# Patient Record
Sex: FEMALE | Race: BLACK OR AFRICAN AMERICAN | Employment: OTHER | URBAN - NONMETROPOLITAN AREA
[De-identification: names, ages, dates, MRNs, and addresses within clinical notes are randomized per-mention and may not be internally consistent; named-entity substitution may affect disease eponyms.]

---

## 2017-01-04 ENCOUNTER — TELEPHONE (OUTPATIENT)
Dept: SURGERY | Age: 72
End: 2017-01-04

## 2017-01-25 ENCOUNTER — TELEPHONE (OUTPATIENT)
Dept: SURGERY | Age: 72
End: 2017-01-25

## 2017-02-21 ENCOUNTER — TELEPHONE (OUTPATIENT)
Dept: SURGERY | Age: 72
End: 2017-02-21

## 2017-02-21 RX ORDER — ANASTROZOLE 1 MG/1
1 TABLET ORAL DAILY
Qty: 90 TABLET | Refills: 0 | Status: SHIPPED | OUTPATIENT
Start: 2017-02-21

## 2017-02-28 ENCOUNTER — INFUSION (OUTPATIENT)
Dept: ONCOLOGY | Facility: HOSPITAL | Age: 72
End: 2017-02-28

## 2017-02-28 ENCOUNTER — LAB (OUTPATIENT)
Dept: ONCOLOGY | Facility: CLINIC | Age: 72
End: 2017-02-28

## 2017-02-28 ENCOUNTER — OFFICE VISIT (OUTPATIENT)
Dept: ONCOLOGY | Facility: CLINIC | Age: 72
End: 2017-02-28

## 2017-02-28 VITALS
HEART RATE: 50 BPM | TEMPERATURE: 97.6 F | DIASTOLIC BLOOD PRESSURE: 50 MMHG | WEIGHT: 235 LBS | OXYGEN SATURATION: 99 % | BODY MASS INDEX: 34.8 KG/M2 | RESPIRATION RATE: 20 BRPM | SYSTOLIC BLOOD PRESSURE: 140 MMHG | HEIGHT: 69 IN

## 2017-02-28 VITALS
RESPIRATION RATE: 18 BRPM | OXYGEN SATURATION: 94 % | DIASTOLIC BLOOD PRESSURE: 70 MMHG | HEIGHT: 69 IN | TEMPERATURE: 97.1 F | BODY MASS INDEX: 34.76 KG/M2 | WEIGHT: 234.7 LBS | HEART RATE: 60 BPM | SYSTOLIC BLOOD PRESSURE: 122 MMHG

## 2017-02-28 DIAGNOSIS — N18.30 ANEMIA OF CHRONIC RENAL FAILURE, STAGE 3 (MODERATE) (HCC): Primary | ICD-10-CM

## 2017-02-28 DIAGNOSIS — D50.9 IRON DEFICIENCY ANEMIA, UNSPECIFIED: Primary | ICD-10-CM

## 2017-02-28 DIAGNOSIS — N18.30 ANEMIA OF CHRONIC RENAL FAILURE, STAGE 3 (MODERATE) (HCC): ICD-10-CM

## 2017-02-28 DIAGNOSIS — D63.1 ANEMIA OF CHRONIC RENAL FAILURE, STAGE 3 (MODERATE) (HCC): Primary | ICD-10-CM

## 2017-02-28 DIAGNOSIS — C50.112 MALIGNANT NEOPLASM OF CENTRAL PORTION OF LEFT FEMALE BREAST (HCC): Primary | ICD-10-CM

## 2017-02-28 DIAGNOSIS — D50.9 IRON DEFICIENCY ANEMIA, UNSPECIFIED IRON DEFICIENCY ANEMIA TYPE: Primary | ICD-10-CM

## 2017-02-28 DIAGNOSIS — D63.1 ANEMIA OF CHRONIC RENAL FAILURE, STAGE 3 (MODERATE) (HCC): ICD-10-CM

## 2017-02-28 LAB
AUTO MIXED CELLS #: 0.4 10*3/UL (ref 0.1–1.5)
AUTO MIXED CELLS %: 7.8 % (ref 0.2–15.1)
ERYTHROCYTE [DISTWIDTH] IN BLOOD BY AUTOMATED COUNT: 17.3 % (ref 11.5–14.5)
HCT VFR BLD AUTO: 30.9 % (ref 37–47)
HGB BLD-MCNC: 9.5 G/DL (ref 12–16)
LYMPHOCYTES # BLD AUTO: 1.6 10*3/MM3 (ref 0.8–7)
LYMPHOCYTES NFR BLD AUTO: 33.1 % (ref 10–58.5)
MCH RBC QN AUTO: 27.8 PG (ref 27–31)
MCHC RBC AUTO-ENTMCNC: 30.7 G/DL (ref 33–37)
MCV RBC AUTO: 90.3 FL (ref 81–99)
NEUTROPHILS # BLD AUTO: 2.9 10*3/MM3 (ref 2–7.8)
NEUTROPHILS NFR BLD AUTO: 59.1 % (ref 37–92)
PLATELET # BLD AUTO: 259 10*3/MM3 (ref 130–400)
PMV BLD AUTO: 7.4 FL (ref 6–12)
RBC # BLD AUTO: 3.42 10*6/MM3 (ref 4.2–5.4)
WBC NRBC COR # BLD: 4.9 10*3/MM3 (ref 4.8–10.8)

## 2017-02-28 PROCEDURE — 36415 COLL VENOUS BLD VENIPUNCTURE: CPT | Performed by: INTERNAL MEDICINE

## 2017-02-28 PROCEDURE — 63510000001 EPOETIN ALFA PER 1000 UNITS: Performed by: INTERNAL MEDICINE

## 2017-02-28 PROCEDURE — 99214 OFFICE O/P EST MOD 30 MIN: CPT | Performed by: INTERNAL MEDICINE

## 2017-02-28 PROCEDURE — 85025 COMPLETE CBC W/AUTO DIFF WBC: CPT | Performed by: INTERNAL MEDICINE

## 2017-02-28 PROCEDURE — 96372 THER/PROPH/DIAG INJ SC/IM: CPT

## 2017-02-28 RX ORDER — IBANDRONATE SODIUM 150 MG/1
150 TABLET, FILM COATED ORAL
Qty: 1 TABLET | Refills: 5 | Status: SHIPPED | OUTPATIENT
Start: 2017-02-28 | End: 2018-05-02

## 2017-02-28 RX ORDER — ANASTROZOLE 1 MG/1
1 TABLET ORAL DAILY
Qty: 30 TABLET | Refills: 5 | Status: SHIPPED | OUTPATIENT
Start: 2017-02-28 | End: 2017-05-30 | Stop reason: SDUPTHER

## 2017-02-28 RX ADMIN — ERYTHROPOIETIN 40000 UNITS: 40000 INJECTION, SOLUTION INTRAVENOUS; SUBCUTANEOUS at 10:26

## 2017-03-28 ENCOUNTER — APPOINTMENT (OUTPATIENT)
Dept: ONCOLOGY | Facility: HOSPITAL | Age: 72
End: 2017-03-28

## 2017-04-27 ENCOUNTER — APPOINTMENT (OUTPATIENT)
Dept: ONCOLOGY | Facility: HOSPITAL | Age: 72
End: 2017-04-27

## 2017-05-04 DIAGNOSIS — C50.212 MALIGNANT NEOPLASM OF UPPER-INNER QUADRANT OF LEFT FEMALE BREAST (HCC): Primary | ICD-10-CM

## 2017-05-05 ENCOUNTER — OFFICE VISIT (OUTPATIENT)
Dept: ONCOLOGY | Facility: CLINIC | Age: 72
End: 2017-05-05

## 2017-05-05 ENCOUNTER — LAB (OUTPATIENT)
Dept: ONCOLOGY | Facility: CLINIC | Age: 72
End: 2017-05-05

## 2017-05-05 VITALS
SYSTOLIC BLOOD PRESSURE: 138 MMHG | BODY MASS INDEX: 34.7 KG/M2 | WEIGHT: 234.3 LBS | RESPIRATION RATE: 18 BRPM | OXYGEN SATURATION: 97 % | HEIGHT: 69 IN | HEART RATE: 68 BPM | DIASTOLIC BLOOD PRESSURE: 76 MMHG | TEMPERATURE: 97.6 F

## 2017-05-05 DIAGNOSIS — D63.1 ANEMIA OF CHRONIC RENAL FAILURE, STAGE 3 (MODERATE) (HCC): ICD-10-CM

## 2017-05-05 DIAGNOSIS — C50.112 MALIGNANT NEOPLASM OF CENTRAL PORTION OF LEFT FEMALE BREAST (HCC): Primary | ICD-10-CM

## 2017-05-05 DIAGNOSIS — N18.30 CHRONIC KIDNEY DISEASE, STAGE III (MODERATE) (HCC): Primary | ICD-10-CM

## 2017-05-05 DIAGNOSIS — N18.30 ANEMIA OF CHRONIC RENAL FAILURE, STAGE 3 (MODERATE) (HCC): ICD-10-CM

## 2017-05-05 DIAGNOSIS — D50.8 OTHER IRON DEFICIENCY ANEMIA: ICD-10-CM

## 2017-05-05 LAB
ALBUMIN SERPL-MCNC: 4.1 G/DL (ref 3.5–5)
ALBUMIN/GLOB SERPL: 1.1 G/DL
ALP SERPL-CCNC: 55 U/L (ref 38–126)
ALT SERPL W P-5'-P-CCNC: 30 U/L (ref 9–52)
ANION GAP SERPL CALCULATED.3IONS-SCNC: 10 MMOL/L
AST SERPL-CCNC: 27 U/L (ref 5–40)
AUTO MIXED CELLS #: 0.4 10*3/UL (ref 0.1–1.5)
AUTO MIXED CELLS %: 7.6 % (ref 0.2–15.1)
BILIRUB SERPL-MCNC: 0.4 MG/DL (ref 0.2–1.3)
BUN BLD-MCNC: 25 MG/DL (ref 7–26)
BUN/CREAT SERPL: 15.6 (ref 7–25)
CALCIUM SPEC-SCNC: 9.7 MG/DL (ref 8.4–10.2)
CHLORIDE SERPL-SCNC: 105 MMOL/L (ref 98–107)
CO2 SERPL-SCNC: 29 MMOL/L (ref 22–30)
CREAT BLD-MCNC: 1.6 MG/DL (ref 0.7–1.4)
ERYTHROCYTE [DISTWIDTH] IN BLOOD BY AUTOMATED COUNT: 18 % (ref 11.5–14.5)
GFR SERPL CREATININE-BSD FRML MDRD: 39 ML/MIN/1.73
GLOBULIN UR ELPH-MCNC: 3.7 GM/DL
GLUCOSE BLD-MCNC: 113 MG/DL (ref 75–110)
HCT VFR BLD AUTO: 32 % (ref 37–47)
HGB BLD-MCNC: 10 G/DL (ref 12–16)
IRON SATN MFR SERPL: 23 % (ref 20–45)
IRON SERPL-MCNC: 67 MCG/DL (ref 42–180)
LYMPHOCYTES # BLD AUTO: 1.7 10*3/MM3 (ref 0.8–7)
LYMPHOCYTES NFR BLD AUTO: 32.1 % (ref 10–58.5)
MCH RBC QN AUTO: 28.1 PG (ref 27–31)
MCHC RBC AUTO-ENTMCNC: 31.3 G/DL (ref 33–37)
MCV RBC AUTO: 89.9 FL (ref 81–99)
NEUTROPHILS # BLD AUTO: 3.1 10*3/MM3 (ref 2–7.8)
NEUTROPHILS NFR BLD AUTO: 60.3 % (ref 37–92)
PLATELET # BLD AUTO: 281 10*3/MM3 (ref 130–400)
PMV BLD AUTO: 6.8 FL (ref 6–12)
POTASSIUM BLD-SCNC: 3.9 MMOL/L (ref 3.6–5)
PROT SERPL-MCNC: 7.8 G/DL (ref 6.3–8.2)
RBC # BLD AUTO: 3.56 10*6/MM3 (ref 4.2–5.4)
SODIUM BLD-SCNC: 144 MMOL/L (ref 137–145)
TIBC SERPL-MCNC: 289 MCG/DL (ref 225–420)
UIBC SERPL-MCNC: 222 MCG/DL
WBC NRBC COR # BLD: 5.2 10*3/MM3 (ref 4.8–10.8)

## 2017-05-05 PROCEDURE — 36415 COLL VENOUS BLD VENIPUNCTURE: CPT | Performed by: INTERNAL MEDICINE

## 2017-05-05 PROCEDURE — 80053 COMPREHEN METABOLIC PANEL: CPT | Performed by: INTERNAL MEDICINE

## 2017-05-05 PROCEDURE — 99214 OFFICE O/P EST MOD 30 MIN: CPT | Performed by: INTERNAL MEDICINE

## 2017-05-05 PROCEDURE — 85025 COMPLETE CBC W/AUTO DIFF WBC: CPT | Performed by: INTERNAL MEDICINE

## 2017-05-06 LAB — FERRITIN SERPL-MCNC: 243 NG/ML (ref 11.1–264)

## 2017-05-30 RX ORDER — ANASTROZOLE 1 MG/1
1 TABLET ORAL DAILY
Qty: 30 TABLET | Refills: 5 | Status: SHIPPED | OUTPATIENT
Start: 2017-05-30 | End: 2017-11-01 | Stop reason: SDUPTHER

## 2017-10-27 DIAGNOSIS — D63.1 ANEMIA OF CHRONIC RENAL FAILURE, STAGE 3 (MODERATE) (HCC): ICD-10-CM

## 2017-10-27 DIAGNOSIS — N18.30 ANEMIA OF CHRONIC RENAL FAILURE, STAGE 3 (MODERATE) (HCC): ICD-10-CM

## 2017-11-01 ENCOUNTER — LAB (OUTPATIENT)
Dept: LAB | Facility: HOSPITAL | Age: 72
End: 2017-11-01

## 2017-11-01 ENCOUNTER — INFUSION (OUTPATIENT)
Dept: ONCOLOGY | Facility: HOSPITAL | Age: 72
End: 2017-11-01
Attending: INTERNAL MEDICINE

## 2017-11-01 ENCOUNTER — OFFICE VISIT (OUTPATIENT)
Dept: ONCOLOGY | Facility: CLINIC | Age: 72
End: 2017-11-01

## 2017-11-01 VITALS
HEIGHT: 69 IN | SYSTOLIC BLOOD PRESSURE: 146 MMHG | TEMPERATURE: 97.4 F | HEART RATE: 60 BPM | OXYGEN SATURATION: 96 % | DIASTOLIC BLOOD PRESSURE: 86 MMHG | BODY MASS INDEX: 36.66 KG/M2 | RESPIRATION RATE: 16 BRPM | WEIGHT: 247.5 LBS

## 2017-11-01 DIAGNOSIS — C50.112 MALIGNANT NEOPLASM OF CENTRAL PORTION OF LEFT FEMALE BREAST, UNSPECIFIED ESTROGEN RECEPTOR STATUS (HCC): Primary | ICD-10-CM

## 2017-11-01 DIAGNOSIS — D63.1 ANEMIA OF CHRONIC RENAL FAILURE, STAGE 3 (MODERATE) (HCC): ICD-10-CM

## 2017-11-01 DIAGNOSIS — N18.30 ANEMIA OF CHRONIC RENAL FAILURE, STAGE 3 (MODERATE) (HCC): ICD-10-CM

## 2017-11-01 LAB
ALBUMIN SERPL-MCNC: 3.9 G/DL (ref 3.5–5)
ALBUMIN/GLOB SERPL: 1.1 G/DL (ref 1.1–2.5)
ALP SERPL-CCNC: 54 U/L (ref 24–120)
ALT SERPL W P-5'-P-CCNC: 39 U/L (ref 0–54)
ANION GAP SERPL CALCULATED.3IONS-SCNC: 11 MMOL/L (ref 4–13)
AST SERPL-CCNC: 30 U/L (ref 7–45)
AUTO MIXED CELLS #: 0.6 10*3/MM3 (ref 0.1–2.6)
AUTO MIXED CELLS %: 11.5 % (ref 0.1–24)
BILIRUB SERPL-MCNC: 0.2 MG/DL (ref 0.1–1)
BUN BLD-MCNC: 23 MG/DL (ref 5–21)
BUN/CREAT SERPL: 16.9
CALCIUM SPEC-SCNC: 9.6 MG/DL (ref 8.4–10.4)
CHLORIDE SERPL-SCNC: 102 MMOL/L (ref 98–110)
CO2 SERPL-SCNC: 28 MMOL/L (ref 24–31)
CREAT BLD-MCNC: 1.36 MG/DL (ref 0.5–1.4)
ERYTHROCYTE [DISTWIDTH] IN BLOOD BY AUTOMATED COUNT: 15.5 % (ref 12–15)
GFR SERPL CREATININE-BSD FRML MDRD: 46 ML/MIN/1.73
GLOBULIN UR ELPH-MCNC: 3.6 GM/DL
GLUCOSE BLD-MCNC: 94 MG/DL (ref 70–100)
HCT VFR BLD AUTO: 31.1 % (ref 37–47)
HGB BLD-MCNC: 10.3 G/DL (ref 12–16)
HOLD SPECIMEN: NORMAL
LYMPHOCYTES # BLD AUTO: 1.5 10*3/MM3 (ref 0.8–7)
LYMPHOCYTES NFR BLD AUTO: 28.7 % (ref 15–45)
MCH RBC QN AUTO: 28.9 PG (ref 28–32)
MCHC RBC AUTO-ENTMCNC: 33.1 G/DL (ref 33–36)
MCV RBC AUTO: 87.4 FL (ref 82–98)
NEUTROPHILS # BLD AUTO: 3.2 10*3/MM3 (ref 1.5–8.3)
NEUTROPHILS NFR BLD AUTO: 59.8 % (ref 39–78)
PLATELET # BLD AUTO: 246 10*3/MM3 (ref 130–400)
PMV BLD AUTO: 8.3 FL (ref 6–12)
POTASSIUM BLD-SCNC: 3.9 MMOL/L (ref 3.5–5.3)
PROT SERPL-MCNC: 7.5 G/DL (ref 6.3–8.7)
RBC # BLD AUTO: 3.56 10*6/MM3 (ref 4.2–5.4)
SODIUM BLD-SCNC: 141 MMOL/L (ref 135–145)
WBC NRBC COR # BLD: 5.3 10*3/MM3 (ref 4.8–10.8)

## 2017-11-01 PROCEDURE — 99214 OFFICE O/P EST MOD 30 MIN: CPT | Performed by: INTERNAL MEDICINE

## 2017-11-01 PROCEDURE — 85025 COMPLETE CBC W/AUTO DIFF WBC: CPT | Performed by: INTERNAL MEDICINE

## 2017-11-01 PROCEDURE — 80053 COMPREHEN METABOLIC PANEL: CPT | Performed by: INTERNAL MEDICINE

## 2017-11-01 PROCEDURE — 36415 COLL VENOUS BLD VENIPUNCTURE: CPT

## 2017-11-01 RX ORDER — ANASTROZOLE 1 MG/1
1 TABLET ORAL DAILY
Qty: 30 TABLET | Refills: 5 | Status: SHIPPED | OUTPATIENT
Start: 2017-11-01 | End: 2018-05-02 | Stop reason: SDUPTHER

## 2017-11-01 RX ORDER — FERROUS SULFATE 325(65) MG
325 TABLET ORAL
COMMUNITY
End: 2022-11-29

## 2017-11-01 NOTE — PROGRESS NOTES
"[facility] Hematology/Oncology    Marina Joe  [id number]  1945 11/1/2017      Subjective    1 year follow-up    HISTORY OF PRESENT ILLNESS:   Oncology history significant for stage IA G1 invasive ductal carcinoma left breast ER 97% FL 97% HER-2/alix 2+, fish  unamplified diagnosed May 2012 status post lumpectomy, radiation, on the hormonal manipulation with Arimidex.  Mammogram May 2016 benign      ROS:    A comprehensive 14 point review of systems performed and was negative generalized fatigue and arthritic discomfort       Medications:  The current medication list was reviewed in the EMR    ALLERGIES:    Allergies   Allergen Reactions   • Aspirin        Objective      Vitals:    11/01/17 1323   BP: 146/86   Pulse: 60   Resp: 16   Temp: 97.4 °F (36.3 °C)   TempSrc: Tympanic   SpO2: 96%   Weight: 247 lb 8 oz (112 kg)   Height: 69\" (175.3 cm)           EXAM:  Gen: Cooperative, in no acute distress.  HEENT: No icterus or pallor, oral mucosa moist, PERRLA, EOMI  Neck: Supple, no JVD, no thyromegaly   Resp: CTA B/L no wheeze or crackles.  Abdomen: Soft, NT , ND, no hepatosplenomegaly  EXT:  No pedal edema or cyanosis  CNS: AAO x 3 , no focal deficit.  Skin: No rash  Breast: No dominant masses, nipple retraction or discharge        Assessment/Plan      1. Stage IA invasive ductal carcinoma left breast ER 97% FL 97% HER-2/alix 2+, fish and amplified diagnosed May 2012 status post lumpectomy, radiation, on the hormonal manipulation with Arimidex.  Mammogram May 2016 benign.  Mammogram due May 2017.  Continue breast self examination and report any abnormality.  Continue Arimidex till January 2018.    2.  Osteopenia.  DEXA scan May 2016 revealed osteopenia.  Start Boniva or Fosamax pending insurance coverage.  Continue daily calcium and vitamin D supplement    3.  Anemia in CKD stage III area today's hemoglobin 10.3. She is asymptomatic, therefore, NO EPO for now. Cont OBSERVATION. RTC 6 months.  "     Farrukh Chavez MD    11/1/2017

## 2017-11-16 ENCOUNTER — TELEPHONE (OUTPATIENT)
Dept: SURGERY | Age: 72
End: 2017-11-16

## 2017-11-16 DIAGNOSIS — Z12.31 VISIT FOR SCREENING MAMMOGRAM: Primary | ICD-10-CM

## 2017-11-16 DIAGNOSIS — C50.412 MALIGNANT NEOPLASM OF UPPER-OUTER QUADRANT OF LEFT FEMALE BREAST, UNSPECIFIED ESTROGEN RECEPTOR STATUS (HCC): ICD-10-CM

## 2017-11-16 NOTE — TELEPHONE ENCOUNTER
Called patient and gave her the following appointment information    [id number]- 12/11/17 at 10:10 for mammograms    [facility]- 12/11/17- at 11:00    Patient voiced her understanding.

## 2017-12-11 ENCOUNTER — OFFICE VISIT (OUTPATIENT)
Dept: SURGERY | Age: 72
End: 2017-12-11
Payer: MEDICARE

## 2017-12-11 ENCOUNTER — HOSPITAL ENCOUNTER (OUTPATIENT)
Dept: WOMENS IMAGING | Age: 72
Discharge: HOME | End: 2017-12-11
Payer: MEDICARE

## 2017-12-11 VITALS — HEART RATE: 72 BPM | SYSTOLIC BLOOD PRESSURE: 138 MMHG | DIASTOLIC BLOOD PRESSURE: 72 MMHG

## 2017-12-11 DIAGNOSIS — C50.412 MALIGNANT NEOPLASM OF UPPER-OUTER QUADRANT OF LEFT FEMALE BREAST, UNSPECIFIED ESTROGEN RECEPTOR STATUS (HCC): ICD-10-CM

## 2017-12-11 DIAGNOSIS — Z85.3 PERSONAL HISTORY OF MALIGNANT NEOPLASM OF BREAST: Primary | ICD-10-CM

## 2017-12-11 PROCEDURE — G8427 DOCREV CUR MEDS BY ELIG CLIN: HCPCS | Performed by: PHYSICIAN ASSISTANT

## 2017-12-11 PROCEDURE — 4040F PNEUMOC VAC/ADMIN/RCVD: CPT | Performed by: PHYSICIAN ASSISTANT

## 2017-12-11 PROCEDURE — 99212 OFFICE O/P EST SF 10 MIN: CPT | Performed by: PHYSICIAN ASSISTANT

## 2017-12-11 PROCEDURE — G8399 PT W/DXA RESULTS DOCUMENT: HCPCS | Performed by: PHYSICIAN ASSISTANT

## 2017-12-11 PROCEDURE — 1036F TOBACCO NON-USER: CPT | Performed by: PHYSICIAN ASSISTANT

## 2017-12-11 PROCEDURE — 1123F ACP DISCUSS/DSCN MKR DOCD: CPT | Performed by: PHYSICIAN ASSISTANT

## 2017-12-11 PROCEDURE — 3014F SCREEN MAMMO DOC REV: CPT | Performed by: PHYSICIAN ASSISTANT

## 2017-12-11 PROCEDURE — G8421 BMI NOT CALCULATED: HCPCS | Performed by: PHYSICIAN ASSISTANT

## 2017-12-11 PROCEDURE — G8484 FLU IMMUNIZE NO ADMIN: HCPCS | Performed by: PHYSICIAN ASSISTANT

## 2017-12-11 PROCEDURE — 3017F COLORECTAL CA SCREEN DOC REV: CPT | Performed by: PHYSICIAN ASSISTANT

## 2017-12-11 PROCEDURE — 1090F PRES/ABSN URINE INCON ASSESS: CPT | Performed by: PHYSICIAN ASSISTANT

## 2017-12-11 PROCEDURE — G0204 DX MAMMO INCL CAD BI: HCPCS

## 2017-12-20 NOTE — PROGRESS NOTES
HISTORY OF PRESENT ILLNESS:  Sakshi Patiño is in for 6 month follow-up breast check with mammogram done today just prior to her visit.  She is s/p left partial mastectomy with left sentinel node biopsy on 1/8/2013 for 3.1 mm invasive ductal carcinoma. ER/GA Positive and HER 2 Equivocal.     She has no new complaints today.  She denies weight loss or any other systemic symptoms.      EXAMINATION: [facility] DIGITAL DIAGNOSTIC W OR WO CAD BILATERAL 12/11/2017   12:32 PM   HISTORY: 71-year-old female with a personal history of left-sided   breast cancer, previous lumpectomy in 2013. Report: Today's examination consists of standard craniocaudal and   oblique digital views of both breasts, a true lateral compression view   of the left lumpectomy bed was also obtained. Comparison is made with   the previous mammograms 12/8/2016 and 5/31/2016. There are scattered fibroglandular densities in a pattern B   parenchymal pattern. There are post therapeutic changes in the left   upper outer quadrant including multiple surgical clips and mild   distortion and fibrosis, which is unchanged. No dominant mass is   identified. There are no suspicious calcifications, skin thickening or   nipple retraction. There is no significant interval change.       Impression   Stable mammograms, with evidence of previous lumpectomy   for breast carcinoma in the left upper outer quadrant. No new or   suspicious features are identified. 1 year follow-up is recommended. 2. ACR/BI-RADS Category 2, benign findings. PHYSICAL EXAM:  The left lumpectomy incision is looking good.  There are fibrocystic changes and appropriate post operative changes.  There are no dominant masses, no skin or nipple changes, and no axillary adenopathy. There is nothing suspicious for new carcinoma and no evidence of local tumor recurrence.     ASSESSMENT:  No evidence of disease status post left lumpectomy 1/2013    PLAN:  I will plan to see her in 1 year with bilateral mammograms.

## 2018-04-30 DIAGNOSIS — C50.112 MALIGNANT NEOPLASM OF CENTRAL PORTION OF LEFT FEMALE BREAST, UNSPECIFIED ESTROGEN RECEPTOR STATUS (HCC): Primary | ICD-10-CM

## 2018-05-01 DIAGNOSIS — N18.30 ANEMIA OF CHRONIC RENAL FAILURE, STAGE 3 (MODERATE) (HCC): ICD-10-CM

## 2018-05-01 DIAGNOSIS — D63.1 ANEMIA OF CHRONIC RENAL FAILURE, STAGE 3 (MODERATE) (HCC): ICD-10-CM

## 2018-05-02 ENCOUNTER — INFUSION (OUTPATIENT)
Dept: ONCOLOGY | Facility: HOSPITAL | Age: 73
End: 2018-05-02
Attending: INTERNAL MEDICINE

## 2018-05-02 ENCOUNTER — LAB (OUTPATIENT)
Dept: LAB | Facility: HOSPITAL | Age: 73
End: 2018-05-02
Attending: INTERNAL MEDICINE

## 2018-05-02 ENCOUNTER — OFFICE VISIT (OUTPATIENT)
Dept: ONCOLOGY | Facility: CLINIC | Age: 73
End: 2018-05-02

## 2018-05-02 VITALS
WEIGHT: 143.8 LBS | HEIGHT: 69 IN | TEMPERATURE: 97.4 F | DIASTOLIC BLOOD PRESSURE: 84 MMHG | SYSTOLIC BLOOD PRESSURE: 138 MMHG | OXYGEN SATURATION: 94 % | HEART RATE: 76 BPM | RESPIRATION RATE: 16 BRPM | BODY MASS INDEX: 21.3 KG/M2

## 2018-05-02 DIAGNOSIS — C50.112 MALIGNANT NEOPLASM OF CENTRAL PORTION OF LEFT BREAST IN FEMALE, ESTROGEN RECEPTOR POSITIVE (HCC): ICD-10-CM

## 2018-05-02 DIAGNOSIS — N18.30 ANEMIA OF CHRONIC RENAL FAILURE, STAGE 3 (MODERATE) (HCC): ICD-10-CM

## 2018-05-02 DIAGNOSIS — D50.9 IRON DEFICIENCY ANEMIA, UNSPECIFIED IRON DEFICIENCY ANEMIA TYPE: Primary | ICD-10-CM

## 2018-05-02 DIAGNOSIS — Z17.0 MALIGNANT NEOPLASM OF CENTRAL PORTION OF LEFT BREAST IN FEMALE, ESTROGEN RECEPTOR POSITIVE (HCC): ICD-10-CM

## 2018-05-02 DIAGNOSIS — D63.1 ANEMIA OF CHRONIC RENAL FAILURE, STAGE 3 (MODERATE) (HCC): ICD-10-CM

## 2018-05-02 DIAGNOSIS — C50.112 MALIGNANT NEOPLASM OF CENTRAL PORTION OF LEFT FEMALE BREAST, UNSPECIFIED ESTROGEN RECEPTOR STATUS (HCC): Primary | ICD-10-CM

## 2018-05-02 LAB
ALBUMIN SERPL-MCNC: 4 G/DL (ref 3.5–5)
ALBUMIN/GLOB SERPL: 1.1 G/DL (ref 1.1–2.5)
ALP SERPL-CCNC: 67 U/L (ref 24–120)
ALT SERPL W P-5'-P-CCNC: 32 U/L (ref 0–54)
ANION GAP SERPL CALCULATED.3IONS-SCNC: 10 MMOL/L (ref 4–13)
AST SERPL-CCNC: 39 U/L (ref 7–45)
BASOPHILS # BLD AUTO: 0.01 10*3/MM3 (ref 0–0.2)
BASOPHILS NFR BLD AUTO: 0.2 % (ref 0–2)
BILIRUB SERPL-MCNC: 0.3 MG/DL (ref 0.1–1)
BUN BLD-MCNC: 20 MG/DL (ref 5–21)
BUN/CREAT SERPL: 17.5 (ref 7–25)
CALCIUM SPEC-SCNC: 9.3 MG/DL (ref 8.4–10.4)
CHLORIDE SERPL-SCNC: 100 MMOL/L (ref 98–110)
CO2 SERPL-SCNC: 32 MMOL/L (ref 24–31)
CREAT BLD-MCNC: 1.14 MG/DL (ref 0.5–1.4)
DEPRECATED RDW RBC AUTO: 49.7 FL (ref 40–54)
EOSINOPHIL # BLD AUTO: 0.16 10*3/MM3 (ref 0–0.7)
EOSINOPHIL NFR BLD AUTO: 3.5 % (ref 0–4)
ERYTHROCYTE [DISTWIDTH] IN BLOOD BY AUTOMATED COUNT: 15.5 % (ref 12–15)
FOLATE SERPL-MCNC: >20 NG/ML
GFR SERPL CREATININE-BSD FRML MDRD: 57 ML/MIN/1.73
GLOBULIN UR ELPH-MCNC: 3.7 GM/DL
GLUCOSE BLD-MCNC: 106 MG/DL (ref 70–100)
HCT VFR BLD AUTO: 32.3 % (ref 37–47)
HGB BLD-MCNC: 10.4 G/DL (ref 12–16)
HOLD SPECIMEN: NORMAL
HOLD SPECIMEN: NORMAL
IMM GRANULOCYTES # BLD: 0.02 10*3/MM3 (ref 0–0.03)
IMM GRANULOCYTES NFR BLD: 0.4 % (ref 0–5)
IRON 24H UR-MRATE: 83 MCG/DL (ref 42–180)
IRON SATN MFR SERPL: 29 % (ref 20–45)
LYMPHOCYTES # BLD AUTO: 1.46 10*3/MM3 (ref 0.72–4.86)
LYMPHOCYTES NFR BLD AUTO: 31.7 % (ref 15–45)
MCH RBC QN AUTO: 28.4 PG (ref 28–32)
MCHC RBC AUTO-ENTMCNC: 32.2 G/DL (ref 33–36)
MCV RBC AUTO: 88.3 FL (ref 82–98)
MONOCYTES # BLD AUTO: 0.43 10*3/MM3 (ref 0.19–1.3)
MONOCYTES NFR BLD AUTO: 9.3 % (ref 4–12)
NEUTROPHILS # BLD AUTO: 2.53 10*3/MM3 (ref 1.87–8.4)
NEUTROPHILS NFR BLD AUTO: 54.9 % (ref 39–78)
NRBC BLD MANUAL-RTO: 0 /100 WBC (ref 0–0)
PLATELET # BLD AUTO: 269 10*3/MM3 (ref 130–400)
PMV BLD AUTO: 9.3 FL (ref 6–12)
POTASSIUM BLD-SCNC: 4 MMOL/L (ref 3.5–5.3)
PROT SERPL-MCNC: 7.7 G/DL (ref 6.3–8.7)
RBC # BLD AUTO: 3.66 10*6/MM3 (ref 4.2–5.4)
SODIUM BLD-SCNC: 142 MMOL/L (ref 135–145)
TIBC SERPL-MCNC: 291 MCG/DL (ref 225–420)
VIT B12 BLD-MCNC: 956 PG/ML (ref 239–931)
WBC NRBC COR # BLD: 4.61 10*3/MM3 (ref 4.8–10.8)

## 2018-05-02 PROCEDURE — 36415 COLL VENOUS BLD VENIPUNCTURE: CPT

## 2018-05-02 PROCEDURE — 99214 OFFICE O/P EST MOD 30 MIN: CPT | Performed by: INTERNAL MEDICINE

## 2018-05-02 PROCEDURE — 83550 IRON BINDING TEST: CPT | Performed by: INTERNAL MEDICINE

## 2018-05-02 PROCEDURE — 85025 COMPLETE CBC W/AUTO DIFF WBC: CPT

## 2018-05-02 PROCEDURE — 82746 ASSAY OF FOLIC ACID SERUM: CPT | Performed by: INTERNAL MEDICINE

## 2018-05-02 PROCEDURE — 82607 VITAMIN B-12: CPT | Performed by: INTERNAL MEDICINE

## 2018-05-02 PROCEDURE — 80053 COMPREHEN METABOLIC PANEL: CPT

## 2018-05-02 PROCEDURE — 83540 ASSAY OF IRON: CPT | Performed by: INTERNAL MEDICINE

## 2018-05-02 RX ORDER — ANASTROZOLE 1 MG/1
1 TABLET ORAL DAILY
Qty: 90 TABLET | Refills: 4 | Status: SHIPPED | OUTPATIENT
Start: 2018-05-02 | End: 2019-01-15 | Stop reason: SDUPTHER

## 2018-05-02 RX ORDER — CARVEDILOL 6.25 MG/1
6.25 TABLET ORAL 2 TIMES DAILY WITH MEALS
COMMUNITY
End: 2020-04-03 | Stop reason: SDUPTHER

## 2018-05-02 NOTE — PROGRESS NOTES
[facility]  HEMATOLOGY & ONCOLOGY    Cancer Staging Information:  No matching staging information was found for the patient.      Subjective     VISIT DIAGNOSIS:   Encounter Diagnoses   Name Primary?   • Iron deficiency anemia, unspecified iron deficiency anemia type Yes   • Malignant neoplasm of central portion of left breast in female, estrogen receptor positive        REASON FOR VISIT:   No chief complaint on file.       HEMATOLOGY / ONCOLOGY HISTORY:    No history exists.           INTERVAL HISTORY  Patient ID: Marina Joe is a 73 y.o. year old female h/o breast ca on arimidex. Tolerates AI very well. mammo 4/27/18 [facility]  -denies sob, brbpr, cp.  Past Medical History:   Past Medical History:   Diagnosis Date   • Breast cancer    • CKD (chronic kidney disease)    • Hypercholesteremia    • Hypertension    • Sinusitis      Past Surgical History:   Past Surgical History:   Procedure Laterality Date   • BREAST BIOPSY Left 11/20/2012   • BREAST LUMPECTOMY Left     with node bx    • REPLACEMENT TOTAL KNEE Right     2016   • TOTAL ABDOMINAL HYSTERECTOMY WITH SALPINGO OOPHORECTOMY       Social History:   Social History     Social History   • Marital status:      Spouse name: N/A   • Number of children: N/A   • Years of education: N/A     Occupational History   • Not on file.     Social History Main Topics   • Smoking status: Never Smoker   • Smokeless tobacco: Not on file   • Alcohol use No   • Drug use: Unknown   • Sexual activity: Not on file     Other Topics Concern   • Not on file     Social History Narrative   • No narrative on file     Family History:   Family History   Problem Relation Age of Onset   • Heart attack Mother    • Hodgkin's lymphoma Father    • Heart attack Brother        Review of Systems   Constitutional: Negative.    HENT: Negative.    Eyes: Negative.    Respiratory: Negative.    Cardiovascular: Negative.    Gastrointestinal: Negative.    Genitourinary: Negative.   "  Musculoskeletal: Negative.    Skin: Negative.    Neurological: Negative.    Hematological: Negative.    Psychiatric/Behavioral: Negative.         Performance Status:  Asymptomatic    Medications:    Current Outpatient Prescriptions   Medication Sig Dispense Refill   • anastrozole (ARIMIDEX) 1 MG tablet Take 1 tablet by mouth Daily. 30 tablet 5   • buPROPion SR (WELLBUTRIN SR) 150 MG 12 hr tablet Take 150 mg by mouth.     • Calcium Carb-Cholecalciferol (CALCIUM 600+D3 PO) Take  by mouth.     • carvedilol (COREG) 6.25 MG tablet Take 6.25 mg by mouth 2 (Two) Times a Day With Meals.     • Ergocalciferol (VITAMIN D2 PO) Take 50,000 Units by mouth.     • ferrous sulfate 325 (65 FE) MG tablet Take 325 mg by mouth Daily With Breakfast.     • fluticasone (FLOVENT DISKUS) 50 MCG/BLIST diskus inhaler Inhale 1 puff.     • levothyroxine (SYNTHROID, LEVOTHROID) 25 MCG tablet Take 25 mcg by mouth Daily.     • Misc Natural Products (COLON HERBAL CLEANSER PO) Take  by mouth.     • olmesartan-hydrochlorothiazide (BENICAR HCT) 40-25 MG per tablet Take 1 tablet by mouth Daily.     • Omega-3 Fatty Acids (FISH OIL) 1000 MG capsule capsule Take 1,000 mg by mouth.     • oxyCODONE (OXY-IR) 5 MG capsule Take 5 mg by mouth.     • Pediatric Multivitamins-Iron (PEDIATRIC MULTIPLE VITAMINS W/ IRON) 15 MG chewable tablet Chew 1 tablet Daily.     • rosuvastatin (CRESTOR) 20 MG tablet Take 20 mg by mouth Daily.     • sertraline (ZOLOFT) 100 MG tablet Take 100 mg by mouth Daily.     • valACYclovir (VALTREX) 500 MG tablet Take 500 mg by mouth.       No current facility-administered medications for this visit.        ALLERGIES:    Allergies   Allergen Reactions   • Aspirin        Objective      Vitals:    05/02/18 1347   BP: 138/84   Pulse: 76   Resp: 16   Temp: 97.4 °F (36.3 °C)   TempSrc: Tympanic   SpO2: 94%   Weight: 65.2 kg (143 lb 12.8 oz)   Height: 175.3 cm (69.02\")         Current Status 5/2/2018   ECOG score 0         Physical Exam  General " Appearance: Patient is awake, alert, oriented and in no acute distress. Patient is welldeveloped, wellnourished, and appears stated age.  HEENT: Normocephalic. Sclerae clear, conjunctiva pink, extraocular movements intact, pupils, round, reactive to light and  accommodation. Mouth and throat are clear with moist oral mucosa.  NECK: Supple, no jugular venous distention, thyroid not enlarged.  LYMPH: No cervical, supraclavicular, axillary, or inguinal lymphadenopathy.  CHEST: Equal bilateral expansion, AP  diameter normal, resonant percussion note  LUNGS: Good air movement, no rales, rhonchi, rubs or wheezes with auscultation  CARDIO: Regular sinus rhythm, no murmurs, gallops or rubs.  ABDOMEN: Nondistended, soft, No tenderness, no guarding, no rebound, No hepatosplenomegaly. No abdominal masses. Bowel sounds positive. No hernia  GENITALIA: Not examined.  BREASTS: Not examined.  MUSKEL: No joint swelling, decreased motion, or inflammation  EXTREMS: No edema, clubbing, cyanosis, No varicose veins.  NEURO: Grossly nonfocal, Gait is coordinated and smooth, Cognition is preserved.  SKIN: No rashes, no ecchymoses, no petechia.  PSYCH: Oriented to time, place and person. Memory is preserved. Mood and affect appear normal  RECENT LABS:  Lab on 05/02/2018   Component Date Value Ref Range Status   • WBC 05/02/2018 4.61* 4.80 - 10.80 10*3/mm3 Final   • RBC 05/02/2018 3.66* 4.20 - 5.40 10*6/mm3 Final   • Hemoglobin 05/02/2018 10.4* 12.0 - 16.0 g/dL Final   • Hematocrit 05/02/2018 32.3* 37.0 - 47.0 % Final   • MCV 05/02/2018 88.3  82.0 - 98.0 fL Final   • MCH 05/02/2018 28.4  28.0 - 32.0 pg Final   • MCHC 05/02/2018 32.2* 33.0 - 36.0 g/dL Final   • RDW 05/02/2018 15.5* 12.0 - 15.0 % Final   • RDW-SD 05/02/2018 49.7  40.0 - 54.0 fl Final   • MPV 05/02/2018 9.3  6.0 - 12.0 fL Final   • Platelets 05/02/2018 269  130 - 400 10*3/mm3 Final   • Neutrophil % 05/02/2018 54.9  39.0 - 78.0 % Final   • Lymphocyte % 05/02/2018 31.7  15.0 -  45.0 % Final   • Monocyte % 05/02/2018 9.3  4.0 - 12.0 % Final   • Eosinophil % 05/02/2018 3.5  0.0 - 4.0 % Final   • Basophil % 05/02/2018 0.2  0.0 - 2.0 % Final   • Immature Grans % 05/02/2018 0.4  0.0 - 5.0 % Final   • Neutrophils, Absolute 05/02/2018 2.53  1.87 - 8.40 10*3/mm3 Final   • Lymphocytes, Absolute 05/02/2018 1.46  0.72 - 4.86 10*3/mm3 Final   • Monocytes, Absolute 05/02/2018 0.43  0.19 - 1.30 10*3/mm3 Final   • Eosinophils, Absolute 05/02/2018 0.16  0.00 - 0.70 10*3/mm3 Final   • Basophils, Absolute 05/02/2018 0.01  0.00 - 0.20 10*3/mm3 Final   • Immature Grans, Absolute 05/02/2018 0.02  0.00 - 0.03 10*3/mm3 Final   • nRBC 05/02/2018 0.0  0.0 - 0.0 /100 WBC Final       RADIOLOGY:  No results found.         Assessment/Plan  Marina Joe is a 73 y.o. year old female with h/o breast ca whom we are seeing today for anemia 2/2 ckd.    Patient Active Problem List   Diagnosis   • Malignant neoplasm of central portion of left female breast   • Anemia of chronic renal failure, stage 3 (moderate)        1. Stage IA invasive ductal carcinoma left breast ER 97% CO 97% HER-2/alix 2+, fish and amplified diagnosed May 2012 status post lumpectomy, radiation, on the hormonal manipulation with Arimidex.  Mammogram 12/17 benign.   --she tolerates AI with no SE, continue for 10yrs.     2.  Osteopenia.  DEXA scan May 2016 revealed osteopenia.  Start Boniva or Fosamax pending insurance coverage.  Continue daily calcium and vitamin D supplement     3.  Anemia in CKD stage III area today's hemoglobin 10.3. She is asymptomatic, therefore, NO EPO for now. Cont OBSERVATION. Check iron profile, B12, folate.      4. Hypothyroidism: on synthroid.  5. Depression: on wellbutrin  6. CAD: on coreg, benicar  7. Chronic pain synd: on oxycodone IR       Obiageli Chinaka Ezewuiro, MD    5/2/2018    2:03 PM

## 2018-06-29 ENCOUNTER — HOSPITAL ENCOUNTER (OUTPATIENT)
Dept: HOSPITAL 58 - REHAB | Age: 73
LOS: 1 days | End: 2018-06-30
Attending: INTERNAL MEDICINE

## 2018-06-29 DIAGNOSIS — M62.89: ICD-10-CM

## 2018-06-29 DIAGNOSIS — M54.2: Primary | ICD-10-CM

## 2018-07-02 NOTE — RS.OPPTDN
Subjective


Date of Note: 07/02/18


Visit #: 2


Date of Evaluation: 06/29/18


Payer Source: MEDICARE


Treatment Diagnosis: cervical pain


Current Subjective/complaints:: Patient says treatment at [facility] felt good, but 

continues with neck soreness.  She says she has been able to try some HEP.


*Precautions: n/a





Pain Assessment





- Pain Description


Pain Location: L and R cspine paraspinals.  Pain can radiate to cause HA 

occasionally.





- Heat/Cryotherapy


Treatment: Hot Pack (20 mins to cervical supine)





Interventions





- Exercise/Activities/Manual Therapy


Exercises/Activities: Patient received gentle passive stretching to cspine in SB

, rotation, and levator scap stretch.  Began manual isometrics for neck 

retraction x 5, SB L and R x 5.  Shoulder shrugs, scap adduction x 8.


Total minutes of Exercise: 14


Manual Therapy: STM and gentle trigger work throughout the bilateral UT, 

focusing on the R side.  Cross stretching bilaterally.


Total minutes of Manual Therapy: 16


HOME EXERCISE PROGRAM: pt given written HEP including cervical retraction, 

scapular retraction, corner stretch, upper trap stretch





- Charges


Timed Code Treatment Minutes: 30


Total Treatment Time: 50


Procedures billed for this date of service:: hp, MT, EX


Assessment: Patient demo moderate muscle guarding throughout bilateral UT, but 

particularly to the R.  Pain seems to be more so to the L of the C5-C7 

paraspinals however.  Stiffness and tightness to the R shoulder with AROM.  

Patient admitted mild pain relief and improved mobility following treatment 

today.


Patient Education: Education of diagnosis, Body/Joint mechanics, Education of 

Plan of Care


Patient demonstrates compliance with HEP?: Yes





Short Term Goals


Goal #1: pt rate pain < 7/10 in cervical spine with no c/o radicular pain in 

RUE.


Goal to be met by: 07/20/18


Goal #2: pt independent with initial HEP


Goal to be met by: 07/20/18


Goal #3: pt demonstrate improved cervical ext and rotation with less pain.


Goal to be met by: 07/20/18





Long Term Goals


Goal #1: pt rate pain <4/10


Goal to be met by: 08/10/18


Goal #2: Cervical ROM WFL's w decreased pain to allow normal activity 

independently


Goal to be met by: 08/10/18


Goal #3: Decreased muscle tightness decreased trigger points noted cervical 

spine


Goal to be met by: 08/10/18





Plan


PLAN OF CARE EXPIRES ON:: 08/10/18


ORDER # VISITS AND/OR THROUGH DATE: 8/10/18


PLAN: Patient to attend BIW for cspine ROM, pain, and postural strengthening.

## 2018-07-03 NOTE — RS.OPPTDN
Subjective


Date of Note: 07/03/18


Visit #: 3


Date of Evaluation: 06/29/18


Payer Source: MEDICARE


Treatment Diagnosis: cervical pain


Current Subjective/complaints:: Patient says she had relief of neck pain after 

yesterday's MT, but is a little sore today on both sides of her neck.


*Precautions: n/a





Pain Assessment





- Pain Description


Pain Location: bilateral UT, daily R sided HA's with or without neck pain.





- Heat/Cryotherapy


Treatment: Hot Pack (cervical in supine x 20 mins)





Interventions





- Exercise/Activities/Manual Therapy


Exercises/Activities: Patient received gentle passive stretching to cspine in SB

, rotation, and levator scap stretch.  Continued with manual isometrics for 

neck retraction x 5, SB L and R x 5.  Shoulder shrugs, scap adduction x 8. 

Began red tband scap retraction with therapy stick and bilateral shoulder ER x 

10 each.  Gave these latter 2 for HEP.


Total minutes of Exercise: 14


Manual Therapy: STM and gentle trigger work throughout the bilateral UT, 

focusing on the R side.  Cross stretching bilaterally.


Total minutes of Manual Therapy: 15


HOME EXERCISE PROGRAM: pt given written HEP including cervical retraction, 

scapular retraction, corner stretch, upper trap stretch





- Charges


Timed Code Treatment Minutes: 29


Total Treatment Time: 49


Procedures billed for this date of service:: hp, MT, EX


Assessment: Patient expresses relief with yesterday's session.  She did have 

mild soreness bilateral UT from MT initially, but did feel improved mobility 

later on.  She demo continued increased tone to the R UT> L.  Limited L 

rotation.  She would benefit from continued MT to improve neck pain/HA's, and 

mobility.  Continue to advance postural strengthening.


Patient Education: Home Exercise Program


Patient demonstrates compliance with HEP?: Yes





Short Term Goals


Goal #1: pt rate pain < 7/10 in cervical spine with no c/o radicular pain in 

RUE.


Goal to be met by: 07/20/18


Goal #2: pt independent with initial HEP


Goal to be met by: 07/20/18


Goal #3: pt demonstrate improved cervical ext and rotation with less pain.


Goal to be met by: 07/20/18





Long Term Goals


Goal #1: pt rate pain <4/10


Goal to be met by: 08/10/18


Goal #2: Cervical ROM WFL's w decreased pain to allow normal activity 

independently


Goal to be met by: 08/10/18


Goal #3: Decreased muscle tightness decreased trigger points noted cervical 

spine


Goal to be met by: 08/10/18





Plan


PLAN OF CARE EXPIRES ON:: 08/10/18


ORDER # VISITS AND/OR THROUGH DATE: 8/10/18


PLAN: Patient to continue BIW

## 2018-07-06 NOTE — RS.OPPTDN
Subjective


Date of Note: 07/06/18


Visit #: 4


Date of Evaluation: 06/29/18


Payer Source: MEDICARE


Treatment Diagnosis: cervical pain


Current Subjective/complaints:: Patient says she has had soreness to her neck 

the past few days.  She says she has noticed improved mobility and can see her 

exercises are getting a little easier.


*Precautions: n/a





Pain Assessment





- Pain Description


Pain Location: bilateral UT





- Heat/Cryotherapy


Treatment: Hot Pack (cervical in supine x 20 mins)





Interventions





- Exercise/Activities/Manual Therapy


Exercises/Activities: Patient received gentle passive stretching to cspine in SB

, rotation, and levator scap stretch.  Continued with manual isometrics for 

neck retraction x 5, SB L and R x 5.  Shoulder shrugs, scap adduction x 8. 

Continued with red tband scap retraction with therapy stick and bilateral 

shoulder ER with green tband x 10 each.


Total minutes of Exercise: 12


Manual Therapy: STM and gentle trigger work throughout the bilateral UT, 

focusing on the R side.  Cross stretching bilaterally.


Total minutes of Manual Therapy: 14


HOME EXERCISE PROGRAM: pt given written HEP including cervical retraction, 

scapular retraction, corner stretch, upper trap stretch





- Charges


Timed Code Treatment Minutes: 26


Total Treatment Time: 46


Procedures billed for this date of service:: hp, MT, ex


Assessment: Patient presents with soreness to bilateral UT with continued mm 

guarding throughout both sides, more so to the R.  She has limited L cervical 

rotation today compared to previous session.  She does show less tone to 

bilateral UT following therex/MT today.


Patient Education: Body/Joint mechanics, Home Exercise Program


Patient demonstrates compliance with HEP?: Yes





Short Term Goals


Goal #1: pt rate pain < 7/10 in cervical spine with no c/o radicular pain in 

RUE.


Goal to be met by: 07/20/18


Progress towards Goal:: Progressing


Goal #2: pt independent with initial HEP


Goal to be met by: 07/20/18


Progress towards Goal:: Progressing


Goal #3: pt demonstrate improved cervical ext and rotation with less pain.


Goal to be met by: 07/20/18


Progress towards Goal:: Progressing





Long Term Goals


Goal #1: pt rate pain <4/10


Goal to be met by: 08/10/18


Goal #2: Cervical ROM WFL's w decreased pain to allow normal activity 

independently


Goal to be met by: 08/10/18


Goal #3: Decreased muscle tightness decreased trigger points noted cervical 

spine


Goal to be met by: 08/10/18





Plan


PLAN OF CARE EXPIRES ON:: 08/10/18


ORDER # VISITS AND/OR THROUGH DATE: 8/10/18


PLAN: Patient to continue to improve cspine mobility, pain, and postural 

strength.

## 2018-07-10 NOTE — RS.OPPTDN
Subjective


Date of Note: 07/10/18


Visit #: 5


Date of Evaluation: 06/29/18


Payer Source: MEDICARE


Treatment Diagnosis: cervical pain


Current Subjective/complaints:: Patient says she has felt good over the 

weekend.  She says her R shoulder may have slightly more mobility.  Gisselle says 

her neck is still tight, but she continues with self stretching to assist with 

limitation.  She says she is open to try any form of treatment as we discussed 

possible trying cervical traction today.


*Precautions: n/a





Pain Assessment





- Pain Description


Pain Location: bilateral UT and R shoulder





- Heat/Cryotherapy


Treatment: Hot Pack (cervical x 20 mins supine)





Interventions





- Exercise/Activities/Manual Therapy


Exercises/Activities: Patient received continued passive stretching to cspine 

in SB, rotation, and levator scap stretch.  Continued with manual isometrics 

for neck retraction x 5, SB L and R x 5.  Shoulder shrugs, scap adduction x 8. 

Continued with red tband scap retraction with therapy stick and bilateral 

shoulder ER with green tband x 10 each.  1# wand for bilateral shoulder flexion 

x 10.


Total minutes of Exercise: 13


Manual Therapy: STM and gentle trigger work throughout the bilateral UT, 

focusing on the R side.  Cross stretching bilaterally.


Total minutes of Manual Therapy: 17


HOME EXERCISE PROGRAM: pt given written HEP including cervical retraction, 

scapular retraction, corner stretch, upper trap stretch





- Charges


Timed Code Treatment Minutes: 30


Total Treatment Time: 50


Procedures billed for this date of service:: hp, MT, EX


Assessment: Patient's pain level is lower today with demo reduced muscle 

guarding to bilateral UT.  Radicular pain to the R shoulder is intermittent and 

not present at current.  She remains with 2 small trigger points at the R UT.  

Patient eager to try traction, but room was occupied at that time.  Patient 

should begin traction next session, for it may assist with radiculopathy and 

continued muscle guarding.


Patient Education: Body/Joint mechanics, Home Exercise Program, Education of 

Plan of Care


Patient demonstrates compliance with HEP?: Yes





Short Term Goals


Goal #1: pt rate pain < 7/10 in cervical spine with no c/o radicular pain in 

RUE.


Goal to be met by: 07/20/18


Progress towards Goal:: Progressing


Goal #2: pt independent with initial HEP


Goal to be met by: 07/20/18


Progress towards Goal:: Progressing


Goal #3: pt demonstrate improved cervical ext and rotation with less pain.


Goal to be met by: 07/20/18


Progress towards Goal:: Progressing





Long Term Goals


Goal #1: pt rate pain <4/10


Goal to be met by: 08/10/18


Goal #2: Cervical ROM WFL's w decreased pain to allow normal activity 

independently


Goal to be met by: 08/10/18


Goal #3: Decreased muscle tightness decreased trigger points noted cervical 

spine


Goal to be met by: 08/10/18





Plan


PLAN OF CARE EXPIRES ON:: 08/10/18


ORDER # VISITS AND/OR THROUGH DATE: 8/10/18


PLAN: Continue BIW and begin cervical traction next session

## 2018-07-12 NOTE — RS.OPPTDN
Subjective


Date of Note: 07/12/18


Visit #: 6


Date of Evaluation: 06/29/18


Payer Source: MEDICARE


Treatment Diagnosis: cervical pain


Current Subjective/complaints:: pt states that her pain is better today, 

however when asked to rate pain, pt rates pain 9/10.


*Precautions: n/a





Pain Assessment





- Pain Description


Pain Location: cervical pain


Pain Description: Aching


Current Pain Intensity: 9/10


Other Comments regarding Pain:: pt inconsistent with rating pain, states that 

pain is better, however rates 9/10.





- Heat/Cryotherapy


Treatment: Hot Pack


Comments:: cervical spine





- Traction Treatment


Method: Mechanical, Cervical


Patient Position: Supine


Amount of Force Applied: 14lbs


Hold Time: 35sec


Rest Time: 5 sec


Duration of treatment: 12 mins


Traction Treatment Comment: pt states multiple times when asked that traction 

felt good, no pain reported, however after removing pt from traction she states 

that is caused some increased pain. pt is inconsistent with describing pain.





Interventions





- Exercise/Activities/Manual Therapy


Exercises/Activities: pt received passive stretch SB, rotation, levator 

scapular stretch. pt performed scapular retraction and shld ER with green 

theraband 2 sets of 10 reps, 1#wand Bshld flex 2 sets of 10 reps, cervical 

retraction x 10reps


Total minutes of Exercise: 26


Manual Therapy: gentle trigger point work


HOME EXERCISE PROGRAM: pt given written HEP including cervical retraction, 

scapular retraction, corner stretch, upper trap stretch





- Charges


Timed Code Treatment Minutes: 48


Total Treatment Time: 60


Procedures billed for this date of service:: exercise x 2, cervical traction, 

hot packs


Assessment: pt inconsistent with description of pain. pt continues with muscle 

tightness in cervical spine as well as pain. pt also with forward flexed 

posture.


Patient Education: Home Exercise Program, Education of Plan of Care


Comments: pt states she has been doing HEP, however muscle tightness remains 

with little change.


Patient demonstrates compliance with HEP?: Yes





Short Term Goals


Goal #1: pt rate pain < 7/10 in cervical spine with no c/o radicular pain in 

RUE.


Goal to be met by: 07/20/18


Progress towards Goal:: Progressing


Goal #2: pt independent with initial HEP


Goal to be met by: 07/20/18


Progress towards Goal:: Progressing


Goal #3: pt demonstrate improved cervical ext and rotation with less pain.


Goal to be met by: 07/20/18


Progress towards Goal:: Progressing





Long Term Goals


Goal #1: pt rate pain <4/10


Goal to be met by: 08/10/18


Goal #2: Cervical ROM WFL's w decreased pain to allow normal activity 

independently


Goal to be met by: 08/10/18


Goal #3: Decreased muscle tightness decreased trigger points noted cervical 

spine


Goal to be met by: 08/10/18





Plan


PLAN OF CARE EXPIRES ON:: 08/10/18


ORDER # VISITS AND/OR THROUGH DATE: 8/10/18


PLAN: plan to continue with manual therapy and try cervical traction another 

time to assess for improvement.

## 2018-07-13 NOTE — RS.OPPTDN
Subjective


Date of Note: 07/13/18


Visit #: 7


Date of Evaluation: 06/29/18


Payer Source: MEDICARE


Treatment Diagnosis: cervical pain


Current Subjective/complaints:: Patient reports the neck feels about the same 

as yesterday.


*Precautions: n/a





Pain Assessment





- Pain Description


Pain Location: cervical


Pain Description: Aching


Current Pain Intensity: 9/10





- Heat/Cryotherapy


Treatment: Hot Pack (20 mins. to cervical ,prior to exercises and traction)





- Traction Treatment


Method: Mechanical, Intermittent, Cervical


Patient Position: Supine


Amount of Force Applied: 15 #


Hold Time: 30 secs.


Rest Time: 5 secs.


Duration of treatment: 15 mins.





Interventions





- Exercise/Activities/Manual Therapy


Exercises/Activities: pt received passive stretch SB, rotation, chin tucks.HEP 

review,emphasizing posture.


Total minutes of Exercise: 20


Manual Therapy: N/A


Total minutes of Manual Therapy: 0


HOME EXERCISE PROGRAM: pt given written HEP including cervical retraction, 

scapular retraction, corner stretch, upper trap stretch





- Charges


Timed Code Treatment Minutes: 35


Total Treatment Time: 50


Procedures billed for this date of service:: hp,ex,traction


Assessment: Patient tolerates traction well,reports slight relief,appears to 

have difficulty assessing her pain level.She is tender to palpate C7 level,also 

has radiating into the R UE today ,but unable to rate.Continue PT to reduce/

eliminate cervical pain.


Patient Education: Education of diagnosis, Body/Joint mechanics, Home Exercise 

Program, Activity Modification, Education of Plan of Care





Short Term Goals


Goal #1: pt rate pain < 7/10 in cervical spine with no c/o radicular pain in 

RUE.


Goal to be met by: 07/20/18 (9/10 before treatment,8/10 after traction.)


Progress towards Goal:: Progressing


Goal #2: pt independent with initial HEP


Goal to be met by: 07/20/18


Progress towards Goal:: Progressing


Goal #3: pt demonstrate improved cervical ext and rotation with less pain.


Goal to be met by: 07/20/18


Progress towards Goal:: Progressing





Long Term Goals


Goal #1: pt rate pain <4/10


Goal to be met by: 08/10/18


Goal #2: Cervical ROM WFL's w decreased pain to allow normal activity 

independently


Goal to be met by: 08/10/18


Goal #3: Decreased muscle tightness decreased trigger points noted cervical 

spine


Goal to be met by: 08/10/18





Plan


PLAN OF CARE EXPIRES ON:: 08/10/18


ORDER # VISITS AND/OR THROUGH DATE: 8/10/18


PLAN: Cont. PT to reduce /eliminate cervical pain ,educate patient on better 

posture.

## 2018-07-16 NOTE — RS.OPPTDN
Subjective


Date of Note: 07/16/18


Visit #: 8


Date of Evaluation: 06/29/18


Payer Source: MEDICARE


Treatment Diagnosis: cervical pain


Current Subjective/complaints:: Patient c/o dull headache today,reports the 

pain intensity continues to be the same.


*Precautions: n/a





Pain Assessment





- Pain Description


Pain Location: cervical


Pain Description: Dull, Aching, Chronic


Current Pain Intensity: 9/10


Other Comments regarding Pain:: 6/10 after manual therapy





Interventions





- Exercise/Activities/Manual Therapy


Exercises/Activities: 15 mins. total,pt received passive stretch SB, rotation 

to L and R, chin tucks,gentle manual distraction intermittently.


Total minutes of Exercise: 15


Manual Therapy: 20 mins. deep tissue mobs. to upper traps /cervical (scalenes) 

in seated position.


Total minutes of Manual Therapy: 20 mins.


HOME EXERCISE PROGRAM: pt given written HEP including cervical retraction, 

scapular retraction, corner stretch, upper trap stretch





- Charges


Timed Code Treatment Minutes: 35


Total Treatment Time: 55


Procedures billed for this date of service:: hp,ex ,manual


Assessment: Patient reports consistent pain at 9/10 before treatment ,but has 

relief after manual therapy today.She has decreased tone in the upper traps,no 

tender trigger points present.She has minimal tightness with R cervical 

rotation today.She has no report of increased pain with all other motions.


Patient Education: Body/Joint mechanics, Home Exercise Program





Short Term Goals


Goal #1: pt rate pain < 7/10 in cervical spine with no c/o radicular pain in 

RUE.


Goal to be met by: 07/20/18 (9/10 before treatment,6/10 after traction.)


Progress towards Goal:: Progressing


Goal #2: pt independent with initial HEP


Goal to be met by: 07/20/18


Progress towards Goal:: Progressing


Goal #3: pt demonstrate improved cervical ext and rotation with less pain.


Goal to be met by: 07/20/18


Progress towards Goal:: Progressing





Long Term Goals


Goal #1: pt rate pain <4/10


Goal to be met by: 08/10/18


Goal #2: Cervical ROM WFL's w decreased pain to allow normal activity 

independently


Goal to be met by: 08/10/18


Goal #3: Decreased muscle tightness decreased trigger points noted cervical 

spine


Goal to be met by: 08/10/18


Progress towards goal: Partially Met





Plan


PLAN OF CARE EXPIRES ON:: 08/10/18


ORDER # VISITS AND/OR THROUGH DATE: 8/10/18


PLAN: Cont. PT to eliminate /reduce cervical pain .

## 2018-07-17 NOTE — RS.OPPTDN
Subjective


Date of Note: 07/17/18


Visit #: 9


Date of Evaluation: 06/29/18


Payer Source: MEDICARE


Treatment Diagnosis: cervical pain


Current Subjective/complaints:: Patient says her neck was very sore last night, 

but did have relief after her session.


*Precautions: n/a





Pain Assessment





- Pain Description


Pain Location: L more than R side of neck.  Does not rate.  Describes, "I'm 

hurting and sore this morning."





- Heat/Cryotherapy


Treatment: Hot Pack (cervical x 20 mins supine)





- Traction Treatment


Method: Mechanical, Intermittent


Patient Position: Supine


Amount of Force Applied: 15


Hold Time: 25


Rest Time: 5


Duration of treatment: 15





Interventions





- Exercise/Activities/Manual Therapy


Exercises/Activities: 15 mins. total,pt received passive stretch SB, rotation 

to L and R, chin tucks.  Isometric neck retraction, SB bilaterally x 5.  

Shoulder shrugs, 2# therapy wand for bilateral shoulder flexion, green tband 

scap retraction, bilateral shoulder ER, horizontal abd bilaterally with green 

tband x 10.


Manual Therapy: 10 mins. deep tissue mobs. to upper traps /cervical (scalenes) 

in seated position.


HOME EXERCISE PROGRAM: pt given written HEP including cervical retraction, 

scapular retraction, corner stretch, upper trap stretch





- Charges


Timed Code Treatment Minutes: 25


Total Treatment Time: 55


Procedures billed for this date of service:: hp, mechanical traction, MT, EX


Assessment: Patient described moderate pain to both sides of her neck, with the 

L > R.  She admits only slight tenderness to the L UT during MT, but none to 

the R.  She is unable to be specific about any limitations at home or community 

which may involve reproduction of neck pain.  She continues with radiating 

symptoms to the L UT intermittently.  Initiated mechanical traction today for 

continued radiculopathy and patient reported mild reduction in pain following 

MT and no pain following traction.  Patient eager to continue progressing.


Patient Education: Home Exercise Program, Education of Plan of Care


Patient demonstrates compliance with HEP?: Yes





Short Term Goals


Goal #1: pt rate pain < 7/10 in cervical spine with no c/o radicular pain in 

RUE.


Goal to be met by: 07/20/18


Progress towards Goal:: Progressing


Goal #2: pt independent with initial HEP


Goal to be met by: 07/20/18


Progress towards Goal:: Met


Goal #3: pt demonstrate improved cervical ext and rotation with less pain.


Goal to be met by: 07/20/18


Progress towards Goal:: Partially Met





Long Term Goals


Goal #1: pt rate pain <4/10


Goal to be met by: 08/10/18


Progress towards goal: Progressing


Goal #2: Cervical ROM WFL's w decreased pain to allow normal activity 

independently


Goal to be met by: 08/10/18


Progress towards goal: Progressing


Goal #3: Decreased muscle tightness decreased trigger points noted cervical 

spine


Goal to be met by: 08/10/18


Progress towards goal: Partially Met





Plan


PLAN OF CARE EXPIRES ON:: 08/10/18


ORDER # VISITS AND/OR THROUGH DATE: 8/10/18


PLAN: Patient to continue to progress cervical traction Friday.

## 2018-07-23 NOTE — RS.OPPTDN
Subjective


Date of Note: 07/20/18


Visit #: 10


Date of Evaluation: 06/29/18


Payer Source: MEDICARE


Treatment Diagnosis: cervical pain


Current Subjective/complaints:: Patient says she thought traction helped her 

neck pain.  She is working on HEP and says her pain is intermittent and is not 

"too high right now."


*Precautions: n/a





- Heat/Cryotherapy


Treatment: Hot Pack (20 mins to the cervical in supine)





- Traction Treatment


Method: Mechanical, Intermittent, Cervical


Patient Position: Supine


Amount of Force Applied: 16-17


Hold Time: 25


Rest Time: 5


Duration of treatment: 17


Traction Treatment Comment: Traction cradle slipping and readjusted 

intermittently.  Loosened patient's pony tail for her head to fit more 

comfortably in sled.





Interventions





- Exercise/Activities/Manual Therapy


Exercises/Activities: Reviewed HeP


Manual Therapy: na


HOME EXERCISE PROGRAM: pt given written HEP including cervical retraction, 

scapular retraction, corner stretch, upper trap stretch





- Objective Findings


Observations,measurements,etc.: Neck Disability Index:  20 or 40% impairment (

Eval measured 25 or 50%)  This leaves patient only 1 point from reaching 

progressed category.





- Charges


Timed Code Treatment Minutes: 10


Total Treatment Time: 50


Procedures billed for this date of service:: hp, mechanical traction


Assessment: Patient admitting improved neck pain following traction session 

last appt, but did have some difficulty with sled comfort.  Readjusted and 

loosened several times for best pull.  Patient noted being relaxed and sleepy 

after session, but no increase in pain due to traction.  Some improvement noted 

in Neck disability Index with 1 point reaching progressed category.  She is 

vague at times about her pain and admits it is better, but offers a high pain 

number on scale.


Patient Education: Education of diagnosis, Home Exercise Program


Patient demonstrates compliance with HEP?: Yes





Short Term Goals


Goal #1: pt rate pain < 7/10 in cervical spine with no c/o radicular pain in 

RUE.


Goal to be met by: 07/20/18


Progress towards Goal:: Partially Met


Comments:: Pain is down on average, but continues with intermittent 

radiculopathy


Goal #2: pt independent with initial HEP


Goal to be met by: 07/20/18


Progress towards Goal:: Met


Goal #3: pt demonstrate improved cervical ext and rotation with less pain.


Goal to be met by: 07/20/18


Progress towards Goal:: Met





Long Term Goals


Goal #1: pt rate pain <4/10


Goal to be met by: 08/10/18


Progress towards goal: Progressing


Goal #2: Cervical ROM WFL's w decreased pain to allow normal activity 

independently


Goal to be met by: 08/10/18


Progress towards goal: Progressing


Goal #3: Decreased muscle tightness decreased trigger points noted cervical 

spine


Goal to be met by: 08/10/18


Progress towards goal: Partially Met





Plan


PLAN OF CARE EXPIRES ON:: 08/10/18


ORDER # VISITS AND/OR THROUGH DATE: 8/10/18


PLAN: Patient to continue 2 more times for progressed traction and 

strengthening.

## 2018-07-23 NOTE — RS.OPPTDC
Date of Discharge: 07/25/18


Date of Evaluation: 06/29/18


Number of Visits: 12


Treatment Diagnosis: cervical pain


Current Level of Function: pt demonstrates decreased muscle guarding 

bilaterally and demonstrates cspine ROM WNL with slight limitation in R side 

bending, rotation.  strength R hand 30#, L 38#. pt with no trigger points 

noted, only intermittent radicular pain in upper arm. Reports h/a are the same.


Current Complaints/Gains: pt reports improved neck pain and tightness but pt 

states headaches are the same. She verbalized improvement but c/o 8/10 score on 

average. She states that at times she has no pain. No reports no real sleep 

disturbance.





Pain Assessment





- Pain Description


Pain Location: cervical spine.


Current Pain Intensity: 8/10





Functional Outcome Measure


Neck Disability Index: 20 (40%)





- G Codes & Severity Modifier


G Codes & Modifier: carrying moving and handling current CK.  carrying moving 

and handling goal CI


Source of G Code score: neck disability index





Observation





- Observation


Posture: Forward Head, Increased Thoracic Kyphosis, Decreased Lumbar Lordosis


Handedness: Right





Gait





- Gait Pattern


General Gait Pattern Observation: No Deviations/Normal





General


Range of Motion: slight limitation in R side bending, rotation


Muscle Strength: WFL's





Interventions





- Exercise/Activities/Manual Therapy


Exercises/Activities: n/a


Manual Therapy: na


HOME EXERCISE PROGRAM: pt given written HEP including cervical retraction, 

scapular retraction, corner stretch, upper trap stretch





- Charges


Timed Code Treatment Minutes: n/a


Total Treatment Time: n/a


Procedures billed for this date of service:: n/a





Assessment


Assessment: pt progressing with decreased pain at times and improved ROM. pt 

has met STG 2,3 and LTG 3. pt progressing or partially met remaining goals. pt 

is inconsistent with description of pain.


Patient Education: Home Exercise Program, Education of Plan of Care


Rehab Potential: Good





Short Term Goals


Goal #1: pt rate pain < 7/10 in cervical spine with no c/o radicular pain in 

RUE.


Goal to be met by: 07/20/18


Progress towards Goal:: Partially Met


Goal #2: pt independent with initial HEP


Goal to be met by: 07/20/18


Progress towards Goal:: Met


Goal #3: pt demonstrate improved cervical ext and rotation with less pain.


Goal to be met by: 07/20/18


Progress towards Goal:: Met





Long Term Goals


Goal #1: pt rate pain <4/10


Goal to be met by: 08/10/18


Progress towards goal: Progressing


Goal #2: Cervical ROM WFL's w decreased pain to allow normal activity 

independently


Goal to be met by: 08/10/18


Progress towards goal: Partially Met


Goal #3: Decreased muscle tightness decreased trigger points noted cervical 

spine


Goal to be met by: 08/10/18


Progress towards goal: Met





Plan


Reason for Discharge:: Maximum Potential Met

## 2018-07-23 NOTE — RS.OPPTDN
Subjective


Date of Note: 07/23/18


Visit #: 11


Date of Evaluation: 06/29/18


Payer Source: MEDICARE


Treatment Diagnosis: cervical pain


Current Subjective/complaints:: Patient says she was hurting yesterday and this 

morning.  She feels it is related to the rain.  She reports ache and stiffness 

to her neck.


*Precautions: n/a





- Heat/Cryotherapy


Treatment: Hot Pack (20 mins to cervical in supine)





- Traction Treatment


Method: Mechanical, Intermittent, Cervical


Patient Position: Supine


Amount of Force Applied: 17


Hold Time: 25


Rest Time: 5


Duration of treatment: 15


Traction Treatment Comment: Readjusted traction today towards the end of her 

session.  Good pull and stable in cradle.  Stopped after ~15 mins.





Interventions





- Exercise/Activities/Manual Therapy


Exercises/Activities: Patient receives Passive stretching bilateral Cervical 

rotation/SB.  Manual isometrics for bilateral SB and neck retraction 2x5.  

Shoulder shrugs, standing against wall for:  2# wand for bilateral shoulder 

flexion, green tband scap retraction, bilateral shoulder ER x 12.


Total minutes of Exercise: 16


Manual Therapy: na


HOME EXERCISE PROGRAM: pt given written HEP including cervical retraction, 

scapular retraction, corner stretch, upper trap stretch





- Charges


Timed Code Treatment Minutes: 16


Total Treatment Time: 51


Procedures billed for this date of service:: hp, mechanical traction, ex


Assessment: Patient lynnette traction slightly better today.  She continues to rate 

her pain 8/10 after therapy, but says pain is better.  She presents differently 

compared to verbal admission.  Neck Index is slightly better, but remains in 

the same category as eval.


Patient Education: Body/Joint mechanics, Home Exercise Program, Education of 

Plan of Care


Patient demonstrates compliance with HEP?: Yes





Short Term Goals


Goal #1: pt rate pain < 7/10 in cervical spine with no c/o radicular pain in 

RUE.


Goal to be met by: 07/20/18


Progress towards Goal:: Partially Met


Goal #2: pt independent with initial HEP


Goal to be met by: 07/20/18


Progress towards Goal:: Met


Goal #3: pt demonstrate improved cervical ext and rotation with less pain.


Goal to be met by: 07/20/18


Progress towards Goal:: Partially Met





Long Term Goals


Goal #1: pt rate pain <4/10


Goal to be met by: 08/10/18


Progress towards goal: Progressing


Goal #2: Cervical ROM WFL's w decreased pain to allow normal activity 

independently


Goal to be met by: 08/10/18


Progress towards goal: Progressing


Goal #3: Decreased muscle tightness decreased trigger points noted cervical 

spine


Goal to be met by: 08/10/18


Progress towards goal: Met





Plan


PLAN OF CARE EXPIRES ON:: 08/10/18


ORDER # VISITS AND/OR THROUGH DATE: 8/10/18


PLAN: Continue 1 more session.  Patient returns to MD 7/31/18.

## 2018-07-25 ENCOUNTER — HOSPITAL ENCOUNTER (OUTPATIENT)
Dept: HOSPITAL 58 - REHAB | Age: 73
LOS: 6 days | End: 2018-07-31
Attending: INTERNAL MEDICINE

## 2018-07-25 DIAGNOSIS — M54.2: Primary | ICD-10-CM

## 2018-07-25 DIAGNOSIS — M62.89: ICD-10-CM

## 2018-07-25 NOTE — RS.OPPTDN
Subjective


Date of Note: 07/25/18


Visit #: 12


Date of Evaluation: 06/29/18


Payer Source: MEDICARE


Treatment Diagnosis: cervical pain


Current Subjective/complaints:: Patient c/o neck and knee pain.  She says she 

would like to avoid traction today due to elevation.  She continues to say 

there are times that she has no pain, but remains with HA's and radiating pain 

to the R arm despite treatment.


*Precautions: n/a





- Heat/Cryotherapy


Treatment: Hot Pack (20 mins cervical )





Interventions





- Exercise/Activities/Manual Therapy


Exercises/Activities: Passive stretching to the cspine for SB, rotation, 

levator scap.  Manual isometrics for neck retraction, L and R SB.  Scap 

adduction, shoulder shrugs, green tband scap retraction, 1# wand bilateral 

shoulder flexion x 10.  Given and performed several reps of stress ball for the 

R hand.  Discussed ways to manage flare ups at home after D/c and working on 

HEP.  She was given education for postural mechanics.


Total minutes of Exercise: 16


Manual Therapy: DTM to the bilateral UT with cross stretching.


Total minutes of Manual Therapy: 14


HOME EXERCISE PROGRAM: pt given written HEP including cervical retraction, 

scapular retraction, corner stretch, upper trap stretch





- Objective Findings


Observations,measurements,etc.: Gcodes remain the same





- Charges


Timed Code Treatment Minutes: 30


Total Treatment Time: 50


Procedures billed for this date of service:: hp, MT, EX


Assessment: Patient demo slight increase in muscle guarding to the R UT with 

pain that is intermittent to the R upper arm.   strength is less into the R 

(dominant) hand demo avg 30# and 38# to the L.  Patient demo full cervical ROM 

with slight limitation with R rotation @ 80%.  She has unchanged HA's that 

occur often, but also has days where she has no neck pain.  She is agreeable to 

D/c and understands treatment plan/HeP.


Patient Education: Education of diagnosis, Body/Joint mechanics, Home Exercise 

Program, Education of Plan of Care


Patient demonstrates compliance with HEP?: Yes





Short Term Goals


Goal #1: pt rate pain < 7/10 in cervical spine with no c/o radicular pain in 

RUE.


Goal to be met by: 07/20/18


Progress towards Goal:: Partially Met


Comments:: Patient continues with intermittent R UE radiculopathy, but neck 

pain reduc


Goal #2: pt independent with initial HEP


Goal to be met by: 07/20/18


Progress towards Goal:: Met


Goal #3: pt demonstrate improved cervical ext and rotation with less pain.


Goal to be met by: 07/20/18


Progress towards Goal:: Met





Long Term Goals


Goal #1: pt rate pain <4/10


Goal to be met by: 08/10/18


Progress towards goal: Progressing


Goal #2: Cervical ROM WFL's w decreased pain to allow normal activity 

independently


Goal to be met by: 08/10/18


Progress towards goal: Partially Met


Goal #3: Decreased muscle tightness decreased trigger points noted cervical 

spine


Goal to be met by: 08/10/18


Progress towards goal: Met





Plan


PLAN OF CARE EXPIRES ON:: 08/10/18


ORDER # VISITS AND/OR THROUGH DATE: 8/10/18


PLAN: Patient has completed orders and has met maximal progress at this point.

## 2018-07-25 NOTE — RS.PTSUM
Progress Note/Summary


Date of Note: 07/20/18


Date of Evaluation: 06/29/18


Number of Visits: 10


Reporting Period for this Progress Note: 6/29/18-7/20/18


Current Complaints/Gains: pt reports improved neck pain and tightness but 

states she continues to have headaches. She verbalizes improvement but c/o 8/10 

score on average. She does reports no pain at times. Reports no real sleep 

disturbance.


Objective Measurements/Presentation: pt demonstrates decreased muscle guarding 

bilaterally and cervical spine ROM WFL's with slight limitation in R side 

bending, rotation. Mild improvement with functional index.


G Codes: carrying moving and handling current CK.  carrying moving and handling 

goal CI


Source of G Code Score: neck disability index.





- Short Term Goals


Goal #1: pt rate pain < 7/10 in cervical spine with no c/o radicular pain in 

RUE.


Goal to be met by: 07/20/18


Progress towards Goal:: Partially Met


Goal #2: pt independent with initial HEP


Goal to be met by: 07/20/18


Progress towards Goal:: Met


Goal #3: pt demonstrate improved cervical ext and rotation with less pain.


Goal to be met by: 07/20/18


Progress towards Goal:: Met





- Long Term Goals


Goal #1: pt rate pain <4/10


Goal to be met by: 08/10/18


Progress towards goal: Progressing


Goal #2: Cervical ROM WFL's w decreased pain to allow normal activity 

independently


Goal to be met by: 08/10/18


Progress towards goal: Partially Met


Goal #3: Decreased muscle tightness decreased trigger points noted cervical 

spine


Goal to be met by: 08/10/18


Progress towards goal: Met





- Assessment


Assessment of Improvement/Progress: pt has met STG 2, 3, and LTG 3 pt is 

progressing toward remaining goals. pt has made improvements with decreased pain

, increased ROM and increased funtional ability.


Summary: Patient has made progress towards goals., Patient demonstrates 

potential to gain increased function with therapy





- Plan


Plan: Continue Plan of Care


Frequency: 2 X week


Duration: 1 week


PLAN OF CARE EXPIRES ON:: 08/10/18


ORDER # VISITS AND/OR THROUGH DATE: 8/10/18

## 2018-07-31 DIAGNOSIS — C50.112 MALIGNANT NEOPLASM OF CENTRAL PORTION OF LEFT FEMALE BREAST, UNSPECIFIED ESTROGEN RECEPTOR STATUS (HCC): Primary | ICD-10-CM

## 2018-08-06 ENCOUNTER — OFFICE VISIT (OUTPATIENT)
Dept: ONCOLOGY | Facility: CLINIC | Age: 73
End: 2018-08-06

## 2018-08-06 ENCOUNTER — INFUSION (OUTPATIENT)
Dept: ONCOLOGY | Facility: HOSPITAL | Age: 73
End: 2018-08-06

## 2018-08-06 ENCOUNTER — APPOINTMENT (OUTPATIENT)
Dept: LAB | Facility: HOSPITAL | Age: 73
End: 2018-08-06
Attending: INTERNAL MEDICINE

## 2018-08-06 VITALS
HEART RATE: 69 BPM | WEIGHT: 244.7 LBS | OXYGEN SATURATION: 95 % | TEMPERATURE: 97.7 F | SYSTOLIC BLOOD PRESSURE: 140 MMHG | HEIGHT: 69 IN | BODY MASS INDEX: 36.24 KG/M2 | RESPIRATION RATE: 18 BRPM | DIASTOLIC BLOOD PRESSURE: 70 MMHG

## 2018-08-06 VITALS
BODY MASS INDEX: 36.29 KG/M2 | HEART RATE: 57 BPM | RESPIRATION RATE: 18 BRPM | SYSTOLIC BLOOD PRESSURE: 149 MMHG | OXYGEN SATURATION: 98 % | DIASTOLIC BLOOD PRESSURE: 65 MMHG | TEMPERATURE: 97 F | WEIGHT: 245 LBS | HEIGHT: 69 IN

## 2018-08-06 DIAGNOSIS — D63.1 ANEMIA OF CHRONIC RENAL FAILURE, STAGE 3 (MODERATE) (HCC): Primary | ICD-10-CM

## 2018-08-06 DIAGNOSIS — N18.30 ANEMIA OF CHRONIC RENAL FAILURE, STAGE 3 (MODERATE) (HCC): Primary | ICD-10-CM

## 2018-08-06 DIAGNOSIS — C50.112 MALIGNANT NEOPLASM OF CENTRAL PORTION OF LEFT FEMALE BREAST, UNSPECIFIED ESTROGEN RECEPTOR STATUS (HCC): Primary | ICD-10-CM

## 2018-08-06 DIAGNOSIS — C50.112 MALIGNANT NEOPLASM OF CENTRAL PORTION OF LEFT FEMALE BREAST, UNSPECIFIED ESTROGEN RECEPTOR STATUS (HCC): ICD-10-CM

## 2018-08-06 LAB
ALBUMIN SERPL-MCNC: 4 G/DL (ref 3.5–5)
ALBUMIN/GLOB SERPL: 1.2 G/DL (ref 1.1–2.5)
ALP SERPL-CCNC: 65 U/L (ref 24–120)
ALT SERPL W P-5'-P-CCNC: 30 U/L (ref 0–54)
ANION GAP SERPL CALCULATED.3IONS-SCNC: 10 MMOL/L (ref 4–13)
AST SERPL-CCNC: 34 U/L (ref 7–45)
BASOPHILS # BLD AUTO: 0.01 10*3/MM3 (ref 0–0.2)
BASOPHILS NFR BLD AUTO: 0.2 % (ref 0–2)
BILIRUB SERPL-MCNC: 0.3 MG/DL (ref 0.1–1)
BUN BLD-MCNC: 20 MG/DL (ref 5–21)
BUN/CREAT SERPL: 15.3 (ref 7–25)
CALCIUM SPEC-SCNC: 9.6 MG/DL (ref 8.4–10.4)
CHLORIDE SERPL-SCNC: 101 MMOL/L (ref 98–110)
CO2 SERPL-SCNC: 31 MMOL/L (ref 24–31)
CREAT BLD-MCNC: 1.31 MG/DL (ref 0.5–1.4)
DEPRECATED RDW RBC AUTO: 49.6 FL (ref 40–54)
EOSINOPHIL # BLD AUTO: 0.15 10*3/MM3 (ref 0–0.7)
EOSINOPHIL NFR BLD AUTO: 3.1 % (ref 0–4)
ERYTHROCYTE [DISTWIDTH] IN BLOOD BY AUTOMATED COUNT: 15.4 % (ref 12–15)
GFR SERPL CREATININE-BSD FRML MDRD: 48 ML/MIN/1.73
GLOBULIN UR ELPH-MCNC: 3.4 GM/DL
GLUCOSE BLD-MCNC: 106 MG/DL (ref 70–100)
HCT VFR BLD AUTO: 31.8 % (ref 37–47)
HGB BLD-MCNC: 10.3 G/DL (ref 12–16)
HOLD SPECIMEN: NORMAL
HOLD SPECIMEN: NORMAL
IMM GRANULOCYTES # BLD: 0.01 10*3/MM3 (ref 0–0.03)
IMM GRANULOCYTES NFR BLD: 0.2 % (ref 0–5)
LYMPHOCYTES # BLD AUTO: 1.29 10*3/MM3 (ref 0.72–4.86)
LYMPHOCYTES NFR BLD AUTO: 26.4 % (ref 15–45)
MCH RBC QN AUTO: 28.5 PG (ref 28–32)
MCHC RBC AUTO-ENTMCNC: 32.4 G/DL (ref 33–36)
MCV RBC AUTO: 88.1 FL (ref 82–98)
MONOCYTES # BLD AUTO: 0.43 10*3/MM3 (ref 0.19–1.3)
MONOCYTES NFR BLD AUTO: 8.8 % (ref 4–12)
NEUTROPHILS # BLD AUTO: 2.99 10*3/MM3 (ref 1.87–8.4)
NEUTROPHILS NFR BLD AUTO: 61.3 % (ref 39–78)
NRBC BLD MANUAL-RTO: 0 /100 WBC (ref 0–0)
PLATELET # BLD AUTO: 250 10*3/MM3 (ref 130–400)
PMV BLD AUTO: 9.4 FL (ref 6–12)
POTASSIUM BLD-SCNC: 3.8 MMOL/L (ref 3.5–5.3)
PROT SERPL-MCNC: 7.4 G/DL (ref 6.3–8.7)
RBC # BLD AUTO: 3.61 10*6/MM3 (ref 4.2–5.4)
SODIUM BLD-SCNC: 142 MMOL/L (ref 135–145)
WBC NRBC COR # BLD: 4.88 10*3/MM3 (ref 4.8–10.8)

## 2018-08-06 PROCEDURE — 85025 COMPLETE CBC W/AUTO DIFF WBC: CPT | Performed by: INTERNAL MEDICINE

## 2018-08-06 PROCEDURE — 36415 COLL VENOUS BLD VENIPUNCTURE: CPT

## 2018-08-06 PROCEDURE — 80053 COMPREHEN METABOLIC PANEL: CPT | Performed by: INTERNAL MEDICINE

## 2018-08-06 PROCEDURE — 63510000001 EPOETIN ALFA PER 1000 UNITS: Performed by: INTERNAL MEDICINE

## 2018-08-06 PROCEDURE — 99214 OFFICE O/P EST MOD 30 MIN: CPT | Performed by: INTERNAL MEDICINE

## 2018-08-06 PROCEDURE — 96372 THER/PROPH/DIAG INJ SC/IM: CPT

## 2018-08-06 RX ADMIN — ERYTHROPOIETIN 40000 UNITS: 40000 INJECTION, SOLUTION INTRAVENOUS; SUBCUTANEOUS at 14:38

## 2018-08-06 NOTE — PROGRESS NOTES
[facility]  HEMATOLOGY & ONCOLOGY    Cancer Staging Information:  No matching staging information was found for the patient.      Subjective     VISIT DIAGNOSIS:   Encounter Diagnoses   Name Primary?   • Anemia of chronic renal failure, stage 3 (moderate) Yes   • Malignant neoplasm of central portion of left female breast, unspecified estrogen receptor status (CMS/HCC)        REASON FOR VISIT:     Chief Complaint   Patient presents with   • Follow-up     anemia/Breast Ca: She is here for f/u visit today        HEMATOLOGY / ONCOLOGY HISTORY:    No history exists.           INTERVAL HISTORY  Patient ID: Marina Joe is a 73 y.o. year old female h/o breast ca on arimidex. Tolerates AI very well. mammo 4/27/18 NMEM  -denies sob, brbpr, cp.  Past Medical History:   Past Medical History:   Diagnosis Date   • Breast cancer (CMS/HCC)    • CKD (chronic kidney disease)    • Hypercholesteremia    • Hypertension    • Sinusitis      Past Surgical History:   Past Surgical History:   Procedure Laterality Date   • BREAST BIOPSY Left 11/20/2012   • BREAST LUMPECTOMY Left     with node bx    • REPLACEMENT TOTAL KNEE Right     2016   • TOTAL ABDOMINAL HYSTERECTOMY WITH SALPINGO OOPHORECTOMY       Social History:   Social History     Social History   • Marital status:      Spouse name: N/A   • Number of children: N/A   • Years of education: N/A     Occupational History   • Not on file.     Social History Main Topics   • Smoking status: Never Smoker   • Smokeless tobacco: Not on file   • Alcohol use No   • Drug use: Unknown   • Sexual activity: Not on file     Other Topics Concern   • Not on file     Social History Narrative   • No narrative on file     Family History:   Family History   Problem Relation Age of Onset   • Heart attack Mother    • Hodgkin's lymphoma Father    • Heart attack Brother        Review of Systems   Constitutional: Negative.    HENT: Negative.    Eyes: Negative.    Respiratory:  Negative.    Cardiovascular: Negative.    Gastrointestinal: Negative.    Genitourinary: Negative.    Musculoskeletal: Negative.    Skin: Negative.    Neurological: Negative.    Hematological: Negative.    Psychiatric/Behavioral: Negative.         Performance Status:  Asymptomatic    Medications:    Current Outpatient Prescriptions   Medication Sig Dispense Refill   • anastrozole (ARIMIDEX) 1 MG tablet Take 1 tablet by mouth Daily. 90 tablet 4   • buPROPion SR (WELLBUTRIN SR) 150 MG 12 hr tablet Take 150 mg by mouth.     • Calcium Carb-Cholecalciferol (CALCIUM 600+D3 PO) Take  by mouth.     • carvedilol (COREG) 6.25 MG tablet Take 6.25 mg by mouth 2 (Two) Times a Day With Meals.     • Ergocalciferol (VITAMIN D2 PO) Take 50,000 Units by mouth.     • ferrous sulfate 325 (65 FE) MG tablet Take 325 mg by mouth Daily With Breakfast.     • fluticasone (FLOVENT DISKUS) 50 MCG/BLIST diskus inhaler Inhale 1 puff.     • levothyroxine (SYNTHROID, LEVOTHROID) 25 MCG tablet Take 25 mcg by mouth Daily.     • Misc Natural Products (COLON HERBAL CLEANSER PO) Take  by mouth.     • olmesartan-hydrochlorothiazide (BENICAR HCT) 40-25 MG per tablet Take 1 tablet by mouth Daily.     • Omega-3 Fatty Acids (FISH OIL) 1000 MG capsule capsule Take 1,000 mg by mouth.     • oxyCODONE (OXY-IR) 5 MG capsule Take 5 mg by mouth.     • Pediatric Multivitamins-Iron (PEDIATRIC MULTIPLE VITAMINS W/ IRON) 15 MG chewable tablet Chew 1 tablet Daily.     • rosuvastatin (CRESTOR) 20 MG tablet Take 20 mg by mouth Daily.     • sertraline (ZOLOFT) 100 MG tablet Take 100 mg by mouth Daily.     • valACYclovir (VALTREX) 500 MG tablet Take 500 mg by mouth.       No current facility-administered medications for this visit.        ALLERGIES:    Allergies   Allergen Reactions   • Aspirin        Objective      Vitals:    08/06/18 1312   BP: 140/70   Pulse: 69   Resp: 18   Temp: 97.7 °F (36.5 °C)   TempSrc: Tympanic   SpO2: 95%   Weight: 111 kg (244 lb 11.2 oz)  "  Height: 175.3 cm (69.02\")         Current Status 8/6/2018   ECOG score 0         Physical Exam    General Appearance: Patient is awake, alert, oriented and in no acute distress. Patient is welldeveloped, wellnourished, and appears stated age.  HEENT: Normocephalic. Sclerae clear, conjunctiva pink, extraocular movements intact, pupils, round, reactive to light and  accommodation. Mouth and throat are clear with moist oral mucosa.  NECK: Supple, no jugular venous distention, thyroid not enlarged.  LYMPH: No cervical, supraclavicular, axillary, or inguinal lymphadenopathy.  CHEST: Equal bilateral expansion, AP  diameter normal, resonant percussion note  LUNGS: Good air movement, no rales, rhonchi, rubs or wheezes with auscultation  CARDIO: Regular sinus rhythm, no murmurs, gallops or rubs.  ABDOMEN: Nondistended, soft, No tenderness, no guarding, no rebound, No hepatosplenomegaly. No abdominal masses. Bowel sounds positive. No hernia  GENITALIA: Not examined.  BREASTS: Not examined.  MUSKEL: No joint swelling, decreased motion, or inflammation  EXTREMS: No edema, clubbing, cyanosis, No varicose veins.  NEURO: Grossly nonfocal, Gait is coordinated and smooth, Cognition is preserved.  SKIN: No rashes, no ecchymoses, no petechia.  PSYCH: Oriented to time, place and person. Memory is preserved. Mood and affect appear normal  RECENT LABS:  Orders Only on 07/31/2018   Component Date Value Ref Range Status   • Glucose 08/06/2018 106* 70 - 100 mg/dL Final   • BUN 08/06/2018 20  5 - 21 mg/dL Final   • Creatinine 08/06/2018 1.31  0.50 - 1.40 mg/dL Final   • Sodium 08/06/2018 142  135 - 145 mmol/L Final   • Potassium 08/06/2018 3.8  3.5 - 5.3 mmol/L Final   • Chloride 08/06/2018 101  98 - 110 mmol/L Final   • CO2 08/06/2018 31.0  24.0 - 31.0 mmol/L Final   • Calcium 08/06/2018 9.6  8.4 - 10.4 mg/dL Final   • Total Protein 08/06/2018 7.4  6.3 - 8.7 g/dL Final   • Albumin 08/06/2018 4.00  3.50 - 5.00 g/dL Final   • ALT (SGPT) " 08/06/2018 30  0 - 54 U/L Final   • AST (SGOT) 08/06/2018 34  7 - 45 U/L Final   • Alkaline Phosphatase 08/06/2018 65  24 - 120 U/L Final   • Total Bilirubin 08/06/2018 0.3  0.1 - 1.0 mg/dL Final   • eGFR   Amer 08/06/2018 48* >60 mL/min/1.73 Final   • Globulin 08/06/2018 3.4  gm/dL Final   • A/G Ratio 08/06/2018 1.2  1.1 - 2.5 g/dL Final   • BUN/Creatinine Ratio 08/06/2018 15.3  7.0 - 25.0 Final   • Anion Gap 08/06/2018 10.0  4.0 - 13.0 mmol/L Final   • WBC 08/06/2018 4.88  4.80 - 10.80 10*3/mm3 Final   • RBC 08/06/2018 3.61* 4.20 - 5.40 10*6/mm3 Final   • Hemoglobin 08/06/2018 10.3* 12.0 - 16.0 g/dL Final   • Hematocrit 08/06/2018 31.8* 37.0 - 47.0 % Final   • MCV 08/06/2018 88.1  82.0 - 98.0 fL Final   • MCH 08/06/2018 28.5  28.0 - 32.0 pg Final   • MCHC 08/06/2018 32.4* 33.0 - 36.0 g/dL Final   • RDW 08/06/2018 15.4* 12.0 - 15.0 % Final   • RDW-SD 08/06/2018 49.6  40.0 - 54.0 fl Final   • MPV 08/06/2018 9.4  6.0 - 12.0 fL Final   • Platelets 08/06/2018 250  130 - 400 10*3/mm3 Final   • Neutrophil % 08/06/2018 61.3  39.0 - 78.0 % Final   • Lymphocyte % 08/06/2018 26.4  15.0 - 45.0 % Final   • Monocyte % 08/06/2018 8.8  4.0 - 12.0 % Final   • Eosinophil % 08/06/2018 3.1  0.0 - 4.0 % Final   • Basophil % 08/06/2018 0.2  0.0 - 2.0 % Final   • Immature Grans % 08/06/2018 0.2  0.0 - 5.0 % Final   • Neutrophils, Absolute 08/06/2018 2.99  1.87 - 8.40 10*3/mm3 Final   • Lymphocytes, Absolute 08/06/2018 1.29  0.72 - 4.86 10*3/mm3 Final   • Monocytes, Absolute 08/06/2018 0.43  0.19 - 1.30 10*3/mm3 Final   • Eosinophils, Absolute 08/06/2018 0.15  0.00 - 0.70 10*3/mm3 Final   • Basophils, Absolute 08/06/2018 0.01  0.00 - 0.20 10*3/mm3 Final   • Immature Grans, Absolute 08/06/2018 0.01  0.00 - 0.03 10*3/mm3 Final   • nRBC 08/06/2018 0.0  0.0 - 0.0 /100 WBC Final       RADIOLOGY:  No results found.         Assessment/Plan  Marina Joe is a 73 y.o. year old female with h/o breast ca whom we are seeing today for anemia  2/2 ckd.    Patient Active Problem List   Diagnosis   • Malignant neoplasm of central portion of left female breast (CMS/HCC)   • Anemia of chronic renal failure, stage 3 (moderate)        1. Stage IA invasive ductal carcinoma left breast ER 97% NC 97% HER-2/alix 2+, fish and amplified diagnosed May 2012 status post lumpectomy, radiation, on the hormonal manipulation with Arimidex.  Mammogram 12/17 benign.   --she tolerates AI with no SE, continue for 10yrs.     2.  Osteopenia.  DEXA scan May 2016 revealed osteopenia.  Start Boniva or Fosamax pending insurance coverage.  Continue daily calcium and vitamin D supplement     3.  Anemia in CKD stage III area today's hemoglobin 10.3. Give epo. Check iron profile, B12, folate.      4. Hypothyroidism: on synthroid.  5. Depression: on wellbutrin  6. CAD: on coreg, benicar  7. Chronic pain synd: on oxycodone IR       Samra De Leon MD    8/6/2018    1:46 PM

## 2018-08-27 DIAGNOSIS — D63.1 ANEMIA OF CHRONIC RENAL FAILURE, STAGE 3 (MODERATE) (HCC): ICD-10-CM

## 2018-08-27 DIAGNOSIS — N18.30 ANEMIA OF CHRONIC RENAL FAILURE, STAGE 3 (MODERATE) (HCC): ICD-10-CM

## 2018-09-04 ENCOUNTER — INFUSION (OUTPATIENT)
Dept: ONCOLOGY | Facility: HOSPITAL | Age: 73
End: 2018-09-04
Attending: INTERNAL MEDICINE

## 2018-09-04 ENCOUNTER — LAB (OUTPATIENT)
Dept: LAB | Facility: HOSPITAL | Age: 73
End: 2018-09-04
Attending: INTERNAL MEDICINE

## 2018-09-04 ENCOUNTER — CLINICAL SUPPORT (OUTPATIENT)
Dept: ONCOLOGY | Facility: CLINIC | Age: 73
End: 2018-09-04

## 2018-09-04 VITALS
WEIGHT: 249 LBS | DIASTOLIC BLOOD PRESSURE: 62 MMHG | HEART RATE: 61 BPM | BODY MASS INDEX: 36.88 KG/M2 | TEMPERATURE: 96.3 F | SYSTOLIC BLOOD PRESSURE: 177 MMHG | RESPIRATION RATE: 20 BRPM | OXYGEN SATURATION: 99 % | HEIGHT: 69 IN

## 2018-09-04 VITALS
HEIGHT: 69 IN | OXYGEN SATURATION: 97 % | SYSTOLIC BLOOD PRESSURE: 150 MMHG | WEIGHT: 249.9 LBS | HEART RATE: 71 BPM | TEMPERATURE: 96.2 F | RESPIRATION RATE: 16 BRPM | DIASTOLIC BLOOD PRESSURE: 84 MMHG | BODY MASS INDEX: 37.01 KG/M2

## 2018-09-04 DIAGNOSIS — D63.1 ANEMIA OF CHRONIC RENAL FAILURE, STAGE 3 (MODERATE) (HCC): ICD-10-CM

## 2018-09-04 DIAGNOSIS — C50.112 MALIGNANT NEOPLASM OF CENTRAL PORTION OF LEFT FEMALE BREAST, UNSPECIFIED ESTROGEN RECEPTOR STATUS (HCC): Primary | ICD-10-CM

## 2018-09-04 DIAGNOSIS — D63.1 ANEMIA OF CHRONIC RENAL FAILURE, STAGE 3 (MODERATE) (HCC): Primary | ICD-10-CM

## 2018-09-04 DIAGNOSIS — N18.30 ANEMIA OF CHRONIC RENAL FAILURE, STAGE 3 (MODERATE) (HCC): Primary | ICD-10-CM

## 2018-09-04 DIAGNOSIS — N18.30 ANEMIA OF CHRONIC RENAL FAILURE, STAGE 3 (MODERATE) (HCC): ICD-10-CM

## 2018-09-04 LAB
ALBUMIN SERPL-MCNC: 3.8 G/DL (ref 3.5–5)
ALBUMIN/GLOB SERPL: 1.1 G/DL (ref 1.1–2.5)
ALP SERPL-CCNC: 74 U/L (ref 24–120)
ALT SERPL W P-5'-P-CCNC: 17 U/L (ref 0–54)
ANION GAP SERPL CALCULATED.3IONS-SCNC: 11 MMOL/L (ref 4–13)
AST SERPL-CCNC: 25 U/L (ref 7–45)
BASOPHILS # BLD AUTO: 0.04 10*3/MM3 (ref 0–0.2)
BASOPHILS NFR BLD AUTO: 0.8 % (ref 0–2)
BILIRUB SERPL-MCNC: 0.4 MG/DL (ref 0.1–1)
BUN BLD-MCNC: 25 MG/DL (ref 5–21)
BUN/CREAT SERPL: 21.6 (ref 7–25)
CALCIUM SPEC-SCNC: 9.2 MG/DL (ref 8.4–10.4)
CHLORIDE SERPL-SCNC: 102 MMOL/L (ref 98–110)
CO2 SERPL-SCNC: 27 MMOL/L (ref 24–31)
CREAT BLD-MCNC: 1.16 MG/DL (ref 0.5–1.4)
DEPRECATED RDW RBC AUTO: 51.1 FL (ref 40–54)
EOSINOPHIL # BLD AUTO: 0.23 10*3/MM3 (ref 0–0.7)
EOSINOPHIL NFR BLD AUTO: 4.5 % (ref 0–4)
ERYTHROCYTE [DISTWIDTH] IN BLOOD BY AUTOMATED COUNT: 15.8 % (ref 12–15)
GFR SERPL CREATININE-BSD FRML MDRD: 56 ML/MIN/1.73
GLOBULIN UR ELPH-MCNC: 3.5 GM/DL
GLUCOSE BLD-MCNC: 99 MG/DL (ref 70–100)
HCT VFR BLD AUTO: 31.5 % (ref 37–47)
HGB BLD-MCNC: 10.1 G/DL (ref 12–16)
IMM GRANULOCYTES # BLD: 0.06 10*3/MM3 (ref 0–0.03)
IMM GRANULOCYTES NFR BLD: 1.2 % (ref 0–5)
LYMPHOCYTES # BLD AUTO: 1.53 10*3/MM3 (ref 0.72–4.86)
LYMPHOCYTES NFR BLD AUTO: 29.7 % (ref 15–45)
MCH RBC QN AUTO: 28.3 PG (ref 28–32)
MCHC RBC AUTO-ENTMCNC: 32.1 G/DL (ref 33–36)
MCV RBC AUTO: 88.2 FL (ref 82–98)
MONOCYTES # BLD AUTO: 0.53 10*3/MM3 (ref 0.19–1.3)
MONOCYTES NFR BLD AUTO: 10.3 % (ref 4–12)
NEUTROPHILS # BLD AUTO: 2.76 10*3/MM3 (ref 1.87–8.4)
NEUTROPHILS NFR BLD AUTO: 53.5 % (ref 39–78)
NRBC BLD MANUAL-RTO: 0 /100 WBC (ref 0–0)
PLATELET # BLD AUTO: 234 10*3/MM3 (ref 130–400)
PMV BLD AUTO: 9.7 FL (ref 6–12)
POTASSIUM BLD-SCNC: 4.4 MMOL/L (ref 3.5–5.3)
PROT SERPL-MCNC: 7.3 G/DL (ref 6.3–8.7)
RBC # BLD AUTO: 3.57 10*6/MM3 (ref 4.2–5.4)
SODIUM BLD-SCNC: 140 MMOL/L (ref 135–145)
WBC NRBC COR # BLD: 5.15 10*3/MM3 (ref 4.8–10.8)

## 2018-09-04 PROCEDURE — 63510000001 EPOETIN ALFA PER 1000 UNITS: Performed by: INTERNAL MEDICINE

## 2018-09-04 PROCEDURE — 36415 COLL VENOUS BLD VENIPUNCTURE: CPT

## 2018-09-04 PROCEDURE — 85025 COMPLETE CBC W/AUTO DIFF WBC: CPT

## 2018-09-04 PROCEDURE — 80053 COMPREHEN METABOLIC PANEL: CPT

## 2018-09-04 PROCEDURE — 96372 THER/PROPH/DIAG INJ SC/IM: CPT

## 2018-09-04 PROCEDURE — 99211 OFF/OP EST MAY X REQ PHY/QHP: CPT | Performed by: INTERNAL MEDICINE

## 2018-09-04 RX ADMIN — ERYTHROPOIETIN 40000 UNITS: 40000 INJECTION, SOLUTION INTRAVENOUS; SUBCUTANEOUS at 11:21

## 2018-09-04 NOTE — PROGRESS NOTES
Patient here for CBC evaluation to see if she meets parameters for Procrit Injection.  Lab reviewed with Dr. De Leon Hemoglobin 10.1.  Patient to get procrit today.  Breath sounds clear.  Denies any problems.  Edema noted left lower leg.  Patient will return in one month for repeat lab.

## 2018-09-26 ENCOUNTER — OFFICE VISIT (OUTPATIENT)
Dept: GASTROENTEROLOGY | Facility: CLINIC | Age: 73
End: 2018-09-26

## 2018-09-26 VITALS
DIASTOLIC BLOOD PRESSURE: 80 MMHG | HEIGHT: 69 IN | SYSTOLIC BLOOD PRESSURE: 142 MMHG | BODY MASS INDEX: 36.88 KG/M2 | WEIGHT: 249 LBS | OXYGEN SATURATION: 98 % | HEART RATE: 81 BPM

## 2018-09-26 DIAGNOSIS — Z86.010 HX OF COLONIC POLYPS: Primary | ICD-10-CM

## 2018-09-26 DIAGNOSIS — E66.9 OBESITY, UNSPECIFIED OBESITY SEVERITY, UNSPECIFIED OBESITY TYPE: ICD-10-CM

## 2018-09-26 DIAGNOSIS — I10 HTN (HYPERTENSION), BENIGN: ICD-10-CM

## 2018-09-26 DIAGNOSIS — Z78.9 NONSMOKER: ICD-10-CM

## 2018-09-26 DIAGNOSIS — Z85.3 HX: BREAST CANCER: ICD-10-CM

## 2018-09-26 PROBLEM — Z86.0100 HX OF COLONIC POLYPS: Status: ACTIVE | Noted: 2018-09-26

## 2018-09-26 PROCEDURE — S0260 H&P FOR SURGERY: HCPCS | Performed by: CLINICAL NURSE SPECIALIST

## 2018-09-26 NOTE — PROGRESS NOTES
Marina Joe  1945 9/26/2018  Chief Complaint   Patient presents with   • Colonoscopy     Subjective   HPI  Marina Joe is a 73 y.o. female who presents as a referral for preventative maintenance. She has no complaints of nausea or vomiting. No change in bowels. No wt loss. No BRBPR. No melena. There is NO family hx for colon cancer. No abdominal pain.  Past Medical History:   Diagnosis Date   • Breast cancer (CMS/HCC)    • CKD (chronic kidney disease)    • Hypercholesteremia    • Hypertension    • Sinusitis      Past Surgical History:   Procedure Laterality Date   • BREAST BIOPSY Left 11/20/2012   • BREAST LUMPECTOMY Left     with node bx    • COLONOSCOPY  09/13/2013    small polyp at 30cm benign hyperplastic polyp, changes consistent with melanosis coli. Recall 5 years   • REPLACEMENT TOTAL KNEE Right     2016   • TOTAL ABDOMINAL HYSTERECTOMY WITH SALPINGO OOPHORECTOMY       Outpatient Prescriptions Marked as Taking for the 9/26/18 encounter (Office Visit) with Meaghan White APRN   Medication Sig Dispense Refill   • anastrozole (ARIMIDEX) 1 MG tablet Take 1 tablet by mouth Daily. 90 tablet 4   • buPROPion SR (WELLBUTRIN SR) 150 MG 12 hr tablet Take 150 mg by mouth.     • Calcium Carb-Cholecalciferol (CALCIUM 600+D3 PO) Take  by mouth.     • carvedilol (COREG) 6.25 MG tablet Take 6.25 mg by mouth 2 (Two) Times a Day With Meals.     • Ergocalciferol (VITAMIN D2 PO) Take 50,000 Units by mouth.     • ferrous sulfate 325 (65 FE) MG tablet Take 325 mg by mouth Daily With Breakfast.     • fluticasone (FLOVENT DISKUS) 50 MCG/BLIST diskus inhaler Inhale 1 puff.     • levothyroxine (SYNTHROID, LEVOTHROID) 25 MCG tablet Take 25 mcg by mouth Daily.     • Misc Natural Products (COLON HERBAL CLEANSER PO) Take  by mouth.     • olmesartan-hydrochlorothiazide (BENICAR HCT) 40-25 MG per tablet Take 1 tablet by mouth Daily.     • Omega-3 Fatty Acids (FISH OIL) 1000 MG capsule capsule Take 1,000 mg by mouth.      • oxyCODONE (OXY-IR) 5 MG capsule Take 5 mg by mouth.     • Pediatric Multivitamins-Iron (PEDIATRIC MULTIPLE VITAMINS W/ IRON) 15 MG chewable tablet Chew 1 tablet Daily.     • rosuvastatin (CRESTOR) 20 MG tablet Take 20 mg by mouth Daily.     • sertraline (ZOLOFT) 100 MG tablet Take 100 mg by mouth Daily.     • valACYclovir (VALTREX) 500 MG tablet Take 500 mg by mouth.       Allergies   Allergen Reactions   • Aspirin      Social History     Social History   • Marital status:      Spouse name: N/A   • Number of children: N/A   • Years of education: N/A     Occupational History   • Not on file.     Social History Main Topics   • Smoking status: Never Smoker   • Smokeless tobacco: Never Used   • Alcohol use No   • Drug use: Unknown   • Sexual activity: Not on file     Other Topics Concern   • Not on file     Social History Narrative   • No narrative on file     Family History   Problem Relation Age of Onset   • Heart attack Mother    • Hodgkin's lymphoma Father    • Heart attack Brother    • Colon cancer Neg Hx    • Colon polyps Neg Hx      Health Maintenance   Topic Date Due   • TDAP/TD VACCINES (1 - Tdap) 01/31/1965   • ZOSTER VACCINE (1 of 2) 01/31/1996   • HEPATITIS C SCREENING  05/03/2017   • MEDICARE ANNUAL WELLNESS  05/03/2017   • LIPID PANEL  05/03/2017   • PNEUMOCOCCAL VACCINES (65+ LOW/MEDIUM RISK) (2 of 2 - PPSV23) 10/14/2017   • INFLUENZA VACCINE  08/01/2018   • MAMMOGRAM  12/11/2019   • COLONOSCOPY  09/13/2023       REVIEW OF SYSTEMS  General: well appearing, no fever chills or sweats, no unexplained wt loss  HEENT: no acute visual or hearing disturbances  Cardiovascular: No chest pain or palpitations  Pulmonary: No shortness of breath, coughing, wheezing or hemoptysis  : No burning, urgency, hematuria, or dysuria  Musculoskeletal: No joint pain or stiffness  Peripheral: no edema  Skin: No lesions or rashes  Neuro: No dizziness, headaches, stroke, syncope  Endocrine: No hot or cold  "intolerances  Hematological: No blood dyscrasias    Objective   Vitals:    09/26/18 0952   BP: 142/80   Pulse: 81   SpO2: 98%   Weight: 113 kg (249 lb)   Height: 175.3 cm (69\")     Body mass index is 36.77 kg/m².  Patient's Body mass index is 36.77 kg/m². BMI is above normal parameters. Recommendations include: nutrition counseling.      PHYSICAL EXAM  General: age appropriate well nourished well appearing, no acute distress  Head: normocephalic and atraumatic  Global assessment-supple  Neck-No JVD noted, no lymphadenopathy  Pulmonary-clear to auscultation bilaterally, normal respiratory effort  Cardiovascular-normal rate and rhythm, normal heart sounds, S1 and S2 noted  Abdomen-soft, non tender, non distended, normal bowel sounds all 4 quadrants, no hepatosplenomegaly noted  Extremities-No clubbing cyanosis or edema  Neuro-Non focal, converses appropriately, awake, alert, oriented    Assessment/Plan     Marina was seen today for colonoscopy.    Diagnoses and all orders for this visit:    Hx of colonic polyps  -     polyethylene glycol (GoLYTELY) 236 g solution; Take as directed by office instructions.  -     Case Request; Standing  -     Follow Anesthesia Guidelines / Standing Orders; Future  -     Implement Anesthesia Orders Day of Procedure; Standing  -     Obtain Informed Consent; Standing  -     Verify bowel prep was successful; Standing  -     Case Request    HTN (hypertension), benign    HX: breast cancer    Nonsmoker    Obesity, unspecified obesity severity, unspecified obesity type        COLONOSCOPY WITH ANESTHESIA (N/A)  Body mass index is 36.77 kg/m².    Patient instructions on prep prior to procedure provided to the patient.    All risks, benefits, alternatives, and indications of colonoscopy procedure have been discussed with the patient. Risks to include perforation of the colon requiring possible surgery or colostomy, risk of bleeding from biopsies or removal of colon tissue, possibility of missing a " colon polyp or cancer, or adverse drug reaction.  Benefits to include the diagnosis and management of disease of the colon and rectum. Alternatives to include barium enema, radiographic evaluation, lab testing or no intervention. Pt verbalizes understanding and agrees.     MARY Portillo  2018  10:19 AM      IF YOU SMOKE OR USE TOBACCO PLEASE READ THE FOLLOWIN minutes reading provided    Why is smoking bad for me?  Smoking increases the risk of heart disease, lung disease, vascular disease, stroke, and cancer.     If you smoke, STOP!    If you would like more information on quitting smoking, please visit the [employer] website: [email or web address]   This link will provide additional resources including the QUIT line and the Beat the Pack support groups.     For more information:    Quit Now [place]  QUIT-NOW  https://[place].[employer].org/en-US/    Obesity, Adult  Obesity is the condition of having too much total body fat. Being overweight or obese means that your weight is greater than what is considered healthy for your body size. Obesity is determined by a measurement called BMI. BMI is an estimate of body fat and is calculated from height and weight. For adults, a BMI of 30 or higher is considered obese.  Obesity can eventually lead to other health concerns and major illnesses, including:  · Stroke.  · Coronary artery disease (CAD).  · Type 2 diabetes.  · Some types of cancer, including cancers of the colon, breast, uterus, and gallbladder.  · Osteoarthritis.  · High blood pressure (hypertension).  · High cholesterol.  · Sleep apnea.  · Gallbladder stones.  · Infertility problems.  What are the causes?  The main cause of obesity is taking in (consuming) more calories than your body uses for energy. Other factors that contribute to this condition may include:  · Being born with genes that make you more likely to become obese.  · Having  a medical condition that causes obesity. These conditions include:  ¨ Hypothyroidism.  ¨ Polycystic ovarian syndrome (PCOS).  ¨ Binge-eating disorder.  ¨ Cushing syndrome.  · Taking certain medicines, such as steroids, antidepressants, and seizure medicines.  · Not being physically active (sedentary lifestyle).  · Living where there are limited places to exercise safely or buy healthy foods.  · Not getting enough sleep.  What increases the risk?  The following factors may increase your risk of this condition:  · Having a family history of obesity.  · Being a woman of -American descent.  · Being a man of  descent.  What are the signs or symptoms?  Having excessive body fat is the main symptom of this condition.  How is this diagnosed?  This condition may be diagnosed based on:  · Your symptoms.  · Your medical history.  · A physical exam. Your health care provider may measure:  ¨ Your BMI. If you are an adult with a BMI between 25 and less than 30, you are considered overweight. If you are an adult with a BMI of 30 or higher, you are considered obese.  ¨ The distances around your hips and your waist (circumferences). These may be compared to each other to help diagnose your condition.  ¨ Your skinfold thickness. Your health care provider may gently pinch a fold of your skin and measure it.  How is this treated?  Treatment for this condition often includes changing your lifestyle. Treatment may include some or all of the following:  · Dietary changes. Work with your health care provider and a dietitian to set a weight-loss goal that is healthy and reasonable for you. Dietary changes may include eating:  ¨ Smaller portions. A portion size is the amount of a particular food that is healthy for you to eat at one time. This varies from person to person.  ¨ Low-calorie or low-fat options.  ¨ More whole grains, fruits, and vegetables.  · Regular physical activity. This may include aerobic activity (cardio)  and strength training.  · Medicine to help you lose weight. Your health care provider may prescribe medicine if you are unable to lose 1 pound a week after 6 weeks of eating more healthily and doing more physical activity.  · Surgery. Surgical options may include gastric banding and gastric bypass. Surgery may be done if:  ¨ Other treatments have not helped to improve your condition.  ¨ You have a BMI of 40 or higher.  ¨ You have life-threatening health problems related to obesity.  Follow these instructions at home:     Eating and drinking     · Follow recommendations from your health care provider about what you eat and drink. Your health care provider may advise you to:  ¨ Limit fast foods, sweets, and processed snack foods.  ¨ Choose low-fat options, such as low-fat milk instead of whole milk.  ¨ Eat 5 or more servings of fruits or vegetables every day.  ¨ Eat at home more often. This gives you more control over what you eat.  ¨ Choose healthy foods when you eat out.  ¨ Learn what a healthy portion size is.  ¨ Keep low-fat snacks on hand.  ¨ Avoid sugary drinks, such as soda, fruit juice, iced tea sweetened with sugar, and flavored milk.  ¨ Eat a healthy breakfast.  · Drink enough water to keep your urine clear or pale yellow.  · Do not go without eating for long periods of time (do not fast) or follow a fad diet. Fasting and fad diets can be unhealthy and even dangerous.  Physical Activity   · Exercise regularly, as told by your health care provider. Ask your health care provider what types of exercise are safe for you and how often you should exercise.  · Warm up and stretch before being active.  · Cool down and stretch after being active.  · Rest between periods of activity.  Lifestyle   · Limit the time that you spend in front of your TV, computer, or video game system.  · Find ways to reward yourself that do not involve food.  · Limit alcohol intake to no more than 1 drink a day for nonpregnant women and 2  drinks a day for men. One drink equals 12 oz of beer, 5 oz of wine, or 1½ oz of hard liquor.  General instructions   · Keep a weight loss journal to keep track of the food you eat and how much you exercise you get.  · Take over-the-counter and prescription medicines only as told by your health care provider.  · Take vitamins and supplements only as told by your health care provider.  · Consider joining a support group. Your health care provider may be able to recommend a support group.  · Keep all follow-up visits as told by your health care provider. This is important.  Contact a health care provider if:  · You are unable to meet your weight loss goal after 6 weeks of dietary and lifestyle changes.  This information is not intended to replace advice given to you by your health care provider. Make sure you discuss any questions you have with your health care provider.  Document Released: 01/25/2006 Document Revised: 05/22/2017 Document Reviewed: 10/05/2016  [employer]vier Interactive Patient Education © 2017 [employer]

## 2018-10-02 ENCOUNTER — LAB (OUTPATIENT)
Dept: LAB | Facility: HOSPITAL | Age: 73
End: 2018-10-02
Attending: INTERNAL MEDICINE

## 2018-10-02 ENCOUNTER — APPOINTMENT (OUTPATIENT)
Dept: LAB | Facility: HOSPITAL | Age: 73
End: 2018-10-02

## 2018-10-02 ENCOUNTER — INFUSION (OUTPATIENT)
Dept: ONCOLOGY | Facility: HOSPITAL | Age: 73
End: 2018-10-02
Attending: INTERNAL MEDICINE

## 2018-10-02 ENCOUNTER — OFFICE VISIT (OUTPATIENT)
Dept: ONCOLOGY | Facility: CLINIC | Age: 73
End: 2018-10-02

## 2018-10-02 VITALS
SYSTOLIC BLOOD PRESSURE: 142 MMHG | OXYGEN SATURATION: 95 % | WEIGHT: 251 LBS | HEART RATE: 68 BPM | DIASTOLIC BLOOD PRESSURE: 80 MMHG | HEIGHT: 69 IN | BODY MASS INDEX: 37.18 KG/M2 | TEMPERATURE: 97.5 F | RESPIRATION RATE: 18 BRPM

## 2018-10-02 VITALS
BODY MASS INDEX: 37.18 KG/M2 | OXYGEN SATURATION: 97 % | WEIGHT: 251 LBS | SYSTOLIC BLOOD PRESSURE: 135 MMHG | DIASTOLIC BLOOD PRESSURE: 74 MMHG | HEIGHT: 69 IN | HEART RATE: 59 BPM | TEMPERATURE: 96.4 F | RESPIRATION RATE: 20 BRPM

## 2018-10-02 DIAGNOSIS — C50.112 MALIGNANT NEOPLASM OF CENTRAL PORTION OF LEFT FEMALE BREAST, UNSPECIFIED ESTROGEN RECEPTOR STATUS (HCC): Primary | ICD-10-CM

## 2018-10-02 DIAGNOSIS — D63.1 ANEMIA OF CHRONIC RENAL FAILURE, STAGE 3 (MODERATE) (HCC): Primary | ICD-10-CM

## 2018-10-02 DIAGNOSIS — D63.1 ANEMIA OF CHRONIC RENAL FAILURE, STAGE 3 (MODERATE) (HCC): ICD-10-CM

## 2018-10-02 DIAGNOSIS — N18.30 STAGE 3 CHRONIC KIDNEY DISEASE (HCC): ICD-10-CM

## 2018-10-02 DIAGNOSIS — N18.30 ANEMIA OF CHRONIC RENAL FAILURE, STAGE 3 (MODERATE) (HCC): ICD-10-CM

## 2018-10-02 DIAGNOSIS — N18.30 ANEMIA OF CHRONIC RENAL FAILURE, STAGE 3 (MODERATE) (HCC): Primary | ICD-10-CM

## 2018-10-02 LAB
ALBUMIN SERPL-MCNC: 3.9 G/DL (ref 3.5–5)
ALBUMIN/GLOB SERPL: 1.3 G/DL (ref 1.1–2.5)
ALP SERPL-CCNC: 69 U/L (ref 24–120)
ALT SERPL W P-5'-P-CCNC: 28 U/L (ref 0–54)
ANION GAP SERPL CALCULATED.3IONS-SCNC: 8 MMOL/L (ref 4–13)
AST SERPL-CCNC: 33 U/L (ref 7–45)
BASOPHILS # BLD AUTO: 0.02 10*3/MM3 (ref 0–0.2)
BASOPHILS NFR BLD AUTO: 0.5 % (ref 0–2)
BILIRUB SERPL-MCNC: 0.4 MG/DL (ref 0.1–1)
BUN BLD-MCNC: 23 MG/DL (ref 5–21)
BUN/CREAT SERPL: 17 (ref 7–25)
CALCIUM SPEC-SCNC: 9.2 MG/DL (ref 8.4–10.4)
CHLORIDE SERPL-SCNC: 99 MMOL/L (ref 98–110)
CO2 SERPL-SCNC: 31 MMOL/L (ref 24–31)
CREAT BLD-MCNC: 1.35 MG/DL (ref 0.5–1.4)
DEPRECATED RDW RBC AUTO: 51.1 FL (ref 40–54)
EOSINOPHIL # BLD AUTO: 0.18 10*3/MM3 (ref 0–0.7)
EOSINOPHIL NFR BLD AUTO: 4.1 % (ref 0–4)
ERYTHROCYTE [DISTWIDTH] IN BLOOD BY AUTOMATED COUNT: 15.6 % (ref 12–15)
GFR SERPL CREATININE-BSD FRML MDRD: 47 ML/MIN/1.73
GLOBULIN UR ELPH-MCNC: 3 GM/DL
GLUCOSE BLD-MCNC: 93 MG/DL (ref 70–100)
HCT VFR BLD AUTO: 32.6 % (ref 37–47)
HGB BLD-MCNC: 10.5 G/DL (ref 12–16)
HOLD SPECIMEN: NORMAL
HOLD SPECIMEN: NORMAL
IMM GRANULOCYTES # BLD: 0.02 10*3/MM3 (ref 0–0.03)
IMM GRANULOCYTES NFR BLD: 0.5 % (ref 0–5)
LYMPHOCYTES # BLD AUTO: 1.45 10*3/MM3 (ref 0.72–4.86)
LYMPHOCYTES NFR BLD AUTO: 33 % (ref 15–45)
MCH RBC QN AUTO: 28.8 PG (ref 28–32)
MCHC RBC AUTO-ENTMCNC: 32.2 G/DL (ref 33–36)
MCV RBC AUTO: 89.3 FL (ref 82–98)
MONOCYTES # BLD AUTO: 0.45 10*3/MM3 (ref 0.19–1.3)
MONOCYTES NFR BLD AUTO: 10.2 % (ref 4–12)
NEUTROPHILS # BLD AUTO: 2.28 10*3/MM3 (ref 1.87–8.4)
NEUTROPHILS NFR BLD AUTO: 51.7 % (ref 39–78)
NRBC BLD MANUAL-RTO: 0 /100 WBC (ref 0–0)
PLATELET # BLD AUTO: 253 10*3/MM3 (ref 130–400)
PMV BLD AUTO: 9.8 FL (ref 6–12)
POTASSIUM BLD-SCNC: 3.7 MMOL/L (ref 3.5–5.3)
PROT SERPL-MCNC: 6.9 G/DL (ref 6.3–8.7)
RBC # BLD AUTO: 3.65 10*6/MM3 (ref 4.2–5.4)
SODIUM BLD-SCNC: 138 MMOL/L (ref 135–145)
WBC NRBC COR # BLD: 4.4 10*3/MM3 (ref 4.8–10.8)

## 2018-10-02 PROCEDURE — 99214 OFFICE O/P EST MOD 30 MIN: CPT | Performed by: INTERNAL MEDICINE

## 2018-10-02 PROCEDURE — 96372 THER/PROPH/DIAG INJ SC/IM: CPT

## 2018-10-02 PROCEDURE — 80053 COMPREHEN METABOLIC PANEL: CPT

## 2018-10-02 PROCEDURE — 36415 COLL VENOUS BLD VENIPUNCTURE: CPT

## 2018-10-02 PROCEDURE — 85025 COMPLETE CBC W/AUTO DIFF WBC: CPT

## 2018-10-02 PROCEDURE — 63510000001 EPOETIN ALFA PER 1000 UNITS: Performed by: INTERNAL MEDICINE

## 2018-10-02 RX ORDER — CETIRIZINE HYDROCHLORIDE 10 MG/1
10 TABLET ORAL DAILY
COMMUNITY

## 2018-10-02 RX ADMIN — ERYTHROPOIETIN 40000 UNITS: 40000 INJECTION, SOLUTION INTRAVENOUS; SUBCUTANEOUS at 10:18

## 2018-10-02 NOTE — PROGRESS NOTES
[facility]  HEMATOLOGY & ONCOLOGY    Cancer Staging Information:  No matching staging information was found for the patient.      Subjective     VISIT DIAGNOSIS:   Encounter Diagnoses   Name Primary?   • Anemia of chronic renal failure, stage 3 (moderate) (CMS/HCC)    • Malignant neoplasm of central portion of left female breast, unspecified estrogen receptor status (CMS/HCC) Yes   • Stage 3 chronic kidney disease (CMS/HCC)        REASON FOR VISIT:     Chief Complaint   Patient presents with   • Follow-up     Breast Ca/anemia: She is here for f/u visit today        HEMATOLOGY / ONCOLOGY HISTORY:    No history exists.           INTERVAL HISTORY  Patient ID: Marina Joe is a 73 y.o. year old female h/o breast ca on arimidex. Tolerates AI very well. mammo 4/27/18 NMEM  -denies sob, brbpr, cp.  Past Medical History:   Past Medical History:   Diagnosis Date   • Breast cancer (CMS/HCC)    • CKD (chronic kidney disease)    • Hypercholesteremia    • Hypertension    • Sinusitis      Past Surgical History:   Past Surgical History:   Procedure Laterality Date   • AVULSION TOENAIL PLATE      Sept 26,2018   • BREAST BIOPSY Left 11/20/2012   • BREAST LUMPECTOMY Left     with node bx    • COLONOSCOPY  09/13/2013    small polyp at 30cm benign hyperplastic polyp, changes consistent with melanosis coli. Recall 5 years   • REPLACEMENT TOTAL KNEE Right     2016   • TOTAL ABDOMINAL HYSTERECTOMY WITH SALPINGO OOPHORECTOMY       Social History:   Social History     Social History   • Marital status:      Spouse name: N/A   • Number of children: N/A   • Years of education: N/A     Occupational History   • Not on file.     Social History Main Topics   • Smoking status: Never Smoker   • Smokeless tobacco: Never Used   • Alcohol use No   • Drug use: Unknown   • Sexual activity: Not on file     Other Topics Concern   • Not on file     Social History Narrative   • No narrative on file     Family History:   Family  History   Problem Relation Age of Onset   • Heart attack Mother    • Hodgkin's lymphoma Father    • Heart attack Brother    • Colon cancer Neg Hx    • Colon polyps Neg Hx        Review of Systems   Constitutional: Negative.    HENT: Negative.    Eyes: Negative.    Respiratory: Negative.    Cardiovascular: Negative.    Gastrointestinal: Negative.    Genitourinary: Negative.    Musculoskeletal: Negative.    Skin: Negative.    Neurological: Negative.    Hematological: Negative.    Psychiatric/Behavioral: Negative.         Performance Status:  Asymptomatic    Medications:    Current Outpatient Prescriptions   Medication Sig Dispense Refill   • anastrozole (ARIMIDEX) 1 MG tablet Take 1 tablet by mouth Daily. 90 tablet 4   • buPROPion SR (WELLBUTRIN SR) 150 MG 12 hr tablet Take 150 mg by mouth.     • Calcium Carb-Cholecalciferol (CALCIUM 600+D3 PO) Take  by mouth.     • carvedilol (COREG) 6.25 MG tablet Take 6.25 mg by mouth 2 (Two) Times a Day With Meals.     • cetirizine (zyrTEC) 10 MG tablet Take 10 mg by mouth Daily.     • Ergocalciferol (VITAMIN D2 PO) Take 50,000 Units by mouth.     • ferrous sulfate 325 (65 FE) MG tablet Take 325 mg by mouth Daily With Breakfast.     • fluticasone (FLOVENT DISKUS) 50 MCG/BLIST diskus inhaler Inhale 1 puff.     • levothyroxine (SYNTHROID, LEVOTHROID) 25 MCG tablet Take 25 mcg by mouth Daily.     • Misc Natural Products (COLON HERBAL CLEANSER PO) Take  by mouth.     • olmesartan-hydrochlorothiazide (BENICAR HCT) 40-25 MG per tablet Take 1 tablet by mouth Daily.     • Omega-3 Fatty Acids (FISH OIL) 1000 MG capsule capsule Take 1,000 mg by mouth.     • Pediatric Multivitamins-Iron (PEDIATRIC MULTIPLE VITAMINS W/ IRON) 15 MG chewable tablet Chew 1 tablet Daily.     • rosuvastatin (CRESTOR) 20 MG tablet Take 20 mg by mouth Daily.     • sertraline (ZOLOFT) 100 MG tablet Take 100 mg by mouth Daily.     • valACYclovir (VALTREX) 500 MG tablet Take 500 mg by mouth.     • oxyCODONE (OXY-IR) 5  "MG capsule Take 5 mg by mouth.     • polyethylene glycol (GoLYTELY) 236 g solution Take as directed by office instructions. 4000 mL 0     No current facility-administered medications for this visit.        ALLERGIES:    Allergies   Allergen Reactions   • Aspirin        Objective      Vitals:    10/02/18 0912   BP: 142/80   Pulse: 68   Resp: 18   Temp: 97.5 °F (36.4 °C)   TempSrc: Tympanic   SpO2: 95%   Weight: 114 kg (251 lb)   Height: 175.3 cm (69\")         Current Status 10/2/2018   ECOG score 0         Physical Exam    General Appearance: Patient is awake, alert, oriented and in no acute distress. Patient is welldeveloped, wellnourished, and appears stated age.  HEENT: Normocephalic. Sclerae clear, conjunctiva pink, extraocular movements intact, pupils, round, reactive to light and  accommodation. Mouth and throat are clear with moist oral mucosa.  NECK: Supple, no jugular venous distention, thyroid not enlarged.  LYMPH: No cervical, supraclavicular, axillary, or inguinal lymphadenopathy.  CHEST: Equal bilateral expansion, AP  diameter normal, resonant percussion note  LUNGS: Good air movement, no rales, rhonchi, rubs or wheezes with auscultation  CARDIO: Regular sinus rhythm, no murmurs, gallops or rubs.  ABDOMEN: Nondistended, soft, No tenderness, no guarding, no rebound, No hepatosplenomegaly. No abdominal masses. Bowel sounds positive. No hernia  GENITALIA: Not examined.  BREASTS: Not examined.  MUSKEL: No joint swelling, decreased motion, or inflammation  EXTREMS: No edema, clubbing, cyanosis, No varicose veins.  NEURO: Grossly nonfocal, Gait is coordinated and smooth, Cognition is preserved.  SKIN: No rashes, no ecchymoses, no petechia.  PSYCH: Oriented to time, place and person. Memory is preserved. Mood and affect appear normal  RECENT LABS:  Lab on 10/02/2018   Component Date Value Ref Range Status   • Glucose 10/02/2018 93  70 - 100 mg/dL Final   • BUN 10/02/2018 23* 5 - 21 mg/dL Final   • Creatinine " 10/02/2018 1.35  0.50 - 1.40 mg/dL Final   • Sodium 10/02/2018 138  135 - 145 mmol/L Final   • Potassium 10/02/2018 3.7  3.5 - 5.3 mmol/L Final   • Chloride 10/02/2018 99  98 - 110 mmol/L Final   • CO2 10/02/2018 31.0  24.0 - 31.0 mmol/L Final   • Calcium 10/02/2018 9.2  8.4 - 10.4 mg/dL Final   • Total Protein 10/02/2018 6.9  6.3 - 8.7 g/dL Final   • Albumin 10/02/2018 3.90  3.50 - 5.00 g/dL Final   • ALT (SGPT) 10/02/2018 28  0 - 54 U/L Final   • AST (SGOT) 10/02/2018 33  7 - 45 U/L Final   • Alkaline Phosphatase 10/02/2018 69  24 - 120 U/L Final   • Total Bilirubin 10/02/2018 0.4  0.1 - 1.0 mg/dL Final   • eGFR   Amer 10/02/2018 47* >60 mL/min/1.73 Final   • Globulin 10/02/2018 3.0  gm/dL Final   • A/G Ratio 10/02/2018 1.3  1.1 - 2.5 g/dL Final   • BUN/Creatinine Ratio 10/02/2018 17.0  7.0 - 25.0 Final   • Anion Gap 10/02/2018 8.0  4.0 - 13.0 mmol/L Final   • WBC 10/02/2018 4.40* 4.80 - 10.80 10*3/mm3 Final   • RBC 10/02/2018 3.65* 4.20 - 5.40 10*6/mm3 Final   • Hemoglobin 10/02/2018 10.5* 12.0 - 16.0 g/dL Final   • Hematocrit 10/02/2018 32.6* 37.0 - 47.0 % Final   • MCV 10/02/2018 89.3  82.0 - 98.0 fL Final   • MCH 10/02/2018 28.8  28.0 - 32.0 pg Final   • MCHC 10/02/2018 32.2* 33.0 - 36.0 g/dL Final   • RDW 10/02/2018 15.6* 12.0 - 15.0 % Final   • RDW-SD 10/02/2018 51.1  40.0 - 54.0 fl Final   • MPV 10/02/2018 9.8  6.0 - 12.0 fL Final   • Platelets 10/02/2018 253  130 - 400 10*3/mm3 Final   • Neutrophil % 10/02/2018 51.7  39.0 - 78.0 % Final   • Lymphocyte % 10/02/2018 33.0  15.0 - 45.0 % Final   • Monocyte % 10/02/2018 10.2  4.0 - 12.0 % Final   • Eosinophil % 10/02/2018 4.1* 0.0 - 4.0 % Final   • Basophil % 10/02/2018 0.5  0.0 - 2.0 % Final   • Immature Grans % 10/02/2018 0.5  0.0 - 5.0 % Final   • Neutrophils, Absolute 10/02/2018 2.28  1.87 - 8.40 10*3/mm3 Final   • Lymphocytes, Absolute 10/02/2018 1.45  0.72 - 4.86 10*3/mm3 Final   • Monocytes, Absolute 10/02/2018 0.45  0.19 - 1.30 10*3/mm3 Final   •  Eosinophils, Absolute 10/02/2018 0.18  0.00 - 0.70 10*3/mm3 Final   • Basophils, Absolute 10/02/2018 0.02  0.00 - 0.20 10*3/mm3 Final   • Immature Grans, Absolute 10/02/2018 0.02  0.00 - 0.03 10*3/mm3 Final   • nRBC 10/02/2018 0.0  0.0 - 0.0 /100 WBC Final       RADIOLOGY:  No results found.         Assessment/Plan  Marina Joe is a 73 y.o. year old female with h/o breast ca whom we are seeing today for anemia 2/2 ckd.    Patient Active Problem List   Diagnosis   • Malignant neoplasm of central portion of left female breast (CMS/HCC)   • Anemia of chronic renal failure, stage 3 (moderate) (CMS/HCC)   • HTN (hypertension), benign   • Hx of colonic polyps   • HX: breast cancer        1. Stage IA invasive ductal carcinoma left breast ER 97% WA 97% HER-2/alix 2+, fish and amplified diagnosed May 2012 status post lumpectomy, radiation, on the hormonal manipulation with Arimidex.  Mammogram 12/17 benign.   --she tolerates AI with no SE, continue for 10yrs.     2.  Osteopenia.  DEXA scan May 2016 revealed osteopenia.  Start Boniva or Fosamax pending insurance coverage.  Continue daily calcium and vitamin D supplement     3.  Anemia in CKD stage III area today's hemoglobin 10.5. Give epo. Iron profile pending for today. She is on po iron.  --william monthly cbc with subQ epo if Hg <11  --RTC in 3 months    4. Hypothyroidism: on synthroid.  5. Depression: on wellbutrin  6. CAD: on coreg, benicar  7. Chronic pain synd: on oxycodone IR       Samra De Leon MD    10/2/2018    9:32 AM

## 2018-11-06 ENCOUNTER — LAB (OUTPATIENT)
Dept: LAB | Facility: HOSPITAL | Age: 73
End: 2018-11-06

## 2018-11-06 DIAGNOSIS — D63.1 ANEMIA OF CHRONIC RENAL FAILURE, STAGE 3 (MODERATE) (HCC): ICD-10-CM

## 2018-11-06 DIAGNOSIS — N18.30 ANEMIA OF CHRONIC RENAL FAILURE, STAGE 3 (MODERATE) (HCC): ICD-10-CM

## 2018-11-06 LAB
ALBUMIN SERPL-MCNC: 4.1 G/DL (ref 3.5–5)
ALBUMIN/GLOB SERPL: 1.2 G/DL (ref 1.1–2.5)
ALP SERPL-CCNC: 56 U/L (ref 24–120)
ALT SERPL W P-5'-P-CCNC: 25 U/L (ref 0–54)
ANION GAP SERPL CALCULATED.3IONS-SCNC: 11 MMOL/L (ref 4–13)
AST SERPL-CCNC: 34 U/L (ref 7–45)
BASOPHILS # BLD AUTO: 0.02 10*3/MM3 (ref 0–0.2)
BASOPHILS NFR BLD AUTO: 0.4 % (ref 0–2)
BILIRUB SERPL-MCNC: 0.5 MG/DL (ref 0.1–1)
BUN BLD-MCNC: 23 MG/DL (ref 5–21)
BUN/CREAT SERPL: 18.4 (ref 7–25)
CALCIUM SPEC-SCNC: 9.3 MG/DL (ref 8.4–10.4)
CHLORIDE SERPL-SCNC: 97 MMOL/L (ref 98–110)
CO2 SERPL-SCNC: 31 MMOL/L (ref 24–31)
CREAT BLD-MCNC: 1.25 MG/DL (ref 0.5–1.4)
DEPRECATED RDW RBC AUTO: 49.3 FL (ref 40–54)
EOSINOPHIL # BLD AUTO: 0.19 10*3/MM3 (ref 0–0.7)
EOSINOPHIL NFR BLD AUTO: 3.5 % (ref 0–4)
ERYTHROCYTE [DISTWIDTH] IN BLOOD BY AUTOMATED COUNT: 15.5 % (ref 12–15)
GFR SERPL CREATININE-BSD FRML MDRD: 51 ML/MIN/1.73
GLOBULIN UR ELPH-MCNC: 3.3 GM/DL
GLUCOSE BLD-MCNC: 91 MG/DL (ref 70–100)
HCT VFR BLD AUTO: 32.8 % (ref 37–47)
HGB BLD-MCNC: 10.5 G/DL (ref 12–16)
HOLD SPECIMEN: NORMAL
IMM GRANULOCYTES # BLD: 0.01 10*3/MM3 (ref 0–0.03)
IMM GRANULOCYTES NFR BLD: 0.2 % (ref 0–5)
LYMPHOCYTES # BLD AUTO: 1.89 10*3/MM3 (ref 0.72–4.86)
LYMPHOCYTES NFR BLD AUTO: 34.8 % (ref 15–45)
MCH RBC QN AUTO: 27.9 PG (ref 28–32)
MCHC RBC AUTO-ENTMCNC: 32 G/DL (ref 33–36)
MCV RBC AUTO: 87 FL (ref 82–98)
MONOCYTES # BLD AUTO: 0.5 10*3/MM3 (ref 0.19–1.3)
MONOCYTES NFR BLD AUTO: 9.2 % (ref 4–12)
NEUTROPHILS # BLD AUTO: 2.82 10*3/MM3 (ref 1.87–8.4)
NEUTROPHILS NFR BLD AUTO: 51.9 % (ref 39–78)
NRBC BLD MANUAL-RTO: 0 /100 WBC (ref 0–0)
PLATELET # BLD AUTO: 257 10*3/MM3 (ref 130–400)
PMV BLD AUTO: 9.7 FL (ref 6–12)
POTASSIUM BLD-SCNC: 4.1 MMOL/L (ref 3.5–5.3)
PROT SERPL-MCNC: 7.4 G/DL (ref 6.3–8.7)
RBC # BLD AUTO: 3.77 10*6/MM3 (ref 4.2–5.4)
SODIUM BLD-SCNC: 139 MMOL/L (ref 135–145)
WBC NRBC COR # BLD: 5.43 10*3/MM3 (ref 4.8–10.8)

## 2018-11-06 PROCEDURE — 36415 COLL VENOUS BLD VENIPUNCTURE: CPT

## 2018-11-06 PROCEDURE — 80053 COMPREHEN METABOLIC PANEL: CPT

## 2018-11-06 PROCEDURE — 85025 COMPLETE CBC W/AUTO DIFF WBC: CPT

## 2018-11-14 ENCOUNTER — TELEPHONE (OUTPATIENT)
Dept: SURGERY | Age: 73
End: 2018-11-14

## 2018-11-16 ENCOUNTER — TELEPHONE (OUTPATIENT)
Dept: SURGERY | Age: 73
End: 2018-11-16

## 2018-11-19 ENCOUNTER — OUTSIDE FACILITY SERVICE (OUTPATIENT)
Dept: GASTROENTEROLOGY | Facility: CLINIC | Age: 73
End: 2018-11-19

## 2018-11-19 PROCEDURE — G0105 COLORECTAL SCRN; HI RISK IND: HCPCS | Performed by: INTERNAL MEDICINE

## 2018-11-27 ENCOUNTER — TELEPHONE (OUTPATIENT)
Dept: GASTROENTEROLOGY | Facility: CLINIC | Age: 73
End: 2018-11-27

## 2018-12-12 ENCOUNTER — HOSPITAL ENCOUNTER (OUTPATIENT)
Dept: WOMENS IMAGING | Age: 73
Discharge: HOME | End: 2018-12-12
Payer: MEDICARE

## 2018-12-12 ENCOUNTER — OFFICE VISIT (OUTPATIENT)
Dept: SURGERY | Age: 73
End: 2018-12-12
Payer: MEDICARE

## 2018-12-12 VITALS — HEIGHT: 68 IN | WEIGHT: 251 LBS | BODY MASS INDEX: 38.04 KG/M2

## 2018-12-12 DIAGNOSIS — Z85.3 PERSONAL HISTORY OF MALIGNANT NEOPLASM OF BREAST: ICD-10-CM

## 2018-12-12 DIAGNOSIS — N63.21 MASS OF UPPER OUTER QUADRANT OF LEFT BREAST: Primary | ICD-10-CM

## 2018-12-12 DIAGNOSIS — R92.8 ABNORMAL MAMMOGRAM: ICD-10-CM

## 2018-12-12 PROCEDURE — G0279 TOMOSYNTHESIS, MAMMO: HCPCS

## 2018-12-12 PROCEDURE — 99212 OFFICE O/P EST SF 10 MIN: CPT | Performed by: PHYSICIAN ASSISTANT

## 2018-12-12 PROCEDURE — G8427 DOCREV CUR MEDS BY ELIG CLIN: HCPCS | Performed by: PHYSICIAN ASSISTANT

## 2018-12-12 PROCEDURE — 4004F PT TOBACCO SCREEN RCVD TLK: CPT | Performed by: PHYSICIAN ASSISTANT

## 2018-12-12 PROCEDURE — 4040F PNEUMOC VAC/ADMIN/RCVD: CPT | Performed by: PHYSICIAN ASSISTANT

## 2018-12-12 PROCEDURE — G8417 CALC BMI ABV UP PARAM F/U: HCPCS | Performed by: PHYSICIAN ASSISTANT

## 2018-12-12 PROCEDURE — 1090F PRES/ABSN URINE INCON ASSESS: CPT | Performed by: PHYSICIAN ASSISTANT

## 2018-12-12 PROCEDURE — 1123F ACP DISCUSS/DSCN MKR DOCD: CPT | Performed by: PHYSICIAN ASSISTANT

## 2018-12-12 PROCEDURE — 3017F COLORECTAL CA SCREEN DOC REV: CPT | Performed by: PHYSICIAN ASSISTANT

## 2018-12-12 PROCEDURE — 1101F PT FALLS ASSESS-DOCD LE1/YR: CPT | Performed by: PHYSICIAN ASSISTANT

## 2018-12-12 PROCEDURE — G8399 PT W/DXA RESULTS DOCUMENT: HCPCS | Performed by: PHYSICIAN ASSISTANT

## 2018-12-12 PROCEDURE — 76642 ULTRASOUND BREAST LIMITED: CPT

## 2018-12-12 PROCEDURE — G8484 FLU IMMUNIZE NO ADMIN: HCPCS | Performed by: PHYSICIAN ASSISTANT

## 2019-01-02 ENCOUNTER — APPOINTMENT (OUTPATIENT)
Dept: LAB | Facility: HOSPITAL | Age: 74
End: 2019-01-02

## 2019-01-04 ENCOUNTER — TELEPHONE (OUTPATIENT)
Dept: SURGERY | Age: 74
End: 2019-01-04

## 2019-01-04 DIAGNOSIS — R92.8 ABNORMAL MAMMOGRAM: Primary | ICD-10-CM

## 2019-01-04 RX ORDER — DIAZEPAM 5 MG/1
TABLET ORAL
Qty: 10 TABLET | Refills: 0 | OUTPATIENT
Start: 2019-01-04 | End: 2019-01-09

## 2019-01-07 ENCOUNTER — HOSPITAL ENCOUNTER (OUTPATIENT)
Dept: WOMENS IMAGING | Age: 74
Discharge: HOME | End: 2019-01-07
Payer: MEDICARE

## 2019-01-07 DIAGNOSIS — N63.21 MASS OF UPPER OUTER QUADRANT OF LEFT BREAST: ICD-10-CM

## 2019-01-07 PROCEDURE — 19083 BX BREAST 1ST LESION US IMAG: CPT

## 2019-01-07 PROCEDURE — 77065 DX MAMMO INCL CAD UNI: CPT

## 2019-01-07 PROCEDURE — 88305 TISSUE EXAM BY PATHOLOGIST: CPT

## 2019-01-08 DIAGNOSIS — D63.1 ANEMIA OF CHRONIC RENAL FAILURE, STAGE 3 (MODERATE) (HCC): ICD-10-CM

## 2019-01-08 DIAGNOSIS — N18.30 ANEMIA OF CHRONIC RENAL FAILURE, STAGE 3 (MODERATE) (HCC): ICD-10-CM

## 2019-01-14 ENCOUNTER — TELEPHONE (OUTPATIENT)
Dept: SURGERY | Age: 74
End: 2019-01-14

## 2019-01-15 ENCOUNTER — OFFICE VISIT (OUTPATIENT)
Dept: ONCOLOGY | Facility: CLINIC | Age: 74
End: 2019-01-15

## 2019-01-15 ENCOUNTER — INFUSION (OUTPATIENT)
Dept: ONCOLOGY | Facility: HOSPITAL | Age: 74
End: 2019-01-15
Attending: INTERNAL MEDICINE

## 2019-01-15 ENCOUNTER — LAB (OUTPATIENT)
Dept: LAB | Facility: HOSPITAL | Age: 74
End: 2019-01-15

## 2019-01-15 VITALS
WEIGHT: 253.3 LBS | SYSTOLIC BLOOD PRESSURE: 144 MMHG | DIASTOLIC BLOOD PRESSURE: 78 MMHG | TEMPERATURE: 98.4 F | BODY MASS INDEX: 37.52 KG/M2 | OXYGEN SATURATION: 93 % | RESPIRATION RATE: 18 BRPM | HEIGHT: 69 IN | HEART RATE: 71 BPM

## 2019-01-15 DIAGNOSIS — D63.1 ANEMIA OF CHRONIC RENAL FAILURE, STAGE 3 (MODERATE) (HCC): ICD-10-CM

## 2019-01-15 DIAGNOSIS — N18.30 ANEMIA OF CHRONIC RENAL FAILURE, STAGE 3 (MODERATE) (HCC): ICD-10-CM

## 2019-01-15 DIAGNOSIS — N18.30 ANEMIA OF CHRONIC RENAL FAILURE, STAGE 3 (MODERATE) (HCC): Primary | ICD-10-CM

## 2019-01-15 DIAGNOSIS — C50.112 MALIGNANT NEOPLASM OF CENTRAL PORTION OF LEFT FEMALE BREAST, UNSPECIFIED ESTROGEN RECEPTOR STATUS (HCC): Primary | ICD-10-CM

## 2019-01-15 DIAGNOSIS — D63.1 ANEMIA OF CHRONIC RENAL FAILURE, STAGE 3 (MODERATE) (HCC): Primary | ICD-10-CM

## 2019-01-15 LAB
ALBUMIN SERPL-MCNC: 4.3 G/DL (ref 3.5–5)
ALBUMIN/GLOB SERPL: 1.2 G/DL (ref 1.1–2.5)
ALP SERPL-CCNC: 64 U/L (ref 24–120)
ALT SERPL W P-5'-P-CCNC: 26 U/L (ref 0–54)
ANION GAP SERPL CALCULATED.3IONS-SCNC: 10 MMOL/L (ref 4–13)
AST SERPL-CCNC: 33 U/L (ref 7–45)
BASOPHILS # BLD AUTO: 0.02 10*3/MM3 (ref 0–0.2)
BASOPHILS NFR BLD AUTO: 0.4 % (ref 0–2)
BILIRUB SERPL-MCNC: 0.5 MG/DL (ref 0.1–1)
BUN BLD-MCNC: 21 MG/DL (ref 5–21)
BUN/CREAT SERPL: 16.8 (ref 7–25)
CALCIUM SPEC-SCNC: 9.7 MG/DL (ref 8.4–10.4)
CHLORIDE SERPL-SCNC: 97 MMOL/L (ref 98–110)
CO2 SERPL-SCNC: 32 MMOL/L (ref 24–31)
CREAT BLD-MCNC: 1.25 MG/DL (ref 0.5–1.4)
DEPRECATED RDW RBC AUTO: 51.5 FL (ref 40–54)
EOSINOPHIL # BLD AUTO: 0.14 10*3/MM3 (ref 0–0.7)
EOSINOPHIL NFR BLD AUTO: 2.6 % (ref 0–4)
ERYTHROCYTE [DISTWIDTH] IN BLOOD BY AUTOMATED COUNT: 16 % (ref 12–15)
FERRITIN SERPL-MCNC: 337 NG/ML (ref 11.1–264)
FOLATE SERPL-MCNC: >20 NG/ML
GFR SERPL CREATININE-BSD FRML MDRD: 51 ML/MIN/1.73
GLOBULIN UR ELPH-MCNC: 3.5 GM/DL
GLUCOSE BLD-MCNC: 100 MG/DL (ref 70–100)
HCT VFR BLD AUTO: 34.5 % (ref 37–47)
HGB BLD-MCNC: 11.2 G/DL (ref 12–16)
IMM GRANULOCYTES # BLD AUTO: 0.02 10*3/MM3 (ref 0–0.03)
IMM GRANULOCYTES NFR BLD AUTO: 0.4 % (ref 0–5)
IRON 24H UR-MRATE: 87 MCG/DL (ref 42–180)
IRON SATN MFR SERPL: 31 % (ref 20–45)
LYMPHOCYTES # BLD AUTO: 1.87 10*3/MM3 (ref 0.72–4.86)
LYMPHOCYTES NFR BLD AUTO: 34.3 % (ref 15–45)
MCH RBC QN AUTO: 28.6 PG (ref 28–32)
MCHC RBC AUTO-ENTMCNC: 32.5 G/DL (ref 33–36)
MCV RBC AUTO: 88.2 FL (ref 82–98)
MONOCYTES # BLD AUTO: 0.41 10*3/MM3 (ref 0.19–1.3)
MONOCYTES NFR BLD AUTO: 7.5 % (ref 4–12)
NEUTROPHILS # BLD AUTO: 2.99 10*3/MM3 (ref 1.87–8.4)
NEUTROPHILS NFR BLD AUTO: 54.8 % (ref 39–78)
NRBC BLD AUTO-RTO: 0 /100 WBC (ref 0–0)
PLATELET # BLD AUTO: 235 10*3/MM3 (ref 130–400)
PMV BLD AUTO: 9.3 FL (ref 6–12)
POTASSIUM BLD-SCNC: 3.6 MMOL/L (ref 3.5–5.3)
PROT SERPL-MCNC: 7.8 G/DL (ref 6.3–8.7)
RBC # BLD AUTO: 3.91 10*6/MM3 (ref 4.2–5.4)
SODIUM BLD-SCNC: 139 MMOL/L (ref 135–145)
TIBC SERPL-MCNC: 284 MCG/DL (ref 225–420)
VIT B12 BLD-MCNC: >1000 PG/ML (ref 239–931)
WBC NRBC COR # BLD: 5.45 10*3/MM3 (ref 4.8–10.8)

## 2019-01-15 PROCEDURE — 86300 IMMUNOASSAY TUMOR CA 15-3: CPT | Performed by: INTERNAL MEDICINE

## 2019-01-15 PROCEDURE — 83550 IRON BINDING TEST: CPT | Performed by: INTERNAL MEDICINE

## 2019-01-15 PROCEDURE — 80053 COMPREHEN METABOLIC PANEL: CPT

## 2019-01-15 PROCEDURE — 99214 OFFICE O/P EST MOD 30 MIN: CPT | Performed by: INTERNAL MEDICINE

## 2019-01-15 PROCEDURE — 82746 ASSAY OF FOLIC ACID SERUM: CPT | Performed by: INTERNAL MEDICINE

## 2019-01-15 PROCEDURE — 82728 ASSAY OF FERRITIN: CPT | Performed by: INTERNAL MEDICINE

## 2019-01-15 PROCEDURE — 36415 COLL VENOUS BLD VENIPUNCTURE: CPT | Performed by: INTERNAL MEDICINE

## 2019-01-15 PROCEDURE — 85025 COMPLETE CBC W/AUTO DIFF WBC: CPT

## 2019-01-15 PROCEDURE — 83540 ASSAY OF IRON: CPT | Performed by: INTERNAL MEDICINE

## 2019-01-15 PROCEDURE — 82607 VITAMIN B-12: CPT | Performed by: INTERNAL MEDICINE

## 2019-01-15 RX ORDER — ANASTROZOLE 1 MG/1
1 TABLET ORAL DAILY
Qty: 90 TABLET | Refills: 4 | Status: SHIPPED | OUTPATIENT
Start: 2019-01-15 | End: 2019-07-19 | Stop reason: SDUPTHER

## 2019-01-15 NOTE — PROGRESS NOTES
[facility]  HEMATOLOGY & ONCOLOGY    Cancer Staging Information:  No matching staging information was found for the patient.      Subjective     VISIT DIAGNOSIS:   Encounter Diagnosis   Name Primary?   • Anemia of chronic renal failure, stage 3 (moderate) (CMS/HCC)        REASON FOR VISIT:     Chief Complaint   Patient presents with   • Breast Cancer     She is here for f/u visit today        HEMATOLOGY / ONCOLOGY HISTORY:    No history exists.           INTERVAL HISTORY  Patient ID: Marina Joe is a 73 y.o. year old female h/o breast ca on arimidex. Tolerates AI very well. mammo 4/27/18 [facility]  -- had flu n Dec. Getting better. Per pt today is the first time she actually feels fine. Had flu shot in Nov of 2018. She would like refill of her arimidex  --denies sob, brbpr, cp/BRIDGER, unintentional weight loss,n/v/adnominal pain. Rest of ros unremarkable. PE unchanged.    Past Medical History:   Past Medical History:   Diagnosis Date   • Breast cancer (CMS/HCC)    • CKD (chronic kidney disease)    • Hypercholesteremia    • Hypertension    • Sinusitis      Past Surgical History:   Past Surgical History:   Procedure Laterality Date   • AVULSION TOENAIL PLATE      Sept 26,2018   • BREAST BIOPSY Left 11/20/2012   • BREAST BIOPSY      Left Breast, 1/2019 per Dr Barkley   • BREAST LUMPECTOMY Left     with node bx    • COLONOSCOPY  09/13/2013    small polyp at 30cm benign hyperplastic polyp, changes consistent with melanosis coli. Recall 5 years   • REPLACEMENT TOTAL KNEE Right     2016   • TOTAL ABDOMINAL HYSTERECTOMY WITH SALPINGO OOPHORECTOMY       Social History:   Social History     Socioeconomic History   • Marital status:      Spouse name: Not on file   • Number of children: Not on file   • Years of education: Not on file   • Highest education level: Not on file   Social Needs   • Financial resource strain: Not on file   • Food insecurity - worry: Not on file   • Food insecurity - inability: Not  on file   • Transportation needs - medical: Not on file   • Transportation needs - non-medical: Not on file   Occupational History   • Not on file   Tobacco Use   • Smoking status: Never Smoker   • Smokeless tobacco: Never Used   Substance and Sexual Activity   • Alcohol use: No   • Drug use: Not on file   • Sexual activity: Not on file   Other Topics Concern   • Not on file   Social History Narrative   • Not on file     Family History:   Family History   Problem Relation Age of Onset   • Heart attack Mother    • Hodgkin's lymphoma Father    • Heart attack Brother    • Colon cancer Neg Hx    • Colon polyps Neg Hx        Review of Systems   Constitutional: Negative.    HENT: Negative.    Eyes: Negative.    Respiratory: Negative.    Cardiovascular: Negative.    Gastrointestinal: Negative.    Genitourinary: Negative.    Musculoskeletal: Negative.    Skin: Negative.    Neurological: Negative.    Hematological: Negative.    Psychiatric/Behavioral: Negative.         Performance Status:  Asymptomatic    Medications:    Current Outpatient Medications   Medication Sig Dispense Refill   • anastrozole (ARIMIDEX) 1 MG tablet Take 1 tablet by mouth Daily. 90 tablet 4   • buPROPion SR (WELLBUTRIN SR) 150 MG 12 hr tablet Take 150 mg by mouth.     • Calcium Carb-Cholecalciferol (CALCIUM 600+D3 PO) Take  by mouth.     • carvedilol (COREG) 6.25 MG tablet Take 6.25 mg by mouth 2 (Two) Times a Day With Meals.     • cetirizine (zyrTEC) 10 MG tablet Take 10 mg by mouth Daily.     • Ergocalciferol (VITAMIN D2 PO) Take 50,000 Units by mouth.     • ferrous sulfate 325 (65 FE) MG tablet Take 325 mg by mouth Daily With Breakfast.     • fluticasone (FLOVENT DISKUS) 50 MCG/BLIST diskus inhaler Inhale 1 puff.     • levothyroxine (SYNTHROID, LEVOTHROID) 25 MCG tablet Take 25 mcg by mouth Daily.     • Misc Natural Products (COLON HERBAL CLEANSER PO) Take  by mouth.     • olmesartan-hydrochlorothiazide (BENICAR HCT) 40-25 MG per tablet Take 1  "tablet by mouth Daily.     • Omega-3 Fatty Acids (FISH OIL) 1000 MG capsule capsule Take 1,000 mg by mouth.     • oxyCODONE (OXY-IR) 5 MG capsule Take 5 mg by mouth.     • Pediatric Multivitamins-Iron (PEDIATRIC MULTIPLE VITAMINS W/ IRON) 15 MG chewable tablet Chew 1 tablet Daily.     • polyethylene glycol (GoLYTELY) 236 g solution Take as directed by office instructions. 4000 mL 0   • rosuvastatin (CRESTOR) 20 MG tablet Take 20 mg by mouth Daily.     • sertraline (ZOLOFT) 100 MG tablet Take 100 mg by mouth Daily.     • valACYclovir (VALTREX) 500 MG tablet Take 500 mg by mouth.       No current facility-administered medications for this visit.        ALLERGIES:    Allergies   Allergen Reactions   • Aspirin        Objective      Vitals:    01/15/19 0941   BP: 144/78  Comment: right arm   Pulse: 71   Resp: 18   Temp: 98.4 °F (36.9 °C)   TempSrc: Tympanic   SpO2: 93%   Weight: 115 kg (253 lb 4.8 oz)   Height: 175.3 cm (69\")   PainSc:   2   PainLoc: Knee  Comment: legs & knees due to weather changes, Arthritis         Current Status 10/2/2018   ECOG score 0         Physical Exam    General Appearance: Patient is awake, alert, oriented and in no acute distress. Patient is welldeveloped, wellnourished, and appears stated age.  HEENT: Normocephalic. Sclerae clear, conjunctiva pink, extraocular movements intact, pupils, round, reactive to light and  accommodation. Mouth and throat are clear with moist oral mucosa.  NECK: Supple, no jugular venous distention, thyroid not enlarged.  LYMPH: No cervical, supraclavicular, axillary, or inguinal lymphadenopathy.  CHEST: Equal bilateral expansion, AP  diameter normal, resonant percussion note  LUNGS: Good air movement, no rales, rhonchi, rubs or wheezes with auscultation  CARDIO: Regular sinus rhythm, no murmurs, gallops or rubs.  ABDOMEN: Nondistended, soft, No tenderness, no guarding, no rebound, No hepatosplenomegaly. No abdominal masses. Bowel sounds positive. No " hernia  GENITALIA: Not examined.  BREASTS: Not examined.  MUSKEL: No joint swelling, decreased motion, or inflammation  EXTREMS: No edema, clubbing, cyanosis, No varicose veins.  NEURO: Grossly nonfocal, Gait is coordinated and smooth, Cognition is preserved.  SKIN: No rashes, no ecchymoses, no petechia.  PSYCH: Oriented to time, place and person. Memory is preserved. Mood and affect appear normal  RECENT LABS:  Lab on 01/15/2019   Component Date Value Ref Range Status   • WBC 01/15/2019 5.45  4.80 - 10.80 10*3/mm3 Final   • RBC 01/15/2019 3.91* 4.20 - 5.40 10*6/mm3 Final   • Hemoglobin 01/15/2019 11.2* 12.0 - 16.0 g/dL Final   • Hematocrit 01/15/2019 34.5* 37.0 - 47.0 % Final   • MCV 01/15/2019 88.2  82.0 - 98.0 fL Final   • MCH 01/15/2019 28.6  28.0 - 32.0 pg Final   • MCHC 01/15/2019 32.5* 33.0 - 36.0 g/dL Final   • RDW 01/15/2019 16.0* 12.0 - 15.0 % Final   • RDW-SD 01/15/2019 51.5  40.0 - 54.0 fl Final   • MPV 01/15/2019 9.3  6.0 - 12.0 fL Final   • Platelets 01/15/2019 235  130 - 400 10*3/mm3 Final   • Neutrophil % 01/15/2019 54.8  39.0 - 78.0 % Final   • Lymphocyte % 01/15/2019 34.3  15.0 - 45.0 % Final   • Monocyte % 01/15/2019 7.5  4.0 - 12.0 % Final   • Eosinophil % 01/15/2019 2.6  0.0 - 4.0 % Final   • Basophil % 01/15/2019 0.4  0.0 - 2.0 % Final   • Immature Grans % 01/15/2019 0.4  0.0 - 5.0 % Final   • Neutrophils, Absolute 01/15/2019 2.99  1.87 - 8.40 10*3/mm3 Final   • Lymphocytes, Absolute 01/15/2019 1.87  0.72 - 4.86 10*3/mm3 Final   • Monocytes, Absolute 01/15/2019 0.41  0.19 - 1.30 10*3/mm3 Final   • Eosinophils, Absolute 01/15/2019 0.14  0.00 - 0.70 10*3/mm3 Final   • Basophils, Absolute 01/15/2019 0.02  0.00 - 0.20 10*3/mm3 Final   • Immature Grans, Absolute 01/15/2019 0.02  0.00 - 0.03 10*3/mm3 Final   • nRBC 01/15/2019 0.0  0.0 - 0.0 /100 WBC Final       RADIOLOGY:  No results found.         Assessment/Plan  Marina Joe is a 73 y.o. year old female with h/o breast ca whom we are seeing today  for anemia 2/2 ckd.    Patient Active Problem List   Diagnosis   • Malignant neoplasm of central portion of left female breast (CMS/HCC)   • Anemia of chronic renal failure, stage 3 (moderate) (CMS/HCC)   • HTN (hypertension), benign   • Hx of colonic polyps   • HX: breast cancer        1. Stage IA invasive ductal carcinoma left breast ER 97% MT 97% HER-2/alix 2+, fish and amplified diagnosed May 2012 status post lumpectomy, radiation, on the hormonal manipulation with Arimidex.  Mammogram 12/17 benign.   --she tolerates AI with no SE, continue for 10yrs.  --had biopsy of left breast nodule per Dr Barkley and pathology was benign.   -- arimidex refilled.    2.  Osteopenia.  DEXA scan May 2016 revealed osteopenia.  Start Boniva or Fosamax pending insurance coverage.  Continue daily calcium and vitamin D supplement     3.  Anemia in CKD stage III area today's hemoglobin 10.5. Give epo. Iron profile pending for today. She is on po iron.  --william monthly cbc with subQ epo if Hg <11  --Hg is 11.2  --RTC in 3 months    4. Hypothyroidism: on synthroid.  5. Depression: on wellbutrin  6. CAD: on coreg, benicar  7. Chronic pain synd: on oxycodone IR       Samra De Leon MD    1/15/2019    9:53 AM

## 2019-01-16 LAB
CANCER AG15-3 SERPL-ACNC: 21.1 U/ML (ref 0–25)
CANCER AG27-29 SERPL-ACNC: 24.9 U/ML (ref 0–38.6)

## 2019-01-18 ENCOUNTER — OFFICE VISIT (OUTPATIENT)
Dept: SURGERY | Age: 74
End: 2019-01-18
Payer: MEDICARE

## 2019-01-18 VITALS
HEART RATE: 70 BPM | DIASTOLIC BLOOD PRESSURE: 80 MMHG | BODY MASS INDEX: 38.04 KG/M2 | HEIGHT: 68 IN | WEIGHT: 251 LBS | SYSTOLIC BLOOD PRESSURE: 132 MMHG

## 2019-01-18 DIAGNOSIS — N64.1 FAT NECROSIS (SEGMENTAL) OF BREAST: Primary | ICD-10-CM

## 2019-01-18 PROCEDURE — G8399 PT W/DXA RESULTS DOCUMENT: HCPCS | Performed by: PHYSICIAN ASSISTANT

## 2019-01-18 PROCEDURE — G8427 DOCREV CUR MEDS BY ELIG CLIN: HCPCS | Performed by: PHYSICIAN ASSISTANT

## 2019-01-18 PROCEDURE — 99213 OFFICE O/P EST LOW 20 MIN: CPT | Performed by: PHYSICIAN ASSISTANT

## 2019-01-18 PROCEDURE — 4004F PT TOBACCO SCREEN RCVD TLK: CPT | Performed by: PHYSICIAN ASSISTANT

## 2019-01-18 PROCEDURE — 1090F PRES/ABSN URINE INCON ASSESS: CPT | Performed by: PHYSICIAN ASSISTANT

## 2019-01-18 PROCEDURE — G8484 FLU IMMUNIZE NO ADMIN: HCPCS | Performed by: PHYSICIAN ASSISTANT

## 2019-01-18 PROCEDURE — 3017F COLORECTAL CA SCREEN DOC REV: CPT | Performed by: PHYSICIAN ASSISTANT

## 2019-01-18 PROCEDURE — 1101F PT FALLS ASSESS-DOCD LE1/YR: CPT | Performed by: PHYSICIAN ASSISTANT

## 2019-01-18 PROCEDURE — G8417 CALC BMI ABV UP PARAM F/U: HCPCS | Performed by: PHYSICIAN ASSISTANT

## 2019-01-18 PROCEDURE — 1123F ACP DISCUSS/DSCN MKR DOCD: CPT | Performed by: PHYSICIAN ASSISTANT

## 2019-01-18 PROCEDURE — 4040F PNEUMOC VAC/ADMIN/RCVD: CPT | Performed by: PHYSICIAN ASSISTANT

## 2019-02-12 ENCOUNTER — APPOINTMENT (OUTPATIENT)
Dept: ONCOLOGY | Facility: HOSPITAL | Age: 74
End: 2019-02-12
Attending: INTERNAL MEDICINE

## 2019-04-15 DIAGNOSIS — N18.30 ANEMIA OF CHRONIC RENAL FAILURE, STAGE 3 (MODERATE) (HCC): Primary | ICD-10-CM

## 2019-04-15 DIAGNOSIS — D63.1 ANEMIA OF CHRONIC RENAL FAILURE, STAGE 3 (MODERATE) (HCC): Primary | ICD-10-CM

## 2019-04-16 ENCOUNTER — INFUSION (OUTPATIENT)
Dept: ONCOLOGY | Facility: HOSPITAL | Age: 74
End: 2019-04-16

## 2019-04-16 ENCOUNTER — LAB (OUTPATIENT)
Dept: LAB | Facility: HOSPITAL | Age: 74
End: 2019-04-16

## 2019-04-16 ENCOUNTER — OFFICE VISIT (OUTPATIENT)
Dept: ONCOLOGY | Facility: CLINIC | Age: 74
End: 2019-04-16

## 2019-04-16 VITALS
HEIGHT: 69 IN | TEMPERATURE: 98.1 F | OXYGEN SATURATION: 98 % | BODY MASS INDEX: 37.34 KG/M2 | SYSTOLIC BLOOD PRESSURE: 142 MMHG | HEART RATE: 70 BPM | DIASTOLIC BLOOD PRESSURE: 70 MMHG | WEIGHT: 252.1 LBS

## 2019-04-16 VITALS
WEIGHT: 252 LBS | OXYGEN SATURATION: 99 % | DIASTOLIC BLOOD PRESSURE: 62 MMHG | HEIGHT: 69 IN | SYSTOLIC BLOOD PRESSURE: 141 MMHG | TEMPERATURE: 97.7 F | HEART RATE: 65 BPM | BODY MASS INDEX: 37.33 KG/M2

## 2019-04-16 DIAGNOSIS — D63.1 ANEMIA OF CHRONIC RENAL FAILURE, STAGE 3 (MODERATE) (HCC): ICD-10-CM

## 2019-04-16 DIAGNOSIS — N18.30 ANEMIA OF CHRONIC RENAL FAILURE, STAGE 3 (MODERATE) (HCC): Primary | ICD-10-CM

## 2019-04-16 DIAGNOSIS — D63.1 ANEMIA OF CHRONIC RENAL FAILURE, STAGE 3 (MODERATE) (HCC): Primary | ICD-10-CM

## 2019-04-16 DIAGNOSIS — N18.30 ANEMIA OF CHRONIC RENAL FAILURE, STAGE 3 (MODERATE) (HCC): ICD-10-CM

## 2019-04-16 DIAGNOSIS — C50.112 MALIGNANT NEOPLASM OF CENTRAL PORTION OF LEFT FEMALE BREAST, UNSPECIFIED ESTROGEN RECEPTOR STATUS (HCC): ICD-10-CM

## 2019-04-16 DIAGNOSIS — E66.01 MORBIDLY OBESE (HCC): ICD-10-CM

## 2019-04-16 LAB
ALBUMIN SERPL-MCNC: 4.1 G/DL (ref 3.5–5)
ALBUMIN/GLOB SERPL: 1.2 G/DL (ref 1.1–2.5)
ALP SERPL-CCNC: 70 U/L (ref 24–120)
ALT SERPL W P-5'-P-CCNC: 15 U/L (ref 0–54)
ANION GAP SERPL CALCULATED.3IONS-SCNC: 9 MMOL/L (ref 4–13)
AST SERPL-CCNC: 34 U/L (ref 7–45)
BASOPHILS # BLD AUTO: 0.02 10*3/MM3 (ref 0–0.2)
BASOPHILS NFR BLD AUTO: 0.4 % (ref 0–2)
BILIRUB SERPL-MCNC: 0.3 MG/DL (ref 0.1–1)
BUN BLD-MCNC: 25 MG/DL (ref 5–21)
BUN/CREAT SERPL: 20.7 (ref 7–25)
CALCIUM SPEC-SCNC: 9.4 MG/DL (ref 8.4–10.4)
CHLORIDE SERPL-SCNC: 100 MMOL/L (ref 98–110)
CO2 SERPL-SCNC: 32 MMOL/L (ref 24–31)
CREAT BLD-MCNC: 1.21 MG/DL (ref 0.5–1.4)
DEPRECATED RDW RBC AUTO: 49.2 FL (ref 40–54)
EOSINOPHIL # BLD AUTO: 0.36 10*3/MM3 (ref 0–0.7)
EOSINOPHIL NFR BLD AUTO: 6.4 % (ref 0–4)
ERYTHROCYTE [DISTWIDTH] IN BLOOD BY AUTOMATED COUNT: 15 % (ref 12–15)
FERRITIN SERPL-MCNC: 241 NG/ML (ref 11.1–264)
FOLATE SERPL-MCNC: >20 NG/ML (ref 4.78–24.2)
GFR SERPL CREATININE-BSD FRML MDRD: 53 ML/MIN/1.73
GLOBULIN UR ELPH-MCNC: 3.5 GM/DL
GLUCOSE BLD-MCNC: 107 MG/DL (ref 70–100)
HCT VFR BLD AUTO: 31.9 % (ref 37–47)
HGB BLD-MCNC: 10.2 G/DL (ref 12–16)
IMM GRANULOCYTES # BLD AUTO: 0.01 10*3/MM3 (ref 0–0.05)
IMM GRANULOCYTES NFR BLD AUTO: 0.2 % (ref 0–5)
IRON 24H UR-MRATE: 75 MCG/DL (ref 42–180)
IRON SATN MFR SERPL: 26 % (ref 20–45)
LYMPHOCYTES # BLD AUTO: 1.5 10*3/MM3 (ref 0.72–4.86)
LYMPHOCYTES NFR BLD AUTO: 26.8 % (ref 15–45)
MCH RBC QN AUTO: 28.5 PG (ref 28–32)
MCHC RBC AUTO-ENTMCNC: 32 G/DL (ref 33–36)
MCV RBC AUTO: 89.1 FL (ref 82–98)
MONOCYTES # BLD AUTO: 0.48 10*3/MM3 (ref 0.19–1.3)
MONOCYTES NFR BLD AUTO: 8.6 % (ref 4–12)
NEUTROPHILS # BLD AUTO: 3.22 10*3/MM3 (ref 1.87–8.4)
NEUTROPHILS NFR BLD AUTO: 57.6 % (ref 39–78)
NRBC BLD AUTO-RTO: 0 /100 WBC (ref 0–0)
PLATELET # BLD AUTO: 245 10*3/MM3 (ref 130–400)
PMV BLD AUTO: 9.7 FL (ref 6–12)
POTASSIUM BLD-SCNC: 4 MMOL/L (ref 3.5–5.3)
PROT SERPL-MCNC: 7.6 G/DL (ref 6.3–8.7)
RBC # BLD AUTO: 3.58 10*6/MM3 (ref 4.2–5.4)
SODIUM BLD-SCNC: 141 MMOL/L (ref 135–145)
TIBC SERPL-MCNC: 285 MCG/DL (ref 225–420)
VIT B12 BLD-MCNC: 950 PG/ML (ref 239–931)
WBC NRBC COR # BLD: 5.59 10*3/MM3 (ref 4.8–10.8)

## 2019-04-16 PROCEDURE — 83550 IRON BINDING TEST: CPT

## 2019-04-16 PROCEDURE — 96372 THER/PROPH/DIAG INJ SC/IM: CPT

## 2019-04-16 PROCEDURE — 80053 COMPREHEN METABOLIC PANEL: CPT

## 2019-04-16 PROCEDURE — 83540 ASSAY OF IRON: CPT

## 2019-04-16 PROCEDURE — 82728 ASSAY OF FERRITIN: CPT

## 2019-04-16 PROCEDURE — 85025 COMPLETE CBC W/AUTO DIFF WBC: CPT

## 2019-04-16 PROCEDURE — 82746 ASSAY OF FOLIC ACID SERUM: CPT

## 2019-04-16 PROCEDURE — 99214 OFFICE O/P EST MOD 30 MIN: CPT | Performed by: INTERNAL MEDICINE

## 2019-04-16 PROCEDURE — 25010000002 EPOETIN ALFA PER 1000 UNITS: Performed by: INTERNAL MEDICINE

## 2019-04-16 PROCEDURE — 36415 COLL VENOUS BLD VENIPUNCTURE: CPT

## 2019-04-16 PROCEDURE — 82607 VITAMIN B-12: CPT

## 2019-04-16 RX ADMIN — ERYTHROPOIETIN 40000 UNITS: 40000 INJECTION, SOLUTION INTRAVENOUS; SUBCUTANEOUS at 11:51

## 2019-04-16 NOTE — PROGRESS NOTES
[facility]  HEMATOLOGY & ONCOLOGY    Cancer Staging Information:  No matching staging information was found for the patient.      Subjective     VISIT DIAGNOSIS:   No diagnosis found.    REASON FOR VISIT:     Chief Complaint   Patient presents with   • Breast Cancer     pt is here for a follow up, pt states around [place] she had the flu and pneumonia.        HEMATOLOGY / ONCOLOGY HISTORY:    No history exists.           INTERVAL HISTORY  Patient ID: Marina Joe is a 74 y.o. year old female h/o breast ca on arimidex. Tolerates AI very well. mammo 4/27/18 [facility]  -- had flu n Dec. Getting better. Per pt today is the first time she actually feels fine. Had flu shot in Nov of 2018. She would like refill of her arimidex  --4/16/19: denies sob, brbpr, cp/BRIDGER, unintentional weight loss,n/v/adnominal pain. Rest of ros unremarkable. PE unchanged.    Past Medical History:   Past Medical History:   Diagnosis Date   • Breast cancer (CMS/HCC)    • CKD (chronic kidney disease)    • Hypercholesteremia    • Hypertension    • Sinusitis      Past Surgical History:   Past Surgical History:   Procedure Laterality Date   • AVULSION TOENAIL PLATE      Sept 26,2018   • BREAST BIOPSY Left 11/20/2012   • BREAST BIOPSY      Left Breast, 1/2019 per Dr Barkley   • BREAST LUMPECTOMY Left     with node bx    • COLONOSCOPY  09/13/2013    small polyp at 30cm benign hyperplastic polyp, changes consistent with melanosis coli. Recall 5 years   • REPLACEMENT TOTAL KNEE Right     2016   • TOTAL ABDOMINAL HYSTERECTOMY WITH SALPINGO OOPHORECTOMY       Social History:   Social History     Socioeconomic History   • Marital status:      Spouse name: Not on file   • Number of children: Not on file   • Years of education: Not on file   • Highest education level: Not on file   Tobacco Use   • Smoking status: Never Smoker   • Smokeless tobacco: Never Used   Substance and Sexual Activity   • Alcohol use: No     Family History:    Family History   Problem Relation Age of Onset   • Heart attack Mother    • Hodgkin's lymphoma Father    • Heart attack Brother    • Colon cancer Neg Hx    • Colon polyps Neg Hx        Review of Systems   Constitutional: Negative.    HENT: Negative.    Eyes: Negative.    Respiratory: Negative.    Cardiovascular: Negative.    Gastrointestinal: Negative.    Genitourinary: Negative.    Musculoskeletal: Negative.    Skin: Negative.    Neurological: Negative.    Hematological: Negative.    Psychiatric/Behavioral: Negative.         Performance Status:  Asymptomatic    Medications:    Current Outpatient Medications   Medication Sig Dispense Refill   • anastrozole (ARIMIDEX) 1 MG tablet Take 1 tablet by mouth Daily. 90 tablet 4   • buPROPion SR (WELLBUTRIN SR) 150 MG 12 hr tablet Take 150 mg by mouth.     • Calcium Carb-Cholecalciferol (CALCIUM 600+D3 PO) Take  by mouth.     • carvedilol (COREG) 6.25 MG tablet Take 6.25 mg by mouth 2 (Two) Times a Day With Meals.     • cetirizine (zyrTEC) 10 MG tablet Take 10 mg by mouth Daily.     • Ergocalciferol (VITAMIN D2 PO) Take 50,000 Units by mouth.     • ferrous sulfate 325 (65 FE) MG tablet Take 325 mg by mouth Daily With Breakfast.     • fluticasone (FLOVENT DISKUS) 50 MCG/BLIST diskus inhaler Inhale 1 puff.     • levothyroxine (SYNTHROID, LEVOTHROID) 25 MCG tablet Take 25 mcg by mouth Daily.     • Misc Natural Products (COLON HERBAL CLEANSER PO) Take  by mouth.     • olmesartan-hydrochlorothiazide (BENICAR HCT) 40-25 MG per tablet Take 1 tablet by mouth Daily.     • Omega-3 Fatty Acids (FISH OIL) 1000 MG capsule capsule Take 1,000 mg by mouth.     • oxyCODONE (OXY-IR) 5 MG capsule Take 5 mg by mouth.     • Pediatric Multivitamins-Iron (PEDIATRIC MULTIPLE VITAMINS W/ IRON) 15 MG chewable tablet Chew 1 tablet Daily.     • polyethylene glycol (GoLYTELY) 236 g solution Take as directed by office instructions. 4000 mL 0   • rosuvastatin (CRESTOR) 20 MG tablet Take 20 mg by mouth  "Daily.     • sertraline (ZOLOFT) 100 MG tablet Take 100 mg by mouth Daily.     • valACYclovir (VALTREX) 500 MG tablet Take 500 mg by mouth.       No current facility-administered medications for this visit.        ALLERGIES:    Allergies   Allergen Reactions   • Aspirin        Objective      Vitals:    04/16/19 1035   Weight: 114 kg (252 lb 1.6 oz)   Height: 175.3 cm (69\")         Current Status 4/16/2019   ECOG score 0         Physical Exam  General Appearance: Patient is awake, alert, oriented and in no acute distress. Patient is welldeveloped, wellnourished, and appears stated age.  HEENT: Normocephalic. Sclerae clear, conjunctiva pink, extraocular movements intact, pupils, round, reactive to light and  accommodation. Mouth and throat are clear with moist oral mucosa.  NECK: Supple, no jugular venous distention, thyroid not enlarged.  LYMPH: No cervical, supraclavicular, axillary, or inguinal lymphadenopathy.  CHEST: Equal bilateral expansion, AP  diameter normal, resonant percussion note  LUNGS: Good air movement, no rales, rhonchi, rubs or wheezes with auscultation  CARDIO: Regular sinus rhythm, no murmurs, gallops or rubs.  ABDOMEN: Nondistended, soft, No tenderness, no guarding, no rebound, No hepatosplenomegaly. No abdominal masses. Bowel sounds positive. No hernia  GENITALIA: Not examined.  BREASTS: Not examined.  MUSKEL: No joint swelling, decreased motion, or inflammation  EXTREMS: No edema, clubbing, cyanosis, No varicose veins.  NEURO: Grossly nonfocal, Gait is coordinated and smooth, Cognition is preserved.  SKIN: No rashes, no ecchymoses, no petechia.  PSYCH: Oriented to time, place and person. Memory is preserved. Mood and affect appear normal  RECENT LABS:  No visits with results within 7 Day(s) from this visit.   Latest known visit with results is:   Orders Only on 01/15/2019   Component Date Value Ref Range Status   • CA 27.29 01/15/2019 24.9  0.0 - 38.6 U/mL Final    [employer] " Immunochemiluminometric Methodology (ICMA)  Values obtained with different assay methods or kits cannot be used  interchangeably. Results cannot be interpreted as absolute evidence  of the presence or absence of malignant disease.   • CA 15-3 01/15/2019 21.1  0.0 - 25.0 U/mL Final    [employer] Electrochemiluminescence Immunoassay (ECLIA)  Values obtained with different assay methods or kits cannot be  used interchangeably.  Results cannot be interpreted as absolute  evidence of the presence or absence of malignant disease.       RADIOLOGY:  No results found.         Assessment/Plan  Marina Joe is a 74 y.o. year old female with h/o breast ca whom we are seeing today for anemia 2/2 ckd.    Patient Active Problem List   Diagnosis   • Malignant neoplasm of central portion of left female breast (CMS/HCC)   • Anemia of chronic renal failure, stage 3 (moderate) (CMS/HCC)   • HTN (hypertension), benign   • Hx of colonic polyps   • HX: breast cancer        1. Stage IA invasive ductal carcinoma left breast ER 97% AK 97% HER-2/alix 2+, fish and amplified diagnosed May 2012 status post lumpectomy, radiation, on the hormonal manipulation with Arimidex.  Mammogram 12/17 benign.   --she tolerates AI with no SE, continue for 10yrs.  --had biopsy of left breast nodule per Dr Barkley and pathology was benign.   -- arimidex refilled.    2.  Osteopenia.  DEXA scan May 2016 revealed osteopenia.  Start Boniva or Fosamax pending insurance coverage.  Continue daily calcium and vitamin D supplement     3.  Anemia in CKD stage III area today's hemoglobin 10.2. Give epo.iron profile 1/15/19 iron 87, %sat 31  --william monthly cbc with subQ epo if Hg <11  --Hg is 10.2  --RTC in 3 months    4. Hypothyroidism: on synthroid.  5. Depression: on wellbutrin  6. CAD: on coreg, benicar  7. Chronic pain synd: on oxycodone IR       Samra De Leon MD    4/16/2019    10:40 AM

## 2019-06-03 ENCOUNTER — TELEPHONE (OUTPATIENT)
Dept: SURGERY | Age: 74
End: 2019-06-03

## 2019-06-03 NOTE — TELEPHONE ENCOUNTER
Pt called in asking for appts on 6/10 be r/s to another date after 20th, she is out of town. Please call with new date and time.   [phone]    CC: Lashae Owens

## 2019-07-03 ENCOUNTER — HOSPITAL ENCOUNTER (OUTPATIENT)
Dept: WOMENS IMAGING | Age: 74
Discharge: HOME | End: 2019-07-03
Payer: MEDICARE

## 2019-07-03 ENCOUNTER — OFFICE VISIT (OUTPATIENT)
Dept: SURGERY | Age: 74
End: 2019-07-03
Payer: MEDICARE

## 2019-07-03 VITALS — WEIGHT: 250 LBS | TEMPERATURE: 96.2 F | BODY MASS INDEX: 37.89 KG/M2 | HEIGHT: 68 IN

## 2019-07-03 DIAGNOSIS — N64.1 FAT NECROSIS (SEGMENTAL) OF BREAST: Primary | ICD-10-CM

## 2019-07-03 DIAGNOSIS — N64.1 FAT NECROSIS (SEGMENTAL) OF BREAST: ICD-10-CM

## 2019-07-03 DIAGNOSIS — Z12.31 SCREENING MAMMOGRAM FOR HIGH-RISK PATIENT: ICD-10-CM

## 2019-07-03 PROCEDURE — 4040F PNEUMOC VAC/ADMIN/RCVD: CPT | Performed by: PHYSICIAN ASSISTANT

## 2019-07-03 PROCEDURE — 1090F PRES/ABSN URINE INCON ASSESS: CPT | Performed by: PHYSICIAN ASSISTANT

## 2019-07-03 PROCEDURE — 1123F ACP DISCUSS/DSCN MKR DOCD: CPT | Performed by: PHYSICIAN ASSISTANT

## 2019-07-03 PROCEDURE — G0279 TOMOSYNTHESIS, MAMMO: HCPCS

## 2019-07-03 PROCEDURE — 99213 OFFICE O/P EST LOW 20 MIN: CPT | Performed by: PHYSICIAN ASSISTANT

## 2019-07-03 PROCEDURE — 4004F PT TOBACCO SCREEN RCVD TLK: CPT | Performed by: PHYSICIAN ASSISTANT

## 2019-07-03 PROCEDURE — 3017F COLORECTAL CA SCREEN DOC REV: CPT | Performed by: PHYSICIAN ASSISTANT

## 2019-07-03 PROCEDURE — G8427 DOCREV CUR MEDS BY ELIG CLIN: HCPCS | Performed by: PHYSICIAN ASSISTANT

## 2019-07-03 PROCEDURE — G8399 PT W/DXA RESULTS DOCUMENT: HCPCS | Performed by: PHYSICIAN ASSISTANT

## 2019-07-03 PROCEDURE — G8417 CALC BMI ABV UP PARAM F/U: HCPCS | Performed by: PHYSICIAN ASSISTANT

## 2019-07-19 ENCOUNTER — OFFICE VISIT (OUTPATIENT)
Dept: ONCOLOGY | Facility: CLINIC | Age: 74
End: 2019-07-19

## 2019-07-19 ENCOUNTER — LAB (OUTPATIENT)
Dept: LAB | Facility: HOSPITAL | Age: 74
End: 2019-07-19

## 2019-07-19 ENCOUNTER — INFUSION (OUTPATIENT)
Dept: ONCOLOGY | Facility: HOSPITAL | Age: 74
End: 2019-07-19

## 2019-07-19 VITALS
HEIGHT: 69 IN | HEART RATE: 60 BPM | DIASTOLIC BLOOD PRESSURE: 78 MMHG | WEIGHT: 252.1 LBS | SYSTOLIC BLOOD PRESSURE: 132 MMHG | BODY MASS INDEX: 37.34 KG/M2 | OXYGEN SATURATION: 96 % | TEMPERATURE: 98.2 F | RESPIRATION RATE: 16 BRPM

## 2019-07-19 VITALS
WEIGHT: 252 LBS | RESPIRATION RATE: 18 BRPM | OXYGEN SATURATION: 98 % | HEART RATE: 55 BPM | BODY MASS INDEX: 38.19 KG/M2 | SYSTOLIC BLOOD PRESSURE: 126 MMHG | DIASTOLIC BLOOD PRESSURE: 76 MMHG | TEMPERATURE: 97.7 F | HEIGHT: 68 IN

## 2019-07-19 DIAGNOSIS — C50.112 MALIGNANT NEOPLASM OF CENTRAL PORTION OF LEFT BREAST IN FEMALE, ESTROGEN RECEPTOR POSITIVE (HCC): ICD-10-CM

## 2019-07-19 DIAGNOSIS — N18.30 ANEMIA OF CHRONIC RENAL FAILURE, STAGE 3 (MODERATE) (HCC): Primary | ICD-10-CM

## 2019-07-19 DIAGNOSIS — Z17.0 MALIGNANT NEOPLASM OF CENTRAL PORTION OF LEFT BREAST IN FEMALE, ESTROGEN RECEPTOR POSITIVE (HCC): ICD-10-CM

## 2019-07-19 DIAGNOSIS — D63.1 ANEMIA OF CHRONIC RENAL FAILURE, STAGE 3 (MODERATE) (HCC): Primary | ICD-10-CM

## 2019-07-19 LAB
ALBUMIN SERPL-MCNC: 4.2 G/DL (ref 3.5–5)
ALBUMIN/GLOB SERPL: 1.2 G/DL (ref 1.1–2.5)
ALP SERPL-CCNC: 84 U/L (ref 24–120)
ALT SERPL W P-5'-P-CCNC: 22 U/L (ref 0–54)
ANION GAP SERPL CALCULATED.3IONS-SCNC: 8 MMOL/L (ref 4–13)
AST SERPL-CCNC: 35 U/L (ref 7–45)
BASOPHILS # BLD AUTO: 0.03 10*3/MM3 (ref 0–0.2)
BASOPHILS NFR BLD AUTO: 0.5 % (ref 0–2)
BILIRUB SERPL-MCNC: 0.4 MG/DL (ref 0.1–1)
BUN BLD-MCNC: 23 MG/DL (ref 5–21)
BUN/CREAT SERPL: 19.3 (ref 7–25)
CALCIUM SPEC-SCNC: 10 MG/DL (ref 8.4–10.4)
CHLORIDE SERPL-SCNC: 100 MMOL/L (ref 98–110)
CO2 SERPL-SCNC: 32 MMOL/L (ref 24–31)
CREAT BLD-MCNC: 1.19 MG/DL (ref 0.5–1.4)
DEPRECATED RDW RBC AUTO: 51.6 FL (ref 40–54)
EOSINOPHIL # BLD AUTO: 0.25 10*3/MM3 (ref 0–0.7)
EOSINOPHIL NFR BLD AUTO: 4.4 % (ref 0–4)
ERYTHROCYTE [DISTWIDTH] IN BLOOD BY AUTOMATED COUNT: 15.9 % (ref 12–15)
GFR SERPL CREATININE-BSD FRML MDRD: 54 ML/MIN/1.73
GLOBULIN UR ELPH-MCNC: 3.5 GM/DL
GLUCOSE BLD-MCNC: 101 MG/DL (ref 70–100)
HCT VFR BLD AUTO: 33.7 % (ref 37–47)
HGB BLD-MCNC: 10.7 G/DL (ref 12–16)
HOLD SPECIMEN: NORMAL
IMM GRANULOCYTES # BLD AUTO: 0.01 10*3/MM3 (ref 0–0.05)
IMM GRANULOCYTES NFR BLD AUTO: 0.2 % (ref 0–5)
LYMPHOCYTES # BLD AUTO: 1.49 10*3/MM3 (ref 0.72–4.86)
LYMPHOCYTES NFR BLD AUTO: 26.4 % (ref 15–45)
MCH RBC QN AUTO: 28.2 PG (ref 28–32)
MCHC RBC AUTO-ENTMCNC: 31.8 G/DL (ref 33–36)
MCV RBC AUTO: 88.7 FL (ref 82–98)
MONOCYTES # BLD AUTO: 0.47 10*3/MM3 (ref 0.19–1.3)
MONOCYTES NFR BLD AUTO: 8.3 % (ref 4–12)
NEUTROPHILS # BLD AUTO: 3.4 10*3/MM3 (ref 1.87–8.4)
NEUTROPHILS NFR BLD AUTO: 60.2 % (ref 39–78)
NRBC BLD AUTO-RTO: 0 /100 WBC (ref 0–0.2)
PLATELET # BLD AUTO: 259 10*3/MM3 (ref 130–400)
PMV BLD AUTO: 9.5 FL (ref 6–12)
POTASSIUM BLD-SCNC: 3.9 MMOL/L (ref 3.5–5.3)
PROT SERPL-MCNC: 7.7 G/DL (ref 6.3–8.7)
RBC # BLD AUTO: 3.8 10*6/MM3 (ref 4.2–5.4)
SODIUM BLD-SCNC: 140 MMOL/L (ref 135–145)
WBC NRBC COR # BLD: 5.65 10*3/MM3 (ref 4.8–10.8)

## 2019-07-19 PROCEDURE — 25010000002 EPOETIN ALFA PER 1000 UNITS: Performed by: INTERNAL MEDICINE

## 2019-07-19 PROCEDURE — 36415 COLL VENOUS BLD VENIPUNCTURE: CPT

## 2019-07-19 PROCEDURE — 80053 COMPREHEN METABOLIC PANEL: CPT | Performed by: INTERNAL MEDICINE

## 2019-07-19 PROCEDURE — 96372 THER/PROPH/DIAG INJ SC/IM: CPT

## 2019-07-19 PROCEDURE — 85025 COMPLETE CBC W/AUTO DIFF WBC: CPT | Performed by: INTERNAL MEDICINE

## 2019-07-19 PROCEDURE — 99214 OFFICE O/P EST MOD 30 MIN: CPT | Performed by: INTERNAL MEDICINE

## 2019-07-19 RX ORDER — ANASTROZOLE 1 MG/1
1 TABLET ORAL DAILY
Qty: 90 TABLET | Refills: 4 | Status: SHIPPED | OUTPATIENT
Start: 2019-07-19 | End: 2020-02-07 | Stop reason: SDUPTHER

## 2019-07-19 RX ADMIN — ERYTHROPOIETIN 40000 UNITS: 40000 INJECTION, SOLUTION INTRAVENOUS; SUBCUTANEOUS at 11:23

## 2019-07-19 NOTE — PROGRESS NOTES
[facility]  HEMATOLOGY & ONCOLOGY    Cancer Staging Information:  No matching staging information was found for the patient.      Subjective     VISIT DIAGNOSIS:   No diagnosis found.    REASON FOR VISIT:     Chief Complaint   Patient presents with   • Anemia     Here for possible procrit. No new issues   • Breast Cancer     Mammogram done at [facility] 7-3-2019         HEMATOLOGY / ONCOLOGY HISTORY:    No history exists.           INTERVAL HISTORY  Patient ID: Marina Joe is a 74 y.o. year old female h/o breast ca on arimidex. Tolerates AI very well. mammo 4/27/18 [facility]  -- had flu n Dec. Getting better. Per pt today is the first time she actually feels fine. Had flu shot in Nov of 2018. She would like refill of her arimidex  -7/19/19: denies sob, brbpr, cp/BRIDGER, unintentional weight loss,n/v/adnominal pain. Rest of ros unremarkable. PE unchanged.    Past Medical History:   Past Medical History:   Diagnosis Date   • Breast cancer (CMS/HCC)    • CKD (chronic kidney disease)    • Hypercholesteremia    • Hypertension    • Sinusitis      Past Surgical History:   Past Surgical History:   Procedure Laterality Date   • AVULSION TOENAIL PLATE      Sept 26,2018   • BREAST BIOPSY Left 11/20/2012   • BREAST BIOPSY      Left Breast, 1/2019 per Dr Barkley   • BREAST LUMPECTOMY Left     with node bx    • COLONOSCOPY  09/13/2013    small polyp at 30cm benign hyperplastic polyp, changes consistent with melanosis coli. Recall 5 years   • REPLACEMENT TOTAL KNEE Right     2016   • TOTAL ABDOMINAL HYSTERECTOMY WITH SALPINGO OOPHORECTOMY       Social History:   Social History     Socioeconomic History   • Marital status:      Spouse name: Not on file   • Number of children: Not on file   • Years of education: Not on file   • Highest education level: Not on file   Tobacco Use   • Smoking status: Never Smoker   • Smokeless tobacco: Never Used   Substance and Sexual Activity   • Alcohol use: No     Family History:    Family History   Problem Relation Age of Onset   • Heart attack Mother    • Hodgkin's lymphoma Father    • Heart attack Brother    • Colon cancer Neg Hx    • Colon polyps Neg Hx        Review of Systems   Constitutional: Negative.    HENT: Negative.    Eyes: Negative.    Respiratory: Negative.    Cardiovascular: Negative.    Gastrointestinal: Negative.    Genitourinary: Negative.    Musculoskeletal: Negative.    Skin: Negative.    Neurological: Negative.    Hematological: Negative.    Psychiatric/Behavioral: Negative.         Performance Status:  Asymptomatic    Medications:    Current Outpatient Medications   Medication Sig Dispense Refill   • anastrozole (ARIMIDEX) 1 MG tablet Take 1 tablet by mouth Daily. 90 tablet 4   • buPROPion SR (WELLBUTRIN SR) 150 MG 12 hr tablet Take 150 mg by mouth.     • Calcium Carb-Cholecalciferol (CALCIUM 600+D3 PO) Take  by mouth.     • carvedilol (COREG) 6.25 MG tablet Take 6.25 mg by mouth 2 (Two) Times a Day With Meals.     • cetirizine (zyrTEC) 10 MG tablet Take 10 mg by mouth Daily.     • Ergocalciferol (VITAMIN D2 PO) Take 50,000 Units by mouth.     • ferrous sulfate 325 (65 FE) MG tablet Take 325 mg by mouth Daily With Breakfast.     • fluticasone (FLOVENT DISKUS) 50 MCG/BLIST diskus inhaler Inhale 1 puff.     • levothyroxine (SYNTHROID, LEVOTHROID) 25 MCG tablet Take 25 mcg by mouth Daily.     • Misc Natural Products (COLON HERBAL CLEANSER PO) Take  by mouth.     • olmesartan-hydrochlorothiazide (BENICAR HCT) 40-25 MG per tablet Take 1 tablet by mouth Daily.     • Omega-3 Fatty Acids (FISH OIL) 1000 MG capsule capsule Take 1,000 mg by mouth.     • oxyCODONE (OXY-IR) 5 MG capsule Take 5 mg by mouth.     • Pediatric Multivitamins-Iron (PEDIATRIC MULTIPLE VITAMINS W/ IRON) 15 MG chewable tablet Chew 1 tablet Daily.     • polyethylene glycol (GoLYTELY) 236 g solution Take as directed by office instructions. 4000 mL 0   • rosuvastatin (CRESTOR) 20 MG tablet Take 20 mg by mouth  "Daily.     • sertraline (ZOLOFT) 100 MG tablet Take 100 mg by mouth Daily.     • valACYclovir (VALTREX) 500 MG tablet Take 500 mg by mouth.       No current facility-administered medications for this visit.        ALLERGIES:    Allergies   Allergen Reactions   • Aspirin        Objective      Vitals:    07/19/19 1015   BP: 132/78   Pulse: 60   Resp: 16   Temp: 98.2 °F (36.8 °C)   TempSrc: Oral   SpO2: 96%   Weight: 114 kg (252 lb 1.6 oz)   Height: 175.3 cm (69\")   PainSc:   2   PainLoc: Knee         Current Status 7/19/2019   ECOG score 0         Physical Exam  General Appearance: Patient is awake, alert, oriented and in no acute distress. Patient is welldeveloped, wellnourished, and appears stated age.  HEENT: Normocephalic. Sclerae clear, conjunctiva pink, extraocular movements intact, pupils, round, reactive to light and  accommodation. Mouth and throat are clear with moist oral mucosa.  NECK: Supple, no jugular venous distention, thyroid not enlarged.  LYMPH: No cervical, supraclavicular, axillary, or inguinal lymphadenopathy.  CHEST: Equal bilateral expansion, AP  diameter normal, resonant percussion note  LUNGS: Good air movement, no rales, rhonchi, rubs or wheezes with auscultation  CARDIO: Regular sinus rhythm, no murmurs, gallops or rubs.  ABDOMEN: Nondistended, soft, No tenderness, no guarding, no rebound, No hepatosplenomegaly. No abdominal masses. Bowel sounds positive. No hernia  GENITALIA: Not examined.  BREASTS: Not examined.  MUSKEL: No joint swelling, decreased motion, or inflammation  EXTREMS: No edema, clubbing, cyanosis, No varicose veins.  NEURO: Grossly nonfocal, Gait is coordinated and smooth, Cognition is preserved.  SKIN: No rashes, no ecchymoses, no petechia.  PSYCH: Oriented to time, place and person. Memory is preserved. Mood and affect appear normal  RECENT LABS:  Lab on 07/19/2019   Component Date Value Ref Range Status   • WBC 07/19/2019 5.65  4.80 - 10.80 10*3/mm3 Final   • RBC " 07/19/2019 3.80* 4.20 - 5.40 10*6/mm3 Final   • Hemoglobin 07/19/2019 10.7* 12.0 - 16.0 g/dL Final   • Hematocrit 07/19/2019 33.7* 37.0 - 47.0 % Final   • MCV 07/19/2019 88.7  82.0 - 98.0 fL Final   • MCH 07/19/2019 28.2  28.0 - 32.0 pg Final   • MCHC 07/19/2019 31.8* 33.0 - 36.0 g/dL Final   • RDW 07/19/2019 15.9* 12.0 - 15.0 % Final   • RDW-SD 07/19/2019 51.6  40.0 - 54.0 fl Final   • MPV 07/19/2019 9.5  6.0 - 12.0 fL Final   • Platelets 07/19/2019 259  130 - 400 10*3/mm3 Final   • Neutrophil % 07/19/2019 60.2  39.0 - 78.0 % Final   • Lymphocyte % 07/19/2019 26.4  15.0 - 45.0 % Final   • Monocyte % 07/19/2019 8.3  4.0 - 12.0 % Final   • Eosinophil % 07/19/2019 4.4* 0.0 - 4.0 % Final   • Basophil % 07/19/2019 0.5  0.0 - 2.0 % Final   • Immature Grans % 07/19/2019 0.2  0.0 - 5.0 % Final   • Neutrophils, Absolute 07/19/2019 3.40  1.87 - 8.40 10*3/mm3 Final   • Lymphocytes, Absolute 07/19/2019 1.49  0.72 - 4.86 10*3/mm3 Final   • Monocytes, Absolute 07/19/2019 0.47  0.19 - 1.30 10*3/mm3 Final   • Eosinophils, Absolute 07/19/2019 0.25  0.00 - 0.70 10*3/mm3 Final   • Basophils, Absolute 07/19/2019 0.03  0.00 - 0.20 10*3/mm3 Final   • Immature Grans, Absolute 07/19/2019 0.01  0.00 - 0.05 10*3/mm3 Final   • nRBC 07/19/2019 0.0  0.0 - 0.2 /100 WBC Final       RADIOLOGY:  No results found.         Assessment/Plan  Marina Joe is a 74 y.o. year old female with h/o breast ca whom we are seeing today for anemia 2/2 ckd.    Patient Active Problem List   Diagnosis   • Malignant neoplasm of central portion of left female breast (CMS/HCC)   • Anemia of chronic renal failure, stage 3 (moderate) (CMS/HCC)   • HTN (hypertension), benign   • Hx of colonic polyps   • HX: breast cancer   • Morbidly obese (CMS/HCC)        1. Stage IA invasive ductal carcinoma left breast ER 97% HI 97% HER-2/alix 2+, fish and amplified diagnosed May 2012 status post lumpectomy, radiation, on the hormonal manipulation with Arimidex.  Mammogram 7/3/19 No  mammographic evidence of malignancy in the left breast. Recommend annual mammography which will be due on/after 12/12/2019.  --she tolerates AI with no SE, continue for 10yrs per her wishes  --had biopsy of left breast nodule per Dr Barkley and pathology was benign.   -- arimidex refilled.    2.  Osteopenia.  DEXA scan May 2016 revealed osteopenia.  Start Boniva or Fosamax pending insurance coverage.  Continue daily calcium and vitamin D supplement     3.  Anemia in CKD stage III area today's hemoglobin 10.2. Give epo.iron profile 1/15/19 iron 87, %sat 31  --william monthly cbc with subQ epo if Hg <11  --Hg is 10.7 due for procrit today  --RTC in a month for procrit    4. Hypothyroidism: on synthroid.  5. Depression: on wellbutrin  6. CAD: on coreg, benicar  7. Chronic pain synd: on oxycodone IR       Obiamatildai Viviana De Leon MD    7/19/2019    10:21 AM

## 2019-08-13 DIAGNOSIS — D63.1 ANEMIA OF CHRONIC RENAL FAILURE, STAGE 3 (MODERATE) (HCC): ICD-10-CM

## 2019-08-13 DIAGNOSIS — N18.30 ANEMIA OF CHRONIC RENAL FAILURE, STAGE 3 (MODERATE) (HCC): ICD-10-CM

## 2019-08-15 DIAGNOSIS — Z17.0 MALIGNANT NEOPLASM OF CENTRAL PORTION OF LEFT BREAST IN FEMALE, ESTROGEN RECEPTOR POSITIVE (HCC): Primary | ICD-10-CM

## 2019-08-15 DIAGNOSIS — C50.112 MALIGNANT NEOPLASM OF CENTRAL PORTION OF LEFT BREAST IN FEMALE, ESTROGEN RECEPTOR POSITIVE (HCC): Primary | ICD-10-CM

## 2019-08-20 ENCOUNTER — APPOINTMENT (OUTPATIENT)
Dept: LAB | Facility: HOSPITAL | Age: 74
End: 2019-08-20

## 2019-08-20 ENCOUNTER — OFFICE VISIT (OUTPATIENT)
Dept: ONCOLOGY | Facility: CLINIC | Age: 74
End: 2019-08-20

## 2019-08-20 ENCOUNTER — INFUSION (OUTPATIENT)
Dept: ONCOLOGY | Facility: HOSPITAL | Age: 74
End: 2019-08-20

## 2019-08-20 VITALS
DIASTOLIC BLOOD PRESSURE: 59 MMHG | BODY MASS INDEX: 38.04 KG/M2 | HEIGHT: 68 IN | TEMPERATURE: 97.6 F | RESPIRATION RATE: 18 BRPM | SYSTOLIC BLOOD PRESSURE: 123 MMHG | HEART RATE: 65 BPM | WEIGHT: 251 LBS | OXYGEN SATURATION: 96 %

## 2019-08-20 VITALS
HEART RATE: 68 BPM | OXYGEN SATURATION: 97 % | BODY MASS INDEX: 38.13 KG/M2 | RESPIRATION RATE: 16 BRPM | HEIGHT: 68 IN | SYSTOLIC BLOOD PRESSURE: 118 MMHG | DIASTOLIC BLOOD PRESSURE: 68 MMHG | WEIGHT: 251.6 LBS | TEMPERATURE: 97.8 F

## 2019-08-20 DIAGNOSIS — N18.30 ANEMIA OF CHRONIC RENAL FAILURE, STAGE 3 (MODERATE) (HCC): ICD-10-CM

## 2019-08-20 DIAGNOSIS — C50.112 MALIGNANT NEOPLASM OF CENTRAL PORTION OF LEFT BREAST IN FEMALE, ESTROGEN RECEPTOR POSITIVE (HCC): Primary | ICD-10-CM

## 2019-08-20 DIAGNOSIS — Z17.0 MALIGNANT NEOPLASM OF CENTRAL PORTION OF LEFT BREAST IN FEMALE, ESTROGEN RECEPTOR POSITIVE (HCC): Primary | ICD-10-CM

## 2019-08-20 DIAGNOSIS — D63.1 ANEMIA OF CHRONIC RENAL FAILURE, STAGE 3 (MODERATE) (HCC): Primary | ICD-10-CM

## 2019-08-20 DIAGNOSIS — D63.1 ANEMIA OF CHRONIC RENAL FAILURE, STAGE 3 (MODERATE) (HCC): ICD-10-CM

## 2019-08-20 DIAGNOSIS — N18.30 ANEMIA OF CHRONIC RENAL FAILURE, STAGE 3 (MODERATE) (HCC): Primary | ICD-10-CM

## 2019-08-20 LAB
ALBUMIN SERPL-MCNC: 4 G/DL (ref 3.5–5)
ALBUMIN/GLOB SERPL: 1.1 G/DL (ref 1.1–2.5)
ALP SERPL-CCNC: 77 U/L (ref 24–120)
ALT SERPL W P-5'-P-CCNC: 21 U/L (ref 0–54)
ANION GAP SERPL CALCULATED.3IONS-SCNC: 9 MMOL/L (ref 4–13)
AST SERPL-CCNC: 30 U/L (ref 7–45)
BASOPHILS # BLD AUTO: 0.02 10*3/MM3 (ref 0–0.2)
BASOPHILS NFR BLD AUTO: 0.4 % (ref 0–1.5)
BILIRUB SERPL-MCNC: 0.3 MG/DL (ref 0.1–1)
BUN BLD-MCNC: 21 MG/DL (ref 5–21)
BUN/CREAT SERPL: 15.7 (ref 7–25)
CALCIUM SPEC-SCNC: 9.9 MG/DL (ref 8.4–10.4)
CHLORIDE SERPL-SCNC: 99 MMOL/L (ref 98–110)
CO2 SERPL-SCNC: 31 MMOL/L (ref 24–31)
CREAT BLD-MCNC: 1.34 MG/DL (ref 0.5–1.4)
DEPRECATED RDW RBC AUTO: 51.7 FL (ref 37–54)
EOSINOPHIL # BLD AUTO: 0.2 10*3/MM3 (ref 0–0.4)
EOSINOPHIL NFR BLD AUTO: 3.7 % (ref 0.3–6.2)
ERYTHROCYTE [DISTWIDTH] IN BLOOD BY AUTOMATED COUNT: 16 % (ref 12.3–15.4)
GFR SERPL CREATININE-BSD FRML MDRD: 47 ML/MIN/1.73
GLOBULIN UR ELPH-MCNC: 3.5 GM/DL
GLUCOSE BLD-MCNC: 121 MG/DL (ref 70–100)
HCT VFR BLD AUTO: 33.2 % (ref 34–46.6)
HGB BLD-MCNC: 10.5 G/DL (ref 12–15.9)
HOLD SPECIMEN: NORMAL
HOLD SPECIMEN: NORMAL
IMM GRANULOCYTES # BLD AUTO: 0.01 10*3/MM3 (ref 0–0.05)
IMM GRANULOCYTES NFR BLD AUTO: 0.2 % (ref 0–0.5)
LYMPHOCYTES # BLD AUTO: 1.44 10*3/MM3 (ref 0.7–3.1)
LYMPHOCYTES NFR BLD AUTO: 26.5 % (ref 19.6–45.3)
MCH RBC QN AUTO: 27.8 PG (ref 26.6–33)
MCHC RBC AUTO-ENTMCNC: 31.6 G/DL (ref 31.5–35.7)
MCV RBC AUTO: 87.8 FL (ref 79–97)
MONOCYTES # BLD AUTO: 0.44 10*3/MM3 (ref 0.1–0.9)
MONOCYTES NFR BLD AUTO: 8.1 % (ref 5–12)
NEUTROPHILS # BLD AUTO: 3.33 10*3/MM3 (ref 1.7–7)
NEUTROPHILS NFR BLD AUTO: 61.1 % (ref 42.7–76)
NRBC BLD AUTO-RTO: 0 /100 WBC (ref 0–0.2)
PLATELET # BLD AUTO: 263 10*3/MM3 (ref 140–450)
PMV BLD AUTO: 9.5 FL (ref 6–12)
POTASSIUM BLD-SCNC: 3.7 MMOL/L (ref 3.5–5.3)
PROT SERPL-MCNC: 7.5 G/DL (ref 6.3–8.7)
RBC # BLD AUTO: 3.78 10*6/MM3 (ref 3.77–5.28)
SODIUM BLD-SCNC: 139 MMOL/L (ref 135–145)
WBC NRBC COR # BLD: 5.44 10*3/MM3 (ref 3.4–10.8)

## 2019-08-20 PROCEDURE — 80053 COMPREHEN METABOLIC PANEL: CPT | Performed by: INTERNAL MEDICINE

## 2019-08-20 PROCEDURE — 36415 COLL VENOUS BLD VENIPUNCTURE: CPT | Performed by: INTERNAL MEDICINE

## 2019-08-20 PROCEDURE — 25010000002 EPOETIN ALFA PER 1000 UNITS: Performed by: INTERNAL MEDICINE

## 2019-08-20 PROCEDURE — 99214 OFFICE O/P EST MOD 30 MIN: CPT | Performed by: INTERNAL MEDICINE

## 2019-08-20 PROCEDURE — 85025 COMPLETE CBC W/AUTO DIFF WBC: CPT | Performed by: INTERNAL MEDICINE

## 2019-08-20 PROCEDURE — 96372 THER/PROPH/DIAG INJ SC/IM: CPT

## 2019-08-20 RX ADMIN — ERYTHROPOIETIN 40000 UNITS: 40000 INJECTION, SOLUTION INTRAVENOUS; SUBCUTANEOUS at 08:50

## 2019-08-20 NOTE — PROGRESS NOTES
[facility]  HEMATOLOGY & ONCOLOGY    Cancer Staging Information:  No matching staging information was found for the patient.      Subjective     VISIT DIAGNOSIS:   Encounter Diagnosis   Name Primary?   • Anemia of chronic renal failure, stage 3 (moderate) (CMS/HCC)        REASON FOR VISIT:     Chief Complaint   Patient presents with   • Anemia     Here for procrit        HEMATOLOGY / ONCOLOGY HISTORY:    No history exists.           INTERVAL HISTORY  Patient ID: Marina Joe is a 74 y.o. year old female h/o breast ca on arimidex. Tolerates AI very well. mammo 4/27/18 NMEM  -- had flu n Dec. Getting better. Per pt today is the first time she actually feels fine. Had flu shot in Nov of 2018. She would like refill of her arimidex  -8/20/19: denies sob, brbpr, cp/BRIDGER, unintentional weight loss,n/v/adnominal pain. Rest of ros unremarkable. PE unchanged.    Past Medical History:   Past Medical History:   Diagnosis Date   • Breast cancer (CMS/HCC)    • CKD (chronic kidney disease)    • Hypercholesteremia    • Hypertension    • Sinusitis      Past Surgical History:   Past Surgical History:   Procedure Laterality Date   • AVULSION TOENAIL PLATE      Sept 26,2018   • BREAST BIOPSY Left 11/20/2012   • BREAST BIOPSY      Left Breast, 1/2019 per Dr Barkley   • BREAST LUMPECTOMY Left     with node bx    • COLONOSCOPY  09/13/2013    small polyp at 30cm benign hyperplastic polyp, changes consistent with melanosis coli. Recall 5 years   • REPLACEMENT TOTAL KNEE Right     2016   • TOTAL ABDOMINAL HYSTERECTOMY WITH SALPINGO OOPHORECTOMY       Social History:   Social History     Socioeconomic History   • Marital status:      Spouse name: Not on file   • Number of children: Not on file   • Years of education: Not on file   • Highest education level: Not on file   Tobacco Use   • Smoking status: Never Smoker   • Smokeless tobacco: Never Used   Substance and Sexual Activity   • Alcohol use: No     Family  History:   Family History   Problem Relation Age of Onset   • Heart attack Mother    • Hodgkin's lymphoma Father    • Heart attack Brother    • Colon cancer Neg Hx    • Colon polyps Neg Hx        Review of Systems   Constitutional: Negative.    HENT: Negative.    Eyes: Negative.    Respiratory: Negative.    Cardiovascular: Negative.    Gastrointestinal: Negative.    Genitourinary: Negative.    Musculoskeletal: Negative.    Skin: Negative.    Neurological: Negative.    Hematological: Negative.    Psychiatric/Behavioral: Negative.         Performance Status:  Asymptomatic    Medications:    Current Outpatient Medications   Medication Sig Dispense Refill   • anastrozole (ARIMIDEX) 1 MG tablet Take 1 tablet by mouth Daily. 90 tablet 4   • buPROPion SR (WELLBUTRIN SR) 150 MG 12 hr tablet Take 150 mg by mouth.     • Calcium Carb-Cholecalciferol (CALCIUM 600+D3 PO) Take  by mouth.     • carvedilol (COREG) 6.25 MG tablet Take 6.25 mg by mouth 2 (Two) Times a Day With Meals.     • cetirizine (zyrTEC) 10 MG tablet Take 10 mg by mouth Daily.     • Ergocalciferol (VITAMIN D2 PO) Take 50,000 Units by mouth.     • ferrous sulfate 325 (65 FE) MG tablet Take 325 mg by mouth Daily With Breakfast.     • fluticasone (FLOVENT DISKUS) 50 MCG/BLIST diskus inhaler Inhale 1 puff.     • levothyroxine (SYNTHROID, LEVOTHROID) 25 MCG tablet Take 25 mcg by mouth Daily.     • Misc Natural Products (COLON HERBAL CLEANSER PO) Take  by mouth.     • olmesartan-hydrochlorothiazide (BENICAR HCT) 40-25 MG per tablet Take 1 tablet by mouth Daily.     • Omega-3 Fatty Acids (FISH OIL) 1000 MG capsule capsule Take 1,000 mg by mouth.     • oxyCODONE (OXY-IR) 5 MG capsule Take 5 mg by mouth.     • Pediatric Multivitamins-Iron (PEDIATRIC MULTIPLE VITAMINS W/ IRON) 15 MG chewable tablet Chew 1 tablet Daily.     • polyethylene glycol (GoLYTELY) 236 g solution Take as directed by office instructions. 4000 mL 0   • rosuvastatin (CRESTOR) 20 MG tablet Take 20 mg  "by mouth Daily.     • sertraline (ZOLOFT) 100 MG tablet Take 100 mg by mouth Daily.     • valACYclovir (VALTREX) 500 MG tablet Take 500 mg by mouth.       No current facility-administered medications for this visit.      Facility-Administered Medications Ordered in Other Visits   Medication Dose Route Frequency Provider Last Rate Last Dose   • epoetin dany (EPOGEN,PROCRIT) injection 40,000 Units  40,000 Units Subcutaneous Once Samra De Leon MD           ALLERGIES:    Allergies   Allergen Reactions   • Aspirin        Objective      Vitals:    08/20/19 0757   BP: 118/68   Pulse: 68   Resp: 16   Temp: 97.8 °F (36.6 °C)   TempSrc: Oral   SpO2: 97%   Weight: 114 kg (251 lb 9.6 oz)   Height: 172.7 cm (68\")   PainSc: 0-No pain         Current Status 8/20/2019   ECOG score 0         Physical Exam  General Appearance: Patient is awake, alert, oriented and in no acute distress. Patient is welldeveloped, wellnourished, and appears stated age.  HEENT: Normocephalic. Sclerae clear, conjunctiva pink, extraocular movements intact, pupils, round, reactive to light and  accommodation. Mouth and throat are clear with moist oral mucosa.  NECK: Supple, no jugular venous distention, thyroid not enlarged.  LYMPH: No cervical, supraclavicular, axillary, or inguinal lymphadenopathy.  CHEST: Equal bilateral expansion, AP  diameter normal, resonant percussion note  LUNGS: Good air movement, no rales, rhonchi, rubs or wheezes with auscultation  CARDIO: Regular sinus rhythm, no murmurs, gallops or rubs.  ABDOMEN: Nondistended, soft, No tenderness, no guarding, no rebound, No hepatosplenomegaly. No abdominal masses. Bowel sounds positive. No hernia  GENITALIA: Not examined.  BREASTS: Not examined.  MUSKEL: No joint swelling, decreased motion, or inflammation  EXTREMS: No edema, clubbing, cyanosis, No varicose veins.  NEURO: Grossly nonfocal, Gait is coordinated and smooth, Cognition is preserved.  SKIN: No rashes, no ecchymoses, " no petechia.  PSYCH: Oriented to time, place and person. Memory is preserved. Mood and affect appear normal  RECENT LABS:  Orders Only on 08/15/2019   Component Date Value Ref Range Status   • Glucose 08/20/2019 121* 70 - 100 mg/dL Final   • BUN 08/20/2019 21  5 - 21 mg/dL Final   • Creatinine 08/20/2019 1.34  0.50 - 1.40 mg/dL Final   • Sodium 08/20/2019 139  135 - 145 mmol/L Final   • Potassium 08/20/2019 3.7  3.5 - 5.3 mmol/L Final   • Chloride 08/20/2019 99  98 - 110 mmol/L Final   • CO2 08/20/2019 31.0  24.0 - 31.0 mmol/L Final   • Calcium 08/20/2019 9.9  8.4 - 10.4 mg/dL Final   • Total Protein 08/20/2019 7.5  6.3 - 8.7 g/dL Final   • Albumin 08/20/2019 4.00  3.50 - 5.00 g/dL Final   • ALT (SGPT) 08/20/2019 21  0 - 54 U/L Final   • AST (SGOT) 08/20/2019 30  7 - 45 U/L Final   • Alkaline Phosphatase 08/20/2019 77  24 - 120 U/L Final   • Total Bilirubin 08/20/2019 0.3  0.1 - 1.0 mg/dL Final   • eGFR   Amer 08/20/2019 47* >60 mL/min/1.73 Final   • Globulin 08/20/2019 3.5  gm/dL Final   • A/G Ratio 08/20/2019 1.1  1.1 - 2.5 g/dL Final   • BUN/Creatinine Ratio 08/20/2019 15.7  7.0 - 25.0 Final   • Anion Gap 08/20/2019 9.0  4.0 - 13.0 mmol/L Final   • WBC 08/20/2019 5.44  3.40 - 10.80 10*3/mm3 Final   • RBC 08/20/2019 3.78  3.77 - 5.28 10*6/mm3 Final   • Hemoglobin 08/20/2019 10.5* 12.0 - 15.9 g/dL Final   • Hematocrit 08/20/2019 33.2* 34.0 - 46.6 % Final   • MCV 08/20/2019 87.8  79.0 - 97.0 fL Final   • MCH 08/20/2019 27.8  26.6 - 33.0 pg Final   • MCHC 08/20/2019 31.6  31.5 - 35.7 g/dL Final   • RDW 08/20/2019 16.0* 12.3 - 15.4 % Final   • RDW-SD 08/20/2019 51.7  37.0 - 54.0 fl Final   • MPV 08/20/2019 9.5  6.0 - 12.0 fL Final   • Platelets 08/20/2019 263  140 - 450 10*3/mm3 Final   • Neutrophil % 08/20/2019 61.1  42.7 - 76.0 % Final   • Lymphocyte % 08/20/2019 26.5  19.6 - 45.3 % Final   • Monocyte % 08/20/2019 8.1  5.0 - 12.0 % Final   • Eosinophil % 08/20/2019 3.7  0.3 - 6.2 % Final   • Basophil %  08/20/2019 0.4  0.0 - 1.5 % Final   • Immature Grans % 08/20/2019 0.2  0.0 - 0.5 % Final   • Neutrophils, Absolute 08/20/2019 3.33  1.70 - 7.00 10*3/mm3 Final   • Lymphocytes, Absolute 08/20/2019 1.44  0.70 - 3.10 10*3/mm3 Final   • Monocytes, Absolute 08/20/2019 0.44  0.10 - 0.90 10*3/mm3 Final   • Eosinophils, Absolute 08/20/2019 0.20  0.00 - 0.40 10*3/mm3 Final   • Basophils, Absolute 08/20/2019 0.02  0.00 - 0.20 10*3/mm3 Final   • Immature Grans, Absolute 08/20/2019 0.01  0.00 - 0.05 10*3/mm3 Final   • nRBC 08/20/2019 0.0  0.0 - 0.2 /100 WBC Final       RADIOLOGY:  No results found.         Assessment/Plan  Marina Joe is a 74 y.o. year old female with h/o breast ca whom we are seeing today for anemia 2/2 ckd.    Patient Active Problem List   Diagnosis   • Malignant neoplasm of central portion of left female breast (CMS/HCC)   • Anemia of chronic renal failure, stage 3 (moderate) (CMS/HCC)   • HTN (hypertension), benign   • Hx of colonic polyps   • HX: breast cancer   • Morbidly obese (CMS/HCC)        1. Stage IA invasive ductal carcinoma left breast ER 97% MI 97% HER-2/alix 2+, fish and amplified diagnosed May 2012 status post lumpectomy, radiation, on the hormonal manipulation with Arimidex.  Mammogram 7/3/19 No mammographic evidence of malignancy in the left breast. Recommend annual mammography which will be due on/after 12/12/2019.  --she tolerates AI with no SE, continue for 10yrs per her wishes  --had biopsy of left breast nodule per Dr Barkley and pathology was benign.  --william arimidex    2.  Osteopenia.  DEXA scan May 2016 revealed osteopenia.  Start Boniva or Fosamax pending insurance coverage.  Continue daily calcium and vitamin D supplement     3.  Anemia in CKD stage III area today's hemoglobin 10.5.  --william monthly cbc with subQ epo if Hg <11  --Hg is 10.5 due for procrit today  --RTC in a month for procrit    4. Hypothyroidism: on synthroid.  5. Depression: on wellbutrin  6. CAD: on coreg,  benicar  7. Chronic pain synd: on oxycodone IR       Samra De Leon MD    8/20/2019    8:42 AM

## 2019-09-17 ENCOUNTER — TELEPHONE (OUTPATIENT)
Dept: ONCOLOGY | Facility: CLINIC | Age: 74
End: 2019-09-17

## 2019-09-17 ENCOUNTER — LAB (OUTPATIENT)
Dept: LAB | Facility: HOSPITAL | Age: 74
End: 2019-09-17

## 2019-09-17 ENCOUNTER — OFFICE VISIT (OUTPATIENT)
Dept: ONCOLOGY | Facility: CLINIC | Age: 74
End: 2019-09-17

## 2019-09-17 ENCOUNTER — INFUSION (OUTPATIENT)
Dept: ONCOLOGY | Facility: HOSPITAL | Age: 74
End: 2019-09-17

## 2019-09-17 VITALS
SYSTOLIC BLOOD PRESSURE: 158 MMHG | BODY MASS INDEX: 38.49 KG/M2 | OXYGEN SATURATION: 97 % | HEART RATE: 65 BPM | RESPIRATION RATE: 20 BRPM | DIASTOLIC BLOOD PRESSURE: 67 MMHG | TEMPERATURE: 97.7 F | WEIGHT: 254 LBS | HEIGHT: 68 IN

## 2019-09-17 VITALS
OXYGEN SATURATION: 93 % | SYSTOLIC BLOOD PRESSURE: 128 MMHG | HEART RATE: 68 BPM | HEIGHT: 68 IN | DIASTOLIC BLOOD PRESSURE: 72 MMHG | BODY MASS INDEX: 38.52 KG/M2 | WEIGHT: 254.2 LBS | TEMPERATURE: 96.8 F | RESPIRATION RATE: 16 BRPM

## 2019-09-17 DIAGNOSIS — N18.30 ANEMIA DUE TO STAGE 3 CHRONIC KIDNEY DISEASE (HCC): ICD-10-CM

## 2019-09-17 DIAGNOSIS — D63.1 ANEMIA DUE TO STAGE 3 CHRONIC KIDNEY DISEASE (HCC): ICD-10-CM

## 2019-09-17 DIAGNOSIS — D63.1 ANEMIA OF CHRONIC RENAL FAILURE, STAGE 3 (MODERATE) (HCC): Primary | ICD-10-CM

## 2019-09-17 DIAGNOSIS — D63.1 ANEMIA DUE TO STAGE 3 CHRONIC KIDNEY DISEASE (HCC): Primary | ICD-10-CM

## 2019-09-17 DIAGNOSIS — N18.30 ANEMIA OF CHRONIC RENAL FAILURE, STAGE 3 (MODERATE) (HCC): Primary | ICD-10-CM

## 2019-09-17 DIAGNOSIS — D63.1 ANEMIA OF CHRONIC RENAL FAILURE, STAGE 3 (MODERATE) (HCC): ICD-10-CM

## 2019-09-17 DIAGNOSIS — N18.30 ANEMIA OF CHRONIC RENAL FAILURE, STAGE 3 (MODERATE) (HCC): ICD-10-CM

## 2019-09-17 DIAGNOSIS — N18.30 ANEMIA DUE TO STAGE 3 CHRONIC KIDNEY DISEASE (HCC): Primary | ICD-10-CM

## 2019-09-17 LAB
ALBUMIN SERPL-MCNC: 3.9 G/DL (ref 3.5–5.2)
ALBUMIN/GLOB SERPL: 1.1 G/DL
ALP SERPL-CCNC: 72 U/L (ref 39–117)
ALT SERPL W P-5'-P-CCNC: 17 U/L (ref 1–33)
ANION GAP SERPL CALCULATED.3IONS-SCNC: 8 MMOL/L (ref 5–15)
AST SERPL-CCNC: 20 U/L (ref 1–32)
BASOPHILS # BLD AUTO: 0.04 10*3/MM3 (ref 0–0.2)
BASOPHILS NFR BLD AUTO: 0.7 % (ref 0–1.5)
BILIRUB SERPL-MCNC: 0.3 MG/DL (ref 0.2–1.2)
BUN BLD-MCNC: 20 MG/DL (ref 8–23)
BUN/CREAT SERPL: 16.4 (ref 7–25)
CALCIUM SPEC-SCNC: 9.3 MG/DL (ref 8.6–10.5)
CHLORIDE SERPL-SCNC: 100 MMOL/L (ref 98–107)
CO2 SERPL-SCNC: 31 MMOL/L (ref 22–29)
CREAT BLD-MCNC: 1.22 MG/DL (ref 0.57–1)
DEPRECATED RDW RBC AUTO: 53 FL (ref 37–54)
EOSINOPHIL # BLD AUTO: 0.24 10*3/MM3 (ref 0–0.4)
EOSINOPHIL NFR BLD AUTO: 4.4 % (ref 0.3–6.2)
ERYTHROCYTE [DISTWIDTH] IN BLOOD BY AUTOMATED COUNT: 16.5 % (ref 12.3–15.4)
FERRITIN SERPL-MCNC: 377.6 NG/ML (ref 13–150)
FOLATE SERPL-MCNC: >20 NG/ML (ref 4.78–24.2)
GFR SERPL CREATININE-BSD FRML MDRD: 52 ML/MIN/1.73
GLOBULIN UR ELPH-MCNC: 3.7 GM/DL
GLUCOSE BLD-MCNC: 105 MG/DL (ref 65–99)
HCT VFR BLD AUTO: 34 % (ref 34–46.6)
HGB BLD-MCNC: 10.7 G/DL (ref 12–15.9)
HOLD SPECIMEN: NORMAL
IMM GRANULOCYTES # BLD AUTO: 0.01 10*3/MM3 (ref 0–0.05)
IMM GRANULOCYTES NFR BLD AUTO: 0.2 % (ref 0–0.5)
IRON 24H UR-MRATE: 61 MCG/DL (ref 37–145)
IRON SATN MFR SERPL: 19 % (ref 20–50)
LYMPHOCYTES # BLD AUTO: 1.55 10*3/MM3 (ref 0.7–3.1)
LYMPHOCYTES NFR BLD AUTO: 28.5 % (ref 19.6–45.3)
MCH RBC QN AUTO: 27.9 PG (ref 26.6–33)
MCHC RBC AUTO-ENTMCNC: 31.5 G/DL (ref 31.5–35.7)
MCV RBC AUTO: 88.8 FL (ref 79–97)
MONOCYTES # BLD AUTO: 0.48 10*3/MM3 (ref 0.1–0.9)
MONOCYTES NFR BLD AUTO: 8.8 % (ref 5–12)
NEUTROPHILS # BLD AUTO: 3.11 10*3/MM3 (ref 1.7–7)
NEUTROPHILS NFR BLD AUTO: 57.4 % (ref 42.7–76)
NRBC BLD AUTO-RTO: 0 /100 WBC (ref 0–0.2)
PLATELET # BLD AUTO: 258 10*3/MM3 (ref 140–450)
PMV BLD AUTO: 9.9 FL (ref 6–12)
POTASSIUM BLD-SCNC: 3.7 MMOL/L (ref 3.5–5.2)
PROT SERPL-MCNC: 7.6 G/DL (ref 6–8.5)
RBC # BLD AUTO: 3.83 10*6/MM3 (ref 3.77–5.28)
SODIUM BLD-SCNC: 139 MMOL/L (ref 136–145)
TIBC SERPL-MCNC: 328 MCG/DL (ref 298–536)
TRANSFERRIN SERPL-MCNC: 220 MG/DL (ref 200–360)
VIT B12 BLD-MCNC: 1199 PG/ML (ref 211–946)
WBC NRBC COR # BLD: 5.43 10*3/MM3 (ref 3.4–10.8)

## 2019-09-17 PROCEDURE — 36415 COLL VENOUS BLD VENIPUNCTURE: CPT

## 2019-09-17 PROCEDURE — 85025 COMPLETE CBC W/AUTO DIFF WBC: CPT | Performed by: INTERNAL MEDICINE

## 2019-09-17 PROCEDURE — 82746 ASSAY OF FOLIC ACID SERUM: CPT | Performed by: INTERNAL MEDICINE

## 2019-09-17 PROCEDURE — 82607 VITAMIN B-12: CPT | Performed by: INTERNAL MEDICINE

## 2019-09-17 PROCEDURE — 84466 ASSAY OF TRANSFERRIN: CPT | Performed by: INTERNAL MEDICINE

## 2019-09-17 PROCEDURE — 96372 THER/PROPH/DIAG INJ SC/IM: CPT

## 2019-09-17 PROCEDURE — 80053 COMPREHEN METABOLIC PANEL: CPT | Performed by: INTERNAL MEDICINE

## 2019-09-17 PROCEDURE — 99214 OFFICE O/P EST MOD 30 MIN: CPT | Performed by: INTERNAL MEDICINE

## 2019-09-17 PROCEDURE — 83540 ASSAY OF IRON: CPT | Performed by: INTERNAL MEDICINE

## 2019-09-17 PROCEDURE — 25010000002 EPOETIN ALFA PER 1000 UNITS: Performed by: INTERNAL MEDICINE

## 2019-09-17 PROCEDURE — 82728 ASSAY OF FERRITIN: CPT | Performed by: INTERNAL MEDICINE

## 2019-09-17 RX ORDER — FAMOTIDINE 10 MG/ML
20 INJECTION, SOLUTION INTRAVENOUS AS NEEDED
Status: CANCELLED | OUTPATIENT
Start: 2019-09-20

## 2019-09-17 RX ORDER — FAMOTIDINE 10 MG/ML
20 INJECTION, SOLUTION INTRAVENOUS AS NEEDED
Status: CANCELLED | OUTPATIENT
Start: 2019-09-27

## 2019-09-17 RX ORDER — DIPHENHYDRAMINE HYDROCHLORIDE 50 MG/ML
50 INJECTION INTRAMUSCULAR; INTRAVENOUS AS NEEDED
Status: CANCELLED | OUTPATIENT
Start: 2019-09-20

## 2019-09-17 RX ORDER — SODIUM CHLORIDE 9 MG/ML
250 INJECTION, SOLUTION INTRAVENOUS ONCE
Status: CANCELLED | OUTPATIENT
Start: 2019-09-27

## 2019-09-17 RX ORDER — SODIUM CHLORIDE 9 MG/ML
250 INJECTION, SOLUTION INTRAVENOUS ONCE
Status: CANCELLED | OUTPATIENT
Start: 2019-09-20

## 2019-09-17 RX ORDER — DIPHENHYDRAMINE HYDROCHLORIDE 50 MG/ML
50 INJECTION INTRAMUSCULAR; INTRAVENOUS AS NEEDED
Status: CANCELLED | OUTPATIENT
Start: 2019-09-27

## 2019-09-17 RX ADMIN — ERYTHROPOIETIN 40000 UNITS: 40000 INJECTION, SOLUTION INTRAVENOUS; SUBCUTANEOUS at 09:14

## 2019-09-17 NOTE — TELEPHONE ENCOUNTER
----- Message from Samra De Leon MD sent at 9/17/2019  2:59 PM CDT -----  Please call the patient regarding her abnormal result. Need injectafer

## 2019-09-17 NOTE — PROGRESS NOTES
[facility]  HEMATOLOGY & ONCOLOGY    Cancer Staging Information:  No matching staging information was found for the patient.      Subjective     VISIT DIAGNOSIS:   Encounter Diagnosis   Name Primary?   • Anemia due to stage 3 chronic kidney disease (CMS/HCC) Yes       REASON FOR VISIT:     Chief Complaint   Patient presents with   • Anemia     Here for possible procrit Not having any new issues.        HEMATOLOGY / ONCOLOGY HISTORY:    No history exists.           INTERVAL HISTORY  Patient ID: Marina Joe is a 74 y.o. year old female h/o breast ca on arimidex. Tolerates AI very well. mammo 4/27/18 NMEM  -- had flu n Dec. Getting better. Per pt today is the first time she actually feels fine. Had flu shot in Nov of 2018. She would like refill of her arimidex  -9/17/19: denies sob, brbpr, cp/BRIDGER, unintentional weight loss,n/v/adnominal pain. Rest of ros unremarkable. PE unchanged.    Past Medical History:   Past Medical History:   Diagnosis Date   • Breast cancer (CMS/HCC)    • CKD (chronic kidney disease)    • Hypercholesteremia    • Hypertension    • Sinusitis      Past Surgical History:   Past Surgical History:   Procedure Laterality Date   • AVULSION TOENAIL PLATE      Sept 26,2018   • BREAST BIOPSY Left 11/20/2012   • BREAST BIOPSY      Left Breast, 1/2019 per Dr Barkley   • BREAST LUMPECTOMY Left     with node bx    • COLONOSCOPY  09/13/2013    small polyp at 30cm benign hyperplastic polyp, changes consistent with melanosis coli. Recall 5 years   • REPLACEMENT TOTAL KNEE Right     2016   • TOTAL ABDOMINAL HYSTERECTOMY WITH SALPINGO OOPHORECTOMY       Social History:   Social History     Socioeconomic History   • Marital status:      Spouse name: Not on file   • Number of children: Not on file   • Years of education: Not on file   • Highest education level: Not on file   Tobacco Use   • Smoking status: Never Smoker   • Smokeless tobacco: Never Used   Substance and Sexual Activity   •  Alcohol use: No     Family History:   Family History   Problem Relation Age of Onset   • Heart attack Mother    • Hodgkin's lymphoma Father    • Heart attack Brother    • Colon cancer Neg Hx    • Colon polyps Neg Hx        Review of Systems   Constitutional: Negative.    HENT: Negative.    Eyes: Negative.    Respiratory: Negative.    Cardiovascular: Negative.    Gastrointestinal: Negative.    Genitourinary: Negative.    Musculoskeletal: Negative.    Skin: Negative.    Neurological: Negative.    Hematological: Negative.    Psychiatric/Behavioral: Negative.         Performance Status:  Asymptomatic    Medications:    Current Outpatient Medications   Medication Sig Dispense Refill   • anastrozole (ARIMIDEX) 1 MG tablet Take 1 tablet by mouth Daily. 90 tablet 4   • buPROPion SR (WELLBUTRIN SR) 150 MG 12 hr tablet Take 150 mg by mouth.     • Calcium Carb-Cholecalciferol (CALCIUM 600+D3 PO) Take  by mouth.     • carvedilol (COREG) 6.25 MG tablet Take 6.25 mg by mouth 2 (Two) Times a Day With Meals.     • cetirizine (zyrTEC) 10 MG tablet Take 10 mg by mouth Daily.     • Ergocalciferol (VITAMIN D2 PO) Take 50,000 Units by mouth.     • ferrous sulfate 325 (65 FE) MG tablet Take 325 mg by mouth Daily With Breakfast.     • fluticasone (FLOVENT DISKUS) 50 MCG/BLIST diskus inhaler Inhale 1 puff.     • levothyroxine (SYNTHROID, LEVOTHROID) 25 MCG tablet Take 25 mcg by mouth Daily.     • Misc Natural Products (COLON HERBAL CLEANSER PO) Take  by mouth.     • olmesartan-hydrochlorothiazide (BENICAR HCT) 40-25 MG per tablet Take 1 tablet by mouth Daily.     • Omega-3 Fatty Acids (FISH OIL) 1000 MG capsule capsule Take 1,000 mg by mouth.     • oxyCODONE (OXY-IR) 5 MG capsule Take 5 mg by mouth.     • Pediatric Multivitamins-Iron (PEDIATRIC MULTIPLE VITAMINS W/ IRON) 15 MG chewable tablet Chew 1 tablet Daily.     • polyethylene glycol (GoLYTELY) 236 g solution Take as directed by office instructions. 4000 mL 0   • rosuvastatin  "(CRESTOR) 20 MG tablet Take 20 mg by mouth Daily.     • sertraline (ZOLOFT) 100 MG tablet Take 100 mg by mouth Daily.     • valACYclovir (VALTREX) 500 MG tablet Take 500 mg by mouth.       No current facility-administered medications for this visit.        ALLERGIES:    Allergies   Allergen Reactions   • Aspirin        Objective      Vitals:    09/17/19 0825   BP: 128/72   Pulse: 68   Resp: 16   Temp: 96.8 °F (36 °C)   TempSrc: Temporal   SpO2: 93%   Weight: 115 kg (254 lb 3.2 oz)   Height: 172.7 cm (68\")   PainSc: 0-No pain         Current Status 9/17/2019   ECOG score 0         Physical Exam  General Appearance: Patient is awake, alert, oriented and in no acute distress. Patient is welldeveloped, wellnourished, and appears stated age.  HEENT: Normocephalic. Sclerae clear, conjunctiva pink, extraocular movements intact, pupils, round, reactive to light and  accommodation. Mouth and throat are clear with moist oral mucosa.  NECK: Supple, no jugular venous distention, thyroid not enlarged.  LYMPH: No cervical, supraclavicular, axillary, or inguinal lymphadenopathy.  CHEST: Equal bilateral expansion, AP  diameter normal, resonant percussion note  LUNGS: Good air movement, no rales, rhonchi, rubs or wheezes with auscultation  CARDIO: Regular sinus rhythm, no murmurs, gallops or rubs.  ABDOMEN: Nondistended, soft, No tenderness, no guarding, no rebound, No hepatosplenomegaly. No abdominal masses. Bowel sounds positive. No hernia  GENITALIA: Not examined.  BREASTS: Not examined.  MUSKEL: No joint swelling, decreased motion, or inflammation  EXTREMS: No edema, clubbing, cyanosis, No varicose veins.  NEURO: Grossly nonfocal, Gait is coordinated and smooth, Cognition is preserved.  SKIN: No rashes, no ecchymoses, no petechia.  PSYCH: Oriented to time, place and person. Memory is preserved. Mood and affect appear normal  RECENT LABS:  Lab on 09/17/2019   Component Date Value Ref Range Status   • WBC 09/17/2019 5.43  3.40 - " 10.80 10*3/mm3 Final   • RBC 09/17/2019 3.83  3.77 - 5.28 10*6/mm3 Final   • Hemoglobin 09/17/2019 10.7* 12.0 - 15.9 g/dL Final   • Hematocrit 09/17/2019 34.0  34.0 - 46.6 % Final   • MCV 09/17/2019 88.8  79.0 - 97.0 fL Final   • MCH 09/17/2019 27.9  26.6 - 33.0 pg Final   • MCHC 09/17/2019 31.5  31.5 - 35.7 g/dL Final   • RDW 09/17/2019 16.5* 12.3 - 15.4 % Final   • RDW-SD 09/17/2019 53.0  37.0 - 54.0 fl Final   • MPV 09/17/2019 9.9  6.0 - 12.0 fL Final   • Platelets 09/17/2019 258  140 - 450 10*3/mm3 Final   • Neutrophil % 09/17/2019 57.4  42.7 - 76.0 % Final   • Lymphocyte % 09/17/2019 28.5  19.6 - 45.3 % Final   • Monocyte % 09/17/2019 8.8  5.0 - 12.0 % Final   • Eosinophil % 09/17/2019 4.4  0.3 - 6.2 % Final   • Basophil % 09/17/2019 0.7  0.0 - 1.5 % Final   • Immature Grans % 09/17/2019 0.2  0.0 - 0.5 % Final   • Neutrophils, Absolute 09/17/2019 3.11  1.70 - 7.00 10*3/mm3 Final   • Lymphocytes, Absolute 09/17/2019 1.55  0.70 - 3.10 10*3/mm3 Final   • Monocytes, Absolute 09/17/2019 0.48  0.10 - 0.90 10*3/mm3 Final   • Eosinophils, Absolute 09/17/2019 0.24  0.00 - 0.40 10*3/mm3 Final   • Basophils, Absolute 09/17/2019 0.04  0.00 - 0.20 10*3/mm3 Final   • Immature Grans, Absolute 09/17/2019 0.01  0.00 - 0.05 10*3/mm3 Final   • nRBC 09/17/2019 0.0  0.0 - 0.2 /100 WBC Final       RADIOLOGY:  No results found.         Assessment/Plan  Marina Joe is a 74 y.o. year old female with h/o breast ca whom we are seeing today for anemia 2/2 ckd.    Patient Active Problem List   Diagnosis   • Malignant neoplasm of central portion of left female breast (CMS/HCC)   • Anemia of chronic renal failure, stage 3 (moderate) (CMS/HCC)   • HTN (hypertension), benign   • Hx of colonic polyps   • HX: breast cancer   • Morbidly obese (CMS/HCC)        1. Stage IA invasive ductal carcinoma left breast ER 97% ID 97% HER-2/alix 2+, fish and amplified diagnosed May 2012 status post lumpectomy, radiation, on the hormonal manipulation with  Arimidex.  Mammogram 7/3/19 No mammographic evidence of malignancy in the left breast. Recommend annual mammography which will be due on/after 12/12/2019.  --she tolerates AI with no SE, continue for 10yrs per her wishes  --had biopsy of left breast nodule per Dr Barkley and pathology was benign.  -labs wbc 5.43, Hg 10.7, plt 258  --william arimidex    2.  Osteopenia.  DEXA scan May 2016 revealed osteopenia.  Start Boniva or Fosamax pending insurance coverage.  Continue daily calcium and vitamin D supplement     3.  Anemia in CKD stage III  -- Hg 10.7. Ok for procrit. Check iron profile and act accordingly  -  --RTC in a month for procrit    4. Hypothyroidism: on synthroid.  5. Depression: on wellbutrin  6. CAD: on coreg, benicar  7. Chronic pain synd: on oxycodone TERRI De Leon MD    9/17/2019    8:27 AM

## 2019-09-17 NOTE — TELEPHONE ENCOUNTER
Notified Marina that her iron level is low and Dr. De Leon wants her to get iron infusions.  Patient scheduled on 9/20/19 and 9/27/19.  Verbalized understanding.

## 2019-09-17 NOTE — PROGRESS NOTES
Verified administration w/KUMAR Lara/Dr. HERNANDEZ because of order parameters.  Patient should have injection if HGB <11.  KUMAR Lara will update order to reflect that.  EDDIE Kern RN

## 2019-09-20 ENCOUNTER — INFUSION (OUTPATIENT)
Dept: ONCOLOGY | Facility: HOSPITAL | Age: 74
End: 2019-09-20

## 2019-09-20 VITALS
SYSTOLIC BLOOD PRESSURE: 143 MMHG | TEMPERATURE: 98.1 F | WEIGHT: 253 LBS | OXYGEN SATURATION: 98 % | HEART RATE: 69 BPM | RESPIRATION RATE: 20 BRPM | BODY MASS INDEX: 38.34 KG/M2 | HEIGHT: 68 IN | DIASTOLIC BLOOD PRESSURE: 63 MMHG

## 2019-09-20 PROCEDURE — G0463 HOSPITAL OUTPT CLINIC VISIT: HCPCS

## 2019-09-20 NOTE — PROGRESS NOTES
Attempted iv x5 without success, including iv team. Will reschedule for Monday am, encouraged pt to drink water prior to appointment.pt and spouse voiced understanding

## 2019-09-23 ENCOUNTER — INFUSION (OUTPATIENT)
Dept: ONCOLOGY | Facility: HOSPITAL | Age: 74
End: 2019-09-23

## 2019-09-23 VITALS
BODY MASS INDEX: 38.8 KG/M2 | DIASTOLIC BLOOD PRESSURE: 55 MMHG | HEART RATE: 64 BPM | OXYGEN SATURATION: 100 % | WEIGHT: 256 LBS | TEMPERATURE: 96.7 F | HEIGHT: 68 IN | RESPIRATION RATE: 18 BRPM | SYSTOLIC BLOOD PRESSURE: 145 MMHG

## 2019-09-23 DIAGNOSIS — D63.1 ANEMIA OF CHRONIC RENAL FAILURE, STAGE 3 (MODERATE) (HCC): Primary | ICD-10-CM

## 2019-09-23 DIAGNOSIS — N18.30 ANEMIA OF CHRONIC RENAL FAILURE, STAGE 3 (MODERATE) (HCC): Primary | ICD-10-CM

## 2019-09-23 PROCEDURE — 25010000002 FERRIC CARBOXYMALTOSE 750 MG/15ML SOLUTION 15 ML VIAL: Performed by: INTERNAL MEDICINE

## 2019-09-23 PROCEDURE — 96365 THER/PROPH/DIAG IV INF INIT: CPT

## 2019-09-23 RX ORDER — FAMOTIDINE 10 MG/ML
20 INJECTION, SOLUTION INTRAVENOUS AS NEEDED
Status: DISCONTINUED | OUTPATIENT
Start: 2019-09-23 | End: 2019-09-23 | Stop reason: HOSPADM

## 2019-09-23 RX ORDER — SODIUM CHLORIDE 9 MG/ML
250 INJECTION, SOLUTION INTRAVENOUS ONCE
Status: COMPLETED | OUTPATIENT
Start: 2019-09-23 | End: 2019-09-23

## 2019-09-23 RX ORDER — DIPHENHYDRAMINE HYDROCHLORIDE 50 MG/ML
50 INJECTION INTRAMUSCULAR; INTRAVENOUS AS NEEDED
Status: DISCONTINUED | OUTPATIENT
Start: 2019-09-23 | End: 2019-09-23 | Stop reason: HOSPADM

## 2019-09-23 RX ADMIN — FERRIC CARBOXYMALTOSE INJECTION 750 MG: 50 INJECTION, SOLUTION INTRAVENOUS at 12:15

## 2019-09-23 RX ADMIN — SODIUM CHLORIDE 250 ML: 9 INJECTION, SOLUTION INTRAVENOUS at 12:10

## 2019-09-27 ENCOUNTER — APPOINTMENT (OUTPATIENT)
Dept: ONCOLOGY | Facility: HOSPITAL | Age: 74
End: 2019-09-27

## 2019-09-30 ENCOUNTER — INFUSION (OUTPATIENT)
Dept: ONCOLOGY | Facility: HOSPITAL | Age: 74
End: 2019-09-30

## 2019-09-30 VITALS
SYSTOLIC BLOOD PRESSURE: 127 MMHG | DIASTOLIC BLOOD PRESSURE: 52 MMHG | OXYGEN SATURATION: 100 % | TEMPERATURE: 97.1 F | HEART RATE: 59 BPM

## 2019-09-30 DIAGNOSIS — D63.1 ANEMIA OF CHRONIC RENAL FAILURE, STAGE 3 (MODERATE) (HCC): Primary | ICD-10-CM

## 2019-09-30 DIAGNOSIS — N18.30 ANEMIA OF CHRONIC RENAL FAILURE, STAGE 3 (MODERATE) (HCC): Primary | ICD-10-CM

## 2019-09-30 PROCEDURE — 25010000002 FERRIC CARBOXYMALTOSE 750 MG/15ML SOLUTION 15 ML VIAL: Performed by: INTERNAL MEDICINE

## 2019-09-30 PROCEDURE — 96365 THER/PROPH/DIAG IV INF INIT: CPT

## 2019-09-30 RX ORDER — DIPHENHYDRAMINE HYDROCHLORIDE 50 MG/ML
50 INJECTION INTRAMUSCULAR; INTRAVENOUS AS NEEDED
Status: DISCONTINUED | OUTPATIENT
Start: 2019-09-30 | End: 2019-09-30 | Stop reason: HOSPADM

## 2019-09-30 RX ORDER — SODIUM CHLORIDE 9 MG/ML
250 INJECTION, SOLUTION INTRAVENOUS ONCE
Status: COMPLETED | OUTPATIENT
Start: 2019-09-30 | End: 2019-09-30

## 2019-09-30 RX ORDER — FAMOTIDINE 10 MG/ML
20 INJECTION, SOLUTION INTRAVENOUS AS NEEDED
Status: DISCONTINUED | OUTPATIENT
Start: 2019-09-30 | End: 2019-09-30 | Stop reason: HOSPADM

## 2019-09-30 RX ADMIN — SODIUM CHLORIDE 250 ML: 9 INJECTION, SOLUTION INTRAVENOUS at 11:37

## 2019-09-30 RX ADMIN — FERRIC CARBOXYMALTOSE INJECTION 750 MG: 50 INJECTION, SOLUTION INTRAVENOUS at 11:37

## 2019-10-15 ENCOUNTER — INFUSION (OUTPATIENT)
Dept: ONCOLOGY | Facility: HOSPITAL | Age: 74
End: 2019-10-15

## 2019-10-15 ENCOUNTER — OFFICE VISIT (OUTPATIENT)
Dept: ONCOLOGY | Facility: CLINIC | Age: 74
End: 2019-10-15

## 2019-10-15 ENCOUNTER — LAB (OUTPATIENT)
Dept: LAB | Facility: HOSPITAL | Age: 74
End: 2019-10-15

## 2019-10-15 VITALS
BODY MASS INDEX: 38.39 KG/M2 | DIASTOLIC BLOOD PRESSURE: 74 MMHG | WEIGHT: 253.3 LBS | OXYGEN SATURATION: 94 % | SYSTOLIC BLOOD PRESSURE: 132 MMHG | HEART RATE: 72 BPM | RESPIRATION RATE: 16 BRPM | HEIGHT: 68 IN | TEMPERATURE: 96 F

## 2019-10-15 VITALS
WEIGHT: 253 LBS | BODY MASS INDEX: 38.34 KG/M2 | OXYGEN SATURATION: 99 % | HEIGHT: 68 IN | DIASTOLIC BLOOD PRESSURE: 75 MMHG | TEMPERATURE: 97.8 F | HEART RATE: 60 BPM | RESPIRATION RATE: 18 BRPM | SYSTOLIC BLOOD PRESSURE: 156 MMHG

## 2019-10-15 DIAGNOSIS — N18.30 ANEMIA OF CHRONIC RENAL FAILURE, STAGE 3 (MODERATE) (HCC): Primary | ICD-10-CM

## 2019-10-15 DIAGNOSIS — D63.1 ANEMIA OF CHRONIC RENAL FAILURE, STAGE 3 (MODERATE) (HCC): ICD-10-CM

## 2019-10-15 DIAGNOSIS — N18.30 ANEMIA OF CHRONIC RENAL FAILURE, STAGE 3 (MODERATE) (HCC): ICD-10-CM

## 2019-10-15 DIAGNOSIS — D63.1 ANEMIA OF CHRONIC RENAL FAILURE, STAGE 3 (MODERATE) (HCC): Primary | ICD-10-CM

## 2019-10-15 LAB
ALBUMIN SERPL-MCNC: 4 G/DL (ref 3.5–5.2)
ALBUMIN/GLOB SERPL: 1.1 G/DL
ALP SERPL-CCNC: 88 U/L (ref 39–117)
ALT SERPL W P-5'-P-CCNC: 34 U/L (ref 1–33)
ANION GAP SERPL CALCULATED.3IONS-SCNC: 11 MMOL/L (ref 5–15)
AST SERPL-CCNC: 27 U/L (ref 1–32)
BASOPHILS # BLD AUTO: 0.03 10*3/MM3 (ref 0–0.2)
BASOPHILS NFR BLD AUTO: 0.6 % (ref 0–1.5)
BILIRUB SERPL-MCNC: 0.2 MG/DL (ref 0.2–1.2)
BUN BLD-MCNC: 20 MG/DL (ref 8–23)
BUN/CREAT SERPL: 17.2 (ref 7–25)
CALCIUM SPEC-SCNC: 9.3 MG/DL (ref 8.6–10.5)
CHLORIDE SERPL-SCNC: 96 MMOL/L (ref 98–107)
CO2 SERPL-SCNC: 31 MMOL/L (ref 22–29)
CREAT BLD-MCNC: 1.16 MG/DL (ref 0.57–1)
DEPRECATED RDW RBC AUTO: 59.9 FL (ref 37–54)
EOSINOPHIL # BLD AUTO: 0.21 10*3/MM3 (ref 0–0.4)
EOSINOPHIL NFR BLD AUTO: 4.1 % (ref 0.3–6.2)
ERYTHROCYTE [DISTWIDTH] IN BLOOD BY AUTOMATED COUNT: 17.8 % (ref 12.3–15.4)
GFR SERPL CREATININE-BSD FRML MDRD: 56 ML/MIN/1.73
GLOBULIN UR ELPH-MCNC: 3.7 GM/DL
GLUCOSE BLD-MCNC: 100 MG/DL (ref 65–99)
HCT VFR BLD AUTO: 35.2 % (ref 34–46.6)
HGB BLD-MCNC: 11.3 G/DL (ref 12–15.9)
HOLD SPECIMEN: NORMAL
IMM GRANULOCYTES # BLD AUTO: 0.01 10*3/MM3 (ref 0–0.05)
IMM GRANULOCYTES NFR BLD AUTO: 0.2 % (ref 0–0.5)
LYMPHOCYTES # BLD AUTO: 1.49 10*3/MM3 (ref 0.7–3.1)
LYMPHOCYTES NFR BLD AUTO: 28.8 % (ref 19.6–45.3)
MCH RBC QN AUTO: 29.4 PG (ref 26.6–33)
MCHC RBC AUTO-ENTMCNC: 32.1 G/DL (ref 31.5–35.7)
MCV RBC AUTO: 91.7 FL (ref 79–97)
MONOCYTES # BLD AUTO: 0.43 10*3/MM3 (ref 0.1–0.9)
MONOCYTES NFR BLD AUTO: 8.3 % (ref 5–12)
NEUTROPHILS # BLD AUTO: 3 10*3/MM3 (ref 1.7–7)
NEUTROPHILS NFR BLD AUTO: 58 % (ref 42.7–76)
NRBC BLD AUTO-RTO: 0 /100 WBC (ref 0–0.2)
PLATELET # BLD AUTO: 243 10*3/MM3 (ref 140–450)
PMV BLD AUTO: 9.7 FL (ref 6–12)
POTASSIUM BLD-SCNC: 3.8 MMOL/L (ref 3.5–5.2)
PROT SERPL-MCNC: 7.7 G/DL (ref 6–8.5)
RBC # BLD AUTO: 3.84 10*6/MM3 (ref 3.77–5.28)
SODIUM BLD-SCNC: 138 MMOL/L (ref 136–145)
WBC NRBC COR # BLD: 5.17 10*3/MM3 (ref 3.4–10.8)

## 2019-10-15 PROCEDURE — 25010000002 EPOETIN ALFA PER 1000 UNITS: Performed by: INTERNAL MEDICINE

## 2019-10-15 PROCEDURE — 36415 COLL VENOUS BLD VENIPUNCTURE: CPT

## 2019-10-15 PROCEDURE — 85025 COMPLETE CBC W/AUTO DIFF WBC: CPT | Performed by: INTERNAL MEDICINE

## 2019-10-15 PROCEDURE — 96372 THER/PROPH/DIAG INJ SC/IM: CPT

## 2019-10-15 PROCEDURE — 99214 OFFICE O/P EST MOD 30 MIN: CPT | Performed by: INTERNAL MEDICINE

## 2019-10-15 PROCEDURE — 80053 COMPREHEN METABOLIC PANEL: CPT | Performed by: INTERNAL MEDICINE

## 2019-10-15 RX ORDER — CYCLOBENZAPRINE HCL 10 MG
TABLET ORAL
Refills: 2 | COMMUNITY
Start: 2019-10-03 | End: 2020-05-05 | Stop reason: SDUPTHER

## 2019-10-15 RX ORDER — BUPROPION HYDROCHLORIDE 150 MG/1
TABLET ORAL DAILY
Refills: 1 | COMMUNITY
Start: 2019-09-18 | End: 2020-04-03 | Stop reason: SDUPTHER

## 2019-10-15 RX ADMIN — ERYTHROPOIETIN 40000 UNITS: 40000 INJECTION, SOLUTION INTRAVENOUS; SUBCUTANEOUS at 10:43

## 2019-10-15 NOTE — PROGRESS NOTES
[facility]  HEMATOLOGY & ONCOLOGY    Cancer Staging Information:  No matching staging information was found for the patient.      Subjective     VISIT DIAGNOSIS:   Encounter Diagnosis   Name Primary?   • Anemia of chronic renal failure, stage 3 (moderate) (CMS/HCC)        REASON FOR VISIT:     Chief Complaint   Patient presents with   • Anemia CKD Stage III     Follow-up        HEMATOLOGY / ONCOLOGY HISTORY:    No history exists.           INTERVAL HISTORY  Patient ID: Marina Joe is a 74 y.o. year old female h/o breast ca on arimidex. Tolerates AI very well. mammo 4/27/18 [facility]  -- had flu n Dec. Getting better. Per pt today is the first time she actually feels fine. Had flu shot in Nov of 2018. She would like refill of her arimidex  -1-/15/19: she is limping today. She is supposed to be getiing left knee replacement but have not gotten around to it. Other than that no other issues.  --denies sob, brbpr, cp/BRIDGER, unintentional weight loss,n/v/adnominal pain. Rest of ros unremarkable. PE unchanged.    Past Medical History:   Past Medical History:   Diagnosis Date   • Breast cancer (CMS/HCC)    • CKD (chronic kidney disease)    • Hypercholesteremia    • Hypertension    • Sinusitis      Past Surgical History:   Past Surgical History:   Procedure Laterality Date   • AVULSION TOENAIL PLATE      Sept 26,2018   • BREAST BIOPSY Left 11/20/2012   • BREAST BIOPSY      Left Breast, 1/2019 per Dr Barkley   • BREAST LUMPECTOMY Left     with node bx    • COLONOSCOPY  09/13/2013    small polyp at 30cm benign hyperplastic polyp, changes consistent with melanosis coli. Recall 5 years   • REPLACEMENT TOTAL KNEE Right     2016   • TOTAL ABDOMINAL HYSTERECTOMY WITH SALPINGO OOPHORECTOMY       Social History:   Social History     Socioeconomic History   • Marital status:      Spouse name: Not on file   • Number of children: Not on file   • Years of education: Not on file   • Highest education level: Not on file    Tobacco Use   • Smoking status: Never Smoker   • Smokeless tobacco: Never Used   Substance and Sexual Activity   • Alcohol use: No     Family History:   Family History   Problem Relation Age of Onset   • Heart attack Mother    • Hodgkin's lymphoma Father    • Heart attack Brother    • Colon cancer Neg Hx    • Colon polyps Neg Hx        Review of Systems   Constitutional: Negative.    HENT: Negative.    Eyes: Negative.    Respiratory: Negative.    Cardiovascular: Negative.    Gastrointestinal: Negative.    Genitourinary: Negative.    Musculoskeletal: Negative.    Skin: Negative.    Neurological: Negative.    Hematological: Negative.    Psychiatric/Behavioral: Negative.         Performance Status:  Asymptomatic    Medications:    Current Outpatient Medications   Medication Sig Dispense Refill   • anastrozole (ARIMIDEX) 1 MG tablet Take 1 tablet by mouth Daily. 90 tablet 4   • buPROPion XL (WELLBUTRIN XL) 150 MG 24 hr tablet Take  by mouth Daily.  1   • Calcium Carb-Cholecalciferol (CALCIUM 600+D3 PO) Take  by mouth.     • carvedilol (COREG) 6.25 MG tablet Take 6.25 mg by mouth 2 (Two) Times a Day With Meals.     • cetirizine (zyrTEC) 10 MG tablet Take 10 mg by mouth Daily.     • cyclobenzaprine (FLEXERIL) 10 MG tablet TK 1 T PO TID PRN  2   • Ergocalciferol (VITAMIN D2 PO) Take 50,000 Units by mouth.     • ferrous sulfate 325 (65 FE) MG tablet Take 325 mg by mouth Daily With Breakfast.     • fluticasone (FLOVENT DISKUS) 50 MCG/BLIST diskus inhaler Inhale 1 puff.     • levothyroxine (SYNTHROID, LEVOTHROID) 25 MCG tablet Take 25 mcg by mouth Daily.     • Misc Natural Products (COLON HERBAL CLEANSER PO) Take  by mouth.     • olmesartan-hydrochlorothiazide (BENICAR HCT) 40-25 MG per tablet Take 1 tablet by mouth Daily.     • Omega-3 Fatty Acids (FISH OIL) 1000 MG capsule capsule Take 1,000 mg by mouth.     • oxyCODONE (OXY-IR) 5 MG capsule Take 5 mg by mouth.     • Pediatric Multivitamins-Iron (PEDIATRIC MULTIPLE  "VITAMINS W/ IRON) 15 MG chewable tablet Chew 1 tablet Daily.     • polyethylene glycol (GoLYTELY) 236 g solution Take as directed by office instructions. 4000 mL 0   • rosuvastatin (CRESTOR) 20 MG tablet Take 20 mg by mouth Daily.     • sertraline (ZOLOFT) 100 MG tablet Take 100 mg by mouth Daily.     • valACYclovir (VALTREX) 500 MG tablet Take 500 mg by mouth.       No current facility-administered medications for this visit.        ALLERGIES:    Allergies   Allergen Reactions   • Aspirin        Objective      Vitals:    10/15/19 0955   BP: 132/74   Pulse: 72   Resp: 16   Temp: 96 °F (35.6 °C)   SpO2: 94%   Weight: 115 kg (253 lb 4.8 oz)   Height: 172.7 cm (68\")   PainSc: 10-Worst pain ever         Current Status 10/15/2019   ECOG score 0         Physical Exam  General Appearance: Patient is awake, alert, oriented and in no acute distress. Patient is welldeveloped, wellnourished, and appears stated age.  HEENT: Normocephalic. Sclerae clear, conjunctiva pink, extraocular movements intact, pupils, round, reactive to light and  accommodation. Mouth and throat are clear with moist oral mucosa.  NECK: Supple, no jugular venous distention, thyroid not enlarged.  LYMPH: No cervical, supraclavicular, axillary, or inguinal lymphadenopathy.  CHEST: Equal bilateral expansion, AP  diameter normal, resonant percussion note  LUNGS: Good air movement, no rales, rhonchi, rubs or wheezes with auscultation  CARDIO: Regular sinus rhythm, no murmurs, gallops or rubs.  ABDOMEN: Nondistended, soft, No tenderness, no guarding, no rebound, No hepatosplenomegaly. No abdominal masses. Bowel sounds positive. No hernia  GENITALIA: Not examined.  BREASTS: Not examined.  MUSKEL: No joint swelling, decreased motion, or inflammation  EXTREMS: No edema, clubbing, cyanosis, No varicose veins.  NEURO: Grossly nonfocal, Gait is coordinated and smooth, Cognition is preserved.  SKIN: No rashes, no ecchymoses, no petechia.  PSYCH: Oriented to time, " place and person. Memory is preserved. Mood and affect appear normal  RECENT LABS:  Lab on 10/15/2019   Component Date Value Ref Range Status   • Glucose 10/15/2019 100* 65 - 99 mg/dL Final   • BUN 10/15/2019 20  8 - 23 mg/dL Final   • Creatinine 10/15/2019 1.16* 0.57 - 1.00 mg/dL Final   • Sodium 10/15/2019 138  136 - 145 mmol/L Final   • Potassium 10/15/2019 3.8  3.5 - 5.2 mmol/L Final   • Chloride 10/15/2019 96* 98 - 107 mmol/L Final   • CO2 10/15/2019 31.0* 22.0 - 29.0 mmol/L Final   • Calcium 10/15/2019 9.3  8.6 - 10.5 mg/dL Final   • Total Protein 10/15/2019 7.7  6.0 - 8.5 g/dL Final   • Albumin 10/15/2019 4.00  3.50 - 5.20 g/dL Final   • ALT (SGPT) 10/15/2019 34* 1 - 33 U/L Final   • AST (SGOT) 10/15/2019 27  1 - 32 U/L Final    Specimen hemolyzed.  Results may be affected.   • Alkaline Phosphatase 10/15/2019 88  39 - 117 U/L Final   • Total Bilirubin 10/15/2019 0.2  0.2 - 1.2 mg/dL Final   • eGFR   Amer 10/15/2019 56* >60 mL/min/1.73 Final   • Globulin 10/15/2019 3.7  gm/dL Final   • A/G Ratio 10/15/2019 1.1  g/dL Final   • BUN/Creatinine Ratio 10/15/2019 17.2  7.0 - 25.0 Final   • Anion Gap 10/15/2019 11.0  5.0 - 15.0 mmol/L Final   • WBC 10/15/2019 5.17  3.40 - 10.80 10*3/mm3 Final   • RBC 10/15/2019 3.84  3.77 - 5.28 10*6/mm3 Final   • Hemoglobin 10/15/2019 11.3* 12.0 - 15.9 g/dL Final   • Hematocrit 10/15/2019 35.2  34.0 - 46.6 % Final   • MCV 10/15/2019 91.7  79.0 - 97.0 fL Final   • MCH 10/15/2019 29.4  26.6 - 33.0 pg Final   • MCHC 10/15/2019 32.1  31.5 - 35.7 g/dL Final   • RDW 10/15/2019 17.8* 12.3 - 15.4 % Final   • RDW-SD 10/15/2019 59.9* 37.0 - 54.0 fl Final   • MPV 10/15/2019 9.7  6.0 - 12.0 fL Final   • Platelets 10/15/2019 243  140 - 450 10*3/mm3 Final   • Neutrophil % 10/15/2019 58.0  42.7 - 76.0 % Final   • Lymphocyte % 10/15/2019 28.8  19.6 - 45.3 % Final   • Monocyte % 10/15/2019 8.3  5.0 - 12.0 % Final   • Eosinophil % 10/15/2019 4.1  0.3 - 6.2 % Final   • Basophil % 10/15/2019 0.6   0.0 - 1.5 % Final   • Immature Grans % 10/15/2019 0.2  0.0 - 0.5 % Final   • Neutrophils, Absolute 10/15/2019 3.00  1.70 - 7.00 10*3/mm3 Final   • Lymphocytes, Absolute 10/15/2019 1.49  0.70 - 3.10 10*3/mm3 Final   • Monocytes, Absolute 10/15/2019 0.43  0.10 - 0.90 10*3/mm3 Final   • Eosinophils, Absolute 10/15/2019 0.21  0.00 - 0.40 10*3/mm3 Final   • Basophils, Absolute 10/15/2019 0.03  0.00 - 0.20 10*3/mm3 Final   • Immature Grans, Absolute 10/15/2019 0.01  0.00 - 0.05 10*3/mm3 Final   • nRBC 10/15/2019 0.0  0.0 - 0.2 /100 WBC Final       RADIOLOGY:  No results found.         Assessment/Plan  Marina Joe is a 74 y.o. year old female with h/o breast ca whom we are seeing today for anemia 2/2 ckd.    Patient Active Problem List   Diagnosis   • Malignant neoplasm of central portion of left female breast (CMS/HCC)   • Anemia of chronic renal failure, stage 3 (moderate) (CMS/HCC)   • HTN (hypertension), benign   • Hx of colonic polyps   • HX: breast cancer   • Morbidly obese (CMS/HCC)   • Abnormal mammogram   • Breast mass   • Right knee DJD   • S/P lumpectomy, left breast        1. Stage IA invasive ductal carcinoma left breast ER 97% MD 97% HER-2/alix 2+, fish and amplified diagnosed May 2012 status post lumpectomy, radiation, on the hormonal manipulation with Arimidex. -- Mammogram 7/3/19 No mammographic evidence of malignancy in the left breast. Recommend annual mammography which will be due on/after 12/12/2019.  --she tolerates AI with no SE, continue for 10yrs per her wishes  --had biopsy of left breast nodule per Dr Barkley and pathology was benign.  -labs wbc 5.17, Hg 11.3, plt 243  --william arimidex    2.  Osteopenia.  DEXA scan May 2016 revealed osteopenia.  Start Boniva or Fosamax pending insurance coverage.  Continue daily calcium and vitamin D supplement     3.  Anemia in CKD stage III  -- Hg 11.3. gfr 56. Ok for procrit.   -iron profile on 9/17 showed %sat of 19. S/p injectafer 9/23 x2  -  --RTC in a  month for procrit    4. Hypothyroidism: on synthroid.  5. Depression: on wellbutrin  6. CAD: on coreg, benicar  7. Chronic pain synd: on oxycodone IR       Samra De Leon MD    10/15/2019    10:07 AM

## 2019-10-15 NOTE — PROGRESS NOTES
1027 Called and spoke with Jane RN about hgb 11.3. Jane reports doctor does want patient to receive injection. Veda Chatman RN

## 2019-11-15 ENCOUNTER — OFFICE VISIT (OUTPATIENT)
Dept: ONCOLOGY | Facility: CLINIC | Age: 74
End: 2019-11-15

## 2019-11-15 ENCOUNTER — LAB (OUTPATIENT)
Dept: LAB | Facility: HOSPITAL | Age: 74
End: 2019-11-15

## 2019-11-15 ENCOUNTER — INFUSION (OUTPATIENT)
Dept: ONCOLOGY | Facility: HOSPITAL | Age: 74
End: 2019-11-15

## 2019-11-15 VITALS
RESPIRATION RATE: 16 BRPM | TEMPERATURE: 97.5 F | SYSTOLIC BLOOD PRESSURE: 132 MMHG | OXYGEN SATURATION: 97 % | WEIGHT: 254.8 LBS | DIASTOLIC BLOOD PRESSURE: 79 MMHG | HEIGHT: 68 IN | BODY MASS INDEX: 38.62 KG/M2 | HEART RATE: 74 BPM

## 2019-11-15 DIAGNOSIS — D63.1 ANEMIA OF CHRONIC RENAL FAILURE, STAGE 3 (MODERATE) (HCC): ICD-10-CM

## 2019-11-15 DIAGNOSIS — N18.30 ANEMIA OF CHRONIC RENAL FAILURE, STAGE 3 (MODERATE) (HCC): ICD-10-CM

## 2019-11-15 LAB
ALBUMIN SERPL-MCNC: 3.9 G/DL (ref 3.5–5.2)
ALBUMIN/GLOB SERPL: 1.1 G/DL
ALP SERPL-CCNC: 73 U/L (ref 39–117)
ALT SERPL W P-5'-P-CCNC: 26 U/L (ref 1–33)
ANION GAP SERPL CALCULATED.3IONS-SCNC: 7 MMOL/L (ref 5–15)
AST SERPL-CCNC: 20 U/L (ref 1–32)
BASOPHILS # BLD AUTO: 0.03 10*3/MM3 (ref 0–0.2)
BASOPHILS NFR BLD AUTO: 0.5 % (ref 0–1.5)
BILIRUB SERPL-MCNC: 0.3 MG/DL (ref 0.2–1.2)
BUN BLD-MCNC: 21 MG/DL (ref 8–23)
BUN/CREAT SERPL: 17.9 (ref 7–25)
CALCIUM SPEC-SCNC: 9.1 MG/DL (ref 8.6–10.5)
CHLORIDE SERPL-SCNC: 101 MMOL/L (ref 98–107)
CO2 SERPL-SCNC: 31 MMOL/L (ref 22–29)
CREAT BLD-MCNC: 1.17 MG/DL (ref 0.57–1)
DEPRECATED RDW RBC AUTO: 57.3 FL (ref 37–54)
EOSINOPHIL # BLD AUTO: 0.21 10*3/MM3 (ref 0–0.4)
EOSINOPHIL NFR BLD AUTO: 3.8 % (ref 0.3–6.2)
ERYTHROCYTE [DISTWIDTH] IN BLOOD BY AUTOMATED COUNT: 17 % (ref 12.3–15.4)
GFR SERPL CREATININE-BSD FRML MDRD: 55 ML/MIN/1.73
GLOBULIN UR ELPH-MCNC: 3.4 GM/DL
GLUCOSE BLD-MCNC: 95 MG/DL (ref 65–99)
HCT VFR BLD AUTO: 37.5 % (ref 34–46.6)
HGB BLD-MCNC: 12 G/DL (ref 12–15.9)
HOLD SPECIMEN: NORMAL
HOLD SPECIMEN: NORMAL
IMM GRANULOCYTES # BLD AUTO: 0.01 10*3/MM3 (ref 0–0.05)
IMM GRANULOCYTES NFR BLD AUTO: 0.2 % (ref 0–0.5)
LYMPHOCYTES # BLD AUTO: 1.73 10*3/MM3 (ref 0.7–3.1)
LYMPHOCYTES NFR BLD AUTO: 31.3 % (ref 19.6–45.3)
MCH RBC QN AUTO: 29.4 PG (ref 26.6–33)
MCHC RBC AUTO-ENTMCNC: 32 G/DL (ref 31.5–35.7)
MCV RBC AUTO: 91.9 FL (ref 79–97)
MONOCYTES # BLD AUTO: 0.52 10*3/MM3 (ref 0.1–0.9)
MONOCYTES NFR BLD AUTO: 9.4 % (ref 5–12)
NEUTROPHILS # BLD AUTO: 3.03 10*3/MM3 (ref 1.7–7)
NEUTROPHILS NFR BLD AUTO: 54.8 % (ref 42.7–76)
NRBC BLD AUTO-RTO: 0 /100 WBC (ref 0–0.2)
PLATELET # BLD AUTO: 235 10*3/MM3 (ref 140–450)
PMV BLD AUTO: 9.9 FL (ref 6–12)
POTASSIUM BLD-SCNC: 3.6 MMOL/L (ref 3.5–5.2)
PROT SERPL-MCNC: 7.3 G/DL (ref 6–8.5)
RBC # BLD AUTO: 4.08 10*6/MM3 (ref 3.77–5.28)
SODIUM BLD-SCNC: 139 MMOL/L (ref 136–145)
WBC NRBC COR # BLD: 5.53 10*3/MM3 (ref 3.4–10.8)

## 2019-11-15 PROCEDURE — 80053 COMPREHEN METABOLIC PANEL: CPT | Performed by: INTERNAL MEDICINE

## 2019-11-15 PROCEDURE — 99215 OFFICE O/P EST HI 40 MIN: CPT | Performed by: INTERNAL MEDICINE

## 2019-11-15 PROCEDURE — 85025 COMPLETE CBC W/AUTO DIFF WBC: CPT | Performed by: INTERNAL MEDICINE

## 2019-11-15 PROCEDURE — 36415 COLL VENOUS BLD VENIPUNCTURE: CPT

## 2019-11-15 RX ORDER — TRAZODONE HYDROCHLORIDE 50 MG/1
50 TABLET ORAL NIGHTLY
COMMUNITY
End: 2020-02-26 | Stop reason: SDUPTHER

## 2019-11-15 RX ORDER — ROPINIROLE 0.5 MG/1
0.5 TABLET, FILM COATED ORAL
Refills: 1 | COMMUNITY
Start: 2019-10-25 | End: 2020-04-03 | Stop reason: SDUPTHER

## 2019-11-15 RX ORDER — IRBESARTAN AND HYDROCHLOROTHIAZIDE 300; 12.5 MG/1; MG/1
12.5 TABLET, FILM COATED ORAL DAILY
Refills: 5 | COMMUNITY
Start: 2019-11-05 | End: 2020-04-03 | Stop reason: SDUPTHER

## 2019-11-15 RX ORDER — MONTELUKAST SODIUM 10 MG/1
10 TABLET ORAL DAILY
Refills: 1 | COMMUNITY
Start: 2019-10-25 | End: 2020-04-03 | Stop reason: SDUPTHER

## 2019-11-15 NOTE — PROGRESS NOTES
[facility]  HEMATOLOGY & ONCOLOGY    Cancer Staging Information:  No matching staging information was found for the patient.      Subjective     VISIT DIAGNOSIS:   Encounter Diagnosis   Name Primary?   • Anemia of chronic renal failure, stage 3 (moderate) (CMS/HCC)        REASON FOR VISIT:     No chief complaint on file.       HEMATOLOGY / ONCOLOGY HISTORY:    No history exists.           INTERVAL HISTORY  Patient ID: Marina Joe is a 74 y.o. year old female h/o breast ca on arimidex. Tolerates AI very well. mammo 4/27/18 [facility]  -- had flu n Dec. Getting better. Per pt today is the first time she actually feels fine. Had flu shot in Nov of 2018. She would like refill of her arimidex  -1-/15/19: she is limping today. She is supposed to be getiing left knee replacement but have not gotten around to it. Other than that no other issues.  11/15/19: presented for procrit shot today. Complaining of breast pain comes and goes not new, mammo due in Dec. Also she sucks her tongue and wakes up with bloody phlegm. Started 2 weeks ago. Told her most likely due to dry air since she started using heater. If continues or worsens, will refer to ent.she saw her dentis last week per her report, he did not see any thing that will explain the blood. She denies tongue pain   --denies sob, brbpr, cp/BRIDGER, unintentional weight loss,n/v/adnominal pain. Rest of ros unremarkable. PE unchanged.    Past Medical History:   Past Medical History:   Diagnosis Date   • Breast cancer (CMS/HCC)    • CKD (chronic kidney disease)    • Hypercholesteremia    • Hypertension    • Sinusitis      Past Surgical History:   Past Surgical History:   Procedure Laterality Date   • AVULSION TOENAIL PLATE      Sept 26,2018   • BREAST BIOPSY Left 11/20/2012   • BREAST BIOPSY      Left Breast, 1/2019 per Dr Barkley   • BREAST LUMPECTOMY Left     with node bx    • COLONOSCOPY  09/13/2013    small polyp at 30cm benign hyperplastic polyp, changes  consistent with melanosis coli. Recall 5 years   • REPLACEMENT TOTAL KNEE Right     2016   • TOTAL ABDOMINAL HYSTERECTOMY WITH SALPINGO OOPHORECTOMY       Social History:   Social History     Socioeconomic History   • Marital status:      Spouse name: Not on file   • Number of children: Not on file   • Years of education: Not on file   • Highest education level: Not on file   Tobacco Use   • Smoking status: Never Smoker   • Smokeless tobacco: Never Used   Substance and Sexual Activity   • Alcohol use: No     Family History:   Family History   Problem Relation Age of Onset   • Heart attack Mother    • Hodgkin's lymphoma Father    • Heart attack Brother    • Colon cancer Neg Hx    • Colon polyps Neg Hx        Review of Systems   Constitutional: Negative.    HENT: Negative.    Eyes: Negative.    Respiratory: Negative.    Cardiovascular: Negative.    Gastrointestinal: Negative.    Genitourinary: Negative.    Musculoskeletal: Negative.    Skin: Negative.    Neurological: Negative.    Hematological: Negative.    Psychiatric/Behavioral: Negative.         Performance Status:  Asymptomatic    Medications:    Current Outpatient Medications   Medication Sig Dispense Refill   • anastrozole (ARIMIDEX) 1 MG tablet Take 1 tablet by mouth Daily. 90 tablet 4   • buPROPion XL (WELLBUTRIN XL) 150 MG 24 hr tablet Take  by mouth Daily.  1   • Calcium Carb-Cholecalciferol (CALCIUM 600+D3 PO) Take  by mouth.     • carvedilol (COREG) 6.25 MG tablet Take 6.25 mg by mouth 2 (Two) Times a Day With Meals.     • cetirizine (zyrTEC) 10 MG tablet Take 10 mg by mouth Daily.     • cyclobenzaprine (FLEXERIL) 10 MG tablet TK 1 T PO TID PRN  2   • Ergocalciferol (VITAMIN D2 PO) Take 50,000 Units by mouth.     • ferrous sulfate 325 (65 FE) MG tablet Take 325 mg by mouth Daily With Breakfast.     • fluticasone (FLOVENT DISKUS) 50 MCG/BLIST diskus inhaler Inhale 1 puff.     • irbesartan-hydrochlorothiazide (AVALIDE) 300-12.5 MG tablet Take 12.5  "tablets by mouth Daily.  5   • Misc Natural Products (COLON HERBAL CLEANSER PO) Take  by mouth.     • montelukast (SINGULAIR) 10 MG tablet Take 10 mg by mouth Daily.  1   • Omega-3 Fatty Acids (FISH OIL) 1000 MG capsule capsule Take 1,000 mg by mouth.     • Pediatric Multivitamins-Iron (PEDIATRIC MULTIPLE VITAMINS W/ IRON) 15 MG chewable tablet Chew 1 tablet Daily.     • rOPINIRole (REQUIP) 0.5 MG tablet 0.5 mg.  1   • rosuvastatin (CRESTOR) 20 MG tablet Take 20 mg by mouth Daily.     • sertraline (ZOLOFT) 100 MG tablet Take 100 mg by mouth Daily.     • traZODone (DESYREL) 50 MG tablet Take 50 mg by mouth Every Night.     • valACYclovir (VALTREX) 500 MG tablet Take 500 mg by mouth.     • levothyroxine (SYNTHROID, LEVOTHROID) 25 MCG tablet Take 25 mcg by mouth Daily.     • olmesartan-hydrochlorothiazide (BENICAR HCT) 40-25 MG per tablet Take 1 tablet by mouth Daily.     • oxyCODONE (OXY-IR) 5 MG capsule Take 5 mg by mouth.       No current facility-administered medications for this visit.        ALLERGIES:    Allergies   Allergen Reactions   • Aspirin        Objective      Vitals:    11/15/19 0855   BP: 132/79   Pulse: 74   Resp: 16   Temp: 97.5 °F (36.4 °C)   SpO2: 97%   Weight: 116 kg (254 lb 12.8 oz)   Height: 172.7 cm (68\")   PainSc: 0-No pain         Current Status 11/15/2019   ECOG score 0         Physical Exam  General Appearance: Patient is awake, alert, oriented and in no acute distress. Patient is welldeveloped, wellnourished, and appears stated age.  HEENT: Normocephalic. Sclerae clear, conjunctiva pink, extraocular movements intact, pupils, round, reactive to light and  accommodation. Mouth and throat are clear with moist oral mucosa.  NECK: Supple, no jugular venous distention, thyroid not enlarged.  LYMPH: No cervical, supraclavicular, axillary, or inguinal lymphadenopathy.  CHEST: Equal bilateral expansion, AP  diameter normal, resonant percussion note  LUNGS: Good air movement, no rales, rhonchi, rubs " or wheezes with auscultation  CARDIO: Regular sinus rhythm, no murmurs, gallops or rubs.  ABDOMEN: Nondistended, soft, No tenderness, no guarding, no rebound, No hepatosplenomegaly. No abdominal masses. Bowel sounds positive. No hernia  GENITALIA: Not examined.  BREASTS: Not examined.  MUSKEL: No joint swelling, decreased motion, or inflammation  EXTREMS: No edema, clubbing, cyanosis, No varicose veins.  NEURO: Grossly nonfocal, Gait is coordinated and smooth, Cognition is preserved.  SKIN: No rashes, no ecchymoses, no petechia.  PSYCH: Oriented to time, place and person. Memory is preserved. Mood and affect appear normal  RECENT LABS:  Lab on 11/15/2019   Component Date Value Ref Range Status   • Glucose 11/15/2019 95  65 - 99 mg/dL Final   • BUN 11/15/2019 21  8 - 23 mg/dL Final   • Creatinine 11/15/2019 1.17* 0.57 - 1.00 mg/dL Final   • Sodium 11/15/2019 139  136 - 145 mmol/L Final   • Potassium 11/15/2019 3.6  3.5 - 5.2 mmol/L Final   • Chloride 11/15/2019 101  98 - 107 mmol/L Final   • CO2 11/15/2019 31.0* 22.0 - 29.0 mmol/L Final   • Calcium 11/15/2019 9.1  8.6 - 10.5 mg/dL Final   • Total Protein 11/15/2019 7.3  6.0 - 8.5 g/dL Final   • Albumin 11/15/2019 3.90  3.50 - 5.20 g/dL Final   • ALT (SGPT) 11/15/2019 26  1 - 33 U/L Final   • AST (SGOT) 11/15/2019 20  1 - 32 U/L Final   • Alkaline Phosphatase 11/15/2019 73  39 - 117 U/L Final   • Total Bilirubin 11/15/2019 0.3  0.2 - 1.2 mg/dL Final   • eGFR   Amer 11/15/2019 55* >60 mL/min/1.73 Final   • Globulin 11/15/2019 3.4  gm/dL Final   • A/G Ratio 11/15/2019 1.1  g/dL Final   • BUN/Creatinine Ratio 11/15/2019 17.9  7.0 - 25.0 Final   • Anion Gap 11/15/2019 7.0  5.0 - 15.0 mmol/L Final   • WBC 11/15/2019 5.53  3.40 - 10.80 10*3/mm3 Final   • RBC 11/15/2019 4.08  3.77 - 5.28 10*6/mm3 Final   • Hemoglobin 11/15/2019 12.0  12.0 - 15.9 g/dL Final   • Hematocrit 11/15/2019 37.5  34.0 - 46.6 % Final   • MCV 11/15/2019 91.9  79.0 - 97.0 fL Final   • MCH  11/15/2019 29.4  26.6 - 33.0 pg Final   • MCHC 11/15/2019 32.0  31.5 - 35.7 g/dL Final   • RDW 11/15/2019 17.0* 12.3 - 15.4 % Final   • RDW-SD 11/15/2019 57.3* 37.0 - 54.0 fl Final   • MPV 11/15/2019 9.9  6.0 - 12.0 fL Final   • Platelets 11/15/2019 235  140 - 450 10*3/mm3 Final   • Neutrophil % 11/15/2019 54.8  42.7 - 76.0 % Final   • Lymphocyte % 11/15/2019 31.3  19.6 - 45.3 % Final   • Monocyte % 11/15/2019 9.4  5.0 - 12.0 % Final   • Eosinophil % 11/15/2019 3.8  0.3 - 6.2 % Final   • Basophil % 11/15/2019 0.5  0.0 - 1.5 % Final   • Immature Grans % 11/15/2019 0.2  0.0 - 0.5 % Final   • Neutrophils, Absolute 11/15/2019 3.03  1.70 - 7.00 10*3/mm3 Final   • Lymphocytes, Absolute 11/15/2019 1.73  0.70 - 3.10 10*3/mm3 Final   • Monocytes, Absolute 11/15/2019 0.52  0.10 - 0.90 10*3/mm3 Final   • Eosinophils, Absolute 11/15/2019 0.21  0.00 - 0.40 10*3/mm3 Final   • Basophils, Absolute 11/15/2019 0.03  0.00 - 0.20 10*3/mm3 Final   • Immature Grans, Absolute 11/15/2019 0.01  0.00 - 0.05 10*3/mm3 Final   • nRBC 11/15/2019 0.0  0.0 - 0.2 /100 WBC Final       RADIOLOGY:  No results found.         Assessment/Plan  Marina Joe is a 74 y.o. year old female with h/o breast ca whom we are seeing today for anemia 2/2 ckd.    Patient Active Problem List   Diagnosis   • Malignant neoplasm of central portion of left female breast (CMS/HCC)   • Anemia of chronic renal failure, stage 3 (moderate) (CMS/HCC)   • HTN (hypertension), benign   • Hx of colonic polyps   • HX: breast cancer   • Morbidly obese (CMS/HCC)   • Abnormal mammogram   • Breast mass   • Right knee DJD   • S/P lumpectomy, left breast        1. Stage IA invasive ductal carcinoma left breast ER 97% RI 97% HER-2/alix 2+, fish and amplified diagnosed May 2012 status post lumpectomy, radiation, on the hormonal manipulation with Arimidex. -- Mammogram 7/3/19 No mammographic evidence of malignancy in the left breast. Recommend annual mammography which will be due on/after  12/12/2019.  --she tolerates AI with no SE, continue for 10yrs per her wishes  --had biopsy of left breast nodule per Dr Barkley and pathology was benign.  -breast pain comes and goes nt new, mammo due in Dec.   -labs cr 1.17 stable, lft nl, wbc 5.53, Hg 12.0, plt 235.    --william arimidex    2.  Osteopenia.  DEXA scan May 2016 revealed osteopenia.  Start Boniva or Fosamax pending insurance coverage.  Continue daily calcium and vitamin D supplement     3.  Anemia in CKD stage III    -iron profile on 9/17 showed %sat of 19. S/p injectafer 9/23 x2  -Hg today of 12.0. Procrit not needed  -rtc in 4 weeks.  -  --RTC in a month for procrit    4. Hypothyroidism: on synthroid.  5. Depression: on wellbutrin  6. CAD: on coreg, benicar  7. Chronic pain synd: on oxycodone TERRI De Leon MD    11/15/2019    9:35 AM

## 2019-12-05 DIAGNOSIS — D63.1 ANEMIA OF CHRONIC RENAL FAILURE, STAGE 3 (MODERATE) (HCC): ICD-10-CM

## 2019-12-05 DIAGNOSIS — N18.30 ANEMIA OF CHRONIC RENAL FAILURE, STAGE 3 (MODERATE) (HCC): ICD-10-CM

## 2019-12-06 ENCOUNTER — HOSPITAL ENCOUNTER (OUTPATIENT)
Dept: WOMENS IMAGING | Age: 74
Discharge: HOME | End: 2019-12-06
Payer: MEDICARE

## 2019-12-06 DIAGNOSIS — Z12.31 SCREENING MAMMOGRAM FOR HIGH-RISK PATIENT: ICD-10-CM

## 2019-12-06 PROCEDURE — 77063 BREAST TOMOSYNTHESIS BI: CPT

## 2019-12-13 ENCOUNTER — OFFICE VISIT (OUTPATIENT)
Dept: ONCOLOGY | Facility: CLINIC | Age: 74
End: 2019-12-13

## 2019-12-13 ENCOUNTER — LAB (OUTPATIENT)
Dept: LAB | Facility: HOSPITAL | Age: 74
End: 2019-12-13

## 2019-12-13 ENCOUNTER — INFUSION (OUTPATIENT)
Dept: ONCOLOGY | Facility: HOSPITAL | Age: 74
End: 2019-12-13

## 2019-12-13 VITALS
RESPIRATION RATE: 16 BRPM | HEART RATE: 76 BPM | SYSTOLIC BLOOD PRESSURE: 152 MMHG | BODY MASS INDEX: 38.07 KG/M2 | HEIGHT: 68 IN | WEIGHT: 251.2 LBS | TEMPERATURE: 97.8 F | DIASTOLIC BLOOD PRESSURE: 78 MMHG | OXYGEN SATURATION: 96 %

## 2019-12-13 VITALS
HEART RATE: 64 BPM | BODY MASS INDEX: 38.16 KG/M2 | DIASTOLIC BLOOD PRESSURE: 75 MMHG | OXYGEN SATURATION: 95 % | SYSTOLIC BLOOD PRESSURE: 182 MMHG | WEIGHT: 251 LBS

## 2019-12-13 DIAGNOSIS — C50.112 MALIGNANT NEOPLASM OF CENTRAL PORTION OF LEFT BREAST IN FEMALE, ESTROGEN RECEPTOR POSITIVE (HCC): ICD-10-CM

## 2019-12-13 DIAGNOSIS — Z17.0 MALIGNANT NEOPLASM OF CENTRAL PORTION OF LEFT BREAST IN FEMALE, ESTROGEN RECEPTOR POSITIVE (HCC): ICD-10-CM

## 2019-12-13 DIAGNOSIS — N18.30 ANEMIA OF CHRONIC RENAL FAILURE, STAGE 3 (MODERATE) (HCC): Primary | ICD-10-CM

## 2019-12-13 DIAGNOSIS — D63.1 ANEMIA OF CHRONIC RENAL FAILURE, STAGE 3 (MODERATE) (HCC): Primary | ICD-10-CM

## 2019-12-13 LAB
ALBUMIN SERPL-MCNC: 4.1 G/DL (ref 3.5–5.2)
ALBUMIN/GLOB SERPL: 1.1 G/DL
ALP SERPL-CCNC: 85 U/L (ref 39–117)
ALT SERPL W P-5'-P-CCNC: 29 U/L (ref 1–33)
ANION GAP SERPL CALCULATED.3IONS-SCNC: 10 MMOL/L (ref 5–15)
AST SERPL-CCNC: 20 U/L (ref 1–32)
BASOPHILS # BLD AUTO: 0.03 10*3/MM3 (ref 0–0.2)
BASOPHILS NFR BLD AUTO: 0.5 % (ref 0–1.5)
BILIRUB SERPL-MCNC: 0.2 MG/DL (ref 0.2–1.2)
BUN BLD-MCNC: 21 MG/DL (ref 8–23)
BUN/CREAT SERPL: 17.6 (ref 7–25)
CALCIUM SPEC-SCNC: 9.5 MG/DL (ref 8.6–10.5)
CHLORIDE SERPL-SCNC: 98 MMOL/L (ref 98–107)
CO2 SERPL-SCNC: 29 MMOL/L (ref 22–29)
CREAT BLD-MCNC: 1.19 MG/DL (ref 0.57–1)
DEPRECATED RDW RBC AUTO: 54.3 FL (ref 37–54)
EOSINOPHIL # BLD AUTO: 0.2 10*3/MM3 (ref 0–0.4)
EOSINOPHIL NFR BLD AUTO: 3.1 % (ref 0.3–6.2)
ERYTHROCYTE [DISTWIDTH] IN BLOOD BY AUTOMATED COUNT: 16.3 % (ref 12.3–15.4)
GFR SERPL CREATININE-BSD FRML MDRD: 54 ML/MIN/1.73
GLOBULIN UR ELPH-MCNC: 3.6 GM/DL
GLUCOSE BLD-MCNC: 103 MG/DL (ref 65–99)
HCT VFR BLD AUTO: 34.5 % (ref 34–46.6)
HGB BLD-MCNC: 11.3 G/DL (ref 12–15.9)
HOLD SPECIMEN: NORMAL
HOLD SPECIMEN: NORMAL
IMM GRANULOCYTES # BLD AUTO: 0.03 10*3/MM3 (ref 0–0.05)
IMM GRANULOCYTES NFR BLD AUTO: 0.5 % (ref 0–0.5)
LYMPHOCYTES # BLD AUTO: 1.64 10*3/MM3 (ref 0.7–3.1)
LYMPHOCYTES NFR BLD AUTO: 25.6 % (ref 19.6–45.3)
MCH RBC QN AUTO: 29.8 PG (ref 26.6–33)
MCHC RBC AUTO-ENTMCNC: 32.8 G/DL (ref 31.5–35.7)
MCV RBC AUTO: 91 FL (ref 79–97)
MONOCYTES # BLD AUTO: 0.61 10*3/MM3 (ref 0.1–0.9)
MONOCYTES NFR BLD AUTO: 9.5 % (ref 5–12)
NEUTROPHILS # BLD AUTO: 3.9 10*3/MM3 (ref 1.7–7)
NEUTROPHILS NFR BLD AUTO: 60.8 % (ref 42.7–76)
NRBC BLD AUTO-RTO: 0 /100 WBC (ref 0–0.2)
PLATELET # BLD AUTO: 248 10*3/MM3 (ref 140–450)
PMV BLD AUTO: 9.4 FL (ref 6–12)
POTASSIUM BLD-SCNC: 3.5 MMOL/L (ref 3.5–5.2)
PROT SERPL-MCNC: 7.7 G/DL (ref 6–8.5)
RBC # BLD AUTO: 3.79 10*6/MM3 (ref 3.77–5.28)
SODIUM BLD-SCNC: 137 MMOL/L (ref 136–145)
WBC NRBC COR # BLD: 6.41 10*3/MM3 (ref 3.4–10.8)

## 2019-12-13 PROCEDURE — 85025 COMPLETE CBC W/AUTO DIFF WBC: CPT | Performed by: INTERNAL MEDICINE

## 2019-12-13 PROCEDURE — 36415 COLL VENOUS BLD VENIPUNCTURE: CPT

## 2019-12-13 PROCEDURE — 25010000002 EPOETIN ALFA-EPBX 40000 UNIT/ML SOLUTION: Performed by: INTERNAL MEDICINE

## 2019-12-13 PROCEDURE — 99215 OFFICE O/P EST HI 40 MIN: CPT | Performed by: INTERNAL MEDICINE

## 2019-12-13 PROCEDURE — 80053 COMPREHEN METABOLIC PANEL: CPT | Performed by: INTERNAL MEDICINE

## 2019-12-13 PROCEDURE — 96372 THER/PROPH/DIAG INJ SC/IM: CPT

## 2019-12-13 RX ADMIN — EPOETIN ALFA-EPBX 40000 UNITS: 40000 INJECTION, SOLUTION INTRAVENOUS; SUBCUTANEOUS at 11:10

## 2019-12-13 NOTE — PROGRESS NOTES
[facility]  HEMATOLOGY & ONCOLOGY    Cancer Staging Information:  No matching staging information was found for the patient.      Subjective     VISIT DIAGNOSIS:   No diagnosis found.    REASON FOR VISIT:     Chief Complaint   Patient presents with   • Anemia     Here for possible procrit   • Breast Cancer     had mammo dec 6th        HEMATOLOGY / ONCOLOGY HISTORY:    No history exists.           INTERVAL HISTORY  Patient ID: Marina Joe is a 74 y.o. year old female h/o breast ca on arimidex. Tolerates AI very well. mammo 4/27/18 [facility]  -- had flu n Dec. Getting better. Per pt today is the first time she actually feels fine. Had flu shot in Nov of 2018. She would like refill of her arimidex  -1-/15/19: she is limping today. She is supposed to be getiing left knee replacement but have not gotten around to it. Other than that no other issues.  11/15/19: presented for procrit shot today. Complaining of breast pain comes and goes not new, mammo due in Dec. Also she sucks her tongue and wakes up with bloody phlegm. Started 2 weeks ago. Told her most likely due to dry air since she started using heater. If continues or worsens, will refer to ent.she saw her dentis last week per her report, he did not see any thing that will explain the blood. She denies tongue pain   --denies sob, brbpr, cp/BRIDGER, unintentional weight loss,n/v/adnominal pain. Rest of ros unremarkable. PE unchanged.    Past Medical History:   Past Medical History:   Diagnosis Date   • Breast cancer (CMS/HCC)    • CKD (chronic kidney disease)    • Hypercholesteremia    • Hypertension    • Sinusitis      Past Surgical History:   Past Surgical History:   Procedure Laterality Date   • AVULSION TOENAIL PLATE      Sept 26,2018   • BREAST BIOPSY Left 11/20/2012   • BREAST BIOPSY      Left Breast, 1/2019 per Dr Barkley   • BREAST LUMPECTOMY Left     with node bx    • COLONOSCOPY  09/13/2013    small polyp at 30cm benign hyperplastic polyp, changes  consistent with melanosis coli. Recall 5 years   • REPLACEMENT TOTAL KNEE Right     2016   • TOTAL ABDOMINAL HYSTERECTOMY WITH SALPINGO OOPHORECTOMY       Social History:   Social History     Socioeconomic History   • Marital status:      Spouse name: Not on file   • Number of children: Not on file   • Years of education: Not on file   • Highest education level: Not on file   Tobacco Use   • Smoking status: Never Smoker   • Smokeless tobacco: Never Used   Substance and Sexual Activity   • Alcohol use: No     Family History:   Family History   Problem Relation Age of Onset   • Heart attack Mother    • Hodgkin's lymphoma Father    • Heart attack Brother    • Colon cancer Neg Hx    • Colon polyps Neg Hx        Review of Systems   Constitutional: Negative.    HENT: Negative.    Eyes: Negative.    Respiratory: Negative.    Cardiovascular: Negative.    Gastrointestinal: Negative.    Genitourinary: Negative.    Musculoskeletal: Negative.    Skin: Negative.    Neurological: Negative.    Hematological: Negative.    Psychiatric/Behavioral: Negative.         Performance Status:  Asymptomatic    Medications:    Current Outpatient Medications   Medication Sig Dispense Refill   • anastrozole (ARIMIDEX) 1 MG tablet Take 1 tablet by mouth Daily. 90 tablet 4   • buPROPion XL (WELLBUTRIN XL) 150 MG 24 hr tablet Take  by mouth Daily.  1   • Calcium Carb-Cholecalciferol (CALCIUM 600+D3 PO) Take  by mouth.     • carvedilol (COREG) 6.25 MG tablet Take 6.25 mg by mouth 2 (Two) Times a Day With Meals.     • cetirizine (zyrTEC) 10 MG tablet Take 10 mg by mouth Daily.     • cyclobenzaprine (FLEXERIL) 10 MG tablet TK 1 T PO TID PRN  2   • Ergocalciferol (VITAMIN D2 PO) Take 50,000 Units by mouth.     • ferrous sulfate 325 (65 FE) MG tablet Take 325 mg by mouth Daily With Breakfast.     • fluticasone (FLOVENT DISKUS) 50 MCG/BLIST diskus inhaler Inhale 1 puff.     • irbesartan-hydrochlorothiazide (AVALIDE) 300-12.5 MG tablet Take 12.5  "tablets by mouth Daily.  5   • levothyroxine (SYNTHROID, LEVOTHROID) 25 MCG tablet Take 25 mcg by mouth Daily.     • Misc Natural Products (COLON HERBAL CLEANSER PO) Take  by mouth.     • montelukast (SINGULAIR) 10 MG tablet Take 10 mg by mouth Daily.  1   • olmesartan-hydrochlorothiazide (BENICAR HCT) 40-25 MG per tablet Take 1 tablet by mouth Daily.     • Omega-3 Fatty Acids (FISH OIL) 1000 MG capsule capsule Take 1,000 mg by mouth.     • oxyCODONE (OXY-IR) 5 MG capsule Take 5 mg by mouth.     • Pediatric Multivitamins-Iron (PEDIATRIC MULTIPLE VITAMINS W/ IRON) 15 MG chewable tablet Chew 1 tablet Daily.     • rOPINIRole (REQUIP) 0.5 MG tablet 0.5 mg.  1   • rosuvastatin (CRESTOR) 20 MG tablet Take 20 mg by mouth Daily.     • sertraline (ZOLOFT) 100 MG tablet Take 100 mg by mouth Daily.     • traZODone (DESYREL) 50 MG tablet Take 50 mg by mouth Every Night.     • valACYclovir (VALTREX) 500 MG tablet Take 500 mg by mouth.       No current facility-administered medications for this visit.        ALLERGIES:    Allergies   Allergen Reactions   • Aspirin        Objective      Vitals:    12/13/19 0918   BP: 152/78   Pulse: 76   Resp: 16   Temp: 97.8 °F (36.6 °C)   TempSrc: Temporal   SpO2: 96%   Weight: 114 kg (251 lb 3.2 oz)   Height: 172.7 cm (68\")   PainSc: 0-No pain         Current Status 12/13/2019   ECOG score 1         Physical Exam  General Appearance: Patient is awake, alert, oriented and in no acute distress. Patient is welldeveloped, wellnourished, and appears stated age.  HEENT: Normocephalic. Sclerae clear, conjunctiva pink, extraocular movements intact, pupils, round, reactive to light and  accommodation. Mouth and throat are clear with moist oral mucosa.  NECK: Supple, no jugular venous distention, thyroid not enlarged.  LYMPH: No cervical, supraclavicular, axillary, or inguinal lymphadenopathy.  CHEST: Equal bilateral expansion, AP  diameter normal, resonant percussion note  LUNGS: Good air movement, no " rales, rhonchi, rubs or wheezes with auscultation  CARDIO: Regular sinus rhythm, no murmurs, gallops or rubs.  ABDOMEN: Nondistended, soft, No tenderness, no guarding, no rebound, No hepatosplenomegaly. No abdominal masses. Bowel sounds positive. No hernia  GENITALIA: Not examined.  BREASTS: Not examined.  MUSKEL: No joint swelling, decreased motion, or inflammation  EXTREMS: No edema, clubbing, cyanosis, No varicose veins.  NEURO: Grossly nonfocal, Gait is coordinated and smooth, Cognition is preserved.  SKIN: No rashes, no ecchymoses, no petechia.  PSYCH: Oriented to time, place and person. Memory is preserved. Mood and affect appear normal  RECENT LABS:  Lab on 12/13/2019   Component Date Value Ref Range Status   • Glucose 12/13/2019 103* 65 - 99 mg/dL Final   • BUN 12/13/2019 21  8 - 23 mg/dL Final   • Creatinine 12/13/2019 1.19* 0.57 - 1.00 mg/dL Final   • Sodium 12/13/2019 137  136 - 145 mmol/L Final   • Potassium 12/13/2019 3.5  3.5 - 5.2 mmol/L Final   • Chloride 12/13/2019 98  98 - 107 mmol/L Final   • CO2 12/13/2019 29.0  22.0 - 29.0 mmol/L Final   • Calcium 12/13/2019 9.5  8.6 - 10.5 mg/dL Final   • Total Protein 12/13/2019 7.7  6.0 - 8.5 g/dL Final   • Albumin 12/13/2019 4.10  3.50 - 5.20 g/dL Final   • ALT (SGPT) 12/13/2019 29  1 - 33 U/L Final   • AST (SGOT) 12/13/2019 20  1 - 32 U/L Final   • Alkaline Phosphatase 12/13/2019 85  39 - 117 U/L Final   • Total Bilirubin 12/13/2019 0.2  0.2 - 1.2 mg/dL Final   • eGFR   Amer 12/13/2019 54* >60 mL/min/1.73 Final   • Globulin 12/13/2019 3.6  gm/dL Final   • A/G Ratio 12/13/2019 1.1  g/dL Final   • BUN/Creatinine Ratio 12/13/2019 17.6  7.0 - 25.0 Final   • Anion Gap 12/13/2019 10.0  5.0 - 15.0 mmol/L Final   • WBC 12/13/2019 6.41  3.40 - 10.80 10*3/mm3 Final   • RBC 12/13/2019 3.79  3.77 - 5.28 10*6/mm3 Final   • Hemoglobin 12/13/2019 11.3* 12.0 - 15.9 g/dL Final   • Hematocrit 12/13/2019 34.5  34.0 - 46.6 % Final   • MCV 12/13/2019 91.0  79.0 - 97.0 fL  Final   • MCH 12/13/2019 29.8  26.6 - 33.0 pg Final   • MCHC 12/13/2019 32.8  31.5 - 35.7 g/dL Final   • RDW 12/13/2019 16.3* 12.3 - 15.4 % Final   • RDW-SD 12/13/2019 54.3* 37.0 - 54.0 fl Final   • MPV 12/13/2019 9.4  6.0 - 12.0 fL Final   • Platelets 12/13/2019 248  140 - 450 10*3/mm3 Final   • Neutrophil % 12/13/2019 60.8  42.7 - 76.0 % Final   • Lymphocyte % 12/13/2019 25.6  19.6 - 45.3 % Final   • Monocyte % 12/13/2019 9.5  5.0 - 12.0 % Final   • Eosinophil % 12/13/2019 3.1  0.3 - 6.2 % Final   • Basophil % 12/13/2019 0.5  0.0 - 1.5 % Final   • Immature Grans % 12/13/2019 0.5  0.0 - 0.5 % Final   • Neutrophils, Absolute 12/13/2019 3.90  1.70 - 7.00 10*3/mm3 Final   • Lymphocytes, Absolute 12/13/2019 1.64  0.70 - 3.10 10*3/mm3 Final   • Monocytes, Absolute 12/13/2019 0.61  0.10 - 0.90 10*3/mm3 Final   • Eosinophils, Absolute 12/13/2019 0.20  0.00 - 0.40 10*3/mm3 Final   • Basophils, Absolute 12/13/2019 0.03  0.00 - 0.20 10*3/mm3 Final   • Immature Grans, Absolute 12/13/2019 0.03  0.00 - 0.05 10*3/mm3 Final   • nRBC 12/13/2019 0.0  0.0 - 0.2 /100 WBC Final       RADIOLOGY:  No results found.         Assessment/Plan  Marina Joe is a 74 y.o. year old female with h/o breast ca whom we are seeing today for anemia 2/2 ckd.    Patient Active Problem List   Diagnosis   • Malignant neoplasm of central portion of left female breast (CMS/HCC)   • Anemia of chronic renal failure, stage 3 (moderate) (CMS/HCC)   • HTN (hypertension), benign   • Hx of colonic polyps   • HX: breast cancer   • Morbidly obese (CMS/HCC)   • Abnormal mammogram   • Breast mass   • Right knee DJD   • S/P lumpectomy, left breast        1. Stage IA invasive ductal carcinoma left breast ER 97% [place] 97% HER-2/alix 2+, fish and amplified diagnosed May 2012 status post lumpectomy, radiation, on the hormonal manipulation with Arimidex. -- Mammogram 7/3/19 No mammographic evidence of malignancy in the left breast. Recommend annual mammography which will be  due on/after 12/12/2019.  --she tolerates AI with no SE, continue for 10yrs per her wishes  --had biopsy of left breast nodule per Dr Barkley and pathology was benign.  -breast pain comes and goes nt new, mammo due in Dec.   -labs cr 1.19 stable, lft nl, wbc 6.41, Hg 11.3, plt 240.    --william arimidex    2.  Osteopenia.  DEXA scan May 2016 revealed osteopenia.  Start Boniva or Fosamax pending insurance coverage.  Continue daily calcium and vitamin D supplement     3.  Anemia in CKD stage III    -iron profile on 9/17 showed %sat of 19. S/p injectafer 9/23 x2  -Hg today of 11.3. Ok for Procrit  -rtc in 4 weeks.  -  --RTC in a month for procrit    4. Hypothyroidism: on synthroid.  5. Depression: on wellbutrin  6. CAD: on coreg, benicar  7. Chronic pain synd: on oxycodone TERRI De Leon MD    12/13/2019    10:23 AM

## 2019-12-26 DIAGNOSIS — N18.30 ANEMIA OF CHRONIC RENAL FAILURE, STAGE 3 (MODERATE) (HCC): ICD-10-CM

## 2019-12-26 DIAGNOSIS — D63.1 ANEMIA OF CHRONIC RENAL FAILURE, STAGE 3 (MODERATE) (HCC): ICD-10-CM

## 2020-01-09 DIAGNOSIS — D63.1 ANEMIA OF CHRONIC RENAL FAILURE, STAGE 3 (MODERATE) (HCC): ICD-10-CM

## 2020-01-09 DIAGNOSIS — N18.30 ANEMIA OF CHRONIC RENAL FAILURE, STAGE 3 (MODERATE) (HCC): ICD-10-CM

## 2020-01-09 DIAGNOSIS — Z17.0 MALIGNANT NEOPLASM OF CENTRAL PORTION OF LEFT BREAST IN FEMALE, ESTROGEN RECEPTOR POSITIVE (HCC): Primary | ICD-10-CM

## 2020-01-09 DIAGNOSIS — C50.112 MALIGNANT NEOPLASM OF CENTRAL PORTION OF LEFT BREAST IN FEMALE, ESTROGEN RECEPTOR POSITIVE (HCC): Primary | ICD-10-CM

## 2020-01-10 ENCOUNTER — APPOINTMENT (OUTPATIENT)
Dept: LAB | Facility: HOSPITAL | Age: 75
End: 2020-01-10

## 2020-01-10 ENCOUNTER — OFFICE VISIT (OUTPATIENT)
Dept: ONCOLOGY | Facility: CLINIC | Age: 75
End: 2020-01-10

## 2020-01-10 ENCOUNTER — INFUSION (OUTPATIENT)
Dept: ONCOLOGY | Facility: HOSPITAL | Age: 75
End: 2020-01-10

## 2020-01-10 VITALS
SYSTOLIC BLOOD PRESSURE: 140 MMHG | HEART RATE: 67 BPM | RESPIRATION RATE: 16 BRPM | OXYGEN SATURATION: 97 % | BODY MASS INDEX: 38.34 KG/M2 | WEIGHT: 253 LBS | TEMPERATURE: 97.2 F | HEIGHT: 68 IN | DIASTOLIC BLOOD PRESSURE: 75 MMHG

## 2020-01-10 DIAGNOSIS — Z17.0 MALIGNANT NEOPLASM OF CENTRAL PORTION OF LEFT BREAST IN FEMALE, ESTROGEN RECEPTOR POSITIVE (HCC): ICD-10-CM

## 2020-01-10 DIAGNOSIS — N18.30 ANEMIA OF CHRONIC RENAL FAILURE, STAGE 3 (MODERATE) (HCC): ICD-10-CM

## 2020-01-10 DIAGNOSIS — C50.112 MALIGNANT NEOPLASM OF CENTRAL PORTION OF LEFT BREAST IN FEMALE, ESTROGEN RECEPTOR POSITIVE (HCC): Primary | ICD-10-CM

## 2020-01-10 DIAGNOSIS — Z17.0 MALIGNANT NEOPLASM OF CENTRAL PORTION OF LEFT BREAST IN FEMALE, ESTROGEN RECEPTOR POSITIVE (HCC): Primary | ICD-10-CM

## 2020-01-10 DIAGNOSIS — E66.01 MORBIDLY OBESE (HCC): ICD-10-CM

## 2020-01-10 DIAGNOSIS — D63.1 ANEMIA OF CHRONIC RENAL FAILURE, STAGE 3 (MODERATE) (HCC): ICD-10-CM

## 2020-01-10 DIAGNOSIS — C50.112 MALIGNANT NEOPLASM OF CENTRAL PORTION OF LEFT BREAST IN FEMALE, ESTROGEN RECEPTOR POSITIVE (HCC): ICD-10-CM

## 2020-01-10 LAB
ALBUMIN SERPL-MCNC: 4 G/DL (ref 3.5–5.2)
ALBUMIN/GLOB SERPL: 1.2 G/DL
ALP SERPL-CCNC: 74 U/L (ref 39–117)
ALT SERPL W P-5'-P-CCNC: 20 U/L (ref 1–33)
ANION GAP SERPL CALCULATED.3IONS-SCNC: 10 MMOL/L (ref 5–15)
AST SERPL-CCNC: 17 U/L (ref 1–32)
BASOPHILS # BLD AUTO: 0.02 10*3/MM3 (ref 0–0.2)
BASOPHILS NFR BLD AUTO: 0.4 % (ref 0–1.5)
BILIRUB SERPL-MCNC: 0.3 MG/DL (ref 0.2–1.2)
BUN BLD-MCNC: 16 MG/DL (ref 8–23)
BUN/CREAT SERPL: 14.7 (ref 7–25)
CALCIUM SPEC-SCNC: 9.2 MG/DL (ref 8.6–10.5)
CHLORIDE SERPL-SCNC: 96 MMOL/L (ref 98–107)
CO2 SERPL-SCNC: 31 MMOL/L (ref 22–29)
CREAT BLD-MCNC: 1.09 MG/DL (ref 0.57–1)
DEPRECATED RDW RBC AUTO: 52.8 FL (ref 37–54)
EOSINOPHIL # BLD AUTO: 0.28 10*3/MM3 (ref 0–0.4)
EOSINOPHIL NFR BLD AUTO: 5.5 % (ref 0.3–6.2)
ERYTHROCYTE [DISTWIDTH] IN BLOOD BY AUTOMATED COUNT: 15.6 % (ref 12.3–15.4)
FERRITIN SERPL-MCNC: 1314 NG/ML (ref 13–150)
GFR SERPL CREATININE-BSD FRML MDRD: 60 ML/MIN/1.73
GLOBULIN UR ELPH-MCNC: 3.4 GM/DL
GLUCOSE BLD-MCNC: 113 MG/DL (ref 65–99)
HCT VFR BLD AUTO: 35.4 % (ref 34–46.6)
HGB BLD-MCNC: 11.3 G/DL (ref 12–15.9)
HOLD SPECIMEN: NORMAL
HOLD SPECIMEN: NORMAL
IMM GRANULOCYTES # BLD AUTO: 0.01 10*3/MM3 (ref 0–0.05)
IMM GRANULOCYTES NFR BLD AUTO: 0.2 % (ref 0–0.5)
IRON 24H UR-MRATE: 82 MCG/DL (ref 37–145)
IRON SATN MFR SERPL: 30 % (ref 20–50)
LYMPHOCYTES # BLD AUTO: 1.56 10*3/MM3 (ref 0.7–3.1)
LYMPHOCYTES NFR BLD AUTO: 30.8 % (ref 19.6–45.3)
MCH RBC QN AUTO: 29.7 PG (ref 26.6–33)
MCHC RBC AUTO-ENTMCNC: 31.9 G/DL (ref 31.5–35.7)
MCV RBC AUTO: 93.2 FL (ref 79–97)
MONOCYTES # BLD AUTO: 0.54 10*3/MM3 (ref 0.1–0.9)
MONOCYTES NFR BLD AUTO: 10.7 % (ref 5–12)
NEUTROPHILS # BLD AUTO: 2.65 10*3/MM3 (ref 1.7–7)
NEUTROPHILS NFR BLD AUTO: 52.4 % (ref 42.7–76)
NRBC BLD AUTO-RTO: 0 /100 WBC (ref 0–0.2)
PLATELET # BLD AUTO: 216 10*3/MM3 (ref 140–450)
PMV BLD AUTO: 9.6 FL (ref 6–12)
POTASSIUM BLD-SCNC: 3.7 MMOL/L (ref 3.5–5.2)
PROT SERPL-MCNC: 7.4 G/DL (ref 6–8.5)
RBC # BLD AUTO: 3.8 10*6/MM3 (ref 3.77–5.28)
SODIUM BLD-SCNC: 137 MMOL/L (ref 136–145)
TIBC SERPL-MCNC: 270 MCG/DL (ref 298–536)
TRANSFERRIN SERPL-MCNC: 181 MG/DL (ref 200–360)
WBC NRBC COR # BLD: 5.06 10*3/MM3 (ref 3.4–10.8)

## 2020-01-10 PROCEDURE — 84466 ASSAY OF TRANSFERRIN: CPT | Performed by: INTERNAL MEDICINE

## 2020-01-10 PROCEDURE — 85025 COMPLETE CBC W/AUTO DIFF WBC: CPT | Performed by: INTERNAL MEDICINE

## 2020-01-10 PROCEDURE — 36415 COLL VENOUS BLD VENIPUNCTURE: CPT | Performed by: INTERNAL MEDICINE

## 2020-01-10 PROCEDURE — 99215 OFFICE O/P EST HI 40 MIN: CPT | Performed by: INTERNAL MEDICINE

## 2020-01-10 PROCEDURE — 83540 ASSAY OF IRON: CPT | Performed by: INTERNAL MEDICINE

## 2020-01-10 PROCEDURE — 80053 COMPREHEN METABOLIC PANEL: CPT | Performed by: INTERNAL MEDICINE

## 2020-01-10 PROCEDURE — 82728 ASSAY OF FERRITIN: CPT | Performed by: INTERNAL MEDICINE

## 2020-01-10 NOTE — PROGRESS NOTES
[facility]  HEMATOLOGY & ONCOLOGY    Cancer Staging Information:  No matching staging information was found for the patient.      Subjective     VISIT DIAGNOSIS:   No diagnosis found.    REASON FOR VISIT:     Chief Complaint   Patient presents with   • Breast Cancer     here for month follow up         HEMATOLOGY / ONCOLOGY HISTORY:    No history exists.           INTERVAL HISTORY  Patient ID: Marina Joe is a 74 y.o. year old female h/o breast ca on arimidex. Tolerates AI very well. mammo 4/27/18 NMEM  -- had flu n Dec. Getting better. Per pt today is the first time she actually feels fine. Had flu shot in Nov of 2018. She would like refill of her arimidex  -1-/15/19: she is limping today. She is supposed to be getiing left knee replacement but have not gotten around to it. Other than that no other issues.  11/15/19: presented for procrit shot today. Complaining of breast pain comes and goes not new, mammo due in Dec. Also she sucks her tongue and wakes up with bloody phlegm. Started 2 weeks ago. Told her most likely due to dry air since she started using heater. If continues or worsens, will refer to ent.she saw her dentis last week per her report, he did not see any thing that will explain the blood. She denies tongue pain   --denies sob, brbpr, cp/BRIDGER, unintentional weight loss,n/v/adnominal pain. Rest of ros unremarkable. PE unchanged.    Past Medical History:   Past Medical History:   Diagnosis Date   • Breast cancer (CMS/HCC)    • CKD (chronic kidney disease)    • Hypercholesteremia    • Hypertension    • Sinusitis      Past Surgical History:   Past Surgical History:   Procedure Laterality Date   • AVULSION TOENAIL PLATE      Sept 26,2018   • BREAST BIOPSY Left 11/20/2012   • BREAST BIOPSY      Left Breast, 1/2019 per Dr Barkley   • BREAST LUMPECTOMY Left     with node bx    • COLONOSCOPY  09/13/2013    small polyp at 30cm benign hyperplastic polyp, changes consistent with melanosis coli.  Recall 5 years   • REPLACEMENT TOTAL KNEE Right     2016   • TOTAL ABDOMINAL HYSTERECTOMY WITH SALPINGO OOPHORECTOMY       Social History:   Social History     Socioeconomic History   • Marital status:      Spouse name: Not on file   • Number of children: Not on file   • Years of education: Not on file   • Highest education level: Not on file   Tobacco Use   • Smoking status: Never Smoker   • Smokeless tobacco: Never Used   Substance and Sexual Activity   • Alcohol use: No     Family History:   Family History   Problem Relation Age of Onset   • Heart attack Mother    • Hodgkin's lymphoma Father    • Heart attack Brother    • Colon cancer Neg Hx    • Colon polyps Neg Hx        Review of Systems   Constitutional: Negative.    HENT: Negative.    Eyes: Negative.    Respiratory: Negative.    Cardiovascular: Negative.    Gastrointestinal: Negative.    Genitourinary: Negative.    Musculoskeletal: Negative.    Skin: Negative.    Neurological: Negative.    Hematological: Negative.    Psychiatric/Behavioral: Negative.         Performance Status:  Asymptomatic    Medications:    Current Outpatient Medications   Medication Sig Dispense Refill   • anastrozole (ARIMIDEX) 1 MG tablet Take 1 tablet by mouth Daily. 90 tablet 4   • buPROPion XL (WELLBUTRIN XL) 150 MG 24 hr tablet Take  by mouth Daily.  1   • Calcium Carb-Cholecalciferol (CALCIUM 600+D3 PO) Take  by mouth.     • carvedilol (COREG) 6.25 MG tablet Take 6.25 mg by mouth 2 (Two) Times a Day With Meals.     • cetirizine (zyrTEC) 10 MG tablet Take 10 mg by mouth Daily.     • cyclobenzaprine (FLEXERIL) 10 MG tablet TK 1 T PO TID PRN  2   • Ergocalciferol (VITAMIN D2 PO) Take 50,000 Units by mouth.     • ferrous sulfate 325 (65 FE) MG tablet Take 325 mg by mouth Daily With Breakfast.     • fluticasone (FLOVENT DISKUS) 50 MCG/BLIST diskus inhaler Inhale 1 puff.     • irbesartan-hydrochlorothiazide (AVALIDE) 300-12.5 MG tablet Take 12.5 tablets by mouth Daily.  5   •  "levothyroxine (SYNTHROID, LEVOTHROID) 25 MCG tablet Take 25 mcg by mouth Daily.     • Misc Natural Products (COLON HERBAL CLEANSER PO) Take  by mouth.     • montelukast (SINGULAIR) 10 MG tablet Take 10 mg by mouth Daily.  1   • olmesartan-hydrochlorothiazide (BENICAR HCT) 40-25 MG per tablet Take 1 tablet by mouth Daily.     • Omega-3 Fatty Acids (FISH OIL) 1000 MG capsule capsule Take 1,000 mg by mouth.     • oxyCODONE (OXY-IR) 5 MG capsule Take 5 mg by mouth.     • Pediatric Multivitamins-Iron (PEDIATRIC MULTIPLE VITAMINS W/ IRON) 15 MG chewable tablet Chew 1 tablet Daily.     • rOPINIRole (REQUIP) 0.5 MG tablet 0.5 mg.  1   • rosuvastatin (CRESTOR) 20 MG tablet Take 20 mg by mouth Daily.     • sertraline (ZOLOFT) 100 MG tablet Take 100 mg by mouth Daily.     • traZODone (DESYREL) 50 MG tablet Take 50 mg by mouth Every Night.     • valACYclovir (VALTREX) 500 MG tablet Take 500 mg by mouth.       No current facility-administered medications for this visit.        ALLERGIES:    Allergies   Allergen Reactions   • Aspirin        Objective      Vitals:    01/10/20 0907   BP: 140/75   Pulse: 67   Resp: 16   Temp: 97.2 °F (36.2 °C)   SpO2: 97%   Weight: 115 kg (253 lb)   Height: 172.7 cm (68\")   PainSc: 0-No pain         Current Status 1/10/2020   ECOG score 0         Physical Exam  General Appearance: Patient is awake, alert, oriented and in no acute distress. Patient is welldeveloped, wellnourished, and appears stated age.  HEENT: Normocephalic. Sclerae clear, conjunctiva pink, extraocular movements intact, pupils, round, reactive to light and  accommodation. Mouth and throat are clear with moist oral mucosa.  NECK: Supple, no jugular venous distention, thyroid not enlarged.  LYMPH: No cervical, supraclavicular, axillary, or inguinal lymphadenopathy.  CHEST: Equal bilateral expansion, AP  diameter normal, resonant percussion note  LUNGS: Good air movement, no rales, rhonchi, rubs or wheezes with auscultation  CARDIO: " Regular sinus rhythm, no murmurs, gallops or rubs.  ABDOMEN: Nondistended, soft, No tenderness, no guarding, no rebound, No hepatosplenomegaly. No abdominal masses. Bowel sounds positive. No hernia  GENITALIA: Not examined.  BREASTS: Not examined.  MUSKEL: No joint swelling, decreased motion, or inflammation  EXTREMS: No edema, clubbing, cyanosis, No varicose veins.  NEURO: Grossly nonfocal, Gait is coordinated and smooth, Cognition is preserved.  SKIN: No rashes, no ecchymoses, no petechia.  PSYCH: Oriented to time, place and person. Memory is preserved. Mood and affect appear normal  RECENT LABS:  Orders Only on 01/09/2020   Component Date Value Ref Range Status   • Glucose 01/10/2020 113* 65 - 99 mg/dL Final   • BUN 01/10/2020 16  8 - 23 mg/dL Final   • Creatinine 01/10/2020 1.09* 0.57 - 1.00 mg/dL Final   • Sodium 01/10/2020 137  136 - 145 mmol/L Final   • Potassium 01/10/2020 3.7  3.5 - 5.2 mmol/L Final   • Chloride 01/10/2020 96* 98 - 107 mmol/L Final   • CO2 01/10/2020 31.0* 22.0 - 29.0 mmol/L Final   • Calcium 01/10/2020 9.2  8.6 - 10.5 mg/dL Final   • Total Protein 01/10/2020 7.4  6.0 - 8.5 g/dL Final   • Albumin 01/10/2020 4.00  3.50 - 5.20 g/dL Final   • ALT (SGPT) 01/10/2020 20  1 - 33 U/L Final   • AST (SGOT) 01/10/2020 17  1 - 32 U/L Final   • Alkaline Phosphatase 01/10/2020 74  39 - 117 U/L Final   • Total Bilirubin 01/10/2020 0.3  0.2 - 1.2 mg/dL Final   • eGFR  African Amer 01/10/2020 60* >60 mL/min/1.73 Final   • Globulin 01/10/2020 3.4  gm/dL Final   • A/G Ratio 01/10/2020 1.2  g/dL Final   • BUN/Creatinine Ratio 01/10/2020 14.7  7.0 - 25.0 Final   • Anion Gap 01/10/2020 10.0  5.0 - 15.0 mmol/L Final   • Iron 01/10/2020 82  37 - 145 mcg/dL Final   • Iron Saturation 01/10/2020 30  20 - 50 % Final   • Transferrin 01/10/2020 181* 200 - 360 mg/dL Final   • TIBC 01/10/2020 270* 298 - 536 mcg/dL Final   • Ferritin 01/10/2020 1,314.00* 13.00 - 150.00 ng/mL Final   • WBC 01/10/2020 5.06  3.40 - 10.80  10*3/mm3 Final   • RBC 01/10/2020 3.80  3.77 - 5.28 10*6/mm3 Final   • Hemoglobin 01/10/2020 11.3* 12.0 - 15.9 g/dL Final   • Hematocrit 01/10/2020 35.4  34.0 - 46.6 % Final   • MCV 01/10/2020 93.2  79.0 - 97.0 fL Final   • MCH 01/10/2020 29.7  26.6 - 33.0 pg Final   • MCHC 01/10/2020 31.9  31.5 - 35.7 g/dL Final   • RDW 01/10/2020 15.6* 12.3 - 15.4 % Final   • RDW-SD 01/10/2020 52.8  37.0 - 54.0 fl Final   • MPV 01/10/2020 9.6  6.0 - 12.0 fL Final   • Platelets 01/10/2020 216  140 - 450 10*3/mm3 Final   • Neutrophil % 01/10/2020 52.4  42.7 - 76.0 % Final   • Lymphocyte % 01/10/2020 30.8  19.6 - 45.3 % Final   • Monocyte % 01/10/2020 10.7  5.0 - 12.0 % Final   • Eosinophil % 01/10/2020 5.5  0.3 - 6.2 % Final   • Basophil % 01/10/2020 0.4  0.0 - 1.5 % Final   • Immature Grans % 01/10/2020 0.2  0.0 - 0.5 % Final   • Neutrophils, Absolute 01/10/2020 2.65  1.70 - 7.00 10*3/mm3 Final   • Lymphocytes, Absolute 01/10/2020 1.56  0.70 - 3.10 10*3/mm3 Final   • Monocytes, Absolute 01/10/2020 0.54  0.10 - 0.90 10*3/mm3 Final   • Eosinophils, Absolute 01/10/2020 0.28  0.00 - 0.40 10*3/mm3 Final   • Basophils, Absolute 01/10/2020 0.02  0.00 - 0.20 10*3/mm3 Final   • Immature Grans, Absolute 01/10/2020 0.01  0.00 - 0.05 10*3/mm3 Final   • nRBC 01/10/2020 0.0  0.0 - 0.2 /100 WBC Final       RADIOLOGY:  No results found.         Assessment/Plan  Marina Joe is a 74 y.o. year old female with h/o breast ca whom we are seeing today for anemia 2/2 ckd.    Patient Active Problem List   Diagnosis   • Malignant neoplasm of central portion of left female breast (CMS/HCC)   • Anemia of chronic renal failure, stage 3 (moderate) (CMS/HCC)   • HTN (hypertension), benign   • Hx of colonic polyps   • HX: breast cancer   • Morbidly obese (CMS/HCC)   • Abnormal mammogram   • Breast mass   • Right knee DJD   • S/P lumpectomy, left breast        1. Stage IA invasive ductal carcinoma left breast ER 97% [place] 97% HER-2/alix 2+, fish and amplified  diagnosed May 2012 status post lumpectomy, radiation, on the hormonal manipulation with Arimidex. -- Mammogram 7/3/19 No mammographic evidence of malignancy in the left breast. Recommend annual mammography which will be due on/after 12/12/2019.  --she tolerates AI with no SE, continue for 10yrs per her wishes  --had biopsy of left breast nodule per Dr Barkley and pathology was benign.  -breast pain comes and goes nt new, mammo due in Dec.   -labs cr 1.09 stable, lft nl, wbc 5.06, Hg 11.3, plt 216. Iron 82, %sat 30,     --william arimidex    2.  Osteopenia.  DEXA scan May 2016 revealed osteopenia.  Start Boniva or Fosamax pending insurance coverage.  Continue daily calcium and vitamin D supplement     3.  Anemia in CKD stage III    -labs cr 1.09 stable, lft nl, wbc 5.06, Hg 11.3, plt 216. Iron 82, %sat 30,   --RTC in a month for procrit    4. Hypothyroidism: on synthroid.  5. Depression: on wellbutrin  6. CAD: on coreg, benicar  7. Chronic pain synd: on oxycodone IR       Samra De Leon MD    1/10/2020    9:19 AM

## 2020-01-29 DIAGNOSIS — D63.1 ANEMIA OF CHRONIC RENAL FAILURE, STAGE 3 (MODERATE) (HCC): ICD-10-CM

## 2020-01-29 DIAGNOSIS — N18.30 ANEMIA OF CHRONIC RENAL FAILURE, STAGE 3 (MODERATE) (HCC): ICD-10-CM

## 2020-02-07 ENCOUNTER — OFFICE VISIT (OUTPATIENT)
Dept: ONCOLOGY | Facility: CLINIC | Age: 75
End: 2020-02-07

## 2020-02-07 ENCOUNTER — APPOINTMENT (OUTPATIENT)
Dept: LAB | Facility: HOSPITAL | Age: 75
End: 2020-02-07

## 2020-02-07 ENCOUNTER — INFUSION (OUTPATIENT)
Dept: ONCOLOGY | Facility: HOSPITAL | Age: 75
End: 2020-02-07

## 2020-02-07 VITALS
WEIGHT: 251 LBS | TEMPERATURE: 97.3 F | OXYGEN SATURATION: 97 % | RESPIRATION RATE: 16 BRPM | DIASTOLIC BLOOD PRESSURE: 66 MMHG | BODY MASS INDEX: 38.04 KG/M2 | HEART RATE: 58 BPM | SYSTOLIC BLOOD PRESSURE: 166 MMHG | HEIGHT: 68 IN

## 2020-02-07 VITALS
DIASTOLIC BLOOD PRESSURE: 80 MMHG | RESPIRATION RATE: 16 BRPM | OXYGEN SATURATION: 94 % | WEIGHT: 251 LBS | BODY MASS INDEX: 38.04 KG/M2 | TEMPERATURE: 97.3 F | HEIGHT: 68 IN | HEART RATE: 65 BPM | SYSTOLIC BLOOD PRESSURE: 140 MMHG

## 2020-02-07 DIAGNOSIS — D63.1 ANEMIA OF CHRONIC RENAL FAILURE, STAGE 3 (MODERATE) (HCC): Primary | ICD-10-CM

## 2020-02-07 DIAGNOSIS — N18.30 ANEMIA OF CHRONIC RENAL FAILURE, STAGE 3 (MODERATE) (HCC): Primary | ICD-10-CM

## 2020-02-07 DIAGNOSIS — Z17.0 MALIGNANT NEOPLASM OF CENTRAL PORTION OF LEFT BREAST IN FEMALE, ESTROGEN RECEPTOR POSITIVE (HCC): ICD-10-CM

## 2020-02-07 DIAGNOSIS — C50.112 MALIGNANT NEOPLASM OF CENTRAL PORTION OF LEFT BREAST IN FEMALE, ESTROGEN RECEPTOR POSITIVE (HCC): ICD-10-CM

## 2020-02-07 LAB
ALBUMIN SERPL-MCNC: 4.1 G/DL (ref 3.5–5.2)
ALBUMIN/GLOB SERPL: 1.1 G/DL
ALP SERPL-CCNC: 73 U/L (ref 39–117)
ALT SERPL W P-5'-P-CCNC: 13 U/L (ref 1–33)
ANION GAP SERPL CALCULATED.3IONS-SCNC: 12 MMOL/L (ref 5–15)
AST SERPL-CCNC: 16 U/L (ref 1–32)
BASOPHILS # BLD AUTO: 0.02 10*3/MM3 (ref 0–0.2)
BASOPHILS NFR BLD AUTO: 0.3 % (ref 0–1.5)
BILIRUB SERPL-MCNC: 0.3 MG/DL (ref 0.2–1.2)
BUN BLD-MCNC: 22 MG/DL (ref 8–23)
BUN/CREAT SERPL: 16.9 (ref 7–25)
CALCIUM SPEC-SCNC: 9.4 MG/DL (ref 8.6–10.5)
CHLORIDE SERPL-SCNC: 98 MMOL/L (ref 98–107)
CO2 SERPL-SCNC: 30 MMOL/L (ref 22–29)
CREAT BLD-MCNC: 1.3 MG/DL (ref 0.57–1)
DEPRECATED RDW RBC AUTO: 50.1 FL (ref 37–54)
EOSINOPHIL # BLD AUTO: 0.32 10*3/MM3 (ref 0–0.4)
EOSINOPHIL NFR BLD AUTO: 5.3 % (ref 0.3–6.2)
ERYTHROCYTE [DISTWIDTH] IN BLOOD BY AUTOMATED COUNT: 14.7 % (ref 12.3–15.4)
GFR SERPL CREATININE-BSD FRML MDRD: 49 ML/MIN/1.73
GLOBULIN UR ELPH-MCNC: 3.6 GM/DL
GLUCOSE BLD-MCNC: 111 MG/DL (ref 65–99)
HCT VFR BLD AUTO: 33.5 % (ref 34–46.6)
HGB BLD-MCNC: 11 G/DL (ref 12–15.9)
HOLD SPECIMEN: NORMAL
HOLD SPECIMEN: NORMAL
IMM GRANULOCYTES # BLD AUTO: 0.01 10*3/MM3 (ref 0–0.05)
IMM GRANULOCYTES NFR BLD AUTO: 0.2 % (ref 0–0.5)
LYMPHOCYTES # BLD AUTO: 1.67 10*3/MM3 (ref 0.7–3.1)
LYMPHOCYTES NFR BLD AUTO: 27.6 % (ref 19.6–45.3)
MCH RBC QN AUTO: 30.4 PG (ref 26.6–33)
MCHC RBC AUTO-ENTMCNC: 32.8 G/DL (ref 31.5–35.7)
MCV RBC AUTO: 92.5 FL (ref 79–97)
MONOCYTES # BLD AUTO: 0.47 10*3/MM3 (ref 0.1–0.9)
MONOCYTES NFR BLD AUTO: 7.8 % (ref 5–12)
NEUTROPHILS # BLD AUTO: 3.55 10*3/MM3 (ref 1.7–7)
NEUTROPHILS NFR BLD AUTO: 58.8 % (ref 42.7–76)
NRBC BLD AUTO-RTO: 0 /100 WBC (ref 0–0.2)
PLATELET # BLD AUTO: 229 10*3/MM3 (ref 140–450)
PMV BLD AUTO: 9.7 FL (ref 6–12)
POTASSIUM BLD-SCNC: 3.7 MMOL/L (ref 3.5–5.2)
PROT SERPL-MCNC: 7.7 G/DL (ref 6–8.5)
RBC # BLD AUTO: 3.62 10*6/MM3 (ref 3.77–5.28)
SODIUM BLD-SCNC: 140 MMOL/L (ref 136–145)
WBC NRBC COR # BLD: 6.04 10*3/MM3 (ref 3.4–10.8)

## 2020-02-07 PROCEDURE — 96372 THER/PROPH/DIAG INJ SC/IM: CPT

## 2020-02-07 PROCEDURE — 99214 OFFICE O/P EST MOD 30 MIN: CPT | Performed by: INTERNAL MEDICINE

## 2020-02-07 PROCEDURE — 36415 COLL VENOUS BLD VENIPUNCTURE: CPT | Performed by: INTERNAL MEDICINE

## 2020-02-07 PROCEDURE — 25010000002 EPOETIN ALFA-EPBX 40000 UNIT/ML SOLUTION: Performed by: INTERNAL MEDICINE

## 2020-02-07 PROCEDURE — 85025 COMPLETE CBC W/AUTO DIFF WBC: CPT | Performed by: INTERNAL MEDICINE

## 2020-02-07 PROCEDURE — 80053 COMPREHEN METABOLIC PANEL: CPT | Performed by: INTERNAL MEDICINE

## 2020-02-07 RX ORDER — ANASTROZOLE 1 MG/1
1 TABLET ORAL DAILY
Qty: 90 TABLET | Refills: 4 | Status: SHIPPED | OUTPATIENT
Start: 2020-02-07 | End: 2021-01-19 | Stop reason: SDUPTHER

## 2020-02-07 RX ADMIN — EPOETIN ALFA-EPBX 40000 UNITS: 40000 INJECTION, SOLUTION INTRAVENOUS; SUBCUTANEOUS at 10:58

## 2020-02-07 NOTE — PROGRESS NOTES
[facility]  HEMATOLOGY & ONCOLOGY    Cancer Staging Information:  No matching staging information was found for the patient.      Subjective     VISIT DIAGNOSIS:   Encounter Diagnoses   Name Primary?   • Anemia of chronic renal failure, stage 3 (moderate) (CMS/HCC) Yes   • Malignant neoplasm of central portion of left breast in female, estrogen receptor positive (CMS/HCC)        REASON FOR VISIT:     Chief Complaint   Patient presents with   • Breast Cancer     HERE FOR FOLLOW UP, NO COMPLAINTS         HEMATOLOGY / ONCOLOGY HISTORY:    No history exists.           INTERVAL HISTORY  Patient ID: Marina Joe is a 75 y.o. year old female h/o breast ca on arimidex. Tolerates AI very well. mammo 4/27/18 [facility]  -- had flu n Dec. Getting better. Per pt today is the first time she actually feels fine. Had flu shot in Nov of 2018. She would like refill of her arimidex  -1-/15/19: she is limping today. She is supposed to be getiing left knee replacement but have not gotten around to it. Other than that no other issues.  11/15/19: presented for procrit shot today. Complaining of breast pain comes and goes not new, mammo due in Dec. Also she sucks her tongue and wakes up with bloody phlegm. Started 2 weeks ago. Told her most likely due to dry air since she started using heater. If continues or worsens, will refer to ent.she saw her dentis last week per her report, he did not see any thing that will explain the blood. She denies tongue pain   2/7/2020: she has no issues or concerns. No lumps or bumps  --denies sob, brbpr, cp/BRIDGER, unintentional weight loss,n/v/adnominal pain. Rest of ros unremarkable. PE unchanged.    Past Medical History:   Past Medical History:   Diagnosis Date   • Breast cancer (CMS/HCC)    • CKD (chronic kidney disease)    • Hypercholesteremia    • Hypertension    • Sinusitis      Past Surgical History:   Past Surgical History:   Procedure Laterality Date   • AVULSION TOENAIL PLATE      Sept  26,2018   • BREAST BIOPSY Left 11/20/2012   • BREAST BIOPSY      Left Breast, 1/2019 per Dr Barkley   • BREAST LUMPECTOMY Left     with node bx    • COLONOSCOPY  09/13/2013    small polyp at 30cm benign hyperplastic polyp, changes consistent with melanosis coli. Recall 5 years   • REPLACEMENT TOTAL KNEE Right     2016   • TOTAL ABDOMINAL HYSTERECTOMY WITH SALPINGO OOPHORECTOMY       Social History:   Social History     Socioeconomic History   • Marital status:      Spouse name: Not on file   • Number of children: Not on file   • Years of education: Not on file   • Highest education level: Not on file   Tobacco Use   • Smoking status: Never Smoker   • Smokeless tobacco: Never Used   Substance and Sexual Activity   • Alcohol use: No     Family History:   Family History   Problem Relation Age of Onset   • Heart attack Mother    • Hodgkin's lymphoma Father    • Heart attack Brother    • Colon cancer Neg Hx    • Colon polyps Neg Hx        Review of Systems   Constitutional: Negative.    HENT: Negative.    Eyes: Negative.    Respiratory: Negative.    Cardiovascular: Negative.    Gastrointestinal: Negative.    Genitourinary: Negative.    Musculoskeletal: Negative.    Skin: Negative.    Neurological: Negative.    Hematological: Negative.    Psychiatric/Behavioral: Negative.         Performance Status:  Asymptomatic    Medications:    Current Outpatient Medications   Medication Sig Dispense Refill   • anastrozole (ARIMIDEX) 1 MG tablet Take 1 tablet by mouth Daily. 90 tablet 4   • buPROPion XL (WELLBUTRIN XL) 150 MG 24 hr tablet Take  by mouth Daily.  1   • Calcium Carb-Cholecalciferol (CALCIUM 600+D3 PO) Take  by mouth.     • carvedilol (COREG) 6.25 MG tablet Take 6.25 mg by mouth 2 (Two) Times a Day With Meals.     • cetirizine (zyrTEC) 10 MG tablet Take 10 mg by mouth Daily.     • cyclobenzaprine (FLEXERIL) 10 MG tablet TK 1 T PO TID PRN  2   • Ergocalciferol (VITAMIN D2 PO) Take 50,000 Units by mouth.     •  "ferrous sulfate 325 (65 FE) MG tablet Take 325 mg by mouth Daily With Breakfast.     • fluticasone (FLOVENT DISKUS) 50 MCG/BLIST diskus inhaler Inhale 1 puff.     • irbesartan-hydrochlorothiazide (AVALIDE) 300-12.5 MG tablet Take 12.5 tablets by mouth Daily.  5   • levothyroxine (SYNTHROID, LEVOTHROID) 25 MCG tablet Take 25 mcg by mouth Daily.     • Misc Natural Products (COLON HERBAL CLEANSER PO) Take  by mouth.     • montelukast (SINGULAIR) 10 MG tablet Take 10 mg by mouth Daily.  1   • olmesartan-hydrochlorothiazide (BENICAR HCT) 40-25 MG per tablet Take 1 tablet by mouth Daily.     • Omega-3 Fatty Acids (FISH OIL) 1000 MG capsule capsule Take 1,000 mg by mouth.     • oxyCODONE (OXY-IR) 5 MG capsule Take 5 mg by mouth.     • Pediatric Multivitamins-Iron (PEDIATRIC MULTIPLE VITAMINS W/ IRON) 15 MG chewable tablet Chew 1 tablet Daily.     • rOPINIRole (REQUIP) 0.5 MG tablet 0.5 mg.  1   • rosuvastatin (CRESTOR) 20 MG tablet Take 20 mg by mouth Daily.     • sertraline (ZOLOFT) 100 MG tablet Take 100 mg by mouth Daily.     • traZODone (DESYREL) 50 MG tablet Take 50 mg by mouth Every Night.     • valACYclovir (VALTREX) 500 MG tablet Take 500 mg by mouth.       No current facility-administered medications for this visit.        ALLERGIES:    Allergies   Allergen Reactions   • Aspirin        Objective      Vitals:    02/07/20 0937   BP: 140/80   Pulse: 65   Resp: 16   Temp: 97.3 °F (36.3 °C)   SpO2: 94%   Weight: 114 kg (251 lb)   Height: 172.7 cm (68\")   PainSc: 0-No pain         Current Status 2/7/2020   ECOG score 0         Physical Exam  General Appearance: Patient is awake, alert, oriented and in no acute distress. Patient is welldeveloped, wellnourished, and appears stated age.  HEENT: Normocephalic. Sclerae clear, conjunctiva pink, extraocular movements intact, pupils, round, reactive to light and  accommodation. Mouth and throat are clear with moist oral mucosa.  NECK: Supple, no jugular venous distention, " thyroid not enlarged.  LYMPH: No cervical, supraclavicular, axillary, or inguinal lymphadenopathy.  CHEST: Equal bilateral expansion, AP  diameter normal, resonant percussion note  LUNGS: Good air movement, no rales, rhonchi, rubs or wheezes with auscultation  CARDIO: Regular sinus rhythm, no murmurs, gallops or rubs.  ABDOMEN: Nondistended, soft, No tenderness, no guarding, no rebound, No hepatosplenomegaly. No abdominal masses. Bowel sounds positive. No hernia  GENITALIA: Not examined.  BREASTS: Not examined.  MUSKEL: No joint swelling, decreased motion, or inflammation  EXTREMS: No edema, clubbing, cyanosis, No varicose veins.  NEURO: Grossly nonfocal, Gait is coordinated and smooth, Cognition is preserved.  SKIN: No rashes, no ecchymoses, no petechia.  PSYCH: Oriented to time, place and person. Memory is preserved. Mood and affect appear normal  RECENT LABS:  Office Visit on 02/07/2020   Component Date Value Ref Range Status   • Glucose 02/07/2020 111* 65 - 99 mg/dL Final   • BUN 02/07/2020 22  8 - 23 mg/dL Final   • Creatinine 02/07/2020 1.30* 0.57 - 1.00 mg/dL Final   • Sodium 02/07/2020 140  136 - 145 mmol/L Final   • Potassium 02/07/2020 3.7  3.5 - 5.2 mmol/L Final   • Chloride 02/07/2020 98  98 - 107 mmol/L Final   • CO2 02/07/2020 30.0* 22.0 - 29.0 mmol/L Final   • Calcium 02/07/2020 9.4  8.6 - 10.5 mg/dL Final   • Total Protein 02/07/2020 7.7  6.0 - 8.5 g/dL Final   • Albumin 02/07/2020 4.10  3.50 - 5.20 g/dL Final   • ALT (SGPT) 02/07/2020 13  1 - 33 U/L Final   • AST (SGOT) 02/07/2020 16  1 - 32 U/L Final   • Alkaline Phosphatase 02/07/2020 73  39 - 117 U/L Final   • Total Bilirubin 02/07/2020 0.3  0.2 - 1.2 mg/dL Final   • eGFR  African Amer 02/07/2020 49* >60 mL/min/1.73 Final   • Globulin 02/07/2020 3.6  gm/dL Final   • A/G Ratio 02/07/2020 1.1  g/dL Final   • BUN/Creatinine Ratio 02/07/2020 16.9  7.0 - 25.0 Final   • Anion Gap 02/07/2020 12.0  5.0 - 15.0 mmol/L Final   • WBC 02/07/2020 6.04  3.40 -  10.80 10*3/mm3 Final   • RBC 02/07/2020 3.62* 3.77 - 5.28 10*6/mm3 Final   • Hemoglobin 02/07/2020 11.0* 12.0 - 15.9 g/dL Final   • Hematocrit 02/07/2020 33.5* 34.0 - 46.6 % Final   • MCV 02/07/2020 92.5  79.0 - 97.0 fL Final   • MCH 02/07/2020 30.4  26.6 - 33.0 pg Final   • MCHC 02/07/2020 32.8  31.5 - 35.7 g/dL Final   • RDW 02/07/2020 14.7  12.3 - 15.4 % Final   • RDW-SD 02/07/2020 50.1  37.0 - 54.0 fl Final   • MPV 02/07/2020 9.7  6.0 - 12.0 fL Final   • Platelets 02/07/2020 229  140 - 450 10*3/mm3 Final   • Neutrophil % 02/07/2020 58.8  42.7 - 76.0 % Final   • Lymphocyte % 02/07/2020 27.6  19.6 - 45.3 % Final   • Monocyte % 02/07/2020 7.8  5.0 - 12.0 % Final   • Eosinophil % 02/07/2020 5.3  0.3 - 6.2 % Final   • Basophil % 02/07/2020 0.3  0.0 - 1.5 % Final   • Immature Grans % 02/07/2020 0.2  0.0 - 0.5 % Final   • Neutrophils, Absolute 02/07/2020 3.55  1.70 - 7.00 10*3/mm3 Final   • Lymphocytes, Absolute 02/07/2020 1.67  0.70 - 3.10 10*3/mm3 Final   • Monocytes, Absolute 02/07/2020 0.47  0.10 - 0.90 10*3/mm3 Final   • Eosinophils, Absolute 02/07/2020 0.32  0.00 - 0.40 10*3/mm3 Final   • Basophils, Absolute 02/07/2020 0.02  0.00 - 0.20 10*3/mm3 Final   • Immature Grans, Absolute 02/07/2020 0.01  0.00 - 0.05 10*3/mm3 Final   • nRBC 02/07/2020 0.0  0.0 - 0.2 /100 WBC Final       RADIOLOGY:  No results found.         Assessment/Plan  Marina Joe is a 75 y.o. year old female with h/o breast ca also being followed for anemia 2/2 ckd.    Patient Active Problem List   Diagnosis   • Malignant neoplasm of central portion of left female breast (CMS/HCC)   • Anemia of chronic renal failure, stage 3 (moderate) (CMS/HCC)   • HTN (hypertension), benign   • Hx of colonic polyps   • HX: breast cancer   • Morbidly obese (CMS/HCC)   • Abnormal mammogram   • Breast mass   • Right knee DJD   • S/P lumpectomy, left breast        1. Stage IA invasive ductal carcinoma left breast ER 97% AK 97% HER-2/alix 2+, fish and amplified  diagnosed May 2012 status post lumpectomy, radiation, on the hormonal manipulation with Arimidex.   -- Mammogram 7/3/19 No mammographic evidence of malignancy in the left breast. Recommend annual mammography which will be due on/after 12/12/2019.  --she tolerates AI with no SE, continue for 10yrs per her wishes  --had biopsy of left breast nodule per Dr Barkley and pathology was benign.  -breast pain comes and goes nt new, mammo due in Dec.   -labs cr 1.30 stable, lft nl, wbc 6.04, Hg 11.0, plt 229. Iron 82, %sat 30,   --william arimidex, refilled called in.    2.  Osteopenia.  DEXA scan May 2016 revealed osteopenia.  Start Boniva or Fosamax pending insurance coverage.  Continue daily calcium and vitamin D supplement     3.  Anemia in CKD stage III    -labs cr 1.30 stable, lft nl, wbc 6.04, Hg 11.0, plt 229. Iron 82, %sat 30,ok for procrit today  --RTC in a month for procrit    4. Hypothyroidism: on synthroid.  5. Depression: on wellbutrin  6. CAD: on coreg, benicar  7. Chronic pain synd: on oxycodone IR       Samra De Leon MD    2/7/2020    10:06 AM

## 2020-02-26 ENCOUNTER — TELEPHONE (OUTPATIENT)
Dept: INTERNAL MEDICINE | Facility: CLINIC | Age: 75
End: 2020-02-26

## 2020-02-26 RX ORDER — TRAZODONE HYDROCHLORIDE 50 MG/1
50 TABLET ORAL NIGHTLY
Qty: 90 TABLET | Refills: 1 | Status: SHIPPED | OUTPATIENT
Start: 2020-02-26 | End: 2020-05-05 | Stop reason: SDUPTHER

## 2020-03-06 ENCOUNTER — OFFICE VISIT (OUTPATIENT)
Dept: ONCOLOGY | Facility: CLINIC | Age: 75
End: 2020-03-06

## 2020-03-06 ENCOUNTER — INFUSION (OUTPATIENT)
Dept: ONCOLOGY | Facility: HOSPITAL | Age: 75
End: 2020-03-06

## 2020-03-06 ENCOUNTER — LAB (OUTPATIENT)
Dept: LAB | Facility: HOSPITAL | Age: 75
End: 2020-03-06

## 2020-03-06 VITALS
DIASTOLIC BLOOD PRESSURE: 82 MMHG | TEMPERATURE: 97.7 F | OXYGEN SATURATION: 97 % | BODY MASS INDEX: 38.39 KG/M2 | SYSTOLIC BLOOD PRESSURE: 146 MMHG | WEIGHT: 253.3 LBS | HEART RATE: 88 BPM | RESPIRATION RATE: 16 BRPM | HEIGHT: 68 IN

## 2020-03-06 VITALS
SYSTOLIC BLOOD PRESSURE: 146 MMHG | OXYGEN SATURATION: 97 % | HEART RATE: 59 BPM | RESPIRATION RATE: 18 BRPM | BODY MASS INDEX: 38.34 KG/M2 | HEIGHT: 68 IN | TEMPERATURE: 97.4 F | DIASTOLIC BLOOD PRESSURE: 65 MMHG | WEIGHT: 253 LBS

## 2020-03-06 DIAGNOSIS — D63.1 ANEMIA OF CHRONIC RENAL FAILURE, STAGE 3 (MODERATE) (HCC): ICD-10-CM

## 2020-03-06 DIAGNOSIS — N18.30 ANEMIA OF CHRONIC RENAL FAILURE, STAGE 3 (MODERATE) (HCC): ICD-10-CM

## 2020-03-06 DIAGNOSIS — C50.112 MALIGNANT NEOPLASM OF CENTRAL PORTION OF LEFT FEMALE BREAST, UNSPECIFIED ESTROGEN RECEPTOR STATUS (HCC): Primary | ICD-10-CM

## 2020-03-06 DIAGNOSIS — D63.1 ANEMIA OF CHRONIC RENAL FAILURE, STAGE 3 (MODERATE) (HCC): Primary | ICD-10-CM

## 2020-03-06 DIAGNOSIS — N18.30 ANEMIA OF CHRONIC RENAL FAILURE, STAGE 3 (MODERATE) (HCC): Primary | ICD-10-CM

## 2020-03-06 LAB
ALBUMIN SERPL-MCNC: 4 G/DL (ref 3.5–5.2)
ALBUMIN/GLOB SERPL: 1.2 G/DL
ALP SERPL-CCNC: 73 U/L (ref 39–117)
ALT SERPL W P-5'-P-CCNC: 18 U/L (ref 1–33)
ANION GAP SERPL CALCULATED.3IONS-SCNC: 11 MMOL/L (ref 5–15)
AST SERPL-CCNC: 19 U/L (ref 1–32)
BASOPHILS # BLD AUTO: 0.01 10*3/MM3 (ref 0–0.2)
BASOPHILS NFR BLD AUTO: 0.2 % (ref 0–1.5)
BILIRUB SERPL-MCNC: 0.3 MG/DL (ref 0.2–1.2)
BUN BLD-MCNC: 16 MG/DL (ref 8–23)
BUN/CREAT SERPL: 13.9 (ref 7–25)
CALCIUM SPEC-SCNC: 9.2 MG/DL (ref 8.6–10.5)
CHLORIDE SERPL-SCNC: 100 MMOL/L (ref 98–107)
CO2 SERPL-SCNC: 28 MMOL/L (ref 22–29)
CREAT BLD-MCNC: 1.15 MG/DL (ref 0.57–1)
DEPRECATED RDW RBC AUTO: 49.5 FL (ref 37–54)
EOSINOPHIL # BLD AUTO: 0.22 10*3/MM3 (ref 0–0.4)
EOSINOPHIL NFR BLD AUTO: 4.5 % (ref 0.3–6.2)
ERYTHROCYTE [DISTWIDTH] IN BLOOD BY AUTOMATED COUNT: 14.6 % (ref 12.3–15.4)
GFR SERPL CREATININE-BSD FRML MDRD: 56 ML/MIN/1.73
GLOBULIN UR ELPH-MCNC: 3.4 GM/DL
GLUCOSE BLD-MCNC: 104 MG/DL (ref 65–99)
HCT VFR BLD AUTO: 33.8 % (ref 34–46.6)
HGB BLD-MCNC: 11 G/DL (ref 12–15.9)
HOLD SPECIMEN: NORMAL
HOLD SPECIMEN: NORMAL
IMM GRANULOCYTES # BLD AUTO: 0.01 10*3/MM3 (ref 0–0.05)
IMM GRANULOCYTES NFR BLD AUTO: 0.2 % (ref 0–0.5)
LYMPHOCYTES # BLD AUTO: 1.51 10*3/MM3 (ref 0.7–3.1)
LYMPHOCYTES NFR BLD AUTO: 30.6 % (ref 19.6–45.3)
MCH RBC QN AUTO: 29.9 PG (ref 26.6–33)
MCHC RBC AUTO-ENTMCNC: 32.5 G/DL (ref 31.5–35.7)
MCV RBC AUTO: 91.8 FL (ref 79–97)
MONOCYTES # BLD AUTO: 0.43 10*3/MM3 (ref 0.1–0.9)
MONOCYTES NFR BLD AUTO: 8.7 % (ref 5–12)
NEUTROPHILS # BLD AUTO: 2.75 10*3/MM3 (ref 1.7–7)
NEUTROPHILS NFR BLD AUTO: 55.8 % (ref 42.7–76)
NRBC BLD AUTO-RTO: 0 /100 WBC (ref 0–0.2)
PLATELET # BLD AUTO: 216 10*3/MM3 (ref 140–450)
PMV BLD AUTO: 9.7 FL (ref 6–12)
POTASSIUM BLD-SCNC: 3.7 MMOL/L (ref 3.5–5.2)
PROT SERPL-MCNC: 7.4 G/DL (ref 6–8.5)
RBC # BLD AUTO: 3.68 10*6/MM3 (ref 3.77–5.28)
SODIUM BLD-SCNC: 139 MMOL/L (ref 136–145)
WBC NRBC COR # BLD: 4.93 10*3/MM3 (ref 3.4–10.8)

## 2020-03-06 PROCEDURE — 80053 COMPREHEN METABOLIC PANEL: CPT

## 2020-03-06 PROCEDURE — 99213 OFFICE O/P EST LOW 20 MIN: CPT | Performed by: NURSE PRACTITIONER

## 2020-03-06 PROCEDURE — 85025 COMPLETE CBC W/AUTO DIFF WBC: CPT

## 2020-03-06 PROCEDURE — 25010000002 EPOETIN ALFA-EPBX 40000 UNIT/ML SOLUTION: Performed by: INTERNAL MEDICINE

## 2020-03-06 PROCEDURE — 96372 THER/PROPH/DIAG INJ SC/IM: CPT

## 2020-03-06 PROCEDURE — 36415 COLL VENOUS BLD VENIPUNCTURE: CPT

## 2020-03-06 RX ADMIN — EPOETIN ALFA-EPBX 40000 UNITS: 40000 INJECTION, SOLUTION INTRAVENOUS; SUBCUTANEOUS at 11:57

## 2020-03-06 NOTE — PROGRESS NOTES
[facility]  HEMATOLOGY & ONCOLOGY    [facility]ATOLOGY AND ONCOLOGY  [address]  [place] [address] [phone]    Patient Name: Marina Joe  Encounter Date: 03/06/2020  YOB: 1945  Patient Number: [id number]    Chief Complaint   Patient presents with   • Anemia     Here for f/u for possible procrit       REASON FOR VISIT: Mrs. Marina Joe is a 74 yo female followed for breast carcinoma an currently on arimidex.  She is doing very well from the breast cancer standpoint and has shown no sign of recurrence.  She is also followed for anemia secondary to CKD stage III.  She is undergoing Procrit therapy when she meets guidelines.  She has been receiving it every 1-2 months.  Her last injection was on 2/7/20 for a hgb of 11.0 and hct of 33.5.      Today her hgb is at 11 and hct is at 33.8, therefore she can continue on with procrit therapy. Her last iron studies on 1/10/20 were normal with iron saturation of 30% and ferritin of 1314.     She presents today feeling well. She has no complaints.  She states that she tires easily at times but she has no shortness of breath, chest pain or N/V/D.  She has had no fever or chills.         PAST MEDICAL HISTORY:  ALLERGIES:  Allergies   Allergen Reactions   • Aspirin        CURRENT MEDICATIONS:  Outpatient Encounter Medications as of 3/6/2020   Medication Sig Dispense Refill   • anastrozole (ARIMIDEX) 1 MG tablet Take 1 tablet by mouth Daily. 90 tablet 4   • buPROPion XL (WELLBUTRIN XL) 150 MG 24 hr tablet Take  by mouth Daily.  1   • Calcium Carb-Cholecalciferol (CALCIUM 600+D3 PO) Take  by mouth.     • carvedilol (COREG) 6.25 MG tablet Take 6.25 mg by mouth 2 (Two) Times a Day With Meals.     • cetirizine (zyrTEC) 10 MG tablet Take 10 mg by mouth Daily.     • cyclobenzaprine (FLEXERIL) 10 MG tablet TK 1 T PO TID PRN  2   • Ergocalciferol (VITAMIN D2 PO) Take 50,000  Units by mouth.     • ferrous sulfate 325 (65 FE) MG tablet Take 325 mg by mouth Daily With Breakfast.     • fluticasone (FLOVENT DISKUS) 50 MCG/BLIST diskus inhaler Inhale 1 puff.     • irbesartan-hydrochlorothiazide (AVALIDE) 300-12.5 MG tablet Take 12.5 tablets by mouth Daily.  5   • levothyroxine (SYNTHROID, LEVOTHROID) 25 MCG tablet Take 25 mcg by mouth Daily.     • Misc Natural Products (COLON HERBAL CLEANSER PO) Take  by mouth.     • montelukast (SINGULAIR) 10 MG tablet Take 10 mg by mouth Daily.  1   • olmesartan-hydrochlorothiazide (BENICAR HCT) 40-25 MG per tablet Take 1 tablet by mouth Daily.     • Omega-3 Fatty Acids (FISH OIL) 1000 MG capsule capsule Take 1,000 mg by mouth.     • oxyCODONE (OXY-IR) 5 MG capsule Take 5 mg by mouth.     • Pediatric Multivitamins-Iron (PEDIATRIC MULTIPLE VITAMINS W/ IRON) 15 MG chewable tablet Chew 1 tablet Daily.     • rOPINIRole (REQUIP) 0.5 MG tablet 0.5 mg.  1   • rosuvastatin (CRESTOR) 20 MG tablet Take 20 mg by mouth Daily.     • sertraline (ZOLOFT) 100 MG tablet Take 100 mg by mouth Daily.     • traZODone (DESYREL) 50 MG tablet Take 1 tablet by mouth Every Night. 90 tablet 1   • valACYclovir (VALTREX) 500 MG tablet Take 500 mg by mouth.       No facility-administered encounter medications on file as of 3/6/2020.      ADULT ILLNESSES:  Patient Active Problem List   Diagnosis Code   • Malignant neoplasm of central portion of left female breast (CMS/HCC) C50.112   • Anemia of chronic renal failure, stage 3 (moderate) (CMS/HCC) N18.3, D63.1   • HTN (hypertension), benign I10   • Hx of colonic polyps Z86.010   • HX: breast cancer Z85.3   • Morbidly obese (CMS/HCC) E66.01   • Abnormal mammogram R92.8   • Breast mass N63.0   • Right knee DJD M17.11   • S/P lumpectomy, left breast Z98.890     SURGERIES:  Past Surgical History:   Procedure Laterality Date   • AVULSION TOENAIL PLATE      Sept 26,2018   • BREAST BIOPSY Left 11/20/2012   • BREAST BIOPSY      Left Breast, 1/2019  per Dr Barkley   • BREAST LUMPECTOMY Left     with node bx    • COLONOSCOPY  09/13/2013    small polyp at 30cm benign hyperplastic polyp, changes consistent with melanosis coli. Recall 5 years   • REPLACEMENT TOTAL KNEE Right     2016   • TOTAL ABDOMINAL HYSTERECTOMY WITH SALPINGO OOPHORECTOMY       HEALTH MAINTENANCE ITEMS:  Health Maintenance Due   Topic Date Due   • TDAP/TD VACCINES (1 - Tdap) 01/31/1957   • ZOSTER VACCINE (1 of 2) 01/31/1996   • HEPATITIS C SCREENING  05/03/2017   • MEDICARE ANNUAL WELLNESS  05/03/2017   • LIPID PANEL  05/03/2017       <no information>  Last Completed Colonoscopy       Status Date      COLONOSCOPY Done 11/19/2018 SCANNED - COLONOSCOPY     Patient has more history with this topic...        Immunization History   Administered Date(s) Administered   • Fluzone High Dose =>65 Years ([employer] ONLY) 11/11/2015, 10/14/2016, 11/20/2017   • Pneumococcal Conjugate 13-Valent (PCV13) 10/14/2016     Last Completed Mammogram       Status Date      MAMMOGRAM Done 12/6/2019 Ext Proc: CHG SCREENING DIGITAL BREAST TOMOSYNTHESIS BI     Patient has more history with this topic...            FAMILY HISTORY:  Family History   Problem Relation Age of Onset   • Heart attack Mother    • Hodgkin's lymphoma Father    • Other Father    • Cancer Father         hodgkins   • Heart attack Brother    • Skin cancer Maternal Uncle    • Pancreatic cancer Maternal Uncle    • Cancer Maternal Uncle         eye   • Colon cancer Neg Hx    • Colon polyps Neg Hx      SOCIAL HISTORY:  Social History     Socioeconomic History   • Marital status:      Spouse name: Not on file   • Number of children: Not on file   • Years of education: Not on file   • Highest education level: Not on file   Tobacco Use   • Smoking status: Never Smoker   • Smokeless tobacco: Never Used   Substance and Sexual Activity   • Alcohol use: No   • Drug use: Defer   • Sexual activity: Defer       REVIEW OF SYSTEMS:  Review of Systems  "  Constitutional: Positive for fatigue. Negative for activity change, appetite change, chills, diaphoresis, fever and unexpected weight loss.   HENT: Negative for ear pain, nosebleeds, sinus pressure, sore throat and voice change.    Eyes: Negative for blurred vision, double vision, pain and visual disturbance.   Respiratory: Negative for cough and shortness of breath.    Cardiovascular: Negative for chest pain, palpitations and leg swelling.   Gastrointestinal: Negative for abdominal pain, anal bleeding, blood in stool, constipation, diarrhea, nausea and vomiting.   Endocrine: Negative for heat intolerance, polydipsia and polyuria.   Genitourinary: Negative for dysuria, frequency, hematuria, urgency and urinary incontinence.   Musculoskeletal: Negative for arthralgias and myalgias.   Skin: Negative for rash and skin lesions.   Neurological: Positive for weakness. Negative for dizziness, tremors, seizures, syncope, speech difficulty and headache.   Hematological: Negative for adenopathy. Does not bruise/bleed easily.   Psychiatric/Behavioral: Negative for dysphoric mood, sleep disturbance, suicidal ideas and depressed mood.       /82   Pulse 88   Temp 97.7 °F (36.5 °C) (Temporal)   Resp 16   Ht 172.7 cm (68\")   Wt 115 kg (253 lb 4.8 oz)   LMP  (LMP Unknown)   SpO2 97%   Breastfeeding No   BMI 38.51 kg/m²  Body surface area is 2.26 meters squared.  Pain Score    03/06/20 0959   PainSc: 0-No pain       Physical Exam:  Physical Exam   Constitutional: She is oriented to person, place, and time. She appears well-developed and well-nourished.   HENT:   Head: Atraumatic.   Mouth/Throat: Oropharynx is clear and moist.   Eyes: Pupils are equal, round, and reactive to light. EOM are normal. No scleral icterus.   Neck: Trachea normal. Neck supple. No JVD present.   Cardiovascular: Normal rate, regular rhythm and normal pulses. Exam reveals no gallop and no friction rub.   No murmur heard.  Pulmonary/Chest: " Effort normal and breath sounds normal. She has no wheezes. She has no rhonchi. She has no rales. Chest wall is not dull to percussion.   Abdominal: Soft. Normal appearance. There is no tenderness. There is no rebound and no guarding.   Lymphadenopathy:     She has no cervical adenopathy.     She has no axillary adenopathy.        Right: No supraclavicular adenopathy present.        Left: No supraclavicular adenopathy present.   Neurological: She is alert and oriented to person, place, and time. She has normal strength. No sensory deficit.   Psychiatric: She has a normal mood and affect. Judgment normal.       Marina GINO Joe reports a pain score of 0.     Patient's Body mass index is 38.51 kg/m². BMI is above normal parameters. Recommendations include: nutrition counseling.    LABS    Lab Results - Last 18 Months   Lab Units 03/06/20  1002 02/07/20  0922 01/10/20  0847 12/13/19  0933 11/15/19  0851 10/15/19  0907   HEMOGLOBIN g/dL 11.0* 11.0* 11.3* 11.3* 12.0 11.3*   HEMATOCRIT % 33.8* 33.5* 35.4 34.5 37.5 35.2   MCV fL 91.8 92.5 93.2 91.0 91.9 91.7   WBC 10*3/mm3 4.93 6.04 5.06 6.41 5.53 5.17   RDW % 14.6 14.7 15.6* 16.3* 17.0* 17.8*   MPV fL 9.7 9.7 9.6 9.4 9.9 9.7   PLATELETS 10*3/mm3 216 229 216 248 235 243   IMM GRAN % % 0.2 0.2 0.2 0.5 0.2 0.2   NEUTROS ABS 10*3/mm3 2.75 3.55 2.65 3.90 3.03 3.00   LYMPHS ABS 10*3/mm3 1.51 1.67 1.56 1.64 1.73 1.49   MONOS ABS 10*3/mm3 0.43 0.47 0.54 0.61 0.52 0.43   EOS ABS 10*3/mm3 0.22 0.32 0.28 0.20 0.21 0.21   BASOS ABS 10*3/mm3 0.01 0.02 0.02 0.03 0.03 0.03   IMMATURE GRANS (ABS) 10*3/mm3 0.01 0.01 0.01 0.03 0.01 0.01   NRBC /100 WBC 0.0 0.0 0.0 0.0 0.0 0.0       Lab Results - Last 18 Months   Lab Units 03/06/20  1002 02/07/20  0922 01/10/20  0847 12/13/19  0933 11/15/19  0851 10/15/19  0907   GLUCOSE mg/dL 104* 111* 113* 103* 95 100*   SODIUM mmol/L 139 140 137 137 139 138   POTASSIUM mmol/L 3.7 3.7 3.7 3.5 3.6 3.8   CO2 mmol/L 28.0 30.0* 31.0* 29.0 31.0* 31.0*   CHLORIDE  mmol/L 100 98 96* 98 101 96*   ANION GAP mmol/L 11.0 12.0 10.0 10.0 7.0 11.0   CREATININE mg/dL 1.15* 1.30* 1.09* 1.19* 1.17* 1.16*   BUN mg/dL 16 22 16 21 21 20   BUN / CREAT RATIO  13.9 16.9 14.7 17.6 17.9 17.2   CALCIUM mg/dL 9.2 9.4 9.2 9.5 9.1 9.3   ALK PHOS U/L 73 73 74 85 73 88   TOTAL PROTEIN g/dL 7.4 7.7 7.4 7.7 7.3 7.7   ALT (SGPT) U/L 18 13 20 29 26 34*   AST (SGOT) U/L 19 16 17 20 20 27   BILIRUBIN mg/dL 0.3 0.3 0.3 0.2 0.3 0.2   ALBUMIN g/dL 4.00 4.10 4.00 4.10 3.90 4.00   GLOBULIN gm/dL 3.4 3.6 3.4 3.6 3.4 3.7       Lab Results - Last 18 Months   Lab Units 01/10/20  0847 09/17/19  0814 04/16/19  1028 01/15/19  0935   IRON mcg/dL 82 61 75 87   TIBC mcg/dL 270* 328 285 284   IRON SATURATION % 30 19* 26 31   FERRITIN ng/mL 1,314.00* 377.60* 241.00 337.00*   FOLATE ng/mL  --  >20.00 >20.00 >20.00     ASSESSMENT  1. Anemia secondary to CKD stage III with hgb of 11 and hct 33.8, undergoing procrit therapy every 1-2 months when meet guidelines. Her last injection was given in February for a hgb of 11 and hct of 33.5.  She will receive procrit today.   2. Stage IA invasive ductal carcinoma of left breast diagnosed in 2012, triple positive s/p lumpectomy, adjuvant radiation and Arimidex.  Markers have been remaining stable. Mammogram up to date as of July 2019 and no evidence of disease.   3.  Hypothyroidism stable on synthroid  4.  Depression stable on wellbutrin    PLAN  1. Procrit 40,000 units subcutaneous today  2. Continue Arimidex  3. Continue to follow with PCP and other specialists  4. Return in 1 mo for follow up with Dr De Leon and possible procrit therapy.   5. Advised the patient to call with any problems.   6. Advance Care Planning   ACP discussion was declined by the patient. Patient does not have an advance directive, information provided.    All activities occurring within this visit are in accordance with the plan of care as set forth by Dr. Samra De Leon.    I spent 20 total minutes,  face-to-face, caring for Marina today.  Greater than 50% of this time involved counseling and/or coordination of care as documented within this note regarding the patient's illness(es), pros and cons of various treatment options, instructions and/or risk reduction.    Porsha Bradford, MARY   03/06/2020  8:41 PM

## 2020-04-03 ENCOUNTER — INFUSION (OUTPATIENT)
Dept: ONCOLOGY | Facility: HOSPITAL | Age: 75
End: 2020-04-03

## 2020-04-03 ENCOUNTER — LAB (OUTPATIENT)
Dept: LAB | Facility: HOSPITAL | Age: 75
End: 2020-04-03

## 2020-04-03 ENCOUNTER — OFFICE VISIT (OUTPATIENT)
Dept: ONCOLOGY | Facility: CLINIC | Age: 75
End: 2020-04-03

## 2020-04-03 VITALS
BODY MASS INDEX: 38.4 KG/M2 | RESPIRATION RATE: 16 BRPM | DIASTOLIC BLOOD PRESSURE: 72 MMHG | HEIGHT: 68 IN | WEIGHT: 253.4 LBS | TEMPERATURE: 98 F | SYSTOLIC BLOOD PRESSURE: 140 MMHG | HEART RATE: 60 BPM | OXYGEN SATURATION: 98 %

## 2020-04-03 DIAGNOSIS — N18.30 ANEMIA OF CHRONIC RENAL FAILURE, STAGE 3 (MODERATE) (HCC): ICD-10-CM

## 2020-04-03 DIAGNOSIS — N18.30 ANEMIA OF CHRONIC RENAL FAILURE, STAGE 3 (MODERATE) (HCC): Primary | ICD-10-CM

## 2020-04-03 DIAGNOSIS — C50.919 MALIGNANT NEOPLASM OF BREAST IN FEMALE, ESTROGEN RECEPTOR POSITIVE, UNSPECIFIED LATERALITY, UNSPECIFIED SITE OF BREAST (HCC): ICD-10-CM

## 2020-04-03 DIAGNOSIS — N18.30 ANEMIA DUE TO STAGE 3 CHRONIC KIDNEY DISEASE (HCC): Primary | ICD-10-CM

## 2020-04-03 DIAGNOSIS — Z17.0 MALIGNANT NEOPLASM OF BREAST IN FEMALE, ESTROGEN RECEPTOR POSITIVE, UNSPECIFIED LATERALITY, UNSPECIFIED SITE OF BREAST (HCC): ICD-10-CM

## 2020-04-03 DIAGNOSIS — D63.1 ANEMIA OF CHRONIC RENAL FAILURE, STAGE 3 (MODERATE) (HCC): ICD-10-CM

## 2020-04-03 DIAGNOSIS — D63.1 ANEMIA OF CHRONIC RENAL FAILURE, STAGE 3 (MODERATE) (HCC): Primary | ICD-10-CM

## 2020-04-03 DIAGNOSIS — D63.1 ANEMIA DUE TO STAGE 3 CHRONIC KIDNEY DISEASE (HCC): Primary | ICD-10-CM

## 2020-04-03 LAB
ALBUMIN SERPL-MCNC: 3.9 G/DL (ref 3.5–5.2)
ALBUMIN/GLOB SERPL: 1.1 G/DL
ALP SERPL-CCNC: 72 U/L (ref 39–117)
ALT SERPL W P-5'-P-CCNC: 17 U/L (ref 1–33)
ANION GAP SERPL CALCULATED.3IONS-SCNC: 13 MMOL/L (ref 5–15)
AST SERPL-CCNC: 17 U/L (ref 1–32)
BASOPHILS # BLD AUTO: 0.03 10*3/MM3 (ref 0–0.2)
BASOPHILS NFR BLD AUTO: 0.5 % (ref 0–1.5)
BILIRUB SERPL-MCNC: 0.3 MG/DL (ref 0.2–1.2)
BUN BLD-MCNC: 19 MG/DL (ref 8–23)
BUN/CREAT SERPL: 16.2 (ref 7–25)
CALCIUM SPEC-SCNC: 9.4 MG/DL (ref 8.6–10.5)
CHLORIDE SERPL-SCNC: 100 MMOL/L (ref 98–107)
CO2 SERPL-SCNC: 27 MMOL/L (ref 22–29)
CREAT BLD-MCNC: 1.17 MG/DL (ref 0.57–1)
DEPRECATED RDW RBC AUTO: 50.4 FL (ref 37–54)
EOSINOPHIL # BLD AUTO: 0.29 10*3/MM3 (ref 0–0.4)
EOSINOPHIL NFR BLD AUTO: 5.1 % (ref 0.3–6.2)
ERYTHROCYTE [DISTWIDTH] IN BLOOD BY AUTOMATED COUNT: 14.9 % (ref 12.3–15.4)
GFR SERPL CREATININE-BSD FRML MDRD: 55 ML/MIN/1.73
GLOBULIN UR ELPH-MCNC: 3.6 GM/DL
GLUCOSE BLD-MCNC: 106 MG/DL (ref 65–99)
HCT VFR BLD AUTO: 35.4 % (ref 34–46.6)
HGB BLD-MCNC: 11.4 G/DL (ref 12–15.9)
HOLD SPECIMEN: NORMAL
HOLD SPECIMEN: NORMAL
IMM GRANULOCYTES # BLD AUTO: 0.02 10*3/MM3 (ref 0–0.05)
IMM GRANULOCYTES NFR BLD AUTO: 0.3 % (ref 0–0.5)
LYMPHOCYTES # BLD AUTO: 1.85 10*3/MM3 (ref 0.7–3.1)
LYMPHOCYTES NFR BLD AUTO: 32.3 % (ref 19.6–45.3)
MCH RBC QN AUTO: 29.8 PG (ref 26.6–33)
MCHC RBC AUTO-ENTMCNC: 32.2 G/DL (ref 31.5–35.7)
MCV RBC AUTO: 92.4 FL (ref 79–97)
MONOCYTES # BLD AUTO: 0.53 10*3/MM3 (ref 0.1–0.9)
MONOCYTES NFR BLD AUTO: 9.2 % (ref 5–12)
NEUTROPHILS # BLD AUTO: 3.01 10*3/MM3 (ref 1.7–7)
NEUTROPHILS NFR BLD AUTO: 52.6 % (ref 42.7–76)
NRBC BLD AUTO-RTO: 0 /100 WBC (ref 0–0.2)
PLATELET # BLD AUTO: 237 10*3/MM3 (ref 140–450)
PMV BLD AUTO: 9.8 FL (ref 6–12)
POTASSIUM BLD-SCNC: 4 MMOL/L (ref 3.5–5.2)
PROT SERPL-MCNC: 7.5 G/DL (ref 6–8.5)
RBC # BLD AUTO: 3.83 10*6/MM3 (ref 3.77–5.28)
SODIUM BLD-SCNC: 140 MMOL/L (ref 136–145)
WBC NRBC COR # BLD: 5.73 10*3/MM3 (ref 3.4–10.8)

## 2020-04-03 PROCEDURE — 85025 COMPLETE CBC W/AUTO DIFF WBC: CPT

## 2020-04-03 PROCEDURE — 80053 COMPREHEN METABOLIC PANEL: CPT

## 2020-04-03 PROCEDURE — 99214 OFFICE O/P EST MOD 30 MIN: CPT | Performed by: INTERNAL MEDICINE

## 2020-04-03 PROCEDURE — 36415 COLL VENOUS BLD VENIPUNCTURE: CPT

## 2020-04-03 RX ORDER — VALACYCLOVIR HYDROCHLORIDE 500 MG/1
500 TABLET, FILM COATED ORAL 2 TIMES DAILY PRN
Qty: 20 TABLET | Refills: 1 | Status: SHIPPED | OUTPATIENT
Start: 2020-04-03 | End: 2020-05-05 | Stop reason: SDUPTHER

## 2020-04-03 RX ORDER — CARVEDILOL 6.25 MG/1
6.25 TABLET ORAL 2 TIMES DAILY WITH MEALS
Qty: 180 TABLET | Refills: 3 | Status: SHIPPED | OUTPATIENT
Start: 2020-04-03 | End: 2021-04-07 | Stop reason: SDUPTHER

## 2020-04-03 RX ORDER — MONTELUKAST SODIUM 10 MG/1
10 TABLET ORAL DAILY
Qty: 90 TABLET | Refills: 3 | Status: SHIPPED | OUTPATIENT
Start: 2020-04-03 | End: 2021-03-25 | Stop reason: SDUPTHER

## 2020-04-03 RX ORDER — BUPROPION HYDROCHLORIDE 150 MG/1
150 TABLET ORAL DAILY
Qty: 90 TABLET | Refills: 3 | Status: SHIPPED | OUTPATIENT
Start: 2020-04-03 | End: 2021-06-16 | Stop reason: SDUPTHER

## 2020-04-03 RX ORDER — ROPINIROLE 0.5 MG/1
0.5 TABLET, FILM COATED ORAL NIGHTLY
Qty: 90 TABLET | Refills: 3 | Status: SHIPPED | OUTPATIENT
Start: 2020-04-03 | End: 2021-03-25 | Stop reason: SDUPTHER

## 2020-04-03 RX ORDER — IRBESARTAN AND HYDROCHLOROTHIAZIDE 300; 12.5 MG/1; MG/1
1 TABLET, FILM COATED ORAL DAILY
Qty: 90 TABLET | Refills: 3 | Status: SHIPPED | OUTPATIENT
Start: 2020-04-03 | End: 2021-05-06 | Stop reason: SDUPTHER

## 2020-04-03 RX ORDER — SERTRALINE HYDROCHLORIDE 100 MG/1
100 TABLET, FILM COATED ORAL DAILY
Qty: 90 TABLET | Refills: 3 | Status: SHIPPED | OUTPATIENT
Start: 2020-04-03 | End: 2021-03-25 | Stop reason: SDUPTHER

## 2020-04-03 RX ORDER — ROSUVASTATIN CALCIUM 20 MG/1
20 TABLET, COATED ORAL DAILY
Qty: 90 TABLET | Refills: 3 | Status: SHIPPED | OUTPATIENT
Start: 2020-04-03 | End: 2021-04-07 | Stop reason: SDUPTHER

## 2020-04-03 NOTE — PROGRESS NOTES
Patient did not come to our locaiton from office today; encounter note revealed tx deferred today per Dr. De Leon.

## 2020-04-03 NOTE — PROGRESS NOTES
[facility]  HEMATOLOGY & ONCOLOGY    Cancer Staging Information:  No matching staging information was found for the patient.      Subjective     VISIT DIAGNOSIS:   No diagnosis found.    REASON FOR VISIT:     Chief Complaint   Patient presents with   • Breast Cancer     HERE FOR PROCRIT, NO COMPLAINTS         HEMATOLOGY / ONCOLOGY HISTORY:    No history exists.           INTERVAL HISTORY  Patient ID: Marina Joe is a 75 y.o. year old female h/o breast ca on arimidex. Tolerates AI very well. mammo 4/27/18 [facility]  -- had flu n Dec. Getting better. Per pt today is the first time she actually feels fine. Had flu shot in Nov of 2018. She would like refill of her arimidex  -1-/15/19: she is limping today. She is supposed to be getiing left knee replacement but have not gotten around to it. Other than that no other issues.  11/15/19: presented for procrit shot today. Complaining of breast pain comes and goes not new, mammo due in Dec. Also she sucks her tongue and wakes up with bloody phlegm. Started 2 weeks ago. Told her most likely due to dry air since she started using heater. If continues or worsens, will refer to ent.she saw her dentis last week per her report, he did not see any thing that will explain the blood. She denies tongue pain   4/3/2020: she has no issues or concerns. No lumps or bumps  --denies sob, brbpr, cp/BRIDGER, unintentional weight loss,n/v/adnominal pain. Rest of ros unremarkable. PE unchanged.    Past Medical History:   Past Medical History:   Diagnosis Date   • Breast cancer (CMS/HCC)    • CKD (chronic kidney disease)    • Hypercholesteremia    • Hypertension    • Sinusitis      Past Surgical History:   Past Surgical History:   Procedure Laterality Date   • AVULSION TOENAIL PLATE      Sept 26,2018   • BREAST BIOPSY Left 11/20/2012   • BREAST BIOPSY      Left Breast, 1/2019 per Dr Barkley   • BREAST LUMPECTOMY Left     with node bx    • COLONOSCOPY  09/13/2013    small polyp at 30cm  benign hyperplastic polyp, changes consistent with melanosis coli. Recall 5 years   • REPLACEMENT TOTAL KNEE Right     2016   • TOTAL ABDOMINAL HYSTERECTOMY WITH SALPINGO OOPHORECTOMY       Social History:   Social History     Socioeconomic History   • Marital status:      Spouse name: Not on file   • Number of children: Not on file   • Years of education: Not on file   • Highest education level: Not on file   Tobacco Use   • Smoking status: Never Smoker   • Smokeless tobacco: Never Used   Substance and Sexual Activity   • Alcohol use: No   • Drug use: Defer   • Sexual activity: Defer     Family History:   Family History   Problem Relation Age of Onset   • Heart attack Mother    • Hodgkin's lymphoma Father    • Other Father    • Cancer Father         hodgkins   • Heart attack Brother    • Skin cancer Maternal Uncle    • Pancreatic cancer Maternal Uncle    • Cancer Maternal Uncle         eye   • Colon cancer Neg Hx    • Colon polyps Neg Hx        Review of Systems   Constitutional: Negative.    HENT: Negative.    Eyes: Negative.    Respiratory: Negative.    Cardiovascular: Negative.    Gastrointestinal: Negative.    Genitourinary: Negative.    Musculoskeletal: Negative.    Skin: Negative.    Neurological: Negative.    Hematological: Negative.    Psychiatric/Behavioral: Negative.         Performance Status:  Asymptomatic    Medications:    Current Outpatient Medications   Medication Sig Dispense Refill   • anastrozole (ARIMIDEX) 1 MG tablet Take 1 tablet by mouth Daily. 90 tablet 4   • buPROPion XL (WELLBUTRIN XL) 150 MG 24 hr tablet Take  by mouth Daily.  1   • Calcium Carb-Cholecalciferol (CALCIUM 600+D3 PO) Take  by mouth.     • carvedilol (COREG) 6.25 MG tablet Take 6.25 mg by mouth 2 (Two) Times a Day With Meals.     • cetirizine (zyrTEC) 10 MG tablet Take 10 mg by mouth Daily.     • cyclobenzaprine (FLEXERIL) 10 MG tablet TK 1 T PO TID PRN  2   • Ergocalciferol (VITAMIN D2 PO) Take 50,000 Units by mouth.  "    • ferrous sulfate 325 (65 FE) MG tablet Take 325 mg by mouth Daily With Breakfast.     • fluticasone (FLOVENT DISKUS) 50 MCG/BLIST diskus inhaler Inhale 1 puff.     • irbesartan-hydrochlorothiazide (AVALIDE) 300-12.5 MG tablet Take 12.5 tablets by mouth Daily.  5   • levothyroxine (SYNTHROID, LEVOTHROID) 25 MCG tablet Take 25 mcg by mouth Daily.     • Misc Natural Products (COLON HERBAL CLEANSER PO) Take  by mouth.     • montelukast (SINGULAIR) 10 MG tablet Take 10 mg by mouth Daily.  1   • olmesartan-hydrochlorothiazide (BENICAR HCT) 40-25 MG per tablet Take 1 tablet by mouth Daily.     • Omega-3 Fatty Acids (FISH OIL) 1000 MG capsule capsule Take 1,000 mg by mouth.     • oxyCODONE (OXY-IR) 5 MG capsule Take 5 mg by mouth.     • Pediatric Multivitamins-Iron (PEDIATRIC MULTIPLE VITAMINS W/ IRON) 15 MG chewable tablet Chew 1 tablet Daily.     • rOPINIRole (REQUIP) 0.5 MG tablet 0.5 mg.  1   • rosuvastatin (CRESTOR) 20 MG tablet Take 20 mg by mouth Daily.     • sertraline (ZOLOFT) 100 MG tablet Take 100 mg by mouth Daily.     • traZODone (DESYREL) 50 MG tablet Take 1 tablet by mouth Every Night. 90 tablet 1   • valACYclovir (VALTREX) 500 MG tablet Take 500 mg by mouth.       No current facility-administered medications for this visit.        ALLERGIES:    Allergies   Allergen Reactions   • Aspirin GI Bleeding       Objective      Vitals:    04/03/20 0941   BP: 140/72   Pulse: 60   Resp: 16   Temp: 98 °F (36.7 °C)   SpO2: 98%   Weight: 115 kg (253 lb 6.4 oz)   Height: 172.7 cm (67.99\")   PainSc: 0-No pain         Current Status 4/3/2020   ECOG score 0         Physical Exam  General Appearance: Patient is awake, alert, oriented and in no acute distress. Patient is welldeveloped, wellnourished, and appears stated age.  HEENT: Normocephalic. Sclerae clear, conjunctiva pink, extraocular movements intact, pupils, round, reactive to light and  accommodation. Mouth and throat are clear with moist oral mucosa.  NECK: " Supple, no jugular venous distention, thyroid not enlarged.  LYMPH: No cervical, supraclavicular, axillary, or inguinal lymphadenopathy.  CHEST: Equal bilateral expansion, AP  diameter normal, resonant percussion note  LUNGS: Good air movement, no rales, rhonchi, rubs or wheezes with auscultation  CARDIO: Regular sinus rhythm, no murmurs, gallops or rubs.  ABDOMEN: Nondistended, soft, No tenderness, no guarding, no rebound, No hepatosplenomegaly. No abdominal masses. Bowel sounds positive. No hernia  GENITALIA: Not examined.  BREASTS: Not examined.  MUSKEL: No joint swelling, decreased motion, or inflammation  EXTREMS: No edema, clubbing, cyanosis, No varicose veins.  NEURO: Grossly nonfocal, Gait is coordinated and smooth, Cognition is preserved.  SKIN: No rashes, no ecchymoses, no petechia.  PSYCH: Oriented to time, place and person. Memory is preserved. Mood and affect appear normal  RECENT LABS:  Lab on 04/03/2020   Component Date Value Ref Range Status   • WBC 04/03/2020 5.73  3.40 - 10.80 10*3/mm3 Final   • RBC 04/03/2020 3.83  3.77 - 5.28 10*6/mm3 Final   • Hemoglobin 04/03/2020 11.4* 12.0 - 15.9 g/dL Final   • Hematocrit 04/03/2020 35.4  34.0 - 46.6 % Final   • MCV 04/03/2020 92.4  79.0 - 97.0 fL Final   • MCH 04/03/2020 29.8  26.6 - 33.0 pg Final   • MCHC 04/03/2020 32.2  31.5 - 35.7 g/dL Final   • RDW 04/03/2020 14.9  12.3 - 15.4 % Final   • RDW-SD 04/03/2020 50.4  37.0 - 54.0 fl Final   • MPV 04/03/2020 9.8  6.0 - 12.0 fL Final   • Platelets 04/03/2020 237  140 - 450 10*3/mm3 Final   • Neutrophil % 04/03/2020 52.6  42.7 - 76.0 % Final   • Lymphocyte % 04/03/2020 32.3  19.6 - 45.3 % Final   • Monocyte % 04/03/2020 9.2  5.0 - 12.0 % Final   • Eosinophil % 04/03/2020 5.1  0.3 - 6.2 % Final   • Basophil % 04/03/2020 0.5  0.0 - 1.5 % Final   • Immature Grans % 04/03/2020 0.3  0.0 - 0.5 % Final   • Neutrophils, Absolute 04/03/2020 3.01  1.70 - 7.00 10*3/mm3 Final   • Lymphocytes, Absolute 04/03/2020 1.85  0.70  - 3.10 10*3/mm3 Final   • Monocytes, Absolute 04/03/2020 0.53  0.10 - 0.90 10*3/mm3 Final   • Eosinophils, Absolute 04/03/2020 0.29  0.00 - 0.40 10*3/mm3 Final   • Basophils, Absolute 04/03/2020 0.03  0.00 - 0.20 10*3/mm3 Final   • Immature Grans, Absolute 04/03/2020 0.02  0.00 - 0.05 10*3/mm3 Final   • nRBC 04/03/2020 0.0  0.0 - 0.2 /100 WBC Final       RADIOLOGY:  No results found.         Assessment/Plan  Marina Joe is a 75 y.o. year old female with h/o breast ca also being followed for anemia 2/2 ckd.    Patient Active Problem List   Diagnosis   • Malignant neoplasm of central portion of left female breast (CMS/HCC)   • Anemia of chronic renal failure, stage 3 (moderate) (CMS/HCC)   • HTN (hypertension), benign   • Hx of colonic polyps   • HX: breast cancer   • Morbidly obese (CMS/HCC)   • Abnormal mammogram   • Breast mass   • Right knee DJD   • S/P lumpectomy, left breast        1. 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   -- Mammogram 7/3/19 No mammographic evidence of malignancy in the left breast. Recommend annual mammography which will be due on/after 12/12/2019.  --she tolerates AI with no SE, continue for 10yrs per her wishes  --had biopsy of left breast nodule per Dr Barkley and pathology was benign.  -breast pain comes and goes nt new, mammo due in Dec.   -labs cr 1.17 stable, lft nl, wbc 5.73, Hg 11.4, plt 237.  --william arimidex, refilled called in.    2.  Osteopenia.  DEXA scan May 2016 revealed osteopenia.  Start Boniva or Fosamax pending insurance coverage.  Continue daily calcium and vitamin D supplement     3.  Anemia in CKD stage III    --labs cr 1.17 stable, lft nl, wbc 5.73, Hg 11.4, plt 237. HCT 35.4.   Defer tx today  --RTC in a month for procrit    4. Hypothyroidism: on synthroid.  5. Depression: on wellbutrin  6. CAD: on coreg,  benicar  7. Chronic pain synd: on oxycodone IR       Samra De Leon MD    4/3/2020    09:45

## 2020-05-01 ENCOUNTER — OFFICE VISIT (OUTPATIENT)
Dept: ONCOLOGY | Facility: CLINIC | Age: 75
End: 2020-05-01

## 2020-05-01 ENCOUNTER — INFUSION (OUTPATIENT)
Dept: ONCOLOGY | Facility: HOSPITAL | Age: 75
End: 2020-05-01

## 2020-05-01 ENCOUNTER — LAB (OUTPATIENT)
Dept: LAB | Facility: HOSPITAL | Age: 75
End: 2020-05-01

## 2020-05-01 VITALS
HEART RATE: 64 BPM | OXYGEN SATURATION: 97 % | RESPIRATION RATE: 16 BRPM | TEMPERATURE: 98 F | SYSTOLIC BLOOD PRESSURE: 142 MMHG | BODY MASS INDEX: 38.51 KG/M2 | WEIGHT: 254.1 LBS | HEIGHT: 68 IN | DIASTOLIC BLOOD PRESSURE: 82 MMHG

## 2020-05-01 DIAGNOSIS — D63.1 ANEMIA OF CHRONIC RENAL FAILURE, STAGE 3 (MODERATE) (HCC): ICD-10-CM

## 2020-05-01 DIAGNOSIS — N18.30 ANEMIA OF CHRONIC RENAL FAILURE, STAGE 3 (MODERATE) (HCC): ICD-10-CM

## 2020-05-01 DIAGNOSIS — D63.1 ANEMIA OF CHRONIC RENAL FAILURE, STAGE 3 (MODERATE) (HCC): Primary | ICD-10-CM

## 2020-05-01 DIAGNOSIS — N18.30 ANEMIA OF CHRONIC RENAL FAILURE, STAGE 3 (MODERATE) (HCC): Primary | ICD-10-CM

## 2020-05-01 LAB
ALBUMIN SERPL-MCNC: 4 G/DL (ref 3.5–5.2)
ALBUMIN/GLOB SERPL: 1.2 G/DL
ALP SERPL-CCNC: 73 U/L (ref 39–117)
ALT SERPL W P-5'-P-CCNC: 15 U/L (ref 1–33)
ANION GAP SERPL CALCULATED.3IONS-SCNC: 11 MMOL/L (ref 5–15)
AST SERPL-CCNC: 21 U/L (ref 1–32)
BASOPHILS # BLD AUTO: 0.02 10*3/MM3 (ref 0–0.2)
BASOPHILS NFR BLD AUTO: 0.4 % (ref 0–1.5)
BILIRUB SERPL-MCNC: 0.3 MG/DL (ref 0.2–1.2)
BUN BLD-MCNC: 19 MG/DL (ref 8–23)
BUN/CREAT SERPL: 15.6 (ref 7–25)
CALCIUM SPEC-SCNC: 9.4 MG/DL (ref 8.6–10.5)
CHLORIDE SERPL-SCNC: 100 MMOL/L (ref 98–107)
CO2 SERPL-SCNC: 27 MMOL/L (ref 22–29)
CREAT BLD-MCNC: 1.22 MG/DL (ref 0.57–1)
DEPRECATED RDW RBC AUTO: 51.1 FL (ref 37–54)
EOSINOPHIL # BLD AUTO: 0.19 10*3/MM3 (ref 0–0.4)
EOSINOPHIL NFR BLD AUTO: 3.7 % (ref 0.3–6.2)
ERYTHROCYTE [DISTWIDTH] IN BLOOD BY AUTOMATED COUNT: 14.8 % (ref 12.3–15.4)
GFR SERPL CREATININE-BSD FRML MDRD: 52 ML/MIN/1.73
GLOBULIN UR ELPH-MCNC: 3.3 GM/DL
GLUCOSE BLD-MCNC: 122 MG/DL (ref 65–99)
HCT VFR BLD AUTO: 35.2 % (ref 34–46.6)
HGB BLD-MCNC: 11.2 G/DL (ref 12–15.9)
HOLD SPECIMEN: NORMAL
HOLD SPECIMEN: NORMAL
IMM GRANULOCYTES # BLD AUTO: 0.01 10*3/MM3 (ref 0–0.05)
IMM GRANULOCYTES NFR BLD AUTO: 0.2 % (ref 0–0.5)
LYMPHOCYTES # BLD AUTO: 1.37 10*3/MM3 (ref 0.7–3.1)
LYMPHOCYTES NFR BLD AUTO: 27 % (ref 19.6–45.3)
MCH RBC QN AUTO: 29.7 PG (ref 26.6–33)
MCHC RBC AUTO-ENTMCNC: 31.8 G/DL (ref 31.5–35.7)
MCV RBC AUTO: 93.4 FL (ref 79–97)
MONOCYTES # BLD AUTO: 0.41 10*3/MM3 (ref 0.1–0.9)
MONOCYTES NFR BLD AUTO: 8.1 % (ref 5–12)
NEUTROPHILS # BLD AUTO: 3.07 10*3/MM3 (ref 1.7–7)
NEUTROPHILS NFR BLD AUTO: 60.6 % (ref 42.7–76)
NRBC BLD AUTO-RTO: 0 /100 WBC (ref 0–0.2)
PLATELET # BLD AUTO: 216 10*3/MM3 (ref 140–450)
PMV BLD AUTO: 9.6 FL (ref 6–12)
POTASSIUM BLD-SCNC: 3.4 MMOL/L (ref 3.5–5.2)
PROT SERPL-MCNC: 7.3 G/DL (ref 6–8.5)
RBC # BLD AUTO: 3.77 10*6/MM3 (ref 3.77–5.28)
SODIUM BLD-SCNC: 138 MMOL/L (ref 136–145)
WBC NRBC COR # BLD: 5.07 10*3/MM3 (ref 3.4–10.8)

## 2020-05-01 PROCEDURE — 80053 COMPREHEN METABOLIC PANEL: CPT

## 2020-05-01 PROCEDURE — 85025 COMPLETE CBC W/AUTO DIFF WBC: CPT

## 2020-05-01 PROCEDURE — 99214 OFFICE O/P EST MOD 30 MIN: CPT | Performed by: INTERNAL MEDICINE

## 2020-05-01 PROCEDURE — 36415 COLL VENOUS BLD VENIPUNCTURE: CPT

## 2020-05-05 ENCOUNTER — OFFICE VISIT (OUTPATIENT)
Dept: INTERNAL MEDICINE | Facility: CLINIC | Age: 75
End: 2020-05-05

## 2020-05-05 VITALS
TEMPERATURE: 97.7 F | HEIGHT: 68 IN | DIASTOLIC BLOOD PRESSURE: 84 MMHG | HEART RATE: 62 BPM | OXYGEN SATURATION: 100 % | WEIGHT: 251 LBS | SYSTOLIC BLOOD PRESSURE: 154 MMHG | BODY MASS INDEX: 38.04 KG/M2

## 2020-05-05 DIAGNOSIS — E03.9 ACQUIRED HYPOTHYROIDISM: Primary | ICD-10-CM

## 2020-05-05 DIAGNOSIS — M25.562 CHRONIC PAIN OF LEFT KNEE: ICD-10-CM

## 2020-05-05 DIAGNOSIS — G89.29 CHRONIC PAIN OF LEFT KNEE: ICD-10-CM

## 2020-05-05 DIAGNOSIS — I10 HTN (HYPERTENSION), BENIGN: ICD-10-CM

## 2020-05-05 DIAGNOSIS — E66.01 MORBIDLY OBESE (HCC): ICD-10-CM

## 2020-05-05 DIAGNOSIS — F51.01 PRIMARY INSOMNIA: ICD-10-CM

## 2020-05-05 PROCEDURE — 99214 OFFICE O/P EST MOD 30 MIN: CPT | Performed by: NURSE PRACTITIONER

## 2020-05-05 RX ORDER — TRAZODONE HYDROCHLORIDE 50 MG/1
50 TABLET ORAL NIGHTLY
Qty: 90 TABLET | Refills: 1 | Status: SHIPPED | OUTPATIENT
Start: 2020-05-05 | End: 2020-05-05

## 2020-05-05 RX ORDER — VALACYCLOVIR HYDROCHLORIDE 500 MG/1
500 TABLET, FILM COATED ORAL 2 TIMES DAILY PRN
Qty: 20 TABLET | Refills: 1 | Status: SHIPPED | OUTPATIENT
Start: 2020-05-05 | End: 2020-07-10

## 2020-05-05 RX ORDER — CYCLOBENZAPRINE HCL 10 MG
10 TABLET ORAL 3 TIMES DAILY PRN
Qty: 90 TABLET | Refills: 2 | Status: SHIPPED | OUTPATIENT
Start: 2020-05-05 | End: 2021-04-28 | Stop reason: SDUPTHER

## 2020-05-05 RX ORDER — TRAZODONE HYDROCHLORIDE 100 MG/1
100 TABLET ORAL NIGHTLY
Qty: 30 TABLET | Refills: 3 | Status: SHIPPED | OUTPATIENT
Start: 2020-05-05 | End: 2020-10-27

## 2020-05-05 NOTE — PATIENT INSTRUCTIONS
Insomnia  Insomnia is a sleep disorder that makes it difficult to fall asleep or stay asleep. Insomnia can cause fatigue, low energy, difficulty concentrating, mood swings, and poor performance at work or school.  There are three different ways to classify insomnia:  · Difficulty falling asleep.  · Difficulty staying asleep.  · Waking up too early in the morning.  Any type of insomnia can be long-term (chronic) or short-term (acute). Both are common. Short-term insomnia usually lasts for three months or less. Chronic insomnia occurs at least three times a week for longer than three months.  What are the causes?  Insomnia may be caused by another condition, situation, or substance, such as:  · Anxiety.  · Certain medicines.  · Gastroesophageal reflux disease (GERD) or other gastrointestinal conditions.  · Asthma or other breathing conditions.  · Restless legs syndrome, sleep apnea, or other sleep disorders.  · Chronic pain.  · Menopause.  · Stroke.  · Abuse of alcohol, tobacco, or illegal drugs.  · Mental health conditions, such as depression.  · Caffeine.  · Neurological disorders, such as Alzheimer's disease.  · An overactive thyroid (hyperthyroidism).  Sometimes, the cause of insomnia may not be known.  What increases the risk?  Risk factors for insomnia include:  · Gender. Women are affected more often than men.  · Age. Insomnia is more common as you get older.  · Stress.  · Lack of exercise.  · Irregular work schedule or working night shifts.  · Traveling between different time zones.  · Certain medical and mental health conditions.  What are the signs or symptoms?  If you have insomnia, the main symptom is having trouble falling asleep or having trouble staying asleep. This may lead to other symptoms, such as:  · Feeling fatigued or having low energy.  · Feeling nervous about going to sleep.  · Not feeling rested in the morning.  · Having trouble concentrating.  · Feeling irritable, anxious, or depressed.  How  is this diagnosed?  This condition may be diagnosed based on:  · Your symptoms and medical history. Your health care provider may ask about:  ? Your sleep habits.  ? Any medical conditions you have.  ? Your mental health.  · A physical exam.  How is this treated?  Treatment for insomnia depends on the cause. Treatment may focus on treating an underlying condition that is causing insomnia. Treatment may also include:  · Medicines to help you sleep.  · Counseling or therapy.  · Lifestyle adjustments to help you sleep better.  Follow these instructions at home:  Eating and drinking    · Limit or avoid alcohol, caffeinated beverages, and cigarettes, especially close to bedtime. These can disrupt your sleep.  · Do not eat a large meal or eat spicy foods right before bedtime. This can lead to digestive discomfort that can make it hard for you to sleep.  Sleep habits    · Keep a sleep diary to help you and your health care provider figure out what could be causing your insomnia. Write down:  ? When you sleep.  ? When you wake up during the night.  ? How well you sleep.  ? How rested you feel the next day.  ? Any side effects of medicines you are taking.  ? What you eat and drink.  · Make your bedroom a dark, comfortable place where it is easy to fall asleep.  ? Put up shades or blackout curtains to block light from outside.  ? Use a white noise machine to block noise.  ? Keep the temperature cool.  · Limit screen use before bedtime. This includes:  ? Watching TV.  ? Using your smartphone, tablet, or computer.  · Stick to a routine that includes going to bed and waking up at the same times every day and night. This can help you fall asleep faster. Consider making a quiet activity, such as reading, part of your nighttime routine.  · Try to avoid taking naps during the day so that you sleep better at night.  · Get out of bed if you are still awake after 15 minutes of trying to sleep. Keep the lights down, but try reading or  doing a quiet activity. When you feel sleepy, go back to bed.  General instructions  · Take over-the-counter and prescription medicines only as told by your health care provider.  · Exercise regularly, as told by your health care provider. Avoid exercise starting several hours before bedtime.  · Use relaxation techniques to manage stress. Ask your health care provider to suggest some techniques that may work well for you. These may include:  ? Breathing exercises.  ? Routines to release muscle tension.  ? Visualizing peaceful scenes.  · Make sure that you drive carefully. Avoid driving if you feel very sleepy.  · Keep all follow-up visits as told by your health care provider. This is important.  Contact a health care provider if:  · You are tired throughout the day.  · You have trouble in your daily routine due to sleepiness.  · You continue to have sleep problems, or your sleep problems get worse.  Get help right away if:  · You have serious thoughts about hurting yourself or someone else.  If you ever feel like you may hurt yourself or others, or have thoughts about taking your own life, get help right away. You can go to your nearest emergency department or call:  · Your local emergency services (911 in the [place]  · A suicide crisis helpline, such as the National Suicide Prevention Lifeline at 1-213.326.2313. This is open 24 hours a day.  Summary  · Insomnia is a sleep disorder that makes it difficult to fall asleep or stay asleep.  · Insomnia can be long-term (chronic) or short-term (acute).  · Treatment for insomnia depends on the cause. Treatment may focus on treating an underlying condition that is causing insomnia.  · Keep a sleep diary to help you and your health care provider figure out what could be causing your insomnia.  This information is not intended to replace advice given to you by your health care provider. Make sure you discuss any questions you have with your health care provider.  Document  "Released: 12/15/2001 Document Revised: 09/27/2018 Document Reviewed: 09/27/2018  [employer] Interactive Patient Education © 2020 [employer]  DASH Eating Plan  DASH stands for \"Dietary Approaches to Stop Hypertension.\" The DASH eating plan is a healthy eating plan that has been shown to reduce high blood pressure (hypertension). It may also reduce your risk for type 2 diabetes, heart disease, and stroke. The DASH eating plan may also help with weight loss.  What are tips for following this plan?    General guidelines  · Avoid eating more than 2,300 mg (milligrams) of salt (sodium) a day. If you have hypertension, you may need to reduce your sodium intake to 1,500 mg a day.  · Limit alcohol intake to no more than 1 drink a day for nonpregnant women and 2 drinks a day for men. One drink equals 12 oz of beer, 5 oz of wine, or 1½ oz of hard liquor.  · Work with your health care provider to maintain a healthy body weight or to lose weight. Ask what an ideal weight is for you.  · Get at least 30 minutes of exercise that causes your heart to beat faster (aerobic exercise) most days of the week. Activities may include walking, swimming, or biking.  · Work with your health care provider or diet and nutrition specialist (dietitian) to adjust your eating plan to your individual calorie needs.  Reading food labels    · Check food labels for the amount of sodium per serving. Choose foods with less than 5 percent of the Daily Value of sodium. Generally, foods with less than 300 mg of sodium per serving fit into this eating plan.  · To find whole grains, look for the word \"whole\" as the first word in the ingredient list.  Shopping  · Buy products labeled as \"low-sodium\" or \"no salt added.\"  · Buy fresh foods. Avoid canned foods and premade or frozen meals.  Cooking  · Avoid adding salt when cooking. Use salt-free seasonings or herbs instead of table salt or sea salt. Check with your health care provider or pharmacist before using " salt substitutes.  · Do not caraballo foods. Cook foods using healthy methods such as baking, boiling, grilling, and broiling instead.  · Cook with heart-healthy oils, such as olive, canola, soybean, or sunflower oil.  Meal planning  · Eat a balanced diet that includes:  ? 5 or more servings of fruits and vegetables each day. At each meal, try to fill half of your plate with fruits and vegetables.  ? Up to 6-8 servings of whole grains each day.  ? Less than 6 oz of lean meat, poultry, or fish each day. A 3-oz serving of meat is about the same size as a deck of cards. One egg equals 1 oz.  ? 2 servings of low-fat dairy each day.  ? A serving of nuts, seeds, or beans 5 times each week.  ? Heart-healthy fats. Healthy fats called Omega-3 fatty acids are found in foods such as flaxseeds and coldwater fish, like sardines, salmon, and mackerel.  · Limit how much you eat of the following:  ? Canned or prepackaged foods.  ? Food that is high in trans fat, such as fried foods.  ? Food that is high in saturated fat, such as fatty meat.  ? Sweets, desserts, sugary drinks, and other foods with added sugar.  ? Full-fat dairy products.  · Do not salt foods before eating.  · Try to eat at least 2 vegetarian meals each week.  · Eat more home-cooked food and less restaurant, buffet, and fast food.  · When eating at a restaurant, ask that your food be prepared with less salt or no salt, if possible.  What foods are recommended?  The items listed may not be a complete list. Talk with your dietitian about what dietary choices are best for you.  Grains  Whole-grain or whole-wheat bread. Whole-grain or whole-wheat pasta. Brown rice. Oatmeal. Quinoa. Bulgur. Whole-grain and low-sodium cereals. Lisa bread. Low-fat, low-sodium crackers. Whole-wheat flour tortillas.  Vegetables  Fresh or frozen vegetables (raw, steamed, roasted, or grilled). Low-sodium or reduced-sodium tomato and vegetable juice. Low-sodium or reduced-sodium tomato sauce and  tomato paste. Low-sodium or reduced-sodium canned vegetables.  Fruits  All fresh, dried, or frozen fruit. Canned fruit in natural juice (without added sugar).  Meat and other protein foods  Skinless chicken or turkey. Ground chicken or turkey. Pork with fat trimmed off. Fish and seafood. Egg whites. Dried beans, peas, or lentils. Unsalted nuts, nut butters, and seeds. Unsalted canned beans. Lean cuts of beef with fat trimmed off. Low-sodium, lean deli meat.  Dairy  Low-fat (1%) or fat-free (skim) milk. Fat-free, low-fat, or reduced-fat cheeses. Nonfat, low-sodium ricotta or cottage cheese. Low-fat or nonfat yogurt. Low-fat, low-sodium cheese.  Fats and oils  Soft margarine without trans fats. Vegetable oil. Low-fat, reduced-fat, or light mayonnaise and salad dressings (reduced-sodium). Canola, safflower, olive, soybean, and sunflower oils. Avocado.  Seasoning and other foods  Herbs. Spices. Seasoning mixes without salt. Unsalted popcorn and pretzels. Fat-free sweets.  What foods are not recommended?  The items listed may not be a complete list. Talk with your dietitian about what dietary choices are best for you.  Grains  Baked goods made with fat, such as croissants, muffins, or some breads. Dry pasta or rice meal packs.  Vegetables  Creamed or fried vegetables. Vegetables in a cheese sauce. Regular canned vegetables (not low-sodium or reduced-sodium). Regular canned tomato sauce and paste (not low-sodium or reduced-sodium). Regular tomato and vegetable juice (not low-sodium or reduced-sodium). Pickles. Olives.  Fruits  Canned fruit in a light or heavy syrup. Fried fruit. Fruit in cream or butter sauce.  Meat and other protein foods  Fatty cuts of meat. Ribs. Fried meat. Kaur. Sausage. Bologna and other processed lunch meats. Salami. Fatback. Hotdogs. Bratwurst. Salted nuts and seeds. Canned beans with added salt. Canned or smoked fish. Whole eggs or egg yolks. Chicken or turkey with skin.  Dairy  Whole or 2%  milk, cream, and half-and-half. Whole or full-fat cream cheese. Whole-fat or sweetened yogurt. Full-fat cheese. Nondairy creamers. Whipped toppings. Processed cheese and cheese spreads.  Fats and oils  Butter. Stick margarine. Lard. Shortening. Ghee. Kaur fat. Tropical oils, such as coconut, palm kernel, or palm oil.  Seasoning and other foods  Salted popcorn and pretzels. Onion salt, garlic salt, seasoned salt, table salt, and sea salt. Worcestershire sauce. Tartar sauce. Barbecue sauce. Teriyaki sauce. Soy sauce, including reduced-sodium. Steak sauce. Canned and packaged gravies. Fish sauce. Oyster sauce. Cocktail sauce. Horseradish that you find on the shelf. Ketchup. Mustard. Meat flavorings and tenderizers. Bouillon cubes. Hot sauce and Tabasco sauce. Premade or packaged marinades. Premade or packaged taco seasonings. Relishes. Regular salad dressings.  Where to find more information:  · National Heart, Lung, and Blood [place]: www.nhlbi.nih.gov  · American Heart Association: www.heart.org  Summary  · The DASH eating plan is a healthy eating plan that has been shown to reduce high blood pressure (hypertension). It may also reduce your risk for type 2 diabetes, heart disease, and stroke.  · With the DASH eating plan, you should limit salt (sodium) intake to 2,300 mg a day. If you have hypertension, you may need to reduce your sodium intake to 1,500 mg a day.  · When on the DASH eating plan, aim to eat more fresh fruits and vegetables, whole grains, lean proteins, low-fat dairy, and heart-healthy fats.  · Work with your health care provider or diet and nutrition specialist (dietitian) to adjust your eating plan to your individual calorie needs.  This information is not intended to replace advice given to you by your health care provider. Make sure you discuss any questions you have with your health care provider.  Document Released: 12/06/2012 Document Revised: 12/11/2017 Document Reviewed:  12/11/2017  [employer] Interactive Patient Education © 2020 [employer]

## 2020-05-05 NOTE — PROGRESS NOTES
"Subjective   Marina GINO Joe is a 75 y.o. female.   Chief Complaint   Patient presents with   • Hypertension     follow up   • Insomnia     having trouble falling asleep, restless leg. Patient has tried melatonin, didn't help.        Ms. Joe presents to the clinic today for follow up on hypertension and to recheck TSH level. Patient blood pressure is elevated today, but she reports that she is very nervous to be at the clinic today related to COVID-19. Patient reports that her blood pressures at home have been consistently 140's/80's. Patient denies shortness of breath, palpitations, lower extremity edema, and chest pain beyond breast pain on the left side where she had her lumpectomy. Patient was seen by Oncologist/Hematologist and he reviewed her labs. I reviewed them as well today. She continues to take Arimidex and is doing well.  Patient meets with her nephrologist Dr. Martin next month and has repeat labs end of May. Patient denies changes in urine frequency or character. Patient denies history of recurrent urinary tract infection and states that she is trying to improve her hydration.     Patient reports insomnia has worsened with the onset of COVID-19. Patient reports that she is staying up 11 pm or later watching TV and unable to \"calm her nerves\" enough to sleep. Patient reports that when she does sleep it is restful. Patient has tried alternative therapies such as Melatonin, antidepressants, and Requip with minimal relief. Patient states that the issue is moderate now and is wanting additional intervention. She has not reviewed the sleep book.       The following portions of the patient's history were reviewed and updated as appropriate: allergies, current medications, past family history, past medical history, past social history, past surgical history and problem list.    Review of Systems    Objective   Past Medical History:   Diagnosis Date   • Breast cancer (CMS/HCC)    • CKD (chronic kidney disease)  "   • Hypercholesteremia    • Hypertension    • Sinusitis       Past Surgical History:   Procedure Laterality Date   • AVULSION TOENAIL PLATE      Sept 26,2018   • BREAST BIOPSY Left 11/20/2012   • BREAST BIOPSY      Left Breast, 1/2019 per Dr Barkley   • BREAST LUMPECTOMY Left     with node bx    • COLONOSCOPY  09/13/2013    small polyp at 30cm benign hyperplastic polyp, changes consistent with melanosis coli. Recall 5 years   • REPLACEMENT TOTAL KNEE Right     2016   • TOTAL ABDOMINAL HYSTERECTOMY WITH SALPINGO OOPHORECTOMY          Current Outpatient Medications:   •  anastrozole (ARIMIDEX) 1 MG tablet, Take 1 tablet by mouth Daily., Disp: 90 tablet, Rfl: 4  •  buPROPion XL (WELLBUTRIN XL) 150 MG 24 hr tablet, Take 1 tablet by mouth Daily., Disp: 90 tablet, Rfl: 3  •  Calcium Carb-Cholecalciferol (CALCIUM 600+D3 PO), Take  by mouth., Disp: , Rfl:   •  carvedilol (COREG) 6.25 MG tablet, Take 1 tablet by mouth 2 (Two) Times a Day With Meals., Disp: 180 tablet, Rfl: 3  •  cetirizine (zyrTEC) 10 MG tablet, Take 10 mg by mouth Daily., Disp: , Rfl:   •  cyclobenzaprine (FLEXERIL) 10 MG tablet, Take 1 tablet by mouth 3 (Three) Times a Day As Needed for Muscle Spasms., Disp: 90 tablet, Rfl: 2  •  Ergocalciferol (VITAMIN D2 PO), Take 50,000 Units by mouth Every 30 (Thirty) Days., Disp: , Rfl:   •  ferrous sulfate 325 (65 FE) MG tablet, Take 325 mg by mouth Daily With Breakfast., Disp: , Rfl:   •  fluticasone (FLOVENT DISKUS) 50 MCG/BLIST diskus inhaler, Inhale 1 puff., Disp: , Rfl:   •  irbesartan-hydrochlorothiazide (AVALIDE) 300-12.5 MG tablet, Take 1 tablet by mouth Daily., Disp: 90 tablet, Rfl: 3  •  levothyroxine (SYNTHROID, LEVOTHROID) 25 MCG tablet, Take 25 mcg by mouth Daily., Disp: , Rfl:   •  montelukast (SINGULAIR) 10 MG tablet, Take 1 tablet by mouth Daily., Disp: 90 tablet, Rfl: 3  •  olmesartan-hydrochlorothiazide (BENICAR HCT) 40-25 MG per tablet, Take 1 tablet by mouth Daily., Disp: , Rfl:   •  Omega-3 Fatty  Acids (FISH OIL) 1000 MG capsule capsule, Take 1,000 mg by mouth., Disp: , Rfl:   •  oxyCODONE (OXY-IR) 5 MG capsule, Take 5 mg by mouth Daily., Disp: , Rfl:   •  Pediatric Multivitamins-Iron (PEDIATRIC MULTIPLE VITAMINS W/ IRON) 15 MG chewable tablet, Chew 1 tablet Daily., Disp: , Rfl:   •  rOPINIRole (REQUIP) 0.5 MG tablet, Take 1 tablet by mouth Every Night., Disp: 90 tablet, Rfl: 3  •  rosuvastatin (CRESTOR) 20 MG tablet, Take 1 tablet by mouth Daily., Disp: 90 tablet, Rfl: 3  •  sertraline (ZOLOFT) 100 MG tablet, Take 1 tablet by mouth Daily., Disp: 90 tablet, Rfl: 3  •  traZODone (DESYREL) 100 MG tablet, Take 1 tablet by mouth Every Night., Disp: 30 tablet, Rfl: 3  •  valACYclovir (VALTREX) 500 MG tablet, Take 1 tablet by mouth 2 (Two) Times a Day As Needed (herpes)., Disp: 20 tablet, Rfl: 1  •  Misc Natural Products (COLON HERBAL CLEANSER PO), Take  by mouth., Disp: , Rfl:      Vitals:    05/05/20 0751   BP: 154/84   Pulse: 62   Temp: 97.7 °F (36.5 °C)   SpO2: 100%         05/05/20  0751   Weight: 114 kg (251 lb)     Patient's Body mass index is 38.16 kg/m². BMI is above normal parameters. Recommendations include: educational material, exercise counseling and nutrition counseling.      Physical Exam   Constitutional: She is oriented to person, place, and time. She appears well-developed and well-nourished.   HENT:   Head: Normocephalic and atraumatic.   Right Ear: Hearing and external ear normal.   Left Ear: Hearing and external ear normal.   Nose: Nose normal.   Mouth/Throat: Uvula is midline.   Eyes: Pupils are equal, round, and reactive to light. Conjunctivae and EOM are normal.   Neck: Trachea normal and normal range of motion. Neck supple. Carotid bruit is not present. No thyromegaly present.   Cardiovascular: Normal rate, regular rhythm, normal heart sounds and intact distal pulses.   Pulmonary/Chest: Effort normal and breath sounds normal.   Abdominal: Soft. Bowel sounds are normal.   Musculoskeletal:  "Normal range of motion.   Neurological: She is alert and oriented to person, place, and time.   Skin: Skin is warm and dry.   Psychiatric: She has a normal mood and affect. Her speech is normal and behavior is normal. Thought content normal. Cognition and memory are normal.             Assessment/Plan   Diagnoses and all orders for this visit:    1. Acquired hypothyroidism (Primary)  -     TSH    2. HTN (hypertension), benign    3. Primary insomnia    4. Morbidly obese (CMS/HCC)    5. Chronic pain of left knee    Other orders  -     cyclobenzaprine (FLEXERIL) 10 MG tablet; Take 1 tablet by mouth 3 (Three) Times a Day As Needed for Muscle Spasms.  Dispense: 90 tablet; Refill: 2  -     valACYclovir (VALTREX) 500 MG tablet; Take 1 tablet by mouth 2 (Two) Times a Day As Needed (herpes).  Dispense: 20 tablet; Refill: 1  -     Discontinue: traZODone (DESYREL) 50 MG tablet; Take 1 tablet by mouth Every Night.  Dispense: 90 tablet; Refill: 1  -     traZODone (DESYREL) 100 MG tablet; Take 1 tablet by mouth Every Night.  Dispense: 30 tablet; Refill: 3    Patient reported that overall she has been doing well. Had follow up with oncologist last week and follow up with Dr. Martin in June. Patient reports worsening of insomnia symptoms. Patient reports that insomnia is worse related to increased anxiety related to COVID-19 pandemic. Patient advised to review \"sleep book\" and attempt sleep hygiene techniques.Will increase Trazadone 100 mg nightly for insomnia. Knee pain is chronic and she has plans to replace left knee, however, COVID-19 has postponed surgery at this time. Advised patient on diet and exercise to improve overall health.             "

## 2020-05-06 LAB — TSH SERPL DL<=0.005 MIU/L-ACNC: 4.79 UIU/ML (ref 0.27–4.2)

## 2020-05-08 LAB
Lab: NORMAL
T3FREE SERPL-MCNC: 2.6 PG/ML (ref 2–4.4)
T4 FREE SERPL-MCNC: 1.04 NG/DL (ref 0.93–1.7)
THYROPEROXIDASE AB SERPL-ACNC: <9 IU/ML (ref 0–34)
WRITTEN AUTHORIZATION: NORMAL

## 2020-05-29 ENCOUNTER — INFUSION (OUTPATIENT)
Dept: ONCOLOGY | Facility: HOSPITAL | Age: 75
End: 2020-05-29

## 2020-05-29 ENCOUNTER — LAB (OUTPATIENT)
Dept: LAB | Facility: HOSPITAL | Age: 75
End: 2020-05-29

## 2020-05-29 ENCOUNTER — OFFICE VISIT (OUTPATIENT)
Dept: ONCOLOGY | Facility: CLINIC | Age: 75
End: 2020-05-29

## 2020-05-29 VITALS
SYSTOLIC BLOOD PRESSURE: 170 MMHG | RESPIRATION RATE: 20 BRPM | OXYGEN SATURATION: 96 % | HEART RATE: 58 BPM | HEIGHT: 68 IN | BODY MASS INDEX: 37.89 KG/M2 | TEMPERATURE: 98.2 F | WEIGHT: 250 LBS | DIASTOLIC BLOOD PRESSURE: 67 MMHG

## 2020-05-29 VITALS
TEMPERATURE: 97.7 F | HEIGHT: 68 IN | HEART RATE: 63 BPM | OXYGEN SATURATION: 99 % | BODY MASS INDEX: 37.96 KG/M2 | WEIGHT: 250.5 LBS | RESPIRATION RATE: 18 BRPM | DIASTOLIC BLOOD PRESSURE: 80 MMHG | SYSTOLIC BLOOD PRESSURE: 155 MMHG

## 2020-05-29 DIAGNOSIS — D63.1 ANEMIA OF CHRONIC RENAL FAILURE, STAGE 3 (MODERATE) (HCC): ICD-10-CM

## 2020-05-29 DIAGNOSIS — C50.112 MALIGNANT NEOPLASM OF CENTRAL PORTION OF LEFT FEMALE BREAST, UNSPECIFIED ESTROGEN RECEPTOR STATUS (HCC): ICD-10-CM

## 2020-05-29 DIAGNOSIS — N18.30 ANEMIA OF CHRONIC RENAL FAILURE, STAGE 3 (MODERATE) (HCC): ICD-10-CM

## 2020-05-29 DIAGNOSIS — D63.1 ANEMIA OF CHRONIC RENAL FAILURE, STAGE 3 (MODERATE) (HCC): Primary | ICD-10-CM

## 2020-05-29 DIAGNOSIS — N18.30 ANEMIA OF CHRONIC RENAL FAILURE, STAGE 3 (MODERATE) (HCC): Primary | ICD-10-CM

## 2020-05-29 PROBLEM — E03.9 ADULT HYPOTHYROIDISM: Status: ACTIVE | Noted: 2020-05-29

## 2020-05-29 PROBLEM — H65.05 RECURRENT ACUTE SEROUS OTITIS MEDIA OF LEFT EAR: Status: ACTIVE | Noted: 2020-05-29

## 2020-05-29 PROBLEM — Z78.0 POSTMENOPAUSAL STATUS: Status: ACTIVE | Noted: 2020-05-29

## 2020-05-29 PROBLEM — Z23 ENCOUNTER FOR IMMUNIZATION: Status: ACTIVE | Noted: 2020-05-29

## 2020-05-29 PROBLEM — H92.02 LEFT EAR PAIN: Status: ACTIVE | Noted: 2020-05-29

## 2020-05-29 PROBLEM — B02.9 HERPES ZOSTER WITHOUT COMPLICATION: Status: ACTIVE | Noted: 2020-05-29

## 2020-05-29 PROBLEM — F51.04 CHRONIC INSOMNIA: Status: ACTIVE | Noted: 2020-05-29

## 2020-05-29 PROBLEM — F41.9 ANXIETY: Status: ACTIVE | Noted: 2020-05-29

## 2020-05-29 PROBLEM — D64.9 ANEMIA: Status: ACTIVE | Noted: 2020-05-29

## 2020-05-29 PROBLEM — J31.0 RHINITIS: Status: ACTIVE | Noted: 2020-05-29

## 2020-05-29 PROBLEM — J32.9 SINUSITIS, BACTERIAL: Status: ACTIVE | Noted: 2020-05-29

## 2020-05-29 PROBLEM — G25.81 RESTLESS LEG: Status: ACTIVE | Noted: 2020-05-29

## 2020-05-29 PROBLEM — J06.9 UPPER RESPIRATORY INFECTION: Status: ACTIVE | Noted: 2020-05-29

## 2020-05-29 PROBLEM — C50.912 BREAST CANCER, LEFT: Status: ACTIVE | Noted: 2020-05-29

## 2020-05-29 PROBLEM — E78.5 ELEVATED LIPIDS: Status: ACTIVE | Noted: 2020-05-29

## 2020-05-29 PROBLEM — J10.1 INFLUENZA B: Status: ACTIVE | Noted: 2020-05-29

## 2020-05-29 PROBLEM — Z13.31 NEGATIVE DEPRESSION SCREENING: Status: ACTIVE | Noted: 2020-05-29

## 2020-05-29 PROBLEM — Z91.81 AT LOW RISK FOR FALL: Status: ACTIVE | Noted: 2020-05-29

## 2020-05-29 PROBLEM — H60.92 LEFT OTITIS EXTERNA: Status: ACTIVE | Noted: 2020-05-29

## 2020-05-29 PROBLEM — M79.10 MYALGIA: Status: ACTIVE | Noted: 2020-05-29

## 2020-05-29 PROBLEM — R05.9 COUGH: Status: ACTIVE | Noted: 2020-05-29

## 2020-05-29 PROBLEM — E55.9 VITAMIN D DEFICIENCY: Status: ACTIVE | Noted: 2020-05-29

## 2020-05-29 PROBLEM — M54.2 CERVICAL PAIN: Status: ACTIVE | Noted: 2020-05-29

## 2020-05-29 PROBLEM — S39.012A LUMBAR STRAIN, INITIAL ENCOUNTER: Status: ACTIVE | Noted: 2020-05-29

## 2020-05-29 PROBLEM — B96.89 SINUSITIS, BACTERIAL: Status: ACTIVE | Noted: 2020-05-29

## 2020-05-29 PROBLEM — L98.9 SKIN LESION: Status: ACTIVE | Noted: 2020-05-29

## 2020-05-29 PROBLEM — E66.9 OBESITY (BMI 30-39.9): Status: ACTIVE | Noted: 2020-05-29

## 2020-05-29 PROBLEM — N39.0 URINARY TRACT INFECTION WITHOUT HEMATURIA: Status: ACTIVE | Noted: 2020-05-29

## 2020-05-29 LAB
ALBUMIN SERPL-MCNC: 4.2 G/DL (ref 3.5–5.2)
ALBUMIN/GLOB SERPL: 1.2 G/DL
ALP SERPL-CCNC: 66 U/L (ref 39–117)
ALT SERPL W P-5'-P-CCNC: 17 U/L (ref 1–33)
ANION GAP SERPL CALCULATED.3IONS-SCNC: 13 MMOL/L (ref 5–15)
AST SERPL-CCNC: 19 U/L (ref 1–32)
BASOPHILS # BLD AUTO: 0.03 10*3/MM3 (ref 0–0.2)
BASOPHILS NFR BLD AUTO: 0.5 % (ref 0–1.5)
BILIRUB SERPL-MCNC: 0.3 MG/DL (ref 0.2–1.2)
BUN BLD-MCNC: 22 MG/DL (ref 8–23)
BUN/CREAT SERPL: 19.3 (ref 7–25)
CALCIUM SPEC-SCNC: 9.6 MG/DL (ref 8.6–10.5)
CHLORIDE SERPL-SCNC: 100 MMOL/L (ref 98–107)
CO2 SERPL-SCNC: 25 MMOL/L (ref 22–29)
CREAT BLD-MCNC: 1.14 MG/DL (ref 0.57–1)
DEPRECATED RDW RBC AUTO: 50.7 FL (ref 37–54)
EOSINOPHIL # BLD AUTO: 0.21 10*3/MM3 (ref 0–0.4)
EOSINOPHIL NFR BLD AUTO: 3.7 % (ref 0.3–6.2)
ERYTHROCYTE [DISTWIDTH] IN BLOOD BY AUTOMATED COUNT: 15 % (ref 12.3–15.4)
GFR SERPL CREATININE-BSD FRML MDRD: 56 ML/MIN/1.73
GLOBULIN UR ELPH-MCNC: 3.4 GM/DL
GLUCOSE BLD-MCNC: 111 MG/DL (ref 65–99)
HCT VFR BLD AUTO: 34.4 % (ref 34–46.6)
HGB BLD-MCNC: 11.1 G/DL (ref 12–15.9)
HOLD SPECIMEN: NORMAL
HOLD SPECIMEN: NORMAL
IMM GRANULOCYTES # BLD AUTO: 0.01 10*3/MM3 (ref 0–0.05)
IMM GRANULOCYTES NFR BLD AUTO: 0.2 % (ref 0–0.5)
LYMPHOCYTES # BLD AUTO: 1.62 10*3/MM3 (ref 0.7–3.1)
LYMPHOCYTES NFR BLD AUTO: 28.3 % (ref 19.6–45.3)
MCH RBC QN AUTO: 29.7 PG (ref 26.6–33)
MCHC RBC AUTO-ENTMCNC: 32.3 G/DL (ref 31.5–35.7)
MCV RBC AUTO: 92 FL (ref 79–97)
MONOCYTES # BLD AUTO: 0.6 10*3/MM3 (ref 0.1–0.9)
MONOCYTES NFR BLD AUTO: 10.5 % (ref 5–12)
NEUTROPHILS # BLD AUTO: 3.25 10*3/MM3 (ref 1.7–7)
NEUTROPHILS NFR BLD AUTO: 56.8 % (ref 42.7–76)
NRBC BLD AUTO-RTO: 0 /100 WBC (ref 0–0.2)
PLATELET # BLD AUTO: 218 10*3/MM3 (ref 140–450)
PMV BLD AUTO: 9.5 FL (ref 6–12)
POTASSIUM BLD-SCNC: 3.6 MMOL/L (ref 3.5–5.2)
PROT SERPL-MCNC: 7.6 G/DL (ref 6–8.5)
RBC # BLD AUTO: 3.74 10*6/MM3 (ref 3.77–5.28)
SODIUM BLD-SCNC: 138 MMOL/L (ref 136–145)
WBC NRBC COR # BLD: 5.72 10*3/MM3 (ref 3.4–10.8)

## 2020-05-29 PROCEDURE — 36415 COLL VENOUS BLD VENIPUNCTURE: CPT

## 2020-05-29 PROCEDURE — 80053 COMPREHEN METABOLIC PANEL: CPT

## 2020-05-29 PROCEDURE — 96372 THER/PROPH/DIAG INJ SC/IM: CPT

## 2020-05-29 PROCEDURE — 99214 OFFICE O/P EST MOD 30 MIN: CPT | Performed by: INTERNAL MEDICINE

## 2020-05-29 PROCEDURE — 25010000002 EPOETIN ALFA-EPBX 40000 UNIT/ML SOLUTION: Performed by: INTERNAL MEDICINE

## 2020-05-29 PROCEDURE — 85025 COMPLETE CBC W/AUTO DIFF WBC: CPT

## 2020-05-29 RX ORDER — FLUTICASONE PROPIONATE 50 MCG
2 SPRAY, SUSPENSION (ML) NASAL DAILY
COMMUNITY
Start: 2019-03-15 | End: 2020-07-10

## 2020-05-29 RX ORDER — ALBUTEROL SULFATE 90 UG/1
2 AEROSOL, METERED RESPIRATORY (INHALATION)
COMMUNITY
Start: 2019-03-15 | End: 2022-06-01 | Stop reason: SDUPTHER

## 2020-05-29 RX ORDER — ELECTROLYTES/DEXTROSE
SOLUTION, ORAL ORAL DAILY
COMMUNITY

## 2020-05-29 RX ADMIN — EPOETIN ALFA-EPBX 40000 UNITS: 40000 INJECTION, SOLUTION INTRAVENOUS; SUBCUTANEOUS at 09:32

## 2020-05-29 NOTE — PROGRESS NOTES
[facility]  HEMATOLOGY & ONCOLOGY    Cancer Staging Information:  No matching staging information was found for the patient.      Subjective     VISIT DIAGNOSIS:   No diagnosis found.    REASON FOR VISIT:     Chief Complaint   Patient presents with   • Breast Cancer     HERE FOR FU, NO COMPLAINTS         HEMATOLOGY / ONCOLOGY HISTORY:    No history exists.           INTERVAL HISTORY  Patient ID: Marina Joe is a 75 y.o. year old female h/o breast ca on arimidex. Tolerates AI very well. mammo 4/27/18 [facility]  -- had flu n Dec. Getting better. Per pt today is the first time she actually feels fine. Had flu shot in Nov of 2018. She would like refill of her arimidex  -1-/15/19: she is limping today. She is supposed to be getiing left knee replacement but have not gotten around to it. Other than that no other issues.  11/15/19: presented for procrit shot today. Complaining of breast pain comes and goes not new, mammo due in Dec. Also she sucks her tongue and wakes up with bloody phlegm. Started 2 weeks ago. Told her most likely due to dry air since she started using heater. If continues or worsens, will refer to ent.she saw her dentis last week per her report, he did not see any thing that will explain the blood. She denies tongue pain   5/29/2020: she has no issues or concerns. No lumps or bumps  --denies sob, cp, leg pain or swelling, brbpr, cp/BRIDGER, unintentional weight loss,n/v/adnominal pain. Rest of ros unremarkable. PE unchanged.    Past Medical History:   Past Medical History:   Diagnosis Date   • Breast cancer (CMS/HCC)    • CKD (chronic kidney disease)    • Hypercholesteremia    • Hypertension    • Sinusitis      Past Surgical History:   Past Surgical History:   Procedure Laterality Date   • AVULSION TOENAIL PLATE      Sept 26,2018   • BREAST BIOPSY Left 11/20/2012   • BREAST BIOPSY      Left Breast, 1/2019 per Dr Barkley   • BREAST LUMPECTOMY Left     with node bx    • COLONOSCOPY  09/13/2013     small polyp at 30cm benign hyperplastic polyp, changes consistent with melanosis coli. Recall 5 years   • REPLACEMENT TOTAL KNEE Right     2016   • TOTAL ABDOMINAL HYSTERECTOMY WITH SALPINGO OOPHORECTOMY       Social History:   Social History     Socioeconomic History   • Marital status:      Spouse name: Not on file   • Number of children: Not on file   • Years of education: Not on file   • Highest education level: Not on file   Tobacco Use   • Smoking status: Never Smoker   • Smokeless tobacco: Never Used   Substance and Sexual Activity   • Alcohol use: No   • Drug use: Defer   • Sexual activity: Defer     Family History:   Family History   Problem Relation Age of Onset   • Heart attack Mother    • Hodgkin's lymphoma Father    • Other Father    • Cancer Father         hodgkins   • Heart attack Brother    • Skin cancer Maternal Uncle    • Pancreatic cancer Maternal Uncle    • Cancer Maternal Uncle         eye   • Colon cancer Neg Hx    • Colon polyps Neg Hx        Review of Systems   Constitutional: Negative.    HENT: Negative.    Eyes: Negative.    Respiratory: Negative.    Cardiovascular: Negative.    Gastrointestinal: Negative.    Genitourinary: Negative.    Musculoskeletal: Negative.    Skin: Negative.    Neurological: Negative.    Hematological: Negative.    Psychiatric/Behavioral: Negative.         Performance Status:  Asymptomatic    Medications:    Current Outpatient Medications   Medication Sig Dispense Refill   • albuterol sulfate HFA (Ventolin HFA) 108 (90 Base) MCG/ACT inhaler Inhale 2 puffs.     • anastrozole (ARIMIDEX) 1 MG tablet Take 1 tablet by mouth Daily. 90 tablet 4   • buPROPion XL (WELLBUTRIN XL) 150 MG 24 hr tablet Take 1 tablet by mouth Daily. 90 tablet 3   • Calcium Carb-Cholecalciferol (CALCIUM 600+D3 PO) Take  by mouth.     • carvedilol (COREG) 6.25 MG tablet Take 1 tablet by mouth 2 (Two) Times a Day With Meals. 180 tablet 3   • cetirizine (zyrTEC) 10 MG tablet Take 10 mg by  mouth Daily.     • cyclobenzaprine (FLEXERIL) 10 MG tablet Take 1 tablet by mouth 3 (Three) Times a Day As Needed for Muscle Spasms. 90 tablet 2   • Ergocalciferol (VITAMIN D2 PO) Take 50,000 Units by mouth Every 30 (Thirty) Days.     • ferrous sulfate 325 (65 FE) MG tablet Take 325 mg by mouth Daily With Breakfast.     • fluticasone (FLONASE) 50 MCG/ACT nasal spray 2 sprays into the nostril(s) as directed by provider Daily.     • fluticasone (FLOVENT DISKUS) 50 MCG/BLIST diskus inhaler Inhale 1 puff.     • irbesartan-hydrochlorothiazide (AVALIDE) 300-12.5 MG tablet Take 1 tablet by mouth Daily. 90 tablet 3   • levothyroxine (SYNTHROID, LEVOTHROID) 25 MCG tablet Take 25 mcg by mouth Daily.     • Misc Natural Products (COLON HERBAL CLEANSER PO) Take  by mouth.     • montelukast (SINGULAIR) 10 MG tablet Take 1 tablet by mouth Daily. 90 tablet 3   • Multiple Vitamins-Minerals (MULTIVITAMIN ADULT) tablet Take  by mouth Daily.     • olmesartan-hydrochlorothiazide (BENICAR HCT) 40-25 MG per tablet Take 1 tablet by mouth Daily.     • Omega-3 Fatty Acids (FISH OIL) 1000 MG capsule capsule Take 1,000 mg by mouth.     • oxyCODONE (OXY-IR) 5 MG capsule Take 5 mg by mouth Daily.     • Pediatric Multivitamins-Iron (PEDIATRIC MULTIPLE VITAMINS W/ IRON) 15 MG chewable tablet Chew 1 tablet Daily.     • rOPINIRole (REQUIP) 0.5 MG tablet Take 1 tablet by mouth Every Night. 90 tablet 3   • rosuvastatin (CRESTOR) 20 MG tablet Take 1 tablet by mouth Daily. 90 tablet 3   • sertraline (ZOLOFT) 100 MG tablet Take 1 tablet by mouth Daily. 90 tablet 3   • traZODone (DESYREL) 100 MG tablet Take 1 tablet by mouth Every Night. 30 tablet 3   • valACYclovir (VALTREX) 500 MG tablet Take 1 tablet by mouth 2 (Two) Times a Day As Needed (herpes). 20 tablet 1     No current facility-administered medications for this visit.        ALLERGIES:    Allergies   Allergen Reactions   • Aspirin GI Bleeding       Objective      Vitals:    05/29/20 0820   BP:  "155/80   Pulse: 63   Resp: 18   Temp: 97.7 °F (36.5 °C)   SpO2: 99%   Weight: 114 kg (250 lb 8 oz)   Height: 172.7 cm (68\")   PainSc: 0-No pain         Current Status 5/29/2020   ECOG score 0         Physical Exam  General Appearance: Patient is awake, alert, oriented and in no acute distress. Patient is welldeveloped, wellnourished, and appears stated age.  HEENT: Normocephalic. Sclerae clear, conjunctiva pink, extraocular movements intact, pupils, round, reactive to light and  accommodation. Mouth and throat are clear with moist oral mucosa.  NECK: Supple, no jugular venous distention, thyroid not enlarged.  LYMPH: No cervical, supraclavicular, axillary, or inguinal lymphadenopathy.  CHEST: Equal bilateral expansion, AP  diameter normal, resonant percussion note  LUNGS: Good air movement, no rales, rhonchi, rubs or wheezes with auscultation  CARDIO: Regular sinus rhythm, no murmurs, gallops or rubs.  ABDOMEN: Nondistended, soft, No tenderness, no guarding, no rebound, No hepatosplenomegaly. No abdominal masses. Bowel sounds positive. No hernia  GENITALIA: Not examined.  BREASTS: Not examined.  MUSKEL: No joint swelling, decreased motion, or inflammation  EXTREMS: No edema, clubbing, cyanosis, No varicose veins.  NEURO: Grossly nonfocal, Gait is coordinated and smooth, Cognition is preserved.  SKIN: No rashes, no ecchymoses, no petechia.  PSYCH: Oriented to time, place and person. Memory is preserved. Mood and affect appear normal  RECENT LABS:  Lab on 05/29/2020   Component Date Value Ref Range Status   • Glucose 05/29/2020 111* 65 - 99 mg/dL Final   • BUN 05/29/2020 22  8 - 23 mg/dL Final   • Creatinine 05/29/2020 1.14* 0.57 - 1.00 mg/dL Final   • Sodium 05/29/2020 138  136 - 145 mmol/L Final   • Potassium 05/29/2020 3.6  3.5 - 5.2 mmol/L Final   • Chloride 05/29/2020 100  98 - 107 mmol/L Final   • CO2 05/29/2020 25.0  22.0 - 29.0 mmol/L Final   • Calcium 05/29/2020 9.6  8.6 - 10.5 mg/dL Final   • Total Protein " 05/29/2020 7.6  6.0 - 8.5 g/dL Final   • Albumin 05/29/2020 4.20  3.50 - 5.20 g/dL Final   • ALT (SGPT) 05/29/2020 17  1 - 33 U/L Final   • AST (SGOT) 05/29/2020 19  1 - 32 U/L Final   • Alkaline Phosphatase 05/29/2020 66  39 - 117 U/L Final   • Total Bilirubin 05/29/2020 0.3  0.2 - 1.2 mg/dL Final   • eGFR   Amer 05/29/2020 56* >60 mL/min/1.73 Final   • Globulin 05/29/2020 3.4  gm/dL Final   • A/G Ratio 05/29/2020 1.2  g/dL Final   • BUN/Creatinine Ratio 05/29/2020 19.3  7.0 - 25.0 Final   • Anion Gap 05/29/2020 13.0  5.0 - 15.0 mmol/L Final   • WBC 05/29/2020 5.72  3.40 - 10.80 10*3/mm3 Final   • RBC 05/29/2020 3.74* 3.77 - 5.28 10*6/mm3 Final   • Hemoglobin 05/29/2020 11.1* 12.0 - 15.9 g/dL Final   • Hematocrit 05/29/2020 34.4  34.0 - 46.6 % Final   • MCV 05/29/2020 92.0  79.0 - 97.0 fL Final   • MCH 05/29/2020 29.7  26.6 - 33.0 pg Final   • MCHC 05/29/2020 32.3  31.5 - 35.7 g/dL Final   • RDW 05/29/2020 15.0  12.3 - 15.4 % Final   • RDW-SD 05/29/2020 50.7  37.0 - 54.0 fl Final   • MPV 05/29/2020 9.5  6.0 - 12.0 fL Final   • Platelets 05/29/2020 218  140 - 450 10*3/mm3 Final   • Neutrophil % 05/29/2020 56.8  42.7 - 76.0 % Final   • Lymphocyte % 05/29/2020 28.3  19.6 - 45.3 % Final   • Monocyte % 05/29/2020 10.5  5.0 - 12.0 % Final   • Eosinophil % 05/29/2020 3.7  0.3 - 6.2 % Final   • Basophil % 05/29/2020 0.5  0.0 - 1.5 % Final   • Immature Grans % 05/29/2020 0.2  0.0 - 0.5 % Final   • Neutrophils, Absolute 05/29/2020 3.25  1.70 - 7.00 10*3/mm3 Final   • Lymphocytes, Absolute 05/29/2020 1.62  0.70 - 3.10 10*3/mm3 Final   • Monocytes, Absolute 05/29/2020 0.60  0.10 - 0.90 10*3/mm3 Final   • Eosinophils, Absolute 05/29/2020 0.21  0.00 - 0.40 10*3/mm3 Final   • Basophils, Absolute 05/29/2020 0.03  0.00 - 0.20 10*3/mm3 Final   • Immature Grans, Absolute 05/29/2020 0.01  0.00 - 0.05 10*3/mm3 Final   • nRBC 05/29/2020 0.0  0.0 - 0.2 /100 WBC Final       RADIOLOGY:  No results found.         Assessment/Plan   Marina Joe is a 75 y.o. year old female with h/o breast ca also being followed for anemia 2/2 ckd.    Patient Active Problem List   Diagnosis   • Malignant neoplasm of central portion of left female breast (CMS/HCC)   • Anemia of chronic renal failure, stage 3 (moderate) (CMS/HCC)   • Hypertension, benign   • Hx of colonic polyps   • HX: breast cancer   • Morbidly obese (CMS/HCC)   • Abnormal mammogram   • Breast mass   • Right knee DJD   • S/P lumpectomy, left breast   • Adult hypothyroidism   • Rhinitis   • Anemia   • Anxiety   • At low risk for fall   • Breast cancer, left (CMS/HCC)   • Cervical pain   • Chronic insomnia   • Cough   • Elevated lipids   • Encounter for immunization   • Herpes zoster without complication   • Influenza B   • Left ear pain   • Left otitis externa   • Lumbar strain, initial encounter   • Myalgia   • Negative depression screening   • Obesity (BMI 30-39.9)   • Postmenopausal status   • Recurrent acute serous otitis media of left ear   • Restless leg   • Sinusitis, bacterial   • Skin lesion   • Upper respiratory infection   • Urinary tract infection without hematuria   • Vitamin D deficiency        1. Stage IA invasive ductal carcinoma left breast ER 97% [place] 97% HER-2/alix 2+, fish and amplified diagnosed May 2012 status post lumpectomy, radiation, on the hormonal manipulation with Arimidex.   -- Mammogram 7/3/19 No mammographic evidence of malignancy in the left breast. Recommend annual mammography which will be due on/after 12/12/2019.  --she tolerates AI with no SE, continue for 10yrs per her wishes  --had biopsy of left breast nodule per Dr Barkley and pathology was benign.  -breast pain comes and goes nt new, mammo due in Dec.   --william arimidex    2.  Osteopenia.  DEXA scan May 2016 revealed osteopenia.  Start Boniva or Fosamax pending insurance coverage.  Continue daily calcium and vitamin D supplement     3.  Anemia in CKD stage III    --labs cr 1.14 stable, lft nl, wbc 5.72, Hg  11.1, plt 218. HCT 34.4.   Ok for procrit  --RTC in 6 weeks for procrit    4. Hypothyroidism: on synthroid.  5. Depression: on wellbutrin  6. CAD: on coreg, benicar  7. Chronic pain synd: on oxycodone TERRI De Leon MD    5/29/2020    08:38

## 2020-06-22 DIAGNOSIS — N18.30 ANEMIA OF CHRONIC RENAL FAILURE, STAGE 3 (MODERATE) (HCC): ICD-10-CM

## 2020-06-22 DIAGNOSIS — D63.1 ANEMIA OF CHRONIC RENAL FAILURE, STAGE 3 (MODERATE) (HCC): ICD-10-CM

## 2020-07-10 ENCOUNTER — INFUSION (OUTPATIENT)
Dept: ONCOLOGY | Facility: HOSPITAL | Age: 75
End: 2020-07-10

## 2020-07-10 ENCOUNTER — OFFICE VISIT (OUTPATIENT)
Dept: ONCOLOGY | Facility: CLINIC | Age: 75
End: 2020-07-10

## 2020-07-10 ENCOUNTER — LAB (OUTPATIENT)
Dept: LAB | Facility: HOSPITAL | Age: 75
End: 2020-07-10

## 2020-07-10 VITALS
DIASTOLIC BLOOD PRESSURE: 88 MMHG | HEART RATE: 74 BPM | HEIGHT: 68 IN | TEMPERATURE: 97.5 F | WEIGHT: 247.1 LBS | OXYGEN SATURATION: 97 % | BODY MASS INDEX: 37.45 KG/M2 | SYSTOLIC BLOOD PRESSURE: 146 MMHG | RESPIRATION RATE: 16 BRPM

## 2020-07-10 VITALS
RESPIRATION RATE: 16 BRPM | OXYGEN SATURATION: 98 % | HEIGHT: 68 IN | WEIGHT: 247 LBS | HEART RATE: 61 BPM | TEMPERATURE: 97.8 F | BODY MASS INDEX: 37.44 KG/M2 | SYSTOLIC BLOOD PRESSURE: 169 MMHG | DIASTOLIC BLOOD PRESSURE: 79 MMHG

## 2020-07-10 DIAGNOSIS — D63.1 ANEMIA DUE TO STAGE 3 CHRONIC KIDNEY DISEASE (HCC): Primary | ICD-10-CM

## 2020-07-10 DIAGNOSIS — N18.30 ANEMIA DUE TO STAGE 3 CHRONIC KIDNEY DISEASE (HCC): Primary | ICD-10-CM

## 2020-07-10 DIAGNOSIS — N18.30 ANEMIA OF CHRONIC RENAL FAILURE, STAGE 3 (MODERATE) (HCC): Primary | ICD-10-CM

## 2020-07-10 DIAGNOSIS — C50.112 MALIGNANT NEOPLASM OF CENTRAL PORTION OF LEFT FEMALE BREAST, UNSPECIFIED ESTROGEN RECEPTOR STATUS (HCC): ICD-10-CM

## 2020-07-10 DIAGNOSIS — D63.1 ANEMIA OF CHRONIC RENAL FAILURE, STAGE 3 (MODERATE) (HCC): Primary | ICD-10-CM

## 2020-07-10 LAB
ALBUMIN SERPL-MCNC: 4.1 G/DL (ref 3.5–5.2)
ALBUMIN/GLOB SERPL: 1.2 G/DL
ALP SERPL-CCNC: 77 U/L (ref 39–117)
ALT SERPL W P-5'-P-CCNC: 16 U/L (ref 1–33)
ANION GAP SERPL CALCULATED.3IONS-SCNC: 12 MMOL/L (ref 5–15)
AST SERPL-CCNC: 16 U/L (ref 1–32)
BASOPHILS # BLD AUTO: 0.02 10*3/MM3 (ref 0–0.2)
BASOPHILS NFR BLD AUTO: 0.3 % (ref 0–1.5)
BILIRUB SERPL-MCNC: 0.3 MG/DL (ref 0–1.2)
BUN SERPL-MCNC: 21 MG/DL (ref 8–23)
BUN/CREAT SERPL: 19.3 (ref 7–25)
CALCIUM SPEC-SCNC: 9.6 MG/DL (ref 8.6–10.5)
CHLORIDE SERPL-SCNC: 98 MMOL/L (ref 98–107)
CO2 SERPL-SCNC: 28 MMOL/L (ref 22–29)
CREAT SERPL-MCNC: 1.09 MG/DL (ref 0.57–1)
DEPRECATED RDW RBC AUTO: 52.4 FL (ref 37–54)
EOSINOPHIL # BLD AUTO: 0.24 10*3/MM3 (ref 0–0.4)
EOSINOPHIL NFR BLD AUTO: 3.9 % (ref 0.3–6.2)
ERYTHROCYTE [DISTWIDTH] IN BLOOD BY AUTOMATED COUNT: 15.5 % (ref 12.3–15.4)
FERRITIN SERPL-MCNC: 1066 NG/ML (ref 13–150)
GFR SERPL CREATININE-BSD FRML MDRD: 59 ML/MIN/1.73
GLOBULIN UR ELPH-MCNC: 3.3 GM/DL
GLUCOSE SERPL-MCNC: 114 MG/DL (ref 65–99)
HCT VFR BLD AUTO: 34.1 % (ref 34–46.6)
HGB BLD-MCNC: 10.8 G/DL (ref 12–15.9)
HOLD SPECIMEN: NORMAL
HOLD SPECIMEN: NORMAL
IMM GRANULOCYTES # BLD AUTO: 0.01 10*3/MM3 (ref 0–0.05)
IMM GRANULOCYTES NFR BLD AUTO: 0.2 % (ref 0–0.5)
IRON 24H UR-MRATE: 66 MCG/DL (ref 37–145)
IRON SATN MFR SERPL: 23 % (ref 20–50)
LYMPHOCYTES # BLD AUTO: 1.4 10*3/MM3 (ref 0.7–3.1)
LYMPHOCYTES NFR BLD AUTO: 23 % (ref 19.6–45.3)
MCH RBC QN AUTO: 29.3 PG (ref 26.6–33)
MCHC RBC AUTO-ENTMCNC: 31.7 G/DL (ref 31.5–35.7)
MCV RBC AUTO: 92.4 FL (ref 79–97)
MONOCYTES # BLD AUTO: 0.5 10*3/MM3 (ref 0.1–0.9)
MONOCYTES NFR BLD AUTO: 8.2 % (ref 5–12)
NEUTROPHILS NFR BLD AUTO: 3.91 10*3/MM3 (ref 1.7–7)
NEUTROPHILS NFR BLD AUTO: 64.4 % (ref 42.7–76)
NRBC BLD AUTO-RTO: 0 /100 WBC (ref 0–0.2)
PLATELET # BLD AUTO: 222 10*3/MM3 (ref 140–450)
PMV BLD AUTO: 9.7 FL (ref 6–12)
POTASSIUM SERPL-SCNC: 3.7 MMOL/L (ref 3.5–5.2)
PROT SERPL-MCNC: 7.4 G/DL (ref 6–8.5)
RBC # BLD AUTO: 3.69 10*6/MM3 (ref 3.77–5.28)
SODIUM SERPL-SCNC: 138 MMOL/L (ref 136–145)
TIBC SERPL-MCNC: 283 MCG/DL (ref 298–536)
TRANSFERRIN SERPL-MCNC: 190 MG/DL (ref 200–360)
WBC # BLD AUTO: 6.08 10*3/MM3 (ref 3.4–10.8)

## 2020-07-10 PROCEDURE — 96372 THER/PROPH/DIAG INJ SC/IM: CPT

## 2020-07-10 PROCEDURE — 80053 COMPREHEN METABOLIC PANEL: CPT

## 2020-07-10 PROCEDURE — 83540 ASSAY OF IRON: CPT

## 2020-07-10 PROCEDURE — 25010000002 EPOETIN ALFA-EPBX 40000 UNIT/ML SOLUTION: Performed by: INTERNAL MEDICINE

## 2020-07-10 PROCEDURE — 36415 COLL VENOUS BLD VENIPUNCTURE: CPT

## 2020-07-10 PROCEDURE — 84466 ASSAY OF TRANSFERRIN: CPT

## 2020-07-10 PROCEDURE — 85025 COMPLETE CBC W/AUTO DIFF WBC: CPT

## 2020-07-10 PROCEDURE — 99214 OFFICE O/P EST MOD 30 MIN: CPT | Performed by: INTERNAL MEDICINE

## 2020-07-10 PROCEDURE — 82728 ASSAY OF FERRITIN: CPT

## 2020-07-10 RX ADMIN — EPOETIN ALFA-EPBX 40000 UNITS: 40000 INJECTION, SOLUTION INTRAVENOUS; SUBCUTANEOUS at 09:37

## 2020-07-10 NOTE — PROGRESS NOTES
[facility]  HEMATOLOGY & ONCOLOGY    Cancer Staging Information:  No matching staging information was found for the patient.      Subjective     VISIT DIAGNOSIS:   No diagnosis found.    REASON FOR VISIT:     Chief Complaint   Patient presents with   • Anemia     Here for procrirt        HEMATOLOGY / ONCOLOGY HISTORY:    No history exists.           INTERVAL HISTORY  Patient ID: Marina Joe is a 75 y.o. year old female h/o breast ca on arimidex. Tolerates AI very well. mammo 4/27/18 [facility]  -- had flu n Dec. Getting better. Per pt today is the first time she actually feels fine. Had flu shot in Nov of 2018. She would like refill of her arimidex  -1-/15/19: she is limping today. She is supposed to be getiing left knee replacement but have not gotten around to it. Other than that no other issues.  11/15/19: presented for procrit shot today. Complaining of breast pain comes and goes not new, mammo due in Dec. Also she sucks her tongue and wakes up with bloody phlegm. Started 2 weeks ago. Told her most likely due to dry air since she started using heater. If continues or worsens, will refer to ent.she saw her dentis last week per her report, he did not see any thing that will explain the blood. She denies tongue pain   7/10/2020: she had steroid shot to he right ankle yesterday. Still sore. Will go back for another shot in a week. No lumps or bumps  --denies sob, cp, leg pain or swelling, brbpr, cp/BRIDGER, unintentional weight loss,n/v/adnominal pain. Rest of ros unremarkable. PE unchanged.    Past Medical History:   Past Medical History:   Diagnosis Date   • Breast cancer (CMS/HCC)    • CKD (chronic kidney disease)    • Hypercholesteremia    • Hypertension    • Sinusitis      Past Surgical History:   Past Surgical History:   Procedure Laterality Date   • AVULSION TOENAIL PLATE      Sept 26,2018   • BREAST BIOPSY Left 11/20/2012   • BREAST BIOPSY      Left Breast, 1/2019 per Dr Barkley   • BREAST  LUMPECTOMY Left     with node bx    • COLONOSCOPY  09/13/2013    small polyp at 30cm benign hyperplastic polyp, changes consistent with melanosis coli. Recall 5 years   • REPLACEMENT TOTAL KNEE Right     2016   • TOTAL ABDOMINAL HYSTERECTOMY WITH SALPINGO OOPHORECTOMY       Social History:   Social History     Socioeconomic History   • Marital status:      Spouse name: Not on file   • Number of children: Not on file   • Years of education: Not on file   • Highest education level: Not on file   Tobacco Use   • Smoking status: Never Smoker   • Smokeless tobacco: Never Used   Substance and Sexual Activity   • Alcohol use: No   • Drug use: Defer   • Sexual activity: Defer     Family History:   Family History   Problem Relation Age of Onset   • Heart attack Mother    • Hodgkin's lymphoma Father    • Other Father    • Cancer Father         hodgkins   • Heart attack Brother    • Skin cancer Maternal Uncle    • Pancreatic cancer Maternal Uncle    • Cancer Maternal Uncle         eye   • Colon cancer Neg Hx    • Colon polyps Neg Hx        Review of Systems   Constitutional: Negative.    HENT: Negative.    Eyes: Negative.    Respiratory: Negative.    Cardiovascular: Negative.    Gastrointestinal: Negative.    Genitourinary: Negative.    Musculoskeletal: Negative.    Skin: Negative.    Neurological: Negative.    Hematological: Negative.    Psychiatric/Behavioral: Negative.         Performance Status:  Asymptomatic    Medications:    Current Outpatient Medications   Medication Sig Dispense Refill   • albuterol sulfate HFA (Ventolin HFA) 108 (90 Base) MCG/ACT inhaler Inhale 2 puffs.     • anastrozole (ARIMIDEX) 1 MG tablet Take 1 tablet by mouth Daily. 90 tablet 4   • buPROPion XL (WELLBUTRIN XL) 150 MG 24 hr tablet Take 1 tablet by mouth Daily. 90 tablet 3   • Calcium Carb-Cholecalciferol (CALCIUM 600+D3 PO) Take  by mouth.     • carvedilol (COREG) 6.25 MG tablet Take 1 tablet by mouth 2 (Two) Times a Day With Meals. 180  "tablet 3   • cetirizine (zyrTEC) 10 MG tablet Take 10 mg by mouth Daily.     • cyclobenzaprine (FLEXERIL) 10 MG tablet Take 1 tablet by mouth 3 (Three) Times a Day As Needed for Muscle Spasms. 90 tablet 2   • Ergocalciferol (VITAMIN D2 PO) Take 50,000 Units by mouth Every 30 (Thirty) Days.     • ferrous sulfate 325 (65 FE) MG tablet Take 325 mg by mouth Daily With Breakfast.     • fluticasone (FLOVENT DISKUS) 50 MCG/BLIST diskus inhaler Inhale 1 puff.     • irbesartan-hydrochlorothiazide (AVALIDE) 300-12.5 MG tablet Take 1 tablet by mouth Daily. 90 tablet 3   • montelukast (SINGULAIR) 10 MG tablet Take 1 tablet by mouth Daily. 90 tablet 3   • Multiple Vitamins-Minerals (MULTIVITAMIN ADULT) tablet Take  by mouth Daily.     • Omega-3 Fatty Acids (FISH OIL) 1000 MG capsule capsule Take 1,000 mg by mouth.     • rOPINIRole (REQUIP) 0.5 MG tablet Take 1 tablet by mouth Every Night. 90 tablet 3   • rosuvastatin (CRESTOR) 20 MG tablet Take 1 tablet by mouth Daily. 90 tablet 3   • sertraline (ZOLOFT) 100 MG tablet Take 1 tablet by mouth Daily. 90 tablet 3   • traZODone (DESYREL) 100 MG tablet Take 1 tablet by mouth Every Night. 30 tablet 3     No current facility-administered medications for this visit.        ALLERGIES:    Allergies   Allergen Reactions   • Aspirin GI Bleeding   • Niacin Other (See Comments)       Objective      Vitals:    07/10/20 0849   BP: 146/88   Pulse: 74   Resp: 16   Temp: 97.5 °F (36.4 °C)   TempSrc: Temporal   SpO2: 97%   Weight: 112 kg (247 lb 1.6 oz)   Height: 172.7 cm (68\")   PainSc:   8   PainLoc: Foot         Current Status 7/10/2020   ECOG score 0         Physical Exam  General Appearance: Patient is awake, alert, oriented and in no acute distress. Patient is welldeveloped, wellnourished, and appears stated age.  HEENT: Normocephalic. Sclerae clear, conjunctiva pink, extraocular movements intact, pupils, round, reactive to light and  accommodation. Mouth and throat are clear with moist oral " mucosa.  NECK: Supple, no jugular venous distention, thyroid not enlarged.  LYMPH: No cervical, supraclavicular, axillary, or inguinal lymphadenopathy.  CHEST: Equal bilateral expansion, AP  diameter normal, resonant percussion note  LUNGS: Good air movement, no rales, rhonchi, rubs or wheezes with auscultation  CARDIO: Regular sinus rhythm, no murmurs, gallops or rubs.  ABDOMEN: Nondistended, soft, No tenderness, no guarding, no rebound, No hepatosplenomegaly. No abdominal masses. Bowel sounds positive. No hernia  GENITALIA: Not examined.  BREASTS: Not examined.  MUSKEL: No joint swelling, decreased motion, or inflammation  EXTREMS: No edema, clubbing, cyanosis, No varicose veins.  NEURO: Grossly nonfocal, Gait is coordinated and smooth, Cognition is preserved.  SKIN: No rashes, no ecchymoses, no petechia.  PSYCH: Oriented to time, place and person. Memory is preserved. Mood and affect appear normal  RECENT LABS:  Lab on 07/10/2020   Component Date Value Ref Range Status   • WBC 07/10/2020 6.08  3.40 - 10.80 10*3/mm3 Final   • RBC 07/10/2020 3.69* 3.77 - 5.28 10*6/mm3 Final   • Hemoglobin 07/10/2020 10.8* 12.0 - 15.9 g/dL Final   • Hematocrit 07/10/2020 34.1  34.0 - 46.6 % Final   • MCV 07/10/2020 92.4  79.0 - 97.0 fL Final   • MCH 07/10/2020 29.3  26.6 - 33.0 pg Final   • MCHC 07/10/2020 31.7  31.5 - 35.7 g/dL Final   • RDW 07/10/2020 15.5* 12.3 - 15.4 % Final   • RDW-SD 07/10/2020 52.4  37.0 - 54.0 fl Final   • MPV 07/10/2020 9.7  6.0 - 12.0 fL Final   • Platelets 07/10/2020 222  140 - 450 10*3/mm3 Final   • Neutrophil % 07/10/2020 64.4  42.7 - 76.0 % Final   • Lymphocyte % 07/10/2020 23.0  19.6 - 45.3 % Final   • Monocyte % 07/10/2020 8.2  5.0 - 12.0 % Final   • Eosinophil % 07/10/2020 3.9  0.3 - 6.2 % Final   • Basophil % 07/10/2020 0.3  0.0 - 1.5 % Final   • Immature Grans % 07/10/2020 0.2  0.0 - 0.5 % Final   • Neutrophils, Absolute 07/10/2020 3.91  1.70 - 7.00 10*3/mm3 Final   • Lymphocytes, Absolute  07/10/2020 1.40  0.70 - 3.10 10*3/mm3 Final   • Monocytes, Absolute 07/10/2020 0.50  0.10 - 0.90 10*3/mm3 Final   • Eosinophils, Absolute 07/10/2020 0.24  0.00 - 0.40 10*3/mm3 Final   • Basophils, Absolute 07/10/2020 0.02  0.00 - 0.20 10*3/mm3 Final   • Immature Grans, Absolute 07/10/2020 0.01  0.00 - 0.05 10*3/mm3 Final   • nRBC 07/10/2020 0.0  0.0 - 0.2 /100 WBC Final       RADIOLOGY:  No results found.         Assessment/Plan  Marina Joe is a 75 y.o. year old female with h/o breast ca also being followed for anemia 2/2 ckd.    Patient Active Problem List   Diagnosis   • Malignant neoplasm of central portion of left female breast (CMS/HCC)   • Anemia of chronic renal failure, stage 3 (moderate) (CMS/HCC)   • Hypertension, benign   • Hx of colonic polyps   • HX: breast cancer   • Morbidly obese (CMS/HCC)   • Abnormal mammogram   • Breast mass   • Right knee DJD   • S/P lumpectomy, left breast   • Adult hypothyroidism   • Rhinitis   • Anemia   • Anxiety   • At low risk for fall   • Breast cancer, left (CMS/HCC)   • Cervical pain   • Chronic insomnia   • Cough   • Elevated lipids   • Encounter for immunization   • Herpes zoster without complication   • Influenza B   • Left ear pain   • Left otitis externa   • Lumbar strain, initial encounter   • Myalgia   • Negative depression screening   • Obesity (BMI 30-39.9)   • Postmenopausal status   • Recurrent acute serous otitis media of left ear   • Restless leg   • Sinusitis, bacterial   • Skin lesion   • Upper respiratory infection   • Urinary tract infection without hematuria   • Vitamin D deficiency        1. Stage IA invasive ductal carcinoma left breast ER 97% NJ 97% HER-2/alix 2+, fish and amplified diagnosed May 2012 status post lumpectomy, radiation, on the hormonal manipulation with Arimidex.   -- Mammogram 7/3/19 No mammographic evidence of malignancy in the left breast. Recommend annual mammography which will be due on/after 12/12/2019.  --she tolerates AI  with no SE, continue for 10yrs per her wishes  --had biopsy of left breast nodule per Dr Barkley and pathology was benign.  -breast pain comes and goes nt new, mammo due in Dec.   --william arimidex    2.  Osteopenia.  DEXA scan May 2016 revealed osteopenia.  Start Boniva or Fosamax pending insurance coverage.  Continue daily calcium and vitamin D supplement     3.  Anemia in CKD stage III    --labs cr 1.09 stable, lft nl, wbc 6.08, Hg 10.8, plt 222. HCT 34.1.   Ok for procrit  --RTC in 6 weeks for procrit    4. Hypothyroidism: on synthroid.  5. Depression: on wellbutrin  6. CAD: on coreg, benicar  7. Chronic pain synd: on oxycodone TERRI De Leon MD    7/10/2020    09:06

## 2020-07-23 RX ORDER — VALACYCLOVIR HYDROCHLORIDE 500 MG/1
TABLET, FILM COATED ORAL
Qty: 20 TABLET | Refills: 1 | Status: SHIPPED | OUTPATIENT
Start: 2020-07-23 | End: 2020-12-04 | Stop reason: SDUPTHER

## 2020-08-21 ENCOUNTER — APPOINTMENT (OUTPATIENT)
Dept: ONCOLOGY | Facility: HOSPITAL | Age: 75
End: 2020-08-21

## 2020-08-21 ENCOUNTER — APPOINTMENT (OUTPATIENT)
Dept: LAB | Facility: HOSPITAL | Age: 75
End: 2020-08-21

## 2020-08-25 DIAGNOSIS — N18.30 ANEMIA OF CHRONIC RENAL FAILURE, STAGE 3 (MODERATE) (HCC): Primary | ICD-10-CM

## 2020-08-25 DIAGNOSIS — D63.1 ANEMIA OF CHRONIC RENAL FAILURE, STAGE 3 (MODERATE) (HCC): Primary | ICD-10-CM

## 2020-08-26 ENCOUNTER — APPOINTMENT (OUTPATIENT)
Dept: ONCOLOGY | Facility: HOSPITAL | Age: 75
End: 2020-08-26

## 2020-08-26 ENCOUNTER — OFFICE VISIT (OUTPATIENT)
Dept: ONCOLOGY | Facility: CLINIC | Age: 75
End: 2020-08-26

## 2020-08-26 ENCOUNTER — LAB (OUTPATIENT)
Dept: LAB | Facility: HOSPITAL | Age: 75
End: 2020-08-26

## 2020-08-26 VITALS
TEMPERATURE: 97.8 F | RESPIRATION RATE: 16 BRPM | HEIGHT: 68 IN | SYSTOLIC BLOOD PRESSURE: 142 MMHG | WEIGHT: 246 LBS | BODY MASS INDEX: 37.28 KG/M2 | HEART RATE: 72 BPM | DIASTOLIC BLOOD PRESSURE: 84 MMHG | OXYGEN SATURATION: 96 %

## 2020-08-26 DIAGNOSIS — N18.30 ANEMIA OF CHRONIC RENAL FAILURE, STAGE 3 (MODERATE) (HCC): ICD-10-CM

## 2020-08-26 DIAGNOSIS — D63.1 ANEMIA OF CHRONIC RENAL FAILURE, STAGE 3 (MODERATE) (HCC): ICD-10-CM

## 2020-08-26 DIAGNOSIS — C50.412 MALIGNANT NEOPLASM OF UPPER-OUTER QUADRANT OF LEFT BREAST IN FEMALE, ESTROGEN RECEPTOR POSITIVE (HCC): Primary | ICD-10-CM

## 2020-08-26 DIAGNOSIS — Z17.0 MALIGNANT NEOPLASM OF UPPER-OUTER QUADRANT OF LEFT BREAST IN FEMALE, ESTROGEN RECEPTOR POSITIVE (HCC): Primary | ICD-10-CM

## 2020-08-26 DIAGNOSIS — I10 HYPERTENSION, BENIGN: ICD-10-CM

## 2020-08-26 PROBLEM — N18.9 CKD (CHRONIC KIDNEY DISEASE): Status: ACTIVE | Noted: 2020-08-26

## 2020-08-26 LAB
ALBUMIN SERPL-MCNC: 3.9 G/DL (ref 3.5–5.2)
ALBUMIN/GLOB SERPL: 1.2 G/DL
ALP SERPL-CCNC: 72 U/L (ref 39–117)
ALT SERPL W P-5'-P-CCNC: 14 U/L (ref 1–33)
ANION GAP SERPL CALCULATED.3IONS-SCNC: 12 MMOL/L (ref 5–15)
AST SERPL-CCNC: 16 U/L (ref 1–32)
BASOPHILS # BLD AUTO: 0.02 10*3/MM3 (ref 0–0.2)
BASOPHILS NFR BLD AUTO: 0.3 % (ref 0–1.5)
BILIRUB SERPL-MCNC: 0.2 MG/DL (ref 0–1.2)
BUN SERPL-MCNC: 22 MG/DL (ref 8–23)
BUN/CREAT SERPL: 19.3 (ref 7–25)
CALCIUM SPEC-SCNC: 9.7 MG/DL (ref 8.6–10.5)
CHLORIDE SERPL-SCNC: 97 MMOL/L (ref 98–107)
CO2 SERPL-SCNC: 28 MMOL/L (ref 22–29)
CREAT SERPL-MCNC: 1.14 MG/DL (ref 0.57–1)
DEPRECATED RDW RBC AUTO: 49.4 FL (ref 37–54)
EOSINOPHIL # BLD AUTO: 0.32 10*3/MM3 (ref 0–0.4)
EOSINOPHIL NFR BLD AUTO: 5.3 % (ref 0.3–6.2)
ERYTHROCYTE [DISTWIDTH] IN BLOOD BY AUTOMATED COUNT: 14.7 % (ref 12.3–15.4)
FERRITIN SERPL-MCNC: 978.2 NG/ML (ref 13–150)
GFR SERPL CREATININE-BSD FRML MDRD: 56 ML/MIN/1.73
GLOBULIN UR ELPH-MCNC: 3.2 GM/DL
GLUCOSE SERPL-MCNC: 108 MG/DL (ref 65–99)
HCT VFR BLD AUTO: 34.3 % (ref 34–46.6)
HGB BLD-MCNC: 11 G/DL (ref 12–15.9)
IMM GRANULOCYTES # BLD AUTO: 0.02 10*3/MM3 (ref 0–0.05)
IMM GRANULOCYTES NFR BLD AUTO: 0.3 % (ref 0–0.5)
IRON 24H UR-MRATE: 66 MCG/DL (ref 37–145)
IRON SATN MFR SERPL: 24 % (ref 20–50)
LYMPHOCYTES # BLD AUTO: 1.61 10*3/MM3 (ref 0.7–3.1)
LYMPHOCYTES NFR BLD AUTO: 26.7 % (ref 19.6–45.3)
MCH RBC QN AUTO: 29.4 PG (ref 26.6–33)
MCHC RBC AUTO-ENTMCNC: 32.1 G/DL (ref 31.5–35.7)
MCV RBC AUTO: 91.7 FL (ref 79–97)
MONOCYTES # BLD AUTO: 0.47 10*3/MM3 (ref 0.1–0.9)
MONOCYTES NFR BLD AUTO: 7.8 % (ref 5–12)
NEUTROPHILS NFR BLD AUTO: 3.59 10*3/MM3 (ref 1.7–7)
NEUTROPHILS NFR BLD AUTO: 59.6 % (ref 42.7–76)
NRBC BLD AUTO-RTO: 0 /100 WBC (ref 0–0.2)
PLATELET # BLD AUTO: 200 10*3/MM3 (ref 140–450)
PMV BLD AUTO: 9.6 FL (ref 6–12)
POTASSIUM SERPL-SCNC: 3.7 MMOL/L (ref 3.5–5.2)
PROT SERPL-MCNC: 7.1 G/DL (ref 6–8.5)
RBC # BLD AUTO: 3.74 10*6/MM3 (ref 3.77–5.28)
SODIUM SERPL-SCNC: 137 MMOL/L (ref 136–145)
TIBC SERPL-MCNC: 270 MCG/DL (ref 298–536)
TRANSFERRIN SERPL-MCNC: 181 MG/DL (ref 200–360)
WBC # BLD AUTO: 6.03 10*3/MM3 (ref 3.4–10.8)

## 2020-08-26 PROCEDURE — 99213 OFFICE O/P EST LOW 20 MIN: CPT | Performed by: NURSE PRACTITIONER

## 2020-08-26 PROCEDURE — 84466 ASSAY OF TRANSFERRIN: CPT

## 2020-08-26 PROCEDURE — 85025 COMPLETE CBC W/AUTO DIFF WBC: CPT

## 2020-08-26 PROCEDURE — 80053 COMPREHEN METABOLIC PANEL: CPT

## 2020-08-26 PROCEDURE — 82728 ASSAY OF FERRITIN: CPT

## 2020-08-26 PROCEDURE — 36415 COLL VENOUS BLD VENIPUNCTURE: CPT

## 2020-08-26 PROCEDURE — 83540 ASSAY OF IRON: CPT

## 2020-08-26 NOTE — PROGRESS NOTES
[facility]  HEMATOLOGY & ONCOLOGY    [facility] [facility] [place]ATOLOGY AND ONCOLOGY  [address]  [facility] [address] [phone]    Patient Name: Marina Joe  Encounter Date: 08/26/2020  YOB: 1945  Patient Number: [id number]    Chief Complaint   Patient presents with   • Anemia     Here for possible procrit       REASON FOR VISIT: Mrs. Marina Joe is a 75-year-old female patient here today in follow-up for both breast carcinoma as well as anemia secondary to chronic kidney disease.  She has been followed by Dr. De Leon.  In February 2012 she had a routine mammogram that found a nodular density in the upper outer quadrant of the left breast.  An ultrasound revealed no nodularity at that time.  Repeat ultrasound 3 months later on 5/3/2012 revealed a small cyst in the left breast but felt to be benign.  Repeat mammogram on October 31, 2012 found a lobulated lesion in the left breast at the 2 o'clock position and a stable cyst at the 12 o'clock position.  She was referred to Dr. Barkley on 11/30/2012 and biopsy was performed.  The 12:00 cyst was a fibrocystic lesion while the 2:00 lesion was an invasive mammary carcinoma, low-grade, ER positive NV positive HER-2 nu 2+, FISH unamplified.    On January 8, 2013 she underwent a left partial mastectomy with sentinel node biopsy finding invasive ductal carcinoma, 3.1 mm in greatest dimension, grade 1.  2 sentinel nodes were negative.  AJCC TNM stage was pT1aN0 M0, Stage IA.  Following surgery she underwent adjuvant radiation therapy and was then started on Arimidex for hormonal manipulation.  She was started on the Arimidex in approximately April or May 2013.  He has been recommended to continue this for 10 years.    She has had a mammogram as of December 6, 2019 that was unremarkable.  Her last bone density study showed osteopenia on 12/8/2016.  Her last colonoscopy was in  November 2018 that showed diverticulosis.    She presents today also in follow-up for her anemia secondary to chronic kidney disease stage III.  She has been undergoing Procrit therapy when meets guidelines.  Her last Procrit injection was on 7/10/2020 for hemoglobin of 10.8 and hematocrit of 34.1.  Her hemoglobin today is up to 11 with hematocrit of 34.3.  Her iron studies remain normal with an iron saturation of 24% and a ferritin of 978.2.  GFR is at 56 ml/min and stable.         Malignant neoplasm of upper-outer quadrant of left breast in female, estrogen receptor positive (CMS/HCC)    10/31/2012 Initial Diagnosis     Malignant neoplasm of central portion of left female breast (CMS/HCC)      10/31/2012 Imaging     Mammogram on October 31, 2012 found a lobulated lesion in the left breast at the 2 o'clock position and a stable cyst at the 12 o'clock position.       11/30/2012 Biopsy     Dr. Barkley on 11/30/2012 and biopsy was performed.  The 12:00 cyst was a fibrocystic lesion while the 2:00 lesion was an invasive mammary carcinoma, low-grade, ER positive NC positive HER-2 nu 2+, FISH unamplified.          1/8/2013 Surgery     On January 8, 2013 she underwent a left partial mastectomy with sentinel node biopsy finding invasive ductal carcinoma, 3.1 mm in greatest dimension, grade 1.  2 sentinel nodes were negative.  AJCC TNM stage was pT1aN0 M0, Stage IA.  Hormone receptor positive HER-2 negative       Radiation     Radiation OncologyTreatment Course:  Marina Joe receivedin 33 fractions to left breast via External Beam Radiation - EBRT.      5/2013 -  Hormonal Therapy     Arimidex 1 mg daily           PAST MEDICAL HISTORY:  ALLERGIES:  Allergies   Allergen Reactions   • Aspirin GI Bleeding   • Niacin Other (See Comments)       CURRENT MEDICATIONS:  Outpatient Encounter Medications as of 8/26/2020   Medication Sig Dispense Refill   • albuterol sulfate HFA (Ventolin HFA) 108 (90 Base) MCG/ACT inhaler Inhale 2  puffs.     • anastrozole (ARIMIDEX) 1 MG tablet Take 1 tablet by mouth Daily. 90 tablet 4   • buPROPion XL (WELLBUTRIN XL) 150 MG 24 hr tablet Take 1 tablet by mouth Daily. 90 tablet 3   • Calcium Carb-Cholecalciferol (CALCIUM 600+D3 PO) Take  by mouth.     • carvedilol (COREG) 6.25 MG tablet Take 1 tablet by mouth 2 (Two) Times a Day With Meals. 180 tablet 3   • cetirizine (zyrTEC) 10 MG tablet Take 10 mg by mouth Daily.     • cyclobenzaprine (FLEXERIL) 10 MG tablet Take 1 tablet by mouth 3 (Three) Times a Day As Needed for Muscle Spasms. 90 tablet 2   • Ergocalciferol (VITAMIN D2 PO) Take 50,000 Units by mouth Every 30 (Thirty) Days.     • ferrous sulfate 325 (65 FE) MG tablet Take 325 mg by mouth Daily With Breakfast.     • fluticasone (FLOVENT DISKUS) 50 MCG/BLIST diskus inhaler Inhale 1 puff.     • irbesartan-hydrochlorothiazide (AVALIDE) 300-12.5 MG tablet Take 1 tablet by mouth Daily. 90 tablet 3   • montelukast (SINGULAIR) 10 MG tablet Take 1 tablet by mouth Daily. 90 tablet 3   • Multiple Vitamins-Minerals (MULTIVITAMIN ADULT) tablet Take  by mouth Daily.     • Omega-3 Fatty Acids (FISH OIL) 1000 MG capsule capsule Take 1,000 mg by mouth.     • rOPINIRole (REQUIP) 0.5 MG tablet Take 1 tablet by mouth Every Night. 90 tablet 3   • rosuvastatin (CRESTOR) 20 MG tablet Take 1 tablet by mouth Daily. 90 tablet 3   • sertraline (ZOLOFT) 100 MG tablet Take 1 tablet by mouth Daily. 90 tablet 3   • traZODone (DESYREL) 100 MG tablet Take 1 tablet by mouth Every Night. 30 tablet 3   • valACYclovir (VALTREX) 500 MG tablet TAKE 1 TABLET BY MOUTH TWICE DAILY AS NEEDED 20 tablet 1     No facility-administered encounter medications on file as of 8/26/2020.      ADULT ILLNESSES:  Patient Active Problem List   Diagnosis Code   • Malignant neoplasm of central portion of left female breast (CMS/HCC) C50.112   • Anemia of chronic renal failure, stage 3 (moderate) (CMS/HCC) N18.3, D63.1   • Hypertension, benign I10   • Hx of  colonic polyps Z86.010   • HX: breast cancer Z85.3   • Morbidly obese (CMS/HCC) E66.01   • Abnormal mammogram R92.8   • Breast mass N63.0   • Right knee DJD M17.11   • S/P lumpectomy, left breast Z98.890   • Adult hypothyroidism E03.9   • Rhinitis J31.0   • Anemia D64.9   • Anxiety F41.9   • At low risk for fall Z91.81   • Breast cancer, left (CMS/HCC) C50.912   • Cervical pain M54.2   • Chronic insomnia F51.04   • Cough R05   • Elevated lipids E78.5   • Encounter for immunization Z23   • Herpes zoster without complication B02.9   • Influenza B J10.1   • Left ear pain H92.02   • Left otitis externa H60.92   • Lumbar strain, initial encounter S39.012A   • Myalgia M79.10   • Negative depression screening Z13.31   • Obesity (BMI 30-39.9) E66.9   • Postmenopausal status Z78.0   • Recurrent acute serous otitis media of left ear H65.05   • Restless leg G25.81   • Sinusitis, bacterial J32.9, B96.89   • Skin lesion L98.9   • Upper respiratory infection J06.9   • Urinary tract infection without hematuria N39.0   • Vitamin D deficiency E55.9     SURGERIES:  Past Surgical History:   Procedure Laterality Date   • AVULSION TOENAIL PLATE      Sept 26,2018   • BREAST BIOPSY Left 11/20/2012   • BREAST BIOPSY      Left Breast, 1/2019 per Dr Barkley   • BREAST LUMPECTOMY Left     with node bx    • COLONOSCOPY  09/13/2013    small polyp at 30cm benign hyperplastic polyp, changes consistent with melanosis coli. Recall 5 years   • REPLACEMENT TOTAL KNEE Right     2016   • TOTAL ABDOMINAL HYSTERECTOMY WITH SALPINGO OOPHORECTOMY       HEALTH MAINTENANCE ITEMS:    Last Completed Colonoscopy       Status Date      COLONOSCOPY Done 11/19/2018 SCANNED - COLONOSCOPY     Diverticulosis          Last Completed Mammogram       Status Date      MAMMOGRAM Done 12/6/2019 Ext Proc: CHG SCREENING DIGITAL BREAST TOMOSYNTHESIS BI     Normal            FAMILY HISTORY:  Family History   Problem Relation Age of Onset   • Heart attack Mother    • Hodgkin's  "lymphoma Father    • Other Father    • Cancer Father         hodgkins   • Heart attack Brother    • Skin cancer Maternal Uncle    • Pancreatic cancer Maternal Uncle    • Cancer Maternal Uncle         eye   • Colon cancer Neg Hx    • Colon polyps Neg Hx      SOCIAL HISTORY:  Social History     Socioeconomic History   • Marital status:      Spouse name: Not on file   • Number of children: Not on file   • Years of education: Not on file   • Highest education level: Not on file   Tobacco Use   • Smoking status: Never Smoker   • Smokeless tobacco: Never Used   Substance and Sexual Activity   • Alcohol use: No   • Drug use: Defer   • Sexual activity: Defer       REVIEW OF SYSTEMS:  Review of Systems   Constitutional: Negative for activity change, appetite change, chills, diaphoresis, fatigue, fever and unexpected weight loss.   HENT: Negative for ear pain, nosebleeds, sinus pressure, sore throat and voice change.    Eyes: Negative for blurred vision, double vision, pain and visual disturbance.   Respiratory: Negative for cough and shortness of breath.    Cardiovascular: Negative for chest pain, palpitations and leg swelling.   Gastrointestinal: Negative for abdominal pain, anal bleeding, blood in stool, constipation, diarrhea, nausea and vomiting.   Endocrine: Negative for heat intolerance, polydipsia and polyuria.   Genitourinary: Negative for dysuria, frequency, hematuria, urgency and urinary incontinence.   Musculoskeletal: Positive for arthralgias and myalgias (mainly in legs).   Skin: Negative for rash and skin lesions.   Neurological: Negative for dizziness, tremors, seizures, syncope, speech difficulty, weakness and headache.   Hematological: Negative for adenopathy. Does not bruise/bleed easily.   Psychiatric/Behavioral: Negative for dysphoric mood, sleep disturbance, suicidal ideas and depressed mood.       /84   Pulse 72   Temp 97.8 °F (36.6 °C) (Temporal)   Resp 16   Ht 172.7 cm (68\")   Wt " 112 kg (246 lb)   LMP  (LMP Unknown)   SpO2 96%   Breastfeeding No   BMI 37.40 kg/m²  Body surface area is 2.24 meters squared.  Pain Score    08/26/20 0802   PainSc: 0-No pain       Physical Exam:  Physical Exam   Constitutional: She is oriented to person, place, and time. She appears well-developed and well-nourished.   HENT:   Head: Atraumatic.   Mouth/Throat: Oropharynx is clear and moist.   Eyes: Pupils are equal, round, and reactive to light. EOM are normal. No scleral icterus.   Neck: Trachea normal. Neck supple. No JVD present.   Cardiovascular: Normal rate, regular rhythm and normal pulses. Exam reveals no gallop and no friction rub.   No murmur heard.  Pulmonary/Chest: Effort normal and breath sounds normal. She has no wheezes. She has no rhonchi. She has no rales.   Abdominal: Soft. Normal appearance. There is no tenderness. There is no rebound and no guarding.   Lymphadenopathy:     She has no cervical adenopathy.     She has no axillary adenopathy.        Right: No supraclavicular adenopathy present.        Left: No supraclavicular adenopathy present.   Neurological: She is alert and oriented to person, place, and time. She has normal strength. No sensory deficit.   Psychiatric: She has a normal mood and affect. Judgment normal.       Marina Joe reports a pain score of 0.   Patient's Body mass index is 37.4 kg/m². BMI is above normal parameters. Recommendations include: defer to pcp.      LABS    Lab Results - Last 18 Months   Lab Units 08/26/20  0802 07/10/20  0833 05/29/20  0806 05/01/20  0926 04/03/20  0923 03/06/20  1002   HEMOGLOBIN g/dL 11.0* 10.8* 11.1* 11.2* 11.4* 11.0*   HEMATOCRIT % 34.3 34.1 34.4 35.2 35.4 33.8*   MCV fL 91.7 92.4 92.0 93.4 92.4 91.8   WBC 10*3/mm3 6.03 6.08 5.72 5.07 5.73 4.93   RDW % 14.7 15.5* 15.0 14.8 14.9 14.6   MPV fL 9.6 9.7 9.5 9.6 9.8 9.7   PLATELETS 10*3/mm3 200 222 218 216 237 216   IMM GRAN % % 0.3 0.2 0.2 0.2 0.3 0.2   NEUTROS ABS 10*3/mm3 3.59 3.91 3.25  3.07 3.01 2.75   LYMPHS ABS 10*3/mm3 1.61 1.40 1.62 1.37 1.85 1.51   MONOS ABS 10*3/mm3 0.47 0.50 0.60 0.41 0.53 0.43   EOS ABS 10*3/mm3 0.32 0.24 0.21 0.19 0.29 0.22   BASOS ABS 10*3/mm3 0.02 0.02 0.03 0.02 0.03 0.01   IMMATURE GRANS (ABS) 10*3/mm3 0.02 0.01 0.01 0.01 0.02 0.01   NRBC /100 WBC 0.0 0.0 0.0 0.0 0.0 0.0       Lab Results - Last 18 Months   Lab Units 08/26/20  0802 07/10/20  0833 05/29/20  0806 05/01/20  0926 04/03/20  0923 03/06/20  1002   GLUCOSE mg/dL 108* 114* 111* 122* 106* 104*   SODIUM mmol/L 137 138 138 138 140 139   POTASSIUM mmol/L 3.7 3.7 3.6 3.4* 4.0 3.7   CO2 mmol/L 28.0 28.0 25.0 27.0 27.0 28.0   CHLORIDE mmol/L 97* 98 100 100 100 100   ANION GAP mmol/L 12.0 12.0 13.0 11.0 13.0 11.0   CREATININE mg/dL 1.14* 1.09* 1.14* 1.22* 1.17* 1.15*   BUN mg/dL 22 21 22 19 19 16   BUN / CREAT RATIO  19.3 19.3 19.3 15.6 16.2 13.9   CALCIUM mg/dL 9.7 9.6 9.6 9.4 9.4 9.2   ALK PHOS U/L 72 77 66 73 72 73   TOTAL PROTEIN g/dL 7.1 7.4 7.6 7.3 7.5 7.4   ALT (SGPT) U/L 14 16 17 15 17 18   AST (SGOT) U/L 16 16 19 21 17 19   BILIRUBIN mg/dL 0.2 0.3 0.3 0.3 0.3 0.3   ALBUMIN g/dL 3.90 4.10 4.20 4.00 3.90 4.00   GLOBULIN gm/dL 3.2 3.3 3.4 3.3 3.6 3.4       Lab Results - Last 18 Months   Lab Units 08/26/20  0802 07/10/20  0833 05/05/20  0723 01/10/20  0847 09/17/19  0814 04/16/19  1028   IRON mcg/dL 66 66  --  82 61 75   TIBC mcg/dL 270* 283*  --  270* 328 285   IRON SATURATION % 24 23  --  30 19* 26   FERRITIN ng/mL 978.20* 1,066.00*  --  1,314.00* 377.60* 241.00   TSH uIU/mL  --   --  4.790*  --   --   --    FOLATE ng/mL  --   --   --   --  >20.00 >20.00     ASSESSMENT  1. Left Breast Invasive ductal carcinoma diagnosed in November 2012  · Initial stage:  AJCC TNM stage was pT1aN0 M0, Stage IA.  ER positive RI positive HER-2 alix 2+/FISH unamplified  · Treatment: Lumpectomy January 8, 2013, adjuvant external beam radiation to the left breast, adjuvant hormonal therapy beginning around May 2013 with Arimidex for  which she is currently still taking and tolerating well.  · Disease status: No evidence of disease.  CA-27-29 has been remaining within normal range.  Labs stable.  Mammogram up-to-date as of December 2019 unremarkable.  2.  Osteopenia on bone mineral density study 12/8/2016.  Patient takes calcium plus D.  3.  Anemia secondary to chronic kidney disease stage III.  Patient has been undergoing Procrit when meets guidelines with last dosing 7/10/2020.  Hemoglobin is at 11 today therefore she is above guideline to receive Procrit.  4.  Chronic kidney disease stage III with a GFR of 56 ml/min.  5.  Hypothyroidism managed on Synthroid by primary care provider  6.  Coronary artery disease/hypertension on Coreg and Benicar  7.  Depression on Wellbutrin and Zoloft  8.  Normal iron studies as of 7/10/2020    PLAN  1.  Counseled patient regarding CBC results today, showing improved hemoglobin to 11 and stable CMP with GFR of 56 ml/min.   2.  Counseled patient on guidelines for Procrit therapy and that she does not meet guidelines today.  Hemoglobin is too high.  She verbalized understanding.  3.  Encourage patient to continue Arimidex 1 mg daily, she has been advised by Dr. HERNANDEZ to continue the Arimidex for a total of 10 years if she is tolerating it and she is.  4.  Schedule repeat bone mineral density study as it has been 4 years since her last exam  5.  Encourage patient continue taking calcium plus D twice a day  6.  Encourage patient to continue follow primary care provider and other specialists  7.  Patient to return in 6 weeks for follow-up and possible Procrit.  She will need pre-office CBC CMP and iron studies    I spent 25 total minutes, face-to-face, caring for Marina today.  Greater than 50% of this time involved counseling and/or coordination of care as documented within this note regarding the patient's illness(es), pros and cons of various treatment options, instructions and/or risk reduction.    Porsha Bradford,  MARY  08/26/2020  11:19 AM

## 2020-10-02 DIAGNOSIS — C50.412 MALIGNANT NEOPLASM OF UPPER-OUTER QUADRANT OF LEFT BREAST IN FEMALE, ESTROGEN RECEPTOR POSITIVE (HCC): Primary | ICD-10-CM

## 2020-10-02 DIAGNOSIS — D63.1 ANEMIA OF CHRONIC RENAL FAILURE, STAGE 3B (HCC): ICD-10-CM

## 2020-10-02 DIAGNOSIS — N18.32 ANEMIA OF CHRONIC RENAL FAILURE, STAGE 3B (HCC): ICD-10-CM

## 2020-10-02 DIAGNOSIS — Z17.0 MALIGNANT NEOPLASM OF UPPER-OUTER QUADRANT OF LEFT BREAST IN FEMALE, ESTROGEN RECEPTOR POSITIVE (HCC): Primary | ICD-10-CM

## 2020-10-07 ENCOUNTER — OFFICE VISIT (OUTPATIENT)
Dept: ONCOLOGY | Facility: CLINIC | Age: 75
End: 2020-10-07

## 2020-10-07 ENCOUNTER — LAB (OUTPATIENT)
Dept: LAB | Facility: HOSPITAL | Age: 75
End: 2020-10-07

## 2020-10-07 VITALS
HEART RATE: 68 BPM | HEIGHT: 68 IN | OXYGEN SATURATION: 98 % | BODY MASS INDEX: 37.89 KG/M2 | TEMPERATURE: 97.3 F | RESPIRATION RATE: 22 BRPM | WEIGHT: 250 LBS | SYSTOLIC BLOOD PRESSURE: 190 MMHG | DIASTOLIC BLOOD PRESSURE: 100 MMHG

## 2020-10-07 DIAGNOSIS — C50.412 MALIGNANT NEOPLASM OF UPPER-OUTER QUADRANT OF LEFT BREAST IN FEMALE, ESTROGEN RECEPTOR POSITIVE (HCC): ICD-10-CM

## 2020-10-07 DIAGNOSIS — Z17.0 MALIGNANT NEOPLASM OF UPPER-OUTER QUADRANT OF LEFT BREAST IN FEMALE, ESTROGEN RECEPTOR POSITIVE (HCC): ICD-10-CM

## 2020-10-07 DIAGNOSIS — N18.2 STAGE 2 CHRONIC KIDNEY DISEASE: ICD-10-CM

## 2020-10-07 DIAGNOSIS — D63.1 ANEMIA OF CHRONIC RENAL FAILURE, STAGE 3B (HCC): ICD-10-CM

## 2020-10-07 DIAGNOSIS — I10 HYPERTENSION, BENIGN: Primary | ICD-10-CM

## 2020-10-07 DIAGNOSIS — D64.9 ANEMIA, UNSPECIFIED TYPE: ICD-10-CM

## 2020-10-07 DIAGNOSIS — N18.32 ANEMIA OF CHRONIC RENAL FAILURE, STAGE 3B (HCC): ICD-10-CM

## 2020-10-07 LAB
ALBUMIN SERPL-MCNC: 4 G/DL (ref 3.5–5.2)
ALBUMIN/GLOB SERPL: 1.3 G/DL
ALP SERPL-CCNC: 78 U/L (ref 39–117)
ALT SERPL W P-5'-P-CCNC: 20 U/L (ref 1–33)
ANION GAP SERPL CALCULATED.3IONS-SCNC: 7 MMOL/L (ref 5–15)
AST SERPL-CCNC: 23 U/L (ref 1–32)
BASOPHILS # BLD AUTO: 0.02 10*3/MM3 (ref 0–0.2)
BASOPHILS NFR BLD AUTO: 0.3 % (ref 0–1.5)
BILIRUB SERPL-MCNC: 0.2 MG/DL (ref 0–1.2)
BUN SERPL-MCNC: 20 MG/DL (ref 8–23)
BUN/CREAT SERPL: 18.9 (ref 7–25)
CALCIUM SPEC-SCNC: 9.5 MG/DL (ref 8.6–10.5)
CHLORIDE SERPL-SCNC: 100 MMOL/L (ref 98–107)
CO2 SERPL-SCNC: 29 MMOL/L (ref 22–29)
CREAT SERPL-MCNC: 1.06 MG/DL (ref 0.57–1)
DEPRECATED RDW RBC AUTO: 51.5 FL (ref 37–54)
EOSINOPHIL # BLD AUTO: 0.27 10*3/MM3 (ref 0–0.4)
EOSINOPHIL NFR BLD AUTO: 4.3 % (ref 0.3–6.2)
ERYTHROCYTE [DISTWIDTH] IN BLOOD BY AUTOMATED COUNT: 15.4 % (ref 12.3–15.4)
FERRITIN SERPL-MCNC: 1107 NG/ML (ref 13–150)
GFR SERPL CREATININE-BSD FRML MDRD: 61 ML/MIN/1.73
GLOBULIN UR ELPH-MCNC: 3.1 GM/DL
GLUCOSE SERPL-MCNC: 100 MG/DL (ref 65–99)
HCT VFR BLD AUTO: 32.7 % (ref 34–46.6)
HGB BLD-MCNC: 10.5 G/DL (ref 12–15.9)
IMM GRANULOCYTES # BLD AUTO: 0.01 10*3/MM3 (ref 0–0.05)
IMM GRANULOCYTES NFR BLD AUTO: 0.2 % (ref 0–0.5)
IRON 24H UR-MRATE: 59 MCG/DL (ref 37–145)
IRON SATN MFR SERPL: 22 % (ref 20–50)
LYMPHOCYTES # BLD AUTO: 1.75 10*3/MM3 (ref 0.7–3.1)
LYMPHOCYTES NFR BLD AUTO: 28.2 % (ref 19.6–45.3)
MCH RBC QN AUTO: 29.6 PG (ref 26.6–33)
MCHC RBC AUTO-ENTMCNC: 32.1 G/DL (ref 31.5–35.7)
MCV RBC AUTO: 92.1 FL (ref 79–97)
MONOCYTES # BLD AUTO: 0.52 10*3/MM3 (ref 0.1–0.9)
MONOCYTES NFR BLD AUTO: 8.4 % (ref 5–12)
NEUTROPHILS NFR BLD AUTO: 3.64 10*3/MM3 (ref 1.7–7)
NEUTROPHILS NFR BLD AUTO: 58.6 % (ref 42.7–76)
NRBC BLD AUTO-RTO: 0 /100 WBC (ref 0–0.2)
PLATELET # BLD AUTO: 220 10*3/MM3 (ref 140–450)
PMV BLD AUTO: 9.1 FL (ref 6–12)
POTASSIUM SERPL-SCNC: 3.5 MMOL/L (ref 3.5–5.2)
PROT SERPL-MCNC: 7.1 G/DL (ref 6–8.5)
RBC # BLD AUTO: 3.55 10*6/MM3 (ref 3.77–5.28)
SODIUM SERPL-SCNC: 136 MMOL/L (ref 136–145)
TIBC SERPL-MCNC: 270 MCG/DL (ref 298–536)
TRANSFERRIN SERPL-MCNC: 181 MG/DL (ref 200–360)
WBC # BLD AUTO: 6.21 10*3/MM3 (ref 3.4–10.8)

## 2020-10-07 PROCEDURE — 84466 ASSAY OF TRANSFERRIN: CPT

## 2020-10-07 PROCEDURE — 82728 ASSAY OF FERRITIN: CPT

## 2020-10-07 PROCEDURE — 36415 COLL VENOUS BLD VENIPUNCTURE: CPT

## 2020-10-07 PROCEDURE — 85025 COMPLETE CBC W/AUTO DIFF WBC: CPT

## 2020-10-07 PROCEDURE — 99213 OFFICE O/P EST LOW 20 MIN: CPT | Performed by: NURSE PRACTITIONER

## 2020-10-07 PROCEDURE — 83540 ASSAY OF IRON: CPT

## 2020-10-07 PROCEDURE — 80053 COMPREHEN METABOLIC PANEL: CPT

## 2020-10-07 NOTE — PROGRESS NOTES
[facility]  HEMATOLOGY & ONCOLOGY    [facility] HEMATOLOGY AND ONCOLOGY  [address]  [place] [address] [phone]    Patient Name: Marina Joe  Encounter Date: 10/07/2020  YOB: 1945  Patient Number: [id number]    Chief Complaint   Patient presents with   • MALIGNANT NEOPLASM OF UPPER-OUTER QUAD OF LEFT BREAST IN FEM   • ANEMIA DUE TO CHRONIC KIDNEY DISEASE STAGE 3       REASON FOR VISIT: Mrs. Marina Joe is a 75-year-old female patient here today in follow-up for both breast carcinoma as well as anemia secondary to chronic kidney disease.  She has been followed by Dr. De Leon.  In February 2012 she had a routine mammogram that found a nodular density in the upper outer quadrant of the left breast.  An ultrasound revealed no nodularity at that time.  Repeat ultrasound 3 months later on 5/3/2012 revealed a small cyst in the left breast but felt to be benign.  Repeat mammogram on October 31, 2012 found a lobulated lesion in the left breast at the 2 o'clock position and a stable cyst at the 12 o'clock position.  She was referred to Dr. Barkley on 11/30/2012 and biopsy was performed.  The 12:00 cyst was a fibrocystic lesion while the 2:00 lesion was an invasive mammary carcinoma, low-grade, ER positive ME positive HER-2 nu 2+, FISH unamplified.    On January 8, 2013 she underwent a left partial mastectomy with sentinel node biopsy finding invasive ductal carcinoma, 3.1 mm in greatest dimension, grade 1.  2 sentinel nodes were negative.  AJCC TNM stage was pT1aN0 M0, Stage IA.  Following surgery she underwent adjuvant radiation therapy and was then started on Arimidex for hormonal manipulation.  She was started on the Arimidex in approximately April or May 2013.  He has been recommended to continue this for 10 years.    She has had a mammogram as of December 6, 2019 that was unremarkable.  Her last bone density  study showed osteopenia on 12/8/2016.  Her last colonoscopy was in November 2018 that showed diverticulosis.    She presents today also in follow-up for her anemia secondary to chronic kidney disease stage III.  She has been undergoing Procrit therapy when meets guidelines.  Her last Procrit injection was on 7/10/2020 for hemoglobin of 10.8 and hematocrit of 34.1.  Her hemoglobin today is down to 10.5 today with hematocrit of 32.7, however her GFR is up to 61ml/min.  Her iron studies in August were normal with an iron saturation of 24% and a ferritin of 978.2.     Oncology/Hematology History   Malignant neoplasm of upper-outer quadrant of left breast in female, estrogen receptor positive (CMS/HCC)   10/31/2012 Initial Diagnosis    Malignant neoplasm of central portion of left female breast (CMS/HCC)     10/31/2012 Imaging    Mammogram on October 31, 2012 found a lobulated lesion in the left breast at the 2 o'clock position and a stable cyst at the 12 o'clock position.      11/30/2012 Biopsy    Dr. Barkley on 11/30/2012 and biopsy was performed.  The 12:00 cyst was a fibrocystic lesion while the 2:00 lesion was an invasive mammary carcinoma, low-grade, ER positive NE positive HER-2 nu 2+, FISH unamplified.         1/8/2013 Surgery    On January 8, 2013 she underwent a left partial mastectomy with sentinel node biopsy finding invasive ductal carcinoma, 3.1 mm in greatest dimension, grade 1.  2 sentinel nodes were negative.  AJCC TNM stage was pT1aN0 M0, Stage IA.  Hormone receptor positive HER-2 negative      Radiation    Radiation OncologyTreatment Course:  Marina Joe receivedin 33 fractions to left breast via External Beam Radiation - EBRT.     5/2013 -  Hormonal Therapy    Arimidex 1 mg daily           PAST MEDICAL HISTORY:  ALLERGIES:  Allergies   Allergen Reactions   • Aspirin GI Bleeding   • Niacin Other (See Comments)       CURRENT MEDICATIONS:  Outpatient Encounter Medications as of 10/7/2020   Medication  Sig Dispense Refill   • albuterol sulfate HFA (Ventolin HFA) 108 (90 Base) MCG/ACT inhaler Inhale 2 puffs.     • anastrozole (ARIMIDEX) 1 MG tablet Take 1 tablet by mouth Daily. 90 tablet 4   • buPROPion XL (WELLBUTRIN XL) 150 MG 24 hr tablet Take 1 tablet by mouth Daily. 90 tablet 3   • Calcium Carb-Cholecalciferol (CALCIUM 600+D3 PO) Take  by mouth.     • carvedilol (COREG) 6.25 MG tablet Take 1 tablet by mouth 2 (Two) Times a Day With Meals. 180 tablet 3   • cetirizine (zyrTEC) 10 MG tablet Take 10 mg by mouth Daily.     • cyclobenzaprine (FLEXERIL) 10 MG tablet Take 1 tablet by mouth 3 (Three) Times a Day As Needed for Muscle Spasms. 90 tablet 2   • Ergocalciferol (VITAMIN D2 PO) Take 50,000 Units by mouth Every 30 (Thirty) Days.     • ferrous sulfate 325 (65 FE) MG tablet Take 325 mg by mouth Daily With Breakfast.     • fluticasone (FLOVENT DISKUS) 50 MCG/BLIST diskus inhaler Inhale 1 puff.     • irbesartan-hydrochlorothiazide (AVALIDE) 300-12.5 MG tablet Take 1 tablet by mouth Daily. 90 tablet 3   • montelukast (SINGULAIR) 10 MG tablet Take 1 tablet by mouth Daily. 90 tablet 3   • Multiple Vitamins-Minerals (MULTIVITAMIN ADULT) tablet Take  by mouth Daily.     • Omega-3 Fatty Acids (FISH OIL) 1000 MG capsule capsule Take 1,000 mg by mouth.     • rOPINIRole (REQUIP) 0.5 MG tablet Take 1 tablet by mouth Every Night. 90 tablet 3   • rosuvastatin (CRESTOR) 20 MG tablet Take 1 tablet by mouth Daily. 90 tablet 3   • sertraline (ZOLOFT) 100 MG tablet Take 1 tablet by mouth Daily. 90 tablet 3   • traZODone (DESYREL) 100 MG tablet Take 1 tablet by mouth Every Night. 30 tablet 3   • valACYclovir (VALTREX) 500 MG tablet TAKE 1 TABLET BY MOUTH TWICE DAILY AS NEEDED 20 tablet 1     No facility-administered encounter medications on file as of 10/7/2020.      ADULT ILLNESSES:  Patient Active Problem List   Diagnosis Code   • Malignant neoplasm of upper-outer quadrant of left breast in female, estrogen receptor positive  (CMS/[facility]) C50.412, Z17.0   • Anemia of chronic renal failure, stage 3 (moderate) N18.30, D63.1   • Hypertension, benign I10   • Hx of colonic polyps Z86.010   • HX: breast cancer Z85.3   • Morbidly obese (CMS/[facility]) E66.01   • Abnormal mammogram R92.8   • Breast mass N63.0   • Right knee DJD M17.11   • S/P lumpectomy, left breast Z98.890   • Adult hypothyroidism E03.9   • Rhinitis J31.0   • Anemia D64.9   • Anxiety F41.9   • At low risk for fall Z91.81   • Breast cancer, left (CMS/[facility]) C50.912   • Cervical pain M54.2   • Chronic insomnia F51.04   • Cough R05   • Elevated lipids E78.5   • Encounter for immunization Z23   • Herpes zoster without complication B02.9   • Influenza B J10.1   • Left ear pain H92.02   • Left otitis externa H60.92   • Lumbar strain, initial encounter S39.012A   • Myalgia M79.10   • Negative depression screening Z13.31   • Obesity (BMI 30-39.9) E66.9   • Postmenopausal status Z78.0   • Recurrent acute serous otitis media of left ear H65.05   • Restless leg G25.81   • Sinusitis, bacterial J32.9, B96.89   • Skin lesion L98.9   • Upper respiratory infection J06.9   • Urinary tract infection without hematuria N39.0   • Vitamin D deficiency E55.9   • CKD (chronic kidney disease) N18.9     SURGERIES:  Past Surgical History:   Procedure Laterality Date   • AVULSION TOENAIL PLATE      Sept 26,2018   • BREAST BIOPSY Left 11/20/2012   • BREAST BIOPSY      Left Breast, 1/2019 per Dr Barkley   • BREAST LUMPECTOMY Left     with node bx    • COLONOSCOPY  09/13/2013    small polyp at 30cm benign hyperplastic polyp, changes consistent with melanosis coli. Recall 5 years   • REPLACEMENT TOTAL KNEE Right     2016   • TOTAL ABDOMINAL HYSTERECTOMY WITH SALPINGO OOPHORECTOMY       HEALTH MAINTENANCE ITEMS:    Last Completed Colonoscopy       Status Date      COLONOSCOPY Done 11/19/2018 SCANNED - COLONOSCOPY     Diverticulosis          Last Completed Mammogram       Status Date      MAMMOGRAM Done 12/6/2019 Ext Proc:  CHG SCREENING DIGITAL BREAST TOMOSYNTHESIS BI     Normal            FAMILY HISTORY:  Family History   Problem Relation Age of Onset   • Heart attack Mother    • Hodgkin's lymphoma Father    • Other Father    • Cancer Father         hodgkins   • Heart attack Brother    • Skin cancer Maternal Uncle    • Pancreatic cancer Maternal Uncle    • Cancer Maternal Uncle         eye   • Colon cancer Neg Hx    • Colon polyps Neg Hx      SOCIAL HISTORY:  Social History     Socioeconomic History   • Marital status:      Spouse name: Not on file   • Number of children: Not on file   • Years of education: Not on file   • Highest education level: Not on file   Tobacco Use   • Smoking status: Never Smoker   • Smokeless tobacco: Never Used   Substance and Sexual Activity   • Alcohol use: No   • Drug use: Defer   • Sexual activity: Defer       REVIEW OF SYSTEMS:  Review of systems is essentially unchanged from last visit  Review of Systems   Constitutional: Negative for activity change, appetite change, chills, diaphoresis, fatigue, fever and unexpected weight loss.   HENT: Negative for ear pain, nosebleeds, sinus pressure, sore throat and voice change.    Eyes: Negative for blurred vision, double vision, pain and visual disturbance.   Respiratory: Negative for cough and shortness of breath.    Cardiovascular: Negative for chest pain, palpitations and leg swelling.   Gastrointestinal: Negative for abdominal pain, anal bleeding, blood in stool, constipation, diarrhea, nausea and vomiting.   Endocrine: Negative for heat intolerance, polydipsia and polyuria.   Genitourinary: Negative for dysuria, frequency, hematuria, urgency and urinary incontinence.   Musculoskeletal: Positive for arthralgias and myalgias (mainly in legs).   Skin: Negative for rash and skin lesions.   Neurological: Negative for dizziness, tremors, seizures, syncope, speech difficulty, weakness and headache.   Hematological: Negative for adenopathy. Does not  "bruise/bleed easily.   Psychiatric/Behavioral: Negative for dysphoric mood, sleep disturbance, suicidal ideas and depressed mood.       BP (!) 190/100   Pulse 68   Temp 97.3 °F (36.3 °C)   Resp 22   Ht 172.7 cm (68\")   Wt 113 kg (250 lb)   LMP  (LMP Unknown)   SpO2 98%   Breastfeeding No   BMI 38.01 kg/m²  Body surface area is 2.24 meters squared.  Pain Score    10/07/20 0812   PainSc: 0-No pain       Physical Exam:  Physical exam is unchanged from last visit  Physical Exam   Constitutional: She is oriented to person, place, and time. She appears well-developed and well-nourished.   HENT:   Head: Atraumatic.   Mouth/Throat: Oropharynx is clear and moist.   Eyes: Pupils are equal, round, and reactive to light. No scleral icterus.   Neck: Trachea normal. Neck supple. No JVD present.   Cardiovascular: Normal rate, regular rhythm and normal pulses. Exam reveals no gallop and no friction rub.   No murmur heard.  Pulmonary/Chest: Effort normal and breath sounds normal. She has no wheezes. She has no rhonchi. She has no rales.   Abdominal: Soft. Normal appearance. There is no abdominal tenderness. There is no rebound and no guarding.   Lymphadenopathy:     She has no cervical adenopathy.        Right: No supraclavicular adenopathy present.        Left: No supraclavicular adenopathy present.   Neurological: She is alert and oriented to person, place, and time. No sensory deficit.   Psychiatric: Judgment normal.       Marina GINO Heath reports a pain score of   Pain Score    10/07/20 0812   PainSc: 0-No pain      Patient's Body mass index is 38.01 kg/m². BMI is above normal parameters. Recommendations include: defer to pcp.    LABS    Lab Results - Last 18 Months   Lab Units 10/07/20  0805 08/26/20  0802 07/10/20  0833 05/29/20  0806 05/01/20  0926 04/03/20  0923   HEMOGLOBIN g/dL 10.5* 11.0* 10.8* 11.1* 11.2* 11.4*   HEMATOCRIT % 32.7* 34.3 34.1 34.4 35.2 35.4   MCV fL 92.1 91.7 92.4 92.0 93.4 92.4   WBC 10*3/mm3 6.21 " 6.03 6.08 5.72 5.07 5.73   RDW % 15.4 14.7 15.5* 15.0 14.8 14.9   MPV fL 9.1 9.6 9.7 9.5 9.6 9.8   PLATELETS 10*3/mm3 220 200 222 218 216 237   IMM GRAN % % 0.2 0.3 0.2 0.2 0.2 0.3   NEUTROS ABS 10*3/mm3 3.64 3.59 3.91 3.25 3.07 3.01   LYMPHS ABS 10*3/mm3 1.75 1.61 1.40 1.62 1.37 1.85   MONOS ABS 10*3/mm3 0.52 0.47 0.50 0.60 0.41 0.53   EOS ABS 10*3/mm3 0.27 0.32 0.24 0.21 0.19 0.29   BASOS ABS 10*3/mm3 0.02 0.02 0.02 0.03 0.02 0.03   IMMATURE GRANS (ABS) 10*3/mm3 0.01 0.02 0.01 0.01 0.01 0.02   NRBC /100 WBC 0.0 0.0 0.0 0.0 0.0 0.0       Lab Results - Last 18 Months   Lab Units 08/26/20  0802 07/10/20  0833 05/29/20  0806 05/01/20  0926 04/03/20  0923 03/06/20  1002   GLUCOSE mg/dL 108* 114* 111* 122* 106* 104*   SODIUM mmol/L 137 138 138 138 140 139   POTASSIUM mmol/L 3.7 3.7 3.6 3.4* 4.0 3.7   CO2 mmol/L 28.0 28.0 25.0 27.0 27.0 28.0   CHLORIDE mmol/L 97* 98 100 100 100 100   ANION GAP mmol/L 12.0 12.0 13.0 11.0 13.0 11.0   CREATININE mg/dL 1.14* 1.09* 1.14* 1.22* 1.17* 1.15*   BUN mg/dL 22 21 22 19 19 16   BUN / CREAT RATIO  19.3 19.3 19.3 15.6 16.2 13.9   CALCIUM mg/dL 9.7 9.6 9.6 9.4 9.4 9.2   ALK PHOS U/L 72 77 66 73 72 73   TOTAL PROTEIN g/dL 7.1 7.4 7.6 7.3 7.5 7.4   ALT (SGPT) U/L 14 16 17 15 17 18   AST (SGOT) U/L 16 16 19 21 17 19   BILIRUBIN mg/dL 0.2 0.3 0.3 0.3 0.3 0.3   ALBUMIN g/dL 3.90 4.10 4.20 4.00 3.90 4.00   GLOBULIN gm/dL 3.2 3.3 3.4 3.3 3.6 3.4       Lab Results - Last 18 Months   Lab Units 08/26/20  0802 07/10/20  0833 05/05/20  0723 01/10/20  0847 09/17/19  0814 04/16/19  1028   IRON mcg/dL 66 66  --  82 61 75   TIBC mcg/dL 270* 283*  --  270* 328 285   IRON SATURATION % 24 23  --  30 19* 26   FERRITIN ng/mL 978.20* 1,066.00*  --  1,314.00* 377.60* 241.00   TSH uIU/mL  --   --  4.790*  --   --   --    FOLATE ng/mL  --   --   --   --  >20.00 >20.00     ASSESSMENT  1. Left Breast Invasive ductal carcinoma diagnosed in November 2012  · Initial stage:  AJCC TNM stage was pT1aN0 M0, Stage IA.   ER positive OH positive HER-2 alix 2+/FISH unamplified  · Treatment: Lumpectomy January 8, 2013, adjuvant external beam radiation to the left breast, adjuvant hormonal therapy beginning around May 2013 with Arimidex for which she is currently still taking and tolerating well.  · Disease status: No evidence of disease.  CA-27-29 has been remaining within normal range.  Labs stable.  Mammogram up-to-date as of December 2019 unremarkable.  2.  Osteopenia on bone mineral density study 12/8/2016.  Patient takes calcium plus D.  3.  Anemia secondary to chronic kidney disease stage III.  Patient has been undergoing Procrit when meets guidelines with last dosing 7/10/2020.  Hemoglobin and hematocrit meet guidelines today however her GFR has improved to 61 ml/min, therefore this is above guideline.  4.  Chronic kidney disease stage III with a GFR of 61 ml/min.  5.  Hypothyroidism managed on Synthroid by primary care provider  6.  Coronary artery disease/hypertension on Coreg and Benicar  7.  Normal iron studies as of 8/26/2020, with an iron saturation of 24% and a ferritin of 978.  8.  Hypertension with a blood pressure of 190/100 when she arrived.  188/98 at recheck manually.  Patient did not take antihypertensives this morning.  She was advised to go home and take her medication.  PLAN  1.  Counseled patient regarding CBC results today, showing hemoglobin hematocrit and improved GFR.    2.  Counseled patient on guidelines for Procrit therapy and that she does not meet guidelines today due to the improved kidney function   3.  Encourage patient to continue Arimidex 1 mg daily, she has been advised by Dr. HERNANDEZ to continue the Arimidex for a total of 10 years if she is tolerating it and she is.  4.  Encourage patient continue taking calcium plus D twice a day  5.  Encourage patient to continue follow primary care provider and other specialists  6.  Patient to return in 2 weeks for cbc,cmp and possible Procrit.    7.  Patient to  return in 6 weeks for follow-up and possible Procrit with a pre-office CBC CMP    I spent 20 total minutes, face-to-face, caring for Marina today.  Greater than 50% of this time involved counseling and/or coordination of care as documented within this note regarding the patient's illness(es), pros and cons of various treatment options, instructions and/or risk reduction.    Porsha Bradford, APRN   10/07/2020  09:33 CDT

## 2020-10-20 PROBLEM — N18.31 STAGE 3A CHRONIC KIDNEY DISEASE: Status: ACTIVE | Noted: 2020-10-20

## 2020-10-21 ENCOUNTER — LAB (OUTPATIENT)
Dept: LAB | Facility: HOSPITAL | Age: 75
End: 2020-10-21

## 2020-10-21 ENCOUNTER — INFUSION (OUTPATIENT)
Dept: ONCOLOGY | Facility: HOSPITAL | Age: 75
End: 2020-10-21

## 2020-10-21 DIAGNOSIS — N18.31 ANEMIA OF CHRONIC RENAL FAILURE, STAGE 3A (HCC): Primary | ICD-10-CM

## 2020-10-21 DIAGNOSIS — N18.31 ANEMIA OF CHRONIC RENAL FAILURE, STAGE 3A (HCC): ICD-10-CM

## 2020-10-21 DIAGNOSIS — D63.1 ANEMIA OF CHRONIC RENAL FAILURE, STAGE 3A (HCC): ICD-10-CM

## 2020-10-21 DIAGNOSIS — N18.31 STAGE 3A CHRONIC KIDNEY DISEASE (HCC): ICD-10-CM

## 2020-10-21 DIAGNOSIS — D63.1 ANEMIA OF CHRONIC RENAL FAILURE, STAGE 3A (HCC): Primary | ICD-10-CM

## 2020-10-21 LAB
ALBUMIN SERPL-MCNC: 3.9 G/DL (ref 3.5–5.2)
ALBUMIN/GLOB SERPL: 1.2 G/DL
ALP SERPL-CCNC: 76 U/L (ref 39–117)
ALT SERPL W P-5'-P-CCNC: 15 U/L (ref 1–33)
ANION GAP SERPL CALCULATED.3IONS-SCNC: 9 MMOL/L (ref 5–15)
AST SERPL-CCNC: 15 U/L (ref 1–32)
BASOPHILS # BLD AUTO: 0.03 10*3/MM3 (ref 0–0.2)
BASOPHILS NFR BLD AUTO: 0.5 % (ref 0–1.5)
BILIRUB SERPL-MCNC: 0.2 MG/DL (ref 0–1.2)
BUN SERPL-MCNC: 20 MG/DL (ref 8–23)
BUN/CREAT SERPL: 17.2 (ref 7–25)
CALCIUM SPEC-SCNC: 9.6 MG/DL (ref 8.6–10.5)
CHLORIDE SERPL-SCNC: 100 MMOL/L (ref 98–107)
CO2 SERPL-SCNC: 30 MMOL/L (ref 22–29)
CREAT SERPL-MCNC: 1.16 MG/DL (ref 0.57–1)
DEPRECATED RDW RBC AUTO: 50.8 FL (ref 37–54)
EOSINOPHIL # BLD AUTO: 0.29 10*3/MM3 (ref 0–0.4)
EOSINOPHIL NFR BLD AUTO: 4.7 % (ref 0.3–6.2)
ERYTHROCYTE [DISTWIDTH] IN BLOOD BY AUTOMATED COUNT: 15.4 % (ref 12.3–15.4)
GFR SERPL CREATININE-BSD FRML MDRD: 55 ML/MIN/1.73
GLOBULIN UR ELPH-MCNC: 3.2 GM/DL
GLUCOSE SERPL-MCNC: 99 MG/DL (ref 65–99)
HCT VFR BLD AUTO: 33.3 % (ref 34–46.6)
HGB BLD-MCNC: 10.7 G/DL (ref 12–15.9)
IMM GRANULOCYTES # BLD AUTO: 0.02 10*3/MM3 (ref 0–0.05)
IMM GRANULOCYTES NFR BLD AUTO: 0.3 % (ref 0–0.5)
LYMPHOCYTES # BLD AUTO: 1.55 10*3/MM3 (ref 0.7–3.1)
LYMPHOCYTES NFR BLD AUTO: 25.1 % (ref 19.6–45.3)
MCH RBC QN AUTO: 29 PG (ref 26.6–33)
MCHC RBC AUTO-ENTMCNC: 32.1 G/DL (ref 31.5–35.7)
MCV RBC AUTO: 90.2 FL (ref 79–97)
MONOCYTES # BLD AUTO: 0.5 10*3/MM3 (ref 0.1–0.9)
MONOCYTES NFR BLD AUTO: 8.1 % (ref 5–12)
NEUTROPHILS NFR BLD AUTO: 3.79 10*3/MM3 (ref 1.7–7)
NEUTROPHILS NFR BLD AUTO: 61.3 % (ref 42.7–76)
NRBC BLD AUTO-RTO: 0 /100 WBC (ref 0–0.2)
PLATELET # BLD AUTO: 209 10*3/MM3 (ref 140–450)
PMV BLD AUTO: 9.2 FL (ref 6–12)
POTASSIUM SERPL-SCNC: 3.4 MMOL/L (ref 3.5–5.2)
PROT SERPL-MCNC: 7.1 G/DL (ref 6–8.5)
RBC # BLD AUTO: 3.69 10*6/MM3 (ref 3.77–5.28)
SODIUM SERPL-SCNC: 139 MMOL/L (ref 136–145)
WBC # BLD AUTO: 6.18 10*3/MM3 (ref 3.4–10.8)

## 2020-10-21 PROCEDURE — 80053 COMPREHEN METABOLIC PANEL: CPT

## 2020-10-21 PROCEDURE — 85025 COMPLETE CBC W/AUTO DIFF WBC: CPT

## 2020-10-21 PROCEDURE — 36415 COLL VENOUS BLD VENIPUNCTURE: CPT

## 2020-10-27 RX ORDER — TRAZODONE HYDROCHLORIDE 100 MG/1
100 TABLET ORAL NIGHTLY
Qty: 30 TABLET | Refills: 5 | Status: SHIPPED | OUTPATIENT
Start: 2020-10-27 | End: 2021-05-06 | Stop reason: SDUPTHER

## 2020-11-05 ENCOUNTER — OFFICE VISIT (OUTPATIENT)
Dept: INTERNAL MEDICINE | Facility: CLINIC | Age: 75
End: 2020-11-05

## 2020-11-05 VITALS
HEIGHT: 68 IN | OXYGEN SATURATION: 96 % | HEART RATE: 76 BPM | TEMPERATURE: 98 F | WEIGHT: 240 LBS | SYSTOLIC BLOOD PRESSURE: 140 MMHG | BODY MASS INDEX: 36.37 KG/M2 | DIASTOLIC BLOOD PRESSURE: 72 MMHG

## 2020-11-05 DIAGNOSIS — Z00.00 MEDICARE ANNUAL WELLNESS VISIT, INITIAL: ICD-10-CM

## 2020-11-05 DIAGNOSIS — Z12.31 BREAST CANCER SCREENING BY MAMMOGRAM: ICD-10-CM

## 2020-11-05 DIAGNOSIS — Z23 NEED FOR INFLUENZA VACCINATION: Primary | ICD-10-CM

## 2020-11-05 DIAGNOSIS — Z11.59 ENCOUNTER FOR HEPATITIS C SCREENING TEST FOR LOW RISK PATIENT: ICD-10-CM

## 2020-11-05 DIAGNOSIS — Z23 NEED FOR PNEUMOCOCCAL VACCINATION: ICD-10-CM

## 2020-11-05 DIAGNOSIS — E78.5 ELEVATED LIPIDS: ICD-10-CM

## 2020-11-05 PROCEDURE — G0008 ADMIN INFLUENZA VIRUS VAC: HCPCS | Performed by: NURSE PRACTITIONER

## 2020-11-05 PROCEDURE — 90694 VACC AIIV4 NO PRSRV 0.5ML IM: CPT | Performed by: NURSE PRACTITIONER

## 2020-11-05 PROCEDURE — 90732 PPSV23 VACC 2 YRS+ SUBQ/IM: CPT | Performed by: NURSE PRACTITIONER

## 2020-11-05 PROCEDURE — G0009 ADMIN PNEUMOCOCCAL VACCINE: HCPCS | Performed by: NURSE PRACTITIONER

## 2020-11-05 PROCEDURE — G0439 PPPS, SUBSEQ VISIT: HCPCS | Performed by: NURSE PRACTITIONER

## 2020-11-05 NOTE — PROGRESS NOTES
"The ABCs of the Annual Wellness Visit  Initial Medicare Wellness Visit    Chief Complaint   Patient presents with   • Medicare Wellness-Initial Visit     Discuss bp medication.       Subjective   History of Present Illness:  Marina Joe is a 75 y.o. female who presents for an Initial Medicare Wellness Visit. Mrs. Joe denies complaints today and reports \"overall doing well\". She had recent visit to hematologist/oncologist and had elevated blood pressure. Patient admitted at the time and today that she had held her medicine that morning incase she needed to complete fasting labs, and reports \"cannot take my medicine without eating\". She reports pretty good control of blood pressure at home. She reports blood pressure reading 130's/70's without chest pain, shortness of breath, palpitations,or lower extremity edema.     Patient is due for mammogram next month and needs order sent to [facility]. Patient will need to complete immunizations today and is agreeable to this. Patient had colonoscopy completed by Dr. Khan's in 2018, and advised repeat colonoscopy in 5 years.     HEALTH RISK ASSESSMENT    Recent Hospitalizations:  No hospitalization(s) within the last year.    Current Medical Providers:  Patient Care Team:  Brynn Hollingsworth APRN as PCP - General (Nurse Practitioner)    Smoking Status:  Social History     Tobacco Use   Smoking Status Never Smoker   Smokeless Tobacco Never Used       Alcohol Consumption:  Social History     Substance and Sexual Activity   Alcohol Use No       Depression Screen:   PHQ-2/PHQ-9 Depression Screening 11/5/2020   Little interest or pleasure in doing things 0   Feeling down, depressed, or hopeless 0   Trouble falling or staying asleep, or sleeping too much -   Feeling tired or having little energy -   Poor appetite or overeating -   Feeling bad about yourself - or that you are a failure or have let yourself or your family down -   Trouble concentrating on " things, such as reading the newspaper or watching television -   Moving or speaking so slowly that other people could have noticed. Or the opposite - being so fidgety or restless that you have been moving around a lot more than usual -   Thoughts that you would be better off dead, or of hurting yourself in some way -   Total Score 0   If you checked off any problems, how difficult have these problems made it for you to do your work, take care of things at home, or get along with other people? -       Fall Risk Screen:  EYAL Fall Risk Assessment was completed, and patient is at LOW risk for falls.Assessment completed on:11/5/2020    Health Habits and Functional and Cognitive Screening:  Functional & Cognitive Status 11/5/2020   Do you have difficulty preparing food and eating? No   Do you have difficulty bathing yourself, getting dressed or grooming yourself? No   Do you have difficulty using the toilet? No   Do you have difficulty moving around from place to place? No   Do you have trouble with steps or getting out of a bed or a chair? No   Current Diet Well Balanced Diet   Dental Exam Up to date   Eye Exam Up to date   Exercise (times per week) 4 times per week   Current Exercise Activities Include Walking   Do you need help using the phone?  No   Are you deaf or do you have serious difficulty hearing?  No   Do you need help with transportation? No   Do you need help shopping? No   Do you need help preparing meals?  No   Do you need help with housework?  No   Do you need help with laundry? No   Do you need help taking your medications? No   Do you need help managing money? No   Do you ever drive or ride in a car without wearing a seat belt? No   Have you felt unusual stress, anger or loneliness in the last month? No   Who do you live with? Spouse   If you need help, do you have trouble finding someone available to you? Yes   Have you been bothered in the last four weeks by sexual problems? No   Do you have  difficulty concentrating, remembering or making decisions? No         Does the patient have evidence of cognitive impairment? No    Asprin use counseling:Contraindicated from taking ASA    Age-appropriate Screening Schedule:  Refer to the list below for future screening recommendations based on patient's age, sex and/or medical conditions. Orders for these recommended tests are listed in the plan section. The patient has been provided with a written plan.    Health Maintenance   Topic Date Due   • ZOSTER VACCINE (1 of 2) 01/31/1996   • LIPID PANEL  05/03/2017   • TDAP/TD VACCINES (1 - Tdap) 11/11/2021 (Originally 1/31/1964)   • MAMMOGRAM  12/06/2020   • COLONOSCOPY  11/19/2023   • INFLUENZA VACCINE  Completed          The following portions of the patient's history were reviewed and updated as appropriate: allergies, current medications, past family history, past medical history, past social history, past surgical history and problem list.    Outpatient Medications Prior to Visit   Medication Sig Dispense Refill   • albuterol sulfate HFA (Ventolin HFA) 108 (90 Base) MCG/ACT inhaler Inhale 2 puffs.     • anastrozole (ARIMIDEX) 1 MG tablet Take 1 tablet by mouth Daily. 90 tablet 4   • buPROPion XL (WELLBUTRIN XL) 150 MG 24 hr tablet Take 1 tablet by mouth Daily. 90 tablet 3   • Calcium Carb-Cholecalciferol (CALCIUM 600+D3 PO) Take  by mouth.     • carvedilol (COREG) 6.25 MG tablet Take 1 tablet by mouth 2 (Two) Times a Day With Meals. 180 tablet 3   • cetirizine (zyrTEC) 10 MG tablet Take 10 mg by mouth Daily.     • cyclobenzaprine (FLEXERIL) 10 MG tablet Take 1 tablet by mouth 3 (Three) Times a Day As Needed for Muscle Spasms. 90 tablet 2   • Ergocalciferol (VITAMIN D2 PO) Take 50,000 Units by mouth Every 30 (Thirty) Days.     • fluticasone (FLOVENT DISKUS) 50 MCG/BLIST diskus inhaler Inhale 1 puff.     • irbesartan-hydrochlorothiazide (AVALIDE) 300-12.5 MG tablet Take 1 tablet by mouth Daily. 90 tablet 3   •  montelukast (SINGULAIR) 10 MG tablet Take 1 tablet by mouth Daily. 90 tablet 3   • Multiple Vitamins-Minerals (MULTIVITAMIN ADULT) tablet Take  by mouth Daily.     • Omega-3 Fatty Acids (FISH OIL) 1000 MG capsule capsule Take 1,000 mg by mouth.     • rOPINIRole (REQUIP) 0.5 MG tablet Take 1 tablet by mouth Every Night. 90 tablet 3   • rosuvastatin (CRESTOR) 20 MG tablet Take 1 tablet by mouth Daily. 90 tablet 3   • sertraline (ZOLOFT) 100 MG tablet Take 1 tablet by mouth Daily. 90 tablet 3   • traZODone (DESYREL) 100 MG tablet TAKE 1 TABLET BY MOUTH EVERY NIGHT 30 tablet 5   • valACYclovir (VALTREX) 500 MG tablet TAKE 1 TABLET BY MOUTH TWICE DAILY AS NEEDED 20 tablet 1   • ferrous sulfate 325 (65 FE) MG tablet Take 325 mg by mouth Daily With Breakfast.       No facility-administered medications prior to visit.        Patient Active Problem List   Diagnosis   • Malignant neoplasm of upper-outer quadrant of left breast in female, estrogen receptor positive (CMS/HCC)   • Anemia of chronic renal failure, stage 3 (moderate)   • Hypertension, benign   • Hx of colonic polyps   • HX: breast cancer   • Morbidly obese (CMS/HCC)   • Abnormal mammogram   • Breast mass   • Right knee DJD   • S/P lumpectomy, left breast   • Adult hypothyroidism   • Rhinitis   • Anemia   • Anxiety   • At low risk for fall   • Breast cancer, left (CMS/HCC)   • Cervical pain   • Chronic insomnia   • Cough   • Elevated lipids   • Encounter for immunization   • Herpes zoster without complication   • Influenza B   • Left ear pain   • Left otitis externa   • Lumbar strain, initial encounter   • Myalgia   • Negative depression screening   • Obesity (BMI 30-39.9)   • Postmenopausal status   • Recurrent acute serous otitis media of left ear   • Restless leg   • Sinusitis, bacterial   • Skin lesion   • Upper respiratory infection   • Urinary tract infection without hematuria   • Vitamin D deficiency   • CKD (chronic kidney disease)   • Stage 3a chronic  kidney disease       Advanced Care Planning:  ACP discussion was declined by the patient. Patient does not have an advance directive, declines further assistance.    Review of Systems   Constitutional: Negative for activity change, appetite change, fatigue, fever and unexpected weight change.   HENT: Negative for congestion, ear discharge, ear pain, hearing loss, postnasal drip, rhinorrhea, sinus pressure, tinnitus, trouble swallowing and voice change.    Eyes: Negative for discharge and visual disturbance.   Respiratory: Negative for apnea, cough and shortness of breath.    Cardiovascular: Negative for chest pain, palpitations and leg swelling.   Gastrointestinal: Negative for abdominal pain, blood in stool, constipation and rectal pain.   Endocrine: Negative for cold intolerance, heat intolerance, polydipsia and polyphagia.   Genitourinary: Negative for decreased urine volume, difficulty urinating, frequency, hematuria and urgency.   Musculoskeletal: Negative for arthralgias, back pain, myalgias, neck pain and neck stiffness.   Skin: Negative for color change, rash and wound.   Allergic/Immunologic: Negative for environmental allergies.   Neurological: Negative for dizziness, speech difficulty, weakness, numbness and headaches.   Hematological: Negative for adenopathy. Does not bruise/bleed easily.   Psychiatric/Behavioral: Negative for agitation, confusion, decreased concentration and sleep disturbance. The patient is not nervous/anxious.        Compared to one year ago, the patient feels her physical health is the same.  Compared to one year ago, the patient feels her mental health is the same.    Reviewed chart for potential of high risk medication in the elderly: yes  Reviewed chart for potential of harmful drug interactions in the elderly:yes    Objective         Vitals:    11/05/20 0945   BP: 140/72   BP Location: Left arm   Pulse: 76   Temp: 98 °F (36.7 °C)   SpO2: 96%   Weight: 109 kg (240 lb)   Height:  "172.7 cm (68\")   PainSc: 0-No pain       Body mass index is 36.49 kg/m².  Discussed the patient's BMI with her. The BMI is above average; BMI management plan is completed.    Physical Exam  Vitals signs and nursing note reviewed.   Constitutional:       Appearance: She is well-developed.   HENT:      Head: Normocephalic and atraumatic.      Right Ear: External ear normal.      Left Ear: External ear normal.      Nose: Nose normal.   Eyes:      Conjunctiva/sclera: Conjunctivae normal.      Pupils: Pupils are equal, round, and reactive to light.   Neck:      Musculoskeletal: Normal range of motion and neck supple.      Thyroid: No thyromegaly.   Cardiovascular:      Rate and Rhythm: Normal rate and regular rhythm.      Heart sounds: Normal heart sounds.   Pulmonary:      Effort: Pulmonary effort is normal.      Breath sounds: Normal breath sounds.   Abdominal:      General: Bowel sounds are normal.      Palpations: Abdomen is soft.   Lymphadenopathy:      Cervical: No cervical adenopathy.   Skin:     General: Skin is warm and dry.   Neurological:      Mental Status: She is alert and oriented to person, place, and time.   Psychiatric:         Behavior: Behavior normal.         Thought Content: Thought content normal.               Assessment/Plan   Medicare Risks and Personalized Health Plan  CMS Preventative Services Quick Reference  Breast Cancer/Mammogram Screening  Cardiovascular risk  Chronic Pain   Colon Cancer Screening  Dementia/Memory   Depression/Dysphoria  Immunizations Discussed/Encouraged (specific immunizations; Td, Influenza, Pneumococcal 23 and Shingrix )  Inadequate Social Support, Isolation, Loneliness, Lack of Transportation, Financial Difficulties, or Caregiver Stress   Inactivity/Sedentary  Obesity/Overweight   Osteoprorosis Risk  Polypharmacy    The above risks/problems have been discussed with the patient.  Pertinent information has been shared with the patient in the After Visit Summary.  Follow " up plans and orders are seen below in the Assessment/Plan Section.    Diagnoses and all orders for this visit:    1. Need for influenza vaccination (Primary)  -     Fluad Quad >65 years    2. Encounter for hepatitis C screening test for low risk patient  -     Hepatitis C Antibody    3. Elevated lipids  -     Lipid Panel    4. Breast cancer screening by mammogram  -     Mammo Screening Digital Tomosynthesis Bilateral With CAD; Future    5. Need for pneumococcal vaccination  -     Pneumococcal Polysaccharide Vaccine 23-Valent Greater Than or Equal To 1yo Subcutaneous / IM    6. Medicare annual wellness visit, initial      Follow Up:  Return in about 6 months (around 5/5/2021).  Continue current blood pressure therapies. Patient's elevated blood pressure readings are relative to her skipping or missing dosing of her medicine.Will consider increasing coreg in the AM 12.5 mg if necessary. Will check remaining yearly labs today. Reviewed over labs completed by oncologist/hematologist and they are addressing her anemia due to CKD with intermittent Procrit. She is seeing her nephrologist at the [facility] every 3-6 months, they will be collecting Vitamin D level and repeating her CMP in 2 weeks prior to visit. Will look at results of these labs to follow care of my patient. Patient will have a mammogram order sent to [facility] to complete on December due date.Patient has a history of cancer and will need yearly monitoring. Last years mammogram was benign. Patient to follow up in 6 months, sooner if blood pressure increases >140/90 2-3 readings at home.     An After Visit Summary and PPPS were given to the patient.

## 2020-11-06 LAB
CHOLEST SERPL-MCNC: 163 MG/DL (ref 0–200)
HCV AB S/CO SERPL IA: 0.1 S/CO RATIO (ref 0–0.9)
HDLC SERPL-MCNC: 60 MG/DL (ref 40–60)
LDLC SERPL CALC-MCNC: 87 MG/DL (ref 0–100)
TRIGL SERPL-MCNC: 88 MG/DL (ref 0–150)
VLDLC SERPL CALC-MCNC: 16 MG/DL (ref 5–40)

## 2020-11-09 ENCOUNTER — TELEPHONE (OUTPATIENT)
Dept: INTERNAL MEDICINE | Facility: CLINIC | Age: 75
End: 2020-11-09

## 2020-11-17 DIAGNOSIS — N18.31 STAGE 3A CHRONIC KIDNEY DISEASE (HCC): Primary | ICD-10-CM

## 2020-11-17 DIAGNOSIS — N18.31 ANEMIA OF CHRONIC RENAL FAILURE, STAGE 3A (HCC): ICD-10-CM

## 2020-11-17 DIAGNOSIS — D63.1 ANEMIA OF CHRONIC RENAL FAILURE, STAGE 3A (HCC): ICD-10-CM

## 2020-11-18 ENCOUNTER — INFUSION (OUTPATIENT)
Dept: ONCOLOGY | Facility: HOSPITAL | Age: 75
End: 2020-11-18

## 2020-11-18 ENCOUNTER — LAB (OUTPATIENT)
Dept: LAB | Facility: HOSPITAL | Age: 75
End: 2020-11-18

## 2020-11-18 ENCOUNTER — OFFICE VISIT (OUTPATIENT)
Dept: ONCOLOGY | Facility: CLINIC | Age: 75
End: 2020-11-18

## 2020-11-18 VITALS
HEART RATE: 60 BPM | HEIGHT: 68 IN | RESPIRATION RATE: 16 BRPM | SYSTOLIC BLOOD PRESSURE: 150 MMHG | WEIGHT: 248 LBS | OXYGEN SATURATION: 91 % | BODY MASS INDEX: 37.59 KG/M2 | TEMPERATURE: 97.9 F | DIASTOLIC BLOOD PRESSURE: 88 MMHG

## 2020-11-18 VITALS
SYSTOLIC BLOOD PRESSURE: 173 MMHG | BODY MASS INDEX: 37.59 KG/M2 | HEART RATE: 53 BPM | WEIGHT: 248 LBS | HEIGHT: 68 IN | TEMPERATURE: 97.1 F | OXYGEN SATURATION: 98 % | RESPIRATION RATE: 18 BRPM | DIASTOLIC BLOOD PRESSURE: 88 MMHG

## 2020-11-18 DIAGNOSIS — N18.31 ANEMIA OF CHRONIC RENAL FAILURE, STAGE 3A (HCC): ICD-10-CM

## 2020-11-18 DIAGNOSIS — Z17.0 MALIGNANT NEOPLASM OF UPPER-OUTER QUADRANT OF LEFT BREAST IN FEMALE, ESTROGEN RECEPTOR POSITIVE (HCC): Primary | ICD-10-CM

## 2020-11-18 DIAGNOSIS — N18.31 STAGE 3A CHRONIC KIDNEY DISEASE (HCC): ICD-10-CM

## 2020-11-18 DIAGNOSIS — D63.1 ANEMIA OF CHRONIC RENAL FAILURE, STAGE 3A (HCC): ICD-10-CM

## 2020-11-18 DIAGNOSIS — N18.31 STAGE 3A CHRONIC KIDNEY DISEASE (HCC): Primary | ICD-10-CM

## 2020-11-18 DIAGNOSIS — C50.412 MALIGNANT NEOPLASM OF UPPER-OUTER QUADRANT OF LEFT BREAST IN FEMALE, ESTROGEN RECEPTOR POSITIVE (HCC): Primary | ICD-10-CM

## 2020-11-18 LAB
ALBUMIN SERPL-MCNC: 4.1 G/DL (ref 3.5–5.2)
ALBUMIN/GLOB SERPL: 1.3 G/DL
ALP SERPL-CCNC: 73 U/L (ref 39–117)
ALT SERPL W P-5'-P-CCNC: 14 U/L (ref 1–33)
ANION GAP SERPL CALCULATED.3IONS-SCNC: 9 MMOL/L (ref 5–15)
AST SERPL-CCNC: 17 U/L (ref 1–32)
BASOPHILS # BLD AUTO: 0.03 10*3/MM3 (ref 0–0.2)
BASOPHILS NFR BLD AUTO: 0.5 % (ref 0–1.5)
BILIRUB SERPL-MCNC: 0.3 MG/DL (ref 0–1.2)
BUN SERPL-MCNC: 19 MG/DL (ref 8–23)
BUN/CREAT SERPL: 14.8 (ref 7–25)
CALCIUM SPEC-SCNC: 9.6 MG/DL (ref 8.6–10.5)
CHLORIDE SERPL-SCNC: 100 MMOL/L (ref 98–107)
CO2 SERPL-SCNC: 29 MMOL/L (ref 22–29)
CREAT SERPL-MCNC: 1.28 MG/DL (ref 0.57–1)
DEPRECATED RDW RBC AUTO: 51.1 FL (ref 37–54)
EOSINOPHIL # BLD AUTO: 0.22 10*3/MM3 (ref 0–0.4)
EOSINOPHIL NFR BLD AUTO: 3.7 % (ref 0.3–6.2)
ERYTHROCYTE [DISTWIDTH] IN BLOOD BY AUTOMATED COUNT: 15.4 % (ref 12.3–15.4)
GFR SERPL CREATININE-BSD FRML MDRD: 49 ML/MIN/1.73
GLOBULIN UR ELPH-MCNC: 3.1 GM/DL
GLUCOSE SERPL-MCNC: 96 MG/DL (ref 65–99)
HCT VFR BLD AUTO: 32.6 % (ref 34–46.6)
HGB BLD-MCNC: 10.6 G/DL (ref 12–15.9)
HOLD SPECIMEN: NORMAL
IMM GRANULOCYTES # BLD AUTO: 0.01 10*3/MM3 (ref 0–0.05)
IMM GRANULOCYTES NFR BLD AUTO: 0.2 % (ref 0–0.5)
LYMPHOCYTES # BLD AUTO: 1.65 10*3/MM3 (ref 0.7–3.1)
LYMPHOCYTES NFR BLD AUTO: 27.9 % (ref 19.6–45.3)
MCH RBC QN AUTO: 29.6 PG (ref 26.6–33)
MCHC RBC AUTO-ENTMCNC: 32.5 G/DL (ref 31.5–35.7)
MCV RBC AUTO: 91.1 FL (ref 79–97)
MONOCYTES # BLD AUTO: 0.47 10*3/MM3 (ref 0.1–0.9)
MONOCYTES NFR BLD AUTO: 7.9 % (ref 5–12)
NEUTROPHILS NFR BLD AUTO: 3.54 10*3/MM3 (ref 1.7–7)
NEUTROPHILS NFR BLD AUTO: 59.8 % (ref 42.7–76)
NRBC BLD AUTO-RTO: 0 /100 WBC (ref 0–0.2)
PLATELET # BLD AUTO: 202 10*3/MM3 (ref 140–450)
PMV BLD AUTO: 9.5 FL (ref 6–12)
POTASSIUM SERPL-SCNC: 3.7 MMOL/L (ref 3.5–5.2)
PROT SERPL-MCNC: 7.2 G/DL (ref 6–8.5)
RBC # BLD AUTO: 3.58 10*6/MM3 (ref 3.77–5.28)
SODIUM SERPL-SCNC: 138 MMOL/L (ref 136–145)
WBC # BLD AUTO: 5.92 10*3/MM3 (ref 3.4–10.8)

## 2020-11-18 PROCEDURE — 80053 COMPREHEN METABOLIC PANEL: CPT

## 2020-11-18 PROCEDURE — 36415 COLL VENOUS BLD VENIPUNCTURE: CPT

## 2020-11-18 PROCEDURE — 85025 COMPLETE CBC W/AUTO DIFF WBC: CPT

## 2020-11-18 PROCEDURE — 25010000002 EPOETIN ALFA-EPBX 40000 UNIT/ML SOLUTION: Performed by: NURSE PRACTITIONER

## 2020-11-18 PROCEDURE — 99213 OFFICE O/P EST LOW 20 MIN: CPT | Performed by: NURSE PRACTITIONER

## 2020-11-18 PROCEDURE — 96372 THER/PROPH/DIAG INJ SC/IM: CPT

## 2020-11-18 RX ADMIN — EPOETIN ALFA-EPBX 40000 UNITS: 40000 INJECTION, SOLUTION INTRAVENOUS; SUBCUTANEOUS at 09:55

## 2020-11-18 NOTE — PROGRESS NOTES
[facility]  HEMATOLOGY & ONCOLOGY    [facility] HEMATOLOGY AND ONCOLOGY  [address]  [facility] [address] [phone]    Patient Name: Marina Joe  Encounter Date: 11/18/2020  YOB: 1945  Patient Number: [id number]    Chief Complaint   Patient presents with   • Anemia     Here for f/u   • Med Refill     arimidex       REASON FOR VISIT: Mrs. Marina Joe is a 75-year-old female patient here today in follow-up for both breast carcinoma as well as anemia secondary to chronic kidney disease.  She has been followed by Dr. De Leon whom recently moved.  Her oncologic history is listed below.  She has continued to show no signs of recurrence of her disease. She has had a mammogram as of December 6, 2019 that was unremarkable.  Her last bone density study showed osteopenia on 12/8/2016.  Her last colonoscopy was in November 2018 that showed diverticulosis.    She presents today also in follow-up for her anemia secondary to chronic kidney disease stage III.  She has been undergoing Procrit therapy when meets guidelines.  Her last Procrit injection was on 7/10/2020 for hemoglobin of 10.8 and hematocrit of 34.1.  Her hemoglobin today is down to 10.6 today with hematocrit of 32.6, however her GFR is 49ml/min.  She meets guidelines for Procrit today.  Her iron studies in October were normal with an iron saturation of 22% and a ferritin of 1107.0.    Oncology/Hematology History Overview Note   Oncologic history  In February 2012, she had a routine mammogram that found a nodular density in the upper outer quadrant of the left breast.  An ultrasound revealed no nodularity at that time.  Repeat ultrasound 3 months later on 5/3/2012 revealed a small cyst in the left breast but felt to be benign.  Repeat mammogram on October 31, 2012 found a lobulated lesion in the left breast at the 2 o'clock position and a stable cyst at the 12  o'clock position.  She was referred to Dr. Barkley on 11/30/2012 and biopsy was performed.  The 12:00 cyst was a fibrocystic lesion while the 2:00 lesion was an invasive mammary carcinoma, low-grade, ER positive MO positive HER-2 nu 2+, FISH unamplified.    On January 8, 2013 she underwent a left partial mastectomy with sentinel node biopsy finding invasive ductal carcinoma, 3.1 mm in greatest dimension, grade 1.  2 sentinel nodes were negative.  AJCC TNM stage was pT1aN0 M0, Stage IA.  Following surgery she underwent adjuvant radiation therapy and was then started on Arimidex for hormonal manipulation.  She was started on the Arimidex in approximately April or May 2013.  He has been recommended to continue this for 10 years.    Hematologic history  Patient with a long history of anemia secondary to iron deficiency as well as chronic kidney disease stage III.Patient has a GFR that ranges from 45-61ML/MIN.  He has been undergoing Procrit when meets guidelines for several years now.  She is also required iron replacement.    Previous interventions  Procrit 40,000 units subcu initiated approximately February 2017  Injectafer given 9/23/2019, 9/30/2019     Malignant neoplasm of upper-outer quadrant of left breast in female, estrogen receptor positive (CMS/HCC)   10/31/2012 Initial Diagnosis    Malignant neoplasm of central portion of left female breast (CMS/HCC)     10/31/2012 Imaging    Mammogram on October 31, 2012 found a lobulated lesion in the left breast at the 2 o'clock position and a stable cyst at the 12 o'clock position.      11/30/2012 Biopsy    Dr. Barkley on 11/30/2012 and biopsy was performed.  The 12:00 cyst was a fibrocystic lesion while the 2:00 lesion was an invasive mammary carcinoma, low-grade, ER positive MO positive HER-2 nu 2+, FISH unamplified.         1/8/2013 Surgery    On January 8, 2013 she underwent a left partial mastectomy with sentinel node biopsy finding invasive ductal carcinoma, 3.1 mm  in greatest dimension, grade 1.  2 sentinel nodes were negative.  AJCC TNM stage was pT1aN0 M0, Stage IA.  Hormone receptor positive HER-2 negative      Radiation    Radiation OncologyTreatment Course:  Marina Joe receivedin 33 fractions to left breast via External Beam Radiation - EBRT.     5/2013 -  Hormonal Therapy    Arimidex 1 mg daily           PAST MEDICAL HISTORY:  ALLERGIES:  Allergies   Allergen Reactions   • Aspirin GI Bleeding   • Niacin Other (See Comments)       CURRENT MEDICATIONS:  Outpatient Encounter Medications as of 11/18/2020   Medication Sig Dispense Refill   • albuterol sulfate HFA (Ventolin HFA) 108 (90 Base) MCG/ACT inhaler Inhale 2 puffs.     • anastrozole (ARIMIDEX) 1 MG tablet Take 1 tablet by mouth Daily. 90 tablet 4   • buPROPion XL (WELLBUTRIN XL) 150 MG 24 hr tablet Take 1 tablet by mouth Daily. 90 tablet 3   • Calcium Carb-Cholecalciferol (CALCIUM 600+D3 PO) Take  by mouth.     • carvedilol (COREG) 6.25 MG tablet Take 1 tablet by mouth 2 (Two) Times a Day With Meals. 180 tablet 3   • cetirizine (zyrTEC) 10 MG tablet Take 10 mg by mouth Daily.     • cyclobenzaprine (FLEXERIL) 10 MG tablet Take 1 tablet by mouth 3 (Three) Times a Day As Needed for Muscle Spasms. 90 tablet 2   • Ergocalciferol (VITAMIN D2 PO) Take 50,000 Units by mouth Every 30 (Thirty) Days.     • ferrous sulfate 325 (65 FE) MG tablet Take 325 mg by mouth Daily With Breakfast.     • fluticasone (FLOVENT DISKUS) 50 MCG/BLIST diskus inhaler Inhale 1 puff.     • irbesartan-hydrochlorothiazide (AVALIDE) 300-12.5 MG tablet Take 1 tablet by mouth Daily. 90 tablet 3   • montelukast (SINGULAIR) 10 MG tablet Take 1 tablet by mouth Daily. 90 tablet 3   • Multiple Vitamins-Minerals (MULTIVITAMIN ADULT) tablet Take  by mouth Daily.     • Omega-3 Fatty Acids (FISH OIL) 1000 MG capsule capsule Take 1,000 mg by mouth.     • rOPINIRole (REQUIP) 0.5 MG tablet Take 1 tablet by mouth Every Night. 90 tablet 3   • rosuvastatin (CRESTOR)  20 MG tablet Take 1 tablet by mouth Daily. 90 tablet 3   • sertraline (ZOLOFT) 100 MG tablet Take 1 tablet by mouth Daily. 90 tablet 3   • traZODone (DESYREL) 100 MG tablet TAKE 1 TABLET BY MOUTH EVERY NIGHT 30 tablet 5   • valACYclovir (VALTREX) 500 MG tablet TAKE 1 TABLET BY MOUTH TWICE DAILY AS NEEDED 20 tablet 1     No facility-administered encounter medications on file as of 11/18/2020.      ADULT ILLNESSES:  Patient Active Problem List   Diagnosis Code   • Malignant neoplasm of upper-outer quadrant of left breast in female, estrogen receptor positive (CMS/[facility]) C50.412, Z17.0   • Anemia of chronic renal failure, stage 3 (moderate) N18.30, D63.1   • Hypertension, benign I10   • Hx of colonic polyps Z86.010   • HX: breast cancer Z85.3   • Morbidly obese (CMS/[facility]) E66.01   • Abnormal mammogram R92.8   • Breast mass N63.0   • Right knee DJD M17.11   • S/P lumpectomy, left breast Z98.890   • Adult hypothyroidism E03.9   • Rhinitis J31.0   • Anemia D64.9   • Anxiety F41.9   • At low risk for fall Z91.81   • Breast cancer, left (CMS/[facility]) C50.912   • Cervical pain M54.2   • Chronic insomnia F51.04   • Cough R05   • Elevated lipids E78.5   • Encounter for immunization Z23   • Herpes zoster without complication B02.9   • Influenza B J10.1   • Left ear pain H92.02   • Left otitis externa H60.92   • Lumbar strain, initial encounter S39.012A   • Myalgia M79.10   • Negative depression screening Z13.31   • Obesity (BMI 30-39.9) E66.9   • Postmenopausal status Z78.0   • Recurrent acute serous otitis media of left ear H65.05   • Restless leg G25.81   • Sinusitis, bacterial J32.9, B96.89   • Skin lesion L98.9   • Upper respiratory infection J06.9   • Urinary tract infection without hematuria N39.0   • Vitamin D deficiency E55.9   • CKD (chronic kidney disease) N18.9   • Stage 3a chronic kidney disease N18.31     SURGERIES:  Past Surgical History:   Procedure Laterality Date   • AVULSION TOENAIL PLATE      Sept 26,2018   • BREAST  BIOPSY Left 11/20/2012   • BREAST BIOPSY      Left Breast, 1/2019 per Dr Barkley   • BREAST LUMPECTOMY Left     with node bx    • COLONOSCOPY  09/13/2013    small polyp at 30cm benign hyperplastic polyp, changes consistent with melanosis coli. Recall 5 years   • REPLACEMENT TOTAL KNEE Right     2016   • TOTAL ABDOMINAL HYSTERECTOMY WITH SALPINGO OOPHORECTOMY       HEALTH MAINTENANCE ITEMS:    Last Completed Colonoscopy       Status Date      COLONOSCOPY Done 11/19/2018 SCANNED - COLONOSCOPY     Diverticulosis          Last Completed Mammogram       Status Date      MAMMOGRAM Done 12/6/2019 Ext Proc: CHG SCREENING DIGITAL BREAST TOMOSYNTHESIS BI     Normal        FAMILY HISTORY:  Family History   Problem Relation Age of Onset   • Heart attack Mother    • Hodgkin's lymphoma Father    • Other Father    • Cancer Father         hodgkins   • Heart attack Brother    • Skin cancer Maternal Uncle    • Pancreatic cancer Maternal Uncle    • Cancer Maternal Uncle         eye   • Colon cancer Neg Hx    • Colon polyps Neg Hx      SOCIAL HISTORY:  Social History     Socioeconomic History   • Marital status:      Spouse name: Not on file   • Number of children: Not on file   • Years of education: Not on file   • Highest education level: Not on file   Tobacco Use   • Smoking status: Never Smoker   • Smokeless tobacco: Never Used   Substance and Sexual Activity   • Alcohol use: No   • Drug use: Defer   • Sexual activity: Defer       REVIEW OF SYSTEMS:  Review of systems is essentially unchanged from last visit  Review of Systems   Constitutional: Positive for fatigue. Negative for activity change, appetite change, chills, diaphoresis, fever and unexpected weight loss.   HENT: Negative for ear pain, nosebleeds, sinus pressure, sore throat and voice change.    Eyes: Negative for blurred vision, double vision, pain and visual disturbance.   Respiratory: Negative for cough and shortness of breath.    Cardiovascular: Negative for  "chest pain, palpitations and leg swelling.   Gastrointestinal: Negative for abdominal pain, anal bleeding, blood in stool, constipation, diarrhea, nausea and vomiting.   Endocrine: Negative for heat intolerance, polydipsia and polyuria.   Genitourinary: Negative for dysuria, frequency, hematuria, urgency and urinary incontinence.   Musculoskeletal: Positive for arthralgias (in back) and myalgias (mainly in legs).   Skin: Negative for rash and skin lesions.   Neurological: Positive for weakness. Negative for dizziness, tremors, seizures, syncope, speech difficulty and headache.   Hematological: Negative for adenopathy. Does not bruise/bleed easily.   Psychiatric/Behavioral: Negative for dysphoric mood, sleep disturbance, suicidal ideas and depressed mood.       /88   Pulse 60   Temp 97.9 °F (36.6 °C) (Temporal)   Resp 16   Ht 172.7 cm (68\")   Wt 112 kg (248 lb)   LMP  (LMP Unknown)   SpO2 91%   Breastfeeding No   BMI 37.71 kg/m²  Body surface area is 2.24 meters squared.  Pain Score    11/18/20 0839   PainSc:   2   PainLoc: Back       Physical Exam:  Physical exam is unchanged from last visit  Physical Exam   Constitutional: She is oriented to person, place, and time. She appears well-developed and well-nourished.   HENT:   Head: Atraumatic.   Mouth/Throat: Oropharynx is clear and moist.   Eyes: Pupils are equal, round, and reactive to light. No scleral icterus.   Neck: Trachea normal. Neck supple. No JVD present.   Cardiovascular: Normal rate, regular rhythm and normal pulses. Exam reveals no gallop and no friction rub.   No murmur heard.  Pulmonary/Chest: Effort normal and breath sounds normal. She has no wheezes. She has no rhonchi. She has no rales.   Abdominal: Soft. Normal appearance. There is no abdominal tenderness. There is no rebound and no guarding.   Lymphadenopathy:     She has no cervical adenopathy.        Right: No supraclavicular adenopathy present.        Left: No supraclavicular " adenopathy present.   Neurological: She is alert and oriented to person, place, and time. No sensory deficit.   Psychiatric: Judgment normal.       Marina Joe reports a pain score of   Pain Score    11/18/20 0839   PainSc:   2   PainLoc: Back      Patient's Body mass index is 37.71 kg/m². BMI is above normal parameters. Recommendations include: defer to pcp.    LABS    Lab Results - Last 18 Months   Lab Units 11/18/20  0828 10/21/20  0836 10/07/20  0805 08/26/20  0802 07/10/20  0833 05/29/20  0806   HEMOGLOBIN g/dL 10.6* 10.7* 10.5* 11.0* 10.8* 11.1*   HEMATOCRIT % 32.6* 33.3* 32.7* 34.3 34.1 34.4   MCV fL 91.1 90.2 92.1 91.7 92.4 92.0   WBC 10*3/mm3 5.92 6.18 6.21 6.03 6.08 5.72   RDW % 15.4 15.4 15.4 14.7 15.5* 15.0   MPV fL 9.5 9.2 9.1 9.6 9.7 9.5   PLATELETS 10*3/mm3 202 209 220 200 222 218   IMM GRAN % % 0.2 0.3 0.2 0.3 0.2 0.2   NEUTROS ABS 10*3/mm3 3.54 3.79 3.64 3.59 3.91 3.25   LYMPHS ABS 10*3/mm3 1.65 1.55 1.75 1.61 1.40 1.62   MONOS ABS 10*3/mm3 0.47 0.50 0.52 0.47 0.50 0.60   EOS ABS 10*3/mm3 0.22 0.29 0.27 0.32 0.24 0.21   BASOS ABS 10*3/mm3 0.03 0.03 0.02 0.02 0.02 0.03   IMMATURE GRANS (ABS) 10*3/mm3 0.01 0.02 0.01 0.02 0.01 0.01   NRBC /100 WBC 0.0 0.0 0.0 0.0 0.0 0.0       Lab Results - Last 18 Months   Lab Units 11/18/20  0828 10/21/20  0836 10/07/20  0805 08/26/20  0802 07/10/20  0833 05/29/20  0806   GLUCOSE mg/dL 96 99 100* 108* 114* 111*   SODIUM mmol/L 138 139 136 137 138 138   POTASSIUM mmol/L 3.7 3.4* 3.5 3.7 3.7 3.6   CO2 mmol/L 29.0 30.0* 29.0 28.0 28.0 25.0   CHLORIDE mmol/L 100 100 100 97* 98 100   ANION GAP mmol/L 9.0 9.0 7.0 12.0 12.0 13.0   CREATININE mg/dL 1.28* 1.16* 1.06* 1.14* 1.09* 1.14*   BUN mg/dL 19 20 20 22 21 22   BUN / CREAT RATIO  14.8 17.2 18.9 19.3 19.3 19.3   CALCIUM mg/dL 9.6 9.6 9.5 9.7 9.6 9.6   ALK PHOS U/L 73 76 78 72 77 66   TOTAL PROTEIN g/dL 7.2 7.1 7.1 7.1 7.4 7.6   ALT (SGPT) U/L 14 15 20 14 16 17   AST (SGOT) U/L 17 15 23 16 16 19   BILIRUBIN mg/dL 0.3 0.2  0.2 0.2 0.3 0.3   ALBUMIN g/dL 4.10 3.90 4.00 3.90 4.10 4.20   GLOBULIN gm/dL 3.1 3.2 3.1 3.2 3.3 3.4       Lab Results - Last 18 Months   Lab Units 10/07/20  0805 08/26/20  0802 07/10/20  0833 05/05/20  0723 01/10/20  0847 09/17/19  0814   IRON mcg/dL 59 66 66  --  82 61   TIBC mcg/dL 270* 270* 283*  --  270* 328   IRON SATURATION % 22 24 23  --  30 19*   FERRITIN ng/mL 1,107.00* 978.20* 1,066.00*  --  1,314.00* 377.60*   TSH uIU/mL  --   --   --  4.790*  --   --    FOLATE ng/mL  --   --   --   --   --  >20.00     ASSESSMENT  1. Left Breast Invasive ductal carcinoma diagnosed in November 2012  · Initial stage:  AJCC TNM stage was pT1aN0 M0, Stage IA.  ER positive MD positive HER-2 alix 2+/FISH unamplified  · Treatment: Lumpectomy January 8, 2013, adjuvant external beam radiation to the left breast, adjuvant hormonal therapy beginning around May 2013 with Arimidex for which she is currently still taking and tolerating well.  · Disease status: No evidence of disease.  CA-27-29 has been remaining within normal range.  Labs stable.  Mammogram up-to-date as of December 2019 unremarkable.  2.  Osteopenia on bone mineral density study 12/8/2016.  Patient takes calcium plus D.  3.  Anemia secondary to chronic kidney disease stage III.  Patient has been undergoing Procrit when meets guidelines with last dosing 7/10/2020.  Hemoglobin and hematocrit meet guidelines today as well as her her GFR at 49 ml/min.  4.  Chronic kidney disease stage III with a GFR of 49 ml/min.  5.  Hypothyroidism managed on Synthroid by primary care provider  6.  Coronary artery disease/hypertension on Coreg and Benicar. BP at 150/88 this am. Patient just took her am medications.  7.  Normal iron studies as of 10/7/2020, with an iron saturation of 22% and a ferritin of 1107.0.  8.  Arthralgias, worse with cold weather    PLAN  1.  Counseled patient regarding CBC results today, showing hemoglobin hematocrit and lower GFR.    2.  Counseled patient on  guidelines for Procrit therapy and that she meets all the guidelines today for it.  Therefore she will receive 40,000 units SC today  3.  Encourage patient to continue Arimidex 1 mg daily, she has been advised by Dr. HERNANDEZ to continue the Arimidex for a total of 10 years if she is tolerating it and she is.  4.  Encourage patient continue taking calcium plus D twice a day  5.  Encourage patient to continue follow primary care provider and other specialists  6.  Patient to return in 4 weeks for cbc,cmp and possible Procrit with a pre-office CBC CMP Iron profile ferritin B12 and folate    I spent 20 total minutes, face-to-face, caring for Marina today.  Greater than 50% of this time involved counseling and/or coordination of care as documented within this note regarding the patient's illness(es), pros and cons of various treatment options, instructions and/or risk reduction.    Porsha Bradford, APRN   11/18/2020  09:37 CST

## 2020-12-04 ENCOUNTER — TELEPHONE (OUTPATIENT)
Dept: INTERNAL MEDICINE | Facility: CLINIC | Age: 75
End: 2020-12-04

## 2020-12-04 DIAGNOSIS — B02.9 HERPES ZOSTER WITHOUT COMPLICATION: Primary | ICD-10-CM

## 2020-12-04 RX ORDER — VALACYCLOVIR HYDROCHLORIDE 500 MG/1
500 TABLET, FILM COATED ORAL 2 TIMES DAILY PRN
Qty: 20 TABLET | Refills: 1 | Status: SHIPPED | OUTPATIENT
Start: 2020-12-04 | End: 2021-03-03 | Stop reason: SDUPTHER

## 2020-12-04 NOTE — TELEPHONE ENCOUNTER
LARA STATES PATIENT HAS  STATED SHE HAS REQUESTED VALTREX A FEW TIMES AND IS SITTING AT THE PHARMACY TO PICK IT UP     GOOD CONTACT NUMBER   [phone] -LARA

## 2020-12-10 ENCOUNTER — TELEPHONE (OUTPATIENT)
Dept: SURGERY | Age: 75
End: 2020-12-10

## 2020-12-10 ENCOUNTER — HOSPITAL ENCOUNTER (OUTPATIENT)
Dept: WOMENS IMAGING | Age: 75
Discharge: HOME | End: 2020-12-10
Payer: MEDICARE

## 2020-12-10 DIAGNOSIS — Z12.31 BREAST CANCER SCREENING BY MAMMOGRAM: ICD-10-CM

## 2020-12-10 DIAGNOSIS — Z12.31 VISIT FOR SCREENING MAMMOGRAM: Primary | ICD-10-CM

## 2020-12-10 PROCEDURE — 77063 BREAST TOMOSYNTHESIS BI: CPT

## 2020-12-18 DIAGNOSIS — D63.1 ANEMIA OF CHRONIC RENAL FAILURE, STAGE 3A (HCC): Primary | ICD-10-CM

## 2020-12-18 DIAGNOSIS — N18.31 STAGE 3A CHRONIC KIDNEY DISEASE (HCC): ICD-10-CM

## 2020-12-18 DIAGNOSIS — N18.31 ANEMIA OF CHRONIC RENAL FAILURE, STAGE 3A (HCC): Primary | ICD-10-CM

## 2020-12-21 ENCOUNTER — OFFICE VISIT (OUTPATIENT)
Dept: ONCOLOGY | Facility: CLINIC | Age: 75
End: 2020-12-21

## 2020-12-21 ENCOUNTER — INFUSION (OUTPATIENT)
Dept: ONCOLOGY | Facility: HOSPITAL | Age: 75
End: 2020-12-21

## 2020-12-21 ENCOUNTER — LAB (OUTPATIENT)
Dept: LAB | Facility: HOSPITAL | Age: 75
End: 2020-12-21

## 2020-12-21 VITALS
SYSTOLIC BLOOD PRESSURE: 142 MMHG | OXYGEN SATURATION: 98 % | HEIGHT: 68 IN | BODY MASS INDEX: 36.77 KG/M2 | RESPIRATION RATE: 16 BRPM | TEMPERATURE: 97.1 F | HEART RATE: 68 BPM | DIASTOLIC BLOOD PRESSURE: 86 MMHG | WEIGHT: 242.6 LBS

## 2020-12-21 DIAGNOSIS — N18.31 STAGE 3A CHRONIC KIDNEY DISEASE (HCC): ICD-10-CM

## 2020-12-21 DIAGNOSIS — D63.1 ANEMIA OF CHRONIC RENAL FAILURE, STAGE 3A (HCC): ICD-10-CM

## 2020-12-21 DIAGNOSIS — N18.31 ANEMIA OF CHRONIC RENAL FAILURE, STAGE 3A (HCC): ICD-10-CM

## 2020-12-21 DIAGNOSIS — C50.412 MALIGNANT NEOPLASM OF UPPER-OUTER QUADRANT OF LEFT BREAST IN FEMALE, ESTROGEN RECEPTOR POSITIVE (HCC): Primary | ICD-10-CM

## 2020-12-21 DIAGNOSIS — N18.31 STAGE 3A CHRONIC KIDNEY DISEASE (HCC): Primary | ICD-10-CM

## 2020-12-21 DIAGNOSIS — Z17.0 MALIGNANT NEOPLASM OF UPPER-OUTER QUADRANT OF LEFT BREAST IN FEMALE, ESTROGEN RECEPTOR POSITIVE (HCC): Primary | ICD-10-CM

## 2020-12-21 LAB
ALBUMIN SERPL-MCNC: 4.3 G/DL (ref 3.5–5.2)
ALBUMIN/GLOB SERPL: 1.3 G/DL
ALP SERPL-CCNC: 72 U/L (ref 39–117)
ALT SERPL W P-5'-P-CCNC: 14 U/L (ref 1–33)
ANION GAP SERPL CALCULATED.3IONS-SCNC: 9 MMOL/L (ref 5–15)
AST SERPL-CCNC: 15 U/L (ref 1–32)
BASOPHILS # BLD AUTO: 0.02 10*3/MM3 (ref 0–0.2)
BASOPHILS NFR BLD AUTO: 0.3 % (ref 0–1.5)
BILIRUB SERPL-MCNC: 0.3 MG/DL (ref 0–1.2)
BUN SERPL-MCNC: 22 MG/DL (ref 8–23)
BUN/CREAT SERPL: 20.6 (ref 7–25)
CALCIUM SPEC-SCNC: 9.7 MG/DL (ref 8.6–10.5)
CHLORIDE SERPL-SCNC: 100 MMOL/L (ref 98–107)
CO2 SERPL-SCNC: 30 MMOL/L (ref 22–29)
CREAT SERPL-MCNC: 1.07 MG/DL (ref 0.57–1)
DEPRECATED RDW RBC AUTO: 50.6 FL (ref 37–54)
EOSINOPHIL # BLD AUTO: 0.21 10*3/MM3 (ref 0–0.4)
EOSINOPHIL NFR BLD AUTO: 3.3 % (ref 0.3–6.2)
ERYTHROCYTE [DISTWIDTH] IN BLOOD BY AUTOMATED COUNT: 15.1 % (ref 12.3–15.4)
FERRITIN SERPL-MCNC: 1071 NG/ML (ref 13–150)
GFR SERPL CREATININE-BSD FRML MDRD: 61 ML/MIN/1.73
GLOBULIN UR ELPH-MCNC: 3.3 GM/DL
GLUCOSE SERPL-MCNC: 101 MG/DL (ref 65–99)
HCT VFR BLD AUTO: 33.9 % (ref 34–46.6)
HGB BLD-MCNC: 11.3 G/DL (ref 12–15.9)
HOLD SPECIMEN: NORMAL
IMM GRANULOCYTES # BLD AUTO: 0.01 10*3/MM3 (ref 0–0.05)
IMM GRANULOCYTES NFR BLD AUTO: 0.2 % (ref 0–0.5)
IRON 24H UR-MRATE: 51 MCG/DL (ref 37–145)
IRON SATN MFR SERPL: 18 % (ref 20–50)
LYMPHOCYTES # BLD AUTO: 1.52 10*3/MM3 (ref 0.7–3.1)
LYMPHOCYTES NFR BLD AUTO: 24.2 % (ref 19.6–45.3)
MCH RBC QN AUTO: 30.5 PG (ref 26.6–33)
MCHC RBC AUTO-ENTMCNC: 33.3 G/DL (ref 31.5–35.7)
MCV RBC AUTO: 91.4 FL (ref 79–97)
MONOCYTES # BLD AUTO: 0.49 10*3/MM3 (ref 0.1–0.9)
MONOCYTES NFR BLD AUTO: 7.8 % (ref 5–12)
NEUTROPHILS NFR BLD AUTO: 4.03 10*3/MM3 (ref 1.7–7)
NEUTROPHILS NFR BLD AUTO: 64.2 % (ref 42.7–76)
NRBC BLD AUTO-RTO: 0 /100 WBC (ref 0–0.2)
PLATELET # BLD AUTO: 181 10*3/MM3 (ref 140–450)
PMV BLD AUTO: 9.8 FL (ref 6–12)
POTASSIUM SERPL-SCNC: 3.3 MMOL/L (ref 3.5–5.2)
PROT SERPL-MCNC: 7.6 G/DL (ref 6–8.5)
RBC # BLD AUTO: 3.71 10*6/MM3 (ref 3.77–5.28)
SODIUM SERPL-SCNC: 139 MMOL/L (ref 136–145)
TIBC SERPL-MCNC: 280 MCG/DL (ref 298–536)
TRANSFERRIN SERPL-MCNC: 188 MG/DL (ref 200–360)
WBC # BLD AUTO: 6.28 10*3/MM3 (ref 3.4–10.8)

## 2020-12-21 PROCEDURE — 82728 ASSAY OF FERRITIN: CPT

## 2020-12-21 PROCEDURE — 36415 COLL VENOUS BLD VENIPUNCTURE: CPT

## 2020-12-21 PROCEDURE — 80053 COMPREHEN METABOLIC PANEL: CPT

## 2020-12-21 PROCEDURE — 83540 ASSAY OF IRON: CPT

## 2020-12-21 PROCEDURE — 99213 OFFICE O/P EST LOW 20 MIN: CPT | Performed by: NURSE PRACTITIONER

## 2020-12-21 PROCEDURE — 85025 COMPLETE CBC W/AUTO DIFF WBC: CPT

## 2020-12-21 PROCEDURE — 84466 ASSAY OF TRANSFERRIN: CPT

## 2020-12-21 NOTE — PROGRESS NOTES
[facility]  HEMATOLOGY & ONCOLOGY    [facility] HEMATOLOGY AND ONCOLOGY  [address]  [facility] [address] [phone]    Patient Name: Marina Joe  Encounter Date: 12/21/2020  YOB: 1945  Patient Number: [id number]    Chief Complaint   Patient presents with   • Breast Cancer     Here for f/u   • Anemia     Possible procrit    • Med Refill     arimidex       REASON FOR VISIT: Mrs. Marina Joe is a 75-year-old female patient here today in follow-up for both breast carcinoma as well as anemia secondary to chronic kidney disease.  She has been followed by Dr. De Leon whom recently moved.  Her oncologic history is listed below.  She has continued to show no signs of recurrence of her disease. She has had a mammogram as of December 6, 2019 that was unremarkable.  Her last bone density study showed osteopenia on 12/8/2016.  Her last colonoscopy was in November 2018 that showed diverticulosis.    She presents today also in follow-up for her anemia secondary to chronic kidney disease stage III.  She has been undergoing Procrit therapy when meets guidelines.  Her last Procrit injection was on 11/18/2020 for hemoglobin of 10.6 and hematocrit of 32.6.  She has had a good response with her hgb being up to 11.3 today.  Her GFR has also improved to 61ml/min today.  She is above guidelines for Procrit today.  Her iron studies in October were normal with an iron saturation of 22% and a ferritin of 1107.0.    She feels well today. She has no complaints.  She has no fever, chills or night sweats.  She has no abdominal complaints.     Oncology/Hematology History Overview Note   Oncologic history  In February 2012, she had a routine mammogram that found a nodular density in the upper outer quadrant of the left breast.  An ultrasound revealed no nodularity at that time.  Repeat ultrasound 3 months later on 5/3/2012 revealed a small  cyst in the left breast but felt to be benign.  Repeat mammogram on October 31, 2012 found a lobulated lesion in the left breast at the 2 o'clock position and a stable cyst at the 12 o'clock position.  She was referred to Dr. Barkley on 11/30/2012 and biopsy was performed.  The 12:00 cyst was a fibrocystic lesion while the 2:00 lesion was an invasive mammary carcinoma, low-grade, ER positive MA positive HER-2 nu 2+, FISH unamplified.    On January 8, 2013 she underwent a left partial mastectomy with sentinel node biopsy finding invasive ductal carcinoma, 3.1 mm in greatest dimension, grade 1.  2 sentinel nodes were negative.  AJCC TNM stage was pT1aN0 M0, Stage IA.  Following surgery she underwent adjuvant radiation therapy and was then started on Arimidex for hormonal manipulation.  She was started on the Arimidex in approximately April or May 2013.  He has been recommended to continue this for 10 years.    Hematologic history  Patient with a long history of anemia secondary to iron deficiency as well as chronic kidney disease stage III.Patient has a GFR that ranges from 45-61ML/MIN.  He has been undergoing Procrit when meets guidelines for several years now.  She is also required iron replacement.    Previous interventions  Procrit 40,000 units subcu initiated approximately February 2017  Injectafer given 9/23/2019, 9/30/2019     Malignant neoplasm of upper-outer quadrant of left breast in female, estrogen receptor positive (CMS/HCC)   10/31/2012 Initial Diagnosis    Malignant neoplasm of central portion of left female breast (CMS/HCC)     10/31/2012 Imaging    Mammogram on October 31, 2012 found a lobulated lesion in the left breast at the 2 o'clock position and a stable cyst at the 12 o'clock position.      11/30/2012 Biopsy    Dr. Barkley on 11/30/2012 and biopsy was performed.  The 12:00 cyst was a fibrocystic lesion while the 2:00 lesion was an invasive mammary carcinoma, low-grade, ER positive MA positive  HER-2 nu 2+, FISH unamplified.         1/8/2013 Surgery    On January 8, 2013 she underwent a left partial mastectomy with sentinel node biopsy finding invasive ductal carcinoma, 3.1 mm in greatest dimension, grade 1.  2 sentinel nodes were negative.  AJCC TNM stage was pT1aN0 M0, Stage IA.  Hormone receptor positive HER-2 negative      Radiation    Radiation OncologyTreatment Course:  Marina Joe receivedin 33 fractions to left breast via External Beam Radiation - EBRT.     5/2013 -  Hormonal Therapy    Arimidex 1 mg daily           PAST MEDICAL HISTORY:  ALLERGIES:  Allergies   Allergen Reactions   • Aspirin GI Bleeding   • Niacin Other (See Comments)       CURRENT MEDICATIONS:  Outpatient Encounter Medications as of 12/21/2020   Medication Sig Dispense Refill   • albuterol sulfate HFA (Ventolin HFA) 108 (90 Base) MCG/ACT inhaler Inhale 2 puffs.     • anastrozole (ARIMIDEX) 1 MG tablet Take 1 tablet by mouth Daily. 90 tablet 4   • buPROPion XL (WELLBUTRIN XL) 150 MG 24 hr tablet Take 1 tablet by mouth Daily. 90 tablet 3   • Calcium Carb-Cholecalciferol (CALCIUM 600+D3 PO) Take  by mouth.     • carvedilol (COREG) 6.25 MG tablet Take 1 tablet by mouth 2 (Two) Times a Day With Meals. 180 tablet 3   • cetirizine (zyrTEC) 10 MG tablet Take 10 mg by mouth Daily.     • cyclobenzaprine (FLEXERIL) 10 MG tablet Take 1 tablet by mouth 3 (Three) Times a Day As Needed for Muscle Spasms. 90 tablet 2   • Ergocalciferol (VITAMIN D2 PO) Take 50,000 Units by mouth Every 30 (Thirty) Days.     • ferrous sulfate 325 (65 FE) MG tablet Take 325 mg by mouth Daily With Breakfast.     • fluticasone (FLOVENT DISKUS) 50 MCG/BLIST diskus inhaler Inhale 1 puff.     • irbesartan-hydrochlorothiazide (AVALIDE) 300-12.5 MG tablet Take 1 tablet by mouth Daily. 90 tablet 3   • montelukast (SINGULAIR) 10 MG tablet Take 1 tablet by mouth Daily. 90 tablet 3   • Multiple Vitamins-Minerals (MULTIVITAMIN ADULT) tablet Take  by mouth Daily.     • Omega-3  Fatty Acids (FISH OIL) 1000 MG capsule capsule Take 1,000 mg by mouth.     • rOPINIRole (REQUIP) 0.5 MG tablet Take 1 tablet by mouth Every Night. 90 tablet 3   • rosuvastatin (CRESTOR) 20 MG tablet Take 1 tablet by mouth Daily. 90 tablet 3   • sertraline (ZOLOFT) 100 MG tablet Take 1 tablet by mouth Daily. 90 tablet 3   • traZODone (DESYREL) 100 MG tablet TAKE 1 TABLET BY MOUTH EVERY NIGHT 30 tablet 5   • valACYclovir (VALTREX) 500 MG tablet Take 1 tablet by mouth 2 (Two) Times a Day As Needed (Fever blister). 20 tablet 1     No facility-administered encounter medications on file as of 12/21/2020.      ADULT ILLNESSES:  Patient Active Problem List   Diagnosis Code   • Malignant neoplasm of upper-outer quadrant of left breast in female, estrogen receptor positive (CMS/[facility]) C50.412, Z17.0   • Anemia of chronic renal failure, stage 3 (moderate) N18.30, D63.1   • Hypertension, benign I10   • Hx of colonic polyps Z86.010   • HX: breast cancer Z85.3   • Morbidly obese (CMS/HCC) E66.01   • Abnormal mammogram R92.8   • Breast mass N63.0   • Right knee DJD M17.11   • S/P lumpectomy, left breast Z98.890   • Adult hypothyroidism E03.9   • Rhinitis J31.0   • Anemia D64.9   • Anxiety F41.9   • At low risk for fall Z91.81   • Breast cancer, left (CMS/[facility]) C50.912   • Cervical pain M54.2   • Chronic insomnia F51.04   • Cough R05   • Elevated lipids E78.5   • Encounter for immunization Z23   • Herpes zoster without complication B02.9   • Influenza B J10.1   • Left ear pain H92.02   • Left otitis externa H60.92   • Lumbar strain, initial encounter S39.012A   • Myalgia M79.10   • Negative depression screening Z13.31   • Obesity (BMI 30-39.9) E66.9   • Postmenopausal status Z78.0   • Recurrent acute serous otitis media of left ear H65.05   • Restless leg G25.81   • Sinusitis, bacterial J32.9, B96.89   • Skin lesion L98.9   • Upper respiratory infection J06.9   • Urinary tract infection without hematuria N39.0   • Vitamin D deficiency  E55.9   • CKD (chronic kidney disease) N18.9   • Stage 3a chronic kidney disease N18.31     SURGERIES:  Past Surgical History:   Procedure Laterality Date   • AVULSION TOENAIL PLATE      Sept 26,2018   • BREAST BIOPSY Left 11/20/2012   • BREAST BIOPSY      Left Breast, 1/2019 per Dr Barkley   • BREAST LUMPECTOMY Left     with node bx    • COLONOSCOPY  09/13/2013    small polyp at 30cm benign hyperplastic polyp, changes consistent with melanosis coli. Recall 5 years   • REPLACEMENT TOTAL KNEE Right     2016   • TOTAL ABDOMINAL HYSTERECTOMY WITH SALPINGO OOPHORECTOMY       HEALTH MAINTENANCE ITEMS:    Last Completed Colonoscopy       Status Date      COLONOSCOPY Done 11/19/2018 SCANNED - COLONOSCOPY     Diverticulosis          Last Completed Mammogram       Status Date      MAMMOGRAM Done 12/6/2019 Ext Proc: CHG SCREENING DIGITAL BREAST TOMOSYNTHESIS BI     Normal        FAMILY HISTORY:  Family History   Problem Relation Age of Onset   • Heart attack Mother    • Hodgkin's lymphoma Father    • Other Father    • Cancer Father         hodgkins   • Heart attack Brother    • Skin cancer Maternal Uncle    • Pancreatic cancer Maternal Uncle    • Cancer Maternal Uncle         eye   • Colon cancer Neg Hx    • Colon polyps Neg Hx      SOCIAL HISTORY:  Social History     Socioeconomic History   • Marital status:      Spouse name: Not on file   • Number of children: Not on file   • Years of education: Not on file   • Highest education level: Not on file   Tobacco Use   • Smoking status: Never Smoker   • Smokeless tobacco: Never Used   Substance and Sexual Activity   • Alcohol use: No   • Drug use: Defer   • Sexual activity: Defer       REVIEW OF SYSTEMS:  Review of Systems   Constitutional: Negative for activity change, appetite change, chills, diaphoresis, fatigue, fever and unexpected weight loss.   HENT: Negative for ear pain, nosebleeds, sinus pressure, sore throat and voice change.    Eyes: Negative for blurred  "vision, double vision, pain and visual disturbance.   Respiratory: Negative for cough and shortness of breath.    Cardiovascular: Negative for chest pain, palpitations and leg swelling.   Gastrointestinal: Negative for abdominal pain, anal bleeding, blood in stool, constipation, diarrhea, nausea and vomiting.   Endocrine: Negative for heat intolerance, polydipsia and polyuria.   Genitourinary: Negative for dysuria, frequency, hematuria, urgency and urinary incontinence.   Musculoskeletal: Positive for arthralgias (in back, \"chornic\") and myalgias (mainly in legs).   Skin: Negative for rash and skin lesions.   Neurological: Negative for dizziness, tremors, seizures, syncope, speech difficulty, weakness and headache.   Hematological: Negative for adenopathy. Does not bruise/bleed easily.   Psychiatric/Behavioral: Negative for dysphoric mood, sleep disturbance, suicidal ideas and depressed mood.       /86   Pulse 68   Temp 97.1 °F (36.2 °C) (Temporal)   Resp 16   Ht 172.7 cm (68\")   Wt 110 kg (242 lb 9.6 oz)   LMP  (LMP Unknown)   SpO2 98%   BMI 36.89 kg/m²  Body surface area is 2.22 meters squared.  Pain Score    12/21/20 0936   PainSc: 0-No pain       Physical Exam:  Physical Exam   Constitutional: She is oriented to person, place, and time. She appears well-developed and well-nourished.   HENT:   Head: Atraumatic.   Mouth/Throat: Oropharynx is clear and moist.   Eyes: Pupils are equal, round, and reactive to light. No scleral icterus.   Neck: Trachea normal. Neck supple.   Cardiovascular: Normal rate and regular rhythm. Exam reveals no gallop and no friction rub.   No murmur heard.  Pulmonary/Chest: Effort normal and breath sounds normal. She has no wheezes. She has no rhonchi. She has no rales.   Abdominal: Soft. Normal appearance. There is no abdominal tenderness. There is no rebound and no guarding.   Lymphadenopathy:     She has no cervical adenopathy.        Right: No supraclavicular adenopathy " present.        Left: No supraclavicular adenopathy present.   Neurological: She is alert and oriented to person, place, and time. No sensory deficit.   Skin: Skin is warm and dry.   Psychiatric: Judgment normal.   Vitals reviewed.      Marina Joe reports a pain score of   Pain Score    12/21/20 0936   PainSc: 0-No pain      Patient's Body mass index is 36.89 kg/m². BMI is above normal parameters. Recommendations include: defer to pcp.    LABS    Lab Results - Last 18 Months   Lab Units 12/21/20  0911 11/18/20  0828 10/21/20  0836 10/07/20  0805 08/26/20  0802 07/10/20  0833   HEMOGLOBIN g/dL 11.3* 10.6* 10.7* 10.5* 11.0* 10.8*   HEMATOCRIT % 33.9* 32.6* 33.3* 32.7* 34.3 34.1   MCV fL 91.4 91.1 90.2 92.1 91.7 92.4   WBC 10*3/mm3 6.28 5.92 6.18 6.21 6.03 6.08   RDW % 15.1 15.4 15.4 15.4 14.7 15.5*   MPV fL 9.8 9.5 9.2 9.1 9.6 9.7   PLATELETS 10*3/mm3 181 202 209 220 200 222   IMM GRAN % % 0.2 0.2 0.3 0.2 0.3 0.2   NEUTROS ABS 10*3/mm3 4.03 3.54 3.79 3.64 3.59 3.91   LYMPHS ABS 10*3/mm3 1.52 1.65 1.55 1.75 1.61 1.40   MONOS ABS 10*3/mm3 0.49 0.47 0.50 0.52 0.47 0.50   EOS ABS 10*3/mm3 0.21 0.22 0.29 0.27 0.32 0.24   BASOS ABS 10*3/mm3 0.02 0.03 0.03 0.02 0.02 0.02   IMMATURE GRANS (ABS) 10*3/mm3 0.01 0.01 0.02 0.01 0.02 0.01   NRBC /100 WBC 0.0 0.0 0.0 0.0 0.0 0.0       Lab Results - Last 18 Months   Lab Units 12/21/20  0911 11/18/20  0828 10/21/20  0836 10/07/20  0805 08/26/20  0802 07/10/20  0833   GLUCOSE mg/dL 101* 96 99 100* 108* 114*   SODIUM mmol/L 139 138 139 136 137 138   POTASSIUM mmol/L 3.3* 3.7 3.4* 3.5 3.7 3.7   CO2 mmol/L 30.0* 29.0 30.0* 29.0 28.0 28.0   CHLORIDE mmol/L 100 100 100 100 97* 98   ANION GAP mmol/L 9.0 9.0 9.0 7.0 12.0 12.0   CREATININE mg/dL 1.07* 1.28* 1.16* 1.06* 1.14* 1.09*   BUN mg/dL 22 19 20 20 22 21   BUN / CREAT RATIO  20.6 14.8 17.2 18.9 19.3 19.3   CALCIUM mg/dL 9.7 9.6 9.6 9.5 9.7 9.6   ALK PHOS U/L 72 73 76 78 72 77   TOTAL PROTEIN g/dL 7.6 7.2 7.1 7.1 7.1 7.4   ALT (SGPT)  U/L 14 14 15 20 14 16   AST (SGOT) U/L 15 17 15 23 16 16   BILIRUBIN mg/dL 0.3 0.3 0.2 0.2 0.2 0.3   ALBUMIN g/dL 4.30 4.10 3.90 4.00 3.90 4.10   GLOBULIN gm/dL 3.3 3.1 3.2 3.1 3.2 3.3       Lab Results - Last 18 Months   Lab Units 12/21/20  0911 10/07/20  0805 08/26/20  0802 07/10/20  0833 05/05/20  0723 01/10/20  0847 09/17/19  0814   IRON mcg/dL  --  59 66 66  --  82 61   TIBC mcg/dL  --  270* 270* 283*  --  270* 328   IRON SATURATION %  --  22 24 23  --  30 19*   FERRITIN ng/mL 1,071.00* 1,107.00* 978.20* 1,066.00*  --  1,314.00* 377.60*   TSH uIU/mL  --   --   --   --  4.790*  --   --    FOLATE ng/mL  --   --   --   --   --   --  >20.00     ASSESSMENT  1. Left Breast Invasive ductal carcinoma diagnosed in November 2012  · Initial stage:  AJCC TNM stage was pT1aN0 M0, Stage IA.  ER positive RI positive HER-2 alix 2+/FISH unamplified  · Treatment: Lumpectomy January 8, 2013, adjuvant external beam radiation to the left breast, adjuvant hormonal therapy beginning around May 2013 with Arimidex for which she is currently still taking and tolerating well.  · Disease status: No evidence of disease.  CA-27-29 has been remaining within normal range.  Labs stable.  Mammogram up-to-date as of December 2019 unremarkable.  2.  Osteopenia on bone mineral density study 12/8/2016.  Patient takes calcium plus D.  3.  Anemia secondary to chronic kidney disease stage III.  Patient has been undergoing Procrit when meets guidelines with last dosing 11/18/2020.  She responded well and Hemoglobin is up to 11.3 today and hematocrit 33.9.  They are above guidelines today for procrit as well as her her GFR being at 61 ml/min today.  4.  Chronic kidney disease stage III, improved with a GFR of 61 ml/min today.  5.  Hypothyroidism managed on Synthroid by primary care provider  6.  Coronary artery disease/hypertension on Coreg and Benicar. BP at 142/86 this am. Patient just took her am medications.  7.  Normal iron studies as of 10/7/2020,  with an iron saturation of 22% and a ferritin of 1107.0.  8.  Arthralgias, worse with cold weather, stable currently    PLAN  1.  Counseled patient regarding CBC results today, showing imnproved hemoglobin hematocrit and improved GFR.    2.  Counseled patient on guidelines for Procrit therapy and that she is above the guidelines today for it.  Therefore she will not receive the procrit.   3.  Encourage patient to continue Arimidex 1 mg daily, she has been advised by Dr. HERNANDEZ to continue the Arimidex for a total of 10 years if she is tolerating it and she is.  4.  Encourage patient continue taking calcium plus D twice a day  5.  Encourage patient to continue follow primary care provider and other specialists  6.  Patient to return in 4 weeks for cbc,cmp and possible Procrit with a pre-office CBC CMP Iron profile ferritin B12 and folate    I spent 21 total minutes, face-to-face, caring for Marina today.  Greater than 50% of this time involved counseling and/or coordination of care as documented within this note regarding the patient's illness(es), pros and cons of various treatment options, instructions and/or risk reduction.    Porsha Bradford, APRN   12/21/2020  10:20 CST

## 2021-01-15 DIAGNOSIS — N18.31 STAGE 3A CHRONIC KIDNEY DISEASE (HCC): Primary | ICD-10-CM

## 2021-01-15 DIAGNOSIS — D63.1 ANEMIA OF CHRONIC RENAL FAILURE, STAGE 3A (HCC): ICD-10-CM

## 2021-01-15 DIAGNOSIS — N18.31 ANEMIA OF CHRONIC RENAL FAILURE, STAGE 3A (HCC): ICD-10-CM

## 2021-01-19 ENCOUNTER — OFFICE VISIT (OUTPATIENT)
Dept: ONCOLOGY | Facility: CLINIC | Age: 76
End: 2021-01-19

## 2021-01-19 ENCOUNTER — INFUSION (OUTPATIENT)
Dept: ONCOLOGY | Facility: HOSPITAL | Age: 76
End: 2021-01-19

## 2021-01-19 ENCOUNTER — LAB (OUTPATIENT)
Dept: LAB | Facility: HOSPITAL | Age: 76
End: 2021-01-19

## 2021-01-19 VITALS
BODY MASS INDEX: 36.77 KG/M2 | DIASTOLIC BLOOD PRESSURE: 80 MMHG | RESPIRATION RATE: 18 BRPM | HEART RATE: 66 BPM | OXYGEN SATURATION: 92 % | HEIGHT: 68 IN | TEMPERATURE: 97.5 F | WEIGHT: 242.6 LBS | SYSTOLIC BLOOD PRESSURE: 128 MMHG

## 2021-01-19 DIAGNOSIS — N18.31 STAGE 3A CHRONIC KIDNEY DISEASE (HCC): ICD-10-CM

## 2021-01-19 DIAGNOSIS — C50.412 MALIGNANT NEOPLASM OF UPPER-OUTER QUADRANT OF LEFT BREAST IN FEMALE, ESTROGEN RECEPTOR POSITIVE (HCC): Primary | ICD-10-CM

## 2021-01-19 DIAGNOSIS — D63.1 ANEMIA OF CHRONIC RENAL FAILURE, STAGE 3A (HCC): ICD-10-CM

## 2021-01-19 DIAGNOSIS — Z17.0 MALIGNANT NEOPLASM OF UPPER-OUTER QUADRANT OF LEFT BREAST IN FEMALE, ESTROGEN RECEPTOR POSITIVE (HCC): Primary | ICD-10-CM

## 2021-01-19 DIAGNOSIS — D63.1 ANEMIA OF CHRONIC RENAL FAILURE, STAGE 3A (HCC): Primary | ICD-10-CM

## 2021-01-19 DIAGNOSIS — N18.31 ANEMIA OF CHRONIC RENAL FAILURE, STAGE 3A (HCC): Primary | ICD-10-CM

## 2021-01-19 DIAGNOSIS — N18.31 ANEMIA OF CHRONIC RENAL FAILURE, STAGE 3A (HCC): ICD-10-CM

## 2021-01-19 LAB
ALBUMIN SERPL-MCNC: 4 G/DL (ref 3.5–5.2)
ALBUMIN/GLOB SERPL: 1.2 G/DL
ALP SERPL-CCNC: 75 U/L (ref 39–117)
ALT SERPL W P-5'-P-CCNC: 15 U/L (ref 1–33)
ANION GAP SERPL CALCULATED.3IONS-SCNC: 8 MMOL/L (ref 5–15)
AST SERPL-CCNC: 15 U/L (ref 1–32)
BASOPHILS # BLD AUTO: 0.02 10*3/MM3 (ref 0–0.2)
BASOPHILS NFR BLD AUTO: 0.4 % (ref 0–1.5)
BILIRUB SERPL-MCNC: 0.2 MG/DL (ref 0–1.2)
BUN SERPL-MCNC: 20 MG/DL (ref 8–23)
BUN/CREAT SERPL: 18.2 (ref 7–25)
CALCIUM SPEC-SCNC: 9.5 MG/DL (ref 8.6–10.5)
CHLORIDE SERPL-SCNC: 100 MMOL/L (ref 98–107)
CO2 SERPL-SCNC: 29 MMOL/L (ref 22–29)
CREAT SERPL-MCNC: 1.1 MG/DL (ref 0.57–1)
DEPRECATED RDW RBC AUTO: 49 FL (ref 37–54)
EOSINOPHIL # BLD AUTO: 0.21 10*3/MM3 (ref 0–0.4)
EOSINOPHIL NFR BLD AUTO: 3.8 % (ref 0.3–6.2)
ERYTHROCYTE [DISTWIDTH] IN BLOOD BY AUTOMATED COUNT: 14.7 % (ref 12.3–15.4)
GFR SERPL CREATININE-BSD FRML MDRD: 59 ML/MIN/1.73
GLOBULIN UR ELPH-MCNC: 3.4 GM/DL
GLUCOSE SERPL-MCNC: 111 MG/DL (ref 65–99)
HCT VFR BLD AUTO: 34.4 % (ref 34–46.6)
HGB BLD-MCNC: 11 G/DL (ref 12–15.9)
HOLD SPECIMEN: NORMAL
IMM GRANULOCYTES # BLD AUTO: 0.01 10*3/MM3 (ref 0–0.05)
IMM GRANULOCYTES NFR BLD AUTO: 0.2 % (ref 0–0.5)
LYMPHOCYTES # BLD AUTO: 1.39 10*3/MM3 (ref 0.7–3.1)
LYMPHOCYTES NFR BLD AUTO: 24.8 % (ref 19.6–45.3)
MCH RBC QN AUTO: 29.1 PG (ref 26.6–33)
MCHC RBC AUTO-ENTMCNC: 32 G/DL (ref 31.5–35.7)
MCV RBC AUTO: 91 FL (ref 79–97)
MONOCYTES # BLD AUTO: 0.5 10*3/MM3 (ref 0.1–0.9)
MONOCYTES NFR BLD AUTO: 8.9 % (ref 5–12)
NEUTROPHILS NFR BLD AUTO: 3.47 10*3/MM3 (ref 1.7–7)
NEUTROPHILS NFR BLD AUTO: 61.9 % (ref 42.7–76)
NRBC BLD AUTO-RTO: 0 /100 WBC (ref 0–0.2)
PLATELET # BLD AUTO: 198 10*3/MM3 (ref 140–450)
PMV BLD AUTO: 10 FL (ref 6–12)
POTASSIUM SERPL-SCNC: 3.8 MMOL/L (ref 3.5–5.2)
PROT SERPL-MCNC: 7.4 G/DL (ref 6–8.5)
RBC # BLD AUTO: 3.78 10*6/MM3 (ref 3.77–5.28)
SODIUM SERPL-SCNC: 137 MMOL/L (ref 136–145)
WBC # BLD AUTO: 5.6 10*3/MM3 (ref 3.4–10.8)

## 2021-01-19 PROCEDURE — 36415 COLL VENOUS BLD VENIPUNCTURE: CPT

## 2021-01-19 PROCEDURE — 99213 OFFICE O/P EST LOW 20 MIN: CPT | Performed by: NURSE PRACTITIONER

## 2021-01-19 PROCEDURE — 80053 COMPREHEN METABOLIC PANEL: CPT

## 2021-01-19 PROCEDURE — 85025 COMPLETE CBC W/AUTO DIFF WBC: CPT

## 2021-01-19 RX ORDER — ANASTROZOLE 1 MG/1
1 TABLET ORAL DAILY
Qty: 90 TABLET | Refills: 4 | Status: SHIPPED | OUTPATIENT
Start: 2021-01-19 | End: 2022-06-23 | Stop reason: SDUPTHER

## 2021-01-19 NOTE — PROGRESS NOTES
Pt did not meet parameters for procrit, left after office visit, did not come to infusion center.

## 2021-01-19 NOTE — PROGRESS NOTES
[facility]  HEMATOLOGY & ONCOLOGY    [facility] HEMATOLOGY AND ONCOLOGY  [address]  [place] [address] [phone]    Patient Name: Marina Joe  Encounter Date: 1/19/2021  YOB: 1945  Patient Number: [id number]    Chief Complaint   Patient presents with   • Anemia     continues with oral iron tablet daily   • Breast Cancer     pt c/o left side arm/hand pain which began aprox 1 week ago, she does have hx of breast cancer and is on hormone blocker Arimidex       REASON FOR VISIT: Mrs. Marina Joe is a 75-year-old female patient here today in follow-up for both breast carcinoma as well as anemia secondary to chronic kidney disease.  She has been followed by Dr. De Leon whom recently moved.  Her oncologic history is listed below.  She has continued to show no signs of recurrence of her disease. She has had a mammogram as of December 10, 2020 that was unremarkable.  Her last bone density study showed osteopenia on 12/8/2016.  Her last colonoscopy was in November 2018 that showed diverticulosis.    She presents today also in follow-up for her anemia secondary to chronic kidney disease stage III.  She has been undergoing Procrit therapy when meets guidelines.  Her last Procrit injection was on 11/18/2020 for hemoglobin of 10.6 and hematocrit of 32.6.  She has had a good response with her hgb remaining above guidelines since.  Her hgb is 11.0 today.  Her GFR is stable today at 59ml/min today.  She is above guidelines for Procrit today.  Her iron studies in December were normal with an iron saturation of 18% and a ferritin of 1071.0.    She feels well today. She has no complaints.  She has no fever, chills or night sweats.  She has no abdominal complaints.     Oncology/Hematology History Overview Note   Oncologic history  In February 2012, she had a routine mammogram that found a nodular density in the upper outer  quadrant of the left breast.  An ultrasound revealed no nodularity at that time.  Repeat ultrasound 3 months later on 5/3/2012 revealed a small cyst in the left breast but felt to be benign.  Repeat mammogram on October 31, 2012 found a lobulated lesion in the left breast at the 2 o'clock position and a stable cyst at the 12 o'clock position.  She was referred to Dr. Barkley on 11/30/2012 and biopsy was performed.  The 12:00 cyst was a fibrocystic lesion while the 2:00 lesion was an invasive mammary carcinoma, low-grade, ER positive MN positive HER-2 nu 2+, FISH unamplified.    On January 8, 2013 she underwent a left partial mastectomy with sentinel node biopsy finding invasive ductal carcinoma, 3.1 mm in greatest dimension, grade 1.  2 sentinel nodes were negative.  AJCC TNM stage was pT1aN0 M0, Stage IA.  Following surgery she underwent adjuvant radiation therapy and was then started on Arimidex for hormonal manipulation.  She was started on the Arimidex in approximately April or May 2013.  He has been recommended to continue this for 10 years.    Hematologic history  Patient with a long history of anemia secondary to iron deficiency as well as chronic kidney disease stage III.Patient has a GFR that ranges from 45-61ML/MIN.  He has been undergoing Procrit when meets guidelines for several years now.  She is also required iron replacement.    Previous interventions  Procrit 40,000 units subcu initiated approximately February 2017  Injectafer given 9/23/2019, 9/30/2019     Malignant neoplasm of upper-outer quadrant of left breast in female, estrogen receptor positive (CMS/HCC)   10/31/2012 Initial Diagnosis    Malignant neoplasm of central portion of left female breast (CMS/HCC)     10/31/2012 Imaging    Mammogram on October 31, 2012 found a lobulated lesion in the left breast at the 2 o'clock position and a stable cyst at the 12 o'clock position.      11/30/2012 Biopsy    Dr. Barkley on 11/30/2012 and biopsy was  performed.  The 12:00 cyst was a fibrocystic lesion while the 2:00 lesion was an invasive mammary carcinoma, low-grade, ER positive CO positive HER-2 nu 2+, FISH unamplified.         1/8/2013 Surgery    On January 8, 2013 she underwent a left partial mastectomy with sentinel node biopsy finding invasive ductal carcinoma, 3.1 mm in greatest dimension, grade 1.  2 sentinel nodes were negative.  AJCC TNM stage was pT1aN0 M0, Stage IA.  Hormone receptor positive HER-2 negative      Radiation    Radiation OncologyTreatment Course:  Marina Joe receivedin 33 fractions to left breast via External Beam Radiation - EBRT.     5/2013 -  Hormonal Therapy    Arimidex 1 mg daily           PAST MEDICAL HISTORY:  ALLERGIES:  Allergies   Allergen Reactions   • Aspirin GI Bleeding   • Niacin Other (See Comments)       CURRENT MEDICATIONS:  Outpatient Encounter Medications as of 1/19/2021   Medication Sig Dispense Refill   • albuterol sulfate HFA (Ventolin HFA) 108 (90 Base) MCG/ACT inhaler Inhale 2 puffs.     • anastrozole (ARIMIDEX) 1 MG tablet Take 1 tablet by mouth Daily. 90 tablet 4   • buPROPion XL (WELLBUTRIN XL) 150 MG 24 hr tablet Take 1 tablet by mouth Daily. 90 tablet 3   • Calcium Carb-Cholecalciferol (CALCIUM 600+D3 PO) Take  by mouth.     • carvedilol (COREG) 6.25 MG tablet Take 1 tablet by mouth 2 (Two) Times a Day With Meals. 180 tablet 3   • cetirizine (zyrTEC) 10 MG tablet Take 10 mg by mouth Daily.     • cyclobenzaprine (FLEXERIL) 10 MG tablet Take 1 tablet by mouth 3 (Three) Times a Day As Needed for Muscle Spasms. 90 tablet 2   • Ergocalciferol (VITAMIN D2 PO) Take 50,000 Units by mouth Every 30 (Thirty) Days.     • ferrous sulfate 325 (65 FE) MG tablet Take 325 mg by mouth Daily With Breakfast.     • fluticasone (FLOVENT DISKUS) 50 MCG/BLIST diskus inhaler Inhale 1 puff.     • irbesartan-hydrochlorothiazide (AVALIDE) 300-12.5 MG tablet Take 1 tablet by mouth Daily. 90 tablet 3   • montelukast (SINGULAIR) 10 MG  tablet Take 1 tablet by mouth Daily. 90 tablet 3   • Multiple Vitamins-Minerals (MULTIVITAMIN ADULT) tablet Take  by mouth Daily.     • Omega-3 Fatty Acids (FISH OIL) 1000 MG capsule capsule Take 1,000 mg by mouth.     • rOPINIRole (REQUIP) 0.5 MG tablet Take 1 tablet by mouth Every Night. 90 tablet 3   • rosuvastatin (CRESTOR) 20 MG tablet Take 1 tablet by mouth Daily. 90 tablet 3   • sertraline (ZOLOFT) 100 MG tablet Take 1 tablet by mouth Daily. 90 tablet 3   • traZODone (DESYREL) 100 MG tablet TAKE 1 TABLET BY MOUTH EVERY NIGHT 30 tablet 5   • valACYclovir (VALTREX) 500 MG tablet Take 1 tablet by mouth 2 (Two) Times a Day As Needed (Fever blister). 20 tablet 1     No facility-administered encounter medications on file as of 1/19/2021.      ADULT ILLNESSES:  Patient Active Problem List   Diagnosis Code   • Malignant neoplasm of upper-outer quadrant of left breast in female, estrogen receptor positive (CMS/HCC) C50.412, Z17.0   • Anemia of chronic renal failure, stage 3 (moderate) N18.30, D63.1   • Hypertension, benign I10   • Hx of colonic polyps Z86.010   • HX: breast cancer Z85.3   • Morbidly obese (CMS/HCC) E66.01   • Abnormal mammogram R92.8   • Breast mass N63.0   • Right knee DJD M17.11   • S/P lumpectomy, left breast Z98.890   • Adult hypothyroidism E03.9   • Rhinitis J31.0   • Anemia D64.9   • Anxiety F41.9   • At low risk for fall Z91.81   • Breast cancer, left (CMS/HCC) C50.912   • Cervical pain M54.2   • Chronic insomnia F51.04   • Cough R05   • Elevated lipids E78.5   • Encounter for immunization Z23   • Herpes zoster without complication B02.9   • Influenza B J10.1   • Left ear pain H92.02   • Left otitis externa H60.92   • Lumbar strain, initial encounter S39.012A   • Myalgia M79.10   • Negative depression screening Z13.31   • Obesity (BMI 30-39.9) E66.9   • Postmenopausal status Z78.0   • Recurrent acute serous otitis media of left ear H65.05   • Restless leg G25.81   • Sinusitis, bacterial J32.9,  B96.89   • Skin lesion L98.9   • Upper respiratory infection J06.9   • Urinary tract infection without hematuria N39.0   • Vitamin D deficiency E55.9   • CKD (chronic kidney disease) N18.9   • Stage 3a chronic kidney disease N18.31     SURGERIES:  Past Surgical History:   Procedure Laterality Date   • AVULSION TOENAIL PLATE      Sept 26,2018   • BREAST BIOPSY Left 11/20/2012   • BREAST BIOPSY      Left Breast, 1/2019 per Dr Barkley   • BREAST LUMPECTOMY Left     with node bx    • COLONOSCOPY  09/13/2013    small polyp at 30cm benign hyperplastic polyp, changes consistent with melanosis coli. Recall 5 years   • REPLACEMENT TOTAL KNEE Right     2016   • TOTAL ABDOMINAL HYSTERECTOMY WITH SALPINGO OOPHORECTOMY       HEALTH MAINTENANCE ITEMS:    Last Completed Colonoscopy       Status Date      COLONOSCOPY Done 11/19/2018 SCANNED - COLONOSCOPY     Diverticulosis          Last Completed Mammogram       Status Date      MAMMOGRAM Done 12/6/2019 Ext Proc: [facility] SCREENING DIGITAL BREAST TOMOSYNTHESIS BI     Normal        FAMILY HISTORY:  Family History   Problem Relation Age of Onset   • Heart attack Mother    • Hodgkin's lymphoma Father    • Other Father    • Cancer Father         hodgkins   • Heart attack Brother    • Skin cancer Maternal Uncle    • Pancreatic cancer Maternal Uncle    • Cancer Maternal Uncle         eye   • Colon cancer Neg Hx    • Colon polyps Neg Hx      SOCIAL HISTORY:  Social History     Socioeconomic History   • Marital status:      Spouse name: Not on file   • Number of children: Not on file   • Years of education: Not on file   • Highest education level: Not on file   Tobacco Use   • Smoking status: Never Smoker   • Smokeless tobacco: Never Used   Substance and Sexual Activity   • Alcohol use: No   • Drug use: Defer   • Sexual activity: Defer       REVIEW OF SYSTEMS:  Review of Systems   Constitutional: Positive for fatigue. Negative for activity change, appetite change, chills, diaphoresis,  "fever and unexpected weight loss.   HENT: Negative for ear pain, nosebleeds, sinus pressure, sore throat and voice change.    Eyes: Negative for blurred vision, double vision, pain and visual disturbance.   Respiratory: Negative for cough and shortness of breath.    Cardiovascular: Negative for chest pain, palpitations and leg swelling.   Gastrointestinal: Negative for abdominal pain, anal bleeding, blood in stool, constipation, diarrhea, nausea and vomiting.   Endocrine: Negative for heat intolerance, polydipsia and polyuria.   Genitourinary: Negative for dysuria, frequency, hematuria, urgency and urinary incontinence.   Musculoskeletal: Positive for arthralgias (in back, \"chornic\") and myalgias (mainly in legs).   Skin: Negative for rash and skin lesions.   Neurological: Negative for dizziness, tremors, seizures, syncope, speech difficulty, weakness and headache.   Hematological: Negative for adenopathy. Does not bruise/bleed easily.   Psychiatric/Behavioral: Negative for dysphoric mood, sleep disturbance, suicidal ideas and depressed mood.   I reviewed the ROS as documented here and confirmed the accuracy of it with the patient today. 1/26/2021       /80   Pulse 66   Temp 97.5 °F (36.4 °C) (Temporal)   Resp 18   Ht 172.7 cm (68\")   Wt 110 kg (242 lb 9.6 oz)   LMP  (LMP Unknown)   SpO2 92%   BMI 36.89 kg/m²  Body surface area is 2.22 meters squared.  Pain Score    01/19/21 0923   PainSc: 10-Worst pain ever  Comment: left arm arthritis       Physical Exam:  Physical Exam   Constitutional: She is oriented to person, place, and time. She appears well-developed and well-nourished.   HENT:   Head: Atraumatic.   Mouth/Throat: Oropharynx is clear and moist.   Eyes: Pupils are equal, round, and reactive to light. No scleral icterus.   Neck: Trachea normal. Neck supple.   Cardiovascular: Normal rate and regular rhythm. Exam reveals no gallop and no friction rub.   No murmur heard.  Pulmonary/Chest: Effort " normal and breath sounds normal. She has no wheezes. She has no rhonchi. She has no rales.   Abdominal: Soft. Normal appearance. There is no abdominal tenderness. There is no rebound and no guarding.   Lymphadenopathy:     She has no cervical adenopathy.        Right: No supraclavicular adenopathy present.        Left: No supraclavicular adenopathy present.   Neurological: She is alert and oriented to person, place, and time. No sensory deficit.   Skin: Skin is warm and dry.   Psychiatric: Judgment normal.   Vitals reviewed.      Marina Joe reports a pain score of   Pain Score    01/19/21 0923   PainSc: 10-Worst pain ever  Comment: left arm arthritis     Patient's Body mass index is 36.89 kg/m². BMI is above normal parameters. Recommendations include: defer to pcp.    LABS    Lab Results - Last 18 Months   Lab Units 01/19/21  0929 12/21/20  0911 11/18/20  0828 10/21/20  0836 10/07/20  0805 08/26/20  0802   HEMOGLOBIN g/dL 11.0* 11.3* 10.6* 10.7* 10.5* 11.0*   HEMATOCRIT % 34.4 33.9* 32.6* 33.3* 32.7* 34.3   MCV fL 91.0 91.4 91.1 90.2 92.1 91.7   WBC 10*3/mm3 5.60 6.28 5.92 6.18 6.21 6.03   RDW % 14.7 15.1 15.4 15.4 15.4 14.7   MPV fL 10.0 9.8 9.5 9.2 9.1 9.6   PLATELETS 10*3/mm3 198 181 202 209 220 200   IMM GRAN % % 0.2 0.2 0.2 0.3 0.2 0.3   NEUTROS ABS 10*3/mm3 3.47 4.03 3.54 3.79 3.64 3.59   LYMPHS ABS 10*3/mm3 1.39 1.52 1.65 1.55 1.75 1.61   MONOS ABS 10*3/mm3 0.50 0.49 0.47 0.50 0.52 0.47   EOS ABS 10*3/mm3 0.21 0.21 0.22 0.29 0.27 0.32   BASOS ABS 10*3/mm3 0.02 0.02 0.03 0.03 0.02 0.02   IMMATURE GRANS (ABS) 10*3/mm3 0.01 0.01 0.01 0.02 0.01 0.02   NRBC /100 WBC 0.0 0.0 0.0 0.0 0.0 0.0       Lab Results - Last 18 Months   Lab Units 01/19/21  0929 12/21/20  0911 11/18/20  0828 10/21/20  0836 10/07/20  0805 08/26/20  0802   GLUCOSE mg/dL 111* 101* 96 99 100* 108*   SODIUM mmol/L 137 139 138 139 136 137   POTASSIUM mmol/L 3.8 3.3* 3.7 3.4* 3.5 3.7   CO2 mmol/L 29.0 30.0* 29.0 30.0* 29.0 28.0   CHLORIDE mmol/L  100 100 100 100 100 97*   ANION GAP mmol/L 8.0 9.0 9.0 9.0 7.0 12.0   CREATININE mg/dL 1.10* 1.07* 1.28* 1.16* 1.06* 1.14*   BUN mg/dL 20 22 19 20 20 22   BUN / CREAT RATIO  18.2 20.6 14.8 17.2 18.9 19.3   CALCIUM mg/dL 9.5 9.7 9.6 9.6 9.5 9.7   ALK PHOS U/L 75 72 73 76 78 72   TOTAL PROTEIN g/dL 7.4 7.6 7.2 7.1 7.1 7.1   ALT (SGPT) U/L 15 14 14 15 20 14   AST (SGOT) U/L 15 15 17 15 23 16   BILIRUBIN mg/dL 0.2 0.3 0.3 0.2 0.2 0.2   ALBUMIN g/dL 4.00 4.30 4.10 3.90 4.00 3.90   GLOBULIN gm/dL 3.4 3.3 3.1 3.2 3.1 3.2       Lab Results - Last 18 Months   Lab Units 12/21/20  0911 10/07/20  0805 08/26/20  0802 07/10/20  0833 05/05/20  0723 01/10/20  0847 09/17/19  0814   IRON mcg/dL 51 59 66 66  --  82 61   TIBC mcg/dL 280* 270* 270* 283*  --  270* 328   IRON SATURATION % 18* 22 24 23  --  30 19*   FERRITIN ng/mL 1,071.00* 1,107.00* 978.20* 1,066.00*  --  1,314.00* 377.60*   TSH uIU/mL  --   --   --   --  4.790*  --   --    FOLATE ng/mL  --   --   --   --   --   --  >20.00     ASSESSMENT  1. Left Breast Invasive ductal carcinoma diagnosed in November 2012  · Initial stage:  AJCC TNM stage was pT1aN0 M0, Stage IA.  ER positive NM positive HER-2 alix 2+/FISH unamplified  · Treatment: Lumpectomy January 8, 2013, adjuvant external beam radiation to the left breast, adjuvant hormonal therapy beginning around May 2013 with Arimidex for which she is currently still taking and tolerating well.  · Disease status: No evidence of disease.  CA-27-29 has been remaining within normal range.  Labs stable.  Mammogram up-to-date as of December 2019 unremarkable.  2.  Osteopenia on bone mineral density study 12/8/2016.  Patient takes calcium plus D.  3.  Anemia secondary to chronic kidney disease stage III.  Patient has been undergoing Procrit when meets guidelines with last dosing 11/18/2020.  She responded well and Hemoglobin is stable at 11.0 today and hematocrit 34.4.  They are above guidelines today for procrit. Her GFR was stable at  59 ml/min today.  4.  Chronic kidney disease stage III, improved with a GFR of 59 ml/min today.  5.  Hypothyroidism managed on Synthroid by primary care provider  6.  Coronary artery disease/hypertension on Coreg and Benicar. BP at 128/80 this am. Patient just took her am medications.  7.  Normal iron studies as of 12/21/2020, with an iron saturation of 18% and a ferritin of 1071.0.  8.  Arthralgias currently stable    PLAN  1.  Counseled patient regarding CBC results today, showing the stable hemoglobin hematocrit and GFR. 2.  Counseled patient on guidelines for Procrit therapy and that she is above the guidelines today for it.  Therefore she will not receive the procrit.   3.  Encourage patient to continue Arimidex 1 mg daily, she has been advised by Dr. HERNANDEZ to continue the Arimidex for a total of 10 years if she is tolerating it and she is.  4.  Encourage patient continue taking calcium plus D twice a day  5.  Encourage patient to continue follow primary care provider and other specialists  6.  Patient to return in 4 weeks for cbc,cmp and possible Procrit with a pre-office CBC CMP Iron profile ferritin B12 and folate    I spent 20 total minutes, face-to-face, caring for Marina today.  Greater than 50% of this time involved counseling and/or coordination of care as documented within this note regarding the patient's illness(es), pros and cons of various treatment options, instructions and/or risk reduction.    Porsha Bradford, APRN   01/19/2021

## 2021-02-12 DIAGNOSIS — C50.412 MALIGNANT NEOPLASM OF UPPER-OUTER QUADRANT OF LEFT BREAST IN FEMALE, ESTROGEN RECEPTOR POSITIVE (HCC): Primary | ICD-10-CM

## 2021-02-12 DIAGNOSIS — Z17.0 MALIGNANT NEOPLASM OF UPPER-OUTER QUADRANT OF LEFT BREAST IN FEMALE, ESTROGEN RECEPTOR POSITIVE (HCC): Primary | ICD-10-CM

## 2021-02-12 DIAGNOSIS — N18.31 STAGE 3A CHRONIC KIDNEY DISEASE (HCC): ICD-10-CM

## 2021-02-12 DIAGNOSIS — D63.1 ANEMIA OF CHRONIC RENAL FAILURE, STAGE 3A (HCC): ICD-10-CM

## 2021-02-12 DIAGNOSIS — N18.31 ANEMIA OF CHRONIC RENAL FAILURE, STAGE 3A (HCC): ICD-10-CM

## 2021-02-15 ENCOUNTER — APPOINTMENT (OUTPATIENT)
Dept: ONCOLOGY | Facility: HOSPITAL | Age: 76
End: 2021-02-15

## 2021-02-15 ENCOUNTER — APPOINTMENT (OUTPATIENT)
Dept: LAB | Facility: HOSPITAL | Age: 76
End: 2021-02-15

## 2021-02-24 ENCOUNTER — OFFICE VISIT (OUTPATIENT)
Dept: ONCOLOGY | Facility: CLINIC | Age: 76
End: 2021-02-24

## 2021-02-24 ENCOUNTER — LAB (OUTPATIENT)
Dept: LAB | Facility: HOSPITAL | Age: 76
End: 2021-02-24

## 2021-02-24 VITALS
HEIGHT: 68 IN | RESPIRATION RATE: 16 BRPM | DIASTOLIC BLOOD PRESSURE: 78 MMHG | SYSTOLIC BLOOD PRESSURE: 158 MMHG | OXYGEN SATURATION: 96 % | WEIGHT: 241.3 LBS | HEART RATE: 68 BPM | BODY MASS INDEX: 36.57 KG/M2

## 2021-02-24 DIAGNOSIS — C50.412 MALIGNANT NEOPLASM OF UPPER-OUTER QUADRANT OF LEFT BREAST IN FEMALE, ESTROGEN RECEPTOR POSITIVE (HCC): Primary | ICD-10-CM

## 2021-02-24 DIAGNOSIS — N18.31 STAGE 3A CHRONIC KIDNEY DISEASE (HCC): ICD-10-CM

## 2021-02-24 DIAGNOSIS — N18.31 ANEMIA OF CHRONIC RENAL FAILURE, STAGE 3A (HCC): ICD-10-CM

## 2021-02-24 DIAGNOSIS — D63.1 ANEMIA OF CHRONIC RENAL FAILURE, STAGE 3A (HCC): ICD-10-CM

## 2021-02-24 DIAGNOSIS — Z17.0 MALIGNANT NEOPLASM OF UPPER-OUTER QUADRANT OF LEFT BREAST IN FEMALE, ESTROGEN RECEPTOR POSITIVE (HCC): Primary | ICD-10-CM

## 2021-02-24 DIAGNOSIS — C50.412 MALIGNANT NEOPLASM OF UPPER-OUTER QUADRANT OF LEFT BREAST IN FEMALE, ESTROGEN RECEPTOR POSITIVE (HCC): ICD-10-CM

## 2021-02-24 DIAGNOSIS — Z17.0 MALIGNANT NEOPLASM OF UPPER-OUTER QUADRANT OF LEFT BREAST IN FEMALE, ESTROGEN RECEPTOR POSITIVE (HCC): ICD-10-CM

## 2021-02-24 DIAGNOSIS — I10 HYPERTENSION, BENIGN: ICD-10-CM

## 2021-02-24 LAB
ALBUMIN SERPL-MCNC: 4 G/DL (ref 3.5–5.2)
ALBUMIN/GLOB SERPL: 1.1 G/DL
ALP SERPL-CCNC: 74 U/L (ref 39–117)
ALT SERPL W P-5'-P-CCNC: 16 U/L (ref 1–33)
ANION GAP SERPL CALCULATED.3IONS-SCNC: 10 MMOL/L (ref 5–15)
AST SERPL-CCNC: 17 U/L (ref 1–32)
BASOPHILS # BLD AUTO: 0.02 10*3/MM3 (ref 0–0.2)
BASOPHILS NFR BLD AUTO: 0.4 % (ref 0–1.5)
BILIRUB SERPL-MCNC: 0.4 MG/DL (ref 0–1.2)
BUN SERPL-MCNC: 20 MG/DL (ref 8–23)
BUN/CREAT SERPL: 18.3 (ref 7–25)
CALCIUM SPEC-SCNC: 9.6 MG/DL (ref 8.6–10.5)
CHLORIDE SERPL-SCNC: 99 MMOL/L (ref 98–107)
CO2 SERPL-SCNC: 29 MMOL/L (ref 22–29)
CREAT SERPL-MCNC: 1.09 MG/DL (ref 0.57–1)
DEPRECATED RDW RBC AUTO: 47.8 FL (ref 37–54)
EOSINOPHIL # BLD AUTO: 0.23 10*3/MM3 (ref 0–0.4)
EOSINOPHIL NFR BLD AUTO: 4.2 % (ref 0.3–6.2)
ERYTHROCYTE [DISTWIDTH] IN BLOOD BY AUTOMATED COUNT: 14.9 % (ref 12.3–15.4)
FERRITIN SERPL-MCNC: 1187 NG/ML (ref 13–150)
FOLATE SERPL-MCNC: >20 NG/ML (ref 4.78–24.2)
GFR SERPL CREATININE-BSD FRML MDRD: 59 ML/MIN/1.73
GLOBULIN UR ELPH-MCNC: 3.6 GM/DL
GLUCOSE SERPL-MCNC: 105 MG/DL (ref 65–99)
HCT VFR BLD AUTO: 35.4 % (ref 34–46.6)
HGB BLD-MCNC: 11.6 G/DL (ref 12–15.9)
IMM GRANULOCYTES # BLD AUTO: 0.02 10*3/MM3 (ref 0–0.05)
IMM GRANULOCYTES NFR BLD AUTO: 0.4 % (ref 0–0.5)
IRON 24H UR-MRATE: 76 MCG/DL (ref 37–145)
IRON SATN MFR SERPL: 25 % (ref 20–50)
LYMPHOCYTES # BLD AUTO: 1.38 10*3/MM3 (ref 0.7–3.1)
LYMPHOCYTES NFR BLD AUTO: 25.1 % (ref 19.6–45.3)
MCH RBC QN AUTO: 29.1 PG (ref 26.6–33)
MCHC RBC AUTO-ENTMCNC: 32.8 G/DL (ref 31.5–35.7)
MCV RBC AUTO: 88.9 FL (ref 79–97)
MONOCYTES # BLD AUTO: 0.46 10*3/MM3 (ref 0.1–0.9)
MONOCYTES NFR BLD AUTO: 8.4 % (ref 5–12)
NEUTROPHILS NFR BLD AUTO: 3.39 10*3/MM3 (ref 1.7–7)
NEUTROPHILS NFR BLD AUTO: 61.5 % (ref 42.7–76)
NRBC BLD AUTO-RTO: 0 /100 WBC (ref 0–0.2)
PLATELET # BLD AUTO: 176 10*3/MM3 (ref 140–450)
PMV BLD AUTO: 10.2 FL (ref 6–12)
POTASSIUM SERPL-SCNC: 3.5 MMOL/L (ref 3.5–5.2)
PROT SERPL-MCNC: 7.6 G/DL (ref 6–8.5)
RBC # BLD AUTO: 3.98 10*6/MM3 (ref 3.77–5.28)
SODIUM SERPL-SCNC: 138 MMOL/L (ref 136–145)
TIBC SERPL-MCNC: 304 MCG/DL (ref 298–536)
TRANSFERRIN SERPL-MCNC: 204 MG/DL (ref 200–360)
VIT B12 BLD-MCNC: 858 PG/ML (ref 211–946)
WBC # BLD AUTO: 5.5 10*3/MM3 (ref 3.4–10.8)

## 2021-02-24 PROCEDURE — 85025 COMPLETE CBC W/AUTO DIFF WBC: CPT

## 2021-02-24 PROCEDURE — 83540 ASSAY OF IRON: CPT

## 2021-02-24 PROCEDURE — 80053 COMPREHEN METABOLIC PANEL: CPT

## 2021-02-24 PROCEDURE — 82728 ASSAY OF FERRITIN: CPT

## 2021-02-24 PROCEDURE — 36415 COLL VENOUS BLD VENIPUNCTURE: CPT

## 2021-02-24 PROCEDURE — 82746 ASSAY OF FOLIC ACID SERUM: CPT

## 2021-02-24 PROCEDURE — 82607 VITAMIN B-12: CPT

## 2021-02-24 PROCEDURE — 84466 ASSAY OF TRANSFERRIN: CPT

## 2021-02-24 PROCEDURE — 99213 OFFICE O/P EST LOW 20 MIN: CPT | Performed by: NURSE PRACTITIONER

## 2021-03-03 DIAGNOSIS — B02.9 HERPES ZOSTER WITHOUT COMPLICATION: ICD-10-CM

## 2021-03-03 RX ORDER — VALACYCLOVIR HYDROCHLORIDE 500 MG/1
500 TABLET, FILM COATED ORAL 2 TIMES DAILY PRN
Qty: 20 TABLET | Refills: 1 | Status: SHIPPED | OUTPATIENT
Start: 2021-03-03 | End: 2021-05-12

## 2021-03-11 DIAGNOSIS — D63.1 ANEMIA OF CHRONIC RENAL FAILURE, STAGE 3A (HCC): ICD-10-CM

## 2021-03-11 DIAGNOSIS — N18.31 STAGE 3A CHRONIC KIDNEY DISEASE (HCC): Primary | ICD-10-CM

## 2021-03-11 DIAGNOSIS — N18.31 ANEMIA OF CHRONIC RENAL FAILURE, STAGE 3A (HCC): ICD-10-CM

## 2021-03-14 NOTE — PROGRESS NOTES
[facility]  HEMATOLOGY & ONCOLOGY    W ONC [facility] HEMATOLOGY AND ONCOLOGY  [address]  [place] [address] [phone]    Patient Name: Marina Joe  Encounter Date: 1/19/2021  YOB: 1945  Patient Number: [id number]    Chief Complaint   Patient presents with   • Breast Cancer     Here for f/u       REASON FOR VISIT: Mrs. Marina Joe is a 75-year-old female patient here today in follow-up for both breast carcinoma as well as anemia secondary to chronic kidney disease.  She has been followed by Dr. De Leon whom recently moved.  Her oncologic history is listed below.  She has continued to show no signs of recurrence of her disease. She has had a mammogram as of December 10, 2020 that was unremarkable.  Her last bone density study showed osteopenia on 12/8/2016.  Her last colonoscopy was in November 2018 that showed diverticulosis.    She presents today also in follow-up for her anemia secondary to chronic kidney disease stage III.  She has been undergoing Procrit therapy when meets guidelines.  Her last Procrit injection was on 11/18/2020 for hemoglobin of 10.6 and hematocrit of 32.6.  She has had a good response with her hgb remaining above guidelines since.  Her hgb is 11.6 today.  Her GFR is stable today at 59ml/min today.  She is above guidelines for Procrit today.  Her iron studies in December were normal with an iron saturation of 18% and a ferritin of 1071.0.    She feels well today. She has no complaints.  She has no fever, chills or night sweats.  She has no abdominal complaints.     Oncology/Hematology History Overview Note   Oncologic history  In February 2012, she had a routine mammogram that found a nodular density in the upper outer quadrant of the left breast.  An ultrasound revealed no nodularity at that time.  Repeat ultrasound 3 months later on 5/3/2012 revealed a small cyst in the left breast but  felt to be benign.  Repeat mammogram on October 31, 2012 found a lobulated lesion in the left breast at the 2 o'clock position and a stable cyst at the 12 o'clock position.  She was referred to Dr. Barkley on 11/30/2012 and biopsy was performed.  The 12:00 cyst was a fibrocystic lesion while the 2:00 lesion was an invasive mammary carcinoma, low-grade, ER positive DC positive HER-2 nu 2+, FISH unamplified.    On January 8, 2013 she underwent a left partial mastectomy with sentinel node biopsy finding invasive ductal carcinoma, 3.1 mm in greatest dimension, grade 1.  2 sentinel nodes were negative.  AJCC TNM stage was pT1aN0 M0, Stage IA.  Following surgery she underwent adjuvant radiation therapy and was then started on Arimidex for hormonal manipulation.  She was started on the Arimidex in approximately April or May 2013.  He has been recommended to continue this for 10 years.    Hematologic history  Patient with a long history of anemia secondary to iron deficiency as well as chronic kidney disease stage III.Patient has a GFR that ranges from 45-61ML/MIN.  He has been undergoing Procrit when meets guidelines for several years now.  She is also required iron replacement.    Previous interventions  Procrit 40,000 units subcu initiated approximately February 2017  Injectafer given 9/23/2019, 9/30/2019     Malignant neoplasm of upper-outer quadrant of left breast in female, estrogen receptor positive (CMS/HCC)   10/31/2012 Initial Diagnosis    Malignant neoplasm of central portion of left female breast (CMS/HCC)     10/31/2012 Imaging    Mammogram on October 31, 2012 found a lobulated lesion in the left breast at the 2 o'clock position and a stable cyst at the 12 o'clock position.      11/30/2012 Biopsy    Dr. Barkley on 11/30/2012 and biopsy was performed.  The 12:00 cyst was a fibrocystic lesion while the 2:00 lesion was an invasive mammary carcinoma, low-grade, ER positive DC positive HER-2 nu 2+, FISH  unamplified.         1/8/2013 Surgery    On January 8, 2013 she underwent a left partial mastectomy with sentinel node biopsy finding invasive ductal carcinoma, 3.1 mm in greatest dimension, grade 1.  2 sentinel nodes were negative.  AJCC TNM stage was pT1aN0 M0, Stage IA.  Hormone receptor positive HER-2 negative      Radiation    Radiation OncologyTreatment Course:  Marina Joe receivedin 33 fractions to left breast via External Beam Radiation - EBRT.     5/2013 -  Hormonal Therapy    Arimidex 1 mg daily           PAST MEDICAL HISTORY:  ALLERGIES:  Allergies   Allergen Reactions   • Aspirin GI Bleeding   • Niacin Other (See Comments)       CURRENT MEDICATIONS:  Outpatient Encounter Medications as of 2/24/2021   Medication Sig Dispense Refill   • albuterol sulfate HFA (Ventolin HFA) 108 (90 Base) MCG/ACT inhaler Inhale 2 puffs.     • anastrozole (ARIMIDEX) 1 MG tablet Take 1 tablet by mouth Daily. 90 tablet 4   • buPROPion XL (WELLBUTRIN XL) 150 MG 24 hr tablet Take 1 tablet by mouth Daily. 90 tablet 3   • Calcium Carb-Cholecalciferol (CALCIUM 600+D3 PO) Take  by mouth.     • carvedilol (COREG) 6.25 MG tablet Take 1 tablet by mouth 2 (Two) Times a Day With Meals. 180 tablet 3   • cetirizine (zyrTEC) 10 MG tablet Take 10 mg by mouth Daily.     • cyclobenzaprine (FLEXERIL) 10 MG tablet Take 1 tablet by mouth 3 (Three) Times a Day As Needed for Muscle Spasms. 90 tablet 2   • Ergocalciferol (VITAMIN D2 PO) Take 50,000 Units by mouth Every 30 (Thirty) Days.     • ferrous sulfate 325 (65 FE) MG tablet Take 325 mg by mouth Daily With Breakfast.     • fluticasone (FLOVENT DISKUS) 50 MCG/BLIST diskus inhaler Inhale 1 puff.     • irbesartan-hydrochlorothiazide (AVALIDE) 300-12.5 MG tablet Take 1 tablet by mouth Daily. 90 tablet 3   • montelukast (SINGULAIR) 10 MG tablet Take 1 tablet by mouth Daily. 90 tablet 3   • Multiple Vitamins-Minerals (MULTIVITAMIN ADULT) tablet Take  by mouth Daily.     • Omega-3 Fatty Acids (FISH  OIL) 1000 MG capsule capsule Take 1,000 mg by mouth.     • rOPINIRole (REQUIP) 0.5 MG tablet Take 1 tablet by mouth Every Night. 90 tablet 3   • rosuvastatin (CRESTOR) 20 MG tablet Take 1 tablet by mouth Daily. 90 tablet 3   • sertraline (ZOLOFT) 100 MG tablet Take 1 tablet by mouth Daily. 90 tablet 3   • traZODone (DESYREL) 100 MG tablet TAKE 1 TABLET BY MOUTH EVERY NIGHT 30 tablet 5   • [DISCONTINUED] valACYclovir (VALTREX) 500 MG tablet Take 1 tablet by mouth 2 (Two) Times a Day As Needed (Fever blister). 20 tablet 1     No facility-administered encounter medications on file as of 2/24/2021.     ADULT ILLNESSES:  Patient Active Problem List   Diagnosis Code   • Malignant neoplasm of upper-outer quadrant of left breast in female, estrogen receptor positive (CMS/[facility]) C50.412, Z17.0   • Anemia of chronic renal failure, stage 3 (moderate) (CMS/[facility]) N18.30, D63.1   • Hypertension, benign I10   • Hx of colonic polyps Z86.010   • HX: breast cancer Z85.3   • Morbidly obese (CMS/[facility]) E66.01   • Abnormal mammogram R92.8   • Breast mass N63.0   • Right knee DJD M17.11   • S/P lumpectomy, left breast Z98.890   • Adult hypothyroidism E03.9   • Rhinitis J31.0   • Anemia D64.9   • Anxiety F41.9   • At low risk for fall Z91.81   • Breast cancer, left (CMS/[facility]) C50.912   • Cervical pain M54.2   • Chronic insomnia F51.04   • Cough R05   • Elevated lipids E78.5   • Encounter for immunization Z23   • Herpes zoster without complication B02.9   • Influenza B J10.1   • Left ear pain H92.02   • Left otitis externa H60.92   • Lumbar strain, initial encounter S39.012A   • Myalgia M79.10   • Negative depression screening Z13.31   • Obesity (BMI 30-39.9) E66.9   • Postmenopausal status Z78.0   • Recurrent acute serous otitis media of left ear H65.05   • Restless leg G25.81   • Sinusitis, bacterial J32.9, B96.89   • Skin lesion L98.9   • Upper respiratory infection J06.9   • Urinary tract infection without hematuria N39.0   • Vitamin D  deficiency E55.9   • CKD (chronic kidney disease) N18.9   • Stage 3a chronic kidney disease (CMS/HCC) N18.31     SURGERIES:  Past Surgical History:   Procedure Laterality Date   • AVULSION TOENAIL PLATE      Sept 26,2018   • BREAST BIOPSY Left 11/20/2012   • BREAST BIOPSY      Left Breast, 1/2019 per Dr Barkley   • BREAST LUMPECTOMY Left     with node bx    • COLONOSCOPY  09/13/2013    small polyp at 30cm benign hyperplastic polyp, changes consistent with melanosis coli. Recall 5 years   • REPLACEMENT TOTAL KNEE Right     2016   • TOTAL ABDOMINAL HYSTERECTOMY WITH SALPINGO OOPHORECTOMY       HEALTH MAINTENANCE ITEMS:    Last Completed Colonoscopy       Status Date      COLONOSCOPY Done 11/19/2018 SCANNED - COLONOSCOPY     Diverticulosis          Last Completed Mammogram       Status Date      MAMMOGRAM Done 12/6/2019 Ext Proc: CHG SCREENING DIGITAL BREAST TOMOSYNTHESIS BI     Normal        FAMILY HISTORY:  Family History   Problem Relation Age of Onset   • Heart attack Mother    • Hodgkin's lymphoma Father    • Other Father    • Cancer Father         hodgkins   • Heart attack Brother    • Skin cancer Maternal Uncle    • Pancreatic cancer Maternal Uncle    • Cancer Maternal Uncle         eye   • Colon cancer Neg Hx    • Colon polyps Neg Hx      SOCIAL HISTORY:  Social History     Socioeconomic History   • Marital status:      Spouse name: Not on file   • Number of children: Not on file   • Years of education: Not on file   • Highest education level: Not on file   Tobacco Use   • Smoking status: Never Smoker   • Smokeless tobacco: Never Used   Substance and Sexual Activity   • Alcohol use: No   • Drug use: Defer   • Sexual activity: Defer       REVIEW OF SYSTEMS:  Review of Systems   Constitutional: Positive for fatigue. Negative for activity change, appetite change, chills, diaphoresis, fever and unexpected weight loss.   HENT: Negative for ear pain, nosebleeds, sinus pressure, sore throat and voice change.   "  Eyes: Negative for blurred vision, double vision, pain and visual disturbance.   Respiratory: Negative for cough and shortness of breath.    Cardiovascular: Negative for chest pain, palpitations and leg swelling.   Gastrointestinal: Negative for abdominal pain, anal bleeding, blood in stool, constipation, diarrhea, nausea and vomiting.   Endocrine: Negative for heat intolerance, polydipsia and polyuria.   Genitourinary: Negative for dysuria, frequency, hematuria, urgency and urinary incontinence.   Musculoskeletal: Positive for arthralgias (in back, \"chornic\") and myalgias (mainly in legs).   Skin: Negative for rash and skin lesions.   Neurological: Negative for dizziness, tremors, seizures, syncope, speech difficulty, weakness and headache.   Hematological: Negative for adenopathy. Does not bruise/bleed easily.   Psychiatric/Behavioral: Negative for dysphoric mood, sleep disturbance, suicidal ideas and depressed mood.   I reviewed the ROS as documented here and confirmed the accuracy of it with the patient today. 2/24/2021     /78   Pulse 68   Resp 16   Ht 172.7 cm (68\")   Wt 109 kg (241 lb 4.8 oz)   LMP  (LMP Unknown)   SpO2 96%   BMI 36.69 kg/m²  Body surface area is 2.21 meters squared.  Pain Score    02/24/21 1051   PainSc: 0-No pain       Physical Exam:  Physical Exam   Constitutional: She is oriented to person, place, and time. She appears well-developed and well-nourished.   HENT:   Head: Atraumatic.   Mouth/Throat: Oropharynx is clear and moist.   Eyes: Pupils are equal, round, and reactive to light. No scleral icterus.   Neck: Trachea normal.   Cardiovascular: Normal rate and regular rhythm. Exam reveals no gallop and no friction rub.   No murmur heard.  Pulmonary/Chest: Effort normal and breath sounds normal. She has no wheezes. She has no rhonchi. She has no rales.   Abdominal: Soft. Normal appearance. There is no abdominal tenderness. There is no rebound and no guarding. "   Lymphadenopathy:     She has no cervical adenopathy.        Right: No supraclavicular adenopathy present.        Left: No supraclavicular adenopathy present.   Neurological: She is alert and oriented to person, place, and time. No sensory deficit.   Skin: Skin is warm and dry.   Psychiatric: Judgment normal.   Vitals reviewed.  I have reexamined the patient and the results are consistent with the previously documented exam. MARY Aguirre reports a pain score of   Pain Score    02/24/21 1051   PainSc: 0-No pain     Patient's Body mass index is 36.69 kg/m². BMI is above normal parameters. Recommendations include: defer to pcp.    LABS    Lab Results - Last 18 Months   Lab Units 02/24/21  1027 01/19/21  0929 12/21/20  0911 11/18/20  0828 10/21/20  0836 10/07/20  0805   HEMOGLOBIN g/dL 11.6* 11.0* 11.3* 10.6* 10.7* 10.5*   HEMATOCRIT % 35.4 34.4 33.9* 32.6* 33.3* 32.7*   MCV fL 88.9 91.0 91.4 91.1 90.2 92.1   WBC 10*3/mm3 5.50 5.60 6.28 5.92 6.18 6.21   RDW % 14.9 14.7 15.1 15.4 15.4 15.4   MPV fL 10.2 10.0 9.8 9.5 9.2 9.1   PLATELETS 10*3/mm3 176 198 181 202 209 220   IMM GRAN % % 0.4 0.2 0.2 0.2 0.3 0.2   NEUTROS ABS 10*3/mm3 3.39 3.47 4.03 3.54 3.79 3.64   LYMPHS ABS 10*3/mm3 1.38 1.39 1.52 1.65 1.55 1.75   MONOS ABS 10*3/mm3 0.46 0.50 0.49 0.47 0.50 0.52   EOS ABS 10*3/mm3 0.23 0.21 0.21 0.22 0.29 0.27   BASOS ABS 10*3/mm3 0.02 0.02 0.02 0.03 0.03 0.02   IMMATURE GRANS (ABS) 10*3/mm3 0.02 0.01 0.01 0.01 0.02 0.01   NRBC /100 WBC 0.0 0.0 0.0 0.0 0.0 0.0       Lab Results - Last 18 Months   Lab Units 02/24/21  1027 01/19/21  0929 12/21/20  0911 11/18/20  0828 10/21/20  0836 10/07/20  0805   GLUCOSE mg/dL 105* 111* 101* 96 99 100*   SODIUM mmol/L 138 137 139 138 139 136   POTASSIUM mmol/L 3.5 3.8 3.3* 3.7 3.4* 3.5   CO2 mmol/L 29.0 29.0 30.0* 29.0 30.0* 29.0   CHLORIDE mmol/L 99 100 100 100 100 100   ANION GAP mmol/L 10.0 8.0 9.0 9.0 9.0 7.0   CREATININE mg/dL 1.09* 1.10* 1.07* 1.28*  1.16* 1.06*   BUN mg/dL 20 20 22 19 20 20   BUN / CREAT RATIO  18.3 18.2 20.6 14.8 17.2 18.9   CALCIUM mg/dL 9.6 9.5 9.7 9.6 9.6 9.5   ALK PHOS U/L 74 75 72 73 76 78   TOTAL PROTEIN g/dL 7.6 7.4 7.6 7.2 7.1 7.1   ALT (SGPT) U/L 16 15 14 14 15 20   AST (SGOT) U/L 17 15 15 17 15 23   BILIRUBIN mg/dL 0.4 0.2 0.3 0.3 0.2 0.2   ALBUMIN g/dL 4.00 4.00 4.30 4.10 3.90 4.00   GLOBULIN gm/dL 3.6 3.4 3.3 3.1 3.2 3.1       Lab Results - Last 18 Months   Lab Units 02/24/21  1027 12/21/20  0911 10/07/20  0805 08/26/20  0802 07/10/20  0833 05/05/20  0723 01/10/20  0847 09/17/19  0814   IRON mcg/dL 76 51 59 66 66  --  82 61   TIBC mcg/dL 304 280* 270* 270* 283*  --  270* 328   IRON SATURATION % 25 18* 22 24 23  --  30 19*   FERRITIN ng/mL 1,187.00* 1,071.00* 1,107.00* 978.20* 1,066.00*  --  1,314.00* 377.60*   TSH uIU/mL  --   --   --   --   --  4.790*  --   --    FOLATE ng/mL >20.00  --   --   --   --   --   --  >20.00     ASSESSMENT  1. Left Breast Invasive ductal carcinoma diagnosed in November 2012  · Initial stage:  AJCC TNM stage was pT1aN0 M0, Stage IA.  ER positive [place] positive HER-2 alix 2+/FISH unamplified  · Treatment: Lumpectomy January 8, 2013, adjuvant external beam radiation to the left breast, adjuvant hormonal therapy beginning around May 2013 with Arimidex for which she is currently still taking and tolerating well.  · Disease status: No evidence of disease.  CA-27-29 has been remaining within normal range.  Labs stable.  Mammogram up-to-date as of December 2019 unremarkable.  2.  Osteopenia on bone mineral density study 12/8/2016.  Patient takes calcium plus D.  3.  Anemia secondary to chronic kidney disease stage III.  Patient has been undergoing Procrit when meets guidelines with last dosing 11/18/2020.  She responded well and Hemoglobin is stable at 11.6 today and hematocrit 35.4.  They are above guidelines today for procrit. Her GFR was stable at 59 ml/min today.  4.  Chronic kidney disease stage III, improved  with a GFR of 59 ml/min today.  5.  Hypothyroidism managed on Synthroid by primary care provider  6.  Coronary artery disease/hypertension on Coreg and Benicar. BP at 158/78 this am. Patient just took her am medications.  7.  Normal iron studies as of today, 2/24/2021 with an iron saturation of 25% and a ferritin of 1187.0.  8.  Arthralgias currently stable    PLAN  1.  Counseled patient regarding CBC results today, showing the stable hemoglobin hematocrit and GFR.   2.  Counseled patient on guidelines for Procrit therapy and that she is above the guidelines today for it.  Therefore she will not receive the procrit.   3.  Continue Arimidex 1 mg daily, she has been advised by Dr. HERNANDEZ to continue the Arimidex for a total of 10 years if she is tolerating it and she is.  4.  Encourage patient continue taking calcium plus D twice a day  5.  Encourage patient to continue follow primary care provider and other specialists  6.  Patient to return in 5 weeks for cbc,cmp and possible Procrit with a pre-office CBC CMP Iron profile ferritin B12 and folate    Porsha Bradford, APRN   02/24/2021

## 2021-03-25 RX ORDER — SERTRALINE HYDROCHLORIDE 100 MG/1
100 TABLET, FILM COATED ORAL DAILY
Qty: 90 TABLET | Refills: 3 | Status: SHIPPED | OUTPATIENT
Start: 2021-03-25 | End: 2022-03-24

## 2021-03-25 RX ORDER — MONTELUKAST SODIUM 10 MG/1
10 TABLET ORAL DAILY
Qty: 90 TABLET | Refills: 3 | Status: SHIPPED | OUTPATIENT
Start: 2021-03-25 | End: 2022-03-24

## 2021-03-25 RX ORDER — ROPINIROLE 0.5 MG/1
0.5 TABLET, FILM COATED ORAL NIGHTLY
Qty: 90 TABLET | Refills: 3 | Status: SHIPPED | OUTPATIENT
Start: 2021-03-25 | End: 2022-03-24

## 2021-03-26 DIAGNOSIS — C50.412 MALIGNANT NEOPLASM OF UPPER-OUTER QUADRANT OF LEFT BREAST IN FEMALE, ESTROGEN RECEPTOR POSITIVE (HCC): ICD-10-CM

## 2021-03-26 DIAGNOSIS — N18.31 STAGE 3A CHRONIC KIDNEY DISEASE (HCC): Primary | ICD-10-CM

## 2021-03-26 DIAGNOSIS — Z17.0 MALIGNANT NEOPLASM OF UPPER-OUTER QUADRANT OF LEFT BREAST IN FEMALE, ESTROGEN RECEPTOR POSITIVE (HCC): ICD-10-CM

## 2021-03-26 DIAGNOSIS — N18.31 ANEMIA OF CHRONIC RENAL FAILURE, STAGE 3A (HCC): ICD-10-CM

## 2021-03-26 DIAGNOSIS — D63.1 ANEMIA OF CHRONIC RENAL FAILURE, STAGE 3A (HCC): ICD-10-CM

## 2021-03-31 ENCOUNTER — OFFICE VISIT (OUTPATIENT)
Dept: ONCOLOGY | Facility: CLINIC | Age: 76
End: 2021-03-31

## 2021-03-31 ENCOUNTER — LAB (OUTPATIENT)
Dept: LAB | Facility: HOSPITAL | Age: 76
End: 2021-03-31

## 2021-03-31 ENCOUNTER — INFUSION (OUTPATIENT)
Dept: ONCOLOGY | Facility: HOSPITAL | Age: 76
End: 2021-03-31

## 2021-03-31 VITALS
BODY MASS INDEX: 36.53 KG/M2 | TEMPERATURE: 97.9 F | HEART RATE: 65 BPM | RESPIRATION RATE: 16 BRPM | DIASTOLIC BLOOD PRESSURE: 72 MMHG | WEIGHT: 241 LBS | OXYGEN SATURATION: 97 % | SYSTOLIC BLOOD PRESSURE: 170 MMHG | HEIGHT: 68 IN

## 2021-03-31 VITALS
DIASTOLIC BLOOD PRESSURE: 82 MMHG | HEART RATE: 68 BPM | OXYGEN SATURATION: 98 % | RESPIRATION RATE: 16 BRPM | WEIGHT: 241.3 LBS | SYSTOLIC BLOOD PRESSURE: 128 MMHG | TEMPERATURE: 98.2 F | BODY MASS INDEX: 36.57 KG/M2 | HEIGHT: 68 IN

## 2021-03-31 DIAGNOSIS — D63.1 ANEMIA OF CHRONIC RENAL FAILURE, STAGE 3A (HCC): ICD-10-CM

## 2021-03-31 DIAGNOSIS — N18.31 ANEMIA OF CHRONIC RENAL FAILURE, STAGE 3A (HCC): ICD-10-CM

## 2021-03-31 DIAGNOSIS — N18.31 STAGE 3A CHRONIC KIDNEY DISEASE (HCC): ICD-10-CM

## 2021-03-31 DIAGNOSIS — Z17.0 MALIGNANT NEOPLASM OF UPPER-OUTER QUADRANT OF LEFT BREAST IN FEMALE, ESTROGEN RECEPTOR POSITIVE (HCC): Primary | ICD-10-CM

## 2021-03-31 DIAGNOSIS — C50.412 MALIGNANT NEOPLASM OF UPPER-OUTER QUADRANT OF LEFT BREAST IN FEMALE, ESTROGEN RECEPTOR POSITIVE (HCC): Primary | ICD-10-CM

## 2021-03-31 DIAGNOSIS — N18.31 ANEMIA DUE TO STAGE 3A CHRONIC KIDNEY DISEASE (HCC): ICD-10-CM

## 2021-03-31 DIAGNOSIS — Z17.0 MALIGNANT NEOPLASM OF UPPER-OUTER QUADRANT OF LEFT BREAST IN FEMALE, ESTROGEN RECEPTOR POSITIVE (HCC): ICD-10-CM

## 2021-03-31 DIAGNOSIS — N18.31 STAGE 3A CHRONIC KIDNEY DISEASE (HCC): Primary | ICD-10-CM

## 2021-03-31 DIAGNOSIS — C50.412 MALIGNANT NEOPLASM OF UPPER-OUTER QUADRANT OF LEFT BREAST IN FEMALE, ESTROGEN RECEPTOR POSITIVE (HCC): ICD-10-CM

## 2021-03-31 DIAGNOSIS — D63.1 ANEMIA DUE TO STAGE 3A CHRONIC KIDNEY DISEASE (HCC): ICD-10-CM

## 2021-03-31 LAB
ALBUMIN SERPL-MCNC: 3.9 G/DL (ref 3.5–5.2)
ALBUMIN/GLOB SERPL: 1.1 G/DL
ALP SERPL-CCNC: 71 U/L (ref 39–117)
ALT SERPL W P-5'-P-CCNC: 13 U/L (ref 1–33)
ANION GAP SERPL CALCULATED.3IONS-SCNC: 7 MMOL/L (ref 5–15)
AST SERPL-CCNC: 18 U/L (ref 1–32)
BASOPHILS # BLD AUTO: 0.02 10*3/MM3 (ref 0–0.2)
BASOPHILS NFR BLD AUTO: 0.4 % (ref 0–1.5)
BILIRUB SERPL-MCNC: 0.3 MG/DL (ref 0–1.2)
BUN SERPL-MCNC: 18 MG/DL (ref 8–23)
BUN/CREAT SERPL: 15.9 (ref 7–25)
CALCIUM SPEC-SCNC: 9.5 MG/DL (ref 8.6–10.5)
CHLORIDE SERPL-SCNC: 100 MMOL/L (ref 98–107)
CO2 SERPL-SCNC: 30 MMOL/L (ref 22–29)
CREAT SERPL-MCNC: 1.13 MG/DL (ref 0.57–1)
DEPRECATED RDW RBC AUTO: 49.5 FL (ref 37–54)
EOSINOPHIL # BLD AUTO: 0.21 10*3/MM3 (ref 0–0.4)
EOSINOPHIL NFR BLD AUTO: 3.8 % (ref 0.3–6.2)
ERYTHROCYTE [DISTWIDTH] IN BLOOD BY AUTOMATED COUNT: 15 % (ref 12.3–15.4)
FERRITIN SERPL-MCNC: 1102 NG/ML (ref 13–150)
FOLATE SERPL-MCNC: >20 NG/ML (ref 4.78–24.2)
GFR SERPL CREATININE-BSD FRML MDRD: 57 ML/MIN/1.73
GLOBULIN UR ELPH-MCNC: 3.6 GM/DL
GLUCOSE SERPL-MCNC: 110 MG/DL (ref 65–99)
HCT VFR BLD AUTO: 32.8 % (ref 34–46.6)
HGB BLD-MCNC: 10.4 G/DL (ref 12–15.9)
HOLD SPECIMEN: NORMAL
IMM GRANULOCYTES # BLD AUTO: 0.02 10*3/MM3 (ref 0–0.05)
IMM GRANULOCYTES NFR BLD AUTO: 0.4 % (ref 0–0.5)
IRON 24H UR-MRATE: 73 MCG/DL (ref 37–145)
IRON SATN MFR SERPL: 26 % (ref 20–50)
LYMPHOCYTES # BLD AUTO: 1.5 10*3/MM3 (ref 0.7–3.1)
LYMPHOCYTES NFR BLD AUTO: 26.8 % (ref 19.6–45.3)
MCH RBC QN AUTO: 28.7 PG (ref 26.6–33)
MCHC RBC AUTO-ENTMCNC: 31.7 G/DL (ref 31.5–35.7)
MCV RBC AUTO: 90.6 FL (ref 79–97)
MONOCYTES # BLD AUTO: 0.48 10*3/MM3 (ref 0.1–0.9)
MONOCYTES NFR BLD AUTO: 8.6 % (ref 5–12)
NEUTROPHILS NFR BLD AUTO: 3.36 10*3/MM3 (ref 1.7–7)
NEUTROPHILS NFR BLD AUTO: 60 % (ref 42.7–76)
NRBC BLD AUTO-RTO: 0 /100 WBC (ref 0–0.2)
PLATELET # BLD AUTO: 172 10*3/MM3 (ref 140–450)
PMV BLD AUTO: 10 FL (ref 6–12)
POTASSIUM SERPL-SCNC: 3.5 MMOL/L (ref 3.5–5.2)
PROT SERPL-MCNC: 7.5 G/DL (ref 6–8.5)
RBC # BLD AUTO: 3.62 10*6/MM3 (ref 3.77–5.28)
SODIUM SERPL-SCNC: 137 MMOL/L (ref 136–145)
TIBC SERPL-MCNC: 286 MCG/DL (ref 298–536)
TRANSFERRIN SERPL-MCNC: 192 MG/DL (ref 200–360)
VIT B12 BLD-MCNC: 1368 PG/ML (ref 211–946)
WBC # BLD AUTO: 5.59 10*3/MM3 (ref 3.4–10.8)

## 2021-03-31 PROCEDURE — 82728 ASSAY OF FERRITIN: CPT

## 2021-03-31 PROCEDURE — 83540 ASSAY OF IRON: CPT

## 2021-03-31 PROCEDURE — 85025 COMPLETE CBC W/AUTO DIFF WBC: CPT

## 2021-03-31 PROCEDURE — 80053 COMPREHEN METABOLIC PANEL: CPT

## 2021-03-31 PROCEDURE — 82746 ASSAY OF FOLIC ACID SERUM: CPT

## 2021-03-31 PROCEDURE — 84466 ASSAY OF TRANSFERRIN: CPT

## 2021-03-31 PROCEDURE — 36415 COLL VENOUS BLD VENIPUNCTURE: CPT

## 2021-03-31 PROCEDURE — 99213 OFFICE O/P EST LOW 20 MIN: CPT | Performed by: NURSE PRACTITIONER

## 2021-03-31 PROCEDURE — 82607 VITAMIN B-12: CPT

## 2021-03-31 PROCEDURE — 25010000002 EPOETIN ALFA-EPBX 40000 UNIT/ML SOLUTION: Performed by: NURSE PRACTITIONER

## 2021-03-31 PROCEDURE — 96372 THER/PROPH/DIAG INJ SC/IM: CPT

## 2021-03-31 RX ADMIN — EPOETIN ALFA-EPBX 40000 UNITS: 40000 INJECTION, SOLUTION INTRAVENOUS; SUBCUTANEOUS at 09:33

## 2021-03-31 NOTE — PROGRESS NOTES
[facility]  HEMATOLOGY & ONCOLOGY    [facility] [place] [facility] HEMATOLOGY AND ONCOLOGY  [address]  [facility] [id number] [phone]    Patient Name: Marina Joe  Encounter Date: 3/31/2021  YOB: 1945  Patient Number: [id number]    Chief Complaint   Patient presents with   • Breast Cancer     Here for f/u       REASON FOR VISIT: Mrs. Marina Joe is a 76-year-old female patient here today in follow-up for the anemia secondary to chronic kidney disease. She also has a history of breast carcinoma.  She has been followed by Dr. De Leon whom recently moved.  Her oncologic history is listed below.  She has continued to show no signs of recurrence of her disease. She has had a mammogram as of December 10, 2020 that was unremarkable.  Her last bone density study showed osteopenia on 12/8/2016.  Her last colonoscopy was in November 2018 that showed diverticulosis.    She presents today also in follow-up for her anemia secondary to chronic kidney disease stage III.  She has been undergoing Retacrit therapy when meets guidelines.  Her last Retacrit injection was on 11/18/2020 for hemoglobin of 10.6 and hematocrit of 32.6.  She has had a good response with her hgb remaining above guidelines since. However, today her hgb is at 10.4 and hct 32.8.  Her GFR is stable today at 57ml/min today.  She meets guidelines for Retacrit today.  Her iron studies in February were normal with an iron saturation of 25% and [place] ferritin [place] 97852.0.    She feels well today. She has no complaints.  She has no fever, chills or night sweats.  She has no abdominal complaints.     Oncology/Hematology History Overview Note   Oncologic history  In February 2012, she had a routine mammogram that found a nodular density in the upper outer quadrant of the left breast.  An ultrasound revealed no nodularity at that time.  Repeat ultrasound 3 months later on 5/3/2012  revealed a small cyst in the left breast but felt to be benign.  Repeat mammogram on October 31, 2012 found a lobulated lesion in the left breast at the 2 o'clock position and a stable cyst at the 12 o'clock position.  She was referred to Dr. Barkley on 11/30/2012 and biopsy was performed.  The 12:00 cyst was a fibrocystic lesion while the 2:00 lesion was an invasive mammary carcinoma, low-grade, ER positive MT positive HER-2 nu 2+, FISH unamplified.    On January 8, 2013 she underwent a left partial mastectomy with sentinel node biopsy finding invasive ductal carcinoma, 3.1 mm in greatest dimension, grade 1.  2 sentinel nodes were negative.  AJCC TNM stage was pT1aN0 M0, Stage IA.  Following surgery she underwent adjuvant radiation therapy and was then started on Arimidex for hormonal manipulation.  She was started on the Arimidex in approximately April or May 2013.  He has been recommended to continue this for 10 years.    Hematologic history  Patient with a long history of anemia secondary to iron deficiency as well as chronic kidney disease stage III.Patient has a GFR that ranges from 45-61ML/MIN.  He has been undergoing Procrit when meets guidelines for several years now.  She is also required iron replacement.    Previous interventions  Procrit 40,000 units subcu initiated approximately February 2017  Injectafer given 9/23/2019, 9/30/2019     Malignant neoplasm of upper-outer quadrant of left breast in female, estrogen receptor positive (CMS/HCC)   10/31/2012 Initial Diagnosis    Malignant neoplasm of central portion of left female breast (CMS/HCC)     10/31/2012 Imaging    Mammogram on October 31, 2012 found a lobulated lesion in the left breast at the 2 o'clock position and a stable cyst at the 12 o'clock position.      11/30/2012 Biopsy    Dr. Barkley on 11/30/2012 and biopsy was performed.  The 12:00 cyst was a fibrocystic lesion while the 2:00 lesion was an invasive mammary carcinoma, low-grade, ER positive  IA positive HER-2 nu 2+, FISH unamplified.         1/8/2013 Surgery    On January 8, 2013 she underwent a left partial mastectomy with sentinel node biopsy finding invasive ductal carcinoma, 3.1 mm in greatest dimension, grade 1.  2 sentinel nodes were negative.  AJCC TNM stage was pT1aN0 M0, Stage IA.  Hormone receptor positive HER-2 negative      Radiation    Radiation OncologyTreatment Course:  Marina Joe receivedin 33 fractions to left breast via External Beam Radiation - EBRT.     5/2013 -  Hormonal Therapy    Arimidex 1 mg daily           PAST MEDICAL HISTORY:  ALLERGIES:  Allergies   Allergen Reactions   • Aspirin GI Bleeding   • Niacin Other (See Comments)       CURRENT MEDICATIONS:  Outpatient Encounter Medications as of 3/31/2021   Medication Sig Dispense Refill   • albuterol sulfate HFA (Ventolin HFA) 108 (90 Base) MCG/ACT inhaler Inhale 2 puffs.     • anastrozole (ARIMIDEX) 1 MG tablet Take 1 tablet by mouth Daily. 90 tablet 4   • buPROPion XL (WELLBUTRIN XL) 150 MG 24 hr tablet Take 1 tablet by mouth Daily. 90 tablet 3   • Calcium Carb-Cholecalciferol (CALCIUM 600+D3 PO) Take  by mouth.     • carvedilol (COREG) 6.25 MG tablet Take 1 tablet by mouth 2 (Two) Times a Day With Meals. 180 tablet 3   • cetirizine (zyrTEC) 10 MG tablet Take 10 mg by mouth Daily.     • cyclobenzaprine (FLEXERIL) 10 MG tablet Take 1 tablet by mouth 3 (Three) Times a Day As Needed for Muscle Spasms. 90 tablet 2   • Ergocalciferol (VITAMIN D2 PO) Take 50,000 Units by mouth Every 30 (Thirty) Days.     • ferrous sulfate 325 (65 FE) MG tablet Take 325 mg by mouth Daily With Breakfast.     • fluticasone (FLOVENT DISKUS) 50 MCG/BLIST diskus inhaler Inhale 1 puff.     • irbesartan-hydrochlorothiazide (AVALIDE) 300-12.5 MG tablet Take 1 tablet by mouth Daily. 90 tablet 3   • montelukast (SINGULAIR) 10 MG tablet Take 1 tablet by mouth Daily. 90 tablet 3   • Multiple Vitamins-Minerals (MULTIVITAMIN ADULT) tablet Take  by mouth Daily.      • Omega-3 Fatty Acids (FISH OIL) 1000 MG capsule capsule Take 1,000 mg by mouth.     • rOPINIRole (REQUIP) 0.5 MG tablet Take 1 tablet by mouth Every Night. 90 tablet 3   • rosuvastatin (CRESTOR) 20 MG tablet Take 1 tablet by mouth Daily. 90 tablet 3   • sertraline (ZOLOFT) 100 MG tablet Take 1 tablet by mouth Daily. 90 tablet 3   • traZODone (DESYREL) 100 MG tablet TAKE 1 TABLET BY MOUTH EVERY NIGHT 30 tablet 5   • valACYclovir (VALTREX) 500 MG tablet Take 1 tablet by mouth 2 (Two) Times a Day As Needed (Fever blister). 20 tablet 1     No facility-administered encounter medications on file as of 3/31/2021.     ADULT ILLNESSES:  Patient Active Problem List   Diagnosis Code   • Malignant neoplasm of upper-outer quadrant of left breast in female, estrogen receptor positive (CMS/HCC) C50.412, Z17.0   • Anemia of chronic renal failure, stage 3 (moderate) (CMS/HCC) N18.30, D63.1   • Hypertension, benign I10   • Hx of colonic polyps Z86.010   • HX: breast cancer Z85.3   • Morbidly obese (CMS/HCC) E66.01   • Abnormal mammogram R92.8   • Breast mass N63.0   • Right knee DJD M17.11   • S/P lumpectomy, left breast Z98.890   • Adult hypothyroidism E03.9   • Rhinitis J31.0   • Anemia D64.9   • Anxiety F41.9   • At low risk for fall Z91.81   • Breast cancer, left (CMS/HCC) C50.912   • Cervical pain M54.2   • Chronic insomnia F51.04   • Cough R05   • Elevated lipids E78.5   • Encounter for immunization Z23   • Herpes zoster without complication B02.9   • Influenza B J10.1   • Left ear pain H92.02   • Left otitis externa H60.92   • Lumbar strain, initial encounter S39.012A   • Myalgia M79.10   • Negative depression screening Z13.31   • Obesity (BMI 30-39.9) E66.9   • Postmenopausal status Z78.0   • Recurrent acute serous otitis media of left ear H65.05   • Restless leg G25.81   • Sinusitis, bacterial J32.9, B96.89   • Skin lesion L98.9   • Upper respiratory infection J06.9   • Urinary tract infection without hematuria N39.0   •  Vitamin D deficiency E55.9   • CKD (chronic kidney disease) N18.9   • Stage 3a chronic kidney disease (CMS/HCC) N18.31     SURGERIES:  Past Surgical History:   Procedure Laterality Date   • AVULSION TOENAIL PLATE      Sept 26,2018   • BREAST BIOPSY Left 11/20/2012   • BREAST BIOPSY      Left Breast, 1/2019 per Dr Barkley   • BREAST LUMPECTOMY Left     with node bx    • COLONOSCOPY  09/13/2013    small polyp at 30cm benign hyperplastic polyp, changes consistent with melanosis coli. Recall 5 years   • REPLACEMENT TOTAL KNEE Right     2016   • TOTAL ABDOMINAL HYSTERECTOMY WITH SALPINGO OOPHORECTOMY       HEALTH MAINTENANCE ITEMS:    Last Completed Colonoscopy       Status Date      COLONOSCOPY Done 11/19/2018 SCANNED - COLONOSCOPY     Diverticulosis          Last Completed Mammogram       Status Date      MAMMOGRAM Done 12/6/2019 Ext Proc: CHG SCREENING DIGITAL BREAST TOMOSYNTHESIS BI     Normal        FAMILY HISTORY:  Family History   Problem Relation Age of Onset   • Heart attack Mother    • Hodgkin's lymphoma Father    • Other Father    • Cancer Father         hodgkins   • Heart attack Brother    • Skin cancer Maternal Uncle    • Pancreatic cancer Maternal Uncle    • Cancer Maternal Uncle         eye   • Colon cancer Neg Hx    • Colon polyps Neg Hx      SOCIAL HISTORY:  Social History     Socioeconomic History   • Marital status:      Spouse name: Not on file   • Number of children: Not on file   • Years of education: Not on file   • Highest education level: Not on file   Tobacco Use   • Smoking status: Never Smoker   • Smokeless tobacco: Never Used   Substance and Sexual Activity   • Alcohol use: No   • Drug use: Defer   • Sexual activity: Defer       REVIEW OF SYSTEMS:  Review of Systems   Constitutional: Negative for activity change, appetite change, chills, diaphoresis, fatigue, fever and unexpected weight loss.   HENT: Negative for ear pain, nosebleeds, sinus pressure, sore throat and voice change.   "  Eyes: Negative for blurred vision, double vision, pain and visual disturbance.   Respiratory: Negative for cough and shortness of breath.    Cardiovascular: Negative for chest pain, palpitations and leg swelling.   Gastrointestinal: Negative for abdominal pain, anal bleeding, blood in stool, constipation, diarrhea, nausea and vomiting.   Endocrine: Negative for heat intolerance, polydipsia and polyuria.   Genitourinary: Negative for dysuria, frequency, hematuria, urgency and urinary incontinence.   Musculoskeletal: Positive for arthralgias (in back, \"chornic\") and myalgias (mainly in legs).   Skin: Negative for rash and skin lesions.   Neurological: Negative for dizziness, tremors, seizures, syncope, speech difficulty, weakness and headache.   Hematological: Negative for adenopathy. Does not bruise/bleed easily.   Psychiatric/Behavioral: Negative for dysphoric mood, sleep disturbance, suicidal ideas and depressed mood.   I reviewed the ROS as documented here and confirmed the accuracy of it with the patient today. 2/24/2021     /82   Pulse 68   Temp 98.2 °F (36.8 °C) (Temporal)   Resp 16   Ht 172.7 cm (68\")   Wt 109 kg (241 lb 4.8 oz)   LMP  (LMP Unknown)   SpO2 98%   Breastfeeding No   BMI 36.69 kg/m²  Body surface area is 2.21 meters squared.  Pain Score    03/31/21 0819   PainSc: 0-No pain       Physical Exam:  Physical Exam   Constitutional: She is oriented to person, place, and time. She appears well-developed and well-nourished.   HENT:   Head: Atraumatic.   Mouth/Throat: Oropharynx is clear and moist.   Eyes: Pupils are equal, round, and reactive to light. No scleral icterus.   Neck: Trachea normal.   Cardiovascular: Normal rate and regular rhythm. Exam reveals no gallop and no friction rub.   No murmur heard.  Pulmonary/Chest: Effort normal and breath sounds normal. She has no wheezes. She has no rhonchi. She has no rales.   Abdominal: Soft. Normal appearance. There is no abdominal " tenderness. There is no rebound and no guarding.   Lymphadenopathy:     She has no cervical adenopathy.   Neurological: She is alert and oriented to person, place, and time. No sensory deficit.   Skin: Skin is warm and dry.   Psychiatric: Judgment normal.   Vitals reviewed.  I have reexamined the patient and the results are consistent with the previously documented exam. Porsha Bradford, MARY Joe reports a pain score of   Pain Score    03/31/21 0819   PainSc: 0-No pain     Patient's Body mass index is 36.69 kg/m². BMI is above normal parameters. Recommendations include: defer to pcp.    LABS    Lab Results - Last 18 Months   Lab Units 03/31/21  0830 02/24/21  1027 01/19/21  0929 12/21/20  0911 11/18/20  0828 10/21/20  0836   HEMOGLOBIN g/dL 10.4* 11.6* 11.0* 11.3* 10.6* 10.7*   HEMATOCRIT % 32.8* 35.4 34.4 33.9* 32.6* 33.3*   MCV fL 90.6 88.9 91.0 91.4 91.1 90.2   WBC 10*3/mm3 5.59 5.50 5.60 6.28 5.92 6.18   RDW % 15.0 14.9 14.7 15.1 15.4 15.4   MPV fL 10.0 10.2 10.0 9.8 9.5 9.2   PLATELETS 10*3/mm3 172 176 198 181 202 209   IMM GRAN % % 0.4 0.4 0.2 0.2 0.2 0.3   NEUTROS ABS 10*3/mm3 3.36 3.39 3.47 4.03 3.54 3.79   LYMPHS ABS 10*3/mm3 1.50 1.38 1.39 1.52 1.65 1.55   MONOS ABS 10*3/mm3 0.48 0.46 0.50 0.49 0.47 0.50   EOS ABS 10*3/mm3 0.21 0.23 0.21 0.21 0.22 0.29   BASOS ABS 10*3/mm3 0.02 0.02 0.02 0.02 0.03 0.03   IMMATURE GRANS (ABS) 10*3/mm3 0.02 0.02 0.01 0.01 0.01 0.02   NRBC /100 WBC 0.0 0.0 0.0 0.0 0.0 0.0       Lab Results - Last 18 Months   Lab Units 02/24/21  1027 01/19/21  0929 12/21/20  0911 11/18/20  0828 10/21/20  0836 10/07/20  0805   GLUCOSE mg/dL 105* 111* 101* 96 99 100*   SODIUM mmol/L 138 137 139 138 139 136   POTASSIUM mmol/L 3.5 3.8 3.3* 3.7 3.4* 3.5   CO2 mmol/L 29.0 29.0 30.0* 29.0 30.0* 29.0   CHLORIDE mmol/L 99 100 100 100 100 100   ANION GAP mmol/L 10.0 8.0 9.0 9.0 9.0 7.0   CREATININE mg/dL 1.09* 1.10* 1.07* 1.28* 1.16* 1.06*   BUN mg/dL 20 20 22 19 20 20   BUN / CREAT  RATIO  18.3 18.2 20.6 14.8 17.2 18.9   CALCIUM mg/dL 9.6 9.5 9.7 9.6 9.6 9.5   ALK PHOS U/L 74 75 72 73 76 78   TOTAL PROTEIN g/dL 7.6 7.4 7.6 7.2 7.1 7.1   ALT (SGPT) U/L 16 15 14 14 15 20   AST (SGOT) U/L 17 15 15 17 15 23   BILIRUBIN mg/dL 0.4 0.2 0.3 0.3 0.2 0.2   ALBUMIN g/dL 4.00 4.00 4.30 4.10 3.90 4.00   GLOBULIN gm/dL 3.6 3.4 3.3 3.1 3.2 3.1       Lab Results - Last 18 Months   Lab Units 02/24/21  1027 12/21/20  0911 10/07/20  0805 08/26/20  0802 07/10/20  0833 05/05/20  0723 01/10/20  0847   IRON mcg/dL 76 51 59 66 66  --  82   TIBC mcg/dL 304 280* 270* 270* 283*  --  270*   IRON SATURATION % 25 18* 22 24 23  --  30   FERRITIN ng/mL 1,187.00* 1,071.00* 1,107.00* 978.20* 1,066.00*  --  1,314.00*   TSH uIU/mL  --   --   --   --   --  4.790*  --    FOLATE ng/mL >20.00  --   --   --   --   --   --      ASSESSMENT  1. Left Breast Invasive ductal carcinoma diagnosed in November 2012  · Initial stage:  AJCC TNM stage was pT1aN0 M0, Stage IA.  ER positive [place] positive HER-2 alix 2+/FISH unamplified  · Treatment: Lumpectomy January 8, 2013, adjuvant external beam radiation to the left breast, adjuvant hormonal therapy beginning around May 2013 with Arimidex for which she is currently still taking and tolerating well.  · Disease status: No evidence of disease.  CA-27-29 has been remaining within normal range.  Labs stable.  Mammogram up-to-date as of December 2020 unremarkable.  2.  Osteopenia on bone mineral density study 12/8/2016.  Patient takes calcium plus D.  3.  Anemia secondary to chronic kidney disease stage III.  Patient has been undergoing Retacrit when meets guidelines with last dosing 11/18/2020.  She responded well and has not required retacrit until today as her hemoglobin is down to 10.4 with hct of 32.8.   This meets guidelines today for retacrit.  4.  Chronic kidney disease stage III, improved with a GFR of 57ml/min today.  5.  Hypothyroidism managed on Synthroid by primary care provider  6.   Coronary artery disease/hypertension on Coreg and Benicar. BP at 128/82 this am.   7.  Normal iron studies as of today, 2/24/2021 with an iron saturation of 25% and a ferritin of 1187.0.  8.  Arthralgias currently stable    PLAN  1.  Reviewed available lab work results with the patient today.    2.  Will give 40,000 units SC Retacrit today  3.  Continue Arimidex 1 mg daily, she has been advised by Dr. HERNANDEZ to continue the Arimidex for a total of 10 years if she is tolerating it and she is.  4.  Encourage patient continue taking calcium plus D twice a day  5.  Discussed with patient having another BMD study and she said she will at a later date  6.  Encourage patient to continue follow primary care provider and other specialists  7.  Patient to return in 6 weeks for cbc,cmp and possible Retacrit with a pre-office CBC CMP Iron profile ferritin B12 and folate    MARY Aguirre   03/31/2021  09:11 CDT

## 2021-04-07 RX ORDER — ROSUVASTATIN CALCIUM 20 MG/1
20 TABLET, COATED ORAL DAILY
Qty: 90 TABLET | Refills: 3 | Status: SHIPPED | OUTPATIENT
Start: 2021-04-07 | End: 2022-04-25

## 2021-04-07 RX ORDER — CARVEDILOL 6.25 MG/1
6.25 TABLET ORAL 2 TIMES DAILY WITH MEALS
Qty: 180 TABLET | Refills: 3 | Status: SHIPPED | OUTPATIENT
Start: 2021-04-07 | End: 2022-04-25

## 2021-04-12 ENCOUNTER — APPOINTMENT (OUTPATIENT)
Dept: LAB | Facility: HOSPITAL | Age: 76
End: 2021-04-12

## 2021-04-12 ENCOUNTER — APPOINTMENT (OUTPATIENT)
Dept: ONCOLOGY | Facility: HOSPITAL | Age: 76
End: 2021-04-12

## 2021-04-14 ENCOUNTER — LAB (OUTPATIENT)
Dept: LAB | Facility: HOSPITAL | Age: 76
End: 2021-04-14

## 2021-04-14 ENCOUNTER — INFUSION (OUTPATIENT)
Dept: ONCOLOGY | Facility: HOSPITAL | Age: 76
End: 2021-04-14

## 2021-04-14 VITALS
SYSTOLIC BLOOD PRESSURE: 145 MMHG | DIASTOLIC BLOOD PRESSURE: 65 MMHG | HEART RATE: 75 BPM | TEMPERATURE: 97.5 F | RESPIRATION RATE: 16 BRPM | WEIGHT: 241 LBS | HEIGHT: 68 IN | OXYGEN SATURATION: 94 % | BODY MASS INDEX: 36.53 KG/M2

## 2021-04-14 DIAGNOSIS — D63.1 ANEMIA OF CHRONIC RENAL FAILURE, STAGE 3A (HCC): ICD-10-CM

## 2021-04-14 DIAGNOSIS — N18.31 STAGE 3A CHRONIC KIDNEY DISEASE (HCC): ICD-10-CM

## 2021-04-14 DIAGNOSIS — N18.31 ANEMIA OF CHRONIC RENAL FAILURE, STAGE 3A (HCC): ICD-10-CM

## 2021-04-14 LAB
ALBUMIN SERPL-MCNC: 3.9 G/DL (ref 3.5–5.2)
ALBUMIN/GLOB SERPL: 1.1 G/DL
ALP SERPL-CCNC: 72 U/L (ref 39–117)
ALT SERPL W P-5'-P-CCNC: 15 U/L (ref 1–33)
ANION GAP SERPL CALCULATED.3IONS-SCNC: 12 MMOL/L (ref 5–15)
AST SERPL-CCNC: 24 U/L (ref 1–32)
BASOPHILS # BLD AUTO: 0.03 10*3/MM3 (ref 0–0.2)
BASOPHILS NFR BLD AUTO: 0.6 % (ref 0–1.5)
BILIRUB SERPL-MCNC: 0.3 MG/DL (ref 0–1.2)
BUN SERPL-MCNC: 21 MG/DL (ref 8–23)
BUN/CREAT SERPL: 18.6 (ref 7–25)
CALCIUM SPEC-SCNC: 9.4 MG/DL (ref 8.6–10.5)
CHLORIDE SERPL-SCNC: 98 MMOL/L (ref 98–107)
CO2 SERPL-SCNC: 28 MMOL/L (ref 22–29)
CREAT SERPL-MCNC: 1.13 MG/DL (ref 0.57–1)
DEPRECATED RDW RBC AUTO: 49.6 FL (ref 37–54)
EOSINOPHIL # BLD AUTO: 0.25 10*3/MM3 (ref 0–0.4)
EOSINOPHIL NFR BLD AUTO: 4.9 % (ref 0.3–6.2)
ERYTHROCYTE [DISTWIDTH] IN BLOOD BY AUTOMATED COUNT: 15.4 % (ref 12.3–15.4)
GFR SERPL CREATININE-BSD FRML MDRD: 57 ML/MIN/1.73
GLOBULIN UR ELPH-MCNC: 3.4 GM/DL
GLUCOSE SERPL-MCNC: 111 MG/DL (ref 65–99)
HCT VFR BLD AUTO: 33.1 % (ref 34–46.6)
HGB BLD-MCNC: 11 G/DL (ref 12–15.9)
IMM GRANULOCYTES # BLD AUTO: 0 10*3/MM3 (ref 0–0.05)
IMM GRANULOCYTES NFR BLD AUTO: 0 % (ref 0–0.5)
LYMPHOCYTES # BLD AUTO: 1.22 10*3/MM3 (ref 0.7–3.1)
LYMPHOCYTES NFR BLD AUTO: 23.9 % (ref 19.6–45.3)
MCH RBC QN AUTO: 29.5 PG (ref 26.6–33)
MCHC RBC AUTO-ENTMCNC: 33.2 G/DL (ref 31.5–35.7)
MCV RBC AUTO: 88.7 FL (ref 79–97)
MONOCYTES # BLD AUTO: 0.59 10*3/MM3 (ref 0.1–0.9)
MONOCYTES NFR BLD AUTO: 11.5 % (ref 5–12)
NEUTROPHILS NFR BLD AUTO: 3.02 10*3/MM3 (ref 1.7–7)
NEUTROPHILS NFR BLD AUTO: 59.1 % (ref 42.7–76)
NRBC BLD AUTO-RTO: 0 /100 WBC (ref 0–0.2)
PLATELET # BLD AUTO: 167 10*3/MM3 (ref 140–450)
PMV BLD AUTO: 9.6 FL (ref 6–12)
POTASSIUM SERPL-SCNC: 3.6 MMOL/L (ref 3.5–5.2)
PROT SERPL-MCNC: 7.3 G/DL (ref 6–8.5)
RBC # BLD AUTO: 3.73 10*6/MM3 (ref 3.77–5.28)
SODIUM SERPL-SCNC: 138 MMOL/L (ref 136–145)
WBC # BLD AUTO: 5.11 10*3/MM3 (ref 3.4–10.8)

## 2021-04-14 PROCEDURE — G0463 HOSPITAL OUTPT CLINIC VISIT: HCPCS

## 2021-04-14 PROCEDURE — 80053 COMPREHEN METABOLIC PANEL: CPT

## 2021-04-14 PROCEDURE — 85025 COMPLETE CBC W/AUTO DIFF WBC: CPT

## 2021-04-14 PROCEDURE — 36415 COLL VENOUS BLD VENIPUNCTURE: CPT

## 2021-04-28 RX ORDER — CYCLOBENZAPRINE HCL 10 MG
10 TABLET ORAL 3 TIMES DAILY PRN
Qty: 90 TABLET | Refills: 5 | Status: SHIPPED | OUTPATIENT
Start: 2021-04-28 | End: 2022-10-27 | Stop reason: SDUPTHER

## 2021-05-06 ENCOUNTER — OFFICE VISIT (OUTPATIENT)
Dept: INTERNAL MEDICINE | Facility: CLINIC | Age: 76
End: 2021-05-06

## 2021-05-06 VITALS
HEIGHT: 67 IN | WEIGHT: 239 LBS | HEART RATE: 59 BPM | TEMPERATURE: 97.5 F | SYSTOLIC BLOOD PRESSURE: 146 MMHG | DIASTOLIC BLOOD PRESSURE: 74 MMHG | BODY MASS INDEX: 37.51 KG/M2 | OXYGEN SATURATION: 94 %

## 2021-05-06 DIAGNOSIS — F51.04 CHRONIC INSOMNIA: ICD-10-CM

## 2021-05-06 DIAGNOSIS — I10 HYPERTENSION, BENIGN: Primary | ICD-10-CM

## 2021-05-06 PROCEDURE — 99213 OFFICE O/P EST LOW 20 MIN: CPT | Performed by: NURSE PRACTITIONER

## 2021-05-06 RX ORDER — TRAZODONE HYDROCHLORIDE 100 MG/1
100 TABLET ORAL NIGHTLY
Qty: 90 TABLET | Refills: 3 | Status: SHIPPED | OUTPATIENT
Start: 2021-05-06 | End: 2022-06-30 | Stop reason: SDUPTHER

## 2021-05-06 RX ORDER — IRBESARTAN AND HYDROCHLOROTHIAZIDE 300; 12.5 MG/1; MG/1
1 TABLET, FILM COATED ORAL DAILY
Qty: 90 TABLET | Refills: 3 | Status: SHIPPED | OUTPATIENT
Start: 2021-05-06 | End: 2022-06-01 | Stop reason: SDUPTHER

## 2021-05-06 NOTE — PATIENT INSTRUCTIONS
"https://www.nhlbi.nih.gov/files/docs/public/heart/dash_brief.pdf\">   DASH Eating Plan  DASH stands for Dietary Approaches to Stop Hypertension. The DASH eating plan is a healthy eating plan that has been shown to:  · Reduce high blood pressure (hypertension).  · Reduce your risk for type 2 diabetes, heart disease, and stroke.  · Help with weight loss.  What are tips for following this plan?  Reading food labels  · Check food labels for the amount of salt (sodium) per serving. Choose foods with less than 5 percent of the Daily Value of sodium. Generally, foods with less than 300 milligrams (mg) of sodium per serving fit into this eating plan.  · To find whole grains, look for the word \"whole\" as the first word in the ingredient list.  Shopping  · Buy products labeled as \"low-sodium\" or \"no salt added.\"  · Buy fresh foods. Avoid canned foods and pre-made or frozen meals.  Cooking  · Avoid adding salt when cooking. Use salt-free seasonings or herbs instead of table salt or sea salt. Check with your health care provider or pharmacist before using salt substitutes.  · Do not caraballo foods. Cook foods using healthy methods such as baking, boiling, grilling, roasting, and broiling instead.  · Cook with heart-healthy oils, such as olive, canola, avocado, soybean, or sunflower oil.  Meal planning    · Eat a balanced diet that includes:  ? 4 or more servings of fruits and 4 or more servings of vegetables each day. Try to fill one-half of your plate with fruits and vegetables.  ? 6-8 servings of whole grains each day.  ? Less than 6 oz (170 g) of lean meat, poultry, or fish each day. A 3-oz (85-g) serving of meat is about the same size as a deck of cards. One egg equals 1 oz (28 g).  ? 2-3 servings of low-fat dairy each day. One serving is 1 cup (237 mL).  ? 1 serving of nuts, seeds, or beans 5 times each week.  ? 2-3 servings of heart-healthy fats. Healthy fats called omega-3 fatty acids are found in foods such as walnuts, " flaxseeds, fortified milks, and eggs. These fats are also found in cold-water fish, such as sardines, salmon, and mackerel.  · Limit how much you eat of:  ? Canned or prepackaged foods.  ? Food that is high in trans fat, such as some fried foods.  ? Food that is high in saturated fat, such as fatty meat.  ? Desserts and other sweets, sugary drinks, and other foods with added sugar.  ? Full-fat dairy products.  · Do not salt foods before eating.  · Do not eat more than 4 egg yolks a week.  · Try to eat at least 2 vegetarian meals a week.  · Eat more home-cooked food and less restaurant, buffet, and fast food.  Lifestyle  · When eating at a restaurant, ask that your food be prepared with less salt or no salt, if possible.  · If you drink alcohol:  ? Limit how much you use to:  § 0-1 drink a day for women who are not pregnant.  § 0-2 drinks a day for men.  ? Be aware of how much alcohol is in your drink. In the [place] one drink equals one 12 oz bottle of beer (355 mL), one 5 oz glass of wine (148 mL), or one 1½ oz glass of hard liquor (44 mL).  General information  · Avoid eating more than 2,300 mg of salt a day. If you have hypertension, you may need to reduce your sodium intake to 1,500 mg a day.  · Work with your health care provider to maintain a healthy body weight or to lose weight. Ask what an ideal weight is for you.  · Get at least 30 minutes of exercise that causes your heart to beat faster (aerobic exercise) most days of the week. Activities may include walking, swimming, or biking.  · Work with your health care provider or dietitian to adjust your eating plan to your individual calorie needs.  What foods should I eat?  Fruits  All fresh, dried, or frozen fruit. Canned fruit in natural juice (without added sugar).  Vegetables  Fresh or frozen vegetables (raw, steamed, roasted, or grilled). Low-sodium or reduced-sodium tomato and vegetable juice. Low-sodium or reduced-sodium tomato sauce and tomato paste.  Low-sodium or reduced-sodium canned vegetables.  Grains  Whole-grain or whole-wheat bread. Whole-grain or whole-wheat pasta. Brown rice. Oatmeal. Quinoa. Bulgur. Whole-grain and low-sodium cereals. Lisa bread. Low-fat, low-sodium crackers. Whole-wheat flour tortillas.  Meats and other proteins  Skinless chicken or turkey. Ground chicken or turkey. Pork with fat trimmed off. Fish and seafood. Egg whites. Dried beans, peas, or lentils. Unsalted nuts, nut butters, and seeds. Unsalted canned beans. Lean cuts of beef with fat trimmed off. Low-sodium, lean precooked or cured meat, such as sausages or meat loaves.  Dairy  Low-fat (1%) or fat-free (skim) milk. Reduced-fat, low-fat, or fat-free cheeses. Nonfat, low-sodium ricotta or cottage cheese. Low-fat or nonfat yogurt. Low-fat, low-sodium cheese.  Fats and oils  Soft margarine without trans fats. Vegetable oil. Reduced-fat, low-fat, or light mayonnaise and salad dressings (reduced-sodium). Canola, safflower, olive, avocado, soybean, and sunflower oils. Avocado.  Seasonings and condiments  Herbs. Spices. Seasoning mixes without salt.  Other foods  Unsalted popcorn and pretzels. Fat-free sweets.  The items listed above may not be a complete list of foods and beverages you can eat. Contact a dietitian for more information.  What foods should I avoid?  Fruits  Canned fruit in a light or heavy syrup. Fried fruit. Fruit in cream or butter sauce.  Vegetables  Creamed or fried vegetables. Vegetables in a cheese sauce. Regular canned vegetables (not low-sodium or reduced-sodium). Regular canned tomato sauce and paste (not low-sodium or reduced-sodium). Regular tomato and vegetable juice (not low-sodium or reduced-sodium). Pickles. Olives.  Grains  Baked goods made with fat, such as croissants, muffins, or some breads. Dry pasta or rice meal packs.  Meats and other proteins  Fatty cuts of meat. Ribs. Fried meat. Kaur. Bologna, salami, and other precooked or cured meats, such as  sausages or meat loaves. Fat from the back of a pig (fatback). Bratwurst. Salted nuts and seeds. Canned beans with added salt. Canned or smoked fish. Whole eggs or egg yolks. Chicken or turkey with skin.  Dairy  Whole or 2% milk, cream, and half-and-half. Whole or full-fat cream cheese. Whole-fat or sweetened yogurt. Full-fat cheese. Nondairy creamers. Whipped toppings. Processed cheese and cheese spreads.  Fats and oils  Butter. Stick margarine. Lard. Shortening. Ghee. Kaur fat. Tropical oils, such as coconut, palm kernel, or palm oil.  Seasonings and condiments  Onion salt, garlic salt, seasoned salt, table salt, and sea salt. Worcestershire sauce. Tartar sauce. Barbecue sauce. Teriyaki sauce. Soy sauce, including reduced-sodium. Steak sauce. Canned and packaged gravies. Fish sauce. Oyster sauce. Cocktail sauce. Store-bought horseradish. Ketchup. Mustard. Meat flavorings and tenderizers. Bouillon cubes. Hot sauces. Pre-made or packaged marinades. Pre-made or packaged taco seasonings. Relishes. Regular salad dressings.  Other foods  Salted popcorn and pretzels.  The items listed above may not be a complete list of foods and beverages you should avoid. Contact a dietitian for more information.  Where to find more information  · National Heart, Lung, and Blood [place]: www.nhlbi.nih.gov  · American Heart Association: www.heart.org  · Academy of Nutrition and Dietetics: www.eatright.org  · National Kidney Foundation: www.kidney.org  Summary  · The DASH eating plan is a healthy eating plan that has been shown to reduce high blood pressure (hypertension). It may also reduce your risk for type 2 diabetes, heart disease, and stroke.  · When on the DASH eating plan, aim to eat more fresh fruits and vegetables, whole grains, lean proteins, low-fat dairy, and heart-healthy fats.  · With the DASH eating plan, you should limit salt (sodium) intake to 2,300 mg a day. If you have hypertension, you may need to reduce your  sodium intake to 1,500 mg a day.  · Work with your health care provider or dietitian to adjust your eating plan to your individual calorie needs.  This information is not intended to replace advice given to you by your health care provider. Make sure you discuss any questions you have with your health care provider.  Document Revised: 11/20/2020 Document Reviewed: 11/20/2020  [employer] Patient Education © 2021 [employer]

## 2021-05-06 NOTE — PROGRESS NOTES
"Chief Complaint  Hypertension    Subjective          Marina GINO Joe presents to [facility]  Mrs. Joe present for 6 month hypertension follow up. She denies issues or concerns at this time and medicine is controlling blood pressure overall. She denies chest pain, shortness of breath, cough, lower extremity swelling. She denies elevated blood pressure readings at home. She is taking medicine as prescribed and denies skipping or missing doses. She is working on exercise and low salt diet as often.     Hypertension  This is a recurrent problem. The current episode started more than 1 year ago. Pertinent negatives include no blurred vision, chest pain, headaches, malaise/fatigue, orthopnea, palpitations, peripheral edema, PND or shortness of breath. Risk factors for coronary artery disease include obesity, sedentary lifestyle and dyslipidemia. Current antihypertension treatment includes angiotensin blockers, diuretics and lifestyle changes. The current treatment provides mild improvement. Compliance problems include exercise and diet.  Hypertensive end-organ damage includes kidney disease. Identifiable causes of hypertension include chronic renal disease.       Objective   Vital Signs:   /74 (BP Location: Right arm)   Pulse 59   Temp 97.5 °F (36.4 °C)   Ht 170.2 cm (67\")   Wt 108 kg (239 lb)   SpO2 94%   BMI 37.43 kg/m²     Physical Exam  Vitals and nursing note reviewed.   Constitutional:       Appearance: She is well-developed.   HENT:      Head: Normocephalic and atraumatic.      Right Ear: External ear normal.      Left Ear: External ear normal.      Nose: Nose normal.   Eyes:      Conjunctiva/sclera: Conjunctivae normal.      Pupils: Pupils are equal, round, and reactive to light.   Neck:      Thyroid: No thyromegaly.   Cardiovascular:      Rate and Rhythm: Normal rate and regular rhythm.      Pulses:           Radial pulses are 2+ on the right side and 2+ on the left " side.        Dorsalis pedis pulses are 2+ on the right side and 2+ on the left side.        Posterior tibial pulses are 2+ on the right side and 2+ on the left side.      Heart sounds: Normal heart sounds.   Pulmonary:      Effort: Pulmonary effort is normal.      Breath sounds: Normal breath sounds.   Abdominal:      General: Bowel sounds are normal.      Palpations: Abdomen is soft.   Musculoskeletal:      Cervical back: Normal range of motion and neck supple.      Right lower leg: No edema.      Left lower leg: No edema.   Lymphadenopathy:      Cervical: No cervical adenopathy.   Skin:     General: Skin is warm and dry.   Neurological:      Mental Status: She is alert and oriented to person, place, and time.   Psychiatric:         Behavior: Behavior normal.         Thought Content: Thought content normal.        Result Review :   The following data was reviewed by: MARY Pate on 05/06/2021:  Common labs    Common Labsle 2/24/21 2/24/21 3/31/21 3/31/21 4/14/21 4/14/21    1027 1027 0830 0830 0849 0849   Glucose  105 (A)  110 (A)  111 (A)   BUN  20  18  21   Creatinine  1.09 (A)  1.13 (A)  1.13 (A)   eGFR  Am  59 (A)  57 (A)  57 (A)   Sodium  138  137  138   Potassium  3.5  3.5  3.6   Chloride  99  100  98   Calcium  9.6  9.5  9.4   Albumin  4.00  3.90  3.90   Total Bilirubin  0.4  0.3  0.3   Alkaline Phosphatase  74  71  72   AST (SGOT)  17  18  24   ALT (SGPT)  16  13  15   WBC 5.50  5.59  5.11    Hemoglobin 11.6 (A)  10.4 (A)  11.0 (A)    Hematocrit 35.4  32.8 (A)  33.1 (A)    Platelets 176  172  167    (A) Abnormal value               Assessment and Plan      Diagnoses and all orders for this visit:    1. Hypertension, benign (Primary)  -     irbesartan-hydrochlorothiazide (AVALIDE) 300-12.5 MG tablet; Take 1 tablet by mouth Daily.  Dispense: 90 tablet; Refill: 3    2. Chronic insomnia  -     traZODone (DESYREL) 100 MG tablet; Take 1 tablet by mouth Every Night.  Dispense: 90 tablet; Refill:  3         I spent 20 minutes caring for Marina on this date of service. This time includes time spent by me in the following activities:preparing for the visit, reviewing tests, obtaining and/or reviewing a separately obtained history, performing a medically appropriate examination and/or evaluation , counseling and educating the patient/family/caregiver, documenting information in the medical record and independently interpreting results and communicating that information with the patient/family/caregiver     Follow Up   Return in about 6 months (around 11/6/2021) for Annual physical, Medicare Wellness.     Will check cholesterol with yearly labs in November. Will continue working on AHA diet and increasing exercise as tolerated.     Patient's blood pressure controlled overall, slightly elevated in office today. Will continue to work on low salt diet and increasing exercise. Educated on the benefits of weight loss and advised her to  Check blood pressure 2-3 days a week and call with readings >140/90. No changes to medicine therapy at this time. Would consider adding Norvasc if not improvement.     Chronic insomnia stable with current medical therapies. Continue working on sleep hygiene methods and take trazodone as needed. Will consider CBT if anxiety is worsening sleep. Encouraged meditation and deep breathing exercises.  Patient was given instructions and counseling regarding her condition or for health maintenance advice. Please see specific information pulled into the AVS if appropriate.

## 2021-05-07 DIAGNOSIS — Z17.0 MALIGNANT NEOPLASM OF UPPER-OUTER QUADRANT OF LEFT BREAST IN FEMALE, ESTROGEN RECEPTOR POSITIVE (HCC): ICD-10-CM

## 2021-05-07 DIAGNOSIS — N18.31 STAGE 3A CHRONIC KIDNEY DISEASE (HCC): Primary | ICD-10-CM

## 2021-05-07 DIAGNOSIS — D63.1 ANEMIA OF CHRONIC RENAL FAILURE, STAGE 3A (HCC): ICD-10-CM

## 2021-05-07 DIAGNOSIS — N18.31 ANEMIA OF CHRONIC RENAL FAILURE, STAGE 3A (HCC): ICD-10-CM

## 2021-05-07 DIAGNOSIS — C50.412 MALIGNANT NEOPLASM OF UPPER-OUTER QUADRANT OF LEFT BREAST IN FEMALE, ESTROGEN RECEPTOR POSITIVE (HCC): ICD-10-CM

## 2021-05-10 ENCOUNTER — LAB (OUTPATIENT)
Dept: LAB | Facility: HOSPITAL | Age: 76
End: 2021-05-10

## 2021-05-10 ENCOUNTER — INFUSION (OUTPATIENT)
Dept: ONCOLOGY | Facility: HOSPITAL | Age: 76
End: 2021-05-10

## 2021-05-10 VITALS
HEIGHT: 68 IN | SYSTOLIC BLOOD PRESSURE: 167 MMHG | DIASTOLIC BLOOD PRESSURE: 78 MMHG | HEART RATE: 65 BPM | RESPIRATION RATE: 18 BRPM | OXYGEN SATURATION: 94 % | BODY MASS INDEX: 37.07 KG/M2 | TEMPERATURE: 97.4 F | WEIGHT: 244.6 LBS

## 2021-05-10 DIAGNOSIS — N18.31 STAGE 3A CHRONIC KIDNEY DISEASE (HCC): Primary | ICD-10-CM

## 2021-05-10 DIAGNOSIS — N18.31 ANEMIA OF CHRONIC RENAL FAILURE, STAGE 3A (HCC): ICD-10-CM

## 2021-05-10 DIAGNOSIS — C50.412 MALIGNANT NEOPLASM OF UPPER-OUTER QUADRANT OF LEFT BREAST IN FEMALE, ESTROGEN RECEPTOR POSITIVE (HCC): ICD-10-CM

## 2021-05-10 DIAGNOSIS — Z17.0 MALIGNANT NEOPLASM OF UPPER-OUTER QUADRANT OF LEFT BREAST IN FEMALE, ESTROGEN RECEPTOR POSITIVE (HCC): ICD-10-CM

## 2021-05-10 DIAGNOSIS — D63.1 ANEMIA OF CHRONIC RENAL FAILURE, STAGE 3A (HCC): ICD-10-CM

## 2021-05-10 DIAGNOSIS — N18.31 STAGE 3A CHRONIC KIDNEY DISEASE (HCC): ICD-10-CM

## 2021-05-10 LAB
ALBUMIN SERPL-MCNC: 3.8 G/DL (ref 3.5–5.2)
ALBUMIN/GLOB SERPL: 1.2 G/DL
ALP SERPL-CCNC: 74 U/L (ref 39–117)
ALT SERPL W P-5'-P-CCNC: 20 U/L (ref 1–33)
ANION GAP SERPL CALCULATED.3IONS-SCNC: 8 MMOL/L (ref 5–15)
AST SERPL-CCNC: 22 U/L (ref 1–32)
BASOPHILS # BLD AUTO: 0.04 10*3/MM3 (ref 0–0.2)
BASOPHILS NFR BLD AUTO: 0.6 % (ref 0–1.5)
BILIRUB SERPL-MCNC: 0.3 MG/DL (ref 0–1.2)
BUN SERPL-MCNC: 18 MG/DL (ref 8–23)
BUN/CREAT SERPL: 12.9 (ref 7–25)
CALCIUM SPEC-SCNC: 9.1 MG/DL (ref 8.6–10.5)
CHLORIDE SERPL-SCNC: 101 MMOL/L (ref 98–107)
CO2 SERPL-SCNC: 29 MMOL/L (ref 22–29)
CREAT SERPL-MCNC: 1.4 MG/DL (ref 0.57–1)
DEPRECATED RDW RBC AUTO: 51 FL (ref 37–54)
EOSINOPHIL # BLD AUTO: 0.28 10*3/MM3 (ref 0–0.4)
EOSINOPHIL NFR BLD AUTO: 4.4 % (ref 0.3–6.2)
ERYTHROCYTE [DISTWIDTH] IN BLOOD BY AUTOMATED COUNT: 15.3 % (ref 12.3–15.4)
FERRITIN SERPL-MCNC: 940.3 NG/ML (ref 13–150)
FOLATE SERPL-MCNC: >20 NG/ML (ref 4.78–24.2)
GFR SERPL CREATININE-BSD FRML MDRD: 44 ML/MIN/1.73
GLOBULIN UR ELPH-MCNC: 3.3 GM/DL
GLUCOSE SERPL-MCNC: 103 MG/DL (ref 65–99)
HCT VFR BLD AUTO: 32.7 % (ref 34–46.6)
HGB BLD-MCNC: 10.4 G/DL (ref 12–15.9)
IMM GRANULOCYTES # BLD AUTO: 0.01 10*3/MM3 (ref 0–0.05)
IMM GRANULOCYTES NFR BLD AUTO: 0.2 % (ref 0–0.5)
IRON 24H UR-MRATE: 66 MCG/DL (ref 37–145)
IRON SATN MFR SERPL: 23 % (ref 20–50)
LYMPHOCYTES # BLD AUTO: 1.65 10*3/MM3 (ref 0.7–3.1)
LYMPHOCYTES NFR BLD AUTO: 25.7 % (ref 19.6–45.3)
MCH RBC QN AUTO: 29.1 PG (ref 26.6–33)
MCHC RBC AUTO-ENTMCNC: 31.8 G/DL (ref 31.5–35.7)
MCV RBC AUTO: 91.3 FL (ref 79–97)
MONOCYTES # BLD AUTO: 0.55 10*3/MM3 (ref 0.1–0.9)
MONOCYTES NFR BLD AUTO: 8.6 % (ref 5–12)
NEUTROPHILS NFR BLD AUTO: 3.88 10*3/MM3 (ref 1.7–7)
NEUTROPHILS NFR BLD AUTO: 60.5 % (ref 42.7–76)
NRBC BLD AUTO-RTO: 0 /100 WBC (ref 0–0.2)
PLATELET # BLD AUTO: 170 10*3/MM3 (ref 140–450)
PMV BLD AUTO: 9.8 FL (ref 6–12)
POTASSIUM SERPL-SCNC: 4 MMOL/L (ref 3.5–5.2)
PROT SERPL-MCNC: 7.1 G/DL (ref 6–8.5)
RBC # BLD AUTO: 3.58 10*6/MM3 (ref 3.77–5.28)
SODIUM SERPL-SCNC: 138 MMOL/L (ref 136–145)
TIBC SERPL-MCNC: 286 MCG/DL (ref 298–536)
TRANSFERRIN SERPL-MCNC: 192 MG/DL (ref 200–360)
VIT B12 BLD-MCNC: 1231 PG/ML (ref 211–946)
WBC # BLD AUTO: 6.41 10*3/MM3 (ref 3.4–10.8)

## 2021-05-10 PROCEDURE — 85025 COMPLETE CBC W/AUTO DIFF WBC: CPT

## 2021-05-10 PROCEDURE — 96372 THER/PROPH/DIAG INJ SC/IM: CPT

## 2021-05-10 PROCEDURE — 84466 ASSAY OF TRANSFERRIN: CPT

## 2021-05-10 PROCEDURE — 82607 VITAMIN B-12: CPT

## 2021-05-10 PROCEDURE — 83540 ASSAY OF IRON: CPT

## 2021-05-10 PROCEDURE — 25010000002 EPOETIN ALFA-EPBX 40000 UNIT/ML SOLUTION: Performed by: NURSE PRACTITIONER

## 2021-05-10 PROCEDURE — 80053 COMPREHEN METABOLIC PANEL: CPT

## 2021-05-10 PROCEDURE — 36415 COLL VENOUS BLD VENIPUNCTURE: CPT

## 2021-05-10 PROCEDURE — 82746 ASSAY OF FOLIC ACID SERUM: CPT

## 2021-05-10 PROCEDURE — 82728 ASSAY OF FERRITIN: CPT

## 2021-05-10 RX ADMIN — EPOETIN ALFA-EPBX 40000 UNITS: 40000 INJECTION, SOLUTION INTRAVENOUS; SUBCUTANEOUS at 10:09

## 2021-05-11 NOTE — PROGRESS NOTES
[facility]  HEMATOLOGY & ONCOLOGY    W ONC [facility] HEMATOLOGY AND ONCOLOGY  [address]  [place] [address] [phone]    Patient Name: Marina Joe  Encounter Date: 5/12/2021  YOB: 1945  Patient Number: [id number]    Chief Complaint   Patient presents with   • Breast Cancer     Here for f/u       REASON FOR VISIT: Mrs. Marina Joe is a 76-year-old female patient here today in follow-up for the anemia secondary to chronic kidney disease. She also has a history of breast carcinoma.  She has been followed by Dr. De Leon whom recently moved.  Her oncologic history is listed below.  She has continued to show no signs of recurrence of her disease. She has had a mammogram as of December 10, 2020 that was unremarkable.  Her last bone density study showed osteopenia on 12/8/2016.  Her last colonoscopy was in November 2018 that showed diverticulosis.    She presents today also in follow-up for her anemia secondary to chronic kidney disease stage III.  She has been undergoing Retacrit therapy when meets guidelines.  Her last Retacrit injection was on 5/10/2021 for hemoglobin of 10.4 and hematocrit of 32.7.   Her GFR is stable at 44ml/min today.  She meets guidelines for Retacrit today.  Her iron studies on 5/10/2021 were normal with an iron saturation of 23% and a ferritin of 940.30.    She feels well today. She has no complaints.  She has no fever, chills or night sweats.  She has no abdominal complaints.     Oncology/Hematology History Overview Note   Oncologic history  In February 2012, she had a routine mammogram that found a nodular density in the upper outer quadrant of the left breast.  An ultrasound revealed no nodularity at that time.  Repeat ultrasound 3 months later on 5/3/2012 revealed a small cyst in the left breast but felt to be benign.  Repeat mammogram on October 31, 2012 found a lobulated lesion in the  left breast at the 2 o'clock position and a stable cyst at the 12 o'clock position.  She was referred to Dr. Barkley on 11/30/2012 and biopsy was performed.  The 12:00 cyst was a fibrocystic lesion while the 2:00 lesion was an invasive mammary carcinoma, low-grade, ER positive OR positive HER-2 nu 2+, FISH unamplified.    On January 8, 2013 she underwent a left partial mastectomy with sentinel node biopsy finding invasive ductal carcinoma, 3.1 mm in greatest dimension, grade 1.  2 sentinel nodes were negative.  AJCC TNM stage was pT1aN0 M0, Stage IA.  Following surgery she underwent adjuvant radiation therapy and was then started on Arimidex for hormonal manipulation.  She was started on the Arimidex in approximately April or May 2013.  He has been recommended to continue this for 10 years.    Hematologic history  Patient with a long history of anemia secondary to iron deficiency as well as chronic kidney disease stage III.Patient has a GFR that ranges from 45-61ML/MIN.  He has been undergoing Procrit when meets guidelines for several years now.  She is also required iron replacement.    Previous interventions  Procrit 40,000 units subcu initiated approximately February 2017  Injectafer given 9/23/2019, 9/30/2019     Malignant neoplasm of upper-outer quadrant of left breast in female, estrogen receptor positive (CMS/HCC)   10/31/2012 Initial Diagnosis    Malignant neoplasm of central portion of left female breast (CMS/HCC)     10/31/2012 Imaging    Mammogram on October 31, 2012 found a lobulated lesion in the left breast at the 2 o'clock position and a stable cyst at the 12 o'clock position.      11/30/2012 Biopsy    Dr. Barkley on 11/30/2012 and biopsy was performed.  The 12:00 cyst was a fibrocystic lesion while the 2:00 lesion was an invasive mammary carcinoma, low-grade, ER positive OR positive HER-2 nu 2+, FISH unamplified.         1/8/2013 Surgery    On January 8, 2013 she underwent a left partial mastectomy  with sentinel node biopsy finding invasive ductal carcinoma, 3.1 mm in greatest dimension, grade 1.  2 sentinel nodes were negative.  AJCC TNM stage was pT1aN0 M0, Stage IA.  Hormone receptor positive HER-2 negative      Radiation    Radiation OncologyTreatment Course:  Marina Joe receivedin 33 fractions to left breast via External Beam Radiation - EBRT.     5/2013 -  Hormonal Therapy    Arimidex 1 mg daily           PAST MEDICAL HISTORY:  ALLERGIES:  Allergies   Allergen Reactions   • Aspirin GI Bleeding   • Niacin Other (See Comments)       CURRENT MEDICATIONS:  Outpatient Encounter Medications as of 5/12/2021   Medication Sig Dispense Refill   • albuterol sulfate HFA (Ventolin HFA) 108 (90 Base) MCG/ACT inhaler Inhale 2 puffs.     • anastrozole (ARIMIDEX) 1 MG tablet Take 1 tablet by mouth Daily. 90 tablet 4   • buPROPion XL (WELLBUTRIN XL) 150 MG 24 hr tablet Take 1 tablet by mouth Daily. 90 tablet 3   • Calcium Carb-Cholecalciferol (CALCIUM 600+D3 PO) Take  by mouth.     • carvedilol (COREG) 6.25 MG tablet Take 1 tablet by mouth 2 (Two) Times a Day With Meals. 180 tablet 3   • cetirizine (zyrTEC) 10 MG tablet Take 10 mg by mouth Daily.     • cyclobenzaprine (FLEXERIL) 10 MG tablet Take 1 tablet by mouth 3 (Three) Times a Day As Needed for Muscle Spasms. 90 tablet 5   • Ergocalciferol (VITAMIN D2 PO) Take 50,000 Units by mouth Every 30 (Thirty) Days.     • ferrous sulfate 325 (65 FE) MG tablet Take 325 mg by mouth Daily With Breakfast.     • fluticasone (FLOVENT DISKUS) 50 MCG/BLIST diskus inhaler Inhale 1 puff.     • irbesartan-hydrochlorothiazide (AVALIDE) 300-12.5 MG tablet Take 1 tablet by mouth Daily. 90 tablet 3   • montelukast (SINGULAIR) 10 MG tablet Take 1 tablet by mouth Daily. 90 tablet 3   • Multiple Vitamins-Minerals (MULTIVITAMIN ADULT) tablet Take  by mouth Daily.     • Omega-3 Fatty Acids (FISH OIL) 1000 MG capsule capsule Take 1,000 mg by mouth.     • rOPINIRole (REQUIP) 0.5 MG tablet Take 1  tablet by mouth Every Night. 90 tablet 3   • rosuvastatin (CRESTOR) 20 MG tablet Take 1 tablet by mouth Daily. 90 tablet 3   • sertraline (ZOLOFT) 100 MG tablet Take 1 tablet by mouth Daily. 90 tablet 3   • traZODone (DESYREL) 100 MG tablet Take 1 tablet by mouth Every Night. 90 tablet 3   • valACYclovir (VALTREX) 500 MG tablet Take 1 tablet by mouth 2 (Two) Times a Day As Needed (Fever blister). 20 tablet 1     No facility-administered encounter medications on file as of 5/12/2021.     ADULT ILLNESSES:  Patient Active Problem List   Diagnosis Code   • Malignant neoplasm of upper-outer quadrant of left breast in female, estrogen receptor positive (CMS/HCC) C50.412, Z17.0   • Anemia of chronic renal failure, stage 3 (moderate) (CMS/HCC) N18.30, D63.1   • Hypertension, benign I10   • Hx of colonic polyps Z86.010   • HX: breast cancer Z85.3   • Morbidly obese (CMS/HCC) E66.01   • Abnormal mammogram R92.8   • Breast mass N63.0   • Right knee DJD M17.11   • S/P lumpectomy, left breast Z98.890   • Adult hypothyroidism E03.9   • Rhinitis J31.0   • Anemia D64.9   • Anxiety F41.9   • At low risk for fall Z91.81   • Breast cancer, left (CMS/HCC) C50.912   • Cervical pain M54.2   • Chronic insomnia F51.04   • Cough R05   • Elevated lipids E78.5   • Encounter for immunization Z23   • Herpes zoster without complication B02.9   • Influenza B J10.1   • Left ear pain H92.02   • Left otitis externa H60.92   • Lumbar strain, initial encounter S39.012A   • Myalgia M79.10   • Negative depression screening Z13.31   • Obesity (BMI 30-39.9) E66.9   • Postmenopausal status Z78.0   • Recurrent acute serous otitis media of left ear H65.05   • Restless leg G25.81   • Sinusitis, bacterial J32.9, B96.89   • Skin lesion L98.9   • Upper respiratory infection J06.9   • Urinary tract infection without hematuria N39.0   • Vitamin D deficiency E55.9   • CKD (chronic kidney disease) N18.9   • Stage 3a chronic kidney disease (CMS/HCC) N18.31      SURGERIES:  Past Surgical History:   Procedure Laterality Date   • AVULSION TOENAIL PLATE      Sept 26,2018   • BREAST BIOPSY Left 11/20/2012   • BREAST BIOPSY      Left Breast, 1/2019 per Dr Barkley   • BREAST LUMPECTOMY Left     with node bx    • COLONOSCOPY  09/13/2013    small polyp at 30cm benign hyperplastic polyp, changes consistent with melanosis coli. Recall 5 years   • REPLACEMENT TOTAL KNEE Right     2016   • TOTAL ABDOMINAL HYSTERECTOMY WITH SALPINGO OOPHORECTOMY       HEALTH MAINTENANCE ITEMS:    Last Completed Colonoscopy       Status Date      COLONOSCOPY Done 11/19/2018 SCANNED - COLONOSCOPY     Diverticulosis          Last Completed Mammogram       Status Date      MAMMOGRAM Done 12/6/2019 Ext Proc: CHG SCREENING DIGITAL BREAST TOMOSYNTHESIS BI     Normal        FAMILY HISTORY:  Family History   Problem Relation Age of Onset   • Heart attack Mother    • Hodgkin's lymphoma Father    • Other Father    • Cancer Father         hodgkins   • Heart attack Brother    • Skin cancer Maternal Uncle    • Pancreatic cancer Maternal Uncle    • Cancer Maternal Uncle         eye   • Colon cancer Neg Hx    • Colon polyps Neg Hx      SOCIAL HISTORY:  Social History     Socioeconomic History   • Marital status:      Spouse name: Not on file   • Number of children: Not on file   • Years of education: Not on file   • Highest education level: Not on file   Tobacco Use   • Smoking status: Never Smoker   • Smokeless tobacco: Never Used   Substance and Sexual Activity   • Alcohol use: No   • Drug use: Defer   • Sexual activity: Defer       REVIEW OF SYSTEMS:  Review of Systems   Constitutional: Negative for activity change, appetite change, chills, diaphoresis, fatigue, fever and unexpected weight loss.   HENT: Negative for ear pain, nosebleeds, sinus pressure and sore throat.    Eyes: Negative for blurred vision, double vision and visual disturbance.   Respiratory: Negative for cough and shortness of breath.   "  Cardiovascular: Negative for chest pain, palpitations and leg swelling.   Gastrointestinal: Negative for abdominal pain, anal bleeding, blood in stool, constipation, diarrhea, nausea and vomiting.   Endocrine: Negative for heat intolerance, polydipsia and polyuria.   Genitourinary: Negative for dysuria, frequency, hematuria, urgency and urinary incontinence.   Musculoskeletal: Positive for arthralgias (in back and knees \"chornic\") and myalgias (mainly in legs).   Skin: Negative for rash and skin lesions.   Neurological: Negative for dizziness, weakness and headache.   Hematological: Negative for adenopathy. Does not bruise/bleed easily.   Psychiatric/Behavioral: Negative for dysphoric mood, sleep disturbance, suicidal ideas and depressed mood.   I reviewed the ROS as documented here and confirmed the accuracy of it with the patient today. 2/24/2021     /82   Pulse 76   Temp 97 °F (36.1 °C) (Temporal)   Resp 16   Ht 170.2 cm (67\")   Wt 106 kg (234 lb 11.2 oz)   LMP  (LMP Unknown)   SpO2 96%   Breastfeeding No   BMI 36.76 kg/m²  Body surface area is 2.16 meters squared.  Pain Score    05/12/21 0815   PainSc: 0-No pain       Physical Exam:  Physical Exam   Constitutional: She is oriented to person, place, and time. She appears well-developed and well-nourished.   HENT:   Head: Atraumatic.   Mouth/Throat: Oropharynx is clear and moist.   Eyes: Pupils are equal, round, and reactive to light. No scleral icterus.   Neck: Trachea normal.   Cardiovascular: Normal rate and regular rhythm. Exam reveals no gallop and no friction rub.   No murmur heard.  Pulmonary/Chest: Effort normal and breath sounds normal. She has no wheezes. She has no rhonchi. She has no rales.   Abdominal: Soft. Normal appearance. There is no abdominal tenderness. There is no rebound and no guarding.   Lymphadenopathy:     She has no cervical adenopathy.   Neurological: She is alert and oriented to person, place, and time. No sensory " deficit.   Skin: Skin is warm and dry.   Psychiatric: Judgment normal.   Vitals reviewed.  I have reexamined the patient and the results are consistent with the previously documented exam. MARY Aguirre reports a pain score of   Pain Score    05/12/21 0815   PainSc: 0-No pain     Patient's Body mass index is 36.76 kg/m². BMI is above normal parameters. Recommendations include: defer to pcp.    LABS    Lab Results - Last 18 Months   Lab Units 05/10/21  0922 04/14/21  0849 03/31/21  0830 02/24/21  1027 01/19/21  0929 12/21/20  0911   HEMOGLOBIN g/dL 10.4* 11.0* 10.4* 11.6* 11.0* 11.3*   HEMATOCRIT % 32.7* 33.1* 32.8* 35.4 34.4 33.9*   MCV fL 91.3 88.7 90.6 88.9 91.0 91.4   WBC 10*3/mm3 6.41 5.11 5.59 5.50 5.60 6.28   RDW % 15.3 15.4 15.0 14.9 14.7 15.1   MPV fL 9.8 9.6 10.0 10.2 10.0 9.8   PLATELETS 10*3/mm3 170 167 172 176 198 181   IMM GRAN % % 0.2 0.0 0.4 0.4 0.2 0.2   NEUTROS ABS 10*3/mm3 3.88 3.02 3.36 3.39 3.47 4.03   LYMPHS ABS 10*3/mm3 1.65 1.22 1.50 1.38 1.39 1.52   MONOS ABS 10*3/mm3 0.55 0.59 0.48 0.46 0.50 0.49   EOS ABS 10*3/mm3 0.28 0.25 0.21 0.23 0.21 0.21   BASOS ABS 10*3/mm3 0.04 0.03 0.02 0.02 0.02 0.02   IMMATURE GRANS (ABS) 10*3/mm3 0.01 0.00 0.02 0.02 0.01 0.01   NRBC /100 WBC 0.0 0.0 0.0 0.0 0.0 0.0       Lab Results - Last 18 Months   Lab Units 05/10/21  0922 04/14/21  0849 03/31/21  0830 02/24/21  1027 01/19/21  0929 12/21/20  0911   GLUCOSE mg/dL 103* 111* 110* 105* 111* 101*   SODIUM mmol/L 138 138 137 138 137 139   POTASSIUM mmol/L 4.0 3.6 3.5 3.5 3.8 3.3*   CO2 mmol/L 29.0 28.0 30.0* 29.0 29.0 30.0*   CHLORIDE mmol/L 101 98 100 99 100 100   ANION GAP mmol/L 8.0 12.0 7.0 10.0 8.0 9.0   CREATININE mg/dL 1.40* 1.13* 1.13* 1.09* 1.10* 1.07*   BUN mg/dL 18 21 18 20 20 22   BUN / CREAT RATIO  12.9 18.6 15.9 18.3 18.2 20.6   CALCIUM mg/dL 9.1 9.4 9.5 9.6 9.5 9.7   ALK PHOS U/L 74 72 71 74 75 72   TOTAL PROTEIN g/dL 7.1 7.3 7.5 7.6 7.4 7.6   ALT (SGPT) U/L 20 15 13 16  15 14   AST (SGOT) U/L 22 24 18 17 15 15   BILIRUBIN mg/dL 0.3 0.3 0.3 0.4 0.2 0.3   ALBUMIN g/dL 3.80 3.90 3.90 4.00 4.00 4.30   GLOBULIN gm/dL 3.3 3.4 3.6 3.6 3.4 3.3       Lab Results - Last 18 Months   Lab Units 05/10/21  0922 03/31/21  0830 02/24/21  1027 12/21/20  0911 10/07/20  0805 08/26/20  0802 05/05/20  0723   IRON mcg/dL 66 73 76 51 59 66  --    TIBC mcg/dL 286* 286* 304 280* 270* 270*  --    IRON SATURATION % 23 26 25 18* 22 24  --    FERRITIN ng/mL 940.30* 1,102.00* 1,187.00* 1,071.00* 1,107.00* 978.20*  --    TSH uIU/mL  --   --   --   --   --   --  4.790*   FOLATE ng/mL >20.00 >20.00 >20.00  --   --   --   --      ASSESSMENT  1. Left Breast Invasive ductal carcinoma diagnosed in November 2012  · Initial stage:  AJCC TNM stage was pT1aN0 M0, Stage IA.  ER positive SD positive HER-2 alix 2+/FISH unamplified  · Treatment: Lumpectomy January 8, 2013, adjuvant external beam radiation to the left breast, adjuvant hormonal therapy beginning around May 2013 with Arimidex for which she is currently still taking and tolerating well.  · Disease status: Clinically no evidence of disease.  CA-27-29 has been remaining within normal range.  Labs stable.  Mammogram up-to-date as of December 2020 unremarkable.  2.  Osteopenia on bone mineral density study 12/8/2016.  Patient takes calcium plus D.  3.  Anemia secondary to chronic kidney disease stage III.  Patient has been undergoing Retacrit when meets guidelines with last dosing 05/10/2021 for a hgb of 10.4 and hct of 32.7.  4.  Chronic kidney disease stage III, improved with a GFR of 44ml/min today.  5.  Hypothyroidism managed on Synthroid by primary care provider  6.  Coronary artery disease/hypertension on Coreg and Benicar. BP at 128/82 this am.   7.  Normal iron studies as of 5/10/2021 with an iron saturation of 23% and a ferritin of 940.30.  8.  Arthralgias currently stable    PLAN  1.  Reviewed available lab work results with the patient today.    2.  Continue  Arimidex 1 mg daily, she has been advised by Dr. HERNANDEZ to continue the Arimidex for a total of 10 years if she is tolerating it and she is.  3.  Schedule BMD study for June 4.  Encourage patient continue taking calcium plus D twice a day  5.  Plan repeat mammogram in December 2021  6.  Encourage patient to continue follow primary care provider and other specialists  7.  Patient to return in 4 weeks for cbc and possible retacrut then in 8 weeks for follow up, cbc,cmp and possible Retacrit with a pre-office CBC CMP Iron profile ferritin B12 and folate      I spent 31 minutes caring for Marina on this date of service. This time includes time spent by me in the following activities: preparing for the visit, reviewing tests, performing a medically appropriate examination and/or evaluation, ordering medications, tests, or procedures and documenting information in the medical record.       Porsha Bradford, MARY   05/12/2021  10:28 CDT

## 2021-05-12 ENCOUNTER — OFFICE VISIT (OUTPATIENT)
Dept: ONCOLOGY | Facility: CLINIC | Age: 76
End: 2021-05-12

## 2021-05-12 VITALS
DIASTOLIC BLOOD PRESSURE: 82 MMHG | OXYGEN SATURATION: 96 % | HEART RATE: 76 BPM | BODY MASS INDEX: 36.84 KG/M2 | SYSTOLIC BLOOD PRESSURE: 152 MMHG | HEIGHT: 67 IN | RESPIRATION RATE: 16 BRPM | WEIGHT: 234.7 LBS | TEMPERATURE: 97 F

## 2021-05-12 DIAGNOSIS — C50.412 MALIGNANT NEOPLASM OF UPPER-OUTER QUADRANT OF LEFT BREAST IN FEMALE, ESTROGEN RECEPTOR POSITIVE (HCC): ICD-10-CM

## 2021-05-12 DIAGNOSIS — N18.31 STAGE 3A CHRONIC KIDNEY DISEASE (HCC): ICD-10-CM

## 2021-05-12 DIAGNOSIS — M85.80 OSTEOPENIA, UNSPECIFIED LOCATION: Primary | ICD-10-CM

## 2021-05-12 DIAGNOSIS — B02.9 HERPES ZOSTER WITHOUT COMPLICATION: ICD-10-CM

## 2021-05-12 DIAGNOSIS — Z17.0 MALIGNANT NEOPLASM OF UPPER-OUTER QUADRANT OF LEFT BREAST IN FEMALE, ESTROGEN RECEPTOR POSITIVE (HCC): ICD-10-CM

## 2021-05-12 DIAGNOSIS — Z79.811 LONG TERM (CURRENT) USE OF AROMATASE INHIBITORS: ICD-10-CM

## 2021-05-12 PROCEDURE — 99214 OFFICE O/P EST MOD 30 MIN: CPT | Performed by: NURSE PRACTITIONER

## 2021-05-12 RX ORDER — VALACYCLOVIR HYDROCHLORIDE 500 MG/1
TABLET, FILM COATED ORAL
Qty: 20 TABLET | Refills: 1 | Status: SHIPPED | OUTPATIENT
Start: 2021-05-12 | End: 2021-08-17

## 2021-06-07 ENCOUNTER — INFUSION (OUTPATIENT)
Dept: ONCOLOGY | Facility: HOSPITAL | Age: 76
End: 2021-06-07

## 2021-06-07 ENCOUNTER — LAB (OUTPATIENT)
Dept: LAB | Facility: HOSPITAL | Age: 76
End: 2021-06-07

## 2021-06-07 DIAGNOSIS — N18.31 ANEMIA OF CHRONIC RENAL FAILURE, STAGE 3A (HCC): ICD-10-CM

## 2021-06-07 DIAGNOSIS — D63.1 ANEMIA OF CHRONIC RENAL FAILURE, STAGE 3A (HCC): ICD-10-CM

## 2021-06-07 DIAGNOSIS — N18.31 STAGE 3A CHRONIC KIDNEY DISEASE (HCC): ICD-10-CM

## 2021-06-07 DIAGNOSIS — N18.31 STAGE 3A CHRONIC KIDNEY DISEASE (HCC): Primary | ICD-10-CM

## 2021-06-07 LAB
ALBUMIN SERPL-MCNC: 4 G/DL (ref 3.5–5.2)
ALBUMIN/GLOB SERPL: 1.1 G/DL
ALP SERPL-CCNC: 80 U/L (ref 39–117)
ALT SERPL W P-5'-P-CCNC: 19 U/L (ref 1–33)
ANION GAP SERPL CALCULATED.3IONS-SCNC: 9 MMOL/L (ref 5–15)
AST SERPL-CCNC: 19 U/L (ref 1–32)
BASOPHILS # BLD AUTO: 0.02 10*3/MM3 (ref 0–0.2)
BASOPHILS NFR BLD AUTO: 0.3 % (ref 0–1.5)
BILIRUB SERPL-MCNC: 0.2 MG/DL (ref 0–1.2)
BUN SERPL-MCNC: 26 MG/DL (ref 8–23)
BUN/CREAT SERPL: 21.7 (ref 7–25)
CALCIUM SPEC-SCNC: 9.6 MG/DL (ref 8.6–10.5)
CHLORIDE SERPL-SCNC: 103 MMOL/L (ref 98–107)
CO2 SERPL-SCNC: 29 MMOL/L (ref 22–29)
CREAT SERPL-MCNC: 1.2 MG/DL (ref 0.57–1)
DEPRECATED RDW RBC AUTO: 53 FL (ref 37–54)
EOSINOPHIL # BLD AUTO: 0.22 10*3/MM3 (ref 0–0.4)
EOSINOPHIL NFR BLD AUTO: 3.4 % (ref 0.3–6.2)
ERYTHROCYTE [DISTWIDTH] IN BLOOD BY AUTOMATED COUNT: 15.7 % (ref 12.3–15.4)
GFR SERPL CREATININE-BSD FRML MDRD: 53 ML/MIN/1.73
GLOBULIN UR ELPH-MCNC: 3.5 GM/DL
GLUCOSE SERPL-MCNC: 94 MG/DL (ref 65–99)
HCT VFR BLD AUTO: 35.3 % (ref 34–46.6)
HGB BLD-MCNC: 11.4 G/DL (ref 12–15.9)
IMM GRANULOCYTES # BLD AUTO: 0.02 10*3/MM3 (ref 0–0.05)
IMM GRANULOCYTES NFR BLD AUTO: 0.3 % (ref 0–0.5)
LYMPHOCYTES # BLD AUTO: 1.52 10*3/MM3 (ref 0.7–3.1)
LYMPHOCYTES NFR BLD AUTO: 23.6 % (ref 19.6–45.3)
MCH RBC QN AUTO: 29.7 PG (ref 26.6–33)
MCHC RBC AUTO-ENTMCNC: 32.3 G/DL (ref 31.5–35.7)
MCV RBC AUTO: 91.9 FL (ref 79–97)
MONOCYTES # BLD AUTO: 0.54 10*3/MM3 (ref 0.1–0.9)
MONOCYTES NFR BLD AUTO: 8.4 % (ref 5–12)
NEUTROPHILS NFR BLD AUTO: 4.11 10*3/MM3 (ref 1.7–7)
NEUTROPHILS NFR BLD AUTO: 64 % (ref 42.7–76)
NRBC BLD AUTO-RTO: 0 /100 WBC (ref 0–0.2)
PLATELET # BLD AUTO: 192 10*3/MM3 (ref 140–450)
PMV BLD AUTO: 9.7 FL (ref 6–12)
POTASSIUM SERPL-SCNC: 3.8 MMOL/L (ref 3.5–5.2)
PROT SERPL-MCNC: 7.5 G/DL (ref 6–8.5)
RBC # BLD AUTO: 3.84 10*6/MM3 (ref 3.77–5.28)
SODIUM SERPL-SCNC: 141 MMOL/L (ref 136–145)
WBC # BLD AUTO: 6.43 10*3/MM3 (ref 3.4–10.8)

## 2021-06-07 PROCEDURE — 36415 COLL VENOUS BLD VENIPUNCTURE: CPT

## 2021-06-07 PROCEDURE — 85025 COMPLETE CBC W/AUTO DIFF WBC: CPT

## 2021-06-07 PROCEDURE — 80053 COMPREHEN METABOLIC PANEL: CPT

## 2021-06-07 NOTE — PROGRESS NOTES
0948 Patient given AVS in [facility]. Labwork did not meet parameters for injection. Patient reports no problems other than tired after returning from vacation. Veda Chatman RN

## 2021-06-16 RX ORDER — BUPROPION HYDROCHLORIDE 150 MG/1
150 TABLET ORAL DAILY
Qty: 90 TABLET | Refills: 3 | Status: SHIPPED | OUTPATIENT
Start: 2021-06-16 | End: 2022-06-01 | Stop reason: SDUPTHER

## 2021-06-17 ENCOUNTER — HOSPITAL ENCOUNTER (OUTPATIENT)
Dept: WOMENS IMAGING | Age: 76
Discharge: HOME | End: 2021-06-17
Payer: MEDICARE

## 2021-06-17 DIAGNOSIS — M85.80 OSTEOPENIA, UNSPECIFIED LOCATION: ICD-10-CM

## 2021-06-17 DIAGNOSIS — Z79.811 LONG TERM CURRENT USE OF AROMATASE INHIBITOR: ICD-10-CM

## 2021-06-17 PROCEDURE — 77080 DXA BONE DENSITY AXIAL: CPT

## 2021-07-08 DIAGNOSIS — Z17.0 MALIGNANT NEOPLASM OF UPPER-OUTER QUADRANT OF LEFT BREAST IN FEMALE, ESTROGEN RECEPTOR POSITIVE (HCC): ICD-10-CM

## 2021-07-08 DIAGNOSIS — C50.412 MALIGNANT NEOPLASM OF UPPER-OUTER QUADRANT OF LEFT BREAST IN FEMALE, ESTROGEN RECEPTOR POSITIVE (HCC): ICD-10-CM

## 2021-07-08 DIAGNOSIS — N18.31 ANEMIA OF CHRONIC RENAL FAILURE, STAGE 3A (HCC): ICD-10-CM

## 2021-07-08 DIAGNOSIS — N18.31 STAGE 3A CHRONIC KIDNEY DISEASE (HCC): Primary | ICD-10-CM

## 2021-07-08 DIAGNOSIS — D63.1 ANEMIA OF CHRONIC RENAL FAILURE, STAGE 3A (HCC): ICD-10-CM

## 2021-07-14 ENCOUNTER — OFFICE VISIT (OUTPATIENT)
Dept: ONCOLOGY | Facility: CLINIC | Age: 76
End: 2021-07-14

## 2021-07-14 ENCOUNTER — LAB (OUTPATIENT)
Dept: LAB | Facility: HOSPITAL | Age: 76
End: 2021-07-14

## 2021-07-14 VITALS
OXYGEN SATURATION: 96 % | SYSTOLIC BLOOD PRESSURE: 136 MMHG | WEIGHT: 240 LBS | HEIGHT: 68 IN | DIASTOLIC BLOOD PRESSURE: 88 MMHG | TEMPERATURE: 98 F | BODY MASS INDEX: 36.37 KG/M2 | RESPIRATION RATE: 16 BRPM | HEART RATE: 64 BPM

## 2021-07-14 DIAGNOSIS — D63.1 ANEMIA OF CHRONIC RENAL FAILURE, STAGE 3A (HCC): ICD-10-CM

## 2021-07-14 DIAGNOSIS — L98.9 SKIN LESION OF RIGHT LEG: ICD-10-CM

## 2021-07-14 DIAGNOSIS — M79.601 RIGHT ARM PAIN: Primary | ICD-10-CM

## 2021-07-14 DIAGNOSIS — N18.31 ANEMIA OF CHRONIC RENAL FAILURE, STAGE 3A (HCC): ICD-10-CM

## 2021-07-14 DIAGNOSIS — Z17.0 MALIGNANT NEOPLASM OF UPPER-OUTER QUADRANT OF LEFT BREAST IN FEMALE, ESTROGEN RECEPTOR POSITIVE (HCC): ICD-10-CM

## 2021-07-14 DIAGNOSIS — N18.31 STAGE 3A CHRONIC KIDNEY DISEASE (HCC): ICD-10-CM

## 2021-07-14 DIAGNOSIS — C50.412 MALIGNANT NEOPLASM OF UPPER-OUTER QUADRANT OF LEFT BREAST IN FEMALE, ESTROGEN RECEPTOR POSITIVE (HCC): ICD-10-CM

## 2021-07-14 LAB
ALBUMIN SERPL-MCNC: 4.4 G/DL (ref 3.5–5.2)
ALBUMIN/GLOB SERPL: 1.4 G/DL
ALP SERPL-CCNC: 81 U/L (ref 39–117)
ALT SERPL W P-5'-P-CCNC: 20 U/L (ref 1–33)
ANION GAP SERPL CALCULATED.3IONS-SCNC: 9 MMOL/L (ref 5–15)
AST SERPL-CCNC: 22 U/L (ref 1–32)
BASOPHILS # BLD AUTO: 0.02 10*3/MM3 (ref 0–0.2)
BASOPHILS NFR BLD AUTO: 0.4 % (ref 0–1.5)
BILIRUB SERPL-MCNC: 0.3 MG/DL (ref 0–1.2)
BUN SERPL-MCNC: 24 MG/DL (ref 8–23)
BUN/CREAT SERPL: 19.5 (ref 7–25)
CALCIUM SPEC-SCNC: 9.7 MG/DL (ref 8.6–10.5)
CHLORIDE SERPL-SCNC: 99 MMOL/L (ref 98–107)
CO2 SERPL-SCNC: 29 MMOL/L (ref 22–29)
CREAT SERPL-MCNC: 1.23 MG/DL (ref 0.57–1)
DEPRECATED RDW RBC AUTO: 50.4 FL (ref 37–54)
EOSINOPHIL # BLD AUTO: 0.24 10*3/MM3 (ref 0–0.4)
EOSINOPHIL NFR BLD AUTO: 4.4 % (ref 0.3–6.2)
ERYTHROCYTE [DISTWIDTH] IN BLOOD BY AUTOMATED COUNT: 15.1 % (ref 12.3–15.4)
FERRITIN SERPL-MCNC: 990.3 NG/ML (ref 13–150)
FOLATE SERPL-MCNC: >20 NG/ML (ref 4.78–24.2)
GFR SERPL CREATININE-BSD FRML MDRD: 52 ML/MIN/1.73
GLOBULIN UR ELPH-MCNC: 3.2 GM/DL
GLUCOSE SERPL-MCNC: 93 MG/DL (ref 65–99)
HCT VFR BLD AUTO: 35 % (ref 34–46.6)
HGB BLD-MCNC: 11 G/DL (ref 12–15.9)
IMM GRANULOCYTES # BLD AUTO: 0.01 10*3/MM3 (ref 0–0.05)
IMM GRANULOCYTES NFR BLD AUTO: 0.2 % (ref 0–0.5)
IRON 24H UR-MRATE: 72 MCG/DL (ref 37–145)
IRON SATN MFR SERPL: 25 % (ref 20–50)
LYMPHOCYTES # BLD AUTO: 1.81 10*3/MM3 (ref 0.7–3.1)
LYMPHOCYTES NFR BLD AUTO: 32.8 % (ref 19.6–45.3)
MCH RBC QN AUTO: 28.8 PG (ref 26.6–33)
MCHC RBC AUTO-ENTMCNC: 31.4 G/DL (ref 31.5–35.7)
MCV RBC AUTO: 91.6 FL (ref 79–97)
MONOCYTES # BLD AUTO: 0.49 10*3/MM3 (ref 0.1–0.9)
MONOCYTES NFR BLD AUTO: 8.9 % (ref 5–12)
NEUTROPHILS NFR BLD AUTO: 2.94 10*3/MM3 (ref 1.7–7)
NEUTROPHILS NFR BLD AUTO: 53.3 % (ref 42.7–76)
NRBC BLD AUTO-RTO: 0 /100 WBC (ref 0–0.2)
PLATELET # BLD AUTO: 165 10*3/MM3 (ref 140–450)
PMV BLD AUTO: 10.5 FL (ref 6–12)
POTASSIUM SERPL-SCNC: 3.7 MMOL/L (ref 3.5–5.2)
PROT SERPL-MCNC: 7.6 G/DL (ref 6–8.5)
RBC # BLD AUTO: 3.82 10*6/MM3 (ref 3.77–5.28)
SODIUM SERPL-SCNC: 137 MMOL/L (ref 136–145)
TIBC SERPL-MCNC: 289 MCG/DL (ref 298–536)
TRANSFERRIN SERPL-MCNC: 194 MG/DL (ref 200–360)
VIT B12 BLD-MCNC: 1243 PG/ML (ref 211–946)
WBC # BLD AUTO: 5.51 10*3/MM3 (ref 3.4–10.8)

## 2021-07-14 PROCEDURE — 36415 COLL VENOUS BLD VENIPUNCTURE: CPT

## 2021-07-14 PROCEDURE — 83540 ASSAY OF IRON: CPT

## 2021-07-14 PROCEDURE — 82746 ASSAY OF FOLIC ACID SERUM: CPT

## 2021-07-14 PROCEDURE — 82728 ASSAY OF FERRITIN: CPT

## 2021-07-14 PROCEDURE — 85025 COMPLETE CBC W/AUTO DIFF WBC: CPT

## 2021-07-14 PROCEDURE — 82607 VITAMIN B-12: CPT

## 2021-07-14 PROCEDURE — 84466 ASSAY OF TRANSFERRIN: CPT

## 2021-07-14 PROCEDURE — 80053 COMPREHEN METABOLIC PANEL: CPT

## 2021-07-14 PROCEDURE — 99213 OFFICE O/P EST LOW 20 MIN: CPT | Performed by: NURSE PRACTITIONER

## 2021-07-14 NOTE — PROGRESS NOTES
"[facility]  HEMATOLOGY & ONCOLOGY    [facility] HEMATOLOGY AND ONCOLOGY  [address]  [facility] [address] [phone]    Patient Name: Marina Joe  Encounter Date: 7/14/2021  YOB: 1945  Patient Number: [id number]    Chief Complaint   Patient presents with   • Anemia     here for f/u   • Breast Cancer       REASON FOR VISIT: Mrs. Marina Joe is a 76-year-old female patient here today in follow-up for the anemia secondary to chronic kidney disease. She also has a history of breast carcinoma.  She has been followed by Dr. De Leon whom recently moved.  Her oncologic history is listed below.  She has continued to show no signs of recurrence of her disease. She has had a mammogram as of December 10, 2020 that was unremarkable.  Her last bone density study showed osteopenia on 12/8/2016.  Her last colonoscopy was in November 2018 that showed diverticulosis.    She presents today also in follow-up for her anemia secondary to chronic kidney disease stage III.  She has been undergoing Retacrit therapy when meets guidelines.  Her last Retacrit injection was on 5/10/2021 for hemoglobin of 10.4 and hematocrit of 32.7.   She has been above guidelines since.  Her hgb is stable today at 11.0 with hct of 35.0. Therefore, she continues to be above guidelines for Retacrit today.  Her iron studies on 5/10/2021 were normal with an iron saturation of 23% and a ferritin of 940.30. She is not on any iron supplements.     She has no fever, chills or night sweats. She has no abdominal complaints. She does have ha skin lesion to the RLE that she states has been there \"awhile\" but it has changed recently and now painful. She is also complaining of right shoulder pain that radiates down to the elbow.  She has had no known injury.    Oncology/Hematology History Overview Note   Oncologic history  In February 2012, she had a routine " mammogram that found a nodular density in the upper outer quadrant of the left breast.  An ultrasound revealed no nodularity at that time.  Repeat ultrasound 3 months later on 5/3/2012 revealed a small cyst in the left breast but felt to be benign.  Repeat mammogram on October 31, 2012 found a lobulated lesion in the left breast at the 2 o'clock position and a stable cyst at the 12 o'clock position.  She was referred to Dr. Barkley on 11/30/2012 and biopsy was performed.  The 12:00 cyst was a fibrocystic lesion while the 2:00 lesion was an invasive mammary carcinoma, low-grade, ER positive PA positive HER-2 nu 2+, FISH unamplified.    On January 8, 2013 she underwent a left partial mastectomy with sentinel node biopsy finding invasive ductal carcinoma, 3.1 mm in greatest dimension, grade 1.  2 sentinel nodes were negative.  AJCC TNM stage was pT1aN0 M0, Stage IA.  Following surgery she underwent adjuvant radiation therapy and was then started on Arimidex for hormonal manipulation.  She was started on the Arimidex in approximately April or May 2013.  He has been recommended to continue this for 10 years.    Hematologic history  Patient with a long history of anemia secondary to iron deficiency as well as chronic kidney disease stage III.Patient has a GFR that ranges from 45-61ML/MIN.  He has been undergoing Procrit when meets guidelines for several years now.  She is also required iron replacement.    Previous interventions  Procrit 40,000 units subcu initiated approximately February 2017  Injectafer given 9/23/2019, 9/30/2019     Malignant neoplasm of upper-outer quadrant of left breast in female, estrogen receptor positive (CMS/HCC)   10/31/2012 Initial Diagnosis    Malignant neoplasm of central portion of left female breast (CMS/HCC)     10/31/2012 Imaging    Mammogram on October 31, 2012 found a lobulated lesion in the left breast at the 2 o'clock position and a stable cyst at the 12 o'clock position.       11/30/2012 Biopsy    Dr. Barkley on 11/30/2012 and biopsy was performed.  The 12:00 cyst was a fibrocystic lesion while the 2:00 lesion was an invasive mammary carcinoma, low-grade, ER positive ME positive HER-2 nu 2+, FISH unamplified.         1/8/2013 Surgery    On January 8, 2013 she underwent a left partial mastectomy with sentinel node biopsy finding invasive ductal carcinoma, 3.1 mm in greatest dimension, grade 1.  2 sentinel nodes were negative.  AJCC TNM stage was pT1aN0 M0, Stage IA.  Hormone receptor positive HER-2 negative      Radiation    Radiation OncologyTreatment Course:  Marina oJe receivedin 33 fractions to left breast via External Beam Radiation - EBRT.     5/2013 -  Hormonal Therapy    Arimidex 1 mg daily           PAST MEDICAL HISTORY:  ALLERGIES:  Allergies   Allergen Reactions   • Aspirin GI Bleeding   • Niacin Other (See Comments)       CURRENT MEDICATIONS:  Outpatient Encounter Medications as of 7/14/2021   Medication Sig Dispense Refill   • albuterol sulfate HFA (Ventolin HFA) 108 (90 Base) MCG/ACT inhaler Inhale 2 puffs.     • anastrozole (ARIMIDEX) 1 MG tablet Take 1 tablet by mouth Daily. 90 tablet 4   • buPROPion XL (WELLBUTRIN XL) 150 MG 24 hr tablet Take 1 tablet by mouth Daily. 90 tablet 3   • Calcium Carb-Cholecalciferol (CALCIUM 600+D3 PO) Take  by mouth.     • carvedilol (COREG) 6.25 MG tablet Take 1 tablet by mouth 2 (Two) Times a Day With Meals. 180 tablet 3   • cetirizine (zyrTEC) 10 MG tablet Take 10 mg by mouth Daily.     • cyclobenzaprine (FLEXERIL) 10 MG tablet Take 1 tablet by mouth 3 (Three) Times a Day As Needed for Muscle Spasms. 90 tablet 5   • Ergocalciferol (VITAMIN D2 PO) Take 50,000 Units by mouth Every 30 (Thirty) Days.     • ferrous sulfate 325 (65 FE) MG tablet Take 325 mg by mouth Daily With Breakfast.     • fluticasone (FLOVENT DISKUS) 50 MCG/BLIST diskus inhaler Inhale 1 puff.     • irbesartan-hydrochlorothiazide (AVALIDE) 300-12.5 MG tablet Take 1 tablet  by mouth Daily. 90 tablet 3   • montelukast (SINGULAIR) 10 MG tablet Take 1 tablet by mouth Daily. 90 tablet 3   • Multiple Vitamins-Minerals (MULTIVITAMIN ADULT) tablet Take  by mouth Daily.     • Omega-3 Fatty Acids (FISH OIL) 1000 MG capsule capsule Take 1,000 mg by mouth.     • rOPINIRole (REQUIP) 0.5 MG tablet Take 1 tablet by mouth Every Night. 90 tablet 3   • rosuvastatin (CRESTOR) 20 MG tablet Take 1 tablet by mouth Daily. 90 tablet 3   • sertraline (ZOLOFT) 100 MG tablet Take 1 tablet by mouth Daily. 90 tablet 3   • traZODone (DESYREL) 100 MG tablet Take 1 tablet by mouth Every Night. 90 tablet 3   • valACYclovir (VALTREX) 500 MG tablet TAKE 1 TABLET BY MOUTH TWICE DAILY AS NEEDED FOR FEVER BLISTER 20 tablet 1     No facility-administered encounter medications on file as of 7/14/2021.     ADULT ILLNESSES:  Patient Active Problem List   Diagnosis Code   • Malignant neoplasm of upper-outer quadrant of left breast in female, estrogen receptor positive (CMS/HCC) C50.412, Z17.0   • Anemia of chronic renal failure, stage 3 (moderate) (CMS/HCC) N18.30, D63.1   • Hypertension, benign I10   • Hx of colonic polyps Z86.010   • HX: breast cancer Z85.3   • Morbidly obese (CMS/HCC) E66.01   • Abnormal mammogram R92.8   • Breast mass N63.0   • Right knee DJD M17.11   • S/P lumpectomy, left breast Z98.890   • Adult hypothyroidism E03.9   • Rhinitis J31.0   • Anemia D64.9   • Anxiety F41.9   • At low risk for fall Z91.81   • Breast cancer, left (CMS/HCC) C50.912   • Cervical pain M54.2   • Chronic insomnia F51.04   • Cough R05   • Elevated lipids E78.5   • Encounter for immunization Z23   • Herpes zoster without complication B02.9   • Influenza B J10.1   • Left ear pain H92.02   • Left otitis externa H60.92   • Lumbar strain, initial encounter S39.012A   • Myalgia M79.10   • Negative depression screening Z13.31   • Obesity (BMI 30-39.9) E66.9   • Postmenopausal status Z78.0   • Recurrent acute serous otitis media of left  ear H65.05   • Restless leg G25.81   • Sinusitis, bacterial J32.9, B96.89   • Skin lesion L98.9   • Upper respiratory infection J06.9   • Urinary tract infection without hematuria N39.0   • Vitamin D deficiency E55.9   • CKD (chronic kidney disease) N18.9   • Stage 3a chronic kidney disease (CMS/HCC) N18.31     SURGERIES:  Past Surgical History:   Procedure Laterality Date   • AVULSION TOENAIL PLATE      Sept 26,2018   • BREAST BIOPSY Left 11/20/2012   • BREAST BIOPSY      Left Breast, 1/2019 per Dr Barkley   • BREAST LUMPECTOMY Left     with node bx    • COLONOSCOPY  09/13/2013    small polyp at 30cm benign hyperplastic polyp, changes consistent with melanosis coli. Recall 5 years   • REPLACEMENT TOTAL KNEE Right     2016   • TOTAL ABDOMINAL HYSTERECTOMY WITH SALPINGO OOPHORECTOMY       HEALTH MAINTENANCE ITEMS:    Last Completed Colonoscopy       Status Date      COLONOSCOPY Done 11/19/2018 SCANNED - COLONOSCOPY     Diverticulosis          Last Completed Mammogram       Status Date      MAMMOGRAM Done 12/6/2019 Ext Proc: CHG SCREENING DIGITAL BREAST TOMOSYNTHESIS BI     Normal        FAMILY HISTORY:  Family History   Problem Relation Age of Onset   • Heart attack Mother    • Hodgkin's lymphoma Father    • Other Father    • Cancer Father         hodgkins   • Heart attack Brother    • Skin cancer Maternal Uncle    • Pancreatic cancer Maternal Uncle    • Cancer Maternal Uncle         eye   • Colon cancer Neg Hx    • Colon polyps Neg Hx      SOCIAL HISTORY:  Social History     Socioeconomic History   • Marital status:      Spouse name: Not on file   • Number of children: Not on file   • Years of education: Not on file   • Highest education level: Not on file   Tobacco Use   • Smoking status: Never Smoker   • Smokeless tobacco: Never Used   Substance and Sexual Activity   • Alcohol use: No   • Drug use: Defer   • Sexual activity: Defer       REVIEW OF SYSTEMS:  Review of Systems   Constitutional: Negative for  "activity change, appetite change, chills, diaphoresis, fatigue, fever and unexpected weight loss.   HENT: Negative for ear pain, nosebleeds, sinus pressure and sore throat.    Eyes: Negative for blurred vision, double vision and visual disturbance.   Respiratory: Negative for cough and shortness of breath.    Cardiovascular: Negative for chest pain, palpitations and leg swelling.   Gastrointestinal: Negative for abdominal pain, anal bleeding, blood in stool, constipation, diarrhea, nausea and vomiting.   Endocrine: Negative for heat intolerance, polydipsia and polyuria.   Genitourinary: Negative for dysuria, frequency, hematuria, urgency and urinary incontinence.   Musculoskeletal: Positive for arthralgias (in back and knees \"chornic\") and myalgias (mainly in legs).        Right shoulder pain radiating down to the elbow   Skin: Positive for skin lesions (Lesion to right shin area that has become painful and slightly larger). Negative for rash.   Neurological: Negative for dizziness, weakness and headache.   Hematological: Negative for adenopathy. Does not bruise/bleed easily.   Psychiatric/Behavioral: Negative for dysphoric mood, sleep disturbance, suicidal ideas and depressed mood.   I reviewed the ROS as documented here and confirmed the accuracy of it with the patient today. 2/24/2021     /88   Pulse 64   Temp 98 °F (36.7 °C) (Temporal)   Resp 16   Ht 172.7 cm (68\")   Wt 109 kg (240 lb)   LMP  (LMP Unknown)   SpO2 96%   Breastfeeding No   BMI 36.49 kg/m²  Body surface area is 2.21 meters squared.  Pain Score    07/14/21 0842   PainSc: 0-No pain       Physical Exam:  Physical Exam   Constitutional: She is oriented to person, place, and time. She appears well-developed and well-nourished.   HENT:   Head: Atraumatic.   Mouth/Throat: Oropharynx is clear and moist.   Eyes: Pupils are equal, round, and reactive to light. No scleral icterus.   Neck: Trachea normal.   Cardiovascular: Normal rate and " regular rhythm.   No murmur heard.  Pulmonary/Chest: Effort normal and breath sounds normal. She has no wheezes. She has no rhonchi. She has no rales.   Abdominal: Soft. Normal appearance. There is no abdominal tenderness. There is no guarding.   Lymphadenopathy:     She has no cervical adenopathy.   Neurological: She is alert and oriented to person, place, and time.   Skin: Skin is warm and dry. Lesion (Dark circular lesion noted to the right shin region. Painful to touch) noted.   Psychiatric: Her behavior is normal. Mood normal.   Vitals reviewed.  I have reexamined the patient and the results are consistent with the previously documented exam. MARY Aguirre reports a pain score of   Pain Score    07/14/21 0842   PainSc: 0-No pain     Patient's Body mass index is 36.49 kg/m². BMI is above normal parameters. Recommendations include: defer to pcp.    LABS    Lab Results - Last 18 Months   Lab Units 07/14/21  0824 06/07/21  0922 05/10/21  0922 04/14/21  0849 03/31/21  0830 02/24/21  1027   HEMOGLOBIN g/dL 11.0* 11.4* 10.4* 11.0* 10.4* 11.6*   HEMATOCRIT % 35.0 35.3 32.7* 33.1* 32.8* 35.4   MCV fL 91.6 91.9 91.3 88.7 90.6 88.9   WBC 10*3/mm3 5.51 6.43 6.41 5.11 5.59 5.50   RDW % 15.1 15.7* 15.3 15.4 15.0 14.9   MPV fL 10.5 9.7 9.8 9.6 10.0 10.2   PLATELETS 10*3/mm3 165 192 170 167 172 176   IMM GRAN % % 0.2 0.3 0.2 0.0 0.4 0.4   NEUTROS ABS 10*3/mm3 2.94 4.11 3.88 3.02 3.36 3.39   LYMPHS ABS 10*3/mm3 1.81 1.52 1.65 1.22 1.50 1.38   MONOS ABS 10*3/mm3 0.49 0.54 0.55 0.59 0.48 0.46   EOS ABS 10*3/mm3 0.24 0.22 0.28 0.25 0.21 0.23   BASOS ABS 10*3/mm3 0.02 0.02 0.04 0.03 0.02 0.02   IMMATURE GRANS (ABS) 10*3/mm3 0.01 0.02 0.01 0.00 0.02 0.02   NRBC /100 WBC 0.0 0.0 0.0 0.0 0.0 0.0       Lab Results - Last 18 Months   Lab Units 06/07/21  0922 05/10/21  0922 04/14/21  0849 03/31/21  0830 02/24/21  1027 01/19/21  0929   GLUCOSE mg/dL 94 103* 111* 110* 105* 111*   SODIUM mmol/L 141 138 138 137  138 137   POTASSIUM mmol/L 3.8 4.0 3.6 3.5 3.5 3.8   CO2 mmol/L 29.0 29.0 28.0 30.0* 29.0 29.0   CHLORIDE mmol/L 103 101 98 100 99 100   ANION GAP mmol/L 9.0 8.0 12.0 7.0 10.0 8.0   CREATININE mg/dL 1.20* 1.40* 1.13* 1.13* 1.09* 1.10*   BUN mg/dL 26* 18 21 18 20 20   BUN / CREAT RATIO  21.7 12.9 18.6 15.9 18.3 18.2   CALCIUM mg/dL 9.6 9.1 9.4 9.5 9.6 9.5   ALK PHOS U/L 80 74 72 71 74 75   TOTAL PROTEIN g/dL 7.5 7.1 7.3 7.5 7.6 7.4   ALT (SGPT) U/L 19 20 15 13 16 15   AST (SGOT) U/L 19 22 24 18 17 15   BILIRUBIN mg/dL 0.2 0.3 0.3 0.3 0.4 0.2   ALBUMIN g/dL 4.00 3.80 3.90 3.90 4.00 4.00   GLOBULIN gm/dL 3.5 3.3 3.4 3.6 3.6 3.4       Lab Results - Last 18 Months   Lab Units 07/14/21  0824 05/10/21  0922 03/31/21  0830 02/24/21  1027 12/21/20  0911 10/07/20  0805 08/26/20  0802 05/05/20  0723   IRON mcg/dL  --  66 73 76 51 59 66  --    TIBC mcg/dL  --  286* 286* 304 280* 270* 270*  --    IRON SATURATION %  --  23 26 25 18* 22 24  --    FERRITIN ng/mL 990.30* 940.30* 1,102.00* 1,187.00* 1,071.00* 1,107.00* 978.20*  --    TSH uIU/mL  --   --   --   --   --   --   --  4.790*   FOLATE ng/mL  --  >20.00 >20.00 >20.00  --   --   --   --      ASSESSMENT  1. Left Breast Invasive ductal carcinoma diagnosed in November 2012  · Initial stage:  AJCC TNM stage was pT1aN0 M0, Stage IA.  ER positive NH positive HER-2 alix 2+/FISH unamplified  · Treatment: Lumpectomy January 8, 2013, adjuvant external beam radiation to the left breast, adjuvant hormonal therapy beginning around May 2013 with Arimidex for which she is currently still taking and tolerating well.  · Disease status: Clinically no evidence of disease.  CA-27-29 has been remaining within normal range.  Labs stable.  Mammogram up-to-date as of December 2020 unremarkable.  2.  Normal bone density as of 6/17/21. Osteopenia on bone mineral density study 12/8/2016.  Patient takes calcium plus D.  3.  Anemia secondary to chronic kidney disease stage III.  Patient has been  undergoing Retacrit when meets guidelines with last dosing 05/10/2021 for a hgb of 10.4 and hct of 32.7. Her H/H has responded well and been above guidelines since.  Hgb at 11 and hct at 35 currently therefore above guidelines.   4.  Chronic kidney disease stage III, improved with a GFR of 52ml/min today.  Follows with Dr Martin  5.  Hypothyroidism managed on Synthroid by primary care provider  6.  Coronary artery disease/hypertension on Coreg and Benicar. BP at 128/82 this am.   7.  Normal iron studies with an iron saturation of 25% and a ferritin of 990.30.  8.  Elevated Ferritin  - overall improved over the last few years as it was > 1300. Patient is not on any iron supplement.   9.  Normal mammo 12/10/21  10.  Right shoulder pain radiating down to the elbow, no known injury    PLAN  1.  Reviewed available lab work results with the patient today.    2.  Continue Arimidex 1 mg daily, she has been advised by Dr. HERNANDEZ to continue the Arimidex for a total of 10 years if she is tolerating it and she is.  3.  Continue calcium + D  4.  Refer to Dermatology for painful dark skin lesion to the RLE.   5.  Plan repeat mammogram in December 2021  6.  Encourage patient to continue follow primary care provider and other specialists  7.  Xray the right shoulder and humerus  8.  Patient to return in 8 weeks for cbc and possible retacrit with a pre-office CBC CMP Iron profile ferritin B12 and folate      I spent 24 minutes caring for Marina on this date of service. This time includes time spent by me in the following activities: preparing for the visit, reviewing tests, performing a medically appropriate examination and/or evaluation, ordering medications, tests, or procedures and documenting information in the medical record.       Porsha Bradford, APRN   07/14/2021

## 2021-07-19 ENCOUNTER — TELEPHONE (OUTPATIENT)
Dept: ONCOLOGY | Facility: CLINIC | Age: 76
End: 2021-07-19

## 2021-07-19 ENCOUNTER — HOSPITAL ENCOUNTER (OUTPATIENT)
Dept: GENERAL RADIOLOGY | Facility: HOSPITAL | Age: 76
Discharge: HOME | End: 2021-07-19
Admitting: NURSE PRACTITIONER

## 2021-07-19 DIAGNOSIS — M79.601 RIGHT ARM PAIN: ICD-10-CM

## 2021-07-19 PROCEDURE — 73060 X-RAY EXAM OF HUMERUS: CPT

## 2021-07-19 PROCEDURE — 73030 X-RAY EXAM OF SHOULDER: CPT

## 2021-07-19 NOTE — TELEPHONE ENCOUNTER
notified pt to f/u with pcp for arthritis in shoulder. She v/u    ----- Message from MARY Villa sent at 7/19/2021  1:40 PM CDT -----  Please let Ms. Joe know that her x-rays of her shoulder and arm does indeed show arthritis.  We will fax these x-rays to her primary care provider and she can follow-up with her for remedies.

## 2021-08-17 DIAGNOSIS — B02.9 HERPES ZOSTER WITHOUT COMPLICATION: ICD-10-CM

## 2021-08-17 RX ORDER — VALACYCLOVIR HYDROCHLORIDE 500 MG/1
TABLET, FILM COATED ORAL
Qty: 20 TABLET | Refills: 1 | Status: SHIPPED | OUTPATIENT
Start: 2021-08-17 | End: 2021-11-29 | Stop reason: SDUPTHER

## 2021-09-13 DIAGNOSIS — D63.1 ANEMIA OF CHRONIC RENAL FAILURE, STAGE 3A (HCC): ICD-10-CM

## 2021-09-13 DIAGNOSIS — N18.31 ANEMIA OF CHRONIC RENAL FAILURE, STAGE 3A (HCC): ICD-10-CM

## 2021-09-13 DIAGNOSIS — N18.31 STAGE 3A CHRONIC KIDNEY DISEASE (HCC): Primary | ICD-10-CM

## 2021-09-15 ENCOUNTER — OFFICE VISIT (OUTPATIENT)
Dept: ONCOLOGY | Facility: CLINIC | Age: 76
End: 2021-09-15

## 2021-09-15 ENCOUNTER — LAB (OUTPATIENT)
Dept: LAB | Facility: HOSPITAL | Age: 76
End: 2021-09-15

## 2021-09-15 VITALS
DIASTOLIC BLOOD PRESSURE: 82 MMHG | TEMPERATURE: 97.8 F | BODY MASS INDEX: 36.87 KG/M2 | HEIGHT: 68 IN | HEART RATE: 64 BPM | OXYGEN SATURATION: 93 % | RESPIRATION RATE: 16 BRPM | WEIGHT: 243.3 LBS | SYSTOLIC BLOOD PRESSURE: 132 MMHG

## 2021-09-15 DIAGNOSIS — I10 HYPERTENSION, BENIGN: ICD-10-CM

## 2021-09-15 DIAGNOSIS — N18.31 ANEMIA OF CHRONIC RENAL FAILURE, STAGE 3A (HCC): Primary | ICD-10-CM

## 2021-09-15 DIAGNOSIS — Z17.0 MALIGNANT NEOPLASM OF UPPER-OUTER QUADRANT OF LEFT BREAST IN FEMALE, ESTROGEN RECEPTOR POSITIVE (HCC): ICD-10-CM

## 2021-09-15 DIAGNOSIS — N18.31 ANEMIA OF CHRONIC RENAL FAILURE, STAGE 3A (HCC): ICD-10-CM

## 2021-09-15 DIAGNOSIS — C50.412 MALIGNANT NEOPLASM OF UPPER-OUTER QUADRANT OF LEFT BREAST IN FEMALE, ESTROGEN RECEPTOR POSITIVE (HCC): ICD-10-CM

## 2021-09-15 DIAGNOSIS — D63.1 ANEMIA OF CHRONIC RENAL FAILURE, STAGE 3A (HCC): ICD-10-CM

## 2021-09-15 DIAGNOSIS — N18.31 STAGE 3A CHRONIC KIDNEY DISEASE (HCC): ICD-10-CM

## 2021-09-15 DIAGNOSIS — D63.1 ANEMIA OF CHRONIC RENAL FAILURE, STAGE 3A (HCC): Primary | ICD-10-CM

## 2021-09-15 LAB
ALBUMIN SERPL-MCNC: 4 G/DL (ref 3.5–5.2)
ALBUMIN/GLOB SERPL: 1.2 G/DL
ALP SERPL-CCNC: 88 U/L (ref 39–117)
ALT SERPL W P-5'-P-CCNC: 19 U/L (ref 1–33)
ANION GAP SERPL CALCULATED.3IONS-SCNC: 9 MMOL/L (ref 5–15)
AST SERPL-CCNC: 20 U/L (ref 1–32)
BASOPHILS # BLD AUTO: 0.03 10*3/MM3 (ref 0–0.2)
BASOPHILS NFR BLD AUTO: 0.5 % (ref 0–1.5)
BILIRUB SERPL-MCNC: 0.3 MG/DL (ref 0–1.2)
BUN SERPL-MCNC: 19 MG/DL (ref 8–23)
BUN/CREAT SERPL: 15.2 (ref 7–25)
CALCIUM SPEC-SCNC: 9.4 MG/DL (ref 8.6–10.5)
CHLORIDE SERPL-SCNC: 102 MMOL/L (ref 98–107)
CO2 SERPL-SCNC: 28 MMOL/L (ref 22–29)
CREAT SERPL-MCNC: 1.25 MG/DL (ref 0.57–1)
DEPRECATED RDW RBC AUTO: 51.4 FL (ref 37–54)
EOSINOPHIL # BLD AUTO: 0.25 10*3/MM3 (ref 0–0.4)
EOSINOPHIL NFR BLD AUTO: 4 % (ref 0.3–6.2)
ERYTHROCYTE [DISTWIDTH] IN BLOOD BY AUTOMATED COUNT: 15.7 % (ref 12.3–15.4)
FERRITIN SERPL-MCNC: 1056 NG/ML (ref 13–150)
FOLATE SERPL-MCNC: >20 NG/ML (ref 4.78–24.2)
GFR SERPL CREATININE-BSD FRML MDRD: 51 ML/MIN/1.73
GLOBULIN UR ELPH-MCNC: 3.4 GM/DL
GLUCOSE SERPL-MCNC: 109 MG/DL (ref 65–99)
HCT VFR BLD AUTO: 33.8 % (ref 34–46.6)
HGB BLD-MCNC: 10.9 G/DL (ref 12–15.9)
IMM GRANULOCYTES # BLD AUTO: 0.02 10*3/MM3 (ref 0–0.05)
IMM GRANULOCYTES NFR BLD AUTO: 0.3 % (ref 0–0.5)
IRON 24H UR-MRATE: 72 MCG/DL (ref 37–145)
IRON SATN MFR SERPL: 25 % (ref 20–50)
LYMPHOCYTES # BLD AUTO: 1.73 10*3/MM3 (ref 0.7–3.1)
LYMPHOCYTES NFR BLD AUTO: 27.5 % (ref 19.6–45.3)
MCH RBC QN AUTO: 29.1 PG (ref 26.6–33)
MCHC RBC AUTO-ENTMCNC: 32.2 G/DL (ref 31.5–35.7)
MCV RBC AUTO: 90.4 FL (ref 79–97)
MONOCYTES # BLD AUTO: 0.52 10*3/MM3 (ref 0.1–0.9)
MONOCYTES NFR BLD AUTO: 8.3 % (ref 5–12)
NEUTROPHILS NFR BLD AUTO: 3.73 10*3/MM3 (ref 1.7–7)
NEUTROPHILS NFR BLD AUTO: 59.4 % (ref 42.7–76)
NRBC BLD AUTO-RTO: 0 /100 WBC (ref 0–0.2)
PLATELET # BLD AUTO: 147 10*3/MM3 (ref 140–450)
PMV BLD AUTO: 10.6 FL (ref 6–12)
POTASSIUM SERPL-SCNC: 3.7 MMOL/L (ref 3.5–5.2)
PROT SERPL-MCNC: 7.4 G/DL (ref 6–8.5)
RBC # BLD AUTO: 3.74 10*6/MM3 (ref 3.77–5.28)
SODIUM SERPL-SCNC: 139 MMOL/L (ref 136–145)
TIBC SERPL-MCNC: 289 MCG/DL (ref 298–536)
TRANSFERRIN SERPL-MCNC: 194 MG/DL (ref 200–360)
VIT B12 BLD-MCNC: 1214 PG/ML (ref 211–946)
WBC # BLD AUTO: 6.28 10*3/MM3 (ref 3.4–10.8)

## 2021-09-15 PROCEDURE — 82746 ASSAY OF FOLIC ACID SERUM: CPT

## 2021-09-15 PROCEDURE — 82728 ASSAY OF FERRITIN: CPT

## 2021-09-15 PROCEDURE — 84466 ASSAY OF TRANSFERRIN: CPT

## 2021-09-15 PROCEDURE — 83540 ASSAY OF IRON: CPT

## 2021-09-15 PROCEDURE — 82607 VITAMIN B-12: CPT

## 2021-09-15 PROCEDURE — 80053 COMPREHEN METABOLIC PANEL: CPT

## 2021-09-15 PROCEDURE — 36415 COLL VENOUS BLD VENIPUNCTURE: CPT

## 2021-09-15 PROCEDURE — 85025 COMPLETE CBC W/AUTO DIFF WBC: CPT

## 2021-09-15 PROCEDURE — 99213 OFFICE O/P EST LOW 20 MIN: CPT | Performed by: NURSE PRACTITIONER

## 2021-09-15 NOTE — PROGRESS NOTES
"[facility]  HEMATOLOGY & ONCOLOGY    W ONC [facility] HEMATOLOGY AND ONCOLOGY  [address]  [place] [facility] [place] [address] [phone]    Patient Name: Marina Joe  Encounter Date: 9/15/2021  YOB: 1945  Patient Number: [id number]    Chief Complaint   Patient presents with   • Anemia     Here for f/u       REASON FOR VISIT: Mrs. Marina Joe is a 76-year-old female patient here today in follow-up for the anemia secondary to chronic kidney disease. She also has a history of breast carcinoma.  She has been followed by Dr. De Leon whom recently moved.  Her oncologic history is listed below.  She has continued to show no signs of recurrence of her disease. She has had a mammogram as of December 10, 2020 that was unremarkable.  Her last bone density study showed osteopenia on 12/8/2016.  Her last colonoscopy was in November 2018 that showed diverticulosis.    She presents today also in follow-up for her anemia secondary to chronic kidney disease stage III.  She has been undergoing Retacrit therapy when meets guidelines.  Her last Retacrit injection was on 5/10/2021 for hemoglobin of 10.4 and hematocrit of 32.7.   She has been above guidelines since.      Her hgb is at 10.9 with hct of 33.8. Therefore, she continues to be above guidelines for Retacrit today.  Her iron studies today are normal with an iron saturation of 25% and a ferritin is elevated at 1056.0. She is not on any iron supplements.     She has no fever, chills or night sweats. She has no abdominal complaints. She does have ha skin lesion to the RLE that she states has been there \"awhile\" but it had changed recently and became painful. She was seen at dermatology and advised her that it could be taken off but may come back. Therefore, patient chose to not have anything done.    Oncology/Hematology History Overview Note   Oncologic history  In February 2012, she had a " routine mammogram that found a nodular density in the upper outer quadrant of the left breast.  An ultrasound revealed no nodularity at that time.  Repeat ultrasound 3 months later on 5/3/2012 revealed a small cyst in the left breast but felt to be benign.  Repeat mammogram on October 31, 2012 found a lobulated lesion in the left breast at the 2 o'clock position and a stable cyst at the 12 o'clock position.  She was referred to Dr. Barkley on 11/30/2012 and biopsy was performed.  The 12:00 cyst was a fibrocystic lesion while the 2:00 lesion was an invasive mammary carcinoma, low-grade, ER positive OR positive HER-2 nu 2+, FISH unamplified.    On January 8, 2013 she underwent a left partial mastectomy with sentinel node biopsy finding invasive ductal carcinoma, 3.1 mm in greatest dimension, grade 1.  2 sentinel nodes were negative.  AJCC TNM stage was pT1aN0 M0, Stage IA.  Following surgery she underwent adjuvant radiation therapy and was then started on Arimidex for hormonal manipulation.  She was started on the Arimidex in approximately April or May 2013.  He has been recommended to continue this for 10 years.    Hematologic history  Patient with a long history of anemia secondary to iron deficiency as well as chronic kidney disease stage III.Patient has a GFR that ranges from 45-61ML/MIN.  He has been undergoing Procrit when meets guidelines for several years now.  She is also required iron replacement.    Previous interventions  Procrit 40,000 units subcu initiated approximately February 2017  Injectafer given 9/23/2019, 9/30/2019     Malignant neoplasm of upper-outer quadrant of left breast in female, estrogen receptor positive (CMS/HCC)   10/31/2012 Initial Diagnosis    Malignant neoplasm of central portion of left female breast (CMS/HCC)     10/31/2012 Imaging    Mammogram on October 31, 2012 found a lobulated lesion in the left breast at the 2 o'clock position and a stable cyst at the 12 o'clock position.       11/30/2012 Biopsy    Dr. Barkley on 11/30/2012 and biopsy was performed.  The 12:00 cyst was a fibrocystic lesion while the 2:00 lesion was an invasive mammary carcinoma, low-grade, ER positive GA positive HER-2 nu 2+, FISH unamplified.         1/8/2013 Surgery    On January 8, 2013 she underwent a left partial mastectomy with sentinel node biopsy finding invasive ductal carcinoma, 3.1 mm in greatest dimension, grade 1.  2 sentinel nodes were negative.  AJCC TNM stage was pT1aN0 M0, Stage IA.  Hormone receptor positive HER-2 negative      Radiation    Radiation OncologyTreatment Course:  Marina Joe receivedin 33 fractions to left breast via External Beam Radiation - EBRT.     5/2013 -  Hormonal Therapy    Arimidex 1 mg daily           PAST MEDICAL HISTORY:  ALLERGIES:  Allergies   Allergen Reactions   • Aspirin GI Bleeding   • Niacin Other (See Comments)       CURRENT MEDICATIONS:  Outpatient Encounter Medications as of 9/15/2021   Medication Sig Dispense Refill   • albuterol sulfate HFA (Ventolin HFA) 108 (90 Base) MCG/ACT inhaler Inhale 2 puffs.     • anastrozole (ARIMIDEX) 1 MG tablet Take 1 tablet by mouth Daily. 90 tablet 4   • buPROPion XL (WELLBUTRIN XL) 150 MG 24 hr tablet Take 1 tablet by mouth Daily. 90 tablet 3   • Calcium Carb-Cholecalciferol (CALCIUM 600+D3 PO) Take  by mouth.     • carvedilol (COREG) 6.25 MG tablet Take 1 tablet by mouth 2 (Two) Times a Day With Meals. 180 tablet 3   • cetirizine (zyrTEC) 10 MG tablet Take 10 mg by mouth Daily.     • cyclobenzaprine (FLEXERIL) 10 MG tablet Take 1 tablet by mouth 3 (Three) Times a Day As Needed for Muscle Spasms. 90 tablet 5   • Ergocalciferol (VITAMIN D2 PO) Take 50,000 Units by mouth Every 30 (Thirty) Days.     • ferrous sulfate 325 (65 FE) MG tablet Take 325 mg by mouth Daily With Breakfast.     • fluticasone (FLOVENT DISKUS) 50 MCG/BLIST diskus inhaler Inhale 1 puff.     • irbesartan-hydrochlorothiazide (AVALIDE) 300-12.5 MG tablet Take 1 tablet  by mouth Daily. 90 tablet 3   • montelukast (SINGULAIR) 10 MG tablet Take 1 tablet by mouth Daily. 90 tablet 3   • Multiple Vitamins-Minerals (MULTIVITAMIN ADULT) tablet Take  by mouth Daily.     • Omega-3 Fatty Acids (FISH OIL) 1000 MG capsule capsule Take 1,000 mg by mouth.     • rOPINIRole (REQUIP) 0.5 MG tablet Take 1 tablet by mouth Every Night. 90 tablet 3   • rosuvastatin (CRESTOR) 20 MG tablet Take 1 tablet by mouth Daily. 90 tablet 3   • sertraline (ZOLOFT) 100 MG tablet Take 1 tablet by mouth Daily. 90 tablet 3   • traZODone (DESYREL) 100 MG tablet Take 1 tablet by mouth Every Night. 90 tablet 3   • valACYclovir (VALTREX) 500 MG tablet TAKE 1 TABLET BY MOUTH TWICE DAILY AS NEEDED FOR FEVER BLISTER 20 tablet 1     No facility-administered encounter medications on file as of 9/15/2021.     ADULT ILLNESSES:  Patient Active Problem List   Diagnosis Code   • Malignant neoplasm of upper-outer quadrant of left breast in female, estrogen receptor positive (CMS/HCC) C50.412, Z17.0   • Anemia of chronic renal failure, stage 3 (moderate) (CMS/HCC) N18.30, D63.1   • Hypertension, benign I10   • Hx of colonic polyps Z86.010   • HX: breast cancer Z85.3   • Morbidly obese (CMS/HCC) E66.01   • Abnormal mammogram R92.8   • Breast mass N63.0   • Right knee DJD M17.11   • S/P lumpectomy, left breast Z98.890   • Adult hypothyroidism E03.9   • Rhinitis J31.0   • Anemia D64.9   • Anxiety F41.9   • At low risk for fall Z91.81   • Breast cancer, left (CMS/HCC) C50.912   • Cervical pain M54.2   • Chronic insomnia F51.04   • Cough R05   • Elevated lipids E78.5   • Encounter for immunization Z23   • Herpes zoster without complication B02.9   • Influenza B J10.1   • Left ear pain H92.02   • Left otitis externa H60.92   • Lumbar strain, initial encounter S39.012A   • Myalgia M79.10   • Negative depression screening Z13.31   • Obesity (BMI 30-39.9) E66.9   • Postmenopausal status Z78.0   • Recurrent acute serous otitis media of left  ear H65.05   • Restless leg G25.81   • Sinusitis, bacterial J32.9, B96.89   • Skin lesion L98.9   • Upper respiratory infection J06.9   • Urinary tract infection without hematuria N39.0   • Vitamin D deficiency E55.9   • CKD (chronic kidney disease) N18.9   • Stage 3a chronic kidney disease (CMS/HCC) N18.31     SURGERIES:  Past Surgical History:   Procedure Laterality Date   • AVULSION TOENAIL PLATE      Sept 26,2018   • BREAST BIOPSY Left 11/20/2012   • BREAST BIOPSY      Left Breast, 1/2019 per Dr Barkley   • BREAST LUMPECTOMY Left     with node bx    • COLONOSCOPY  09/13/2013    small polyp at 30cm benign hyperplastic polyp, changes consistent with melanosis coli. Recall 5 years   • REPLACEMENT TOTAL KNEE Right     2016   • TOTAL ABDOMINAL HYSTERECTOMY WITH SALPINGO OOPHORECTOMY       HEALTH MAINTENANCE ITEMS:    Last Completed Colonoscopy       Status Date      COLONOSCOPY Done 11/19/2018 SCANNED - COLONOSCOPY     Diverticulosis          Last Completed Mammogram       Status Date      MAMMOGRAM Done 12/6/2019 Ext Proc: CHG SCREENING DIGITAL BREAST TOMOSYNTHESIS BI     Normal        FAMILY HISTORY:  Family History   Problem Relation Age of Onset   • Heart attack Mother    • Hodgkin's lymphoma Father    • Other Father    • Cancer Father         hodgkins   • Heart attack Brother    • Skin cancer Maternal Uncle    • Pancreatic cancer Maternal Uncle    • Cancer Maternal Uncle         eye   • Colon cancer Neg Hx    • Colon polyps Neg Hx      SOCIAL HISTORY:  Social History     Socioeconomic History   • Marital status:      Spouse name: Not on file   • Number of children: Not on file   • Years of education: Not on file   • Highest education level: Not on file   Tobacco Use   • Smoking status: Never Smoker   • Smokeless tobacco: Never Used   Substance and Sexual Activity   • Alcohol use: No   • Drug use: Defer   • Sexual activity: Defer       REVIEW OF SYSTEMS:  Review of Systems   Constitutional: Positive for  "fatigue. Negative for activity change, appetite change, chills, diaphoresis, fever and unexpected weight loss.   HENT: Negative for ear pain, nosebleeds, sinus pressure and sore throat.    Eyes: Negative for blurred vision, double vision and visual disturbance.   Respiratory: Negative for cough and shortness of breath.    Cardiovascular: Negative for chest pain, palpitations and leg swelling.   Gastrointestinal: Negative for abdominal pain, anal bleeding, blood in stool, constipation, diarrhea, nausea and vomiting.   Genitourinary: Negative for dysuria, frequency, hematuria, urgency and urinary incontinence.   Musculoskeletal: Positive for arthralgias (worse in shoulders) and myalgias (mainly in legs).        Right shoulder pain radiating down to the elbow   Skin: Positive for skin lesions (Lesion to right shin area that has become painful and slightly larger). Negative for rash.   Neurological: Negative for dizziness, weakness and headache.   Hematological: Negative for adenopathy. Does not bruise/bleed easily.   Psychiatric/Behavioral: Negative for dysphoric mood, sleep disturbance, suicidal ideas and depressed mood.   I reviewed the ROS as documented here and confirmed the accuracy of it with the patient today. 2/24/2021     /82   Pulse 64   Temp 97.8 °F (36.6 °C) (Temporal)   Resp 16   Ht 172.7 cm (68\")   Wt 110 kg (243 lb 4.8 oz)   LMP  (LMP Unknown)   SpO2 93%   Breastfeeding No   BMI 36.99 kg/m²  Body surface area is 2.22 meters squared.  Pain Score    09/15/21 0903   PainSc: 0-No pain       Physical Exam:  Physical Exam   Constitutional: She is oriented to person, place, and time. She appears well-developed and well-nourished.   HENT:   Head: Atraumatic.   Mouth/Throat: Oropharynx is clear and moist.   Eyes: Pupils are equal, round, and reactive to light. No scleral icterus.   Neck: Trachea normal.   Cardiovascular: Normal rate and regular rhythm.   No murmur heard.  Pulmonary/Chest: Effort " normal and breath sounds normal. She has no wheezes. She has no rhonchi. She has no rales.   Abdominal: Soft. Normal appearance. There is no abdominal tenderness. There is no guarding.   Lymphadenopathy:     She has no cervical adenopathy.   Neurological: She is alert and oriented to person, place, and time.   Skin: Skin is warm and dry. Lesion (Dark circular lesion noted to the right shin region. Painful to touch) noted.   Psychiatric: Her behavior is normal. Mood normal.   Vitals reviewed.  I have reexamined the patient and the results are consistent with the previously documented exam. MARY Aguirre reports a pain score of   Pain Score    09/15/21 0903   PainSc: 0-No pain     Patient's Body mass index is 36.99 kg/m². BMI is above normal parameters. Recommendations include: defer to pcp.    LABS    Lab Results - Last 18 Months   Lab Units 09/15/21  0804 07/14/21  0824 06/07/21  0922 05/10/21  0922 04/14/21  0849 03/31/21  0830   HEMOGLOBIN g/dL 10.9* 11.0* 11.4* 10.4* 11.0* 10.4*   HEMATOCRIT % 33.8* 35.0 35.3 32.7* 33.1* 32.8*   MCV fL 90.4 91.6 91.9 91.3 88.7 90.6   WBC 10*3/mm3 6.28 5.51 6.43 6.41 5.11 5.59   RDW % 15.7* 15.1 15.7* 15.3 15.4 15.0   MPV fL 10.6 10.5 9.7 9.8 9.6 10.0   PLATELETS 10*3/mm3 147 165 192 170 167 172   IMM GRAN % % 0.3 0.2 0.3 0.2 0.0 0.4   NEUTROS ABS 10*3/mm3 3.73 2.94 4.11 3.88 3.02 3.36   LYMPHS ABS 10*3/mm3 1.73 1.81 1.52 1.65 1.22 1.50   MONOS ABS 10*3/mm3 0.52 0.49 0.54 0.55 0.59 0.48   EOS ABS 10*3/mm3 0.25 0.24 0.22 0.28 0.25 0.21   BASOS ABS 10*3/mm3 0.03 0.02 0.02 0.04 0.03 0.02   IMMATURE GRANS (ABS) 10*3/mm3 0.02 0.01 0.02 0.01 0.00 0.02   NRBC /100 WBC 0.0 0.0 0.0 0.0 0.0 0.0       Lab Results - Last 18 Months   Lab Units 09/15/21  0804 07/14/21  0824 06/07/21  0922 05/10/21  0922 04/14/21  0849 03/31/21  0830   GLUCOSE mg/dL 109* 93 94 103* 111* 110*   SODIUM mmol/L 139 137 141 138 138 137   POTASSIUM mmol/L 3.7 3.7 3.8 4.0 3.6 3.5   CO2  mmol/L 28.0 29.0 29.0 29.0 28.0 30.0*   CHLORIDE mmol/L 102 99 103 101 98 100   ANION GAP mmol/L 9.0 9.0 9.0 8.0 12.0 7.0   CREATININE mg/dL 1.25* 1.23* 1.20* 1.40* 1.13* 1.13*   BUN mg/dL 19 24* 26* 18 21 18   BUN / CREAT RATIO  15.2 19.5 21.7 12.9 18.6 15.9   CALCIUM mg/dL 9.4 9.7 9.6 9.1 9.4 9.5   ALK PHOS U/L 88 81 80 74 72 71   TOTAL PROTEIN g/dL 7.4 7.6 7.5 7.1 7.3 7.5   ALT (SGPT) U/L 19 20 19 20 15 13   AST (SGOT) U/L 20 22 19 22 24 18   BILIRUBIN mg/dL 0.3 0.3 0.2 0.3 0.3 0.3   ALBUMIN g/dL 4.00 4.40 4.00 3.80 3.90 3.90   GLOBULIN gm/dL 3.4 3.2 3.5 3.3 3.4 3.6       Lab Results - Last 18 Months   Lab Units 09/15/21  0804 07/14/21  0824 05/10/21  0922 03/31/21  0830 02/24/21  1027 12/21/20  0911 07/10/20  0833 05/05/20  0723   IRON mcg/dL 72 72 66 73 76 51   < >  --    TIBC mcg/dL 289* 289* 286* 286* 304 280*   < >  --    IRON SATURATION % 25 25 23 26 25 18*   < >  --    FERRITIN ng/mL 1,056.00* 990.30* 940.30* 1,102.00* 1,187.00* 1,071.00*   < >  --    TSH uIU/mL  --   --   --   --   --   --   --  4.790*   FOLATE ng/mL  --  >20.00 >20.00 >20.00 >20.00  --   --   --     < > = values in this interval not displayed.     ASSESSMENT  1. Left Breast Invasive ductal carcinoma diagnosed in November 2012  · Initial stage:  AJCC TNM stage was pT1aN0 M0, Stage IA.  ER positive SD positive HER-2 alix 2+/FISH unamplified  · Treatment: Lumpectomy January 8, 2013, adjuvant external beam radiation to the left breast, adjuvant hormonal therapy beginning around May 2013 with Arimidex for which she is currently still taking and tolerating well.  · Disease status: Clinically no evidence of disease.  CA-27-29 has been remaining within normal range.  Labs stable.  Mammogram up-to-date as of December 2020 unremarkable.  2.  Normal bone density as of 6/17/21. Osteopenia on bone mineral density study 12/8/2016.  Patient takes calcium plus D.  3.  Anemia secondary to chronic kidney disease stage III.  Patient has been undergoing  Retacrit when meets guidelines with last dosing 05/10/2021 for a hgb of 10.4 and hct of 32.7. Her H/H has responded well and been above guidelines since.  Hgb is at 10.9 and hct at 33.8 currently therefore above guidelines.   4.  Chronic kidney disease stage III, improved with a GFR of 51ml/min today.  Follows with Dr Martin  5.  Hypothyroidism managed on Synthroid by primary care provider  6.  Coronary artery disease/hypertension on Coreg and Benicar. BP at 132/82 this am.   7.  Normal iron studies with an iron saturation of 25% and a ferritin of 1056.00.  8.  Elevated Ferritin  - overall improved over the last few years as it was > 1300. Patient is not on any iron supplement.   9.  Normal mammo 12/10/21  10.  Shoulder pain, xrays July 2021 with degenerative changes    PLAN  1.  Reviewed available lab work results with the patient today.    2.  Continue Arimidex 1 mg daily, she has been advised by Dr. HERNANDEZ to continue the Arimidex for a total of 10 years if she is tolerating it and she is.  3.  Continue calcium + D  4.  Plan repeat mammogram in December 2021  5.  Encourage patient to continue follow primary care provider and other specialists  7.  Xray the right shoulder and humerus  8.  Patient to return in 8 weeks for cbc and possible retacrit with a pre-office CBC CMP Iron profile ferritin B12 and folate      I spent 24 minutes caring for Marina on this date of service. This time includes time spent by me in the following activities: preparing for the visit, reviewing tests, performing a medically appropriate examination and/or evaluation, ordering medications, tests, or procedures and documenting information in the medical record.       Porsha Bradford, APRN   09/15/2021

## 2021-10-12 DIAGNOSIS — N18.31 ANEMIA OF CHRONIC RENAL FAILURE, STAGE 3A (HCC): Primary | ICD-10-CM

## 2021-10-12 DIAGNOSIS — D63.1 ANEMIA OF CHRONIC RENAL FAILURE, STAGE 3A (HCC): Primary | ICD-10-CM

## 2021-10-13 ENCOUNTER — INFUSION (OUTPATIENT)
Dept: ONCOLOGY | Facility: HOSPITAL | Age: 76
End: 2021-10-13

## 2021-10-13 ENCOUNTER — LAB (OUTPATIENT)
Dept: LAB | Facility: HOSPITAL | Age: 76
End: 2021-10-13

## 2021-10-13 DIAGNOSIS — N18.31 ANEMIA OF CHRONIC RENAL FAILURE, STAGE 3A (HCC): Primary | ICD-10-CM

## 2021-10-13 DIAGNOSIS — D63.1 ANEMIA OF CHRONIC RENAL FAILURE, STAGE 3A (HCC): Primary | ICD-10-CM

## 2021-10-13 LAB
BASOPHILS # BLD AUTO: 0.02 10*3/MM3 (ref 0–0.2)
BASOPHILS NFR BLD AUTO: 0.4 % (ref 0–1.5)
DEPRECATED RDW RBC AUTO: 53.1 FL (ref 37–54)
EOSINOPHIL # BLD AUTO: 0.25 10*3/MM3 (ref 0–0.4)
EOSINOPHIL NFR BLD AUTO: 4.4 % (ref 0.3–6.2)
ERYTHROCYTE [DISTWIDTH] IN BLOOD BY AUTOMATED COUNT: 15.5 % (ref 12.3–15.4)
HCT VFR BLD AUTO: 33.8 % (ref 34–46.6)
HGB BLD-MCNC: 10.8 G/DL (ref 12–15.9)
HOLD SPECIMEN: NORMAL
IMM GRANULOCYTES # BLD AUTO: 0.02 10*3/MM3 (ref 0–0.05)
IMM GRANULOCYTES NFR BLD AUTO: 0.4 % (ref 0–0.5)
LYMPHOCYTES # BLD AUTO: 1.57 10*3/MM3 (ref 0.7–3.1)
LYMPHOCYTES NFR BLD AUTO: 27.5 % (ref 19.6–45.3)
MCH RBC QN AUTO: 29.8 PG (ref 26.6–33)
MCHC RBC AUTO-ENTMCNC: 32 G/DL (ref 31.5–35.7)
MCV RBC AUTO: 93.4 FL (ref 79–97)
MONOCYTES # BLD AUTO: 0.49 10*3/MM3 (ref 0.1–0.9)
MONOCYTES NFR BLD AUTO: 8.6 % (ref 5–12)
NEUTROPHILS NFR BLD AUTO: 3.36 10*3/MM3 (ref 1.7–7)
NEUTROPHILS NFR BLD AUTO: 58.7 % (ref 42.7–76)
NRBC BLD AUTO-RTO: 0 /100 WBC (ref 0–0.2)
PLATELET # BLD AUTO: 141 10*3/MM3 (ref 140–450)
PMV BLD AUTO: 10.4 FL (ref 6–12)
RBC # BLD AUTO: 3.62 10*6/MM3 (ref 3.77–5.28)
WBC # BLD AUTO: 5.71 10*3/MM3 (ref 3.4–10.8)

## 2021-10-13 PROCEDURE — 36415 COLL VENOUS BLD VENIPUNCTURE: CPT

## 2021-10-13 PROCEDURE — 85025 COMPLETE CBC W/AUTO DIFF WBC: CPT

## 2021-10-13 NOTE — PROGRESS NOTES
0911 Patients lab results did not meet criteria for injection. Patient discharged from the lobby with no questions or concerns. Veda Chatman RN

## 2021-11-05 DIAGNOSIS — D63.1 ANEMIA OF CHRONIC RENAL FAILURE, STAGE 3A (HCC): ICD-10-CM

## 2021-11-05 DIAGNOSIS — N18.31 ANEMIA OF CHRONIC RENAL FAILURE, STAGE 3A (HCC): ICD-10-CM

## 2021-11-05 DIAGNOSIS — N18.31 STAGE 3A CHRONIC KIDNEY DISEASE (HCC): Primary | ICD-10-CM

## 2021-11-10 ENCOUNTER — LAB (OUTPATIENT)
Dept: LAB | Facility: HOSPITAL | Age: 76
End: 2021-11-10

## 2021-11-10 ENCOUNTER — INFUSION (OUTPATIENT)
Dept: ONCOLOGY | Facility: HOSPITAL | Age: 76
End: 2021-11-10

## 2021-11-10 VITALS
HEART RATE: 65 BPM | BODY MASS INDEX: 38.04 KG/M2 | OXYGEN SATURATION: 100 % | HEIGHT: 68 IN | DIASTOLIC BLOOD PRESSURE: 65 MMHG | RESPIRATION RATE: 16 BRPM | SYSTOLIC BLOOD PRESSURE: 160 MMHG | WEIGHT: 251 LBS | TEMPERATURE: 97.6 F

## 2021-11-10 DIAGNOSIS — N18.31 STAGE 3A CHRONIC KIDNEY DISEASE (HCC): ICD-10-CM

## 2021-11-10 DIAGNOSIS — D63.1 ANEMIA OF CHRONIC RENAL FAILURE, STAGE 3A (HCC): ICD-10-CM

## 2021-11-10 DIAGNOSIS — N18.31 ANEMIA OF CHRONIC RENAL FAILURE, STAGE 3A (HCC): ICD-10-CM

## 2021-11-10 LAB
ALBUMIN SERPL-MCNC: 3.9 G/DL (ref 3.5–5.2)
ALBUMIN/GLOB SERPL: 1.3 G/DL
ALP SERPL-CCNC: 85 U/L (ref 39–117)
ALT SERPL W P-5'-P-CCNC: 17 U/L (ref 1–33)
ANION GAP SERPL CALCULATED.3IONS-SCNC: 9 MMOL/L (ref 5–15)
AST SERPL-CCNC: 18 U/L (ref 1–32)
BASOPHILS # BLD AUTO: 0.03 10*3/MM3 (ref 0–0.2)
BASOPHILS NFR BLD AUTO: 0.6 % (ref 0–1.5)
BILIRUB SERPL-MCNC: 0.2 MG/DL (ref 0–1.2)
BUN SERPL-MCNC: 24 MG/DL (ref 8–23)
BUN/CREAT SERPL: 20 (ref 7–25)
CALCIUM SPEC-SCNC: 9.2 MG/DL (ref 8.6–10.5)
CHLORIDE SERPL-SCNC: 102 MMOL/L (ref 98–107)
CO2 SERPL-SCNC: 28 MMOL/L (ref 22–29)
CREAT SERPL-MCNC: 1.2 MG/DL (ref 0.57–1)
DEPRECATED RDW RBC AUTO: 51.7 FL (ref 37–54)
EOSINOPHIL # BLD AUTO: 0.24 10*3/MM3 (ref 0–0.4)
EOSINOPHIL NFR BLD AUTO: 4.5 % (ref 0.3–6.2)
ERYTHROCYTE [DISTWIDTH] IN BLOOD BY AUTOMATED COUNT: 15.2 % (ref 12.3–15.4)
GFR SERPL CREATININE-BSD FRML MDRD: 53 ML/MIN/1.73
GLOBULIN UR ELPH-MCNC: 2.9 GM/DL
GLUCOSE SERPL-MCNC: 114 MG/DL (ref 65–99)
HCT VFR BLD AUTO: 33.7 % (ref 34–46.6)
HGB BLD-MCNC: 10.4 G/DL (ref 12–15.9)
IMM GRANULOCYTES # BLD AUTO: 0.02 10*3/MM3 (ref 0–0.05)
IMM GRANULOCYTES NFR BLD AUTO: 0.4 % (ref 0–0.5)
LYMPHOCYTES # BLD AUTO: 1.59 10*3/MM3 (ref 0.7–3.1)
LYMPHOCYTES NFR BLD AUTO: 29.7 % (ref 19.6–45.3)
MCH RBC QN AUTO: 28.7 PG (ref 26.6–33)
MCHC RBC AUTO-ENTMCNC: 30.9 G/DL (ref 31.5–35.7)
MCV RBC AUTO: 92.8 FL (ref 79–97)
MONOCYTES # BLD AUTO: 0.47 10*3/MM3 (ref 0.1–0.9)
MONOCYTES NFR BLD AUTO: 8.8 % (ref 5–12)
NEUTROPHILS NFR BLD AUTO: 3.01 10*3/MM3 (ref 1.7–7)
NEUTROPHILS NFR BLD AUTO: 56 % (ref 42.7–76)
NRBC BLD AUTO-RTO: 0 /100 WBC (ref 0–0.2)
PLATELET # BLD AUTO: 124 10*3/MM3 (ref 140–450)
PMV BLD AUTO: 11.1 FL (ref 6–12)
POTASSIUM SERPL-SCNC: 3.6 MMOL/L (ref 3.5–5.2)
PROT SERPL-MCNC: 6.8 G/DL (ref 6–8.5)
RBC # BLD AUTO: 3.63 10*6/MM3 (ref 3.77–5.28)
SODIUM SERPL-SCNC: 139 MMOL/L (ref 136–145)
WBC # BLD AUTO: 5.36 10*3/MM3 (ref 3.4–10.8)

## 2021-11-10 PROCEDURE — 36415 COLL VENOUS BLD VENIPUNCTURE: CPT

## 2021-11-10 PROCEDURE — 85025 COMPLETE CBC W/AUTO DIFF WBC: CPT

## 2021-11-10 PROCEDURE — G0463 HOSPITAL OUTPT CLINIC VISIT: HCPCS

## 2021-11-10 PROCEDURE — 80053 COMPREHEN METABOLIC PANEL: CPT

## 2021-11-29 ENCOUNTER — OFFICE VISIT (OUTPATIENT)
Dept: INTERNAL MEDICINE | Facility: CLINIC | Age: 76
End: 2021-11-29

## 2021-11-29 VITALS
DIASTOLIC BLOOD PRESSURE: 78 MMHG | OXYGEN SATURATION: 97 % | SYSTOLIC BLOOD PRESSURE: 130 MMHG | HEIGHT: 68 IN | HEART RATE: 73 BPM | BODY MASS INDEX: 36.98 KG/M2 | TEMPERATURE: 96.9 F | WEIGHT: 244 LBS

## 2021-11-29 DIAGNOSIS — B02.9 HERPES ZOSTER WITHOUT COMPLICATION: ICD-10-CM

## 2021-11-29 DIAGNOSIS — E55.9 VITAMIN D DEFICIENCY: ICD-10-CM

## 2021-11-29 DIAGNOSIS — E03.9 ACQUIRED HYPOTHYROIDISM: ICD-10-CM

## 2021-11-29 DIAGNOSIS — M19.90 ARTHRITIS: ICD-10-CM

## 2021-11-29 DIAGNOSIS — Z79.899 ENCOUNTER FOR LONG-TERM CURRENT USE OF MEDICATION: ICD-10-CM

## 2021-11-29 DIAGNOSIS — Z00.00 MEDICARE ANNUAL WELLNESS VISIT, SUBSEQUENT: Primary | ICD-10-CM

## 2021-11-29 DIAGNOSIS — I10 HYPERTENSION, BENIGN: ICD-10-CM

## 2021-11-29 PROCEDURE — 90662 IIV NO PRSV INCREASED AG IM: CPT | Performed by: NURSE PRACTITIONER

## 2021-11-29 PROCEDURE — 1159F MED LIST DOCD IN RCRD: CPT | Performed by: NURSE PRACTITIONER

## 2021-11-29 PROCEDURE — G0439 PPPS, SUBSEQ VISIT: HCPCS | Performed by: NURSE PRACTITIONER

## 2021-11-29 PROCEDURE — 1126F AMNT PAIN NOTED NONE PRSNT: CPT | Performed by: NURSE PRACTITIONER

## 2021-11-29 PROCEDURE — G0008 ADMIN INFLUENZA VIRUS VAC: HCPCS | Performed by: NURSE PRACTITIONER

## 2021-11-29 PROCEDURE — 1170F FXNL STATUS ASSESSED: CPT | Performed by: NURSE PRACTITIONER

## 2021-11-29 RX ORDER — VALACYCLOVIR HYDROCHLORIDE 500 MG/1
500 TABLET, FILM COATED ORAL 2 TIMES DAILY
Qty: 40 TABLET | Refills: 1 | Status: SHIPPED | OUTPATIENT
Start: 2021-11-29 | End: 2022-01-24

## 2021-11-29 NOTE — PROGRESS NOTES
The ABCs of the Annual Wellness Visit  Subsequent Medicare Wellness Visit    Chief Complaint   Patient presents with   • Medicare Wellness-subsequent      Subjective    History of Present Illness:  Marina Joe is a 76 y.o. female who presents for a Subsequent Medicare Wellness Visit.    The following portions of the patient's history were reviewed and   updated as appropriate: allergies, current medications, past family history, past medical history, past social history, past surgical history and problem list.    Compared to one year ago, the patient feels her physical   health is the same.    Compared to one year ago, the patient feels her mental   health is the same.    Recent Hospitalizations:  She was not admitted to the hospital during the last year.       Current Medical Providers:  Patient Care Team:  Brynn Hollingsworth APRN as PCP - General (Nurse Practitioner)    Outpatient Medications Prior to Visit   Medication Sig Dispense Refill   • anastrozole (ARIMIDEX) 1 MG tablet Take 1 tablet by mouth Daily. 90 tablet 4   • buPROPion XL (WELLBUTRIN XL) 150 MG 24 hr tablet Take 1 tablet by mouth Daily. 90 tablet 3   • Calcium Carb-Cholecalciferol (CALCIUM 600+D3 PO) Take  by mouth.     • carvedilol (COREG) 6.25 MG tablet Take 1 tablet by mouth 2 (Two) Times a Day With Meals. 180 tablet 3   • cetirizine (zyrTEC) 10 MG tablet Take 10 mg by mouth Daily.     • cyclobenzaprine (FLEXERIL) 10 MG tablet Take 1 tablet by mouth 3 (Three) Times a Day As Needed for Muscle Spasms. 90 tablet 5   • Ergocalciferol (VITAMIN D2 PO) Take 50,000 Units by mouth Every 30 (Thirty) Days.     • ferrous sulfate 325 (65 FE) MG tablet Take 325 mg by mouth Daily With Breakfast.     • fluticasone (FLOVENT DISKUS) 50 MCG/BLIST diskus inhaler Inhale 1 puff.     • irbesartan-hydrochlorothiazide (AVALIDE) 300-12.5 MG tablet Take 1 tablet by mouth Daily. 90 tablet 3   • montelukast (SINGULAIR) 10 MG tablet Take 1 tablet by mouth Daily. 90  tablet 3   • Multiple Vitamins-Minerals (MULTIVITAMIN ADULT) tablet Take  by mouth Daily.     • Omega-3 Fatty Acids (FISH OIL) 1000 MG capsule capsule Take 1,000 mg by mouth.     • rOPINIRole (REQUIP) 0.5 MG tablet Take 1 tablet by mouth Every Night. 90 tablet 3   • rosuvastatin (CRESTOR) 20 MG tablet Take 1 tablet by mouth Daily. 90 tablet 3   • sertraline (ZOLOFT) 100 MG tablet Take 1 tablet by mouth Daily. 90 tablet 3   • traZODone (DESYREL) 100 MG tablet Take 1 tablet by mouth Every Night. 90 tablet 3   • valACYclovir (VALTREX) 500 MG tablet TAKE 1 TABLET BY MOUTH TWICE DAILY AS NEEDED FOR FEVER BLISTER 20 tablet 1   • albuterol sulfate HFA (Ventolin HFA) 108 (90 Base) MCG/ACT inhaler Inhale 2 puffs.       No facility-administered medications prior to visit.       No opioid medication identified on active medication list. I have reviewed chart for other potential  high risk medication/s and harmful drug interactions in the elderly.          Aspirin is not on active medication list.  Aspirin use is not indicated based on review of current medical condition/s. Risk of harm outweighs potential benefits.  .    Patient Active Problem List   Diagnosis   • Malignant neoplasm of upper-outer quadrant of left breast in female, estrogen receptor positive (HCC)   • Anemia of chronic renal failure, stage 3 (moderate) (HCC)   • Hypertension, benign   • Hx of colonic polyps   • HX: breast cancer   • Morbidly obese (HCC)   • Abnormal mammogram   • Breast mass   • Right knee DJD   • S/P lumpectomy, left breast   • Adult hypothyroidism   • Rhinitis   • Anemia   • Anxiety   • At low risk for fall   • Breast cancer, left (HCC)   • Cervical pain   • Chronic insomnia   • Cough   • Elevated lipids   • Encounter for immunization   • Herpes zoster without complication   • Influenza B   • Left ear pain   • Left otitis externa   • Lumbar strain, initial encounter   • Myalgia   • Negative depression screening   • Obesity (BMI 30-39.9)   •  "Postmenopausal status   • Recurrent acute serous otitis media of left ear   • Restless leg   • Sinusitis, bacterial   • Skin lesion   • Upper respiratory infection   • Urinary tract infection without hematuria   • Vitamin D deficiency   • CKD (chronic kidney disease)   • Stage 3a chronic kidney disease (HCC)     Advance Care Planning  Advance Directive is not on file.  ACP discussion was declined by the patient. Patient does not have an advance directive, declines further assistance.    Review of Systems   Constitutional: Negative for activity change, appetite change, fatigue and fever.   HENT: Negative for congestion, ear discharge, ear pain, facial swelling, rhinorrhea, sinus pressure, sneezing, tinnitus and trouble swallowing.    Eyes: Negative for discharge and visual disturbance.   Respiratory: Negative for chest tightness, shortness of breath, wheezing and stridor.    Cardiovascular: Negative for chest pain, palpitations and leg swelling.   Gastrointestinal: Negative for abdominal distention, abdominal pain, constipation, diarrhea and nausea.   Endocrine: Negative for cold intolerance and heat intolerance.   Genitourinary: Negative for difficulty urinating, flank pain, frequency, hematuria and urgency.   Musculoskeletal: Negative for arthralgias, joint swelling, myalgias and neck pain.   Skin: Negative for color change and rash.   Allergic/Immunologic: Negative for environmental allergies.   Neurological: Negative for dizziness, tremors, weakness and confusion.   Hematological: Negative for adenopathy.   Psychiatric/Behavioral: Negative for decreased concentration and sleep disturbance. The patient is not nervous/anxious.         Objective    Vitals:    11/29/21 0820   BP: 130/78   BP Location: Right arm   Pulse: 73   Temp: 96.9 °F (36.1 °C)   SpO2: 97%   Weight: 111 kg (244 lb)   Height: 172.7 cm (68\")   PainSc: 0-No pain     BMI Readings from Last 1 Encounters:   11/29/21 37.10 kg/m²   BMI is above normal " parameters. Recommendations include: educational material, exercise counseling and nutrition counseling    Does the patient have evidence of cognitive impairment? No    Physical Exam  Vitals and nursing note reviewed.   Constitutional:       Appearance: Normal appearance. She is well-developed and well-groomed. She is morbidly obese.   HENT:      Head: Normocephalic and atraumatic.      Right Ear: Hearing, tympanic membrane, ear canal and external ear normal.      Left Ear: Hearing, tympanic membrane, ear canal and external ear normal.      Nose: Nose normal.      Mouth/Throat:      Lips: Pink.      Mouth: Mucous membranes are moist.      Pharynx: Oropharynx is clear. Uvula midline.   Eyes:      General: Lids are normal. Lids are everted, no foreign bodies appreciated. Vision grossly intact.      Extraocular Movements: Extraocular movements intact.      Conjunctiva/sclera: Conjunctivae normal.      Pupils: Pupils are equal, round, and reactive to light.   Neck:      Thyroid: No thyroid mass or thyromegaly.      Vascular: No carotid bruit.      Trachea: Trachea and phonation normal.   Cardiovascular:      Rate and Rhythm: Normal rate and regular rhythm.      Pulses: Normal pulses.      Heart sounds: Normal heart sounds.   Pulmonary:      Effort: Pulmonary effort is normal.      Breath sounds: Normal breath sounds.   Abdominal:      General: Abdomen is protuberant. Bowel sounds are normal.      Palpations: Abdomen is soft.      Tenderness: There is no abdominal tenderness. There is no right CVA tenderness, left CVA tenderness, guarding or rebound.      Hernia: No hernia is present.   Musculoskeletal:      Right shoulder: Tenderness present. Decreased range of motion.      Left shoulder: Tenderness present. Decreased range of motion.      Cervical back: Normal range of motion and neck supple.      Right knee: Decreased range of motion. Tenderness present over the medial joint line and lateral joint line.      Left  knee: Decreased range of motion. Tenderness present over the medial joint line.      Right lower leg: No edema.      Left lower leg: No edema.   Lymphadenopathy:      Head:      Right side of head: No submental, submandibular, tonsillar, preauricular, posterior auricular or occipital adenopathy.      Left side of head: No submental, submandibular, tonsillar, preauricular, posterior auricular or occipital adenopathy.      Cervical: No cervical adenopathy.   Skin:     General: Skin is warm and dry.      Capillary Refill: Capillary refill takes less than 2 seconds.   Neurological:      General: No focal deficit present.      Mental Status: She is alert and oriented to person, place, and time.      Deep Tendon Reflexes: Reflexes are normal and symmetric.   Psychiatric:         Attention and Perception: Attention normal.         Mood and Affect: Mood normal.         Speech: Speech normal.         Behavior: Behavior normal. Behavior is cooperative.         Thought Content: Thought content normal.         Cognition and Memory: Cognition and memory normal.         Judgment: Judgment normal.                 HEALTH RISK ASSESSMENT    Smoking Status:  Social History     Tobacco Use   Smoking Status Never Smoker   Smokeless Tobacco Never Used     Alcohol Consumption:  Social History     Substance and Sexual Activity   Alcohol Use No     Fall Risk Screen:    STEADI Fall Risk Assessment was completed, and patient is at LOW risk for falls.Assessment completed on:11/29/2021    Depression Screening:  PHQ-2/PHQ-9 Depression Screening 11/29/2021   Little interest or pleasure in doing things 0   Feeling down, depressed, or hopeless 0   Trouble falling or staying asleep, or sleeping too much -   Feeling tired or having little energy -   Poor appetite or overeating -   Feeling bad about yourself - or that you are a failure or have let yourself or your family down -   Trouble concentrating on things, such as reading the newspaper or  watching television -   Moving or speaking so slowly that other people could have noticed. Or the opposite - being so fidgety or restless that you have been moving around a lot more than usual -   Thoughts that you would be better off dead, or of hurting yourself in some way -   Total Score 0   If you checked off any problems, how difficult have these problems made it for you to do your work, take care of things at home, or get along with other people? -       Health Habits and Functional and Cognitive Screening:  Functional & Cognitive Status 11/29/2021   Do you have difficulty preparing food and eating? No   Do you have difficulty bathing yourself, getting dressed or grooming yourself? No   Do you have difficulty using the toilet? No   Do you have difficulty moving around from place to place? No   Do you have trouble with steps or getting out of a bed or a chair? No   Current Diet Well Balanced Diet   Dental Exam Up to date   Eye Exam Up to date   Exercise (times per week) 3 times per week   Current Exercises Include Stationary Bicycling/Spin Class   Current Exercise Activities Include -   Do you need help using the phone?  No   Are you deaf or do you have serious difficulty hearing?  No   Do you need help with transportation? No   Do you need help shopping? No   Do you need help preparing meals?  No   Do you need help with housework?  No   Do you need help with laundry? No   Do you need help taking your medications? No   Do you need help managing money? No   Do you ever drive or ride in a car without wearing a seat belt? No   Have you felt unusual stress, anger or loneliness in the last month? No   Who do you live with? Other   If you need help, do you have trouble finding someone available to you? No   Have you been bothered in the last four weeks by sexual problems? No   Do you have difficulty concentrating, remembering or making decisions? No       Age-appropriate Screening Schedule:  Refer to the list below  for future screening recommendations based on patient's age, sex and/or medical conditions. Orders for these recommended tests are listed in the plan section. The patient has been provided with a written plan.    Health Maintenance   Topic Date Due   • LIPID PANEL  11/05/2021   • TDAP/TD VACCINES (1 - Tdap) 11/29/2022 (Originally 1/31/1964)   • MAMMOGRAM  12/10/2021   • DXA SCAN  06/17/2023   • INFLUENZA VACCINE  Completed   • ZOSTER VACCINE  Completed              Assessment/Plan   CMS Preventative Services Quick Reference  Risk Factors Identified During Encounter  Cardiovascular Disease  Chronic Pain   Dementia/Memory   Depression/Dysphoria  Immunizations Discussed/Encouraged (specific Immunizations; Tdap and COVID19  Inadequate Social Support, Isolation, Loneliness, Lack of Transportation, Financial Difficulties, or Caregiver Stress   Inactivity/Sedentary  Obesity/Overweight   The above risks/problems have been discussed with the patient.  Follow up actions/plans if indicated are seen below in the Assessment/Plan Section.  Pertinent information has been shared with the patient in the After Visit Summary.    Diagnoses and all orders for this visit:    1. Medicare annual wellness visit, subsequent (Primary)    2. Hypertension, benign  -     Urinalysis With Microscopic - Urine, Clean Catch  -     Uric Acid    3. Vitamin D deficiency  -     Vitamin D 25 Hydroxy    4. Acquired hypothyroidism  -     TSH    5. Encounter for long-term current use of medication  -     Lipid Panel    6. Herpes zoster without complication  -     valACYclovir (VALTREX) 500 MG tablet; Take 1 tablet by mouth 2 (Two) Times a Day.  Dispense: 40 tablet; Refill: 1    7. Arthritis    Other orders  -     Fluzone High-Dose 65+yrs        Follow Up:   Return in about 6 months (around 5/29/2022).     An After Visit Summary and PPPS were made available to the patient.        I spent 30 minutes caring for Marina on this date of service. This time includes  time spent by me in the following activities:preparing for the visit, reviewing tests, obtaining and/or reviewing a separately obtained history, performing a medically appropriate examination and/or evaluation , counseling and educating the patient/family/caregiver, ordering medications, tests, or procedures, documenting information in the medical record, independently interpreting results and communicating that information with the patient/family/caregiver and care coordination       Patient is scheduled for mammogram this month, ordered and monitored by Dr. Barkley. Dexa bone scan completed last year and ordered by Dr. Mcbride office as well.    Will complete additional monitoring labs today. Will call with results tomorrow.    Patient taking valtrex more often related to recurrent fever blister on lip. Will monitor liver function every 6 months.  Last CMP showed normal liver function 9/2021.    Will continue to provide handicap parking assistance. Form given to patient. Patient reports arthritis pain well managed with ice, heat rotation, exercise/stretches, and intermittent use of acetaminophen.

## 2021-11-30 LAB
25(OH)D3+25(OH)D2 SERPL-MCNC: 52.9 NG/ML (ref 30–100)
APPEARANCE UR: CLEAR
BACTERIA #/AREA URNS HPF: ABNORMAL /HPF
BILIRUB UR QL STRIP: NEGATIVE
CHOLEST SERPL-MCNC: 177 MG/DL (ref 0–200)
COLOR UR: YELLOW
CRYSTALS URNS MICRO: ABNORMAL
EPI CELLS #/AREA URNS HPF: ABNORMAL /HPF
GLUCOSE UR QL: NEGATIVE
HDLC SERPL-MCNC: 56 MG/DL (ref 40–60)
HGB UR QL STRIP: NEGATIVE
KETONES UR QL STRIP: NEGATIVE
LDLC SERPL CALC-MCNC: 105 MG/DL (ref 0–100)
LEUKOCYTE ESTERASE UR QL STRIP: ABNORMAL
NITRITE UR QL STRIP: NEGATIVE
PH UR STRIP: 6.5 [PH] (ref 5–8)
PROT UR QL STRIP: NEGATIVE
RBC #/AREA URNS HPF: ABNORMAL /HPF
SP GR UR: 1.02 (ref 1–1.03)
TRIGL SERPL-MCNC: 88 MG/DL (ref 0–150)
TSH SERPL DL<=0.005 MIU/L-ACNC: 4.01 UIU/ML (ref 0.27–4.2)
URATE SERPL-MCNC: 6.9 MG/DL (ref 2.4–5.7)
UROBILINOGEN UR STRIP-MCNC: ABNORMAL MG/DL
VLDLC SERPL CALC-MCNC: 16 MG/DL (ref 5–40)
WBC #/AREA URNS HPF: ABNORMAL /HPF

## 2021-12-01 ENCOUNTER — TELEPHONE (OUTPATIENT)
Dept: INTERNAL MEDICINE | Facility: CLINIC | Age: 76
End: 2021-12-01

## 2021-12-01 DIAGNOSIS — D63.1 ANEMIA OF CHRONIC RENAL FAILURE, STAGE 3A (HCC): ICD-10-CM

## 2021-12-01 DIAGNOSIS — N18.31 ANEMIA OF CHRONIC RENAL FAILURE, STAGE 3A (HCC): ICD-10-CM

## 2021-12-01 DIAGNOSIS — N18.31 STAGE 3A CHRONIC KIDNEY DISEASE (HCC): Primary | ICD-10-CM

## 2021-12-01 NOTE — TELEPHONE ENCOUNTER
----- Message from MARY Mcnamara sent at 11/30/2021  8:04 AM CST -----  Small amount of bacteria found in urine. Ask her if she has UTI symptoms. Did not have complaints yesterday. If so, will need her to return for a repeat urine. Cholesterol slightly elevated. Continue to work on low fat, low sugar, high fiber diet. Would benefit from healthy weight loss.

## 2021-12-01 NOTE — TELEPHONE ENCOUNTER
Patient informed. States she's not having any problems with her urine at this time. Told her if she does to call back and let us know.

## 2021-12-13 ENCOUNTER — HOSPITAL ENCOUNTER (OUTPATIENT)
Dept: WOMENS IMAGING | Age: 76
Discharge: HOME | End: 2021-12-13
Payer: MEDICARE

## 2021-12-13 DIAGNOSIS — Z12.31 VISIT FOR SCREENING MAMMOGRAM: ICD-10-CM

## 2021-12-13 LAB
ALBUMIN SERPL-MCNC: 3.8 G/DL (ref 3.5–5.2)
ALP BLD-CCNC: 78 U/L (ref 35–104)
ALT SERPL-CCNC: 20 U/L (ref 5–33)
ANION GAP SERPL CALCULATED.3IONS-SCNC: 14 MMOL/L (ref 7–19)
AST SERPL-CCNC: 19 U/L (ref 5–32)
BASOPHILS ABSOLUTE: 0 K/UL (ref 0–0.2)
BASOPHILS RELATIVE PERCENT: 0.4 % (ref 0–1)
BILIRUB SERPL-MCNC: 0.3 MG/DL (ref 0.2–1.2)
BUN BLDV-MCNC: 22 MG/DL (ref 8–23)
CALCIUM SERPL-MCNC: 9.5 MG/DL (ref 8.8–10.2)
CHLORIDE BLD-SCNC: 102 MMOL/L (ref 98–111)
CO2: 25 MMOL/L (ref 22–29)
CREAT SERPL-MCNC: 1.3 MG/DL (ref 0.5–0.9)
CREATININE URINE: 179.2 MG/DL (ref 4.2–622)
EOSINOPHILS ABSOLUTE: 0.2 K/UL (ref 0–0.6)
EOSINOPHILS RELATIVE PERCENT: 3.5 % (ref 0–5)
GFR AFRICAN AMERICAN: 48
GFR NON-AFRICAN AMERICAN: 40
GLUCOSE BLD-MCNC: 97 MG/DL (ref 74–109)
HCT VFR BLD CALC: 34.6 % (ref 37–47)
HEMOGLOBIN: 10.6 G/DL (ref 12–16)
IMMATURE GRANULOCYTES #: 0 K/UL
LYMPHOCYTES ABSOLUTE: 1.5 K/UL (ref 1.1–4.5)
LYMPHOCYTES RELATIVE PERCENT: 27.8 % (ref 20–40)
MCH RBC QN AUTO: 29 PG (ref 27–31)
MCHC RBC AUTO-ENTMCNC: 30.6 G/DL (ref 33–37)
MCV RBC AUTO: 94.8 FL (ref 81–99)
MONOCYTES ABSOLUTE: 0.5 K/UL (ref 0–0.9)
MONOCYTES RELATIVE PERCENT: 9.3 % (ref 0–10)
NEUTROPHILS ABSOLUTE: 3.2 K/UL (ref 1.5–7.5)
NEUTROPHILS RELATIVE PERCENT: 58.8 % (ref 50–65)
PARATHYROID HORMONE INTACT: 35.4 PG/ML (ref 15–65)
PDW BLD-RTO: 14.6 % (ref 11.5–14.5)
PHOSPHORUS: 3.6 MG/DL (ref 2.5–4.5)
PLATELET # BLD: 119 K/UL (ref 130–400)
PMV BLD AUTO: 11.9 FL (ref 9.4–12.3)
POTASSIUM SERPL-SCNC: 3.6 MMOL/L (ref 3.5–5)
PROTEIN PROTEIN: 16 MG/DL (ref 15–45)
RBC # BLD: 3.65 M/UL (ref 4.2–5.4)
SODIUM BLD-SCNC: 141 MMOL/L (ref 136–145)
TOTAL PROTEIN: 7.1 G/DL (ref 6.6–8.7)
VITAMIN D 25-HYDROXY: 55.8 NG/ML
WBC # BLD: 5.5 K/UL (ref 4.8–10.8)

## 2021-12-13 PROCEDURE — 77063 BREAST TOMOSYNTHESIS BI: CPT

## 2022-01-05 ENCOUNTER — OFFICE VISIT (OUTPATIENT)
Dept: ONCOLOGY | Facility: CLINIC | Age: 77
End: 2022-01-05

## 2022-01-05 ENCOUNTER — APPOINTMENT (OUTPATIENT)
Dept: ONCOLOGY | Facility: HOSPITAL | Age: 77
End: 2022-01-05

## 2022-01-05 ENCOUNTER — LAB (OUTPATIENT)
Dept: LAB | Facility: HOSPITAL | Age: 77
End: 2022-01-05

## 2022-01-05 VITALS
HEIGHT: 68 IN | DIASTOLIC BLOOD PRESSURE: 70 MMHG | SYSTOLIC BLOOD PRESSURE: 136 MMHG | TEMPERATURE: 97.4 F | RESPIRATION RATE: 18 BRPM | HEART RATE: 73 BPM | OXYGEN SATURATION: 95 % | BODY MASS INDEX: 37.6 KG/M2 | WEIGHT: 248.1 LBS

## 2022-01-05 DIAGNOSIS — N18.31 ANEMIA OF CHRONIC RENAL FAILURE, STAGE 3A: Primary | ICD-10-CM

## 2022-01-05 DIAGNOSIS — N18.31 STAGE 3A CHRONIC KIDNEY DISEASE: ICD-10-CM

## 2022-01-05 DIAGNOSIS — Z17.0 MALIGNANT NEOPLASM OF UPPER-OUTER QUADRANT OF LEFT BREAST IN FEMALE, ESTROGEN RECEPTOR POSITIVE: ICD-10-CM

## 2022-01-05 DIAGNOSIS — N18.31 ANEMIA OF CHRONIC RENAL FAILURE, STAGE 3A: ICD-10-CM

## 2022-01-05 DIAGNOSIS — C50.412 MALIGNANT NEOPLASM OF UPPER-OUTER QUADRANT OF LEFT BREAST IN FEMALE, ESTROGEN RECEPTOR POSITIVE: ICD-10-CM

## 2022-01-05 DIAGNOSIS — Z17.0 MALIGNANT NEOPLASM OF UPPER-OUTER QUADRANT OF LEFT BREAST IN FEMALE, ESTROGEN RECEPTOR POSITIVE: Primary | ICD-10-CM

## 2022-01-05 DIAGNOSIS — D63.1 ANEMIA OF CHRONIC RENAL FAILURE, STAGE 3A: ICD-10-CM

## 2022-01-05 DIAGNOSIS — D69.6 THROMBOCYTOPENIA: ICD-10-CM

## 2022-01-05 DIAGNOSIS — C50.412 MALIGNANT NEOPLASM OF UPPER-OUTER QUADRANT OF LEFT BREAST IN FEMALE, ESTROGEN RECEPTOR POSITIVE: Primary | ICD-10-CM

## 2022-01-05 DIAGNOSIS — D63.1 ANEMIA OF CHRONIC RENAL FAILURE, STAGE 3A: Primary | ICD-10-CM

## 2022-01-05 LAB
ALBUMIN SERPL-MCNC: 4.2 G/DL (ref 3.5–5.2)
ALBUMIN/GLOB SERPL: 1.3 G/DL
ALP SERPL-CCNC: 80 U/L (ref 39–117)
ALT SERPL W P-5'-P-CCNC: 16 U/L (ref 1–33)
ANION GAP SERPL CALCULATED.3IONS-SCNC: 8 MMOL/L (ref 5–15)
AST SERPL-CCNC: 18 U/L (ref 1–32)
BASOPHILS # BLD AUTO: 0.02 10*3/MM3 (ref 0–0.2)
BASOPHILS NFR BLD AUTO: 0.4 % (ref 0–1.5)
BILIRUB SERPL-MCNC: 0.3 MG/DL (ref 0–1.2)
BUN SERPL-MCNC: 19 MG/DL (ref 8–23)
BUN/CREAT SERPL: 16.2 (ref 7–25)
CALCIUM SPEC-SCNC: 9.1 MG/DL (ref 8.6–10.5)
CHLORIDE SERPL-SCNC: 100 MMOL/L (ref 98–107)
CO2 SERPL-SCNC: 30 MMOL/L (ref 22–29)
CREAT SERPL-MCNC: 1.17 MG/DL (ref 0.57–1)
DEPRECATED RDW RBC AUTO: 50 FL (ref 37–54)
EOSINOPHIL # BLD AUTO: 0.22 10*3/MM3 (ref 0–0.4)
EOSINOPHIL NFR BLD AUTO: 4.1 % (ref 0.3–6.2)
ERYTHROCYTE [DISTWIDTH] IN BLOOD BY AUTOMATED COUNT: 14.8 % (ref 12.3–15.4)
FERRITIN SERPL-MCNC: 928.2 NG/ML (ref 13–150)
FOLATE SERPL-MCNC: >20 NG/ML (ref 4.78–24.2)
GFR SERPL CREATININE-BSD FRML MDRD: 55 ML/MIN/1.73
GLOBULIN UR ELPH-MCNC: 3.2 GM/DL
GLUCOSE SERPL-MCNC: 122 MG/DL (ref 65–99)
HCT VFR BLD AUTO: 33.9 % (ref 34–46.6)
HGB BLD-MCNC: 10.4 G/DL (ref 12–15.9)
HOLD SPECIMEN: NORMAL
IRON 24H UR-MRATE: 69 MCG/DL (ref 37–145)
IRON SATN MFR SERPL: 22 % (ref 20–50)
LYMPHOCYTES # BLD AUTO: 1.5 10*3/MM3 (ref 0.7–3.1)
LYMPHOCYTES NFR BLD AUTO: 27.6 % (ref 19.6–45.3)
MCH RBC QN AUTO: 28.4 PG (ref 26.6–33)
MCHC RBC AUTO-ENTMCNC: 30.7 G/DL (ref 31.5–35.7)
MCV RBC AUTO: 92.6 FL (ref 79–97)
MONOCYTES # BLD AUTO: 0.42 10*3/MM3 (ref 0.1–0.9)
MONOCYTES NFR BLD AUTO: 7.7 % (ref 5–12)
NEUTROPHILS NFR BLD AUTO: 3.25 10*3/MM3 (ref 1.7–7)
NEUTROPHILS NFR BLD AUTO: 59.8 % (ref 42.7–76)
PLATELET # BLD AUTO: 103 10*3/MM3 (ref 140–450)
PMV BLD AUTO: 11.7 FL (ref 6–12)
POTASSIUM SERPL-SCNC: 3.5 MMOL/L (ref 3.5–5.2)
PROT SERPL-MCNC: 7.4 G/DL (ref 6–8.5)
RBC # BLD AUTO: 3.66 10*6/MM3 (ref 3.77–5.28)
SODIUM SERPL-SCNC: 138 MMOL/L (ref 136–145)
TIBC SERPL-MCNC: 307 MCG/DL (ref 298–536)
TRANSFERRIN SERPL-MCNC: 206 MG/DL (ref 200–360)
VIT B12 BLD-MCNC: 1503 PG/ML (ref 211–946)
WBC NRBC COR # BLD: 5.43 10*3/MM3 (ref 3.4–10.8)

## 2022-01-05 PROCEDURE — 83540 ASSAY OF IRON: CPT

## 2022-01-05 PROCEDURE — 88185 FLOWCYTOMETRY/TC ADD-ON: CPT

## 2022-01-05 PROCEDURE — 82607 VITAMIN B-12: CPT

## 2022-01-05 PROCEDURE — 84466 ASSAY OF TRANSFERRIN: CPT

## 2022-01-05 PROCEDURE — 80053 COMPREHEN METABOLIC PANEL: CPT

## 2022-01-05 PROCEDURE — 88184 FLOWCYTOMETRY/ TC 1 MARKER: CPT

## 2022-01-05 PROCEDURE — 82746 ASSAY OF FOLIC ACID SERUM: CPT

## 2022-01-05 PROCEDURE — 85025 COMPLETE CBC W/AUTO DIFF WBC: CPT

## 2022-01-05 PROCEDURE — 36415 COLL VENOUS BLD VENIPUNCTURE: CPT

## 2022-01-05 PROCEDURE — 82728 ASSAY OF FERRITIN: CPT

## 2022-01-05 PROCEDURE — 99214 OFFICE O/P EST MOD 30 MIN: CPT | Performed by: INTERNAL MEDICINE

## 2022-01-07 LAB — REF LAB TEST METHOD: NORMAL

## 2022-01-24 DIAGNOSIS — B02.9 HERPES ZOSTER WITHOUT COMPLICATION: ICD-10-CM

## 2022-01-24 RX ORDER — VALACYCLOVIR HYDROCHLORIDE 500 MG/1
TABLET, FILM COATED ORAL
Qty: 20 TABLET | Refills: 0 | Status: SHIPPED | OUTPATIENT
Start: 2022-01-24 | End: 2022-02-24

## 2022-02-24 DIAGNOSIS — B02.9 HERPES ZOSTER WITHOUT COMPLICATION: ICD-10-CM

## 2022-02-24 RX ORDER — VALACYCLOVIR HYDROCHLORIDE 500 MG/1
TABLET, FILM COATED ORAL
Qty: 20 TABLET | Refills: 0 | Status: SHIPPED | OUTPATIENT
Start: 2022-02-24 | End: 2022-03-24

## 2022-03-24 DIAGNOSIS — B02.9 HERPES ZOSTER WITHOUT COMPLICATION: ICD-10-CM

## 2022-03-24 RX ORDER — SERTRALINE HYDROCHLORIDE 100 MG/1
100 TABLET, FILM COATED ORAL DAILY
Qty: 90 TABLET | Refills: 0 | Status: SHIPPED | OUTPATIENT
Start: 2022-03-24 | End: 2022-07-06

## 2022-03-24 RX ORDER — VALACYCLOVIR HYDROCHLORIDE 500 MG/1
TABLET, FILM COATED ORAL
Qty: 20 TABLET | Refills: 0 | Status: SHIPPED | OUTPATIENT
Start: 2022-03-24 | End: 2022-04-25

## 2022-03-24 RX ORDER — ROPINIROLE 0.5 MG/1
0.5 TABLET, FILM COATED ORAL NIGHTLY
Qty: 90 TABLET | Refills: 0 | Status: SHIPPED | OUTPATIENT
Start: 2022-03-24 | End: 2022-06-01 | Stop reason: SDUPTHER

## 2022-03-24 RX ORDER — MONTELUKAST SODIUM 10 MG/1
10 TABLET ORAL DAILY
Qty: 90 TABLET | Refills: 0 | Status: SHIPPED | OUTPATIENT
Start: 2022-03-24 | End: 2022-06-01 | Stop reason: SDUPTHER

## 2022-04-25 DIAGNOSIS — B02.9 HERPES ZOSTER WITHOUT COMPLICATION: ICD-10-CM

## 2022-04-25 RX ORDER — ROSUVASTATIN CALCIUM 20 MG/1
20 TABLET, COATED ORAL DAILY
Qty: 90 TABLET | Refills: 3 | Status: SHIPPED | OUTPATIENT
Start: 2022-04-25

## 2022-04-25 RX ORDER — VALACYCLOVIR HYDROCHLORIDE 500 MG/1
TABLET, FILM COATED ORAL
Qty: 20 TABLET | Refills: 0 | Status: SHIPPED | OUTPATIENT
Start: 2022-04-25 | End: 2022-06-01 | Stop reason: SDUPTHER

## 2022-04-25 RX ORDER — CARVEDILOL 6.25 MG/1
TABLET ORAL
Qty: 180 TABLET | Refills: 3 | Status: SHIPPED | OUTPATIENT
Start: 2022-04-25 | End: 2023-03-02 | Stop reason: SDUPTHER

## 2022-05-04 ENCOUNTER — OFFICE VISIT (OUTPATIENT)
Dept: ONCOLOGY | Facility: CLINIC | Age: 77
End: 2022-05-04

## 2022-05-04 ENCOUNTER — LAB (OUTPATIENT)
Dept: LAB | Facility: HOSPITAL | Age: 77
End: 2022-05-04

## 2022-05-04 VITALS
HEIGHT: 68 IN | RESPIRATION RATE: 18 BRPM | SYSTOLIC BLOOD PRESSURE: 152 MMHG | HEART RATE: 70 BPM | WEIGHT: 242 LBS | TEMPERATURE: 97.5 F | BODY MASS INDEX: 36.68 KG/M2 | OXYGEN SATURATION: 95 % | DIASTOLIC BLOOD PRESSURE: 70 MMHG

## 2022-05-04 DIAGNOSIS — C50.412 MALIGNANT NEOPLASM OF UPPER-OUTER QUADRANT OF LEFT BREAST IN FEMALE, ESTROGEN RECEPTOR POSITIVE: ICD-10-CM

## 2022-05-04 DIAGNOSIS — D63.1 ANEMIA OF CHRONIC RENAL FAILURE, STAGE 3A: Primary | ICD-10-CM

## 2022-05-04 DIAGNOSIS — N18.31 ANEMIA OF CHRONIC RENAL FAILURE, STAGE 3A: Primary | ICD-10-CM

## 2022-05-04 DIAGNOSIS — Z17.0 MALIGNANT NEOPLASM OF UPPER-OUTER QUADRANT OF LEFT BREAST IN FEMALE, ESTROGEN RECEPTOR POSITIVE: ICD-10-CM

## 2022-05-04 LAB
ALBUMIN SERPL-MCNC: 4.2 G/DL (ref 3.5–5.2)
ALBUMIN/GLOB SERPL: 1.4 G/DL
ALP SERPL-CCNC: 78 U/L (ref 39–117)
ALT SERPL W P-5'-P-CCNC: 18 U/L (ref 1–33)
ANION GAP SERPL CALCULATED.3IONS-SCNC: 11 MMOL/L (ref 5–15)
AST SERPL-CCNC: 18 U/L (ref 1–32)
BASOPHILS # BLD AUTO: 0.03 10*3/MM3 (ref 0–0.2)
BASOPHILS NFR BLD AUTO: 0.5 % (ref 0–1.5)
BILIRUB SERPL-MCNC: 0.2 MG/DL (ref 0–1.2)
BUN SERPL-MCNC: 21 MG/DL (ref 8–23)
BUN/CREAT SERPL: 16.8 (ref 7–25)
CALCIUM SPEC-SCNC: 9.5 MG/DL (ref 8.6–10.5)
CHLORIDE SERPL-SCNC: 102 MMOL/L (ref 98–107)
CO2 SERPL-SCNC: 27 MMOL/L (ref 22–29)
CREAT SERPL-MCNC: 1.25 MG/DL (ref 0.57–1)
DEPRECATED RDW RBC AUTO: 50.9 FL (ref 37–54)
EGFRCR SERPLBLD CKD-EPI 2021: 44.8 ML/MIN/1.73
EOSINOPHIL # BLD AUTO: 0.31 10*3/MM3 (ref 0–0.4)
EOSINOPHIL NFR BLD AUTO: 5.4 % (ref 0.3–6.2)
ERYTHROCYTE [DISTWIDTH] IN BLOOD BY AUTOMATED COUNT: 15.1 % (ref 12.3–15.4)
FERRITIN SERPL-MCNC: 805.4 NG/ML (ref 13–150)
GLOBULIN UR ELPH-MCNC: 2.9 GM/DL
GLUCOSE SERPL-MCNC: 117 MG/DL (ref 65–99)
HCT VFR BLD AUTO: 35.6 % (ref 34–46.6)
HGB BLD-MCNC: 11 G/DL (ref 12–15.9)
HOLD SPECIMEN: NORMAL
IRON 24H UR-MRATE: 66 MCG/DL (ref 37–145)
IRON SATN MFR SERPL: 21 % (ref 20–50)
LYMPHOCYTES # BLD AUTO: 1.67 10*3/MM3 (ref 0.7–3.1)
LYMPHOCYTES NFR BLD AUTO: 29.2 % (ref 19.6–45.3)
MCH RBC QN AUTO: 28.6 PG (ref 26.6–33)
MCHC RBC AUTO-ENTMCNC: 30.9 G/DL (ref 31.5–35.7)
MCV RBC AUTO: 92.5 FL (ref 79–97)
MONOCYTES # BLD AUTO: 0.49 10*3/MM3 (ref 0.1–0.9)
MONOCYTES NFR BLD AUTO: 8.6 % (ref 5–12)
NEUTROPHILS NFR BLD AUTO: 3.2 10*3/MM3 (ref 1.7–7)
NEUTROPHILS NFR BLD AUTO: 56 % (ref 42.7–76)
PLATELET # BLD AUTO: 82 10*3/MM3 (ref 140–450)
PMV BLD AUTO: 13.3 FL (ref 6–12)
POTASSIUM SERPL-SCNC: 3.8 MMOL/L (ref 3.5–5.2)
PROT SERPL-MCNC: 7.1 G/DL (ref 6–8.5)
RBC # BLD AUTO: 3.85 10*6/MM3 (ref 3.77–5.28)
SODIUM SERPL-SCNC: 140 MMOL/L (ref 136–145)
TIBC SERPL-MCNC: 313 MCG/DL (ref 298–536)
TRANSFERRIN SERPL-MCNC: 210 MG/DL (ref 200–360)
WBC NRBC COR # BLD: 5.72 10*3/MM3 (ref 3.4–10.8)

## 2022-05-04 PROCEDURE — 99214 OFFICE O/P EST MOD 30 MIN: CPT | Performed by: INTERNAL MEDICINE

## 2022-05-04 PROCEDURE — 82728 ASSAY OF FERRITIN: CPT

## 2022-05-04 PROCEDURE — 36415 COLL VENOUS BLD VENIPUNCTURE: CPT

## 2022-05-04 PROCEDURE — 84466 ASSAY OF TRANSFERRIN: CPT

## 2022-05-04 PROCEDURE — 80053 COMPREHEN METABOLIC PANEL: CPT

## 2022-05-04 PROCEDURE — 85025 COMPLETE CBC W/AUTO DIFF WBC: CPT

## 2022-05-04 PROCEDURE — 83540 ASSAY OF IRON: CPT

## 2022-06-01 ENCOUNTER — OFFICE VISIT (OUTPATIENT)
Dept: INTERNAL MEDICINE | Facility: CLINIC | Age: 77
End: 2022-06-01

## 2022-06-01 VITALS
TEMPERATURE: 97.1 F | SYSTOLIC BLOOD PRESSURE: 160 MMHG | BODY MASS INDEX: 36.62 KG/M2 | OXYGEN SATURATION: 97 % | HEIGHT: 68 IN | DIASTOLIC BLOOD PRESSURE: 80 MMHG | HEART RATE: 72 BPM | WEIGHT: 241.6 LBS

## 2022-06-01 DIAGNOSIS — G25.81 RESTLESS LEGS: ICD-10-CM

## 2022-06-01 DIAGNOSIS — F32.A ANXIETY AND DEPRESSION: ICD-10-CM

## 2022-06-01 DIAGNOSIS — F41.9 ANXIETY AND DEPRESSION: ICD-10-CM

## 2022-06-01 DIAGNOSIS — E78.5 ELEVATED LIPIDS: ICD-10-CM

## 2022-06-01 DIAGNOSIS — J30.1 ALLERGIC RHINITIS DUE TO POLLEN, UNSPECIFIED SEASONALITY: ICD-10-CM

## 2022-06-01 DIAGNOSIS — I10 HYPERTENSION, BENIGN: Primary | ICD-10-CM

## 2022-06-01 DIAGNOSIS — R06.02 SHORTNESS OF BREATH: ICD-10-CM

## 2022-06-01 DIAGNOSIS — B02.9 HERPES ZOSTER WITHOUT COMPLICATION: ICD-10-CM

## 2022-06-01 PROCEDURE — 99214 OFFICE O/P EST MOD 30 MIN: CPT

## 2022-06-01 RX ORDER — ALBUTEROL SULFATE 90 UG/1
2 AEROSOL, METERED RESPIRATORY (INHALATION) EVERY 4 HOURS PRN
Qty: 2 G | Refills: 3 | Status: SHIPPED | OUTPATIENT
Start: 2022-06-01 | End: 2022-06-30 | Stop reason: SDUPTHER

## 2022-06-01 RX ORDER — ROPINIROLE 0.5 MG/1
0.5 TABLET, FILM COATED ORAL NIGHTLY
Qty: 90 TABLET | Refills: 0 | Status: SHIPPED | OUTPATIENT
Start: 2022-06-01 | End: 2022-09-21

## 2022-06-01 RX ORDER — FLUTICASONE PROPIONATE 50 MCG
2 SPRAY, SUSPENSION (ML) NASAL DAILY
Qty: 1 G | Refills: 3 | Status: SHIPPED | OUTPATIENT
Start: 2022-06-01

## 2022-06-01 RX ORDER — BUPROPION HYDROCHLORIDE 150 MG/1
150 TABLET ORAL DAILY
Qty: 90 TABLET | Refills: 3 | Status: SHIPPED | OUTPATIENT
Start: 2022-06-01

## 2022-06-01 RX ORDER — MONTELUKAST SODIUM 10 MG/1
10 TABLET ORAL DAILY
Qty: 90 TABLET | Refills: 0 | Status: SHIPPED | OUTPATIENT
Start: 2022-06-01 | End: 2022-09-21

## 2022-06-01 RX ORDER — VALACYCLOVIR HYDROCHLORIDE 500 MG/1
500 TABLET, FILM COATED ORAL 2 TIMES DAILY
Qty: 180 TABLET | Refills: 3 | Status: SHIPPED | OUTPATIENT
Start: 2022-06-01 | End: 2022-08-30

## 2022-06-01 RX ORDER — FLUTICASONE PROPIONATE 50 MCG
2 SPRAY, SUSPENSION (ML) NASAL DAILY
COMMUNITY
End: 2022-06-01 | Stop reason: SDUPTHER

## 2022-06-01 RX ORDER — IRBESARTAN AND HYDROCHLOROTHIAZIDE 300; 12.5 MG/1; MG/1
1 TABLET, FILM COATED ORAL DAILY
Qty: 90 TABLET | Refills: 3 | Status: SHIPPED | OUTPATIENT
Start: 2022-06-01 | End: 2023-03-02 | Stop reason: SDUPTHER

## 2022-06-01 NOTE — PROGRESS NOTES
Subjective   Marina GINO Joe is a 77 y.o. female.   Chief Complaint   Patient presents with   • Hypertension     6 mo f/u, needs med refills   • Hypothyroidism       History of Present Illness   Ms. Joe is here today for a 6 month follow-up on management of hypertension, and elevated cholesterol.  She reports that she has been having increased issues with shortness of breath with exertion, has been using her albuterol at least once sometimes twice a day for the last 3 months.  When asked she states that she does feel slightly wheezy during these moments.  She denies any chest pain no palpitations no headaches overall no decline in her activity.  No history of smoking since she was about 17, however she has been around people that do smoke.  She reports that she does check her blood pressures regularly at home, and she does take her medications as prescribed and she normally runs 132/72.  She reports that her blood pressure is usually higher in the doctor's office.  She also notes today that she needs her Valtrex refilled and that she has been having to take this twice a day daily consistently to prevent breakouts both orally and on her lower back.  She takes an omega-3 for her elevated cholesterol, is not fasting today.    She sees Dr. Shah for management of stage Ia left breast cancer, he is also managing her anemia.     The following portions of the patient's history were reviewed and updated as appropriate: allergies, current medications, past family history, past medical history, past social history, past surgical history and problem list.    Review of Systems    Objective   Past Medical History:   Diagnosis Date   • Breast cancer (HCC)    • CKD (chronic kidney disease)    • Hypercholesteremia    • Hypertension    • Sinusitis    • Stage 3a chronic kidney disease (HCC) 10/20/2020      Past Surgical History:   Procedure Laterality Date   • AVULSION TOENAIL PLATE      Sept 26,2018   • BREAST BIOPSY Left 11/20/2012    • BREAST BIOPSY      Left Breast, 1/2019 per Dr Barkley   • BREAST LUMPECTOMY Left     with node bx    • COLONOSCOPY  09/13/2013    small polyp at 30cm benign hyperplastic polyp, changes consistent with melanosis coli. Recall 5 years   • REPLACEMENT TOTAL KNEE Right     2016   • TOTAL ABDOMINAL HYSTERECTOMY WITH SALPINGO OOPHORECTOMY          Current Outpatient Medications:   •  albuterol sulfate  (90 Base) MCG/ACT inhaler, Inhale 2 puffs Every 4 (Four) Hours As Needed for Wheezing., Disp: 2 g, Rfl: 3  •  anastrozole (ARIMIDEX) 1 MG tablet, Take 1 tablet by mouth Daily., Disp: 90 tablet, Rfl: 4  •  buPROPion XL (WELLBUTRIN XL) 150 MG 24 hr tablet, Take 1 tablet by mouth Daily., Disp: 90 tablet, Rfl: 3  •  Calcium Carb-Cholecalciferol (CALCIUM 600+D3 PO), Take  by mouth., Disp: , Rfl:   •  carvedilol (COREG) 6.25 MG tablet, TAKE 1 TABLET BY MOUTH TWICE DAILY WITH MEALS, Disp: 180 tablet, Rfl: 3  •  cetirizine (zyrTEC) 10 MG tablet, Take 10 mg by mouth Daily., Disp: , Rfl:   •  cyclobenzaprine (FLEXERIL) 10 MG tablet, Take 1 tablet by mouth 3 (Three) Times a Day As Needed for Muscle Spasms., Disp: 90 tablet, Rfl: 5  •  Ergocalciferol (VITAMIN D2 PO), Take 50,000 Units by mouth Every 30 (Thirty) Days., Disp: , Rfl:   •  ferrous sulfate 325 (65 FE) MG tablet, Take 325 mg by mouth Daily With Breakfast., Disp: , Rfl:   •  fluticasone (FLONASE) 50 MCG/ACT nasal spray, 2 sprays into the nostril(s) as directed by provider Daily., Disp: 1 g, Rfl: 3  •  fluticasone (FLOVENT DISKUS) 50 MCG/BLIST diskus inhaler, Inhale 1 puff., Disp: , Rfl:   •  irbesartan-hydrochlorothiazide (AVALIDE) 300-12.5 MG tablet, Take 1 tablet by mouth Daily., Disp: 90 tablet, Rfl: 3  •  montelukast (SINGULAIR) 10 MG tablet, Take 1 tablet by mouth Daily., Disp: 90 tablet, Rfl: 0  •  Multiple Vitamins-Minerals (MULTIVITAMIN ADULT) tablet, Take  by mouth Daily., Disp: , Rfl:   •  Omega-3 Fatty Acids (FISH OIL) 1000 MG capsule capsule, Take 1,000  "mg by mouth., Disp: , Rfl:   •  rOPINIRole (REQUIP) 0.5 MG tablet, Take 1 tablet by mouth Every Night., Disp: 90 tablet, Rfl: 0  •  rosuvastatin (CRESTOR) 20 MG tablet, TAKE 1 TABLET BY MOUTH DAILY, Disp: 90 tablet, Rfl: 3  •  sertraline (ZOLOFT) 100 MG tablet, TAKE 1 TABLET BY MOUTH DAILY, Disp: 90 tablet, Rfl: 0  •  traZODone (DESYREL) 100 MG tablet, Take 1 tablet by mouth Every Night., Disp: 90 tablet, Rfl: 3  •  valACYclovir (VALTREX) 500 MG tablet, Take 1 tablet by mouth 2 (Two) Times a Day for 90 days., Disp: 180 tablet, Rfl: 3      /80 (BP Location: Left arm, Patient Position: Sitting, Cuff Size: Adult)   Pulse 72   Temp 97.1 °F (36.2 °C) (Temporal)   Ht 172.7 cm (68\")   Wt 110 kg (241 lb 9.6 oz)   LMP  (LMP Unknown)   SpO2 97%   Breastfeeding No   BMI 36.74 kg/m²      Body mass index is 36.74 kg/m².  BMI has not been calculated during today's encounter.        Physical Exam  Vitals and nursing note reviewed.   Constitutional:       General: She is not in acute distress.     Appearance: Normal appearance. She is obese. She is not ill-appearing, toxic-appearing or diaphoretic.   HENT:      Head: Normocephalic and atraumatic.      Right Ear: Tympanic membrane, ear canal and external ear normal. There is no impacted cerumen.      Left Ear: Tympanic membrane, ear canal and external ear normal. There is no impacted cerumen.      Nose: Nose normal. No congestion or rhinorrhea.      Mouth/Throat:      Mouth: Mucous membranes are dry.      Pharynx: Oropharynx is clear. No oropharyngeal exudate or posterior oropharyngeal erythema.   Eyes:      General:         Right eye: No discharge.         Left eye: No discharge.      Extraocular Movements: Extraocular movements intact.      Conjunctiva/sclera: Conjunctivae normal.      Pupils: Pupils are equal, round, and reactive to light.   Neck:      Thyroid: No thyroid mass, thyromegaly or thyroid tenderness.      Vascular: No carotid bruit.   Cardiovascular:     "  Rate and Rhythm: Normal rate and regular rhythm.      Pulses: Normal pulses.      Heart sounds: Normal heart sounds. No murmur heard.    No friction rub. No gallop.   Pulmonary:      Effort: Pulmonary effort is normal. No respiratory distress.      Breath sounds: Normal breath sounds. No stridor. No wheezing, rhonchi or rales.   Chest:      Chest wall: No tenderness.   Abdominal:      General: Abdomen is flat. Bowel sounds are normal. There is no distension.      Palpations: Abdomen is soft. There is no mass.      Tenderness: There is no abdominal tenderness. There is no guarding or rebound.      Hernia: No hernia is present.   Musculoskeletal:         General: No swelling, tenderness, deformity or signs of injury. Normal range of motion.      Cervical back: Normal range of motion and neck supple. No rigidity or tenderness.      Right lower leg: No edema.      Left lower leg: No edema.   Lymphadenopathy:      Cervical: No cervical adenopathy.   Skin:     General: Skin is warm and dry.      Capillary Refill: Capillary refill takes less than 2 seconds.      Coloration: Skin is not jaundiced or pale.      Findings: No bruising, erythema, lesion or rash.   Neurological:      General: No focal deficit present.      Mental Status: She is alert and oriented to person, place, and time. Mental status is at baseline.      Sensory: No sensory deficit.      Motor: No weakness.      Coordination: Coordination normal.      Gait: Gait normal.   Psychiatric:         Mood and Affect: Mood normal.         Behavior: Behavior normal.         Thought Content: Thought content normal.         Judgment: Judgment normal.               Assessment & Plan   Diagnoses and all orders for this visit:    1. Hypertension, benign (Primary)  -     irbesartan-hydrochlorothiazide (AVALIDE) 300-12.5 MG tablet; Take 1 tablet by mouth Daily.  Dispense: 90 tablet; Refill: 3    2. Herpes zoster without complication  -     valACYclovir (VALTREX) 500 MG  tablet; Take 1 tablet by mouth 2 (Two) Times a Day for 90 days.  Dispense: 180 tablet; Refill: 3    3. Restless legs  -     rOPINIRole (REQUIP) 0.5 MG tablet; Take 1 tablet by mouth Every Night.  Dispense: 90 tablet; Refill: 0    4. Shortness of breath  -     albuterol sulfate  (90 Base) MCG/ACT inhaler; Inhale 2 puffs Every 4 (Four) Hours As Needed for Wheezing.  Dispense: 2 g; Refill: 3  -     Full Pulmonary Function Test With Bronchodilator; Future    5. Anxiety and depression  -     buPROPion XL (WELLBUTRIN XL) 150 MG 24 hr tablet; Take 1 tablet by mouth Daily.  Dispense: 90 tablet; Refill: 3    6. Allergic rhinitis due to pollen, unspecified seasonality  -     montelukast (SINGULAIR) 10 MG tablet; Take 1 tablet by mouth Daily.  Dispense: 90 tablet; Refill: 0  -     fluticasone (FLONASE) 50 MCG/ACT nasal spray; 2 sprays into the nostril(s) as directed by provider Daily.  Dispense: 1 g; Refill: 3    7. Elevated lipids  -     Lipid panel; Future               Plan of care reviewed with Ms. Joe.   I have advised that she continue to monitor blood pressure at home and let me know if she is consistently running greater than 140/90, she states understanding of this medications renewed, will continue current therapies.  She is due to have her cholesterol rechecked, however is not fasting today, and I will be following up with her in a month to follow-up on the shortness of breath, I have asked for an early appointment so that she can be fasting and we can assess her cholesterol at that time.  I have refilled her Requip for her restless legs which she states has been very effective for her, also refilled her albuterol.  Ordered a PFT for shortness of breath, we will follow-up on this in a month.  Also refilled her Wellbutrin for her anxiety and depression of which she states that Wellbutrin does a great job controlling.  Flonase and Singulair for her allergic rhinitis, this is under good control currently with  these occasions.   Will follow-up in 1 month can be seen prior to that as needed.

## 2022-06-22 ENCOUNTER — LAB (OUTPATIENT)
Dept: LAB | Facility: HOSPITAL | Age: 77
End: 2022-06-22

## 2022-06-22 ENCOUNTER — INFUSION (OUTPATIENT)
Dept: ONCOLOGY | Facility: HOSPITAL | Age: 77
End: 2022-06-22

## 2022-06-22 DIAGNOSIS — D63.1 ANEMIA OF CHRONIC RENAL FAILURE, STAGE 3A: ICD-10-CM

## 2022-06-22 DIAGNOSIS — N18.31 ANEMIA OF CHRONIC RENAL FAILURE, STAGE 3A: ICD-10-CM

## 2022-06-22 LAB
BASOPHILS # BLD AUTO: 0.03 10*3/MM3 (ref 0–0.2)
BASOPHILS NFR BLD AUTO: 0.5 % (ref 0–1.5)
DEPRECATED RDW RBC AUTO: 52.8 FL (ref 37–54)
EOSINOPHIL # BLD AUTO: 0.34 10*3/MM3 (ref 0–0.4)
EOSINOPHIL NFR BLD AUTO: 5.9 % (ref 0.3–6.2)
ERYTHROCYTE [DISTWIDTH] IN BLOOD BY AUTOMATED COUNT: 15.8 % (ref 12.3–15.4)
HCT VFR BLD AUTO: 33.5 % (ref 34–46.6)
HGB BLD-MCNC: 10.4 G/DL (ref 12–15.9)
IMM GRANULOCYTES # BLD AUTO: 0.03 10*3/MM3 (ref 0–0.05)
IMM GRANULOCYTES NFR BLD AUTO: 0.5 % (ref 0–0.5)
LYMPHOCYTES # BLD AUTO: 1.5 10*3/MM3 (ref 0.7–3.1)
LYMPHOCYTES NFR BLD AUTO: 26 % (ref 19.6–45.3)
MCH RBC QN AUTO: 28.5 PG (ref 26.6–33)
MCHC RBC AUTO-ENTMCNC: 31 G/DL (ref 31.5–35.7)
MCV RBC AUTO: 91.8 FL (ref 79–97)
MONOCYTES # BLD AUTO: 0.59 10*3/MM3 (ref 0.1–0.9)
MONOCYTES NFR BLD AUTO: 10.2 % (ref 5–12)
NEUTROPHILS NFR BLD AUTO: 3.28 10*3/MM3 (ref 1.7–7)
NEUTROPHILS NFR BLD AUTO: 56.9 % (ref 42.7–76)
NRBC BLD AUTO-RTO: 0 /100 WBC (ref 0–0.2)
PLATELET # BLD AUTO: 77 10*3/MM3 (ref 140–450)
PMV BLD AUTO: 12.2 FL (ref 6–12)
RBC # BLD AUTO: 3.65 10*6/MM3 (ref 3.77–5.28)
WBC NRBC COR # BLD: 5.77 10*3/MM3 (ref 3.4–10.8)

## 2022-06-22 PROCEDURE — 85025 COMPLETE CBC W/AUTO DIFF WBC: CPT

## 2022-06-22 PROCEDURE — G0463 HOSPITAL OUTPT CLINIC VISIT: HCPCS

## 2022-06-22 PROCEDURE — 36415 COLL VENOUS BLD VENIPUNCTURE: CPT

## 2022-06-22 NOTE — PROGRESS NOTES
1455 - Injection not indicated, Patient does not have an appointment scheduled for July, but patient states that she is going out of town and does not want to schedule one. She will be back in September  - JOSELINE Espinosa RN

## 2022-06-23 DIAGNOSIS — Z17.0 MALIGNANT NEOPLASM OF UPPER-OUTER QUADRANT OF LEFT BREAST IN FEMALE, ESTROGEN RECEPTOR POSITIVE: Primary | ICD-10-CM

## 2022-06-23 DIAGNOSIS — C50.412 MALIGNANT NEOPLASM OF UPPER-OUTER QUADRANT OF LEFT BREAST IN FEMALE, ESTROGEN RECEPTOR POSITIVE: Primary | ICD-10-CM

## 2022-06-23 RX ORDER — ANASTROZOLE 1 MG/1
1 TABLET ORAL DAILY
Qty: 90 TABLET | Refills: 3 | Status: SHIPPED | OUTPATIENT
Start: 2022-06-23

## 2022-06-30 ENCOUNTER — OFFICE VISIT (OUTPATIENT)
Dept: INTERNAL MEDICINE | Facility: CLINIC | Age: 77
End: 2022-06-30

## 2022-06-30 VITALS
WEIGHT: 244 LBS | BODY MASS INDEX: 36.98 KG/M2 | HEART RATE: 85 BPM | HEIGHT: 68 IN | TEMPERATURE: 97.5 F | DIASTOLIC BLOOD PRESSURE: 82 MMHG | SYSTOLIC BLOOD PRESSURE: 154 MMHG | OXYGEN SATURATION: 99 %

## 2022-06-30 DIAGNOSIS — I10 HYPERTENSION, BENIGN: ICD-10-CM

## 2022-06-30 DIAGNOSIS — E55.9 VITAMIN D DEFICIENCY: ICD-10-CM

## 2022-06-30 DIAGNOSIS — F51.04 CHRONIC INSOMNIA: ICD-10-CM

## 2022-06-30 DIAGNOSIS — E78.41 ELEVATED LIPOPROTEIN(A): ICD-10-CM

## 2022-06-30 DIAGNOSIS — R06.02 SHORTNESS OF BREATH: Primary | ICD-10-CM

## 2022-06-30 PROCEDURE — 99213 OFFICE O/P EST LOW 20 MIN: CPT

## 2022-06-30 RX ORDER — TRAZODONE HYDROCHLORIDE 100 MG/1
100 TABLET ORAL NIGHTLY
Qty: 90 TABLET | Refills: 3 | Status: SHIPPED | OUTPATIENT
Start: 2022-06-30 | End: 2023-03-02 | Stop reason: SDUPTHER

## 2022-06-30 RX ORDER — ALBUTEROL SULFATE 90 UG/1
2 AEROSOL, METERED RESPIRATORY (INHALATION) EVERY 4 HOURS PRN
Qty: 2 G | Refills: 3 | Status: SHIPPED | OUTPATIENT
Start: 2022-06-30

## 2022-06-30 NOTE — PROGRESS NOTES
Subjective   Marina GINO Joe is a 77 y.o. female.   Chief Complaint   Patient presents with   • Shortness of Breath     4 week follow up ; pt stats she is still SOB and it does not seem to be any better  also needs fasting lipid today        History of Present Illness   Ms. Joe is here today for a 4 week follow up on shortness of breath.   She reports that she has had no changes in shortness of breath, no improvement, no increased severity. She has not had her albuterol inhaler because it was $89 when she went to her pharmacy to pick it up. She states that she gets short of breath with walking for extended periods of time, not at rest. Denies any chest pain at rest or with exertion. No dizziness, palpitations or headaches. Reports that she checks her BP regularly, states that they run around 134/72, has not been elevated past 140/90, reports that she runs higher in the DrMere's office all the time.   She is still following up with  for her management of breast neoplasm.  She is requesting refill on trazodone which she takes for insomnia. Reports she does not sleep well, takes her forever to fall asleep, she has noted this is worse if she eats late, does not have gi disturbance, no acid reflux. Admits she likely does not take her trazodone early enough, typically takes right before she goes to bed.   She notes she has been eating more sweets like m&ms, attributes 3 lb weight gain to this. Does like and eat fruits too. She has gained 3 lb since last visit.    The following portions of the patient's history were reviewed and updated as appropriate: allergies, current medications, past family history, past medical history, past social history, past surgical history and problem list.    Review of Systems    Objective   Past Medical History:   Diagnosis Date   • Breast cancer (HCC)    • CKD (chronic kidney disease)    • Hypercholesteremia    • Hypertension    • Sinusitis    • Stage 3a chronic kidney disease (HCC)  10/20/2020      Past Surgical History:   Procedure Laterality Date   • AVULSION TOENAIL PLATE      Sept 26,2018   • BREAST BIOPSY Left 11/20/2012   • BREAST BIOPSY      Left Breast, 1/2019 per Dr Barkley   • BREAST LUMPECTOMY Left     with node bx    • COLONOSCOPY  09/13/2013    small polyp at 30cm benign hyperplastic polyp, changes consistent with melanosis coli. Recall 5 years   • REPLACEMENT TOTAL KNEE Right     2016   • TOTAL ABDOMINAL HYSTERECTOMY WITH SALPINGO OOPHORECTOMY          Current Outpatient Medications:   •  albuterol sulfate  (90 Base) MCG/ACT inhaler, Inhale 2 puffs Every 4 (Four) Hours As Needed for Wheezing., Disp: 2 g, Rfl: 3  •  anastrozole (ARIMIDEX) 1 MG tablet, Take 1 tablet by mouth Daily., Disp: 90 tablet, Rfl: 3  •  buPROPion XL (WELLBUTRIN XL) 150 MG 24 hr tablet, Take 1 tablet by mouth Daily., Disp: 90 tablet, Rfl: 3  •  Calcium Carb-Cholecalciferol (CALCIUM 600+D3 PO), Take  by mouth., Disp: , Rfl:   •  carvedilol (COREG) 6.25 MG tablet, TAKE 1 TABLET BY MOUTH TWICE DAILY WITH MEALS, Disp: 180 tablet, Rfl: 3  •  cetirizine (zyrTEC) 10 MG tablet, Take 10 mg by mouth Daily., Disp: , Rfl:   •  cyclobenzaprine (FLEXERIL) 10 MG tablet, Take 1 tablet by mouth 3 (Three) Times a Day As Needed for Muscle Spasms., Disp: 90 tablet, Rfl: 5  •  Ergocalciferol (VITAMIN D2 PO), Take 50,000 Units by mouth Every 30 (Thirty) Days., Disp: , Rfl:   •  ferrous sulfate 325 (65 FE) MG tablet, Take 325 mg by mouth Daily With Breakfast., Disp: , Rfl:   •  fluticasone (FLONASE) 50 MCG/ACT nasal spray, 2 sprays into the nostril(s) as directed by provider Daily., Disp: 1 g, Rfl: 3  •  fluticasone (FLOVENT DISKUS) 50 MCG/BLIST diskus inhaler, Inhale 1 puff., Disp: , Rfl:   •  irbesartan-hydrochlorothiazide (AVALIDE) 300-12.5 MG tablet, Take 1 tablet by mouth Daily., Disp: 90 tablet, Rfl: 3  •  montelukast (SINGULAIR) 10 MG tablet, Take 1 tablet by mouth Daily., Disp: 90 tablet, Rfl: 0  •  Multiple  "Vitamins-Minerals (MULTIVITAMIN ADULT) tablet, Take  by mouth Daily., Disp: , Rfl:   •  Omega-3 Fatty Acids (FISH OIL) 1000 MG capsule capsule, Take 1,000 mg by mouth., Disp: , Rfl:   •  rOPINIRole (REQUIP) 0.5 MG tablet, Take 1 tablet by mouth Every Night., Disp: 90 tablet, Rfl: 0  •  rosuvastatin (CRESTOR) 20 MG tablet, TAKE 1 TABLET BY MOUTH DAILY, Disp: 90 tablet, Rfl: 3  •  sertraline (ZOLOFT) 100 MG tablet, TAKE 1 TABLET BY MOUTH DAILY, Disp: 90 tablet, Rfl: 0  •  traZODone (DESYREL) 100 MG tablet, Take 1 tablet by mouth Every Night., Disp: 90 tablet, Rfl: 3  •  valACYclovir (VALTREX) 500 MG tablet, Take 1 tablet by mouth 2 (Two) Times a Day for 90 days., Disp: 180 tablet, Rfl: 3      /82 (BP Location: Right arm, Patient Position: Sitting, Cuff Size: Large Adult)   Pulse 85   Temp 97.5 °F (36.4 °C) (Temporal)   Ht 172.7 cm (68\")   Wt 111 kg (244 lb)   LMP  (LMP Unknown)   SpO2 99%   Breastfeeding No   BMI 37.10 kg/m²      Body mass index is 37.1 kg/m².  Class 2 Severe Obesity (BMI >=35 and <=39.9). Obesity-related health conditions include the following: hypertension and dyslipidemias. Obesity is worsening. BMI is is above average; BMI management plan is completed. We discussed portion control and increasing exercise.       Physical Exam  Vitals and nursing note reviewed.   Constitutional:       General: She is not in acute distress.     Appearance: Normal appearance. She is obese. She is not ill-appearing, toxic-appearing or diaphoretic.   HENT:      Head: Normocephalic and atraumatic.   Eyes:      Extraocular Movements: Extraocular movements intact.      Conjunctiva/sclera: Conjunctivae normal.      Pupils: Pupils are equal, round, and reactive to light.   Neck:      Vascular: No carotid bruit.   Cardiovascular:      Rate and Rhythm: Normal rate and regular rhythm.      Pulses: Normal pulses.      Heart sounds: Normal heart sounds.   Pulmonary:      Effort: Pulmonary effort is normal.      " Breath sounds: Normal breath sounds. No wheezing or rales.   Abdominal:      General: Bowel sounds are normal.      Palpations: Abdomen is soft.   Musculoskeletal:         General: Tenderness (left knee) present.      Cervical back: Normal range of motion and neck supple.      Right lower leg: Edema (trace) present.      Left lower leg: Edema (trace) present.   Skin:     General: Skin is warm and dry.      Capillary Refill: Capillary refill takes less than 2 seconds.   Neurological:      General: No focal deficit present.      Mental Status: She is alert and oriented to person, place, and time. Mental status is at baseline.   Psychiatric:         Mood and Affect: Mood normal.         Behavior: Behavior normal.         Thought Content: Thought content normal.         Judgment: Judgment normal.               Assessment & Plan   Diagnoses and all orders for this visit:    1. Shortness of breath (Primary)  -     albuterol sulfate  (90 Base) MCG/ACT inhaler; Inhale 2 puffs Every 4 (Four) Hours As Needed for Wheezing.  Dispense: 2 g; Refill: 3    2. Hypertension, benign  -     Lipid panel  -     Basic metabolic panel    3. Elevated lipoprotein(a)  -     Lipid panel    4. Vitamin D deficiency  -     Vitamin D 25 Hydroxy    5. Chronic insomnia  -     traZODone (DESYREL) 100 MG tablet; Take 1 tablet by mouth Every Night.  Dispense: 90 tablet; Refill: 3               Has PFT scheduled for 7/12. I have given her instructions on how to download and use [facility], shows albuterol inhaler available at [facility] for as little as $14, I have sent this rx there and she will call if she has issues.   BP is 154/82 here today, not correlating with readings from home. Call with any issues r/t hypertension, bp > 140/90 (did give her a log and asked to write readings down, check at different times of day of different days).   She will be getting lipid panel and vitamin D checked today.   Trazodone refilled, advised taking one hour  before bed, call if still having issues with falling asleep.  Advised avoiding sweets like M&ms and eating fruit for sweet cravings, portion control, and also advised increased activity as tolerated to avoid additonal weight gain and to help with weight loss.   She attributes trace edema in left foot to her left knee pain, is needing a replacement, also has chronic pain and hx of injections to right ankle.   Will recheck BMP as well given elevated creatinine on last one to reassess.   Will follow up in 3 months, prior to this as needed.

## 2022-07-01 LAB
25(OH)D3+25(OH)D2 SERPL-MCNC: 59.2 NG/ML (ref 30–100)
BUN SERPL-MCNC: 22 MG/DL (ref 8–23)
BUN/CREAT SERPL: 16.8 (ref 7–25)
CALCIUM SERPL-MCNC: 8.9 MG/DL (ref 8.6–10.5)
CHLORIDE SERPL-SCNC: 100 MMOL/L (ref 98–107)
CHOLEST SERPL-MCNC: 156 MG/DL (ref 0–200)
CO2 SERPL-SCNC: 29.2 MMOL/L (ref 22–29)
CREAT SERPL-MCNC: 1.31 MG/DL (ref 0.57–1)
EGFRCR SERPLBLD CKD-EPI 2021: 42.3 ML/MIN/1.73
GLUCOSE SERPL-MCNC: 94 MG/DL (ref 65–99)
HDLC SERPL-MCNC: 57 MG/DL (ref 40–60)
LDLC SERPL CALC-MCNC: 86 MG/DL (ref 0–100)
POTASSIUM SERPL-SCNC: 3.9 MMOL/L (ref 3.5–5.2)
SODIUM SERPL-SCNC: 141 MMOL/L (ref 136–145)
TRIGL SERPL-MCNC: 63 MG/DL (ref 0–150)
VLDLC SERPL CALC-MCNC: 13 MG/DL (ref 5–40)

## 2022-07-01 NOTE — PROGRESS NOTES
Creatinine is slightly higher at 1.31, encourage continued efforts towards staying well-hydrated, drinking at least 8 glasses of 8 ounces of water daily.  Cholesterol is perfect.  Vitamin D level is good at 59.2.  Was she able to utilize good Rx and get her albuterol yesterday?

## 2022-07-06 RX ORDER — SERTRALINE HYDROCHLORIDE 100 MG/1
100 TABLET, FILM COATED ORAL DAILY
Qty: 90 TABLET | Refills: 3 | Status: SHIPPED | OUTPATIENT
Start: 2022-07-06

## 2022-07-12 ENCOUNTER — APPOINTMENT (OUTPATIENT)
Dept: PULMONOLOGY | Facility: HOSPITAL | Age: 77
End: 2022-07-12

## 2022-08-16 ENCOUNTER — TELEPHONE (OUTPATIENT)
Dept: INTERNAL MEDICINE | Facility: CLINIC | Age: 77
End: 2022-08-16

## 2022-08-16 DIAGNOSIS — Z01.812 PRE-PROCEDURE LAB EXAM: Primary | ICD-10-CM

## 2022-08-19 ENCOUNTER — APPOINTMENT (OUTPATIENT)
Dept: LAB | Facility: HOSPITAL | Age: 77
End: 2022-08-19

## 2022-08-22 ENCOUNTER — APPOINTMENT (OUTPATIENT)
Dept: PULMONOLOGY | Facility: HOSPITAL | Age: 77
End: 2022-08-22

## 2022-08-23 ENCOUNTER — HOSPITAL ENCOUNTER (OUTPATIENT)
Dept: PULMONOLOGY | Facility: HOSPITAL | Age: 77
Discharge: HOME | End: 2022-08-23

## 2022-08-23 DIAGNOSIS — R06.02 SHORTNESS OF BREATH: ICD-10-CM

## 2022-08-23 PROCEDURE — 94010 BREATHING CAPACITY TEST: CPT

## 2022-08-23 PROCEDURE — 94726 PLETHYSMOGRAPHY LUNG VOLUMES: CPT

## 2022-08-23 PROCEDURE — 94010 BREATHING CAPACITY TEST: CPT | Performed by: INTERNAL MEDICINE

## 2022-08-23 PROCEDURE — 94726 PLETHYSMOGRAPHY LUNG VOLUMES: CPT | Performed by: INTERNAL MEDICINE

## 2022-08-23 RX ORDER — ALBUTEROL SULFATE 2.5 MG/3ML
2.5 SOLUTION RESPIRATORY (INHALATION) ONCE
Status: DISCONTINUED | OUTPATIENT
Start: 2022-08-23 | End: 2022-08-24 | Stop reason: HOSPADM

## 2022-08-29 NOTE — PROGRESS NOTES
Report from pulmonary function test notes that she had a difficult time performing the maneuvers necessary.  Lung volumes were obtained and did show consistency with mild restrictive ventilatory defect, indicating that there is some impairment in air movement in and out of the lungs.  Has she been able to get her albuterol inhaler?  If so it has she noted improvement with use of inhaler?

## 2022-09-07 ENCOUNTER — OFFICE VISIT (OUTPATIENT)
Dept: ONCOLOGY | Facility: CLINIC | Age: 77
End: 2022-09-07

## 2022-09-07 ENCOUNTER — LAB (OUTPATIENT)
Dept: LAB | Facility: HOSPITAL | Age: 77
End: 2022-09-07

## 2022-09-07 VITALS
DIASTOLIC BLOOD PRESSURE: 70 MMHG | OXYGEN SATURATION: 93 % | RESPIRATION RATE: 18 BRPM | BODY MASS INDEX: 36.62 KG/M2 | HEART RATE: 66 BPM | WEIGHT: 241.6 LBS | HEIGHT: 68 IN | TEMPERATURE: 96.6 F | SYSTOLIC BLOOD PRESSURE: 150 MMHG

## 2022-09-07 DIAGNOSIS — D50.8 IRON DEFICIENCY ANEMIA SECONDARY TO INADEQUATE DIETARY IRON INTAKE: ICD-10-CM

## 2022-09-07 DIAGNOSIS — Z17.0 MALIGNANT NEOPLASM OF UPPER-OUTER QUADRANT OF LEFT BREAST IN FEMALE, ESTROGEN RECEPTOR POSITIVE: Primary | ICD-10-CM

## 2022-09-07 DIAGNOSIS — N18.31 ANEMIA OF CHRONIC RENAL FAILURE, STAGE 3A: Primary | ICD-10-CM

## 2022-09-07 DIAGNOSIS — D63.1 ANEMIA OF CHRONIC RENAL FAILURE, STAGE 3A: Primary | ICD-10-CM

## 2022-09-07 DIAGNOSIS — D69.1 PLATELET DYSFUNCTION: Primary | ICD-10-CM

## 2022-09-07 DIAGNOSIS — C50.412 MALIGNANT NEOPLASM OF UPPER-OUTER QUADRANT OF LEFT BREAST IN FEMALE, ESTROGEN RECEPTOR POSITIVE: Primary | ICD-10-CM

## 2022-09-07 LAB
ALBUMIN SERPL-MCNC: 4.2 G/DL (ref 3.5–5.2)
ALBUMIN/GLOB SERPL: 1.2 G/DL
ALP SERPL-CCNC: 91 U/L (ref 39–117)
ALT SERPL W P-5'-P-CCNC: 19 U/L (ref 1–33)
ANION GAP SERPL CALCULATED.3IONS-SCNC: 8 MMOL/L (ref 5–15)
AST SERPL-CCNC: 21 U/L (ref 1–32)
BASOPHILS # BLD AUTO: 0.04 10*3/MM3 (ref 0–0.2)
BASOPHILS NFR BLD AUTO: 0.5 % (ref 0–1.5)
BILIRUB SERPL-MCNC: 0.2 MG/DL (ref 0–1.2)
BUN SERPL-MCNC: 16 MG/DL (ref 8–23)
BUN/CREAT SERPL: 13.3 (ref 7–25)
CALCIUM SPEC-SCNC: 10.1 MG/DL (ref 8.6–10.5)
CHLORIDE SERPL-SCNC: 101 MMOL/L (ref 98–107)
CO2 SERPL-SCNC: 30 MMOL/L (ref 22–29)
CREAT SERPL-MCNC: 1.2 MG/DL (ref 0.57–1)
DEPRECATED RDW RBC AUTO: 53.9 FL (ref 37–54)
EGFRCR SERPLBLD CKD-EPI 2021: 47 ML/MIN/1.73
EOSINOPHIL # BLD AUTO: 0.48 10*3/MM3 (ref 0–0.4)
EOSINOPHIL NFR BLD AUTO: 5.7 % (ref 0.3–6.2)
ERYTHROCYTE [DISTWIDTH] IN BLOOD BY AUTOMATED COUNT: 15.9 % (ref 12.3–15.4)
FERRITIN SERPL-MCNC: 585.4 NG/ML (ref 13–150)
GLOBULIN UR ELPH-MCNC: 3.5 GM/DL
GLUCOSE SERPL-MCNC: 116 MG/DL (ref 65–99)
HCT VFR BLD AUTO: 30.4 % (ref 34–46.6)
HGB BLD-MCNC: 9.6 G/DL (ref 12–15.9)
HOLD SPECIMEN: NORMAL
IRON 24H UR-MRATE: 40 MCG/DL (ref 37–145)
IRON SATN MFR SERPL: 13 % (ref 20–50)
LYMPHOCYTES # BLD AUTO: 1.99 10*3/MM3 (ref 0.7–3.1)
LYMPHOCYTES NFR BLD AUTO: 23.6 % (ref 19.6–45.3)
MCH RBC QN AUTO: 29.1 PG (ref 26.6–33)
MCHC RBC AUTO-ENTMCNC: 31.6 G/DL (ref 31.5–35.7)
MCV RBC AUTO: 92.1 FL (ref 79–97)
MONOCYTES # BLD AUTO: 0.69 10*3/MM3 (ref 0.1–0.9)
MONOCYTES NFR BLD AUTO: 8.2 % (ref 5–12)
NEUTROPHILS NFR BLD AUTO: 5.22 10*3/MM3 (ref 1.7–7)
NEUTROPHILS NFR BLD AUTO: 61.8 % (ref 42.7–76)
PLATELET # BLD AUTO: 62 10*3/MM3 (ref 140–450)
PMV BLD AUTO: ABNORMAL FL
POTASSIUM SERPL-SCNC: 3.9 MMOL/L (ref 3.5–5.2)
PROT SERPL-MCNC: 7.7 G/DL (ref 6–8.5)
RBC # BLD AUTO: 3.3 10*6/MM3 (ref 3.77–5.28)
SODIUM SERPL-SCNC: 139 MMOL/L (ref 136–145)
TIBC SERPL-MCNC: 298 MCG/DL (ref 298–536)
TRANSFERRIN SERPL-MCNC: 200 MG/DL (ref 200–360)
WBC NRBC COR # BLD: 8.44 10*3/MM3 (ref 3.4–10.8)

## 2022-09-07 PROCEDURE — 99214 OFFICE O/P EST MOD 30 MIN: CPT | Performed by: INTERNAL MEDICINE

## 2022-09-07 PROCEDURE — 83540 ASSAY OF IRON: CPT

## 2022-09-07 PROCEDURE — 84466 ASSAY OF TRANSFERRIN: CPT

## 2022-09-07 PROCEDURE — 80053 COMPREHEN METABOLIC PANEL: CPT

## 2022-09-07 PROCEDURE — 82728 ASSAY OF FERRITIN: CPT

## 2022-09-07 PROCEDURE — 36415 COLL VENOUS BLD VENIPUNCTURE: CPT

## 2022-09-07 PROCEDURE — 85025 COMPLETE CBC W/AUTO DIFF WBC: CPT

## 2022-09-07 RX ORDER — SODIUM CHLORIDE 9 MG/ML
250 INJECTION, SOLUTION INTRAVENOUS ONCE
Status: CANCELLED | OUTPATIENT
Start: 2022-09-27

## 2022-09-07 RX ORDER — DIPHENHYDRAMINE HYDROCHLORIDE 50 MG/ML
50 INJECTION INTRAMUSCULAR; INTRAVENOUS AS NEEDED
Status: CANCELLED | OUTPATIENT
Start: 2022-09-27

## 2022-09-07 RX ORDER — FAMOTIDINE 10 MG/ML
20 INJECTION, SOLUTION INTRAVENOUS AS NEEDED
Status: CANCELLED | OUTPATIENT
Start: 2022-09-27

## 2022-09-07 RX ORDER — ACETAMINOPHEN 325 MG/1
650 TABLET ORAL ONCE
Status: CANCELLED | OUTPATIENT
Start: 2022-09-27

## 2022-09-12 ENCOUNTER — TELEPHONE (OUTPATIENT)
Dept: ONCOLOGY | Facility: CLINIC | Age: 77
End: 2022-09-12

## 2022-09-12 ENCOUNTER — TELEPHONE (OUTPATIENT)
Dept: INTERNAL MEDICINE | Facility: CLINIC | Age: 77
End: 2022-09-12

## 2022-09-12 NOTE — TELEPHONE ENCOUNTER
Caller: Marina Joe    Relationship: Self    Best call back number: [phone]    What is the best time to reach you: ANYTIME    Who are you requesting to speak with (clinical staff, provider,  specific staff member): ALEXA    What was the call regarding: PT CALLED WANTING THE BONE BIOPSY ON 9/13 TO BE CANC AND THE 9/19 APPT FOR HER INFUSION TO BE CANC.    CALL PT TO R/S     Do you require a callback: YERS

## 2022-09-12 NOTE — TELEPHONE ENCOUNTER
Had COVID booster 3 weeks ago, has been coughing since then, can't sleep, yesterday a small amount of blood in phlegm.

## 2022-09-13 ENCOUNTER — OFFICE VISIT (OUTPATIENT)
Dept: INTERNAL MEDICINE | Facility: CLINIC | Age: 77
End: 2022-09-13

## 2022-09-13 ENCOUNTER — APPOINTMENT (OUTPATIENT)
Dept: CT IMAGING | Facility: HOSPITAL | Age: 77
End: 2022-09-13

## 2022-09-13 ENCOUNTER — HOSPITAL ENCOUNTER (OUTPATIENT)
Dept: GENERAL RADIOLOGY | Facility: HOSPITAL | Age: 77
Discharge: HOME | End: 2022-09-13

## 2022-09-13 VITALS
WEIGHT: 240 LBS | OXYGEN SATURATION: 98 % | HEIGHT: 68 IN | SYSTOLIC BLOOD PRESSURE: 140 MMHG | BODY MASS INDEX: 36.37 KG/M2 | DIASTOLIC BLOOD PRESSURE: 72 MMHG | TEMPERATURE: 96.9 F | HEART RATE: 61 BPM

## 2022-09-13 DIAGNOSIS — R04.2 HEMOPTYSIS: ICD-10-CM

## 2022-09-13 DIAGNOSIS — R06.2 WHEEZING: ICD-10-CM

## 2022-09-13 DIAGNOSIS — R05.9 COUGH: Primary | ICD-10-CM

## 2022-09-13 DIAGNOSIS — R05.9 COUGH: ICD-10-CM

## 2022-09-13 PROCEDURE — 71046 X-RAY EXAM CHEST 2 VIEWS: CPT

## 2022-09-13 PROCEDURE — 99213 OFFICE O/P EST LOW 20 MIN: CPT

## 2022-09-13 RX ORDER — BENZONATATE 100 MG/1
100 CAPSULE ORAL 3 TIMES DAILY PRN
Qty: 45 CAPSULE | Refills: 0 | Status: SHIPPED | OUTPATIENT
Start: 2022-09-13

## 2022-09-13 RX ORDER — GUAIFENESIN 600 MG/1
1200 TABLET, EXTENDED RELEASE ORAL 2 TIMES DAILY
Qty: 112 TABLET | Refills: 0 | Status: SHIPPED | OUTPATIENT
Start: 2022-09-13 | End: 2022-10-11

## 2022-09-13 NOTE — PROGRESS NOTES
Chest x-ray is negative for any signs of pneumonia, does note an area in the left lung that may be a small pleural effusion or pleural thickening, if symptoms do not improve in the next week may consider repeating chest x-ray to reevaluate or ordering a CT of the chest, please stay in touch with us regarding your symptoms

## 2022-09-13 NOTE — PROGRESS NOTES
"Subjective   Marina GINO Joe is a 77 y.o. female.   Chief Complaint   Patient presents with   • Cough     Started about 3 weeks ago after covid booster.  Coughing up mucus some blood on occasion, horse voice as well        History of Present Illness   Ms. Joe presents here today with complaints of incessant coughing that started the day she got her booster vaccine for COVID 3 weeks ago.  She states that 1 week ago she noted a little speck of blood in her sputum that she coughed up, since then she has noted \"a little speck here and there \".  She notes that initially the sputum was yellow now it is more light, cough is always productive.  She notes that she has also had some wheezing, has used her albuterol inhaler and she notices a little improvement after.  She denies any chest pain or fever, does feel like this has made her more short of air.  She denies any malaise, sinus, ear, abdominal pain, has not had any nausea or diarrhea.  She states this is bothersome to her because it does keep her up at night.  She has noted some slight throat discomfort and has been doing warm salt water gargles for this.  She states that the albuterol inhaler was $34, was unsure of how to access [employer], she had her smart phone with her here today so I showed her how to look this up, is able to utilize a coupon for $10 off at [employer], performed a screenshot so that she could use this at her next pickup.  The following portions of the patient's history were reviewed and updated as appropriate: allergies, current medications, past family history, past medical history, past social history, past surgical history and problem list.    Review of Systems    Objective   Past Medical History:   Diagnosis Date   • Breast cancer (HCC)    • CKD (chronic kidney disease)    • Hypercholesteremia    • Hypertension    • Sinusitis    • Stage 3a chronic kidney disease (HCC) 10/20/2020      Past Surgical History:   Procedure Laterality Date   • AVULSION " TOENAIL PLATE      Sept 26,2018   • BREAST BIOPSY Left 11/20/2012   • BREAST BIOPSY      Left Breast, 1/2019 per Dr Barkley   • BREAST LUMPECTOMY Left     with node bx    • COLONOSCOPY  09/13/2013    small polyp at 30cm benign hyperplastic polyp, changes consistent with melanosis coli. Recall 5 years   • REPLACEMENT TOTAL KNEE Right     2016   • TOTAL ABDOMINAL HYSTERECTOMY WITH SALPINGO OOPHORECTOMY          Current Outpatient Medications:   •  albuterol sulfate  (90 Base) MCG/ACT inhaler, Inhale 2 puffs Every 4 (Four) Hours As Needed for Wheezing., Disp: 2 g, Rfl: 3  •  anastrozole (ARIMIDEX) 1 MG tablet, Take 1 tablet by mouth Daily., Disp: 90 tablet, Rfl: 3  •  buPROPion XL (WELLBUTRIN XL) 150 MG 24 hr tablet, Take 1 tablet by mouth Daily., Disp: 90 tablet, Rfl: 3  •  Calcium Carb-Cholecalciferol (CALCIUM 600+D3 PO), Take  by mouth., Disp: , Rfl:   •  carvedilol (COREG) 6.25 MG tablet, TAKE 1 TABLET BY MOUTH TWICE DAILY WITH MEALS, Disp: 180 tablet, Rfl: 3  •  cetirizine (zyrTEC) 10 MG tablet, Take 10 mg by mouth Daily., Disp: , Rfl:   •  cyclobenzaprine (FLEXERIL) 10 MG tablet, Take 1 tablet by mouth 3 (Three) Times a Day As Needed for Muscle Spasms., Disp: 90 tablet, Rfl: 5  •  Ergocalciferol (VITAMIN D2 PO), Take 50,000 Units by mouth Every 30 (Thirty) Days., Disp: , Rfl:   •  ferrous sulfate 325 (65 FE) MG tablet, Take 325 mg by mouth Daily With Breakfast., Disp: , Rfl:   •  fluticasone (FLONASE) 50 MCG/ACT nasal spray, 2 sprays into the nostril(s) as directed by provider Daily., Disp: 1 g, Rfl: 3  •  fluticasone (FLOVENT DISKUS) 50 MCG/BLIST diskus inhaler, Inhale 1 puff., Disp: , Rfl:   •  irbesartan-hydrochlorothiazide (AVALIDE) 300-12.5 MG tablet, Take 1 tablet by mouth Daily., Disp: 90 tablet, Rfl: 3  •  montelukast (SINGULAIR) 10 MG tablet, Take 1 tablet by mouth Daily., Disp: 90 tablet, Rfl: 0  •  Multiple Vitamins-Minerals (MULTIVITAMIN ADULT) tablet, Take  by mouth Daily., Disp: , Rfl:   •   "Omega-3 Fatty Acids (FISH OIL) 1000 MG capsule capsule, Take 1,000 mg by mouth., Disp: , Rfl:   •  rOPINIRole (REQUIP) 0.5 MG tablet, Take 1 tablet by mouth Every Night., Disp: 90 tablet, Rfl: 0  •  rosuvastatin (CRESTOR) 20 MG tablet, TAKE 1 TABLET BY MOUTH DAILY, Disp: 90 tablet, Rfl: 3  •  sertraline (ZOLOFT) 100 MG tablet, TAKE 1 TABLET BY MOUTH DAILY, Disp: 90 tablet, Rfl: 3  •  traZODone (DESYREL) 100 MG tablet, Take 1 tablet by mouth Every Night., Disp: 90 tablet, Rfl: 3  •  benzonatate (Tessalon Perles) 100 MG capsule, Take 1 capsule by mouth 3 (Three) Times a Day As Needed for Cough., Disp: 45 capsule, Rfl: 0  •  guaiFENesin (Mucinex) 600 MG 12 hr tablet, Take 2 tablets by mouth 2 (Two) Times a Day for 28 days., Disp: 112 tablet, Rfl: 0      /72 (BP Location: Left arm, Patient Position: Sitting, Cuff Size: Adult)   Pulse 61   Temp 96.9 °F (36.1 °C) (Temporal)   Ht 172.7 cm (68\")   Wt 109 kg (240 lb)   LMP  (LMP Unknown)   SpO2 98%   Breastfeeding No   BMI 36.49 kg/m²      Body mass index is 36.49 kg/m².         Physical Exam  Vitals and nursing note reviewed.   Constitutional:       General: She is not in acute distress.     Appearance: Normal appearance. She is obese. She is not ill-appearing, toxic-appearing or diaphoretic.   HENT:      Head: Normocephalic and atraumatic.      Nose: Nose normal.      Mouth/Throat:      Mouth: Mucous membranes are moist.      Pharynx: Oropharynx is clear. Posterior oropharyngeal erythema present. No oropharyngeal exudate.   Eyes:      Extraocular Movements: Extraocular movements intact.      Conjunctiva/sclera: Conjunctivae normal.      Pupils: Pupils are equal, round, and reactive to light.   Cardiovascular:      Rate and Rhythm: Normal rate and regular rhythm.      Pulses: Normal pulses.      Heart sounds: Normal heart sounds.   Pulmonary:      Effort: Pulmonary effort is normal.      Breath sounds: Normal breath sounds.   Abdominal:      General: Bowel " sounds are normal.      Palpations: Abdomen is soft.   Musculoskeletal:         General: Normal range of motion.      Cervical back: Normal range of motion and neck supple.   Skin:     General: Skin is warm and dry.      Capillary Refill: Capillary refill takes less than 2 seconds.   Neurological:      General: No focal deficit present.      Mental Status: She is alert and oriented to person, place, and time. Mental status is at baseline.   Psychiatric:         Mood and Affect: Mood normal.         Behavior: Behavior normal.         Thought Content: Thought content normal.         Judgment: Judgment normal.               Assessment & Plan   Diagnoses and all orders for this visit:    1. Cough (Primary)  -     XR Chest PA & Lateral; Future  -     guaiFENesin (Mucinex) 600 MG 12 hr tablet; Take 2 tablets by mouth 2 (Two) Times a Day for 28 days.  Dispense: 112 tablet; Refill: 0  -     benzonatate (Tessalon Perles) 100 MG capsule; Take 1 capsule by mouth 3 (Three) Times a Day As Needed for Cough.  Dispense: 45 capsule; Refill: 0    2. Hemoptysis  -     XR Chest PA & Lateral; Future    3. Wheezing  -     XR Chest PA & Lateral; Future               Plan of care reviewed with Marina  Lungs appear to be clear today with good air movement, she did cough on several occasions during her visit, vitals are stable.  We will go ahead and do a chest x-ray to further evaluate, will communicate results as they are available.  No sputum with blood present during today's visit, based on her reports that there have not been any blood clots just scant production, throat does appear erythematous, no exudate.  Advised that I want her to take Mucinex twice daily for the next 2 weeks to help thin secretions, ensure adequate water intake with this.  We will also send in some Tessalon Perles to utilize for cough as needed, advised that she could take up to 2 tablets before bedtime.  Asked her to utilize these therapies for several days and to  please reach out if no improvement is noted and/or if condition increases in severity.  At this time keep your next scheduled visit.

## 2022-09-16 ENCOUNTER — APPOINTMENT (OUTPATIENT)
Dept: ONCOLOGY | Facility: HOSPITAL | Age: 77
End: 2022-09-16

## 2022-09-21 DIAGNOSIS — G25.81 RESTLESS LEGS: ICD-10-CM

## 2022-09-21 DIAGNOSIS — J30.1 ALLERGIC RHINITIS DUE TO POLLEN, UNSPECIFIED SEASONALITY: ICD-10-CM

## 2022-09-21 RX ORDER — MONTELUKAST SODIUM 10 MG/1
10 TABLET ORAL DAILY
Qty: 90 TABLET | Refills: 1 | Status: SHIPPED | OUTPATIENT
Start: 2022-09-21 | End: 2023-03-02 | Stop reason: SDUPTHER

## 2022-09-21 RX ORDER — ROPINIROLE 0.5 MG/1
0.5 TABLET, FILM COATED ORAL NIGHTLY
Qty: 90 TABLET | Refills: 1 | Status: SHIPPED | OUTPATIENT
Start: 2022-09-21 | End: 2023-03-02 | Stop reason: SDUPTHER

## 2022-09-22 ENCOUNTER — APPOINTMENT (OUTPATIENT)
Dept: CT IMAGING | Facility: HOSPITAL | Age: 77
End: 2022-09-22

## 2022-09-27 ENCOUNTER — INFUSION (OUTPATIENT)
Dept: ONCOLOGY | Facility: HOSPITAL | Age: 77
End: 2022-09-27

## 2022-09-27 VITALS
SYSTOLIC BLOOD PRESSURE: 129 MMHG | DIASTOLIC BLOOD PRESSURE: 55 MMHG | OXYGEN SATURATION: 98 % | WEIGHT: 239 LBS | RESPIRATION RATE: 18 BRPM | HEIGHT: 68 IN | HEART RATE: 62 BPM | BODY MASS INDEX: 36.22 KG/M2 | TEMPERATURE: 97.2 F

## 2022-09-27 DIAGNOSIS — N18.30 ANEMIA OF CHRONIC RENAL FAILURE, STAGE 3 (MODERATE), UNSPECIFIED WHETHER STAGE 3A OR 3B CKD: Primary | ICD-10-CM

## 2022-09-27 DIAGNOSIS — D63.1 ANEMIA OF CHRONIC RENAL FAILURE, STAGE 3 (MODERATE), UNSPECIFIED WHETHER STAGE 3A OR 3B CKD: Primary | ICD-10-CM

## 2022-09-27 PROCEDURE — 96365 THER/PROPH/DIAG IV INF INIT: CPT

## 2022-09-27 PROCEDURE — 25010000002 FERRIC CARBOXYMALTOSE 750 MG/15ML SOLUTION 15 ML VIAL: Performed by: INTERNAL MEDICINE

## 2022-09-27 RX ORDER — DIPHENHYDRAMINE HYDROCHLORIDE 50 MG/ML
50 INJECTION INTRAMUSCULAR; INTRAVENOUS AS NEEDED
Status: DISCONTINUED | OUTPATIENT
Start: 2022-09-27 | End: 2022-09-27 | Stop reason: HOSPADM

## 2022-09-27 RX ORDER — SODIUM CHLORIDE 9 MG/ML
250 INJECTION, SOLUTION INTRAVENOUS ONCE
Status: COMPLETED | OUTPATIENT
Start: 2022-09-27 | End: 2022-09-27

## 2022-09-27 RX ORDER — ACETAMINOPHEN 325 MG/1
650 TABLET ORAL ONCE
Status: COMPLETED | OUTPATIENT
Start: 2022-09-27 | End: 2022-09-27

## 2022-09-27 RX ORDER — FAMOTIDINE 10 MG/ML
20 INJECTION, SOLUTION INTRAVENOUS AS NEEDED
Status: DISCONTINUED | OUTPATIENT
Start: 2022-09-27 | End: 2022-09-27 | Stop reason: HOSPADM

## 2022-09-27 RX ADMIN — SODIUM CHLORIDE 250 ML: 9 INJECTION, SOLUTION INTRAVENOUS at 13:02

## 2022-09-27 RX ADMIN — ACETAMINOPHEN 650 MG: 325 TABLET, FILM COATED ORAL at 13:03

## 2022-09-27 RX ADMIN — FERRIC CARBOXYMALTOSE INJECTION 750 MG: 50 INJECTION, SOLUTION INTRAVENOUS at 13:05

## 2022-10-04 ENCOUNTER — OFFICE VISIT (OUTPATIENT)
Dept: INTERNAL MEDICINE | Facility: CLINIC | Age: 77
End: 2022-10-04

## 2022-10-04 VITALS
OXYGEN SATURATION: 98 % | TEMPERATURE: 98.2 F | DIASTOLIC BLOOD PRESSURE: 70 MMHG | HEART RATE: 62 BPM | WEIGHT: 238 LBS | HEIGHT: 68 IN | BODY MASS INDEX: 36.07 KG/M2 | SYSTOLIC BLOOD PRESSURE: 142 MMHG

## 2022-10-04 DIAGNOSIS — E66.01 MORBIDLY OBESE: ICD-10-CM

## 2022-10-04 DIAGNOSIS — R20.0 NUMBNESS AND TINGLING OF FOOT: ICD-10-CM

## 2022-10-04 DIAGNOSIS — R05.3 CHRONIC COUGH: ICD-10-CM

## 2022-10-04 DIAGNOSIS — I10 HYPERTENSION, BENIGN: Primary | ICD-10-CM

## 2022-10-04 DIAGNOSIS — R06.2 WHEEZING: ICD-10-CM

## 2022-10-04 DIAGNOSIS — R73.01 IFG (IMPAIRED FASTING GLUCOSE): ICD-10-CM

## 2022-10-04 DIAGNOSIS — R20.2 NUMBNESS AND TINGLING OF FOOT: ICD-10-CM

## 2022-10-04 DIAGNOSIS — R06.02 SHORTNESS OF BREATH: ICD-10-CM

## 2022-10-04 LAB — HBA1C MFR BLD: 5.8 %

## 2022-10-04 PROCEDURE — 99213 OFFICE O/P EST LOW 20 MIN: CPT

## 2022-10-04 PROCEDURE — 3044F HG A1C LEVEL LT 7.0%: CPT

## 2022-10-04 PROCEDURE — 90662 IIV NO PRSV INCREASED AG IM: CPT

## 2022-10-04 PROCEDURE — G0008 ADMIN INFLUENZA VIRUS VAC: HCPCS

## 2022-10-04 PROCEDURE — 83036 HEMOGLOBIN GLYCOSYLATED A1C: CPT

## 2022-10-04 NOTE — PROGRESS NOTES
Subjective   Marina Jeo is a 77 y.o. female.   Chief Complaint   Patient presents with   • Hypertension     3 month follow up; pt forgot paper with BP readings, she states it has been running higher in the mornings 160's/70's        History of Present Illness   Marina presents here today for 3-month follow-up on hypertension.  She states that she forgot to bring her paper with the blood pressure readings from home but states that she is often 160 systolic in the mornings, typically 70s diastolic.  She states that when her blood pressures this time she feels completely fine, denies any chest pains or headaches.  She states that she continues to have shortness of air with exertion like walking, continues to have a cough that has been present ever since August 31 when she got her COVID booster vaccine.  She was previously seen in the office for this and was prescribed Tessalon Perles, chest x-ray revealed no active cardiopulmonary disease did note a potential small pleural effusion in the left lateral costophrenic sulcus.  She had at that visit mention that she had coughed up some blood, she states today that she has had no more bloody sputum.  She does note that she wheezes at times has been using her as needed albuterol inhaler and has had good results with this.  She states that she has been using creomulsion for cough suppressant, states that she only takes it as recommended on the label.  States that the cough is worse in the mornings, is typically productive in the mornings with yellow phlegm that is moderately thick.  She states that she will intermittently get congested and cough during the day as well.  She mentions that Dr. Shah is wanting to do a bone marrow biopsy, she states that she has canceled on this several times because of her not feeling well with related to her cough, she admits that she is also nervous about it  She denies any acid reflux.  Denies any other GI or  symptoms or issues.      When  asked about numbness and tingling in her feet she states that for the last week she has noted a pins and needle sensation on the bottom of her left foot.  Looking back through her history I do see where she has had some elevated blood glucose levels on her metabolic panels however I do not see an A1c.  She states that she may have a soda every day or so, typically avoids sugary foods, does not eat a lot of bread, states that she enjoys eating meat and spinach.    The following portions of the patient's history were reviewed and updated as appropriate: allergies, current medications, past family history, past medical history, past social history, past surgical history and problem list.    Review of Systems    Objective   Past Medical History:   Diagnosis Date   • Breast cancer (HCC)    • CKD (chronic kidney disease)    • Hypercholesteremia    • Hypertension    • Sinusitis    • Stage 3a chronic kidney disease (HCC) 10/20/2020      Past Surgical History:   Procedure Laterality Date   • AVULSION TOENAIL PLATE      Sept 26,2018   • BREAST BIOPSY Left 11/20/2012   • BREAST BIOPSY      Left Breast, 1/2019 per Dr Barkley   • BREAST LUMPECTOMY Left     with node bx    • COLONOSCOPY  09/13/2013    small polyp at 30cm benign hyperplastic polyp, changes consistent with melanosis coli. Recall 5 years   • REPLACEMENT TOTAL KNEE Right     2016   • TOTAL ABDOMINAL HYSTERECTOMY WITH SALPINGO OOPHORECTOMY          Current Outpatient Medications:   •  albuterol sulfate  (90 Base) MCG/ACT inhaler, Inhale 2 puffs Every 4 (Four) Hours As Needed for Wheezing., Disp: 2 g, Rfl: 3  •  anastrozole (ARIMIDEX) 1 MG tablet, Take 1 tablet by mouth Daily., Disp: 90 tablet, Rfl: 3  •  benzonatate (Tessalon Perles) 100 MG capsule, Take 1 capsule by mouth 3 (Three) Times a Day As Needed for Cough., Disp: 45 capsule, Rfl: 0  •  buPROPion XL (WELLBUTRIN XL) 150 MG 24 hr tablet, Take 1 tablet by mouth Daily., Disp: 90 tablet, Rfl: 3  •  Calcium  "Carb-Cholecalciferol (CALCIUM 600+D3 PO), Take  by mouth., Disp: , Rfl:   •  carvedilol (COREG) 6.25 MG tablet, TAKE 1 TABLET BY MOUTH TWICE DAILY WITH MEALS, Disp: 180 tablet, Rfl: 3  •  cetirizine (zyrTEC) 10 MG tablet, Take 10 mg by mouth Daily., Disp: , Rfl:   •  cyclobenzaprine (FLEXERIL) 10 MG tablet, Take 1 tablet by mouth 3 (Three) Times a Day As Needed for Muscle Spasms., Disp: 90 tablet, Rfl: 5  •  Ergocalciferol (VITAMIN D2 PO), Take 50,000 Units by mouth Every 30 (Thirty) Days., Disp: , Rfl:   •  ferrous sulfate 325 (65 FE) MG tablet, Take 325 mg by mouth Daily With Breakfast., Disp: , Rfl:   •  fluticasone (FLONASE) 50 MCG/ACT nasal spray, 2 sprays into the nostril(s) as directed by provider Daily., Disp: 1 g, Rfl: 3  •  fluticasone (FLOVENT DISKUS) 50 MCG/BLIST diskus inhaler, Inhale 1 puff., Disp: , Rfl:   •  guaiFENesin (Mucinex) 600 MG 12 hr tablet, Take 2 tablets by mouth 2 (Two) Times a Day for 28 days., Disp: 112 tablet, Rfl: 0  •  irbesartan-hydrochlorothiazide (AVALIDE) 300-12.5 MG tablet, Take 1 tablet by mouth Daily., Disp: 90 tablet, Rfl: 3  •  montelukast (SINGULAIR) 10 MG tablet, TAKE 1 TABLET BY MOUTH DAILY, Disp: 90 tablet, Rfl: 1  •  Multiple Vitamins-Minerals (MULTIVITAMIN ADULT) tablet, Take  by mouth Daily., Disp: , Rfl:   •  Omega-3 Fatty Acids (FISH OIL) 1000 MG capsule capsule, Take 1,000 mg by mouth., Disp: , Rfl:   •  rOPINIRole (REQUIP) 0.5 MG tablet, TAKE 1 TABLET BY MOUTH EVERY NIGHT, Disp: 90 tablet, Rfl: 1  •  rosuvastatin (CRESTOR) 20 MG tablet, TAKE 1 TABLET BY MOUTH DAILY, Disp: 90 tablet, Rfl: 3  •  sertraline (ZOLOFT) 100 MG tablet, TAKE 1 TABLET BY MOUTH DAILY, Disp: 90 tablet, Rfl: 3  •  traZODone (DESYREL) 100 MG tablet, Take 1 tablet by mouth Every Night., Disp: 90 tablet, Rfl: 3      /70 (BP Location: Left arm, Patient Position: Sitting, Cuff Size: Adult)   Pulse 62   Temp 98.2 °F (36.8 °C) (Temporal)   Ht 172.7 cm (68\")   Wt 108 kg (238 lb)   LMP  (LMP " Unknown)   SpO2 98%   Breastfeeding No   BMI 36.19 kg/m²      Body mass index is 36.19 kg/m².  Class 2 Severe Obesity (BMI >=35 and <=39.9). Obesity-related health conditions include the following: hypertension and dyslipidemias. Obesity is unchanged. BMI is is above average; BMI management plan is completed. We discussed portion control and increasing exercise.       Physical Exam  Vitals and nursing note reviewed.   Constitutional:       General: She is not in acute distress.     Appearance: Normal appearance. She is obese. She is not ill-appearing, toxic-appearing or diaphoretic.   HENT:      Head: Normocephalic and atraumatic.      Mouth/Throat:      Mouth: Mucous membranes are moist.      Pharynx: Oropharynx is clear. Posterior oropharyngeal erythema present. No oropharyngeal exudate.   Eyes:      Extraocular Movements: Extraocular movements intact.      Conjunctiva/sclera: Conjunctivae normal.      Pupils: Pupils are equal, round, and reactive to light.   Cardiovascular:      Rate and Rhythm: Normal rate and regular rhythm.      Pulses: Normal pulses.      Heart sounds: Normal heart sounds.   Pulmonary:      Effort: Pulmonary effort is normal. No respiratory distress.      Breath sounds: No stridor. No wheezing, rhonchi or rales.      Comments: Diminished breath sounds in bases     Chest:      Chest wall: No tenderness.   Abdominal:      General: Bowel sounds are normal. There is no distension.      Palpations: Abdomen is soft.      Tenderness: There is no abdominal tenderness.   Musculoskeletal:         General: No tenderness. Normal range of motion.      Cervical back: Normal range of motion and neck supple.      Right lower leg: No edema.      Left lower leg: No edema.   Skin:     Capillary Refill: Capillary refill takes less than 2 seconds.      Findings: Bruising (left shoulder, left knee, right thigh) present.   Neurological:      General: No focal deficit present.      Mental Status: She is alert and  oriented to person, place, and time. Mental status is at baseline.      Sensory: Sensory deficit (numbness and tingling to bottom left foot) present.      Motor: No weakness.      Coordination: Coordination normal.   Psychiatric:         Mood and Affect: Mood normal.         Behavior: Behavior normal.         Thought Content: Thought content normal.         Judgment: Judgment normal.               Assessment & Plan   Diagnoses and all orders for this visit:    1. Hypertension, benign (Primary)    2. Morbidly obese (HCC)    3. Chronic cough  -     CT Chest Without Contrast; Future    4. Shortness of breath  -     CT Chest Without Contrast; Future    5. Wheezing  -     CT Chest Without Contrast; Future    6. Numbness and tingling of foot    7. IFG (impaired fasting glucose)  -     POC Glycated Hemoglobin, Total    Other orders  -     Fluzone High-Dose 65+yrs                Plan of care reviewed with Marina  Blood pressure today is 142/70, while verifying her blood pressure medications with her she was confused about the Avalide, when clarified she seems to think that she has been taking it however is not 100% certain, I have written the name of the medication dosage on a piece of paper and asked her to verify this with her medication bottles at home, she is to call if she does not have this medication.  Would like to make this clarification prior to considering increasing or changing any of her antihypertensives.  Discussed her weight today, has difficulty with increasing physical activity related to the shortness of air that she gets with exertion as well as concern of injury, has been bruising with very little impact given her low platelet count.  We discussed diet, avoidance of simple sugars, ensuring that all food eaten is nutritious.  Assessed A1c and it is 5.8, advised that this puts her at increased risk of developing diabetes.  We may consider trialing some Saxenda or Wegovy, will have her implement diet  changes and discuss this at her visit in November.  She will continue with albuterol as needed, cough suppressants, advised not taking more than recommended as this can have harmful effects, and we will proceed with getting a CT of her chest for further assessment.  Will be seen next end of November for Medicare wellness, can be seen before this as needed.

## 2022-10-13 ENCOUNTER — HOSPITAL ENCOUNTER (OUTPATIENT)
Dept: CT IMAGING | Facility: HOSPITAL | Age: 77
Discharge: HOME | End: 2022-10-13

## 2022-10-13 DIAGNOSIS — R05.3 CHRONIC COUGH: Primary | ICD-10-CM

## 2022-10-13 DIAGNOSIS — R06.02 SHORTNESS OF BREATH: ICD-10-CM

## 2022-10-13 DIAGNOSIS — R06.2 WHEEZING: ICD-10-CM

## 2022-10-13 DIAGNOSIS — R05.3 CHRONIC COUGH: ICD-10-CM

## 2022-10-13 PROCEDURE — 71250 CT THORAX DX C-: CPT

## 2022-10-13 RX ORDER — GUAIFENESIN 600 MG/1
1200 TABLET, EXTENDED RELEASE ORAL 2 TIMES DAILY
Qty: 120 TABLET | Refills: 0 | Status: SHIPPED | OUTPATIENT
Start: 2022-10-13 | End: 2022-11-12

## 2022-10-13 NOTE — PROGRESS NOTES
CT of chest notes no pleural effusion, there is some atelectasis present, no signs of consolidative pneumonia or nodularity, I have ordered Mucinex for her to take twice daily have her take it for at least 2 weeks to see if this help with cough/increasing production to hopefully clear this.  CT of the chest does note that there is some calcifications in the arteries of the heart, I cannot find any kind of previous cardiovascular work-up on her, given her ongoing symptoms, I would like to obtain an EKG, I thought she had an appointment in November, would like to see her sooner than December, we can discuss doing a coronary CTA as well.

## 2022-10-18 ENCOUNTER — OFFICE VISIT (OUTPATIENT)
Dept: INTERNAL MEDICINE | Facility: CLINIC | Age: 77
End: 2022-10-18

## 2022-10-18 VITALS
HEIGHT: 68 IN | DIASTOLIC BLOOD PRESSURE: 78 MMHG | WEIGHT: 241 LBS | BODY MASS INDEX: 36.53 KG/M2 | HEART RATE: 63 BPM | OXYGEN SATURATION: 99 % | SYSTOLIC BLOOD PRESSURE: 140 MMHG | TEMPERATURE: 96.9 F

## 2022-10-18 DIAGNOSIS — R05.3 CHRONIC COUGH: ICD-10-CM

## 2022-10-18 DIAGNOSIS — R06.09 DYSPNEA ON EXERTION: Primary | ICD-10-CM

## 2022-10-18 PROCEDURE — 93000 ELECTROCARDIOGRAM COMPLETE: CPT

## 2022-10-18 PROCEDURE — 99213 OFFICE O/P EST LOW 20 MIN: CPT

## 2022-10-18 NOTE — PROGRESS NOTES
Subjective   Marina Joe is a 77 y.o. female.   Chief Complaint   Patient presents with   • review CT scan results        History of Present Illness   Ms. Joe presents here today for follow-up to discuss results of CT scanning of chest and for continued chronic cough  She reports that she has been utilizing the Mucinex that has been sent in, cough has been productive, states that is more productive in the mornings, does feel that she has some sinus drainage, continues to take her Zyrtec as ordered.  Denies any further hemoptysis, there was only 1 occurrence of this over a month ago.  She states that she continues to have shortness of air with exertion.  She denies any chest pain, chest pressure.  Reports that she feels nauseated only after she takes her medications in the mornings on empty stomach.  No dizziness, diaphoresis.   She does not typically check her blood pressures around noon, states that she will sometimes check her blood pressures at night sometimes before sometimes after taking her Coreg.  She reports that she is also been taking her Avalide as prescribed in the mornings.  Reports that she typically runs around 139/70 in the evenings.    The following portions of the patient's history were reviewed and updated as appropriate: allergies, current medications, past family history, past medical history, past social history, past surgical history and problem list.    Review of Systems    Objective   Past Medical History:   Diagnosis Date   • Breast cancer (HCC)    • CKD (chronic kidney disease)    • Hypercholesteremia    • Hypertension    • Sinusitis    • Stage 3a chronic kidney disease (HCC) 10/20/2020      Past Surgical History:   Procedure Laterality Date   • AVULSION TOENAIL PLATE      Sept 26,2018   • BREAST BIOPSY Left 11/20/2012   • BREAST BIOPSY      Left Breast, 1/2019 per Dr Barkley   • BREAST LUMPECTOMY Left     with node bx    • COLONOSCOPY  09/13/2013    small polyp at 30cm benign  hyperplastic polyp, changes consistent with melanosis coli. Recall 5 years   • REPLACEMENT TOTAL KNEE Right     2016   • TOTAL ABDOMINAL HYSTERECTOMY WITH SALPINGO OOPHORECTOMY          Current Outpatient Medications:   •  albuterol sulfate  (90 Base) MCG/ACT inhaler, Inhale 2 puffs Every 4 (Four) Hours As Needed for Wheezing., Disp: 2 g, Rfl: 3  •  anastrozole (ARIMIDEX) 1 MG tablet, Take 1 tablet by mouth Daily., Disp: 90 tablet, Rfl: 3  •  benzonatate (Tessalon Perles) 100 MG capsule, Take 1 capsule by mouth 3 (Three) Times a Day As Needed for Cough., Disp: 45 capsule, Rfl: 0  •  buPROPion XL (WELLBUTRIN XL) 150 MG 24 hr tablet, Take 1 tablet by mouth Daily., Disp: 90 tablet, Rfl: 3  •  Calcium Carb-Cholecalciferol (CALCIUM 600+D3 PO), Take  by mouth., Disp: , Rfl:   •  carvedilol (COREG) 6.25 MG tablet, TAKE 1 TABLET BY MOUTH TWICE DAILY WITH MEALS, Disp: 180 tablet, Rfl: 3  •  cetirizine (zyrTEC) 10 MG tablet, Take 10 mg by mouth Daily., Disp: , Rfl:   •  cyclobenzaprine (FLEXERIL) 10 MG tablet, Take 1 tablet by mouth 3 (Three) Times a Day As Needed for Muscle Spasms., Disp: 90 tablet, Rfl: 5  •  Ergocalciferol (VITAMIN D2 PO), Take 50,000 Units by mouth Every 30 (Thirty) Days., Disp: , Rfl:   •  ferrous sulfate 325 (65 FE) MG tablet, Take 325 mg by mouth Daily With Breakfast., Disp: , Rfl:   •  fluticasone (FLONASE) 50 MCG/ACT nasal spray, 2 sprays into the nostril(s) as directed by provider Daily., Disp: 1 g, Rfl: 3  •  fluticasone (FLOVENT DISKUS) 50 MCG/BLIST diskus inhaler, Inhale 1 puff., Disp: , Rfl:   •  guaiFENesin (Mucinex) 600 MG 12 hr tablet, Take 2 tablets by mouth 2 (Two) Times a Day for 30 days., Disp: 120 tablet, Rfl: 0  •  irbesartan-hydrochlorothiazide (AVALIDE) 300-12.5 MG tablet, Take 1 tablet by mouth Daily., Disp: 90 tablet, Rfl: 3  •  montelukast (SINGULAIR) 10 MG tablet, TAKE 1 TABLET BY MOUTH DAILY, Disp: 90 tablet, Rfl: 1  •  Multiple Vitamins-Minerals (MULTIVITAMIN ADULT)  "tablet, Take  by mouth Daily., Disp: , Rfl:   •  Omega-3 Fatty Acids (FISH OIL) 1000 MG capsule capsule, Take 1,000 mg by mouth., Disp: , Rfl:   •  rOPINIRole (REQUIP) 0.5 MG tablet, TAKE 1 TABLET BY MOUTH EVERY NIGHT, Disp: 90 tablet, Rfl: 1  •  rosuvastatin (CRESTOR) 20 MG tablet, TAKE 1 TABLET BY MOUTH DAILY, Disp: 90 tablet, Rfl: 3  •  sertraline (ZOLOFT) 100 MG tablet, TAKE 1 TABLET BY MOUTH DAILY, Disp: 90 tablet, Rfl: 3  •  traZODone (DESYREL) 100 MG tablet, Take 1 tablet by mouth Every Night., Disp: 90 tablet, Rfl: 3      /78 (BP Location: Left arm, Patient Position: Sitting, Cuff Size: Adult)   Pulse 63   Temp 96.9 °F (36.1 °C) (Temporal)   Ht 172.7 cm (68\")   Wt 109 kg (241 lb)   LMP  (LMP Unknown)   SpO2 99%   BMI 36.64 kg/m²      Body mass index is 36.64 kg/m².         Physical Exam  Vitals and nursing note reviewed.   Constitutional:       Appearance: Normal appearance. She is obese.   HENT:      Head: Normocephalic and atraumatic.   Eyes:      Pupils: Pupils are equal, round, and reactive to light.   Neck:      Vascular: No carotid bruit.   Cardiovascular:      Rate and Rhythm: Normal rate and regular rhythm.      Pulses: Normal pulses.      Heart sounds: Normal heart sounds. No murmur heard.  Pulmonary:      Effort: Pulmonary effort is normal. No respiratory distress.      Breath sounds: Normal breath sounds. No wheezing or rales.   Musculoskeletal:         General: Normal range of motion.      Cervical back: Normal range of motion and neck supple.   Skin:     General: Skin is warm and dry.      Capillary Refill: Capillary refill takes less than 2 seconds.   Neurological:      General: No focal deficit present.      Mental Status: She is alert and oriented to person, place, and time. Mental status is at baseline.   Psychiatric:         Mood and Affect: Mood normal.         Behavior: Behavior normal.         Thought Content: Thought content normal.         Judgment: Judgment normal. "             ECG 12 Lead    Date/Time: 10/18/2022 8:00 AM  Performed by: Hanh Og APRN  Authorized by: Hanh Og APRN   Comparison: not compared with previous ECG   Rhythm: sinus rhythm  Ectopy: infrequent PVCs  Rate: normal  ST Segments: ST segments normal  T inversion: V1, V2 and V3  Other findings: T wave abnormality    Clinical impression: abnormal EKG              Assessment & Plan   Diagnoses and all orders for this visit:    1. Dyspnea on exertion (Primary)  -     Adult Stress Echo W/ Cont or Stress Agent if Necessary Per Protocol; Future    2. Chronic cough    Other orders  -     ECG 12 Lead               Plan of care and CT of chest results reviewed with Ms. Joe  CT Chest Without Contrast Diagnostic (10/13/2022 12:46)  Did note left lingular and left lower lobe subsegmental atelectasis, advised to continue with Mucinex to help keep cough productive and Zyrtec for sinus drainage.  Scan did also note coronary calcifications in the LAD and circumflex distributions, discussed additional work-up for this advised that her dyspnea on exertion may be related to a cardiac source rather than just her lungs.  Discussed proceeding with Crestor for now getting a stress echocardiogram as well as an EKG today as I do not see one done previously, she is compliant with this.  Last lipid panel was stable, she will continue with this.   She will continue take her medications as ordered, I have given her instructions to evaluate her blood pressures daily around noon as well as in the evenings right before bedtime, if blood pressures are not well controlled we will consider adding additional therapy for this, we will have her return in 2 weeks to evaluate this.

## 2022-10-26 ENCOUNTER — HOSPITAL ENCOUNTER (OUTPATIENT)
Dept: CARDIOLOGY | Facility: HOSPITAL | Age: 77
Discharge: HOME | End: 2022-10-26

## 2022-10-26 VITALS
HEIGHT: 68 IN | WEIGHT: 240 LBS | DIASTOLIC BLOOD PRESSURE: 80 MMHG | BODY MASS INDEX: 36.37 KG/M2 | SYSTOLIC BLOOD PRESSURE: 188 MMHG | HEART RATE: 60 BPM

## 2022-10-26 DIAGNOSIS — R06.09 DYSPNEA ON EXERTION: ICD-10-CM

## 2022-10-26 PROCEDURE — 25010000002 ATROPINE SULFATE: Performed by: INTERNAL MEDICINE

## 2022-10-26 PROCEDURE — 93017 CV STRESS TEST TRACING ONLY: CPT

## 2022-10-26 PROCEDURE — 93350 STRESS TTE ONLY: CPT

## 2022-10-26 PROCEDURE — 25010000002 PERFLUTREN 6.52 MG/ML SUSPENSION: Performed by: INTERNAL MEDICINE

## 2022-10-26 PROCEDURE — 93352 ADMIN ECG CONTRAST AGENT: CPT | Performed by: INTERNAL MEDICINE

## 2022-10-26 PROCEDURE — 0 DOBUTAMINE PER 250 MG: Performed by: INTERNAL MEDICINE

## 2022-10-26 PROCEDURE — 93018 CV STRESS TEST I&R ONLY: CPT | Performed by: INTERNAL MEDICINE

## 2022-10-26 PROCEDURE — 93350 STRESS TTE ONLY: CPT | Performed by: INTERNAL MEDICINE

## 2022-10-26 RX ORDER — DOBUTAMINE HYDROCHLORIDE 100 MG/100ML
10-50 INJECTION INTRAVENOUS CONTINUOUS
Status: DISCONTINUED | OUTPATIENT
Start: 2022-10-26 | End: 2022-10-27 | Stop reason: HOSPADM

## 2022-10-26 RX ORDER — METOPROLOL TARTRATE 5 MG/5ML
5 INJECTION INTRAVENOUS ONCE
Status: COMPLETED | OUTPATIENT
Start: 2022-10-26 | End: 2022-10-26

## 2022-10-26 RX ADMIN — ATROPINE SULFATE 2 MG: 0.1 INJECTION INTRAVENOUS at 09:50

## 2022-10-26 RX ADMIN — PERFLUTREN 8.48 MG: 6.52 INJECTION, SUSPENSION INTRAVENOUS at 09:37

## 2022-10-26 RX ADMIN — DOBUTAMINE HYDROCHLORIDE 10 MCG/KG/MIN: 100 INJECTION INTRAVENOUS at 09:37

## 2022-10-26 RX ADMIN — METOPROLOL TARTRATE 5 MG: 5 INJECTION INTRAVENOUS at 09:52

## 2022-10-27 LAB
BH CV STRESS BP STAGE 1: NORMAL
BH CV STRESS BP STAGE 2: NORMAL
BH CV STRESS BP STAGE 3: NORMAL
BH CV STRESS BP STAGE 4: NORMAL
BH CV STRESS DOB - ATROPINE STAGE 3: 1
BH CV STRESS DOB - ATROPINE STAGE 4: 1
BH CV STRESS DOSE DOBUTAMINE STAGE 1: 10
BH CV STRESS DOSE DOBUTAMINE STAGE 2: 20
BH CV STRESS DOSE DOBUTAMINE STAGE 3: 30
BH CV STRESS DOSE DOBUTAMINE STAGE 4: 40
BH CV STRESS DURATION MIN STAGE 1: 3
BH CV STRESS DURATION MIN STAGE 2: 3
BH CV STRESS DURATION MIN STAGE 3: 3
BH CV STRESS DURATION MIN STAGE 4: 1
BH CV STRESS DURATION SEC STAGE 1: 0
BH CV STRESS DURATION SEC STAGE 2: 0
BH CV STRESS DURATION SEC STAGE 3: 0
BH CV STRESS DURATION SEC STAGE 4: 24
BH CV STRESS HR STAGE 1: 57
BH CV STRESS HR STAGE 2: 74
BH CV STRESS HR STAGE 3: 113
BH CV STRESS HR STAGE 4: 125
BH CV STRESS PROTOCOL 1: NORMAL
BH CV STRESS RECOVERY BP: NORMAL MMHG
BH CV STRESS RECOVERY HR: 67 BPM
BH CV STRESS STAGE 1: 1
BH CV STRESS STAGE 2: 2
BH CV STRESS STAGE 3: 3
BH CV STRESS STAGE 4: 4
MAXIMAL PREDICTED HEART RATE: 144 BPM
PERCENT MAX PREDICTED HR: 86.81 %
STRESS BASELINE BP: NORMAL MMHG
STRESS BASELINE HR: 60 BPM
STRESS PERCENT HR: 102 %
STRESS POST EXERCISE DUR MIN: 10 MIN
STRESS POST EXERCISE DUR SEC: 24 SEC
STRESS POST PEAK BP: NORMAL MMHG
STRESS POST PEAK HR: 125 BPM
STRESS TARGET HR: 122 BPM

## 2022-10-27 RX ORDER — CYCLOBENZAPRINE HCL 10 MG
10 TABLET ORAL 3 TIMES DAILY PRN
Qty: 90 TABLET | Refills: 5 | Status: SHIPPED | OUTPATIENT
Start: 2022-10-27

## 2022-10-31 ENCOUNTER — OFFICE VISIT (OUTPATIENT)
Dept: INTERNAL MEDICINE | Facility: CLINIC | Age: 77
End: 2022-10-31

## 2022-10-31 VITALS
DIASTOLIC BLOOD PRESSURE: 78 MMHG | SYSTOLIC BLOOD PRESSURE: 134 MMHG | OXYGEN SATURATION: 98 % | HEART RATE: 60 BPM | HEIGHT: 68 IN | TEMPERATURE: 96.8 F | BODY MASS INDEX: 36.22 KG/M2 | WEIGHT: 239 LBS

## 2022-10-31 DIAGNOSIS — I10 HYPERTENSION, BENIGN: Primary | ICD-10-CM

## 2022-10-31 PROCEDURE — 99213 OFFICE O/P EST LOW 20 MIN: CPT

## 2022-10-31 RX ORDER — AMLODIPINE BESYLATE 5 MG/1
5 TABLET ORAL DAILY
Qty: 90 TABLET | Refills: 0 | Status: SHIPPED | OUTPATIENT
Start: 2022-10-31 | End: 2022-12-08

## 2022-10-31 NOTE — PROGRESS NOTES
Subjective   Marina Joe is a 77 y.o. female.   Chief Complaint   Patient presents with   • Shortness of Breath     2 weeks follow up  Maybe a little better       History of Present Illness   Marina is here today for 2-week follow-up on hypertension.  She has had some borderline readings the last couple times she has been here and she was asked to keep a more thorough eye on her blood pressures at home at varying times of the day and to bring those readings with her at 2 of 2-week follow-up before determining whether or not we need to start a new agent.  She brings a log with her today with blood pressures as following, 155/74, 145/70, 127/74, 158/83.  Pulse rates have averaged anywhere from 60 to 80 bpm.  She denies any chest pain or shortness of breath, does not feel a headache.  She states that her cough is a little bit better, still notes that in the mornings when she first wakes up, states that she feels this is likely related to her sinuses draining.  She does mention that she rolled out of bed on Tuesday and landed on her right side, has had some residual right-sided thigh pain that she feels is getting better, she states that she will reach out if the pain does not continue to improve in the next couple weeks.    The following portions of the patient's history were reviewed and updated as appropriate: allergies, current medications, past family history, past medical history, past social history, past surgical history and problem list.    Review of Systems    Objective   Past Medical History:   Diagnosis Date   • Breast cancer (HCC)    • CKD (chronic kidney disease)    • Hypercholesteremia    • Hypertension    • Sinusitis    • Stage 3a chronic kidney disease (HCC) 10/20/2020      Past Surgical History:   Procedure Laterality Date   • AVULSION TOENAIL PLATE      Sept 26,2018   • BREAST BIOPSY Left 11/20/2012   • BREAST BIOPSY      Left Breast, 1/2019 per Dr Barkley   • BREAST LUMPECTOMY Left     with node bx     • COLONOSCOPY  09/13/2013    small polyp at 30cm benign hyperplastic polyp, changes consistent with melanosis coli. Recall 5 years   • REPLACEMENT TOTAL KNEE Right     2016   • TOTAL ABDOMINAL HYSTERECTOMY WITH SALPINGO OOPHORECTOMY          Current Outpatient Medications:   •  albuterol sulfate  (90 Base) MCG/ACT inhaler, Inhale 2 puffs Every 4 (Four) Hours As Needed for Wheezing., Disp: 2 g, Rfl: 3  •  anastrozole (ARIMIDEX) 1 MG tablet, Take 1 tablet by mouth Daily., Disp: 90 tablet, Rfl: 3  •  benzonatate (Tessalon Perles) 100 MG capsule, Take 1 capsule by mouth 3 (Three) Times a Day As Needed for Cough., Disp: 45 capsule, Rfl: 0  •  buPROPion XL (WELLBUTRIN XL) 150 MG 24 hr tablet, Take 1 tablet by mouth Daily., Disp: 90 tablet, Rfl: 3  •  Calcium Carb-Cholecalciferol (CALCIUM 600+D3 PO), Take  by mouth., Disp: , Rfl:   •  carvedilol (COREG) 6.25 MG tablet, TAKE 1 TABLET BY MOUTH TWICE DAILY WITH MEALS, Disp: 180 tablet, Rfl: 3  •  cetirizine (zyrTEC) 10 MG tablet, Take 10 mg by mouth Daily., Disp: , Rfl:   •  cyclobenzaprine (FLEXERIL) 10 MG tablet, Take 1 tablet by mouth 3 (Three) Times a Day As Needed for Muscle Spasms., Disp: 90 tablet, Rfl: 5  •  Ergocalciferol (VITAMIN D2 PO), Take 50,000 Units by mouth Every 30 (Thirty) Days., Disp: , Rfl:   •  ferrous sulfate 325 (65 FE) MG tablet, Take 325 mg by mouth Daily With Breakfast., Disp: , Rfl:   •  fluticasone (FLONASE) 50 MCG/ACT nasal spray, 2 sprays into the nostril(s) as directed by provider Daily., Disp: 1 g, Rfl: 3  •  fluticasone (FLOVENT DISKUS) 50 MCG/BLIST diskus inhaler, Inhale 1 puff., Disp: , Rfl:   •  guaiFENesin (Mucinex) 600 MG 12 hr tablet, Take 2 tablets by mouth 2 (Two) Times a Day for 30 days., Disp: 120 tablet, Rfl: 0  •  irbesartan-hydrochlorothiazide (AVALIDE) 300-12.5 MG tablet, Take 1 tablet by mouth Daily., Disp: 90 tablet, Rfl: 3  •  montelukast (SINGULAIR) 10 MG tablet, TAKE 1 TABLET BY MOUTH DAILY, Disp: 90 tablet, Rfl:  "1  •  Multiple Vitamins-Minerals (MULTIVITAMIN ADULT) tablet, Take  by mouth Daily., Disp: , Rfl:   •  Omega-3 Fatty Acids (FISH OIL) 1000 MG capsule capsule, Take 1,000 mg by mouth., Disp: , Rfl:   •  rOPINIRole (REQUIP) 0.5 MG tablet, TAKE 1 TABLET BY MOUTH EVERY NIGHT, Disp: 90 tablet, Rfl: 1  •  rosuvastatin (CRESTOR) 20 MG tablet, TAKE 1 TABLET BY MOUTH DAILY, Disp: 90 tablet, Rfl: 3  •  sertraline (ZOLOFT) 100 MG tablet, TAKE 1 TABLET BY MOUTH DAILY, Disp: 90 tablet, Rfl: 3  •  traZODone (DESYREL) 100 MG tablet, Take 1 tablet by mouth Every Night., Disp: 90 tablet, Rfl: 3  •  amLODIPine (NORVASC) 5 MG tablet, Take 1 tablet by mouth Daily., Disp: 90 tablet, Rfl: 0      /78 (BP Location: Right arm, Patient Position: Sitting, Cuff Size: Adult)   Pulse 60   Temp 96.8 °F (36 °C) (Temporal)   Ht 172.7 cm (68\")   Wt 108 kg (239 lb)   LMP  (LMP Unknown)   SpO2 98%   BMI 36.34 kg/m²      Body mass index is 36.34 kg/m².         Physical Exam  Vitals and nursing note reviewed.   Constitutional:       Appearance: Normal appearance. She is obese.   HENT:      Head: Normocephalic and atraumatic.   Eyes:      Extraocular Movements: Extraocular movements intact.      Conjunctiva/sclera: Conjunctivae normal.      Pupils: Pupils are equal, round, and reactive to light.   Cardiovascular:      Rate and Rhythm: Normal rate and regular rhythm.      Pulses: Normal pulses.      Heart sounds: Normal heart sounds.   Pulmonary:      Effort: Pulmonary effort is normal.      Breath sounds: Normal breath sounds.   Abdominal:      General: Bowel sounds are normal.      Palpations: Abdomen is soft.   Musculoskeletal:         General: Tenderness (anterior aspect of right thigh) present. Normal range of motion.      Cervical back: Normal range of motion and neck supple.   Skin:     General: Skin is warm and dry.      Capillary Refill: Capillary refill takes less than 2 seconds.   Neurological:      General: No focal deficit " present.      Mental Status: She is alert and oriented to person, place, and time. Mental status is at baseline.      Motor: No weakness.      Gait: Gait normal.   Psychiatric:         Mood and Affect: Mood normal.         Behavior: Behavior normal.         Thought Content: Thought content normal.         Judgment: Judgment normal.               Assessment & Plan   Diagnoses and all orders for this visit:    1. Hypertension, benign (Primary)  -     amLODIPine (NORVASC) 5 MG tablet; Take 1 tablet by mouth Daily.  Dispense: 90 tablet; Refill: 0               Plan of care reviewed with Ms. Joe  She has assessed blood pressures at varying times of the day as requested, concerned with a warm consistent elevation for midday and in the evening, often running 140s to 150s systolic, we will initiate amlodipine 5 mg nightly and have her return at her next scheduled appointment which is in early December for reevaluation of this.  I did advise that if she has any issues or concerns we can always see her before then, she states understanding.  She will continue to closely monitor blood pressures, at least once daily.

## 2022-11-10 ENCOUNTER — HOSPITAL ENCOUNTER (OUTPATIENT)
Dept: CT IMAGING | Facility: HOSPITAL | Age: 77
Discharge: HOME | End: 2022-11-10

## 2022-11-10 ENCOUNTER — OFFICE VISIT (OUTPATIENT)
Dept: ONCOLOGY | Facility: CLINIC | Age: 77
End: 2022-11-10

## 2022-11-10 ENCOUNTER — LAB (OUTPATIENT)
Dept: LAB | Facility: HOSPITAL | Age: 77
End: 2022-11-10

## 2022-11-10 VITALS
TEMPERATURE: 97.5 F | BODY MASS INDEX: 36.22 KG/M2 | RESPIRATION RATE: 16 BRPM | WEIGHT: 239 LBS | DIASTOLIC BLOOD PRESSURE: 82 MMHG | OXYGEN SATURATION: 98 % | HEART RATE: 63 BPM | SYSTOLIC BLOOD PRESSURE: 152 MMHG | HEIGHT: 68 IN

## 2022-11-10 VITALS
RESPIRATION RATE: 16 BRPM | BODY MASS INDEX: 35.61 KG/M2 | OXYGEN SATURATION: 97 % | HEIGHT: 68 IN | DIASTOLIC BLOOD PRESSURE: 64 MMHG | SYSTOLIC BLOOD PRESSURE: 144 MMHG | HEART RATE: 69 BPM | TEMPERATURE: 96.6 F | WEIGHT: 235 LBS

## 2022-11-10 DIAGNOSIS — D69.1 PLATELET DYSFUNCTION: ICD-10-CM

## 2022-11-10 DIAGNOSIS — D69.6 THROMBOCYTOPENIA: ICD-10-CM

## 2022-11-10 DIAGNOSIS — D63.1 ANEMIA OF CHRONIC RENAL FAILURE, STAGE 3 (MODERATE), UNSPECIFIED WHETHER STAGE 3A OR 3B CKD: ICD-10-CM

## 2022-11-10 DIAGNOSIS — N18.30 ANEMIA OF CHRONIC RENAL FAILURE, STAGE 3 (MODERATE), UNSPECIFIED WHETHER STAGE 3A OR 3B CKD: Primary | ICD-10-CM

## 2022-11-10 DIAGNOSIS — R19.5 DARK STOOLS: ICD-10-CM

## 2022-11-10 DIAGNOSIS — D50.8 IRON DEFICIENCY ANEMIA SECONDARY TO INADEQUATE DIETARY IRON INTAKE: ICD-10-CM

## 2022-11-10 DIAGNOSIS — Z85.3 HISTORY OF BREAST CANCER: ICD-10-CM

## 2022-11-10 DIAGNOSIS — N18.30 ANEMIA OF CHRONIC RENAL FAILURE, STAGE 3 (MODERATE), UNSPECIFIED WHETHER STAGE 3A OR 3B CKD: ICD-10-CM

## 2022-11-10 DIAGNOSIS — D63.1 ANEMIA OF CHRONIC RENAL FAILURE, STAGE 3 (MODERATE), UNSPECIFIED WHETHER STAGE 3A OR 3B CKD: Primary | ICD-10-CM

## 2022-11-10 LAB
ALBUMIN SERPL-MCNC: 3.9 G/DL (ref 3.5–5.2)
ALBUMIN/GLOB SERPL: 1.3 G/DL
ALP SERPL-CCNC: 77 U/L (ref 39–117)
ALT SERPL W P-5'-P-CCNC: 17 U/L (ref 1–33)
ANION GAP SERPL CALCULATED.3IONS-SCNC: 7 MMOL/L (ref 5–15)
AST SERPL-CCNC: 17 U/L (ref 1–32)
BASOPHILS # BLD AUTO: 0.03 10*3/MM3 (ref 0–0.2)
BASOPHILS # BLD AUTO: 0.04 10*3/MM3 (ref 0–0.2)
BASOPHILS NFR BLD AUTO: 0.5 % (ref 0–1.5)
BASOPHILS NFR BLD AUTO: 0.7 % (ref 0–1.5)
BILIRUB SERPL-MCNC: 0.2 MG/DL (ref 0–1.2)
BUN SERPL-MCNC: 22 MG/DL (ref 8–23)
BUN/CREAT SERPL: 17.7 (ref 7–25)
CALCIUM SPEC-SCNC: 9.7 MG/DL (ref 8.6–10.5)
CHLORIDE SERPL-SCNC: 101 MMOL/L (ref 98–107)
CO2 SERPL-SCNC: 31 MMOL/L (ref 22–29)
CREAT SERPL-MCNC: 1.24 MG/DL (ref 0.57–1)
DEPRECATED RDW RBC AUTO: 55.5 FL (ref 37–54)
DEPRECATED RDW RBC AUTO: 55.9 FL (ref 37–54)
EGFRCR SERPLBLD CKD-EPI 2021: 45.2 ML/MIN/1.73
EOSINOPHIL # BLD AUTO: 0.3 10*3/MM3 (ref 0–0.4)
EOSINOPHIL # BLD AUTO: 0.32 10*3/MM3 (ref 0–0.4)
EOSINOPHIL NFR BLD AUTO: 5.6 % (ref 0.3–6.2)
EOSINOPHIL NFR BLD AUTO: 5.7 % (ref 0.3–6.2)
ERYTHROCYTE [DISTWIDTH] IN BLOOD BY AUTOMATED COUNT: 16.1 % (ref 12.3–15.4)
ERYTHROCYTE [DISTWIDTH] IN BLOOD BY AUTOMATED COUNT: 16.2 % (ref 12.3–15.4)
FERRITIN SERPL-MCNC: 1019 NG/ML (ref 13–150)
GLOBULIN UR ELPH-MCNC: 3.1 GM/DL
GLUCOSE SERPL-MCNC: 100 MG/DL (ref 65–99)
HCT VFR BLD AUTO: 31.9 % (ref 34–46.6)
HCT VFR BLD AUTO: 32 % (ref 34–46.6)
HGB BLD-MCNC: 10.1 G/DL (ref 12–15.9)
HGB BLD-MCNC: 10.1 G/DL (ref 12–15.9)
INR PPP: 1.04 (ref 0.91–1.09)
IRON 24H UR-MRATE: 67 MCG/DL (ref 37–145)
IRON SATN MFR SERPL: 23 % (ref 20–50)
LYMPHOCYTES # BLD AUTO: 1.28 10*3/MM3 (ref 0.7–3.1)
LYMPHOCYTES # BLD AUTO: 1.43 10*3/MM3 (ref 0.7–3.1)
LYMPHOCYTES NFR BLD AUTO: 23.7 % (ref 19.6–45.3)
LYMPHOCYTES NFR BLD AUTO: 25.3 % (ref 19.6–45.3)
MCH RBC QN AUTO: 29.8 PG (ref 26.6–33)
MCH RBC QN AUTO: 29.9 PG (ref 26.6–33)
MCHC RBC AUTO-ENTMCNC: 31.6 G/DL (ref 31.5–35.7)
MCHC RBC AUTO-ENTMCNC: 31.7 G/DL (ref 31.5–35.7)
MCV RBC AUTO: 94.4 FL (ref 79–97)
MCV RBC AUTO: 94.4 FL (ref 79–97)
MONOCYTES # BLD AUTO: 0.47 10*3/MM3 (ref 0.1–0.9)
MONOCYTES # BLD AUTO: 0.51 10*3/MM3 (ref 0.1–0.9)
MONOCYTES NFR BLD AUTO: 8.7 % (ref 5–12)
MONOCYTES NFR BLD AUTO: 9 % (ref 5–12)
NEUTROPHILS NFR BLD AUTO: 3.29 10*3/MM3 (ref 1.7–7)
NEUTROPHILS NFR BLD AUTO: 3.35 10*3/MM3 (ref 1.7–7)
NEUTROPHILS NFR BLD AUTO: 59.1 % (ref 42.7–76)
NEUTROPHILS NFR BLD AUTO: 60.9 % (ref 42.7–76)
PLATELET # BLD AUTO: 55 10*3/MM3 (ref 140–450)
PLATELET # BLD AUTO: 58 10*3/MM3 (ref 140–450)
POTASSIUM SERPL-SCNC: 3.6 MMOL/L (ref 3.5–5.2)
PROT SERPL-MCNC: 7 G/DL (ref 6–8.5)
PROTHROMBIN TIME: 13.2 SECONDS (ref 11.9–14.6)
RBC # BLD AUTO: 3.38 10*6/MM3 (ref 3.77–5.28)
RBC # BLD AUTO: 3.39 10*6/MM3 (ref 3.77–5.28)
RETICS # AUTO: 0.05 10*6/MM3 (ref 0.02–0.13)
RETICS/RBC NFR AUTO: 1.49 % (ref 0.7–1.9)
SODIUM SERPL-SCNC: 139 MMOL/L (ref 136–145)
TIBC SERPL-MCNC: 289 MCG/DL (ref 298–536)
TRANSFERRIN SERPL-MCNC: 194 MG/DL (ref 200–360)
WBC NRBC COR # BLD: 5.4 10*3/MM3 (ref 3.4–10.8)
WBC NRBC COR # BLD: 5.66 10*3/MM3 (ref 3.4–10.8)

## 2022-11-10 PROCEDURE — 77012 CT SCAN FOR NEEDLE BIOPSY: CPT

## 2022-11-10 PROCEDURE — 88237 TISSUE CULTURE BONE MARROW: CPT | Performed by: INTERNAL MEDICINE

## 2022-11-10 PROCEDURE — 88311 DECALCIFY TISSUE: CPT | Performed by: INTERNAL MEDICINE

## 2022-11-10 PROCEDURE — 88305 TISSUE EXAM BY PATHOLOGIST: CPT | Performed by: INTERNAL MEDICINE

## 2022-11-10 PROCEDURE — 88185 FLOWCYTOMETRY/TC ADD-ON: CPT

## 2022-11-10 PROCEDURE — 84466 ASSAY OF TRANSFERRIN: CPT

## 2022-11-10 PROCEDURE — 80053 COMPREHEN METABOLIC PANEL: CPT

## 2022-11-10 PROCEDURE — 25010000002 FENTANYL CITRATE (PF) 50 MCG/ML SOLUTION: Performed by: RADIOLOGY

## 2022-11-10 PROCEDURE — 83540 ASSAY OF IRON: CPT

## 2022-11-10 PROCEDURE — 99214 OFFICE O/P EST MOD 30 MIN: CPT | Performed by: NURSE PRACTITIONER

## 2022-11-10 PROCEDURE — 88264 CHROMOSOME ANALYSIS 20-25: CPT | Performed by: INTERNAL MEDICINE

## 2022-11-10 PROCEDURE — 88237 TISSUE CULTURE BONE MARROW: CPT

## 2022-11-10 PROCEDURE — 88184 FLOWCYTOMETRY/ TC 1 MARKER: CPT

## 2022-11-10 PROCEDURE — 85045 AUTOMATED RETICULOCYTE COUNT: CPT

## 2022-11-10 PROCEDURE — 0 LIDOCAINE 1 % SOLUTION: Performed by: RADIOLOGY

## 2022-11-10 PROCEDURE — 85025 COMPLETE CBC W/AUTO DIFF WBC: CPT | Performed by: RADIOLOGY

## 2022-11-10 PROCEDURE — 25010000002 MIDAZOLAM PER 1 MG: Performed by: RADIOLOGY

## 2022-11-10 PROCEDURE — 88313 SPECIAL STAINS GROUP 2: CPT | Performed by: INTERNAL MEDICINE

## 2022-11-10 PROCEDURE — 88264 CHROMOSOME ANALYSIS 20-25: CPT

## 2022-11-10 PROCEDURE — 85025 COMPLETE CBC W/AUTO DIFF WBC: CPT

## 2022-11-10 PROCEDURE — 82728 ASSAY OF FERRITIN: CPT

## 2022-11-10 PROCEDURE — 85610 PROTHROMBIN TIME: CPT | Performed by: INTERNAL MEDICINE

## 2022-11-10 PROCEDURE — 36415 COLL VENOUS BLD VENIPUNCTURE: CPT

## 2022-11-10 RX ORDER — FENTANYL CITRATE 50 UG/ML
INJECTION, SOLUTION INTRAMUSCULAR; INTRAVENOUS AS NEEDED
Status: COMPLETED | OUTPATIENT
Start: 2022-11-10 | End: 2022-11-10

## 2022-11-10 RX ORDER — SODIUM CHLORIDE 0.9 % (FLUSH) 0.9 %
10 SYRINGE (ML) INJECTION AS NEEDED
Status: DISCONTINUED | OUTPATIENT
Start: 2022-11-10 | End: 2022-11-11 | Stop reason: HOSPADM

## 2022-11-10 RX ORDER — LIDOCAINE HYDROCHLORIDE 10 MG/ML
INJECTION, SOLUTION INFILTRATION; PERINEURAL AS NEEDED
Status: COMPLETED | OUTPATIENT
Start: 2022-11-10 | End: 2022-11-10

## 2022-11-10 RX ORDER — SODIUM CHLORIDE 0.9 % (FLUSH) 0.9 %
3 SYRINGE (ML) INJECTION EVERY 12 HOURS SCHEDULED
Status: DISCONTINUED | OUTPATIENT
Start: 2022-11-10 | End: 2022-11-11 | Stop reason: HOSPADM

## 2022-11-10 RX ORDER — MIDAZOLAM HYDROCHLORIDE 1 MG/ML
INJECTION INTRAMUSCULAR; INTRAVENOUS AS NEEDED
Status: COMPLETED | OUTPATIENT
Start: 2022-11-10 | End: 2022-11-10

## 2022-11-10 RX ADMIN — MIDAZOLAM 1 MG: 1 INJECTION INTRAMUSCULAR; INTRAVENOUS at 10:09

## 2022-11-10 RX ADMIN — FENTANYL CITRATE 100 MCG: 50 INJECTION, SOLUTION INTRAMUSCULAR; INTRAVENOUS at 10:09

## 2022-11-10 RX ADMIN — LIDOCAINE HYDROCHLORIDE 10 ML: 10 INJECTION, SOLUTION INFILTRATION; PERINEURAL at 10:11

## 2022-11-16 ENCOUNTER — LAB (OUTPATIENT)
Dept: LAB | Facility: HOSPITAL | Age: 77
End: 2022-11-16

## 2022-11-16 DIAGNOSIS — R19.5 DARK STOOLS: ICD-10-CM

## 2022-11-16 LAB
COLLECT DATE SP2 STL: ABNORMAL
COLLECT DATE SP3 STL: ABNORMAL
COLLECT DATE STL: ABNORMAL
GASTROCULT GAST QL: POSITIVE
HEMOCCULT SP2 STL QL: NEGATIVE
HEMOCCULT SP3 STL QL: POSITIVE
Lab: 1100
Lab: 1130
Lab: 2000

## 2022-11-16 PROCEDURE — 82272 OCCULT BLD FECES 1-3 TESTS: CPT

## 2022-11-21 DIAGNOSIS — D50.8 IRON DEFICIENCY ANEMIA SECONDARY TO INADEQUATE DIETARY IRON INTAKE: ICD-10-CM

## 2022-11-21 DIAGNOSIS — D63.1 ANEMIA OF CHRONIC RENAL FAILURE, STAGE 3 (MODERATE), UNSPECIFIED WHETHER STAGE 3A OR 3B CKD: Primary | ICD-10-CM

## 2022-11-21 DIAGNOSIS — N18.31 STAGE 3A CHRONIC KIDNEY DISEASE: ICD-10-CM

## 2022-11-21 DIAGNOSIS — N18.30 ANEMIA OF CHRONIC RENAL FAILURE, STAGE 3 (MODERATE), UNSPECIFIED WHETHER STAGE 3A OR 3B CKD: Primary | ICD-10-CM

## 2022-11-22 ENCOUNTER — TELEPHONE (OUTPATIENT)
Dept: ONCOLOGY | Facility: CLINIC | Age: 77
End: 2022-11-22

## 2022-11-22 LAB
CYTO UR: NORMAL
LAB AP CASE REPORT: NORMAL
Lab: NORMAL
PATH REPORT.FINAL DX SPEC: NORMAL
PATH REPORT.GROSS SPEC: NORMAL

## 2022-11-22 NOTE — TELEPHONE ENCOUNTER
----- Message from Benjamin Shah MD sent at 11/22/2022  1:47 PM CST -----  Notify patient.  No MDS or leukemia.      Bone marrow, left iliac crest, core biopsy, aspirate smears, and clot section:  Slightly hypercellular marrow (40%) with maturing trilineage hematopoiesis.  Aspiculate smears, nondiagnostic.  No overt features of myelodysplasia and no excess blast population.  Moderately increased megakaryocytes.  2+ stainable iron.    Peripheral blood smear:  Severe thrombocytopenia.  Mild normocytic normochromic anemia.  Rare circulating schistocytes.  Normal white blood cell count with normal differential and morphology.  No circulating blasts.    Flow cytometry: No immunophenotypic evidence of abnormal myeloid maturation, increased blast population or a lymphoproliferative disorder.    Chromosome analysis: Normal female karyotype

## 2022-11-29 ENCOUNTER — INFUSION (OUTPATIENT)
Dept: ONCOLOGY | Facility: HOSPITAL | Age: 77
End: 2022-11-29

## 2022-11-29 ENCOUNTER — LAB (OUTPATIENT)
Dept: LAB | Facility: HOSPITAL | Age: 77
End: 2022-11-29

## 2022-11-29 ENCOUNTER — OFFICE VISIT (OUTPATIENT)
Dept: ONCOLOGY | Facility: CLINIC | Age: 77
End: 2022-11-29

## 2022-11-29 VITALS
HEIGHT: 68 IN | BODY MASS INDEX: 36.56 KG/M2 | OXYGEN SATURATION: 97 % | WEIGHT: 241.2 LBS | SYSTOLIC BLOOD PRESSURE: 166 MMHG | RESPIRATION RATE: 16 BRPM | HEART RATE: 56 BPM | DIASTOLIC BLOOD PRESSURE: 92 MMHG | TEMPERATURE: 97.4 F

## 2022-11-29 VITALS
HEIGHT: 68 IN | RESPIRATION RATE: 16 BRPM | SYSTOLIC BLOOD PRESSURE: 152 MMHG | BODY MASS INDEX: 36.53 KG/M2 | HEART RATE: 57 BPM | OXYGEN SATURATION: 95 % | WEIGHT: 241 LBS | DIASTOLIC BLOOD PRESSURE: 61 MMHG | TEMPERATURE: 97.4 F

## 2022-11-29 DIAGNOSIS — R19.5 OCCULT BLOOD IN STOOLS: ICD-10-CM

## 2022-11-29 DIAGNOSIS — D69.3 CHRONIC ITP (IDIOPATHIC THROMBOCYTOPENIA): Primary | ICD-10-CM

## 2022-11-29 DIAGNOSIS — N18.31 STAGE 3A CHRONIC KIDNEY DISEASE: Primary | ICD-10-CM

## 2022-11-29 DIAGNOSIS — D63.1 ANEMIA OF CHRONIC RENAL FAILURE, STAGE 3 (MODERATE), UNSPECIFIED WHETHER STAGE 3A OR 3B CKD: ICD-10-CM

## 2022-11-29 DIAGNOSIS — Z85.3 HISTORY OF BREAST CANCER: ICD-10-CM

## 2022-11-29 DIAGNOSIS — N18.31 STAGE 3A CHRONIC KIDNEY DISEASE: ICD-10-CM

## 2022-11-29 DIAGNOSIS — D50.8 IRON DEFICIENCY ANEMIA SECONDARY TO INADEQUATE DIETARY IRON INTAKE: ICD-10-CM

## 2022-11-29 DIAGNOSIS — N18.31 ANEMIA OF CHRONIC RENAL FAILURE, STAGE 3A: ICD-10-CM

## 2022-11-29 DIAGNOSIS — N18.30 ANEMIA OF CHRONIC RENAL FAILURE, STAGE 3 (MODERATE), UNSPECIFIED WHETHER STAGE 3A OR 3B CKD: ICD-10-CM

## 2022-11-29 DIAGNOSIS — D63.1 ANEMIA OF CHRONIC RENAL FAILURE, STAGE 3A: ICD-10-CM

## 2022-11-29 LAB
ALBUMIN SERPL-MCNC: 3.8 G/DL (ref 3.5–5.2)
ALBUMIN/GLOB SERPL: 1.3 G/DL
ALP SERPL-CCNC: 79 U/L (ref 39–117)
ALT SERPL W P-5'-P-CCNC: 18 U/L (ref 1–33)
ANION GAP SERPL CALCULATED.3IONS-SCNC: 8 MMOL/L (ref 5–15)
AST SERPL-CCNC: 20 U/L (ref 1–32)
BASOPHILS # BLD AUTO: 0.03 10*3/MM3 (ref 0–0.2)
BASOPHILS NFR BLD AUTO: 0.5 % (ref 0–1.5)
BILIRUB SERPL-MCNC: 0.2 MG/DL (ref 0–1.2)
BUN SERPL-MCNC: 16 MG/DL (ref 8–23)
BUN/CREAT SERPL: 12.4 (ref 7–25)
CALCIUM SPEC-SCNC: 8.9 MG/DL (ref 8.6–10.5)
CHLORIDE SERPL-SCNC: 105 MMOL/L (ref 98–107)
CO2 SERPL-SCNC: 29 MMOL/L (ref 22–29)
CREAT SERPL-MCNC: 1.29 MG/DL (ref 0.57–1)
DEPRECATED RDW RBC AUTO: 57.3 FL (ref 37–54)
EGFRCR SERPLBLD CKD-EPI 2021: 43.1 ML/MIN/1.73
EOSINOPHIL # BLD AUTO: 0.34 10*3/MM3 (ref 0–0.4)
EOSINOPHIL NFR BLD AUTO: 5.6 % (ref 0.3–6.2)
ERYTHROCYTE [DISTWIDTH] IN BLOOD BY AUTOMATED COUNT: 16.2 % (ref 12.3–15.4)
FERRITIN SERPL-MCNC: 959.2 NG/ML (ref 13–150)
GLOBULIN UR ELPH-MCNC: 2.9 GM/DL
GLUCOSE SERPL-MCNC: 105 MG/DL (ref 65–99)
HCT VFR BLD AUTO: 31.2 % (ref 34–46.6)
HGB BLD-MCNC: 9.5 G/DL (ref 12–15.9)
HOLD SPECIMEN: NORMAL
HOLD SPECIMEN: NORMAL
IRON 24H UR-MRATE: 57 MCG/DL (ref 37–145)
IRON SATN MFR SERPL: 20 % (ref 20–50)
LYMPHOCYTES # BLD AUTO: 1.69 10*3/MM3 (ref 0.7–3.1)
LYMPHOCYTES NFR BLD AUTO: 27.8 % (ref 19.6–45.3)
MCH RBC QN AUTO: 29.1 PG (ref 26.6–33)
MCHC RBC AUTO-ENTMCNC: 30.4 G/DL (ref 31.5–35.7)
MCV RBC AUTO: 95.4 FL (ref 79–97)
MONOCYTES # BLD AUTO: 0.5 10*3/MM3 (ref 0.1–0.9)
MONOCYTES NFR BLD AUTO: 8.2 % (ref 5–12)
NEUTROPHILS NFR BLD AUTO: 3.49 10*3/MM3 (ref 1.7–7)
NEUTROPHILS NFR BLD AUTO: 57.6 % (ref 42.7–76)
PLATELET # BLD AUTO: 53 10*3/MM3 (ref 140–450)
POTASSIUM SERPL-SCNC: 3.6 MMOL/L (ref 3.5–5.2)
PROT SERPL-MCNC: 6.7 G/DL (ref 6–8.5)
RBC # BLD AUTO: 3.27 10*6/MM3 (ref 3.77–5.28)
SODIUM SERPL-SCNC: 142 MMOL/L (ref 136–145)
TIBC SERPL-MCNC: 289 MCG/DL (ref 298–536)
TRANSFERRIN SERPL-MCNC: 194 MG/DL (ref 200–360)
WBC NRBC COR # BLD: 6.07 10*3/MM3 (ref 3.4–10.8)

## 2022-11-29 PROCEDURE — 82728 ASSAY OF FERRITIN: CPT | Performed by: NURSE PRACTITIONER

## 2022-11-29 PROCEDURE — 80053 COMPREHEN METABOLIC PANEL: CPT | Performed by: NURSE PRACTITIONER

## 2022-11-29 PROCEDURE — 85025 COMPLETE CBC W/AUTO DIFF WBC: CPT

## 2022-11-29 PROCEDURE — 99214 OFFICE O/P EST MOD 30 MIN: CPT | Performed by: NURSE PRACTITIONER

## 2022-11-29 PROCEDURE — 83540 ASSAY OF IRON: CPT | Performed by: NURSE PRACTITIONER

## 2022-11-29 PROCEDURE — 96372 THER/PROPH/DIAG INJ SC/IM: CPT

## 2022-11-29 PROCEDURE — 82668 ASSAY OF ERYTHROPOIETIN: CPT

## 2022-11-29 PROCEDURE — 25010000002 EPOETIN ALFA-EPBX 40000 UNIT/ML SOLUTION: Performed by: INTERNAL MEDICINE

## 2022-11-29 PROCEDURE — 36415 COLL VENOUS BLD VENIPUNCTURE: CPT | Performed by: NURSE PRACTITIONER

## 2022-11-29 PROCEDURE — 84466 ASSAY OF TRANSFERRIN: CPT | Performed by: NURSE PRACTITIONER

## 2022-11-29 RX ADMIN — EPOETIN ALFA-EPBX 40000 UNITS: 40000 INJECTION, SOLUTION INTRAVENOUS; SUBCUTANEOUS at 09:22

## 2022-11-29 NOTE — PROGRESS NOTES
"[facility] HEMATOLOGY & ONCOLOGY  [address]  [place] [address] [phone]    Patient Name: Marina Joe  Encounter Date: 11/29/2022  YOB: 1945  Patient Number: [id number]      HPI: Marina Joe is a  77 y.o.  female who is seen on follow-up for stage Ia left breast cancer, upper outer quadrant, receptor positive and HER-2/alix negative.  She is on adjuvant anastrozole since 05/2013. Plan for 10 years.  She is also seen for anemia from chronic kidney disease stage IIIa .  She has been seen by Dr. Shah.  She was given injectafer on 09/27/22.       INTERVAL HISTORY   She presents to clinic today to review results of bone marrow biopsy.  She has had persistent thrombocytopenia and had bone marrow is to evaluate etiology.   She states \"I feel fine\".     Final Diagnosis  Bone marrow, left iliac crest, core biopsy, aspirate smears, and clot section:  Slightly hypercellular marrow (40%) with maturing trilineage hematopoiesis.  Aspiculate smears, nondiagnostic.  No overt features of myelodysplasia and no excess blast population.  Moderately increased megakaryocytes.  2+ stainable iron.  Peripheral blood smear:  Severe thrombocytopenia.  Mild normocytic normochromic anemia.  Rare circulating schistocytes.  Normal white blood cell count with normal differential and morphology.  No circulating blasts.  Flow cytometry: No immunophenotypic evidence of abnormal myeloid maturation, increased blast population or a lymphoproliferative disorder.  Chromosome analysis: Normal female karyotype.    She had labs drawn today.   Hgb 9.5, Hct 31.2, Plt 53  BUN 16, Creatinine 1.29, GFR 43.1  Iron 57, Ferritin 959, Sat 50%, TIBC 289  Pt meets criteria to start SREE therapy        Oncology/Hematology History Overview Note   Oncologic history  In February 2012, she had a routine mammogram that found a nodular density in the upper outer " quadrant of the left breast.  An ultrasound revealed no nodularity at that time.  Repeat ultrasound 3 months later on 5/3/2012 revealed a small cyst in the left breast but felt to be benign.  Repeat mammogram on October 31, 2012 found a lobulated lesion in the left breast at the 2 o'clock position and a stable cyst at the 12 o'clock position.  She was referred to Dr. Barkley on 11/30/2012 and biopsy was performed.  The 12:00 cyst was a fibrocystic lesion while the 2:00 lesion was an invasive mammary carcinoma, low-grade, ER positive AR positive HER-2 nu 2+, FISH unamplified.    On January 8, 2013 she underwent a left partial mastectomy with sentinel node biopsy finding invasive ductal carcinoma, 3.1 mm in greatest dimension, grade 1.  2 sentinel nodes were negative.  AJCC TNM stage was pT1aN0 M0, Stage IA.  Following surgery she underwent adjuvant radiation therapy and was then started on Arimidex for hormonal manipulation.  She was started on the Arimidex in approximately April or May 2013.  He has been recommended to continue this for 10 years.    Hematologic history  Patient with a long history of anemia secondary to iron deficiency as well as chronic kidney disease stage III.Patient has a GFR that ranges from 45-61ML/MIN.  He has been undergoing Procrit when meets guidelines for several years now.  She is also required iron replacement.  Folate > 20, B12 at 1503 and negative blood for flow cytometry on 01/07/2022.      Previous interventions  Procrit 40,000 units subcu initiated approximately February 2017  Injectafer given 9/23/2019, 9/30/2019     Malignant neoplasm of upper-outer quadrant of left breast in female, estrogen receptor positive (HCC)   10/31/2012 Initial Diagnosis    Malignant neoplasm of central portion of left female breast (CMS/HCC)     10/31/2012 Imaging    Mammogram on October 31, 2012 found a lobulated lesion in the left breast at the 2 o'clock position and a stable cyst at the 12 o'clock  position.      11/30/2012 Biopsy    Dr. Barkley on 11/30/2012 and biopsy was performed.  The 12:00 cyst was a fibrocystic lesion while the 2:00 lesion was an invasive mammary carcinoma, low-grade, ER positive ME positive HER-2 nu 2+, FISH unamplified.         1/8/2013 Surgery    On January 8, 2013 she underwent a left partial mastectomy with sentinel node biopsy finding invasive ductal carcinoma, 3.1 mm in greatest dimension, grade 1.  2 sentinel nodes were negative.  AJCC TNM stage was pT1aN0 M0, Stage IA.  Hormone receptor positive HER-2 negative      Radiation    Radiation OncologyTreatment Course:  Marina Joe receivedin 33 fractions to left breast via External Beam Radiation - EBRT.     5/2013 -  Hormonal Therapy    Arimidex 1 mg daily     8/22/2022 Cancer Staged    Staging form: Breast, AJCC V7  - Pathologic stage from 8/22/2022: Stage IA (T1a, N0, cM0) - Signed by Benjamin Shah MD on 8/22/2022         PAST MEDICAL HISTORY:  ALLERGIES:  Allergies   Allergen Reactions   • Aspirin GI Bleeding   • Niacin Other (See Comments)     CURRENT MEDICATIONS:  Outpatient Encounter Medications as of 11/29/2022   Medication Sig Dispense Refill   • albuterol sulfate  (90 Base) MCG/ACT inhaler Inhale 2 puffs Every 4 (Four) Hours As Needed for Wheezing. 2 g 3   • amLODIPine (NORVASC) 5 MG tablet Take 1 tablet by mouth Daily. 90 tablet 0   • anastrozole (ARIMIDEX) 1 MG tablet Take 1 tablet by mouth Daily. 90 tablet 3   • benzonatate (Tessalon Perles) 100 MG capsule Take 1 capsule by mouth 3 (Three) Times a Day As Needed for Cough. 45 capsule 0   • buPROPion XL (WELLBUTRIN XL) 150 MG 24 hr tablet Take 1 tablet by mouth Daily. 90 tablet 3   • Calcium Carb-Cholecalciferol (CALCIUM 600+D3 PO) Take  by mouth.     • carvedilol (COREG) 6.25 MG tablet TAKE 1 TABLET BY MOUTH TWICE DAILY WITH MEALS 180 tablet 3   • cetirizine (zyrTEC) 10 MG tablet Take 10 mg by mouth Daily.     • cyclobenzaprine (FLEXERIL) 10 MG tablet Take 1  tablet by mouth 3 (Three) Times a Day As Needed for Muscle Spasms. 90 tablet 5   • Ergocalciferol (VITAMIN D2 PO) Take 50,000 Units by mouth Every 30 (Thirty) Days.     • fluticasone (FLONASE) 50 MCG/ACT nasal spray 2 sprays into the nostril(s) as directed by provider Daily. 1 g 3   • fluticasone (FLOVENT DISKUS) 50 MCG/BLIST diskus inhaler Inhale 1 puff.     • irbesartan-hydrochlorothiazide (AVALIDE) 300-12.5 MG tablet Take 1 tablet by mouth Daily. 90 tablet 3   • montelukast (SINGULAIR) 10 MG tablet TAKE 1 TABLET BY MOUTH DAILY 90 tablet 1   • Multiple Vitamins-Minerals (MULTIVITAMIN ADULT) tablet Take  by mouth Daily.     • Omega-3 Fatty Acids (FISH OIL) 1000 MG capsule capsule Take 1,000 mg by mouth.     • rOPINIRole (REQUIP) 0.5 MG tablet TAKE 1 TABLET BY MOUTH EVERY NIGHT 90 tablet 1   • rosuvastatin (CRESTOR) 20 MG tablet TAKE 1 TABLET BY MOUTH DAILY 90 tablet 3   • sertraline (ZOLOFT) 100 MG tablet TAKE 1 TABLET BY MOUTH DAILY 90 tablet 3   • traZODone (DESYREL) 100 MG tablet Take 1 tablet by mouth Every Night. 90 tablet 3   • [DISCONTINUED] ferrous sulfate 325 (65 FE) MG tablet Take 325 mg by mouth Daily With Breakfast.       No facility-administered encounter medications on file as of 11/29/2022.     ADULT ILLNESSES:  Patient Active Problem List   Diagnosis Code   • Malignant neoplasm of upper-outer quadrant of left breast in female, estrogen receptor positive (HCC) C50.412, Z17.0   • Anemia of chronic renal failure, stage 3 (moderate) (HCC) N18.30, D63.1   • Hypertension, benign I10   • Hx of colonic polyps Z86.010   • HX: breast cancer Z85.3   • Morbidly obese (HCC) E66.01   • Abnormal mammogram R92.8   • Breast mass N63.0   • Right knee DJD M17.11   • S/P lumpectomy, left breast Z98.890   • Adult hypothyroidism E03.9   • Rhinitis J31.0   • Anemia D64.9   • Anxiety F41.9   • At low risk for fall Z91.81   • Breast cancer, left (HCC) C50.912   • Cervical pain M54.2   • Chronic insomnia F51.04   • Cough  R05.9   • Elevated lipids E78.5   • Encounter for immunization Z23   • Herpes zoster without complication B02.9   • Influenza B J10.1   • Left ear pain H92.02   • Left otitis externa H60.92   • Lumbar strain, initial encounter S39.012A   • Myalgia M79.10   • Negative depression screening Z13.31   • Obesity (BMI 30-39.9) E66.9   • Postmenopausal status Z78.0   • Recurrent acute serous otitis media of left ear H65.05   • Restless leg G25.81   • Sinusitis, bacterial J32.9, B96.89   • Skin lesion L98.9   • Upper respiratory infection J06.9   • Urinary tract infection without hematuria N39.0   • Vitamin D deficiency E55.9   • CKD (chronic kidney disease) N18.9   • Stage 3a chronic kidney disease (HCC) N18.31     SURGERIES:  Past Surgical History:   Procedure Laterality Date   • AVULSION TOENAIL PLATE      Sept 26,2018   • BREAST BIOPSY Left 11/20/2012   • BREAST BIOPSY      Left Breast, 1/2019 per Dr Barkley   • BREAST LUMPECTOMY Left     with node bx    • COLONOSCOPY  09/13/2013    small polyp at 30cm benign hyperplastic polyp, changes consistent with melanosis coli. Recall 5 years   • REPLACEMENT TOTAL KNEE Right     2016   • TOTAL ABDOMINAL HYSTERECTOMY WITH SALPINGO OOPHORECTOMY       HEALTH MAINTENANCE ITEMS:  Health Maintenance Due   Topic Date Due   • ZOSTER VACCINE (2 of 2) 08/03/2021   • COVID-19 Vaccine (4 - Booster) 10/26/2022   • ANNUAL WELLNESS VISIT  11/29/2022       <no information>  Last Completed Colonoscopy          COLORECTAL CANCER SCREENING (COLONOSCOPY - Every 5 Years) Next due on 11/19/2023 11/16/2022  Occult Blood X 3, Stool - Stool, Per Rectum    11/19/2018  SCANNED - COLONOSCOPY    11/19/2018  SCANNED - COLONOSCOPY    09/13/2013  SCANNED - COLONOSCOPY              Immunization History   Administered Date(s) Administered   • COVID-19 (MODERNA) 1st, 2nd, 3rd Dose Only 03/12/2021, 03/28/2021   • COVID-19 (MODERNA) BOOSTER 08/31/2022   • Flu Vaccine Quad PF >36MO 11/05/2019   • Fluad Quad 65+  "11/05/2020   • Fluzone High Dose =>65 Years ([employer] ONLY) 11/11/2015, 10/14/2016, 11/20/2017   • Fluzone High-Dose 65+yrs 11/29/2021, 10/04/2022   • Pneumococcal Conjugate 13-Valent (PCV13) 10/14/2016   • Pneumococcal Polysaccharide (PPSV23) 11/05/2020   • Shingrix 01/01/2021, 06/08/2021   • Zoster, Unspecified 01/01/2021     Last Completed Mammogram          MAMMOGRAM (Yearly) Next due on 12/13/2022 12/13/2021  SCANNED - MAMMO    12/10/2020  Mammo Screening Digital Tomosynthesis Bilateral With CAD    07/03/2019  Outside Claim: HC MAMMOGRAM DIAGNOSTIC UNILAT DIGITAL W CAD,ME TOMOSYNTHESIS MAMMOGRAPHY    12/12/2018  Outside Claim: HC MAMMOGRAM DIAGNOSTIC BILAT DIGITAL W CAD,HC US BREAST UNILATERAL LIMITED,ME TOMOSYNTHESIS MAMMOGRAPHY    12/11/2017  Outside Claim: ME TOMOSYNTHESIS MAMMOGRAPHY    Only the first 5 history entries have been loaded, but more history exists.                  FAMILY HISTORY:  Family History   Problem Relation Age of Onset   • Heart attack Mother    • Hodgkin's lymphoma Father    • Other Father    • Cancer Father         hodgkins   • Heart attack Brother    • Skin cancer Maternal Uncle    • Pancreatic cancer Maternal Uncle    • Cancer Maternal Uncle         eye   • Colon cancer Neg Hx    • Colon polyps Neg Hx      SOCIAL HISTORY:  Social History     Socioeconomic History   • Marital status:    Tobacco Use   • Smoking status: Never   • Smokeless tobacco: Never   Substance and Sexual Activity   • Alcohol use: No   • Drug use: Not Currently   • Sexual activity: Not Currently     Birth control/protection: Surgical       REVIEW OF SYSTEMS:    Review of Systems   Constitutional: Negative for chills and fever.        Pt states \"I feel fine\"   HENT: Negative for congestion and trouble swallowing.    Respiratory: Negative for wheezing.    Cardiovascular: Negative for chest pain and palpitations.   Gastrointestinal: Negative for abdominal pain, nausea and vomiting.   Endocrine: Negative " "for polydipsia and polyphagia.   Genitourinary: Negative for difficulty urinating, dysuria and flank pain.   Musculoskeletal: Negative for gait problem and joint swelling.   Allergic/Immunologic: Negative for food allergies.   Neurological: Negative for speech difficulty and weakness.   Hematological: Negative for adenopathy. Does not bruise/bleed easily.   Psychiatric/Behavioral: Negative for agitation, confusion and hallucinations.       VITAL SIGNS: /92   Pulse 56   Temp 97.4 °F (36.3 °C)   Resp 16   Ht 172.7 cm (68\")   Wt 109 kg (241 lb 3.2 oz)   LMP  (LMP Unknown)   SpO2 97%   BMI 36.67 kg/m²   Pain Score    11/29/22 0826   PainSc: 0-No pain       PHYSICAL EXAMINATION:     Physical Exam  Vitals reviewed.   Constitutional:       General: She is not in acute distress.  HENT:      Head: Normocephalic and atraumatic.   Eyes:      General: No scleral icterus.  Cardiovascular:      Rate and Rhythm: Normal rate.   Pulmonary:      Effort: No respiratory distress.      Breath sounds: No wheezing or rales.   Abdominal:      General: Bowel sounds are normal.      Palpations: Abdomen is soft.      Tenderness: There is no abdominal tenderness.   Musculoskeletal:         General: No swelling.      Cervical back: Neck supple.   Skin:     General: Skin is warm.      Coloration: Skin is pale.   Neurological:      Mental Status: She is alert and oriented to person, place, and time.   Psychiatric:         Mood and Affect: Mood normal.         Behavior: Behavior normal.         Thought Content: Thought content normal.         Judgment: Judgment normal.         LABS    Lab Results - Last 18 Months   Lab Units 11/29/22  0815 11/10/22  0902 11/10/22  0846 09/07/22  0801 06/22/22  1419 05/04/22  0755 01/05/22  0829 12/13/21  0944 11/10/21  0849 10/13/21  0848 09/15/21  0804 07/14/21  0824 06/07/21  0922   HEMOGLOBIN g/dL 9.5* 10.1* 10.1* 9.6* 10.4* 11.0* 10.4* 10.6* 10.4* 10.8* 10.9* 11.0* 11.4*   HEMATOCRIT % 31.2* " 32.0* 31.9* 30.4* 33.5* 35.6 33.9* 34.6* 33.7* 33.8* 33.8* 35.0 35.3   MCV fL 95.4 94.4 94.4 92.1 91.8 92.5 92.6 94.8 92.8 93.4 90.4 91.6 91.9   WBC 10*3/mm3 6.07 5.66 5.40 8.44 5.77 5.72 5.43 5.5 5.36 5.71 6.28 5.51 6.43   RDW % 16.2* 16.2* 16.1* 15.9* 15.8* 15.1 14.8 14.6* 15.2 15.5* 15.7* 15.1 15.7*   MPV fL  --   --   --   --  12.2* 13.3* 11.7 11.9 11.1 10.4 10.6 10.5 9.7   PLATELETS 10*3/mm3 53* 58* 55* 62* 77* 82* 103* 119* 124* 141 147 165 192   IMM GRAN % %  --   --   --   --  0.5  --   --   --  0.4 0.4 0.3 0.2 0.3   NEUTROS ABS 10*3/mm3 3.49 3.35 3.29 5.22 3.28 3.20 3.25 3.2 3.01 3.36 3.73 2.94 4.11   LYMPHS ABS 10*3/mm3 1.69 1.43 1.28 1.99 1.50 1.67 1.50 1.5 1.59 1.57 1.73 1.81 1.52   MONOS ABS 10*3/mm3 0.50 0.51 0.47 0.69 0.59 0.49 0.42 0.50 0.47 0.49 0.52 0.49 0.54   EOS ABS 10*3/mm3 0.34 0.32 0.30 0.48* 0.34 0.31 0.22 0.20 0.24 0.25 0.25 0.24 0.22   BASOS ABS 10*3/mm3 0.03 0.03 0.04 0.04 0.03 0.03 0.02 0.00 0.03 0.02 0.03 0.02 0.02   IMMATURE GRANS (ABS) 10*3/mm3  --   --   --   --  0.03  --   --  0.0 0.02 0.02 0.02 0.01 0.02   NRBC /100 WBC  --   --   --   --  0.0  --   --   --  0.0 0.0 0.0 0.0 0.0       Lab Results - Last 18 Months   Lab Units 11/29/22  0815 11/10/22  0846 09/07/22  0801 06/30/22  0733 05/04/22  0755 01/05/22  0829 12/13/21  0944 11/10/21  0849   GLUCOSE mg/dL 105* 100* 116* 94 117* 122* 97 114*   SODIUM mmol/L 142 139 139 141 140 138 141 139   POTASSIUM mmol/L 3.6 3.6 3.9 3.9 3.8 3.5 3.6 3.6   TOTAL CO2 mmol/L  --   --   --  29.2*  --   --  25  --    CO2 mmol/L 29.0 31.0* 30.0*  --  27.0 30.0*  --  28.0   CHLORIDE mmol/L 105 101 101 100 102 100 102 102   ANION GAP mmol/L 8.0 7.0 8.0  --  11.0 8.0 14 9.0   CREATININE mg/dL 1.29* 1.24* 1.20* 1.31* 1.25* 1.17* 1.3* 1.20*   BUN mg/dL 16 22 16 22 21 19 22 24*   BUN / CREAT RATIO  12.4 17.7 13.3 16.8 16.8 16.2  --  20.0   CALCIUM mg/dL 8.9 9.7 10.1 8.9 9.5 9.1 9.5 9.2   EGFR IF NONAFRICN AM   --   --   --   --   --   --  40*  --    ALK  PHOS U/L 79 77 91  --  78 80 78 85   TOTAL PROTEIN g/dL 6.7 7.0 7.7  --  7.1 7.4 7.1 6.8   ALT (SGPT) U/L 18 17 19  --  18 16 20 17   AST (SGOT) U/L 20 17 21  --  18 18 19 18   BILIRUBIN mg/dL 0.2 0.2 0.2  --  0.2 0.3 0.3 0.2   ALBUMIN g/dL 3.80 3.90 4.20  --  4.20 4.20 3.8 3.90   GLOBULIN gm/dL 2.9 3.1 3.5  --  2.9 3.2  --  2.9       Lab Results - Last 18 Months   Lab Units 01/05/22  0932 11/29/21  0817   URIC ACID mg/dL  --  6.9*   REFERENCE LAB REPORT  See Attached Report  --        Lab Results - Last 18 Months   Lab Units 11/29/22  0815 11/10/22  0846 09/07/22  0801 05/04/22  0755 01/05/22  0829 11/29/21  0817 09/15/21  0804 07/14/21  0824   IRON mcg/dL 57 67 40 66 69  --  72 72   TIBC mcg/dL 289* 289* 298 313 307  --  289* 289*   IRON SATURATION % 20 23 13* 21 22  --  25 25   FERRITIN ng/mL 959.20* 1,019.00* 585.40* 805.40* 928.20*  --  1,056.00* 990.30*   TSH uIU/mL  --   --   --   --   --  4.010  --   --    FOLATE ng/mL  --   --   --   --  >20.00  --  >20.00 >20.00       Marina Joe reports a pain score of .  .      ASSESSMENT:  1. Chronic ITP (idiopathic thrombocytopenia) (HCC)    2. Iron deficiency anemia secondary to inadequate dietary iron intake    3. History of breast cancer    4. Occult blood in stools    5. Stage 3a chronic kidney disease (HCC)      1. Chronic ITP (idiopathic thrombocytopenia) (HCC)   Bone marrow, left iliac crest, core biopsy, aspirate smears, and clot section:  Slightly hypercellular marrow (40%) with maturing trilineage hematopoiesis.  Aspiculate smears, nondiagnostic.  No overt features of myelodysplasia and no excess blast population.  Moderately increased megakaryocytes.  2+ stainable iron.  Peripheral blood smear:  Severe thrombocytopenia.  Mild normocytic normochromic anemia.  Rare circulating schistocytes.  Normal white blood cell count with normal differential and morphology.  No circulating blasts.  Flow cytometry: No immunophenotypic evidence of abnormal myeloid  maturation, increased blast population or a lymphoproliferative disorder.  Chromosome analysis: Normal female karyotype.  PLAN  -Will transfuse for platelets < 10 regardless of bleeding.   -Transfuse platelets < 20 If bleeding   -Can give burst dose of steroids if platelets < 30.    2. Iron deficiency anemia secondary to inadequate dietary iron intake   She was given injectafer on 09/27/22.     Hgb 9.5, Hct 31.2, Plt 53  Iron 57, Ferritin 959, Sat 50%, TIBC 289  PLAN  -Stable for observation     3. History of breast cancer  - Pathologic stage: Stage IA (T1a, N0, cM0)   -Invasive mammary carcinoma, low-grade, ER positive NC positive HER-2 nu 2+, FISH unamplified.  -January 8, 2013 she underwent a left partial mastectomy with sentinel node biopsy  -Following surgery she underwent adjuvant radiation therapy and was then started on Arimidex April 2013 for hormonal manipulation.   He has been recommended to continue this for 10 years.  -12/13/21 mammogram  No mammographic evidence of malignancy within either breast.   -October 13, 2022  CT Chest w/o Contrast per PCP.  1. The patient's undergone previous left lumpectomy with axillary dissection.  2. Left lingular and left lower lobe subsegmental atelectasis. There is mild pleural thickening within the left lateral and posterior costophrenic angle. No evidence of effusion. No consolidative pneumonia or nodularity. No evidence of metastatic disease to the thorax.      4. Occult blood in stools   -Occult blood test with 2 of 3 were positive   -Pt had colonoscopy November 2018 with 5 year recall   PLAN  -Will refer to GI     5.  Chronic Kidney Disease Stage IIIa  Hgb 9.5, Hct 31.2, Plt 53  BUN 16, Creatinine 1.29, GFR 43.1  -Will check erythropoietin    -Hgb 9.5, Hct 31.2  PLAN  -As Hgb < 10, GFR < 60, Ferritin > 100, can start SREE therapy with Retacrit 40,000 units today  - Continue Retacrit biweekly for Hgb < 11g OR Hct < 33.        PLAN:  AS LISTED ABOVE  Continue current  medications, treatment plans and follow up with PCP and any other providers  Return to office in 6 weeks   Pre-office labs for CBC, CMP  Care discussed with patient.  Understanding expressed.  Patient agreeable with plan.            Analilia Madera, APRN  11/29/2022

## 2022-11-29 NOTE — PATIENT INSTRUCTIONS
Bone marrow biopsy showed low platelets but no evidence of cancer.   -Will transfuse for platelets < 10 regardless of bleeding.    Transfuse platelets < 20 If bleeding   -Can give burst dose of steroids if platelets < 30.    Will start Retacrit injections today for anemia in Chronic Kidney Disease   Will get CBC every two weeks.  If Hgb < 11 OR Hct < 33, you will get an injection     Return to clinic in 6 weeks to see how you are responding to injections.     Will refer to GI because there is some blood in your stool.

## 2022-11-30 ENCOUNTER — TELEPHONE (OUTPATIENT)
Dept: INTERNAL MEDICINE | Facility: CLINIC | Age: 77
End: 2022-11-30

## 2022-11-30 LAB — EPO SERPL-ACNC: 29.9 MIU/ML (ref 2.6–18.5)

## 2022-11-30 NOTE — TELEPHONE ENCOUNTER
Caller: Marina Joe    Relationship: Self    Best call back number: [phone]    What is the best time to reach you: ANYTIME    Who are you requesting to speak with (clinical staff, provider,  specific staff member): CLINICAL    What was the call regarding: PATIENT IS SCHEDULED FOR LABS TOMORROW BUT HAD LABS YESTERDAY. DOES SHE STILL NEED TO COME TOMORROW    Do you require a callback: YES

## 2022-12-01 ENCOUNTER — LAB (OUTPATIENT)
Dept: INTERNAL MEDICINE | Facility: CLINIC | Age: 77
End: 2022-12-01

## 2022-12-01 DIAGNOSIS — I10 HYPERTENSION, BENIGN: Primary | ICD-10-CM

## 2022-12-01 DIAGNOSIS — R73.01 IFG (IMPAIRED FASTING GLUCOSE): ICD-10-CM

## 2022-12-01 DIAGNOSIS — E78.5 ELEVATED LIPIDS: ICD-10-CM

## 2022-12-01 DIAGNOSIS — E55.9 VITAMIN D DEFICIENCY: ICD-10-CM

## 2022-12-02 LAB
25(OH)D3+25(OH)D2 SERPL-MCNC: 52.1 NG/ML (ref 30–100)
APPEARANCE UR: CLEAR
BACTERIA #/AREA URNS HPF: NORMAL /HPF
BILIRUB UR QL STRIP: NEGATIVE
CASTS URNS MICRO: NORMAL
CHOLEST SERPL-MCNC: 163 MG/DL (ref 0–200)
COLOR UR: YELLOW
EPI CELLS #/AREA URNS HPF: NORMAL /HPF
GLUCOSE UR QL STRIP: NEGATIVE
HBA1C MFR BLD: 6 % (ref 4.8–5.6)
HDLC SERPL-MCNC: 55 MG/DL (ref 40–60)
HGB UR QL STRIP: NEGATIVE
KETONES UR QL STRIP: NEGATIVE
LDLC SERPL CALC-MCNC: 94 MG/DL (ref 0–100)
LEUKOCYTE ESTERASE UR QL STRIP: ABNORMAL
MAGNESIUM SERPL-MCNC: 1.9 MG/DL (ref 1.6–2.4)
NITRITE UR QL STRIP: NEGATIVE
PH UR STRIP: 6.5 [PH] (ref 5–8)
PROT UR QL STRIP: NEGATIVE
RBC #/AREA URNS HPF: NORMAL /HPF
SP GR UR STRIP: 1.02 (ref 1–1.03)
T4 FREE SERPL-MCNC: 0.99 NG/DL (ref 0.93–1.7)
TRIGL SERPL-MCNC: 74 MG/DL (ref 0–150)
TSH SERPL DL<=0.005 MIU/L-ACNC: 4.64 UIU/ML (ref 0.27–4.2)
UROBILINOGEN UR STRIP-MCNC: ABNORMAL MG/DL
VLDLC SERPL CALC-MCNC: 14 MG/DL (ref 5–40)
WBC #/AREA URNS HPF: NORMAL /HPF

## 2022-12-05 DIAGNOSIS — N18.31 ANEMIA OF CHRONIC RENAL FAILURE, STAGE 3A: ICD-10-CM

## 2022-12-05 DIAGNOSIS — N18.31 STAGE 3A CHRONIC KIDNEY DISEASE: Primary | ICD-10-CM

## 2022-12-05 DIAGNOSIS — D63.1 ANEMIA OF CHRONIC RENAL FAILURE, STAGE 3A: ICD-10-CM

## 2022-12-08 ENCOUNTER — OFFICE VISIT (OUTPATIENT)
Dept: INTERNAL MEDICINE | Facility: CLINIC | Age: 77
End: 2022-12-08

## 2022-12-08 VITALS
BODY MASS INDEX: 36.34 KG/M2 | WEIGHT: 239.8 LBS | OXYGEN SATURATION: 96 % | HEART RATE: 70 BPM | TEMPERATURE: 96.9 F | HEIGHT: 68 IN | SYSTOLIC BLOOD PRESSURE: 150 MMHG | DIASTOLIC BLOOD PRESSURE: 80 MMHG

## 2022-12-08 DIAGNOSIS — R19.5 OCCULT BLOOD POSITIVE STOOL: ICD-10-CM

## 2022-12-08 DIAGNOSIS — K92.1 BLACK STOOLS: ICD-10-CM

## 2022-12-08 DIAGNOSIS — I10 HYPERTENSION, BENIGN: ICD-10-CM

## 2022-12-08 DIAGNOSIS — N18.31 STAGE 3A CHRONIC KIDNEY DISEASE: ICD-10-CM

## 2022-12-08 DIAGNOSIS — E66.9 OBESITY (BMI 30-39.9): ICD-10-CM

## 2022-12-08 DIAGNOSIS — E03.9 ADULT HYPOTHYROIDISM: ICD-10-CM

## 2022-12-08 DIAGNOSIS — Z00.00 ENCOUNTER FOR SUBSEQUENT ANNUAL WELLNESS VISIT (AWV) IN MEDICARE PATIENT: Primary | ICD-10-CM

## 2022-12-08 PROBLEM — N18.9 CKD (CHRONIC KIDNEY DISEASE): Status: RESOLVED | Noted: 2020-08-26 | Resolved: 2022-12-08

## 2022-12-08 LAB
ALBUMIN SERPL-MCNC: 4.7 G/DL (ref 3.5–5.2)
ALBUMIN/GLOB SERPL: 1.6 G/DL
ALP SERPL-CCNC: 78 U/L (ref 39–117)
ALT SERPL-CCNC: 14 U/L (ref 1–33)
AST SERPL-CCNC: 18 U/L (ref 1–32)
BASOPHILS # BLD AUTO: ABNORMAL 10*3/UL
BILIRUB SERPL-MCNC: 0.3 MG/DL (ref 0–1.2)
BUN SERPL-MCNC: 18 MG/DL (ref 8–23)
BUN/CREAT SERPL: 15.1 (ref 7–25)
CALCIUM SERPL-MCNC: 9.9 MG/DL (ref 8.6–10.5)
CHLORIDE SERPL-SCNC: 102 MMOL/L (ref 98–107)
CO2 SERPL-SCNC: 31 MMOL/L (ref 22–29)
CREAT SERPL-MCNC: 1.19 MG/DL (ref 0.57–1)
DIFFERENTIAL COMMENT: ABNORMAL
EGFRCR SERPLBLD CKD-EPI 2021: 47.5 ML/MIN/1.73
EOSINOPHIL # BLD AUTO: ABNORMAL 10*3/UL
EOSINOPHIL # BLD MANUAL: 0.36 10*3/MM3 (ref 0–0.4)
EOSINOPHIL NFR BLD AUTO: ABNORMAL %
EOSINOPHIL NFR BLD MANUAL: 6.1 % (ref 0.3–6.2)
ERYTHROCYTE [DISTWIDTH] IN BLOOD BY AUTOMATED COUNT: 17.3 % (ref 12.3–15.4)
GLOBULIN SER CALC-MCNC: 2.9 GM/DL
GLUCOSE SERPL-MCNC: 95 MG/DL (ref 65–99)
HCT VFR BLD AUTO: 37.7 % (ref 34–46.6)
HGB BLD-MCNC: 11.1 G/DL (ref 12–15.9)
LYMPHOCYTES # BLD AUTO: ABNORMAL 10*3/UL
LYMPHOCYTES # BLD MANUAL: 1.14 10*3/MM3 (ref 0.7–3.1)
LYMPHOCYTES NFR BLD AUTO: ABNORMAL %
LYMPHOCYTES NFR BLD MANUAL: 17.2 % (ref 19.6–45.3)
MCH RBC QN AUTO: 28.9 PG (ref 26.6–33)
MCHC RBC AUTO-ENTMCNC: 29.4 G/DL (ref 31.5–35.7)
MCV RBC AUTO: 98.2 FL (ref 79–97)
MONOCYTES # BLD MANUAL: 0.78 10*3/MM3 (ref 0.1–0.9)
MONOCYTES NFR BLD AUTO: ABNORMAL %
MONOCYTES NFR BLD MANUAL: 13.1 % (ref 5–12)
NEUTROPHILS # BLD MANUAL: 3.67 10*3/MM3 (ref 1.7–7)
NEUTROPHILS NFR BLD AUTO: ABNORMAL %
NEUTROPHILS NFR BLD MANUAL: 61.6 % (ref 42.7–76)
PLATELET # BLD AUTO: 51 10*3/MM3 (ref 140–450)
PLATELET BLD QL SMEAR: ABNORMAL
POTASSIUM SERPL-SCNC: 3.9 MMOL/L (ref 3.5–5.2)
PROT SERPL-MCNC: 7.6 G/DL (ref 6–8.5)
RBC # BLD AUTO: 3.84 10*6/MM3 (ref 3.77–5.28)
RBC MORPH BLD: ABNORMAL
SODIUM SERPL-SCNC: 141 MMOL/L (ref 136–145)
WBC # BLD AUTO: 5.96 10*3/MM3 (ref 3.4–10.8)

## 2022-12-08 PROCEDURE — 1159F MED LIST DOCD IN RCRD: CPT

## 2022-12-08 PROCEDURE — 1126F AMNT PAIN NOTED NONE PRSNT: CPT

## 2022-12-08 PROCEDURE — 1160F RVW MEDS BY RX/DR IN RCRD: CPT

## 2022-12-08 PROCEDURE — 91312 COVID-19 (PFIZER) BIVALENT BOOSTER 12+YRS: CPT

## 2022-12-08 PROCEDURE — 1170F FXNL STATUS ASSESSED: CPT

## 2022-12-08 PROCEDURE — G0439 PPPS, SUBSEQ VISIT: HCPCS

## 2022-12-08 PROCEDURE — 0124A PR ADM SARSCOV2 30MCG/0.3ML BST: CPT

## 2022-12-08 RX ORDER — PANTOPRAZOLE SODIUM 40 MG/1
40 TABLET, DELAYED RELEASE ORAL DAILY
Qty: 90 TABLET | Refills: 0 | Status: SHIPPED | OUTPATIENT
Start: 2022-12-08 | End: 2023-03-02 | Stop reason: SDUPTHER

## 2022-12-08 RX ORDER — AMLODIPINE BESYLATE 5 MG/1
5 TABLET ORAL 2 TIMES DAILY
Qty: 180 TABLET | Refills: 2 | Status: SHIPPED | OUTPATIENT
Start: 2022-12-08

## 2022-12-08 NOTE — PROGRESS NOTES
The ABCs of the Annual Wellness Visit  Subsequent Medicare Wellness Visit    Subjective        Marina Joe is a 77 y.o. female who presents for a Subsequent Medicare Wellness Visit.  She reports today that she had turned in some stool samples to her hematologist and they came back positive for blood, she has been referred to GI.  She reports that her stools have continued to be dark, states that they are they are regular in size but are often hard.  She has been taking MiraLAX once a day and drinking prune juice.  No reports of increased fatigue, shortness of breath or chest pain.  She denies any abdominal discomfort or pain.  Her last colonoscopy was performed in 2018, recommendations to repeat in 5 years.  She brings a blood pressure log with her here today, average systolic readings range from 136-153, diastolic readings range from 64-70, is consistently running 140s systolic.    The following portions of the patient's history were reviewed and   updated as appropriate: allergies, current medications, past family history, past medical history, past social history, past surgical history and problem list.    Compared to one year ago, the patient feels her physical   health is the same.    Compared to one year ago, the patient feels her mental   health is the same.    Recent Hospitalizations:  She was not admitted to the hospital during the last year.       Current Medical Providers:  Patient Care Team:  Hanh Og APRN as PCP - General (Internal Medicine)    Outpatient Medications Prior to Visit   Medication Sig Dispense Refill   • albuterol sulfate  (90 Base) MCG/ACT inhaler Inhale 2 puffs Every 4 (Four) Hours As Needed for Wheezing. 2 g 3   • anastrozole (ARIMIDEX) 1 MG tablet Take 1 tablet by mouth Daily. 90 tablet 3   • buPROPion XL (WELLBUTRIN XL) 150 MG 24 hr tablet Take 1 tablet by mouth Daily. 90 tablet 3   • Calcium Carb-Cholecalciferol (CALCIUM 600+D3 PO) Take  by mouth.     •  carvedilol (COREG) 6.25 MG tablet TAKE 1 TABLET BY MOUTH TWICE DAILY WITH MEALS 180 tablet 3   • cetirizine (zyrTEC) 10 MG tablet Take 10 mg by mouth Daily.     • cyclobenzaprine (FLEXERIL) 10 MG tablet Take 1 tablet by mouth 3 (Three) Times a Day As Needed for Muscle Spasms. 90 tablet 5   • Ergocalciferol (VITAMIN D2 PO) Take 50,000 Units by mouth Every 30 (Thirty) Days.     • fluticasone (FLONASE) 50 MCG/ACT nasal spray 2 sprays into the nostril(s) as directed by provider Daily. 1 g 3   • fluticasone (FLOVENT DISKUS) 50 MCG/BLIST diskus inhaler Inhale 1 puff.     • irbesartan-hydrochlorothiazide (AVALIDE) 300-12.5 MG tablet Take 1 tablet by mouth Daily. 90 tablet 3   • montelukast (SINGULAIR) 10 MG tablet TAKE 1 TABLET BY MOUTH DAILY 90 tablet 1   • Multiple Vitamins-Minerals (MULTIVITAMIN ADULT) tablet Take  by mouth Daily.     • Omega-3 Fatty Acids (FISH OIL) 1000 MG capsule capsule Take 1,000 mg by mouth.     • rOPINIRole (REQUIP) 0.5 MG tablet TAKE 1 TABLET BY MOUTH EVERY NIGHT 90 tablet 1   • rosuvastatin (CRESTOR) 20 MG tablet TAKE 1 TABLET BY MOUTH DAILY 90 tablet 3   • sertraline (ZOLOFT) 100 MG tablet TAKE 1 TABLET BY MOUTH DAILY 90 tablet 3   • traZODone (DESYREL) 100 MG tablet Take 1 tablet by mouth Every Night. 90 tablet 3   • amLODIPine (NORVASC) 5 MG tablet Take 1 tablet by mouth Daily. 90 tablet 0   • benzonatate (Tessalon Perles) 100 MG capsule Take 1 capsule by mouth 3 (Three) Times a Day As Needed for Cough. 45 capsule 0     No facility-administered medications prior to visit.       No opioid medication identified on active medication list. I have reviewed chart for other potential  high risk medication/s and harmful drug interactions in the elderly.        Physical Exam  Vitals and nursing note reviewed.   Constitutional:       General: She is not in acute distress.     Appearance: Normal appearance. She is obese. She is not ill-appearing, toxic-appearing or diaphoretic.   HENT:      Head:  Normocephalic and atraumatic.      Right Ear: Tympanic membrane, ear canal and external ear normal. There is no impacted cerumen.      Left Ear: Tympanic membrane, ear canal and external ear normal. There is no impacted cerumen.      Nose: Nose normal. No congestion or rhinorrhea.      Mouth/Throat:      Mouth: Mucous membranes are moist.      Pharynx: Oropharynx is clear. No oropharyngeal exudate or posterior oropharyngeal erythema.   Eyes:      Extraocular Movements: Extraocular movements intact.      Conjunctiva/sclera: Conjunctivae normal.      Pupils: Pupils are equal, round, and reactive to light.   Neck:      Thyroid: No thyroid mass, thyromegaly or thyroid tenderness.      Vascular: No carotid bruit.   Cardiovascular:      Rate and Rhythm: Normal rate and regular rhythm.      Pulses: Normal pulses.      Heart sounds: Normal heart sounds. No murmur heard.    No friction rub. No gallop.   Pulmonary:      Effort: Pulmonary effort is normal.      Breath sounds: Normal breath sounds.   Chest:   Breasts:     Breasts are asymmetrical.      Right: Normal.      Left: No swelling, bleeding, inverted nipple, mass, nipple discharge, skin change or tenderness.      Comments: Left breast  slightly smaller, with scarring at 1:00   Abdominal:      General: Bowel sounds are normal. There is no distension.      Palpations: Abdomen is soft. There is no mass.      Tenderness: There is no abdominal tenderness. There is no guarding or rebound.      Hernia: No hernia is present.   Musculoskeletal:         General: Tenderness (generalized arthralgia') present. Normal range of motion.      Cervical back: Normal range of motion and neck supple. No rigidity or tenderness.      Right lower leg: No edema.   Lymphadenopathy:      Cervical: No cervical adenopathy.      Upper Body:      Right upper body: No supraclavicular, axillary or pectoral adenopathy.      Left upper body: No supraclavicular, axillary or pectoral adenopathy.   Skin:      General: Skin is warm and dry.      Capillary Refill: Capillary refill takes less than 2 seconds.   Neurological:      General: No focal deficit present.      Mental Status: She is alert and oriented to person, place, and time. Mental status is at baseline.      Sensory: No sensory deficit.      Motor: No weakness.   Psychiatric:         Mood and Affect: Mood normal.         Behavior: Behavior normal.         Thought Content: Thought content normal.         Judgment: Judgment normal.       Aspirin is not on active medication list.  Aspirin use is not indicated based on review of current medical condition/s. Risk of harm outweighs potential benefits.  .    Patient Active Problem List   Diagnosis   • Malignant neoplasm of upper-outer quadrant of female breast (HCC)   • Anemia of chronic renal failure, stage 3 (moderate) (HCC)   • Hypertension, benign   • Hx of colonic polyps   • HX: breast cancer   • Morbidly obese (HCC)   • Abnormal mammogram   • Breast mass   • Right knee DJD   • S/P lumpectomy, left breast   • Adult hypothyroidism   • Rhinitis   • Anemia   • Anxiety   • At low risk for fall   • Breast cancer, left (HCC)   • Cervical pain   • Chronic insomnia   • Cough   • Elevated lipids   • Encounter for immunization   • Herpes zoster without complication   • Influenza B   • Left ear pain   • Left otitis externa   • Lumbar strain, initial encounter   • Myalgia   • Negative depression screening   • Obesity (BMI 30-39.9)   • Postmenopausal status   • Recurrent acute serous otitis media of left ear   • Restless leg   • Sinusitis, bacterial   • Skin lesion   • Upper respiratory infection   • Urinary tract infection without hematuria   • Vitamin D deficiency   • Stage 3a chronic kidney disease (HCC)     Advance Care Planning  Advance Directive is not on file.  ACP discussion was held with the patient during this visit. Patient does not have an advance directive, information provided.     Objective    Vitals:     "12/08/22 0800   BP: 150/80   BP Location: Right arm   Patient Position: Sitting   Cuff Size: Adult   Pulse: 70   Temp: 96.9 °F (36.1 °C)   TempSrc: Temporal   SpO2: 96%   Weight: 109 kg (239 lb 12.8 oz)   Height: 172.7 cm (68\")   PainSc: 0-No pain     Estimated body mass index is 36.46 kg/m² as calculated from the following:    Height as of this encounter: 172.7 cm (68\").    Weight as of this encounter: 109 kg (239 lb 12.8 oz).    Class 2 Severe Obesity (BMI >=35 and <=39.9). Obesity-related health conditions include the following: hypertension, dyslipidemias and osteoarthritis. Obesity is improving with lifestyle modifications. BMI is is above average; BMI management plan is completed. We discussed portion control and increasing exercise.      Does the patient have evidence of cognitive impairment?   No    Lab Results   Component Value Date    CHLPL 163 12/01/2022    TRIG 74 12/01/2022    HDL 55 12/01/2022    LDL 94 12/01/2022    VLDL 14 12/01/2022    HGBA1C 6.00 (H) 12/01/2022    HGBA1C 5.8 10/04/2022          HEALTH RISK ASSESSMENT    Smoking Status:  Social History     Tobacco Use   Smoking Status Never   Smokeless Tobacco Never     Alcohol Consumption:  Social History     Substance and Sexual Activity   Alcohol Use No     Fall Risk Screen:    STEADI Fall Risk Assessment was completed, and patient is at MODERATE risk for falls. Assessment completed on:12/8/2022    Depression Screening:  PHQ-2/PHQ-9 Depression Screening 12/8/2022   Retired PHQ-9 Total Score -   Retired Total Score -   Little Interest or Pleasure in Doing Things 0-->not at all   Feeling Down, Depressed or Hopeless 0-->not at all   PHQ-9: Brief Depression Severity Measure Score 0       Health Habits and Functional and Cognitive Screening:  Functional & Cognitive Status 12/8/2022   Do you have difficulty preparing food and eating? No   Do you have difficulty bathing yourself, getting dressed or grooming yourself? No   Do you have difficulty using " the toilet? No   Do you have difficulty moving around from place to place? No   Do you have trouble with steps or getting out of a bed or a chair? No   Current Diet Well Balanced Diet   Dental Exam Up to date   Eye Exam Up to date   Exercise (times per week) 3 times per week   Current Exercises Include Stationary Bicycling/Spin Class   Current Exercise Activities Include -   Do you need help using the phone?  No   Are you deaf or do you have serious difficulty hearing?  No   Do you need help with transportation? No   Do you need help shopping? No   Do you need help preparing meals?  No   Do you need help with housework?  No   Do you need help with laundry? No   Do you need help taking your medications? No   Do you need help managing money? No   Do you ever drive or ride in a car without wearing a seat belt? No   Have you felt unusual stress, anger or loneliness in the last month? No   Who do you live with? Other   If you need help, do you have trouble finding someone available to you? No   Have you been bothered in the last four weeks by sexual problems? No   Do you have difficulty concentrating, remembering or making decisions? No       Age-appropriate Screening Schedule:  Refer to the list below for future screening recommendations based on patient's age, sex and/or medical conditions. Orders for these recommended tests are listed in the plan section. The patient has been provided with a written plan.    Health Maintenance   Topic Date Due   • ZOSTER VACCINE (2 of 2) 08/03/2021   • TDAP/TD VACCINES (1 - Tdap) 12/08/2023 (Originally 1/31/1964)   • MAMMOGRAM  12/13/2022   • DXA SCAN  01/01/2023   • LIPID PANEL  12/01/2023   • INFLUENZA VACCINE  Completed                CMS Preventative Services Quick Reference  Risk Factors Identified During Encounter:    Immunizations Discussed/Encouraged: Tdap, Shingrix and COVID19  Inactivity/Sedentary: Patient was advised to exercise at least 150 minutes a week per CDC  recommendations.  Dental Screening Recommended  Vision Screening Recommended    The above risks/problems have been discussed with the patient.  Pertinent information has been shared with the patient in the After Visit Summary.    Diagnoses and all orders for this visit:    1. Encounter for subsequent annual wellness visit (AWV) in Medicare patient (Primary)    2. Stage 3a chronic kidney disease (HCC)  -     Comprehensive metabolic panel    3. Adult hypothyroidism    4. Obesity (BMI 30-39.9)    5. Hypertension, benign  -     amLODIPine (NORVASC) 5 MG tablet; Take 1 tablet by mouth 2 (Two) Times a Day.  Dispense: 180 tablet; Refill: 2    6. Occult blood positive stool  -     CBC & Differential  -     pantoprazole (Protonix) 40 MG EC tablet; Take 1 tablet by mouth Daily.  Dispense: 90 tablet; Refill: 0    7. Black stools  -     CBC & Differential  -     pantoprazole (Protonix) 40 MG EC tablet; Take 1 tablet by mouth Daily.  Dispense: 90 tablet; Refill: 0        Follow Up:   Next Medicare Wellness visit to be scheduled in 1 year.      An After Visit Summary and PPPS were made available to the patient.        Plan of care and recent lab results reviewed with Ms. Joe  Blood pressure here today is 150/80 with a pulse rate of 70, based on this and home readings blood pressure is currently poorly controlled, we are going to increase her amlodipine to 5 mg twice daily.  Of note her blood pressure readings have been performed in the evenings, I have asked her to please start checking in the mornings as well, she will bring blood pressure log back in 4 weeks for reevaluation.  Did not have a CBC or CMP done on her labs somehow, we will get this done today, especially considering continued black tarry stools and elevations in blood pressure and previous impaired renal function.  Has had 2 pound weight loss in the last several weeks, advised continued increase in physical activity and adherence to healthy low-carb,  low-cholesterol diet.  Her A1c today is 6.0  She will continue to follow with hematology related to thrombocytopenia/anemia, encouraged her to reach out to us next week if she still has not heard from GI specialist about an upcoming appointment.  We will go ahead and start her on Protonix.  She denies any epigastric or abdominal pain at all, states that she has intermittent sternum pain related to previous trauma.  Cholesterol is in acceptable range, continue with current statin therapy  Breast exam unremarkable today except for scarring from previous procedures, continue with self breast exams at home, continue with routine mammography.  Continue with routine ophthalmology and orthodontic exams  She states that she believes she has had the second shingles vaccine, she will clarify this with her pharmacy next time she goes.  Follow-up in 4 weeks, before this if indicated.

## 2022-12-09 NOTE — PROGRESS NOTES
Labs show improvement in hemoglobin, is currently at 11.1, was 9.510 days ago.  Very slight improvement in renal function.  Please let us know next week if you still have not heard from GI.

## 2022-12-13 ENCOUNTER — LAB (OUTPATIENT)
Dept: LAB | Facility: HOSPITAL | Age: 77
End: 2022-12-13

## 2022-12-13 ENCOUNTER — APPOINTMENT (OUTPATIENT)
Dept: LAB | Facility: HOSPITAL | Age: 77
End: 2022-12-13

## 2022-12-13 ENCOUNTER — INFUSION (OUTPATIENT)
Dept: ONCOLOGY | Facility: HOSPITAL | Age: 77
End: 2022-12-13

## 2022-12-13 VITALS
DIASTOLIC BLOOD PRESSURE: 54 MMHG | BODY MASS INDEX: 36.46 KG/M2 | SYSTOLIC BLOOD PRESSURE: 148 MMHG | HEIGHT: 68 IN | RESPIRATION RATE: 18 BRPM | WEIGHT: 240.6 LBS | HEART RATE: 63 BPM | OXYGEN SATURATION: 97 % | TEMPERATURE: 97.1 F

## 2022-12-13 DIAGNOSIS — D50.8 IRON DEFICIENCY ANEMIA SECONDARY TO INADEQUATE DIETARY IRON INTAKE: ICD-10-CM

## 2022-12-13 DIAGNOSIS — N18.31 STAGE 3A CHRONIC KIDNEY DISEASE: ICD-10-CM

## 2022-12-13 DIAGNOSIS — N18.31 STAGE 3A CHRONIC KIDNEY DISEASE: Primary | ICD-10-CM

## 2022-12-13 DIAGNOSIS — N18.31 ANEMIA OF CHRONIC RENAL FAILURE, STAGE 3A: ICD-10-CM

## 2022-12-13 DIAGNOSIS — D63.1 ANEMIA OF CHRONIC RENAL FAILURE, STAGE 3A: ICD-10-CM

## 2022-12-13 LAB
BASOPHILS # BLD AUTO: 0.02 10*3/MM3 (ref 0–0.2)
BASOPHILS NFR BLD AUTO: 0.3 % (ref 0–1.5)
DEPRECATED RDW RBC AUTO: 57.3 FL (ref 37–54)
EOSINOPHIL # BLD AUTO: 0.34 10*3/MM3 (ref 0–0.4)
EOSINOPHIL NFR BLD AUTO: 5.9 % (ref 0.3–6.2)
ERYTHROCYTE [DISTWIDTH] IN BLOOD BY AUTOMATED COUNT: 16.2 % (ref 12.3–15.4)
HCT VFR BLD AUTO: 31.1 % (ref 34–46.6)
HGB BLD-MCNC: 9.3 G/DL (ref 12–15.9)
IMM GRANULOCYTES # BLD AUTO: 0.02 10*3/MM3 (ref 0–0.05)
IMM GRANULOCYTES NFR BLD AUTO: 0.3 % (ref 0–0.5)
LYMPHOCYTES # BLD AUTO: 1.34 10*3/MM3 (ref 0.7–3.1)
LYMPHOCYTES NFR BLD AUTO: 23.3 % (ref 19.6–45.3)
MCH RBC QN AUTO: 28.7 PG (ref 26.6–33)
MCHC RBC AUTO-ENTMCNC: 29.9 G/DL (ref 31.5–35.7)
MCV RBC AUTO: 96 FL (ref 79–97)
MONOCYTES # BLD AUTO: 0.51 10*3/MM3 (ref 0.1–0.9)
MONOCYTES NFR BLD AUTO: 8.9 % (ref 5–12)
NEUTROPHILS NFR BLD AUTO: 3.52 10*3/MM3 (ref 1.7–7)
NEUTROPHILS NFR BLD AUTO: 61.3 % (ref 42.7–76)
NRBC BLD AUTO-RTO: 0 /100 WBC (ref 0–0.2)
PLATELET # BLD AUTO: 54 10*3/MM3 (ref 140–450)
PMV BLD AUTO: ABNORMAL FL
RBC # BLD AUTO: 3.24 10*6/MM3 (ref 3.77–5.28)
WBC NRBC COR # BLD: 5.75 10*3/MM3 (ref 3.4–10.8)

## 2022-12-13 PROCEDURE — 85025 COMPLETE CBC W/AUTO DIFF WBC: CPT

## 2022-12-13 PROCEDURE — 36415 COLL VENOUS BLD VENIPUNCTURE: CPT

## 2022-12-13 PROCEDURE — 96372 THER/PROPH/DIAG INJ SC/IM: CPT

## 2022-12-13 PROCEDURE — 25010000002 EPOETIN ALFA-EPBX 40000 UNIT/ML SOLUTION: Performed by: INTERNAL MEDICINE

## 2022-12-13 RX ADMIN — EPOETIN ALFA-EPBX 40000 UNITS: 40000 INJECTION, SOLUTION INTRAVENOUS; SUBCUTANEOUS at 09:04

## 2022-12-27 ENCOUNTER — INFUSION (OUTPATIENT)
Dept: ONCOLOGY | Facility: HOSPITAL | Age: 77
End: 2022-12-27

## 2022-12-27 ENCOUNTER — LAB (OUTPATIENT)
Dept: LAB | Facility: HOSPITAL | Age: 77
End: 2022-12-27

## 2022-12-27 VITALS
RESPIRATION RATE: 20 BRPM | WEIGHT: 241.8 LBS | HEIGHT: 68 IN | HEART RATE: 63 BPM | SYSTOLIC BLOOD PRESSURE: 152 MMHG | DIASTOLIC BLOOD PRESSURE: 50 MMHG | TEMPERATURE: 96.7 F | OXYGEN SATURATION: 97 % | BODY MASS INDEX: 36.65 KG/M2

## 2022-12-27 DIAGNOSIS — N18.31 ANEMIA OF CHRONIC RENAL FAILURE, STAGE 3A: ICD-10-CM

## 2022-12-27 DIAGNOSIS — N18.31 STAGE 3A CHRONIC KIDNEY DISEASE: ICD-10-CM

## 2022-12-27 DIAGNOSIS — D63.1 ANEMIA OF CHRONIC RENAL FAILURE, STAGE 3A: ICD-10-CM

## 2022-12-27 DIAGNOSIS — C50.912 MALIGNANT NEOPLASM OF LEFT BREAST IN FEMALE, ESTROGEN RECEPTOR POSITIVE, UNSPECIFIED SITE OF BREAST: ICD-10-CM

## 2022-12-27 DIAGNOSIS — Z17.0 MALIGNANT NEOPLASM OF LEFT BREAST IN FEMALE, ESTROGEN RECEPTOR POSITIVE, UNSPECIFIED SITE OF BREAST: ICD-10-CM

## 2022-12-27 LAB
ALBUMIN SERPL-MCNC: 4 G/DL (ref 3.5–5.2)
ALBUMIN/GLOB SERPL: 1.1 G/DL
ALP SERPL-CCNC: 79 U/L (ref 39–117)
ALT SERPL W P-5'-P-CCNC: 14 U/L (ref 1–33)
ANION GAP SERPL CALCULATED.3IONS-SCNC: 8 MMOL/L (ref 5–15)
AST SERPL-CCNC: 15 U/L (ref 1–32)
BASOPHILS # BLD AUTO: 0.02 10*3/MM3 (ref 0–0.2)
BASOPHILS NFR BLD AUTO: 0.4 % (ref 0–1.5)
BILIRUB SERPL-MCNC: 0.2 MG/DL (ref 0–1.2)
BUN SERPL-MCNC: 19 MG/DL (ref 8–23)
BUN/CREAT SERPL: 13.2 (ref 7–25)
CALCIUM SPEC-SCNC: 9.3 MG/DL (ref 8.6–10.5)
CHLORIDE SERPL-SCNC: 103 MMOL/L (ref 98–107)
CO2 SERPL-SCNC: 30 MMOL/L (ref 22–29)
CREAT SERPL-MCNC: 1.44 MG/DL (ref 0.57–1)
DEPRECATED RDW RBC AUTO: 57.3 FL (ref 37–54)
EGFRCR SERPLBLD CKD-EPI 2021: 37.8 ML/MIN/1.73
EOSINOPHIL # BLD AUTO: 0.23 10*3/MM3 (ref 0–0.4)
EOSINOPHIL NFR BLD AUTO: 4.5 % (ref 0.3–6.2)
ERYTHROCYTE [DISTWIDTH] IN BLOOD BY AUTOMATED COUNT: 16.2 % (ref 12.3–15.4)
GLOBULIN UR ELPH-MCNC: 3.5 GM/DL
GLUCOSE SERPL-MCNC: 103 MG/DL (ref 65–99)
HCT VFR BLD AUTO: 33.8 % (ref 34–46.6)
HGB BLD-MCNC: 10.3 G/DL (ref 12–15.9)
LYMPHOCYTES # BLD AUTO: 1.24 10*3/MM3 (ref 0.7–3.1)
LYMPHOCYTES NFR BLD AUTO: 24.1 % (ref 19.6–45.3)
MCH RBC QN AUTO: 29.3 PG (ref 26.6–33)
MCHC RBC AUTO-ENTMCNC: 30.5 G/DL (ref 31.5–35.7)
MCV RBC AUTO: 96 FL (ref 79–97)
MONOCYTES # BLD AUTO: 0.45 10*3/MM3 (ref 0.1–0.9)
MONOCYTES NFR BLD AUTO: 8.7 % (ref 5–12)
NEUTROPHILS NFR BLD AUTO: 3.19 10*3/MM3 (ref 1.7–7)
NEUTROPHILS NFR BLD AUTO: 61.9 % (ref 42.7–76)
PLATELET # BLD AUTO: 65 10*3/MM3 (ref 140–450)
PMV BLD AUTO: ABNORMAL FL
POTASSIUM SERPL-SCNC: 4 MMOL/L (ref 3.5–5.2)
PROT SERPL-MCNC: 7.5 G/DL (ref 6–8.5)
RBC # BLD AUTO: 3.52 10*6/MM3 (ref 3.77–5.28)
SODIUM SERPL-SCNC: 141 MMOL/L (ref 136–145)
WBC NRBC COR # BLD: 5.15 10*3/MM3 (ref 3.4–10.8)

## 2022-12-27 PROCEDURE — G0463 HOSPITAL OUTPT CLINIC VISIT: HCPCS

## 2022-12-27 PROCEDURE — 85025 COMPLETE CBC W/AUTO DIFF WBC: CPT

## 2022-12-27 PROCEDURE — 80053 COMPREHEN METABOLIC PANEL: CPT

## 2022-12-27 PROCEDURE — 36415 COLL VENOUS BLD VENIPUNCTURE: CPT

## 2022-12-27 NOTE — PROGRESS NOTES
1355 Per lab results retacrit not indicated today. Spoke with office r/t GFR 37. Per MD no fluids needed and pt ok to go. DERREK Martínez RN

## 2023-01-05 ENCOUNTER — OFFICE VISIT (OUTPATIENT)
Dept: INTERNAL MEDICINE | Facility: CLINIC | Age: 78
End: 2023-01-05
Payer: MEDICARE

## 2023-01-05 VITALS
TEMPERATURE: 97.9 F | WEIGHT: 241 LBS | OXYGEN SATURATION: 94 % | DIASTOLIC BLOOD PRESSURE: 70 MMHG | BODY MASS INDEX: 36.53 KG/M2 | SYSTOLIC BLOOD PRESSURE: 130 MMHG | HEART RATE: 63 BPM | HEIGHT: 68 IN

## 2023-01-05 DIAGNOSIS — R19.5 OCCULT BLOOD POSITIVE STOOL: ICD-10-CM

## 2023-01-05 DIAGNOSIS — N18.31 STAGE 3A CHRONIC KIDNEY DISEASE: ICD-10-CM

## 2023-01-05 DIAGNOSIS — K92.1 BLACK STOOLS: ICD-10-CM

## 2023-01-05 DIAGNOSIS — I10 HYPERTENSION, BENIGN: Primary | ICD-10-CM

## 2023-01-05 DIAGNOSIS — Z12.31 SCREENING MAMMOGRAM, ENCOUNTER FOR: ICD-10-CM

## 2023-01-05 DIAGNOSIS — Z78.0 POST-MENOPAUSAL: ICD-10-CM

## 2023-01-05 PROCEDURE — 99214 OFFICE O/P EST MOD 30 MIN: CPT

## 2023-01-05 RX ORDER — CLONIDINE HYDROCHLORIDE 0.1 MG/1
0.1 TABLET ORAL 2 TIMES DAILY PRN
Qty: 45 TABLET | Refills: 0 | Status: SHIPPED | OUTPATIENT
Start: 2023-01-05

## 2023-01-05 NOTE — PROGRESS NOTES
Subjective   Marina GINO Joe is a 77 y.o. female.   Chief Complaint   Patient presents with   • Hypertension     Follow up from 12/08/2022; still same issues    • dark stools       History of Present Illness   Ms. Joe returns here today for 4-week reevaluation of her hypertension after increasing her amlodipine from 5 mg daily to 5 mg twice daily.  She brings a blood pressure log with her here today which reveals continued elevated blood pressure readings as high as 165/95, reports that this reading was last night 1 hour after taking her evening medications, she is currently on an ARB, HCTZ, beta-blocker, and calcium channel blocker.  She reports that she has an appointment coming up with Dr. Martin at the end this month.  She denies any headaches, shortness of breath, chest pain or activity intolerance as not related to chronic knee pain.  She reports that her stools continue to be dark, is wondering about the GI referral that her heme-onc provider placed, she states that she has not heard yet from them.  She has been getting iron infusions, reports of poor tolerance of oral iron.  The following portions of the patient's history were reviewed and updated as appropriate: allergies, current medications, past family history, past medical history, past social history, past surgical history and problem list.    Review of Systems    Objective   Past Medical History:   Diagnosis Date   • Breast cancer (HCC)    • CKD (chronic kidney disease)    • Hypercholesteremia    • Hypertension    • Sinusitis    • Stage 3a chronic kidney disease (HCC) 10/20/2020      Past Surgical History:   Procedure Laterality Date   • AVULSION TOENAIL PLATE      Sept 26,2018   • BREAST BIOPSY Left 11/20/2012   • BREAST BIOPSY      Left Breast, 1/2019 per Dr Barkley   • BREAST LUMPECTOMY Left     with node bx    • COLONOSCOPY  09/13/2013    small polyp at 30cm benign hyperplastic polyp, changes consistent with melanosis coli. Recall 5 years   •  REPLACEMENT TOTAL KNEE Right     2016   • TOTAL ABDOMINAL HYSTERECTOMY WITH SALPINGO OOPHORECTOMY          Current Outpatient Medications:   •  albuterol sulfate  (90 Base) MCG/ACT inhaler, Inhale 2 puffs Every 4 (Four) Hours As Needed for Wheezing., Disp: 2 g, Rfl: 3  •  amLODIPine (NORVASC) 5 MG tablet, Take 1 tablet by mouth 2 (Two) Times a Day., Disp: 180 tablet, Rfl: 2  •  anastrozole (ARIMIDEX) 1 MG tablet, Take 1 tablet by mouth Daily., Disp: 90 tablet, Rfl: 3  •  benzonatate (Tessalon Perles) 100 MG capsule, Take 1 capsule by mouth 3 (Three) Times a Day As Needed for Cough., Disp: 45 capsule, Rfl: 0  •  buPROPion XL (WELLBUTRIN XL) 150 MG 24 hr tablet, Take 1 tablet by mouth Daily., Disp: 90 tablet, Rfl: 3  •  Calcium Carb-Cholecalciferol (CALCIUM 600+D3 PO), Take  by mouth., Disp: , Rfl:   •  carvedilol (COREG) 6.25 MG tablet, TAKE 1 TABLET BY MOUTH TWICE DAILY WITH MEALS, Disp: 180 tablet, Rfl: 3  •  cetirizine (zyrTEC) 10 MG tablet, Take 10 mg by mouth Daily., Disp: , Rfl:   •  cyclobenzaprine (FLEXERIL) 10 MG tablet, Take 1 tablet by mouth 3 (Three) Times a Day As Needed for Muscle Spasms., Disp: 90 tablet, Rfl: 5  •  Ergocalciferol (VITAMIN D2 PO), Take 50,000 Units by mouth Every 30 (Thirty) Days., Disp: , Rfl:   •  fluticasone (FLONASE) 50 MCG/ACT nasal spray, 2 sprays into the nostril(s) as directed by provider Daily., Disp: 1 g, Rfl: 3  •  fluticasone (FLOVENT DISKUS) 50 MCG/BLIST diskus inhaler, Inhale 1 puff., Disp: , Rfl:   •  irbesartan-hydrochlorothiazide (AVALIDE) 300-12.5 MG tablet, Take 1 tablet by mouth Daily., Disp: 90 tablet, Rfl: 3  •  montelukast (SINGULAIR) 10 MG tablet, TAKE 1 TABLET BY MOUTH DAILY, Disp: 90 tablet, Rfl: 1  •  Multiple Vitamins-Minerals (MULTIVITAMIN ADULT) tablet, Take  by mouth Daily., Disp: , Rfl:   •  Omega-3 Fatty Acids (FISH OIL) 1000 MG capsule capsule, Take 1,000 mg by mouth., Disp: , Rfl:   •  pantoprazole (Protonix) 40 MG EC tablet, Take 1 tablet by  mouth Daily., Disp: 90 tablet, Rfl: 0  •  rOPINIRole (REQUIP) 0.5 MG tablet, TAKE 1 TABLET BY MOUTH EVERY NIGHT, Disp: 90 tablet, Rfl: 1  •  rosuvastatin (CRESTOR) 20 MG tablet, TAKE 1 TABLET BY MOUTH DAILY, Disp: 90 tablet, Rfl: 3  •  sertraline (ZOLOFT) 100 MG tablet, TAKE 1 TABLET BY MOUTH DAILY, Disp: 90 tablet, Rfl: 3  •  traZODone (DESYREL) 100 MG tablet, Take 1 tablet by mouth Every Night., Disp: 90 tablet, Rfl: 3  •  cloNIDine (Catapres) 0.1 MG tablet, Take 1 tablet by mouth 2 (Two) Times a Day As Needed for High Blood Pressure (greater than 160/90)., Disp: 45 tablet, Rfl: 0      /70 (BP Location: Right arm, Patient Position: Sitting, Cuff Size: Adult)   Pulse 63   Temp 97.9 °F (36.6 °C) (Temporal)   Ht 172.7 cm (68\")   Wt 109 kg (241 lb)   LMP  (LMP Unknown)   SpO2 94%   BMI 36.64 kg/m²      Body mass index is 36.64 kg/m².         Physical Exam  Vitals and nursing note reviewed.   Constitutional:       Appearance: Normal appearance. She is morbidly obese.   HENT:      Head: Normocephalic and atraumatic.   Eyes:      Extraocular Movements: Extraocular movements intact.      Conjunctiva/sclera: Conjunctivae normal.      Pupils: Pupils are equal, round, and reactive to light.   Cardiovascular:      Rate and Rhythm: Regular rhythm. Bradycardia present.      Pulses: Normal pulses.      Heart sounds: Normal heart sounds.   Pulmonary:      Effort: Pulmonary effort is normal.      Breath sounds: Normal breath sounds.   Abdominal:      General: Bowel sounds are normal.      Palpations: Abdomen is soft.   Musculoskeletal:         General: Normal range of motion.      Cervical back: Normal range of motion and neck supple.      Right lower leg: No edema.      Left lower leg: No edema.   Skin:     General: Skin is warm and dry.      Capillary Refill: Capillary refill takes less than 2 seconds.   Neurological:      General: No focal deficit present.      Mental Status: She is alert and oriented to person,  place, and time. Mental status is at baseline.   Psychiatric:         Mood and Affect: Mood normal.         Behavior: Behavior normal.         Thought Content: Thought content normal.         Judgment: Judgment normal.               Assessment & Plan   Diagnoses and all orders for this visit:    1. Hypertension, benign (Primary)  -     cloNIDine (Catapres) 0.1 MG tablet; Take 1 tablet by mouth 2 (Two) Times a Day As Needed for High Blood Pressure (greater than 160/90).  Dispense: 45 tablet; Refill: 0    2. Stage 3a chronic kidney disease (HCC)    3. Post-menopausal  -     DEXA Bone Density Axial; Future    4. Screening mammogram, encounter for  -     Mammo Screening Digital Tomosynthesis Bilateral With CAD; Future    5. Black stools    6. Occult blood positive stool               Plan of care and recent lab results reviewed with Mrs. Joe  CMP from 12/27/2022 reveals increasing renal impairment, her creatinine was 1.44, BUN 19, GFR decreased to 37.8, she follows Dr. Martin from nephrology and reports she has an appointment at the end of this month, we discussed a medication called Farxiga, at this time because of her GFR being as low as it is I am hesitant to initiate this but have asked her to please ask him to see if this is something we could potentially start on her.  I advised this medication could also help lower her blood pressure slightly.  Her blood pressure here today is normotensive at 130/70, however after reviewing her blood pressure log she is often in the 150s to 160s systolic, her diastolic readings tend to be a little lower, typically in the 50s or 60s, pulse rates are normally in the 60s, for now we will add on as needed clonidine for blood pressures greater than 160/90, I have advised to not take this medication until she evaluates her blood pressure 1 to 1-1/2 hours after taking her routine medications and then if her blood pressures greater than 160/90 she can take this.  Continue with adequate  hydration and close monitoring of blood pressures.  Per her request orders for bone density scan and mammogram sent to the [facility]  We will have someone at our office assess the GI referral that was placed on 11/29, I definitely do want her seen related to this given her continued reports of dark tarry stools and requiring iron infusions.  She had a colonoscopy last done back in 2018 and was told return in 5 years by Dr. Morris.  For now, return in 2 months for further evaluation and monitoring, can always be seen before this as needed.

## 2023-01-10 ENCOUNTER — OFFICE VISIT (OUTPATIENT)
Dept: ONCOLOGY | Facility: CLINIC | Age: 78
End: 2023-01-10
Payer: MEDICARE

## 2023-01-10 ENCOUNTER — INFUSION (OUTPATIENT)
Dept: ONCOLOGY | Facility: HOSPITAL | Age: 78
End: 2023-01-10
Payer: MEDICARE

## 2023-01-10 ENCOUNTER — LAB (OUTPATIENT)
Dept: LAB | Facility: HOSPITAL | Age: 78
End: 2023-01-10
Payer: MEDICARE

## 2023-01-10 VITALS
SYSTOLIC BLOOD PRESSURE: 154 MMHG | HEIGHT: 68 IN | BODY MASS INDEX: 36.53 KG/M2 | HEART RATE: 61 BPM | DIASTOLIC BLOOD PRESSURE: 61 MMHG | OXYGEN SATURATION: 97 % | WEIGHT: 241 LBS | RESPIRATION RATE: 18 BRPM | TEMPERATURE: 97.2 F

## 2023-01-10 VITALS
RESPIRATION RATE: 16 BRPM | WEIGHT: 242 LBS | HEART RATE: 68 BPM | TEMPERATURE: 97.9 F | BODY MASS INDEX: 36.68 KG/M2 | OXYGEN SATURATION: 97 % | SYSTOLIC BLOOD PRESSURE: 134 MMHG | HEIGHT: 68 IN | DIASTOLIC BLOOD PRESSURE: 74 MMHG

## 2023-01-10 DIAGNOSIS — N18.31 STAGE 3A CHRONIC KIDNEY DISEASE: Primary | ICD-10-CM

## 2023-01-10 DIAGNOSIS — N18.31 ANEMIA OF CHRONIC RENAL FAILURE, STAGE 3A: ICD-10-CM

## 2023-01-10 DIAGNOSIS — Z85.3 HISTORY OF BREAST CANCER: ICD-10-CM

## 2023-01-10 DIAGNOSIS — N18.31 STAGE 3A CHRONIC KIDNEY DISEASE: ICD-10-CM

## 2023-01-10 DIAGNOSIS — D69.3 CHRONIC ITP (IDIOPATHIC THROMBOCYTOPENIA): Primary | ICD-10-CM

## 2023-01-10 DIAGNOSIS — N18.32 STAGE 3B CHRONIC KIDNEY DISEASE: ICD-10-CM

## 2023-01-10 DIAGNOSIS — C50.412 MALIGNANT NEOPLASM OF UPPER-OUTER QUADRANT OF LEFT BREAST IN FEMALE, ESTROGEN RECEPTOR POSITIVE: ICD-10-CM

## 2023-01-10 DIAGNOSIS — D63.1 ANEMIA OF CHRONIC RENAL FAILURE, STAGE 3A: ICD-10-CM

## 2023-01-10 DIAGNOSIS — D50.8 IRON DEFICIENCY ANEMIA SECONDARY TO INADEQUATE DIETARY IRON INTAKE: Primary | ICD-10-CM

## 2023-01-10 DIAGNOSIS — D50.8 IRON DEFICIENCY ANEMIA SECONDARY TO INADEQUATE DIETARY IRON INTAKE: ICD-10-CM

## 2023-01-10 DIAGNOSIS — Z17.0 MALIGNANT NEOPLASM OF UPPER-OUTER QUADRANT OF LEFT BREAST IN FEMALE, ESTROGEN RECEPTOR POSITIVE: ICD-10-CM

## 2023-01-10 LAB
ALBUMIN SERPL-MCNC: 3.9 G/DL (ref 3.5–5.2)
ALBUMIN/GLOB SERPL: 1.2 G/DL
ALP SERPL-CCNC: 72 U/L (ref 39–117)
ALT SERPL W P-5'-P-CCNC: 13 U/L (ref 1–33)
ANION GAP SERPL CALCULATED.3IONS-SCNC: 10 MMOL/L (ref 5–15)
AST SERPL-CCNC: 15 U/L (ref 1–32)
BASOPHILS # BLD AUTO: 0.03 10*3/MM3 (ref 0–0.2)
BASOPHILS NFR BLD AUTO: 0.5 % (ref 0–1.5)
BILIRUB SERPL-MCNC: 0.2 MG/DL (ref 0–1.2)
BUN SERPL-MCNC: 19 MG/DL (ref 8–23)
BUN/CREAT SERPL: 13.6 (ref 7–25)
CALCIUM SPEC-SCNC: 8.6 MG/DL (ref 8.6–10.5)
CHLORIDE SERPL-SCNC: 103 MMOL/L (ref 98–107)
CO2 SERPL-SCNC: 28 MMOL/L (ref 22–29)
CREAT SERPL-MCNC: 1.4 MG/DL (ref 0.57–1)
DEPRECATED RDW RBC AUTO: 56.9 FL (ref 37–54)
EGFRCR SERPLBLD CKD-EPI 2021: 39.1 ML/MIN/1.73
EOSINOPHIL # BLD AUTO: 0.33 10*3/MM3 (ref 0–0.4)
EOSINOPHIL NFR BLD AUTO: 5.7 % (ref 0.3–6.2)
ERYTHROCYTE [DISTWIDTH] IN BLOOD BY AUTOMATED COUNT: 15.9 % (ref 12.3–15.4)
FERRITIN SERPL-MCNC: 681.1 NG/ML (ref 13–150)
GLOBULIN UR ELPH-MCNC: 3.3 GM/DL
GLUCOSE SERPL-MCNC: 118 MG/DL (ref 65–99)
HCT VFR BLD AUTO: 34.7 % (ref 34–46.6)
HGB BLD-MCNC: 10.2 G/DL (ref 12–15.9)
HOLD SPECIMEN: NORMAL
IRON 24H UR-MRATE: 49 MCG/DL (ref 37–145)
IRON SATN MFR SERPL: 17 % (ref 20–50)
LYMPHOCYTES # BLD AUTO: 1.5 10*3/MM3 (ref 0.7–3.1)
LYMPHOCYTES NFR BLD AUTO: 26 % (ref 19.6–45.3)
MCH RBC QN AUTO: 28.5 PG (ref 26.6–33)
MCHC RBC AUTO-ENTMCNC: 29.4 G/DL (ref 31.5–35.7)
MCV RBC AUTO: 96.9 FL (ref 79–97)
MONOCYTES # BLD AUTO: 0.46 10*3/MM3 (ref 0.1–0.9)
MONOCYTES NFR BLD AUTO: 8 % (ref 5–12)
NEUTROPHILS NFR BLD AUTO: 3.44 10*3/MM3 (ref 1.7–7)
NEUTROPHILS NFR BLD AUTO: 59.6 % (ref 42.7–76)
PLATELET # BLD AUTO: 62 10*3/MM3 (ref 140–450)
PMV BLD AUTO: 14 FL (ref 6–12)
POTASSIUM SERPL-SCNC: 3.4 MMOL/L (ref 3.5–5.2)
PROT SERPL-MCNC: 7.2 G/DL (ref 6–8.5)
RBC # BLD AUTO: 3.58 10*6/MM3 (ref 3.77–5.28)
SODIUM SERPL-SCNC: 141 MMOL/L (ref 136–145)
TIBC SERPL-MCNC: 280 MCG/DL (ref 298–536)
TRANSFERRIN SERPL-MCNC: 188 MG/DL (ref 200–360)
WBC NRBC COR # BLD: 5.77 10*3/MM3 (ref 3.4–10.8)

## 2023-01-10 PROCEDURE — 82728 ASSAY OF FERRITIN: CPT

## 2023-01-10 PROCEDURE — 84466 ASSAY OF TRANSFERRIN: CPT

## 2023-01-10 PROCEDURE — 99214 OFFICE O/P EST MOD 30 MIN: CPT | Performed by: NURSE PRACTITIONER

## 2023-01-10 PROCEDURE — 25010000002 EPOETIN ALFA-EPBX 40000 UNIT/ML SOLUTION: Performed by: NURSE PRACTITIONER

## 2023-01-10 PROCEDURE — 80053 COMPREHEN METABOLIC PANEL: CPT

## 2023-01-10 PROCEDURE — 85025 COMPLETE CBC W/AUTO DIFF WBC: CPT

## 2023-01-10 PROCEDURE — 36415 COLL VENOUS BLD VENIPUNCTURE: CPT

## 2023-01-10 PROCEDURE — 83540 ASSAY OF IRON: CPT

## 2023-01-10 PROCEDURE — 96372 THER/PROPH/DIAG INJ SC/IM: CPT

## 2023-01-10 RX ADMIN — EPOETIN ALFA-EPBX 40000 UNITS: 40000 INJECTION, SOLUTION INTRAVENOUS; SUBCUTANEOUS at 10:48

## 2023-01-10 NOTE — PROGRESS NOTES
[facility] HEMATOLOGY & ONCOLOGY  [address]  [place] [address] [phone]    Patient Name: Marina Joe  Encounter Date: 01/10/2023  YOB: 1945  Patient Number: [id number]      HPI: Marina Joe is a  77 y.o.  female who is seen on follow-up for stage Ia left breast cancer, upper outer quadrant, receptor positive and HER-2/alix negative.  She is on adjuvant anastrozole since 05/2013. Plan for 10 years.  She is also seen for anemia from chronic kidney disease stage IIIa .   She was given injectafer on 09/27/22.   She was started on SREE with Retacrit on November 29, 2022.    She began to experience thrombocytopenia and had bone marrow biopsy 11/10/22.      Final Diagnosis  Bone marrow, left iliac crest, core biopsy, aspirate smears, and clot section:  Slightly hypercellular marrow (40%) with maturing trilineage hematopoiesis.  Aspiculate smears, nondiagnostic.  No overt features of myelodysplasia and no excess blast population.  Moderately increased megakaryocytes.  2+ stainable iron.  Peripheral blood smear:  Severe thrombocytopenia.  Mild normocytic normochromic anemia.  Rare circulating schistocytes.  Normal white blood cell count with normal differential and morphology.  No circulating blasts.  Flow cytometry: No immunophenotypic evidence of abnormal myeloid maturation, increased blast population or a lymphoproliferative disorder.  Chromosome analysis: Normal female karyotype.    INTERVAL HISTORY     Ms. Joe presents to clinic today for continued follow up.  She received Retacrit on 11/29/22 and 12/13/22.  She continues to have dark stools. Otherwise, she has no acute complaints.      She had labs drawn today.   BUN 19, Creatinine 1.4, GFR 39.1, Potassium 3.4  Hgb 10.2, Hct 34.7, Plt 62  Iron 49, Ferritin 681, Sat 17%, TIBC 280      Oncology/Hematology History Overview Note   Oncologic history  In  February 2012, she had a routine mammogram that found a nodular density in the upper outer quadrant of the left breast.  An ultrasound revealed no nodularity at that time.  Repeat ultrasound 3 months later on 5/3/2012 revealed a small cyst in the left breast but felt to be benign.  Repeat mammogram on October 31, 2012 found a lobulated lesion in the left breast at the 2 o'clock position and a stable cyst at the 12 o'clock position.  She was referred to Dr. Barkley on 11/30/2012 and biopsy was performed.  The 12:00 cyst was a fibrocystic lesion while the 2:00 lesion was an invasive mammary carcinoma, low-grade, ER positive [place] positive HER-2 nu 2+, FISH unamplified.    On January 8, 2013 she underwent a left partial mastectomy with sentinel node biopsy finding invasive ductal carcinoma, 3.1 mm in greatest dimension, grade 1.  2 sentinel nodes were negative.  AJCC TNM stage was pT1aN0 M0, Stage IA.  Following surgery she underwent adjuvant radiation therapy and was then started on Arimidex for hormonal manipulation.  She was started on the Arimidex in approximately April or May 2013.  He has been recommended to continue this for 10 years.    Hematologic history  Patient with a long history of anemia secondary to iron deficiency as well as chronic kidney disease stage III.Patient has a GFR that ranges from 45-61ML/MIN.  He has been undergoing Procrit when meets guidelines for several years now.  She is also required iron replacement.  Folate > 20, B12 at 1503 and negative blood for flow cytometry on 01/07/2022.      Previous interventions  Procrit 40,000 units subcu initiated approximately February 2017  Injectafer given 9/23/2019, 9/30/2019     Malignant neoplasm of upper-outer quadrant of female breast (HCC)   10/31/2012 Initial Diagnosis    Malignant neoplasm of central portion of left female breast (CMS/HCC)     10/31/2012 Imaging    Mammogram on October 31, 2012 found a lobulated lesion in the left breast at the 2  o'clock position and a stable cyst at the 12 o'clock position.      11/30/2012 Biopsy    Dr. Barkley on 11/30/2012 and biopsy was performed.  The 12:00 cyst was a fibrocystic lesion while the 2:00 lesion was an invasive mammary carcinoma, low-grade, ER positive [place] positive HER-2 nu 2+, FISH unamplified.         1/8/2013 Surgery    On January 8, 2013 she underwent a left partial mastectomy with sentinel node biopsy finding invasive ductal carcinoma, 3.1 mm in greatest dimension, grade 1.  2 sentinel nodes were negative.  AJCC TNM stage was pT1aN0 M0, Stage IA.  Hormone receptor positive HER-2 negative      Radiation    Radiation OncologyTreatment Course:  Marina Joe receivedin 33 fractions to left breast via External Beam Radiation - EBRT.     5/2013 -  Hormonal Therapy    Arimidex 1 mg daily     8/22/2022 Cancer Staged    Staging form: Breast, AJCC V7  - Pathologic stage from 8/22/2022: Stage IA (T1a, N0, cM0) - Signed by Benjamin Shah MD on 8/22/2022         PAST MEDICAL HISTORY:  ALLERGIES:  Allergies   Allergen Reactions   • Aspirin GI Bleeding   • Niacin Other (See Comments)     CURRENT MEDICATIONS:  Outpatient Encounter Medications as of 1/10/2023   Medication Sig Dispense Refill   • albuterol sulfate  (90 Base) MCG/ACT inhaler Inhale 2 puffs Every 4 (Four) Hours As Needed for Wheezing. 2 g 3   • amLODIPine (NORVASC) 5 MG tablet Take 1 tablet by mouth 2 (Two) Times a Day. 180 tablet 2   • anastrozole (ARIMIDEX) 1 MG tablet Take 1 tablet by mouth Daily. 90 tablet 3   • benzonatate (Tessalon Perles) 100 MG capsule Take 1 capsule by mouth 3 (Three) Times a Day As Needed for Cough. 45 capsule 0   • buPROPion XL (WELLBUTRIN XL) 150 MG 24 hr tablet Take 1 tablet by mouth Daily. 90 tablet 3   • Calcium Carb-Cholecalciferol (CALCIUM 600+D3 PO) Take  by mouth.     • carvedilol (COREG) 6.25 MG tablet TAKE 1 TABLET BY MOUTH TWICE DAILY WITH MEALS 180 tablet 3   • cetirizine (zyrTEC) 10 MG tablet Take 10 mg by  mouth Daily.     • cloNIDine (Catapres) 0.1 MG tablet Take 1 tablet by mouth 2 (Two) Times a Day As Needed for High Blood Pressure (greater than 160/90). 45 tablet 0   • cyclobenzaprine (FLEXERIL) 10 MG tablet Take 1 tablet by mouth 3 (Three) Times a Day As Needed for Muscle Spasms. 90 tablet 5   • Ergocalciferol (VITAMIN D2 PO) Take 50,000 Units by mouth Every 30 (Thirty) Days.     • fluticasone (FLONASE) 50 MCG/ACT nasal spray 2 sprays into the nostril(s) as directed by provider Daily. 1 g 3   • fluticasone (FLOVENT DISKUS) 50 MCG/BLIST diskus inhaler Inhale 1 puff.     • irbesartan-hydrochlorothiazide (AVALIDE) 300-12.5 MG tablet Take 1 tablet by mouth Daily. 90 tablet 3   • montelukast (SINGULAIR) 10 MG tablet TAKE 1 TABLET BY MOUTH DAILY 90 tablet 1   • Multiple Vitamins-Minerals (MULTIVITAMIN ADULT) tablet Take  by mouth Daily.     • Omega-3 Fatty Acids (FISH OIL) 1000 MG capsule capsule Take 1,000 mg by mouth.     • pantoprazole (Protonix) 40 MG EC tablet Take 1 tablet by mouth Daily. 90 tablet 0   • rOPINIRole (REQUIP) 0.5 MG tablet TAKE 1 TABLET BY MOUTH EVERY NIGHT 90 tablet 1   • rosuvastatin (CRESTOR) 20 MG tablet TAKE 1 TABLET BY MOUTH DAILY 90 tablet 3   • sertraline (ZOLOFT) 100 MG tablet TAKE 1 TABLET BY MOUTH DAILY 90 tablet 3   • traZODone (DESYREL) 100 MG tablet Take 1 tablet by mouth Every Night. 90 tablet 3     No facility-administered encounter medications on file as of 1/10/2023.     ADULT ILLNESSES:  Patient Active Problem List   Diagnosis Code   • Malignant neoplasm of upper-outer quadrant of female breast (HCC) C50.419   • Anemia of chronic renal failure, stage 3 (moderate) (HCC) N18.30, D63.1   • Hypertension, benign I10   • Hx of colonic polyps Z86.010   • HX: breast cancer Z85.3   • Morbidly obese (HCC) E66.01   • Abnormal mammogram R92.8   • Breast mass N63.0   • Right knee DJD M17.11   • S/P lumpectomy, left breast Z98.890   • Adult hypothyroidism E03.9   • Rhinitis J31.0   • Anemia  D64.9   • Anxiety F41.9   • At low risk for fall Z91.81   • Breast cancer, left (HCC) C50.912   • Cervical pain M54.2   • Chronic insomnia F51.04   • Cough R05.9   • Elevated lipids E78.5   • Encounter for immunization Z23   • Herpes zoster without complication B02.9   • Influenza B J10.1   • Left ear pain H92.02   • Left otitis externa H60.92   • Lumbar strain, initial encounter S39.012A   • Myalgia M79.10   • Negative depression screening Z13.31   • Obesity (BMI 30-39.9) E66.9   • Postmenopausal status Z78.0   • Recurrent acute serous otitis media of left ear H65.05   • Restless leg G25.81   • Sinusitis, bacterial J32.9, B96.89   • Skin lesion L98.9   • Upper respiratory infection J06.9   • Urinary tract infection without hematuria N39.0   • Vitamin D deficiency E55.9   • Stage 3a chronic kidney disease (HCC) N18.31     SURGERIES:  Past Surgical History:   Procedure Laterality Date   • AVULSION TOENAIL PLATE      Sept 26,2018   • BREAST BIOPSY Left 11/20/2012   • BREAST BIOPSY      Left Breast, 1/2019 per Dr Barkley   • BREAST LUMPECTOMY Left     with node bx    • COLONOSCOPY  09/13/2013    small polyp at 30cm benign hyperplastic polyp, changes consistent with melanosis coli. Recall 5 years   • REPLACEMENT TOTAL KNEE Right     2016   • TOTAL ABDOMINAL HYSTERECTOMY WITH SALPINGO OOPHORECTOMY       HEALTH MAINTENANCE ITEMS:  Health Maintenance Due   Topic Date Due   • MAMMOGRAM  12/13/2022   • DXA SCAN  01/01/2023       <no information>  Last Completed Colonoscopy          COLORECTAL CANCER SCREENING (COLONOSCOPY - Every 5 Years) Next due on 11/19/2023 11/16/2022  Occult Blood X 3, Stool - Stool, Per Rectum    11/19/2018  SCANNED - COLONOSCOPY    11/19/2018  SCANNED - COLONOSCOPY    09/13/2013  SCANNED - COLONOSCOPY              Immunization History   Administered Date(s) Administered   • COVID-19 (MODERNA) 1st, 2nd, 3rd Dose Only 03/12/2021, 03/28/2021   • COVID-19 (MODERNA) BOOSTER 08/31/2022   • COVID-19  (PFIZER) BIVALENT BOOSTER 12+YRS 12/08/2022   • Flu Vaccine Quad PF >36MO 11/05/2019   • Fluad Quad 65+ 11/05/2020   • Fluzone High Dose =>65 Years ([employer] ONLY) 11/11/2015, 10/14/2016, 11/20/2017   • Fluzone High-Dose 65+yrs 11/29/2021, 10/04/2022   • Pneumococcal Conjugate 13-Valent (PCV13) 10/14/2016   • Pneumococcal Polysaccharide (PPSV23) 11/05/2020   • Shingrix 01/01/2021, 06/08/2021   • Zoster, Unspecified 01/01/2021     Last Completed Mammogram     This patient has no relevant Health Maintenance data.            FAMILY HISTORY:  Family History   Problem Relation Age of Onset   • Heart attack Mother    • Hodgkin's lymphoma Father    • Other Father    • Cancer Father         hodgkins   • Heart attack Brother    • Skin cancer Maternal Uncle    • Pancreatic cancer Maternal Uncle    • Cancer Maternal Uncle         eye   • Colon cancer Neg Hx    • Colon polyps Neg Hx      SOCIAL HISTORY:  Social History     Socioeconomic History   • Marital status:    Tobacco Use   • Smoking status: Never   • Smokeless tobacco: Never   Vaping Use   • Vaping Use: Never used   Substance and Sexual Activity   • Alcohol use: No   • Drug use: Not Currently   • Sexual activity: Not Currently     Birth control/protection: Surgical       REVIEW OF SYSTEMS:    Review of Systems   Constitutional: Negative for chills and fever.   HENT: Negative for congestion and trouble swallowing.    Respiratory: Negative for wheezing.    Cardiovascular: Negative for chest pain and palpitations.   Gastrointestinal: Negative for abdominal pain, nausea and vomiting.   Endocrine: Negative for polydipsia and polyphagia.   Genitourinary: Negative for difficulty urinating, dysuria and flank pain.   Musculoskeletal: Negative for gait problem and joint swelling.   Allergic/Immunologic: Negative for food allergies.   Neurological: Negative for speech difficulty and weakness.   Hematological: Negative for adenopathy. Does not bruise/bleed easily.    Psychiatric/Behavioral: Negative for agitation, confusion and hallucinations.       VITAL SIGNS: /74   Pulse 68   Temp 97.9 °F (36.6 °C)   Resp 16   Ht 172.7 cm (68\")   Wt 110 kg (242 lb)   LMP  (LMP Unknown)   SpO2 97%   BMI 36.80 kg/m²   Pain Score    01/10/23 0912   PainSc: 0-No pain       PHYSICAL EXAMINATION:     Physical Exam  Vitals reviewed.   Constitutional:       General: She is not in acute distress.  HENT:      Head: Normocephalic and atraumatic.   Eyes:      General: No scleral icterus.  Cardiovascular:      Rate and Rhythm: Normal rate.   Pulmonary:      Effort: No respiratory distress.      Breath sounds: No wheezing or rales.   Abdominal:      General: Bowel sounds are normal.      Palpations: Abdomen is soft.      Tenderness: There is no abdominal tenderness.   Musculoskeletal:         General: No swelling.      Cervical back: Neck supple.   Skin:     General: Skin is warm and dry.   Neurological:      Mental Status: She is alert and oriented to person, place, and time.   Psychiatric:         Mood and Affect: Mood normal.         Behavior: Behavior normal.         Thought Content: Thought content normal.         Judgment: Judgment normal.         LABS    Lab Results - Last 18 Months   Lab Units 01/10/23  0905 12/27/22  1309 12/13/22  0810 12/08/22  0910 11/29/22  0815 11/10/22  0902 09/07/22  0801 06/22/22  1419 05/04/22  0755 01/05/22  0829 12/13/21  0944 11/10/21  0849 10/13/21  0848 09/15/21  0804 07/14/21  0824   HEMOGLOBIN g/dL 10.2* 10.3* 9.3* 11.1* 9.5* 10.1*   < > 10.4* 11.0* 10.4* 10.6* 10.4* 10.8* 10.9* 11.0*   HEMATOCRIT % 34.7 33.8* 31.1* 37.7 31.2* 32.0*   < > 33.5* 35.6 33.9* 34.6* 33.7* 33.8* 33.8* 35.0   MCV fL 96.9 96.0 96.0 98.2* 95.4 94.4   < > 91.8 92.5 92.6 94.8 92.8 93.4 90.4 91.6   WBC 10*3/mm3 5.77 5.15 5.75 5.96 6.07 5.66   < > 5.77 5.72 5.43 5.5 5.36 5.71 6.28 5.51   RDW % 15.9* 16.2* 16.2* 17.3* 16.2* 16.2*   < > 15.8* 15.1 14.8 14.6* 15.2 15.5* 15.7* 15.1    MPV fL 14.0*  --   --   --   --   --   --  12.2* 13.3* 11.7 11.9 11.1 10.4 10.6 10.5   PLATELETS 10*3/mm3 62* 65* 54* 51* 53* 58*   < > 77* 82* 103* 119* 124* 141 147 165   IMM GRAN % %  --   --  0.3  --   --   --   --  0.5  --   --   --  0.4 0.4 0.3 0.2   NEUTROS ABS 10*3/mm3 3.44 3.19 3.52 3.67 3.49 3.35   < > 3.28 3.20 3.25 3.2 3.01 3.36 3.73 2.94   LYMPHS ABS 10*3/mm3 1.50 1.24 1.34 CANCELED  1.14 1.69 1.43   < > 1.50 1.67 1.50 1.5 1.59 1.57 1.73 1.81   MONOS ABS 10*3/mm3 0.46 0.45 0.51 0.78 0.50 0.51   < > 0.59 0.49 0.42 0.50 0.47 0.49 0.52 0.49   EOS ABS 10*3/mm3 0.33 0.23 0.34 CANCELED 0.34 0.32   < > 0.34 0.31 0.22 0.20 0.24 0.25 0.25 0.24   EOSINOPHIL ABS 10*3/mm3  --   --   --  0.36  --   --   --   --   --   --   --   --   --   --   --    EOSINOPHIL % %  --   --   --  6.1  --   --   --   --   --   --   --   --   --   --   --    BASOS ABS 10*3/mm3 0.03 0.02 0.02 CANCELED 0.03 0.03   < > 0.03 0.03 0.02 0.00 0.03 0.02 0.03 0.02   IMMATURE GRANS (ABS) 10*3/mm3  --   --  0.02  --   --   --   --  0.03  --   --  0.0 0.02 0.02 0.02 0.01   NRBC /100 WBC  --   --  0.0  --   --   --   --  0.0  --   --   --  0.0 0.0 0.0 0.0   NEUTROPHIL % %  --   --   --  61.6  --   --   --   --   --   --   --   --   --   --   --    MONOCYTES % %  --   --   --  13.1*  --   --   --   --   --   --   --   --   --   --   --     < > = values in this interval not displayed.       Lab Results - Last 18 Months   Lab Units 01/10/23  0905 12/27/22  1309 12/08/22  0910 11/29/22  0815 11/10/22  0846 09/07/22  0801 06/30/22  0733 05/04/22  0755 01/05/22  0829 12/13/21  0944   GLUCOSE mg/dL 118* 103* 95 105* 100* 116*   < > 117*   < > 97   SODIUM mmol/L 141 141 141 142 139 139   < > 140   < > 141   POTASSIUM mmol/L 3.4* 4.0 3.9 3.6 3.6 3.9   < > 3.8   < > 3.6   TOTAL CO2 mmol/L  --   --  31.0*  --   --   --    < >  --   --  25   CO2 mmol/L 28.0 30.0*  --  29.0 31.0* 30.0*  --  27.0   < >  --    CHLORIDE mmol/L 103 103 102 105 101 101   < > 102    < > 102   ANION GAP mmol/L 10.0 8.0  --  8.0 7.0 8.0  --  11.0   < > 14   CREATININE mg/dL 1.40* 1.44* 1.19* 1.29* 1.24* 1.20*   < > 1.25*   < > 1.3*   BUN mg/dL 19 19 18 16 22 16   < > 21   < > 22   BUN / CREAT RATIO  13.6 13.2 15.1 12.4 17.7 13.3   < > 16.8   < >  --    CALCIUM mg/dL 8.6 9.3 9.9 8.9 9.7 10.1   < > 9.5   < > 9.5   EGFR IF NONAFRICN AM   --   --   --   --   --   --   --   --   --  40*   ALK PHOS U/L 72 79 78 79 77 91  --  78   < > 78   TOTAL PROTEIN g/dL 7.2 7.5  --  6.7 7.0 7.7  --  7.1   < > 7.1   ALT (SGPT) U/L 13 14 14 18 17 19  --  18   < > 20   AST (SGOT) U/L 15 15 18 20 17 21  --  18   < > 19   BILIRUBIN mg/dL 0.2 0.2 0.3 0.2 0.2 0.2  --  0.2   < > 0.3   ALBUMIN g/dL 3.9 4.0 4.70 3.80 3.90 4.20  --  4.20   < > 3.8   GLOBULIN gm/dL 3.3 3.5  --  2.9 3.1 3.5  --  2.9   < >  --     < > = values in this interval not displayed.       Lab Results - Last 18 Months   Lab Units 01/05/22  0932 11/29/21  0817   URIC ACID mg/dL  --  6.9*   REFERENCE LAB REPORT  See Attached Report  --        Lab Results - Last 18 Months   Lab Units 01/10/23  0905 12/01/22  0838 11/29/22  0815 11/10/22  0846 09/07/22  0801 05/04/22  0755 01/05/22  0829 11/29/21  0817 09/15/21  0804 07/14/21  0824   IRON mcg/dL 49  --  57 67 40 66 69  --  72 72   TIBC mcg/dL 280*  --  289* 289* 298 313 307  --  289* 289*   IRON SATURATION % 17*  --  20 23 13* 21 22  --  25 25   FERRITIN ng/mL 681.10*  --  959.20* 1,019.00* 585.40* 805.40* 928.20*  --  1,056.00* 990.30*   TSH uIU/mL  --  4.640*  --   --   --   --   --  4.010  --   --    FOLATE ng/mL  --   --   --   --   --   --  >20.00  --  >20.00 >20.00       Marina Joe reports a pain score of .  .      ASSESSMENT:  1. Chronic ITP (idiopathic thrombocytopenia) (HCC)    2. Iron deficiency anemia secondary to inadequate dietary iron intake    3. Stage 3b chronic kidney disease (HCC)    4. History of breast cancer      1. Chronic ITP (idiopathic thrombocytopenia) (HCC)   Bone marrow, left  iliac crest, core biopsy, aspirate smears, and clot section:  Slightly hypercellular marrow (40%) with maturing trilineage hematopoiesis.  Aspiculate smears, nondiagnostic.  No overt features of myelodysplasia and no excess blast population.  Moderately increased megakaryocytes.  2+ stainable iron.  Peripheral blood smear:  Severe thrombocytopenia.  Mild normocytic normochromic anemia.  Rare circulating schistocytes.  Normal white blood cell count with normal differential and morphology.  No circulating blasts.  Flow cytometry: No immunophenotypic evidence of abnormal myeloid maturation, increased blast population or a lymphoproliferative disorder.  Chromosome analysis: Normal female karyotype.  -Plt today 62.   -Stable for observation   PLAN  -Will transfuse for platelets < 10 regardless of bleeding.   -Transfuse platelets < 20 If bleeding   -Can give burst dose of steroids if platelets < 30.    2. Iron deficiency anemia secondary to inadequate dietary iron intake   She was given injectafer on 09/27/22.     Hgb 10.2, Hct 34.7, Plt 62  Iron 49, Ferritin 681, Sat 17%, TIBC 280  PLAN  -Stable for observation     3. History of breast cancer  - Pathologic stage: Stage IA (T1a, N0, cM0)   -Invasive mammary carcinoma, low-grade, ER positive [place] positive HER-2 nu 2+, FISH unamplified.  -January 8, 2013 she underwent a left partial mastectomy with sentinel node biopsy  -Following surgery she underwent adjuvant radiation therapy and was then started on Arimidex  for hormonal manipulation.   He has been recommended to continue this for 10 years.  -12/13/21 mammogram  No mammographic evidence of malignancy within either breast.   -October 13, 2022  CT Chest w/o Contrast per PCP.  1. The patient's undergone previous left lumpectomy with axillary dissection.  2. Left lingular and left lower lobe subsegmental atelectasis. There is mild pleural thickening within the left lateral and posterior costophrenic angle. No evidence of  effusion. No consolidative pneumonia or nodularity. No evidence of metastatic disease to the thorax.  -Tolerating Arimidex well w/o s/s of toxicity.    -Pt reports she started Arimidex in Jan 2013.  She will finish her current supply and then stop.  -Mammogram has been ordered per PCP as well as DEXA scan      4. Occult blood in stools   -Occult blood test with 2 of 3 were positive   -Pt had colonoscopy November 2018 with 5 year recall   PLAN  -Has appt with GI on Jan 31, 2023      5.  Anemia Chronic Kidney Disease Stage IIIb  BUN 19, Creatinine 1.4, GFR 39.1, Potassium 3.4  Hgb 10.2, Hct 34.7, Plt 62  Iron 49, Ferritin 681, Sat 17%, TIBC 280  PLAN  Continue Retacrit 40,000 units monthly for Hgb < 11 OR Hct < 33  -She will get Retacrit injection today   -Followed by Dr. Martin        PLAN:  AS LISTED ABOVE  Continue current medications, treatment plans and follow up with PCP and any other providers  Monthly labs and retacrit for Hgb < 11 OR Hct < 33  Return to office in 3 months   Pre-office labs for CBC, CMP, Iron profile and Ferritin   Care discussed with patient.  Understanding expressed.  Patient agreeable with plan.            Analilia Madera, MARY  01/10/2023

## 2023-01-23 ENCOUNTER — HOSPITAL ENCOUNTER (OUTPATIENT)
Dept: WOMENS IMAGING | Age: 78
Discharge: HOME | End: 2023-01-23
Payer: MEDICARE

## 2023-01-23 VITALS — BODY MASS INDEX: 36.49 KG/M2 | WEIGHT: 240 LBS

## 2023-01-23 DIAGNOSIS — Z12.31 BREAST CANCER SCREENING BY MAMMOGRAM: ICD-10-CM

## 2023-01-23 PROCEDURE — 77067 SCR MAMMO BI INCL CAD: CPT | Performed by: RADIOLOGY

## 2023-01-23 PROCEDURE — 77063 BREAST TOMOSYNTHESIS BI: CPT

## 2023-01-23 PROCEDURE — 77063 BREAST TOMOSYNTHESIS BI: CPT | Performed by: RADIOLOGY

## 2023-01-24 ENCOUNTER — APPOINTMENT (OUTPATIENT)
Dept: ONCOLOGY | Facility: HOSPITAL | Age: 78
End: 2023-01-24
Payer: MEDICARE

## 2023-01-24 ENCOUNTER — APPOINTMENT (OUTPATIENT)
Dept: LAB | Facility: HOSPITAL | Age: 78
End: 2023-01-24
Payer: MEDICARE

## 2023-02-03 ENCOUNTER — OFFICE VISIT (OUTPATIENT)
Dept: GASTROENTEROLOGY | Facility: CLINIC | Age: 78
End: 2023-02-03
Payer: MEDICARE

## 2023-02-03 VITALS
SYSTOLIC BLOOD PRESSURE: 140 MMHG | HEART RATE: 70 BPM | BODY MASS INDEX: 36.07 KG/M2 | HEIGHT: 68 IN | TEMPERATURE: 97.8 F | WEIGHT: 238 LBS | OXYGEN SATURATION: 97 % | DIASTOLIC BLOOD PRESSURE: 80 MMHG

## 2023-02-03 DIAGNOSIS — R19.5 HEME POSITIVE STOOL: ICD-10-CM

## 2023-02-03 DIAGNOSIS — I10 HTN (HYPERTENSION), BENIGN: ICD-10-CM

## 2023-02-03 DIAGNOSIS — Z78.9 NONSMOKER: ICD-10-CM

## 2023-02-03 DIAGNOSIS — D50.9 IRON DEFICIENCY ANEMIA, UNSPECIFIED IRON DEFICIENCY ANEMIA TYPE: Primary | ICD-10-CM

## 2023-02-03 DIAGNOSIS — Z86.010 HX OF COLONIC POLYPS: ICD-10-CM

## 2023-02-03 DIAGNOSIS — R10.13 DYSPEPSIA: ICD-10-CM

## 2023-02-03 PROCEDURE — 99214 OFFICE O/P EST MOD 30 MIN: CPT | Performed by: CLINICAL NURSE SPECIALIST

## 2023-02-03 NOTE — PROGRESS NOTES
Marina Joe  1945      2/3/2023  Chief Complaint   Patient presents with   • GI Problem     Anemia and heme + stools     Subjective   HPI  Marina Joe is a 78 y.o. female who presents as a referral for heme positive stool and anemia. Incidental heme positive stool. No visible bleeding. H/H has been low for years she says and says iron def all of her life. Her iron level on 1/10/23 is 49 and iron saturation 17%. . She has no complaints of nausea or vomiting. No change in bowels. No wt loss. No BRBPR. No melena. There is NO family hx for colon cancer. No abdominal pain.  She takes Protonix for reflux ongoing for years. She has never had endoscopy. No nausea or vomiting. No dysphagia. No wt loss.   WBC   Date Value Ref Range Status   01/10/2023 5.77 3.40 - 10.80 10*3/mm3 Final   12/08/2022 5.96 3.40 - 10.80 10*3/mm3 Final   12/13/2021 5.5 4.8 - 10.8 K/uL Final     RBC   Date Value Ref Range Status   01/10/2023 3.58 (L) 3.77 - 5.28 10*6/mm3 Final   12/08/2022 3.84 3.77 - 5.28 10*6/mm3 Final     Hemoglobin   Date Value Ref Range Status   01/10/2023 10.2 (L) 12.0 - 15.9 g/dL Final   12/13/2021 10.6 (L) 12.0 - 16.0 g/dL Final     Hematocrit   Date Value Ref Range Status   01/10/2023 34.7 34.0 - 46.6 % Final   12/13/2021 34.6 (L) 37.0 - 47.0 % Final     MCV   Date Value Ref Range Status   01/10/2023 96.9 79.0 - 97.0 fL Final   12/13/2021 94.8 81.0 - 99.0 fL Final     MCH   Date Value Ref Range Status   01/10/2023 28.5 26.6 - 33.0 pg Final   12/13/2021 29.0 27.0 - 31.0 pg Final     MCHC   Date Value Ref Range Status   01/10/2023 29.4 (L) 31.5 - 35.7 g/dL Final   12/13/2021 30.6 (L) 33.0 - 37.0 g/dL Final     RDW   Date Value Ref Range Status   01/10/2023 15.9 (H) 12.3 - 15.4 % Final   12/13/2021 14.6 (H) 11.5 - 14.5 % Final     RDW-SD   Date Value Ref Range Status   01/10/2023 56.9 (H) 37.0 - 54.0 fl Final     MPV   Date Value Ref Range Status   01/10/2023 14.0 (H) 6.0 - 12.0 fL Final   12/13/2021 11.9 9.4 - 12.3 fL  Final     Platelets   Date Value Ref Range Status   01/10/2023 62 (L) 140 - 450 10*3/mm3 Final   12/13/2021 119 (L) 130 - 400 K/uL Final     Neutrophil Rel %   Date Value Ref Range Status   12/13/2021 58.8 50.0 - 65.0 % Final     Neutrophil %   Date Value Ref Range Status   01/10/2023 59.6 42.7 - 76.0 % Final     Lymphocyte Rel %   Date Value Ref Range Status   12/13/2021 27.8 20.0 - 40.0 % Final     Lymphocyte %   Date Value Ref Range Status   01/10/2023 26.0 19.6 - 45.3 % Final     Monocyte Rel %   Date Value Ref Range Status   12/13/2021 9.3 0.0 - 10.0 % Final     Monocyte %   Date Value Ref Range Status   01/10/2023 8.0 5.0 - 12.0 % Final     Eosinophil Rel %   Date Value Ref Range Status   12/13/2021 3.5 0.0 - 5.0 % Final     Eosinophil %   Date Value Ref Range Status   01/10/2023 5.7 0.3 - 6.2 % Final     Basophil Rel %   Date Value Ref Range Status   12/13/2021 0.4 0.0 - 1.0 % Final     Basophil %   Date Value Ref Range Status   01/10/2023 0.5 0.0 - 1.5 % Final     Immature Grans %   Date Value Ref Range Status   12/13/2022 0.3 0.0 - 0.5 % Final     Neutrophils Absolute   Date Value Ref Range Status   12/13/2021 3.2 1.5 - 7.5 K/uL Final     Neutrophils, Absolute   Date Value Ref Range Status   01/10/2023 3.44 1.70 - 7.00 10*3/mm3 Final     Lymphocytes Absolute   Date Value Ref Range Status   12/13/2021 1.5 1.1 - 4.5 K/uL Final     Lymphocytes, Absolute   Date Value Ref Range Status   01/10/2023 1.50 0.70 - 3.10 10*3/mm3 Final     Monocytes Absolute   Date Value Ref Range Status   12/13/2021 0.50 0.00 - 0.90 K/uL Final     Monocytes, Absolute   Date Value Ref Range Status   01/10/2023 0.46 0.10 - 0.90 10*3/mm3 Final     Eosinophils Absolute   Date Value Ref Range Status   12/13/2021 0.20 0.00 - 0.60 K/uL Final     Eosinophils, Absolute   Date Value Ref Range Status   01/10/2023 0.33 0.00 - 0.40 10*3/mm3 Final     Basophils Absolute   Date Value Ref Range Status   12/13/2021 0.00 0.00 - 0.20 K/uL Final      Basophils, Absolute   Date Value Ref Range Status   01/10/2023 0.03 0.00 - 0.20 10*3/mm3 Final     Immature Grans, Absolute   Date Value Ref Range Status   12/13/2022 0.02 0.00 - 0.05 10*3/mm3 Final   12/13/2021 0.0 K/uL Final     nRBC   Date Value Ref Range Status   12/13/2022 0.0 0.0 - 0.2 /100 WBC Final     Iron   Date Value Ref Range Status   01/10/2023 49 37 - 145 mcg/dL Final       Past Medical History:   Diagnosis Date   • Breast cancer (HCC)    • CKD (chronic kidney disease)    • Hypercholesteremia    • Hypertension    • Sinusitis    • Stage 3a chronic kidney disease (HCC) 10/20/2020     Past Surgical History:   Procedure Laterality Date   • AVULSION TOENAIL PLATE      Sept 26,2018   • BREAST BIOPSY Left 11/20/2012   • BREAST BIOPSY      Left Breast, 1/2019 per Dr Barkley   • BREAST LUMPECTOMY Left     with node bx    • COLONOSCOPY  09/13/2013    small polyp at 30cm benign hyperplastic polyp, changes consistent with melanosis coli. Recall 5 years   • COLONOSCOPY  11/19/2018    Tics otherwise normal exam repeat in 5 years   • REPLACEMENT TOTAL KNEE Right     2016   • TOTAL ABDOMINAL HYSTERECTOMY WITH SALPINGO OOPHORECTOMY       Outpatient Medications Marked as Taking for the 2/3/23 encounter (Office Visit) with Meaghan White APRN   Medication Sig Dispense Refill   • albuterol sulfate  (90 Base) MCG/ACT inhaler Inhale 2 puffs Every 4 (Four) Hours As Needed for Wheezing. 2 g 3   • amLODIPine (NORVASC) 5 MG tablet Take 1 tablet by mouth 2 (Two) Times a Day. 180 tablet 2   • anastrozole (ARIMIDEX) 1 MG tablet Take 1 tablet by mouth Daily. 90 tablet 3   • benzonatate (Tessalon Perles) 100 MG capsule Take 1 capsule by mouth 3 (Three) Times a Day As Needed for Cough. 45 capsule 0   • buPROPion XL (WELLBUTRIN XL) 150 MG 24 hr tablet Take 1 tablet by mouth Daily. 90 tablet 3   • Calcium Carb-Cholecalciferol (CALCIUM 600+D3 PO) Take  by mouth.     • carvedilol (COREG) 6.25 MG tablet TAKE 1 TABLET BY  MOUTH TWICE DAILY WITH MEALS 180 tablet 3   • cetirizine (zyrTEC) 10 MG tablet Take 10 mg by mouth Daily.     • cloNIDine (Catapres) 0.1 MG tablet Take 1 tablet by mouth 2 (Two) Times a Day As Needed for High Blood Pressure (greater than 160/90). 45 tablet 0   • cyclobenzaprine (FLEXERIL) 10 MG tablet Take 1 tablet by mouth 3 (Three) Times a Day As Needed for Muscle Spasms. 90 tablet 5   • Ergocalciferol (VITAMIN D2 PO) Take 50,000 Units by mouth Every 30 (Thirty) Days.     • fluticasone (FLONASE) 50 MCG/ACT nasal spray 2 sprays into the nostril(s) as directed by provider Daily. 1 g 3   • fluticasone (FLOVENT DISKUS) 50 MCG/BLIST diskus inhaler Inhale 1 puff.     • irbesartan-hydrochlorothiazide (AVALIDE) 300-12.5 MG tablet Take 1 tablet by mouth Daily. 90 tablet 3   • montelukast (SINGULAIR) 10 MG tablet TAKE 1 TABLET BY MOUTH DAILY 90 tablet 1   • Multiple Vitamins-Minerals (MULTIVITAMIN ADULT) tablet Take  by mouth Daily.     • Omega-3 Fatty Acids (FISH OIL) 1000 MG capsule capsule Take 1,000 mg by mouth.     • pantoprazole (Protonix) 40 MG EC tablet Take 1 tablet by mouth Daily. 90 tablet 0   • rOPINIRole (REQUIP) 0.5 MG tablet TAKE 1 TABLET BY MOUTH EVERY NIGHT 90 tablet 1   • rosuvastatin (CRESTOR) 20 MG tablet TAKE 1 TABLET BY MOUTH DAILY 90 tablet 3   • sertraline (ZOLOFT) 100 MG tablet TAKE 1 TABLET BY MOUTH DAILY 90 tablet 3   • traZODone (DESYREL) 100 MG tablet Take 1 tablet by mouth Every Night. 90 tablet 3     Allergies   Allergen Reactions   • Aspirin GI Bleeding   • Niacin Other (See Comments)     Social History     Socioeconomic History   • Marital status:    Tobacco Use   • Smoking status: Never   • Smokeless tobacco: Never   Vaping Use   • Vaping Use: Never used   Substance and Sexual Activity   • Alcohol use: No   • Drug use: Not Currently   • Sexual activity: Not Currently     Birth control/protection: Surgical     Family History   Problem Relation Age of Onset   • Heart attack Mother   "  • Hodgkin's lymphoma Father    • Other Father    • Cancer Father         hodgkins   • Heart attack Brother    • Skin cancer Maternal Uncle    • Pancreatic cancer Maternal Uncle    • Cancer Maternal Uncle         eye   • Colon cancer Neg Hx    • Colon polyps Neg Hx      Health Maintenance   Topic Date Due   • DXA SCAN  01/01/2023   • TDAP/TD VACCINES (1 - Tdap) 12/08/2023 (Originally 1/31/1964)   • COLORECTAL CANCER SCREENING  11/19/2023   • LIPID PANEL  12/01/2023   • ANNUAL WELLNESS VISIT  12/08/2023   • MAMMOGRAM  01/23/2024   • HEPATITIS C SCREENING  Completed   • COVID-19 Vaccine  Completed   • INFLUENZA VACCINE  Completed   • Pneumococcal Vaccine 65+  Completed   • ZOSTER VACCINE  Completed       REVIEW OF SYSTEMS  General: well appearing, no fever chills or sweats, no unexplained wt loss  HEENT: no acute visual or hearing disturbances  Cardiovascular: No chest pain or palpitations  Pulmonary: No shortness of breath, coughing, wheezing or hemoptysis  : No burning, urgency, hematuria, or dysuria  Musculoskeletal: No joint pain or stiffness  Peripheral: no edema  Skin: No lesions or rashes  Neuro: No dizziness, headaches, stroke, syncope  Endocrine: No hot or cold intolerances  Hematological: No blood dyscrasias    Objective   Vitals:    02/03/23 0911   BP: 140/80   Pulse: 70   Temp: 97.8 °F (36.6 °C)   TempSrc: Temporal   SpO2: 97%   Weight: 108 kg (238 lb)   Height: 172.7 cm (68\")     Body mass index is 36.19 kg/m².  Class 2 Severe Obesity (BMI >=35 and <=39.9). Obesity-related health conditions include the following: hypertension. Obesity is unchanged. BMI is is above average; BMI management plan is completed. We discussed portion control and increasing exercise.      PHYSICAL EXAM  General: age appropriate well nourished well appearing, no acute distress  Head: normocephalic and atraumatic  Global assessment-supple  Neck-No JVD noted, no lymphadenopathy  Pulmonary-clear to auscultation bilaterally, normal " respiratory effort  Cardiovascular-normal rate and rhythm, normal heart sounds, S1 and S2 noted  Abdomen-soft, non tender, non distended, normal bowel sounds all 4 quadrants, no hepatosplenomegaly noted  Extremities-No clubbing cyanosis or edema  Neuro-Non focal, converses appropriately, awake, alert, oriented    Assessment & Plan     Diagnoses and all orders for this visit:    1. Iron deficiency anemia, unspecified iron deficiency anemia type (Primary)    2. Heme positive stool    3. Hx of colonic polyps  -     Cancel: Case Request; Standing  -     Cancel: Follow Anesthesia Guidelines / Protocol; Future  -     Cancel: Obtain Informed Consent; Future  -     Cancel: Implement Anesthesia Orders Day of Procedure; Standing  -     Cancel: Obtain Informed Consent; Standing  -     Cancel: Verify bowel prep was successful; Standing  -     Cancel: Case Request  -     Case Request; Standing  -     Follow Anesthesia Guidelines / Protocol; Future  -     Obtain Informed Consent; Future  -     Implement Anesthesia Orders Day of Procedure; Standing  -     Obtain Informed Consent; Standing  -     Verify Bowel Prep Was Successful; Standing  -     Case Request    4. Nonsmoker    5. HTN (hypertension), benign  Comments:  cont BP medication the day of procedure    6. Dyspepsia      MIRALAX PREP INSTRUCTIONS PROVIDED  Continue Protonix  I discussed non pharmaceutical treatment of gerd.  This includes gradually losing weight to achieve ideal body wt., elevation of the head of bed by 4-6 inches, nothing to eat or drink 3 hours prior to lying down, avoiding tight clothing, stress reduction, tobacco cessation, reduction of alcohol intake, and dietary restrictions (avoiding caffeine, coffee, fatty foods, mints, chocolate, spicy foods and tomato based sauces as much as possible).      ESOPHAGOGASTRODUODENOSCOPY WITH ANESTHESIA (N/A), COLONOSCOPY WITH ANESTHESIA (N/A)  Body mass index is 36.19 kg/m².    Patient instructions on prep prior to  procedure provided to the patient.    All risks, benefits, alternatives, and indications of colonoscopy procedure have been discussed with the patient. Risks to include perforation of the colon requiring possible surgery or colostomy, risk of bleeding from biopsies or removal of colon tissue, possibility of missing a colon polyp or cancer, or adverse drug reaction.  Benefits to include the diagnosis and management of disease of the colon and rectum. Alternatives to include barium enema, radiographic evaluation, lab testing or no intervention. Pt verbalizes understanding and agrees.     Meaghan White, APRN  2/3/2023  09:26 CST      IF YOU SMOKE OR USE TOBACCO PLEASE READ THE FOLLOWIN minutes reading provided    Why is smoking bad for me?  Smoking increases the risk of heart disease, lung disease, vascular disease, stroke, and cancer.     If you smoke, STOP!    If you would like more information on quitting smoking, please visit the [employer] website: [email or web address]   This link will provide additional resources including the QUIT line and the Beat the Pack support groups.     For more information:    Quit Now [place]  -QUIT-NOW  [email or web address]

## 2023-02-07 ENCOUNTER — INFUSION (OUTPATIENT)
Dept: ONCOLOGY | Facility: HOSPITAL | Age: 78
End: 2023-02-07
Payer: MEDICARE

## 2023-02-07 ENCOUNTER — LAB (OUTPATIENT)
Dept: LAB | Facility: HOSPITAL | Age: 78
End: 2023-02-07
Payer: MEDICARE

## 2023-02-07 VITALS
TEMPERATURE: 97.5 F | DIASTOLIC BLOOD PRESSURE: 55 MMHG | RESPIRATION RATE: 20 BRPM | HEART RATE: 71 BPM | SYSTOLIC BLOOD PRESSURE: 141 MMHG | OXYGEN SATURATION: 97 % | WEIGHT: 237 LBS | HEIGHT: 68 IN | BODY MASS INDEX: 35.92 KG/M2

## 2023-02-07 DIAGNOSIS — D63.1 ANEMIA OF CHRONIC RENAL FAILURE, STAGE 3A: ICD-10-CM

## 2023-02-07 DIAGNOSIS — N18.31 STAGE 3A CHRONIC KIDNEY DISEASE: ICD-10-CM

## 2023-02-07 DIAGNOSIS — N18.31 STAGE 3A CHRONIC KIDNEY DISEASE: Primary | ICD-10-CM

## 2023-02-07 DIAGNOSIS — N18.31 ANEMIA OF CHRONIC RENAL FAILURE, STAGE 3A: ICD-10-CM

## 2023-02-07 LAB
ALBUMIN SERPL-MCNC: 4.5 G/DL (ref 3.5–5.2)
ALBUMIN/GLOB SERPL: 1.3 G/DL
ALP SERPL-CCNC: 71 U/L (ref 39–117)
ALT SERPL W P-5'-P-CCNC: 14 U/L (ref 1–33)
ANION GAP SERPL CALCULATED.3IONS-SCNC: 10 MMOL/L (ref 5–15)
AST SERPL-CCNC: 19 U/L (ref 1–32)
BASOPHILS # BLD AUTO: 0.02 10*3/MM3 (ref 0–0.2)
BASOPHILS NFR BLD AUTO: 0.3 % (ref 0–1.5)
BILIRUB SERPL-MCNC: 0.3 MG/DL (ref 0–1.2)
BUN SERPL-MCNC: 21 MG/DL (ref 8–23)
BUN/CREAT SERPL: 16.2 (ref 7–25)
CALCIUM SPEC-SCNC: 9.2 MG/DL (ref 8.6–10.5)
CHLORIDE SERPL-SCNC: 101 MMOL/L (ref 98–107)
CO2 SERPL-SCNC: 27 MMOL/L (ref 22–29)
CREAT SERPL-MCNC: 1.3 MG/DL (ref 0.57–1)
DEPRECATED RDW RBC AUTO: 53.2 FL (ref 37–54)
EGFRCR SERPLBLD CKD-EPI 2021: 42.4 ML/MIN/1.73
EOSINOPHIL # BLD AUTO: 0.29 10*3/MM3 (ref 0–0.4)
EOSINOPHIL NFR BLD AUTO: 4.4 % (ref 0.3–6.2)
ERYTHROCYTE [DISTWIDTH] IN BLOOD BY AUTOMATED COUNT: 15.7 % (ref 12.3–15.4)
GLOBULIN UR ELPH-MCNC: 3.4 GM/DL
GLUCOSE SERPL-MCNC: 99 MG/DL (ref 65–99)
HCT VFR BLD AUTO: 34.4 % (ref 34–46.6)
HGB BLD-MCNC: 10.5 G/DL (ref 12–15.9)
LYMPHOCYTES # BLD AUTO: 1.48 10*3/MM3 (ref 0.7–3.1)
LYMPHOCYTES NFR BLD AUTO: 22.5 % (ref 19.6–45.3)
MCH RBC QN AUTO: 28.2 PG (ref 26.6–33)
MCHC RBC AUTO-ENTMCNC: 30.5 G/DL (ref 31.5–35.7)
MCV RBC AUTO: 92.5 FL (ref 79–97)
MONOCYTES # BLD AUTO: 0.59 10*3/MM3 (ref 0.1–0.9)
MONOCYTES NFR BLD AUTO: 9 % (ref 5–12)
NEUTROPHILS NFR BLD AUTO: 4.16 10*3/MM3 (ref 1.7–7)
NEUTROPHILS NFR BLD AUTO: 63.3 % (ref 42.7–76)
PLATELET # BLD AUTO: 91 10*3/MM3 (ref 140–450)
PMV BLD AUTO: 12.8 FL (ref 6–12)
POTASSIUM SERPL-SCNC: 3.6 MMOL/L (ref 3.5–5.2)
PROT SERPL-MCNC: 7.9 G/DL (ref 6–8.5)
RBC # BLD AUTO: 3.72 10*6/MM3 (ref 3.77–5.28)
SODIUM SERPL-SCNC: 138 MMOL/L (ref 136–145)
WBC NRBC COR # BLD: 6.57 10*3/MM3 (ref 3.4–10.8)

## 2023-02-07 PROCEDURE — 36415 COLL VENOUS BLD VENIPUNCTURE: CPT

## 2023-02-07 PROCEDURE — 25010000002 EPOETIN ALFA-EPBX 40000 UNIT/ML SOLUTION: Performed by: NURSE PRACTITIONER

## 2023-02-07 PROCEDURE — 80053 COMPREHEN METABOLIC PANEL: CPT

## 2023-02-07 PROCEDURE — 85025 COMPLETE CBC W/AUTO DIFF WBC: CPT

## 2023-02-07 PROCEDURE — 96372 THER/PROPH/DIAG INJ SC/IM: CPT

## 2023-02-07 RX ADMIN — EPOETIN ALFA-EPBX 40000 UNITS: 40000 INJECTION, SOLUTION INTRAVENOUS; SUBCUTANEOUS at 09:32

## 2023-02-13 ENCOUNTER — OUTSIDE FACILITY SERVICE (OUTPATIENT)
Dept: GASTROENTEROLOGY | Facility: CLINIC | Age: 78
End: 2023-02-13
Payer: MEDICARE

## 2023-02-13 ENCOUNTER — LAB REQUISITION (OUTPATIENT)
Dept: LAB | Facility: HOSPITAL | Age: 78
End: 2023-02-13
Payer: MEDICARE

## 2023-02-13 DIAGNOSIS — Z00.00 ENCOUNTER FOR GENERAL ADULT MEDICAL EXAMINATION WITHOUT ABNORMAL FINDINGS: ICD-10-CM

## 2023-02-13 PROCEDURE — 88305 TISSUE EXAM BY PATHOLOGIST: CPT | Performed by: INTERNAL MEDICINE

## 2023-02-13 PROCEDURE — 45378 DIAGNOSTIC COLONOSCOPY: CPT | Performed by: INTERNAL MEDICINE

## 2023-02-13 PROCEDURE — 43239 EGD BIOPSY SINGLE/MULTIPLE: CPT | Performed by: INTERNAL MEDICINE

## 2023-02-14 LAB
CYTO UR: NORMAL
LAB AP CASE REPORT: NORMAL
LAB AP CLINICAL INFORMATION: NORMAL
Lab: NORMAL
PATH REPORT.FINAL DX SPEC: NORMAL
PATH REPORT.GROSS SPEC: NORMAL

## 2023-02-27 DIAGNOSIS — N18.31 STAGE 3A CHRONIC KIDNEY DISEASE: Primary | ICD-10-CM

## 2023-02-27 DIAGNOSIS — D63.1 ANEMIA OF CHRONIC RENAL FAILURE, STAGE 3A: ICD-10-CM

## 2023-02-27 DIAGNOSIS — N18.31 ANEMIA OF CHRONIC RENAL FAILURE, STAGE 3A: ICD-10-CM

## 2023-03-02 ENCOUNTER — OFFICE VISIT (OUTPATIENT)
Dept: INTERNAL MEDICINE | Facility: CLINIC | Age: 78
End: 2023-03-02
Payer: MEDICARE

## 2023-03-02 VITALS
OXYGEN SATURATION: 97 % | HEIGHT: 68 IN | HEART RATE: 66 BPM | TEMPERATURE: 96.9 F | SYSTOLIC BLOOD PRESSURE: 128 MMHG | BODY MASS INDEX: 35.77 KG/M2 | DIASTOLIC BLOOD PRESSURE: 72 MMHG | WEIGHT: 236 LBS

## 2023-03-02 DIAGNOSIS — G25.81 RESTLESS LEGS: ICD-10-CM

## 2023-03-02 DIAGNOSIS — J30.1 ALLERGIC RHINITIS DUE TO POLLEN, UNSPECIFIED SEASONALITY: ICD-10-CM

## 2023-03-02 DIAGNOSIS — F51.04 CHRONIC INSOMNIA: ICD-10-CM

## 2023-03-02 DIAGNOSIS — K29.70 GASTRITIS DETERMINED BY ENDOSCOPY: ICD-10-CM

## 2023-03-02 DIAGNOSIS — N18.31 STAGE 3A CHRONIC KIDNEY DISEASE: ICD-10-CM

## 2023-03-02 DIAGNOSIS — B02.9 HERPES ZOSTER WITHOUT COMPLICATION: ICD-10-CM

## 2023-03-02 DIAGNOSIS — I10 ESSENTIAL HYPERTENSION: Primary | ICD-10-CM

## 2023-03-02 DIAGNOSIS — E66.9 OBESITY (BMI 30-39.9): ICD-10-CM

## 2023-03-02 DIAGNOSIS — R73.03 PREDIABETES: ICD-10-CM

## 2023-03-02 LAB — HBA1C MFR BLD: 5.8 %

## 2023-03-02 PROCEDURE — 83036 HEMOGLOBIN GLYCOSYLATED A1C: CPT

## 2023-03-02 PROCEDURE — 3044F HG A1C LEVEL LT 7.0%: CPT

## 2023-03-02 PROCEDURE — 99214 OFFICE O/P EST MOD 30 MIN: CPT

## 2023-03-02 RX ORDER — IRBESARTAN AND HYDROCHLOROTHIAZIDE 300; 12.5 MG/1; MG/1
1 TABLET, FILM COATED ORAL DAILY
Qty: 90 TABLET | Refills: 3 | Status: SHIPPED | OUTPATIENT
Start: 2023-03-02

## 2023-03-02 RX ORDER — TRAZODONE HYDROCHLORIDE 100 MG/1
100 TABLET ORAL NIGHTLY
Qty: 90 TABLET | Refills: 3 | Status: SHIPPED | OUTPATIENT
Start: 2023-03-02

## 2023-03-02 RX ORDER — FERROUS SULFATE 325(65) MG
325 TABLET ORAL
COMMUNITY

## 2023-03-02 RX ORDER — ROPINIROLE 0.5 MG/1
0.5 TABLET, FILM COATED ORAL NIGHTLY
Qty: 90 TABLET | Refills: 1 | Status: SHIPPED | OUTPATIENT
Start: 2023-03-02

## 2023-03-02 RX ORDER — PANTOPRAZOLE SODIUM 40 MG/1
40 TABLET, DELAYED RELEASE ORAL DAILY
Qty: 90 TABLET | Refills: 0 | Status: SHIPPED | OUTPATIENT
Start: 2023-03-02

## 2023-03-02 RX ORDER — VALACYCLOVIR HYDROCHLORIDE 500 MG/1
500 TABLET, FILM COATED ORAL 2 TIMES DAILY
COMMUNITY
End: 2023-03-02 | Stop reason: SDUPTHER

## 2023-03-02 RX ORDER — CARVEDILOL 6.25 MG/1
6.25 TABLET ORAL 2 TIMES DAILY WITH MEALS
Qty: 180 TABLET | Refills: 3 | Status: SHIPPED | OUTPATIENT
Start: 2023-03-02

## 2023-03-02 RX ORDER — VALACYCLOVIR HYDROCHLORIDE 500 MG/1
500 TABLET, FILM COATED ORAL 2 TIMES DAILY
Qty: 60 TABLET | Refills: 0 | Status: SHIPPED | OUTPATIENT
Start: 2023-03-02

## 2023-03-02 RX ORDER — MONTELUKAST SODIUM 10 MG/1
10 TABLET ORAL DAILY
Qty: 90 TABLET | Refills: 1 | Status: SHIPPED | OUTPATIENT
Start: 2023-03-02

## 2023-03-02 NOTE — PROGRESS NOTES
Subjective     Chief Complaint:  Follow-up hypertension, prediabetes    HPI:  Patient presents today for 2-month follow-up for hypertension and prediabetes.  The patient states that she has been doing well since her last visit.  She had a colonoscopy and endoscopy which showed gastritis and Dr. Morris recommended repeating in 3 years.  She will follow-up with MARY Watson on 4/10/2023.  She continues taking Protonix 40 mg daily.    She states that she saw Dr. Martin last month for follow-up on CKD.  She states that he told her everything looks good.  CMP obtained on 2/7/2023 showed creatinine 1.3, BUN 21, GFR 42.4.    She brought her blood pressure log in for review.  It appears that her systolic blood pressure has mostly been running in the 130s-140s.  When asked if she has had to take any doses of her as needed clonidine, she states that she took all of the pills.  She thought that she was supposed to take it daily.  She took the 45-day supply and has been out since then.  She has continued taking amlodipine 5 mg twice daily, carvedilol 6.25 mg twice daily, and irbesartan-HCTZ 300-12.5 mg daily.    Hemoglobin A1c today is 5.8.  She states that she has continued to lose weight.  She has lost approximately 5 pounds over the last 2 months.  She did bring in a weight loss supplement that she has been wanting to try.  We did discuss the risks involved given her CKD and hypertension. Advised her to stop taking the supplement if her blood pressure begins to run higher or if she has other symptoms such as palpitations or chest pain.  She states that she is supposed to have labs drawn at the end of this month so will need to follow up closely on renal and hepatic function.    She denies any acute complaints today.  She states that she is tired of having to go see so many doctors but understands that it is necessary.    Past Medical History:   Past Medical History:   Diagnosis Date   • Breast cancer (HCC)     • CKD (chronic kidney disease)    • Hypercholesteremia    • Hypertension    • Sinusitis    • Stage 3a chronic kidney disease (HCC) 10/20/2020     Past Surgical History:  Past Surgical History:   Procedure Laterality Date   • AVULSION TOENAIL PLATE      Sept 26,2018   • BREAST BIOPSY Left 11/20/2012   • BREAST BIOPSY      Left Breast, 1/2019 per Dr Barkley   • BREAST LUMPECTOMY Left     with node bx    • COLONOSCOPY  09/13/2013    small polyp at 30cm benign hyperplastic polyp, changes consistent with melanosis coli. Recall 5 years   • COLONOSCOPY  11/19/2018    Tics otherwise normal exam repeat in 5 years   • REPLACEMENT TOTAL KNEE Right     2016   • TOTAL ABDOMINAL HYSTERECTOMY WITH SALPINGO OOPHORECTOMY         Allergies:  Allergies   Allergen Reactions   • Aspirin GI Bleeding   • Niacin Other (See Comments)     Medications:  Prior to Admission medications    Medication Sig Start Date End Date Taking? Authorizing Provider   albuterol sulfate  (90 Base) MCG/ACT inhaler Inhale 2 puffs Every 4 (Four) Hours As Needed for Wheezing. 6/30/22  Yes Hanh Og APRN   amLODIPine (NORVASC) 5 MG tablet Take 1 tablet by mouth 2 (Two) Times a Day. 12/8/22  Yes Hanh Og, APRN   buPROPion XL (WELLBUTRIN XL) 150 MG 24 hr tablet Take 1 tablet by mouth Daily. 6/1/22  Yes Romeo Ogva LASHA APRN   Calcium Carb-Cholecalciferol (CALCIUM 600+D3 PO) Take  by mouth.   Yes ProviderMaurisio MD   carvedilol (COREG) 6.25 MG tablet Take 1 tablet by mouth 2 (Two) Times a Day With Meals. 3/2/23  Yes Gladis Ledezma APRN   cetirizine (zyrTEC) 10 MG tablet Take 1 tablet by mouth Daily.   Yes Provider, MD Maurisio   cloNIDine (Catapres) 0.1 MG tablet Take 1 tablet by mouth 2 (Two) Times a Day As Needed for High Blood Pressure (greater than 160/90). 1/5/23  Yes Hanh Og APRN   cyclobenzaprine (FLEXERIL) 10 MG tablet Take 1 tablet by mouth 3 (Three) Times a Day As Needed for Muscle Spasms. 10/27/22   Yes Hanh Og APRN   Ergocalciferol (VITAMIN D2 PO) Take 50,000 Units by mouth Every 30 (Thirty) Days.   Yes Maurisio Zazueta MD   ferrous sulfate 325 (65 FE) MG tablet Take 1 tablet by mouth Daily With Breakfast.   Yes Maurisio Zazueta MD   fluticasone (FLONASE) 50 MCG/ACT nasal spray 2 sprays into the nostril(s) as directed by provider Daily. 6/1/22  Yes Hanh Og APRN   fluticasone (FLOVENT DISKUS) 50 MCG/BLIST diskus inhaler Inhale 1 puff.   Yes Maurisio Zazueta MD   irbesartan-hydrochlorothiazide (AVALIDE) 300-12.5 MG tablet Take 1 tablet by mouth Daily. 3/2/23  Yes Gladis Ledezma APRN   montelukast (SINGULAIR) 10 MG tablet Take 1 tablet by mouth Daily. 3/2/23  Yes Gladis Ledezma APRN   Multiple Vitamins-Minerals (MULTIVITAMIN ADULT) tablet Take  by mouth Daily.   Yes Maurisio Zazueta MD   Omega-3 Fatty Acids (FISH OIL) 1000 MG capsule capsule Take 1 capsule by mouth.   Yes aMurisio Zazueta MD   pantoprazole (Protonix) 40 MG EC tablet Take 1 tablet by mouth Daily. 3/2/23  Yes Gladis Ledezma APRN   rOPINIRole (REQUIP) 0.5 MG tablet Take 1 tablet by mouth Every Night. 3/2/23  Yes Gladis Ledezma APRN   rosuvastatin (CRESTOR) 20 MG tablet TAKE 1 TABLET BY MOUTH DAILY 4/25/22  Yes Brynn Hollingsworth APRN   sertraline (ZOLOFT) 100 MG tablet TAKE 1 TABLET BY MOUTH DAILY 7/6/22  Yes Hanh Og APRN   traZODone (DESYREL) 100 MG tablet Take 1 tablet by mouth Every Night. 3/2/23  Yes Gladis Ledezma APRN   valACYclovir (VALTREX) 500 MG tablet Take 1 tablet by mouth 2 (Two) Times a Day. 3/2/23  Yes Gladis Ledezma APRN   carvedilol (COREG) 6.25 MG tablet TAKE 1 TABLET BY MOUTH TWICE DAILY WITH MEALS 4/25/22 3/2/23 Yes Brynn Hollingsworth APRN   irbesartan-hydrochlorothiazide (AVALIDE) 300-12.5 MG tablet Take 1 tablet by mouth Daily. 6/1/22 3/2/23 Yes Hanh Og APRN   montelukast (SINGULAIR) 10 MG tablet TAKE 1 TABLET BY  "MOUTH DAILY 9/21/22 3/2/23 Yes Camilla Rangel APRN   pantoprazole (Protonix) 40 MG EC tablet Take 1 tablet by mouth Daily. 12/8/22 3/2/23 Yes Hanh Og APRN   rOPINIRole (REQUIP) 0.5 MG tablet TAKE 1 TABLET BY MOUTH EVERY NIGHT 9/21/22 3/2/23 Yes Camilla Rangel APRN   traZODone (DESYREL) 100 MG tablet Take 1 tablet by mouth Every Night. 6/30/22 3/2/23 Yes Hanh Og APRJULIET   valACYclovir (VALTREX) 500 MG tablet Take 1 tablet by mouth 2 (Two) Times a Day.  3/2/23 Yes Provider, MD Maurisio   anastrozole (ARIMIDEX) 1 MG tablet Take 1 tablet by mouth Daily. 6/23/22   Benjamin Shah MD   benzonatate (Tessalon Perles) 100 MG capsule Take 1 capsule by mouth 3 (Three) Times a Day As Needed for Cough. 9/13/22   Hanh Og APRN       Objective     Vital Signs: /72 (BP Location: Right arm, Patient Position: Sitting, Cuff Size: Adult)   Pulse 66   Temp 96.9 °F (36.1 °C) (Temporal)   Ht 172.7 cm (68\")   Wt 107 kg (236 lb)   LMP  (LMP Unknown)   SpO2 97%   BMI 35.88 kg/m²   Physical Exam  Vitals and nursing note reviewed.   Constitutional:       General: She is not in acute distress.     Appearance: Normal appearance. She is obese.   HENT:      Head: Normocephalic.   Cardiovascular:      Rate and Rhythm: Normal rate.      Pulses: Normal pulses.      Heart sounds: Normal heart sounds. No murmur heard.  Pulmonary:      Effort: Pulmonary effort is normal. No respiratory distress.      Breath sounds: Normal breath sounds. No wheezing.   Abdominal:      General: Bowel sounds are normal. There is no distension.      Palpations: Abdomen is soft.      Tenderness: There is no abdominal tenderness.   Musculoskeletal:         General: No swelling or tenderness. Normal range of motion.      Cervical back: Normal range of motion.   Lymphadenopathy:      Cervical: No cervical adenopathy.   Skin:     General: Skin is warm and dry.      Capillary Refill: Capillary refill takes less than 2 " seconds.      Findings: No bruising, erythema or rash.   Neurological:      Mental Status: She is alert and oriented to person, place, and time. Mental status is at baseline.      Motor: No weakness.       Class 2 Severe Obesity (BMI >=35 and <=39.9). Obesity-related health conditions include the following: hypertension. Obesity is improving with lifestyle modifications. BMI is is above average; BMI management plan is completed. We discussed portion control and increasing exercise.    Results Reviewed:  Reviewed note from office visit with MARY Pabon on 1/5/2023.  Reviewed CMP from 2/7/2023.    Assessment / Plan     Assessment/Plan:  Diagnoses and all orders for this visit:    1. Essential hypertension (Primary)  -     irbesartan-hydrochlorothiazide (AVALIDE) 300-12.5 MG tablet; Take 1 tablet by mouth Daily.  Dispense: 90 tablet; Refill: 3  -     carvedilol (COREG) 6.25 MG tablet; Take 1 tablet by mouth 2 (Two) Times a Day With Meals.  Dispense: 180 tablet; Refill: 3    2. Prediabetes  -     POC Glycated Hemoglobin, Total    3. Stage 3a chronic kidney disease (HCC)    4. Obesity (BMI 30-39.9)    5. Gastritis determined by endoscopy  -     pantoprazole (Protonix) 40 MG EC tablet; Take 1 tablet by mouth Daily.  Dispense: 90 tablet; Refill: 0    6. Restless legs  -     rOPINIRole (REQUIP) 0.5 MG tablet; Take 1 tablet by mouth Every Night.  Dispense: 90 tablet; Refill: 1    7. Allergic rhinitis due to pollen, unspecified seasonality  -     montelukast (SINGULAIR) 10 MG tablet; Take 1 tablet by mouth Daily.  Dispense: 90 tablet; Refill: 1    8. Chronic insomnia  -     traZODone (DESYREL) 100 MG tablet; Take 1 tablet by mouth Every Night.  Dispense: 90 tablet; Refill: 3    9. Herpes zoster without complication  -     valACYclovir (VALTREX) 500 MG tablet; Take 1 tablet by mouth 2 (Two) Times a Day.  Dispense: 60 tablet; Refill: 0       Recommend continuing irbesartan-HCTZ, carvedilol, and amlodipine for  hypertension.  Her blood pressure appears to be fairly controlled at this time.  Will hold off on sending refill for clonidine.  If she ends up needing this, she will need to be reeducated about taking it as needed as opposed to daily.    Hemoglobin A1c has improved to 5.8.  She states that she has continued with weight loss attempts.  She understands the importance of continuing consistent carbohydrate diet and eliminating sugary foods/drinks.  Will plan to repeat hemoglobin A1c in 3 months.    Renal function is stable.  She will continue to follow with Dr. Martin.    She continues to follow with MARY Toney for anemia.  She has been receiving Retacrit injections monthly for hemoglobin <11 or hematocrit <33.  It appears that she will have labs obtained on 3/7 and 4/11.     Continue pantoprazole 40 mg daily as recommended by gastroenterology.  She will follow-up with MARY Watson on 4/10.    Return in about 3 months (around 6/2/2023) for with Hanh. unless patient needs to be seen sooner or acute issues arise.    I have discussed the patient results/orders and and plan/recommendation with them at today's visit.      MARY Alejandro   03/02/2023

## 2023-03-07 ENCOUNTER — LAB (OUTPATIENT)
Dept: LAB | Facility: HOSPITAL | Age: 78
End: 2023-03-07
Payer: MEDICARE

## 2023-03-07 ENCOUNTER — INFUSION (OUTPATIENT)
Dept: ONCOLOGY | Facility: HOSPITAL | Age: 78
End: 2023-03-07
Payer: MEDICARE

## 2023-03-07 VITALS
HEART RATE: 69 BPM | OXYGEN SATURATION: 99 % | WEIGHT: 234 LBS | DIASTOLIC BLOOD PRESSURE: 57 MMHG | HEIGHT: 68 IN | TEMPERATURE: 97.7 F | BODY MASS INDEX: 35.46 KG/M2 | RESPIRATION RATE: 18 BRPM | SYSTOLIC BLOOD PRESSURE: 143 MMHG

## 2023-03-07 DIAGNOSIS — D63.1 ANEMIA OF CHRONIC RENAL FAILURE, STAGE 3A: ICD-10-CM

## 2023-03-07 DIAGNOSIS — N18.31 ANEMIA OF CHRONIC RENAL FAILURE, STAGE 3A: ICD-10-CM

## 2023-03-07 DIAGNOSIS — N18.31 STAGE 3A CHRONIC KIDNEY DISEASE: Primary | ICD-10-CM

## 2023-03-07 DIAGNOSIS — N18.31 STAGE 3A CHRONIC KIDNEY DISEASE: ICD-10-CM

## 2023-03-07 LAB
ALBUMIN SERPL-MCNC: 4.3 G/DL (ref 3.5–5.2)
ALBUMIN/GLOB SERPL: 1.3 G/DL
ALP SERPL-CCNC: 77 U/L (ref 39–117)
ALT SERPL W P-5'-P-CCNC: 15 U/L (ref 1–33)
ANION GAP SERPL CALCULATED.3IONS-SCNC: 11 MMOL/L (ref 5–15)
AST SERPL-CCNC: 17 U/L (ref 1–32)
BASOPHILS # BLD AUTO: 0.03 10*3/MM3 (ref 0–0.2)
BASOPHILS NFR BLD AUTO: 0.5 % (ref 0–1.5)
BILIRUB SERPL-MCNC: 0.3 MG/DL (ref 0–1.2)
BUN SERPL-MCNC: 24 MG/DL (ref 8–23)
BUN/CREAT SERPL: 19.7 (ref 7–25)
CALCIUM SPEC-SCNC: 9.3 MG/DL (ref 8.6–10.5)
CHLORIDE SERPL-SCNC: 103 MMOL/L (ref 98–107)
CO2 SERPL-SCNC: 26 MMOL/L (ref 22–29)
CREAT SERPL-MCNC: 1.22 MG/DL (ref 0.57–1)
DEPRECATED RDW RBC AUTO: 54.1 FL (ref 37–54)
EGFRCR SERPLBLD CKD-EPI 2021: 45.8 ML/MIN/1.73
EOSINOPHIL # BLD AUTO: 0.26 10*3/MM3 (ref 0–0.4)
EOSINOPHIL NFR BLD AUTO: 4.2 % (ref 0.3–6.2)
ERYTHROCYTE [DISTWIDTH] IN BLOOD BY AUTOMATED COUNT: 16 % (ref 12.3–15.4)
GLOBULIN UR ELPH-MCNC: 3.3 GM/DL
GLUCOSE SERPL-MCNC: 110 MG/DL (ref 65–99)
HCT VFR BLD AUTO: 35 % (ref 34–46.6)
HGB BLD-MCNC: 10.9 G/DL (ref 12–15.9)
IMM GRANULOCYTES # BLD AUTO: 0.01 10*3/MM3 (ref 0–0.05)
IMM GRANULOCYTES NFR BLD AUTO: 0.2 % (ref 0–0.5)
LYMPHOCYTES # BLD AUTO: 1.4 10*3/MM3 (ref 0.7–3.1)
LYMPHOCYTES NFR BLD AUTO: 22.6 % (ref 19.6–45.3)
MCH RBC QN AUTO: 28.5 PG (ref 26.6–33)
MCHC RBC AUTO-ENTMCNC: 31.1 G/DL (ref 31.5–35.7)
MCV RBC AUTO: 91.6 FL (ref 79–97)
MONOCYTES # BLD AUTO: 0.49 10*3/MM3 (ref 0.1–0.9)
MONOCYTES NFR BLD AUTO: 7.9 % (ref 5–12)
NEUTROPHILS NFR BLD AUTO: 4 10*3/MM3 (ref 1.7–7)
NEUTROPHILS NFR BLD AUTO: 64.6 % (ref 42.7–76)
NRBC BLD AUTO-RTO: 0 /100 WBC (ref 0–0.2)
PLATELET # BLD AUTO: 115 10*3/MM3 (ref 140–450)
PMV BLD AUTO: 12.3 FL (ref 6–12)
POTASSIUM SERPL-SCNC: 4.2 MMOL/L (ref 3.5–5.2)
PROT SERPL-MCNC: 7.6 G/DL (ref 6–8.5)
RBC # BLD AUTO: 3.82 10*6/MM3 (ref 3.77–5.28)
SODIUM SERPL-SCNC: 140 MMOL/L (ref 136–145)
WBC NRBC COR # BLD: 6.19 10*3/MM3 (ref 3.4–10.8)

## 2023-03-07 PROCEDURE — 25010000002 EPOETIN ALFA-EPBX 40000 UNIT/ML SOLUTION: Performed by: NURSE PRACTITIONER

## 2023-03-07 PROCEDURE — 96372 THER/PROPH/DIAG INJ SC/IM: CPT

## 2023-03-07 PROCEDURE — 36415 COLL VENOUS BLD VENIPUNCTURE: CPT

## 2023-03-07 PROCEDURE — 80053 COMPREHEN METABOLIC PANEL: CPT

## 2023-03-07 PROCEDURE — 85025 COMPLETE CBC W/AUTO DIFF WBC: CPT

## 2023-03-07 RX ADMIN — EPOETIN ALFA-EPBX 40000 UNITS: 40000 INJECTION, SOLUTION INTRAVENOUS; SUBCUTANEOUS at 13:59

## 2023-03-28 DIAGNOSIS — D63.1 ANEMIA OF CHRONIC RENAL FAILURE, STAGE 3A: ICD-10-CM

## 2023-03-28 DIAGNOSIS — N18.31 STAGE 3A CHRONIC KIDNEY DISEASE: Primary | ICD-10-CM

## 2023-03-28 DIAGNOSIS — N18.31 ANEMIA OF CHRONIC RENAL FAILURE, STAGE 3A: ICD-10-CM

## 2023-04-10 ENCOUNTER — OFFICE VISIT (OUTPATIENT)
Dept: GASTROENTEROLOGY | Facility: CLINIC | Age: 78
End: 2023-04-10
Payer: MEDICARE

## 2023-04-10 VITALS
HEART RATE: 63 BPM | OXYGEN SATURATION: 96 % | WEIGHT: 231 LBS | DIASTOLIC BLOOD PRESSURE: 72 MMHG | TEMPERATURE: 97.9 F | BODY MASS INDEX: 35.01 KG/M2 | SYSTOLIC BLOOD PRESSURE: 132 MMHG | HEIGHT: 68 IN

## 2023-04-10 DIAGNOSIS — Z78.9 NONSMOKER: ICD-10-CM

## 2023-04-10 DIAGNOSIS — I10 HTN (HYPERTENSION), BENIGN: ICD-10-CM

## 2023-04-10 DIAGNOSIS — R19.5 HEME POSITIVE STOOL: ICD-10-CM

## 2023-04-10 DIAGNOSIS — D50.9 IRON DEFICIENCY ANEMIA, UNSPECIFIED IRON DEFICIENCY ANEMIA TYPE: Primary | ICD-10-CM

## 2023-04-10 PROCEDURE — 1159F MED LIST DOCD IN RCRD: CPT | Performed by: CLINICAL NURSE SPECIALIST

## 2023-04-10 PROCEDURE — 3075F SYST BP GE 130 - 139MM HG: CPT | Performed by: CLINICAL NURSE SPECIALIST

## 2023-04-10 PROCEDURE — 3078F DIAST BP <80 MM HG: CPT | Performed by: CLINICAL NURSE SPECIALIST

## 2023-04-10 PROCEDURE — 1160F RVW MEDS BY RX/DR IN RCRD: CPT | Performed by: CLINICAL NURSE SPECIALIST

## 2023-04-10 PROCEDURE — 99214 OFFICE O/P EST MOD 30 MIN: CPT | Performed by: CLINICAL NURSE SPECIALIST

## 2023-04-10 NOTE — PROGRESS NOTES
[facility] HEMATOLOGY & ONCOLOGY  [address]  [place] [address] [phone]    Patient Name: Marina Jeo  Encounter Date: 04/11/2023  YOB: 1945  Patient Number: [id number]      HPI: Marina Joe is a  78 y.o.  female who is seen on follow-up for stage Ia left breast cancer, upper outer quadrant, receptor positive and HER-2/alix negative.  She is on adjuvant anastrozole since 05/2013. Plan for 10 years.  She is also seen for anemia from chronic kidney disease stage IIIa .   She was given injectafer on 09/27/22.   She was started on SREE with Retacrit on November 29, 2022.    She began to experience thrombocytopenia and had bone marrow biopsy 11/10/22.      Final Diagnosis  Bone marrow, left iliac crest, core biopsy, aspirate smears, and clot section:  Slightly hypercellular marrow (40%) with maturing trilineage hematopoiesis.  Aspiculate smears, nondiagnostic.  No overt features of myelodysplasia and no excess blast population.  Moderately increased megakaryocytes.  2+ stainable iron.  Peripheral blood smear:  Severe thrombocytopenia.  Mild normocytic normochromic anemia.  Rare circulating schistocytes.  Normal white blood cell count with normal differential and morphology.  No circulating blasts.  Flow cytometry: No immunophenotypic evidence of abnormal myeloid maturation, increased blast population or a lymphoproliferative disorder.  Chromosome analysis: Normal female karyotype.    INTERVAL HISTORY     Ms. Joe presents to clinic today for continued follow up.  She states that she fell and hurt right arm last week but she did not get it evaluated.  Otherwise, she is doing well.      She had labs drawn today and results were reviewed with her.       Oncology/Hematology History Overview Note   Oncologic history  In February 2012, she had a routine mammogram that found a nodular density in the upper outer  quadrant of the left breast.  An ultrasound revealed no nodularity at that time.  Repeat ultrasound 3 months later on 5/3/2012 revealed a small cyst in the left breast but felt to be benign.  Repeat mammogram on October 31, 2012 found a lobulated lesion in the left breast at the 2 o'clock position and a stable cyst at the 12 o'clock position.  She was referred to Dr. Barkley on 11/30/2012 and biopsy was performed.  The 12:00 cyst was a fibrocystic lesion while the 2:00 lesion was an invasive mammary carcinoma, low-grade, ER positive ME positive HER-2 nu 2+, FISH unamplified.    On January 8, 2013 she underwent a left partial mastectomy with sentinel node biopsy finding invasive ductal carcinoma, 3.1 mm in greatest dimension, grade 1.  2 sentinel nodes were negative.  AJCC TNM stage was pT1aN0 M0, Stage IA.  Following surgery she underwent adjuvant radiation therapy and was then started on Arimidex for hormonal manipulation.  She was started on the Arimidex in approximately April or May 2013.  He has been recommended to continue this for 10 years.    Hematologic history  Patient with a long history of anemia secondary to iron deficiency as well as chronic kidney disease stage III.Patient has a GFR that ranges from 45-61ML/MIN.  He has been undergoing Procrit when meets guidelines for several years now.  She is also required iron replacement.  Folate > 20, B12 at 1503 and negative blood for flow cytometry on 01/07/2022.      Previous interventions  Procrit 40,000 units subcu initiated approximately February 2017  Injectafer given 9/23/2019, 9/30/2019     Malignant neoplasm of upper-outer quadrant of female breast (HCC)   10/31/2012 Initial Diagnosis    Malignant neoplasm of central portion of left female breast (CMS/HCC)     10/31/2012 Imaging    Mammogram on October 31, 2012 found a lobulated lesion in the left breast at the 2 o'clock position and a stable cyst at the 12 o'clock position.      11/30/2012 Biopsy      Filippo on 11/30/2012 and biopsy was performed.  The 12:00 cyst was a fibrocystic lesion while the 2:00 lesion was an invasive mammary carcinoma, low-grade, ER positive IA positive HER-2 nu 2+, FISH unamplified.         1/8/2013 Surgery    On January 8, 2013 she underwent a left partial mastectomy with sentinel node biopsy finding invasive ductal carcinoma, 3.1 mm in greatest dimension, grade 1.  2 sentinel nodes were negative.  AJCC TNM stage was pT1aN0 M0, Stage IA.  Hormone receptor positive HER-2 negative      Radiation    Radiation OncologyTreatment Course:  Marina Joe receivedin 33 fractions to left breast via External Beam Radiation - EBRT.     5/2013 -  Hormonal Therapy    Arimidex 1 mg daily     8/22/2022 Cancer Staged    Staging form: Breast, AJCC V7  - Pathologic stage from 8/22/2022: Stage IA (T1a, N0, cM0) - Signed by Benjamin Shah MD on 8/22/2022         PAST MEDICAL HISTORY:  ALLERGIES:  Allergies   Allergen Reactions   • Aspirin GI Bleeding   • Niacin Other (See Comments)     CURRENT MEDICATIONS:  Outpatient Encounter Medications as of 4/11/2023   Medication Sig Dispense Refill   • albuterol sulfate  (90 Base) MCG/ACT inhaler Inhale 2 puffs Every 4 (Four) Hours As Needed for Wheezing. 2 g 3   • amLODIPine (NORVASC) 5 MG tablet Take 1 tablet by mouth 2 (Two) Times a Day. 180 tablet 2   • anastrozole (ARIMIDEX) 1 MG tablet Take 1 tablet by mouth Daily. 90 tablet 3   • benzonatate (Tessalon Perles) 100 MG capsule Take 1 capsule by mouth 3 (Three) Times a Day As Needed for Cough. 45 capsule 0   • buPROPion XL (WELLBUTRIN XL) 150 MG 24 hr tablet Take 1 tablet by mouth Daily. 90 tablet 3   • Calcium Carb-Cholecalciferol (CALCIUM 600+D3 PO) Take  by mouth.     • carvedilol (COREG) 6.25 MG tablet Take 1 tablet by mouth 2 (Two) Times a Day With Meals. 180 tablet 3   • cetirizine (zyrTEC) 10 MG tablet Take 1 tablet by mouth Daily.     • cloNIDine (Catapres) 0.1 MG tablet Take 1 tablet by mouth 2  (Two) Times a Day As Needed for High Blood Pressure (greater than 160/90). 45 tablet 0   • cyclobenzaprine (FLEXERIL) 10 MG tablet Take 1 tablet by mouth 3 (Three) Times a Day As Needed for Muscle Spasms. 90 tablet 5   • Ergocalciferol (VITAMIN D2 PO) Take 50,000 Units by mouth Every 30 (Thirty) Days.     • ferrous sulfate 325 (65 FE) MG tablet Take 1 tablet by mouth Daily With Breakfast.     • fluticasone (FLONASE) 50 MCG/ACT nasal spray 2 sprays into the nostril(s) as directed by provider Daily. 1 g 3   • fluticasone (FLOVENT DISKUS) 50 MCG/BLIST diskus inhaler Inhale 1 puff.     • irbesartan-hydrochlorothiazide (AVALIDE) 300-12.5 MG tablet Take 1 tablet by mouth Daily. 90 tablet 3   • montelukast (SINGULAIR) 10 MG tablet Take 1 tablet by mouth Daily. 90 tablet 1   • Multiple Vitamins-Minerals (MULTIVITAMIN ADULT) tablet Take  by mouth Daily.     • Omega-3 Fatty Acids (FISH OIL) 1000 MG capsule capsule Take 1 capsule by mouth.     • pantoprazole (Protonix) 40 MG EC tablet Take 1 tablet by mouth Daily. 90 tablet 0   • rOPINIRole (REQUIP) 0.5 MG tablet Take 1 tablet by mouth Every Night. 90 tablet 1   • rosuvastatin (CRESTOR) 20 MG tablet TAKE 1 TABLET BY MOUTH DAILY 90 tablet 3   • sertraline (ZOLOFT) 100 MG tablet TAKE 1 TABLET BY MOUTH DAILY 90 tablet 3   • traZODone (DESYREL) 100 MG tablet Take 1 tablet by mouth Every Night. 90 tablet 3   • valACYclovir (VALTREX) 500 MG tablet Take 1 tablet by mouth 2 (Two) Times a Day. 60 tablet 0     No facility-administered encounter medications on file as of 4/11/2023.     ADULT ILLNESSES:  Patient Active Problem List   Diagnosis Code   • Malignant neoplasm of upper-outer quadrant of female breast (HCC) C50.419   • Anemia of chronic renal failure, stage 3 (moderate) N18.30, D63.1   • Hypertension, benign I10   • Hx of colonic polyps Z86.010   • HX: breast cancer Z85.3   • Morbidly obese E66.01   • Abnormal mammogram R92.8   • Breast mass N63.0   • Right knee DJD M17.11   •  S/P lumpectomy, left breast Z98.890   • Adult hypothyroidism E03.9   • Rhinitis J31.0   • Anemia D64.9   • Anxiety F41.9   • At low risk for fall Z91.81   • Breast cancer, left C50.912   • Cervical pain M54.2   • Chronic insomnia F51.04   • Cough R05.9   • Elevated lipids E78.5   • Encounter for immunization Z23   • Herpes zoster without complication B02.9   • Influenza B J10.1   • Left ear pain H92.02   • Left otitis externa H60.92   • Lumbar strain, initial encounter S39.012A   • Myalgia M79.10   • Negative depression screening Z13.31   • Obesity (BMI 30-39.9) E66.9   • Postmenopausal status Z78.0   • Recurrent acute serous otitis media of left ear H65.05   • Restless leg G25.81   • Sinusitis, bacterial J32.9, B96.89   • Skin lesion L98.9   • Upper respiratory infection J06.9   • Urinary tract infection without hematuria N39.0   • Vitamin D deficiency E55.9   • Stage 3a chronic kidney disease N18.31     SURGERIES:  Past Surgical History:   Procedure Laterality Date   • AVULSION TOENAIL PLATE      Sept 26,2018   • BREAST BIOPSY Left 11/20/2012   • BREAST BIOPSY      Left Breast, 1/2019 per Dr Barkley   • BREAST LUMPECTOMY Left     with node bx    • COLONOSCOPY  09/13/2013    small polyp at 30cm benign hyperplastic polyp, changes consistent with melanosis coli. Recall 5 years   • COLONOSCOPY  11/19/2018    Tics otherwise normal exam repeat in 5 years   • COLONOSCOPY  02/13/2023    Normal exam repeat in 3 years with a 2 day prep   • ENDOSCOPY  02/13/2023    Gastritis, small HH   • REPLACEMENT TOTAL KNEE Right     2016   • TOTAL ABDOMINAL HYSTERECTOMY WITH SALPINGO OOPHORECTOMY       HEALTH MAINTENANCE ITEMS:  Health Maintenance Due   Topic Date Due   • DXA SCAN  01/01/2023       <no information>  Last Completed Colonoscopy          COLORECTAL CANCER SCREENING (COLONOSCOPY - Every 3 Years) Next due on 2/13/2026 02/13/2023  SCANNED - COLONOSCOPY    11/16/2022  Occult Blood X 3, Stool - Stool, Per Rectum     11/19/2018  SCANNED - COLONOSCOPY    11/19/2018  SCANNED - COLONOSCOPY    09/13/2013  SCANNED - COLONOSCOPY              Immunization History   Administered Date(s) Administered   • COVID-19 (MODERNA) 1st, 2nd, 3rd Dose Only 03/12/2021, 03/28/2021   • COVID-19 (MODERNA) BOOSTER 08/31/2022   • COVID-19 (PFIZER) BIVALENT BOOSTER 12+YRS 12/08/2022   • Flu Vaccine Quad PF >36MO 11/05/2019   • Fluad Quad 65+ 11/05/2020   • Fluzone High Dose =>65 Years ([employer] ONLY) 11/11/2015, 10/14/2016, 11/20/2017   • Fluzone High-Dose 65+yrs 11/29/2021, 10/04/2022   • Pneumococcal Conjugate 13-Valent (PCV13) 10/14/2016   • Pneumococcal Polysaccharide (PPSV23) 11/05/2020   • Shingrix 01/01/2021, 06/08/2021   • Zoster, Unspecified 01/01/2021     Last Completed Mammogram          MAMMOGRAM (Yearly) Next due on 1/23/2024 01/23/2023  SCANNED - MAMMO    12/13/2021  SCANNED - MAMMO    12/10/2020  Mammo Screening Digital Tomosynthesis Bilateral With CAD    07/03/2019  Outside Claim: HC MAMMOGRAM DIAGNOSTIC UNILAT DIGITAL W CAD,MO TOMOSYNTHESIS MAMMOGRAPHY    12/12/2018  Outside Claim: HC MAMMOGRAM DIAGNOSTIC BILAT DIGITAL W CAD,HC US BREAST UNILATERAL LIMITED,MO TOMOSYNTHESIS MAMMOGRAPHY    Only the first 5 history entries have been loaded, but more history exists.                  FAMILY HISTORY:  Family History   Problem Relation Age of Onset   • Heart attack Mother    • Hodgkin's lymphoma Father    • Other Father    • Cancer Father         hodgkins   • Heart attack Brother    • Skin cancer Maternal Uncle    • Pancreatic cancer Maternal Uncle    • Cancer Maternal Uncle         eye   • Colon cancer Neg Hx    • Colon polyps Neg Hx      SOCIAL HISTORY:  Social History     Socioeconomic History   • Marital status:    Tobacco Use   • Smoking status: Never   • Smokeless tobacco: Never   Vaping Use   • Vaping Use: Never used   Substance and Sexual Activity   • Alcohol use: No   • Drug use: Not Currently   • Sexual activity: Not  "Currently     Birth control/protection: Surgical       REVIEW OF SYSTEMS:    Review of Systems   Constitutional: Negative for chills and fever.   HENT: Negative for congestion and trouble swallowing.    Respiratory: Negative for wheezing.    Cardiovascular: Negative for chest pain and palpitations.   Gastrointestinal: Negative for abdominal pain, nausea and vomiting.   Endocrine: Negative for polydipsia and polyphagia.   Genitourinary: Negative for difficulty urinating, dysuria and flank pain.   Musculoskeletal: Negative for gait problem and joint swelling.   Allergic/Immunologic: Negative for food allergies.   Neurological: Negative for speech difficulty and weakness.   Hematological: Negative for adenopathy. Does not bruise/bleed easily.   Psychiatric/Behavioral: Negative for agitation, confusion and hallucinations.       VITAL SIGNS: /68   Pulse 72   Temp 97.8 °F (36.6 °C) (Temporal)   Resp 18   Ht 172.7 cm (67.99\")   Wt 105 kg (231 lb)   LMP  (LMP Unknown)   SpO2 94%   BMI 35.13 kg/m²   Pain Score    04/11/23 0821   PainSc: 0-No pain       PHYSICAL EXAMINATION:     Physical Exam  Vitals reviewed.   Constitutional:       General: She is not in acute distress.  HENT:      Head: Normocephalic and atraumatic.   Eyes:      General: No scleral icterus.  Cardiovascular:      Rate and Rhythm: Normal rate.   Pulmonary:      Effort: No respiratory distress.      Breath sounds: No wheezing or rales.   Abdominal:      General: Bowel sounds are normal.      Palpations: Abdomen is soft.      Tenderness: There is no abdominal tenderness.   Musculoskeletal:         General: No swelling.      Cervical back: Neck supple.   Skin:     General: Skin is warm and dry.   Neurological:      Mental Status: She is alert and oriented to person, place, and time.   Psychiatric:         Mood and Affect: Mood normal.         Behavior: Behavior normal.         Thought Content: Thought content normal.         Judgment: Judgment " normal.         LABS    Lab Results - Last 18 Months   Lab Units 04/11/23  0744 03/07/23  1310 02/07/23  0907 01/10/23  0905 12/27/22  1309 12/13/22  0810 12/08/22  0910 09/07/22  0801 06/22/22  1419 05/04/22  0755 01/05/22  0829 12/13/21  0944 11/10/21  0849 10/13/21  0848   HEMOGLOBIN g/dL 10.7* 10.9* 10.5* 10.2* 10.3* 9.3* 11.1*   < > 10.4* 11.0* 10.4* 10.6* 10.4* 10.8*   HEMATOCRIT % 34.6 35.0 34.4 34.7 33.8* 31.1* 37.7   < > 33.5* 35.6 33.9* 34.6* 33.7* 33.8*   MCV fL 91.3 91.6 92.5 96.9 96.0 96.0 98.2*   < > 91.8 92.5 92.6 94.8 92.8 93.4   WBC 10*3/mm3 7.08 6.19 6.57 5.77 5.15 5.75 5.96   < > 5.77 5.72 5.43 5.5 5.36 5.71   RDW % 17.2* 16.0* 15.7* 15.9* 16.2* 16.2* 17.3*   < > 15.8* 15.1 14.8 14.6* 15.2 15.5*   MPV fL  --  12.3* 12.8* 14.0*  --   --   --   --  12.2* 13.3* 11.7 11.9 11.1 10.4   PLATELETS 10*3/mm3 83* 115* 91* 62* 65* 54* 51*   < > 77* 82* 103* 119* 124* 141   IMM GRAN % %  --  0.2  --   --   --  0.3  --   --  0.5  --   --   --  0.4 0.4   NEUTROS ABS 10*3/mm3 4.63 4.00 4.16 3.44 3.19 3.52 3.67   < > 3.28 3.20 3.25 3.2 3.01 3.36   LYMPHS ABS 10*3/mm3 1.59 1.40 1.48 1.50 1.24 1.34 CANCELED  1.14   < > 1.50 1.67 1.50 1.5 1.59 1.57   MONOS ABS 10*3/mm3 0.52 0.49 0.59 0.46 0.45 0.51 0.78   < > 0.59 0.49 0.42 0.50 0.47 0.49   EOS ABS 10*3/mm3 0.29 0.26 0.29 0.33 0.23 0.34 CANCELED   < > 0.34 0.31 0.22 0.20 0.24 0.25   EOSINOPHIL ABS 10*3/mm3  --   --   --   --   --   --  0.36  --   --   --   --   --   --   --    EOSINOPHIL % %  --   --   --   --   --   --  6.1  --   --   --   --   --   --   --    BASOS ABS 10*3/mm3 0.03 0.03 0.02 0.03 0.02 0.02 CANCELED   < > 0.03 0.03 0.02 0.00 0.03 0.02   IMMATURE GRANS (ABS) 10*3/mm3  --  0.01  --   --   --  0.02  --   --  0.03  --   --  0.0 0.02 0.02   NRBC /100 WBC  --  0.0  --   --   --  0.0  --   --  0.0  --   --   --  0.0 0.0   NEUTROPHIL % %  --   --   --   --   --   --  61.6  --   --   --   --   --   --   --    MONOCYTES % %  --   --   --   --   --   --   13.1*  --   --   --   --   --   --   --     < > = values in this interval not displayed.       Lab Results - Last 18 Months   Lab Units 04/11/23  0744 03/07/23  1310 02/07/23  0907 01/10/23  0905 12/27/22  1309 12/08/22  0910 11/29/22  0815 01/05/22  0829 12/13/21  0944   GLUCOSE mg/dL 111* 110* 99 118* 103* 95 105*   < > 97   SODIUM mmol/L 140 140 138 141 141 141 142   < > 141   POTASSIUM mmol/L 3.3* 4.2 3.6 3.4* 4.0 3.9 3.6   < > 3.6   TOTAL CO2 mmol/L  --   --   --   --   --  31.0*  --    < > 25   CO2 mmol/L 27.0 26.0 27.0 28.0 30.0*  --  29.0   < >  --    CHLORIDE mmol/L 102 103 101 103 103 102 105   < > 102   ANION GAP mmol/L 11.0 11.0 10.0 10.0 8.0  --  8.0   < > 14   CREATININE mg/dL 1.36* 1.22* 1.30* 1.40* 1.44* 1.19* 1.29*   < > 1.3*   BUN mg/dL 26* 24* 21 19 19 18 16   < > 22   BUN / CREAT RATIO  19.1 19.7 16.2 13.6 13.2 15.1 12.4   < >  --    CALCIUM mg/dL 9.5 9.3 9.2 8.6 9.3 9.9 8.9   < > 9.5   EGFR IF NONAFRICN AM   --   --   --   --   --   --   --   --  40*   ALK PHOS U/L 74 77 71 72 79 78 79   < > 78   TOTAL PROTEIN g/dL 7.6 7.6 7.9 7.2 7.5  --  6.7   < > 7.1   ALT (SGPT) U/L 13 15 14 13 14 14 18   < > 20   AST (SGOT) U/L 16 17 19 15 15 18 20   < > 19   BILIRUBIN mg/dL 0.3 0.3 0.3 0.2 0.2 0.3 0.2   < > 0.3   ALBUMIN g/dL 4.4 4.3 4.5 3.9 4.0 4.70 3.80   < > 3.8   GLOBULIN gm/dL 3.2 3.3 3.4 3.3 3.5  --  2.9   < >  --     < > = values in this interval not displayed.       Lab Results - Last 18 Months   Lab Units 01/05/22  0932 11/29/21  0817   URIC ACID mg/dL  --  6.9*   REFERENCE LAB REPORT  See Attached Report  --        Lab Results - Last 18 Months   Lab Units 04/11/23  0744 01/10/23  0905 12/01/22  0838 11/29/22  0815 11/10/22  0846 09/07/22  0801 05/04/22  0755 01/05/22  0829 01/05/22  0829 11/29/21  0817   IRON mcg/dL 37 49  --  57 67 40 66   < > 69  --    TIBC mcg/dL 295* 280*  --  289* 289* 298 313   < > 307  --    IRON SATURATION % 13* 17*  --  20 23 13* 21   < > 22  --    FERRITIN ng/mL 726.60*  681.10*  --  959.20* 1,019.00* 585.40* 805.40*   < > 928.20*  --    TSH uIU/mL  --   --  4.640*  --   --   --   --   --   --  4.010   FOLATE ng/mL  --   --   --   --   --   --   --   --  >20.00  --     < > = values in this interval not displayed.       Marina Joe reports a pain score of 0.      ASSESSMENT:  1. Stage 3b chronic kidney disease    2. Chronic ITP (idiopathic thrombocytopenia)    3. Iron deficiency anemia secondary to inadequate dietary iron intake      1. Chronic ITP (idiopathic thrombocytopenia) ([facility])   Bone marrow, left iliac crest, core biopsy, aspirate smears, and clot section:  Slightly hypercellular marrow (40%) with maturing trilineage hematopoiesis.  Aspiculate smears, nondiagnostic.  No overt features of myelodysplasia and no excess blast population.  Moderately increased megakaryocytes.  2+ stainable iron.  Peripheral blood smear:  Severe thrombocytopenia.  Mild normocytic normochromic anemia.  Rare circulating schistocytes.  Normal white blood cell count with normal differential and morphology.  No circulating blasts.  Flow cytometry: No immunophenotypic evidence of abnormal myeloid maturation, increased blast population or a lymphoproliferative disorder.  Chromosome analysis: Normal female karyotype.  -Plt today 83. NO s/s of bleeding   -Stable for observation   PLAN  -Will transfuse for platelets < 10 regardless of bleeding.   -Transfuse platelets < 20 If bleeding   -Can give burst dose of steroids if platelets < 30.    2. Iron deficiency anemia   3.  Chronic Kidney Disease Stage  She was given injectafer on 09/27/22.     Hgb 10.7, Hct 34.6  Iron 37, Ferritin 726, Sat 13%, TIBC 295  Receiving monthly Retacrit injections   PLAN  Followed by Dr. Martin, Nephrology   Continue Retacrit today   Continue Retacrit monthly for Hgb < 11 or Hct < 33    3. History of breast cancer  - Pathologic stage: Stage IA (T1a, N0, cM0)   -Invasive mammary carcinoma, low-grade, ER positive GA positive HER-2 nu  2+, FISH unamplified.  -January 8, 2013 she underwent a left partial mastectomy with sentinel node biopsy  -Following surgery she underwent adjuvant radiation therapy and was then started on Arimidex  for hormonal manipulation.  She completed it in Jan 2023.  -Mammogram 01/23/23, Benign, Continue Annual Screening            PLAN:  AS LISTED ABOVE  Continue current medications, treatment plans and follow up with PCP and any other providers  Monthly labs and retacrit for Hgb < 11 OR Hct < 33  Return to office in 3 months   Pre-office labs for CBC, CMP, Iron profile and Ferritin   Care discussed with patient.  Understanding expressed.  Patient agreeable with plan.            Analilia Madera, APRN  04/11/2023

## 2023-04-10 NOTE — PROGRESS NOTES
aMrina Joe  1945    4/10/2023  Chief Complaint   Patient presents with   • GI Problem     Discuss anemia     Subjective   HPI  Marina Joe is a 78 y.o. female who presents with a complaint of persistent ongoing iron def anemia. She has had her Endo/colon as part of workup and here today to discuss Ct enterography. No visible bleeding. Iron 49 on 1/10/23. No family hx for colon cancer. No wt loss. No fever chills or sweats.      Past Medical History:   Diagnosis Date   • Breast cancer    • CKD (chronic kidney disease)    • Hypercholesteremia    • Hypertension    • Sinusitis    • Stage 3a chronic kidney disease 10/20/2020     Past Surgical History:   Procedure Laterality Date   • AVULSION TOENAIL PLATE      Sept 26,2018   • BREAST BIOPSY Left 11/20/2012   • BREAST BIOPSY      Left Breast, 1/2019 per Dr Barkley   • BREAST LUMPECTOMY Left     with node bx    • COLONOSCOPY  09/13/2013    small polyp at 30cm benign hyperplastic polyp, changes consistent with melanosis coli. Recall 5 years   • COLONOSCOPY  11/19/2018    Tics otherwise normal exam repeat in 5 years   • COLONOSCOPY  02/13/2023    Normal exam repeat in 3 years with a 2 day prep   • ENDOSCOPY  02/13/2023    Gastritis, small HH   • REPLACEMENT TOTAL KNEE Right     2016   • TOTAL ABDOMINAL HYSTERECTOMY WITH SALPINGO OOPHORECTOMY         Outpatient Medications Marked as Taking for the 4/10/23 encounter (Office Visit) with Meaghan White APRN   Medication Sig Dispense Refill   • albuterol sulfate  (90 Base) MCG/ACT inhaler Inhale 2 puffs Every 4 (Four) Hours As Needed for Wheezing. 2 g 3   • amLODIPine (NORVASC) 5 MG tablet Take 1 tablet by mouth 2 (Two) Times a Day. 180 tablet 2   • anastrozole (ARIMIDEX) 1 MG tablet Take 1 tablet by mouth Daily. 90 tablet 3   • benzonatate (Tessalon Perles) 100 MG capsule Take 1 capsule by mouth 3 (Three) Times a Day As Needed for Cough. 45 capsule 0   • buPROPion XL (WELLBUTRIN XL) 150 MG 24 hr tablet  Take 1 tablet by mouth Daily. 90 tablet 3   • Calcium Carb-Cholecalciferol (CALCIUM 600+D3 PO) Take  by mouth.     • carvedilol (COREG) 6.25 MG tablet Take 1 tablet by mouth 2 (Two) Times a Day With Meals. 180 tablet 3   • cetirizine (zyrTEC) 10 MG tablet Take 1 tablet by mouth Daily.     • cloNIDine (Catapres) 0.1 MG tablet Take 1 tablet by mouth 2 (Two) Times a Day As Needed for High Blood Pressure (greater than 160/90). 45 tablet 0   • cyclobenzaprine (FLEXERIL) 10 MG tablet Take 1 tablet by mouth 3 (Three) Times a Day As Needed for Muscle Spasms. 90 tablet 5   • Ergocalciferol (VITAMIN D2 PO) Take 50,000 Units by mouth Every 30 (Thirty) Days.     • ferrous sulfate 325 (65 FE) MG tablet Take 1 tablet by mouth Daily With Breakfast.     • fluticasone (FLONASE) 50 MCG/ACT nasal spray 2 sprays into the nostril(s) as directed by provider Daily. 1 g 3   • fluticasone (FLOVENT DISKUS) 50 MCG/BLIST diskus inhaler Inhale 1 puff.     • irbesartan-hydrochlorothiazide (AVALIDE) 300-12.5 MG tablet Take 1 tablet by mouth Daily. 90 tablet 3   • montelukast (SINGULAIR) 10 MG tablet Take 1 tablet by mouth Daily. 90 tablet 1   • Multiple Vitamins-Minerals (MULTIVITAMIN ADULT) tablet Take  by mouth Daily.     • Omega-3 Fatty Acids (FISH OIL) 1000 MG capsule capsule Take 1 capsule by mouth.     • pantoprazole (Protonix) 40 MG EC tablet Take 1 tablet by mouth Daily. 90 tablet 0   • rOPINIRole (REQUIP) 0.5 MG tablet Take 1 tablet by mouth Every Night. 90 tablet 1   • rosuvastatin (CRESTOR) 20 MG tablet TAKE 1 TABLET BY MOUTH DAILY 90 tablet 3   • sertraline (ZOLOFT) 100 MG tablet TAKE 1 TABLET BY MOUTH DAILY 90 tablet 3   • traZODone (DESYREL) 100 MG tablet Take 1 tablet by mouth Every Night. 90 tablet 3   • valACYclovir (VALTREX) 500 MG tablet Take 1 tablet by mouth 2 (Two) Times a Day. 60 tablet 0     Allergies   Allergen Reactions   • Aspirin GI Bleeding   • Niacin Other (See Comments)     Social History     Socioeconomic History    • Marital status:    Tobacco Use   • Smoking status: Never   • Smokeless tobacco: Never   Vaping Use   • Vaping Use: Never used   Substance and Sexual Activity   • Alcohol use: No   • Drug use: Not Currently   • Sexual activity: Not Currently     Birth control/protection: Surgical     Family History   Problem Relation Age of Onset   • Heart attack Mother    • Hodgkin's lymphoma Father    • Other Father    • Cancer Father         hodgkins   • Heart attack Brother    • Skin cancer Maternal Uncle    • Pancreatic cancer Maternal Uncle    • Cancer Maternal Uncle         eye   • Colon cancer Neg Hx    • Colon polyps Neg Hx      Health Maintenance   Topic Date Due   • DXA SCAN  01/01/2023   • TDAP/TD VACCINES (1 - Tdap) 12/08/2023 (Originally 1/31/1964)   • INFLUENZA VACCINE  08/01/2023   • LIPID PANEL  12/01/2023   • ANNUAL WELLNESS VISIT  12/08/2023   • MAMMOGRAM  01/23/2024   • COLORECTAL CANCER SCREENING  02/13/2026   • HEPATITIS C SCREENING  Completed   • COVID-19 Vaccine  Completed   • Pneumococcal Vaccine 65+  Completed   • ZOSTER VACCINE  Completed     Review of Systems   Constitutional: Negative for activity change, appetite change, chills, diaphoresis, fatigue, fever and unexpected weight change.   HENT: Negative for ear pain, hearing loss, mouth sores, sore throat, trouble swallowing and voice change.    Eyes: Negative.    Respiratory: Negative for cough, choking, shortness of breath and wheezing.    Cardiovascular: Negative for chest pain and palpitations.   Gastrointestinal: Negative for abdominal pain, blood in stool, constipation, diarrhea, nausea and vomiting.   Endocrine: Negative for cold intolerance and heat intolerance.   Genitourinary: Negative for decreased urine volume, dysuria, frequency, hematuria and urgency.   Musculoskeletal: Negative for back pain, gait problem and myalgias.   Skin: Negative for color change, pallor and rash.   Allergic/Immunologic: Negative for food allergies and  "immunocompromised state.   Neurological: Negative for dizziness, tremors, seizures, syncope, weakness, light-headedness, numbness and headaches.   Hematological: Negative for adenopathy. Does not bruise/bleed easily.   Psychiatric/Behavioral: Negative for agitation and confusion. The patient is not nervous/anxious.    All other systems reviewed and are negative.    Objective   Vitals:    04/10/23 0854   BP: 132/72   Pulse: 63   Temp: 97.9 °F (36.6 °C)   TempSrc: Temporal   SpO2: 96%   Weight: 105 kg (231 lb)   Height: 172.7 cm (68\")     Body mass index is 35.12 kg/m².  Physical Exam  Constitutional:       Appearance: She is well-developed.   HENT:      Head: Normocephalic and atraumatic.   Eyes:      Pupils: Pupils are equal, round, and reactive to light.   Neck:      Trachea: No tracheal deviation.   Cardiovascular:      Rate and Rhythm: Normal rate and regular rhythm.      Heart sounds: Normal heart sounds. No murmur heard.    No friction rub. No gallop.   Pulmonary:      Effort: Pulmonary effort is normal. No respiratory distress.      Breath sounds: Normal breath sounds. No wheezing or rales.   Chest:      Chest wall: No tenderness.   Abdominal:      General: Bowel sounds are normal. There is no distension.      Palpations: Abdomen is soft. Abdomen is not rigid.      Tenderness: There is no abdominal tenderness. There is no guarding or rebound.   Musculoskeletal:         General: No tenderness or deformity. Normal range of motion.      Cervical back: Normal range of motion and neck supple.   Skin:     General: Skin is warm and dry.      Coloration: Skin is not pale.      Findings: No rash.   Neurological:      Mental Status: She is alert and oriented to person, place, and time.      Deep Tendon Reflexes: Reflexes are normal and symmetric.   Psychiatric:         Behavior: Behavior normal.         Thought Content: Thought content normal.         Judgment: Judgment normal.       Assessment & Plan   Diagnoses and " all orders for this visit:    1. Iron deficiency anemia, unspecified iron deficiency anemia type (Primary)  -     CT Abdomen Pelvis With & Without Contrast Enterography    2. Heme positive stool    3. Nonsmoker    4. HTN (hypertension), benign        She does have some kidney disease and aware that Ct with contrast can have an impact of creatinine as well. She verbalizes and agrees.   After Ct enterography she may need referral to hematology for iron def will defer this to her PCP.    Part of this note may be an electronic transcription/translation of spoken language to printed text using the Dragon Dictation System.  Body mass index is 35.12 kg/m².  Return in about 3 weeks (around 5/1/2023).    Class 2 Severe Obesity (BMI >=35 and <=39.9). Obesity-related health conditions include the following: hypertension. Obesity is unchanged. BMI is is above average; BMI management plan is completed. We discussed portion control and increasing exercise.      All risks, benefits, alternatives, and indications of colonoscopy and/or Endoscopy procedure have been discussed with the patient. Risks to include perforation of the colon requiring possible surgery or colostomy, risk of bleeding from biopsies or removal of colon tissue, possibility of missing a colon polyp or cancer, or adverse drug reaction.  Benefits to include the diagnosis and management of disease of the colon and rectum. Alternatives to include barium enema, radiographic evaluation, lab testing or no intervention. Pt verbalizes understanding and agrees.     Megahan White, MARY  4/10/2023  09:36 CDT          If you smoke or use tobacco, 4 minutes reading provided  Steps to Quit Smoking  Smoking tobacco can be harmful to your health and can affect almost every organ in your body. Smoking puts you, and those around you, at risk for developing many serious chronic diseases. Quitting smoking is difficult, but it is one of the best things that you can do for your  health. It is never too late to quit.  What are the benefits of quitting smoking?  When you quit smoking, you lower your risk of developing serious diseases and conditions, such as:  · Lung cancer or lung disease, such as COPD.  · Heart disease.  · Stroke.  · Heart attack.  · Infertility.  · Osteoporosis and bone fractures.  Additionally, symptoms such as coughing, wheezing, and shortness of breath may get better when you quit. You may also find that you get sick less often because your body is stronger at fighting off colds and infections. If you are pregnant, quitting smoking can help to reduce your chances of having a baby of low birth weight.  How do I get ready to quit?  When you decide to quit smoking, create a plan to make sure that you are successful. Before you quit:  · Pick a date to quit. Set a date within the next two weeks to give you time to prepare.  · Write down the reasons why you are quitting. Keep this list in places where you will see it often, such as on your bathroom mirror or in your car or wallet.  · Identify the people, places, things, and activities that make you want to smoke (triggers) and avoid them. Make sure to take these actions:  ¨ Throw away all cigarettes at home, at work, and in your car.  ¨ Throw away smoking accessories, such as ashtrays and lighters.  ¨ Clean your car and make sure to empty the ashtray.  ¨ Clean your home, including curtains and carpets.  · Tell your family, friends, and coworkers that you are quitting. Support from your loved ones can make quitting easier.  · Talk with your health care provider about your options for quitting smoking.  · Find out what treatment options are covered by your health insurance.  What strategies can I use to quit smoking?  Talk with your healthcare provider about different strategies to quit smoking. Some strategies include:  · Quitting smoking altogether instead of gradually lessening how much you smoke over a period of time.  Research shows that quitting “cold turkey” is more successful than gradually quitting.  · Attending in-person counseling to help you build problem-solving skills. You are more likely to have success in quitting if you attend several counseling sessions. Even short sessions of 10 minutes can be effective.  · Finding resources and support systems that can help you to quit smoking and remain smoke-free after you quit. These resources are most helpful when you use them often. They can include:  ¨ Online chats with a counselor.  ¨ Telephone quitlines.  ¨ Printed self-help materials.  ¨ Support groups or group counseling.  ¨ Text messaging programs.  ¨ Mobile phone applications.  · Taking medicines to help you quit smoking. (If you are pregnant or breastfeeding, talk with your health care provider first.) Some medicines contain nicotine and some do not. Both types of medicines help with cravings, but the medicines that include nicotine help to relieve withdrawal symptoms. Your health care provider may recommend:  ¨ Nicotine patches, gum, or lozenges.  ¨ Nicotine inhalers or sprays.  ¨ Non-nicotine medicine that is taken by mouth.  Talk with your health care provider about combining strategies, such as taking medicines while you are also receiving in-person counseling. Using these two strategies together makes you more likely to succeed in quitting than if you used either strategy on its own.  If you are pregnant or breastfeeding, talk with your health care provider about finding counseling or other support strategies to quit smoking. Do not take medicine to help you quit smoking unless told to do so by your health care provider.  What things can I do to make it easier to quit?  Quitting smoking might feel overwhelming at first, but there is a lot that you can do to make it easier. Take these important actions:  · Reach out to your family and friends and ask that they support and encourage you during this time. Call  telephone quitlines, reach out to support groups, or work with a counselor for support.  · Ask people who smoke to avoid smoking around you.  · Avoid places that trigger you to smoke, such as bars, parties, or smoke-break areas at work.  · Spend time around people who do not smoke.  · Lessen stress in your life, because stress can be a smoking trigger for some people. To lessen stress, try:  ¨ Exercising regularly.  ¨ Deep-breathing exercises.  ¨ Yoga.  ¨ Meditating.  ¨ Performing a body scan. This involves closing your eyes, scanning your body from head to toe, and noticing which parts of your body are particularly tense. Purposefully relax the muscles in those areas.  · Download or purchase mobile phone or tablet apps (applications) that can help you stick to your quit plan by providing reminders, tips, and encouragement. There are many free apps, such as QuitGuide from the CDC (Centers for Disease Control and Prevention). You can find other support for quitting smoking (smoking cessation) through smokefree.gov and other websites.  How will I feel when I quit smoking?  Within the first 24 hours of quitting smoking, you may start to feel some withdrawal symptoms. These symptoms are usually most noticeable 2-3 days after quitting, but they usually do not last beyond 2-3 weeks. Changes or symptoms that you might experience include:  · Mood swings.  · Restlessness, anxiety, or irritation.  · Difficulty concentrating.  · Dizziness.  · Strong cravings for sugary foods in addition to nicotine.  · Mild weight gain.  · Constipation.  · Nausea.  · Coughing or a sore throat.  · Changes in how your medicines work in your body.  · A depressed mood.  · Difficulty sleeping (insomnia).  After the first 2-3 weeks of quitting, you may start to notice more positive results, such as:  · Improved sense of smell and taste.  · Decreased coughing and sore throat.  · Slower heart rate.  · Lower blood pressure.  · Clearer skin.  · The  ability to breathe more easily.  · Fewer sick days.  Quitting smoking is very challenging for most people. Do not get discouraged if you are not successful the first time. Some people need to make many attempts to quit before they achieve long-term success. Do your best to stick to your quit plan, and talk with your health care provider if you have any questions or concerns.  This information is not intended to replace advice given to you by your health care provider. Make sure you discuss any questions you have with your health care provider.  Document Released: 12/12/2002 Document Revised: 08/15/2017 Document Reviewed: 05/03/2016  [employer] Interactive Patient Education © 2017 [employer] Inc.

## 2023-04-11 ENCOUNTER — INFUSION (OUTPATIENT)
Dept: ONCOLOGY | Facility: HOSPITAL | Age: 78
End: 2023-04-11
Payer: MEDICARE

## 2023-04-11 ENCOUNTER — LAB (OUTPATIENT)
Dept: LAB | Facility: HOSPITAL | Age: 78
End: 2023-04-11
Payer: MEDICARE

## 2023-04-11 ENCOUNTER — OFFICE VISIT (OUTPATIENT)
Dept: ONCOLOGY | Facility: CLINIC | Age: 78
End: 2023-04-11
Payer: MEDICARE

## 2023-04-11 VITALS
HEIGHT: 68 IN | OXYGEN SATURATION: 94 % | BODY MASS INDEX: 35.01 KG/M2 | TEMPERATURE: 97.8 F | HEART RATE: 72 BPM | SYSTOLIC BLOOD PRESSURE: 128 MMHG | DIASTOLIC BLOOD PRESSURE: 68 MMHG | WEIGHT: 231 LBS | RESPIRATION RATE: 18 BRPM

## 2023-04-11 VITALS
TEMPERATURE: 98.1 F | HEART RATE: 63 BPM | SYSTOLIC BLOOD PRESSURE: 152 MMHG | RESPIRATION RATE: 18 BRPM | DIASTOLIC BLOOD PRESSURE: 54 MMHG | OXYGEN SATURATION: 97 %

## 2023-04-11 DIAGNOSIS — D50.8 IRON DEFICIENCY ANEMIA SECONDARY TO INADEQUATE DIETARY IRON INTAKE: ICD-10-CM

## 2023-04-11 DIAGNOSIS — D63.1 ANEMIA OF CHRONIC RENAL FAILURE, STAGE 3A: ICD-10-CM

## 2023-04-11 DIAGNOSIS — N18.31 STAGE 3A CHRONIC KIDNEY DISEASE: Primary | ICD-10-CM

## 2023-04-11 DIAGNOSIS — N18.32 STAGE 3B CHRONIC KIDNEY DISEASE: Primary | ICD-10-CM

## 2023-04-11 DIAGNOSIS — N18.31 ANEMIA OF CHRONIC RENAL FAILURE, STAGE 3A: ICD-10-CM

## 2023-04-11 DIAGNOSIS — D69.3 CHRONIC ITP (IDIOPATHIC THROMBOCYTOPENIA): ICD-10-CM

## 2023-04-11 DIAGNOSIS — N18.31 STAGE 3A CHRONIC KIDNEY DISEASE: ICD-10-CM

## 2023-04-11 LAB
ALBUMIN SERPL-MCNC: 4.4 G/DL (ref 3.5–5.2)
ALBUMIN/GLOB SERPL: 1.4 G/DL
ALP SERPL-CCNC: 74 U/L (ref 39–117)
ALT SERPL W P-5'-P-CCNC: 13 U/L (ref 1–33)
ANION GAP SERPL CALCULATED.3IONS-SCNC: 11 MMOL/L (ref 5–15)
AST SERPL-CCNC: 16 U/L (ref 1–32)
BASOPHILS # BLD AUTO: 0.03 10*3/MM3 (ref 0–0.2)
BASOPHILS NFR BLD AUTO: 0.4 % (ref 0–1.5)
BILIRUB SERPL-MCNC: 0.3 MG/DL (ref 0–1.2)
BUN SERPL-MCNC: 26 MG/DL (ref 8–23)
BUN/CREAT SERPL: 19.1 (ref 7–25)
CALCIUM SPEC-SCNC: 9.5 MG/DL (ref 8.6–10.5)
CHLORIDE SERPL-SCNC: 102 MMOL/L (ref 98–107)
CO2 SERPL-SCNC: 27 MMOL/L (ref 22–29)
CREAT SERPL-MCNC: 1.36 MG/DL (ref 0.57–1)
DEPRECATED RDW RBC AUTO: 57.4 FL (ref 37–54)
EGFRCR SERPLBLD CKD-EPI 2021: 40.2 ML/MIN/1.73
EOSINOPHIL # BLD AUTO: 0.29 10*3/MM3 (ref 0–0.4)
EOSINOPHIL NFR BLD AUTO: 4.1 % (ref 0.3–6.2)
ERYTHROCYTE [DISTWIDTH] IN BLOOD BY AUTOMATED COUNT: 17.2 % (ref 12.3–15.4)
FERRITIN SERPL-MCNC: 726.6 NG/ML (ref 13–150)
GLOBULIN UR ELPH-MCNC: 3.2 GM/DL
GLUCOSE SERPL-MCNC: 111 MG/DL (ref 65–99)
HCT VFR BLD AUTO: 34.6 % (ref 34–46.6)
HGB BLD-MCNC: 10.7 G/DL (ref 12–15.9)
HOLD SPECIMEN: NORMAL
IRON 24H UR-MRATE: 37 MCG/DL (ref 37–145)
IRON SATN MFR SERPL: 13 % (ref 20–50)
LYMPHOCYTES # BLD AUTO: 1.59 10*3/MM3 (ref 0.7–3.1)
LYMPHOCYTES NFR BLD AUTO: 22.5 % (ref 19.6–45.3)
MCH RBC QN AUTO: 28.2 PG (ref 26.6–33)
MCHC RBC AUTO-ENTMCNC: 30.9 G/DL (ref 31.5–35.7)
MCV RBC AUTO: 91.3 FL (ref 79–97)
MONOCYTES # BLD AUTO: 0.52 10*3/MM3 (ref 0.1–0.9)
MONOCYTES NFR BLD AUTO: 7.3 % (ref 5–12)
NEUTROPHILS NFR BLD AUTO: 4.63 10*3/MM3 (ref 1.7–7)
NEUTROPHILS NFR BLD AUTO: 65.4 % (ref 42.7–76)
PLATELET # BLD AUTO: 83 10*3/MM3 (ref 140–450)
PMV BLD AUTO: ABNORMAL FL
POTASSIUM SERPL-SCNC: 3.3 MMOL/L (ref 3.5–5.2)
PROT SERPL-MCNC: 7.6 G/DL (ref 6–8.5)
RBC # BLD AUTO: 3.79 10*6/MM3 (ref 3.77–5.28)
SODIUM SERPL-SCNC: 140 MMOL/L (ref 136–145)
TIBC SERPL-MCNC: 295 MCG/DL (ref 298–536)
TRANSFERRIN SERPL-MCNC: 198 MG/DL (ref 200–360)
WBC NRBC COR # BLD: 7.08 10*3/MM3 (ref 3.4–10.8)

## 2023-04-11 PROCEDURE — 3078F DIAST BP <80 MM HG: CPT | Performed by: NURSE PRACTITIONER

## 2023-04-11 PROCEDURE — 36415 COLL VENOUS BLD VENIPUNCTURE: CPT

## 2023-04-11 PROCEDURE — 1126F AMNT PAIN NOTED NONE PRSNT: CPT | Performed by: NURSE PRACTITIONER

## 2023-04-11 PROCEDURE — 3074F SYST BP LT 130 MM HG: CPT | Performed by: NURSE PRACTITIONER

## 2023-04-11 PROCEDURE — 25010000002 EPOETIN ALFA-EPBX 40000 UNIT/ML SOLUTION: Performed by: NURSE PRACTITIONER

## 2023-04-11 PROCEDURE — 84466 ASSAY OF TRANSFERRIN: CPT

## 2023-04-11 PROCEDURE — 85025 COMPLETE CBC W/AUTO DIFF WBC: CPT

## 2023-04-11 PROCEDURE — 83540 ASSAY OF IRON: CPT

## 2023-04-11 PROCEDURE — 80053 COMPREHEN METABOLIC PANEL: CPT

## 2023-04-11 PROCEDURE — 96372 THER/PROPH/DIAG INJ SC/IM: CPT

## 2023-04-11 PROCEDURE — 82728 ASSAY OF FERRITIN: CPT

## 2023-04-11 PROCEDURE — 99214 OFFICE O/P EST MOD 30 MIN: CPT | Performed by: NURSE PRACTITIONER

## 2023-04-11 RX ADMIN — EPOETIN ALFA-EPBX 40000 UNITS: 40000 INJECTION, SOLUTION INTRAVENOUS; SUBCUTANEOUS at 09:28

## 2023-05-02 DIAGNOSIS — I10 ESSENTIAL HYPERTENSION: ICD-10-CM

## 2023-05-02 RX ORDER — IRBESARTAN AND HYDROCHLOROTHIAZIDE 300; 12.5 MG/1; MG/1
1 TABLET, FILM COATED ORAL DAILY
Qty: 90 TABLET | Refills: 3 | Status: SHIPPED | OUTPATIENT
Start: 2023-05-02

## 2023-05-02 RX ORDER — ROSUVASTATIN CALCIUM 20 MG/1
20 TABLET, COATED ORAL DAILY
Qty: 90 TABLET | Refills: 3 | Status: SHIPPED | OUTPATIENT
Start: 2023-05-02

## 2023-05-03 ENCOUNTER — TELEPHONE (OUTPATIENT)
Dept: GASTROENTEROLOGY | Facility: CLINIC | Age: 78
End: 2023-05-03
Payer: COMMERCIAL

## 2023-05-03 ENCOUNTER — TELEPHONE (OUTPATIENT)
Dept: ONCOLOGY | Facility: CLINIC | Age: 78
End: 2023-05-03

## 2023-05-03 NOTE — TELEPHONE ENCOUNTER
Laura from [facility] called me and pt's insurance is not covering her CT scheduled on 5/5. She tells me her insurance wants her to have this done in [place] because it has medicare and medicaid on it. Spoke with pt and she titus. I told her I could fax it to somewhere in [place] for her to have completed. She said she would be ok with doing it at [place]. Faxing order over to [phone]. Pt aware they will call her with an appt. Advised her to call back if she has any questions.    Randee BOLAND

## 2023-05-03 NOTE — TELEPHONE ENCOUNTER
Caller: KATHE    Relationship: [facility]    Best call back number: [phone]      What was the call regarding: KATHE WANTED TO SPEAK TO SOMEONE IN THE OFFICE REGARDING PATIENTS INSURANCE, IT IS OUT OF NETWORK FOR US. PLEASE CALL KATHE WITH [facility]     Do you require a callback: YES

## 2023-05-05 DIAGNOSIS — N18.31 ANEMIA OF CHRONIC RENAL FAILURE, STAGE 3A: Primary | ICD-10-CM

## 2023-05-05 DIAGNOSIS — D63.1 ANEMIA OF CHRONIC RENAL FAILURE, STAGE 3A: Primary | ICD-10-CM

## 2023-05-08 NOTE — TELEPHONE ENCOUNTER
Spoke with Mariangel CHRISTIAN at [facility] to see what was needed to have approval for pt to have her ct and she tells me the reason it is being denied is not the facility itself, that it is because pt needs to complete M2A endoscopy or have a clinical reason as to why she can't complete a CTE first. Can I schedule pt for M2A or which would you like me to try and do? Just let me know!       Case # [id number]  CPT [id number]  Phone number [phone]

## 2023-05-10 NOTE — TELEPHONE ENCOUNTER
Per nurse Sandrine STEINBERG At [facility], pt is now approved for CTE to be scheduled. Effective dates are 5/10/2023 - 07/09/2023. Auth # [id number] Case # [id number].    Sent order back down to scheduling.

## 2023-05-11 ENCOUNTER — TELEPHONE (OUTPATIENT)
Dept: INTERNAL MEDICINE | Facility: CLINIC | Age: 78
End: 2023-05-11
Payer: COMMERCIAL

## 2023-05-11 NOTE — TELEPHONE ENCOUNTER
Do to her changing to a new insurance thru [place] which is dual Medicare/Medicad, she cannot use [facility] any longer and must use someone in [place].  She has been told this on her tests.  They are wanting us to reach out and possibly refer her to a new doctor who can take over her care and get her scheduled again for her tests.        I am cancelling her future appointment as insurance will not pay for us to see her.

## 2023-05-23 ENCOUNTER — HOSPITAL ENCOUNTER (OUTPATIENT)
Dept: CT IMAGING | Facility: HOSPITAL | Age: 78
Discharge: HOME | End: 2023-05-23
Admitting: CLINICAL NURSE SPECIALIST
Payer: MEDICARE

## 2023-05-23 PROCEDURE — 74177 CT ABD & PELVIS W/CONTRAST: CPT

## 2023-05-23 PROCEDURE — 25510000001 IOPAMIDOL PER 1 ML: Performed by: CLINICAL NURSE SPECIALIST

## 2023-05-23 RX ADMIN — IOPAMIDOL 100 ML: 755 INJECTION, SOLUTION INTRAVENOUS at 14:13

## 2023-05-24 DIAGNOSIS — K86.9 PANCREATIC LESION: ICD-10-CM

## 2023-05-24 DIAGNOSIS — C25.1 MALIGNANT NEOPLASM OF BODY OF PANCREAS: ICD-10-CM

## 2023-05-24 DIAGNOSIS — N18.31 STAGE 3A CHRONIC KIDNEY DISEASE: Primary | ICD-10-CM

## 2023-05-26 ENCOUNTER — LAB (OUTPATIENT)
Dept: LAB | Facility: HOSPITAL | Age: 78
End: 2023-05-26
Payer: MEDICARE

## 2023-05-26 LAB
ANION GAP SERPL CALCULATED.3IONS-SCNC: 9 MMOL/L (ref 5–15)
BUN SERPL-MCNC: 21 MG/DL (ref 8–23)
BUN/CREAT SERPL: 17.6 (ref 7–25)
CALCIUM SPEC-SCNC: 9.2 MG/DL (ref 8.6–10.5)
CANCER AG19-9 SERPL-ACNC: 11.5 U/ML
CHLORIDE SERPL-SCNC: 103 MMOL/L (ref 98–107)
CO2 SERPL-SCNC: 28 MMOL/L (ref 22–29)
CREAT SERPL-MCNC: 1.19 MG/DL (ref 0.57–1)
EGFRCR SERPLBLD CKD-EPI 2021: 47.2 ML/MIN/1.73
GLUCOSE SERPL-MCNC: 99 MG/DL (ref 65–99)
POTASSIUM SERPL-SCNC: 4 MMOL/L (ref 3.5–5.2)
SODIUM SERPL-SCNC: 140 MMOL/L (ref 136–145)

## 2023-05-26 PROCEDURE — 86301 IMMUNOASSAY TUMOR CA 19-9: CPT | Performed by: CLINICAL NURSE SPECIALIST

## 2023-05-26 PROCEDURE — 80048 BASIC METABOLIC PNL TOTAL CA: CPT | Performed by: CLINICAL NURSE SPECIALIST

## 2023-05-26 PROCEDURE — 36415 COLL VENOUS BLD VENIPUNCTURE: CPT | Performed by: CLINICAL NURSE SPECIALIST

## 2023-06-01 NOTE — ADDENDUM NOTE
Addended by: ROCCO GARCIA on: 2/7/2020 10:25 AM     Modules accepted: Orders     Discharge Instructions- Postpartum    Immunizations given:   Most Recent Immunizations   Administered Date(s) Administered    DPT 02/23/2000    DTaP(INFANRIX) 02/23/2000    HIB (HbOC) 01/23/1996    HIB, Unspecified Formulation 01/23/1996    HPV Quadrivalent 07/15/2015    HPV, Unspecified Formulation 03/26/2008    Hep A, Pediatric, Unspecified Formulation 03/26/2008    Hep A, ped/adol, 2 dose 03/14/2012    Hep B, adolescent or pediatric 01/23/1996    Influenza, Unspecified Formulation 10/13/2010    Influenza, seasonal, injectable, preservative free 10/27/2006    Influenza, seasonal, injectable, trivalent 10/02/2013    MMR 02/23/2000    Meningococcal Conjugate MCV4P (Menactra) 03/14/2012    Meningococcus 11/19/2007    Novel Influenza C2C0-64, Unspecified Formulation 12/04/2009    Polio, INACTIVATED 02/23/2000    Polio, Oral 05/23/1995    Rho D IG 05/31/2023    Rho(D) Immune Globulin 03/07/2023    Tdap 03/07/2023   Pended Date(s) Pended    MMR 05/31/2023    Tdap 05/31/2023    Varicella 05/31/2023   Deferred Date(s) Deferred    MMR 10/19/2017       Diet: Eat a well balanced diet including plenty of fiber and fluids such as juice and water.    Activity:   Bathing: Shower or bathe as needed.   Driving: As directed by your doctor or midwife.   Return to work or school: As directed by your doctor or midwife.   Lifting/ Bending: Lift nothing heavier than 10 pounds, bend at the knees, not hips.   Resuming sexual activity: 6 weeks or as recommended by your doctor. Your doctor will discuss birth control options with you at your postpartum check up.   Housework: Limit for the first 2 weeks.   Exercise: Walking is fine. Check with your doctor or midwife for any restrictions.   Other: No douching or tampons.   Other: Wear a supportive bra at all times.    Follow up care  Self care:   -Vaginal discharge may continue for 10 days to 6 weeks, becoming light in color and amount.   -Continue to use your water bottle, tucks, spray,  ointment, sitz bath until bleeding stops or discomfort is gone.   -If you had a  section or tubal ligation, keep incision clean and dry.   -Apply cold compresses to breasts to relieve swelling and soothe discomfort.   -Apply warm compresses 5 minutes prior to breastfeeding to relieve engorgement.    Notify your doctor/ midwife if:   -Your temperature is over 100.4 degrees F.   -You notice hard, red, warm or painful areas in your breasts or legs.   -Your vaginal discharge becomes foul smelling, increases in amount, soaks more than one pad in an hour, is bright red or you pass large clots.   -You have difficulty going to the bathroom.   -You notice swelling, drainage warmth or tenderness at your incision.   -You have symptoms of postpartum depression (loss of appetite, feelings of hopelessness or loss of control, inability to sleep or extensive sleep).    Who is your help at home after you leave the hospital?    Ideas for help at home: friends, family members, neighbors, and members of your reba community are all there to help you.        Special Instructions:

## 2023-06-05 DIAGNOSIS — K86.9 PANCREATIC LESION: Primary | ICD-10-CM

## 2023-06-21 ENCOUNTER — TELEPHONE (OUTPATIENT)
Dept: ONCOLOGY | Facility: CLINIC | Age: 78
End: 2023-06-21

## 2023-06-21 NOTE — TELEPHONE ENCOUNTER
Caller: Marina Joe    Relationship to patient: Self    Best call back number: [phone]     Chief complaint: PATIENT HAS UPDATED INS. REQUESTING TO RESCHEDULE APPT    Type of visit: LAB AND FU    Requested date: PATIENT WOULD LIKE TO SCHEDULE FOR ORIGINAL APPT ON 7/11/23 OR NEXT AVAILABLE PATIENT PREFERS EARLY MORNINGS

## 2023-06-29 RX ORDER — CHLORAL HYDRATE 500 MG
1000 CAPSULE ORAL
COMMUNITY

## 2023-06-29 RX ORDER — MONTELUKAST SODIUM 10 MG/1
10 TABLET ORAL DAILY
COMMUNITY
Start: 2022-09-21

## 2023-06-29 RX ORDER — ALBUTEROL SULFATE 90 UG/1
2 AEROSOL, METERED RESPIRATORY (INHALATION) EVERY 4 HOURS PRN
COMMUNITY
Start: 2022-06-30

## 2023-06-29 RX ORDER — CLONIDINE HYDROCHLORIDE 0.1 MG/1
0.1 TABLET ORAL 2 TIMES DAILY PRN
COMMUNITY
Start: 2023-01-05

## 2023-06-29 RX ORDER — IRBESARTAN AND HYDROCHLOROTHIAZIDE 300; 12.5 MG/1; MG/1
1 TABLET, FILM COATED ORAL DAILY
COMMUNITY
Start: 2022-06-01

## 2023-06-29 RX ORDER — TRAZODONE HYDROCHLORIDE 100 MG/1
100 TABLET ORAL NIGHTLY
COMMUNITY
Start: 2022-06-30 | End: 2023-06-29 | Stop reason: SDUPTHER

## 2023-06-29 RX ORDER — BENZONATATE 100 MG/1
100 CAPSULE ORAL 3 TIMES DAILY PRN
COMMUNITY
Start: 2022-09-13

## 2023-06-29 RX ORDER — ROPINIROLE 0.5 MG/1
0.5 TABLET, FILM COATED ORAL NIGHTLY
COMMUNITY
Start: 2023-06-23 | End: 2023-06-29 | Stop reason: SDUPTHER

## 2023-06-29 RX ORDER — ASPIRIN 81 MG
TABLET, DELAYED RELEASE (ENTERIC COATED) ORAL
COMMUNITY

## 2023-06-29 RX ORDER — CETIRIZINE HYDROCHLORIDE 10 MG/1
10 TABLET ORAL DAILY
COMMUNITY

## 2023-06-29 RX ORDER — PROMETHAZINE HYDROCHLORIDE 25 MG/1
TABLET ORAL DAILY
COMMUNITY

## 2023-06-29 RX ORDER — ERGOCALCIFEROL 1.25 MG/1
50000 CAPSULE ORAL
COMMUNITY

## 2023-06-29 RX ORDER — ROPINIROLE 0.5 MG/1
0.5 TABLET, FILM COATED ORAL NIGHTLY
COMMUNITY
Start: 2022-09-21

## 2023-06-29 RX ORDER — AMLODIPINE BESYLATE 5 MG/1
5 TABLET ORAL 2 TIMES DAILY
COMMUNITY
Start: 2023-04-06

## 2023-06-29 RX ORDER — CYCLOBENZAPRINE HCL 10 MG
10 TABLET ORAL 3 TIMES DAILY PRN
COMMUNITY
Start: 2023-06-12

## 2023-06-29 RX ORDER — PANTOPRAZOLE SODIUM 40 MG/1
40 TABLET, DELAYED RELEASE ORAL DAILY
COMMUNITY
Start: 2023-06-20

## 2023-06-29 RX ORDER — FERROUS SULFATE 325(65) MG
1 TABLET ORAL DAILY
COMMUNITY
Start: 2023-06-12

## 2023-07-10 ENCOUNTER — TELEPHONE (OUTPATIENT)
Dept: INTERNAL MEDICINE | Facility: CLINIC | Age: 78
End: 2023-07-10

## 2023-07-10 NOTE — TELEPHONE ENCOUNTER
Caller: Marina Joe    Relationship: Self    Best call back number: [phone]    What medication are you requesting:     PCP RECOMMENDATION    What are your current symptoms:     BURNING AND HURTING WHEN URINATING,     How long have you been experiencing symptoms:     YESTERDAY 07.09.23    Have you had these symptoms before:    [] Yes  [x] No    Have you been treated for these symptoms before:   [] Yes  [x] No    If a prescription is needed, what is your preferred pharmacy and phone number:        [facility] #[id number] - [place], [place] - [address] AT [facility] OF [address] [phone] [facility] [phone]        Additional notes:    PLEASE CALL AND ADVISE PATIENT DOES NOT HAVE TRANSPORTATION

## 2023-07-10 NOTE — TELEPHONE ENCOUNTER
Called and discussed, only symptom she is having is dysuria, would like for her to come in to test urine first given her declining renal function would like to avoid unnecessary antibiotic treatment.  She is going to come in at 0 945.

## 2023-07-13 ENCOUNTER — TELEPHONE (OUTPATIENT)
Dept: GASTROENTEROLOGY | Age: 78
End: 2023-07-13

## 2023-07-13 NOTE — TELEPHONE ENCOUNTER
Aida returned missed call to schedule a EUS W/ FNA with dr Mandi Tomas per note. Please call mobile phone    Please be advised that the best time to call her to accommodate their needs is Anytime. Thank you.

## 2023-07-21 ENCOUNTER — LAB (OUTPATIENT)
Dept: LAB | Facility: HOSPITAL | Age: 78
End: 2023-07-21
Payer: MEDICARE

## 2023-07-21 DIAGNOSIS — N18.31 STAGE 3A CHRONIC KIDNEY DISEASE: ICD-10-CM

## 2023-07-21 DIAGNOSIS — D63.1 ANEMIA OF CHRONIC RENAL FAILURE, STAGE 3A: ICD-10-CM

## 2023-07-21 DIAGNOSIS — N18.31 ANEMIA OF CHRONIC RENAL FAILURE, STAGE 3A: ICD-10-CM

## 2023-07-21 LAB
ALBUMIN SERPL-MCNC: 3.9 G/DL (ref 3.5–5.2)
ALBUMIN/GLOB SERPL: 1.3 G/DL
ALP SERPL-CCNC: 69 U/L (ref 39–117)
ALT SERPL W P-5'-P-CCNC: 12 U/L (ref 1–33)
ANION GAP SERPL CALCULATED.3IONS-SCNC: 11 MMOL/L (ref 5–15)
AST SERPL-CCNC: 16 U/L (ref 1–32)
BASOPHILS # BLD AUTO: 0.04 10*3/MM3 (ref 0–0.2)
BASOPHILS NFR BLD AUTO: 0.6 % (ref 0–1.5)
BILIRUB SERPL-MCNC: 0.2 MG/DL (ref 0–1.2)
BUN SERPL-MCNC: 26 MG/DL (ref 8–23)
BUN/CREAT SERPL: 16.3 (ref 7–25)
CALCIUM SPEC-SCNC: 8.9 MG/DL (ref 8.6–10.5)
CHLORIDE SERPL-SCNC: 99 MMOL/L (ref 98–107)
CO2 SERPL-SCNC: 25 MMOL/L (ref 22–29)
CREAT SERPL-MCNC: 1.6 MG/DL (ref 0.57–1)
DEPRECATED RDW RBC AUTO: 63.9 FL (ref 37–54)
EGFRCR SERPLBLD CKD-EPI 2021: 33.1 ML/MIN/1.73
EOSINOPHIL # BLD AUTO: 0.35 10*3/MM3 (ref 0–0.4)
EOSINOPHIL NFR BLD AUTO: 5.5 % (ref 0.3–6.2)
ERYTHROCYTE [DISTWIDTH] IN BLOOD BY AUTOMATED COUNT: 18 % (ref 12.3–15.4)
GLOBULIN UR ELPH-MCNC: 2.9 GM/DL
GLUCOSE SERPL-MCNC: 96 MG/DL (ref 65–99)
HCT VFR BLD AUTO: 25.5 % (ref 34–46.6)
HGB BLD-MCNC: 7.4 G/DL (ref 12–15.9)
LYMPHOCYTES # BLD AUTO: 1.29 10*3/MM3 (ref 0.7–3.1)
LYMPHOCYTES NFR BLD AUTO: 20.1 % (ref 19.6–45.3)
MCH RBC QN AUTO: 28.5 PG (ref 26.6–33)
MCHC RBC AUTO-ENTMCNC: 29 G/DL (ref 31.5–35.7)
MCV RBC AUTO: 98.1 FL (ref 79–97)
MONOCYTES # BLD AUTO: 0.62 10*3/MM3 (ref 0.1–0.9)
MONOCYTES NFR BLD AUTO: 9.7 % (ref 5–12)
NEUTROPHILS NFR BLD AUTO: 4.1 10*3/MM3 (ref 1.7–7)
NEUTROPHILS NFR BLD AUTO: 63.8 % (ref 42.7–76)
PLATELET # BLD AUTO: 47 10*3/MM3 (ref 140–450)
POTASSIUM SERPL-SCNC: 4 MMOL/L (ref 3.5–5.2)
PROT SERPL-MCNC: 6.8 G/DL (ref 6–8.5)
RBC # BLD AUTO: 2.6 10*6/MM3 (ref 3.77–5.28)
SODIUM SERPL-SCNC: 135 MMOL/L (ref 136–145)
WBC NRBC COR # BLD: 6.42 10*3/MM3 (ref 3.4–10.8)

## 2023-07-21 PROCEDURE — 36415 COLL VENOUS BLD VENIPUNCTURE: CPT

## 2023-07-21 PROCEDURE — 80053 COMPREHEN METABOLIC PANEL: CPT

## 2023-07-21 PROCEDURE — 85025 COMPLETE CBC W/AUTO DIFF WBC: CPT

## 2023-07-24 RX ORDER — SERTRALINE HYDROCHLORIDE 100 MG/1
100 TABLET, FILM COATED ORAL DAILY
Qty: 90 TABLET | Refills: 3 | Status: SHIPPED | OUTPATIENT
Start: 2023-07-24

## 2023-07-28 ENCOUNTER — INFUSION (OUTPATIENT)
Dept: ONCOLOGY | Facility: HOSPITAL | Age: 78
End: 2023-07-28
Payer: MEDICARE

## 2023-07-28 ENCOUNTER — LAB (OUTPATIENT)
Dept: LAB | Facility: HOSPITAL | Age: 78
End: 2023-07-28
Payer: MEDICARE

## 2023-07-28 VITALS
RESPIRATION RATE: 18 BRPM | WEIGHT: 237 LBS | TEMPERATURE: 97.1 F | BODY MASS INDEX: 35.92 KG/M2 | HEIGHT: 68 IN | SYSTOLIC BLOOD PRESSURE: 146 MMHG | HEART RATE: 63 BPM | OXYGEN SATURATION: 99 % | DIASTOLIC BLOOD PRESSURE: 58 MMHG

## 2023-07-28 DIAGNOSIS — N18.31 ANEMIA OF CHRONIC RENAL FAILURE, STAGE 3A: Primary | ICD-10-CM

## 2023-07-28 DIAGNOSIS — N18.31 STAGE 3A CHRONIC KIDNEY DISEASE: ICD-10-CM

## 2023-07-28 DIAGNOSIS — D63.1 ANEMIA OF CHRONIC RENAL FAILURE, STAGE 3A: Primary | ICD-10-CM

## 2023-07-28 DIAGNOSIS — N18.32 STAGE 3B CHRONIC KIDNEY DISEASE: ICD-10-CM

## 2023-07-28 LAB
ALBUMIN SERPL-MCNC: 3.8 G/DL (ref 3.5–5.2)
ALBUMIN/GLOB SERPL: 1.3 G/DL
ALP SERPL-CCNC: 69 U/L (ref 39–117)
ALT SERPL W P-5'-P-CCNC: 13 U/L (ref 1–33)
ANION GAP SERPL CALCULATED.3IONS-SCNC: 9 MMOL/L (ref 5–15)
ANISOCYTOSIS BLD QL: ABNORMAL
AST SERPL-CCNC: 16 U/L (ref 1–32)
BILIRUB SERPL-MCNC: 0.2 MG/DL (ref 0–1.2)
BUN SERPL-MCNC: 26 MG/DL (ref 8–23)
BUN/CREAT SERPL: 17.3 (ref 7–25)
CALCIUM SPEC-SCNC: 9.2 MG/DL (ref 8.6–10.5)
CHLORIDE SERPL-SCNC: 101 MMOL/L (ref 98–107)
CO2 SERPL-SCNC: 26 MMOL/L (ref 22–29)
CREAT SERPL-MCNC: 1.5 MG/DL (ref 0.57–1)
DACRYOCYTES BLD QL SMEAR: ABNORMAL
DEPRECATED RDW RBC AUTO: 65.3 FL (ref 37–54)
EGFRCR SERPLBLD CKD-EPI 2021: 35.7 ML/MIN/1.73
EOSINOPHIL # BLD MANUAL: 0.57 10*3/MM3 (ref 0–0.4)
EOSINOPHIL NFR BLD MANUAL: 10.1 % (ref 0.3–6.2)
ERYTHROCYTE [DISTWIDTH] IN BLOOD BY AUTOMATED COUNT: 18.3 % (ref 12.3–15.4)
FERRITIN SERPL-MCNC: 146.1 NG/ML (ref 13–150)
GIANT PLATELETS: ABNORMAL
GLOBULIN UR ELPH-MCNC: 2.9 GM/DL
GLUCOSE SERPL-MCNC: 114 MG/DL (ref 65–99)
HCT VFR BLD AUTO: 24.8 % (ref 34–46.6)
HGB BLD-MCNC: 7 G/DL (ref 12–15.9)
HYPOCHROMIA BLD QL: ABNORMAL
IRON 24H UR-MRATE: 43 MCG/DL (ref 37–145)
IRON SATN MFR SERPL: 12 % (ref 20–50)
LYMPHOCYTES # BLD MANUAL: 0.86 10*3/MM3 (ref 0.7–3.1)
LYMPHOCYTES NFR BLD MANUAL: 9.1 % (ref 5–12)
MCH RBC QN AUTO: 27.7 PG (ref 26.6–33)
MCHC RBC AUTO-ENTMCNC: 28.2 G/DL (ref 31.5–35.7)
MCV RBC AUTO: 98 FL (ref 79–97)
MONOCYTES # BLD: 0.52 10*3/MM3 (ref 0.1–0.9)
NEUTROPHILS # BLD AUTO: 3.73 10*3/MM3 (ref 1.7–7)
NEUTROPHILS NFR BLD MANUAL: 64.6 % (ref 42.7–76)
NEUTS BAND NFR BLD MANUAL: 1 % (ref 0–5)
PLATELET # BLD AUTO: 46 10*3/MM3 (ref 140–450)
POIKILOCYTOSIS BLD QL SMEAR: ABNORMAL
POLYCHROMASIA BLD QL SMEAR: ABNORMAL
POTASSIUM SERPL-SCNC: 4.2 MMOL/L (ref 3.5–5.2)
PROT SERPL-MCNC: 6.7 G/DL (ref 6–8.5)
RBC # BLD AUTO: 2.53 10*6/MM3 (ref 3.77–5.28)
SMALL PLATELETS BLD QL SMEAR: ABNORMAL
SODIUM SERPL-SCNC: 136 MMOL/L (ref 136–145)
TIBC SERPL-MCNC: 350 MCG/DL (ref 298–536)
TRANSFERRIN SERPL-MCNC: 235 MG/DL (ref 200–360)
VARIANT LYMPHS NFR BLD MANUAL: 15.2 % (ref 19.6–45.3)
WBC MORPH BLD: NORMAL
WBC NRBC COR # BLD: 5.68 10*3/MM3 (ref 3.4–10.8)

## 2023-07-28 PROCEDURE — 85025 COMPLETE CBC W/AUTO DIFF WBC: CPT

## 2023-07-28 PROCEDURE — 96372 THER/PROPH/DIAG INJ SC/IM: CPT

## 2023-07-28 PROCEDURE — 25010000002 EPOETIN ALFA-EPBX 40000 UNIT/ML SOLUTION: Performed by: NURSE PRACTITIONER

## 2023-07-28 PROCEDURE — 82728 ASSAY OF FERRITIN: CPT

## 2023-07-28 PROCEDURE — 85007 BL SMEAR W/DIFF WBC COUNT: CPT

## 2023-07-28 PROCEDURE — 84466 ASSAY OF TRANSFERRIN: CPT

## 2023-07-28 PROCEDURE — 83540 ASSAY OF IRON: CPT

## 2023-07-28 PROCEDURE — 80053 COMPREHEN METABOLIC PANEL: CPT

## 2023-07-28 PROCEDURE — 36415 COLL VENOUS BLD VENIPUNCTURE: CPT

## 2023-07-28 RX ORDER — FAMOTIDINE 10 MG/ML
20 INJECTION, SOLUTION INTRAVENOUS AS NEEDED
OUTPATIENT
Start: 2023-08-04

## 2023-07-28 RX ORDER — DIPHENHYDRAMINE HYDROCHLORIDE 50 MG/ML
50 INJECTION INTRAMUSCULAR; INTRAVENOUS AS NEEDED
OUTPATIENT
Start: 2023-08-11

## 2023-07-28 RX ORDER — FAMOTIDINE 10 MG/ML
20 INJECTION, SOLUTION INTRAVENOUS AS NEEDED
OUTPATIENT
Start: 2023-08-11

## 2023-07-28 RX ORDER — SODIUM CHLORIDE 9 MG/ML
250 INJECTION, SOLUTION INTRAVENOUS ONCE
OUTPATIENT
Start: 2023-08-11

## 2023-07-28 RX ORDER — SODIUM CHLORIDE 9 MG/ML
250 INJECTION, SOLUTION INTRAVENOUS ONCE
OUTPATIENT
Start: 2023-08-04

## 2023-07-28 RX ORDER — DIPHENHYDRAMINE HYDROCHLORIDE 50 MG/ML
50 INJECTION INTRAMUSCULAR; INTRAVENOUS AS NEEDED
OUTPATIENT
Start: 2023-08-04

## 2023-07-28 RX ADMIN — EPOETIN ALFA-EPBX 40000 UNITS: 40000 INJECTION, SOLUTION INTRAVENOUS; SUBCUTANEOUS at 10:28

## 2023-07-28 NOTE — PROGRESS NOTES
Message to Analilia HERNANDEZ to review hgb of 7.0 / hct of 24.8. Patient declines SOA or any other symptoms besides fatigue rated 8/10. Per Analilia patient could have a unit of PRBC today or Monday. Patient declined and wished to proceed with retacrit injection today. Instructed patient to notify office if she begins to become symptomatic anytime.

## 2023-08-04 ENCOUNTER — LAB (OUTPATIENT)
Dept: LAB | Facility: HOSPITAL | Age: 78
End: 2023-08-04
Payer: MEDICARE

## 2023-08-04 ENCOUNTER — INFUSION (OUTPATIENT)
Dept: ONCOLOGY | Facility: HOSPITAL | Age: 78
End: 2023-08-04
Payer: MEDICARE

## 2023-08-04 ENCOUNTER — TELEPHONE (OUTPATIENT)
Dept: ONCOLOGY | Facility: CLINIC | Age: 78
End: 2023-08-04
Payer: MEDICARE

## 2023-08-04 VITALS
OXYGEN SATURATION: 100 % | SYSTOLIC BLOOD PRESSURE: 128 MMHG | WEIGHT: 234 LBS | BODY MASS INDEX: 35.46 KG/M2 | RESPIRATION RATE: 18 BRPM | HEIGHT: 68 IN | HEART RATE: 70 BPM | TEMPERATURE: 97.6 F | DIASTOLIC BLOOD PRESSURE: 40 MMHG

## 2023-08-04 DIAGNOSIS — N18.32 STAGE 3B CHRONIC KIDNEY DISEASE: Primary | ICD-10-CM

## 2023-08-04 DIAGNOSIS — D63.1 ANEMIA OF CHRONIC RENAL FAILURE, STAGE 3 (MODERATE), UNSPECIFIED WHETHER STAGE 3A OR 3B CKD: Primary | ICD-10-CM

## 2023-08-04 DIAGNOSIS — N18.30 ANEMIA OF CHRONIC RENAL FAILURE, STAGE 3 (MODERATE), UNSPECIFIED WHETHER STAGE 3A OR 3B CKD: Primary | ICD-10-CM

## 2023-08-04 LAB
BASOPHILS # BLD AUTO: 0.02 10*3/MM3 (ref 0–0.2)
BASOPHILS NFR BLD AUTO: 0.4 % (ref 0–1.5)
DEPRECATED RDW RBC AUTO: 63.6 FL (ref 37–54)
EOSINOPHIL # BLD AUTO: 0.22 10*3/MM3 (ref 0–0.4)
EOSINOPHIL NFR BLD AUTO: 3.9 % (ref 0.3–6.2)
ERYTHROCYTE [DISTWIDTH] IN BLOOD BY AUTOMATED COUNT: 18 % (ref 12.3–15.4)
HCT VFR BLD AUTO: 26.5 % (ref 34–46.6)
HGB BLD-MCNC: 7.4 G/DL (ref 12–15.9)
HOLD SPECIMEN: NORMAL
LYMPHOCYTES # BLD AUTO: 1.36 10*3/MM3 (ref 0.7–3.1)
LYMPHOCYTES NFR BLD AUTO: 24.2 % (ref 19.6–45.3)
MCH RBC QN AUTO: 27.2 PG (ref 26.6–33)
MCHC RBC AUTO-ENTMCNC: 27.9 G/DL (ref 31.5–35.7)
MCV RBC AUTO: 97.4 FL (ref 79–97)
MONOCYTES # BLD AUTO: 0.65 10*3/MM3 (ref 0.1–0.9)
MONOCYTES NFR BLD AUTO: 11.6 % (ref 5–12)
NEUTROPHILS NFR BLD AUTO: 3.35 10*3/MM3 (ref 1.7–7)
NEUTROPHILS NFR BLD AUTO: 59.5 % (ref 42.7–76)
PLATELET # BLD AUTO: 44 10*3/MM3 (ref 140–450)
PMV BLD AUTO: ABNORMAL FL
RBC # BLD AUTO: 2.72 10*6/MM3 (ref 3.77–5.28)
WBC NRBC COR # BLD: 5.62 10*3/MM3 (ref 3.4–10.8)

## 2023-08-04 PROCEDURE — 96365 THER/PROPH/DIAG IV INF INIT: CPT

## 2023-08-04 PROCEDURE — 25010000002 FERRIC CARBOXYMALTOSE 750 MG/15ML SOLUTION 15 ML VIAL: Performed by: NURSE PRACTITIONER

## 2023-08-04 PROCEDURE — 96367 TX/PROPH/DG ADDL SEQ IV INF: CPT

## 2023-08-04 PROCEDURE — 36415 COLL VENOUS BLD VENIPUNCTURE: CPT

## 2023-08-04 PROCEDURE — 85025 COMPLETE CBC W/AUTO DIFF WBC: CPT

## 2023-08-04 RX ORDER — SODIUM CHLORIDE 9 MG/ML
250 INJECTION, SOLUTION INTRAVENOUS ONCE
Status: COMPLETED | OUTPATIENT
Start: 2023-08-04 | End: 2023-08-04

## 2023-08-04 RX ORDER — FAMOTIDINE 10 MG/ML
20 INJECTION, SOLUTION INTRAVENOUS AS NEEDED
Status: DISCONTINUED | OUTPATIENT
Start: 2023-08-04 | End: 2023-08-04 | Stop reason: HOSPADM

## 2023-08-04 RX ORDER — DIPHENHYDRAMINE HYDROCHLORIDE 50 MG/ML
50 INJECTION INTRAMUSCULAR; INTRAVENOUS AS NEEDED
Status: DISCONTINUED | OUTPATIENT
Start: 2023-08-04 | End: 2023-08-04 | Stop reason: HOSPADM

## 2023-08-04 RX ADMIN — SODIUM CHLORIDE 250 ML: 9 INJECTION, SOLUTION INTRAVENOUS at 09:26

## 2023-08-04 RX ADMIN — FERRIC CARBOXYMALTOSE INJECTION 750 MG: 50 INJECTION, SOLUTION INTRAVENOUS at 09:26

## 2023-08-10 ENCOUNTER — APPOINTMENT (OUTPATIENT)
Dept: GENERAL RADIOLOGY | Facility: HOSPITAL | Age: 78
End: 2023-08-10
Payer: MEDICARE

## 2023-08-10 ENCOUNTER — TELEPHONE (OUTPATIENT)
Dept: INTERNAL MEDICINE | Facility: CLINIC | Age: 78
End: 2023-08-10

## 2023-08-10 ENCOUNTER — APPOINTMENT (OUTPATIENT)
Dept: CT IMAGING | Facility: HOSPITAL | Age: 78
End: 2023-08-10
Payer: MEDICARE

## 2023-08-10 ENCOUNTER — HOSPITAL ENCOUNTER (EMERGENCY)
Facility: HOSPITAL | Age: 78
Discharge: HOME | End: 2023-08-11
Payer: MEDICARE

## 2023-08-10 DIAGNOSIS — D64.9 ANEMIA, UNSPECIFIED TYPE: Primary | ICD-10-CM

## 2023-08-10 DIAGNOSIS — R42 DIZZINESS: ICD-10-CM

## 2023-08-10 LAB
ABO GROUP BLD: NORMAL
ALBUMIN SERPL-MCNC: 3.7 G/DL (ref 3.5–5.2)
ALBUMIN/GLOB SERPL: 1.5 G/DL
ALP SERPL-CCNC: 52 U/L (ref 39–117)
ALT SERPL W P-5'-P-CCNC: 10 U/L (ref 1–33)
ANION GAP SERPL CALCULATED.3IONS-SCNC: 11 MMOL/L (ref 5–15)
ANISOCYTOSIS BLD QL: ABNORMAL
AST SERPL-CCNC: 14 U/L (ref 1–32)
BACTERIA UR QL AUTO: ABNORMAL /HPF
BASOPHILS # BLD AUTO: 0.01 10*3/MM3 (ref 0–0.2)
BASOPHILS # BLD MANUAL: 0.08 10*3/MM3 (ref 0–0.2)
BASOPHILS NFR BLD AUTO: 0.2 % (ref 0–1.5)
BASOPHILS NFR BLD MANUAL: 1 % (ref 0–1.5)
BILIRUB SERPL-MCNC: <0.2 MG/DL (ref 0–1.2)
BILIRUB UR QL STRIP: NEGATIVE
BLD GP AB SCN SERPL QL: NEGATIVE
BUN SERPL-MCNC: 45 MG/DL (ref 8–23)
BUN/CREAT SERPL: 36.3 (ref 7–25)
CALCIUM SPEC-SCNC: 9 MG/DL (ref 8.6–10.5)
CHLORIDE SERPL-SCNC: 104 MMOL/L (ref 98–107)
CLARITY UR: CLEAR
CO2 SERPL-SCNC: 22 MMOL/L (ref 22–29)
COLOR UR: YELLOW
CREAT SERPL-MCNC: 1.24 MG/DL (ref 0.57–1)
DEPRECATED RDW RBC AUTO: 66.7 FL (ref 37–54)
DEPRECATED RDW RBC AUTO: 68.3 FL (ref 37–54)
DEVELOPER EXPIRATION DATE: NORMAL
DEVELOPER LOT NUMBER: 241
EGFRCR SERPLBLD CKD-EPI 2021: 44.9 ML/MIN/1.73
EOSINOPHIL # BLD AUTO: 0.14 10*3/MM3 (ref 0–0.4)
EOSINOPHIL # BLD MANUAL: 0.15 10*3/MM3 (ref 0–0.4)
EOSINOPHIL NFR BLD AUTO: 2.1 % (ref 0.3–6.2)
EOSINOPHIL NFR BLD MANUAL: 2 % (ref 0.3–6.2)
ERYTHROCYTE [DISTWIDTH] IN BLOOD BY AUTOMATED COUNT: 19.4 % (ref 12.3–15.4)
ERYTHROCYTE [DISTWIDTH] IN BLOOD BY AUTOMATED COUNT: 19.5 % (ref 12.3–15.4)
EXPIRATION DATE: NORMAL
FECAL OCCULT BLOOD SCREEN, POC: NEGATIVE
GIANT PLATELETS: ABNORMAL
GLOBULIN UR ELPH-MCNC: 2.5 GM/DL
GLUCOSE SERPL-MCNC: 112 MG/DL (ref 65–99)
GLUCOSE UR STRIP-MCNC: NEGATIVE MG/DL
HCT VFR BLD AUTO: 18.4 % (ref 34–46.6)
HCT VFR BLD AUTO: 18.5 % (ref 34–46.6)
HGB BLD-MCNC: 5.3 G/DL (ref 12–15.9)
HGB BLD-MCNC: 5.3 G/DL (ref 12–15.9)
HGB UR QL STRIP.AUTO: NEGATIVE
HYALINE CASTS UR QL AUTO: ABNORMAL /LPF
HYPOCHROMIA BLD QL: ABNORMAL
INR PPP: 1 (ref 0.91–1.09)
KETONES UR QL STRIP: NEGATIVE
LEUKOCYTE ESTERASE UR QL STRIP.AUTO: ABNORMAL
LYMPHOCYTES # BLD AUTO: 1.16 10*3/MM3 (ref 0.7–3.1)
LYMPHOCYTES # BLD MANUAL: 2 10*3/MM3 (ref 0.7–3.1)
LYMPHOCYTES NFR BLD AUTO: 17.7 % (ref 19.6–45.3)
LYMPHOCYTES NFR BLD MANUAL: 1 % (ref 5–12)
Lab: 241
MACROCYTES BLD QL SMEAR: ABNORMAL
MCH RBC QN AUTO: 27.9 PG (ref 26.6–33)
MCH RBC QN AUTO: 28.3 PG (ref 26.6–33)
MCHC RBC AUTO-ENTMCNC: 28.6 G/DL (ref 31.5–35.7)
MCHC RBC AUTO-ENTMCNC: 28.8 G/DL (ref 31.5–35.7)
MCV RBC AUTO: 97.4 FL (ref 79–97)
MCV RBC AUTO: 98.4 FL (ref 79–97)
MONOCYTES # BLD AUTO: 0.5 10*3/MM3 (ref 0.1–0.9)
MONOCYTES # BLD: 0.08 10*3/MM3 (ref 0.1–0.9)
MONOCYTES NFR BLD AUTO: 7.6 % (ref 5–12)
NEGATIVE CONTROL: NEGATIVE
NEUTROPHILS # BLD AUTO: 5.3 10*3/MM3 (ref 1.7–7)
NEUTROPHILS NFR BLD AUTO: 4.7 10*3/MM3 (ref 1.7–7)
NEUTROPHILS NFR BLD AUTO: 71.9 % (ref 42.7–76)
NEUTROPHILS NFR BLD MANUAL: 69.7 % (ref 42.7–76)
NITRITE UR QL STRIP: NEGATIVE
OVALOCYTES BLD QL SMEAR: ABNORMAL
PH UR STRIP.AUTO: 5.5 [PH] (ref 5–8)
PLATELET # BLD AUTO: 46 10*3/MM3 (ref 140–450)
PLATELET # BLD AUTO: 47 10*3/MM3 (ref 140–450)
PMV BLD AUTO: ABNORMAL FL
POIKILOCYTOSIS BLD QL SMEAR: ABNORMAL
POLYCHROMASIA BLD QL SMEAR: ABNORMAL
POSITIVE CONTROL: POSITIVE
POTASSIUM SERPL-SCNC: 4.4 MMOL/L (ref 3.5–5.2)
PROT SERPL-MCNC: 6.2 G/DL (ref 6–8.5)
PROT UR QL STRIP: NEGATIVE
PROTHROMBIN TIME: 13.3 SECONDS (ref 11.8–14.8)
QT INTERVAL: 404 MS
QTC INTERVAL: 416 MS
RBC # BLD AUTO: 1.87 10*6/MM3 (ref 3.77–5.28)
RBC # BLD AUTO: 1.9 10*6/MM3 (ref 3.77–5.28)
RBC # UR STRIP: ABNORMAL /HPF
REF LAB TEST METHOD: ABNORMAL
RH BLD: POSITIVE
SARS-COV-2 RNA RESP QL NAA+PROBE: NOT DETECTED
SMALL PLATELETS BLD QL SMEAR: ABNORMAL
SODIUM SERPL-SCNC: 137 MMOL/L (ref 136–145)
SP GR UR STRIP: 1.02 (ref 1–1.03)
SQUAMOUS #/AREA URNS HPF: ABNORMAL /HPF
T&S EXPIRATION DATE: NORMAL
TROPONIN T SERPL HS-MCNC: 8 NG/L
UROBILINOGEN UR QL STRIP: ABNORMAL
VARIANT LYMPHS NFR BLD MANUAL: 26.3 % (ref 19.6–45.3)
WBC # UR STRIP: ABNORMAL /HPF
WBC MORPH BLD: NORMAL
WBC NRBC COR # BLD: 6.54 10*3/MM3 (ref 3.4–10.8)
WBC NRBC COR # BLD: 7.61 10*3/MM3 (ref 3.4–10.8)

## 2023-08-10 PROCEDURE — 86900 BLOOD TYPING SEROLOGIC ABO: CPT

## 2023-08-10 PROCEDURE — 86900 BLOOD TYPING SEROLOGIC ABO: CPT | Performed by: NURSE PRACTITIONER

## 2023-08-10 PROCEDURE — 71045 X-RAY EXAM CHEST 1 VIEW: CPT

## 2023-08-10 PROCEDURE — 84484 ASSAY OF TROPONIN QUANT: CPT | Performed by: NURSE PRACTITIONER

## 2023-08-10 PROCEDURE — P9016 RBC LEUKOCYTES REDUCED: HCPCS

## 2023-08-10 PROCEDURE — 70450 CT HEAD/BRAIN W/O DYE: CPT

## 2023-08-10 PROCEDURE — 80053 COMPREHEN METABOLIC PANEL: CPT | Performed by: NURSE PRACTITIONER

## 2023-08-10 PROCEDURE — 99284 EMERGENCY DEPT VISIT MOD MDM: CPT

## 2023-08-10 PROCEDURE — 86920 COMPATIBILITY TEST SPIN: CPT

## 2023-08-10 PROCEDURE — 82270 OCCULT BLOOD FECES: CPT | Performed by: NURSE PRACTITIONER

## 2023-08-10 PROCEDURE — 85025 COMPLETE CBC W/AUTO DIFF WBC: CPT | Performed by: STUDENT IN AN ORGANIZED HEALTH CARE EDUCATION/TRAINING PROGRAM

## 2023-08-10 PROCEDURE — 81001 URINALYSIS AUTO W/SCOPE: CPT | Performed by: NURSE PRACTITIONER

## 2023-08-10 PROCEDURE — 85610 PROTHROMBIN TIME: CPT | Performed by: NURSE PRACTITIONER

## 2023-08-10 PROCEDURE — 86901 BLOOD TYPING SEROLOGIC RH(D): CPT | Performed by: NURSE PRACTITIONER

## 2023-08-10 PROCEDURE — 85007 BL SMEAR W/DIFF WBC COUNT: CPT | Performed by: STUDENT IN AN ORGANIZED HEALTH CARE EDUCATION/TRAINING PROGRAM

## 2023-08-10 PROCEDURE — 36415 COLL VENOUS BLD VENIPUNCTURE: CPT

## 2023-08-10 PROCEDURE — 87635 SARS-COV-2 COVID-19 AMP PRB: CPT | Performed by: NURSE PRACTITIONER

## 2023-08-10 PROCEDURE — 86850 RBC ANTIBODY SCREEN: CPT | Performed by: NURSE PRACTITIONER

## 2023-08-10 PROCEDURE — 93005 ELECTROCARDIOGRAM TRACING: CPT

## 2023-08-10 PROCEDURE — 36430 TRANSFUSION BLD/BLD COMPNT: CPT

## 2023-08-10 PROCEDURE — 85025 COMPLETE CBC W/AUTO DIFF WBC: CPT | Performed by: NURSE PRACTITIONER

## 2023-08-10 RX ORDER — SODIUM CHLORIDE 0.9 % (FLUSH) 0.9 %
10 SYRINGE (ML) INJECTION AS NEEDED
Status: DISCONTINUED | OUTPATIENT
Start: 2023-08-10 | End: 2023-08-11 | Stop reason: HOSPADM

## 2023-08-10 RX ORDER — MECLIZINE HYDROCHLORIDE 25 MG/1
25 TABLET ORAL ONCE
Status: COMPLETED | OUTPATIENT
Start: 2023-08-10 | End: 2023-08-10

## 2023-08-10 RX ADMIN — MECLIZINE HYDROCLORIDE 25 MG: 25 TABLET ORAL at 16:27

## 2023-08-10 NOTE — TELEPHONE ENCOUNTER
Caller: NELLY AGUILAR     Relationship to patient:   SELF     Best call back number:     [phone] (Mobile)       Chief complaint: STATES SHE FEELS LIKE SHE IS GOING TO FALL OUT   PASS OUT AND REALLY DIZZY     SHE STATES HER BLOOD IS LOW     Patient directed to call 911 or go to their nearest emergency room.     Patient verbalized understanding: [x] Yes  [] No  If no, why?    Additional notes: NONE

## 2023-08-10 NOTE — TELEPHONE ENCOUNTER
Patient states she feels real nauseous/dizzy. States she hasn't felt well and notices her blood is low.     Patient was advised to go to the ER. Patient voiced understanding and states she will be going today.

## 2023-08-10 NOTE — ED PROVIDER NOTES
Subjective   History of Present Illness  She is a 78-year-old feet male presents the emergency department with dizziness for the past week.  She denies any chest pain or shortness of breath.  She states she had slight nausea this morning.  She denies any headache or blurred vision.  She denies any numbness or focal weakness.  She states her dizziness is worse when she is standing.  She does not describe the room spinning.  She had nausea today.  No vomiting.  She denies any fever or chills.  No cough or congestion.  She states the reason she came in today because she was just not getting better and the dizziness seems to be worse today.  She denies any chest pain or shortness of breath.    History provided by:  Patient   used: No      Review of Systems   Constitutional: Negative.    HENT: Negative.     Eyes: Negative.    Respiratory: Negative.     Cardiovascular: Negative.    Gastrointestinal:  Positive for nausea.   Endocrine: Negative.    Genitourinary: Negative.    Musculoskeletal: Negative.    Skin: Negative.    Allergic/Immunologic: Negative.    Neurological:  Positive for dizziness.   Hematological: Negative.    Psychiatric/Behavioral: Negative.     All other systems reviewed and are negative.    Past Medical History:   Diagnosis Date    Breast cancer     CKD (chronic kidney disease)     Hypercholesteremia     Hypertension     Sinusitis     Stage 3a chronic kidney disease 10/20/2020       Allergies   Allergen Reactions    Aspirin GI Bleeding    Niacin Other (See Comments)       Past Surgical History:   Procedure Laterality Date    AVULSION TOENAIL PLATE      Sept 26,2018    BREAST BIOPSY Left 11/20/2012    BREAST BIOPSY      Left Breast, 1/2019 per Dr Barkley    BREAST LUMPECTOMY Left     with node bx     COLONOSCOPY  09/13/2013    small polyp at 30cm benign hyperplastic polyp, changes consistent with melanosis coli. Recall 5 years    COLONOSCOPY  11/19/2018    Tics otherwise normal exam  repeat in 5 years    COLONOSCOPY  02/13/2023    Normal exam repeat in 3 years with a 2 day prep    ENDOSCOPY  02/13/2023    Gastritis, small HH    REPLACEMENT TOTAL KNEE Right     2016    TOTAL ABDOMINAL HYSTERECTOMY WITH SALPINGO OOPHORECTOMY         Family History   Problem Relation Age of Onset    Heart attack Mother     Hodgkin's lymphoma Father     Other Father     Cancer Father         hodgkins    Heart attack Brother     Skin cancer Maternal Uncle     Pancreatic cancer Maternal Uncle     Cancer Maternal Uncle         eye    Colon cancer Neg Hx     Colon polyps Neg Hx        Social History     Socioeconomic History    Marital status:    Tobacco Use    Smoking status: Never    Smokeless tobacco: Never   Vaping Use    Vaping Use: Never used   Substance and Sexual Activity    Alcohol use: No    Drug use: Not Currently    Sexual activity: Not Currently     Birth control/protection: Surgical       Prior to Admission medications    Medication Sig Start Date End Date Taking? Authorizing Provider   albuterol sulfate  (90 Base) MCG/ACT inhaler Inhale 2 puffs Every 4 (Four) Hours As Needed for Wheezing. 6/30/22   Hanh Og APRN   amLODIPine (NORVASC) 5 MG tablet Take 1 tablet by mouth 2 (Two) Times a Day.  Patient taking differently: Take 2.5 mg by mouth 2 (Two) Times a Day. 12/8/22   Hanh Og APRN   anastrozole (ARIMIDEX) 1 MG tablet Take 1 tablet by mouth Daily. 6/23/22   Benjamin Shah MD   buPROPion XL (WELLBUTRIN XL) 150 MG 24 hr tablet TAKE 1 TABLET BY MOUTH DAILY 6/23/23   Hanh Og APRN   Calcium Carb-Cholecalciferol (CALCIUM 600+D3 PO) Take  by mouth.    Provider, MD Maurisio   carvedilol (COREG) 6.25 MG tablet Take 1 tablet by mouth 2 (Two) Times a Day With Meals. 3/2/23   Gladis Ledezma APRN   cetirizine (zyrTEC) 10 MG tablet Take 1 tablet by mouth Daily.    ProviderMaurisio MD   cloNIDine (Catapres) 0.1 MG tablet Take 1 tablet by mouth 2 (Two) Times  a Day As Needed for High Blood Pressure (greater than 160/90). 1/5/23   Hanh Og APRN   cyclobenzaprine (FLEXERIL) 10 MG tablet Take 1 tablet by mouth 3 (Three) Times a Day As Needed for Muscle Spasms. 10/27/22   Hanh Og APRN   Ergocalciferol (VITAMIN D2 PO) Take 50,000 Units by mouth Every 30 (Thirty) Days.    Maurisio Zazueta MD   ferrous sulfate 325 (65 FE) MG tablet Take 1 tablet by mouth Daily With Breakfast.    Maurisio Zazueta MD   fluticasone (FLONASE) 50 MCG/ACT nasal spray 2 sprays into the nostril(s) as directed by provider Daily. 6/1/22   Hanh Og APRN   fluticasone (FLOVENT DISKUS) 50 MCG/BLIST diskus inhaler Inhale 1 puff.    Maurisio Zazueta MD   irbesartan-hydrochlorothiazide (AVALIDE) 300-12.5 MG tablet TAKE 1 TABLET BY MOUTH DAILY 5/2/23   Hanh Og APRN   montelukast (SINGULAIR) 10 MG tablet Take 1 tablet by mouth Daily. 3/2/23   Gladis Ledezma APRN   Multiple Vitamins-Minerals (MULTIVITAMIN ADULT) tablet Take  by mouth Daily.    Maurisio Zazueta MD   Omega-3 Fatty Acids (FISH OIL) 1000 MG capsule capsule Take 1 capsule by mouth.    Maurisio Zazueta MD   pantoprazole (Protonix) 40 MG EC tablet Take 1 tablet by mouth Daily. 6/20/23   Hanh Og APRN   rOPINIRole (REQUIP) 0.5 MG tablet Take 1 tablet by mouth Every Night. 3/2/23   Gladis Ledezma APRN   rosuvastatin (CRESTOR) 20 MG tablet Take 1 tablet by mouth Daily. 5/2/23   Hanh Og APRN   sertraline (ZOLOFT) 100 MG tablet TAKE 1 TABLET BY MOUTH DAILY 7/24/23   Hanh Og APRN   traZODone (DESYREL) 100 MG tablet Take 1 tablet by mouth Every Night. 3/2/23   Gladis Ledezma APRN   valACYclovir (VALTREX) 500 MG tablet TAKE 1 TABLET BY MOUTH TWICE DAILY 7/12/23   Hanh Og, MARY   vitamin D (ERGOCALCIFEROL) 1.25 MG (76340 UT) capsule capsule TAKE 1 CAPSULE BY MOUTH ONCE MONTHLY 5/2/23   Provider, Historical, MD   benzonatate  "(Tessalon Perles) 100 MG capsule Take 1 capsule by mouth 3 (Three) Times a Day As Needed for Cough. 9/13/22 8/10/23  Hanh Og, MARY       /55   Pulse 74   Temp 97.9 øF (36.6 øC) (Oral)   Resp 18   Ht 172.7 cm (68\")   Wt 106 kg (234 lb)   LMP  (LMP Unknown)   SpO2 99%   BMI 35.58 kg/mý     Objective   Physical Exam  Vitals and nursing note reviewed.   Constitutional:       Appearance: She is well-developed.      Comments: Nontoxic-appearing.  No acute distress.   HENT:      Head: Normocephalic and atraumatic.   Eyes:      Conjunctiva/sclera: Conjunctivae normal.      Pupils: Pupils are equal, round, and reactive to light.   Neck:      Thyroid: No thyromegaly.      Trachea: No tracheal deviation.   Cardiovascular:      Rate and Rhythm: Normal rate and regular rhythm.      Heart sounds: Normal heart sounds.   Pulmonary:      Effort: Pulmonary effort is normal. No respiratory distress.      Breath sounds: Normal breath sounds. No wheezing or rales.   Chest:      Chest wall: No tenderness.   Abdominal:      General: Bowel sounds are normal.      Palpations: Abdomen is soft.   Musculoskeletal:         General: Normal range of motion.      Cervical back: Normal range of motion and neck supple.   Skin:     General: Skin is warm and dry.   Neurological:      Mental Status: She is alert and oriented to person, place, and time.      Cranial Nerves: No cranial nerve deficit.      Deep Tendon Reflexes: Reflexes are normal and symmetric.      Comments: No nystagmus noted. no Focal weakness noted   Psychiatric:         Behavior: Behavior normal.         Thought Content: Thought content normal.         Judgment: Judgment normal.       Procedures         Lab Results (last 24 hours)       Procedure Component Value Units Date/Time    Urinalysis With Culture If Indicated - Urine, Clean Catch [[id number]]  (Abnormal) Collected: 08/10/23 1302    Specimen: Urine, Clean Catch Updated: 08/10/23 1349     Color, UA " Yellow     Appearance, UA Clear     pH, UA 5.5     Specific Gravity, UA 1.018     Glucose, UA Negative     Ketones, UA Negative     Bilirubin, UA Negative     Blood, UA Negative     Protein, UA Negative     Leuk Esterase, UA Small (1+)     Nitrite, UA Negative     Urobilinogen, UA 0.2 E.U./dL    Narrative:      In absence of clinical symptoms, the presence of pyuria, bacteria, and/or nitrites on the urinalysis result does not correlate with infection.    Urinalysis, Microscopic Only - Urine, Clean Catch [[id number]]  (Abnormal) Collected: 08/10/23 1302    Specimen: Urine, Clean Catch Updated: 08/10/23 1349     RBC, UA 0-2 /HPF      WBC, UA 0-2 /HPF      Comment: Urine culture not indicated.        Bacteria, UA None Seen /HPF      Squamous Epithelial Cells, UA 0-2 /HPF      Hyaline Casts, UA 0-2 /LPF      Methodology Automated Microscopy    COVID PRE-OP / PRE-PROCEDURE SCREENING ORDER (NO ISOLATION) - Swab, Nasopharynx [[id number]]  (Normal) Collected: 08/10/23 1418    Specimen: Swab from Nasopharynx Updated: 08/10/23 4389    Narrative:      The following orders were created for panel order COVID PRE-OP / PRE-PROCEDURE SCREENING ORDER (NO ISOLATION) - Swab, Nasopharynx.  Procedure                               Abnormality         Status                     ---------                               -----------         ------                     COVID-19,CEPHEID/KATY,CO...[[id number]]  Normal              Final result                 Please view results for these tests on the individual orders.    COVID-19,CEPHEID/KATY,COR/KIMBERLY/PAD/SHILPI/MAD IN-HOUSE(OR EMERGENT/ADD-ON),NP SWAB IN TRANSPORT MEDIA 3-4 HR TAT, RT-PCR - Swab, Nasopharynx [[id number]]  (Normal) Collected: 08/10/23 1418    Specimen: Swab from Nasopharynx Updated: 08/10/23 1509     COVID19 Not Detected    Narrative:      Fact sheet for providers: https://www.fda.gov/media/294969/download     Fact sheet for patients: https://www.fda.gov/media/081168/download  Fact  sheet for providers: https://www.fda.gov/media/424675/download    Fact sheet for patients: https://www.fda.gov/media/126456/download    Test performed by PCR.    Comprehensive Metabolic Panel [[id number]]  (Abnormal) Collected: 08/10/23 1510    Specimen: Blood Updated: 08/10/23 1534     Glucose 112 mg/dL      BUN 45 mg/dL      Creatinine 1.24 mg/dL      Sodium 137 mmol/L      Potassium 4.4 mmol/L      Chloride 104 mmol/L      CO2 22.0 mmol/L      Calcium 9.0 mg/dL      Total Protein 6.2 g/dL      Albumin 3.7 g/dL      ALT (SGPT) 10 U/L      AST (SGOT) 14 U/L      Alkaline Phosphatase 52 U/L      Total Bilirubin <0.2 mg/dL      Globulin 2.5 gm/dL      A/G Ratio 1.5 g/dL      BUN/Creatinine Ratio 36.3     Anion Gap 11.0 mmol/L      eGFR 44.9 mL/min/1.73     Narrative:      GFR Normal >60  Chronic Kidney Disease <60  Kidney Failure <15    The GFR formula is only valid for adults with stable renal function between ages 18 and 70.    Single High Sensitivity Troponin T [[id number]]  (Normal) Collected: 08/10/23 1510    Specimen: Blood Updated: 08/10/23 1532     HS Troponin T 8 ng/L     Narrative:      High Sensitive Troponin T Reference Range:  <10.0 ng/L- Negative Female for AMI  <15.0 ng/L- Negative Male for AMI  >=10 - Abnormal Female indicating possible myocardial injury.  >=15 - Abnormal Male indicating possible myocardial injury.   Clinicians would have to utilize clinical acumen, EKG, Troponin, and serial changes to determine if it is an Acute Myocardial Infarction or myocardial injury due to an underlying chronic condition.         Protime-INR [[id number]]  (Normal) Collected: 08/10/23 1510    Specimen: Blood Updated: 08/10/23 1546     Protime 13.3 Seconds      INR 1.00    CBC & Differential [[id number]]  (Abnormal) Collected: 08/10/23 1701    Specimen: Blood Updated: 08/10/23 1724    Narrative:      The following orders were created for panel order CBC & Differential.  Procedure                                Abnormality         Status                     ---------                               -----------         ------                     CBC Auto Differential[[id number]]        Abnormal            Final result                 Please view results for these tests on the individual orders.    CBC Auto Differential [[id number]]  (Abnormal) Collected: 08/10/23 1701    Specimen: Blood Updated: 08/10/23 1724     WBC 6.54 10*3/mm3      RBC 1.90 10*6/mm3      Hemoglobin 5.3 g/dL      Hematocrit 18.5 %      MCV 97.4 fL      MCH 27.9 pg      MCHC 28.6 g/dL      RDW 19.4 %      RDW-SD 68.3 fl      MPV --     Comment: Unable to calculate        Platelets 47 10*3/mm3      Neutrophil % 71.9 %      Lymphocyte % 17.7 %      Monocyte % 7.6 %      Eosinophil % 2.1 %      Basophil % 0.2 %      Neutrophils, Absolute 4.70 10*3/mm3      Lymphocytes, Absolute 1.16 10*3/mm3      Monocytes, Absolute 0.50 10*3/mm3      Eosinophils, Absolute 0.14 10*3/mm3      Basophils, Absolute 0.01 10*3/mm3     CBC & Differential [[id number]]  (Abnormal) Collected: 08/10/23 1754    Specimen: Blood Updated: 08/10/23 1829    Narrative:      The following orders were created for panel order CBC & Differential.  Procedure                               Abnormality         Status                     ---------                               -----------         ------                     CBC Auto Differential[[id number]]        Abnormal            Final result                 Please view results for these tests on the individual orders.    CBC Auto Differential [[id number]]  (Abnormal) Collected: 08/10/23 1754    Specimen: Blood Updated: 08/10/23 1829     WBC 7.61 10*3/mm3      RBC 1.87 10*6/mm3      Hemoglobin 5.3 g/dL      Hematocrit 18.4 %      MCV 98.4 fL      MCH 28.3 pg      MCHC 28.8 g/dL      RDW 19.5 %      RDW-SD 66.7 fl      Platelets 46 10*3/mm3     Manual Differential [[id number]]  (Abnormal) Collected: 08/10/23 1754    Specimen: Blood Updated: 08/10/23 1852      Neutrophil % 69.7 %      Lymphocyte % 26.3 %      Monocyte % 1.0 %      Eosinophil % 2.0 %      Basophil % 1.0 %      Neutrophils Absolute 5.30 10*3/mm3      Lymphocytes Absolute 2.00 10*3/mm3      Monocytes Absolute 0.08 10*3/mm3      Eosinophils Absolute 0.15 10*3/mm3      Basophils Absolute 0.08 10*3/mm3      Anisocytosis Slight/1+     Hypochromia Slight/1+     Macrocytes Slight/1+     Ovalocytes Slight/1+     Poikilocytes Slight/1+     Polychromasia Slight/1+     WBC Morphology Normal     Platelet Estimate Decreased     Giant Platelets Large/3+    POC Occult Blood Stool [[id number]]  (Normal) Resulted: 08/10/23 1804    Specimen: Stool Updated: 08/10/23 1807     Fecal Occult Blood Negative     Lot Number [id number]     Expiration Date 11,302,023     DEVELOPER LOT NUMBER [id number]     DEVELOPER EXPIRATION DATE 11,302,023     Positive Control Positive     Negative Control Negative            XR Chest 1 View   Final Result   Impression: No evidence of acute cardiopulmonary disease.   This report was finalized on 08/10/2023 14:50 by Dr. Dylon Babb MD.      CT Head Without Contrast   Final Result       1. Mild cerebral and cerebellar atrophy with chronic microvascular   disease but no evidence of acute intracranial process.           This report was finalized on 08/10/2023 14:01 by Dr. Dylon Babb MD.          ED Course  ED Course as of 08/10/23 1944   Thu Aug 10, 2023   1737 After mult attempts for blood collection, pt was noted to have hgb 5.3; hct 18.5. she has hx of anemia- although last hgb was 7.4 6 days ago. Platelets 47 which has been baseline for her with last several blood draws and is being worked up per hematology. Pt denies black or tarry stools or abd pain  [CW]   1758 Pt states that she did a stool specimen about 3 months ago and was noted to have blood in stool. She states that she had a colonoscopy about 2 months ago and was told it was normal. Pt did not mention any of this on initial presentation.  Hemoccult stool negative today. Reviewed pt and pt care plan with dr smith- also assessed pt and in agreement with care plan. Pt is hemodynamically stable. Will administer 2 units of blood and plan on discharge home. Dr smith in agreement with care plan. 2units of prbcs ordered.  [CW]   1935 Reviewed pt and pt care plan with H.English HERNANDEZ. Pt is awaiting 2 units of PRBCs and plan will be to discharge home if tolerates infusions. Pt is in agreement with care plan. Her vitals are stable. Care of pt transferred at this time  [CW]      ED Course User Index  [CW] Erin Quigley APRN        Medical Decision Making  She is a 78-year-old feet male presents the emergency department with dizziness for the past week.  She denies any chest pain or shortness of breath.  She states she had slight nausea this morning.  She denies any headache or blurred vision.  She denies any numbness or focal weakness.  She states her dizziness is worse when she is standing.  She does not describe the room spinning.  She had nausea today.  No vomiting.  She denies any fever or chills.  No cough or congestion.  She states the reason she came in today because she was just not getting better and the dizziness seems to be worse today.  She denies any chest pain or shortness of breath.   Pt was found to be anemic. She is currently being worked up for amenia with hematology. She is actually scheduled to receive iron this week. She states that she came into the er because her dizziness was so bad today. She had not initially mentioned that she has hx anemia. She states that she had hemoccult stool positive test about 3 months ago and has had colonoscopy and no findings noted. She states that she has had dark stools for the past several months however she is also on iron. Hemoccult stool today is negative. She denies abd pain. Reviewed pt with dr smith- pt has remained hemodynamically stable since being in the er. 2units of PRBcs have been  ordered. Care of pt transferred to  APRN. For further. Pt should be able to be discharged home after infusions.   Differential dx: cva; tia; acs; hypoglycemia; gi bleed     Amount and/or Complexity of Data Reviewed  Labs: ordered.  Radiology: ordered.    Risk  Prescription drug management.         Final diagnoses:   None          Erin Quigley, APRN  08/10/23 1944

## 2023-08-10 NOTE — ED NOTES
Discussed bloodwork with provider. After 1st Arterial stick, RT able to obtain 2 or 3 tubes. Patient now agreeable to attempt a 2nd arterial stick. New orders obtained. Attempting to contact RT again for stick.

## 2023-08-10 NOTE — ED NOTES
Patient was educated on the signs and symptoms of a transfusion reaction which may include:    Hives  Itching/Rash/Urticarias  Flushing  Chills  Fever (if greater than 1.8 degrees F or 1 degree C from the pre-transfusion baseline temperature And the increased result is a temperature greater than or equal to 100.4 øF)  Nausea/Vomiting  Chest/Abdominal/Back Pain  Pain at the Infusion Site  Headache  Muscle Aches/Myalgia  Dyspnea/Pulmonary Edema/Rales  Cyanosis  Coughing/Wheezing  Shock  Rigors  Hypoxemia  Hypertension  Hypotension  Abnormal Bleeding  Oliguria/Anuria  Hemoglobinuria  Tachycardia    Patient to notify staff if any signs/symptoms occur.  Patient verbalizes understanding.

## 2023-08-10 NOTE — ED NOTES
Patient difficult IV stick. Labwork delayed at this time. Provider aware. VAT team consult placed.

## 2023-08-11 ENCOUNTER — INFUSION (OUTPATIENT)
Dept: ONCOLOGY | Facility: HOSPITAL | Age: 78
End: 2023-08-11
Payer: MEDICARE

## 2023-08-11 ENCOUNTER — LAB (OUTPATIENT)
Dept: LAB | Facility: HOSPITAL | Age: 78
End: 2023-08-11
Payer: MEDICARE

## 2023-08-11 VITALS
HEART RATE: 65 BPM | BODY MASS INDEX: 35.01 KG/M2 | TEMPERATURE: 96.8 F | SYSTOLIC BLOOD PRESSURE: 101 MMHG | WEIGHT: 231 LBS | OXYGEN SATURATION: 97 % | DIASTOLIC BLOOD PRESSURE: 42 MMHG | RESPIRATION RATE: 22 BRPM | HEIGHT: 68 IN

## 2023-08-11 VITALS
HEIGHT: 68 IN | WEIGHT: 234 LBS | DIASTOLIC BLOOD PRESSURE: 57 MMHG | OXYGEN SATURATION: 99 % | SYSTOLIC BLOOD PRESSURE: 125 MMHG | HEART RATE: 75 BPM | RESPIRATION RATE: 18 BRPM | TEMPERATURE: 98.6 F | BODY MASS INDEX: 35.46 KG/M2

## 2023-08-11 DIAGNOSIS — N18.30 ANEMIA OF CHRONIC RENAL FAILURE, STAGE 3 (MODERATE), UNSPECIFIED WHETHER STAGE 3A OR 3B CKD: Primary | ICD-10-CM

## 2023-08-11 DIAGNOSIS — D63.1 ANEMIA OF CHRONIC RENAL FAILURE, STAGE 3 (MODERATE), UNSPECIFIED WHETHER STAGE 3A OR 3B CKD: Primary | ICD-10-CM

## 2023-08-11 LAB
HCT VFR BLD AUTO: 24.2 % (ref 34–46.6)
HGB BLD-MCNC: 7.3 G/DL (ref 12–15.9)

## 2023-08-11 PROCEDURE — 85014 HEMATOCRIT: CPT | Performed by: STUDENT IN AN ORGANIZED HEALTH CARE EDUCATION/TRAINING PROGRAM

## 2023-08-11 PROCEDURE — 25010000002 FERRIC CARBOXYMALTOSE 750 MG/15ML SOLUTION 15 ML VIAL: Performed by: NURSE PRACTITIONER

## 2023-08-11 PROCEDURE — 96365 THER/PROPH/DIAG IV INF INIT: CPT

## 2023-08-11 PROCEDURE — 85018 HEMOGLOBIN: CPT | Performed by: STUDENT IN AN ORGANIZED HEALTH CARE EDUCATION/TRAINING PROGRAM

## 2023-08-11 RX ORDER — DIPHENHYDRAMINE HYDROCHLORIDE 50 MG/ML
50 INJECTION INTRAMUSCULAR; INTRAVENOUS AS NEEDED
Status: DISCONTINUED | OUTPATIENT
Start: 2023-08-11 | End: 2023-08-11 | Stop reason: HOSPADM

## 2023-08-11 RX ORDER — FAMOTIDINE 10 MG/ML
20 INJECTION, SOLUTION INTRAVENOUS AS NEEDED
Status: DISCONTINUED | OUTPATIENT
Start: 2023-08-11 | End: 2023-08-11 | Stop reason: HOSPADM

## 2023-08-11 RX ORDER — SODIUM CHLORIDE 9 MG/ML
250 INJECTION, SOLUTION INTRAVENOUS ONCE
Status: COMPLETED | OUTPATIENT
Start: 2023-08-11 | End: 2023-08-11

## 2023-08-11 RX ADMIN — FERRIC CARBOXYMALTOSE INJECTION 750 MG: 50 INJECTION, SOLUTION INTRAVENOUS at 10:08

## 2023-08-11 RX ADMIN — SODIUM CHLORIDE 250 ML: 9 INJECTION, SOLUTION INTRAVENOUS at 09:56

## 2023-08-11 NOTE — DISCHARGE INSTRUCTIONS
Today you were seen in the ED for your symptoms.  We have discussed the results of your labs and imaging at the bedside.  We have given you 2 units of blood.  He is going to be very important that you follow-up with your PCP in the next 24 to 48 hours and return to the ED with any new, worsening, or persistent symptoms.

## 2023-08-11 NOTE — ED PROVIDER NOTES
Subjective   History of Present Illness    Review of Systems    Past Medical History:   Diagnosis Date    Breast cancer     CKD (chronic kidney disease)     Hypercholesteremia     Hypertension     Sinusitis     Stage 3a chronic kidney disease 10/20/2020       Allergies   Allergen Reactions    Aspirin GI Bleeding    Niacin Other (See Comments)       Past Surgical History:   Procedure Laterality Date    AVULSION TOENAIL PLATE      Sept 26,2018    BREAST BIOPSY Left 11/20/2012    BREAST BIOPSY      Left Breast, 1/2019 per Dr Barkley    BREAST LUMPECTOMY Left     with node bx     COLONOSCOPY  09/13/2013    small polyp at 30cm benign hyperplastic polyp, changes consistent with melanosis coli. Recall 5 years    COLONOSCOPY  11/19/2018    Tics otherwise normal exam repeat in 5 years    COLONOSCOPY  02/13/2023    Normal exam repeat in 3 years with a 2 day prep    ENDOSCOPY  02/13/2023    Gastritis, small HH    REPLACEMENT TOTAL KNEE Right     2016    TOTAL ABDOMINAL HYSTERECTOMY WITH SALPINGO OOPHORECTOMY         Family History   Problem Relation Age of Onset    Heart attack Mother     Hodgkin's lymphoma Father     Other Father     Cancer Father         hodgkins    Heart attack Brother     Skin cancer Maternal Uncle     Pancreatic cancer Maternal Uncle     Cancer Maternal Uncle         eye    Colon cancer Neg Hx     Colon polyps Neg Hx        Social History     Socioeconomic History    Marital status:    Tobacco Use    Smoking status: Never    Smokeless tobacco: Never   Vaping Use    Vaping Use: Never used   Substance and Sexual Activity    Alcohol use: No    Drug use: Not Currently    Sexual activity: Not Currently     Birth control/protection: Surgical           Objective   Physical Exam    Procedures           ED Course  ED Course as of 08/11/23 0149   Thu Aug 10, 2023   1737 After mult attempts for blood collection, pt was noted to have hgb 5.3; hct 18.5. she has hx of anemia- although last hgb was 7.4 6 days  ago. Platelets 47 which has been baseline for her with last several blood draws and is being worked up per hematology. Pt denies black or tarry stools or abd pain  [CW]   1758 Pt states that she did a stool specimen about 3 months ago and was noted to have blood in stool. She states that she had a colonoscopy about 2 months ago and was told it was normal. Pt did not mention any of this on initial presentation. Hemoccult stool negative today. Reviewed pt and pt care plan with dr smith- also assessed pt and in agreement with care plan. Pt is hemodynamically stable. Will administer 2 units of blood and plan on discharge home. Dr smith in agreement with care plan. 2units of prbcs ordered.  [CW]   1935 Reviewed pt and pt care plan with H.English HERNANDEZ. Pt is awaiting 2 units of PRBCs and plan will be to discharge home if tolerates infusions. Pt is in agreement with care plan. Her vitals are stable. Care of pt transferred at this time  [CW]   1948 I have assumed care of the patient from MARY Fontanez. Please see her notes for HPI, ROS, and physical exam findings. Patient awaiting blood transfusion to finish and will plan for discharge home.  [HE]      ED Course User Index  [CW] Erin Quigley APRN  [HE] Kim Metzger APRN                                           Medical Decision Making  I have assumed care of the patient from MARY Fontanez. Please see her notes for HPI, ROS, and physical exam findings. Patient awaiting blood transfusion to finish and will plan for discharge home.     Patient received 2 units of PRBCs here in the ED.  Repeat H&H reveals improved hemoglobin at 7.3 with a hematocrit of 24.2.  POC occult stool test is negative. Patient does have history of chronic anemia as well as ITP.  Patient will be discharged home to follow-up with her PCP in the coming days; return precautions given.  Vital signs reassuring, patient agreeable and appreciative, discharged in stable  condition.        Problems Addressed:  Anemia, unspecified type: complicated acute illness or injury  Dizziness: complicated acute illness or injury    Amount and/or Complexity of Data Reviewed  Labs: ordered.  Radiology: ordered.    Risk  Prescription drug management.        Final diagnoses:   Anemia, unspecified type   Dizziness       ED Disposition  ED Disposition       ED Disposition   Discharge    Condition   Stable    Comment   --               Hanh Og, APRN  [address] [place] [address]  [phone]               Medication List        Changed      amLODIPine 5 MG tablet  Commonly known as: NORVASC  Take 1 tablet by mouth 2 (Two) Times a Day.  What changed: how much to take                 Kim Metzger, APRN  08/11/23 0149

## 2023-08-12 LAB
BH BB BLOOD EXPIRATION DATE: NORMAL
BH BB BLOOD EXPIRATION DATE: NORMAL
BH BB BLOOD TYPE BARCODE: 5100
BH BB BLOOD TYPE BARCODE: 5100
BH BB DISPENSE STATUS: NORMAL
BH BB DISPENSE STATUS: NORMAL
BH BB PRODUCT CODE: NORMAL
BH BB PRODUCT CODE: NORMAL
BH BB UNIT NUMBER: NORMAL
BH BB UNIT NUMBER: NORMAL
CROSSMATCH INTERPRETATION: NORMAL
CROSSMATCH INTERPRETATION: NORMAL
UNIT  ABO: NORMAL
UNIT  ABO: NORMAL
UNIT  RH: NORMAL
UNIT  RH: NORMAL

## 2023-08-18 ENCOUNTER — TELEPHONE (OUTPATIENT)
Dept: ONCOLOGY | Facility: CLINIC | Age: 78
End: 2023-08-18
Payer: MEDICARE

## 2023-08-18 ENCOUNTER — INFUSION (OUTPATIENT)
Dept: ONCOLOGY | Facility: HOSPITAL | Age: 78
End: 2023-08-18
Payer: MEDICARE

## 2023-08-18 ENCOUNTER — LAB (OUTPATIENT)
Dept: LAB | Facility: HOSPITAL | Age: 78
End: 2023-08-18
Payer: MEDICARE

## 2023-08-18 VITALS
RESPIRATION RATE: 18 BRPM | HEIGHT: 68 IN | BODY MASS INDEX: 35.25 KG/M2 | TEMPERATURE: 97.4 F | OXYGEN SATURATION: 99 % | HEART RATE: 69 BPM | SYSTOLIC BLOOD PRESSURE: 142 MMHG | WEIGHT: 232.6 LBS | DIASTOLIC BLOOD PRESSURE: 50 MMHG

## 2023-08-18 DIAGNOSIS — D64.9 ANEMIA, UNSPECIFIED TYPE: ICD-10-CM

## 2023-08-18 DIAGNOSIS — N18.32 STAGE 3B CHRONIC KIDNEY DISEASE: ICD-10-CM

## 2023-08-18 DIAGNOSIS — D64.9 ANEMIA, UNSPECIFIED TYPE: Primary | ICD-10-CM

## 2023-08-18 LAB
ABO GROUP BLD: NORMAL
ACANTHOCYTES BLD QL SMEAR: NORMAL
ANISOCYTOSIS BLD QL: NORMAL
BLD GP AB SCN SERPL QL: NEGATIVE
DACRYOCYTES BLD QL SMEAR: NORMAL
DEPRECATED RDW RBC AUTO: 74.8 FL (ref 37–54)
EOSINOPHIL # BLD MANUAL: 0.36 10*3/MM3 (ref 0–0.4)
EOSINOPHIL NFR BLD MANUAL: 5.1 % (ref 0.3–6.2)
ERYTHROCYTE [DISTWIDTH] IN BLOOD BY AUTOMATED COUNT: 21.2 % (ref 12.3–15.4)
GIANT PLATELETS: NORMAL
HCT VFR BLD AUTO: 22.1 % (ref 34–46.6)
HGB BLD-MCNC: 6.6 G/DL (ref 12–15.9)
LYMPHOCYTES # BLD MANUAL: 1.41 10*3/MM3 (ref 0.7–3.1)
LYMPHOCYTES NFR BLD MANUAL: 5.1 % (ref 5–12)
MACROCYTES BLD QL SMEAR: NORMAL
MCH RBC QN AUTO: 30 PG (ref 26.6–33)
MCHC RBC AUTO-ENTMCNC: 29.9 G/DL (ref 31.5–35.7)
MCV RBC AUTO: 100.5 FL (ref 79–97)
MONOCYTES # BLD: 0.36 10*3/MM3 (ref 0.1–0.9)
NEUTROPHILS # BLD AUTO: 4.86 10*3/MM3 (ref 1.7–7)
NEUTROPHILS NFR BLD MANUAL: 68.7 % (ref 42.7–76)
NEUTS BAND NFR BLD MANUAL: 1 % (ref 0–5)
PLATELET # BLD AUTO: 35 10*3/MM3 (ref 140–450)
PMV BLD AUTO: 13.3 FL (ref 6–12)
POIKILOCYTOSIS BLD QL SMEAR: NORMAL
POLYCHROMASIA BLD QL SMEAR: NORMAL
RBC # BLD AUTO: 2.2 10*6/MM3 (ref 3.77–5.28)
RH BLD: POSITIVE
SMALL PLATELETS BLD QL SMEAR: NORMAL
T&S EXPIRATION DATE: NORMAL
VARIANT LYMPHS NFR BLD MANUAL: 20.2 % (ref 19.6–45.3)
WBC MORPH BLD: NORMAL
WBC NRBC COR # BLD: 6.97 10*3/MM3 (ref 3.4–10.8)

## 2023-08-18 PROCEDURE — 96367 TX/PROPH/DG ADDL SEQ IV INF: CPT

## 2023-08-18 PROCEDURE — 96365 THER/PROPH/DIAG IV INF INIT: CPT

## 2023-08-18 PROCEDURE — A9270 NON-COVERED ITEM OR SERVICE: HCPCS | Performed by: NURSE PRACTITIONER

## 2023-08-18 PROCEDURE — 85025 COMPLETE CBC W/AUTO DIFF WBC: CPT

## 2023-08-18 PROCEDURE — P9016 RBC LEUKOCYTES REDUCED: HCPCS

## 2023-08-18 PROCEDURE — 96375 TX/PRO/DX INJ NEW DRUG ADDON: CPT

## 2023-08-18 PROCEDURE — 25010000002 DIPHENHYDRAMINE PER 50 MG: Performed by: NURSE PRACTITIONER

## 2023-08-18 PROCEDURE — 86923 COMPATIBILITY TEST ELECTRIC: CPT

## 2023-08-18 PROCEDURE — 63710000001 ACETAMINOPHEN 325 MG TABLET: Performed by: NURSE PRACTITIONER

## 2023-08-18 PROCEDURE — 86900 BLOOD TYPING SEROLOGIC ABO: CPT

## 2023-08-18 PROCEDURE — 36415 COLL VENOUS BLD VENIPUNCTURE: CPT

## 2023-08-18 PROCEDURE — 86850 RBC ANTIBODY SCREEN: CPT

## 2023-08-18 PROCEDURE — 86901 BLOOD TYPING SEROLOGIC RH(D): CPT

## 2023-08-18 PROCEDURE — 85007 BL SMEAR W/DIFF WBC COUNT: CPT

## 2023-08-18 PROCEDURE — 36430 TRANSFUSION BLD/BLD COMPNT: CPT

## 2023-08-18 RX ORDER — SODIUM CHLORIDE 9 MG/ML
250 INJECTION, SOLUTION INTRAVENOUS AS NEEDED
Status: DISCONTINUED | OUTPATIENT
Start: 2023-08-18 | End: 2023-08-18 | Stop reason: HOSPADM

## 2023-08-18 RX ORDER — SODIUM CHLORIDE 9 MG/ML
250 INJECTION, SOLUTION INTRAVENOUS AS NEEDED
Status: CANCELLED | OUTPATIENT
Start: 2023-08-18

## 2023-08-18 RX ORDER — FAMOTIDINE 10 MG/ML
20 INJECTION, SOLUTION INTRAVENOUS ONCE
Status: COMPLETED | OUTPATIENT
Start: 2023-08-18 | End: 2023-08-18

## 2023-08-18 RX ORDER — DIPHENHYDRAMINE HYDROCHLORIDE 50 MG/ML
25 INJECTION INTRAMUSCULAR; INTRAVENOUS ONCE
Status: CANCELLED | OUTPATIENT
Start: 2023-08-18 | End: 2023-08-18

## 2023-08-18 RX ORDER — DIPHENHYDRAMINE HYDROCHLORIDE 50 MG/ML
25 INJECTION INTRAMUSCULAR; INTRAVENOUS ONCE
Status: DISCONTINUED | OUTPATIENT
Start: 2023-08-18 | End: 2023-08-18

## 2023-08-18 RX ORDER — ACETAMINOPHEN 325 MG/1
650 TABLET ORAL ONCE
Status: CANCELLED | OUTPATIENT
Start: 2023-08-18 | End: 2023-08-18

## 2023-08-18 RX ORDER — FAMOTIDINE 10 MG/ML
20 INJECTION, SOLUTION INTRAVENOUS ONCE
Status: CANCELLED | OUTPATIENT
Start: 2023-08-18 | End: 2023-08-18

## 2023-08-18 RX ORDER — ACETAMINOPHEN 325 MG/1
650 TABLET ORAL ONCE
Status: COMPLETED | OUTPATIENT
Start: 2023-08-18 | End: 2023-08-18

## 2023-08-18 RX ADMIN — ACETAMINOPHEN 650 MG: 325 TABLET ORAL at 10:49

## 2023-08-18 RX ADMIN — SODIUM CHLORIDE 250 ML: 9 INJECTION, SOLUTION INTRAVENOUS at 10:40

## 2023-08-18 RX ADMIN — DIPHENHYDRAMINE HYDROCHLORIDE 25 MG: 50 INJECTION, SOLUTION INTRAMUSCULAR; INTRAVENOUS at 10:53

## 2023-08-18 RX ADMIN — FAMOTIDINE 20 MG: 10 INJECTION INTRAVENOUS at 10:50

## 2023-08-18 NOTE — PROGRESS NOTES
0935 Called and spoke with Janet SANCHEZ with [facility] and was informed Santy would like patient to have 1 unit of blood today and reevaluate for procrit/retacrit on Monday. Veda Chatman RN

## 2023-08-18 NOTE — PROGRESS NOTES
0909 Patient's hgb 6.6, ptls. 35 and patient dizzy. Informed hem/onc per secure message. Veda Chatman RN

## 2023-08-18 NOTE — TELEPHONE ENCOUNTER
CRITICAL LAB VALUES:  Received call from [facility] with CRITICAL LAB VALUES:    HGB: 6.6  PLT: 35    This information was sent to EMILY Madera for review

## 2023-08-19 LAB
BH BB BLOOD EXPIRATION DATE: NORMAL
BH BB BLOOD TYPE BARCODE: 5100
BH BB DISPENSE STATUS: NORMAL
BH BB PRODUCT CODE: NORMAL
BH BB UNIT NUMBER: NORMAL
CROSSMATCH INTERPRETATION: NORMAL
UNIT  ABO: NORMAL
UNIT  RH: NORMAL

## 2023-08-25 ENCOUNTER — LAB (OUTPATIENT)
Dept: LAB | Facility: HOSPITAL | Age: 78
End: 2023-08-25
Payer: MEDICARE

## 2023-08-25 ENCOUNTER — INFUSION (OUTPATIENT)
Dept: ONCOLOGY | Facility: HOSPITAL | Age: 78
End: 2023-08-25
Payer: MEDICARE

## 2023-08-25 VITALS
BODY MASS INDEX: 35.31 KG/M2 | TEMPERATURE: 97.4 F | RESPIRATION RATE: 18 BRPM | HEIGHT: 68 IN | OXYGEN SATURATION: 100 % | HEART RATE: 65 BPM | DIASTOLIC BLOOD PRESSURE: 53 MMHG | WEIGHT: 233 LBS | SYSTOLIC BLOOD PRESSURE: 131 MMHG

## 2023-08-25 DIAGNOSIS — N18.31 ANEMIA OF CHRONIC RENAL FAILURE, STAGE 3A: Primary | ICD-10-CM

## 2023-08-25 DIAGNOSIS — N18.31 STAGE 3A CHRONIC KIDNEY DISEASE: Primary | ICD-10-CM

## 2023-08-25 DIAGNOSIS — N18.31 ANEMIA OF CHRONIC RENAL FAILURE, STAGE 3A: ICD-10-CM

## 2023-08-25 DIAGNOSIS — N18.32 STAGE 3B CHRONIC KIDNEY DISEASE: Primary | ICD-10-CM

## 2023-08-25 DIAGNOSIS — N18.31 STAGE 3A CHRONIC KIDNEY DISEASE: ICD-10-CM

## 2023-08-25 DIAGNOSIS — D63.1 ANEMIA OF CHRONIC RENAL FAILURE, STAGE 3A: Primary | ICD-10-CM

## 2023-08-25 DIAGNOSIS — D63.1 ANEMIA OF CHRONIC RENAL FAILURE, STAGE 3A: ICD-10-CM

## 2023-08-25 LAB
BASOPHILS # BLD AUTO: 0.03 10*3/MM3 (ref 0–0.2)
BASOPHILS NFR BLD AUTO: 0.5 % (ref 0–1.5)
DEPRECATED RDW RBC AUTO: 67.5 FL (ref 37–54)
EOSINOPHIL # BLD AUTO: 0.2 10*3/MM3 (ref 0–0.4)
EOSINOPHIL NFR BLD AUTO: 3.3 % (ref 0.3–6.2)
ERYTHROCYTE [DISTWIDTH] IN BLOOD BY AUTOMATED COUNT: 19.2 % (ref 12.3–15.4)
HCT VFR BLD AUTO: 27.4 % (ref 34–46.6)
HGB BLD-MCNC: 8.1 G/DL (ref 12–15.9)
HOLD SPECIMEN: NORMAL
LYMPHOCYTES # BLD AUTO: 1.28 10*3/MM3 (ref 0.7–3.1)
LYMPHOCYTES NFR BLD AUTO: 21.2 % (ref 19.6–45.3)
MCH RBC QN AUTO: 28.5 PG (ref 26.6–33)
MCHC RBC AUTO-ENTMCNC: 29.6 G/DL (ref 31.5–35.7)
MCV RBC AUTO: 96.5 FL (ref 79–97)
MONOCYTES # BLD AUTO: 0.52 10*3/MM3 (ref 0.1–0.9)
MONOCYTES NFR BLD AUTO: 8.6 % (ref 5–12)
NEUTROPHILS NFR BLD AUTO: 4 10*3/MM3 (ref 1.7–7)
NEUTROPHILS NFR BLD AUTO: 66.1 % (ref 42.7–76)
PLATELET # BLD AUTO: 55 10*3/MM3 (ref 140–450)
RBC # BLD AUTO: 2.84 10*6/MM3 (ref 3.77–5.28)
WBC NRBC COR # BLD: 6.05 10*3/MM3 (ref 3.4–10.8)

## 2023-08-25 PROCEDURE — 96372 THER/PROPH/DIAG INJ SC/IM: CPT

## 2023-08-25 PROCEDURE — 25010000002 EPOETIN ALFA PER 1000 UNITS: Performed by: NURSE PRACTITIONER

## 2023-08-25 PROCEDURE — 85025 COMPLETE CBC W/AUTO DIFF WBC: CPT

## 2023-08-25 PROCEDURE — 36415 COLL VENOUS BLD VENIPUNCTURE: CPT

## 2023-08-25 RX ADMIN — ERYTHROPOIETIN 40000 UNITS: 40000 INJECTION, SOLUTION INTRAVENOUS; SUBCUTANEOUS at 10:05

## 2023-08-25 NOTE — PROGRESS NOTES
8380 Called hem/onc and spoke with Miladys HEATH to report patients lab results and need orders. Patient was a recheck after blood and iron infusions last week. Veda Chatman RN    6917 Miladys d/w [facility] and called to inform patient will be given procrit today orders to be signed. Veda Chatman RN

## 2023-08-27 LAB
QT INTERVAL: 404 MS
QTC INTERVAL: 416 MS

## 2023-08-28 DIAGNOSIS — D63.1 ANEMIA OF CHRONIC RENAL FAILURE, STAGE 3A: ICD-10-CM

## 2023-08-28 DIAGNOSIS — N18.31 ANEMIA OF CHRONIC RENAL FAILURE, STAGE 3A: ICD-10-CM

## 2023-08-28 DIAGNOSIS — D50.8 IRON DEFICIENCY ANEMIA SECONDARY TO INADEQUATE DIETARY IRON INTAKE: Primary | ICD-10-CM

## 2023-08-28 DIAGNOSIS — N18.31 STAGE 3A CHRONIC KIDNEY DISEASE: Primary | ICD-10-CM

## 2023-08-29 ENCOUNTER — OFFICE VISIT (OUTPATIENT)
Dept: ONCOLOGY | Facility: CLINIC | Age: 78
End: 2023-08-29
Payer: MEDICARE

## 2023-08-29 VITALS
SYSTOLIC BLOOD PRESSURE: 132 MMHG | HEIGHT: 68 IN | HEART RATE: 59 BPM | WEIGHT: 234 LBS | OXYGEN SATURATION: 97 % | RESPIRATION RATE: 16 BRPM | DIASTOLIC BLOOD PRESSURE: 86 MMHG | TEMPERATURE: 97.7 F | BODY MASS INDEX: 35.46 KG/M2

## 2023-08-29 DIAGNOSIS — D63.1 ANEMIA IN STAGE 3B CHRONIC KIDNEY DISEASE: ICD-10-CM

## 2023-08-29 DIAGNOSIS — N18.32 ANEMIA IN STAGE 3B CHRONIC KIDNEY DISEASE: ICD-10-CM

## 2023-08-29 DIAGNOSIS — K86.9 PANCREATIC LESION: ICD-10-CM

## 2023-08-29 DIAGNOSIS — D69.3 CHRONIC ITP (IDIOPATHIC THROMBOCYTOPENIA): Primary | ICD-10-CM

## 2023-08-29 DIAGNOSIS — Z85.3 HISTORY OF BREAST CANCER: ICD-10-CM

## 2023-08-29 DIAGNOSIS — D50.8 IRON DEFICIENCY ANEMIA SECONDARY TO INADEQUATE DIETARY IRON INTAKE: ICD-10-CM

## 2023-08-29 NOTE — PROGRESS NOTES
[facility] GROUP HEMATOLOGY & ONCOLOGY  [address]  [place] [address] [phone]    Patient Name: Marina Joe  Encounter Date: 08/29/2023  YOB: 1945  Patient Number: [id number]      HPI: Marina Joe is a  78 y.o.  female who is seen on follow-up for stage Ia left breast cancer, upper outer quadrant, receptor positive and HER-2/alix negative.  She is on adjuvant anastrozole since 05/2013. Plan for 10 years.  She is also seen for anemia from chronic kidney disease stage IIIa .   She was given injectafer on 09/27/22.   She was started on SREE with Retacrit on November 29, 2022.    She began to experience thrombocytopenia and had bone marrow biopsy 11/10/22.      Final Diagnosis  Bone marrow, left iliac crest, core biopsy, aspirate smears, and clot section:  Slightly hypercellular marrow (40%) with maturing trilineage hematopoiesis.  Aspiculate smears, nondiagnostic.  No overt features of myelodysplasia and no excess blast population.  Moderately increased megakaryocytes.  2+ stainable iron.  Peripheral blood smear:  Severe thrombocytopenia.  Mild normocytic normochromic anemia.  Rare circulating schistocytes.  Normal white blood cell count with normal differential and morphology.  No circulating blasts.  Flow cytometry: No immunophenotypic evidence of abnormal myeloid maturation, increased blast population or a lymphoproliferative disorder.  Chromosome analysis: Normal female karyotype.    INTERVAL HISTORY     Ms. Joe presents to clinic today for continued follow up.  Since her last visit, she received blood transfusion August 10, 11 and 18th.  She received Inejctafer 08/04,11, 2023.  She received Retacrit August 25 for Hgb 8.1.      Pt complains of significant fatigue with her anemia.  She denies any bleeding.       Oncology/Hematology History Overview Note   Oncologic history  In February 2012, she had a routine  mammogram that found a nodular density in the upper outer quadrant of the left breast.  An ultrasound revealed no nodularity at that time.  Repeat ultrasound 3 months later on 5/3/2012 revealed a small cyst in the left breast but felt to be benign.  Repeat mammogram on October 31, 2012 found a lobulated lesion in the left breast at the 2 o'clock position and a stable cyst at the 12 o'clock position.  She was referred to Dr. Barkley on 11/30/2012 and biopsy was performed.  The 12:00 cyst was a fibrocystic lesion while the 2:00 lesion was an invasive mammary carcinoma, low-grade, ER positive AK positive HER-2 nu 2+, FISH unamplified.    On January 8, 2013 she underwent a left partial mastectomy with sentinel node biopsy finding invasive ductal carcinoma, 3.1 mm in greatest dimension, grade 1.  2 sentinel nodes were negative.  AJCC TNM stage was pT1aN0 M0, Stage IA.  Following surgery she underwent adjuvant radiation therapy and was then started on Arimidex for hormonal manipulation.  She was started on the Arimidex in approximately April or May 2013.  He has been recommended to continue this for 10 years.    Hematologic history  Patient with a long history of anemia secondary to iron deficiency as well as chronic kidney disease stage III.Patient has a GFR that ranges from 45-61ML/MIN.  He has been undergoing Procrit when meets guidelines for several years now.  She is also required iron replacement.  Folate > 20, B12 at 1503 and negative blood for flow cytometry on 01/07/2022.      Previous interventions  Procrit 40,000 units subcu initiated approximately February 2017  Injectafer given 9/23/2019, 9/30/2019     Malignant neoplasm of upper-outer quadrant of female breast   10/31/2012 Initial Diagnosis    Malignant neoplasm of central portion of left female breast (CMS/HCC)     10/31/2012 Imaging    Mammogram on October 31, 2012 found a lobulated lesion in the left breast at the 2 o'clock position and a stable cyst at  the 12 o'clock position.      11/30/2012 Biopsy    Dr. Barkley on 11/30/2012 and biopsy was performed.  The 12:00 cyst was a fibrocystic lesion while the 2:00 lesion was an invasive mammary carcinoma, low-grade, ER positive NM positive HER-2 nu 2+, FISH unamplified.         1/8/2013 Surgery    On January 8, 2013 she underwent a left partial mastectomy with sentinel node biopsy finding invasive ductal carcinoma, 3.1 mm in greatest dimension, grade 1.  2 sentinel nodes were negative.  AJCC TNM stage was pT1aN0 M0, Stage IA.  Hormone receptor positive HER-2 negative      Radiation    Radiation OncologyTreatment Course:  Marina Joe receivedin 33 fractions to left breast via External Beam Radiation - EBRT.     5/2013 -  Hormonal Therapy    Arimidex 1 mg daily     8/22/2022 Cancer Staged    Staging form: Breast, AJCC V7  - Pathologic stage from 8/22/2022: Stage IA (T1a, N0, cM0) - Signed by Benjamin Shah MD on 8/22/2022         PAST MEDICAL HISTORY:  ALLERGIES:  Allergies   Allergen Reactions    Aspirin GI Bleeding    Niacin Other (See Comments)     CURRENT MEDICATIONS:  Outpatient Encounter Medications as of 8/29/2023   Medication Sig Dispense Refill    albuterol sulfate  (90 Base) MCG/ACT inhaler Inhale 2 puffs Every 4 (Four) Hours As Needed for Wheezing. 2 g 3    amLODIPine (NORVASC) 5 MG tablet Take 1 tablet by mouth 2 (Two) Times a Day. (Patient taking differently: Take 0.5 tablets by mouth 2 (Two) Times a Day.) 180 tablet 2    anastrozole (ARIMIDEX) 1 MG tablet Take 1 tablet by mouth Daily. 90 tablet 3    buPROPion XL (WELLBUTRIN XL) 150 MG 24 hr tablet TAKE 1 TABLET BY MOUTH DAILY 90 tablet 3    Calcium Carb-Cholecalciferol (CALCIUM 600+D3 PO) Take  by mouth.      carvedilol (COREG) 6.25 MG tablet Take 1 tablet by mouth 2 (Two) Times a Day With Meals. 180 tablet 3    cetirizine (zyrTEC) 10 MG tablet Take 1 tablet by mouth Daily.      cloNIDine (Catapres) 0.1 MG tablet Take 1 tablet by mouth 2 (Two) Times  a Day As Needed for High Blood Pressure (greater than 160/90). 45 tablet 0    cyclobenzaprine (FLEXERIL) 10 MG tablet Take 1 tablet by mouth 3 (Three) Times a Day As Needed for Muscle Spasms. 90 tablet 5    Ergocalciferol (VITAMIN D2 PO) Take 50,000 Units by mouth Every 30 (Thirty) Days.      ferrous sulfate 325 (65 FE) MG tablet Take 1 tablet by mouth Daily With Breakfast.      fluticasone (FLONASE) 50 MCG/ACT nasal spray 2 sprays into the nostril(s) as directed by provider Daily. 1 g 3    fluticasone (FLOVENT DISKUS) 50 MCG/BLIST diskus inhaler Inhale 1 puff.      irbesartan-hydrochlorothiazide (AVALIDE) 300-12.5 MG tablet TAKE 1 TABLET BY MOUTH DAILY 90 tablet 3    montelukast (SINGULAIR) 10 MG tablet Take 1 tablet by mouth Daily. 90 tablet 1    Multiple Vitamins-Minerals (MULTIVITAMIN ADULT) tablet Take  by mouth Daily.      Omega-3 Fatty Acids (FISH OIL) 1000 MG capsule capsule Take 1 capsule by mouth.      pantoprazole (Protonix) 40 MG EC tablet Take 1 tablet by mouth Daily. 90 tablet 2    rOPINIRole (REQUIP) 0.5 MG tablet Take 1 tablet by mouth Every Night. 90 tablet 1    rosuvastatin (CRESTOR) 20 MG tablet Take 1 tablet by mouth Daily. 90 tablet 3    sertraline (ZOLOFT) 100 MG tablet TAKE 1 TABLET BY MOUTH DAILY 90 tablet 3    traZODone (DESYREL) 100 MG tablet Take 1 tablet by mouth Every Night. 90 tablet 3    valACYclovir (VALTREX) 500 MG tablet TAKE 1 TABLET BY MOUTH TWICE DAILY 180 tablet 0    vitamin D (ERGOCALCIFEROL) 1.25 MG (60554 UT) capsule capsule TAKE 1 CAPSULE BY MOUTH ONCE MONTHLY       No facility-administered encounter medications on file as of 8/29/2023.     ADULT ILLNESSES:  Patient Active Problem List   Diagnosis Code    Malignant neoplasm of upper-outer quadrant of female breast C50.419    Anemia of chronic renal failure, stage 3 (moderate) N18.30, D63.1    Hypertension, benign I10    Hx of colonic polyps Z86.010    HX: breast cancer Z85.3    Morbidly obese E66.01    Abnormal mammogram  R92.8    Breast mass N63.0    Right knee DJD M17.11    S/P lumpectomy, left breast Z98.890    Adult hypothyroidism E03.9    Rhinitis J31.0    Anemia D64.9    Anxiety F41.9    At low risk for fall Z91.81    Breast cancer, left C50.912    Cervical pain M54.2    Chronic insomnia F51.04    Cough R05.9    Elevated lipids E78.5    Encounter for immunization Z23    Herpes zoster without complication B02.9    Influenza B J10.1    Left ear pain H92.02    Left otitis externa H60.92    Lumbar strain, initial encounter S39.012A    Myalgia M79.10    Negative depression screening Z13.31    Obesity (BMI 30-39.9) E66.9    Postmenopausal status Z78.0    Recurrent acute serous otitis media of left ear H65.05    Restless leg G25.81    Sinusitis, bacterial J32.9, B96.89    Skin lesion L98.9    Upper respiratory infection J06.9    Urinary tract infection without hematuria N39.0    Vitamin D deficiency E55.9    Stage 3a chronic kidney disease N18.31     SURGERIES:  Past Surgical History:   Procedure Laterality Date    AVULSION TOENAIL PLATE      Sept 26,2018    BREAST BIOPSY Left 11/20/2012    BREAST BIOPSY      Left Breast, 1/2019 per Dr Barkley    BREAST LUMPECTOMY Left     with node bx     COLONOSCOPY  09/13/2013    small polyp at 30cm benign hyperplastic polyp, changes consistent with melanosis coli. Recall 5 years    COLONOSCOPY  11/19/2018    Tics otherwise normal exam repeat in 5 years    COLONOSCOPY  02/13/2023    Normal exam repeat in 3 years with a 2 day prep    ENDOSCOPY  02/13/2023    Gastritis, small HH    REPLACEMENT TOTAL KNEE Right     2016    TOTAL ABDOMINAL HYSTERECTOMY WITH SALPINGO OOPHORECTOMY       HEALTH MAINTENANCE ITEMS:  Health Maintenance Due   Topic Date Due    DXA SCAN  01/01/2023    COVID-19 Vaccine (5 - Moderna series) 04/08/2023       <no information>  Last Completed Colonoscopy            COLORECTAL CANCER SCREENING (COLONOSCOPY - Every 3 Years) Next due on 2/13/2026      08/10/2023  Fecal Occult Blood  component of POC Occult Blood Stool    02/13/2023  SCANNED - COLONOSCOPY    11/16/2022  Occult Blood X 3, Stool - Stool, Per Rectum    11/19/2018  SCANNED - COLONOSCOPY    11/19/2018  SCANNED - COLONOSCOPY    Only the first 5 history entries have been loaded, but more history exists.                  Immunization History   Administered Date(s) Administered    COVID-19 (MODERNA) 1st,2nd,3rd Dose Monovalent 03/12/2021, 03/28/2021    COVID-19 (MODERNA) Monovalent Original Booster 08/31/2022    COVID-19 (PFIZER) BIVALENT 12+YRS 12/08/2022    Flu Vaccine Quad PF >36MO 11/05/2019    Fluad Quad 65+ 11/05/2020    Fluzone High Dose =>65 Years ([employer] ONLY) 11/11/2015, 10/14/2016, 11/20/2017    Fluzone High-Dose 65+yrs 11/29/2021, 10/04/2022    Pneumococcal Conjugate 13-Valent (PCV13) 10/14/2016    Pneumococcal Polysaccharide (PPSV23) 11/05/2020    Shingrix 01/01/2021, 06/08/2021    Zoster, Unspecified 01/01/2021     Last Completed Mammogram            MAMMOGRAM (Yearly) Next due on 1/23/2024 01/23/2023  SCANNED - MAMMO    12/13/2021  SCANNED - MAMMO    12/10/2020  Mammo Screening Digital Tomosynthesis Bilateral With CAD    07/03/2019  Outside Claim: HC MAMMOGRAM DIAGNOSTIC UNILAT DIGITAL W CAD,MA TOMOSYNTHESIS MAMMOGRAPHY    12/12/2018  Outside Claim: HC MAMMOGRAM DIAGNOSTIC BILAT DIGITAL W CAD,MA TOMOSYNTHESIS MAMMOGRAPHY    Only the first 5 history entries have been loaded, but more history exists.                      FAMILY HISTORY:  Family History   Problem Relation Age of Onset    Heart attack Mother     Hodgkin's lymphoma Father     Other Father     Cancer Father         hodgkins    Heart attack Brother     Skin cancer Maternal Uncle     Pancreatic cancer Maternal Uncle     Cancer Maternal Uncle         eye    Colon cancer Neg Hx     Colon polyps Neg Hx      SOCIAL HISTORY:  Social History     Socioeconomic History    Marital status:    Tobacco Use    Smoking status: Never    Smokeless tobacco: Never  "  Vaping Use    Vaping Use: Never used   Substance and Sexual Activity    Alcohol use: No    Drug use: Not Currently    Sexual activity: Not Currently     Birth control/protection: Surgical       REVIEW OF SYSTEMS:    Review of Systems   Constitutional:  Negative for chills and fever.   HENT:  Negative for congestion and trouble swallowing.    Respiratory:  Negative for wheezing.    Cardiovascular:  Negative for chest pain and palpitations.   Gastrointestinal:  Negative for abdominal pain, nausea and vomiting.   Endocrine: Negative for polydipsia and polyphagia.   Genitourinary:  Negative for difficulty urinating, dysuria and flank pain.   Musculoskeletal:  Negative for gait problem and joint swelling.   Allergic/Immunologic: Negative for food allergies.   Neurological:  Negative for speech difficulty and weakness.   Hematological:  Negative for adenopathy. Does not bruise/bleed easily.   Psychiatric/Behavioral:  Negative for agitation, confusion and hallucinations.      VITAL SIGNS: /86   Pulse 59   Temp 97.7 øF (36.5 øC)   Resp 16   Ht 172.7 cm (68\")   Wt 106 kg (234 lb)   LMP  (LMP Unknown)   SpO2 97%   BMI 35.58 kg/mý   Pain Score    08/29/23 0809   PainSc: 0-No pain       PHYSICAL EXAMINATION:     Physical Exam  Vitals reviewed.   Constitutional:       General: She is not in acute distress.  HENT:      Head: Normocephalic and atraumatic.   Eyes:      General: No scleral icterus.  Cardiovascular:      Rate and Rhythm: Normal rate.   Pulmonary:      Effort: No respiratory distress.      Breath sounds: No wheezing or rales.   Abdominal:      General: Bowel sounds are normal.      Palpations: Abdomen is soft.      Tenderness: There is no abdominal tenderness.   Musculoskeletal:         General: No swelling.      Cervical back: Neck supple.      Right lower leg: Edema present.      Left lower leg: Edema present.   Skin:     General: Skin is warm and dry.   Neurological:      Mental Status: She is alert " and oriented to person, place, and time.   Psychiatric:         Mood and Affect: Mood normal.         Behavior: Behavior normal.         Thought Content: Thought content normal.         Judgment: Judgment normal.       LABS    Lab Results - Last 18 Months   Lab Units 08/25/23  0853 08/18/23  0844 08/11/23  0030 08/10/23  1754 08/10/23  1701 08/04/23  0843 07/28/23  0932 07/21/23  0919 07/14/23  0908 07/07/23  0937 06/30/23  0918 06/20/23  0733 04/11/23  0744 03/07/23  1310 02/07/23  0907 01/10/23  0905 12/27/22  1309 12/13/22  0810 12/08/22  0910 09/07/22  0801 06/22/22  1419 05/04/22  0755   HEMOGLOBIN g/dL 8.1* 6.6* 7.3* 5.3* 5.3* 7.4* 7.0* 7.4* 7.7* 7.7*   < > 7.7*   < > 10.9* 10.5* 10.2*   < > 9.3* 11.1*   < > 10.4* 11.0*   HEMATOCRIT % 27.4* 22.1* 24.2* 18.4* 18.5* 26.5* 24.8* 25.5* 26.7* 26.8*   < > 24.0*   < > 35.0 34.4 34.7   < > 31.1* 37.7   < > 33.5* 35.6   MCV fL 96.5 100.5*  --  98.4* 97.4* 97.4* 98.0* 98.1* 97.1* 96.8   < > 91.3   < > 91.6 92.5 96.9   < > 96.0 98.2*   < > 91.8 92.5   WBC 10*3/mm3 6.05 6.97  --  7.61 6.54 5.62 5.68 6.42 5.83 5.79   < > 6.91   < > 6.19 6.57 5.77   < > 5.75 5.96   < > 5.77 5.72   RDW % 19.2* 21.2*  --  19.5* 19.4* 18.0* 18.3* 18.0* 18.3* 17.9*   < > 16.4*   < > 16.0* 15.7* 15.9*   < > 16.2* 17.3*   < > 15.8* 15.1   MPV fL  --  13.3*  --   --   --   --   --   --   --   --   --   --   --  12.3* 12.8* 14.0*  --   --   --   --  12.2* 13.3*   PLATELETS 10*3/mm3 55* 35*  --  46* 47* 44* 46* 47* 47* 51*   < > 56*   < > 115* 91* 62*   < > 54* 51*   < > 77* 82*   IMM GRAN % %  --   --   --   --   --   --   --   --   --   --   --   --   --  0.2  --   --   --  0.3  --   --  0.5  --    NEUTROS ABS 10*3/mm3 4.00 4.86  --  5.30 4.70 3.35 3.73 4.10 3.59 3.37   < > 4.91   < > 4.00 4.16 3.44   < > 3.52 3.67   < > 3.28 3.20   LYMPHS ABS 10*3/mm3 1.28  --   --   --  1.16 1.36  --  1.29 1.22 1.32   < > CANCELED  0.90   < > 1.40 1.48 1.50   < > 1.34 CANCELED  1.14   < > 1.50 1.67   MONOS ABS  10*3/mm3 0.52  --   --   --  0.50 0.65  --  0.62 0.58 0.66   < > 0.48   < > 0.49 0.59 0.46   < > 0.51 0.78   < > 0.59 0.49   EOS ABS 10*3/mm3 0.20 0.36  --  0.15 0.14 0.22 0.57* 0.35 0.39 0.39   < > CANCELED   < > 0.26 0.29 0.33   < > 0.34 CANCELED   < > 0.34 0.31   EOSINOPHIL ABS 10*3/mm3  --   --   --   --   --   --   --   --   --   --   --  0.62*  --   --   --   --   --   --  0.36   < >  --   --    EOSINOPHIL % %  --   --   --   --   --   --   --   --   --   --   --  9.0*  --   --   --   --   --   --  6.1   < >  --   --    BASOS ABS 10*3/mm3 0.03  --   --  0.08 0.01 0.02  --  0.04 0.02 0.03   < > CANCELED   < > 0.03 0.02 0.03   < > 0.02 CANCELED   < > 0.03 0.03   IMMATURE GRANS (ABS) 10*3/mm3  --   --   --   --   --   --   --   --   --   --   --   --   --  0.01  --   --   --  0.02  --   --  0.03  --    NRBC /100 WBC  --   --   --   --   --   --   --   --   --   --   --   --   --  0.0  --   --   --  0.0  --   --  0.0  --    NEUTROPHIL % %  --  68.7  --  69.7  --   --  64.6  --   --   --   --  71.0  --   --   --   --   --   --  61.6  --   --   --    MONOCYTES % %  --  5.1  --  1.0*  --   --  9.1  --   --   --   --  7.0  --   --   --   --   --   --  13.1*  --   --   --    BASOPHIL % %  --   --   --  1.0  --   --   --   --   --   --   --   --   --   --   --   --   --   --   --   --   --   --    ANISOCYTOSIS   --  Slight/1+  --  Slight/1+  --   --  Mod/2+  --   --   --   --   --   --   --   --   --   --   --   --   --   --   --    GIANT PLT   --  Large/3+  --  Large/3+  --   --  Large/3+  --   --   --   --   --   --   --   --   --   --   --   --   --   --   --     < > = values in this interval not displayed.       Lab Results - Last 18 Months   Lab Units 08/10/23  1510 07/28/23  0932 07/21/23  0919 07/07/23  0924 06/30/23  0918 06/26/23  1658 06/20/23  0733 05/26/23  1058 04/11/23  0744   GLUCOSE mg/dL 112* 114* 96 100* 94  --  100* 99 111*   SODIUM mmol/L 137 136 135* 139 138  --  142 140 140   POTASSIUM mmol/L 4.4  4.2 4.0 4.0 3.7  --  3.8 4.0 3.3*   CO2 mmol/L 22.0 26.0 25.0 23.0 26.0  --  27.6 28.0 27.0   CHLORIDE mmol/L 104 101 99 104 101  --  104 103 102   ANION GAP mmol/L 11.0 9.0 11.0 12.0 11.0  --   --  9.0 11.0   CREATININE mg/dL 1.24* 1.50* 1.60* 1.40* 1.46* 1.50* 1.47* 1.19* 1.36*   BUN mg/dL 45* 26* 26* 24* 25*  --  23 21 26*   BUN / CREAT RATIO  36.3* 17.3 16.3 17.1 17.1  --  15.6 17.6 19.1   CALCIUM mg/dL 9.0 9.2 8.9 9.1 9.2  --  9.7 9.2 9.5   ALK PHOS U/L 52 69 69 86 81  --  88  --  74   TOTAL PROTEIN g/dL 6.2 6.7 6.8 7.0 7.3  --   --   --  7.6   ALT (SGPT) U/L 10 13 12 14 11  --  14  --  13   AST (SGOT) U/L 14 16 16 21 13  --  13  --  16   BILIRUBIN mg/dL <0.2 0.2 0.2 0.3 0.2  --  0.2  --  0.3   ALBUMIN g/dL 3.7 3.8 3.9 3.9 4.1  --  4.1  --  4.4   GLOBULIN gm/dL 2.5 2.9 2.9 3.1 3.2  --   --   --  3.2       No results for input(s): MSPIKE, KAPPALAMB, IGLFLC, URICACID, FREEKAPPAL, CEA, LDH, REFLABREPO in the last 73675 hours.      Lab Results - Last 18 Months   Lab Units 07/28/23  0932 06/30/23  0918 06/20/23  0733 04/11/23  0744 01/10/23  0905 12/01/22  0838 11/29/22  0815 11/10/22  0846   IRON mcg/dL 43 48  --  37 49  --  57 67   TIBC mcg/dL 350 313 334 295* 280*  --  289* 289*   IRON SATURATION %  --   --  17*  --   --   --   --   --    IRON SATURATION (TSAT) % 12* 15*  --  13* 17*  --  20 23   FERRITIN ng/mL 146.10 421.50* 455.00* 726.60* 681.10*  --  959.20* 1,019.00*   TSH uIU/mL  --   --  5.880*  --   --  4.640*  --   --        Marina Joe reports a pain score of 0.      ASSESSMENT:  1. Chronic ITP (idiopathic thrombocytopenia)    2. Anemia in stage 3b chronic kidney disease    3. Iron deficiency anemia secondary to inadequate dietary iron intake    4. History of breast cancer    5. Pancreatic lesion        1. Chronic ITP (idiopathic thrombocytopenia) (HCC)   Bone marrow, left iliac crest, core biopsy, aspirate smears, and clot section:  Slightly hypercellular marrow (40%) with maturing trilineage  hematopoiesis.  Aspiculate smears, nondiagnostic.  No overt features of myelodysplasia and no excess blast population.  Moderately increased megakaryocytes.  2+ stainable iron.  Peripheral blood smear:  Severe thrombocytopenia.  Mild normocytic normochromic anemia.  Rare circulating schistocytes.  Normal white blood cell count with normal differential and morphology.  No circulating blasts.  Flow cytometry: No immunophenotypic evidence of abnormal myeloid maturation, increased blast population or a lymphoproliferative disorder.  Chromosome analysis: Normal female karyotype.  -Plt 08/28/23:  55. NO s/s of bleeding   -Stable for observation   PLAN  -Will transfuse for platelets < 10 regardless of bleeding.   -Transfuse platelets < 20 If bleeding   -Can give burst dose of steroids if platelets < 30.    2. Iron deficiency anemia   3.  Chronic Kidney Disease Stage IIIB  Chemistry 08/10/23:  BUN 45, Creatinine 1.24, GFR 44.9  Anemia Profile 07/28/23: Iron 43, Ferritin 146, Sat 12%, TIBC 350  CBC 08/25/23:  Hgb 8.1, Hct 27.4  She received Inejctafer August 4 &11, 2023.    She received Retacrit August 25 for Hgb 8.1.    PLAN  Followed by Dr. Martin, Nephrology   She will continue Retacrit weekly for Hgb< 11 or Hct < 33  Her next appointment is September 1, 2023.  Will recheck anemia profile then.     3. History of breast cancer  - Pathologic stage: Stage IA (T1a, N0, cM0)   -Invasive mammary carcinoma, low-grade, ER positive [place] positive HER-2 nu 2+, FISH unamplified.  -January 8, 2013 she underwent a left partial mastectomy with sentinel node biopsy  -Following surgery she underwent adjuvant radiation therapy and was then started on Arimidex  for hormonal manipulation.  She completed it in Jan 2023.  -Mammogram 01/23/23, Benign,   -Continue Annual Screening     4.  Pancreatic lesion     -MRI Abdomen 6/26/23 15 x 14 mm cystic lesion in the pancreatic tail without septations, mural nodules or enhancement.   -Per note, Dr. Carvalho  at [facility] attempted to call pt to schedule EUS        PLAN:  Weekly labs and retacrit for Hgb < 11 OR Hct < 33  RTC in 6 weeks.  Pre-office labs for CBC, CMP, Iron profile and Ferritin   Continue current medications, treatment plans and follow up with PCP and any other providers  Care discussed with patient.  Understanding expressed.  Patient agreeable with plan.            Analilia Madera, APRN  08/29/2023

## 2023-08-30 DIAGNOSIS — N18.31 ANEMIA OF CHRONIC RENAL FAILURE, STAGE 3A: ICD-10-CM

## 2023-08-30 DIAGNOSIS — N18.31 STAGE 3A CHRONIC KIDNEY DISEASE: Primary | ICD-10-CM

## 2023-08-30 DIAGNOSIS — D63.1 ANEMIA OF CHRONIC RENAL FAILURE, STAGE 3A: ICD-10-CM

## 2023-09-01 ENCOUNTER — INFUSION (OUTPATIENT)
Dept: ONCOLOGY | Facility: HOSPITAL | Age: 78
End: 2023-09-01
Payer: MEDICARE

## 2023-09-01 ENCOUNTER — LAB (OUTPATIENT)
Dept: LAB | Facility: HOSPITAL | Age: 78
End: 2023-09-01
Payer: MEDICARE

## 2023-09-01 VITALS
TEMPERATURE: 97.1 F | HEIGHT: 68 IN | BODY MASS INDEX: 35.28 KG/M2 | SYSTOLIC BLOOD PRESSURE: 156 MMHG | WEIGHT: 232.8 LBS | RESPIRATION RATE: 20 BRPM | DIASTOLIC BLOOD PRESSURE: 46 MMHG | OXYGEN SATURATION: 98 % | HEART RATE: 70 BPM

## 2023-09-01 DIAGNOSIS — N18.31 STAGE 3A CHRONIC KIDNEY DISEASE: ICD-10-CM

## 2023-09-01 DIAGNOSIS — N18.31 ANEMIA OF CHRONIC RENAL FAILURE, STAGE 3A: ICD-10-CM

## 2023-09-01 DIAGNOSIS — D63.1 ANEMIA OF CHRONIC RENAL FAILURE, STAGE 3A: Primary | ICD-10-CM

## 2023-09-01 DIAGNOSIS — N18.31 ANEMIA OF CHRONIC RENAL FAILURE, STAGE 3A: Primary | ICD-10-CM

## 2023-09-01 DIAGNOSIS — D50.8 IRON DEFICIENCY ANEMIA SECONDARY TO INADEQUATE DIETARY IRON INTAKE: Primary | ICD-10-CM

## 2023-09-01 DIAGNOSIS — D63.1 ANEMIA OF CHRONIC RENAL FAILURE, STAGE 3A: ICD-10-CM

## 2023-09-01 LAB
ALBUMIN SERPL-MCNC: 3.7 G/DL (ref 3.5–5.2)
ALBUMIN/GLOB SERPL: 1.4 G/DL
ALP SERPL-CCNC: 71 U/L (ref 39–117)
ALT SERPL W P-5'-P-CCNC: 11 U/L (ref 1–33)
ANION GAP SERPL CALCULATED.3IONS-SCNC: 10 MMOL/L (ref 5–15)
AST SERPL-CCNC: 14 U/L (ref 1–32)
BASOPHILS # BLD AUTO: 0.02 10*3/MM3 (ref 0–0.2)
BASOPHILS NFR BLD AUTO: 0.3 % (ref 0–1.5)
BILIRUB SERPL-MCNC: 0.2 MG/DL (ref 0–1.2)
BUN SERPL-MCNC: 38 MG/DL (ref 8–23)
BUN/CREAT SERPL: 20.2 (ref 7–25)
CALCIUM SPEC-SCNC: 8.6 MG/DL (ref 8.6–10.5)
CHLORIDE SERPL-SCNC: 103 MMOL/L (ref 98–107)
CO2 SERPL-SCNC: 26 MMOL/L (ref 22–29)
CREAT SERPL-MCNC: 1.88 MG/DL (ref 0.57–1)
DEPRECATED RDW RBC AUTO: 67.7 FL (ref 37–54)
EGFRCR SERPLBLD CKD-EPI 2021: 27.3 ML/MIN/1.73
EOSINOPHIL # BLD AUTO: 0.21 10*3/MM3 (ref 0–0.4)
EOSINOPHIL NFR BLD AUTO: 3 % (ref 0.3–6.2)
ERYTHROCYTE [DISTWIDTH] IN BLOOD BY AUTOMATED COUNT: 19.5 % (ref 12.3–15.4)
GLOBULIN UR ELPH-MCNC: 2.7 GM/DL
GLUCOSE SERPL-MCNC: 106 MG/DL (ref 65–99)
HCT VFR BLD AUTO: 26.8 % (ref 34–46.6)
HGB BLD-MCNC: 8.1 G/DL (ref 12–15.9)
LYMPHOCYTES # BLD AUTO: 1.17 10*3/MM3 (ref 0.7–3.1)
LYMPHOCYTES NFR BLD AUTO: 16.5 % (ref 19.6–45.3)
MCH RBC QN AUTO: 29 PG (ref 26.6–33)
MCHC RBC AUTO-ENTMCNC: 30.2 G/DL (ref 31.5–35.7)
MCV RBC AUTO: 96.1 FL (ref 79–97)
MONOCYTES # BLD AUTO: 0.66 10*3/MM3 (ref 0.1–0.9)
MONOCYTES NFR BLD AUTO: 9.3 % (ref 5–12)
NEUTROPHILS NFR BLD AUTO: 4.99 10*3/MM3 (ref 1.7–7)
NEUTROPHILS NFR BLD AUTO: 70.6 % (ref 42.7–76)
PLATELET # BLD AUTO: 38 10*3/MM3 (ref 140–450)
POTASSIUM SERPL-SCNC: 3.8 MMOL/L (ref 3.5–5.2)
PROT SERPL-MCNC: 6.4 G/DL (ref 6–8.5)
RBC # BLD AUTO: 2.79 10*6/MM3 (ref 3.77–5.28)
SODIUM SERPL-SCNC: 139 MMOL/L (ref 136–145)
WBC NRBC COR # BLD: 7.07 10*3/MM3 (ref 3.4–10.8)

## 2023-09-01 PROCEDURE — 36415 COLL VENOUS BLD VENIPUNCTURE: CPT

## 2023-09-01 PROCEDURE — 25010000002 EPOETIN ALFA PER 1000 UNITS: Performed by: NURSE PRACTITIONER

## 2023-09-01 PROCEDURE — 85025 COMPLETE CBC W/AUTO DIFF WBC: CPT

## 2023-09-01 PROCEDURE — 96372 THER/PROPH/DIAG INJ SC/IM: CPT

## 2023-09-01 PROCEDURE — 80053 COMPREHEN METABOLIC PANEL: CPT

## 2023-09-01 RX ADMIN — ERYTHROPOIETIN 40000 UNITS: 40000 INJECTION, SOLUTION INTRAVENOUS; SUBCUTANEOUS at 09:35

## 2023-09-01 NOTE — PROGRESS NOTES
0944 Contacted Frannie with MD office r/t  CMP. Per Analilia  no fluids today, pt to continue f/u with PCP and nephrologist. DERREK Martínez RN

## 2023-09-03 ENCOUNTER — HOSPITAL ENCOUNTER (EMERGENCY)
Facility: HOSPITAL | Age: 78
Discharge: HOME | DRG: 378 | End: 2023-09-03
Attending: STUDENT IN AN ORGANIZED HEALTH CARE EDUCATION/TRAINING PROGRAM | Admitting: STUDENT IN AN ORGANIZED HEALTH CARE EDUCATION/TRAINING PROGRAM
Payer: MEDICARE

## 2023-09-03 VITALS
DIASTOLIC BLOOD PRESSURE: 60 MMHG | BODY MASS INDEX: 35.01 KG/M2 | OXYGEN SATURATION: 99 % | TEMPERATURE: 97.9 F | RESPIRATION RATE: 18 BRPM | HEART RATE: 71 BPM | HEIGHT: 68 IN | SYSTOLIC BLOOD PRESSURE: 135 MMHG | WEIGHT: 231 LBS

## 2023-09-03 DIAGNOSIS — D64.9 ANEMIA, UNSPECIFIED TYPE: Primary | ICD-10-CM

## 2023-09-03 LAB
ABO GROUP BLD: NORMAL
ALBUMIN SERPL-MCNC: 3.5 G/DL (ref 3.5–5.2)
ALBUMIN/GLOB SERPL: 1.8 G/DL
ALP SERPL-CCNC: 48 U/L (ref 39–117)
ALT SERPL W P-5'-P-CCNC: 9 U/L (ref 1–33)
ANION GAP SERPL CALCULATED.3IONS-SCNC: 8 MMOL/L (ref 5–15)
APTT PPP: 25.2 SECONDS (ref 24.1–35)
AST SERPL-CCNC: 12 U/L (ref 1–32)
BASOPHILS # BLD AUTO: 0.01 10*3/MM3 (ref 0–0.2)
BASOPHILS NFR BLD AUTO: 0.1 % (ref 0–1.5)
BILIRUB SERPL-MCNC: <0.2 MG/DL (ref 0–1.2)
BLD GP AB SCN SERPL QL: NEGATIVE
BUN SERPL-MCNC: 59 MG/DL (ref 8–23)
BUN/CREAT SERPL: 37.3 (ref 7–25)
CALCIUM SPEC-SCNC: 8.8 MG/DL (ref 8.6–10.5)
CHLORIDE SERPL-SCNC: 104 MMOL/L (ref 98–107)
CO2 SERPL-SCNC: 25 MMOL/L (ref 22–29)
CREAT SERPL-MCNC: 1.58 MG/DL (ref 0.57–1)
DEPRECATED RDW RBC AUTO: 68.8 FL (ref 37–54)
DEVELOPER EXPIRATION DATE: ABNORMAL
DEVELOPER LOT NUMBER: 239
EGFRCR SERPLBLD CKD-EPI 2021: 33.6 ML/MIN/1.73
EOSINOPHIL # BLD AUTO: 0.13 10*3/MM3 (ref 0–0.4)
EOSINOPHIL NFR BLD AUTO: 1.4 % (ref 0.3–6.2)
ERYTHROCYTE [DISTWIDTH] IN BLOOD BY AUTOMATED COUNT: 19.6 % (ref 12.3–15.4)
EXPIRATION DATE: ABNORMAL
FECAL OCCULT BLOOD SCREEN, POC: POSITIVE
GLOBULIN UR ELPH-MCNC: 2 GM/DL
GLUCOSE SERPL-MCNC: 123 MG/DL (ref 65–99)
HCT VFR BLD AUTO: 17.7 % (ref 34–46.6)
HCT VFR BLD AUTO: 24.5 % (ref 34–46.6)
HGB BLD-MCNC: 5.2 G/DL (ref 12–15.9)
HGB BLD-MCNC: 7.7 G/DL (ref 12–15.9)
IMM GRANULOCYTES # BLD AUTO: 0.08 10*3/MM3 (ref 0–0.05)
IMM GRANULOCYTES NFR BLD AUTO: 0.8 % (ref 0–0.5)
INR PPP: 1.04 (ref 0.91–1.09)
LYMPHOCYTES # BLD AUTO: 1.21 10*3/MM3 (ref 0.7–3.1)
LYMPHOCYTES NFR BLD AUTO: 12.7 % (ref 19.6–45.3)
Lab: 239
MCH RBC QN AUTO: 28.6 PG (ref 26.6–33)
MCHC RBC AUTO-ENTMCNC: 29.4 G/DL (ref 31.5–35.7)
MCV RBC AUTO: 97.3 FL (ref 79–97)
MONOCYTES # BLD AUTO: 0.77 10*3/MM3 (ref 0.1–0.9)
MONOCYTES NFR BLD AUTO: 8.1 % (ref 5–12)
NEGATIVE CONTROL: NEGATIVE
NEUTROPHILS NFR BLD AUTO: 7.34 10*3/MM3 (ref 1.7–7)
NEUTROPHILS NFR BLD AUTO: 76.9 % (ref 42.7–76)
NRBC BLD AUTO-RTO: 0.6 /100 WBC (ref 0–0.2)
PLATELET # BLD AUTO: 33 10*3/MM3 (ref 140–450)
POSITIVE CONTROL: POSITIVE
POTASSIUM SERPL-SCNC: 4 MMOL/L (ref 3.5–5.2)
PROT SERPL-MCNC: 5.5 G/DL (ref 6–8.5)
PROTHROMBIN TIME: 13.7 SECONDS (ref 11.8–14.8)
QT INTERVAL: 422 MS
QTC INTERVAL: 452 MS
RBC # BLD AUTO: 1.82 10*6/MM3 (ref 3.77–5.28)
RH BLD: POSITIVE
SODIUM SERPL-SCNC: 137 MMOL/L (ref 136–145)
T&S EXPIRATION DATE: NORMAL
WBC NRBC COR # BLD: 9.54 10*3/MM3 (ref 3.4–10.8)

## 2023-09-03 PROCEDURE — P9016 RBC LEUKOCYTES REDUCED: HCPCS

## 2023-09-03 PROCEDURE — 86901 BLOOD TYPING SEROLOGIC RH(D): CPT

## 2023-09-03 PROCEDURE — 93010 ELECTROCARDIOGRAM REPORT: CPT | Performed by: INTERNAL MEDICINE

## 2023-09-03 PROCEDURE — 86923 COMPATIBILITY TEST ELECTRIC: CPT

## 2023-09-03 PROCEDURE — 85014 HEMATOCRIT: CPT | Performed by: STUDENT IN AN ORGANIZED HEALTH CARE EDUCATION/TRAINING PROGRAM

## 2023-09-03 PROCEDURE — 86900 BLOOD TYPING SEROLOGIC ABO: CPT

## 2023-09-03 PROCEDURE — 93005 ELECTROCARDIOGRAM TRACING: CPT | Performed by: STUDENT IN AN ORGANIZED HEALTH CARE EDUCATION/TRAINING PROGRAM

## 2023-09-03 PROCEDURE — 82270 OCCULT BLOOD FECES: CPT | Performed by: STUDENT IN AN ORGANIZED HEALTH CARE EDUCATION/TRAINING PROGRAM

## 2023-09-03 PROCEDURE — 86850 RBC ANTIBODY SCREEN: CPT | Performed by: STUDENT IN AN ORGANIZED HEALTH CARE EDUCATION/TRAINING PROGRAM

## 2023-09-03 PROCEDURE — 99283 EMERGENCY DEPT VISIT LOW MDM: CPT

## 2023-09-03 PROCEDURE — 85610 PROTHROMBIN TIME: CPT | Performed by: STUDENT IN AN ORGANIZED HEALTH CARE EDUCATION/TRAINING PROGRAM

## 2023-09-03 PROCEDURE — 86901 BLOOD TYPING SEROLOGIC RH(D): CPT | Performed by: STUDENT IN AN ORGANIZED HEALTH CARE EDUCATION/TRAINING PROGRAM

## 2023-09-03 PROCEDURE — 85025 COMPLETE CBC W/AUTO DIFF WBC: CPT | Performed by: STUDENT IN AN ORGANIZED HEALTH CARE EDUCATION/TRAINING PROGRAM

## 2023-09-03 PROCEDURE — 86900 BLOOD TYPING SEROLOGIC ABO: CPT | Performed by: STUDENT IN AN ORGANIZED HEALTH CARE EDUCATION/TRAINING PROGRAM

## 2023-09-03 PROCEDURE — 85730 THROMBOPLASTIN TIME PARTIAL: CPT | Performed by: STUDENT IN AN ORGANIZED HEALTH CARE EDUCATION/TRAINING PROGRAM

## 2023-09-03 PROCEDURE — 85018 HEMOGLOBIN: CPT | Performed by: STUDENT IN AN ORGANIZED HEALTH CARE EDUCATION/TRAINING PROGRAM

## 2023-09-03 PROCEDURE — 80053 COMPREHEN METABOLIC PANEL: CPT | Performed by: STUDENT IN AN ORGANIZED HEALTH CARE EDUCATION/TRAINING PROGRAM

## 2023-09-03 PROCEDURE — 36430 TRANSFUSION BLD/BLD COMPNT: CPT

## 2023-09-03 NOTE — ED PROVIDER NOTES
Care of patient Marina Joe assumed from the previous healthcare provider.  See their note for further details.  Briefly, 78 y.o. female with PMH below presents with lightheadedness. History of cancer, anemia, gets infusions. She fall last night and hit her head. Last time she was anemic. Receiving transfusion now. Longstanding history of dark stools, has already has workup, and takes iron. This is unchanged per Dr. Jacob.    Past Medical History:   Diagnosis Date    Breast cancer     CKD (chronic kidney disease)     Hypercholesteremia     Hypertension     Sinusitis     Stage 3a chronic kidney disease 10/20/2020     Vitals:    09/03/23 1646   BP: 139/57   Pulse: 71   Resp:    Temp:    SpO2: 97%         Plan at time of Handoff:   Getting blood. Then discharge.      MDM/ED Course:  Reviewed patient's history.  She has had a significant hemoglobin drop in the past few days; her blood count 2 days ago was 3 points higher than it is today.  She has had dark stools for a long time.  Her most recent ER visit at which she received transfusion notes a negative FOBT.  I performed a chaperoned bedside rectal exam with the nurse and it shows melena with frankly positive Hemoccult testing.  She has not taking iron pills or Pepto-Bismol or anything else that she thinks could turn her stool dark.  She does note she is eating a lot of beets however this does not account for Hemoccult positive testing.  Vital signs are stable.  She says that she has not had blood transfusions before her ER visit recently and then again in the outpatient setting and these have all occurred within the past month or so.  She has had this problem since early this year at least but is not had to have blood transfusion.  It seems that she has having a significant degree of worsening of her anemia with possible upper GI bleed although she did have an endoscopy back in February.  Therefore I will discuss the case with her GI doctor and see whether she  might need another endoscopy because of the worsening of her anemia in such a significant degree.  Otherwise she does see her hematologist in 5 days for follow-up.    I spoke with Dr. Her GI doctor and reviewed her full history and physical including theMysh more rapid dropping in her hemoglobin recently, the recent transfusions, her FOBT test, her melena.  He has a low suspicion that she would need another emergent endoscopy given that she had 1 back in February that just showed mild gastritis.  He does not think the patient needs to be emergently scoped. Plan to also d/w Dr. Shah.    I spoke with Dr. De Luna, again reviewed the complete history and physical with him.  He feels that she should receive a third unit but cannot think of any further emergent inpatient work-up that should be done from the hematologic standpoint.  He is comfortable with her established follow-up.  He request that she be given strict return precautions.    Spoke with the patient, I did discuss with her that I can call our hospitalist For observation admission however she does prefer to go home.  She says that she can always go to the cancer center if she feels like she needs something and I discussed with her that she should have a very low threshold to come back to the emergency department.  She understands the clearly outlined return precautions that included in her AVS and agrees to them however she would prefer to go home at this time.  She received 3 units per Dr. De Luna's recommendation.       Oren Dunham MD  09/03/23 5314

## 2023-09-03 NOTE — ED PROVIDER NOTES
Subjective   History of Present Illness  Patient states that she has low blood counts and tonight when she was going to the bathroom she felt slightly dizzy and fell.  States that she did not lose consciousness but she was concerned and so came in for further evaluation.  States that she has been having some dark stools.  Denies any abdominal pain or dysuria or hematuria.  States that he is not having any chest pain or shortness of breath.  States that currently she is not dizzy.  Denies any neck pain.  Denies any numbness or tingling is new    Review of Systems   All other systems reviewed and are negative.    Past Medical History:   Diagnosis Date    Breast cancer     CKD (chronic kidney disease)     Hypercholesteremia     Hypertension     Sinusitis     Stage 3a chronic kidney disease 10/20/2020       Allergies   Allergen Reactions    Aspirin GI Bleeding    Niacin Other (See Comments)       Past Surgical History:   Procedure Laterality Date    AVULSION TOENAIL PLATE      Sept 26,2018    BREAST BIOPSY Left 11/20/2012    BREAST BIOPSY      Left Breast, 1/2019 per Dr Barkley    BREAST LUMPECTOMY Left     with node bx     COLONOSCOPY  09/13/2013    small polyp at 30cm benign hyperplastic polyp, changes consistent with melanosis coli. Recall 5 years    COLONOSCOPY  11/19/2018    Tics otherwise normal exam repeat in 5 years    COLONOSCOPY  02/13/2023    Normal exam repeat in 3 years with a 2 day prep    ENDOSCOPY  02/13/2023    Gastritis, small HH    REPLACEMENT TOTAL KNEE Right     2016    TOTAL ABDOMINAL HYSTERECTOMY WITH SALPINGO OOPHORECTOMY         Family History   Problem Relation Age of Onset    Heart attack Mother     Hodgkin's lymphoma Father     Other Father     Cancer Father         hodgkins    Heart attack Brother     Skin cancer Maternal Uncle     Pancreatic cancer Maternal Uncle     Cancer Maternal Uncle         eye    Colon cancer Neg Hx     Colon polyps Neg Hx        Social History     Socioeconomic  History    Marital status:    Tobacco Use    Smoking status: Never    Smokeless tobacco: Never   Vaping Use    Vaping Use: Never used   Substance and Sexual Activity    Alcohol use: No    Drug use: Not Currently    Sexual activity: Not Currently     Birth control/protection: Surgical           Objective   Physical Exam  Vitals and nursing note reviewed.   Constitutional:       General: She is not in acute distress.     Appearance: Normal appearance. She is not toxic-appearing or diaphoretic.   HENT:      Head: Normocephalic and atraumatic.      Right Ear: External ear normal.      Left Ear: External ear normal.      Nose: Nose normal.      Mouth/Throat:      Mouth: Mucous membranes are moist.   Eyes:      General:         Right eye: No discharge.         Left eye: No discharge.      Extraocular Movements: Extraocular movements intact.      Conjunctiva/sclera: Conjunctivae normal.      Pupils: Pupils are equal, round, and reactive to light.   Cardiovascular:      Rate and Rhythm: Normal rate.   Pulmonary:      Effort: Pulmonary effort is normal. No respiratory distress.      Breath sounds: No rhonchi.   Abdominal:      General: Abdomen is flat.      Tenderness: There is no abdominal tenderness. There is no guarding or rebound.   Musculoskeletal:         General: No deformity or signs of injury.   Skin:     General: Skin is warm.      Coloration: Skin is not jaundiced.   Neurological:      Mental Status: She is alert and oriented to person, place, and time. Mental status is at baseline.   Psychiatric:         Mood and Affect: Mood normal.         Behavior: Behavior normal.         Thought Content: Thought content normal.         Judgment: Judgment normal.       Procedures           ED Course  ED Course as of 09/03/23 1934   Sun Sep 03, 2023   0603 Pending transfusion and likely discharge home after, patient was signed out to the oncoming physician, Dr. Dunham. [NP]   1813 Patient's second hemoglobin  hematocrit hemoglobin is 7.7.  Hematocrit is 24.5.  Assumed care of patient from Dr. Dunham.  Advised if his her hemoglobin was greater than 7 that she can go home.  Her vitals are stable with a blood pressure 135/62, heart rate 69, respirations 18, O2 sats 99% on room air.  See Dr. Dunham's note for discharge instructions [CW]   1814  and reminder. [CW]      ED Course User Index  [CW] Erin Quigley APRN  [NP] Александр Jacob MD                                           Medical Decision Making  Marina Joe is a very pleasant 78 y.o. female who presents to the ED for dizziness and fall.      Patient was non-toxic and not-ill appearing on arrival.     Vital signs stable on arrival.     Patient's presentation raises suspicion for differentials including, but not limited to, orthostatic hypotension, anemia, bppv.     External (non-ED) record review: none    Given this, Marina was placed on the monitor. Laboratory studies and imaging studies were ordered including cbc, bmp, pt/ptt.     Patient's CBC shows a hemoglobin of 5.2.  Given this we will need to transfuse her.  Transfusion and reassessment and likely discharge home the patient was signed out to the oncoming physician, Dr. Dunham.      Signed by:   Александр Jacob MD 9/3/2023 05:39 CDT   Emergency Medicine Physician    Dragon disclaimer:  Part of this note may be an electronic transcription/translation of spoken language to printed text using the Dragon Dictation System.         Problems Addressed:  Anemia, unspecified type: complicated acute illness or injury    Amount and/or Complexity of Data Reviewed  Labs: ordered.  ECG/medicine tests: ordered.        Final diagnoses:   Anemia, unspecified type       ED Disposition  ED Disposition       ED Disposition   Discharge    Condition   Stable    Comment   --               PATIENT CONNECTION - [facility] [id number]  [phone]  Schedule an appointment as soon as possible for a visit             Medication List        Changed      amLODIPine 5 MG tablet  Commonly known as: NORVASC  Take 1 tablet by mouth 2 (Two) Times a Day.  What changed: how much to take                 Александр Jacob MD  09/03/23 1934

## 2023-09-03 NOTE — DISCHARGE INSTRUCTIONS
Today I talked with our GI doctor and our hematologist about your symptoms.  You will need to follow-up in 5 days with Dr. Shah as scheduled.  You definitely need to come back if you experience more lightheadedness, frequent stooling of dark stool, passing out, chest pain, trouble breathing, or any other emergency symptoms.  Please have a low threshold to come back to the ER if you are feeling worse.

## 2023-09-05 ENCOUNTER — TELEPHONE (OUTPATIENT)
Dept: ONCOLOGY | Facility: CLINIC | Age: 78
End: 2023-09-05
Payer: MEDICARE

## 2023-09-05 NOTE — TELEPHONE ENCOUNTER
Received call from patient Marina Joe, she calls to report that she was seen in  ER dept early Sunday Morning 9/3/23. Patient says that she had a fall due to the episodes of dizziness she was experiencing. patient says that she was taken to the ER by family, examined, and received 3 units of blood while there. Patient was d/c home yesterday once her HGB stabilized. Patient says she rested the remaining day of Sunday and also Monday but today she reports that she is once again feeling very sick, dizzy, light headed, and weak. Patient reports that every time she goes to the bathroom and has a bowel movement she is losing a large amount of blood in the commode. Patient questions what she needs to do?    Patient was informed that she will need to return to ER dept and they will need to contact GI regarding her bleeding issues that this is very dangerous. Patient v/u of this instruction.

## 2023-09-06 ENCOUNTER — HOSPITAL ENCOUNTER (INPATIENT)
Facility: HOSPITAL | Age: 78
LOS: 6 days | Discharge: HOME | DRG: 378 | End: 2023-09-12
Attending: STUDENT IN AN ORGANIZED HEALTH CARE EDUCATION/TRAINING PROGRAM | Admitting: INTERNAL MEDICINE
Payer: MEDICARE

## 2023-09-06 DIAGNOSIS — Z74.09 IMPAIRED MOBILITY: ICD-10-CM

## 2023-09-06 DIAGNOSIS — K92.2 GASTROINTESTINAL HEMORRHAGE, UNSPECIFIED GASTROINTESTINAL HEMORRHAGE TYPE: Primary | ICD-10-CM

## 2023-09-06 PROBLEM — D62 ACUTE BLOOD LOSS ANEMIA: Status: ACTIVE | Noted: 2023-09-06

## 2023-09-06 PROBLEM — D69.6 THROMBOCYTOPENIA: Status: ACTIVE | Noted: 2023-09-06

## 2023-09-06 PROBLEM — E86.1 HYPOTENSION DUE TO HYPOVOLEMIA: Status: ACTIVE | Noted: 2023-09-06

## 2023-09-06 PROBLEM — N18.32 STAGE 3B CHRONIC KIDNEY DISEASE: Status: ACTIVE | Noted: 2020-10-20

## 2023-09-06 PROBLEM — I95.89 HYPOTENSION DUE TO HYPOVOLEMIA: Status: ACTIVE | Noted: 2023-09-06

## 2023-09-06 LAB
ABO GROUP BLD: NORMAL
ALBUMIN SERPL-MCNC: 3.1 G/DL (ref 3.5–5.2)
ALBUMIN/GLOB SERPL: 1.6 G/DL
ALP SERPL-CCNC: 50 U/L (ref 39–117)
ALT SERPL W P-5'-P-CCNC: 9 U/L (ref 1–33)
ANION GAP SERPL CALCULATED.3IONS-SCNC: 9 MMOL/L (ref 5–15)
ANISOCYTOSIS BLD QL: ABNORMAL
AST SERPL-CCNC: 14 U/L (ref 1–32)
BILIRUB SERPL-MCNC: <0.2 MG/DL (ref 0–1.2)
BLD GP AB SCN SERPL QL: NEGATIVE
BUN SERPL-MCNC: 52 MG/DL (ref 8–23)
BUN/CREAT SERPL: 34.4 (ref 7–25)
CALCIUM SPEC-SCNC: 8.3 MG/DL (ref 8.6–10.5)
CHLORIDE SERPL-SCNC: 105 MMOL/L (ref 98–107)
CO2 SERPL-SCNC: 25 MMOL/L (ref 22–29)
CREAT SERPL-MCNC: 1.51 MG/DL (ref 0.57–1)
D-LACTATE SERPL-SCNC: 1.5 MMOL/L (ref 0.5–2)
DEPRECATED RDW RBC AUTO: 66.3 FL (ref 37–54)
EGFRCR SERPLBLD CKD-EPI 2021: 35.5 ML/MIN/1.73
EOSINOPHIL # BLD MANUAL: 0.26 10*3/MM3 (ref 0–0.4)
EOSINOPHIL NFR BLD MANUAL: 3 % (ref 0.3–6.2)
ERYTHROCYTE [DISTWIDTH] IN BLOOD BY AUTOMATED COUNT: 21.5 % (ref 12.3–15.4)
GIANT PLATELETS: ABNORMAL
GLOBULIN UR ELPH-MCNC: 1.9 GM/DL
GLUCOSE SERPL-MCNC: 115 MG/DL (ref 65–99)
HCT VFR BLD AUTO: 16.6 % (ref 34–46.6)
HGB BLD-MCNC: 4.9 G/DL (ref 12–15.9)
HOLD SPECIMEN: NORMAL
HOLD SPECIMEN: NORMAL
INR PPP: 1.09 (ref 0.91–1.09)
LYMPHOCYTES # BLD MANUAL: 1.21 10*3/MM3 (ref 0.7–3.1)
LYMPHOCYTES NFR BLD MANUAL: 6.9 % (ref 5–12)
MACROCYTES BLD QL SMEAR: ABNORMAL
MCH RBC QN AUTO: 28.8 PG (ref 26.6–33)
MCHC RBC AUTO-ENTMCNC: 29.5 G/DL (ref 31.5–35.7)
MCV RBC AUTO: 97.6 FL (ref 79–97)
MICROCYTES BLD QL: ABNORMAL
MONOCYTES # BLD: 0.6 10*3/MM3 (ref 0.1–0.9)
NEUTROPHILS # BLD AUTO: 6.61 10*3/MM3 (ref 1.7–7)
NEUTROPHILS NFR BLD MANUAL: 75.2 % (ref 42.7–76)
NEUTS BAND NFR BLD MANUAL: 1 % (ref 0–5)
NRBC SPEC MANUAL: 2 /100 WBC (ref 0–0.2)
PLATELET # BLD AUTO: 24 10*3/MM3 (ref 140–450)
PMV BLD AUTO: ABNORMAL FL
POIKILOCYTOSIS BLD QL SMEAR: ABNORMAL
POLYCHROMASIA BLD QL SMEAR: ABNORMAL
POTASSIUM SERPL-SCNC: 4 MMOL/L (ref 3.5–5.2)
PROT SERPL-MCNC: 5 G/DL (ref 6–8.5)
PROTHROMBIN TIME: 14.2 SECONDS (ref 11.8–14.8)
RBC # BLD AUTO: 1.7 10*6/MM3 (ref 3.77–5.28)
RH BLD: POSITIVE
SMALL PLATELETS BLD QL SMEAR: ABNORMAL
SODIUM SERPL-SCNC: 139 MMOL/L (ref 136–145)
T&S EXPIRATION DATE: NORMAL
VARIANT LYMPHS NFR BLD MANUAL: 13.9 % (ref 19.6–45.3)
WBC MORPH BLD: NORMAL
WBC NRBC COR # BLD: 8.67 10*3/MM3 (ref 3.4–10.8)
WHOLE BLOOD HOLD COAG: NORMAL
WHOLE BLOOD HOLD SPECIMEN: NORMAL

## 2023-09-06 PROCEDURE — 85025 COMPLETE CBC W/AUTO DIFF WBC: CPT | Performed by: STUDENT IN AN ORGANIZED HEALTH CARE EDUCATION/TRAINING PROGRAM

## 2023-09-06 PROCEDURE — 83540 ASSAY OF IRON: CPT | Performed by: INTERNAL MEDICINE

## 2023-09-06 PROCEDURE — 36415 COLL VENOUS BLD VENIPUNCTURE: CPT

## 2023-09-06 PROCEDURE — 86901 BLOOD TYPING SEROLOGIC RH(D): CPT

## 2023-09-06 PROCEDURE — 86900 BLOOD TYPING SEROLOGIC ABO: CPT

## 2023-09-06 PROCEDURE — 99285 EMERGENCY DEPT VISIT HI MDM: CPT

## 2023-09-06 PROCEDURE — 80053 COMPREHEN METABOLIC PANEL: CPT | Performed by: STUDENT IN AN ORGANIZED HEALTH CARE EDUCATION/TRAINING PROGRAM

## 2023-09-06 PROCEDURE — 86900 BLOOD TYPING SEROLOGIC ABO: CPT | Performed by: STUDENT IN AN ORGANIZED HEALTH CARE EDUCATION/TRAINING PROGRAM

## 2023-09-06 PROCEDURE — 84466 ASSAY OF TRANSFERRIN: CPT | Performed by: INTERNAL MEDICINE

## 2023-09-06 PROCEDURE — 86850 RBC ANTIBODY SCREEN: CPT | Performed by: STUDENT IN AN ORGANIZED HEALTH CARE EDUCATION/TRAINING PROGRAM

## 2023-09-06 PROCEDURE — 85610 PROTHROMBIN TIME: CPT | Performed by: STUDENT IN AN ORGANIZED HEALTH CARE EDUCATION/TRAINING PROGRAM

## 2023-09-06 PROCEDURE — P9035 PLATELET PHERES LEUKOREDUCED: HCPCS

## 2023-09-06 PROCEDURE — 36430 TRANSFUSION BLD/BLD COMPNT: CPT

## 2023-09-06 PROCEDURE — 85007 BL SMEAR W/DIFF WBC COUNT: CPT | Performed by: STUDENT IN AN ORGANIZED HEALTH CARE EDUCATION/TRAINING PROGRAM

## 2023-09-06 PROCEDURE — 86901 BLOOD TYPING SEROLOGIC RH(D): CPT | Performed by: STUDENT IN AN ORGANIZED HEALTH CARE EDUCATION/TRAINING PROGRAM

## 2023-09-06 PROCEDURE — 86923 COMPATIBILITY TEST ELECTRIC: CPT

## 2023-09-06 PROCEDURE — P9016 RBC LEUKOCYTES REDUCED: HCPCS

## 2023-09-06 PROCEDURE — 82728 ASSAY OF FERRITIN: CPT | Performed by: INTERNAL MEDICINE

## 2023-09-06 PROCEDURE — 83605 ASSAY OF LACTIC ACID: CPT | Performed by: STUDENT IN AN ORGANIZED HEALTH CARE EDUCATION/TRAINING PROGRAM

## 2023-09-06 PROCEDURE — P9100 PATHOGEN TEST FOR PLATELETS: HCPCS

## 2023-09-06 RX ORDER — ACETAMINOPHEN 160 MG/5ML
650 SOLUTION ORAL EVERY 4 HOURS PRN
Status: DISCONTINUED | OUTPATIENT
Start: 2023-09-06 | End: 2023-09-12 | Stop reason: HOSPADM

## 2023-09-06 RX ORDER — PANTOPRAZOLE SODIUM 40 MG/10ML
40 INJECTION, POWDER, LYOPHILIZED, FOR SOLUTION INTRAVENOUS EVERY 12 HOURS
Status: DISCONTINUED | OUTPATIENT
Start: 2023-09-07 | End: 2023-09-10

## 2023-09-06 RX ORDER — NITROGLYCERIN 0.4 MG/1
0.4 TABLET SUBLINGUAL
Status: DISCONTINUED | OUTPATIENT
Start: 2023-09-06 | End: 2023-09-12 | Stop reason: HOSPADM

## 2023-09-06 RX ORDER — ACETAMINOPHEN 650 MG/1
650 SUPPOSITORY RECTAL EVERY 4 HOURS PRN
Status: DISCONTINUED | OUTPATIENT
Start: 2023-09-06 | End: 2023-09-12 | Stop reason: HOSPADM

## 2023-09-06 RX ORDER — SERTRALINE HYDROCHLORIDE 100 MG/1
100 TABLET, FILM COATED ORAL DAILY
Status: DISCONTINUED | OUTPATIENT
Start: 2023-09-06 | End: 2023-09-12 | Stop reason: HOSPADM

## 2023-09-06 RX ORDER — SODIUM CHLORIDE 0.9 % (FLUSH) 0.9 %
10 SYRINGE (ML) INJECTION EVERY 12 HOURS SCHEDULED
Status: DISCONTINUED | OUTPATIENT
Start: 2023-09-06 | End: 2023-09-12 | Stop reason: HOSPADM

## 2023-09-06 RX ORDER — SODIUM CHLORIDE 9 MG/ML
40 INJECTION, SOLUTION INTRAVENOUS AS NEEDED
Status: DISCONTINUED | OUTPATIENT
Start: 2023-09-06 | End: 2023-09-12 | Stop reason: HOSPADM

## 2023-09-06 RX ORDER — SODIUM CHLORIDE 9 MG/ML
50 INJECTION, SOLUTION INTRAVENOUS CONTINUOUS
Status: DISCONTINUED | OUTPATIENT
Start: 2023-09-06 | End: 2023-09-07

## 2023-09-06 RX ORDER — ACETAMINOPHEN 325 MG/1
650 TABLET ORAL EVERY 4 HOURS PRN
Status: DISCONTINUED | OUTPATIENT
Start: 2023-09-06 | End: 2023-09-12 | Stop reason: HOSPADM

## 2023-09-06 RX ORDER — ONDANSETRON 2 MG/ML
4 INJECTION INTRAMUSCULAR; INTRAVENOUS EVERY 6 HOURS PRN
Status: DISCONTINUED | OUTPATIENT
Start: 2023-09-06 | End: 2023-09-12 | Stop reason: HOSPADM

## 2023-09-06 RX ORDER — ROPINIROLE 0.25 MG/1
0.5 TABLET, FILM COATED ORAL NIGHTLY
Status: DISCONTINUED | OUTPATIENT
Start: 2023-09-06 | End: 2023-09-12 | Stop reason: HOSPADM

## 2023-09-06 RX ORDER — ANASTROZOLE 1 MG/1
1 TABLET ORAL DAILY
Status: DISCONTINUED | OUTPATIENT
Start: 2023-09-06 | End: 2023-09-07

## 2023-09-06 RX ORDER — VALACYCLOVIR HYDROCHLORIDE 500 MG/1
500 TABLET, FILM COATED ORAL 2 TIMES DAILY
Status: DISCONTINUED | OUTPATIENT
Start: 2023-09-06 | End: 2023-09-12 | Stop reason: HOSPADM

## 2023-09-06 RX ORDER — ROSUVASTATIN CALCIUM 20 MG/1
20 TABLET, COATED ORAL DAILY
Status: DISCONTINUED | OUTPATIENT
Start: 2023-09-06 | End: 2023-09-12 | Stop reason: HOSPADM

## 2023-09-06 RX ORDER — MONTELUKAST SODIUM 10 MG/1
10 TABLET ORAL NIGHTLY
Status: DISCONTINUED | OUTPATIENT
Start: 2023-09-06 | End: 2023-09-12 | Stop reason: HOSPADM

## 2023-09-06 RX ORDER — FLUTICASONE PROPIONATE 50 MCG
2 SPRAY, SUSPENSION (ML) NASAL DAILY
Status: DISCONTINUED | OUTPATIENT
Start: 2023-09-06 | End: 2023-09-12 | Stop reason: HOSPADM

## 2023-09-06 RX ORDER — ONDANSETRON 4 MG/1
4 TABLET, FILM COATED ORAL EVERY 6 HOURS PRN
Status: DISCONTINUED | OUTPATIENT
Start: 2023-09-06 | End: 2023-09-12 | Stop reason: HOSPADM

## 2023-09-06 RX ORDER — TRAZODONE HYDROCHLORIDE 100 MG/1
100 TABLET ORAL NIGHTLY
Status: DISCONTINUED | OUTPATIENT
Start: 2023-09-06 | End: 2023-09-07

## 2023-09-06 RX ORDER — PANTOPRAZOLE SODIUM 40 MG/10ML
80 INJECTION, POWDER, LYOPHILIZED, FOR SOLUTION INTRAVENOUS ONCE
Status: COMPLETED | OUTPATIENT
Start: 2023-09-06 | End: 2023-09-06

## 2023-09-06 RX ORDER — SODIUM CHLORIDE 0.9 % (FLUSH) 0.9 %
10 SYRINGE (ML) INJECTION AS NEEDED
Status: DISCONTINUED | OUTPATIENT
Start: 2023-09-06 | End: 2023-09-12 | Stop reason: HOSPADM

## 2023-09-06 RX ORDER — BUPROPION HYDROCHLORIDE 150 MG/1
150 TABLET ORAL DAILY
Status: DISCONTINUED | OUTPATIENT
Start: 2023-09-06 | End: 2023-09-12 | Stop reason: HOSPADM

## 2023-09-06 RX ADMIN — SODIUM CHLORIDE, POTASSIUM CHLORIDE, SODIUM LACTATE AND CALCIUM CHLORIDE 1000 ML: 600; 310; 30; 20 INJECTION, SOLUTION INTRAVENOUS at 15:30

## 2023-09-06 RX ADMIN — PANTOPRAZOLE SODIUM 80 MG: 40 INJECTION, POWDER, FOR SOLUTION INTRAVENOUS at 16:08

## 2023-09-06 RX ADMIN — TRAZODONE HYDROCHLORIDE 100 MG: 100 TABLET ORAL at 21:24

## 2023-09-06 RX ADMIN — VALACYCLOVIR 500 MG: 500 TABLET, FILM COATED ORAL at 21:24

## 2023-09-06 NOTE — ED PROVIDER NOTES
Subjective   History of Present Illness  Patient presents due to continued black and bloody stool.  She reports bright red blood in her stool.  My last saw her she was reporting melena which is chronic in nature.  She feels fatigued and lightheaded so according to discussed return precautions she came back to the ER today.  She states that on Monday she had bright red blood in her stool and she continues to have this.  She states this has not acutely changed.  No fevers.  No syncope.  No falls or injuries.  No abdominal pain.  No vomiting.  Urinating normally.    Review of Systems   Constitutional:  Negative for chills and fever.   Respiratory:  Negative for cough and shortness of breath.    Cardiovascular:  Negative for chest pain and palpitations.   Gastrointestinal:  Positive for blood in stool. Negative for abdominal pain and vomiting.   Genitourinary:  Negative for difficulty urinating and dysuria.   Neurological:  Negative for syncope and light-headedness.     Past Medical History:   Diagnosis Date    Breast cancer     CKD (chronic kidney disease)     Hypercholesteremia     Hypertension     Sinusitis     Stage 3a chronic kidney disease 10/20/2020       Allergies   Allergen Reactions    Aspirin GI Bleeding    Niacin Other (See Comments)       Past Surgical History:   Procedure Laterality Date    AVULSION TOENAIL PLATE      Sept 26,2018    BREAST BIOPSY Left 11/20/2012    BREAST BIOPSY      Left Breast, 1/2019 per Dr Barkley    BREAST LUMPECTOMY Left     with node bx     COLONOSCOPY  09/13/2013    small polyp at 30cm benign hyperplastic polyp, changes consistent with melanosis coli. Recall 5 years    COLONOSCOPY  11/19/2018    Tics otherwise normal exam repeat in 5 years    COLONOSCOPY  02/13/2023    Normal exam repeat in 3 years with a 2 day prep    ENDOSCOPY  02/13/2023    Gastritis, small HH    REPLACEMENT TOTAL KNEE Right     2016    TOTAL ABDOMINAL HYSTERECTOMY WITH SALPINGO OOPHORECTOMY         Family  History   Problem Relation Age of Onset    Heart attack Mother     Hodgkin's lymphoma Father     Other Father     Cancer Father         hodgkins    Heart attack Brother     Skin cancer Maternal Uncle     Pancreatic cancer Maternal Uncle     Cancer Maternal Uncle         eye    Colon cancer Neg Hx     Colon polyps Neg Hx        Social History     Socioeconomic History    Marital status:    Tobacco Use    Smoking status: Never    Smokeless tobacco: Never   Vaping Use    Vaping Use: Never used   Substance and Sexual Activity    Alcohol use: No    Drug use: Not Currently    Sexual activity: Not Currently     Birth control/protection: Surgical           Objective   Physical Exam  Vitals reviewed.   Constitutional:       General: She is not in acute distress.  HENT:      Head: Normocephalic and atraumatic.   Eyes:      Extraocular Movements: Extraocular movements intact.      Conjunctiva/sclera: Conjunctivae normal.   Cardiovascular:      Pulses: Normal pulses.      Heart sounds: Normal heart sounds.   Pulmonary:      Effort: Pulmonary effort is normal. No respiratory distress.      Breath sounds: No wheezing.   Abdominal:      General: Abdomen is flat. There is no distension.      Tenderness: There is no abdominal tenderness. There is no guarding.   Musculoskeletal:      Cervical back: Normal range of motion and neck supple.      Right lower leg: No edema.      Left lower leg: No edema.   Skin:     General: Skin is warm and dry.   Neurological:      General: No focal deficit present.      Mental Status: She is alert. Mental status is at baseline.   Psychiatric:         Behavior: Behavior normal.         Thought Content: Thought content normal.       Procedures           ED Course  ED Course as of 09/06/23 2134   Wed Sep 06, 2023   1652 BP 110s/70s [AS]      ED Course User Index  [AS] Oren Dunham MD                                           Medical Decision Making  Amount and/or Complexity of Data  Reviewed  Labs: ordered.    Risk  Prescription drug management.  Decision regarding hospitalization.      Marina Joe is a 78 y.o. female with PMH above who presents to the Emergency Department with bloody/dark stool. Also fatigued, lightheaded. Lucid, not tachycardic, diastolic hypotension: IV fluids ordered.  History and physical is similar today as it was a few days ago.  Previously, discussed with GI doctor on-call who recommended close outpatient follow-up however suspect that she likely dropped her hemoglobin again today and will need to be admitted for inpatient work-up.  No abdominal pain to suggest perforated ulcer or surgical process.     ED Course:   -labs show stable renal function, repeat critical anemia. pRBCs and platelets ordered. BP improved with fluids. Discussed with Dr. Hernandes who accepts to hospitalist service; recs floor level of care. Pt HDS so floor level of care ordered. Pt and family agreeable to this plan.      Final diagnosis: GI bleed    All questions answered. Patient/family was understanding and in agreement with today's assessment and plan. The patient was monitored during their stay in the ED and dispositioned without acute event.    Electronically signed by:  Oren Dunham MD 9/6/2023 21:34 CDT      Note: Dragon medical dictation software was used in the creation of this note.        Final diagnoses:   Gastrointestinal hemorrhage, unspecified gastrointestinal hemorrhage type       ED Disposition  ED Disposition       ED Disposition   Decision to Admit    Condition   --    Comment   Level of Care: Med/Surg [1]   Diagnosis: GIB (gastrointestinal bleeding) [599622]   Admitting Physician: IVETH SCHERER [[id number]]   Attending Physician: IVETH SCHERER [[id number]]   Certification: I Certify That Inpatient Hospital Services Are Medically Necessary For Greater Than 2 Midnights                 No follow-up provider specified.       Medication List      No changes were made to your  prescriptions during this visit.            Oren Dunham MD  09/06/23 4611

## 2023-09-06 NOTE — H&P
"    [facility] Medicine Services  HISTORY AND PHYSICAL    Date of Admission: 9/6/2023  Primary Care Physician: Hanh Og APRN    Subjective   Primary Historian: Patient    Chief Complaint: Black stools    History of Present Illness  Marina Joe is a 78-year-old female with a past medical history of chronic kidney disease stage IIIb, breast cancer, chronic thrombocytopenia and normocytic anemia followed by [facility] oncology.  Patient has had multiple episodes of anemia with need for blood transfusion.  She was at [facility] ER on 9/3 and was given 1 unit packed red blood cells.  At that time ER provider called GI who did not feel scope was needed as she has had both upper and lower scopes 2/2023 that were essentially negative.  Patient states on 9/4 she developed profound black tarry stools however she did not room return to the emergency department simply because she was \"too sick\".  Due to worsening weakness and ongoing bleeding she presented today for evaluation, hemoglobin 4.9, platelet count 24,000.  Due to bleeding she has received 1 unit platelets and 3 units of packed red blood cells have been ordered.  Anemia studies are followed by oncology group.  She has no pain.  Upon palpation she does have right upper quadrant and mid epigastric tenderness.  Abdomen is soft.  Bowel sounds are within normal limits.  She has no other complaints.  Discussed possible need for upper endoscopy in a.m., patient did verbalize understanding.  She is admitted for further evaluation treatment.      Review of Systems   Otherwise complete ROS reviewed and negative except as mentioned in the HPI.    Past Medical History:   Past Medical History:   Diagnosis Date    Breast cancer     CKD (chronic kidney disease)     Hypercholesteremia     Hypertension     Sinusitis     Stage 3a chronic kidney disease 10/20/2020     Past Surgical History:  Past Surgical History:   Procedure Laterality Date    " AVULSION TOENAIL PLATE      Sept 26,2018    BREAST BIOPSY Left 11/20/2012    BREAST BIOPSY      Left Breast, 1/2019 per Dr Barkley    BREAST LUMPECTOMY Left     with node bx     COLONOSCOPY  09/13/2013    small polyp at 30cm benign hyperplastic polyp, changes consistent with melanosis coli. Recall 5 years    COLONOSCOPY  11/19/2018    Tics otherwise normal exam repeat in 5 years    COLONOSCOPY  02/13/2023    Normal exam repeat in 3 years with a 2 day prep    ENDOSCOPY  02/13/2023    Gastritis, small HH    REPLACEMENT TOTAL KNEE Right     2016    TOTAL ABDOMINAL HYSTERECTOMY WITH SALPINGO OOPHORECTOMY       Social History:  reports that she has never smoked. She has never used smokeless tobacco. She reports that she does not currently use drugs. She reports that she does not drink alcohol.    Family History: family history includes Cancer in her father and maternal uncle; Heart attack in her brother and mother; Hodgkin's lymphoma in her father; Other in her father; Pancreatic cancer in her maternal uncle; Skin cancer in her maternal uncle.       Allergies:  Allergies   Allergen Reactions    Aspirin GI Bleeding    Niacin Other (See Comments)       Medications:  Prior to Admission medications    Medication Sig Start Date End Date Taking? Authorizing Provider   albuterol sulfate  (90 Base) MCG/ACT inhaler Inhale 2 puffs Every 4 (Four) Hours As Needed for Wheezing. 6/30/22   Hanh Og APRJULIET   amLODIPine (NORVASC) 5 MG tablet Take 1 tablet by mouth 2 (Two) Times a Day.  Patient taking differently: Take 0.5 tablets by mouth 2 (Two) Times a Day. 12/8/22   Hanh Og APRN   anastrozole (ARIMIDEX) 1 MG tablet Take 1 tablet by mouth Daily. 6/23/22   Benjamin Shah MD   buPROPion XL (WELLBUTRIN XL) 150 MG 24 hr tablet TAKE 1 TABLET BY MOUTH DAILY 6/23/23   Hanh Og APRN   Calcium Carb-Cholecalciferol (CALCIUM 600+D3 PO) Take  by mouth.    Provider, MD Maurisio   carvedilol (COREG) 6.25  MG tablet Take 1 tablet by mouth 2 (Two) Times a Day With Meals. 3/2/23   Gladis Ledezma APRN   cetirizine (zyrTEC) 10 MG tablet Take 1 tablet by mouth Daily.    Maurisio Zazueta MD   cloNIDine (Catapres) 0.1 MG tablet Take 1 tablet by mouth 2 (Two) Times a Day As Needed for High Blood Pressure (greater than 160/90). 1/5/23   Hanh Og APRN   cyclobenzaprine (FLEXERIL) 10 MG tablet Take 1 tablet by mouth 3 (Three) Times a Day As Needed for Muscle Spasms. 10/27/22   Hanh Og APRN   Ergocalciferol (VITAMIN D2 PO) Take 50,000 Units by mouth Every 30 (Thirty) Days.    Maurisio Zazueta MD   ferrous sulfate 325 (65 FE) MG tablet Take 1 tablet by mouth Daily With Breakfast.    Maurisio Zazueta MD   fluticasone (FLONASE) 50 MCG/ACT nasal spray 2 sprays into the nostril(s) as directed by provider Daily. 6/1/22   Hanh Og APRN   fluticasone (FLOVENT DISKUS) 50 MCG/BLIST diskus inhaler Inhale 1 puff.    Maurisio Zazueta MD   irbesartan-hydrochlorothiazide (AVALIDE) 300-12.5 MG tablet TAKE 1 TABLET BY MOUTH DAILY 5/2/23   Hanh Og APRN   montelukast (SINGULAIR) 10 MG tablet Take 1 tablet by mouth Daily. 3/2/23   Gladis Ledezma APRN   Multiple Vitamins-Minerals (MULTIVITAMIN ADULT) tablet Take  by mouth Daily.    Maurisio Zazueta MD   Omega-3 Fatty Acids (FISH OIL) 1000 MG capsule capsule Take 1 capsule by mouth.    Maurisio Zazueta MD   pantoprazole (Protonix) 40 MG EC tablet Take 1 tablet by mouth Daily. 6/20/23   Hanh Og APRN   rOPINIRole (REQUIP) 0.5 MG tablet Take 1 tablet by mouth Every Night. 3/2/23   Gladis Ledezma APRN   rosuvastatin (CRESTOR) 20 MG tablet Take 1 tablet by mouth Daily. 5/2/23   Hanh Og APRN   sertraline (ZOLOFT) 100 MG tablet TAKE 1 TABLET BY MOUTH DAILY 7/24/23   Hanh Og APRN   traZODone (DESYREL) 100 MG tablet Take 1 tablet by mouth Every Night. 3/2/23   Gladis Ledezma, MARY  "  valACYclovir (VALTREX) 500 MG tablet TAKE 1 TABLET BY MOUTH TWICE DAILY 7/12/23   Hanh Og APRN   vitamin D (ERGOCALCIFEROL) 1.25 MG (57068 UT) capsule capsule TAKE 1 CAPSULE BY MOUTH ONCE MONTHLY 5/2/23   Provider, MD MIRIAM Forde have utilized all available immediate resources to obtain, update, or review the patient's current medications (including all prescriptions, over-the-counter products, herbals, cannabis/cannabidiol products, and vitamin/mineral/dietary (nutritional) supplements).    Objective     Vital Signs: /43   Pulse 69   Temp 97.7 °F (36.5 °C)   Resp 14   Ht 172.7 cm (68\")   Wt 105 kg (231 lb)   LMP  (LMP Unknown)   SpO2 99%   BMI 35.12 kg/m²   Physical Exam  Vitals reviewed.   Constitutional:       Appearance: She is ill-appearing.   HENT:      Head: Normocephalic and atraumatic.      Mouth/Throat:      Mouth: Mucous membranes are moist.      Pharynx: Oropharynx is clear.      Comments: Oral mucosa pale  Eyes:      Extraocular Movements: Extraocular movements intact.      Conjunctiva/sclera: Conjunctivae normal.   Cardiovascular:      Rate and Rhythm: Normal rate and regular rhythm.   Pulmonary:      Effort: Pulmonary effort is normal.      Breath sounds: Normal breath sounds.   Abdominal:      Palpations: Abdomen is soft.      Tenderness: There is abdominal tenderness (Right upper quadrant and midepigastric).   Musculoskeletal:      Cervical back: Normal range of motion and neck supple.      Comments: Generalized weakness and debility   Skin:     General: Skin is warm and dry.   Neurological:      General: No focal deficit present.      Mental Status: She is alert and oriented to person, place, and time.   Psychiatric:         Mood and Affect: Mood normal.         Behavior: Behavior normal.        Results Reviewed:  Lab Results (last 24 hours)       Procedure Component Value Units Date/Time    CBC Auto Differential [[id number]]  (Abnormal) Collected: 09/06/23 1608    " Specimen: Blood Updated: 09/06/23 1643     WBC 8.67 10*3/mm3      RBC 1.70 10*6/mm3      Hemoglobin 4.9 g/dL      Hematocrit 16.6 %      MCV 97.6 fL      MCH 28.8 pg      MCHC 29.5 g/dL      RDW 21.5 %      RDW-SD 66.3 fl      MPV --     Comment: Unable to calculate        Platelets 24 10*3/mm3     Manual Differential [[id number]]  (Abnormal) Collected: 09/06/23 1608    Specimen: Blood Updated: 09/06/23 1643     Neutrophil % 75.2 %      Lymphocyte % 13.9 %      Monocyte % 6.9 %      Eosinophil % 3.0 %      Bands %  1.0 %      Neutrophils Absolute 6.61 10*3/mm3      Lymphocytes Absolute 1.21 10*3/mm3      Monocytes Absolute 0.60 10*3/mm3      Eosinophils Absolute 0.26 10*3/mm3      nRBC 2.0 /100 WBC      Anisocytosis Mod/2+     Macrocytes Slight/1+     Microcytes Mod/2+     Poikilocytes Slight/1+     Polychromasia Large/3+     WBC Morphology Normal     Platelet Estimate Decreased     Giant Platelets Slight/1+    Protime-INR [[id number]]  (Normal) Collected: 09/06/23 1608    Specimen: Blood Updated: 09/06/23 1628     Protime 14.2 Seconds      INR 1.09    Comprehensive Metabolic Panel [[id number]]  (Abnormal) Collected: 09/06/23 1532    Specimen: Blood Updated: 09/06/23 1613     Glucose 115 mg/dL      BUN 52 mg/dL      Creatinine 1.51 mg/dL      Sodium 139 mmol/L      Potassium 4.0 mmol/L      Comment: Slight hemolysis detected by analyzer. Results may be affected.        Chloride 105 mmol/L      CO2 25.0 mmol/L      Calcium 8.3 mg/dL      Total Protein 5.0 g/dL      Albumin 3.1 g/dL      ALT (SGPT) 9 U/L      AST (SGOT) 14 U/L      Alkaline Phosphatase 50 U/L      Total Bilirubin <0.2 mg/dL      Globulin 1.9 gm/dL      A/G Ratio 1.6 g/dL      BUN/Creatinine Ratio 34.4     Anion Gap 9.0 mmol/L      eGFR 35.5 mL/min/1.73     Lactic Acid, Plasma [[id number]]  (Normal) Collected: 09/06/23 1532    Specimen: Blood Updated: 09/06/23 1612     Lactate 1.5 mmol/L           Imaging Results (Last 24 Hours)       ** No results found  for the last 24 hours. **          2/13/2023 EGD revealed mild gastritis in the body of the stomach, colonoscopy negative for acute findings    Assessment / Plan   Assessment:   Active Hospital Problems    Diagnosis     **GIB (gastrointestinal bleeding)     Acute blood loss anemia     Thrombocytopenia     Hypotension due to hypovolemia     Stage 3b chronic kidney disease        Treatment Plan  1.  The patient will be admitted to Dr. Lincoln's service here at [facility].   2.  Patient has received 1 unit of platelets in the emergency department, 3 units of packed red blood cells have been ordered  3.  Anemia studies followed by oncology, will not repeat  4.  Patient has received Protonix 80 mg IV bolus and will continue with 40 mg IV every 12 hours  5.  Gentle hydration normal saline 75 mL/hour  6.  Clear liquid diet, n.p.o. after midnight  7.  Supplemental oxygen if needed, incentive spirometry, continuous pulse oximetry  8.  Labs in a.m., H&H every 8 hours begin after PRBCs have infused stool for occult blood pending  9.  Cardiac monitor, daily weights, oral care,  10.  Home medications reviewed and restarted as appropriate  11.  DVT prophylaxis with SCDs  12.  Consult GI and hematology  13.  Consult OT and PT for strengthening    Medical Decision Making  Number and Complexity of problems: 5  Differential Diagnosis: None    Conditions and Status        Condition is unchanged.     MDM Data  External documents reviewed: No  Cardiac tracing (EKG, telemetry) interpretation: Reviewed  Radiology interpretation: Reviewed  Labs reviewed: Yes  Any tests that were considered but not ordered: No     Decision rules/scores evaluated (example [id number], Wells, etc): No     Discussed with: Patient,  Jose Enrique and Dr. Lincoln     Care Planning  Shared decision making: Patient,  Jose Enrique and Dr. Lincoln  Code status and discussions: Full    Disposition  Social Determinants of Health that impact treatment or  disposition: No  Estimated length of stay is 2+ days.     I confirmed that the patient's advanced care plan is present, code status is documented, and a surrogate decision maker is listed in the patient's medical record.     The patient's surrogate decision maker is  Jose Enrique.     The patient was seen and examined by me on 9/6/2023 at 5:31 PM.    Electronically signed by MARY Jasmine, 09/06/23, 19:04 CDT.

## 2023-09-07 LAB
ANION GAP SERPL CALCULATED.3IONS-SCNC: 9 MMOL/L (ref 5–15)
BH BB BLOOD EXPIRATION DATE: NORMAL
BH BB BLOOD TYPE BARCODE: 6200
BH BB DISPENSE STATUS: NORMAL
BH BB PRODUCT CODE: NORMAL
BH BB UNIT NUMBER: NORMAL
BUN SERPL-MCNC: 46 MG/DL (ref 8–23)
BUN/CREAT SERPL: 32.4 (ref 7–25)
CALCIUM SPEC-SCNC: 8 MG/DL (ref 8.6–10.5)
CHLORIDE SERPL-SCNC: 106 MMOL/L (ref 98–107)
CO2 SERPL-SCNC: 24 MMOL/L (ref 22–29)
CREAT SERPL-MCNC: 1.42 MG/DL (ref 0.57–1)
DEPRECATED RDW RBC AUTO: 52.5 FL (ref 37–54)
EGFRCR SERPLBLD CKD-EPI 2021: 38.2 ML/MIN/1.73
ERYTHROCYTE [DISTWIDTH] IN BLOOD BY AUTOMATED COUNT: 18.2 % (ref 12.3–15.4)
FERRITIN SERPL-MCNC: 260.6 NG/ML (ref 13–150)
GLUCOSE SERPL-MCNC: 105 MG/DL (ref 65–99)
HCT VFR BLD AUTO: 21.6 % (ref 34–46.6)
HCT VFR BLD AUTO: 22.4 % (ref 34–46.6)
HCT VFR BLD AUTO: 22.8 % (ref 34–46.6)
HEMOCCULT STL QL: POSITIVE
HGB BLD-MCNC: 6.5 G/DL (ref 12–15.9)
HGB BLD-MCNC: 7 G/DL (ref 12–15.9)
HGB BLD-MCNC: 7.2 G/DL (ref 12–15.9)
IRON 24H UR-MRATE: 60 MCG/DL (ref 37–145)
IRON SATN MFR SERPL: 21 % (ref 20–50)
MCH RBC QN AUTO: 29.3 PG (ref 26.6–33)
MCHC RBC AUTO-ENTMCNC: 31.6 G/DL (ref 31.5–35.7)
MCV RBC AUTO: 92.7 FL (ref 79–97)
PLATELET # BLD AUTO: 17 10*3/MM3 (ref 140–450)
PMV BLD AUTO: ABNORMAL FL
POTASSIUM SERPL-SCNC: 3.8 MMOL/L (ref 3.5–5.2)
RBC # BLD AUTO: 2.46 10*6/MM3 (ref 3.77–5.28)
RETICS # AUTO: 0.19 10*6/MM3 (ref 0.02–0.13)
RETICS/RBC NFR AUTO: 8.43 % (ref 0.7–1.9)
SODIUM SERPL-SCNC: 139 MMOL/L (ref 136–145)
TIBC SERPL-MCNC: 292 MCG/DL (ref 298–536)
TRANSFERRIN SERPL-MCNC: 196 MG/DL (ref 200–360)
UNIT  ABO: NORMAL
UNIT  RH: NORMAL
WBC NRBC COR # BLD: 8.99 10*3/MM3 (ref 3.4–10.8)

## 2023-09-07 PROCEDURE — 86900 BLOOD TYPING SEROLOGIC ABO: CPT

## 2023-09-07 PROCEDURE — 85014 HEMATOCRIT: CPT | Performed by: NURSE PRACTITIONER

## 2023-09-07 PROCEDURE — P9035 PLATELET PHERES LEUKOREDUCED: HCPCS

## 2023-09-07 PROCEDURE — 85018 HEMOGLOBIN: CPT | Performed by: NURSE PRACTITIONER

## 2023-09-07 PROCEDURE — 36430 TRANSFUSION BLD/BLD COMPNT: CPT

## 2023-09-07 PROCEDURE — 80048 BASIC METABOLIC PNL TOTAL CA: CPT | Performed by: NURSE PRACTITIONER

## 2023-09-07 PROCEDURE — 85027 COMPLETE CBC AUTOMATED: CPT | Performed by: NURSE PRACTITIONER

## 2023-09-07 PROCEDURE — P9016 RBC LEUKOCYTES REDUCED: HCPCS

## 2023-09-07 PROCEDURE — 99222 1ST HOSP IP/OBS MODERATE 55: CPT | Performed by: INTERNAL MEDICINE

## 2023-09-07 PROCEDURE — 25010000002 DEXAMETHASONE SODIUM PHOSPHATE 100 MG/10ML SOLUTION 10 ML VIAL: Performed by: INTERNAL MEDICINE

## 2023-09-07 PROCEDURE — P9100 PATHOGEN TEST FOR PLATELETS: HCPCS

## 2023-09-07 PROCEDURE — 99222 1ST HOSP IP/OBS MODERATE 55: CPT | Performed by: NURSE PRACTITIONER

## 2023-09-07 PROCEDURE — 36415 COLL VENOUS BLD VENIPUNCTURE: CPT | Performed by: NURSE PRACTITIONER

## 2023-09-07 PROCEDURE — 85045 AUTOMATED RETICULOCYTE COUNT: CPT | Performed by: INTERNAL MEDICINE

## 2023-09-07 PROCEDURE — P9037 PLATE PHERES LEUKOREDU IRRAD: HCPCS

## 2023-09-07 PROCEDURE — 82272 OCCULT BLD FECES 1-3 TESTS: CPT | Performed by: STUDENT IN AN ORGANIZED HEALTH CARE EDUCATION/TRAINING PROGRAM

## 2023-09-07 RX ORDER — VALACYCLOVIR HYDROCHLORIDE 500 MG/1
500 TABLET, FILM COATED ORAL 2 TIMES DAILY
COMMUNITY

## 2023-09-07 RX ORDER — IRBESARTAN AND HYDROCHLOROTHIAZIDE 300; 12.5 MG/1; MG/1
1 TABLET, FILM COATED ORAL DAILY
COMMUNITY

## 2023-09-07 RX ORDER — SERTRALINE HYDROCHLORIDE 100 MG/1
100 TABLET, FILM COATED ORAL DAILY
COMMUNITY

## 2023-09-07 RX ORDER — BUPROPION HYDROCHLORIDE 150 MG/1
150 TABLET ORAL DAILY
COMMUNITY

## 2023-09-07 RX ADMIN — Medication 10 ML: at 09:02

## 2023-09-07 RX ADMIN — PANTOPRAZOLE SODIUM 40 MG: 40 INJECTION, POWDER, FOR SOLUTION INTRAVENOUS at 15:44

## 2023-09-07 RX ADMIN — SERTRALINE HYDROCHLORIDE 100 MG: 100 TABLET, FILM COATED ORAL at 09:01

## 2023-09-07 RX ADMIN — FLUTICASONE PROPIONATE 2 SPRAY: 50 SPRAY, METERED NASAL at 09:10

## 2023-09-07 RX ADMIN — ROPINIROLE HYDROCHLORIDE 0.5 MG: 0.25 TABLET, FILM COATED ORAL at 20:38

## 2023-09-07 RX ADMIN — BUPROPION HYDROCHLORIDE 150 MG: 150 TABLET, EXTENDED RELEASE ORAL at 09:00

## 2023-09-07 RX ADMIN — Medication 10 ML: at 20:38

## 2023-09-07 RX ADMIN — VALACYCLOVIR 500 MG: 500 TABLET, FILM COATED ORAL at 09:03

## 2023-09-07 RX ADMIN — PANTOPRAZOLE SODIUM 40 MG: 40 INJECTION, POWDER, FOR SOLUTION INTRAVENOUS at 06:31

## 2023-09-07 RX ADMIN — VALACYCLOVIR 500 MG: 500 TABLET, FILM COATED ORAL at 20:38

## 2023-09-07 RX ADMIN — ROSUVASTATIN CALCIUM 20 MG: 20 TABLET, FILM COATED ORAL at 09:01

## 2023-09-07 RX ADMIN — MONTELUKAST SODIUM 10 MG: 10 TABLET, FILM COATED ORAL at 20:38

## 2023-09-07 RX ADMIN — DEXAMETHASONE SODIUM PHOSPHATE 40 MG: 10 INJECTION, SOLUTION INTRAMUSCULAR; INTRAVENOUS at 09:31

## 2023-09-07 RX ADMIN — ANASTROZOLE 1 MG: 1 TABLET ORAL at 09:00

## 2023-09-07 NOTE — PLAN OF CARE
Goal Outcome Evaluation:            Patient admitted from ED with hgb 4.9. Hx of thrombocytopenia with recent blood transfusions. Blood currently infusing. Patient received platelets in ED. GI and Heme/Onc consulted.

## 2023-09-07 NOTE — CONSULTS
Psychiatric Oncology/Hematology Services  CONSULT NOTE    PATIENT NAME:  Marina Joe  YOB: 1945  PATIENT MRN:  [id number]    Date of Admission:  9/6/2023  Consultation Date:  9/7/2023  Referring Provider: No ref. provider found    Subjective     Reason for Consultation: Continuity of care.    History of present illness: Marina Joe is a pleasant 78 y.o. female known to our service with chronic idiopathic thrombocytic purpura, iron deficiency anemia and anemia from chronic kidney disease stage IIIb.  She is seen with nurse Concepcion at the bedside.  History from the chart and patient.      She presented on 9/3/2023 at the emergency department for melena, symptomatic anemia and given 1 unit of packed RBC.  Her hemoglobin was 5.2 and hematocrit 17.7.  Platelet was 33.  CBC 9/1/2023 revealed a hemoglobin of 8.1, hematocrit 26.8 and platelet of 38.  PT and PTT were normal.  GI had recommended repeat endoscopy/colonoscopy.  Repeat hemoglobin 7.7 hematocrit 24.5.    She presented with melena 9/6/2023.  CBC remarkable for hemoglobin 4.9, hematocrit 16.6 and platelet 24.  No schistocytes.  No nucleated RBC.    She was given 3 units packed RBC and 1 unit platelets.  Repeat hemoglobin 6.5 and hematocrit 21.6. Await CBC this am.     She had received her latest Injectafer on 8/11/2023 and Retacrit on 9/1/2023.    With above background, she is seen.    Past Medical History:  Past Medical History:   Diagnosis Date    Breast cancer     CKD (chronic kidney disease)     Hypercholesteremia     Hypertension     Sinusitis     Stage 3a chronic kidney disease 10/20/2020     Prior Surgeries:  Past Surgical History:   Procedure Laterality Date    AVULSION TOENAIL PLATE      Sept 26,2018    BREAST BIOPSY Left 11/20/2012    BREAST BIOPSY      Left Breast, 1/2019 per Dr Barkley    BREAST LUMPECTOMY Left     with node bx     COLONOSCOPY  09/13/2013    small polyp at 30cm benign hyperplastic polyp, changes  consistent with melanosis coli. Recall 5 years    COLONOSCOPY  11/19/2018    Tics otherwise normal exam repeat in 5 years    COLONOSCOPY  02/13/2023    Normal exam repeat in 3 years with a 2 day prep    ENDOSCOPY  02/13/2023    Gastritis, small HH    REPLACEMENT TOTAL KNEE Right     2016    TOTAL ABDOMINAL HYSTERECTOMY WITH SALPINGO OOPHORECTOMY          Allergies:Aspirin and Niacin  PTA Meds:  Medications Prior to Admission   Medication Sig Dispense Refill Last Dose    buPROPion XL (WELLBUTRIN XL) 150 MG 24 hr tablet TAKE 1 TABLET BY MOUTH DAILY 90 tablet 3 9/6/2023    albuterol sulfate  (90 Base) MCG/ACT inhaler Inhale 2 puffs Every 4 (Four) Hours As Needed for Wheezing. 2 g 3     amLODIPine (NORVASC) 5 MG tablet Take 1 tablet by mouth 2 (Two) Times a Day. (Patient taking differently: Take 0.5 tablets by mouth 2 (Two) Times a Day.) 180 tablet 2     anastrozole (ARIMIDEX) 1 MG tablet Take 1 tablet by mouth Daily. 90 tablet 3     Calcium Carb-Cholecalciferol (CALCIUM 600+D3 PO) Take  by mouth.       carvedilol (COREG) 6.25 MG tablet Take 1 tablet by mouth 2 (Two) Times a Day With Meals. 180 tablet 3     cetirizine (zyrTEC) 10 MG tablet Take 1 tablet by mouth Daily.       cloNIDine (Catapres) 0.1 MG tablet Take 1 tablet by mouth 2 (Two) Times a Day As Needed for High Blood Pressure (greater than 160/90). 45 tablet 0     cyclobenzaprine (FLEXERIL) 10 MG tablet Take 1 tablet by mouth 3 (Three) Times a Day As Needed for Muscle Spasms. 90 tablet 5     Ergocalciferol (VITAMIN D2 PO) Take 50,000 Units by mouth Every 30 (Thirty) Days.       ferrous sulfate 325 (65 FE) MG tablet Take 1 tablet by mouth Daily With Breakfast.       fluticasone (FLONASE) 50 MCG/ACT nasal spray 2 sprays into the nostril(s) as directed by provider Daily. 1 g 3     fluticasone (FLOVENT DISKUS) 50 MCG/BLIST diskus inhaler Inhale 1 puff.       irbesartan-hydrochlorothiazide (AVALIDE) 300-12.5 MG tablet TAKE 1 TABLET BY MOUTH DAILY 90 tablet 3      montelukast (SINGULAIR) 10 MG tablet Take 1 tablet by mouth Daily. 90 tablet 1     Multiple Vitamins-Minerals (MULTIVITAMIN ADULT) tablet Take  by mouth Daily.       Omega-3 Fatty Acids (FISH OIL) 1000 MG capsule capsule Take 1 capsule by mouth.       pantoprazole (Protonix) 40 MG EC tablet Take 1 tablet by mouth Daily. 90 tablet 2     rOPINIRole (REQUIP) 0.5 MG tablet Take 1 tablet by mouth Every Night. 90 tablet 1     rosuvastatin (CRESTOR) 20 MG tablet Take 1 tablet by mouth Daily. 90 tablet 3     sertraline (ZOLOFT) 100 MG tablet TAKE 1 TABLET BY MOUTH DAILY 90 tablet 3     traZODone (DESYREL) 100 MG tablet Take 1 tablet by mouth Every Night. 90 tablet 3     valACYclovir (VALTREX) 500 MG tablet TAKE 1 TABLET BY MOUTH TWICE DAILY 180 tablet 0     vitamin D (ERGOCALCIFEROL) 1.25 MG (54854 UT) capsule capsule TAKE 1 CAPSULE BY MOUTH ONCE MONTHLY         Current Meds:   Current Facility-Administered Medications   Medication Dose Route Frequency Provider Last Rate Last Admin    acetaminophen (TYLENOL) tablet 650 mg  650 mg Oral Q4H PRN Johanny Reid APRN        Or    acetaminophen (TYLENOL) 160 MG/5ML solution 650 mg  650 mg Oral Q4H PRN Johanny Reid APRN        Or    acetaminophen (TYLENOL) suppository 650 mg  650 mg Rectal Q4H PRN Johanny Reid, MARY        anastrozole (ARIMIDEX) tablet 1 mg  1 mg Oral Daily Johanny Reid APRN        buPROPion XL (WELLBUTRIN XL) 24 hr tablet 150 mg  150 mg Oral Daily Johanny Reid APRN        fluticasone (FLONASE) 50 MCG/ACT nasal spray 2 spray  2 spray Nasal Daily Johanny Reid APRN        montelukast (SINGULAIR) tablet 10 mg  10 mg Oral Nightly Johanny Reid APRN        nitroglycerin (NITROSTAT) SL tablet 0.4 mg  0.4 mg Sublingual Q5 Min PRN Johanny Reid APRN        ondansetron (ZOFRAN) tablet 4 mg  4 mg Oral Q6H PRN Johanny Reid APRN        Or    ondansetron (ZOFRAN) injection 4 mg  4 mg Intravenous Q6H PRN Johanny Reid,  APRN        pantoprazole (PROTONIX) injection 40 mg  40 mg Intravenous Q12H Johanny Reid, APRN        rOPINIRole (REQUIP) tablet 0.5 mg  0.5 mg Oral Nightly Johanny Reid, APRN        rosuvastatin (CRESTOR) tablet 20 mg  20 mg Oral Daily Johanny Reid, APRN        sertraline (ZOLOFT) tablet 100 mg  100 mg Oral Daily Johanny Reid, APRJULIET        sodium chloride 0.9 % flush 10 mL  10 mL Intravenous PRN Oren Dunham MD        sodium chloride 0.9 % flush 10 mL  10 mL Intravenous Q12H Johanny Reid, APRN        sodium chloride 0.9 % flush 10 mL  10 mL Intravenous PRN Johanny Reid, APRN        sodium chloride 0.9 % infusion 40 mL  40 mL Intravenous PRN Johanny Reid, APRN        sodium chloride 0.9 % infusion  75 mL/hr Intravenous Continuous Joahnny Reid, MARY        traZODone (DESYREL) tablet 100 mg  100 mg Oral Nightly Johanny Reid, APRN   100 mg at 09/06/23 2124    valACYclovir (VALTREX) tablet 500 mg  500 mg Oral BID Johanny Reid, APRN   500 mg at 09/06/23 2124       Family History:family history includes Cancer in her father and maternal uncle; Heart attack in her brother and mother; Hodgkin's lymphoma in her father; Other in her father; Pancreatic cancer in her maternal uncle; Skin cancer in her maternal uncle.   Social History: reports that she has never smoked. She has never used smokeless tobacco. She reports that she does not currently use drugs. She reports that she does not drink alcohol.    Review of Systems  Review of Systems   Constitutional:  Positive for fatigue. Negative for fever.   HENT:  Negative for congestion and nosebleeds.    Eyes:  Negative for redness and visual disturbance.   Respiratory:  Negative for shortness of breath and wheezing.    Cardiovascular:  Negative for chest pain and palpitations.   Gastrointestinal:  Negative for abdominal pain, nausea and vomiting.   Endocrine: Negative for polydipsia and polyphagia.   Genitourinary:   Negative for hematuria and vaginal bleeding.   Musculoskeletal:  Negative for neck stiffness.   Skin:  Positive for pallor.   Allergic/Immunologic: Negative for immunocompromised state.   Neurological:  Negative for dizziness and speech difficulty.   Hematological:  Negative for adenopathy.   Psychiatric/Behavioral:  Negative for agitation, confusion and hallucinations.        Objective      Vital Signs   Temp:  [97.4 °F (36.3 °C)-98.6 °F (37 °C)] 98.4 °F (36.9 °C)  Heart Rate:  [69-86] 76  Resp:  [14-20] 16  BP: ()/(38-57) 136/46    Physical Exam  Vitals and nursing note reviewed.   Constitutional:       Appearance: She is ill-appearing.   HENT:      Head: Normocephalic and atraumatic.   Eyes:      General: No scleral icterus.  Cardiovascular:      Rate and Rhythm: Normal rate.   Pulmonary:      Effort: No respiratory distress.      Breath sounds: No wheezing.   Abdominal:      General: Bowel sounds are normal. There is no distension.      Palpations: Abdomen is soft.   Musculoskeletal:      Cervical back: Neck supple.      Comments: Bilateral lower extremity SCDs.   Skin:     General: Skin is warm.      Coloration: Skin is pale.   Neurological:      Mental Status: She is oriented to person, place, and time.   Psychiatric:         Mood and Affect: Mood normal.         Behavior: Behavior normal.         Thought Content: Thought content normal.         Judgment: Judgment normal.        Results from last 7 days   Lab Units 09/07/23  0141 09/06/23  1608 09/03/23  1734 09/03/23  0535 09/01/23  0853   WBC 10*3/mm3  --  8.67  --  9.54 7.07   HEMOGLOBIN g/dL 6.5* 4.9* 7.7* 5.2* 8.1*   HEMATOCRIT % 21.6* 16.6* 24.5* 17.7* 26.8*   PLATELETS 10*3/mm3  --  24*  --  33* 38*        Results from last 7 days   Lab Units 09/06/23  1532 09/03/23  0535 09/01/23  0853   SODIUM mmol/L 139 137 139   POTASSIUM mmol/L 4.0 4.0 3.8   CHLORIDE mmol/L 105 104 103   CO2 mmol/L 25.0 25.0 26.0   BUN mg/dL 52* 59* 38*   CREATININE mg/dL  1.51* 1.58* 1.88*   CALCIUM mg/dL 8.3* 8.8 8.6   BILIRUBIN mg/dL <0.2 <0.2 0.2   ALK PHOS U/L 50 48 71   ALT (SGPT) U/L 9 9 11   AST (SGOT) U/L 14 12 14   GLUCOSE mg/dL 115* 123* 106*       ABG:  No results found for: PHART, PO2ART, YVA4WTI    Culture Data:   No results found for: BLOODCX, URINECX, WOUNDCX, MRSACX, RESPCX, STOOLCX    Radiology:   Imaging Results (Last 7 Days)       ** No results found for the last 168 hours. **                 Assessment/Plan        ASSESSMENT:   Gastrointestinal bleeding.  Symptomatic anemia from acute blood loss anemia.        CONCURRENT PROBLEMS:  1. Chronic ITP (idiopathic thrombocytopenia).  Slightly hyperresonant marrow.  No features of myelodysplasia or excess blasts on 11/10/2022.   2. Anemia in stage 3b chronic kidney disease.  On epoetin alpha 40,000 units, latest dose 9/1/2023.   3. Iron deficiency anemia secondary to inadequate dietary iron intake    4. History of breast cancer.  Off adjuvant anastrozole.   5.   Followed by Dr[facility] Gus Carvalho at [facility].         PLAN:   regarding the reason for the consult.   regarding trial of dexamethasone 40 mg IV daily for 4 days and follow platelet counts.  She is on Protonix IV.  Await GI evaluation.  Latest endoscopy and colonoscopy 2/13/2023 were unremarkable.  Follow heme status.  Check retic, ferritin and iron profile.  Reticulocyte 8.4, 21% saturation, low TIBC and ferritin 260.6.  She had Injectafer on 8/11/2023.  Transfuse 1 unit packed RBC if hemoglobin less than 8.  Monitor for transfusion reactions  Transfuse 1 unit platelet if platelet is 10 or below.  Monitor for transfusion reactions.  Clinic appointment with MARY Toney 1 week after discharge.  Further recommendations pending.    I discussed the patients findings and my recommendations with patient and nurse Carlene.  They verbalized understanding.        I spent 51 total minutes, face-to-face, caring for Marina today. Greater than 50% of this time  involved counseling and/or coordination of care as documented within this note.       Benjamin Shah MD  09/07/23  06:01 CDT                  Thank you for allowing me to participate in the care of Ms. Marina Joe

## 2023-09-07 NOTE — ED NOTES
Attempted to call report to 3C and 3C charge nurse, unable to reach staff at this time. Will report to night shift nurse

## 2023-09-07 NOTE — PROGRESS NOTES
"    [facility]  INPATIENT PROGRESS NOTE    Patient Name: Marina Joe  Date of Admission: 9/6/2023  Today's Date: 09/07/23  Length of Stay: 1  Primary Care Physician: Hanh Og APRN    Subjective   Chief Complaint: Back stools  HPI   Ms. Joe presented to [facility] ER 9/6/2023 with worsening weakness, black stools.  She has a history of CKD stage IIIb, chronic thrombocytopenia, normocytic anemia followed by [facility] oncology.  Patient has had multiple episodes of anemia requiring transfusion.  She was evaluated at [facility] ER on 9/3/2023 and received 1 unit packed cells.  At that time, ER provider discussed with gastroenterology who did not believe scope was needed as patient had both upper and lower scopes February 2023 essentially negative.  On 9/4, patient developed profound black tarry stools but did not return to the emergency room because she was \"too sick\".  Due to ongoing black stools she presented to ER on 9/6 with hemoglobin 4.9, platelet count 24,000.  Patient denied any abdominal pain but upon palpation she was noted to have mid epigastric tenderness.  Abdomen soft and bowel sounds within normal limits. Patient was ordered 1 platelet pheresis pack and 3 units packed cells in ER.    Today  Patient seen earlier today.  No complaints of abdominal pain.  No black stool since admission.  Patient has received 3 units packed cells with hemoglobin improved to 7.2.  However, platelets 17,000 and additional unit of platelet pheresis pack ordered.  Discussed with MARY Zapata today and needs to proceed with endoscopy however due to low platelets and low hemoglobin timing to be determined.  Follow-up hemoglobin 7 and additional unit of packed cells ordered today.    Review of Systems   Constitutional:  Positive for activity change and fatigue. Negative for chills and fever.   HENT:  Negative for congestion and trouble swallowing.    Eyes:  " Negative for photophobia and visual disturbance.   Respiratory:  Negative for cough, shortness of breath and wheezing.    Cardiovascular:  Negative for chest pain, palpitations and leg swelling.   Gastrointestinal:  Positive for blood in stool (Black tarry). Negative for vomiting.   Endocrine: Negative for cold intolerance, heat intolerance and polyuria.   Genitourinary:  Negative for dysuria, frequency and urgency.   Musculoskeletal:  Negative for gait problem.   Skin:  Negative for color change, pallor, rash and wound.   Allergic/Immunologic: Negative for immunocompromised state.   Neurological:  Positive for weakness. Negative for light-headedness.   Hematological:  Negative for adenopathy. Does not bruise/bleed easily.   Psychiatric/Behavioral:  Negative for agitation, behavioral problems and confusion.       All pertinent negatives and positives are as above. All other systems have been reviewed and are negative unless otherwise stated.     Objective    Temp:  [97.4 °F (36.3 °C)-98.6 °F (37 °C)] 98.2 °F (36.8 °C)  Heart Rate:  [69-95] 95  Resp:  [14-20] 16  BP: ()/(38-71) 157/71  Physical Exam  Vitals and nursing note reviewed.   Constitutional:       Comments: Lying in bed.  No oxygen use.  No visitors in room.   HENT:      Head: Normocephalic and atraumatic.      Nose: No congestion.      Mouth/Throat:      Pharynx: Oropharynx is clear. No oropharyngeal exudate or posterior oropharyngeal erythema.   Eyes:      Extraocular Movements: Extraocular movements intact.      Pupils: Pupils are equal, round, and reactive to light.   Cardiovascular:      Rate and Rhythm: Normal rate and regular rhythm.      Heart sounds: No murmur heard.     Comments: Normal sinus rhythm 74 on telemetry.  Pulmonary:      Breath sounds: No wheezing, rhonchi or rales.      Comments: No oxygen in use.  Abdominal:      Palpations: Abdomen is soft.      Tenderness: There is no abdominal tenderness.   Genitourinary:     Comments:  Voiding.  Musculoskeletal:         General: No swelling or tenderness.      Cervical back: Normal range of motion and neck supple.      Comments: SCDs bilateral lower extremities.   Skin:     General: Skin is warm and dry.   Neurological:      General: No focal deficit present.      Mental Status: She is alert and oriented to person, place, and time.   Psychiatric:         Mood and Affect: Mood normal.         Behavior: Behavior normal.         Thought Content: Thought content normal.         Judgment: Judgment normal.         Results Review:  I have reviewed the labs, radiology results, and diagnostic studies.    Laboratory Data:   Results from last 7 days   Lab Units 09/07/23  1520 09/07/23  0840 09/07/23  0141 09/06/23  1608 09/03/23  1734 09/03/23  0535   WBC 10*3/mm3  --  8.99  --  8.67  --  9.54   HEMOGLOBIN g/dL 7.0* 7.2* 6.5* 4.9*   < > 5.2*   HEMATOCRIT % 22.4* 22.8* 21.6* 16.6*   < > 17.7*   PLATELETS 10*3/mm3  --  17*  --  24*  --  33*    < > = values in this interval not displayed.        Results from last 7 days   Lab Units 09/07/23  0840 09/06/23  1532 09/03/23  0535 09/01/23  0853   SODIUM mmol/L 139 139 137 139   POTASSIUM mmol/L 3.8 4.0 4.0 3.8   CHLORIDE mmol/L 106 105 104 103   CO2 mmol/L 24.0 25.0 25.0 26.0   BUN mg/dL 46* 52* 59* 38*   CREATININE mg/dL 1.42* 1.51* 1.58* 1.88*   CALCIUM mg/dL 8.0* 8.3* 8.8 8.6   BILIRUBIN mg/dL  --  <0.2 <0.2 0.2   ALK PHOS U/L  --  50 48 71   ALT (SGPT) U/L  --  9 9 11   AST (SGOT) U/L  --  14 12 14   GLUCOSE mg/dL 105* 115* 123* 106*     Imaging Results (All)       None           Scheduled medications:  buPROPion XL, 150 mg, Oral, Daily  dexAMETHasone, 40 mg, Intravenous, Daily  fluticasone, 2 spray, Nasal, Daily  montelukast, 10 mg, Oral, Nightly  pantoprazole, 40 mg, Intravenous, Q12H  rOPINIRole, 0.5 mg, Oral, Nightly  rosuvastatin, 20 mg, Oral, Daily  sertraline, 100 mg, Oral, Daily  sodium chloride, 10 mL, Intravenous, Q12H  valACYclovir, 500 mg, Oral,  BID         I have reviewed the patient's current medications.     Assessment/Plan   Assessment  Active Hospital Problems    Diagnosis     **GIB (gastrointestinal bleeding)     Acute blood loss anemia     Thrombocytopenia     Hypotension due to hypovolemia     Stage 3b chronic kidney disease      Treatment Plan  1.  Acute blood loss anemia superimposed on iron deficiency anemia and anemia of chronic kidney disease.  Patient has history of normocytic iron anemia followed by [facility] oncology requiring multiple blood transfusions.  Patient had endoscopy and colonoscopy February 2023 essentially negative.  Patient received 1 unit packed cells on 9/3 for hemoglobin of 5.2 and platelets 33,000..  She reported black tarry stools on 9/4.  Hemoglobin 4.9 with platelets 24,000 on admission.  3 units packed cells transfused.  Follow-up hemoglobin 7.  Transfusing additional 1 unit packed red blood cells today.  GI consulted.  Discussed with MARY Zapata gastroenterology today and patient reports periumbilical abdominal pain with no nausea or vomiting, heartburn or epigastric pain.  Denies dysphagia.    2.  Suspected upper GI bleed.  Patient reports black tarry stools at home.  She has required 4 units packed red blood cells since admission and 2 platelet pheresis packs.  Continue Protonix 40 mg IV every 12 hours.  Monitor hemoglobin every 8 hours.  Gastroenterology consulted, discussed with MARY Zapata today.  Endoscopy recommended pending results of hemoglobin and platelets.  Patient scheduled to see Dr. Gus Carvalho for EUS next week.    3.  Chronic idiopathic thrombocytopenia.  Follows with Dr. Shah, hematology.  Platelets 24,000 on admission and received 1 platelet pheresis pack.  Repeat platelet level 17,000 an additional 1 unit platelet pheresis pack transfused.  Repeat CBC in a.m.  Hematology consulted and patient is followed by Dr. Shah who has seen patient today.  Patient received last Injectafer on  8/11/2023 and Retacrit on 9/1/2023.  No features of myelodysplasia or excess blast on 11/10/2022 per Dr. Shah.  Dr. Shah discussed trial of dexamethasone 40 mg IV daily for 4 days and follow platelet counts.  Continue IV Protonix.    4.  Iron deficiency anemia secondary to inadequate dietary iron intake.  Follows with hematology.    5.  Stage IIIb CKD.  Creatinine 1.88 on admission.  Creatinine 1.5 today.  Baseline creatinine 1.2-1.5.  Continue IV fluids.  Repeat BMP in AM.    6.  Hypotension due to hypovolemia.  Patient with blood pressure 93/50 on admission suspect secondary to blood loss anemia.  Blood pressure improved 146/52.    7.  SCDs for deep vein thrombosis prophylaxis.    Medical Decision Making  Number and Complexity of problems: 6  Acute blood loss anemia superimposed on iron deficiency anemia and anemia of chronic kidney disease: Acute on chronic, high complexity posing threat to life and bodily function, unchanged  Suspected acute upper GI bleed with melena: Acute, high complexity requiring transfusion  Chronic idiopathic thrombocytopenia: Acute on chronic, high complexity requiring transfusion  Iron deficiency anemia secondary to inadequate oral iron take: Chronic, moderate complexity stable  CKD stage IIIb: Chronic, moderate complexity, stable  Hypotension due to hypovolemia: Acute, moderate complexity, stable    Differential Diagnosis: Acute upper GI bleed    Conditions and Status        Condition is unchanged.     [facility] Data  External documents reviewed: Hematology office visit 8/29/2023.  Cardiac tracing (EKG, telemetry) interpretation: Normal sinus rhythm 74 on telemetry  Radiology interpretation: None  Labs reviewed:   BMP 8/7/2023.  Repeat BMP in AM.  CBC 8/7/2023, hemoglobin hematocrit 8/7/23.  Monitor hemoglobin every 8 hours  Iron panel reviewed 9/7/23    Any tests that were considered but not ordered: None     Decision rules/scores evaluated (example [id number], [place], etc): None      Discussed with: Dr. Lincoln, Soham Reilly, MARY gastroenterology, and patient.     Care Planning  Shared decision making: Dr. Lincoln, Soham Reilly, MARY gastroenterology, and patient.  Patient agrees to GI consultation and endoscopy when stable.  Agrees to blood transfusion and platelet transfusion.     Care Planning  Code status and discussions: Full code  The patient's surrogate decision maker is Jose Enrique,     Disposition  Social Determinants of Health that impact treatment or disposition: None  I expect the patient to be discharged to home in 2-3 days.     Electronically signed by MARY Glynn, 09/07/23, 16:25 CDT.

## 2023-09-07 NOTE — PLAN OF CARE
Goal Outcome Evaluation:      Received platelets today and PRBC'S infusing now.VSS. Safety maintained.

## 2023-09-07 NOTE — CASE MANAGEMENT/SOCIAL WORK
Discharge Planning Assessment  [facility]     Patient Name: Marina Joe  MRN: [id number]  Today's Date: 9/7/2023    Admit Date: 9/6/2023        Discharge Needs Assessment       Row Name 09/07/23 1016       Living Environment    People in Home significant other    Name(s) of People in Home Jose Enrique Bates    Current Living Arrangements home    Potentially Unsafe Housing Conditions none    Primary Care Provided by self    Provides Primary Care For no one    Family Caregiver if Needed significant other    Quality of Family Relationships helpful;involved;supportive    Able to Return to Prior Arrangements yes       Resource/Environmental Concerns    Resource/Environmental Concerns none    Transportation Concerns none       Food Insecurity    Within the past 12 months, you worried that your food would run out before you got the money to buy more. Never true       Transition Planning    Patient/Family Anticipates Transition to home with family;home    Patient/Family Anticipated Services at Transition none    Transportation Anticipated family or friend will provide       Discharge Needs Assessment    Readmission Within the Last 30 Days no previous admission in last 30 days    Equipment Currently Used at Home none    Concerns to be Addressed denies needs/concerns at this time;discharge planning    Equipment Needed After Discharge none    Discharge Coordination/Progress Pt reports living with her significant other Jose Enrique.  States she is independent at home and still drives.  Denies use of DME, and states she has had HH before but not currently prior to admission.  Reports PCP and RX coverage and anticipates DC home, currently denies any DC needs. Will continue to follow.                   Discharge Plan    No documentation.                 Continued Care and Services - Admitted Since 9/6/2023    Coordination has not been started for this encounter.          Demographic Summary    No documentation.                   Functional Status    No documentation.                  Psychosocial    No documentation.                  Abuse/Neglect    No documentation.                  Legal    No documentation.                  Substance Abuse    No documentation.                  Patient Forms    No documentation.                     Denise Arnold RN

## 2023-09-07 NOTE — PAYOR COMM NOTE
"Marina Joe (78 y.o. Female) [id number]  admit 9/6  [facility] phone    Fax          Date of Birth   1945    Social Security Number       Address   [address] [place] [id number]    Home Phone   [phone]    MRN   [id number]       [employer]   J[employer]ism    Marital Status                               Admission Date   9/6/23    Admission Type   Emergency    Admitting Provider   Alysia Lincoln MD    Attending Provider   Alysia Lincoln MD    Department, Room/Bed   [facility] 3C, 390/1       Discharge Date       Discharge Disposition       Discharge Destination                                 Attending Provider: Alysia Lincoln MD    Allergies: Aspirin, Niacin    Isolation: None   Infection: None   Code Status: CPR    Ht: 172.7 cm (68\")   Wt: 105 kg (231 lb)    Admission Cmt: None   Principal Problem: GIB (gastrointestinal bleeding) [K92.2]                   Active Insurance as of 9/6/2023       Primary Coverage       Payor Plan Insurance Group Employer/Plan Group    HUMANA MEDICARE REPLACEMENT HUMANA MEDICARE REPLACEMENT [id number]       Payor Plan Address Payor Plan Phone Number Payor Plan Fax Number Effective Dates    [address] [address] [phone]  6/1/2023 - None Entered    [facility] [id number]         Subscriber Name Subscriber Birth Date Member ID       MARINA JOE 1945 [id number]               Secondary Coverage       Payor Plan Insurance Group Employer/Plan Group    [place]        Payor Plan Address Payor Plan Phone Number Payor Plan Fax Number Effective Dates    [address] [address] [phone]  2/28/2017 - None Entered    [facility] [id number]         Subscriber Name Subscriber Birth Date Member ID       MARINA JOE 1945 [id number]                     Emergency Contacts        (Rel.) Home Phone Work Phone Mobile Phone    Jose Enrique Nieves (Friend) -- -- " "[phone]                 History & Physical        Johanny Reid APRN at 09/06/23 1731              [facility] Medicine Services  HISTORY AND PHYSICAL    Date of Admission: 9/6/2023  Primary Care Physician: Hanh Og APRN    Subjective   Primary Historian: Patient    Chief Complaint: Black stools    History of Present Illness  Marina Joe is a 78-year-old female with a past medical history of chronic kidney disease stage IIIb, breast cancer, chronic thrombocytopenia and normocytic anemia followed by [facility] oncology.  Patient has had multiple episodes of anemia with need for blood transfusion.  She was at [facility] ER on 9/3 and was given 1 unit packed red blood cells.  At that time ER provider called GI who did not feel scope was needed as she has had both upper and lower scopes 2/2023 that were essentially negative.  Patient states on 9/4 she developed profound black tarry stools however she did not room return to the emergency department simply because she was \"too sick\".  Due to worsening weakness and ongoing bleeding she presented today for evaluation, hemoglobin 4.9, platelet count 24,000.  Due to bleeding she has received 1 unit platelets and 3 units of packed red blood cells have been ordered.  Anemia studies are followed by oncology group.  She has no pain.  Upon palpation she does have right upper quadrant and mid epigastric tenderness.  Abdomen is soft.  Bowel sounds are within normal limits.  She has no other complaints.  Discussed possible need for upper endoscopy in a.m., patient did verbalize understanding.  She is admitted for further evaluation treatment.      Review of Systems   Otherwise complete ROS reviewed and negative except as mentioned in the HPI.    Past Medical History:   Past Medical History:   Diagnosis Date    Breast cancer     CKD (chronic kidney disease)     Hypercholesteremia     Hypertension     Sinusitis     Stage 3a chronic kidney " disease 10/20/2020     Past Surgical History:  Past Surgical History:   Procedure Laterality Date    AVULSION TOENAIL PLATE      Sept 26,2018    BREAST BIOPSY Left 11/20/2012    BREAST BIOPSY      Left Breast, 1/2019 per Dr Barkley    BREAST LUMPECTOMY Left     with node bx     COLONOSCOPY  09/13/2013    small polyp at 30cm benign hyperplastic polyp, changes consistent with melanosis coli. Recall 5 years    COLONOSCOPY  11/19/2018    Tics otherwise normal exam repeat in 5 years    COLONOSCOPY  02/13/2023    Normal exam repeat in 3 years with a 2 day prep    ENDOSCOPY  02/13/2023    Gastritis, small HH    REPLACEMENT TOTAL KNEE Right     2016    TOTAL ABDOMINAL HYSTERECTOMY WITH SALPINGO OOPHORECTOMY       Social History:  reports that she has never smoked. She has never used smokeless tobacco. She reports that she does not currently use drugs. She reports that she does not drink alcohol.    Family History: family history includes Cancer in her father and maternal uncle; Heart attack in her brother and mother; Hodgkin's lymphoma in her father; Other in her father; Pancreatic cancer in her maternal uncle; Skin cancer in her maternal uncle.       Allergies:  Allergies   Allergen Reactions    Aspirin GI Bleeding    Niacin Other (See Comments)       Medications:  Prior to Admission medications    Medication Sig Start Date End Date Taking? Authorizing Provider   albuterol sulfate  (90 Base) MCG/ACT inhaler Inhale 2 puffs Every 4 (Four) Hours As Needed for Wheezing. 6/30/22   Romeo Ogva C, APRN   amLODIPine (NORVASC) 5 MG tablet Take 1 tablet by mouth 2 (Two) Times a Day.  Patient taking differently: Take 0.5 tablets by mouth 2 (Two) Times a Day. 12/8/22   Romeo Ogva C, APRN   anastrozole (ARIMIDEX) 1 MG tablet Take 1 tablet by mouth Daily. 6/23/22   Benjamin Shah MD   buPROPion XL (WELLBUTRIN XL) 150 MG 24 hr tablet TAKE 1 TABLET BY MOUTH DAILY 6/23/23   Hanh Og, APRN   Calcium  Carb-Cholecalciferol (CALCIUM 600+D3 PO) Take  by mouth.    Maurisio Zazueta MD   carvedilol (COREG) 6.25 MG tablet Take 1 tablet by mouth 2 (Two) Times a Day With Meals. 3/2/23   Gladis Ledezma APRN   cetirizine (zyrTEC) 10 MG tablet Take 1 tablet by mouth Daily.    Maurisio Zazueta MD   cloNIDine (Catapres) 0.1 MG tablet Take 1 tablet by mouth 2 (Two) Times a Day As Needed for High Blood Pressure (greater than 160/90). 1/5/23   Hanh Og APRN   cyclobenzaprine (FLEXERIL) 10 MG tablet Take 1 tablet by mouth 3 (Three) Times a Day As Needed for Muscle Spasms. 10/27/22   Hanh Og APRN   Ergocalciferol (VITAMIN D2 PO) Take 50,000 Units by mouth Every 30 (Thirty) Days.    Maurisio Zazueta MD   ferrous sulfate 325 (65 FE) MG tablet Take 1 tablet by mouth Daily With Breakfast.    Maurisio Zazueta MD   fluticasone (FLONASE) 50 MCG/ACT nasal spray 2 sprays into the nostril(s) as directed by provider Daily. 6/1/22   Hanh Og APRN   fluticasone (FLOVENT DISKUS) 50 MCG/BLIST diskus inhaler Inhale 1 puff.    Maurisio Zazueta MD   irbesartan-hydrochlorothiazide (AVALIDE) 300-12.5 MG tablet TAKE 1 TABLET BY MOUTH DAILY 5/2/23   Hanh Og APRN   montelukast (SINGULAIR) 10 MG tablet Take 1 tablet by mouth Daily. 3/2/23   Gladis Ledezma APRN   Multiple Vitamins-Minerals (MULTIVITAMIN ADULT) tablet Take  by mouth Daily.    Maurisio Zzaueta MD   Omega-3 Fatty Acids (FISH OIL) 1000 MG capsule capsule Take 1 capsule by mouth.    Maurisio Zazueta MD   pantoprazole (Protonix) 40 MG EC tablet Take 1 tablet by mouth Daily. 6/20/23   Hanh Og APRN   rOPINIRole (REQUIP) 0.5 MG tablet Take 1 tablet by mouth Every Night. 3/2/23   Gladis Ledezma APRN   rosuvastatin (CRESTOR) 20 MG tablet Take 1 tablet by mouth Daily. 5/2/23   Hanh Og APRN   sertraline (ZOLOFT) 100 MG tablet TAKE 1 TABLET BY MOUTH DAILY 7/24/23   Hanh Og,  "APRN   traZODone (DESYREL) 100 MG tablet Take 1 tablet by mouth Every Night. 3/2/23   Gladis Ledezma APRN   valACYclovir (VALTREX) 500 MG tablet TAKE 1 TABLET BY MOUTH TWICE DAILY 7/12/23   Hanh Og APRJULIET   vitamin D (ERGOCALCIFEROL) 1.25 MG (54099 UT) capsule capsule TAKE 1 CAPSULE BY MOUTH ONCE MONTHLY 5/2/23   Provider, MD MIRIAM Forde have utilized all available immediate resources to obtain, update, or review the patient's current medications (including all prescriptions, over-the-counter products, herbals, cannabis/cannabidiol products, and vitamin/mineral/dietary (nutritional) supplements).    Objective     Vital Signs: /43   Pulse 69   Temp 97.7 °F (36.5 °C)   Resp 14   Ht 172.7 cm (68\")   Wt 105 kg (231 lb)   LMP  (LMP Unknown)   SpO2 99%   BMI 35.12 kg/m²   Physical Exam  Vitals reviewed.   Constitutional:       Appearance: She is ill-appearing.   HENT:      Head: Normocephalic and atraumatic.      Mouth/Throat:      Mouth: Mucous membranes are moist.      Pharynx: Oropharynx is clear.      Comments: Oral mucosa pale  Eyes:      Extraocular Movements: Extraocular movements intact.      Conjunctiva/sclera: Conjunctivae normal.   Cardiovascular:      Rate and Rhythm: Normal rate and regular rhythm.   Pulmonary:      Effort: Pulmonary effort is normal.      Breath sounds: Normal breath sounds.   Abdominal:      Palpations: Abdomen is soft.      Tenderness: There is abdominal tenderness (Right upper quadrant and midepigastric).   Musculoskeletal:      Cervical back: Normal range of motion and neck supple.      Comments: Generalized weakness and debility   Skin:     General: Skin is warm and dry.   Neurological:      General: No focal deficit present.      Mental Status: She is alert and oriented to person, place, and time.   Psychiatric:         Mood and Affect: Mood normal.         Behavior: Behavior normal.        Results Reviewed:  Lab Results (last 24 hours)       Procedure " Component Value Units Date/Time    CBC Auto Differential [[id number]]  (Abnormal) Collected: 09/06/23 1608    Specimen: Blood Updated: 09/06/23 1643     WBC 8.67 10*3/mm3      RBC 1.70 10*6/mm3      Hemoglobin 4.9 g/dL      Hematocrit 16.6 %      MCV 97.6 fL      MCH 28.8 pg      MCHC 29.5 g/dL      RDW 21.5 %      RDW-SD 66.3 fl      MPV --     Comment: Unable to calculate        Platelets 24 10*3/mm3     Manual Differential [[id number]]  (Abnormal) Collected: 09/06/23 1608    Specimen: Blood Updated: 09/06/23 1643     Neutrophil % 75.2 %      Lymphocyte % 13.9 %      Monocyte % 6.9 %      Eosinophil % 3.0 %      Bands %  1.0 %      Neutrophils Absolute 6.61 10*3/mm3      Lymphocytes Absolute 1.21 10*3/mm3      Monocytes Absolute 0.60 10*3/mm3      Eosinophils Absolute 0.26 10*3/mm3      nRBC 2.0 /100 WBC      Anisocytosis Mod/2+     Macrocytes Slight/1+     Microcytes Mod/2+     Poikilocytes Slight/1+     Polychromasia Large/3+     WBC Morphology Normal     Platelet Estimate Decreased     Giant Platelets Slight/1+    Protime-INR [[id number]]  (Normal) Collected: 09/06/23 1608    Specimen: Blood Updated: 09/06/23 1628     Protime 14.2 Seconds      INR 1.09    Comprehensive Metabolic Panel [[id number]]  (Abnormal) Collected: 09/06/23 1532    Specimen: Blood Updated: 09/06/23 1613     Glucose 115 mg/dL      BUN 52 mg/dL      Creatinine 1.51 mg/dL      Sodium 139 mmol/L      Potassium 4.0 mmol/L      Comment: Slight hemolysis detected by analyzer. Results may be affected.        Chloride 105 mmol/L      CO2 25.0 mmol/L      Calcium 8.3 mg/dL      Total Protein 5.0 g/dL      Albumin 3.1 g/dL      ALT (SGPT) 9 U/L      AST (SGOT) 14 U/L      Alkaline Phosphatase 50 U/L      Total Bilirubin <0.2 mg/dL      Globulin 1.9 gm/dL      A/G Ratio 1.6 g/dL      BUN/Creatinine Ratio 34.4     Anion Gap 9.0 mmol/L      eGFR 35.5 mL/min/1.73     Lactic Acid, Plasma [[id number]]  (Normal) Collected: 09/06/23 1532    Specimen: Blood  Updated: 09/06/23 1612     Lactate 1.5 mmol/L           Imaging Results (Last 24 Hours)       ** No results found for the last 24 hours. **          2/13/2023 EGD revealed mild gastritis in the body of the stomach, colonoscopy negative for acute findings    Assessment / Plan   Assessment:   Active Hospital Problems    Diagnosis     **GIB (gastrointestinal bleeding)     Acute blood loss anemia     Thrombocytopenia     Hypotension due to hypovolemia     Stage 3b chronic kidney disease        Treatment Plan  1.  The patient will be admitted to Dr. Lincoln's [facility].   2.  Patient has received 1 unit of platelets in the emergency department, 3 units of packed red blood cells have been ordered  3.  Anemia studies followed by oncology, will not repeat  4.  Patient has received Protonix 80 mg IV bolus and will continue with 40 mg IV every 12 hours  5.  Gentle hydration normal saline 75 mL/hour  6.  Clear liquid diet, n.p.o. after midnight  7.  Supplemental oxygen if needed, incentive spirometry, continuous pulse oximetry  8.  Labs in a.m., H&H every 8 hours begin after PRBCs have infused stool for occult blood pending  9.  Cardiac monitor, daily weights, oral care,  10.  Home medications reviewed and restarted as appropriate  11.  DVT prophylaxis with SCDs  12.  Consult GI and hematology  13.  Consult OT and PT for strengthening    Medical Decision Making  Number and Complexity of problems: 5  Differential Diagnosis: None    Conditions and Status        Condition is unchanged.     [facility] Data  External documents reviewed: No  Cardiac tracing (EKG, telemetry) interpretation: Reviewed  Radiology interpretation: Reviewed  Labs reviewed: Yes  Any tests that were considered but not ordered: No     Decision rules/scores evaluated (example [id number], Wells, etc): No     Discussed with: Patient,  Jose Enrique and Dr. Lincoln     Care Planning  Shared decision making: Patient,  Jose Enrique and   [place]  Code status and discussions: Full    Disposition  Social Determinants of Health that impact treatment or disposition: No  Estimated length of stay is 2+ days.     I confirmed that the patient's advanced care plan is present, code status is documented, and a surrogate decision maker is listed in the patient's medical record.     The patient's surrogate decision maker is aixa Quispe.     The patient was seen and examined by me on 9/6/2023 at 5:31 PM.    Electronically signed by Johanny CRUZ. MARY Reid, 09/06/23, 19:04 CDT.               Electronically signed by Johanny Reid APRN at 09/06/23 1906          Emergency Department Notes        Miri Guaman RN at 09/06/23 1927          Report called and given to 3C nurse.     Electronically signed by Miri Guaman RN at 09/06/23 1927       Miri Guaman RN at 09/06/23 1923          Attempted to call report to 3C and 3C charge nurse, unable to reach staff at this time. Will report to night shift nurse     Electronically signed by Miri Guaman RN at 09/06/23 1924       Oren Dunham MD at 09/06/23 1414          Subjective   History of Present Illness  Patient presents due to continued black and bloody stool.  She reports bright red blood in her stool.  My last saw her she was reporting melena which is chronic in nature.  She feels fatigued and lightheaded so according to discussed return precautions she came back to the ER today.  She states that on Monday she had bright red blood in her stool and she continues to have this.  She states this has not acutely changed.  No fevers.  No syncope.  No falls or injuries.  No abdominal pain.  No vomiting.  Urinating normally.    Review of Systems   Constitutional:  Negative for chills and fever.   Respiratory:  Negative for cough and shortness of breath.    Cardiovascular:  Negative for chest pain and palpitations.   Gastrointestinal:  Positive for blood in stool. Negative for abdominal pain and  vomiting.   Genitourinary:  Negative for difficulty urinating and dysuria.   Neurological:  Negative for syncope and light-headedness.     Past Medical History:   Diagnosis Date    Breast cancer     CKD (chronic kidney disease)     Hypercholesteremia     Hypertension     Sinusitis     Stage 3a chronic kidney disease 10/20/2020       Allergies   Allergen Reactions    Aspirin GI Bleeding    Niacin Other (See Comments)       Past Surgical History:   Procedure Laterality Date    AVULSION TOENAIL PLATE      Sept 26,2018    BREAST BIOPSY Left 11/20/2012    BREAST BIOPSY      Left Breast, 1/2019 per Dr Barkley    BREAST LUMPECTOMY Left     with node bx     COLONOSCOPY  09/13/2013    small polyp at 30cm benign hyperplastic polyp, changes consistent with melanosis coli. Recall 5 years    COLONOSCOPY  11/19/2018    Tics otherwise normal exam repeat in 5 years    COLONOSCOPY  02/13/2023    Normal exam repeat in 3 years with a 2 day prep    ENDOSCOPY  02/13/2023    Gastritis, small HH    REPLACEMENT TOTAL KNEE Right     2016    TOTAL ABDOMINAL HYSTERECTOMY WITH SALPINGO OOPHORECTOMY         Family History   Problem Relation Age of Onset    Heart attack Mother     Hodgkin's lymphoma Father     Other Father     Cancer Father         hodgkins    Heart attack Brother     Skin cancer Maternal Uncle     Pancreatic cancer Maternal Uncle     Cancer Maternal Uncle         eye    Colon cancer Neg Hx     Colon polyps Neg Hx        Social History     Socioeconomic History    Marital status:    Tobacco Use    Smoking status: Never    Smokeless tobacco: Never   Vaping Use    Vaping Use: Never used   Substance and Sexual Activity    Alcohol use: No    Drug use: Not Currently    Sexual activity: Not Currently     Birth control/protection: Surgical           Objective   Physical Exam  Vitals reviewed.   Constitutional:       General: She is not in acute distress.  HENT:      Head: Normocephalic and atraumatic.   Eyes:      Extraocular  Movements: Extraocular movements intact.      Conjunctiva/sclera: Conjunctivae normal.   Cardiovascular:      Pulses: Normal pulses.      Heart sounds: Normal heart sounds.   Pulmonary:      Effort: Pulmonary effort is normal. No respiratory distress.      Breath sounds: No wheezing.   Abdominal:      General: Abdomen is flat. There is no distension.      Tenderness: There is no abdominal tenderness. There is no guarding.   Musculoskeletal:      Cervical back: Normal range of motion and neck supple.      Right lower leg: No edema.      Left lower leg: No edema.   Skin:     General: Skin is warm and dry.   Neurological:      General: No focal deficit present.      Mental Status: She is alert. Mental status is at baseline.   Psychiatric:         Behavior: Behavior normal.         Thought Content: Thought content normal.       Procedures          ED Course  ED Course as of 09/06/23 2134   Wed Sep 06, 2023   1652 BP 110s/70s [AS]      ED Course User Index  [AS] Oren Dunham MD                                           Medical Decision Making  Amount and/or Complexity of Data Reviewed  Labs: ordered.    Risk  Prescription drug management.  Decision regarding hospitalization.      Marina Joe is a 78 y.o. female with PMH above who presents to the Emergency Department with bloody/dark stool. Also fatigued, lightheaded. Lucid, not tachycardic, diastolic hypotension: IV fluids ordered.  History and physical is similar today as it was a few days ago.  Previously, discussed with GI doctor on-call who recommended close outpatient follow-up however suspect that she likely dropped her hemoglobin again today and will need to be admitted for inpatient work-up.  No abdominal pain to suggest perforated ulcer or surgical process.     ED Course:   -labs show stable renal function, repeat critical anemia. pRBCs and platelets ordered. BP improved with fluids. Discussed with Dr. Hernandes who accepts to hospitalist service;  recs floor level of care. Pt HDS so floor level of care ordered. Pt and family agreeable to this plan.      Final diagnosis: GI bleed    All questions answered. Patient/family was understanding and in agreement with today's assessment and plan. The patient was monitored during their stay in the ED and dispositioned without acute event.    Electronically signed by:  Oren Dunham MD 9/6/2023 21:34 CDT      Note: Dragon medical dictation software was used in the creation of this note.        Final diagnoses:   Gastrointestinal hemorrhage, unspecified gastrointestinal hemorrhage type       ED Disposition  ED Disposition       ED Disposition   Decision to Admit    Condition   --    Comment   Level of Care: Med/Surg [1]   Diagnosis: GIB (gastrointestinal bleeding) [117957]   Admitting Physician: IVETH SCHERER [[id number]]   Attending Physician: IVETH SCHERER [[id number]]   Certification: I Certify That Inpatient Hospital Services Are Medically Necessary For Greater Than 2 Midnights                 No follow-up provider specified.       Medication List      No changes were made to your prescriptions during this visit.            Oren Dunham MD  09/06/23 2134      Electronically signed by Oren Dunham MD at 09/06/23 2134       Current Facility-Administered Medications   Medication Dose Route Frequency Provider Last Rate Last Admin    acetaminophen (TYLENOL) tablet 650 mg  650 mg Oral Q4H PRN Johanny Reid APRN        Or    acetaminophen (TYLENOL) 160 MG/5ML solution 650 mg  650 mg Oral Q4H PRN Johanny Reid APRN        Or    acetaminophen (TYLENOL) suppository 650 mg  650 mg Rectal Q4H PRN Johanny Reid APRN        anastrozole (ARIMIDEX) tablet 1 mg  1 mg Oral Daily Johanny Reid APRN        buPROPion XL (WELLBUTRIN XL) 24 hr tablet 150 mg  150 mg Oral Daily Johanny Reid APRN        dexAMETHasone sodium phosphate 40 mg in sodium chloride 0.9 % IVPB  40 mg Intravenous  Daily Benjamin Shah MD   40 mg at 09/07/23 0931    fluticasone (FLONASE) 50 MCG/ACT nasal spray 2 spray  2 spray Nasal Daily Johanny Reid APRN   2 spray at 09/07/23 0910    montelukast (SINGULAIR) tablet 10 mg  10 mg Oral Nightly Johanny Reid, MARY        nitroglycerin (NITROSTAT) SL tablet 0.4 mg  0.4 mg Sublingual Q5 Min PRN Johanny Reid APRN        ondansetron (ZOFRAN) tablet 4 mg  4 mg Oral Q6H PRN Johanny Reid APRN        Or    ondansetron (ZOFRAN) injection 4 mg  4 mg Intravenous Q6H PRN Johanny Reid APRN        pantoprazole (PROTONIX) injection 40 mg  40 mg Intravenous Q12H Johanny Reid APRN   40 mg at 09/07/23 0631    rOPINIRole (REQUIP) tablet 0.5 mg  0.5 mg Oral Nightly Johanny Reid APRN        rosuvastatin (CRESTOR) tablet 20 mg  20 mg Oral Daily Johanny Reid APRN        sertraline (ZOLOFT) tablet 100 mg  100 mg Oral Daily Johanny Reid APRN        sodium chloride 0.9 % flush 10 mL  10 mL Intravenous PRN Oren Dunham MD        sodium chloride 0.9 % flush 10 mL  10 mL Intravenous Q12H Johanny Reid APRN   10 mL at 09/07/23 0902    sodium chloride 0.9 % flush 10 mL  10 mL Intravenous PRN Johanny Reid APRN        sodium chloride 0.9 % infusion 40 mL  40 mL Intravenous PRN Johanny Reid APRN        sodium chloride 0.9 % infusion  75 mL/hr Intravenous Continuous Johanny Reid APRN        traZODone (DESYREL) tablet 100 mg  100 mg Oral Nightly Johanny Reid APRN   100 mg at 09/06/23 2124    valACYclovir (VALTREX) tablet 500 mg  500 mg Oral BID Johanny Reid APRN   500 mg at 09/06/23 2124        Consult Notes (last 24 hours)        Soham Reilly APRN at 09/07/23 0750        Consult Orders    1. Inpatient Gastroenterology Consult [[id number]] ordered by Johanny Reid APRN at 09/06/23 1818                 [facility] Gastroenterology  Initial Inpatient Consult Note    Referring Provider: No Known  Provider    Date of Admission: 9/6/2023  Date of Service:  09/07/23    Reason for Consultation: Anemia/melena    Subjective     History of present illness:      This is a 78-year-old female presented to [facility] ER on 9/6/2023 with complaints of black stools.  She does observe some bright red blood in her bowel movement.  She was evaluated in the ER on 9/3/2023 and found to have a hemoglobin of 5.2 with platelets of 33.  Hemoglobin on arrival yesterday noted to be 4.9 with platelets of 24.  She reports periumbilical abdominal pain.  No nausea or vomiting.  No heartburn or indigestion, no dysphagia.  She has experienced melanic stools for over a month.    Colonoscopy and endoscopy February 2023 both unremarkable.  Colonoscopy was complete however stool was noted throughout the colon.    Past Medical History:  Past Medical History:   Diagnosis Date    Breast cancer     CKD (chronic kidney disease)     Hypercholesteremia     Hypertension     Sinusitis     Stage 3a chronic kidney disease 10/20/2020       Past Surgical History:  Past Surgical History:   Procedure Laterality Date    AVULSION TOENAIL PLATE      Sept 26,2018    BREAST BIOPSY Left 11/20/2012    BREAST BIOPSY      Left Breast, 1/2019 per Dr Barkley    BREAST LUMPECTOMY Left     with node bx     COLONOSCOPY  09/13/2013    small polyp at 30cm benign hyperplastic polyp, changes consistent with melanosis coli. Recall 5 years    COLONOSCOPY  11/19/2018    Tics otherwise normal exam repeat in 5 years    COLONOSCOPY  02/13/2023    Normal exam repeat in 3 years with a 2 day prep    ENDOSCOPY  02/13/2023    Gastritis, small HH    REPLACEMENT TOTAL KNEE Right     2016    TOTAL ABDOMINAL HYSTERECTOMY WITH SALPINGO OOPHORECTOMY          Social History:   Social History     Tobacco Use    Smoking status: Never    Smokeless tobacco: Never   Substance Use Topics    Alcohol use: No        Family History:  Family History   Problem Relation Age of Onset    Heart  attack Mother     Hodgkin's lymphoma Father     Other Father     Cancer Father         hodgkins    Heart attack Brother     Skin cancer Maternal Uncle     Pancreatic cancer Maternal Uncle     Cancer Maternal Uncle         eye    Colon cancer Neg Hx     Colon polyps Neg Hx        Home Meds:  Medications Prior to Admission   Medication Sig Dispense Refill Last Dose    buPROPion XL (WELLBUTRIN XL) 150 MG 24 hr tablet TAKE 1 TABLET BY MOUTH DAILY 90 tablet 3 9/6/2023    albuterol sulfate  (90 Base) MCG/ACT inhaler Inhale 2 puffs Every 4 (Four) Hours As Needed for Wheezing. 2 g 3     amLODIPine (NORVASC) 5 MG tablet Take 1 tablet by mouth 2 (Two) Times a Day. (Patient taking differently: Take 0.5 tablets by mouth 2 (Two) Times a Day.) 180 tablet 2     anastrozole (ARIMIDEX) 1 MG tablet Take 1 tablet by mouth Daily. 90 tablet 3     Calcium Carb-Cholecalciferol (CALCIUM 600+D3 PO) Take  by mouth.       carvedilol (COREG) 6.25 MG tablet Take 1 tablet by mouth 2 (Two) Times a Day With Meals. 180 tablet 3     cetirizine (zyrTEC) 10 MG tablet Take 1 tablet by mouth Daily.       cloNIDine (Catapres) 0.1 MG tablet Take 1 tablet by mouth 2 (Two) Times a Day As Needed for High Blood Pressure (greater than 160/90). 45 tablet 0     cyclobenzaprine (FLEXERIL) 10 MG tablet Take 1 tablet by mouth 3 (Three) Times a Day As Needed for Muscle Spasms. 90 tablet 5     Ergocalciferol (VITAMIN D2 PO) Take 50,000 Units by mouth Every 30 (Thirty) Days.       ferrous sulfate 325 (65 FE) MG tablet Take 1 tablet by mouth Daily With Breakfast.       fluticasone (FLONASE) 50 MCG/ACT nasal spray 2 sprays into the nostril(s) as directed by provider Daily. 1 g 3     fluticasone (FLOVENT DISKUS) 50 MCG/BLIST diskus inhaler Inhale 1 puff.       irbesartan-hydrochlorothiazide (AVALIDE) 300-12.5 MG tablet TAKE 1 TABLET BY MOUTH DAILY 90 tablet 3     montelukast (SINGULAIR) 10 MG tablet Take 1 tablet by mouth Daily. 90 tablet 1     Multiple  Vitamins-Minerals (MULTIVITAMIN ADULT) tablet Take  by mouth Daily.       Omega-3 Fatty Acids (FISH OIL) 1000 MG capsule capsule Take 1 capsule by mouth.       pantoprazole (Protonix) 40 MG EC tablet Take 1 tablet by mouth Daily. 90 tablet 2     rOPINIRole (REQUIP) 0.5 MG tablet Take 1 tablet by mouth Every Night. 90 tablet 1     rosuvastatin (CRESTOR) 20 MG tablet Take 1 tablet by mouth Daily. 90 tablet 3     sertraline (ZOLOFT) 100 MG tablet TAKE 1 TABLET BY MOUTH DAILY 90 tablet 3     traZODone (DESYREL) 100 MG tablet Take 1 tablet by mouth Every Night. 90 tablet 3     valACYclovir (VALTREX) 500 MG tablet TAKE 1 TABLET BY MOUTH TWICE DAILY 180 tablet 0     vitamin D (ERGOCALCIFEROL) 1.25 MG (21661 UT) capsule capsule TAKE 1 CAPSULE BY MOUTH ONCE MONTHLY          Current Meds:     Current Facility-Administered Medications:     acetaminophen (TYLENOL) tablet 650 mg, 650 mg, Oral, Q4H PRN **OR** acetaminophen (TYLENOL) 160 MG/5ML solution 650 mg, 650 mg, Oral, Q4H PRN **OR** acetaminophen (TYLENOL) suppository 650 mg, 650 mg, Rectal, Q4H PRN, Johanny Reid APRN    anastrozole (ARIMIDEX) tablet 1 mg, 1 mg, Oral, Daily, Johanny Reid APRN    buPROPion XL (WELLBUTRIN XL) 24 hr tablet 150 mg, 150 mg, Oral, Daily, Johanny Reid APRN    dexAMETHasone sodium phosphate 40 mg in sodium chloride 0.9 % IVPB, 40 mg, Intravenous, Daily, Benjamin Shah MD    fluticasone (FLONASE) 50 MCG/ACT nasal spray 2 spray, 2 spray, Nasal, Daily, Johanny Reid APRN    montelukast (SINGULAIR) tablet 10 mg, 10 mg, Oral, Nightly, Johanny Reid APRN    nitroglycerin (NITROSTAT) SL tablet 0.4 mg, 0.4 mg, Sublingual, Q5 Min PRN, Johanny Reid, MARY    ondansetron (ZOFRAN) tablet 4 mg, 4 mg, Oral, Q6H PRN **OR** ondansetron (ZOFRAN) injection 4 mg, 4 mg, Intravenous, Q6H PRN, Johanny Reid, APRN    pantoprazole (PROTONIX) injection 40 mg, 40 mg, Intravenous, Q12H, Johanny Reid, MARY, 40 mg at 09/07/23 0631     rOPINIRole (REQUIP) tablet 0.5 mg, 0.5 mg, Oral, Nightly, Johanny Reid, APRN    rosuvastatin (CRESTOR) tablet 20 mg, 20 mg, Oral, Daily, Johanny Reid, APRN    sertraline (ZOLOFT) tablet 100 mg, 100 mg, Oral, Daily, Johanny Reid, MARY    sodium chloride 0.9 % flush 10 mL, 10 mL, Intravenous, PRN, Oren Dunham MD    sodium chloride 0.9 % flush 10 mL, 10 mL, Intravenous, Q12H, Johanny Reid, APRN, 10 mL at 09/07/23 0902    sodium chloride 0.9 % flush 10 mL, 10 mL, Intravenous, PRN, Johanny Reid, MARY    sodium chloride 0.9 % infusion 40 mL, 40 mL, Intravenous, PRN, Johanny Reid, MARY    sodium chloride 0.9 % infusion, 75 mL/hr, Intravenous, Continuous, Johanny Reid, MARY    traZODone (DESYREL) tablet 100 mg, 100 mg, Oral, Nightly, Johanny Reid, APRN, 100 mg at 09/06/23 2124    valACYclovir (VALTREX) tablet 500 mg, 500 mg, Oral, BID, Johanny Reid APRN, 500 mg at 09/06/23 2124    Allergies:  Allergies   Allergen Reactions    Aspirin GI Bleeding    Niacin Other (See Comments)       Vital Signs  Temp:  [97.4 °F (36.3 °C)-98.6 °F (37 °C)] 98.4 °F (36.9 °C)  Heart Rate:  [69-86] 75  Resp:  [14-20] 16  BP: ()/(38-57) 153/56  Body mass index is 35.12 kg/m².    Intake/Output Summary (Last 24 hours) at 9/7/2023 0912  Last data filed at 9/7/2023 0619  Gross per 24 hour   Intake 918 ml   Output --   Net 918 ml     No intake/output data recorded.    Review of Systems     Constitution:  negative for chills, fatigue and fevers  Eyes:  negative for blurriness and change of vision  ENT:   negative for sore throat and voice change  Respiratory: negative for  cough and shortness of air  Cardiovascular:  Negative for chest pain or palpitations  Gastrointestinal:  negative for  See HPI  Genitourinary:  negative for  blood in urine and painful urination  Integument: negative for  rash and redness  Hematologic / Lymphatic: negative for  excessive bleeding and easy  bruising  Musculoskeletal: negative for  joint pain and joint stiffness out of the ordinary  Neurological:  negative for  seizures and speech abnormality  Behavioral/Psych:  negative for  anxiety and depression out of the ordinary  Endocrine: negative for  diabetes and weight loss, unintended  Allergies / Immunologic:  negative for  anaphylaxis and rash      Objective     Physical Exam:  General :    Alert, cooperative, in no acute distress   Head:    Normocephalic, without obvious abnormality, atraumatic   Eyes:            Lids and lashes normal, conjunctivae and sclerae normal, no   Icterus, conjunctival pallor   Throat:   No oral lesions, no thrush, oral mucosa moist, posterior oropharynx clear   Neck:   No adenopathy, supple, trachea midline, no thyromegaly, no   carotid bruit, no JVD   Lungs:     Clear to auscultation, respirations regular, even and                   unlabored    Heart:    Regular rhythm and normal rate, normal S1 and S2, no            murmur   Chest Wall:    No abnormalities observed   Abdomen:     Normal bowel sounds, no masses, no organomegaly, soft        nontender, nondistended, no guarding, no rebound                 tenderness   Rectal:     Deferred   Extremities:   No edema, no cyanosis   Skin:   No open lesions, bruising or rash   Lymph nodes:   No palpable cervical adenopathy   Psychiatric:  Judgement and insight: normal   Orientation to person place and time: normal   Mood and affect: normal        Results Review:    I have reviewed all of the patients current test results  Results from last 7 days   Lab Units 09/07/23  0141 09/06/23  1608 09/03/23  1734 09/03/23  0535 09/01/23  0853   WBC 10*3/mm3  --  8.67  --  9.54 7.07   HEMOGLOBIN g/dL 6.5* 4.9* 7.7* 5.2* 8.1*   HEMATOCRIT % 21.6* 16.6* 24.5* 17.7* 26.8*   PLATELETS 10*3/mm3  --  24*  --  33* 38*       Results from last 7 days   Lab Units 09/06/23  1532 09/03/23  0535 09/01/23  0853   SODIUM mmol/L 139 137 139   POTASSIUM mmol/L  4.0 4.0 3.8   CHLORIDE mmol/L 105 104 103   CO2 mmol/L 25.0 25.0 26.0   BUN mg/dL 52* 59* 38*   CREATININE mg/dL 1.51* 1.58* 1.88*   CALCIUM mg/dL 8.3* 8.8 8.6   BILIRUBIN mg/dL <0.2 <0.2 0.2   ALK PHOS U/L 50 48 71   ALT (SGPT) U/L 9 9 11   AST (SGOT) U/L 14 12 14   GLUCOSE mg/dL 115* 123* 106*       Results from last 7 days   Lab Units 09/06/23  1608 09/03/23  0535   INR  1.09 1.04       No results found for: LIPASE    Radiology:    Imaging Results (Last 24 Hours)       ** No results found for the last 24 hours. **              Assessment & Plan       GIB (gastrointestinal bleeding)    Stage 3b chronic kidney disease    Acute blood loss anemia    Thrombocytopenia    Hypotension due to hypovolemia      Impression/Plan    Melena  Anemia, acute on chronic  Thrombocytopenia    She is on oral iron.  No PPI listed on home medication list.  Recommend proceeding with EGD today pending results of hemoglobin and platelets.  She has scheduled appointment next week with Dr. Gus Carvalho for EUS.  Risk benefits indication reviewed with patient, she was agreeable to proceed.  Further recommendations pending result of ordered testing, patient progress.      Electronically signed by MARY Nino, 09/07/23, 9:12 AM CDT.         MARY Nino  09/07/23  09:12 CDT          Electronically signed by Soham Reilly APRN at 09/07/23 0918       Benjamin Shah MD at 09/07/23 0601        Consult Orders    1. Inpatient Hematology & Oncology Consult [[id number]] ordered by Johanny Reid APRN at 09/06/23 1818                     [facility] Oncology/Hematology [facility]  CONSULT NOTE    PATIENT NAME:  Marina Joe  YOB: 1945  PATIENT MRN:  [id number]    Date of Admission:  9/6/2023  Consultation Date:  9/7/2023  Referring Provider: No ref. provider found    Subjective     Reason for Consultation: Continuity of care.    History of present illness: Marina Joe is a pleasant 78 y.o.  female known to our service with chronic idiopathic thrombocytic purpura, iron deficiency anemia and anemia from chronic kidney disease stage IIIb.  She is seen with nurse Concepcion at the bedside.  History from the chart and patient.      She presented on 9/3/2023 at the emergency department for melena, symptomatic anemia and given 1 unit of packed RBC.  Her hemoglobin was 5.2 and hematocrit 17.7.  Platelet was 33.  CBC 9/1/2023 revealed a hemoglobin of 8.1, hematocrit 26.8 and platelet of 38.  PT and PTT were normal.  GI had recommended repeat endoscopy/colonoscopy.  Repeat hemoglobin 7.7 hematocrit 24.5.    She presented with melena 9/6/2023.  CBC remarkable for hemoglobin 4.9, hematocrit 16.6 and platelet 24.  No schistocytes.  No nucleated RBC.    She was given 3 units packed RBC and 1 unit platelets.  Repeat hemoglobin 6.5 and hematocrit 21.6. Await CBC this am.     She had received her latest Injectafer on 8/11/2023 and Retacrit on 9/1/2023.    With above background, she is seen.    Past Medical History:  Past Medical History:   Diagnosis Date    Breast cancer     CKD (chronic kidney disease)     Hypercholesteremia     Hypertension     Sinusitis     Stage 3a chronic kidney disease 10/20/2020     Prior Surgeries:  Past Surgical History:   Procedure Laterality Date    AVULSION TOENAIL PLATE      Sept 26,2018    BREAST BIOPSY Left 11/20/2012    BREAST BIOPSY      Left Breast, 1/2019 per Dr Barkley    BREAST LUMPECTOMY Left     with node bx     COLONOSCOPY  09/13/2013    small polyp at 30cm benign hyperplastic polyp, changes consistent with melanosis coli. Recall 5 years    COLONOSCOPY  11/19/2018    Tics otherwise normal exam repeat in 5 years    COLONOSCOPY  02/13/2023    Normal exam repeat in 3 years with a 2 day prep    ENDOSCOPY  02/13/2023    Gastritis, small HH    REPLACEMENT TOTAL KNEE Right     2016    TOTAL ABDOMINAL HYSTERECTOMY WITH SALPINGO OOPHORECTOMY          Allergies:Aspirin and Niacin  PTA  Meds:  Medications Prior to Admission   Medication Sig Dispense Refill Last Dose    buPROPion XL (WELLBUTRIN XL) 150 MG 24 hr tablet TAKE 1 TABLET BY MOUTH DAILY 90 tablet 3 9/6/2023    albuterol sulfate  (90 Base) MCG/ACT inhaler Inhale 2 puffs Every 4 (Four) Hours As Needed for Wheezing. 2 g 3     amLODIPine (NORVASC) 5 MG tablet Take 1 tablet by mouth 2 (Two) Times a Day. (Patient taking differently: Take 0.5 tablets by mouth 2 (Two) Times a Day.) 180 tablet 2     anastrozole (ARIMIDEX) 1 MG tablet Take 1 tablet by mouth Daily. 90 tablet 3     Calcium Carb-Cholecalciferol (CALCIUM 600+D3 PO) Take  by mouth.       carvedilol (COREG) 6.25 MG tablet Take 1 tablet by mouth 2 (Two) Times a Day With Meals. 180 tablet 3     cetirizine (zyrTEC) 10 MG tablet Take 1 tablet by mouth Daily.       cloNIDine (Catapres) 0.1 MG tablet Take 1 tablet by mouth 2 (Two) Times a Day As Needed for High Blood Pressure (greater than 160/90). 45 tablet 0     cyclobenzaprine (FLEXERIL) 10 MG tablet Take 1 tablet by mouth 3 (Three) Times a Day As Needed for Muscle Spasms. 90 tablet 5     Ergocalciferol (VITAMIN D2 PO) Take 50,000 Units by mouth Every 30 (Thirty) Days.       ferrous sulfate 325 (65 FE) MG tablet Take 1 tablet by mouth Daily With Breakfast.       fluticasone (FLONASE) 50 MCG/ACT nasal spray 2 sprays into the nostril(s) as directed by provider Daily. 1 g 3     fluticasone (FLOVENT DISKUS) 50 MCG/BLIST diskus inhaler Inhale 1 puff.       irbesartan-hydrochlorothiazide (AVALIDE) 300-12.5 MG tablet TAKE 1 TABLET BY MOUTH DAILY 90 tablet 3     montelukast (SINGULAIR) 10 MG tablet Take 1 tablet by mouth Daily. 90 tablet 1     Multiple Vitamins-Minerals (MULTIVITAMIN ADULT) tablet Take  by mouth Daily.       Omega-3 Fatty Acids (FISH OIL) 1000 MG capsule capsule Take 1 capsule by mouth.       pantoprazole (Protonix) 40 MG EC tablet Take 1 tablet by mouth Daily. 90 tablet 2     rOPINIRole (REQUIP) 0.5 MG tablet Take 1 tablet  by mouth Every Night. 90 tablet 1     rosuvastatin (CRESTOR) 20 MG tablet Take 1 tablet by mouth Daily. 90 tablet 3     sertraline (ZOLOFT) 100 MG tablet TAKE 1 TABLET BY MOUTH DAILY 90 tablet 3     traZODone (DESYREL) 100 MG tablet Take 1 tablet by mouth Every Night. 90 tablet 3     valACYclovir (VALTREX) 500 MG tablet TAKE 1 TABLET BY MOUTH TWICE DAILY 180 tablet 0     vitamin D (ERGOCALCIFEROL) 1.25 MG (91584 UT) capsule capsule TAKE 1 CAPSULE BY MOUTH ONCE MONTHLY         Current Meds:   Current Facility-Administered Medications   Medication Dose Route Frequency Provider Last Rate Last Admin    acetaminophen (TYLENOL) tablet 650 mg  650 mg Oral Q4H PRN Johanny Reid, MARY        Or    acetaminophen (TYLENOL) 160 MG/5ML solution 650 mg  650 mg Oral Q4H PRN Johanny Reid, MARY        Or    acetaminophen (TYLENOL) suppository 650 mg  650 mg Rectal Q4H PRN Johanny Reid, APRN        anastrozole (ARIMIDEX) tablet 1 mg  1 mg Oral Daily Johanny Reid, MARY        buPROPion XL (WELLBUTRIN XL) 24 hr tablet 150 mg  150 mg Oral Daily Johanny Reid, MARY        fluticasone (FLONASE) 50 MCG/ACT nasal spray 2 spray  2 spray Nasal Daily Johanny Reid, MARY        montelukast (SINGULAIR) tablet 10 mg  10 mg Oral Nightly Johanny Reid, MARY        nitroglycerin (NITROSTAT) SL tablet 0.4 mg  0.4 mg Sublingual Q5 Min PRN Johanny Reid, MARY        ondansetron (ZOFRAN) tablet 4 mg  4 mg Oral Q6H PRN Johanny Reid, APRN        Or    ondansetron (ZOFRAN) injection 4 mg  4 mg Intravenous Q6H PRN Johanny Reid, APRN        pantoprazole (PROTONIX) injection 40 mg  40 mg Intravenous Q12H Johanny Reid, APRN        rOPINIRole (REQUIP) tablet 0.5 mg  0.5 mg Oral Nightly Johanny Reid, APRN        rosuvastatin (CRESTOR) tablet 20 mg  20 mg Oral Daily Joahnny Reid, APRN        sertraline (ZOLOFT) tablet 100 mg  100 mg Oral Daily Johanny Reid, APRN        sodium chloride 0.9 % flush 10  mL  10 mL Intravenous PRN Oren Dunham MD        sodium chloride 0.9 % flush 10 mL  10 mL Intravenous Q12H Johanny Reid, MARY        sodium chloride 0.9 % flush 10 mL  10 mL Intravenous PRN Johanny Reid APRN        sodium chloride 0.9 % infusion 40 mL  40 mL Intravenous PRN Johanny Reid, MARY        sodium chloride 0.9 % infusion  75 mL/hr Intravenous Continuous Johanny Reid APRN        traZODone (DESYREL) tablet 100 mg  100 mg Oral Nightly Johanny Reid APRN   100 mg at 09/06/23 2124    valACYclovir (VALTREX) tablet 500 mg  500 mg Oral BID Johanny Reid APRN   500 mg at 09/06/23 2124       Family History:family history includes Cancer in her father and maternal uncle; Heart attack in her brother and mother; Hodgkin's lymphoma in her father; Other in her father; Pancreatic cancer in her maternal uncle; Skin cancer in her maternal uncle.   Social History: reports that she has never smoked. She has never used smokeless tobacco. She reports that she does not currently use drugs. She reports that she does not drink alcohol.    Review of Systems  Review of Systems   Constitutional:  Positive for fatigue. Negative for fever.   HENT:  Negative for congestion and nosebleeds.    Eyes:  Negative for redness and visual disturbance.   Respiratory:  Negative for shortness of breath and wheezing.    Cardiovascular:  Negative for chest pain and palpitations.   Gastrointestinal:  Negative for abdominal pain, nausea and vomiting.   Endocrine: Negative for polydipsia and polyphagia.   Genitourinary:  Negative for hematuria and vaginal bleeding.   Musculoskeletal:  Negative for neck stiffness.   Skin:  Positive for pallor.   Allergic/Immunologic: Negative for immunocompromised state.   Neurological:  Negative for dizziness and speech difficulty.   Hematological:  Negative for adenopathy.   Psychiatric/Behavioral:  Negative for agitation, confusion and hallucinations.        Objective       Vital Signs   Temp:  [97.4 °F (36.3 °C)-98.6 °F (37 °C)] 98.4 °F (36.9 °C)  Heart Rate:  [69-86] 76  Resp:  [14-20] 16  BP: ()/(38-57) 136/46    Physical Exam  Vitals and nursing note reviewed.   Constitutional:       Appearance: She is ill-appearing.   HENT:      Head: Normocephalic and atraumatic.   Eyes:      General: No scleral icterus.  Cardiovascular:      Rate and Rhythm: Normal rate.   Pulmonary:      Effort: No respiratory distress.      Breath sounds: No wheezing.   Abdominal:      General: Bowel sounds are normal. There is no distension.      Palpations: Abdomen is soft.   Musculoskeletal:      Cervical back: Neck supple.      Comments: Bilateral lower extremity SCDs.   Skin:     General: Skin is warm.      Coloration: Skin is pale.   Neurological:      Mental Status: She is oriented to person, place, and time.   Psychiatric:         Mood and Affect: Mood normal.         Behavior: Behavior normal.         Thought Content: Thought content normal.         Judgment: Judgment normal.        Results from last 7 days   Lab Units 09/07/23  0141 09/06/23  1608 09/03/23  1734 09/03/23  0535 09/01/23  0853   WBC 10*3/mm3  --  8.67  --  9.54 7.07   HEMOGLOBIN g/dL 6.5* 4.9* 7.7* 5.2* 8.1*   HEMATOCRIT % 21.6* 16.6* 24.5* 17.7* 26.8*   PLATELETS 10*3/mm3  --  24*  --  33* 38*        Results from last 7 days   Lab Units 09/06/23  1532 09/03/23  0535 09/01/23  0853   SODIUM mmol/L 139 137 139   POTASSIUM mmol/L 4.0 4.0 3.8   CHLORIDE mmol/L 105 104 103   CO2 mmol/L 25.0 25.0 26.0   BUN mg/dL 52* 59* 38*   CREATININE mg/dL 1.51* 1.58* 1.88*   CALCIUM mg/dL 8.3* 8.8 8.6   BILIRUBIN mg/dL <0.2 <0.2 0.2   ALK PHOS U/L 50 48 71   ALT (SGPT) U/L 9 9 11   AST (SGOT) U/L 14 12 14   GLUCOSE mg/dL 115* 123* 106*       ABG:  No results found for: PHART, PO2ART, RQD6KTO    Culture Data:   No results found for: BLOODCX, URINECX, WOUNDCX, MRSACX, RESPCX, STOOLCX    Radiology:   Imaging Results (Last 7 Days)       ** No  results found for the last 168 hours. **                 Assessment/Plan        ASSESSMENT:   Gastrointestinal bleeding.  Symptomatic anemia from acute blood loss anemia.        CONCURRENT PROBLEMS:  1. Chronic ITP (idiopathic thrombocytopenia).  Slightly hyperresonant marrow.  No features of myelodysplasia or excess blasts on 11/10/2022.   2. Anemia in stage 3b chronic kidney disease.  On epoetin alpha 40,000 units, latest dose 9/1/2023.   3. Iron deficiency anemia secondary to inadequate dietary iron intake    4. History of breast cancer.  Off adjuvant anastrozole.   5.   Followed by Dr. Gus Carvalho at [facility].         PLAN:   regarding the reason for the consult.   regarding trial of dexamethasone 40 mg IV daily for 4 days and follow platelet counts.  She is on Protonix IV.  Await GI evaluation.  Latest endoscopy and colonoscopy 2/13/2023 were unremarkable.  Follow heme status.  Check retic, ferritin and iron profile.  Reticulocyte 8.4, 21% saturation, low TIBC and ferritin 260.6.  She had Injectafer on 8/11/2020  Transfuse 1 unit packed RBC if hemoglobin less than 8.  Monitor for transfusion reactions  Transfuse 1 unit platelet if platelet is 10 or below.  Monitor for transfusion reactions.  Clinic appointment with MARY Toney 1 week after discharge.  Further recommendations pending.    I discussed the patients findings and my recommendations with patient and nurse Carlene.  They verbalized understanding.        I spent 51 total minutes, face-to-face, caring for Marina today. Greater than 50% of this time involved counseling and/or coordination of care as documented within this note.       Benjmain Shah MD  09/07/23  06:01 CDT                  Thank you for allowing me to participate in the care of Ms. Marina Joe      Electronically signed by Benjamin Shah MD at 09/07/23 0702

## 2023-09-07 NOTE — CONSULTS
[facility]  Initial Inpatient Consult Note    Referring Provider: No Known Provider    Date of Admission: 9/6/2023  Date of Service:  09/07/23    Reason for Consultation: Anemia/melena    Subjective     History of present illness:      This is a 78-year-old female presented to [facility] ER on 9/6/2023 with complaints of black stools.  She does observe some bright red blood in her bowel movement.  She was evaluated in the ER on 9/3/2023 and found to have a hemoglobin of 5.2 with platelets of 33.  Hemoglobin on arrival yesterday noted to be 4.9 with platelets of 24.  She reports periumbilical abdominal pain.  No nausea or vomiting.  No heartburn or indigestion, no dysphagia.  She has experienced melanic stools for over a month.    Colonoscopy and endoscopy February 2023 both unremarkable.  Colonoscopy was complete however stool was noted throughout the colon.    Past Medical History:  Past Medical History:   Diagnosis Date    Breast cancer     CKD (chronic kidney disease)     Hypercholesteremia     Hypertension     Sinusitis     Stage 3a chronic kidney disease 10/20/2020       Past Surgical History:  Past Surgical History:   Procedure Laterality Date    AVULSION TOENAIL PLATE      Sept 26,2018    BREAST BIOPSY Left 11/20/2012    BREAST BIOPSY      Left Breast, 1/2019 per Dr Barkley    BREAST LUMPECTOMY Left     with node bx     COLONOSCOPY  09/13/2013    small polyp at 30cm benign hyperplastic polyp, changes consistent with melanosis coli. Recall 5 years    COLONOSCOPY  11/19/2018    Tics otherwise normal exam repeat in 5 years    COLONOSCOPY  02/13/2023    Normal exam repeat in 3 years with a 2 day prep    ENDOSCOPY  02/13/2023    Gastritis, small HH    REPLACEMENT TOTAL KNEE Right     2016    TOTAL ABDOMINAL HYSTERECTOMY WITH SALPINGO OOPHORECTOMY          Social History:   Social History     Tobacco Use    Smoking status: Never    Smokeless tobacco: Never   Substance Use Topics     Alcohol use: No        Family History:  Family History   Problem Relation Age of Onset    Heart attack Mother     Hodgkin's lymphoma Father     Other Father     Cancer Father         hodgkins    Heart attack Brother     Skin cancer Maternal Uncle     Pancreatic cancer Maternal Uncle     Cancer Maternal Uncle         eye    Colon cancer Neg Hx     Colon polyps Neg Hx        Home Meds:  Medications Prior to Admission   Medication Sig Dispense Refill Last Dose    buPROPion XL (WELLBUTRIN XL) 150 MG 24 hr tablet TAKE 1 TABLET BY MOUTH DAILY 90 tablet 3 9/6/2023    albuterol sulfate  (90 Base) MCG/ACT inhaler Inhale 2 puffs Every 4 (Four) Hours As Needed for Wheezing. 2 g 3     amLODIPine (NORVASC) 5 MG tablet Take 1 tablet by mouth 2 (Two) Times a Day. (Patient taking differently: Take 0.5 tablets by mouth 2 (Two) Times a Day.) 180 tablet 2     anastrozole (ARIMIDEX) 1 MG tablet Take 1 tablet by mouth Daily. 90 tablet 3     Calcium Carb-Cholecalciferol (CALCIUM 600+D3 PO) Take  by mouth.       carvedilol (COREG) 6.25 MG tablet Take 1 tablet by mouth 2 (Two) Times a Day With Meals. 180 tablet 3     cetirizine (zyrTEC) 10 MG tablet Take 1 tablet by mouth Daily.       cloNIDine (Catapres) 0.1 MG tablet Take 1 tablet by mouth 2 (Two) Times a Day As Needed for High Blood Pressure (greater than 160/90). 45 tablet 0     cyclobenzaprine (FLEXERIL) 10 MG tablet Take 1 tablet by mouth 3 (Three) Times a Day As Needed for Muscle Spasms. 90 tablet 5     Ergocalciferol (VITAMIN D2 PO) Take 50,000 Units by mouth Every 30 (Thirty) Days.       ferrous sulfate 325 (65 FE) MG tablet Take 1 tablet by mouth Daily With Breakfast.       fluticasone (FLONASE) 50 MCG/ACT nasal spray 2 sprays into the nostril(s) as directed by provider Daily. 1 g 3     fluticasone (FLOVENT DISKUS) 50 MCG/BLIST diskus inhaler Inhale 1 puff.       irbesartan-hydrochlorothiazide (AVALIDE) 300-12.5 MG tablet TAKE 1 TABLET BY MOUTH DAILY 90 tablet 3      montelukast (SINGULAIR) 10 MG tablet Take 1 tablet by mouth Daily. 90 tablet 1     Multiple Vitamins-Minerals (MULTIVITAMIN ADULT) tablet Take  by mouth Daily.       Omega-3 Fatty Acids (FISH OIL) 1000 MG capsule capsule Take 1 capsule by mouth.       pantoprazole (Protonix) 40 MG EC tablet Take 1 tablet by mouth Daily. 90 tablet 2     rOPINIRole (REQUIP) 0.5 MG tablet Take 1 tablet by mouth Every Night. 90 tablet 1     rosuvastatin (CRESTOR) 20 MG tablet Take 1 tablet by mouth Daily. 90 tablet 3     sertraline (ZOLOFT) 100 MG tablet TAKE 1 TABLET BY MOUTH DAILY 90 tablet 3     traZODone (DESYREL) 100 MG tablet Take 1 tablet by mouth Every Night. 90 tablet 3     valACYclovir (VALTREX) 500 MG tablet TAKE 1 TABLET BY MOUTH TWICE DAILY 180 tablet 0     vitamin D (ERGOCALCIFEROL) 1.25 MG (73656 UT) capsule capsule TAKE 1 CAPSULE BY MOUTH ONCE MONTHLY          Current Meds:     Current Facility-Administered Medications:     acetaminophen (TYLENOL) tablet 650 mg, 650 mg, Oral, Q4H PRN **OR** acetaminophen (TYLENOL) 160 MG/5ML solution 650 mg, 650 mg, Oral, Q4H PRN **OR** acetaminophen (TYLENOL) suppository 650 mg, 650 mg, Rectal, Q4H PRN, Johanny Reid APRN    anastrozole (ARIMIDEX) tablet 1 mg, 1 mg, Oral, Daily, Johanny Reid APRN    buPROPion XL (WELLBUTRIN XL) 24 hr tablet 150 mg, 150 mg, Oral, Daily, Johanny Reid APRN    dexAMETHasone sodium phosphate 40 mg in sodium chloride 0.9 % IVPB, 40 mg, Intravenous, Daily, Benjamin Shah MD    fluticasone (FLONASE) 50 MCG/ACT nasal spray 2 spray, 2 spray, Nasal, Daily, Johanny Reid APRN    montelukast (SINGULAIR) tablet 10 mg, 10 mg, Oral, Nightly, Johanny Reid APRN    nitroglycerin (NITROSTAT) SL tablet 0.4 mg, 0.4 mg, Sublingual, Q5 Min PRN, Johanny Reid APRN    ondansetron (ZOFRAN) tablet 4 mg, 4 mg, Oral, Q6H PRN **OR** ondansetron (ZOFRAN) injection 4 mg, 4 mg, Intravenous, Q6H PRN, Johanny Reid APRN    pantoprazole (PROTONIX)  injection 40 mg, 40 mg, Intravenous, Q12H, Johanny Reid, APRN, 40 mg at 09/07/23 0631    rOPINIRole (REQUIP) tablet 0.5 mg, 0.5 mg, Oral, Nightly, Johanny Reid APRN    rosuvastatin (CRESTOR) tablet 20 mg, 20 mg, Oral, Daily, Johanny Reid APRN    sertraline (ZOLOFT) tablet 100 mg, 100 mg, Oral, Daily, Johanny Reid APRN    sodium chloride 0.9 % flush 10 mL, 10 mL, Intravenous, PRN, Oren Dunham MD    sodium chloride 0.9 % flush 10 mL, 10 mL, Intravenous, Q12H, Johanny Reid APRN, 10 mL at 09/07/23 0902    sodium chloride 0.9 % flush 10 mL, 10 mL, Intravenous, PRN, Johanny Reid, MARY    sodium chloride 0.9 % infusion 40 mL, 40 mL, Intravenous, PRN, Johanny Reid, MARY    sodium chloride 0.9 % infusion, 75 mL/hr, Intravenous, Continuous, Johanny Reid APRN    traZODone (DESYREL) tablet 100 mg, 100 mg, Oral, Nightly, Johanny Reid APRN, 100 mg at 09/06/23 2124    valACYclovir (VALTREX) tablet 500 mg, 500 mg, Oral, BID, Johanny Reid APRN, 500 mg at 09/06/23 2124    Allergies:  Allergies   Allergen Reactions    Aspirin GI Bleeding    Niacin Other (See Comments)       Vital Signs  Temp:  [97.4 °F (36.3 °C)-98.6 °F (37 °C)] 98.4 °F (36.9 °C)  Heart Rate:  [69-86] 75  Resp:  [14-20] 16  BP: ()/(38-57) 153/56  Body mass index is 35.12 kg/m².    Intake/Output Summary (Last 24 hours) at 9/7/2023 0912  Last data filed at 9/7/2023 0619  Gross per 24 hour   Intake 918 ml   Output --   Net 918 ml     No intake/output data recorded.    Review of Systems     Constitution:  negative for chills, fatigue and fevers  Eyes:  negative for blurriness and change of vision  ENT:   negative for sore throat and voice change  Respiratory: negative for  cough and shortness of air  Cardiovascular:  Negative for chest pain or palpitations  Gastrointestinal:  negative for  See HPI  Genitourinary:  negative for  blood in urine and painful urination  Integument: negative for  rash  and redness  Hematologic / Lymphatic: negative for  excessive bleeding and easy bruising  Musculoskeletal: negative for  joint pain and joint stiffness out of the ordinary  Neurological:  negative for  seizures and speech abnormality  Behavioral/Psych:  negative for  anxiety and depression out of the ordinary  Endocrine: negative for  diabetes and weight loss, unintended  Allergies / Immunologic:  negative for  anaphylaxis and rash      Objective     Physical Exam:  General :    Alert, cooperative, in no acute distress   Head:    Normocephalic, without obvious abnormality, atraumatic   Eyes:            Lids and lashes normal, conjunctivae and sclerae normal, no   Icterus, conjunctival pallor   Throat:   No oral lesions, no thrush, oral mucosa moist, posterior oropharynx clear   Neck:   No adenopathy, supple, trachea midline, no thyromegaly, no   carotid bruit, no JVD   Lungs:     Clear to auscultation, respirations regular, even and                   unlabored    Heart:    Regular rhythm and normal rate, normal S1 and S2, no            murmur   Chest Wall:    No abnormalities observed   Abdomen:     Normal bowel sounds, no masses, no organomegaly, soft        nontender, nondistended, no guarding, no rebound                 tenderness   Rectal:     Deferred   Extremities:   No edema, no cyanosis   Skin:   No open lesions, bruising or rash   Lymph nodes:   No palpable cervical adenopathy   Psychiatric:  Judgement and insight: normal   Orientation to person place and time: normal   Mood and affect: normal        Results Review:    I have reviewed all of the patients current test results  Results from last 7 days   Lab Units 09/07/23  0141 09/06/23  1608 09/03/23  1734 09/03/23  0535 09/01/23  0853   WBC 10*3/mm3  --  8.67  --  9.54 7.07   HEMOGLOBIN g/dL 6.5* 4.9* 7.7* 5.2* 8.1*   HEMATOCRIT % 21.6* 16.6* 24.5* 17.7* 26.8*   PLATELETS 10*3/mm3  --  24*  --  33* 38*       Results from last 7 days   Lab Units  09/06/23  1532 09/03/23  0535 09/01/23  0853   SODIUM mmol/L 139 137 139   POTASSIUM mmol/L 4.0 4.0 3.8   CHLORIDE mmol/L 105 104 103   CO2 mmol/L 25.0 25.0 26.0   BUN mg/dL 52* 59* 38*   CREATININE mg/dL 1.51* 1.58* 1.88*   CALCIUM mg/dL 8.3* 8.8 8.6   BILIRUBIN mg/dL <0.2 <0.2 0.2   ALK PHOS U/L 50 48 71   ALT (SGPT) U/L 9 9 11   AST (SGOT) U/L 14 12 14   GLUCOSE mg/dL 115* 123* 106*       Results from last 7 days   Lab Units 09/06/23  1608 09/03/23  0535   INR  1.09 1.04       No results found for: LIPASE    Radiology:    Imaging Results (Last 24 Hours)       ** No results found for the last 24 hours. **              Assessment & Plan       GIB (gastrointestinal bleeding)    Stage 3b chronic kidney disease    Acute blood loss anemia    Thrombocytopenia    Hypotension due to hypovolemia      Impression/Plan    Melena  Anemia, acute on chronic  Thrombocytopenia    She is on oral iron.  No PPI listed on home medication list.  Recommend proceeding with EGD today pending results of hemoglobin and platelets.  She has scheduled appointment next week with Dr. Gus Carvalho for EUS.  Risk benefits indication reviewed with patient, she was agreeable to proceed.  Further recommendations pending result of ordered testing, patient progress.      Electronically signed by MARY Nino, 09/07/23, 9:12 AM CDT.         MARY Nino  09/07/23  09:12 CDT

## 2023-09-08 ENCOUNTER — APPOINTMENT (OUTPATIENT)
Dept: GENERAL RADIOLOGY | Facility: HOSPITAL | Age: 78
DRG: 378 | End: 2023-09-08
Payer: MEDICARE

## 2023-09-08 LAB
ANION GAP SERPL CALCULATED.3IONS-SCNC: 10 MMOL/L (ref 5–15)
BASOPHILS # BLD AUTO: 0.01 10*3/MM3 (ref 0–0.2)
BASOPHILS NFR BLD AUTO: 0.1 % (ref 0–1.5)
BH BB BLOOD EXPIRATION DATE: NORMAL
BH BB BLOOD TYPE BARCODE: 5100
BH BB BLOOD TYPE BARCODE: 6200
BH BB DISPENSE STATUS: NORMAL
BH BB PRODUCT CODE: NORMAL
BH BB UNIT NUMBER: NORMAL
BUN SERPL-MCNC: 36 MG/DL (ref 8–23)
BUN/CREAT SERPL: 29 (ref 7–25)
CALCIUM SPEC-SCNC: 8.2 MG/DL (ref 8.6–10.5)
CHLORIDE SERPL-SCNC: 105 MMOL/L (ref 98–107)
CO2 SERPL-SCNC: 22 MMOL/L (ref 22–29)
CREAT SERPL-MCNC: 1.24 MG/DL (ref 0.57–1)
CROSSMATCH INTERPRETATION: NORMAL
DEPRECATED RDW RBC AUTO: 53.2 FL (ref 37–54)
EGFRCR SERPLBLD CKD-EPI 2021: 44.9 ML/MIN/1.73
EOSINOPHIL # BLD AUTO: 0 10*3/MM3 (ref 0–0.4)
EOSINOPHIL NFR BLD AUTO: 0 % (ref 0.3–6.2)
ERYTHROCYTE [DISTWIDTH] IN BLOOD BY AUTOMATED COUNT: 18.8 % (ref 12.3–15.4)
GLUCOSE SERPL-MCNC: 136 MG/DL (ref 65–99)
HCT VFR BLD AUTO: 24.6 % (ref 34–46.6)
HCT VFR BLD AUTO: 26.1 % (ref 34–46.6)
HCT VFR BLD AUTO: 27.2 % (ref 34–46.6)
HCT VFR BLD AUTO: 27.4 % (ref 34–46.6)
HGB BLD-MCNC: 7.9 G/DL (ref 12–15.9)
HGB BLD-MCNC: 8.2 G/DL (ref 12–15.9)
HGB BLD-MCNC: 8.4 G/DL (ref 12–15.9)
HGB BLD-MCNC: 8.7 G/DL (ref 12–15.9)
LYMPHOCYTES # BLD AUTO: 0.82 10*3/MM3 (ref 0.7–3.1)
LYMPHOCYTES NFR BLD AUTO: 7.7 % (ref 19.6–45.3)
MCH RBC QN AUTO: 28.9 PG (ref 26.6–33)
MCHC RBC AUTO-ENTMCNC: 32.1 G/DL (ref 31.5–35.7)
MCV RBC AUTO: 90.1 FL (ref 79–97)
MONOCYTES # BLD AUTO: 0.59 10*3/MM3 (ref 0.1–0.9)
MONOCYTES NFR BLD AUTO: 5.5 % (ref 5–12)
NEUTROPHILS NFR BLD AUTO: 85.1 % (ref 42.7–76)
NEUTROPHILS NFR BLD AUTO: 9.08 10*3/MM3 (ref 1.7–7)
PLATELET # BLD AUTO: 24 10*3/MM3 (ref 140–450)
POTASSIUM SERPL-SCNC: 3.9 MMOL/L (ref 3.5–5.2)
RBC # BLD AUTO: 2.73 10*6/MM3 (ref 3.77–5.28)
SODIUM SERPL-SCNC: 137 MMOL/L (ref 136–145)
UNIT  ABO: NORMAL
UNIT  RH: NORMAL
WBC NRBC COR # BLD: 10.67 10*3/MM3 (ref 3.4–10.8)

## 2023-09-08 PROCEDURE — 97110 THERAPEUTIC EXERCISES: CPT

## 2023-09-08 PROCEDURE — 25010000002 DEXAMETHASONE SODIUM PHOSPHATE 100 MG/10ML SOLUTION 10 ML VIAL: Performed by: INTERNAL MEDICINE

## 2023-09-08 PROCEDURE — BD11YZZ FLUOROSCOPY OF ESOPHAGUS USING OTHER CONTRAST: ICD-10-PCS | Performed by: STUDENT IN AN ORGANIZED HEALTH CARE EDUCATION/TRAINING PROGRAM

## 2023-09-08 PROCEDURE — 97166 OT EVAL MOD COMPLEX 45 MIN: CPT

## 2023-09-08 PROCEDURE — 80048 BASIC METABOLIC PNL TOTAL CA: CPT | Performed by: NURSE PRACTITIONER

## 2023-09-08 PROCEDURE — 85018 HEMOGLOBIN: CPT | Performed by: NURSE PRACTITIONER

## 2023-09-08 PROCEDURE — 99233 SBSQ HOSP IP/OBS HIGH 50: CPT | Performed by: NURSE PRACTITIONER

## 2023-09-08 PROCEDURE — 99232 SBSQ HOSP IP/OBS MODERATE 35: CPT | Performed by: INTERNAL MEDICINE

## 2023-09-08 PROCEDURE — 97161 PT EVAL LOW COMPLEX 20 MIN: CPT

## 2023-09-08 PROCEDURE — 85014 HEMATOCRIT: CPT | Performed by: NURSE PRACTITIONER

## 2023-09-08 PROCEDURE — 97116 GAIT TRAINING THERAPY: CPT

## 2023-09-08 PROCEDURE — 74240 X-RAY XM UPR GI TRC 1CNTRST: CPT

## 2023-09-08 PROCEDURE — 0 DIATRIZOATE MEGLUMINE & SODIUM PER 1 ML: Performed by: HOSPITALIST

## 2023-09-08 PROCEDURE — 85025 COMPLETE CBC W/AUTO DIFF WBC: CPT | Performed by: INTERNAL MEDICINE

## 2023-09-08 RX ORDER — FAMOTIDINE 20 MG/1
20 TABLET, FILM COATED ORAL
Status: DISCONTINUED | OUTPATIENT
Start: 2023-09-08 | End: 2023-09-12 | Stop reason: HOSPADM

## 2023-09-08 RX ADMIN — MONTELUKAST SODIUM 10 MG: 10 TABLET, FILM COATED ORAL at 20:03

## 2023-09-08 RX ADMIN — DEXAMETHASONE SODIUM PHOSPHATE 40 MG: 10 INJECTION, SOLUTION INTRAMUSCULAR; INTRAVENOUS at 10:28

## 2023-09-08 RX ADMIN — FLUTICASONE PROPIONATE 2 SPRAY: 50 SPRAY, METERED NASAL at 09:47

## 2023-09-08 RX ADMIN — FAMOTIDINE 20 MG: 20 TABLET, FILM COATED ORAL at 17:28

## 2023-09-08 RX ADMIN — Medication 10 ML: at 20:04

## 2023-09-08 RX ADMIN — PANTOPRAZOLE SODIUM 40 MG: 40 INJECTION, POWDER, FOR SOLUTION INTRAVENOUS at 04:10

## 2023-09-08 RX ADMIN — VALACYCLOVIR 500 MG: 500 TABLET, FILM COATED ORAL at 20:03

## 2023-09-08 RX ADMIN — DIATRIZOATE MEGLUMINE AND DIATRIZOATE SODIUM 100 ML: 660; 100 LIQUID ORAL; RECTAL at 12:03

## 2023-09-08 RX ADMIN — ROPINIROLE HYDROCHLORIDE 0.5 MG: 0.25 TABLET, FILM COATED ORAL at 20:03

## 2023-09-08 RX ADMIN — PANTOPRAZOLE SODIUM 40 MG: 40 INJECTION, POWDER, FOR SOLUTION INTRAVENOUS at 16:06

## 2023-09-08 NOTE — THERAPY EVALUATION
Patient Name: Marina Joe  : 1945    MRN: [id number]                              Today's Date: 2023       Admit Date: 2023    Visit Dx:     ICD-10-CM ICD-9-CM   1. Gastrointestinal hemorrhage, unspecified gastrointestinal hemorrhage type  K92.2 578.9   2. Impaired mobility [Z74.09 (ICD-10-CM)]  Z74.09 799.89     Patient Active Problem List   Diagnosis    Malignant neoplasm of upper-outer quadrant of female breast    Anemia of chronic renal failure, stage 3 (moderate)    Hypertension, benign    Hx of colonic polyps    HX: breast cancer    Morbidly obese    Abnormal mammogram    Breast mass    Right knee DJD    S/P lumpectomy, left breast    Adult hypothyroidism    Rhinitis    Anemia    Anxiety    At low risk for fall    Breast cancer, left    Cervical pain    Chronic insomnia    Cough    Elevated lipids    Encounter for immunization    Herpes zoster without complication    Influenza B    Left ear pain    Left otitis externa    Lumbar strain, initial encounter    Myalgia    Negative depression screening    Obesity (BMI 30-39.9)    Postmenopausal status    Recurrent acute serous otitis media of left ear    Restless leg    Sinusitis, bacterial    Skin lesion    Upper respiratory infection    Urinary tract infection without hematuria    Vitamin D deficiency    Stage 3b chronic kidney disease    GIB (gastrointestinal bleeding)    Acute blood loss anemia    Thrombocytopenia    Hypotension due to hypovolemia     Past Medical History:   Diagnosis Date    Breast cancer     CKD (chronic kidney disease)     Hypercholesteremia     Hypertension     Sinusitis     Stage 3a chronic kidney disease 10/20/2020     Past Surgical History:   Procedure Laterality Date    AVULSION TOENAIL PLATE          BREAST BIOPSY Left 2012    BREAST BIOPSY      Left Breast, 2019 per Dr Barkley    BREAST LUMPECTOMY Left     with node bx     COLONOSCOPY  2013    small polyp at 30cm benign hyperplastic polyp,  changes consistent with melanosis coli. Recall 5 years    COLONOSCOPY  11/19/2018    Tics otherwise normal exam repeat in 5 years    COLONOSCOPY  02/13/2023    Normal exam repeat in 3 years with a 2 day prep    ENDOSCOPY  02/13/2023    Gastritis, small HH    REPLACEMENT TOTAL KNEE Right     2016    TOTAL ABDOMINAL HYSTERECTOMY WITH SALPINGO OOPHORECTOMY        General Information       Row Name 09/08/23 1020          OT Time and Intention    Document Type evaluation  Pt presents with black and bloody stool, fatigue and weakness. H&H 4.9 & 16.6% upon arrival. Dx: GI bleed, acute blood loss anemia. Hx includes: CKD, breast CA, HTN.  -LR     Mode of Treatment occupational therapy  -LR       Row Name 09/08/23 1020          General Information    Patient Profile Reviewed yes  -LR     Prior Level of Function independent:;all household mobility;community mobility;transfer;ADL's;home management;cooking;cleaning;driving;shopping  -LR     Existing Precautions/Restrictions fall   low H&H, platelets  -LR     Barriers to Rehab medically complex  -LR       Row Name 09/08/23 1020          Occupational Profile    Environmental Supports and Barriers (Occupational Profile) tub/shower combo  -LR       Row Name 09/08/23 1020          Living Environment    People in Home significant other  -LR       Row Name 09/08/23 1020          Home Main Entrance    Number of Stairs, Main Entrance three  -LR     Stair Railings, Main Entrance railing on right side (ascending)  -LR       Row Name 09/08/23 1020          Stairs Within Home, Primary    Number of Stairs, Within Home, Primary none  -LR       Row Name 09/08/23 1020          Cognition    Orientation Status (Cognition) oriented x 4  -LR       Row Name 09/08/23 1020          Safety Issues, Functional Mobility    Safety Issues Affecting Function (Mobility) awareness of need for assistance;impulsivity;insight into deficits/self-awareness;safety precaution awareness;safety precautions  follow-through/compliance  -LR     Impairments Affecting Function (Mobility) balance;endurance/activity tolerance;shortness of breath;strength  -LR               User Key  (r) = Recorded By, (t) = Taken By, (c) = Cosigned By      Initials Name Provider Type    LR Bety Tejeda, ELIR/L Occupational Therapist                     Mobility/ADL's       Row Name 09/08/23 1020          Bed Mobility    Bed Mobility supine-sit;sit-supine  -LR     Supine-Sit [place] (Bed Mobility) supervision  -LR     Sit-Supine Roberts (Bed Mobility) supervision  -LR     Assistive Device (Bed Mobility) bed rails;head of bed elevated  -       Row Name 09/08/23 1020          Transfers    Transfers sit-stand transfer;stand-sit transfer;toilet transfer  -       Row Name 09/08/23 1020          Sit-Stand Transfer    Sit-Stand Roberts (Transfers) contact guard;standby assist;verbal cues  -       Row Name 09/08/23 1020          Stand-Sit Transfer    Stand-Sit Roberts (Transfers) contact guard;standby assist;verbal cues  -       Row Name 09/08/23 1020          Toilet Transfer    Type (Toilet Transfer) stand pivot/stand step  -LR     [place] Level (Toilet Transfer) standby assist  -LR     Assistive Device (Toilet Transfer) commode, bedside without drop arms  -       Row Name 09/08/23 1020          Functional Mobility    Functional Mobility- Ind. Level contact guard assist;verbal cues required  -LR     Functional Mobility- Comment unsteadiness noted initially, improved with time  -       Row Name 09/08/23 1020          Activities of Daily Living    BADL Assessment/Intervention lower body dressing;toileting  -       Row Name 09/08/23 1020          Lower Body Dressing Assessment/Training    [place]ts Level (Lower Body Dressing) socks;standby assist  -     Position (Lower Body Dressing) edge of bed sitting  -LR     Comment, (Lower Body Dressing) adjust B socks  -       Row Name 09/08/23 1020           Toileting Assessment/Training    [place] Level (Toileting) toileting skills;standby assist  -LR     Assistive Devices (Toileting) commode, bedside without drop arms  -LR     Position (Toileting) unsupported standing;unsupported sitting  -LR               User Key  (r) = Recorded By, (t) = Taken By, (c) = Cosigned By      Initials Name Provider Type    LR Bety Tejeda OTR/L Occupational Therapist                   Obj/Interventions       Row Name 09/08/23 1020          Sensory Assessment (Somatosensory)    Sensory Assessment (Somatosensory) UE sensation intact  -LR       Row Name 09/08/23 1020          Vision Assessment/Intervention    Visual Impairment/Limitations corrective lenses full-time  -LR       Row Name 09/08/23 1020          Range of Motion Comprehensive    General Range of Motion bilateral upper extremity ROM WFL  -LR       Row Name 09/08/23 1020          Strength Comprehensive (MMT)    General Manual Muscle Testing (MMT) Assessment upper extremity strength deficits identified  -LR       Row Name 09/08/23 1020          Upper Extremity (Manual Muscle Testing)    Comment, MMT: Upper Extremity B UE 4+/5  -LR       Row Name 09/08/23 1020          Balance    Balance Assessment sitting static balance;sitting dynamic balance;standing static balance;standing dynamic balance  -LR     Static Sitting Balance supervision  -LR     Dynamic Sitting Balance standby assist  -LR     Position, Sitting Balance unsupported;sitting edge of bed  -LR     Static Standing Balance standby assist  -LR     Dynamic Standing Balance contact guard;standby assist  -LR     Position/Device Used, Standing Balance unsupported  -LR     Comment, Balance Pt initially demo unsteadiness, improved with time  -LR               User Key  (r) = Recorded By, (t) = Taken By, (c) = Cosigned By      Initials Name Provider Type    Bety Fernandez OTR/L Occupational Therapist                   Goals/Plan       Row Name 09/08/23 1020           Transfer Goal 1 (OT)    Activity/Assistive Device (Transfer Goal 1, OT) bed-to-chair/chair-to-bed;toilet;tub;commode  -LR     [place] Level/Cues Needed (Transfer Goal 1, OT) supervision required  -LR     Time Frame (Transfer Goal 1, OT) long term goal (LTG);by discharge  -LR     Progress/Outcome (Transfer Goal 1, OT) new goal  -LR       Row Name 09/08/23 1020          Bathing Goal 1 (OT)    Activity/Device (Bathing Goal 1, OT) bathing skills, all  -LR     [place] Level/Cues Needed (Bathing Goal 1, OT) supervision required  -LR     Time Frame (Bathing Goal 1, OT) long term goal (LTG);by discharge  -LR     Progress/Outcomes (Bathing Goal 1, OT) new goal  -LR       Row Name 09/08/23 1020          Toileting Goal 1 (OT)    Activity/Device (Toileting Goal 1, OT) toileting skills, all;commode  -LR     [place] Level/Cues Needed (Toileting Goal 1, OT) supervision required  -LR     Time Frame (Toileting Goal 1, OT) long term goal (LTG);by discharge  -LR     Progress/Outcome (Toileting Goal 1, OT) new goal  -LR       Row Name 09/08/23 1020          Therapy Assessment/Plan (OT)    Planned Therapy Interventions (OT) activity tolerance training;adaptive equipment training;BADL retraining;functional balance retraining;IADL retraining;occupation/activity based interventions;patient/caregiver education/training;ROM/therapeutic exercise;strengthening exercise;transfer/mobility retraining  -LR               User Key  (r) = Recorded By, (t) = Taken By, (c) = Cosigned By      Initials Name Provider Type    LR Bety Tejeda OTR/L Occupational Therapist                   Clinical Impression       Row Name 09/08/23 1020          Pain Assessment    Pretreatment Pain Rating 0/10 - no pain  -LR     Posttreatment Pain Rating 0/10 - no pain  -LR       Row Name 09/08/23 1020          Plan of Care Review    Plan of Care Reviewed With patient  -LR     Progress no change  -LR     Outcome Evaluation OT eval completed. Nsg  cleared pt for participation in eval. Pt presents A&Ox4 with c/o fatigue and weakness, but willing to participate. Pt reports PLOF of I without use of AD. Pt completed supine<>sit with Supervision. Pt adjusted B socks while seated EOB with SBA. Pt completed sit<>stand t/f at EOB with CGA/SBA. Pt completed fxl mobility with CGA, demo unsteadiness initially but improved with time. Upon return to EOB, pt reported need for toileting and requested use of BSC. Pt completed stand step t/f EOB<>BSC with SBA. Pt completed toileting at BSC with SBA. Pt with increased SOA following activities, O2 sats 99% on RA. Skilled OT warranted to provide education and address safety awareness, balance, activity tolerance, and strength in order to increase pt safety and I during ADLs, fxl mobility, and fxl t/f's. Recommend D/C home with assist pending progress.  -LR       Row Name 09/08/23 1020          Therapy Assessment/Plan (OT)    Rehab Potential (OT) good, to achieve stated therapy goals  -LR     Criteria for Skilled Therapeutic Interventions Met (OT) yes;skilled treatment is necessary  -LR     Therapy Frequency (OT) 3 times/wk  -LR     Predicted Duration of Therapy Intervention (OT) until hospital D/C  -LR       Row Name 09/08/23 1020          Therapy Plan Review/Discharge Plan (OT)    Anticipated Discharge Disposition (OT) home with assist  -LR       Row Name 09/08/23 1020          Vital Signs    Pre SpO2 (%) 98  -LR     O2 Delivery Pre Treatment room air  -LR     Intra SpO2 (%) 99  -LR     O2 Delivery Intra Treatment room air  -LR     Post SpO2 (%) 98  -LR     O2 Delivery Post Treatment room air  -LR     Pre Patient Position Supine  -LR     Intra Patient Position Standing  -LR     Post Patient Position Supine  -LR       Row Name 09/08/23 1020          Positioning and Restraints    Pre-Treatment Position in bed  -LR     Post Treatment Position bed  -LR     In Bed notified nsg;fowlers;call light within reach;encouraged to call for  assist;exit alarm on;side rails up x2  -LR               User Key  (r) = Recorded By, (t) = Taken By, (c) = Cosigned By      Initials Name Provider Type    Bety Fernandez OTR/L Occupational Therapist                   Outcome Measures       Row Name 09/08/23 1020          How much help from another is currently needed...    Putting on and taking off regular lower body clothing? 3  -LR     Bathing (including washing, rinsing, and drying) 3  -LR     Toileting (which includes using toilet bed pan or urinal) 3  -LR     Putting on and taking off regular upper body clothing 3  -LR     Taking care of personal grooming (such as brushing teeth) 3  -LR     Eating meals 4  -LR     AM-PAC 6 Clicks Score (OT) 19  -LR       Row Name 09/08/23 1020          Functional Assessment    Outcome Measure Options AM-PAC 6 Clicks Daily Activity (OT)  -LR               User Key  (r) = Recorded By, (t) = Taken By, (c) = Cosigned By      Initials Name Provider Type    Bety Fernandez OTR/L Occupational Therapist                    Occupational Therapy Education       Title: PT OT SLP Therapies (In Progress)       Topic: Occupational Therapy (In Progress)       Point: ADL training (Done)       Description:   Instruct learner(s) on proper safety adaptation and remediation techniques during self care or transfers.   Instruct in proper use of assistive devices.                  Learning Progress Summary             Patient Acceptance, E,D, VU,NR by LR at 9/8/2023 1256                         Point: Home exercise program (Not Started)       Description:   Instruct learner(s) on appropriate technique for monitoring, assisting and/or progressing therapeutic exercises/activities.                  Learner Progress:  Not documented in this visit.              Point: Precautions (Done)       Description:   Instruct learner(s) on prescribed precautions during self-care and functional transfers.                  Learning Progress Summary              Patient Acceptance, E,D, VU,NR by LR at 9/8/2023 1258                         Point: Body mechanics (Done)       Description:   Instruct learner(s) on proper positioning and spine alignment during self-care, functional mobility activities and/or exercises.                  Learning Progress Summary             Patient Acceptance, E,D, VU,NR by LR at 9/8/2023 1258                                         User Key       Initials Effective Dates Name Provider Type Discipline     04/25/23 -  Bety Tejeda, OTR/L Occupational Therapist OT                  OT Recommendation and Plan  Planned Therapy Interventions (OT): activity tolerance training, adaptive equipment training, BADL retraining, functional balance retraining, IADL retraining, occupation/activity based interventions, patient/caregiver education/training, ROM/therapeutic exercise, strengthening exercise, transfer/mobility retraining  Therapy Frequency (OT): 3 times/wk  Plan of Care Review  Plan of Care Reviewed With: patient  Progress: no change  Outcome Evaluation: OT eval completed. Nsg cleared pt for participation in eval. Pt presents A&Ox4 with c/o fatigue and weakness, but willing to participate. Pt reports PLOF of I without use of AD. Pt completed supine<>sit with Supervision. Pt adjusted B socks while seated EOB with SBA. Pt completed sit<>stand t/f at EOB with CGA/SBA. Pt completed fxl mobility with CGA, demo unsteadiness initially but improved with time. Upon return to EOB, pt reported need for toileting and requested use of BSC. Pt completed stand step t/f EOB<>BSC with SBA. Pt completed toileting at BSC with SBA. Pt with increased SOA following activities, O2 sats 99% on RA. Skilled OT warranted to provide education and address safety awareness, balance, activity tolerance, and strength in order to increase pt safety and I during ADLs, fxl mobility, and fxl t/f's. Recommend D/C home with assist pending progress.     Time  Calculation:         Time Calculation- OT       Row Name 09/08/23 1020             Time Calculation- OT    OT Start Time 1020  -LR      OT Stop Time 1114  -LR      OT Time Calculation (min) 54 min  -LR      OT Received On 09/08/23  -LR      OT Goal Re-Cert Due Date 09/18/23  -LR         Untimed Charges    OT Eval/Re-eval Minutes 54  -LR         Total Minutes    Untimed Charges Total Minutes 54  -LR       Total Minutes 54  -LR                User Key  (r) = Recorded By, (t) = Taken By, (c) = Cosigned By      Initials Name Provider Type    LR Bety Tejeda OTR/L Occupational Therapist                  Therapy Charges for Today       Code Description Service Date Service Provider Modifiers Qty    11856756081 HC OT EVAL MOD COMPLEXITY 4 9/8/2023 Bety Tejeda OTR/L GO 1                 LORETTA Huntley/MARY  9/8/2023

## 2023-09-08 NOTE — PLAN OF CARE
Goal Outcome Evaluation:  Plan of Care Reviewed With: patient        Progress: improving  Outcome Evaluation: HGB = 8.4  PLATELETS = 24  UGI DONE TODAY - SUGGESTION TO CORRELATE WITH ENDO. NO ACTIVE BLEEDING NOTED. ROOM AIR. NO DISTRESS NOTED.

## 2023-09-08 NOTE — PLAN OF CARE
Goal Outcome Evaluation:  Plan of Care Reviewed With: patient        Progress: no change  Outcome Evaluation: OT eval completed. Nsg cleared pt for participation in eval. Pt presents A&Ox4 with c/o fatigue and weakness, but willing to participate. Pt reports PLOF of I without use of AD. Pt completed supine<>sit with Supervision. Pt adjusted B socks while seated EOB with SBA. Pt completed sit<>stand t/f at EOB with CGA/SBA. Pt completed fxl mobility with CGA, demo unsteadiness initially but improved with time. Upon return to EOB, pt reported need for toileting and requested use of BSC. Pt completed stand step t/f EOB<>BSC with SBA. Pt completed toileting at BSC with SBA. Pt with increased SOA following activities, O2 sats 99% on RA. Skilled OT warranted to provide education and address safety awareness, balance, activity tolerance, and strength in order to increase pt safety and I during ADLs, fxl mobility, and fxl t/f's. Recommend D/C home with assist pending progress.      Anticipated Discharge Disposition (OT): home with assist

## 2023-09-08 NOTE — PLAN OF CARE
Problem: Adult Inpatient Plan of Care  Goal: Plan of Care Review  Outcome: Ongoing, Progressing  Flowsheets (Taken 9/8/2023 0321)  Progress: improving  Plan of Care Reviewed With: patient  Outcome Evaluation: PT receiving one unit of blood prior to start of shift. HGB: 8.4, HCT: 26.1. AAOx4. NPO since midnight. Occult blood (+). Tele-NSR. IID Rt AC. Room air. IV protonix. Voiding via BSC. Up w/ standby. SCDs. Resting through shift. VSS. Safety maintained.

## 2023-09-08 NOTE — PROGRESS NOTES
"    [facility] Medicine Services  INPATIENT PROGRESS NOTE    Patient Name: Marina Joe  Date of Admission: 9/6/2023  Today's Date: 09/08/23  Length of Stay: 2  Primary Care Physician: Hanh Og APRN    Subjective   Chief Complaint: Back stools  HPI   Ms. Joe presented to [facility] ER 9/6/2023 with worsening weakness, black stools.  She has a history of CKD stage IIIb, chronic thrombocytopenia, normocytic anemia followed by [facility] [place] oncology.  Patient has had multiple episodes of anemia requiring transfusion.  She was evaluated at [facility] [place] ER on 9/3/2023 and received 1 unit packed cells.  At that time, ER provider discussed with gastroenterology who did not believe scope was needed as patient had both upper and lower scopes February 2023 essentially negative.  On 9/4, patient developed profound black tarry stools but did not return to the emergency room because she was \"too sick\".  Due to ongoing black stools she presented to ER on 9/6 with hemoglobin 4.9, platelet count 24,000.  Patient denied any abdominal pain but upon palpation she was noted to have mid epigastric tenderness.  Abdomen soft and bowel sounds within normal limits. Patient was ordered 1 platelet pheresis pack and 3 units packed cells in ER.    Today  Patient was evaluated earlier this a.m., patient was resting comfortably in bed, she states that she is feeling much better.  However patient states she had just had an extremely large black tarry stool with no evidence of lee blood.  Platelets 24,000 on admission and received 1 platelet pheresis pack.  Repeat platelet level 17,000 an additional 1 unit platelet pheresis pack transfused.  Platelet count of 24,000 this a.m. 9/8/2023.  Initial hemoglobin of 4.9, patient was transfused total of 4 PRBCs, last hemoglobin on 9/8/2023 at 0700 was 7.9 continue q. 8 H&H. Discussed today with MARY Zapata with gastroenterology, who is made aware of " patient's continued melena.  After her discussion with Dr. Chance, he continues to decline need for endoscopy in light of her thrombocytopenia.  He recommends upper GI, continued monitoring, and to go ahead with scheduled EUS next week with Dr. Weber.  Fluoroscopy esophagram was performed, which revealed thickened gastric rugae predominantly involving the gastric body which can be seen with gastritis.  Recommending correlation with endoscopy.  Discussed with bedside nurse who was calling MARY Zapata to discuss any continued evaluation or changes to the treatment plan prior to continuing n.p.o. status.    Review of Systems   Constitutional:  Positive for activity change and fatigue. Negative for chills and fever.   HENT:  Negative for congestion and trouble swallowing.    Eyes:  Negative for photophobia and visual disturbance.   Respiratory:  Negative for cough, shortness of breath and wheezing.    Cardiovascular:  Negative for chest pain, palpitations and leg swelling.   Gastrointestinal:  Positive for blood in stool (Black tarry). Negative for vomiting.   Endocrine: Negative for cold intolerance, heat intolerance and polyuria.   Genitourinary:  Negative for dysuria, frequency and urgency.   Musculoskeletal:  Negative for gait problem.   Skin:  Negative for color change, pallor, rash and wound.   Allergic/Immunologic: Negative for immunocompromised state.   Neurological:  Positive for weakness. Negative for light-headedness.   Hematological:  Negative for adenopathy. Does not bruise/bleed easily.   Psychiatric/Behavioral:  Negative for agitation, behavioral problems and confusion.       All pertinent negatives and positives are as above. All other systems have been reviewed and are negative unless otherwise stated.     Objective    Temp:  [97.4 °F (36.3 °C)-98.3 °F (36.8 °C)] 97.6 °F (36.4 °C)  Heart Rate:  [74-95] 74  Resp:  [16] 16  BP: (133-157)/(52-71) 138/59  Physical Exam  Vitals and nursing note  reviewed.   Constitutional:       Comments: Lying in bed.  No oxygen use.  No visitors in room.   HENT:      Head: Normocephalic and atraumatic.      Nose: No congestion.      Mouth/Throat:      Pharynx: Oropharynx is clear. No oropharyngeal exudate or posterior oropharyngeal erythema.   Eyes:      Extraocular Movements: Extraocular movements intact.      Pupils: Pupils are equal, round, and reactive to light.   Cardiovascular:      Rate and Rhythm: Normal rate and regular rhythm.      Heart sounds: No murmur heard.     Comments: Normal sinus rhythm on 73 telemetry.  Pulmonary:      Breath sounds: No wheezing, rhonchi or rales.      Comments: No oxygen in use.  Abdominal:      Palpations: Abdomen is soft.      Tenderness: There is no abdominal tenderness.   Genitourinary:     Comments: Voiding.  Musculoskeletal:         General: No swelling or tenderness.      Cervical back: Normal range of motion and neck supple.      Comments: SCDs bilateral lower extremities.   Skin:     General: Skin is warm and dry.   Neurological:      General: No focal deficit present.      Mental Status: She is alert and oriented to person, place, and time.   Psychiatric:         Mood and Affect: Mood normal.         Behavior: Behavior normal.         Thought Content: Thought content normal.         Judgment: Judgment normal.         Results Review:  I have reviewed the labs, radiology results, and diagnostic studies.    Laboratory Data:   Results from last 7 days   Lab Units 09/08/23  0623 09/07/23  2355 09/07/23  1520 09/07/23  0840 09/07/23  0141 09/06/23  1608   WBC 10*3/mm3 10.67  --   --  8.99  --  8.67   HEMOGLOBIN g/dL 7.9* 8.4* 7.0* 7.2*   < > 4.9*   HEMATOCRIT % 24.6* 26.1* 22.4* 22.8*   < > 16.6*   PLATELETS 10*3/mm3 24*  --   --  17*  --  24*    < > = values in this interval not displayed.        Results from last 7 days   Lab Units 09/08/23  0623 09/07/23  0840 09/06/23  1532 09/03/23  0535   SODIUM mmol/L 137 139 139 137    POTASSIUM mmol/L 3.9 3.8 4.0 4.0   CHLORIDE mmol/L 105 106 105 104   CO2 mmol/L 22.0 24.0 25.0 25.0   BUN mg/dL 36* 46* 52* 59*   CREATININE mg/dL 1.24* 1.42* 1.51* 1.58*   CALCIUM mg/dL 8.2* 8.0* 8.3* 8.8   BILIRUBIN mg/dL  --   --  <0.2 <0.2   ALK PHOS U/L  --   --  50 48   ALT (SGPT) U/L  --   --  9 9   AST (SGOT) U/L  --   --  14 12   GLUCOSE mg/dL 136* 105* 115* 123*     .  Imaging Results (Last 7 Days)       Procedure Component Value Units Date/Time    FL Upper GI Water Soluble [[id number]] Collected: 09/08/23 1208     Updated: 09/08/23 1216    Narrative:      Procedure: Fluoro Upper GI single contrast     Clinical indication: Melena     Technique: Single contrast water-soluble cine esophagram and upper GI  study is performed.         Fluoroscopy time:  1 minute 34 seconds minutes.     Images/ image captures: 19        Findings:      In the upright position there is normal transit of contrast through the  esophagus without strictures or masses.      No gastroesophageal reflux observed fluoroscopically.      The 13 mm diameter barium tablet passes without difficulty.     There appears to be thickened gastric rugae involving the body.     The stomach is without ulcers or masses. There is no delay in gastric  emptying.      Duodenal bulb and C-loop are normal.       Impression:      Impression:         Thickened gastric rugae predominantly involving the gastric body which  can be seen with gastritis. Consider correlation with endoscopy.     No visible mass, large ulcer, or stricture.  This report was finalized on 09/08/2023 12:13 by  Warren Freire DO.           Scheduled medications:  buPROPion XL, 150 mg, Oral, Daily  dexAMETHasone, 40 mg, Intravenous, Daily  famotidine, 20 mg, Oral, BID AC  fluticasone, 2 spray, Nasal, Daily  montelukast, 10 mg, Oral, Nightly  pantoprazole, 40 mg, Intravenous, Q12H  rOPINIRole, 0.5 mg, Oral, Nightly  rosuvastatin, 20 mg, Oral, Daily  sertraline, 100 mg, Oral, Daily  sodium  chloride, 10 mL, Intravenous, Q12H  valACYclovir, 500 mg, Oral, BID       I have reviewed the patient's current medications.     Assessment/Plan   Assessment  Active Hospital Problems    Diagnosis     **GIB (gastrointestinal bleeding)     Acute blood loss anemia     Chronic idiopathic thrombocytopenia     Hypotension due to hypovolemia     Stage 3b chronic kidney disease      Treatment Plan    1.  Acute blood loss anemia superimposed on iron deficiency anemia and anemia of chronic kidney disease.  Patient has history of normocytic iron anemia followed by [facility] requiring multiple blood transfusions.  Patient had endoscopy and colonoscopy February 2023 essentially negative.   Platelets 24,000 on admission and received 1 platelet pheresis pack.  Repeat platelet level 17,000 an additional 1 unit platelet pheresis pack transfused.  Platelet count of 24,000 this a.m. 9/8/2023.  Initial hemoglobin of 4.9, patient was transfused total of 4 PRBCs, last hemoglobin on 9/8/2023 at 0700 was 7.9 continue q. 8 H&H.  Reports feeling better today,continues to report black tarry stools with a large black tarry stool this a.m.9/8/2023.  No reports of abdominal pain, dysphagia, or notes of breath dysphagia discussed today with MARY Zapata with gastroenterology.     2.  Suspected upper GI bleed.  Patient reports a extremely large black tarry stool this AM.   She has required 4 units packed red blood cells since admission and 2 platelet pheresis packs.  Dr. Shah has initiated as needed PRBC and platelet orders to be utilized with q. 8 H&H and daily CBC. Continue Protonix 40 mg IV every 12 hours.   Discussed today with MARY Zapata with gastroenterology, who is made aware of patient's continued melena.  After her discussion with Dr. Chance, he continues to decline need for endoscopy in light of her thrombocytopenia.  He recommended upper GI, continued monitoring, and to go ahead with scheduled EUS next week with  Dr. Weber.   Fluoroscopy esophagram was performed today which revealed thickened gastric rugae predominantly involving the gastric body which can be seen with gastritis.  Recommending correlation with endoscopy.  Discussed with bedside nurse who was calling MARY Zapata to discuss any continued evaluation or changes to the treatment plan prior to discontinuing n.p.o. status.  Pepcid 20 mg orally twice daily added per recommendations of MARY Zapata gastroenterology    3.  Chronic idiopathic thrombocytopenia.  Follows with Dr. Shah, hematology.  Patient received last Injectafer on 8/11/2023 and Retacrit on 9/1/2023.  No features of myelodysplasia or excess blast on 11/10/2022 per Dr. Shah. Platelets 24,000 on admission and received 1 platelet pheresis pack.  Repeat platelet level 17,000 an additional 1 unit platelet pheresis pack transfused.  Platelet count of 24,000 this a.m. 9/8/2023.  Initial hemoglobin of 4.9, patient was transfused total of 4 PRBCs, last hemoglobin on 9/8/2023 at 0700 was 7.9 continue q. 8 H&H.  Dr. Shah additionally recommends endoscopy, he wishes to continue trial of dexamethasone 40 mg IV total for 4 days to end on 9/10/2023 with close monitoring of platelet count.  New orders initiated for Transfuse 1 unit packed RBC if hemoglobin less than 8.  Monitor for tensional transfusion reactions and Transfuse 1 unit platelet if platelet is 10 or below.  Monitor for potential transfusion reactions. continue IV Protonix.    4.  Iron deficiency anemia secondary to inadequate dietary iron intake.  Follows with hematology.    5.  Stage IIIb CKD.  Creatinine 1.88 on admission.  Creatinine 1. 24 today.  Baseline creatinine 1.2-1.5.  Discontinue IV fluids.  Repeat BMP in AM.    6.  Hypotension due to hypovolemia.  Patient with blood pressure  on admission suspect secondary to blood loss anemia.  Resolution of hypotension, to current euvolemic status last 3 BPs were 1 133/54, 160/60, 138/59  .    7.  SCDs for deep vein thrombosis prophylaxis.    Medical Decision Making  Number and Complexity of problems: 6  Acute blood loss anemia superimposed on iron deficiency anemia and anemia of chronic kidney disease: Acute on chronic, high complexity posing threat to life and bodily function, unchanged  Suspected acute upper GI bleed with melena: Acute, high complexity requiring transfusion  Chronic idiopathic thrombocytopenia: Acute on chronic, high complexity requiring transfusion  Iron deficiency anemia secondary to inadequate oral iron take: Chronic, moderate complexity stable  CKD stage IIIb: Chronic, moderate complexity, stable  Hypotension due to hypovolemia: Acute, moderate complexity, resolved    Differential Diagnosis: Acute upper GI bleed    Conditions and Status        Condition is unchanged.     [facility] Data  External documents reviewed: Hematology office visit 8/29/2023.  Cardiac tracing (EKG, telemetry) interpretation: Normal sinus rhythm 74 on telemetry  Radiology interpretation: reviewed fluoroscopy esophagram 9/8/2023  Labs reviewed:   BMP 8/8/2023.  Repeat BMP in AM.  CBC 8/8/2023, hemoglobin hematocrit 8/8/23.  Monitor hemoglobin every 8 hours.  Repeat CBC in a.m. for evaluation of platelets    Any tests that were considered but not ordered: None     Decision rules/scores evaluated (example [id number], Wells, etc): None     Discussed with: Soham Arredondo, MARY gastroenterology, Dr. Shah oncology and patient.     Care Planning  Shared decision making: Soham Arredondo, MARY gastroenterology, Dr. Shah oncology and patient.  Patient agrees to continuation of care per gastroenterology, Dr. Chance and oncology per Dr. Shah with as needed blood and platelet transfusions.      Care Planning  Code status and discussions: Full code  The patient's surrogate decision maker is Jose Enrique,     Disposition  Social Determinants of Health that impact treatment or disposition: None  I  expect the patient to be discharged to home in 2-3 days.     Electronically signed by MARY Glynn, 09/08/23, 14:44 CDT.

## 2023-09-08 NOTE — THERAPY TREATMENT NOTE
Acute Care - Physical Therapy Treatment Note  [facility]     Patient Name: Marina Joe  : 1945  MRN: [id number]  Today's Date: 2023      Visit Dx:     ICD-10-CM ICD-9-CM   1. Gastrointestinal hemorrhage, unspecified gastrointestinal hemorrhage type  K92.2 578.9   2. Impaired mobility [Z74.09 (ICD-10-CM)]  Z74.09 799.89     Patient Active Problem List   Diagnosis    Malignant neoplasm of upper-outer quadrant of female breast    Anemia of chronic renal failure, stage 3 (moderate)    Hypertension, benign    Hx of colonic polyps    HX: breast cancer    Morbidly obese    Abnormal mammogram    Breast mass    Right knee DJD    S/P lumpectomy, left breast    Adult hypothyroidism    Rhinitis    Anemia    Anxiety    At low risk for fall    Breast cancer, left    Cervical pain    Chronic insomnia    Cough    Elevated lipids    Encounter for immunization    Herpes zoster without complication    Influenza B    Left ear pain    Left otitis externa    Lumbar strain, initial encounter    Myalgia    Negative depression screening    Obesity (BMI 30-39.9)    Postmenopausal status    Recurrent acute serous otitis media of left ear    Restless leg    Sinusitis, bacterial    Skin lesion    Upper respiratory infection    Urinary tract infection without hematuria    Vitamin D deficiency    Stage 3b chronic kidney disease    GIB (gastrointestinal bleeding)    Acute blood loss anemia    Chronic idiopathic thrombocytopenia    Hypotension due to hypovolemia     Past Medical History:   Diagnosis Date    Breast cancer     CKD (chronic kidney disease)     Hypercholesteremia     Hypertension     Sinusitis     Stage 3a chronic kidney disease 10/20/2020     Past Surgical History:   Procedure Laterality Date    AVULSION TOENAIL PLATE          BREAST BIOPSY Left 2012    BREAST BIOPSY      Left Breast, 2019 per Dr Barkley    BREAST LUMPECTOMY Left     with node bx     COLONOSCOPY  2013    small polyp at 30cm  benign hyperplastic polyp, changes consistent with melanosis coli. Recall 5 years    COLONOSCOPY  11/19/2018    Tics otherwise normal exam repeat in 5 years    COLONOSCOPY  02/13/2023    Normal exam repeat in 3 years with a 2 day prep    ENDOSCOPY  02/13/2023    Gastritis, small HH    REPLACEMENT TOTAL KNEE Right     2016    TOTAL ABDOMINAL HYSTERECTOMY WITH SALPINGO OOPHORECTOMY       PT Assessment (last 12 hours)       PT Evaluation and Treatment       Row Name 09/08/23 1430          Physical Therapy Time and Intention    Subjective Information no complaints  -KJ     Document Type therapy note (daily note)  -KJ     Mode of Treatment physical therapy  -KJ     Patient Effort good  -KJ       Row Name 09/08/23 1430          General Information    Existing Precautions/Restrictions fall  -KJ       Row Name 09/08/23 1430          Pain    Pretreatment Pain Rating 0/10 - no pain  -KJ     Posttreatment Pain Rating 0/10 - no pain  -KJ       Row Name 09/08/23 1430          Bed Mobility    Supine-Sit Rarden (Bed Mobility) independent  -KJ     Sit-Supine Rarden (Bed Mobility) independent  -KJ       Row Name 09/08/23 1430          Sit-Stand Transfer    Sit-Stand Rarden (Transfers) supervision  -KJ       Row Name 09/08/23 1430          Stand-Sit Transfer    Stand-Sit Rarden (Transfers) supervision  -KJ       Row Name 09/08/23 1430          Gait/Stairs (Locomotion)    Rarden Level (Gait) verbal cues;contact guard;supervision  -KJ     Distance in Feet (Gait) 525  -KJ       Row Name 09/08/23 1430          Motor Skills    Therapeutic Exercise aerobic  -KJ       Row Name 09/08/23 1430          Aerobic Exercise    Comment, Aerobic Exercise (Therapeutic Exercise) AROM x 10 reps standing  -KJ       Row Name 09/08/23 1430          Positioning and Restraints    Pre-Treatment Position in bed  -KJ     Post Treatment Position bed  -KJ     In Bed call light within reach  -KJ               User Key  (r) = Recorded  By, (t) = Taken By, (c) = Cosigned By      Initials Name Provider Type    Sarah Spencer, PTA Physical Therapist Assistant                    Physical Therapy Education       Title: PT OT SLP Therapies (In Progress)       Topic: Physical Therapy (In Progress)       Point: Mobility training (Done)       Learning Progress Summary             Patient Acceptance, E,TB,D, VU,DU by  at 9/8/2023 1403    Comment: education re: purpose of PT/importance of activity, for safety/falls prevention w/ mobillity   Significant Other Acceptance, E,TB,D, VU,DU by  at 9/8/2023 1403    Comment: education re: purpose of PT/importance of activity, for safety/falls prevention w/ mobillity                         Point: Home exercise program (Not Started)       Learner Progress:  Not documented in this visit.              Point: Precautions (Done)       Learning Progress Summary             Patient Acceptance, E,TB,D, VU,DU by  at 9/8/2023 1403    Comment: education re: purpose of PT/importance of activity, for safety/falls prevention w/ mobillity   Significant Other Acceptance, E,TB,D, VU,DU by  at 9/8/2023 1403    Comment: education re: purpose of PT/importance of activity, for safety/falls prevention w/ mobillity                                         User Key       Initials Effective Dates Name Provider Type [facility] 08/02/18 -  Radha Morocho, PT Physical Therapist PT                  PT Recommendation and Plan             Time Calculation:    PT Charges       Row Name 09/08/23 1452 09/08/23 1141          Time Calculation    Start Time 1430  -KJ 1115  -     Stop Time 1453  -KJ 1140  -     Time Calculation (min) 23 min  -KJ 25 min  -     PT Received On 09/08/23  -KJ 09/08/23  -     PT Goal Re-Cert Due Date 09/18/23  -KJ 09/18/23  -        Time Calculation- PT    Total Timed Code Minutes- PT 23 minute(s)  -KJ --               User Key  (r) = Recorded By, (t) = Taken By, (c) = Cosigned By      Initials  Name Provider Type    NANETTE Sarah Vasquez, LAZARO Physical Therapist Assistant    Radha Adler, PT Physical Therapist                  Therapy Charges for Today       Code Description Service Date Service Provider Modifiers Qty    55459434743 HC GAIT TRAINING EA 15 MIN 9/8/2023 Sarah Vasquez, PTA GP 1    21890879593 HC PT THER PROC EA 15 MIN 9/8/2023 Sarah Vasquez PTA GP 1            PT G-Codes  Outcome Measure Options: AM-PAC 6 Clicks Basic Mobility (PT)  AM-PAC 6 Clicks Score (PT): 20  AM-PAC 6 Clicks Score (OT): 19    Sarah Vasquez PTA  9/8/2023

## 2023-09-08 NOTE — PROGRESS NOTES
"    [facility] Medicine Services  INPATIENT PROGRESS NOTE    Patient Name: Marina Joe  Date of Admission: 9/6/2023  Today's Date: 09/08/23  Length of Stay: 2  Primary Care Physician: Hanh Og APRN    Subjective   Chief Complaint: Back stools  HPI   Ms. Joe presented to [facility] ER 9/6/2023 with worsening weakness, black stools.  She has a history of CKD stage IIIb, chronic thrombocytopenia, normocytic anemia followed by [facility] oncology.  Patient has had multiple episodes of anemia requiring transfusion.  She was evaluated at [facility] ER on 9/3/2023 and received 1 unit packed cells.  At that time, ER provider discussed with gastroenterology who did not believe scope was needed as patient had both upper and lower scopes February 2023 essentially negative.  On 9/4, patient developed profound black tarry stools but did not return to the emergency room because she was \"too sick\".  Due to ongoing black stools she presented to ER on 9/6 with hemoglobin 4.9, platelet count 24,000.  Patient denied any abdominal pain but upon palpation she was noted to have mid epigastric tenderness.  Abdomen soft and bowel sounds within normal limits. Patient was ordered 1 platelet pheresis pack and 3 units packed cells in ER.    Today  Patient was evaluated earlier this a.m., patient was resting comfortably in bed, she states that she is feeling much better.  However patient states she had just had an extremely large black tarry stool with no evidence of lee blood.  Platelets 24,000 on admission and received 1 platelet pheresis pack.  Repeat platelet level 17,000 an additional 1 unit platelet pheresis pack transfused.  Platelet count of 24,000 this a.m. 9/8/2023.  Initial hemoglobin of 4.9, patient was transfused total of 4 PRBCs, last hemoglobin on 9/8/2023 at 0700 was 7.9 continue q. 8 H&H. Discussed today with MARY Zapata with gastroenterology, who is made aware of " patient's continued melena.  After her discussion with Dr. Chance, he continues to decline need for endoscopy in light of her thrombocytopenia.  He recommends upper GI, continued monitoring, and to go ahead with scheduled EUS next week with Dr. Weber.  Fluoroscopy esophagram was performed, which revealed thickened gastric rugae predominantly involving the gastric body which can be seen with gastritis.  Recommending correlation with endoscopy.  Discussed with bedside nurse who was calling MARY Zapata to discuss any continued evaluation or changes to the treatment plan prior to continuing n.p.o. status.      Review of Systems   Constitutional:  Positive for activity change and fatigue. Negative for chills and fever.   HENT:  Negative for congestion and trouble swallowing.    Eyes:  Negative for photophobia and visual disturbance.   Respiratory:  Negative for cough, shortness of breath and wheezing.    Cardiovascular:  Negative for chest pain, palpitations and leg swelling.   Gastrointestinal:  Positive for blood in stool (Black tarry). Negative for vomiting.   Endocrine: Negative for cold intolerance, heat intolerance and polyuria.   Genitourinary:  Negative for dysuria, frequency and urgency.   Musculoskeletal:  Negative for gait problem.   Skin:  Negative for color change, pallor, rash and wound.   Allergic/Immunologic: Negative for immunocompromised state.   Neurological:  Positive for weakness. Negative for light-headedness.   Hematological:  Negative for adenopathy. Does not bruise/bleed easily.   Psychiatric/Behavioral:  Negative for agitation, behavioral problems and confusion.       All pertinent negatives and positives are as above. All other systems have been reviewed and are negative unless otherwise stated.     Objective    Temp:  [97.4 °F (36.3 °C)-98.3 °F (36.8 °C)] 97.6 °F (36.4 °C)  Heart Rate:  [74-95] 74  Resp:  [16] 16  BP: (133-157)/(52-71) 138/59  Physical Exam  Vitals and nursing note  reviewed.   Constitutional:       Comments: Lying in bed.  No oxygen use.  No visitors in room.   HENT:      Head: Normocephalic and atraumatic.      Nose: No congestion.      Mouth/Throat:      Pharynx: Oropharynx is clear. No oropharyngeal exudate or posterior oropharyngeal erythema.   Eyes:      Extraocular Movements: Extraocular movements intact.      Pupils: Pupils are equal, round, and reactive to light.   Cardiovascular:      Rate and Rhythm: Normal rate and regular rhythm.      Heart sounds: No murmur heard.     Comments: Normal sinus rhythm on 73 telemetry.  Pulmonary:      Breath sounds: No wheezing, rhonchi or rales.      Comments: No oxygen in use.  Abdominal:      Palpations: Abdomen is soft.      Tenderness: There is no abdominal tenderness.   Genitourinary:     Comments: Voiding.  Musculoskeletal:         General: No swelling or tenderness.      Cervical back: Normal range of motion and neck supple.      Comments: SCDs bilateral lower extremities.   Skin:     General: Skin is warm and dry.   Neurological:      General: No focal deficit present.      Mental Status: She is alert and oriented to person, place, and time.   Psychiatric:         Mood and Affect: Mood normal.         Behavior: Behavior normal.         Thought Content: Thought content normal.         Judgment: Judgment normal.         Results Review:  I have reviewed the labs, radiology results, and diagnostic studies.    Laboratory Data:   Results from last 7 days   Lab Units 09/08/23  0623 09/07/23  2355 09/07/23  1520 09/07/23  0840 09/07/23  0141 09/06/23  1608   WBC 10*3/mm3 10.67  --   --  8.99  --  8.67   HEMOGLOBIN g/dL 7.9* 8.4* 7.0* 7.2*   < > 4.9*   HEMATOCRIT % 24.6* 26.1* 22.4* 22.8*   < > 16.6*   PLATELETS 10*3/mm3 24*  --   --  17*  --  24*    < > = values in this interval not displayed.        Results from last 7 days   Lab Units 09/08/23  0623 09/07/23  0840 09/06/23  1532 09/03/23  0535   SODIUM mmol/L 137 139 139 137    POTASSIUM mmol/L 3.9 3.8 4.0 4.0   CHLORIDE mmol/L 105 106 105 104   CO2 mmol/L 22.0 24.0 25.0 25.0   BUN mg/dL 36* 46* 52* 59*   CREATININE mg/dL 1.24* 1.42* 1.51* 1.58*   CALCIUM mg/dL 8.2* 8.0* 8.3* 8.8   BILIRUBIN mg/dL  --   --  <0.2 <0.2   ALK PHOS U/L  --   --  50 48   ALT (SGPT) U/L  --   --  9 9   AST (SGOT) U/L  --   --  14 12   GLUCOSE mg/dL 136* 105* 115* 123*     .  Imaging Results (Last 7 Days)       Procedure Component Value Units Date/Time    FL Upper GI Water Soluble [[id number]] Collected: 09/08/23 1208     Updated: 09/08/23 1216    Narrative:      Procedure: Fluoro Upper GI single contrast     Clinical indication: Melena     Technique: Single contrast water-soluble cine esophagram and upper GI  study is performed.         Fluoroscopy time:  1 minute 34 seconds minutes.     Images/ image captures: 19        Findings:      In the upright position there is normal transit of contrast through the  esophagus without strictures or masses.      No gastroesophageal reflux observed fluoroscopically.      The 13 mm diameter barium tablet passes without difficulty.     There appears to be thickened gastric rugae involving the body.     The stomach is without ulcers or masses. There is no delay in gastric  emptying.      Duodenal bulb and C-loop are normal.       Impression:      Impression:         Thickened gastric rugae predominantly involving the gastric body which  can be seen with gastritis. Consider correlation with endoscopy.     No visible mass, large ulcer, or stricture.  This report was finalized on 09/08/2023 12:13 by  Warren Freire DO.             Scheduled medications:  buPROPion XL, 150 mg, Oral, Daily  dexAMETHasone, 40 mg, Intravenous, Daily  famotidine, 20 mg, Oral, BID AC  fluticasone, 2 spray, Nasal, Daily  montelukast, 10 mg, Oral, Nightly  pantoprazole, 40 mg, Intravenous, Q12H  rOPINIRole, 0.5 mg, Oral, Nightly  rosuvastatin, 20 mg, Oral, Daily  sertraline, 100 mg, Oral, Daily  sodium  chloride, 10 mL, Intravenous, Q12H  valACYclovir, 500 mg, Oral, BID         I have reviewed the patient's current medications.     Assessment/Plan   Assessment  Active Hospital Problems    Diagnosis     **GIB (gastrointestinal bleeding)     Acute blood loss anemia     Chronic idiopathic thrombocytopenia     Hypotension due to hypovolemia     Stage 3b chronic kidney disease      Treatment Plan    1.  Acute blood loss anemia superimposed on iron deficiency anemia and anemia of chronic kidney disease.  Patient has history of normocytic iron anemia followed by [facility] requiring multiple blood transfusions.  Patient had endoscopy and colonoscopy February 2023 essentially negative.   Platelets 24,000 on admission and received 1 platelet pheresis pack.  Repeat platelet level 17,000 an additional 1 unit platelet pheresis pack transfused.  Platelet count of 24,000 this a.m. 9/8/2023.  Initial hemoglobin of 4.9, patient was transfused total of 4 PRBCs, last hemoglobin on 9/8/2023 at 0700 was 7.9 continue q. 8 H&H.  Reports feeling better today,continues to report black tarry stools with a large black tarry stool this a.m.9/8/2023.  No reports of abdominal pain, dysphagia, or notes of breath dysphagia discussed today with MARY Zapata with gastroenterology.     2.  Suspected upper GI bleed.  Patient reports a extremely large black tarry stool this AM.   She has required 4 units packed red blood cells since admission and 2 platelet pheresis packs.  Dr. Shah has initiated as needed PRBC and platelet orders to be utilized with q. 8 H&H and daily CBC. Continue Protonix 40 mg IV every 12 hours.   Discussed today with MARY Zapata with gastroenterology, who is made aware of patient's continued melena.  After her discussion with Dr. Chance, he continues to decline need for endoscopy in light of her thrombocytopenia.  He recommended upper GI, continued monitoring, and to go ahead with scheduled EUS next week with  Dr. Weber.   Fluoroscopy esophagram was performed, which revealed thickened gastric rugae predominantly involving the gastric body which can be seen with gastritis.  Recommending correlation with endoscopy.  Discussed with bedside nurse who was calling MARY Zapata to discuss any continued evaluation or changes to the treatment plan prior to continuing n.p.o. status.    3.  Chronic idiopathic thrombocytopenia.  Follows with Dr. Shah, hematology.  Patient received last Injectafer on 8/11/2023 and Retacrit on 9/1/2023.  No features of myelodysplasia or excess blast on 11/10/2022 per Dr. Shah. Platelets 24,000 on admission and received 1 platelet pheresis pack.  Repeat platelet level 17,000 an additional 1 unit platelet pheresis pack transfused.  Platelet count of 24,000 this a.m. 9/8/2023.  Initial hemoglobin of 4.9, patient was transfused total of 4 PRBCs, last hemoglobin on 9/8/2023 at 0700 was 7.9 continue q. 8 H&H.  Dr. Shah additionally recommends endoscopy, he wishes to continue trial of dexamethasone 40 mg IV total for 4 days to end on 9/10/2023 with close monitoring of platelet count.  New orders initiated for Transfuse 1 unit packed RBC if hemoglobin less than 8.  Monitor for tensional transfusion reactions and Transfuse 1 unit platelet if platelet is 10 or below.  Monitor for potential transfusion reactions. continue IV Protonix.    4.  Iron deficiency anemia secondary to inadequate dietary iron intake.  Follows with hematology.    5.  Stage IIIb CKD.  Creatinine 1.88 on admission.  Creatinine 1. 2 4 today.  Baseline creatinine 1.2-1.5.  Continue IV fluids.  Repeat BMP in AM.    6.  Hypotension due to hypovolemia.  Patient with blood pressure  on admission suspect secondary to blood loss anemia.  Resolution of hypotension, to current euvolemic status last 3 BPs were 1 133/54 ,160/60, 138/59 .    7.  SCDs for deep vein thrombosis prophylaxis.    Medical Decision Making  Number and Complexity of  problems: 6  Acute blood loss anemia superimposed on iron deficiency anemia and anemia of chronic kidney disease: Acute on chronic, high complexity posing threat to life and bodily function, unchanged  Suspected acute upper GI bleed with melena: Acute, high complexity requiring transfusion  Chronic idiopathic thrombocytopenia: Acute on chronic, high complexity requiring transfusion  Iron deficiency anemia secondary to inadequate oral iron take: Chronic, moderate complexity stable  CKD stage IIIb: Chronic, moderate complexity, stable  Hypotension due to hypovolemia: Acute, moderate complexity, resolved    Differential Diagnosis: Acute upper GI bleed    Conditions and Status        Condition is unchanged.     [facility] Data  External documents reviewed: Hematology office visit 8/29/2023.  Cardiac tracing (EKG, telemetry) interpretation: Normal sinus rhythm 74 on telemetry  Radiology interpretation: reviewed fluoroscopy esophagram 9/8/2023  Labs reviewed:   BMP 8/8/2023.  Repeat BMP in AM.  CBC 8/8/2023, hemoglobin hematocrit 8/8/23.  Monitor hemoglobin every 8 hours.  Repeat CBC in a.m. for evaluation of platelets    Any tests that were considered but not ordered: None     Decision rules/scores evaluated (example [id number], Wells, etc): None     Discussed with: Soham Arredondo, MARY gastroenterology, Dr. Shah oncology and patient.     Care Planning  Shared decision making: Soham Arredondo, MARY gastroenterology, Dr. Shah oncology and patient.  Patient agrees to continuation of care per gastroenterology, Dr. Chance and oncology per Dr. Shah with as needed blood and platelet transfusions.      Care Planning  Code status and discussions: Full code  The patient's surrogate decision maker is Jose Enrique,     Disposition  Social Determinants of Health that impact treatment or disposition: None  I expect the patient to be discharged to home in 2-3 days.     Electronically signed by MARY Hill  Student, 09/08/23, 14:08 CDT.

## 2023-09-08 NOTE — PROGRESS NOTES
[facility] Progress Note    Progress Note    Patient:  Marina Joe  YOB: 1945  Date of Service: 9/8/2023  MRN: [id number]   Acct: [id number]   Primary Care Physician: Hanh Og APRN  Advance Directive:   Code Status and Medical Interventions:   Ordered at: 09/06/23 1810     Level Of Support Discussed With:    Patient     Code Status (Patient has no pulse and is not breathing):    CPR (Attempt to Resuscitate)     Medical Interventions (Patient has pulse or is breathing):    Full Support     Admit Date: 9/6/2023       Hospital Day: 2      Subjective:     Chief Compliant: Patient seen with nurse Madera at the bedside.  Patient had a bowel movement, dark stools.  No other sites of bleeding.      Review of Systems:   Review of Systems   Constitutional:  Positive for fatigue. Negative for fever.   HENT:  Negative for nosebleeds.    Eyes:  Negative for discharge and redness.   Respiratory:  Negative for shortness of breath and wheezing.    Cardiovascular:  Negative for chest pain and palpitations.   Gastrointestinal:  Positive for blood in stool. Negative for nausea and vomiting.   Endocrine: Negative for polydipsia and polyphagia.   Genitourinary:  Negative for hematuria and vaginal bleeding.   Musculoskeletal:  Negative for joint swelling and myalgias.   Skin:  Positive for pallor.   Allergic/Immunologic: Negative for food allergies.   Neurological:  Negative for dizziness, facial asymmetry and speech difficulty.   Hematological:  Negative for adenopathy.   Psychiatric/Behavioral:  Negative for agitation, confusion and hallucinations.          Medications:   Scheduled Meds:buPROPion XL, 150 mg, Oral, Daily  dexAMETHasone, 40 mg, Intravenous, Daily  fluticasone, 2 spray, Nasal, Daily  montelukast, 10 mg, Oral, Nightly  pantoprazole, 40 mg, Intravenous, Q12H  rOPINIRole, 0.5 mg, Oral, Nightly  rosuvastatin, 20 mg, Oral, Daily  sertraline, 100 mg, Oral, Daily  sodium chloride, 10 mL,  Intravenous, Q12H  valACYclovir, 500 mg, Oral, BID        Continuous Infusions:     Labs:     Lab Results (last 72 hours)       Procedure Component Value Units Date/Time    Hemoglobin & Hematocrit, Blood [[id number]]  (Abnormal) Collected: 09/07/23 2355    Specimen: Blood Updated: 09/08/23 0008     Hemoglobin 8.4 g/dL      Hematocrit 26.1 %     Occult Blood X 1, Stool - Stool, Per Rectum [[id number]]  (Abnormal) Collected: 09/1945    Specimen: Stool from Per Rectum Updated: 09/07/23 2045     Fecal Occult Blood Positive    Hemoglobin & Hematocrit, Blood [[id number]]  (Abnormal) Collected: 09/07/23 1520    Specimen: Blood Updated: 09/07/23 1530     Hemoglobin 7.0 g/dL      Hematocrit 22.4 %     Basic Metabolic Panel [[id number]]  (Abnormal) Collected: 09/07/23 0840    Specimen: Blood Updated: 09/07/23 0932     Glucose 105 mg/dL      BUN 46 mg/dL      Creatinine 1.42 mg/dL      Sodium 139 mmol/L      Potassium 3.8 mmol/L      Comment: Slight hemolysis detected by analyzer. Results may be affected.        Chloride 106 mmol/L      CO2 24.0 mmol/L      Calcium 8.0 mg/dL      BUN/Creatinine Ratio 32.4     Anion Gap 9.0 mmol/L      eGFR 38.2 mL/min/1.73     Narrative:      GFR Normal >60  Chronic Kidney Disease <60  Kidney Failure <15    The GFR formula is only valid for adults with stable renal function between ages 18 and 70.    CBC (No Diff) [[id number]]  (Abnormal) Collected: 09/07/23 0840    Specimen: Blood Updated: 09/07/23 0913     WBC 8.99 10*3/mm3      RBC 2.46 10*6/mm3      Hemoglobin 7.2 g/dL      Hematocrit 22.8 %      MCV 92.7 fL      MCH 29.3 pg      MCHC 31.6 g/dL      RDW 18.2 %      RDW-SD 52.5 fl      MPV --     Comment: UNABLE TO CALCULATE        Platelets 17 10*3/mm3     Narrative:      DELTA CHECK, PT RECEIVED BLOOD PRODUCTS     Ferritin [[id number]]  (Abnormal) Collected: 09/06/23 1532    Specimen: Blood Updated: 09/07/23 0649     Ferritin 260.60 ng/mL     Narrative:      Results may be falsely  decreased if patient taking Biotin.      Iron Profile [[id number]]  (Abnormal) Collected: 09/06/23 1532    Specimen: Blood Updated: 09/07/23 0643     Iron 60 mcg/dL      Iron Saturation (TSAT) 21 %      Transferrin 196 mg/dL      TIBC 292 mcg/dL     Reticulocytes [[id number]]  (Abnormal) Collected: 09/07/23 0141    Specimen: Blood Updated: 09/07/23 0635     Reticulocyte % 8.43 %      Reticulocyte Absolute 0.1871 10*6/mm3     Hemoglobin & Hematocrit, Blood [[id number]]  (Abnormal) Collected: 09/07/23 0141    Specimen: Blood Updated: 09/07/23 0350     Hemoglobin 6.5 g/dL      Hematocrit 21.6 %     [place] Draw [[id number]] Collected: 09/06/23 1532    Specimen: Blood Updated: 09/06/23 1646    Narrative:      The following orders were created for panel order [place] Draw.  Procedure                               Abnormality         Status                     ---------                               -----------         ------                     Green Top (Gel)[[id number]]                                  Final result               Lavender Top[[id number]]                                     Final result               Red Top[[id number]]                                          Final result               Light Blue Top[[id number]]                                   Final result                 Please view results for these tests on the individual orders.    Green Top (Gel) [[id number]] Collected: 09/06/23 1532    Specimen: Blood Updated: 09/06/23 1646     Extra Tube Hold for add-ons.     Comment: Auto resulted.       Lavender Top [[id number]] Collected: 09/06/23 1532    Specimen: Blood Updated: 09/06/23 1646     Extra Tube hold for add-on     Comment: Auto resulted       Red Top [[id number]] Collected: 09/06/23 1532    Specimen: Blood Updated: 09/06/23 1646     Extra Tube Hold for add-ons.     Comment: Auto resulted.       Light Blue Top [[id number]] Collected: 09/06/23 1532    Specimen: Blood Updated: 09/06/23 1646     Extra Tube  Hold for add-ons.     Comment: Auto resulted       CBC & Differential [[id number]]  (Abnormal) Collected: 09/06/23 1608    Specimen: Blood Updated: 09/06/23 1643    Narrative:      The following orders were created for panel order CBC & Differential.  Procedure                               Abnormality         Status                     ---------                               -----------         ------                     CBC Auto Differential[[id number]]        Abnormal            Final result                 Please view results for these tests on the individual orders.    CBC Auto Differential [[id number]]  (Abnormal) Collected: 09/06/23 1608    Specimen: Blood Updated: 09/06/23 1643     WBC 8.67 10*3/mm3      RBC 1.70 10*6/mm3      Hemoglobin 4.9 g/dL      Hematocrit 16.6 %      MCV 97.6 fL      MCH 28.8 pg      MCHC 29.5 g/dL      RDW 21.5 %      RDW-SD 66.3 fl      MPV --     Comment: Unable to calculate        Platelets 24 10*3/mm3     Manual Differential [[id number]]  (Abnormal) Collected: 09/06/23 1608    Specimen: Blood Updated: 09/06/23 1643     Neutrophil % 75.2 %      Lymphocyte % 13.9 %      Monocyte % 6.9 %      Eosinophil % 3.0 %      Bands %  1.0 %      Neutrophils Absolute 6.61 10*3/mm3      Lymphocytes Absolute 1.21 10*3/mm3      Monocytes Absolute 0.60 10*3/mm3      Eosinophils Absolute 0.26 10*3/mm3      nRBC 2.0 /100 WBC      Anisocytosis Mod/2+     Macrocytes Slight/1+     Microcytes Mod/2+     Poikilocytes Slight/1+     Polychromasia Large/3+     WBC Morphology Normal     Platelet Estimate Decreased     Giant Platelets Slight/1+    Protime-INR [[id number]]  (Normal) Collected: 09/06/23 1608    Specimen: Blood Updated: 09/06/23 1628     Protime 14.2 Seconds      INR 1.09    Comprehensive Metabolic Panel [[id number]]  (Abnormal) Collected: 09/06/23 1532    Specimen: Blood Updated: 09/06/23 1613     Glucose 115 mg/dL      BUN 52 mg/dL      Creatinine 1.51 mg/dL      Sodium 139 mmol/L      Potassium 4.0  "mmol/L      Comment: Slight hemolysis detected by analyzer. Results may be affected.        Chloride 105 mmol/L      CO2 25.0 mmol/L      Calcium 8.3 mg/dL      Total Protein 5.0 g/dL      Albumin 3.1 g/dL      ALT (SGPT) 9 U/L      AST (SGOT) 14 U/L      Alkaline Phosphatase 50 U/L      Total Bilirubin <0.2 mg/dL      Globulin 1.9 gm/dL      A/G Ratio 1.6 g/dL      BUN/Creatinine Ratio 34.4     Anion Gap 9.0 mmol/L      eGFR 35.5 mL/min/1.73     Narrative:      GFR Normal >60  Chronic Kidney Disease <60  Kidney Failure <15    The GFR formula is only valid for adults with stable renal function between ages 18 and 70.    Lactic Acid, Plasma [[id number]]  (Normal) Collected: 09/06/23 1532    Specimen: Blood Updated: 09/06/23 1612     Lactate 1.5 mmol/L               Radiology:     Imaging Results (Last 72 Hours)       ** No results found for the last 72 hours. **              Objective:   Vitals: /54 (BP Location: Right arm, Patient Position: Lying)   Pulse 77   Temp 97.9 °F (36.6 °C) (Oral)   Resp 16   Ht 172.7 cm (68\")   Wt 104 kg (230 lb 1.6 oz)   LMP  (LMP Unknown)   SpO2 94%   BMI 34.99 kg/m²   Physical Exam  Vitals and nursing note reviewed.   Constitutional:       Appearance: She is ill-appearing.   HENT:      Head: Normocephalic and atraumatic.   Eyes:      General: No scleral icterus.  Cardiovascular:      Rate and Rhythm: Normal rate.   Pulmonary:      Effort: No respiratory distress.      Breath sounds: No wheezing.   Abdominal:      General: There is no distension.      Palpations: Abdomen is soft.      Tenderness: There is no abdominal tenderness.   Musculoskeletal:         General: No swelling.      Cervical back: Neck supple.   Skin:     General: Skin is warm.      Coloration: Skin is pale.   Neurological:      Mental Status: She is oriented to person, place, and time.   Psychiatric:         Mood and Affect: Mood normal.         Behavior: Behavior normal.     24HR INTAKE/OUTPUT:  "   Intake/Output Summary (Last 24 hours) at 9/8/2023 0627  Last data filed at 9/7/2023 1954  Gross per 24 hour   Intake 1042.5 ml   Output --   Net 1042.5 ml        Problem list:       GIB (gastrointestinal bleeding)    Stage 3b chronic kidney disease    Acute blood loss anemia    Thrombocytopenia    Hypotension due to hypovolemia      Assessment/Plan:     ASSESSMENT:   Gastrointestinal bleeding.  Symptomatic anemia from acute blood loss anemia.  Packed RBC transfusion as needed.           CONCURRENT PROBLEMS:  1. Chronic ITP (idiopathic thrombocytopenia).  Slightly hypercellular marrow.  No features of myelodysplasia or excess blasts on 11/10/2022.   2. Anemia in stage 3b chronic kidney disease.  On epoetin alpha 40,000 units, latest dose 9/1/2023.   3. Iron deficiency anemia secondary to inadequate dietary iron intake    4. History of breast cancer.  Off adjuvant anastrozole.   5.   Followed by Dr. Gus Carvalho at [facility].            PLAN:   Re: Note from GI 9/7/2023.  Recommend endoscopy pending results of hemoglobin and platelets.  May transfuse platelets 30 minutes to 1 hour before planned endoscopy as needed.   Re: continue trial of dexamethasone 40 mg IV daily total of 4 days to end 9/10/2023 and monitor platelet.  She is on Protonix IV.  Occult blood positive on 9/7/2023.  Please see Dr. Gus Carvalho for EUS next week.  Follow heme status.  WBC 10.6, hemoglobin 7.9 from 8.4 from 7 from 7.2 from 6.5, and platelet 24 from 17 from 24.  Transfuse 1 unit packed RBC if hemoglobin less than 8.  Monitor for tensional transfusion reactions  Transfuse 1 unit platelet if platelet is 10 or below.  Monitor for potential transfusion reactions.  Clinic appointment with MARY Toney 1 week after discharge.  Above plan discussed with patient and nurse Madera.  They verbalized understanding.

## 2023-09-08 NOTE — CASE MANAGEMENT/SOCIAL WORK
Continued Stay Note   [facility]     Patient Name: Marina Joe  MRN: [id number]  Today's Date: 9/8/2023    Admit Date: 9/6/2023    Plan: Home   Discharge Plan       Row Name 09/08/23 1434       Plan    Plan Home    Patient/Family in Agreement with Plan yes    Plan Comments Pt lives at home with her significant other and will return home at d/c. No needs identified at this time.    Final Discharge Disposition Code 01 - home or self-care                   Discharge Codes    No documentation.                 Expected Discharge Date and Time       Expected Discharge Date Expected Discharge Time    Sep 9, 2023               JW Villalba

## 2023-09-08 NOTE — PROGRESS NOTES
[facility]  Inpatient Progress Note      Date of Admission: 9/6/2023  Date of Service:  09/08/23    Reason for Follow Up: Anemia    Subjective     Subjective:   Patient is lying in bed, no abdominal pain.  She had 2 large black BMs last evening.  No nausea or vomiting.  She did receive blood transfusion during the night.    Current Facility-Administered Medications:     acetaminophen (TYLENOL) tablet 650 mg, 650 mg, Oral, Q4H PRN **OR** acetaminophen (TYLENOL) 160 MG/5ML solution 650 mg, 650 mg, Oral, Q4H PRN **OR** acetaminophen (TYLENOL) suppository 650 mg, 650 mg, Rectal, Q4H PRN, Johanny Reid, APRN    buPROPion XL (WELLBUTRIN XL) 24 hr tablet 150 mg, 150 mg, Oral, Daily, Johanny Reid, MARY, 150 mg at 09/07/23 0900    dexAMETHasone sodium phosphate 40 mg in sodium chloride 0.9 % IVPB, 40 mg, Intravenous, Daily, Benjamin Shah MD, 40 mg at 09/07/23 0931    fluticasone (FLONASE) 50 MCG/ACT nasal spray 2 spray, 2 spray, Nasal, Daily, Johanny Reid, APRN, 2 spray at 09/07/23 0910    montelukast (SINGULAIR) tablet 10 mg, 10 mg, Oral, Nightly, Johanny Reid, APRN, 10 mg at 09/07/23 2038    nitroglycerin (NITROSTAT) SL tablet 0.4 mg, 0.4 mg, Sublingual, Q5 Min PRN, Johanny Reid, APRN    ondansetron (ZOFRAN) tablet 4 mg, 4 mg, Oral, Q6H PRN **OR** ondansetron (ZOFRAN) injection 4 mg, 4 mg, Intravenous, Q6H PRN, Johanny Reid APRN    pantoprazole (PROTONIX) injection 40 mg, 40 mg, Intravenous, Q12H, Johanny Reid, APRN, 40 mg at 09/08/23 0410    rOPINIRole (REQUIP) tablet 0.5 mg, 0.5 mg, Oral, Nightly, Johanny Reid, APRN, 0.5 mg at 09/07/23 2038    rosuvastatin (CRESTOR) tablet 20 mg, 20 mg, Oral, Daily, Johanny Reid, APRN, 20 mg at 09/07/23 0901    sertraline (ZOLOFT) tablet 100 mg, 100 mg, Oral, Daily, Johanny Reid, APRN, 100 mg at 09/07/23 0901    sodium chloride 0.9 % flush 10 mL, 10 mL, Intravenous, PRN, Oren Dunham MD    sodium  chloride 0.9 % flush 10 mL, 10 mL, Intravenous, Q12H, Johanny Reid, APRN, 10 mL at 09/07/23 2038    sodium chloride 0.9 % flush 10 mL, 10 mL, Intravenous, PRN, Johanny Reid, APRN    sodium chloride 0.9 % infusion 40 mL, 40 mL, Intravenous, PRN, Johanny Reid, APRN    valACYclovir (VALTREX) tablet 500 mg, 500 mg, Oral, BID, Johanny Reid APRN, 500 mg at 09/07/23 2038    Review of Systems     Constitution:  negative for chills, fatigue and fevers  Eyes:  negative for blurriness and change of vision  ENT:   negative for sore throat and voice change  Respiratory: negative for  cough and shortness of air  Cardiovascular:  Negative for chest pain or palpitations  Gastrointestinal:  negative for  See HPI  Genitourinary:  negative for  blood in urine and painful urination  Integument: negative for  rash and redness  Hematologic / Lymphatic: negative for  excessive bleeding and easy bruising  Musculoskeletal: negative for  joint pain and joint stiffness out of the ordinary  Neurological:  negative for  seizures and speech abnormality  Behavioral/Psych:  negative for  anxiety and depression out of the ordinary  Endocrine: negative for  diabetes and weight loss, unintended  Allergies / Immunologic:  negative for  anaphylaxis and rash        Objective     Vital Signs  Temp:  [97.4 °F (36.3 °C)-98.5 °F (36.9 °C)] 97.4 °F (36.3 °C)  Heart Rate:  [73-95] 74  Resp:  [16] 16  BP: (133-160)/(44-71) 152/58  Body mass index is 34.99 kg/m².    Intake/Output Summary (Last 24 hours) at 9/8/2023 0909  Last data filed at 9/7/2023 1954  Gross per 24 hour   Intake 1042.5 ml   Output --   Net 1042.5 ml     No intake/output data recorded.       Physical Exam:   General: patient awake, alert and cooperative   Eyes: Normal lids and lashes, no scleral icterus   Neck: supple, normal ROM   Skin: warm and dry, not jaundiced   Cardiovascular: regular rhythm and rate, no murmurs auscultated   Pulm: clear to auscultation bilaterally,  regular and unlabored   Abdomen: soft, nontender, nondistended; normal bowel sounds   Rectal: deferred   Extremities: no rash or edema   Psychiatric: Normal mood and behavior; memory intact         Results Review:    I have reviewed all of the patients current test results  Results from last 7 days   Lab Units 09/08/23  0623 09/07/23  2355 09/07/23  1520 09/07/23  0840 09/07/23  0141 09/06/23  1608   WBC 10*3/mm3 10.67  --   --  8.99  --  8.67   HEMOGLOBIN g/dL 7.9* 8.4* 7.0* 7.2*   < > 4.9*   HEMATOCRIT % 24.6* 26.1* 22.4* 22.8*   < > 16.6*   PLATELETS 10*3/mm3 24*  --   --  17*  --  24*    < > = values in this interval not displayed.       Results from last 7 days   Lab Units 09/08/23  0623 09/07/23  0840 09/06/23  1532 09/03/23  0535   SODIUM mmol/L 137 139 139 137   POTASSIUM mmol/L 3.9 3.8 4.0 4.0   CHLORIDE mmol/L 105 106 105 104   CO2 mmol/L 22.0 24.0 25.0 25.0   BUN mg/dL 36* 46* 52* 59*   CREATININE mg/dL 1.24* 1.42* 1.51* 1.58*   CALCIUM mg/dL 8.2* 8.0* 8.3* 8.8   BILIRUBIN mg/dL  --   --  <0.2 <0.2   ALK PHOS U/L  --   --  50 48   ALT (SGPT) U/L  --   --  9 9   AST (SGOT) U/L  --   --  14 12   GLUCOSE mg/dL 136* 105* 115* 123*       Results from last 7 days   Lab Units 09/06/23  1608 09/03/23  0535   INR  1.09 1.04       No results found for: LIPASE    Radiology:    Imaging Results (Last 24 Hours)       ** No results found for the last 24 hours. **              Assessment & Plan       GIB (gastrointestinal bleeding)    Stage 3b chronic kidney disease    Acute blood loss anemia    Thrombocytopenia    Hypotension due to hypovolemia      Impression/Plan    Melena  Anemia, acute on chronic  Thrombocytopenia  CKD stage III  Pancreatic cyst    No plans on EGD in light of thrombocytopenia.  Preferably platelets be closer to 50 prior to consideration for a scope.  She is scheduled for EUS next week with Dr. Gus Carvalho for evaluation of pancreatic cyst.  I did go ahead and order an upper GI.  Continue bid PPI,  continue to monitor for GI blood loss, monitor Hgb.   Case discussed with MARY Corbett.  Case reviewed with Dr Chance, he is in agreement.      Electronically signed by MARY Nino, 09/08/23, 9:01 AM CDT.       MARY Nino  09/08/23  09:09 CDT  .3

## 2023-09-08 NOTE — PLAN OF CARE
Goal Outcome Evaluation:  Plan of Care Reviewed With: patient, significant other        Progress: improving  Outcome Evaluation: PT eval completed.  Pt pleasant, agreeable to therapy, motivated to improve and oriented X 4.  Pt performs bed mobility w/ SBA, tfers CGA to SBA and gait w/out a device from bed to transport chair in hallway w/ CGA.  Pt continues w/ c/os weakness and remains NPO.  Pt will benefit from continued PT service to improve activity tolerance, to progress mobility, safety awareness and to increase I reducing fall risk.  Recommend home w/ assist at discharge.  Will follow for progress and needs.      Anticipated Discharge Disposition (PT): home with assist

## 2023-09-08 NOTE — THERAPY EVALUATION
Patient Name: Marina Joe  : 1945    MRN: [id number]                              Today's Date: 2023       Admit Date: 2023    Visit Dx:     ICD-10-CM ICD-9-CM   1. Gastrointestinal hemorrhage, unspecified gastrointestinal hemorrhage type  K92.2 578.9   2. Impaired mobility [Z74.09 (ICD-10-CM)]  Z74.09 799.89     Patient Active Problem List   Diagnosis    Malignant neoplasm of upper-outer quadrant of female breast    Anemia of chronic renal failure, stage 3 (moderate)    Hypertension, benign    Hx of colonic polyps    HX: breast cancer    Morbidly obese    Abnormal mammogram    Breast mass    Right knee DJD    S/P lumpectomy, left breast    Adult hypothyroidism    Rhinitis    Anemia    Anxiety    At low risk for fall    Breast cancer, left    Cervical pain    Chronic insomnia    Cough    Elevated lipids    Encounter for immunization    Herpes zoster without complication    Influenza B    Left ear pain    Left otitis externa    Lumbar strain, initial encounter    Myalgia    Negative depression screening    Obesity (BMI 30-39.9)    Postmenopausal status    Recurrent acute serous otitis media of left ear    Restless leg    Sinusitis, bacterial    Skin lesion    Upper respiratory infection    Urinary tract infection without hematuria    Vitamin D deficiency    Stage 3b chronic kidney disease    GIB (gastrointestinal bleeding)    Acute blood loss anemia    Chronic idiopathic thrombocytopenia    Hypotension due to hypovolemia     Past Medical History:   Diagnosis Date    Breast cancer     CKD (chronic kidney disease)     Hypercholesteremia     Hypertension     Sinusitis     Stage 3a chronic kidney disease 10/20/2020     Past Surgical History:   Procedure Laterality Date    AVULSION TOENAIL PLATE          BREAST BIOPSY Left 2012    BREAST BIOPSY      Left Breast, 2019 per Dr Barkley    BREAST LUMPECTOMY Left     with node bx     COLONOSCOPY  2013    small polyp at 30cm benign  hyperplastic polyp, changes consistent with melanosis coli. Recall 5 years    COLONOSCOPY  11/19/2018    Tics otherwise normal exam repeat in 5 years    COLONOSCOPY  02/13/2023    Normal exam repeat in 3 years with a 2 day prep    ENDOSCOPY  02/13/2023    Gastritis, small HH    REPLACEMENT TOTAL KNEE Right     2016    TOTAL ABDOMINAL HYSTERECTOMY WITH SALPINGO OOPHORECTOMY        General Information       Row Name 09/08/23 1115          Physical Therapy Time and Intention    Document Type evaluation;other (see comments)  see MAR  -JE     Mode of Treatment physical therapy  -       Row Name 09/08/23 1115          General Information    Patient Profile Reviewed yes  -JE     Prior Level of Function independent:;all household mobility;community mobility;ADL's;shopping;using stairs;home management;driving  -JE     Existing Precautions/Restrictions fall   low platelets  -     Barriers to Rehab medically complex  -       Row Name 09/08/23 1115          Living Environment    People in Home significant other  -     Name(s) of People in Home SO- Jose Enrique  -       Row Name 09/08/23 1115          Home Main Entrance    Number of Stairs, Main Entrance three  -JE     Stair Railings, Main Entrance railing on right side (ascending)  -       Row Name 09/08/23 1115          Stairs Within Home, Primary    Number of Stairs, Within Home, Primary none  -       Row Name 09/08/23 1115          Cognition    Orientation Status (Cognition) oriented x 4  -       Row Name 09/08/23 1115          Safety Issues, Functional Mobility    Safety Issues Affecting Function (Mobility) safety precaution awareness  -     Impairments Affecting Function (Mobility) balance;endurance/activity tolerance  -               User Key  (r) = Recorded By, (t) = Taken By, (c) = Cosigned By      Initials Name Provider Type    Radha Adler, PT Physical Therapist                   Mobility       Row Name 09/08/23 1115          Bed Mobility    Bed  Mobility supine-sit  -     Supine-Sit [place] (Bed Mobility) standby assist  -     Assistive Device (Bed Mobility) head of bed elevated  -[facility] Name 09/08/23 1115          Sit-Stand Transfer    Sit-Stand [place] (Transfers) contact guard;standby assist  -[facility] Name 09/08/23 1115          Gait/Stairs (Locomotion)    [place] Level (Gait) contact guard  -     Distance in Feet (Gait) ~ 20 ft from bed to transport chair; staff have arrived to take pt for xray  -               User Key  (r) = Recorded By, (t) = Taken By, (c) = Cosigned By      Initials Name Provider Type    Radha Adler, PT Physical Therapist                   Obj/Interventions       Row Name 09/08/23 1115          Range of Motion Comprehensive    Comment, General Range of Motion AROM all 4 extremities WFLs  -[facility] Name 09/08/23 1115          Strength Comprehensive (MMT)    Comment, General Manual Muscle Testing (MMT) Assessment grossly 4 to 4+/5 throughout  -[facility] Name 09/08/23 1115          Balance    Balance Assessment sitting static balance;sitting dynamic balance;sit to stand dynamic balance;standing static balance;standing dynamic balance  -     Static Sitting Balance standby assist;independent  -     Dynamic Sitting Balance standby assist;independent  -     Sit to Stand Dynamic Balance contact guard;standby assist  -     Static Standing Balance standby assist  -     Dynamic Standing Balance contact guard  -     Position/Device Used, Standing Balance unsupported  -JE       Row Name 09/08/23 1115          Sensory Assessment (Somatosensory)    Sensory Assessment (Somatosensory) sensation intact  -               User Key  (r) = Recorded By, (t) = Taken By, (c) = Cosigned By      Initials Name Provider Type    Radha Adler, PT Physical Therapist                   Goals/Plan       Row Name 09/08/23 1115          Bed Mobility Goal 1 (PT)    Activity/Assistive Device (Bed  Mobility Goal 1, PT) bed mobility activities, all  -JE     [place] Level/Cues Needed (Bed Mobility Goal 1, PT) independent  -JE     Time Frame (Bed Mobility Goal 1, PT) long term goal (LTG);10 days  -JE     Progress/Outcomes (Bed Mobility Goal 1, PT) goal ongoing  -       Row Name 09/08/23 1115          Transfer Goal 1 (PT)    Activity/Assistive Device (Transfer Goal 1, PT) sit-to-stand/stand-to-sit;bed-to-chair/chair-to-bed  -JE     [place] Level/Cues Needed (Transfer Goal 1, PT) standby assist;independent  -JE     Time Frame (Transfer Goal 1, PT) long term goal (LTG);10 days  -JE     Progress/Outcome (Transfer Goal 1, PT) goal ongoing  -       Row Name 09/08/23 1115          Gait Training Goal 1 (PT)    Activity/Assistive Device (Gait Training Goal 1, PT) gait (walking locomotion);decrease fall risk;improve balance and speed;increase endurance/gait distance  -JE     [place] Level (Gait Training Goal 1, PT) standby assist;independent  -JE     Distance (Gait Training Goal 1, PT) 150-200 ft  -JE     Time Frame (Gait Training Goal 1, PT) long term goal (LTG);10 days  -JE     Progress/Outcome (Gait Training Goal 1, PT) goal ongoing  -       Row Name 09/08/23 1115          Stairs Goal 1 (PT)    Activity/Assistive Device (Stairs Goal 1, PT) ascending stairs;descending stairs;using handrail, right;improve balance and speed;decrease fall risk;step-to-step  -JE     [place] Level/Cues Needed (Stairs Goal 1, PT) standby assist  -     Number of Stairs (Stairs Goal 1, PT) 3  -JE     Time Frame (Stairs Goal 1, PT) long term goal (LTG);10 days  -JE     Progress/Outcome (Stairs Goal 1, PT) goal ongoing  -       Row Name 09/08/23 1115          Therapy Assessment/Plan (PT)    Planned Therapy Interventions (PT) balance training;bed mobility training;gait training;postural re-education;patient/family education;strengthening;transfer training;other (see comments)  safety/falls prevention  -                User Key  (r) = Recorded By, (t) = Taken By, (c) = Cosigned By      Initials Name Provider Type    Radha Adler, PT Physical Therapist                   Clinical Impression       Row Name 09/08/23 1115          Pain    Pretreatment Pain Rating 0/10 - no pain  -     Posttreatment Pain Rating 0/10 - no pain  -       Row Name 09/08/23 1115          Plan of Care Review    Plan of Care Reviewed With patient;significant other  -     Progress improving  -     Outcome Evaluation PT eval completed.  Pt pleasant, agreeable to therapy, motivated to improve and oriented X 4.  Pt performs bed mobility w/ SBA, tfers CGA to SBA and gait w/out a device from bed to transport chair in hallway w/ CGA.  Pt continues w/ c/os weakness and remains NPO.  Pt will benefit from continued PT service to improve activity tolerance, to progress mobility, safety awareness and to increase I reducing fall risk.  Recommend home w/ assist at discharge.  Will follow for progress and needs.  -       Row Name 09/08/23 1115          Therapy Assessment/Plan (PT)    Patient/Family Therapy Goals Statement (PT) return to prior level of function  -     Rehab Potential (PT) good, to achieve stated therapy goals  -     Criteria for Skilled Interventions Met (PT) yes;meets criteria;skilled treatment is necessary  -     Therapy Frequency (PT) 2 times/day  -     Predicted Duration of Therapy Intervention (PT) until discharge or goals achieved  -       Row Name 09/08/23 1115          Vital Signs    Pretreatment Heart Rate (beats/min) 75  -JE     Pre SpO2 (%) 97  -JE     O2 Delivery Pre Treatment room air  -JE     O2 Delivery Intra Treatment room air  -     O2 Delivery Post Treatment room air  -JE     Pre Patient Position Supine  -     Intra Patient Position Standing  -     Post Patient Position Sitting  -       Row Name 09/08/23 1115          Positioning and Restraints    Pre-Treatment Position in bed  -     Post Treatment  Position wheelchair  -JE     In Wheelchair sitting;with other staff  -               User Key  (r) = Recorded By, (t) = Taken By, (c) = Cosigned By      Initials Name Provider Type    Radha Adler, PT Physical Therapist                   Outcome Measures       Row Name 09/08/23 1115 09/08/23 0800       How much help from another person do you currently need...    Turning from your back to your side while in flat bed without using bedrails? 4  -JE 4  -TB    Moving from lying on back to sitting on the side of a flat bed without bedrails? 4  -JE 4  -TB    Moving to and from a bed to a chair (including a wheelchair)? 3  -JE 3  -TB    Standing up from a chair using your arms (e.g., wheelchair, bedside chair)? 3  -JE 3  -TB    Climbing 3-5 steps with a railing? 3  -JE 3  -TB    To walk in hospital room? 3  -JE 3  -TB    AM-PAC 6 Clicks Score (PT) 20  - 20  -TB    Highest level of mobility 6 --> Walked 10 steps or more  - 6 --> Walked 10 steps or more  -TB      Row Name 09/08/23 1115 09/08/23 1020       Functional Assessment    Outcome Measure Options AM-PAC 6 Clicks Basic Mobility (PT)  -JE AM-PAC 6 Clicks Daily Activity (OT)  -LR              User Key  (r) = Recorded By, (t) = Taken By, (c) = Cosigned By      Initials Name Provider Type    TB Denise Monet LPN Licensed Nurse    Radha Adler, PT Physical Therapist    Bety Fernandez OTR/L Occupational Therapist                                 Physical Therapy Education       Title: PT OT SLP Therapies (In Progress)       Topic: Physical Therapy (In Progress)       Point: Mobility training (Done)       Learning Progress Summary             Patient Acceptance, E,TB,D, VU,DU by  at 9/8/2023 1403    Comment: education re: purpose of PT/importance of activity, for safety/falls prevention w/ mobillity   Significant Other Acceptance, E,TB,D, VU,DU by  at 9/8/2023 1403    Comment: education re: purpose of PT/importance of activity, for  safety/falls prevention w/ mobillity                         Point: Home exercise program (Not Started)       Learner Progress:  Not documented in this visit.              Point: Precautions (Done)       Learning Progress Summary             Patient Acceptance, E,TB,D, VU,DU by  at 9/8/2023 1403    Comment: education re: purpose of PT/importance of activity, for safety/falls prevention w/ mobillity   Significant Other Acceptance, E,TB,D, VU,DU by  at 9/8/2023 1403    Comment: education re: purpose of PT/importance of activity, for safety/falls prevention w/ mobillity                                         User Key       Initials Effective Dates Name Provider Type Discipline     08/02/18 -  Radha Morocho, PT Physical Therapist PT                  PT Recommendation and Plan  Planned Therapy Interventions (PT): balance training, bed mobility training, gait training, postural re-education, patient/family education, strengthening, transfer training, other (see comments) (safety/falls prevention)  Plan of Care Reviewed With: patient, significant other  Progress: improving  Outcome Evaluation: PT eval completed.  Pt pleasant, agreeable to therapy, motivated to improve and oriented X 4.  Pt performs bed mobility w/ SBA, tfers CGA to SBA and gait w/out a device from bed to transport chair in hallway w/ CGA.  Pt continues w/ c/os weakness and remains NPO.  Pt will benefit from continued PT service to improve activity tolerance, to progress mobility, safety awareness and to increase I reducing fall risk.  Recommend home w/ assist at discharge.  Will follow for progress and needs.     Time Calculation:         PT Charges       Row Name 09/08/23 1141             Time Calculation    Start Time 1115  -      Stop Time 1140  -      Time Calculation (min) 25 min  -      PT Received On 09/08/23  -      PT Goal Re-Cert Due Date 09/18/23  -                User Key  (r) = Recorded By, (t) = Taken By, (c) = Cosigned By       Initials Name Provider Type    Radha Adler, PT Physical Therapist                  Therapy Charges for Today       Code Description Service Date Service Provider Modifiers Qty    20398293712 HC PT EVAL LOW COMPLEXITY 2 9/8/2023 Radha Morocho, PT GP 1            PT G-Codes  Outcome Measure Options: AM-PAC 6 Clicks Basic Mobility (PT)  AM-PAC 6 Clicks Score (PT): 20  AM-PAC 6 Clicks Score (OT): 19  PT Discharge Summary  Anticipated Discharge Disposition (PT): home with assist    Radha Morocho, PT  9/8/2023

## 2023-09-09 LAB
ANION GAP SERPL CALCULATED.3IONS-SCNC: 9 MMOL/L (ref 5–15)
BASOPHILS # BLD AUTO: 0.01 10*3/MM3 (ref 0–0.2)
BASOPHILS NFR BLD AUTO: 0.1 % (ref 0–1.5)
BUN SERPL-MCNC: 30 MG/DL (ref 8–23)
BUN/CREAT SERPL: 25.4 (ref 7–25)
CALCIUM SPEC-SCNC: 8.3 MG/DL (ref 8.6–10.5)
CHLORIDE SERPL-SCNC: 107 MMOL/L (ref 98–107)
CO2 SERPL-SCNC: 24 MMOL/L (ref 22–29)
CREAT SERPL-MCNC: 1.18 MG/DL (ref 0.57–1)
DEPRECATED RDW RBC AUTO: 61.1 FL (ref 37–54)
EGFRCR SERPLBLD CKD-EPI 2021: 47.7 ML/MIN/1.73
EOSINOPHIL # BLD AUTO: 0 10*3/MM3 (ref 0–0.4)
EOSINOPHIL NFR BLD AUTO: 0 % (ref 0.3–6.2)
ERYTHROCYTE [DISTWIDTH] IN BLOOD BY AUTOMATED COUNT: 19.7 % (ref 12.3–15.4)
GLUCOSE SERPL-MCNC: 114 MG/DL (ref 65–99)
HCT VFR BLD AUTO: 25.4 % (ref 34–46.6)
HCT VFR BLD AUTO: 28.1 % (ref 34–46.6)
HGB BLD-MCNC: 7.9 G/DL (ref 12–15.9)
HGB BLD-MCNC: 8.9 G/DL (ref 12–15.9)
LYMPHOCYTES # BLD AUTO: 0.77 10*3/MM3 (ref 0.7–3.1)
LYMPHOCYTES NFR BLD AUTO: 7.2 % (ref 19.6–45.3)
MCH RBC QN AUTO: 29 PG (ref 26.6–33)
MCHC RBC AUTO-ENTMCNC: 31.1 G/DL (ref 31.5–35.7)
MCV RBC AUTO: 93.4 FL (ref 79–97)
MONOCYTES # BLD AUTO: 0.65 10*3/MM3 (ref 0.1–0.9)
MONOCYTES NFR BLD AUTO: 6.1 % (ref 5–12)
NEUTROPHILS NFR BLD AUTO: 85.4 % (ref 42.7–76)
NEUTROPHILS NFR BLD AUTO: 9.13 10*3/MM3 (ref 1.7–7)
PLATELET # BLD AUTO: 20 10*3/MM3 (ref 140–450)
PMV BLD AUTO: 11.7 FL (ref 6–12)
POTASSIUM SERPL-SCNC: 4 MMOL/L (ref 3.5–5.2)
RBC # BLD AUTO: 2.72 10*6/MM3 (ref 3.77–5.28)
SODIUM SERPL-SCNC: 140 MMOL/L (ref 136–145)
WBC NRBC COR # BLD: 10.69 10*3/MM3 (ref 3.4–10.8)

## 2023-09-09 PROCEDURE — 85014 HEMATOCRIT: CPT | Performed by: NURSE PRACTITIONER

## 2023-09-09 PROCEDURE — 25010000002 DEXAMETHASONE SODIUM PHOSPHATE 100 MG/10ML SOLUTION 10 ML VIAL: Performed by: INTERNAL MEDICINE

## 2023-09-09 PROCEDURE — 85018 HEMOGLOBIN: CPT | Performed by: NURSE PRACTITIONER

## 2023-09-09 PROCEDURE — 80048 BASIC METABOLIC PNL TOTAL CA: CPT | Performed by: NURSE PRACTITIONER

## 2023-09-09 PROCEDURE — 85025 COMPLETE CBC W/AUTO DIFF WBC: CPT | Performed by: INTERNAL MEDICINE

## 2023-09-09 PROCEDURE — 97116 GAIT TRAINING THERAPY: CPT

## 2023-09-09 PROCEDURE — 36415 COLL VENOUS BLD VENIPUNCTURE: CPT | Performed by: INTERNAL MEDICINE

## 2023-09-09 RX ORDER — TRAZODONE HYDROCHLORIDE 100 MG/1
100 TABLET ORAL NIGHTLY PRN
Status: DISCONTINUED | OUTPATIENT
Start: 2023-09-09 | End: 2023-09-12 | Stop reason: HOSPADM

## 2023-09-09 RX ORDER — CLONIDINE HYDROCHLORIDE 0.1 MG/1
0.1 TABLET ORAL 2 TIMES DAILY
COMMUNITY

## 2023-09-09 RX ORDER — CARVEDILOL 3.12 MG/1
3.12 TABLET ORAL 2 TIMES DAILY WITH MEALS
Status: DISCONTINUED | OUTPATIENT
Start: 2023-09-09 | End: 2023-09-10

## 2023-09-09 RX ORDER — TRAZODONE HYDROCHLORIDE 100 MG/1
100 TABLET ORAL NIGHTLY PRN
COMMUNITY
End: 2023-09-19

## 2023-09-09 RX ADMIN — SERTRALINE HYDROCHLORIDE 100 MG: 100 TABLET, FILM COATED ORAL at 08:25

## 2023-09-09 RX ADMIN — VALACYCLOVIR 500 MG: 500 TABLET, FILM COATED ORAL at 20:22

## 2023-09-09 RX ADMIN — VALACYCLOVIR 500 MG: 500 TABLET, FILM COATED ORAL at 08:25

## 2023-09-09 RX ADMIN — PANTOPRAZOLE SODIUM 40 MG: 40 INJECTION, POWDER, FOR SOLUTION INTRAVENOUS at 15:22

## 2023-09-09 RX ADMIN — PANTOPRAZOLE SODIUM 40 MG: 40 INJECTION, POWDER, FOR SOLUTION INTRAVENOUS at 05:09

## 2023-09-09 RX ADMIN — MONTELUKAST SODIUM 10 MG: 10 TABLET, FILM COATED ORAL at 20:22

## 2023-09-09 RX ADMIN — CARVEDILOL 3.12 MG: 3.12 TABLET, FILM COATED ORAL at 17:20

## 2023-09-09 RX ADMIN — FLUTICASONE PROPIONATE 2 SPRAY: 50 SPRAY, METERED NASAL at 08:25

## 2023-09-09 RX ADMIN — FAMOTIDINE 20 MG: 20 TABLET, FILM COATED ORAL at 17:19

## 2023-09-09 RX ADMIN — Medication 10 ML: at 08:29

## 2023-09-09 RX ADMIN — FAMOTIDINE 20 MG: 20 TABLET, FILM COATED ORAL at 08:24

## 2023-09-09 RX ADMIN — BUPROPION HYDROCHLORIDE 150 MG: 150 TABLET, EXTENDED RELEASE ORAL at 08:25

## 2023-09-09 RX ADMIN — ROSUVASTATIN CALCIUM 20 MG: 20 TABLET, FILM COATED ORAL at 08:25

## 2023-09-09 RX ADMIN — Medication 10 ML: at 20:22

## 2023-09-09 RX ADMIN — DEXAMETHASONE SODIUM PHOSPHATE 40 MG: 10 INJECTION, SOLUTION INTRAMUSCULAR; INTRAVENOUS at 08:29

## 2023-09-09 RX ADMIN — ROPINIROLE HYDROCHLORIDE 0.5 MG: 0.25 TABLET, FILM COATED ORAL at 20:22

## 2023-09-09 NOTE — THERAPY TREATMENT NOTE
Acute Care - Physical Therapy Treatment Note  [facility]     Patient Name: Marina Joe  : 1945  MRN: [id number]  Today's Date: 2023      Visit Dx:     ICD-10-CM ICD-9-CM   1. Gastrointestinal hemorrhage, unspecified gastrointestinal hemorrhage type  K92.2 578.9   2. Impaired mobility [Z74.09 (ICD-10-CM)]  Z74.09 799.89     Patient Active Problem List   Diagnosis    Malignant neoplasm of upper-outer quadrant of female breast    Anemia of chronic renal failure, stage 3 (moderate)    Hypertension, benign    Hx of colonic polyps    HX: breast cancer    Morbidly obese    Abnormal mammogram    Breast mass    Right knee DJD    S/P lumpectomy, left breast    Adult hypothyroidism    Rhinitis    Anemia    Anxiety    At low risk for fall    Breast cancer, left    Cervical pain    Chronic insomnia    Cough    Elevated lipids    Encounter for immunization    Herpes zoster without complication    Influenza B    Left ear pain    Left otitis externa    Lumbar strain, initial encounter    Myalgia    Negative depression screening    Obesity (BMI 30-39.9)    Postmenopausal status    Recurrent acute serous otitis media of left ear    Restless leg    Sinusitis, bacterial    Skin lesion    Upper respiratory infection    Urinary tract infection without hematuria    Vitamin D deficiency    Stage 3b chronic kidney disease    GIB (gastrointestinal bleeding)    Acute blood loss anemia    Chronic idiopathic thrombocytopenia    Hypotension due to hypovolemia     Past Medical History:   Diagnosis Date    Breast cancer     CKD (chronic kidney disease)     Hypercholesteremia     Hypertension     Sinusitis     Stage 3a chronic kidney disease 10/20/2020     Past Surgical History:   Procedure Laterality Date    AVULSION TOENAIL PLATE          BREAST BIOPSY Left 2012    BREAST BIOPSY      Left Breast, 2019 per Dr Barkley    BREAST LUMPECTOMY Left     with node bx     COLONOSCOPY  2013    small polyp at 30cm  benign hyperplastic polyp, changes consistent with melanosis coli. Recall 5 years    COLONOSCOPY  11/19/2018    Tics otherwise normal exam repeat in 5 years    COLONOSCOPY  02/13/2023    Normal exam repeat in 3 years with a 2 day prep    ENDOSCOPY  02/13/2023    Gastritis, small HH    REPLACEMENT TOTAL KNEE Right     2016    TOTAL ABDOMINAL HYSTERECTOMY WITH SALPINGO OOPHORECTOMY       PT Assessment (last 12 hours)       PT Evaluation and Treatment       Row Name 09/09/23 1024          Physical Therapy Time and Intention    Subjective Information weakness  -NW     Document Type therapy note (daily note)  -NW     Mode of Treatment physical therapy  -NW       Row Name 09/09/23 1024          General Information    Existing Precautions/Restrictions fall  -NW       Row Name 09/09/23 1024          Pain    Pretreatment Pain Rating 0/10 - no pain  -NW       Row Name 09/09/23 1024          Bed Mobility    Comment, (Bed Mobility) up in room  -NW       Row Name 09/09/23 1024          Stand-Sit Transfer    Stand-Sit [place] (Transfers) independent  -NW       Row Name 09/09/23 1024          Gait/Stairs (Locomotion)    [place] Level (Gait) supervision  -NW     Distance in Feet (Gait) 525  -NW     Gait Assessment/Intervention 9  -NW     [place] Level (Stairs) contact guard  -NW     Handrail Location (Stairs) right side (ascending)  -NW     Ascending Technique (Stairs) step-over-step  -NW     Descending Technique (Stairs) step-over-step  -NW       Row Name 09/09/23 1024          Safety Issues, Functional Mobility    Impairments Affecting Function (Mobility) endurance/activity tolerance  -NW       Row Name 09/09/23 1024          Positioning and Restraints    Pre-Treatment Position standing in room  -NW     Post Treatment Position other  -NW     Other Position return to room independently  -NW               User Key  (r) = Recorded By, (t) = Taken By, (c) = Cosigned By      Initials Name Provider Type    NW Remi  Yuly MENA PTA Physical Therapist Assistant                    Physical Therapy Education       Title: PT OT SLP Therapies (In Progress)       Topic: Physical Therapy (In Progress)       Point: Mobility training (Done)       Learning Progress Summary             Patient Acceptance, E,TB,D, VU,DU by  at 9/8/2023 1403    Comment: education re: purpose of PT/importance of activity, for safety/falls prevention w/ mobillity   Significant Other Acceptance, E,TB,D, VU,DU by  at 9/8/2023 1403    Comment: education re: purpose of PT/importance of activity, for safety/falls prevention w/ mobillity                         Point: Home exercise program (Not Started)       Learner Progress:  Not documented in this visit.              Point: Precautions (Done)       Learning Progress Summary             Patient Acceptance, E,TB,D, VU,DU by  at 9/8/2023 1403    Comment: education re: purpose of PT/importance of activity, for safety/falls prevention w/ mobillity   Significant Other Acceptance, E,TB,D, VU,DU by  at 9/8/2023 1403    Comment: education re: purpose of PT/importance of activity, for safety/falls prevention w/ mobillity                                         User Key       Initials Effective Dates Name Provider Type Discipline     08/02/18 -  Radha Morocho, PT Physical Therapist PT                  PT Recommendation and Plan     Plan of Care Reviewed With: patient  Progress: improving  Outcome Evaluation: pt up in room w/ nsg. Pt able to amb up/down flight stairs cga. Pt fatigued after activity, but was able to amb entire hallway w/ no LOB. discussed home safety and POC. feel pt is safe to d/c home when medically stable       Time Calculation:    PT Charges       Row Name 09/09/23 1038             Time Calculation    Start Time 1024  -NW      Stop Time 1038  -NW      Time Calculation (min) 14 min  -NW      PT Received On 09/09/23  -NW      PT Goal Re-Cert Due Date 09/18/23  -NW         Time Calculation- PT     Total Timed Code Minutes- PT 14 minute(s)  -NW         Timed Charges    13190 - Gait Training Minutes  14  -NW         Total Minutes    Timed Charges Total Minutes 14  -NW       Total Minutes 14  -NW                User Key  (r) = Recorded By, (t) = Taken By, (c) = Cosigned By      Initials Name Provider Type    Yuly Rodriguez PTA Physical Therapist Assistant                  Therapy Charges for Today       Code Description Service Date Service Provider Modifiers Qty    93877842039 HC GAIT TRAINING EA 15 MIN 9/9/2023 Yuly Khalil PTA GP 1            PT G-Codes  Outcome Measure Options: AM-PAC 6 Clicks Basic Mobility (PT)  AM-PAC 6 Clicks Score (PT): 20  AM-PAC 6 Clicks Score (OT): 19    Yuly Khalil PTA  9/9/2023

## 2023-09-09 NOTE — PLAN OF CARE
Goal Outcome Evaluation:  Plan of Care Reviewed With: patient        Progress: improving  Outcome Evaluation: pt up in room w/ nsg. Pt able to amb up/down flight stairs cga. Pt fatigued after activity, but was able to amb entire hallway w/ no LOB. discussed home safety and POC. feel pt is safe to d/c home when medically stable

## 2023-09-09 NOTE — PROGRESS NOTES
[facility]  Inpatient Progress Note    Reason for Follow Up: Melena    Subjective     Interval History:   Still with dark loose stool      Current Facility-Administered Medications:     acetaminophen (TYLENOL) tablet 650 mg, 650 mg, Oral, Q4H PRN **OR** acetaminophen (TYLENOL) 160 MG/5ML solution 650 mg, 650 mg, Oral, Q4H PRN **OR** acetaminophen (TYLENOL) suppository 650 mg, 650 mg, Rectal, Q4H PRN, Johanny Reid, MARY    buPROPion XL (WELLBUTRIN XL) 24 hr tablet 150 mg, 150 mg, Oral, Daily, Johanny Reid APRN, 150 mg at 09/09/23 0825    dexAMETHasone sodium phosphate 40 mg in sodium chloride 0.9 % IVPB, 40 mg, Intravenous, Daily, Benjamin Shah MD, 40 mg at 09/09/23 0829    famotidine (PEPCID) tablet 20 mg, 20 mg, Oral, BID AC, Alma Zaidi, APRJULIET, 20 mg at 09/09/23 0824    fluticasone (FLONASE) 50 MCG/ACT nasal spray 2 spray, 2 spray, Nasal, Daily, Johanny Reid APRN, 2 spray at 09/09/23 0825    montelukast (SINGULAIR) tablet 10 mg, 10 mg, Oral, Nightly, Johanny Reid APRN, 10 mg at 09/08/23 2003    nitroglycerin (NITROSTAT) SL tablet 0.4 mg, 0.4 mg, Sublingual, Q5 Min PRN, Johanny Reid, APRN    ondansetron (ZOFRAN) tablet 4 mg, 4 mg, Oral, Q6H PRN **OR** ondansetron (ZOFRAN) injection 4 mg, 4 mg, Intravenous, Q6H PRN, Johanny Reid APRN    pantoprazole (PROTONIX) injection 40 mg, 40 mg, Intravenous, Q12H, Johanny Reid, APRN, 40 mg at 09/09/23 0509    rOPINIRole (REQUIP) tablet 0.5 mg, 0.5 mg, Oral, Nightly, Johanny Reid APRN, 0.5 mg at 09/08/23 2003    rosuvastatin (CRESTOR) tablet 20 mg, 20 mg, Oral, Daily, Johanny Reid, APRN, 20 mg at 09/09/23 0825    sertraline (ZOLOFT) tablet 100 mg, 100 mg, Oral, Daily, Johanny Reid, APRN, 100 mg at 09/09/23 0825    sodium chloride 0.9 % flush 10 mL, 10 mL, Intravenous, PRN, Oren Dunham MD    sodium chloride 0.9 % flush 10 mL, 10 mL, Intravenous, Q12H, Johanny Reid, APRN, 10 mL  at 09/09/23 0829    sodium chloride 0.9 % flush 10 mL, 10 mL, Intravenous, PRN, Johanny Reid, MARY    sodium chloride 0.9 % infusion 40 mL, 40 mL, Intravenous, PRN, Johanny Reid APRN    valACYclovir (VALTREX) tablet 500 mg, 500 mg, Oral, BID, Johanny Reid APRN, 500 mg at 09/09/23 0825  Review of Systems:    All systems were reviewed and negative except for that previously mentioned in the HPI    Objective     Vital Signs  Temp:  [97.6 °F (36.4 °C)-98.3 °F (36.8 °C)] 98.3 °F (36.8 °C)  Heart Rate:  [70-83] 70  Resp:  [16] 16  BP: (110-152)/(50-67) 125/60  Body mass index is 37.02 kg/m².    Intake/Output Summary (Last 24 hours) at 9/9/2023 1025  Last data filed at 9/9/2023 0413  Gross per 24 hour   Intake 88.7 ml   Output --   Net 88.7 ml     No intake/output data recorded.     Physical Exam:   General: patient awake, alert and cooperative   Eyes: Normal lids and lashes, no scleral icterus   Neck: supple, normal ROM   Skin: warm and dry, not jaundiced   Cardiovascular: regular rhythm and rate, no murmurs auscultated   Pulm: clear to auscultation bilaterally, regular and unlabored   Abdomen: soft, nontender, nondistended; normal bowel sounds   Rectal: deferred   Extremities: no rash or edema   Psychiatric: Normal mood and behavior; memory intact     Results Review:     I reviewed the patient's new clinical results.    Results from last 7 days   Lab Units 09/09/23  0752 09/08/23  2323 09/08/23  1621 09/08/23  0623 09/07/23  1520 09/07/23  0840   WBC 10*3/mm3 10.69  --   --  10.67  --  8.99   HEMOGLOBIN g/dL 7.9* 8.2* 8.7* 7.9*   < > 7.2*   HEMATOCRIT % 25.4* 27.2* 27.4* 24.6*   < > 22.8*   PLATELETS 10*3/mm3 20*  --   --  24*  --  17*    < > = values in this interval not displayed.     Results from last 7 days   Lab Units 09/09/23  0752 09/08/23  0623 09/07/23  0840 09/06/23  1532 09/03/23  0535   SODIUM mmol/L 140 137 139 139 137   POTASSIUM mmol/L 4.0 3.9 3.8 4.0 4.0   CHLORIDE mmol/L 107 105 106 105  104   CO2 mmol/L 24.0 22.0 24.0 25.0 25.0   BUN mg/dL 30* 36* 46* 52* 59*   CREATININE mg/dL 1.18* 1.24* 1.42* 1.51* 1.58*   CALCIUM mg/dL 8.3* 8.2* 8.0* 8.3* 8.8   BILIRUBIN mg/dL  --   --   --  <0.2 <0.2   ALK PHOS U/L  --   --   --  50 48   ALT (SGPT) U/L  --   --   --  9 9   AST (SGOT) U/L  --   --   --  14 12   GLUCOSE mg/dL 114* 136* 105* 115* 123*     Results from last 7 days   Lab Units 09/06/23  1608 09/03/23  0535   INR  1.09 1.04     No results found for: LIPASE    Radiology:  [unfilled]        Assessment:     Active Hospital Problems    Diagnosis     **GIB (gastrointestinal bleeding)     Acute blood loss anemia     Chronic idiopathic thrombocytopenia     Hypotension due to hypovolemia     Stage 3b chronic kidney disease         Plan:   As per oncology  Patient with normal endoscopy and colonoscopy other than mild gastritis in February 2023 with Dr. Morris  Will not advance diet until stool clears to normal  There will be no GI coverage next week Monday through Wednesday    I discussed the patients findings and my recommendations with patient.      Electronically signed by Pramod Chance MD, 09/09/23, 10:25 AM CDT.

## 2023-09-09 NOTE — PLAN OF CARE
Problem: Adult Inpatient Plan of Care  Goal: Plan of Care Review  Outcome: Ongoing, Progressing  Flowsheets (Taken 9/9/2023 5006)  Progress: no change  Plan of Care Reviewed With: patient  Outcome Evaluation: VSS. No c/o pain. Pt reported 2 black and runny BMs. Hgb 8.2. Safety maintained.

## 2023-09-09 NOTE — PROGRESS NOTES
"    [facility] Medicine Services  INPATIENT PROGRESS NOTE    Patient Name: Marina Joe  Date of Admission: 9/6/2023  Today's Date: 09/09/23  Length of Stay: 3  Primary Care Physician: Hanh Og APRN    Subjective   Chief Complaint: Back stools  HPI   Ms. Joe presented to [facility] ER 9/6/2023 with worsening weakness, black stools.  She has a history of CKD stage IIIb, chronic thrombocytopenia, normocytic anemia followed by [facility] oncology.  Patient has had multiple episodes of anemia requiring transfusion.  She was evaluated at [facility] ER on 9/3/2023 and received 1 unit packed cells.  At that time, ER provider discussed with gastroenterology who did not believe scope was needed as patient had both upper and lower scopes February 2023 essentially negative.  On 9/4, patient developed profound black tarry stools but did not return to the emergency room because she was \"too sick\".  Due to ongoing black stools she presented to ER on 9/6 with hemoglobin 4.9, platelet count 24,000.  Patient denied any abdominal pain but upon palpation she was noted to have mid epigastric tenderness.  Abdomen soft and bowel sounds within normal limits. Patient was ordered 1 platelet pheresis pack and 3 units packed cells in ER.    Today  Patient was evaluated earlier this morning.  She reports she is still having black liquid stools today.  No complaints of nausea.  Hemoglobin 7.9, platelets 20,000.  She remains on clear liquid diet and GI has seen today and notes not to advance diet until stools are clear to normal.  No GI coverage Monday through Wednesday next week.  Patient has EUS scheduled with Dr. Gus Carvalho at [facility] on Wednesday of next week.  She has weekly lab drawn every Friday by hematology.  Patient has received total of 4 units of packed cells and 2 platelet pheresis packs since admission. Fluoroscopy esophagram 9/8 revealed thickened gastric rugae predominantly " involving the gastric body which can be seen with gastritis.  Recommending correlation with endoscopy.  Discussed with bedside nurse who was calling MARY Zapata to discuss any continued evaluation or changes to the treatment plan prior to continuing n.p.o. status.    Review of Systems   Constitutional:  Positive for activity change and fatigue. Negative for chills and fever.   HENT:  Negative for congestion and trouble swallowing.    Eyes:  Negative for photophobia and visual disturbance.   Respiratory:  Negative for cough, shortness of breath and wheezing.    Cardiovascular:  Negative for chest pain, palpitations and leg swelling.   Gastrointestinal:  Positive for blood in stool (Black tarry). Negative for vomiting.   Endocrine: Negative for cold intolerance, heat intolerance and polyuria.   Genitourinary:  Negative for dysuria, frequency and urgency.   Musculoskeletal:  Negative for gait problem.   Skin:  Negative for color change, pallor, rash and wound.   Allergic/Immunologic: Negative for immunocompromised state.   Neurological:  Positive for weakness. Negative for light-headedness.   Hematological:  Negative for adenopathy. Does not bruise/bleed easily.   Psychiatric/Behavioral:  Negative for agitation, behavioral problems and confusion.       All pertinent negatives and positives are as above. All other systems have been reviewed and are negative unless otherwise stated.     Objective    Temp:  [97.6 °F (36.4 °C)-98.5 °F (36.9 °C)] 98.5 °F (36.9 °C)  Heart Rate:  [70-83] 70  Resp:  [16] 16  BP: (110-152)/(50-68) 134/68  Physical Exam  Vitals and nursing note reviewed.   Constitutional:       Comments: Lying in bed.  No oxygen use.  No visitors in room.   HENT:      Head: Normocephalic and atraumatic.      Nose: No congestion.      Mouth/Throat:      Pharynx: Oropharynx is clear. No oropharyngeal exudate or posterior oropharyngeal erythema.   Eyes:      Extraocular Movements: Extraocular movements  intact.      Pupils: Pupils are equal, round, and reactive to light.   Cardiovascular:      Rate and Rhythm: Normal rate and regular rhythm.      Heart sounds: No murmur heard.     Comments: Normal sinus rhythm on 70-86 telemetry.  Pulmonary:      Breath sounds: No wheezing, rhonchi or rales.      Comments: No oxygen in use.  Abdominal:      Palpations: Abdomen is soft.      Tenderness: There is no abdominal tenderness.   Genitourinary:     Comments: Voiding.  Musculoskeletal:         General: No swelling or tenderness.      Cervical back: Normal range of motion and neck supple.      Comments: SCDs bilateral lower extremities.   Skin:     General: Skin is warm and dry.   Neurological:      General: No focal deficit present.      Mental Status: She is alert and oriented to person, place, and time.   Psychiatric:         Mood and Affect: Mood normal.         Behavior: Behavior normal.         Thought Content: Thought content normal.         Judgment: Judgment normal.     Results Review:  I have reviewed the labs, radiology results, and diagnostic studies.    Laboratory Data:   Results from last 7 days   Lab Units 09/09/23  0752 09/08/23  2323 09/08/23  1621 09/08/23  0623 09/07/23  1520 09/07/23  0840   WBC 10*3/mm3 10.69  --   --  10.67  --  8.99   HEMOGLOBIN g/dL 7.9* 8.2* 8.7* 7.9*   < > 7.2*   HEMATOCRIT % 25.4* 27.2* 27.4* 24.6*   < > 22.8*   PLATELETS 10*3/mm3 20*  --   --  24*  --  17*    < > = values in this interval not displayed.        Results from last 7 days   Lab Units 09/09/23  0752 09/08/23  0623 09/07/23  0840 09/06/23  1532 09/03/23  0535   SODIUM mmol/L 140 137 139 139 137   POTASSIUM mmol/L 4.0 3.9 3.8 4.0 4.0   CHLORIDE mmol/L 107 105 106 105 104   CO2 mmol/L 24.0 22.0 24.0 25.0 25.0   BUN mg/dL 30* 36* 46* 52* 59*   CREATININE mg/dL 1.18* 1.24* 1.42* 1.51* 1.58*   CALCIUM mg/dL 8.3* 8.2* 8.0* 8.3* 8.8   BILIRUBIN mg/dL  --   --   --  <0.2 <0.2   ALK PHOS U/L  --   --   --  50 48   ALT (SGPT) U/L   --   --   --  9 9   AST (SGOT) U/L  --   --   --  14 12   GLUCOSE mg/dL 114* 136* 105* 115* 123*     .  Imaging Results (Last 7 Days)       Procedure Component Value Units Date/Time    FL Upper GI Water Soluble [[id number]] Collected: 09/08/23 1208     Updated: 09/08/23 1216    Narrative:      Procedure: Fluoro Upper GI single contrast     Clinical indication: Melena     Technique: Single contrast water-soluble cine esophagram and upper GI  study is performed.         Fluoroscopy time:  1 minute 34 seconds minutes.     Images/ image captures: 19        Findings:      In the upright position there is normal transit of contrast through the  esophagus without strictures or masses.      No gastroesophageal reflux observed fluoroscopically.      The 13 mm diameter barium tablet passes without difficulty.     There appears to be thickened gastric rugae involving the body.     The stomach is without ulcers or masses. There is no delay in gastric  emptying.      Duodenal bulb and C-loop are normal.       Impression:      Impression:         Thickened gastric rugae predominantly involving the gastric body which  can be seen with gastritis. Consider correlation with endoscopy.     No visible mass, large ulcer, or stricture.  This report was finalized on 09/08/2023 12:13 by  Warren Freire DO.           Scheduled medications:  buPROPion XL, 150 mg, Oral, Daily  carvedilol, 3.125 mg, Oral, BID With Meals  dexAMETHasone, 40 mg, Intravenous, Daily  famotidine, 20 mg, Oral, BID AC  fluticasone, 2 spray, Nasal, Daily  montelukast, 10 mg, Oral, Nightly  pantoprazole, 40 mg, Intravenous, Q12H  rOPINIRole, 0.5 mg, Oral, Nightly  rosuvastatin, 20 mg, Oral, Daily  sertraline, 100 mg, Oral, Daily  sodium chloride, 10 mL, Intravenous, Q12H  valACYclovir, 500 mg, Oral, BID       I have reviewed the patient's current medications.     Assessment/Plan   Assessment  Active Hospital Problems    Diagnosis     **GIB (gastrointestinal bleeding)      Acute blood loss anemia     Chronic idiopathic thrombocytopenia     Hypotension due to hypovolemia     Stage 3b chronic kidney disease      Treatment Plan  1.  Acute blood loss anemia superimposed on iron deficiency anemia and anemia of chronic kidney disease.  Patient has history of normocytic iron anemia followed by [facility] requiring multiple blood transfusions.  Patient had endoscopy and colonoscopy February 2023 essentially negative.   Platelets 24,000 on admission and received 1 platelet pheresis pack.  Repeat platelet level 17,000 an additional 1 unit platelet pheresis pack transfused.  Platelets 20,000 today. Initial hemoglobin of 4.9 and patient was transfused total of 4 PRBCs.  Patient reports 2 black diarrhea stools during the night.  Hemoglobin 7.9 today.  Patient denies abdominal pain, shortness of breath.  GI following and will not advance diet until stools clear to normal.    2.  Suspected upper GI bleed.  Patient reports extremely large black tarry stool on 9/8 and has had a total of 4 black tarry diarrheal stools since then.  Transfused total of 4 units packed cells and 2 platelet pheresis packs since admission.  Hematology, Dr. Shah following. Continue Protonix 40 mg IV every 12 hours.  Protonix 20 mg twice daily added on 9/8 per recommendations of MARY Zapata with GI.  Dr. Chance continues to decline need for endoscopy due to thrombocytopenia.  Upper GI recommended and esophagram 9/8 noted thickened gastric rugae dominantly involving the gastric body which can be seen with gastritis.  Recommending correlation with endoscopy.  Patient has EUS scheduled on Wednesday 9/13 with Dr. Gus Carvalho at [facility].    3.  Chronic idiopathic thrombocytopenia.  Follows with Dr. Shah, hematology.  Patient received last Injectafer on 8/11/2023 and Retacrit on 9/1/2023.  No features of myelodysplasia or excess blast on 11/10/2022 per Dr. Shah. Platelets 24,000 on admission and received 1 platelet  pheresis pack.  Repeat platelet level 17,000 an additional 1 unit platelet pheresis pack transfused.  Platelet count of 20,000 today. Initial hemoglobin of 4.9, patient was transfused total of 4 PRBCs.  Monitor hemoglobin every 8 hours and transfuse for hemoglobin less than 8.  Transfuse platelets if less than 10,000.  Dr. Shah has ordered trial of dexamethasone 40 mg IV for total of 4 days to end on 9/10/2023.  Monitor platelet count.    4.  Iron deficiency anemia secondary to inadequate dietary iron intake.  Follows with hematology.    5.  Stage IIIb CKD.  Creatinine 1.88 on admission.  Creatinine 1. 18 today.  Baseline creatinine 1.2-1.5.  Repeat BMP in AM.    6.  Hypotension due to hypovolemia on admission.  Patient with blood pressure 93/50 on admission suspect secondary to blood loss anemia.  Resolution of hypotension after blood transfusion.  Blood pressure 134/68.  Will resume Coreg twice daily.  Hold amlodipine and irbesartan today.    7.  SCDs for deep vein thrombosis prophylaxis.    Medical Decision Making  Number and Complexity of problems: 6  Acute blood loss anemia superimposed on iron deficiency anemia and anemia of chronic kidney disease: Acute on chronic, high complexity posing threat to life and bodily function, unchanged  Suspected acute upper GI bleed with melena: Acute, high complexity requiring transfusion  Chronic idiopathic thrombocytopenia: Acute on chronic, high complexity requiring transfusion  Iron deficiency anemia secondary to inadequate oral iron take: Chronic, moderate complexity stable  CKD stage IIIb: Chronic, moderate complexity, stable  Hypotension due to hypovolemia: Acute, moderate complexity, resolved    Differential Diagnosis: Acute upper GI bleed    Conditions and Status        Condition is unchanged.     [facility] Data  External documents reviewed: Hematology office visit 8/29/2023.  Cardiac tracing (EKG, telemetry) interpretation: Normal sinus rhythm 74 on telemetry  Radiology  interpretation: reviewed fluoroscopy esophagram 9/8/2023  Labs reviewed:   BMP 8/9/2023.  Repeat BMP in AM.  CBC 8/9/2023.  CBC in AM.  Monitor hemoglobin every 8 hours h    Any tests that were considered but not ordered: None     Decision rules/scores evaluated (example [id number], Wells, etc): None     Discussed with: Dr. Lincoln, Soham Reilly, MARY gastroenterology, Dr. Shah oncology and patient.     Care Planning  Shared decision making: Soham Arredondo, MARY gastroenterology, Dr. Shah oncology and patient.  Patient agrees to continuation of care per gastroenterology, Dr. Chance and oncology per Dr. Shah with as needed blood and platelet transfusions.      Care Planning  Code status and discussions: Full code  The patient's surrogate decision maker is aixa Quispe  Social Determinants of Health that impact treatment or disposition: None  I expect the patient to be discharged to home in 2-3 days.     Electronically signed by MARY Glynn, 09/09/23, 14:40 CDT.

## 2023-09-09 NOTE — PLAN OF CARE
Goal Outcome Evaluation:  Plan of Care Reviewed With: patient        Progress: improving  Outcome Evaluation: PT. UP AD HANG. NO C/O'S PAIN VOICED. NO ACTIVE GI BLEEDING NOTED. FULL LIQUIDS. CONT. TO MONITOR HGB AND HCT. NO DISTRESS NOTED.

## 2023-09-10 LAB
ANION GAP SERPL CALCULATED.3IONS-SCNC: 9 MMOL/L (ref 5–15)
BUN SERPL-MCNC: 22 MG/DL (ref 8–23)
BUN/CREAT SERPL: 19.6 (ref 7–25)
CALCIUM SPEC-SCNC: 8.2 MG/DL (ref 8.6–10.5)
CHLORIDE SERPL-SCNC: 108 MMOL/L (ref 98–107)
CO2 SERPL-SCNC: 23 MMOL/L (ref 22–29)
CREAT SERPL-MCNC: 1.12 MG/DL (ref 0.57–1)
DEPRECATED RDW RBC AUTO: 61.5 FL (ref 37–54)
EGFRCR SERPLBLD CKD-EPI 2021: 50.8 ML/MIN/1.73
ERYTHROCYTE [DISTWIDTH] IN BLOOD BY AUTOMATED COUNT: 19.1 % (ref 12.3–15.4)
GLUCOSE SERPL-MCNC: 108 MG/DL (ref 65–99)
HCT VFR BLD AUTO: 26.2 % (ref 34–46.6)
HGB BLD-MCNC: 7.9 G/DL (ref 12–15.9)
MCH RBC QN AUTO: 28.1 PG (ref 26.6–33)
MCHC RBC AUTO-ENTMCNC: 30.2 G/DL (ref 31.5–35.7)
MCV RBC AUTO: 93.2 FL (ref 79–97)
PLATELET # BLD AUTO: 18 10*3/MM3 (ref 140–450)
PMV BLD AUTO: ABNORMAL FL
POTASSIUM SERPL-SCNC: 4.2 MMOL/L (ref 3.5–5.2)
RBC # BLD AUTO: 2.81 10*6/MM3 (ref 3.77–5.28)
SODIUM SERPL-SCNC: 140 MMOL/L (ref 136–145)
WBC NRBC COR # BLD: 9.71 10*3/MM3 (ref 3.4–10.8)

## 2023-09-10 PROCEDURE — 80048 BASIC METABOLIC PNL TOTAL CA: CPT | Performed by: NURSE PRACTITIONER

## 2023-09-10 PROCEDURE — 85027 COMPLETE CBC AUTOMATED: CPT | Performed by: NURSE PRACTITIONER

## 2023-09-10 PROCEDURE — 99232 SBSQ HOSP IP/OBS MODERATE 35: CPT | Performed by: INTERNAL MEDICINE

## 2023-09-10 PROCEDURE — 25010000002 DEXAMETHASONE SODIUM PHOSPHATE 100 MG/10ML SOLUTION 10 ML VIAL: Performed by: INTERNAL MEDICINE

## 2023-09-10 RX ORDER — HYDROCHLOROTHIAZIDE 25 MG/1
12.5 TABLET ORAL
Status: DISCONTINUED | OUTPATIENT
Start: 2023-09-10 | End: 2023-09-12 | Stop reason: HOSPADM

## 2023-09-10 RX ORDER — LOSARTAN POTASSIUM 50 MG/1
100 TABLET ORAL
Status: DISCONTINUED | OUTPATIENT
Start: 2023-09-10 | End: 2023-09-12 | Stop reason: HOSPADM

## 2023-09-10 RX ORDER — CARVEDILOL 6.25 MG/1
6.25 TABLET ORAL 2 TIMES DAILY WITH MEALS
Status: DISCONTINUED | OUTPATIENT
Start: 2023-09-10 | End: 2023-09-12 | Stop reason: HOSPADM

## 2023-09-10 RX ORDER — PANTOPRAZOLE SODIUM 40 MG/1
40 TABLET, DELAYED RELEASE ORAL
Status: DISCONTINUED | OUTPATIENT
Start: 2023-09-10 | End: 2023-09-12 | Stop reason: HOSPADM

## 2023-09-10 RX ADMIN — ROPINIROLE HYDROCHLORIDE 0.5 MG: 0.25 TABLET, FILM COATED ORAL at 20:41

## 2023-09-10 RX ADMIN — ROSUVASTATIN CALCIUM 20 MG: 20 TABLET, FILM COATED ORAL at 08:21

## 2023-09-10 RX ADMIN — DEXAMETHASONE SODIUM PHOSPHATE 40 MG: 10 INJECTION, SOLUTION INTRAMUSCULAR; INTRAVENOUS at 08:17

## 2023-09-10 RX ADMIN — LOSARTAN POTASSIUM 100 MG: 50 TABLET, FILM COATED ORAL at 15:12

## 2023-09-10 RX ADMIN — FAMOTIDINE 20 MG: 20 TABLET, FILM COATED ORAL at 17:54

## 2023-09-10 RX ADMIN — CARVEDILOL 3.12 MG: 3.12 TABLET, FILM COATED ORAL at 08:20

## 2023-09-10 RX ADMIN — FLUTICASONE PROPIONATE 2 SPRAY: 50 SPRAY, METERED NASAL at 08:20

## 2023-09-10 RX ADMIN — FAMOTIDINE 20 MG: 20 TABLET, FILM COATED ORAL at 08:21

## 2023-09-10 RX ADMIN — HYDROCHLOROTHIAZIDE 12.5 MG: 25 TABLET ORAL at 15:12

## 2023-09-10 RX ADMIN — MONTELUKAST SODIUM 10 MG: 10 TABLET, FILM COATED ORAL at 20:41

## 2023-09-10 RX ADMIN — PANTOPRAZOLE SODIUM 40 MG: 40 TABLET, DELAYED RELEASE ORAL at 17:56

## 2023-09-10 RX ADMIN — VALACYCLOVIR 500 MG: 500 TABLET, FILM COATED ORAL at 08:21

## 2023-09-10 RX ADMIN — SERTRALINE HYDROCHLORIDE 100 MG: 100 TABLET, FILM COATED ORAL at 08:21

## 2023-09-10 RX ADMIN — Medication 10 ML: at 08:17

## 2023-09-10 RX ADMIN — PANTOPRAZOLE SODIUM 40 MG: 40 INJECTION, POWDER, FOR SOLUTION INTRAVENOUS at 05:07

## 2023-09-10 RX ADMIN — CARVEDILOL 6.25 MG: 6.25 TABLET, FILM COATED ORAL at 17:54

## 2023-09-10 RX ADMIN — Medication 10 ML: at 20:41

## 2023-09-10 RX ADMIN — BUPROPION HYDROCHLORIDE 150 MG: 150 TABLET, EXTENDED RELEASE ORAL at 08:21

## 2023-09-10 RX ADMIN — VALACYCLOVIR 500 MG: 500 TABLET, FILM COATED ORAL at 20:41

## 2023-09-10 NOTE — PROCEDURES
Clinic Progress Note    Patient:  Marina Joe  YOB: 1945  Date of Service: 9/10/2023  MRN: [id number]   Acct: [id number]   Primary Care Physician: Hanh Og APRN    Chief Complaint: Melena, GI bleed, anemia    Subjective:  No acute events overnight.  The patient is lying in bed, breathing on room air, in no acute distress.  She reports that her last black stool was Friday evening, but her BM yesterday was clear with no blood and it was not black.  She otherwise denies having any fever, chills, chest pain, shortness of breath, abdominal pain, nausea or vomiting.    Objectives:  Vitals:    09/10/23 0737   BP: 160/53   Pulse: 67   Resp: 16   Temp: 97.9 °F (36.6 °C)   SpO2: 91%     GENERAL: Alert and oriented, no apparent distress  HEENT: No scleral icterus  NECK: Supple  HEART: Regular rate and rhythm  LUNGS: Clear to auscultation bilaterally  ABDOMEN: Soft, nontender, nondistended  EXTREMITIES: Symmetric  SKIN: No jaundice  PSYCHIATRIC: Full affect  NEUROLOGIC: Stable gait    Results:  Lab Results   Component Value Date    WBC 9.71 09/10/2023    HGB 7.9 (L) 09/10/2023    HCT 26.2 (L) 09/10/2023    MCV 93.2 09/10/2023    PLT 18 (C) 09/10/2023     Lab Results   Component Value Date    GLUCOSE 108 (H) 09/10/2023    BUN 22 09/10/2023    CREATININE 1.12 (H) 09/10/2023    EGFRRESULT 36.6 (L) 06/20/2023    EGFR 50.8 (L) 09/10/2023    BCR 19.6 09/10/2023    K 4.2 09/10/2023    CO2 23.0 09/10/2023    CALCIUM 8.2 (L) 09/10/2023    PROTENTOTREF 6.9 06/20/2023    ALBUMIN 3.1 (L) 09/06/2023    BILITOT <0.2 09/06/2023    AST 14 09/06/2023    ALT 9 09/06/2023     Assessment :  Melena; gastrointestinal bleeding.  Symptomatic anemia from acute blood loss anemia.  Chronic ITP  Anemia of CKD stage 3b, on EPO  Iron deficiency anemia secondary to inadequate dietary iron intake   History of breast cancer    Plan:   - Reviewed the patient's CBC from today, which showed hemoglobin of 7.9 and platelet count of  18; she does not require blood product transfusion today.  - The plan for her is to undergo EBUS by Dr. Gus Carvalho on Wednesday per notes.  - Transfusion parameters:    > Transfuse 1 unit packed RBC if hemoglobin <8.    > Transfuse 1 unit platelet if platelet is <10.    Kassi De Luna MD  9/10/2023

## 2023-09-10 NOTE — PLAN OF CARE
Goal Outcome Evaluation:  Plan of Care Reviewed With: patient        Progress: improving  Outcome Evaluation: PLATELETS = 18  HGB = 7.9  NO ACTIVE BLEEDING NOTED. NO C/O'S PAIN VOICED.  PT. UP AD HANG. ROOM AIR. PT. VOIDING. NO DISTRESS NOTED.

## 2023-09-10 NOTE — PLAN OF CARE
Goal Outcome Evaluation:  Plan of Care Reviewed With: patient        Progress: no change  Outcome Evaluation: Patient no c/o pain. Up ad gerardo. Voiding. No active bleeding noted this shift. No bm noted so far this shift.

## 2023-09-10 NOTE — PROGRESS NOTES
"    [facility] Medicine Services  INPATIENT PROGRESS NOTE    Patient Name: Marina Joe  Date of Admission: 9/6/2023  Today's Date: 09/10/23  Length of Stay: 4  Primary Care Physician: Hanh Og APRN    Subjective   Chief Complaint: Back stools  HPI   Ms. Joe presented to [facility] ER 9/6/2023 with worsening weakness, black stools.  She has a history of CKD stage IIIb, chronic thrombocytopenia, normocytic anemia followed by [facility] oncology.  Patient has had multiple episodes of anemia requiring transfusion.  She was evaluated at [facility] ER on 9/3/2023 and received 1 unit packed cells.  At that time, ER provider discussed with gastroenterology who did not believe scope was needed as patient had both upper and lower scopes February 2023 essentially negative.  On 9/4, patient developed profound black tarry stools but did not return to the emergency room because she was \"too sick\".  Due to ongoing black stools she presented to ER on 9/6 with hemoglobin 4.9, platelet count 24,000.  Patient denied any abdominal pain but upon palpation she was noted to have mid epigastric tenderness.  Abdomen soft and bowel sounds within normal limits. Patient was ordered 1 platelet pheresis pack and 3 units packed cells in ER.    Today  Patient was evaluated earlier this morning.  She denies any additional black stools during the night.  She said the most recent stool was more clear.  She denies abdominal pain.  She was seen by GI today who notes stools are clear, platelets remain low.  Hemoglobin 7.9.  Oscar to advance to mechanical soft diet today.  GI notes patient may follow-up with Dr. Gus Carvalho on Wednesday of this week as she has EUS scheduled and endoscopy can be performed when platelets improved.  Patient denies chest pain or palpitations.    Review of Systems   Constitutional:  Positive for activity change and fatigue. Negative for chills and fever.   HENT:  Negative for " congestion and trouble swallowing.    Eyes:  Negative for photophobia and visual disturbance.   Respiratory:  Negative for cough, shortness of breath and wheezing.    Cardiovascular:  Negative for chest pain, palpitations and leg swelling.   Gastrointestinal:  Positive for blood in stool (most recent stool clear earlier). Negative for vomiting.   Endocrine: Negative for cold intolerance, heat intolerance and polyuria.   Genitourinary:  Negative for dysuria, frequency and urgency.   Musculoskeletal:  Negative for gait problem.   Skin:  Negative for color change, pallor, rash and wound.   Allergic/Immunologic: Negative for immunocompromised state.   Neurological:  Positive for weakness. Negative for light-headedness.   Hematological:  Negative for adenopathy. Does not bruise/bleed easily.   Psychiatric/Behavioral:  Negative for agitation, behavioral problems and confusion.       All pertinent negatives and positives are as above. All other systems have been reviewed and are negative unless otherwise stated.     Objective    Temp:  [97.6 °F (36.4 °C)-98.3 °F (36.8 °C)] 98.2 °F (36.8 °C)  Heart Rate:  [63-74] 66  Resp:  [16] 16  BP: (135-160)/(53-71) 135/58  Physical Exam  Vitals and nursing note reviewed.   Constitutional:       Comments: Lying in bed.  No oxygen use.  No visitors in room.   HENT:      Head: Normocephalic and atraumatic.      Nose: No congestion.      Mouth/Throat:      Pharynx: Oropharynx is clear. No oropharyngeal exudate or posterior oropharyngeal erythema.   Eyes:      Extraocular Movements: Extraocular movements intact.      Pupils: Pupils are equal, round, and reactive to light.   Cardiovascular:      Rate and Rhythm: Normal rate and regular rhythm.      Heart sounds: No murmur heard.     Comments: Normal sinus rhythm on 61-70 telemetry.  Pulmonary:      Breath sounds: No wheezing, rhonchi or rales.      Comments: No oxygen in use.  Abdominal:      Palpations: Abdomen is soft.      Tenderness:  There is no abdominal tenderness.   Genitourinary:     Comments: Voiding.  Musculoskeletal:         General: No swelling or tenderness.      Cervical back: Normal range of motion and neck supple.      Comments: SCDs bilateral lower extremities.   Skin:     General: Skin is warm and dry.   Neurological:      General: No focal deficit present.      Mental Status: She is alert and oriented to person, place, and time.   Psychiatric:         Mood and Affect: Mood normal.         Behavior: Behavior normal.         Thought Content: Thought content normal.         Judgment: Judgment normal.     Results Review:  I have reviewed the labs, radiology results, and diagnostic studies.    Laboratory Data:   Results from last 7 days   Lab Units 09/10/23  0545 09/09/23  1623 09/09/23  0752 09/08/23  1621 09/08/23  0623   WBC 10*3/mm3 9.71  --  10.69  --  10.67   HEMOGLOBIN g/dL 7.9* 8.9* 7.9*   < > 7.9*   HEMATOCRIT % 26.2* 28.1* 25.4*   < > 24.6*   PLATELETS 10*3/mm3 18*  --  20*  --  24*    < > = values in this interval not displayed.        Results from last 7 days   Lab Units 09/10/23  0545 09/09/23  0752 09/08/23  0623 09/07/23  0840 09/06/23  1532   SODIUM mmol/L 140 140 137   < > 139   POTASSIUM mmol/L 4.2 4.0 3.9   < > 4.0   CHLORIDE mmol/L 108* 107 105   < > 105   CO2 mmol/L 23.0 24.0 22.0   < > 25.0   BUN mg/dL 22 30* 36*   < > 52*   CREATININE mg/dL 1.12* 1.18* 1.24*   < > 1.51*   CALCIUM mg/dL 8.2* 8.3* 8.2*   < > 8.3*   BILIRUBIN mg/dL  --   --   --   --  <0.2   ALK PHOS U/L  --   --   --   --  50   ALT (SGPT) U/L  --   --   --   --  9   AST (SGOT) U/L  --   --   --   --  14   GLUCOSE mg/dL 108* 114* 136*   < > 115*    < > = values in this interval not displayed.     .  Imaging Results (Last 7 Days)       Procedure Component Value Units Date/Time    FL Upper GI Water Soluble [[id number]] Collected: 09/08/23 1208     Updated: 09/08/23 1216    Narrative:      Procedure: Fluoro Upper GI single contrast     Clinical  indication: Melena     Technique: Single contrast water-soluble cine esophagram and upper GI  study is performed.         Fluoroscopy time:  1 minute 34 seconds minutes.     Images/ image captures: 19        Findings:      In the upright position there is normal transit of contrast through the  esophagus without strictures or masses.      No gastroesophageal reflux observed fluoroscopically.      The 13 mm diameter barium tablet passes without difficulty.     There appears to be thickened gastric rugae involving the body.     The stomach is without ulcers or masses. There is no delay in gastric  emptying.      Duodenal bulb and C-loop are normal.       Impression:      Impression:         Thickened gastric rugae predominantly involving the gastric body which  can be seen with gastritis. Consider correlation with endoscopy.     No visible mass, large ulcer, or stricture.  This report was finalized on 09/08/2023 12:13 by  Warren Freire DO.           Scheduled medications:  buPROPion XL, 150 mg, Oral, Daily  carvedilol, 6.25 mg, Oral, BID With Meals  famotidine, 20 mg, Oral, BID AC  fluticasone, 2 spray, Nasal, Daily  losartan, 100 mg, Oral, Q24H   And  hydroCHLOROthiazide, 12.5 mg, Oral, Q24H  montelukast, 10 mg, Oral, Nightly  pantoprazole, 40 mg, Oral, BID AC  rOPINIRole, 0.5 mg, Oral, Nightly  rosuvastatin, 20 mg, Oral, Daily  sertraline, 100 mg, Oral, Daily  sodium chloride, 10 mL, Intravenous, Q12H  valACYclovir, 500 mg, Oral, BID       I have reviewed the patient's current medications.     Assessment/Plan   Assessment  Active Hospital Problems    Diagnosis     **GIB (gastrointestinal bleeding)     Acute blood loss anemia     Chronic idiopathic thrombocytopenia     Hypotension due to hypovolemia     Stage 3b chronic kidney disease      Treatment Plan  1.  Acute blood loss anemia superimposed on iron deficiency anemia and anemia of chronic kidney disease.  Patient has history of normocytic iron anemia followed  by [facility] oncology requiring multiple blood transfusions.  Patient had endoscopy and colonoscopy February 2023 essentially negative.   Platelets 24,000 on admission and received 1 platelet pheresis pack.  Repeat platelet level 17,000 an additional 1 unit platelet pheresis pack transfused.  Platelets 18,000 today. Initial hemoglobin of 4.9 and patient was transfused total of 4 PRBCs.  Patient continued to have black stools on 9/8 and 9/9.  Patient reports today stools have cleared.  Hemoglobin 7.9 today.  Patient denies abdominal pain, shortness of breath.  GI following and will advance to chemical soft diet today.    2.  Suspected upper GI bleed.  Patient reports extremely large black tarry stool on 9/8 and has had a total of 4 black tarry diarrheal stools since then.  Transfused total of 4 units packed cells and 2 platelet pheresis packs since admission.  Hematology, Dr. Shah following. Continue Protonix 40 mg every 12 hours.  Pepcid 20 mg twice daily added on 9/8 per recommendations of MARY Zapata with GI.  Dr. Chance continues to decline need for endoscopy due to thrombocytopenia.  Upper GI recommended and esophagram 9/8 noted thickened gastric rugae dominantly involving the gastric body which can be seen with gastritis.  Recommending correlation with endoscopy.  Patient has EUS scheduled on Wednesday 9/13 with Dr. Gus Carvalho at [facility] and GI recommends patient keep scheduled appointment and can have endoscopy at the same time if platelets improved    3.  Chronic idiopathic thrombocytopenia.  Follows with Dr. Shah, hematology.  Patient received last Injectafer on 8/11/2023 and Retacrit on 9/1/2023.  No features of myelodysplasia or excess blast on 11/10/2022 per Dr. Shah. Platelets 24,000 on admission and received 1 platelet pheresis pack.  Repeat platelet level 17,000 an additional 1 unit platelet pheresis pack transfused.  Platelet count of 18,000 today. Initial hemoglobin of 4.9, patient was  transfused total of 4 PRBCs. Transfuse for hemoglobin less than 8.  Transfuse platelets if less than 10,000.  Dr. Shah has ordered trial of dexamethasone 40 mg IV for total of 4 days to end on 9/10/2023.  Monitor platelet count.    4.  Iron deficiency anemia secondary to inadequate dietary iron intake.  Follows with hematology.    5.  Stage IIIb CKD.  Creatinine 1.88 on admission.  Creatinine 1. 12 today.  Baseline creatinine 1.2-1.5.  Repeat BMP in AM.    6.  Primary hypertension but presented with hypotension due to hypovolemia on admission. Patient with blood pressure 93/50 on admission suspect secondary to blood loss anemia.  Resolution of hypotension after blood transfusion.  Blood pressure 135/58, 160/53.  Increase Coreg to 6.25 mg twice daily home dose.,  Restart Irbesartan (losartan/HCTZ substituted).    7.  SCDs for deep vein thrombosis prophylaxis.    Medical Decision Making  Number and Complexity of problems: 6  Acute blood loss anemia superimposed on iron deficiency anemia and anemia of chronic kidney disease:, Acute on chronic, high complexity posing threat to life and bodily function, improving  Suspected acute upper GI bleed with melena: Acute, high complexity requiring transfusion, improving  Chronic idiopathic thrombocytopenia: Acute on chronic, high complexity requiring transfusion, unchanged  Iron deficiency anemia secondary to inadequate oral iron take: Chronic, moderate complexity stable  CKD stage IIIb: Chronic, moderate complexity, stable  Hypotension due to hypovolemia: Acute, moderate complexity, resolved    Differential Diagnosis: Acute upper GI bleed    Conditions and Status        Condition is unchanged.     [facility] Data  External documents reviewed: Hematology office visit 8/29/2023.  Cardiac tracing (EKG, telemetry) interpretation: Normal sinus rhythm 74 on telemetry  Radiology interpretation: reviewed fluoroscopy esophagram 9/8/2023  Labs reviewed:   BMP 8/10/2023.  Repeat BMP in AM.  CBC  8/10/2023.  CBC in AM.  Monitor hemoglobin every 8 hours h    Any tests that were considered but not ordered: None     Decision rules/scores evaluated (example [id number], Wells, etc): None     Discussed with: Dr. Lincoln and patient.     Care Planning  Shared decision making: Dr. Lincoln, Soham Reilly, MARY gastroenterology, and patient. Patient agrees to continuation of care per gastroenterology, Dr. Chance and oncology per Dr. Shah with as needed blood and platelet transfusions.      Care Planning  Code status and discussions: Full code  The patient's surrogate decision maker is Jose Enrique,     Disposition  Social Determinants of Health that impact treatment or disposition: None  I expect the patient to be discharged to home in 1 day.     Electronically signed by MARY Glynn, 09/10/23, 13:12 CDT.

## 2023-09-10 NOTE — PROGRESS NOTES
[facility]  Inpatient Progress Note    Reason for Follow Up: Melena    Subjective     Interval History:   Stools have cleared platelets remain low H&H is stable    Current Facility-Administered Medications:     acetaminophen (TYLENOL) tablet 650 mg, 650 mg, Oral, Q4H PRN **OR** acetaminophen (TYLENOL) 160 MG/5ML solution 650 mg, 650 mg, Oral, Q4H PRN **OR** acetaminophen (TYLENOL) suppository 650 mg, 650 mg, Rectal, Q4H PRN, Johanny Reid APRN    buPROPion XL (WELLBUTRIN XL) 24 hr tablet 150 mg, 150 mg, Oral, Daily, Johanny Reid APRN, 150 mg at 09/10/23 0821    carvedilol (COREG) tablet 3.125 mg, 3.125 mg, Oral, BID With Meals, Alma Zaidi APRN, 3.125 mg at 09/10/23 0820    famotidine (PEPCID) tablet 20 mg, 20 mg, Oral, BID AC, Alma Zaidi APRN, 20 mg at 09/10/23 0821    fluticasone (FLONASE) 50 MCG/ACT nasal spray 2 spray, 2 spray, Nasal, Daily, Johanny Reid APRN, 2 spray at 09/10/23 0820    montelukast (SINGULAIR) tablet 10 mg, 10 mg, Oral, Nightly, Johanny Reid APRN, 10 mg at 09/09/23 2022    nitroglycerin (NITROSTAT) SL tablet 0.4 mg, 0.4 mg, Sublingual, Q5 Min PRN, Johanny Reid APRN    ondansetron (ZOFRAN) tablet 4 mg, 4 mg, Oral, Q6H PRN **OR** ondansetron (ZOFRAN) injection 4 mg, 4 mg, Intravenous, Q6H PRN, Johanny Reid APRN    pantoprazole (PROTONIX) injection 40 mg, 40 mg, Intravenous, Q12H, Johanny Reid APRN, 40 mg at 09/10/23 0507    rOPINIRole (REQUIP) tablet 0.5 mg, 0.5 mg, Oral, Nightly, Johanny Reid APRN, 0.5 mg at 09/09/23 2022    rosuvastatin (CRESTOR) tablet 20 mg, 20 mg, Oral, Daily, Johanny Reid APRN, 20 mg at 09/10/23 0821    sertraline (ZOLOFT) tablet 100 mg, 100 mg, Oral, Daily, Johanny Reid APRN, 100 mg at 09/10/23 0821    sodium chloride 0.9 % flush 10 mL, 10 mL, Intravenous, PRN, Oren Dunham MD    sodium chloride 0.9 % flush 10 mL, 10 mL, Intravenous, Q12H, Johanny Reid,  APRN, 10 mL at 09/10/23 0817    sodium chloride 0.9 % flush 10 mL, 10 mL, Intravenous, PRN, Johanny Reid APRN    sodium chloride 0.9 % infusion 40 mL, 40 mL, Intravenous, PRN, Johanny Reid APRN    traZODone (DESYREL) tablet 100 mg, 100 mg, Oral, Nightly PRN, Alma Zaidi APRN    valACYclovir (VALTREX) tablet 500 mg, 500 mg, Oral, BID, Johanny Reid APRN, 500 mg at 09/10/23 0821  Review of Systems:    All systems were reviewed and negative except for that previously mentioned in the HPI    Objective     Vital Signs  Temp:  [97.6 °F (36.4 °C)-98.5 °F (36.9 °C)] 97.9 °F (36.6 °C)  Heart Rate:  [63-74] 67  Resp:  [16] 16  BP: (134-160)/(53-71) 160/53  Body mass index is 35.58 kg/m².    Intake/Output Summary (Last 24 hours) at 9/10/2023 1030  Last data filed at 9/10/2023 0449  Gross per 24 hour   Intake 924 ml   Output --   Net 924 ml     No intake/output data recorded.     Physical Exam:   General: patient awake, alert and cooperative   Eyes: Normal lids and lashes, no scleral icterus   Neck: supple, normal ROM   Skin: warm and dry, not jaundiced   Cardiovascular: regular rhythm and rate, no murmurs auscultated   Pulm: clear to auscultation bilaterally, regular and unlabored   Abdomen: soft, nontender, nondistended; normal bowel sounds   Rectal: deferred   Extremities: no rash or edema   Psychiatric: Normal mood and behavior; memory intact     Results Review:     I reviewed the patient's new clinical results.    Results from last 7 days   Lab Units 09/10/23  0545 09/09/23  1623 09/09/23  0752 09/08/23  1621 09/08/23  0623   WBC 10*3/mm3 9.71  --  10.69  --  10.67   HEMOGLOBIN g/dL 7.9* 8.9* 7.9*   < > 7.9*   HEMATOCRIT % 26.2* 28.1* 25.4*   < > 24.6*   PLATELETS 10*3/mm3 18*  --  20*  --  24*    < > = values in this interval not displayed.     Results from last 7 days   Lab Units 09/10/23  0545 09/09/23  0752 09/08/23  0623 09/07/23  0840 09/06/23  1532   SODIUM mmol/L 140 140 137   < > 139    POTASSIUM mmol/L 4.2 4.0 3.9   < > 4.0   CHLORIDE mmol/L 108* 107 105   < > 105   CO2 mmol/L 23.0 24.0 22.0   < > 25.0   BUN mg/dL 22 30* 36*   < > 52*   CREATININE mg/dL 1.12* 1.18* 1.24*   < > 1.51*   CALCIUM mg/dL 8.2* 8.3* 8.2*   < > 8.3*   BILIRUBIN mg/dL  --   --   --   --  <0.2   ALK PHOS U/L  --   --   --   --  50   ALT (SGPT) U/L  --   --   --   --  9   AST (SGOT) U/L  --   --   --   --  14   GLUCOSE mg/dL 108* 114* 136*   < > 115*    < > = values in this interval not displayed.     Results from last 7 days   Lab Units 09/06/23  1608   INR  1.09     No results found for: LIPASE    Radiology:  [unfilled]        Assessment:     Active Hospital Problems    Diagnosis     **GIB (gastrointestinal bleeding)     Acute blood loss anemia     Chronic idiopathic thrombocytopenia     Hypotension due to hypovolemia     Stage 3b chronic kidney disease         Plan:     Platelets remain low but melena has cleared\  Advance diet to soft mechanical  Treatment of thrombocytopenia as per hematology  Patient can follow-up with Dr. Carvalho at [facility] for work-up she was scheduled for EUS she can have an endoscopy done at the same time once her platelets are improved    I discussed the patients findings and my recommendations with patient and nursing staff.      Electronically signed by Pramod Chance MD, 09/10/23, 10:30 AM CDT.

## 2023-09-11 ENCOUNTER — APPOINTMENT (OUTPATIENT)
Dept: CT IMAGING | Facility: HOSPITAL | Age: 78
DRG: 378 | End: 2023-09-11
Payer: MEDICARE

## 2023-09-11 ENCOUNTER — TELEPHONE (OUTPATIENT)
Dept: ONCOLOGY | Facility: CLINIC | Age: 78
End: 2023-09-11
Payer: MEDICARE

## 2023-09-11 ENCOUNTER — ANESTHESIA EVENT (OUTPATIENT)
Dept: OPERATING ROOM | Age: 78
End: 2023-09-11
Payer: MEDICARE

## 2023-09-11 LAB
ABO GROUP BLD: NORMAL
ANION GAP SERPL CALCULATED.3IONS-SCNC: 7 MMOL/L (ref 5–15)
BASOPHILS # BLD AUTO: 0.01 10*3/MM3 (ref 0–0.2)
BASOPHILS NFR BLD AUTO: 0.1 % (ref 0–1.5)
BLD GP AB SCN SERPL QL: NEGATIVE
BUN SERPL-MCNC: 21 MG/DL (ref 8–23)
BUN/CREAT SERPL: 19.6 (ref 7–25)
CALCIUM SPEC-SCNC: 8.4 MG/DL (ref 8.6–10.5)
CHLORIDE SERPL-SCNC: 106 MMOL/L (ref 98–107)
CO2 SERPL-SCNC: 24 MMOL/L (ref 22–29)
CREAT SERPL-MCNC: 1.07 MG/DL (ref 0.57–1)
DEPRECATED RDW RBC AUTO: 59.7 FL (ref 37–54)
DEPRECATED RDW RBC AUTO: 60.1 FL (ref 37–54)
EGFRCR SERPLBLD CKD-EPI 2021: 53.6 ML/MIN/1.73
EOSINOPHIL # BLD AUTO: 0.02 10*3/MM3 (ref 0–0.4)
EOSINOPHIL NFR BLD AUTO: 0.2 % (ref 0.3–6.2)
ERYTHROCYTE [DISTWIDTH] IN BLOOD BY AUTOMATED COUNT: 18.1 % (ref 12.3–15.4)
ERYTHROCYTE [DISTWIDTH] IN BLOOD BY AUTOMATED COUNT: 18.4 % (ref 12.3–15.4)
GLUCOSE SERPL-MCNC: 107 MG/DL (ref 65–99)
HCT VFR BLD AUTO: 27.2 % (ref 34–46.6)
HCT VFR BLD AUTO: 29.1 % (ref 34–46.6)
HGB BLD-MCNC: 8.6 G/DL (ref 12–15.9)
HGB BLD-MCNC: 8.9 G/DL (ref 12–15.9)
INR PPP: 1.04 (ref 0.91–1.09)
LYMPHOCYTES # BLD AUTO: 1.33 10*3/MM3 (ref 0.7–3.1)
LYMPHOCYTES NFR BLD AUTO: 12.9 % (ref 19.6–45.3)
MCH RBC QN AUTO: 28.3 PG (ref 26.6–33)
MCH RBC QN AUTO: 29.4 PG (ref 26.6–33)
MCHC RBC AUTO-ENTMCNC: 30.6 G/DL (ref 31.5–35.7)
MCHC RBC AUTO-ENTMCNC: 31.6 G/DL (ref 31.5–35.7)
MCV RBC AUTO: 92.7 FL (ref 79–97)
MCV RBC AUTO: 92.8 FL (ref 79–97)
MONOCYTES # BLD AUTO: 0.86 10*3/MM3 (ref 0.1–0.9)
MONOCYTES NFR BLD AUTO: 8.3 % (ref 5–12)
NEUTROPHILS NFR BLD AUTO: 7.96 10*3/MM3 (ref 1.7–7)
NEUTROPHILS NFR BLD AUTO: 77.2 % (ref 42.7–76)
PLATELET # BLD AUTO: 16 10*3/MM3 (ref 140–450)
PLATELET # BLD AUTO: 19 10*3/MM3 (ref 140–450)
POTASSIUM SERPL-SCNC: 4.2 MMOL/L (ref 3.5–5.2)
PROTHROMBIN TIME: 13.7 SECONDS (ref 11.8–14.8)
RBC # BLD AUTO: 2.93 10*6/MM3 (ref 3.77–5.28)
RBC # BLD AUTO: 3.14 10*6/MM3 (ref 3.77–5.28)
RH BLD: POSITIVE
SODIUM SERPL-SCNC: 137 MMOL/L (ref 136–145)
T&S EXPIRATION DATE: NORMAL
WBC NRBC COR # BLD: 10.31 10*3/MM3 (ref 3.4–10.8)
WBC NRBC COR # BLD: 9.73 10*3/MM3 (ref 3.4–10.8)

## 2023-09-11 PROCEDURE — 86900 BLOOD TYPING SEROLOGIC ABO: CPT | Performed by: INTERNAL MEDICINE

## 2023-09-11 PROCEDURE — 85027 COMPLETE CBC AUTOMATED: CPT | Performed by: NURSE PRACTITIONER

## 2023-09-11 PROCEDURE — 86850 RBC ANTIBODY SCREEN: CPT | Performed by: INTERNAL MEDICINE

## 2023-09-11 PROCEDURE — 97116 GAIT TRAINING THERAPY: CPT

## 2023-09-11 PROCEDURE — 85610 PROTHROMBIN TIME: CPT | Performed by: RADIOLOGY

## 2023-09-11 PROCEDURE — 99232 SBSQ HOSP IP/OBS MODERATE 35: CPT | Performed by: INTERNAL MEDICINE

## 2023-09-11 PROCEDURE — 80048 BASIC METABOLIC PNL TOTAL CA: CPT | Performed by: NURSE PRACTITIONER

## 2023-09-11 PROCEDURE — 86901 BLOOD TYPING SEROLOGIC RH(D): CPT | Performed by: INTERNAL MEDICINE

## 2023-09-11 PROCEDURE — 97535 SELF CARE MNGMENT TRAINING: CPT

## 2023-09-11 PROCEDURE — 36430 TRANSFUSION BLD/BLD COMPNT: CPT

## 2023-09-11 PROCEDURE — P9035 PLATELET PHERES LEUKOREDUCED: HCPCS

## 2023-09-11 PROCEDURE — 85025 COMPLETE CBC W/AUTO DIFF WBC: CPT | Performed by: INTERNAL MEDICINE

## 2023-09-11 PROCEDURE — P9100 PATHOGEN TEST FOR PLATELETS: HCPCS

## 2023-09-11 RX ORDER — FAMOTIDINE 20 MG/1
20 TABLET, FILM COATED ORAL
Qty: 60 TABLET | Refills: 0 | Status: SHIPPED | OUTPATIENT
Start: 2023-09-11

## 2023-09-11 RX ORDER — SERTRALINE HYDROCHLORIDE 100 MG/1
100 TABLET, FILM COATED ORAL DAILY
Qty: 90 TABLET | Refills: 3 | Status: SHIPPED | OUTPATIENT
Start: 2023-09-11 | End: 2023-09-19 | Stop reason: SDUPTHER

## 2023-09-11 RX ORDER — AMLODIPINE BESYLATE 5 MG/1
5 TABLET ORAL 2 TIMES DAILY
Status: DISCONTINUED | OUTPATIENT
Start: 2023-09-11 | End: 2023-09-12 | Stop reason: HOSPADM

## 2023-09-11 RX ORDER — PANTOPRAZOLE SODIUM 40 MG/1
40 TABLET, DELAYED RELEASE ORAL
Qty: 60 TABLET | Refills: 0 | Status: SHIPPED | OUTPATIENT
Start: 2023-09-11

## 2023-09-11 RX ADMIN — VALACYCLOVIR 500 MG: 500 TABLET, FILM COATED ORAL at 20:52

## 2023-09-11 RX ADMIN — Medication 10 ML: at 09:12

## 2023-09-11 RX ADMIN — FAMOTIDINE 20 MG: 20 TABLET, FILM COATED ORAL at 17:10

## 2023-09-11 RX ADMIN — MONTELUKAST SODIUM 10 MG: 10 TABLET, FILM COATED ORAL at 20:53

## 2023-09-11 RX ADMIN — ROPINIROLE HYDROCHLORIDE 0.5 MG: 0.25 TABLET, FILM COATED ORAL at 20:53

## 2023-09-11 RX ADMIN — Medication 10 ML: at 20:53

## 2023-09-11 RX ADMIN — PANTOPRAZOLE SODIUM 40 MG: 40 TABLET, DELAYED RELEASE ORAL at 17:10

## 2023-09-11 RX ADMIN — AMLODIPINE BESYLATE 5 MG: 5 TABLET ORAL at 20:52

## 2023-09-11 RX ADMIN — CARVEDILOL 6.25 MG: 6.25 TABLET, FILM COATED ORAL at 17:10

## 2023-09-11 NOTE — TELEPHONE ENCOUNTER
3[facility] HAD CALLED THIS MORNING TO MAKE HOSP FU, DR GONZALEZ CONSULTED AND INSTRUCTED TO BE SEEN IN 1 WK, PT IS ALREADY ON SUNI'S SCHEDULE FOR 10/13/23, SINCE HER SCHEDULE IS SO FULL I SENT MESSAGE TO SUNI TO SEE IF PT NEEDS TO BE SEEN SOONER THAN 10/13, PER SUNI FOR PT TO CONTINUE HER WEEKLY RETACRIT, AND LEAVE HER ON FOR FOLLOW UP 10/13/23.

## 2023-09-11 NOTE — PROGRESS NOTES
"    [facility] Medicine Services  INPATIENT PROGRESS NOTE    Patient Name: Marina Joe  Date of Admission: 9/6/2023  Today's Date: 09/11/23  Length of Stay: 5  Primary Care Physician: Hanh Og APRN    Subjective   Chief Complaint: Back stools  HPI   Ms. Joe presented to [facility] ER 9/6/2023 with worsening weakness, black stools.  She has a history of CKD stage IIIb, chronic thrombocytopenia, normocytic anemia followed by [facility] oncology.  Patient has had multiple episodes of anemia requiring transfusion.  She was evaluated at [facility] ER on 9/3/2023 and received 1 unit packed cells.  At that time, ER provider discussed with gastroenterology who did not believe scope was needed as patient had both upper and lower scopes February 2023 essentially negative.  On 9/4, patient developed profound black tarry stools but did not return to the emergency room because she was \"too sick\".  Due to ongoing black stools she presented to ER on 9/6 with hemoglobin 4.9, platelet count 24,000.  Patient denied any abdominal pain but upon palpation she was noted to have mid epigastric tenderness.  Abdomen soft and bowel sounds within normal limits. Patient was ordered 1 platelet pheresis pack and 3 units packed cells in ER.    Today  Patient was evaluated earlier this morning.  No additional black stools.  Dr. Shah requested bone marrow biopsy prior to discharge and transfuse 1 unit platelets 30 minutes prior to procedure.  Plan was for patient to be discharged after bone marrow biopsy today.  Platelets transfused but unfortunately biopsy not completed in a timely manner to get specimen to lab before 2 PM.  Bone marrow biopsy now planned for 8 AM in the morning and patient will be able to be discharged afterwards.  Mechanical soft diet today.  Patient has EUS scheduled with Dr. Gus Carvalho on Wednesday 9/13.  No complaints of chest pain or palpitation.    Review of Systems "   Constitutional:  Positive for activity change. Negative for chills and fever.   HENT:  Negative for congestion and trouble swallowing.    Eyes:  Negative for photophobia and visual disturbance.   Respiratory:  Negative for cough, shortness of breath and wheezing.    Cardiovascular:  Negative for chest pain, palpitations and leg swelling.   Gastrointestinal:  Negative for vomiting.   Endocrine: Negative for cold intolerance, heat intolerance and polyuria.   Genitourinary:  Negative for dysuria, frequency and urgency.   Musculoskeletal:  Negative for gait problem.   Skin:  Negative for color change, pallor, rash and wound.   Allergic/Immunologic: Negative for immunocompromised state.   Neurological:  Positive for weakness. Negative for light-headedness.   Hematological:  Negative for adenopathy. Does not bruise/bleed easily.   Psychiatric/Behavioral:  Negative for agitation, behavioral problems and confusion.       All pertinent negatives and positives are as above. All other systems have been reviewed and are negative unless otherwise stated.     Objective    Temp:  [98 °F (36.7 °C)-98.5 °F (36.9 °C)] 98.5 °F (36.9 °C)  Heart Rate:  [61-70] 66  Resp:  [16] 16  BP: (139-163)/(56-81) 163/73  Physical Exam  Vitals and nursing note reviewed.   Constitutional:       Comments: Lying in bed.  No oxygen use.  No visitors in room.   HENT:      Head: Normocephalic and atraumatic.      Nose: No congestion.      Mouth/Throat:      Pharynx: Oropharynx is clear. No oropharyngeal exudate or posterior oropharyngeal erythema.   Eyes:      Extraocular Movements: Extraocular movements intact.      Pupils: Pupils are equal, round, and reactive to light.   Cardiovascular:      Rate and Rhythm: Normal rate and regular rhythm.      Heart sounds: No murmur heard.  Pulmonary:      Breath sounds: No wheezing, rhonchi or rales.      Comments: No oxygen in use.  Abdominal:      Palpations: Abdomen is soft.      Tenderness: There is no  abdominal tenderness.   Genitourinary:     Comments: Voiding.  Musculoskeletal:         General: No swelling or tenderness.      Cervical back: Normal range of motion and neck supple.      Comments: SCDs bilateral lower extremities.   Skin:     General: Skin is warm and dry.   Neurological:      General: No focal deficit present.      Mental Status: She is alert and oriented to person, place, and time.   Psychiatric:         Mood and Affect: Mood normal.         Behavior: Behavior normal.         Thought Content: Thought content normal.         Judgment: Judgment normal.     Results Review:  I have reviewed the labs, radiology results, and diagnostic studies.    Laboratory Data:   Results from last 7 days   Lab Units 09/11/23  1422 09/11/23  0521 09/10/23  0545   WBC 10*3/mm3 10.31 9.73 9.71   HEMOGLOBIN g/dL 8.9* 8.6* 7.9*   HEMATOCRIT % 29.1* 27.2* 26.2*   PLATELETS 10*3/mm3 16* 19* 18*        Results from last 7 days   Lab Units 09/11/23  0521 09/10/23  0545 09/09/23  0752 09/07/23  0840 09/06/23  1532   SODIUM mmol/L 137 140 140   < > 139   POTASSIUM mmol/L 4.2 4.2 4.0   < > 4.0   CHLORIDE mmol/L 106 108* 107   < > 105   CO2 mmol/L 24.0 23.0 24.0   < > 25.0   BUN mg/dL 21 22 30*   < > 52*   CREATININE mg/dL 1.07* 1.12* 1.18*   < > 1.51*   CALCIUM mg/dL 8.4* 8.2* 8.3*   < > 8.3*   BILIRUBIN mg/dL  --   --   --   --  <0.2   ALK PHOS U/L  --   --   --   --  50   ALT (SGPT) U/L  --   --   --   --  9   AST (SGOT) U/L  --   --   --   --  14   GLUCOSE mg/dL 107* 108* 114*   < > 115*    < > = values in this interval not displayed.     .  Imaging Results (Last 7 Days)       Procedure Component Value Units Date/Time    CT Guided Biopsy Bone Marrow [[id number]] Resulted: 09/11/23 1341     Updated: 09/11/23 1426    FL Upper GI Water Soluble [[id number]] Collected: 09/08/23 1208     Updated: 09/08/23 1216    Narrative:      Procedure: Fluoro Upper GI single contrast     Clinical indication: Melena     Technique: Single  contrast water-soluble cine esophagram and upper GI  study is performed.         Fluoroscopy time:  1 minute 34 seconds minutes.     Images/ image captures: 19        Findings:      In the upright position there is normal transit of contrast through the  esophagus without strictures or masses.      No gastroesophageal reflux observed fluoroscopically.      The 13 mm diameter barium tablet passes without difficulty.     There appears to be thickened gastric rugae involving the body.     The stomach is without ulcers or masses. There is no delay in gastric  emptying.      Duodenal bulb and C-loop are normal.       Impression:      Impression:         Thickened gastric rugae predominantly involving the gastric body which  can be seen with gastritis. Consider correlation with endoscopy.     No visible mass, large ulcer, or stricture.  This report was finalized on 09/08/2023 12:13 by  Warren Freire DO.           Scheduled medications:  amLODIPine, 5 mg, Oral, BID  buPROPion XL, 150 mg, Oral, Daily  carvedilol, 6.25 mg, Oral, BID With Meals  famotidine, 20 mg, Oral, BID AC  fluticasone, 2 spray, Nasal, Daily  losartan, 100 mg, Oral, Q24H   And  hydroCHLOROthiazide, 12.5 mg, Oral, Q24H  montelukast, 10 mg, Oral, Nightly  pantoprazole, 40 mg, Oral, BID AC  rOPINIRole, 0.5 mg, Oral, Nightly  rosuvastatin, 20 mg, Oral, Daily  sertraline, 100 mg, Oral, Daily  sodium chloride, 10 mL, Intravenous, Q12H  valACYclovir, 500 mg, Oral, BID       I have reviewed the patient's current medications.     Assessment/Plan   Assessment  Active Hospital Problems    Diagnosis     **GIB (gastrointestinal bleeding)     Acute blood loss anemia     Chronic idiopathic thrombocytopenia     Hypotension due to hypovolemia     Stage 3b chronic kidney disease      Treatment Plan  1.  Acute blood loss anemia superimposed on iron deficiency anemia and anemia of chronic kidney disease.  Patient has history of normocytic iron anemia followed by [facility]  oncology requiring multiple blood transfusions.  Patient had endoscopy and colonoscopy February 2023 essentially negative.   Platelets 24,000 on admission and received 1 platelet pheresis pack.  Repeat platelet level 17,000 an additional 1 unit platelet pheresis pack transfused.  Platelets 19,000 today. Initial hemoglobin of 4.9 and patient was transfused total of 4 PRBCs.  Patient continued to have black stools on 9/8 and 9/9.  Patient reports stools were clear yesterday and no black stools today.  Hemoglobin 8.9.  No abdominal pain or shortness of breath.  GI advance to mechanical soft diet on 9/10.    2.  Suspected upper GI bleed.  Patient reports extremely large black tarry stool on 9/8 and has had a total of 4 black tarry diarrheal stools since then.  Transfused total of 4 units packed cells and 2 platelet pheresis packs since admission.  Hematology, Dr. Shah following. Continue Protonix 40 mg every 12 hours.  Pepcid 20 mg twice daily added on 9/8 per recommendations of MARY Zapata with GI.  Dr. Chance continues to decline need for endoscopy due to thrombocytopenia.  Upper GI recommended and esophagram 9/8 noted thickened gastric rugae dominantly involving the gastric body which can be seen with gastritis.  Recommending correlation with endoscopy.  Patient has EUS scheduled on Wednesday 9/13 with Dr. Gus Carvalho at [facility] and GI recommends patient keep scheduled appointment and can have endoscopy at the same time if platelets improved    3.  Chronic idiopathic thrombocytopenia.  Follows with Dr. Shah, hematology.  Patient received last Injectafer on 8/11/2023 and Retacrit on 9/1/2023.  No features of myelodysplasia or excess blast on 11/10/2022 per Dr. Shah. Platelets 24,000 on admission and received 1 platelet pheresis pack.  Repeat platelet level 17,000 an additional 1 unit platelet pheresis pack transfused.  Platelet count of 19,000 today. Initial hemoglobin of 4.9, patient was transfused total of 4  PRBCs.   Dr. Shah has ordered trial of dexamethasone 40 mg IV for total of 4 days completed.  Dr. Shah requested bone marrow biopsy today prior to discharge.  Transfuse 1 unit platelets 30 minutes prior to procedure.  Platelets transfused today but unfortunately radiology unable to complete bone marrow biopsy prior to 2 PM when specimen was required to be in lab.  Bone marrow biopsy rescheduled for 8 AM tomorrow and patient will be able to be discharged thereafter.    4.  Iron deficiency anemia secondary to inadequate dietary iron intake.  Follows with hematology.    5.  Stage IIIb CKD.  Creatinine 1.88 on admission.  Creatinine 1.07 today.  Baseline creatinine 1.2-1.5.  Repeat BMP in AM.    6.  Primary hypertension but presented with hypotension due to hypovolemia on admission. Patient with blood pressure 93/50 on admission suspect secondary to blood loss anemia.  Resolution of hypotension after blood transfusion.  Blood pressure 163/73, 148/81.  Coreg and  Irbesartan (losartan/HCTZ substituted) resumed.  Will restart amlodipine.    7.  SCDs for deep vein thrombosis prophylaxis.    Medical Decision Making  Number and Complexity of problems: 6  Acute blood loss anemia superimposed on iron deficiency anemia and anemia of chronic kidney disease:, Acute on chronic, high complexity posing threat to life and bodily function, improving  Suspected acute upper GI bleed with melena: Acute, high complexity requiring transfusion, improving  Chronic idiopathic thrombocytopenia: Acute on chronic, high complexity requiring transfusion, unchanged  Iron deficiency anemia secondary to inadequate oral iron take: Chronic, moderate complexity stable  CKD stage IIIb: Chronic, moderate complexity, stable  Hypotension due to hypovolemia: Acute, moderate complexity, resolved    Differential Diagnosis: Acute upper GI bleed    Conditions and Status        Condition is unchanged.     [facility] Data  External documents reviewed: Hematology office  visit 8/29/2023.  Cardiac tracing (EKG, telemetry) interpretation: Normal sinus rhythm 74 on telemetry  Radiology interpretation: reviewed fluoroscopy esophagram 9/8/2023  Labs reviewed:   BMP 8/11/2023.  Repeat BMP in AM.  CBC 8/11/2023.  CBC in AM.     Any tests that were considered but not ordered: None     Decision rules/scores evaluated (example [id number], [place], etc): None     Discussed with: Dr. Chatterjee, Dr. Shah, and patient.     Care Planning  Shared decision making: Dr. Chatterjee, Dr. Shah, and patient.  Patient agrees to bone marrow biopsy prior to discharge.  Scheduled for 9/12.  Agrees to Pepcid and Protonix as recommended by GI..  Agrees to platelets per Dr. Shah.  Plans to keep EUS as scheduled with Dr. Gus Carvalho on 9/13.  Care Planning  Code status and discussions: Full code  The patient's surrogate decision maker is Jose Enrique,     Disposition  Social Determinants of Health that impact treatment or disposition: None  I expect the patient to be discharged to home in 1 day.     Electronically signed by MARY Glynn, 09/11/23, 15:40 CDT.     The above documentation resulted from a face-to-face encounter by me Alma HERNANDEZ, [facility].

## 2023-09-11 NOTE — PLAN OF CARE
Goal Outcome Evaluation:  Plan of Care Reviewed With: patient        Progress: improving  Outcome Evaluation: OT tx completed. Pt in fowlers upon therapist arrival; A&Ox4; No pain reported. Pt performed supine>sit I. Pt performed all sit<>stand transfers from bed, toilet, and shower chair I. Pt ambulated community distance in hallway utilizing no AD I. Pt performing all BADLs I at this time, therefore, skilled OT intervention no longer indicated. OT to sign off. Anticipate Pt to return home at discharge.      Anticipated Discharge Disposition (OT): home

## 2023-09-11 NOTE — THERAPY TREATMENT NOTE
Acute Care - Physical Therapy Treatment Note  [facility]     Patient Name: Marina Joe  : 1945  MRN: [id number]  Today's Date: 2023      Visit Dx:     ICD-10-CM ICD-9-CM   1. Gastrointestinal hemorrhage, unspecified gastrointestinal hemorrhage type  K92.2 578.9   2. Impaired mobility [Z74.09 (ICD-10-CM)]  Z74.09 799.89     Patient Active Problem List   Diagnosis    Malignant neoplasm of upper-outer quadrant of female breast    Anemia of chronic renal failure, stage 3 (moderate)    Hypertension, benign    Hx of colonic polyps    HX: breast cancer    Morbidly obese    Abnormal mammogram    Breast mass    Right knee DJD    S/P lumpectomy, left breast    Adult hypothyroidism    Rhinitis    Anemia    Anxiety    At low risk for fall    Breast cancer, left    Cervical pain    Chronic insomnia    Cough    Elevated lipids    Encounter for immunization    Herpes zoster without complication    Influenza B    Left ear pain    Left otitis externa    Lumbar strain, initial encounter    Myalgia    Negative depression screening    Obesity (BMI 30-39.9)    Postmenopausal status    Recurrent acute serous otitis media of left ear    Restless leg    Sinusitis, bacterial    Skin lesion    Upper respiratory infection    Urinary tract infection without hematuria    Vitamin D deficiency    Stage 3b chronic kidney disease    GIB (gastrointestinal bleeding)    Acute blood loss anemia    Chronic idiopathic thrombocytopenia    Hypotension due to hypovolemia     Past Medical History:   Diagnosis Date    Breast cancer     CKD (chronic kidney disease)     Hypercholesteremia     Hypertension     Sinusitis     Stage 3a chronic kidney disease 10/20/2020     Past Surgical History:   Procedure Laterality Date    AVULSION TOENAIL PLATE          BREAST BIOPSY Left 2012    BREAST BIOPSY      Left Breast, 2019 per Dr Barkley    BREAST LUMPECTOMY Left     with node bx     COLONOSCOPY  2013    small polyp at 30cm  benign hyperplastic polyp, changes consistent with melanosis coli. Recall 5 years    COLONOSCOPY  11/19/2018    Tics otherwise normal exam repeat in 5 years    COLONOSCOPY  02/13/2023    Normal exam repeat in 3 years with a 2 day prep    ENDOSCOPY  02/13/2023    Gastritis, small HH    REPLACEMENT TOTAL KNEE Right     2016    TOTAL ABDOMINAL HYSTERECTOMY WITH SALPINGO OOPHORECTOMY       PT Assessment (last 12 hours)       PT Evaluation and Treatment       Row Name 09/11/23 1533          Physical Therapy Time and Intention    Subjective Information no complaints  -NW     Document Type therapy note (daily note)  -NW     Mode of Treatment physical therapy  -NW     Comment pt reports plan was to d/c home today, but unable to get bone marrow biopsy, plan is to get biopsy tomorrow and d/c. Pt has met PT goals will d/c from PT standpoint at this time.  -NW       Row Name 09/11/23 1533          Pain    Pretreatment Pain Rating 0/10 - no pain  -NW       Row Name 09/11/23 1533          Bed Mobility    Supine-Sit [place] (Bed Mobility) independent  -NW     Sit-Supine [place] (Bed Mobility) --  sitting EOB  -NW       Row Name 09/11/23 1533          Sit-Stand Transfer    Sit-Stand [place] (Transfers) independent  -NW       Row Name 09/11/23 1533          Stand-Sit Transfer    Stand-Sit [place] (Transfers) independent  -NW       Row Name 09/11/23 1533          Toilet Transfer    [place] Level (Toilet Transfer) independent  -NW       Row Name 09/11/23 1533          Gait/Stairs (Locomotion)    [place] Level (Gait) independent  -NW     Distance in Feet (Gait) 525  -NW     Comment, (Gait/Stairs) able to go up/down flight stairs. reviewed home safety and POC  -NW       Row Name 09/11/23 1533          Positioning and Restraints    Pre-Treatment Position in bed  -NW     Post Treatment Position bed  -NW     In Bed sitting EOB;call light within reach;encouraged to call for assist  -NW               User Key   (r) = Recorded By, (t) = Taken By, (c) = Cosigned By      Initials Name Provider Type    NW Yuly Khalil, PTA Physical Therapist Assistant                    Physical Therapy Education       Title: PT OT SLP Therapies (In Progress)       Topic: Physical Therapy (In Progress)       Point: Mobility training (Done)       Learning Progress Summary             Patient Acceptance, E,TB,D, VU,DU by  at 9/8/2023 1403    Comment: education re: purpose of PT/importance of activity, for safety/falls prevention w/ mobillity   Significant Other Acceptance, E,TB,D, VU,DU by  at 9/8/2023 1403    Comment: education re: purpose of PT/importance of activity, for safety/falls prevention w/ mobillity                         Point: Home exercise program (Not Started)       Learner Progress:  Not documented in this visit.              Point: Precautions (Done)       Learning Progress Summary             Patient Acceptance, E,TB,D, VU,DU by  at 9/8/2023 1403    Comment: education re: purpose of PT/importance of activity, for safety/falls prevention w/ mobillity   Significant Other Acceptance, E,TB,D, VU,DU by  at 9/8/2023 1403    Comment: education re: purpose of PT/importance of activity, for safety/falls prevention w/ mobillity                                         User Key       Initials Effective Dates Name Provider Type [facility] 08/02/18 -  Radha Morocho, PT Physical Therapist PT                  PT Recommendation and Plan     Plan of Care Reviewed With: patient  Progress: improving  Outcome Evaluation: pt up in room w/ nsg. Pt able to amb up/down flight stairs cga. Pt fatigued after activity, but was able to amb entire hallway w/ no LOB. discussed home safety and POC. feel pt is safe to d/c home when medically stable       Time Calculation:    PT Charges       Row Name 09/11/23 1549             Time Calculation    Start Time 1533  -NW      Stop Time 1549  -NW      Time Calculation (min) 16 min  -NW      PT  Received On 09/11/23  -NW      PT Goal Re-Cert Due Date 09/18/23  -NW         Time Calculation- PT    Total Timed Code Minutes- PT 16 minute(s)  -NW         Timed Charges    65087 - Gait Training Minutes  16  -NW         Total Minutes    Timed Charges Total Minutes 16  -NW       Total Minutes 16  -NW                User Key  (r) = Recorded By, (t) = Taken By, (c) = Cosigned By      Initials Name Provider Type    NW Yuly Khalil PTA Physical Therapist Assistant                  Therapy Charges for Today       Code Description Service Date Service Provider Modifiers Qty    [id number] HC GAIT TRAINING EA 15 MIN 9/11/2023 Yuly Khalil PTA GP 1            PT G-Codes  Outcome Measure Options: AM-PAC 6 Clicks Daily Activity (OT)  AM-PAC 6 Clicks Score (PT): 23  AM-PAC 6 Clicks Score (OT): 24    Yuly Khalil PTA  9/11/2023

## 2023-09-11 NOTE — PROGRESS NOTES
[facility] Progress Note    Progress Note    Patient:  Marina Joe  YOB: 1945  Date of Service: 9/11/2023  MRN: [id number]   Acct: [id number]   Primary Care Physician: Hanh Og APRN  Advance Directive:   Code Status and Medical Interventions:   Ordered at: 09/06/23 1810     Level Of Support Discussed With:    Patient     Code Status (Patient has no pulse and is not breathing):    CPR (Attempt to Resuscitate)     Medical Interventions (Patient has pulse or is breathing):    Full Support     Admit Date: 9/6/2023       Hospital Day: 5      Subjective:     Chief Compliant: Patient seen with nurse Viki at the bedside. No complains. No melena or other bleeding manifestations.       Review of Systems:   Review of Systems   Constitutional:  Positive for fatigue. Negative for fever.   HENT:  Negative for nosebleeds.    Eyes:  Negative for redness and visual disturbance.   Respiratory:  Negative for shortness of breath and wheezing.    Cardiovascular:  Negative for chest pain and palpitations.   Gastrointestinal:  Negative for blood in stool, nausea and vomiting.   Endocrine: Negative for polydipsia and polyphagia.   Genitourinary:  Negative for hematuria and vaginal bleeding.   Musculoskeletal:  Negative for joint swelling.   Skin:  Positive for pallor.   Allergic/Immunologic: Negative for immunocompromised state.   Neurological:  Negative for dizziness and speech difficulty.   Hematological:  Negative for adenopathy.   Psychiatric/Behavioral:  Negative for agitation, confusion and hallucinations.          Medications:   Scheduled Meds:buPROPion XL, 150 mg, Oral, Daily  carvedilol, 6.25 mg, Oral, BID With Meals  famotidine, 20 mg, Oral, BID AC  fluticasone, 2 spray, Nasal, Daily  losartan, 100 mg, Oral, Q24H   And  hydroCHLOROthiazide, 12.5 mg, Oral, Q24H  montelukast, 10 mg, Oral, Nightly  pantoprazole, 40 mg, Oral, BID AC  rOPINIRole, 0.5 mg, Oral, Nightly  rosuvastatin, 20 mg,  Oral, Daily  sertraline, 100 mg, Oral, Daily  sodium chloride, 10 mL, Intravenous, Q12H  valACYclovir, 500 mg, Oral, BID        Continuous Infusions:     Labs:     Lab Results (last 72 hours)       Procedure Component Value Units Date/Time    CBC (No Diff) [[id number]]  (Abnormal) Collected: 09/11/23 0521    Specimen: Blood Updated: 09/11/23 0616     WBC 9.73 10*3/mm3      RBC 2.93 10*6/mm3      Hemoglobin 8.6 g/dL      Hematocrit 27.2 %      MCV 92.8 fL      MCH 29.4 pg      MCHC 31.6 g/dL      RDW 18.1 %      RDW-SD 60.1 fl      Platelets 19 10*3/mm3     Basic Metabolic Panel [[id number]] Collected: 09/11/23 0521    Specimen: Blood Updated: 09/11/23 0555    Basic Metabolic Panel [[id number]]  (Abnormal) Collected: 09/10/23 0545    Specimen: Blood Updated: 09/10/23 0621     Glucose 108 mg/dL      BUN 22 mg/dL      Creatinine 1.12 mg/dL      Sodium 140 mmol/L      Potassium 4.2 mmol/L      Chloride 108 mmol/L      CO2 23.0 mmol/L      Calcium 8.2 mg/dL      BUN/Creatinine Ratio 19.6     Anion Gap 9.0 mmol/L      eGFR 50.8 mL/min/1.73     Narrative:      GFR Normal >60  Chronic Kidney Disease <60  Kidney Failure <15    The GFR formula is only valid for adults with stable renal function between ages 18 and 70.    CBC (No Diff) [[id number]]  (Abnormal) Collected: 09/10/23 0545    Specimen: Blood Updated: 09/10/23 0606     WBC 9.71 10*3/mm3      RBC 2.81 10*6/mm3      Hemoglobin 7.9 g/dL      Hematocrit 26.2 %      MCV 93.2 fL      MCH 28.1 pg      MCHC 30.2 g/dL      RDW 19.1 %      RDW-SD 61.5 fl      MPV --     Comment: Unable to calculate        Platelets 18 10*3/mm3     Hemoglobin & Hematocrit, Blood [[id number]]  (Abnormal) Collected: 09/09/23 1623    Specimen: Blood Updated: 09/09/23 1632     Hemoglobin 8.9 g/dL      Hematocrit 28.1 %     Basic Metabolic Panel [[id number]]  (Abnormal) Collected: 09/09/23 0752    Specimen: Blood Updated: 09/09/23 0831     Glucose 114 mg/dL      BUN 30 mg/dL      Creatinine 1.18 mg/dL       Sodium 140 mmol/L      Potassium 4.0 mmol/L      Chloride 107 mmol/L      CO2 24.0 mmol/L      Calcium 8.3 mg/dL      BUN/Creatinine Ratio 25.4     Anion Gap 9.0 mmol/L      eGFR 47.7 mL/min/1.73     Narrative:      GFR Normal >60  Chronic Kidney Disease <60  Kidney Failure <15    The GFR formula is only valid for adults with stable renal function between ages 18 and 70.    CBC & Differential [[id number]]  (Abnormal) Collected: 09/09/23 0752    Specimen: Blood Updated: 09/09/23 0819    Narrative:      The following orders were created for panel order CBC & Differential.  Procedure                               Abnormality         Status                     ---------                               -----------         ------                     CBC Auto Differential[[id number]]        Abnormal            Final result                 Please view results for these tests on the individual orders.    CBC Auto Differential [[id number]]  (Abnormal) Collected: 09/09/23 0752    Specimen: Blood Updated: 09/09/23 0819     WBC 10.69 10*3/mm3      RBC 2.72 10*6/mm3      Hemoglobin 7.9 g/dL      Hematocrit 25.4 %      MCV 93.4 fL      MCH 29.0 pg      MCHC 31.1 g/dL      RDW 19.7 %      RDW-SD 61.1 fl      MPV 11.7 fL      Platelets 20 10*3/mm3      Neutrophil % 85.4 %      Lymphocyte % 7.2 %      Monocyte % 6.1 %      Eosinophil % 0.0 %      Basophil % 0.1 %      Neutrophils, Absolute 9.13 10*3/mm3      Lymphocytes, Absolute 0.77 10*3/mm3      Monocytes, Absolute 0.65 10*3/mm3      Eosinophils, Absolute 0.00 10*3/mm3      Basophils, Absolute 0.01 10*3/mm3     Hemoglobin & Hematocrit, Blood [[id number]]  (Abnormal) Collected: 09/08/23 2323    Specimen: Blood Updated: 09/08/23 2335     Hemoglobin 8.2 g/dL      Hematocrit 27.2 %     Hemoglobin & Hematocrit, Blood [[id number]]  (Abnormal) Collected: 09/08/23 1621    Specimen: Blood Updated: 09/08/23 1638     Hemoglobin 8.7 g/dL      Hematocrit 27.4 %     Basic Metabolic Panel  [[id number]]  (Abnormal) Collected: 09/08/23 0623    Specimen: Blood Updated: 09/08/23 0703     Glucose 136 mg/dL      BUN 36 mg/dL      Creatinine 1.24 mg/dL      Sodium 137 mmol/L      Potassium 3.9 mmol/L      Chloride 105 mmol/L      CO2 22.0 mmol/L      Calcium 8.2 mg/dL      BUN/Creatinine Ratio 29.0     Anion Gap 10.0 mmol/L      eGFR 44.9 mL/min/1.73     Narrative:      GFR Normal >60  Chronic Kidney Disease <60  Kidney Failure <15    The GFR formula is only valid for adults with stable renal function between ages 18 and 70.    CBC & Differential [[id number]]  (Abnormal) Collected: 09/08/23 0623    Specimen: Blood Updated: 09/08/23 0644    Narrative:      The following orders were created for panel order CBC & Differential.  Procedure                               Abnormality         Status                     ---------                               -----------         ------                     CBC Auto Differential[[id number]]        Abnormal            Final result                 Please view results for these tests on the individual orders.    CBC Auto Differential [[id number]]  (Abnormal) Collected: 09/08/23 0623    Specimen: Blood Updated: 09/08/23 0644     WBC 10.67 10*3/mm3      RBC 2.73 10*6/mm3      Hemoglobin 7.9 g/dL      Hematocrit 24.6 %      MCV 90.1 fL      MCH 28.9 pg      MCHC 32.1 g/dL      RDW 18.8 %      RDW-SD 53.2 fl      Platelets 24 10*3/mm3      Neutrophil % 85.1 %      Lymphocyte % 7.7 %      Monocyte % 5.5 %      Eosinophil % 0.0 %      Basophil % 0.1 %      Neutrophils, Absolute 9.08 10*3/mm3      Lymphocytes, Absolute 0.82 10*3/mm3      Monocytes, Absolute 0.59 10*3/mm3      Eosinophils, Absolute 0.00 10*3/mm3      Basophils, Absolute 0.01 10*3/mm3               Radiology:     Imaging Results (Last 72 Hours)       Procedure Component Value Units Date/Time    FL Upper GI Water Soluble [[id number]] Collected: 09/08/23 1208     Updated: 09/08/23 1216    Narrative:      Procedure: Fluoro  "Upper GI single contrast     Clinical indication: Melena     Technique: Single contrast water-soluble cine esophagram and upper GI  study is performed.         Fluoroscopy time:  1 minute 34 seconds minutes.     Images/ image captures: 19        Findings:      In the upright position there is normal transit of contrast through the  esophagus without strictures or masses.      No gastroesophageal reflux observed fluoroscopically.      The 13 mm diameter barium tablet passes without difficulty.     There appears to be thickened gastric rugae involving the body.     The stomach is without ulcers or masses. There is no delay in gastric  emptying.      Duodenal bulb and C-loop are normal.       Impression:      Impression:         Thickened gastric rugae predominantly involving the gastric body which  can be seen with gastritis. Consider correlation with endoscopy.     No visible mass, large ulcer, or stricture.  This report was finalized on 09/08/2023 12:13 by  Warren Freire DO.              Objective:   Vitals: /56 (BP Location: Right arm, Patient Position: Lying)   Pulse 62   Temp 98 °F (36.7 °C) (Oral)   Resp 16   Ht 172.7 cm (68\")   Wt 105 kg (232 lb)   LMP  (LMP Unknown)   SpO2 99%   BMI 35.28 kg/m²   Physical Exam  24HR INTAKE/OUTPUT:    Intake/Output Summary (Last 24 hours) at 9/11/2023 0623  Last data filed at 9/10/2023 2117  Gross per 24 hour   Intake 1048.7 ml   Output --   Net 1048.7 ml        Problem list:       GIB (gastrointestinal bleeding)    Stage 3b chronic kidney disease    Acute blood loss anemia    Chronic idiopathic thrombocytopenia    Hypotension due to hypovolemia      Assessment/Plan:         ASSESSMENT:   Gastrointestinal bleeding, history of.  Symptomatic anemia from acute blood loss anemia, history of.  Packed RBC transfusion as needed.           CONCURRENT PROBLEMS:  1. Chronic ITP (idiopathic thrombocytopenia).  Slightly hypercellular marrow.  No features of myelodysplasia " or excess blasts on 11/10/2022.  Post Decadron 40 mg on 9/10/2023.   2. Anemia in stage 3b chronic kidney disease.  On epoetin alpha 40,000 units, latest dose 9/1/2023.   3. Iron deficiency anemia secondary to inadequate dietary iron intake    4. History of breast cancer.  She is off adjuvant anastrozole.   5.   Followed by Dr. Gus Carvalho at [facility].            PLAN:   Re: Note from GI 9/9/2023 and 9/10/2023, reviewed.  Follow-up with Dr. Gus Carvalho for endoscopy.  May transfuse platelets 30 minutes to 1 hour before procedure as needed.   Re: Post trial of dexamethasone 40 mg IV daily total of 4 days ending 9/10/2023 and no improvement thrombocytopenia.    Repeat bone marrow biopsy prior to initiating thrombopoietin agent.  Schedule CT-guided bone marrow biopsy and may transfuse 1 unit of platelets 30 minutes before procedure.  Follow heme status.  WBC 9.7, hemoglobin 8.6 from 7.9 from 8.9 from 7.9 from 8.2 and platelet 19 from 18 from 20 from 24  Transfuse 1 unit packed RBC if hemoglobin less than 8.  Observe for transfusion reactions.Transfuse 1 unit platelet if platelet is 10 or below.  Monitor for transfusion reactions.  Clinic appointment with MARY Toney 1 week after discharge.  Above plan discussed with patient and nurse.  They had verbalized understanding.

## 2023-09-11 NOTE — DISCHARGE SUMMARY
[facility] Medicine Services  DISCHARGE SUMMARY     Date of Admission: 9/6/2023  Date of Discharge:  9/12/2023  Primary Care Physician: Hanh Og APRN    Presenting Problem/History of Present Illness:  Black stools    Final Discharge Diagnoses:  Active Hospital Problems    Diagnosis     **GIB (gastrointestinal bleeding)     Acute on chronic blood loss anemia     Primary hypertension     Chronic idiopathic thrombocytopenia     Hypotension due to hypovolemia     Stage 3b chronic kidney disease      Consults:   1.  Dr. Chance, gastroenterology  2.  Dr. Shah, hematology    Procedures Performed: Bone marrow biopsy 9/12/2023    Pertinent Test Results:   Results for orders placed during the hospital encounter of 10/26/22    Adult Stress Echo W/ Cont or Stress Agent if Necessary Per Protocol    Interpretation Summary    Low risk for ischemia.    All left ventricular wall segments demonstrate appropriate increase in contractility with dobutamine infusion.    Imaging Results (All)       Procedure Component Value Units Date/Time    FL Upper GI Water Soluble [[id number]] Collected: 09/08/23 1208     Updated: 09/08/23 1216    Narrative:      Procedure: Fluoro Upper GI single contrast     Clinical indication: Melena     Technique: Single contrast water-soluble cine esophagram and upper GI  study is performed.         Fluoroscopy time:  1 minute 34 seconds minutes.     Images/ image captures: 19        Findings:      In the upright position there is normal transit of contrast through the  esophagus without strictures or masses.      No gastroesophageal reflux observed fluoroscopically.      The 13 mm diameter barium tablet passes without difficulty.     There appears to be thickened gastric rugae involving the body.     The stomach is without ulcers or masses. There is no delay in gastric  emptying.      Duodenal bulb and C-loop are normal.       Impression:      Impression:          Thickened gastric rugae predominantly involving the gastric body which  can be seen with gastritis. Consider correlation with endoscopy.     No visible mass, large ulcer, or stricture.  This report was finalized on 09/08/2023 12:13 by  Warren Freire DO.          LAB RESULTS:      Lab 09/12/23  0529 09/11/23  1422 09/11/23  1030 09/11/23  0521 09/10/23  0545 09/09/23  1623 09/09/23  0752 09/08/23  1621 09/08/23  0623 09/07/23  0141 09/06/23  1608 09/06/23  1532   WBC 8.39 10.31  --  9.73 9.71  --  10.69  --  10.67   < > 8.67  --    HEMOGLOBIN 8.1* 8.9*  --  8.6* 7.9* 8.9* 7.9*   < > 7.9*   < > 4.9*  --    HEMATOCRIT 26.7* 29.1*  --  27.2* 26.2* 28.1* 25.4*   < > 24.6*   < > 16.6*  --    PLATELETS 17* 16*  --  19* 18*  --  20*  --  24*   < > 24*  --    NEUTROS ABS 6.24 7.96*  --   --   --   --  9.13*  --  9.08*  --  6.61  --    LYMPHS ABS 1.25 1.33  --   --   --   --  0.77  --  0.82  --   --   --    MONOS ABS 0.73 0.86  --   --   --   --  0.65  --  0.59  --   --   --    EOS ABS 0.10 0.02  --   --   --   --  0.00  --  0.00  --  0.26  --    MCV 94.0 92.7  --  92.8 93.2  --  93.4  --  90.1   < > 97.6*  --    LACTATE  --   --   --   --   --   --   --   --   --   --   --  1.5   PROTIME  --   --  13.7  --   --   --   --   --   --   --  14.2  --     < > = values in this interval not displayed.         Lab 09/12/23  0529 09/11/23  0521 09/10/23  0545 09/09/23  0752 09/08/23  0623   SODIUM 141 137 140 140 137   POTASSIUM 3.7 4.2 4.2 4.0 3.9   CHLORIDE 106 106 108* 107 105   CO2 27.0 24.0 23.0 24.0 22.0   ANION GAP 8.0 7.0 9.0 9.0 10.0   BUN 20 21 22 30* 36*   CREATININE 1.13* 1.07* 1.12* 1.18* 1.24*   EGFR 50.2* 53.6* 50.8* 47.7* 44.9*   GLUCOSE 108* 107* 108* 114* 136*   CALCIUM 7.9* 8.4* 8.2* 8.3* 8.2*         Lab 09/06/23  1532   TOTAL PROTEIN 5.0*   ALBUMIN 3.1*   GLOBULIN 1.9   ALT (SGPT) 9   AST (SGOT) 14   BILIRUBIN <0.2   ALK PHOS 50         Lab 09/11/23  1030 09/06/23  1608   PROTIME 13.7 14.2   INR 1.04 1.09       "       Lab 09/11/23  0717 09/06/23  1532   IRON  --  60   IRON SATURATION (TSAT)  --  21   TIBC  --  292*   TRANSFERRIN  --  196*   FERRITIN  --  260.60*   ABO TYPING O O   RH TYPING Positive Positive   ANTIBODY SCREEN Negative Negative         Brief Urine Lab Results  (Last result in the past 365 days)        Color   Clarity   Blood   Leuk Est   Nitrite   Protein   CREAT   Urine HCG        08/10/23 1302 Yellow   Clear   Negative   Small (1+)   Negative   Negative                 Microbiology Results (last 10 days)       ** No results found for the last 240 hours. **          Hospital Course: Ms. Joe presented to [facility] ER 9/6/2023 with worsening weakness, black stools. She has a history of CKD stage IIIb, chronic thrombocytopenia, normocytic anemia followed by [facility] oncology. Patient has had multiple episodes of anemia requiring transfusion. She was evaluated at [facility] ER on 9/3/2023 and received 1 unit packed cells. At that time, ER provider discussed with gastroenterology who did not believe scope was needed as patient had both upper and lower scopes February 2023 essentially negative. On 9/4, patient developed profound black tarry stools but did not return to the emergency room because she was \"too sick\". Due to ongoing black stools she presented to ER on 9/6 with hemoglobin 4.9, platelet count 24,000. Patient denied any abdominal pain but upon palpation she was noted to have mid epigastric tenderness. Abdomen soft and bowel sounds within normal limits. Patient was ordered 1 platelet pheresis pack and 3 units packed cells in ER.     She was admitted to the medical floor with a acute on chronic blood loss anemia superimposed on iron deficiency anemia and anemia of chronic kidney disease with suspected upper GI bleed.  Patient has history of normocytic iron deficiency anemia followed by Dr. Shah requiring multiple blood transfusions.  Patient had endoscopy and colonoscopy February 2023 essentially " negative.  Platelets 24,000 on admission and patient received 1 platelet pheresis pack.  Follow-up platelet level 17,000 and patient received additional platelet pheresis pack.  Initial hemoglobin 4.9 and patient was transfused total of 4 units packed red blood cells.  Patient continued to have black stools on 9/8 and 9/9 but stools finally cleared on 9/10.  Patient denied abdominal pain.    Suspected upper GI bleed as patient reported black stools.  On 9/8 patient reported large black tarry stool and had 4 additional black tarry stools afterwards.  She received a total of 4 units packed red blood cells.  Initially she received 2 platelet pheresis packs.  Gastroenterology consulted and she was seen by Dr. Chance who continued to decline the need for endoscopy due to thrombocytopenia.  Instead, upper GI recommended and an esophagram 9/8 noted thickened gastric rugae dominantly involving the gastric body which can be seen with gastritis.  Recommend correlation with endoscopy.  Again, Dr. Chance followed and declined endoscopy due to low platelet count.  Patient remained on Protonix 40 mg twice daily and Pepcid 20 mg twice daily added 9/8/2023 per recommendations of GI.  Patient has EUS scheduled on 9/13/2023 with Dr. Gus Carvalho at [facility] and Dr. Chance advised patient keep scheduled appointment and can have endoscopy at that time if platelets improved.    Patient has chronic idiopathic thrombocytopenia followed by Dr. Boucher with hematology.  Patient received last Injectafer on 8/11/2023 and Retacrit on 9/1/2023.  No features of myelodysplasia or excess blast on 11/10/2022 per Dr. Shah. Platelets 24,000 on admission and received 1 platelet pheresis pack.  Repeat platelet level 17,000 an additional 1 unit platelet pheresis pack transfused. Initial hemoglobin of 4.9, patient was transfused total of 4 PRBCs.   Dr. Shah ordered trial of dexamethasone 40 mg IV for total of 4 days completed.  Dr. Shah requested bone marrow  "biopsy on 9/11 prior to discharge.  Patient received 1 unit platelets prior to planned procedure.  Unfortunately, radiology unable to complete bone marrow biopsy prior to 2 PM on 9/11 when specimen was required to be in lab.  Bone marrow biopsy rescheduled for 8 AM on 9/12.  Patient to be transfused 1 pheresis pack prior to bone marrow biopsy.  Dr. Boucher to follow-up results of bone marrow biopsy after discharge.    Stage IIIb chronic kidney disease.  Creatinine 1.88 on admission.  Creatinine 1.13 on 9/12.  Baseline creatinine 1.2-1.5.    Patient has a history of primary hypertension but presented with hypotension due to hypovolemia on admission.  Patient presented with blood pressure 93/56 suspect secondary to blood loss anemia.  Resolution of hypotension after blood transfusion.  Blood pressure trended upward.  Coreg, Irbesartan (losartan/HCTZ substituted) and amlodipine resumed.  Blood pressure 154/62, 135/59 at discharge.    SCDs ordered for deep vein thrombosis prophylaxis.    On 9/12/2023, patient stable for discharge after completing bone marrow biopsy.  Patient has EUS scheduled on 9/13/2023 by Dr. Gus Carvalho.  She will follow-up with Hanh HERNANDEZ on 9/19/2023.  Follow-up with MARY Toney hematology in 1 week to follow-up on bone marrow results.  Patient has weekly lab work performed per hematology with next lab work due on 9/15/2023.    Physical Exam on Discharge:  /65   Pulse 60   Temp 98 °F (36.7 °C) (Oral)   Resp 15   Ht 172.7 cm (68\")   Wt 106 kg (233 lb 14.4 oz)   LMP  (LMP Unknown)   SpO2 98%   BMI 35.56 kg/m²   Physical Exam  Vitals and nursing note reviewed.   Constitutional:       Comments: Sitting up in bed.  No oxygen in use.  No visitors in room.   HENT:      Head: Normocephalic and atraumatic.      Nose: No congestion.      Mouth/Throat:      Pharynx: Oropharynx is clear. No oropharyngeal exudate or posterior oropharyngeal erythema.   Eyes:      Extraocular Movements: " Extraocular movements intact.      Pupils: Pupils are equal, round, and reactive to light.   Cardiovascular:      Rate and Rhythm: Normal rate and regular rhythm.      Heart sounds: No murmur heard.  Pulmonary:      Breath sounds: No wheezing, rhonchi or rales.      Comments: No oxygen in use.  Abdominal:      General: There is no distension.      Palpations: Abdomen is soft.      Comments: No black stools.   Genitourinary:     Comments: Voiding.  Musculoskeletal:         General: No swelling or tenderness.      Cervical back: Normal range of motion and neck supple.   Skin:     General: Skin is warm and dry.   Neurological:      General: No focal deficit present.      Mental Status: She is alert and oriented to person, place, and time.   Psychiatric:         Mood and Affect: Mood normal.         Behavior: Behavior normal.         Thought Content: Thought content normal.         Judgment: Judgment normal.     Condition on Discharge: Stable for discharge home    Discharge Disposition:  Home or Self Care    Discharge Medications:     Discharge Medications        New Medications        Instructions Start Date   famotidine 20 MG tablet  Commonly known as: PEPCID   20 mg, Oral, 2 Times Daily Before Meals             Changes to Medications        Instructions Start Date   pantoprazole 40 MG EC tablet  Commonly known as: PROTONIX  What changed: when to take this   40 mg, Oral, 2 Times Daily Before Meals             Continue These Medications        Instructions Start Date   albuterol sulfate  (90 Base) MCG/ACT inhaler  Commonly known as: PROVENTIL HFA;VENTOLIN HFA;PROAIR HFA   2 puffs, Inhalation, Every 4 Hours PRN      amLODIPine 5 MG tablet  Commonly known as: NORVASC   5 mg, Oral, 2 Times Daily      buPROPion  MG 24 hr tablet  Commonly known as: WELLBUTRIN XL   150 mg, Oral, Daily      CALCIUM 600+D3 PO   1 tablet, Oral, Daily      carvedilol 6.25 MG tablet  Commonly known as: COREG   6.25 mg, Oral, 2 Times  Daily With Meals      cetirizine 10 MG tablet  Commonly known as: zyrTEC   10 mg, Oral, Daily      cloNIDine 0.1 MG tablet  Commonly known as: CATAPRES   0.1 mg, Oral, 2 Times Daily      cyclobenzaprine 10 MG tablet  Commonly known as: FLEXERIL   10 mg, Oral, 3 Times Daily PRN      ferrous sulfate 325 (65 FE) MG tablet   325 mg, Oral, Daily With Breakfast      fish oil 1000 MG capsule capsule   1,000 mg, Oral      fluticasone 50 MCG/ACT nasal spray  Commonly known as: FLONASE   2 sprays, Nasal, Daily      irbesartan-hydrochlorothiazide 300-12.5 MG tablet  Commonly known as: AVALIDE   1 tablet, Oral, Daily      montelukast 10 MG tablet  Commonly known as: SINGULAIR   10 mg, Oral, Daily      Multivitamin Adult tablet tablet  Generic drug: multivitamin with minerals   1 tablet, Oral, Daily      rOPINIRole 0.5 MG tablet  Commonly known as: REQUIP   0.5 mg, Oral, Nightly      rosuvastatin 20 MG tablet  Commonly known as: CRESTOR   20 mg, Oral, Daily      sertraline 100 MG tablet  Commonly known as: ZOLOFT   100 mg, Oral, Daily      sertraline 100 MG tablet  Commonly known as: ZOLOFT   100 mg, Oral, Daily      traZODone 100 MG tablet  Commonly known as: DESYREL   100 mg, Oral, Nightly PRN      valACYclovir 500 MG tablet  Commonly known as: VALTREX   500 mg, Oral, 2 Times Daily      VITAMIN D2 PO   50,000 Units, Oral, Every 30 Days             Discharge Diet:   Diet Instructions       Diet: Regular/House Diet; Regular Texture (IDDSI 7); Thin (IDDSI 0)      Discharge Diet: Regular/House Diet    Texture: Regular Texture (IDDSI 7)    Fluid Consistency: Thin (IDDSI 0)          Activity at Discharge:   Activity Instructions       Activity as Tolerated            Discharge instructions:  1.  For recurrent black stools seek medical attention.  2.  Keep scheduled appointment with Dr. Gus Carvalho on 9/13/2023 for EUS  3.  Follow-up with MARY Toney 1 week to follow-up on bone marrow biopsy.  4.  Follow-up with Hanh  Darrin, 1 week, 9/19/2023  5.  Continue Protonix 40 mg twice daily per gastroenterology.  6.  Pepcid 20 mg orally twice daily per gastroenterology    Follow-up Appointments:   Future Appointments   Date Time Provider Department Center   9/15/2023  9:25 AM BH PAD CANCER CTR LAB BH PAD CCLAB PAD   9/15/2023  9:45 AM TX ROOM BH PAD OP INFU ONC BH PAD OIONC PAD   9/19/2023  8:00 AM Hanh Og APRN [facility] VSQ PAD   9/22/2023  9:20 AM BH PAD CANCER CTR LAB BH PAD CCLAB PAD   9/22/2023  9:30 AM TX ROOM BH PAD OP INFU ONC BH PAD O[facility]C PAD   9/25/2023  9:30 AM Meaghan White APRN [facility] PAD [facility]\""   9/29/2023  8:10 AM BH PAD CANCER CTR LAB BH PAD CCLAB \[facility]\""   9/29/2023  8:30 AM TX ROOM BH PAD OP INFU ONC BH PAD OIONC PAD   10/6/2023  9:10 AM BH PAD CANCER CTR LAB BH PAD CCLAB PAD   10/6/2023  9:30 AM TX ROOM BH PAD OP INFU ONC BH PAD OIONC PAD   10/13/2023  8:00 AM BH PAD CANCER CTR LAB BH PAD CCLAB PAD   10/13/2023  8:30 AM Analilia Madera APRN [facility] ONC PAD PAD   10/13/2023  9:00 AM TX ROOM BH PAD OP INFU ONC BH PAD OIONC PAD     MARY Toney, hematology to follow-up on bone marrow biopsy in 1 week Dr. Shah    Test Results Pending at Discharge: Bone marrow biopsy results    Electronically signed by MARY Glynn, 09/12/23, 09:05 CDT.    Time: 35 minutes.  Discussed with Dr. Chatterjee, Dr. Shah, and patient.    The above documentation resulted from a face-to-face encounter by me Alma HERNANDEZ, [facility].

## 2023-09-11 NOTE — PLAN OF CARE
Goal Outcome Evaluation:  Plan of Care Reviewed With: patient        Progress: improving  Outcome Evaluation: Patient will have bone marrow biopsy tomorrow morning. Conscent signed in chart. Platelets given today. NPO at midnight. Daily weight. SCD's. Alert x4. No BM today. No blood noted today. Hgb 8.6. Safety maintained.

## 2023-09-11 NOTE — PLAN OF CARE
Goal Outcome Evaluation:  Plan of Care Reviewed With: patient        Progress: no change  Outcome Evaluation: Patient having no c/o pain. Up ad gerardo. Voiding. No bm noted so far this shift.

## 2023-09-11 NOTE — THERAPY DISCHARGE NOTE
Acute Care - Occupational Therapy Discharge  [facility]    Patient Name: Marina Joe  : 1945    MRN: [id number]                              Today's Date: 2023       Admit Date: 2023    Visit Dx:     ICD-10-CM ICD-9-CM   1. Gastrointestinal hemorrhage, unspecified gastrointestinal hemorrhage type  K92.2 578.9   2. Impaired mobility [Z74.09 (ICD-10-CM)]  Z74.09 799.89     Patient Active Problem List   Diagnosis    Malignant neoplasm of upper-outer quadrant of female breast    Anemia of chronic renal failure, stage 3 (moderate)    Hypertension, benign    Hx of colonic polyps    HX: breast cancer    Morbidly obese    Abnormal mammogram    Breast mass    Right knee DJD    S/P lumpectomy, left breast    Adult hypothyroidism    Rhinitis    Anemia    Anxiety    At low risk for fall    Breast cancer, left    Cervical pain    Chronic insomnia    Cough    Elevated lipids    Encounter for immunization    Herpes zoster without complication    Influenza B    Left ear pain    Left otitis externa    Lumbar strain, initial encounter    Myalgia    Negative depression screening    Obesity (BMI 30-39.9)    Postmenopausal status    Recurrent acute serous otitis media of left ear    Restless leg    Sinusitis, bacterial    Skin lesion    Upper respiratory infection    Urinary tract infection without hematuria    Vitamin D deficiency    Stage 3b chronic kidney disease    GIB (gastrointestinal bleeding)    Acute blood loss anemia    Chronic idiopathic thrombocytopenia    Hypotension due to hypovolemia     Past Medical History:   Diagnosis Date    Breast cancer     CKD (chronic kidney disease)     Hypercholesteremia     Hypertension     Sinusitis     Stage 3a chronic kidney disease 10/20/2020     Past Surgical History:   Procedure Laterality Date    AVULSION TOENAIL PLATE          BREAST BIOPSY Left 2012    BREAST BIOPSY      Left Breast, 2019 per Dr Barkley    BREAST LUMPECTOMY Left     with node bx      COLONOSCOPY  09/13/2013    small polyp at 30cm benign hyperplastic polyp, changes consistent with melanosis coli. Recall 5 years    COLONOSCOPY  11/19/2018    Tics otherwise normal exam repeat in 5 years    COLONOSCOPY  02/13/2023    Normal exam repeat in 3 years with a 2 day prep    ENDOSCOPY  02/13/2023    Gastritis, small HH    REPLACEMENT TOTAL KNEE Right     2016    TOTAL ABDOMINAL HYSTERECTOMY WITH SALPINGO OOPHORECTOMY        General Information       Row Name 09/11/23 0844          OT Time and Intention    Document Type therapy note (daily note)  -LS     Mode of Treatment occupational therapy  -LS       Row Name 09/11/23 0844          Cognition    Orientation Status (Cognition) oriented x 4  -LS               User Key  (r) = Recorded By, (t) = Taken By, (c) = Cosigned By      Initials Name Provider Type    LS Stacey Reagan OTR/L Occupational Therapist                   Mobility/ADL's       Row Name 09/11/23 0844          Bed Mobility    Bed Mobility supine-sit  -LS     Supine-Sit [place] (Bed Mobility) independent  -LS       Row Name 09/11/23 0844          Transfers    Transfers sit-stand transfer;stand-sit transfer;toilet transfer  -LS       Row Name 09/11/23 0844          Sit-Stand Transfer    Sit-Stand [place] (Transfers) independent  -LS       Row Name 09/11/23 0844          Stand-Sit Transfer    Stand-Sit [place] (Transfers) independent  -LS       Row Name 09/11/23 0844          Toilet Transfer    Type (Toilet Transfer) sit-stand;stand-sit  -LS     [place] Level (Toilet Transfer) independent  -LS       Row Name 09/11/23 0844          Functional Mobility    Functional Mobility- Ind. Level independent  Pt ambulated community distance in hallway utilizing no AD I.  -LS               User Key  (r) = Recorded By, (t) = Taken By, (c) = Cosigned By      Initials Name Provider Type    Stacey Alba OTR/L Occupational Therapist                   Obj/Interventions    No  documentation.                  Goals/Plan    No documentation.                  Clinical Impression       Row Name 09/11/23 0844          Pain Assessment    Pretreatment Pain Rating 0/10 - no pain  -LS     Posttreatment Pain Rating 0/10 - no pain  -LS       Row Name 09/11/23 0844          Plan of Care Review    Plan of Care Reviewed With patient  -LS     Progress improving  -LS     Outcome Evaluation OT tx completed. Pt in fowlers upon therapist arrival; A&Ox4; No pain reported. Pt performed supine>sit I. Pt performed all sit<>stand transfers from bed, toilet, and shower chair I. Pt ambulated community distance in hallway utilizing no AD I. Pt performing all BADLs I at this time, therefore, skilled OT intervention no longer indicated. OT to sign off. Anticipate Pt to return home at discharge.  -LS       Row Name 09/11/23 0844          Therapy Plan Review/Discharge Plan (OT)    Anticipated Discharge Disposition (OT) home  -       Row Name 09/11/23 0844          Positioning and Restraints    Pre-Treatment Position in bed  -LS     Post Treatment Position bed  -LS     In Bed sitting EOB;call light within reach;side rails up x2  -LS               User Key  (r) = Recorded By, (t) = Taken By, (c) = Cosigned By      Initials Name Provider Type    LS Stacey Reagan, OTR/L Occupational Therapist                   Outcome Measures       Row Name 09/11/23 0844          How much help from another is currently needed...    Putting on and taking off regular lower body clothing? 4  -LS     Bathing (including washing, rinsing, and drying) 4  -LS     Toileting (which includes using toilet bed pan or urinal) 4  -LS     Putting on and taking off regular upper body clothing 4  -LS     Taking care of personal grooming (such as brushing teeth) 4  -LS     Eating meals 4  -LS     AM-PAC 6 Clicks Score (OT) 24  -LS       Row Name 09/11/23 0910          How much help from another person do you currently need...    Turning from your back to  your side while in flat bed without using bedrails? 4  -AM     Moving from lying on back to sitting on the side of a flat bed without bedrails? 4  -AM     Moving to and from a bed to a chair (including a wheelchair)? 4  -AM     Standing up from a chair using your arms (e.g., wheelchair, bedside chair)? 4  -AM     Climbing 3-5 steps with a railing? 3  -AM     To walk in hospital room? 4  -AM     AM-PAC 6 Clicks Score (PT) 23  -AM     Highest level of mobility 7 --> Walked 25 feet or more  -AM       Row Name 09/11/23 0844          Functional Assessment    Outcome Measure Options AM-PAC 6 Clicks Daily Activity (OT)  -LS               User Key  (r) = Recorded By, (t) = Taken By, (c) = Cosigned By      Initials Name Provider Type    Alexandra Santos, RN Registered Nurse    Stacey Alba, OTR/L Occupational Therapist                  Occupational Therapy Education       Title: PT OT SLP Therapies (In Progress)       Topic: Occupational Therapy (In Progress)       Point: ADL training (Done)       Description:   Instruct learner(s) on proper safety adaptation and remediation techniques during self care or transfers.   Instruct in proper use of assistive devices.                  Learning Progress Summary             Patient Acceptance, E,D, VU,NR by LR at 9/8/2023 1258                         Point: Home exercise program (Not Started)       Description:   Instruct learner(s) on appropriate technique for monitoring, assisting and/or progressing therapeutic exercises/activities.                  Learner Progress:  Not documented in this visit.              Point: Precautions (Done)       Description:   Instruct learner(s) on prescribed precautions during self-care and functional transfers.                  Learning Progress Summary             Patient Acceptance, E,D, VU,NR by LR at 9/8/2023 1258                         Point: Body mechanics (Done)       Description:   Instruct learner(s) on proper positioning and spine  alignment during self-care, functional mobility activities and/or exercises.                  Learning Progress Summary             Patient Acceptance, E,D, VU,NR by LR at 9/8/2023 1258                                         User Key       Initials Effective Dates Name Provider Type [facility] 04/25/23 -  Bety Tejeda OTR/L Occupational Therapist OT                  OT Recommendation and Plan     Plan of Care Review  Plan of Care Reviewed With: patient  Progress: improving  Outcome Evaluation: OT tx completed. Pt in fowlers upon therapist arrival; A&Ox4; No pain reported. Pt performed supine>sit I. Pt performed all sit<>stand transfers from bed, toilet, and shower chair I. Pt ambulated community distance in hallway utilizing no AD I. Pt performing all BADLs I at this time, therefore, skilled OT intervention no longer indicated. OT to sign off. Anticipate Pt to return home at discharge.  Plan of Care Reviewed With: patient  Outcome Evaluation: OT tx completed. Pt in fowlers upon therapist arrival; A&Ox4; No pain reported. Pt performed supine>sit I. Pt performed all sit<>stand transfers from bed, toilet, and shower chair I. Pt ambulated community distance in hallway utilizing no AD I. Pt performing all BADLs I at this time, therefore, skilled OT intervention no longer indicated. OT to sign off. Anticipate Pt to return home at discharge.     Time Calculation:         Time Calculation- OT       Row Name 09/11/23 0844             Time Calculation- OT    OT Start Time 0844  -LS      OT Stop Time 0907  -      OT Time Calculation (min) 23 min  -      Total Timed Code Minutes- OT 23 minute(s)  -      OT Received On 09/11/23  -                User Key  (r) = Recorded By, (t) = Taken By, (c) = Cosigned By      Initials Name Provider Type    Stacey Alba OTR/L Occupational Therapist                  Therapy Charges for Today       Code Description Service Date Service Provider Modifiers Qty     [id number]  OT SELF CARE/MGMT/TRAIN EA 15 MIN 9/11/2023 Stacey Reagan OTR/L GO 2               OT Discharge Summary  Anticipated Discharge Disposition (OT): home  Reason for Discharge: Independent  Outcomes Achieved: Able to achieve all goals within established timeline  Discharge Destination: Home    LORETTA Reid/MARY  9/11/2023

## 2023-09-12 ENCOUNTER — APPOINTMENT (OUTPATIENT)
Dept: CT IMAGING | Facility: HOSPITAL | Age: 78
DRG: 378 | End: 2023-09-12
Payer: MEDICARE

## 2023-09-12 ENCOUNTER — READMISSION MANAGEMENT (OUTPATIENT)
Dept: CALL CENTER | Facility: HOSPITAL | Age: 78
End: 2023-09-12
Payer: MEDICARE

## 2023-09-12 VITALS
HEART RATE: 74 BPM | BODY MASS INDEX: 35.45 KG/M2 | OXYGEN SATURATION: 96 % | SYSTOLIC BLOOD PRESSURE: 136 MMHG | DIASTOLIC BLOOD PRESSURE: 53 MMHG | HEIGHT: 68 IN | RESPIRATION RATE: 16 BRPM | TEMPERATURE: 97.5 F | WEIGHT: 233.9 LBS

## 2023-09-12 PROBLEM — I10 PRIMARY HYPERTENSION: Status: ACTIVE | Noted: 2023-09-12

## 2023-09-12 LAB
ANION GAP SERPL CALCULATED.3IONS-SCNC: 8 MMOL/L (ref 5–15)
BASOPHILS # BLD AUTO: 0.01 10*3/MM3 (ref 0–0.2)
BASOPHILS NFR BLD AUTO: 0.1 % (ref 0–1.5)
BH BB BLOOD EXPIRATION DATE: NORMAL
BH BB BLOOD TYPE BARCODE: 5100
BH BB DISPENSE STATUS: NORMAL
BH BB PRODUCT CODE: NORMAL
BH BB UNIT NUMBER: NORMAL
BUN SERPL-MCNC: 20 MG/DL (ref 8–23)
BUN/CREAT SERPL: 17.7 (ref 7–25)
CALCIUM SPEC-SCNC: 7.9 MG/DL (ref 8.6–10.5)
CHLORIDE SERPL-SCNC: 106 MMOL/L (ref 98–107)
CO2 SERPL-SCNC: 27 MMOL/L (ref 22–29)
CREAT SERPL-MCNC: 1.13 MG/DL (ref 0.57–1)
DEPRECATED RDW RBC AUTO: 60.3 FL (ref 37–54)
EGFRCR SERPLBLD CKD-EPI 2021: 50.2 ML/MIN/1.73
EOSINOPHIL # BLD AUTO: 0.1 10*3/MM3 (ref 0–0.4)
EOSINOPHIL NFR BLD AUTO: 1.2 % (ref 0.3–6.2)
ERYTHROCYTE [DISTWIDTH] IN BLOOD BY AUTOMATED COUNT: 17.8 % (ref 12.3–15.4)
GLUCOSE SERPL-MCNC: 108 MG/DL (ref 65–99)
HCT VFR BLD AUTO: 26.7 % (ref 34–46.6)
HGB BLD-MCNC: 8.1 G/DL (ref 12–15.9)
LYMPHOCYTES # BLD AUTO: 1.25 10*3/MM3 (ref 0.7–3.1)
LYMPHOCYTES NFR BLD AUTO: 14.9 % (ref 19.6–45.3)
MCH RBC QN AUTO: 28.5 PG (ref 26.6–33)
MCHC RBC AUTO-ENTMCNC: 30.3 G/DL (ref 31.5–35.7)
MCV RBC AUTO: 94 FL (ref 79–97)
MONOCYTES # BLD AUTO: 0.73 10*3/MM3 (ref 0.1–0.9)
MONOCYTES NFR BLD AUTO: 8.7 % (ref 5–12)
NEUTROPHILS NFR BLD AUTO: 6.24 10*3/MM3 (ref 1.7–7)
NEUTROPHILS NFR BLD AUTO: 74.4 % (ref 42.7–76)
PLATELET # BLD AUTO: 17 10*3/MM3 (ref 140–450)
PMV BLD AUTO: ABNORMAL FL
POTASSIUM SERPL-SCNC: 3.7 MMOL/L (ref 3.5–5.2)
RBC # BLD AUTO: 2.84 10*6/MM3 (ref 3.77–5.28)
SODIUM SERPL-SCNC: 141 MMOL/L (ref 136–145)
UNIT  ABO: NORMAL
UNIT  RH: NORMAL
WBC NRBC COR # BLD: 8.39 10*3/MM3 (ref 3.4–10.8)

## 2023-09-12 PROCEDURE — 99232 SBSQ HOSP IP/OBS MODERATE 35: CPT | Performed by: INTERNAL MEDICINE

## 2023-09-12 PROCEDURE — 36430 TRANSFUSION BLD/BLD COMPNT: CPT

## 2023-09-12 PROCEDURE — 25010000002 FENTANYL CITRATE (PF) 50 MCG/ML SOLUTION: Performed by: RADIOLOGY

## 2023-09-12 PROCEDURE — 88313 SPECIAL STAINS GROUP 2: CPT | Performed by: INTERNAL MEDICINE

## 2023-09-12 PROCEDURE — 85025 COMPLETE CBC W/AUTO DIFF WBC: CPT | Performed by: INTERNAL MEDICINE

## 2023-09-12 PROCEDURE — 07DR3ZX EXTRACTION OF ILIAC BONE MARROW, PERCUTANEOUS APPROACH, DIAGNOSTIC: ICD-10-PCS | Performed by: RADIOLOGY

## 2023-09-12 PROCEDURE — 88311 DECALCIFY TISSUE: CPT | Performed by: INTERNAL MEDICINE

## 2023-09-12 PROCEDURE — 25010000002 MIDAZOLAM PER 1 MG: Performed by: RADIOLOGY

## 2023-09-12 PROCEDURE — 80048 BASIC METABOLIC PNL TOTAL CA: CPT | Performed by: NURSE PRACTITIONER

## 2023-09-12 PROCEDURE — 88305 TISSUE EXAM BY PATHOLOGIST: CPT | Performed by: INTERNAL MEDICINE

## 2023-09-12 PROCEDURE — 88264 CHROMOSOME ANALYSIS 20-25: CPT | Performed by: INTERNAL MEDICINE

## 2023-09-12 PROCEDURE — P9035 PLATELET PHERES LEUKOREDUCED: HCPCS

## 2023-09-12 PROCEDURE — 77012 CT SCAN FOR NEEDLE BIOPSY: CPT

## 2023-09-12 PROCEDURE — 0 LIDOCAINE 1 % SOLUTION: Performed by: RADIOLOGY

## 2023-09-12 PROCEDURE — 88237 TISSUE CULTURE BONE MARROW: CPT | Performed by: INTERNAL MEDICINE

## 2023-09-12 PROCEDURE — P9100 PATHOGEN TEST FOR PLATELETS: HCPCS

## 2023-09-12 RX ORDER — MIDAZOLAM HYDROCHLORIDE 1 MG/ML
INJECTION INTRAMUSCULAR; INTRAVENOUS AS NEEDED
Status: COMPLETED | OUTPATIENT
Start: 2023-09-12 | End: 2023-09-12

## 2023-09-12 RX ORDER — FENTANYL CITRATE 50 UG/ML
INJECTION, SOLUTION INTRAMUSCULAR; INTRAVENOUS AS NEEDED
Status: COMPLETED | OUTPATIENT
Start: 2023-09-12 | End: 2023-09-12

## 2023-09-12 RX ORDER — LIDOCAINE HYDROCHLORIDE 10 MG/ML
INJECTION, SOLUTION INFILTRATION; PERINEURAL AS NEEDED
Status: COMPLETED | OUTPATIENT
Start: 2023-09-12 | End: 2023-09-12

## 2023-09-12 RX ADMIN — Medication 10 ML: at 09:25

## 2023-09-12 RX ADMIN — SERTRALINE HYDROCHLORIDE 100 MG: 100 TABLET, FILM COATED ORAL at 09:23

## 2023-09-12 RX ADMIN — LOSARTAN POTASSIUM 100 MG: 50 TABLET, FILM COATED ORAL at 09:24

## 2023-09-12 RX ADMIN — BUPROPION HYDROCHLORIDE 150 MG: 150 TABLET, EXTENDED RELEASE ORAL at 09:24

## 2023-09-12 RX ADMIN — FENTANYL CITRATE 50 MCG: 50 INJECTION, SOLUTION INTRAMUSCULAR; INTRAVENOUS at 08:11

## 2023-09-12 RX ADMIN — LIDOCAINE HYDROCHLORIDE 10 ML: 10 INJECTION, SOLUTION INFILTRATION; PERINEURAL at 08:12

## 2023-09-12 RX ADMIN — AMLODIPINE BESYLATE 5 MG: 5 TABLET ORAL at 09:24

## 2023-09-12 RX ADMIN — FLUTICASONE PROPIONATE 2 SPRAY: 50 SPRAY, METERED NASAL at 09:25

## 2023-09-12 RX ADMIN — HYDROCHLOROTHIAZIDE 12.5 MG: 25 TABLET ORAL at 09:24

## 2023-09-12 RX ADMIN — PANTOPRAZOLE SODIUM 40 MG: 40 TABLET, DELAYED RELEASE ORAL at 09:23

## 2023-09-12 RX ADMIN — MIDAZOLAM 1 MG: 1 INJECTION INTRAMUSCULAR; INTRAVENOUS at 08:11

## 2023-09-12 RX ADMIN — ROSUVASTATIN CALCIUM 20 MG: 20 TABLET, FILM COATED ORAL at 09:24

## 2023-09-12 RX ADMIN — VALACYCLOVIR 500 MG: 500 TABLET, FILM COATED ORAL at 09:23

## 2023-09-12 RX ADMIN — FAMOTIDINE 20 MG: 20 TABLET, FILM COATED ORAL at 09:24

## 2023-09-12 RX ADMIN — CARVEDILOL 6.25 MG: 6.25 TABLET, FILM COATED ORAL at 09:25

## 2023-09-12 NOTE — PAYOR COMM NOTE
"Ref:    [id number]     [facility]  Fax  [phone]    Marina Joe (78 y.o. Female)       Date of Birth   1945    Social Security Number       Address   [address] [place] [address]    Home Phone   [phone]    MRN   [id number]       Hoahaoism   Yazidism    Marital Status                               Admission Date   9/6/23    Admission Type   Emergency    Admitting Provider   Christian Chatterjee MD    Attending Provider       Department, Room/Bed   [facility] 3C, 390/1       Discharge Date   9/12/2023    Discharge Disposition   Home or Self Care    Discharge Destination                                 Attending Provider: (none)   Allergies: Aspirin, Niacin    Isolation: None   Infection: None   Code Status: CPR    Ht: 172.7 cm (68\")   Wt: 106 kg (233 lb 14.4 oz)    Admission Cmt: None   Principal Problem: GIB (gastrointestinal bleeding) [K92.2]                   Active Insurance as of 9/6/2023       Primary Coverage       Payor Plan Insurance Group Employer/Plan Group    HUMANA MEDICARE REPLACEMENT HUMANA MEDICARE REPLACEMENT [id number]       Payor Plan Address Payor Plan Phone Number Payor Plan Fax Number Effective Dates    [address] [address] [phone]  6/1/2023 - None Entered    [facility] [phone]         Subscriber Name Subscriber Birth Date Member ID       MARINA JOE 1945 [id number]               Secondary Coverage       Payor Plan Insurance Group Employer/Plan Group    [place]        Payor Plan Address Payor Plan Phone Number Payor Plan Fax Number Effective Dates    [address] [address] [phone]  2/28/2017 - None Entered    [facility] [phone]         Subscriber Name Subscriber Birth Date Member ID       MARINA JOE 1945 [id number]                     Emergency Contacts        (Rel.) Home Phone Work Phone Mobile Phone    Jose Enrique Nieves (Friend) -- -- [phone]               "   Discharge Summary        Alma Zaidi APRN at 09/12/23 0803                [facility]  DISCHARGE SUMMARY     Date of Admission: 9/6/2023  Date of Discharge:  9/12/2023  Primary Care Physician: Hanh Og APRN    Presenting Problem/History of Present Illness:  Black stools    Final Discharge Diagnoses:  Active Hospital Problems    Diagnosis     **GIB (gastrointestinal bleeding)     Acute on chronic blood loss anemia     Primary hypertension     Chronic idiopathic thrombocytopenia     Hypotension due to hypovolemia     Stage 3b chronic kidney disease      Consults:   1.  Dr. Chance, gastroenterology  2.  Dr. Shah, hematology    Procedures Performed: Bone marrow biopsy 9/12/2023    Pertinent Test Results:   Results for orders placed during the hospital encounter of 10/26/22    Adult Stress Echo W/ Cont or Stress Agent if Necessary Per Protocol    Interpretation Summary    Low risk for ischemia.    All left ventricular wall segments demonstrate appropriate increase in contractility with dobutamine infusion.    Imaging Results (All)       Procedure Component Value Units Date/Time    FL Upper GI Water Soluble [[id number]] Collected: 09/08/23 1208     Updated: 09/08/23 1216    Narrative:      Procedure: Fluoro Upper GI single contrast     Clinical indication: Melena     Technique: Single contrast water-soluble cine esophagram and upper GI  study is performed.         Fluoroscopy time:  1 minute 34 seconds minutes.     Images/ image captures: 19        Findings:      In the upright position there is normal transit of contrast through the  esophagus without strictures or masses.      No gastroesophageal reflux observed fluoroscopically.      The 13 mm diameter barium tablet passes without difficulty.     There appears to be thickened gastric rugae involving the body.     The stomach is without ulcers or masses. There is no delay in gastric  emptying.       Duodenal bulb and C-loop are normal.       Impression:      Impression:         Thickened gastric rugae predominantly involving the gastric body which  can be seen with gastritis. Consider correlation with endoscopy.     No visible mass, large ulcer, or stricture.  This report was finalized on 09/08/2023 12:13 by  Warren Freire DO.          LAB RESULTS:      Lab 09/12/23  0529 09/11/23  1422 09/11/23  1030 09/11/23  0521 09/10/23  0545 09/09/23  1623 09/09/23  0752 09/08/23  1621 09/08/23  0623 09/07/23  0141 09/06/23  1608 09/06/23  1532   WBC 8.39 10.31  --  9.73 9.71  --  10.69  --  10.67   < > 8.67  --    HEMOGLOBIN 8.1* 8.9*  --  8.6* 7.9* 8.9* 7.9*   < > 7.9*   < > 4.9*  --    HEMATOCRIT 26.7* 29.1*  --  27.2* 26.2* 28.1* 25.4*   < > 24.6*   < > 16.6*  --    PLATELETS 17* 16*  --  19* 18*  --  20*  --  24*   < > 24*  --    NEUTROS ABS 6.24 7.96*  --   --   --   --  9.13*  --  9.08*  --  6.61  --    LYMPHS ABS 1.25 1.33  --   --   --   --  0.77  --  0.82  --   --   --    MONOS ABS 0.73 0.86  --   --   --   --  0.65  --  0.59  --   --   --    EOS ABS 0.10 0.02  --   --   --   --  0.00  --  0.00  --  0.26  --    MCV 94.0 92.7  --  92.8 93.2  --  93.4  --  90.1   < > 97.6*  --    LACTATE  --   --   --   --   --   --   --   --   --   --   --  1.5   PROTIME  --   --  13.7  --   --   --   --   --   --   --  14.2  --     < > = values in this interval not displayed.         Lab 09/12/23  0529 09/11/23  0521 09/10/23  0545 09/09/23  0752 09/08/23  0623   SODIUM 141 137 140 140 137   POTASSIUM 3.7 4.2 4.2 4.0 3.9   CHLORIDE 106 106 108* 107 105   CO2 27.0 24.0 23.0 24.0 22.0   ANION GAP 8.0 7.0 9.0 9.0 10.0   BUN 20 21 22 30* 36*   CREATININE 1.13* 1.07* 1.12* 1.18* 1.24*   EGFR 50.2* 53.6* 50.8* 47.7* 44.9*   GLUCOSE 108* 107* 108* 114* 136*   CALCIUM 7.9* 8.4* 8.2* 8.3* 8.2*         Lab 09/06/23  1532   TOTAL PROTEIN 5.0*   ALBUMIN 3.1*   GLOBULIN 1.9   ALT (SGPT) 9   AST (SGOT) 14   BILIRUBIN <0.2   ALK PHOS 50  "        Lab 09/11/23  1030 09/06/23  1608   PROTIME 13.7 14.2   INR 1.04 1.09             Lab 09/11/23  0717 09/06/23  1532   IRON  --  60   IRON SATURATION (TSAT)  --  21   TIBC  --  292*   TRANSFERRIN  --  196*   FERRITIN  --  260.60*   ABO TYPING O O   RH TYPING Positive Positive   ANTIBODY SCREEN Negative Negative         Brief Urine Lab Results  (Last result in the past 365 days)        Color   Clarity   Blood   Leuk Est   Nitrite   Protein   CREAT   Urine HCG        08/10/23 1302 Yellow   Clear   Negative   Small (1+)   Negative   Negative                 Microbiology Results (last 10 days)       ** No results found for the last 240 hours. **          Hospital Course: Ms. Joe presented to [facility] ER 9/6/2023 with worsening weakness, black stools. She has a history of CKD stage IIIb, chronic thrombocytopenia, normocytic anemia followed by [facility] oncology. Patient has had multiple episodes of anemia requiring transfusion. She was evaluated at [facility] ER on 9/3/2023 and received 1 unit packed cells. At that time, ER provider discussed with gastroenterology who did not believe scope was needed as patient had both upper and lower scopes February 2023 essentially negative. On 9/4, patient developed profound black tarry stools but did not return to the emergency room because she was \"too sick\". Due to ongoing black stools she presented to ER on 9/6 with hemoglobin 4.9, platelet count 24,000. Patient denied any abdominal pain but upon palpation she was noted to have mid epigastric tenderness. Abdomen soft and bowel sounds within normal limits. Patient was ordered 1 platelet pheresis pack and 3 units packed cells in ER.     She was admitted to the medical floor with a acute on chronic blood loss anemia superimposed on iron deficiency anemia and anemia of chronic kidney disease with suspected upper GI bleed.  Patient has history of normocytic iron deficiency anemia followed by Dr. Shah requiring multiple blood " transfusions.  Patient had endoscopy and colonoscopy February 2023 essentially negative.  Platelets 24,000 on admission and patient received 1 platelet pheresis pack.  Follow-up platelet level 17,000 and patient received additional platelet pheresis pack.  Initial hemoglobin 4.9 and patient was transfused total of 4 units packed red blood cells.  Patient continued to have black stools on 9/8 and 9/9 but stools finally cleared on 9/10.  Patient denied abdominal pain.    Suspected upper GI bleed as patient reported black stools.  On 9/8 patient reported large black tarry stool and had 4 additional black tarry stools afterwards.  She received a total of 4 units packed red blood cells.  Initially she received 2 platelet pheresis packs.  Gastroenterology consulted and she was seen by Dr. Chance who continued to decline the need for endoscopy due to thrombocytopenia.  Instead, upper GI recommended and an esophagram 9/8 noted thickened gastric rugae dominantly involving the gastric body which can be seen with gastritis.  Recommend correlation with endoscopy.  Again, Dr. Chance followed and declined endoscopy due to low platelet count.  Patient remained on Protonix 40 mg twice daily and Pepcid 20 mg twice daily added 9/8/2023 per recommendations of GI.  Patient has EUS scheduled on 9/13/2023 with Dr. Gus Carvalho at [facility] and Dr. Chance advised patient keep scheduled appointment and can have endoscopy at that time if platelets improved.    Patient has chronic idiopathic thrombocytopenia followed by Dr. Boucher with hematology.  Patient received last Injectafer on 8/11/2023 and Retacrit on 9/1/2023.  No features of myelodysplasia or excess blast on 11/10/2022 per Dr. Shah. Platelets 24,000 on admission and received 1 platelet pheresis pack.  Repeat platelet level 17,000 an additional 1 unit platelet pheresis pack transfused. Initial hemoglobin of 4.9, patient was transfused total of 4 PRBCs.   Dr. Shah ordered trial of  "dexamethasone 40 mg IV for total of 4 days completed.  Dr. Shah requested bone marrow biopsy on 9/11 prior to discharge.  Patient received 1 unit platelets prior to planned procedure.  Unfortunately, radiology unable to complete bone marrow biopsy prior to 2 PM on 9/11 when specimen was required to be in lab.  Bone marrow biopsy rescheduled for 8 AM on 9/12.  Patient to be transfused 1 pheresis pack prior to bone marrow biopsy.  Dr. Boucher to follow-up results of bone marrow biopsy after discharge.    Stage IIIb chronic kidney disease.  Creatinine 1.88 on admission.  Creatinine 1.13 on 9/12.  Baseline creatinine 1.2-1.5.    Patient has a history of primary hypertension but presented with hypotension due to hypovolemia on admission.  Patient presented with blood pressure 93/56 suspect secondary to blood loss anemia.  Resolution of hypotension after blood transfusion.  Blood pressure trended upward.  Coreg, Irbesartan (losartan/HCTZ substituted) and amlodipine resumed.  Blood pressure 154/62, 135/59 at discharge.    SCDs ordered for deep vein thrombosis prophylaxis.    On 9/12/2023, patient stable for discharge after completing bone marrow biopsy.  Patient has EUS scheduled on 9/13/2023 by Dr. Gus Carvalho.  She will follow-up with Hanh HERNANDEZ on 9/19/2023.  Follow-up with MARY Toney hematology in 1 week to follow-up on bone marrow results.  Patient has weekly lab work performed per hematology with next lab work due on 9/15/2023.    Physical Exam on Discharge:  /65   Pulse 60   Temp 98 °F (36.7 °C) (Oral)   Resp 15   Ht 172.7 cm (68\")   Wt 106 kg (233 lb 14.4 oz)   LMP  (LMP Unknown)   SpO2 98%   BMI 35.56 kg/m²   Physical Exam  Vitals and nursing note reviewed.   Constitutional:       Comments: Sitting up in bed.  No oxygen in use.  No visitors in room.   HENT:      Head: Normocephalic and atraumatic.      Nose: No congestion.      Mouth/Throat:      Pharynx: Oropharynx is clear. No " oropharyngeal exudate or posterior oropharyngeal erythema.   Eyes:      Extraocular Movements: Extraocular movements intact.      Pupils: Pupils are equal, round, and reactive to light.   Cardiovascular:      Rate and Rhythm: Normal rate and regular rhythm.      Heart sounds: No murmur heard.  Pulmonary:      Breath sounds: No wheezing, rhonchi or rales.      Comments: No oxygen in use.  Abdominal:      General: There is no distension.      Palpations: Abdomen is soft.      Comments: No black stools.   Genitourinary:     Comments: Voiding.  Musculoskeletal:         General: No swelling or tenderness.      Cervical back: Normal range of motion and neck supple.   Skin:     General: Skin is warm and dry.   Neurological:      General: No focal deficit present.      Mental Status: She is alert and oriented to person, place, and time.   Psychiatric:         Mood and Affect: Mood normal.         Behavior: Behavior normal.         Thought Content: Thought content normal.         Judgment: Judgment normal.     Condition on Discharge: Stable for discharge home    Discharge Disposition:  Home or Self Care    Discharge Medications:     Discharge Medications        New Medications        Instructions Start Date   famotidine 20 MG tablet  Commonly known as: PEPCID   20 mg, Oral, 2 Times Daily Before Meals             Changes to Medications        Instructions Start Date   pantoprazole 40 MG EC tablet  Commonly known as: PROTONIX  What changed: when to take this   40 mg, Oral, 2 Times Daily Before Meals             Continue These Medications        Instructions Start Date   albuterol sulfate  (90 Base) MCG/ACT inhaler  Commonly known as: PROVENTIL HFA;VENTOLIN HFA;PROAIR HFA   2 puffs, Inhalation, Every 4 Hours PRN      amLODIPine 5 MG tablet  Commonly known as: NORVASC   5 mg, Oral, 2 Times Daily      buPROPion  MG 24 hr tablet  Commonly known as: WELLBUTRIN XL   150 mg, Oral, Daily      CALCIUM 600+D3 PO   1 tablet,  Oral, Daily      carvedilol 6.25 MG tablet  Commonly known as: COREG   6.25 mg, Oral, 2 Times Daily With Meals      cetirizine 10 MG tablet  Commonly known as: zyrTEC   10 mg, Oral, Daily      cloNIDine 0.1 MG tablet  Commonly known as: CATAPRES   0.1 mg, Oral, 2 Times Daily      cyclobenzaprine 10 MG tablet  Commonly known as: FLEXERIL   10 mg, Oral, 3 Times Daily PRN      ferrous sulfate 325 (65 FE) MG tablet   325 mg, Oral, Daily With Breakfast      fish oil 1000 MG capsule capsule   1,000 mg, Oral      fluticasone 50 MCG/ACT nasal spray  Commonly known as: FLONASE   2 sprays, Nasal, Daily      irbesartan-hydrochlorothiazide 300-12.5 MG tablet  Commonly known as: AVALIDE   1 tablet, Oral, Daily      montelukast 10 MG tablet  Commonly known as: SINGULAIR   10 mg, Oral, Daily      Multivitamin Adult tablet tablet  Generic drug: multivitamin with minerals   1 tablet, Oral, Daily      rOPINIRole 0.5 MG tablet  Commonly known as: REQUIP   0.5 mg, Oral, Nightly      rosuvastatin 20 MG tablet  Commonly known as: CRESTOR   20 mg, Oral, Daily      sertraline 100 MG tablet  Commonly known as: ZOLOFT   100 mg, Oral, Daily      sertraline 100 MG tablet  Commonly known as: ZOLOFT   100 mg, Oral, Daily      traZODone 100 MG tablet  Commonly known as: DESYREL   100 mg, Oral, Nightly PRN      valACYclovir 500 MG tablet  Commonly known as: VALTREX   500 mg, Oral, 2 Times Daily      VITAMIN D2 PO   50,000 Units, Oral, Every 30 Days             Discharge Diet:   Diet Instructions       Diet: Regular/House Diet; Regular Texture (IDDSI 7); Thin (IDDSI 0)      Discharge Diet: Regular/House Diet    Texture: Regular Texture (IDDSI 7)    Fluid Consistency: Thin (IDDSI 0)          Activity at Discharge:   Activity Instructions       Activity as Tolerated            Discharge instructions:  1.  For recurrent black stools seek medical attention.  2.  Keep scheduled appointment with Dr. Gus Carvalho on 9/13/2023 for EUS  3.  Follow-up with  MARY Toney 1 week to follow-up on bone marrow biopsy.  4.  Follow-up with Hanh Clifford, 1 week, 9/19/2023  5.  Continue Protonix 40 mg twice daily per gastroenterology.  6.  Pepcid 20 mg orally twice daily per gastroenterology    Follow-up Appointments:   Future Appointments   Date Time Provider Department Center   9/15/2023  9:25 AM BH PAD CANCER CTR LAB BH PAD CCLAB PAD   9/15/2023  9:45 AM TX ROOM BH PAD OP INFU ONC BH PAD OIONC PAD   9/19/2023  8:00 AM Hanh Og APRN [facility]W PC VSQ PAD   9/22/2023  9:20 AM BH PAD CANCER CTR LAB BH PAD CCLAB PAD   9/22/2023  9:30 AM TX ROOM BH PAD OP INFU ONC BH PAD OIONC PAD   9/25/2023  9:30 AM Meaghan White APRN [facility] GE PAD PAD   9/29/2023  8:10 AM BH PAD CANCER CTR LAB BH PAD CCLAB PAD   9/29/2023  8:30 AM TX ROOM BH PAD OP INFU ONC BH PAD OIONC PAD   10/6/2023  9:10 AM BH PAD CANCER CTR LAB BH PAD CCLAB PAD   10/6/2023  9:30 AM TX ROOM BH PAD OP INFU ONC BH PAD OIONC PAD   10/13/2023  8:00 AM BH PAD CANCER CTR LAB BH PAD CCLAB PAD   10/13/2023  8:30 AM Analilia Madera APRN MGW ONC PAD PAD   10/13/2023  9:00 AM TX ROOM BH PAD OP INFU ONC BH PAD OIONC PAD     MARY Toney, hematology to follow-up on bone marrow biopsy in 1 week Dr. Shah    Test Results Pending at Discharge: Bone marrow biopsy results    Electronically signed by MARY Glynn, 09/12/23, 09:05 CDT.    Time: 35 minutes.  Discussed with Dr. Chatterjee, Dr. Shah, and patient.    The above documentation resulted from a face-to-face encounter by ramonita HERNANDEZ, [facility].           Electronically signed by Alma Zaidi APRN at 09/12/23 0906       Discharge Order (From admission, onward)       Start     Ordered    09/12/23 0855  Discharge patient  Once        Expected Discharge Date: 09/12/23   Discharge Disposition: Home or Self Care   Physician of Record for Attribution - Please select from Treatment Team: IVETH SCHERER [[id number]]   Review needed by CMO to  determine Physician of Record: No      Question Answer Comment   Physician of Record for Attribution - Please select from Treatment Team IVETH SCHERER    Review needed by CMO to determine Physician of Record No        09/12/23 0855    09/11/23 0858  Discharge patient  Once,   Status:  Canceled        Expected Discharge Date: 09/11/23   Expected Discharge Time: Midday   Discharge Disposition: Home or Self Care   Physician of Record for Attribution - Please select from Treatment Team: IVETH SCHERER [[id number]]   Review needed by CMO to determine Physician of Record: No      Question Answer Comment   Physician of Record for Attribution - Please select from Treatment Team IVETH SCHERER    Review needed by CMO to determine Physician of Record No        09/11/23 0857

## 2023-09-12 NOTE — PLAN OF CARE
Goal Outcome Evaluation:  Plan of Care Reviewed With: patient        Progress: improving  Outcome Evaluation: Patient being discharged home today. Bone marrow biopsy completed this morning. Platelets given this AM; tolerated well. Alert x4. Voiding. BM yesterday. No active bleeding noted. Hgb 8.1 today. Patient educated on new medications and future Dr. appointments. No further questions were asked. Family will be taking patient home today. Safety maintained.

## 2023-09-12 NOTE — OUTREACH NOTE
Prep Survey      Flowsheet Row Responses   [facility] patient discharged from? [place]   Is LACE score < 7 ? No   Eligibility [facility]   Date of Admission 09/06/23   Date of Discharge 09/12/23   Discharge Disposition Home or Self Care   Discharge diagnosis GIB (gastrointestinal bleeding)   Does the patient have one of the following disease processes/diagnoses(primary or secondary)? Other   Does the patient have Home health ordered? No   Is there a DME ordered? No   Prep survey completed? Yes            Sarah CASH - Registered Nurse

## 2023-09-12 NOTE — THERAPY DISCHARGE NOTE
Acute Care - Physical Therapy Discharge Summary  [facility]       Patient Name: Marina Joe  : 1945  MRN: [id number]    Today's Date: 2023                 Admit Date: 2023      PT Recommendation and Plan    Visit Dx:    ICD-10-CM ICD-9-CM   1. Gastrointestinal hemorrhage, unspecified gastrointestinal hemorrhage type  K92.2 578.9   2. Impaired mobility [Z74.09 (ICD-10-CM)]  Z74.09 799.89                PT Rehab Goals       Row Name 23 1500             Bed Mobility Goal 1 (PT)    Activity/Assistive Device (Bed Mobility Goal 1, PT) bed mobility activities, all  -AB      [place] Level/Cues Needed (Bed Mobility Goal 1, PT) independent  -AB      Time Frame (Bed Mobility Goal 1, PT) long term goal (LTG);10 days  -AB      Progress/Outcomes (Bed Mobility Goal 1, PT) goal met  -AB         Transfer Goal 1 (PT)    Activity/Assistive Device (Transfer Goal 1, PT) sit-to-stand/stand-to-sit;bed-to-chair/chair-to-bed  -AB      [place] Level/Cues Needed (Transfer Goal 1, PT) standby assist;independent  -AB      Time Frame (Transfer Goal 1, PT) long term goal (LTG);10 days  -AB      Progress/Outcome (Transfer Goal 1, PT) goal met  -AB         Gait Training Goal 1 (PT)    Activity/Assistive Device (Gait Training Goal 1, PT) gait (walking locomotion);decrease fall risk;improve balance and speed;increase endurance/gait distance  -AB      [place] Level (Gait Training Goal 1, PT) standby assist;independent  -AB      Distance (Gait Training Goal 1, PT) 150-200 ft  -AB      Time Frame (Gait Training Goal 1, PT) long term goal (LTG);10 days  -AB      Progress/Outcome (Gait Training Goal 1, PT) goal met  -AB         Stairs Goal 1 (PT)    Activity/Assistive Device (Stairs Goal 1, PT) ascending stairs;descending stairs;using handrail, right;improve balance and speed;decrease fall risk;step-to-step  -AB      [place] Level/Cues Needed (Stairs Goal 1, PT) standby assist  -AB      Number of Stairs (Stairs  Goal 1, PT) 3  -AB      Time Frame (Stairs Goal 1, PT) long term goal (LTG);10 days  -AB      Progress/Outcome (Stairs Goal 1, PT) goal met  -AB                User Key  (r) = Recorded By, (t) = Taken By, (c) = Cosigned By      Initials Name Provider Type Discipline    Carlene Canela, PTA Physical Therapist Assistant PT                        PT Discharge Summary  Anticipated Discharge Disposition (PT): home with assist  Reason for Discharge: Discharge from facility  Outcomes Achieved: Refer to plan of care for updates on goals achieved  Discharge Destination: Home with assist      Carlene Smith, LAZARO   9/12/2023

## 2023-09-12 NOTE — POST-PROCEDURE NOTE
S/p CT guided bone marrow biopsy    1 mg versed  50 mcg fentanyl     7 ml aspirate  1 core     Less than 1 ml blood loss  No immed complications  Routine post procedure monitoring     Call ext [phone] if questions

## 2023-09-12 NOTE — PLAN OF CARE
Problem: Adult Inpatient Plan of Care  Goal: Plan of Care Review  Outcome: Ongoing, Progressing  Flowsheets (Taken 9/12/2023 9378)  Progress: improving  Plan of Care Reviewed With: patient  Outcome Evaluation: Pt denies pain or nausea. NPO since midnight for bone marrow biopsy this AM. A&O x4. Safety maintained.

## 2023-09-12 NOTE — PROGRESS NOTES
[facility] Progress Note    Progress Note    Patient:  Marina Joe  YOB: 1945  Date of Service: 9/12/2023  MRN: [id number]   Acct: [id number]   Primary Care Physician: Hanh Og APRN  Advance Directive:   Code Status and Medical Interventions:   Ordered at: 09/06/23 1810     Level Of Support Discussed With:    Patient     Code Status (Patient has no pulse and is not breathing):    CPR (Attempt to Resuscitate)     Medical Interventions (Patient has pulse or is breathing):    Full Support     Admit Date: 9/6/2023       Hospital Day: 6      Subjective:     Chief Compliant: Patient seen with nurse Jessica at the bedside.  She has no complaints.  No bleeding manifestations.          Review of Systems:   Review of Systems   Constitutional:  Positive for fatigue. Negative for fever.   HENT:  Negative for nosebleeds.    Eyes:  Negative for discharge and redness.   Respiratory:  Negative for shortness of breath and wheezing.    Cardiovascular:  Negative for chest pain and palpitations.   Gastrointestinal:  Negative for blood in stool, nausea and vomiting.   Endocrine: Negative for polydipsia and polyphagia.   Genitourinary:  Negative for hematuria and vaginal bleeding.   Musculoskeletal:  Negative for neck stiffness.   Skin:  Positive for pallor.   Allergic/Immunologic: Negative for immunocompromised state.   Neurological:  Negative for dizziness, facial asymmetry and speech difficulty.   Hematological:  Does not bruise/bleed easily.   Psychiatric/Behavioral:  Negative for agitation, confusion and hallucinations.          Medications:   Scheduled Meds:amLODIPine, 5 mg, Oral, BID  buPROPion XL, 150 mg, Oral, Daily  carvedilol, 6.25 mg, Oral, BID With Meals  famotidine, 20 mg, Oral, BID AC  fluticasone, 2 spray, Nasal, Daily  losartan, 100 mg, Oral, Q24H   And  hydroCHLOROthiazide, 12.5 mg, Oral, Q24H  montelukast, 10 mg, Oral, Nightly  pantoprazole, 40 mg, Oral, BID AC  rOPINIRole, 0.5 mg,  Oral, Nightly  rosuvastatin, 20 mg, Oral, Daily  sertraline, 100 mg, Oral, Daily  sodium chloride, 10 mL, Intravenous, Q12H  valACYclovir, 500 mg, Oral, BID        Continuous Infusions:     Labs:     Lab Results (last 72 hours)       Procedure Component Value Units Date/Time    Basic Metabolic Panel [[id number]]  (Abnormal) Collected: 09/12/23 0529    Specimen: Blood Updated: 09/12/23 0601     Glucose 108 mg/dL      BUN 20 mg/dL      Creatinine 1.13 mg/dL      Sodium 141 mmol/L      Potassium 3.7 mmol/L      Chloride 106 mmol/L      CO2 27.0 mmol/L      Calcium 7.9 mg/dL      BUN/Creatinine Ratio 17.7     Anion Gap 8.0 mmol/L      eGFR 50.2 mL/min/1.73     Narrative:      GFR Normal >60  Chronic Kidney Disease <60  Kidney Failure <15    The GFR formula is only valid for adults with stable renal function between ages 18 and 70.    CBC & Differential [[id number]]  (Abnormal) Collected: 09/12/23 0529    Specimen: Blood Updated: 09/12/23 0549    Narrative:      The following orders were created for panel order CBC & Differential.  Procedure                               Abnormality         Status                     ---------                               -----------         ------                     CBC Auto Differential[[id number]]        Abnormal            Final result                 Please view results for these tests on the individual orders.    CBC Auto Differential [[id number]]  (Abnormal) Collected: 09/12/23 0529    Specimen: Blood Updated: 09/12/23 0549     WBC 8.39 10*3/mm3      RBC 2.84 10*6/mm3      Hemoglobin 8.1 g/dL      Hematocrit 26.7 %      MCV 94.0 fL      MCH 28.5 pg      MCHC 30.3 g/dL      RDW 17.8 %      RDW-SD 60.3 fl      MPV --     Comment: Unable to calculate        Platelets 17 10*3/mm3      Neutrophil % 74.4 %      Lymphocyte % 14.9 %      Monocyte % 8.7 %      Eosinophil % 1.2 %      Basophil % 0.1 %      Neutrophils, Absolute 6.24 10*3/mm3      Lymphocytes, Absolute 1.25 10*3/mm3       Monocytes, Absolute 0.73 10*3/mm3      Eosinophils, Absolute 0.10 10*3/mm3      Basophils, Absolute 0.01 10*3/mm3     Bone Marrow Exam [[id number]]  (Abnormal) Collected: 09/11/23 1422    Specimen: Bone Marrow Aspirate Updated: 09/11/23 1501    Narrative:      The following orders were created for panel order Bone Marrow Exam.  Procedure                               Abnormality         Status                     ---------                               -----------         ------                     Bone Marrow Exam[[id number]]                                                            CBC Auto Differential[[id number]]        Abnormal            Final result                 Please view results for these tests on the individual orders.    CBC Auto Differential [[id number]]  (Abnormal) Collected: 09/11/23 1422    Specimen: Blood Updated: 09/11/23 1501     WBC 10.31 10*3/mm3      RBC 3.14 10*6/mm3      Hemoglobin 8.9 g/dL      Hematocrit 29.1 %      MCV 92.7 fL      MCH 28.3 pg      MCHC 30.6 g/dL      RDW 18.4 %      RDW-SD 59.7 fl      Platelets 16 10*3/mm3      Neutrophil % 77.2 %      Lymphocyte % 12.9 %      Monocyte % 8.3 %      Eosinophil % 0.2 %      Basophil % 0.1 %      Neutrophils, Absolute 7.96 10*3/mm3      Lymphocytes, Absolute 1.33 10*3/mm3      Monocytes, Absolute 0.86 10*3/mm3      Eosinophils, Absolute 0.02 10*3/mm3      Basophils, Absolute 0.01 10*3/mm3     Protime-INR [[id number]]  (Normal) Collected: 09/11/23 1030    Specimen: Blood Updated: 09/11/23 1131     Protime 13.7 Seconds      INR 1.04    Basic Metabolic Panel [[id number]]  (Abnormal) Collected: 09/11/23 0521    Specimen: Blood Updated: 09/11/23 0627     Glucose 107 mg/dL      BUN 21 mg/dL      Creatinine 1.07 mg/dL      Sodium 137 mmol/L      Potassium 4.2 mmol/L      Chloride 106 mmol/L      CO2 24.0 mmol/L      Calcium 8.4 mg/dL      BUN/Creatinine Ratio 19.6     Anion Gap 7.0 mmol/L      eGFR 53.6 mL/min/1.73     Narrative:      GFR Normal  >60  Chronic Kidney Disease <60  Kidney Failure <15    The GFR formula is only valid for adults with stable renal function between ages 18 and 70.    CBC (No Diff) [[id number]]  (Abnormal) Collected: 09/11/23 0521    Specimen: Blood Updated: 09/11/23 0616     WBC 9.73 10*3/mm3      RBC 2.93 10*6/mm3      Hemoglobin 8.6 g/dL      Hematocrit 27.2 %      MCV 92.8 fL      MCH 29.4 pg      MCHC 31.6 g/dL      RDW 18.1 %      RDW-SD 60.1 fl      Platelets 19 10*3/mm3     Basic Metabolic Panel [[id number]]  (Abnormal) Collected: 09/10/23 0545    Specimen: Blood Updated: 09/10/23 0621     Glucose 108 mg/dL      BUN 22 mg/dL      Creatinine 1.12 mg/dL      Sodium 140 mmol/L      Potassium 4.2 mmol/L      Chloride 108 mmol/L      CO2 23.0 mmol/L      Calcium 8.2 mg/dL      BUN/Creatinine Ratio 19.6     Anion Gap 9.0 mmol/L      eGFR 50.8 mL/min/1.73     Narrative:      GFR Normal >60  Chronic Kidney Disease <60  Kidney Failure <15    The GFR formula is only valid for adults with stable renal function between ages 18 and 70.    CBC (No Diff) [[id number]]  (Abnormal) Collected: 09/10/23 0545    Specimen: Blood Updated: 09/10/23 0606     WBC 9.71 10*3/mm3      RBC 2.81 10*6/mm3      Hemoglobin 7.9 g/dL      Hematocrit 26.2 %      MCV 93.2 fL      MCH 28.1 pg      MCHC 30.2 g/dL      RDW 19.1 %      RDW-SD 61.5 fl      MPV --     Comment: Unable to calculate        Platelets 18 10*3/mm3     Hemoglobin & Hematocrit, Blood [[id number]]  (Abnormal) Collected: 09/09/23 1623    Specimen: Blood Updated: 09/09/23 1632     Hemoglobin 8.9 g/dL      Hematocrit 28.1 %     Basic Metabolic Panel [[id number]]  (Abnormal) Collected: 09/09/23 0752    Specimen: Blood Updated: 09/09/23 0831     Glucose 114 mg/dL      BUN 30 mg/dL      Creatinine 1.18 mg/dL      Sodium 140 mmol/L      Potassium 4.0 mmol/L      Chloride 107 mmol/L      CO2 24.0 mmol/L      Calcium 8.3 mg/dL      BUN/Creatinine Ratio 25.4     Anion Gap 9.0 mmol/L      eGFR 47.7  "mL/min/1.73     Narrative:      GFR Normal >60  Chronic Kidney Disease <60  Kidney Failure <15    The GFR formula is only valid for adults with stable renal function between ages 18 and 70.    CBC & Differential [[id number]]  (Abnormal) Collected: 09/09/23 0752    Specimen: Blood Updated: 09/09/23 0819    Narrative:      The following orders were created for panel order CBC & Differential.  Procedure                               Abnormality         Status                     ---------                               -----------         ------                     CBC Auto Differential[[id number]]        Abnormal            Final result                 Please view results for these tests on the individual orders.    CBC Auto Differential [[id number]]  (Abnormal) Collected: 09/09/23 0752    Specimen: Blood Updated: 09/09/23 0819     WBC 10.69 10*3/mm3      RBC 2.72 10*6/mm3      Hemoglobin 7.9 g/dL      Hematocrit 25.4 %      MCV 93.4 fL      MCH 29.0 pg      MCHC 31.1 g/dL      RDW 19.7 %      RDW-SD 61.1 fl      MPV 11.7 fL      Platelets 20 10*3/mm3      Neutrophil % 85.4 %      Lymphocyte % 7.2 %      Monocyte % 6.1 %      Eosinophil % 0.0 %      Basophil % 0.1 %      Neutrophils, Absolute 9.13 10*3/mm3      Lymphocytes, Absolute 0.77 10*3/mm3      Monocytes, Absolute 0.65 10*3/mm3      Eosinophils, Absolute 0.00 10*3/mm3      Basophils, Absolute 0.01 10*3/mm3               Radiology:     Imaging Results (Last 72 Hours)       Procedure Component Value Units Date/Time    CT Guided Biopsy Bone Marrow [[id number]] Resulted: 09/11/23 1341     Updated: 09/11/23 1426              Objective:   Vitals: /69 (BP Location: Right arm, Patient Position: Lying)   Pulse 64   Temp 97.9 °F (36.6 °C) (Oral)   Resp 16   Ht 172.7 cm (68\")   Wt 106 kg (233 lb 14.4 oz)   LMP  (LMP Unknown)   SpO2 99%   BMI 35.56 kg/m²   Physical Exam  Vitals reviewed.   Constitutional:       General: She is not in acute distress.  HENT:      " Head: Normocephalic and atraumatic.   Eyes:      General: No scleral icterus.  Cardiovascular:      Rate and Rhythm: Normal rate.   Pulmonary:      Effort: No respiratory distress.      Breath sounds: No wheezing.   Abdominal:      General: Bowel sounds are normal.      Palpations: Abdomen is soft.      Tenderness: There is no abdominal tenderness.   Musculoskeletal:         General: No swelling.      Cervical back: Neck supple.   Skin:     General: Skin is warm.      Coloration: Skin is pale.   Neurological:      Mental Status: She is oriented to person, place, and time.   Psychiatric:         Mood and Affect: Mood normal.         Behavior: Behavior normal.         Thought Content: Thought content normal.         Judgment: Judgment normal.     24HR INTAKE/OUTPUT:    Intake/Output Summary (Last 24 hours) at 9/12/2023 0711  Last data filed at 9/11/2023 1721  Gross per 24 hour   Intake 259 ml   Output --   Net 259 ml        Problem list:       GIB (gastrointestinal bleeding)    Stage 3b chronic kidney disease    Acute blood loss anemia    Chronic idiopathic thrombocytopenia    Hypotension due to hypovolemia      Assessment/Plan:       [facility] Progress Note     Progress Note     Patient:  Marina Joe  YOB: 1945  Date of Service: 9/11/2023  MRN: [id number]        Acct: [id number]    Primary Care Physician: Hanh Og APRN  Advance Directive:       Code Status and Medical Interventions:   Ordered at: 09/06/23 1818     Level Of Support Discussed With:     Patient     Code Status (Patient has no pulse and is not breathing):     CPR (Attempt to Resuscitate)     Medical Interventions (Patient has pulse or is breathing):     Full Support      Admit Date: 9/6/2023                          Hospital Day: 5        Subjective:      Chief Compliant: Patient seen with nurse Drew at the bedside. No complains. No melena or other bleeding manifestations.         Review of Systems:   Review  of Systems   Constitutional:  Positive for fatigue. Negative for fever.   HENT:  Negative for nosebleeds.    Eyes:  Negative for redness and visual disturbance.   Respiratory:  Negative for shortness of breath and wheezing.    Cardiovascular:  Negative for chest pain and palpitations.   Gastrointestinal:  Negative for blood in stool, nausea and vomiting.   Endocrine: Negative for polydipsia and polyphagia.   Genitourinary:  Negative for hematuria and vaginal bleeding.   Musculoskeletal:  Negative for joint swelling.   Skin:  Positive for pallor.   Allergic/Immunologic: Negative for immunocompromised state.   Neurological:  Negative for dizziness and speech difficulty.   Hematological:  Negative for adenopathy.   Psychiatric/Behavioral:  Negative for agitation, confusion and hallucinations.             Medications:   Scheduled Meds:buPROPion XL, 150 mg, Oral, Daily  carvedilol, 6.25 mg, Oral, BID With Meals  famotidine, 20 mg, Oral, BID AC  fluticasone, 2 spray, Nasal, Daily  losartan, 100 mg, Oral, Q24H   And  hydroCHLOROthiazide, 12.5 mg, Oral, Q24H  montelukast, 10 mg, Oral, Nightly  pantoprazole, 40 mg, Oral, BID AC  rOPINIRole, 0.5 mg, Oral, Nightly  rosuvastatin, 20 mg, Oral, Daily  sertraline, 100 mg, Oral, Daily  sodium chloride, 10 mL, Intravenous, Q12H  valACYclovir, 500 mg, Oral, BID           Continuous Infusions:      Labs:      Lab Results (last 72 hours)         Procedure Component Value Units Date/Time     CBC (No Diff) [[id number]]  (Abnormal) Collected: 09/11/23 0521     Specimen: Blood Updated: 09/11/23 0616       WBC 9.73 10*3/mm3         RBC 2.93 10*6/mm3         Hemoglobin 8.6 g/dL         Hematocrit 27.2 %         MCV 92.8 fL         MCH 29.4 pg         MCHC 31.6 g/dL         RDW 18.1 %         RDW-SD 60.1 fl         Platelets 19 10*3/mm3       Basic Metabolic Panel [[id number]] Collected: 09/11/23 0521     Specimen: Blood Updated: 09/11/23 0555     Basic Metabolic Panel [[id number]]  (Abnormal)  Collected: 09/10/23 0545     Specimen: Blood Updated: 09/10/23 0621       Glucose 108 mg/dL         BUN 22 mg/dL         Creatinine 1.12 mg/dL         Sodium 140 mmol/L         Potassium 4.2 mmol/L         Chloride 108 mmol/L         CO2 23.0 mmol/L         Calcium 8.2 mg/dL         BUN/Creatinine Ratio 19.6       Anion Gap 9.0 mmol/L         eGFR 50.8 mL/min/1.73       Narrative:       GFR Normal >60  Chronic Kidney Disease <60  Kidney Failure <15     The GFR formula is only valid for adults with stable renal function between ages 18 and 70.     CBC (No Diff) [[id number]]  (Abnormal) Collected: 09/10/23 0545     Specimen: Blood Updated: 09/10/23 0606       WBC 9.71 10*3/mm3         RBC 2.81 10*6/mm3         Hemoglobin 7.9 g/dL         Hematocrit 26.2 %         MCV 93.2 fL         MCH 28.1 pg         MCHC 30.2 g/dL         RDW 19.1 %         RDW-SD 61.5 fl         MPV --       Comment: Unable to calculate          Platelets 18 10*3/mm3       Hemoglobin & Hematocrit, Blood [[id number]]  (Abnormal) Collected: 09/09/23 1623     Specimen: Blood Updated: 09/09/23 1632       Hemoglobin 8.9 g/dL         Hematocrit 28.1 %       Basic Metabolic Panel [[id number]]  (Abnormal) Collected: 09/09/23 0752     Specimen: Blood Updated: 09/09/23 0831       Glucose 114 mg/dL         BUN 30 mg/dL         Creatinine 1.18 mg/dL         Sodium 140 mmol/L         Potassium 4.0 mmol/L         Chloride 107 mmol/L         CO2 24.0 mmol/L         Calcium 8.3 mg/dL         BUN/Creatinine Ratio 25.4       Anion Gap 9.0 mmol/L         eGFR 47.7 mL/min/1.73       Narrative:       GFR Normal >60  Chronic Kidney Disease <60  Kidney Failure <15     The GFR formula is only valid for adults with stable renal function between ages 18 and 70.     CBC & Differential [[id number]]  (Abnormal) Collected: 09/09/23 0752     Specimen: Blood Updated: 09/09/23 0819     Narrative:       The following orders were created for panel order CBC & Differential.  Procedure                                Abnormality         Status                     ---------                               -----------         ------                     CBC Auto Differential[[id number]]        Abnormal            Final result                  Please view results for these tests on the individual orders.     CBC Auto Differential [[id number]]  (Abnormal) Collected: 09/09/23 0752     Specimen: Blood Updated: 09/09/23 0819       WBC 10.69 10*3/mm3         RBC 2.72 10*6/mm3         Hemoglobin 7.9 g/dL         Hematocrit 25.4 %         MCV 93.4 fL         MCH 29.0 pg         MCHC 31.1 g/dL         RDW 19.7 %         RDW-SD 61.1 fl         MPV 11.7 fL         Platelets 20 10*3/mm3         Neutrophil % 85.4 %         Lymphocyte % 7.2 %         Monocyte % 6.1 %         Eosinophil % 0.0 %         Basophil % 0.1 %         Neutrophils, Absolute 9.13 10*3/mm3         Lymphocytes, Absolute 0.77 10*3/mm3         Monocytes, Absolute 0.65 10*3/mm3         Eosinophils, Absolute 0.00 10*3/mm3         Basophils, Absolute 0.01 10*3/mm3       Hemoglobin & Hematocrit, Blood [[id number]]  (Abnormal) Collected: 09/08/23 2323     Specimen: Blood Updated: 09/08/23 2335       Hemoglobin 8.2 g/dL         Hematocrit 27.2 %       Hemoglobin & Hematocrit, Blood [[id number]]  (Abnormal) Collected: 09/08/23 1621     Specimen: Blood Updated: 09/08/23 1638       Hemoglobin 8.7 g/dL         Hematocrit 27.4 %       Basic Metabolic Panel [[id number]]  (Abnormal) Collected: 09/08/23 0623     Specimen: Blood Updated: 09/08/23 0703       Glucose 136 mg/dL         BUN 36 mg/dL         Creatinine 1.24 mg/dL         Sodium 137 mmol/L         Potassium 3.9 mmol/L         Chloride 105 mmol/L         CO2 22.0 mmol/L         Calcium 8.2 mg/dL         BUN/Creatinine Ratio 29.0       Anion Gap 10.0 mmol/L         eGFR 44.9 mL/min/1.73       Narrative:       GFR Normal >60  Chronic Kidney Disease <60  Kidney Failure <15     The GFR formula is only valid for  adults with stable renal function between ages 18 and 70.     CBC & Differential [[id number]]  (Abnormal) Collected: 09/08/23 0623     Specimen: Blood Updated: 09/08/23 0644     Narrative:       The following orders were created for panel order CBC & Differential.  Procedure                               Abnormality         Status                     ---------                               -----------         ------                     CBC Auto Differential[[id number]]        Abnormal            Final result                  Please view results for these tests on the individual orders.     CBC Auto Differential [[id number]]  (Abnormal) Collected: 09/08/23 0623     Specimen: Blood Updated: 09/08/23 0644       WBC 10.67 10*3/mm3         RBC 2.73 10*6/mm3         Hemoglobin 7.9 g/dL         Hematocrit 24.6 %         MCV 90.1 fL         MCH 28.9 pg         MCHC 32.1 g/dL         RDW 18.8 %         RDW-SD 53.2 fl         Platelets 24 10*3/mm3         Neutrophil % 85.1 %         Lymphocyte % 7.7 %         Monocyte % 5.5 %         Eosinophil % 0.0 %         Basophil % 0.1 %         Neutrophils, Absolute 9.08 10*3/mm3         Lymphocytes, Absolute 0.82 10*3/mm3         Monocytes, Absolute 0.59 10*3/mm3         Eosinophils, Absolute 0.00 10*3/mm3         Basophils, Absolute 0.01 10*3/mm3                     Radiology:      Imaging Results (Last 72 Hours)         Procedure Component Value Units Date/Time     FL Upper GI Water Soluble [[id number]] Collected: 09/08/23 1208       Updated: 09/08/23 1216     Narrative:       Procedure: Fluoro Upper GI single contrast     Clinical indication: Melena     Technique: Single contrast water-soluble cine esophagram and upper GI  study is performed.         Fluoroscopy time:  1 minute 34 seconds minutes.     Images/ image captures: 19        Findings:      In the upright position there is normal transit of contrast through the  esophagus without strictures or masses.      No gastroesophageal  "reflux observed fluoroscopically.      The 13 mm diameter barium tablet passes without difficulty.     There appears to be thickened gastric rugae involving the body.     The stomach is without ulcers or masses. There is no delay in gastric  emptying.      Duodenal bulb and C-loop are normal.        Impression:       Impression:         Thickened gastric rugae predominantly involving the gastric body which  can be seen with gastritis. Consider correlation with endoscopy.     No visible mass, large ulcer, or stricture.  This report was finalized on 09/08/2023 12:13 by  Warren Freire DO.                   Objective:   Vitals: /56 (BP Location: Right arm, Patient Position: Lying)   Pulse 62   Temp 98 °F (36.7 °C) (Oral)   Resp 16   Ht 172.7 cm (68\")   Wt 105 kg (232 lb)   LMP  (LMP Unknown)   SpO2 99%   BMI 35.28 kg/m²   Physical Exam  24HR INTAKE/OUTPUT:    Intake/Output Summary (Last 24 hours) at 9/11/2023 0623  Last data filed at 9/10/2023 2117      Gross per 24 hour   Intake 1048.7 ml   Output --   Net 1048.7 ml         Problem list:        GIB (gastrointestinal bleeding)    Stage 3b chronic kidney disease    Acute blood loss anemia    Chronic idiopathic thrombocytopenia    Hypotension due to hypovolemia        Assessment/Plan:            ASSESSMENT:   Gastrointestinal bleeding, history of.  Symptomatic anemia from acute blood loss anemia, history of.   Has been given packed RBC transfusions as needed.        CONCURRENT PROBLEMS:  1. Chronic ITP (idiopathic thrombocytopenia).  Slightly hypercellular marrow.  No features of myelodysplasia or excess blasts on 11/10/2022.  Post Decadron 40 mg on 9/10/2023, no response.   2. Anemia in stage 3b chronic kidney disease.  On epoetin alpha 40,000 units, latest dose 9/1/2023.   3. Iron deficiency anemia secondary to inadequate dietary iron intake    4. History of breast cancer.  She is off adjuvant anastrozole.   5.   Followed by Dr. Gus Carvalho at [facility].    "         PLAN:   Re: CT-guided bone marrow biopsy today.  Transfuse 1 unit platelets 30 minutes before procedure.   Re: Discussed with MARY Mcguire on 9/11/2023. Okay to discharge after procedure.  Patient no bleeding manifestations.  Await bone marrow biopsy report prior to initiating thrombopoietin agent to confirm diagnosis.  WBC 9.7, hemoglobin 8.3 and platelet 17 from 16 from 19 from 18 from 20 from 24  Transfuse 1 unit packed RBC if hemoglobin less than 8.  Monitor for transfusion reactions.Transfuse 1 unit platelet if platelet is 10 or below.  Observe for transfusion reactions.  Clinic appointment with MARY Toney 1 week after discharge.  Above plan discussed with patient and nurse Jessica.  Both have verbalized understanding.

## 2023-09-12 NOTE — PRE-PROCEDURE NOTE
79 yo woman for CT guided bone marrow biopsy.     Meds, allergies and labs reviewed.   Discussed low plts, she is being transfused now.    AAOx3  RRR  CTA    Risk, Benefits, and Alternatives discussed with the patient.  History and Physical reviewed, and no changes.  Plan is for moderate sedation, and the patient agrees to proceed with procedure.    An immediate assessment was done prior to the administration of moderate sedation.      Plan for mod conscious sedation with biopsy.  Agrees to follow up with oncology for biopsy results.

## 2023-09-13 ENCOUNTER — TRANSITIONAL CARE MANAGEMENT TELEPHONE ENCOUNTER (OUTPATIENT)
Dept: CALL CENTER | Facility: HOSPITAL | Age: 78
End: 2023-09-13
Payer: MEDICARE

## 2023-09-13 ENCOUNTER — HOSPITAL ENCOUNTER (OUTPATIENT)
Age: 78
Setting detail: OUTPATIENT SURGERY
Discharge: HOME | End: 2023-09-13
Attending: INTERNAL MEDICINE | Admitting: INTERNAL MEDICINE
Payer: MEDICARE

## 2023-09-13 ENCOUNTER — TELEPHONE (OUTPATIENT)
Dept: ONCOLOGY | Facility: CLINIC | Age: 78
End: 2023-09-13
Payer: MEDICARE

## 2023-09-13 ENCOUNTER — ANESTHESIA (OUTPATIENT)
Dept: OPERATING ROOM | Age: 78
End: 2023-09-13
Payer: MEDICARE

## 2023-09-13 VITALS
DIASTOLIC BLOOD PRESSURE: 58 MMHG | SYSTOLIC BLOOD PRESSURE: 127 MMHG | TEMPERATURE: 97.7 F | RESPIRATION RATE: 16 BRPM | OXYGEN SATURATION: 100 % | HEART RATE: 65 BPM

## 2023-09-13 LAB
BH BB BLOOD EXPIRATION DATE: NORMAL
BH BB BLOOD TYPE BARCODE: 6200
BH BB DISPENSE STATUS: NORMAL
BH BB PRODUCT CODE: NORMAL
BH BB UNIT NUMBER: NORMAL
PLATELET # BLD AUTO: 21 K/UL (ref 130–400)
UNIT  ABO: NORMAL
UNIT  RH: NORMAL

## 2023-09-13 PROCEDURE — 85049 AUTOMATED PLATELET COUNT: CPT

## 2023-09-13 PROCEDURE — 36415 COLL VENOUS BLD VENIPUNCTURE: CPT

## 2023-09-13 PROCEDURE — 2580000003 HC RX 258: Performed by: INTERNAL MEDICINE

## 2023-09-13 RX ORDER — SODIUM CHLORIDE, SODIUM LACTATE, POTASSIUM CHLORIDE, CALCIUM CHLORIDE 600; 310; 30; 20 MG/100ML; MG/100ML; MG/100ML; MG/100ML
INJECTION, SOLUTION INTRAVENOUS CONTINUOUS
Status: DISCONTINUED | OUTPATIENT
Start: 2023-09-13 | End: 2023-09-13 | Stop reason: HOSPADM

## 2023-09-13 RX ADMIN — SODIUM CHLORIDE, POTASSIUM CHLORIDE, SODIUM LACTATE AND CALCIUM CHLORIDE: 600; 310; 30; 20 INJECTION, SOLUTION INTRAVENOUS at 10:06

## 2023-09-13 ASSESSMENT — PAIN - FUNCTIONAL ASSESSMENT: PAIN_FUNCTIONAL_ASSESSMENT: NONE - DENIES PAIN

## 2023-09-13 NOTE — OUTREACH NOTE
"Call Center TCM Note      Flowsheet Row Responses   [facility] patient discharged from? [facility]   Does the patient have one of the following disease processes/diagnoses(primary or secondary)? Other   TCM attempt successful? Yes   Call start time 1024   Call end time 1028   Discharge diagnosis GIB (gastrointestinal bleeding)   Meds reviewed with patient/caregiver? Yes   Is the patient having any side effects they believe may be caused by any medication additions or changes? No   Does the patient have all medications ordered at discharge? Yes   Is the patient taking all medications as directed (includes completed medication regime)? Yes   Comments Appt with MARY Castañeda 9/19 @ 8:00   Does the patient have an appointment with their PCP within 7-14 days of discharge? Yes   Psychosocial issues? No   Did the patient receive a copy of their discharge instructions? Yes   Nursing interventions Reviewed instructions with patient   What is the patient's perception of their health status since discharge? Improving   Is the patient/caregiver able to teach back signs and symptoms related to disease process for when to call PCP? Yes   Is the patient/caregiver able to teach back signs and symptoms related to disease process for when to call 911? Yes   Is the patient/caregiver able to teach back the hierarchy of who to call/visit for symptoms/problems? PCP, Specialist, Home health nurse, Urgent Care, ED, 911 Yes   If the patient is a current smoker, are they able to teach back resources for cessation? Not a smoker   Additional teach back comments States she is at [facility] for outpatient testing but couldn't think of the name.  \"I'm hanging in there\"   TCM call completed? Yes   Wrap up additional comments Denies quesitons or needs at this time.   Call end time 1028            Viviane Rudd LPN    9/13/2023, 10:29 CDT        "

## 2023-09-13 NOTE — PROGRESS NOTES
Patient presented for elective EUS_FNA of pancreatic cyst.     Noted patient was recently admitted at [facility] with Platelet count severely low. S/p Bone Marrow yesterday with results pending. Repeat Plt count today     Lab Results   Component Value Date    WBC 5.5 12/13/2021    HGB 10.6 (L) 12/13/2021    HCT 34.6 (L) 12/13/2021    MCV 94.8 12/13/2021    PLT 21 (LL) 09/13/2023         I discussed patient with Dr Oleksandr Weaver over the phone. Given elective nature of procedure, we both agreed it would be best to defer EUS for now until workup of thrombocytopenia complete. Plan  - Chart check in 8 weeks  - If platelet count has recovered and stable, we will reschedule at that time. Other options would be repeat MRI imaging in 6-12 months.

## 2023-09-13 NOTE — TELEPHONE ENCOUNTER
PHONE CALL  Dr Shah received call from Dr Gus Grullon , he calls regarding mutual patient Marina Joe. Dr Carvalho reports patient was kathy for biopsy of Pancreatic Cyst today 9/13/23 but checking lab today results noted:   PLT Count: 21  Due to Critical low PLT Count it was consensus that today's biopsy of pancreatic cyst be postponed and rescheduled at later date once PLT Count has recovered and Dr Shah has biopsy report of recent Bone Marrow Biopsy for review to r/o any underlying issues.     Procedure cancelled.

## 2023-09-15 ENCOUNTER — INFUSION (OUTPATIENT)
Dept: ONCOLOGY | Facility: HOSPITAL | Age: 78
End: 2023-09-15
Payer: MEDICARE

## 2023-09-15 ENCOUNTER — LAB (OUTPATIENT)
Dept: LAB | Facility: HOSPITAL | Age: 78
End: 2023-09-15
Payer: MEDICARE

## 2023-09-15 ENCOUNTER — TELEPHONE (OUTPATIENT)
Dept: INTERNAL MEDICINE | Facility: CLINIC | Age: 78
End: 2023-09-15
Payer: MEDICARE

## 2023-09-15 VITALS
WEIGHT: 233.2 LBS | HEART RATE: 66 BPM | SYSTOLIC BLOOD PRESSURE: 117 MMHG | BODY MASS INDEX: 35.34 KG/M2 | HEIGHT: 68 IN | RESPIRATION RATE: 18 BRPM | TEMPERATURE: 96.7 F | DIASTOLIC BLOOD PRESSURE: 58 MMHG | OXYGEN SATURATION: 100 %

## 2023-09-15 DIAGNOSIS — N18.32 STAGE 3B CHRONIC KIDNEY DISEASE: ICD-10-CM

## 2023-09-15 DIAGNOSIS — D69.3 CHRONIC ITP (IDIOPATHIC THROMBOCYTOPENIA): Primary | ICD-10-CM

## 2023-09-15 DIAGNOSIS — D63.1 ANEMIA OF CHRONIC RENAL FAILURE, STAGE 3A: ICD-10-CM

## 2023-09-15 DIAGNOSIS — N18.31 ANEMIA OF CHRONIC RENAL FAILURE, STAGE 3A: ICD-10-CM

## 2023-09-15 DIAGNOSIS — D63.1 ANEMIA OF CHRONIC RENAL FAILURE, STAGE 3A: Primary | ICD-10-CM

## 2023-09-15 DIAGNOSIS — N18.31 ANEMIA OF CHRONIC RENAL FAILURE, STAGE 3A: Primary | ICD-10-CM

## 2023-09-15 LAB
ALBUMIN SERPL-MCNC: 3.1 G/DL (ref 3.5–5.2)
ALBUMIN/GLOB SERPL: 1.2 G/DL
ALP SERPL-CCNC: 70 U/L (ref 39–117)
ALT SERPL W P-5'-P-CCNC: 23 U/L (ref 1–33)
ANION GAP SERPL CALCULATED.3IONS-SCNC: 9 MMOL/L (ref 5–15)
ANISOCYTOSIS BLD QL: ABNORMAL
AST SERPL-CCNC: 15 U/L (ref 1–32)
BILIRUB SERPL-MCNC: 0.4 MG/DL (ref 0–1.2)
BUN SERPL-MCNC: 17 MG/DL (ref 8–23)
BUN/CREAT SERPL: 13.8 (ref 7–25)
CALCIUM SPEC-SCNC: 8.8 MG/DL (ref 8.6–10.5)
CHLORIDE SERPL-SCNC: 101 MMOL/L (ref 98–107)
CO2 SERPL-SCNC: 26 MMOL/L (ref 22–29)
CREAT SERPL-MCNC: 1.23 MG/DL (ref 0.57–1)
DACRYOCYTES BLD QL SMEAR: ABNORMAL
DEPRECATED RDW RBC AUTO: 58.4 FL (ref 37–54)
EGFRCR SERPLBLD CKD-EPI 2021: 45.4 ML/MIN/1.73
EOSINOPHIL # BLD MANUAL: 0.36 10*3/MM3 (ref 0–0.4)
EOSINOPHIL NFR BLD MANUAL: 4 % (ref 0.3–6.2)
ERYTHROCYTE [DISTWIDTH] IN BLOOD BY AUTOMATED COUNT: 17.1 % (ref 12.3–15.4)
GIANT PLATELETS: ABNORMAL
GLOBULIN UR ELPH-MCNC: 2.6 GM/DL
GLUCOSE SERPL-MCNC: 108 MG/DL (ref 65–99)
HCT VFR BLD AUTO: 30.2 % (ref 34–46.6)
HGB BLD-MCNC: 9.2 G/DL (ref 12–15.9)
LYMPHOCYTES # BLD MANUAL: 1.17 10*3/MM3 (ref 0.7–3.1)
LYMPHOCYTES NFR BLD MANUAL: 3 % (ref 5–12)
MCH RBC QN AUTO: 28.7 PG (ref 26.6–33)
MCHC RBC AUTO-ENTMCNC: 30.5 G/DL (ref 31.5–35.7)
MCV RBC AUTO: 94.1 FL (ref 79–97)
MONOCYTES # BLD: 0.27 10*3/MM3 (ref 0.1–0.9)
NEUTROPHILS # BLD AUTO: 7.12 10*3/MM3 (ref 1.7–7)
NEUTROPHILS NFR BLD MANUAL: 79.8 % (ref 42.7–76)
PLATELET # BLD AUTO: 21 10*3/MM3 (ref 140–450)
PMV BLD AUTO: ABNORMAL FL
POLYCHROMASIA BLD QL SMEAR: ABNORMAL
POTASSIUM SERPL-SCNC: 4 MMOL/L (ref 3.5–5.2)
PROT SERPL-MCNC: 5.7 G/DL (ref 6–8.5)
RBC # BLD AUTO: 3.21 10*6/MM3 (ref 3.77–5.28)
SMALL PLATELETS BLD QL SMEAR: ABNORMAL
SODIUM SERPL-SCNC: 136 MMOL/L (ref 136–145)
VARIANT LYMPHS NFR BLD MANUAL: 13.1 % (ref 19.6–45.3)
WBC MORPH BLD: NORMAL
WBC NRBC COR # BLD: 8.92 10*3/MM3 (ref 3.4–10.8)

## 2023-09-15 PROCEDURE — 85007 BL SMEAR W/DIFF WBC COUNT: CPT

## 2023-09-15 PROCEDURE — 96372 THER/PROPH/DIAG INJ SC/IM: CPT

## 2023-09-15 PROCEDURE — 80053 COMPREHEN METABOLIC PANEL: CPT

## 2023-09-15 PROCEDURE — 36415 COLL VENOUS BLD VENIPUNCTURE: CPT

## 2023-09-15 PROCEDURE — 25010000002 EPOETIN ALFA PER 1000 UNITS: Performed by: NURSE PRACTITIONER

## 2023-09-15 PROCEDURE — 85025 COMPLETE CBC W/AUTO DIFF WBC: CPT

## 2023-09-15 RX ADMIN — ERYTHROPOIETIN 40000 UNITS: 40000 INJECTION, SOLUTION INTRAVENOUS; SUBCUTANEOUS at 09:55

## 2023-09-15 NOTE — PROGRESS NOTES
Message to Hem/Onc: Marina Joe, [birth year], can you have Analilia review labs, I am giving Procrit today, but does she need anything else?  No active bleeding reported since Friday.  Please advise.

## 2023-09-15 NOTE — TELEPHONE ENCOUNTER
Lab results from [facility] faxed over please review in care everywhere     States results were discussed with Alyssa and their plan was to check in 8 weeks

## 2023-09-15 NOTE — PROGRESS NOTES
Per Frannie in Hem/Onc office, okay to discharge patient, if needs anything else, office will notify her.     Message from Frannie: LET NELLY KNOW WE ARE SENDING IN DEXAMETHASONE FOR HER TO START.  Caught patient as going to vehicle and told her to check with her pharmacy for script, she voiced understanding.

## 2023-09-19 ENCOUNTER — TELEPHONE (OUTPATIENT)
Dept: INTERNAL MEDICINE | Facility: CLINIC | Age: 78
End: 2023-09-19

## 2023-09-19 ENCOUNTER — OFFICE VISIT (OUTPATIENT)
Dept: INTERNAL MEDICINE | Facility: CLINIC | Age: 78
End: 2023-09-19
Payer: MEDICARE

## 2023-09-19 VITALS
OXYGEN SATURATION: 97 % | DIASTOLIC BLOOD PRESSURE: 62 MMHG | TEMPERATURE: 97.1 F | BODY MASS INDEX: 35.43 KG/M2 | WEIGHT: 233.8 LBS | SYSTOLIC BLOOD PRESSURE: 134 MMHG | HEIGHT: 68 IN | HEART RATE: 73 BPM

## 2023-09-19 DIAGNOSIS — K92.2 GASTROINTESTINAL HEMORRHAGE, UNSPECIFIED GASTROINTESTINAL HEMORRHAGE TYPE: ICD-10-CM

## 2023-09-19 DIAGNOSIS — N18.31 STAGE 3A CHRONIC KIDNEY DISEASE: ICD-10-CM

## 2023-09-19 DIAGNOSIS — Z09 HOSPITAL DISCHARGE FOLLOW-UP: Primary | ICD-10-CM

## 2023-09-19 DIAGNOSIS — I10 HYPERTENSION, BENIGN: ICD-10-CM

## 2023-09-19 DIAGNOSIS — D50.0 IRON DEFICIENCY ANEMIA DUE TO CHRONIC BLOOD LOSS: ICD-10-CM

## 2023-09-19 DIAGNOSIS — D62 ACUTE ON CHRONIC BLOOD LOSS ANEMIA: ICD-10-CM

## 2023-09-19 LAB
QT INTERVAL: 422 MS
QTC INTERVAL: 452 MS

## 2023-09-19 NOTE — TELEPHONE ENCOUNTER
Caller: Marina Joe    Relationship: Self    Best call back number:     [phone]      What is the best time to reach you:  ANYTIME     Who are you requesting to speak with (clinical staff, provider,  specific staff member): CLINICAL     Do you know the name of the person who called: CLINICAL     What was the call regarding: HIGH RISK CYCLOBENZAPRINE   FOR OVER THE AGE OF 65 AND WANTED TO KNOW IF SHE WOULD BENEFIT BY TAKING ANOTHER MEDICATION BACLOFEN OR TIZANIDINE     STATES SHE ALSO WOULD LIKE TO BE ADVISED IF SHE SHOULD TAKE THE MEDICATIONS THAT HAVE NOT BEEN FILLED 6 MONTHS OF TRAZODONE 100 MG TABLET AND VITAMIN D 2 1,250 MCG CAPSULE     Is it okay if the provider responds through MyChart: CALL BACK TO BE ADVISED

## 2023-09-19 NOTE — PROGRESS NOTES
Transitional Care Follow Up Visit  Subjective     Marina Joe is a 78 y.o. female who presents for a transitional care management visit.    Within 48 business hours after discharge our office contacted her via telephone to coordinate her care and needs.      I reviewed and discussed the details of that call along with the discharge summary, hospital problems, inpatient lab results, inpatient diagnostic studies, and consultation reports with Marina.     Current outpatient and discharge medications have been reconciled for the patient.  Reviewed by: MARY Lockwood          9/12/2023     4:35 PM   Date of TCM Phone Call   [facility]   Date of Admission 9/6/2023   Date of Discharge 9/12/2023   Discharge Disposition Home or Self Care     Risk for Readmission (LACE) Score: 13 (9/12/2023  5:00 AM)      History of Present Illness   Course During Hospital Stay: Initially presented to the emergency room on 9/3/2023, at that visit she was noted to have significant drop in her hemoglobin over the past few days, noted that she has been having dark stools for a long time, and a recent ER visit prior to that she had received PRBC.  Hemoccult positive, vitals are stable.  Her case was discussed with her GI doctor, it was not felt that she needed an emergency endoscopy as she had 1 not too long ago in February that showed mild gastritis.  It appears that she was given 1 unit of PRBC, she was discharged with strict return precautions.  She returned to the ER on 9/6/2023 with worsening weakness, continuous black stools.  On presentation to the ER on 9/6 her hemoglobin was 4.9, platelet count 24,000.  She was noted to have mild mid epigastric tenderness on palpation.  She was transfused with 1 unit of platelets and 3 units of PRBCs and admitted to the medical floor with acute on chronic blood loss anemia superimposed on iron deficiency anemia related to CKD.  As noted she has been following with  gastroenterology and oncology/hematology.  Platelets after transfusion were 17,000, she received an additional unit of platelets, she noted getting a total of 4 units of PRBCs.  She continued to have black stools on 9/8 and 9/9, after which they appeared to be normal.  She continued to deny any significant abdominal pain.  GI was consulted, they declined doing endoscopy related to her thrombocytopenia, recommended an esophagram which noted thickened gastric rugae, throughout her stay GI continued to deter endoscopy related to thrombocytopenia.  She was recommended to remain on Protonix 40 mg twice daily, Pepcid 20 mg twice daily.  She was scheduled for an EUS with Dr. Gus Carvalho at [facility], recommended to have endoscopy if platelets improved.  Dr. Shah recommended bone marrow biopsy which was scheduled on 9/12 after an additional transfusion.  Creatinine was at baseline on discharge at 1.13.  Antihypertensive medication was initially held related to hypovolemia and hypotension on admission after resolution of this blood pressures trended upward and her medications were resumed.  She was deemed stable enough for discharge on 9/12/2023 after completing her bone marrow biopsy.  Appointment was scheduled to discuss bone marrow biopsy results with hematology.  Labs obtained on 9/15/2023 revealed hemoglobin 9.2, hematocrit 30.2, platelet 21, creatinine bumped up a little bit to 1.23, electrolytes in normal range.       The following portions of the patient's history were reviewed and updated as appropriate: allergies, current medications, past family history, past medical history, past social history, past surgical history, and problem list.    Review of Systems    Objective   Physical Exam  Vitals and nursing note reviewed.   Constitutional:       General: She is not in acute distress.     Appearance: Normal appearance. She is obese. She is not ill-appearing, toxic-appearing or diaphoretic.   HENT:      Head:  Normocephalic and atraumatic.   Eyes:      Pupils: Pupils are equal, round, and reactive to light.   Cardiovascular:      Rate and Rhythm: Normal rate and regular rhythm.      Pulses: Normal pulses.      Heart sounds: Normal heart sounds.   Pulmonary:      Effort: Pulmonary effort is normal.      Breath sounds: Normal breath sounds.   Abdominal:      General: Bowel sounds are normal. There is no distension.      Palpations: Abdomen is soft.      Tenderness: There is no abdominal tenderness. There is no guarding.   Musculoskeletal:         General: Normal range of motion.      Cervical back: Normal range of motion and neck supple.      Right lower leg: Edema (trace) present.      Left lower leg: Edema (trace) present.   Skin:     Findings: Bruising (from IV sites on forearm - right) present.   Neurological:      General: No focal deficit present.      Mental Status: She is alert and oriented to person, place, and time. Mental status is at baseline.      Motor: No weakness.      Gait: Gait normal.   Psychiatric:         Mood and Affect: Mood normal.         Behavior: Behavior normal.         Thought Content: Thought content normal.         Judgment: Judgment normal.       Assessment & Plan   Diagnoses and all orders for this visit:    1. Hospital discharge follow-up (Primary)    2. Gastrointestinal hemorrhage, unspecified gastrointestinal hemorrhage type    3. Acute on chronic blood loss anemia    4. Iron deficiency anemia due to chronic blood loss    5. Stage 3a chronic kidney disease    6. Hypertension, benign           Plan of care and recent hospitalization reviewed with Marina  She reports today that she has not had any issues with nausea, vomiting, abdominal pain.  She states since the Friday when she was in the hospital when she last had a black tarry bowel movement she has had regular soft brown stools.  She denies increased weakness or dizziness.  She reports she has been taking all her medications as they are  prescribed.  After further discussion she has been taking her Protonix and Pepcid with her meals.  I have encouraged her to take her Protonix at least 30 minutes prior to eating.   Most recent labs reviewed with her.  We discussed the importance of bleeding precautions, advised to use soft bristle toothbrush, avoid shaving, avoid use of sharp objects if possible, strict fall precautions.  She follows up with heme-onc in 2 weeks (to review bone marrow biopsy results), sees gastroenterology in 1 week.  She is aware of what signs and symptoms to be cognizant of that would indicate immediate presentation to the emergency room.  She is having weekly lab draws, every Friday currently.  Advised in between these times if she experiences any symptoms such as dizziness, increased weakness, hypotension, tachycardia, abdominal discomfort, black tarry stools to immediately let us know and we can get her in to be seen and to check labs.  Blood pressure today is in normotensive range, she will continue with current management for this.  She does discuss her desire to get off some of her medications, reviewed her medication list, currently all her medications are either necessary or are therapeutic.  She has stopped taking her omega-3 fish oil supplements which is appropriate.  She is also still planning on following up with Dr. Gus Carvalho at [facility] for EUS in regards to incidental finding on MRI of abdomen back in June -15 x 14 mm cystic lesion in the pancreatic tail.    We will follow-up with her and her in the office for recheck in 4 weeks, prior to this as needed.    Please note that portions of this note were completed with a voice recognition program.     Electronically signed by MARY Lockwood, 09/19/23, 08:44 CDT.

## 2023-09-20 ENCOUNTER — TELEPHONE (OUTPATIENT)
Dept: GASTROENTEROLOGY | Facility: CLINIC | Age: 78
End: 2023-09-20
Payer: MEDICARE

## 2023-09-20 DIAGNOSIS — N18.31 ANEMIA OF CHRONIC RENAL FAILURE, STAGE 3A: Primary | ICD-10-CM

## 2023-09-20 DIAGNOSIS — N18.32 STAGE 3B CHRONIC KIDNEY DISEASE: ICD-10-CM

## 2023-09-20 DIAGNOSIS — D63.1 ANEMIA OF CHRONIC RENAL FAILURE, STAGE 3A: Primary | ICD-10-CM

## 2023-09-20 NOTE — TELEPHONE ENCOUNTER
Called patient and moved her fu appt out hoping she will be able to get the eus done before her next appt-will look closer to time

## 2023-09-20 NOTE — TELEPHONE ENCOUNTER
Patient has follow up Monday, we were working up her anemia EGD/COLON normal overall. CTE showed pancreatic lesion, referred to Gus Carvalho for EUS but it had to be postponed due to patient being hospitalized for low platelets. Pending results of bone marrow biopsy. Does she still need her appointment Monday, should we call and push that out?

## 2023-09-22 ENCOUNTER — INFUSION (OUTPATIENT)
Dept: ONCOLOGY | Facility: HOSPITAL | Age: 78
End: 2023-09-22
Payer: MEDICARE

## 2023-09-22 ENCOUNTER — LAB (OUTPATIENT)
Dept: LAB | Facility: HOSPITAL | Age: 78
End: 2023-09-22
Payer: MEDICARE

## 2023-09-22 VITALS
RESPIRATION RATE: 16 BRPM | SYSTOLIC BLOOD PRESSURE: 125 MMHG | TEMPERATURE: 97.3 F | OXYGEN SATURATION: 100 % | WEIGHT: 236.2 LBS | DIASTOLIC BLOOD PRESSURE: 66 MMHG | HEART RATE: 67 BPM | BODY MASS INDEX: 35.8 KG/M2 | HEIGHT: 68 IN

## 2023-09-22 DIAGNOSIS — N18.31 ANEMIA OF CHRONIC RENAL FAILURE, STAGE 3A: Primary | ICD-10-CM

## 2023-09-22 DIAGNOSIS — D63.1 ANEMIA OF CHRONIC RENAL FAILURE, STAGE 3A: ICD-10-CM

## 2023-09-22 DIAGNOSIS — N18.32 STAGE 3B CHRONIC KIDNEY DISEASE: ICD-10-CM

## 2023-09-22 DIAGNOSIS — D63.1 ANEMIA OF CHRONIC RENAL FAILURE, STAGE 3A: Primary | ICD-10-CM

## 2023-09-22 DIAGNOSIS — N18.31 ANEMIA OF CHRONIC RENAL FAILURE, STAGE 3A: ICD-10-CM

## 2023-09-22 DIAGNOSIS — D50.8 IRON DEFICIENCY ANEMIA SECONDARY TO INADEQUATE DIETARY IRON INTAKE: ICD-10-CM

## 2023-09-22 LAB
ALBUMIN SERPL-MCNC: 3.6 G/DL (ref 3.5–5.2)
ALBUMIN/GLOB SERPL: 1.4 G/DL
ALP SERPL-CCNC: 84 U/L (ref 39–117)
ALT SERPL W P-5'-P-CCNC: 16 U/L (ref 1–33)
ANION GAP SERPL CALCULATED.3IONS-SCNC: 10 MMOL/L (ref 5–15)
AST SERPL-CCNC: 15 U/L (ref 1–32)
BASOPHILS # BLD AUTO: 0.04 10*3/MM3 (ref 0–0.2)
BASOPHILS NFR BLD AUTO: 0.5 % (ref 0–1.5)
BILIRUB SERPL-MCNC: 0.3 MG/DL (ref 0–1.2)
BUN SERPL-MCNC: 16 MG/DL (ref 8–23)
BUN/CREAT SERPL: 11.6 (ref 7–25)
CALCIUM SPEC-SCNC: 9.1 MG/DL (ref 8.6–10.5)
CHLORIDE SERPL-SCNC: 103 MMOL/L (ref 98–107)
CO2 SERPL-SCNC: 27 MMOL/L (ref 22–29)
CREAT SERPL-MCNC: 1.38 MG/DL (ref 0.57–1)
DEPRECATED RDW RBC AUTO: 57.4 FL (ref 37–54)
EGFRCR SERPLBLD CKD-EPI 2021: 39.5 ML/MIN/1.73
EOSINOPHIL # BLD AUTO: 0.32 10*3/MM3 (ref 0–0.4)
EOSINOPHIL NFR BLD AUTO: 3.7 % (ref 0.3–6.2)
ERYTHROCYTE [DISTWIDTH] IN BLOOD BY AUTOMATED COUNT: 17.4 % (ref 12.3–15.4)
GLOBULIN UR ELPH-MCNC: 2.5 GM/DL
GLUCOSE SERPL-MCNC: 106 MG/DL (ref 65–99)
HCT VFR BLD AUTO: 31.9 % (ref 34–46.6)
HGB BLD-MCNC: 9.6 G/DL (ref 12–15.9)
LYMPHOCYTES # BLD AUTO: 1.07 10*3/MM3 (ref 0.7–3.1)
LYMPHOCYTES NFR BLD AUTO: 12.5 % (ref 19.6–45.3)
MCH RBC QN AUTO: 28.2 PG (ref 26.6–33)
MCHC RBC AUTO-ENTMCNC: 30.1 G/DL (ref 31.5–35.7)
MCV RBC AUTO: 93.5 FL (ref 79–97)
MONOCYTES # BLD AUTO: 0.68 10*3/MM3 (ref 0.1–0.9)
MONOCYTES NFR BLD AUTO: 7.9 % (ref 5–12)
NEUTROPHILS NFR BLD AUTO: 6.43 10*3/MM3 (ref 1.7–7)
NEUTROPHILS NFR BLD AUTO: 75 % (ref 42.7–76)
PLATELET # BLD AUTO: 52 10*3/MM3 (ref 140–450)
POTASSIUM SERPL-SCNC: 4.1 MMOL/L (ref 3.5–5.2)
PROT SERPL-MCNC: 6.1 G/DL (ref 6–8.5)
RBC # BLD AUTO: 3.41 10*6/MM3 (ref 3.77–5.28)
SODIUM SERPL-SCNC: 140 MMOL/L (ref 136–145)
WBC NRBC COR # BLD: 8.57 10*3/MM3 (ref 3.4–10.8)

## 2023-09-22 PROCEDURE — 96372 THER/PROPH/DIAG INJ SC/IM: CPT

## 2023-09-22 PROCEDURE — 80053 COMPREHEN METABOLIC PANEL: CPT

## 2023-09-22 PROCEDURE — 36415 COLL VENOUS BLD VENIPUNCTURE: CPT

## 2023-09-22 PROCEDURE — 85025 COMPLETE CBC W/AUTO DIFF WBC: CPT

## 2023-09-22 PROCEDURE — 25010000002 EPOETIN ALFA PER 1000 UNITS: Performed by: NURSE PRACTITIONER

## 2023-09-22 RX ADMIN — ERYTHROPOIETIN 40000 UNITS: 40000 INJECTION, SOLUTION INTRAVENOUS; SUBCUTANEOUS at 09:50

## 2023-09-22 NOTE — PROGRESS NOTES
Message from ABDOUL Kathleen:  DR GONZALEZ HAD ALREADY TRIED HER ON STEROIDS THEY WERE NOT FFECTIVE SO TELL HER NOT TO WORRY WITH THE STEROIDS. SHE IS OK FOR RETACRIT TODAY

## 2023-09-22 NOTE — PROGRESS NOTES
FYI: Marina Joe, [birth year], last week she was suppose to have had a script for steroids sent to her pharmacy, but she said they never had anything for her.  She is not having any problems today, but just wanted you to be aware it was not sent in per pt.

## 2023-09-25 LAB
LAB AP CASE REPORT: NORMAL
Lab: NORMAL
PATH REPORT.FINAL DX SPEC: NORMAL
PATH REPORT.GROSS SPEC: NORMAL

## 2023-09-27 DIAGNOSIS — J30.1 ALLERGIC RHINITIS DUE TO POLLEN, UNSPECIFIED SEASONALITY: ICD-10-CM

## 2023-09-27 DIAGNOSIS — G25.81 RESTLESS LEGS: ICD-10-CM

## 2023-09-27 RX ORDER — MONTELUKAST SODIUM 10 MG/1
10 TABLET ORAL DAILY
Qty: 90 TABLET | Refills: 1 | Status: SHIPPED | OUTPATIENT
Start: 2023-09-27

## 2023-09-27 RX ORDER — ROPINIROLE 0.5 MG/1
0.5 TABLET, FILM COATED ORAL NIGHTLY
Qty: 90 TABLET | Refills: 1 | Status: SHIPPED | OUTPATIENT
Start: 2023-09-27

## 2023-09-29 ENCOUNTER — INFUSION (OUTPATIENT)
Dept: ONCOLOGY | Facility: HOSPITAL | Age: 78
End: 2023-09-29
Payer: MEDICARE

## 2023-09-29 ENCOUNTER — TELEPHONE (OUTPATIENT)
Dept: ONCOLOGY | Facility: CLINIC | Age: 78
End: 2023-09-29
Payer: MEDICARE

## 2023-09-29 ENCOUNTER — LAB (OUTPATIENT)
Dept: LAB | Facility: HOSPITAL | Age: 78
End: 2023-09-29
Payer: MEDICARE

## 2023-09-29 ENCOUNTER — READMISSION MANAGEMENT (OUTPATIENT)
Dept: CALL CENTER | Facility: HOSPITAL | Age: 78
End: 2023-09-29
Payer: MEDICARE

## 2023-09-29 VITALS
OXYGEN SATURATION: 98 % | HEART RATE: 71 BPM | TEMPERATURE: 97 F | SYSTOLIC BLOOD PRESSURE: 148 MMHG | DIASTOLIC BLOOD PRESSURE: 50 MMHG | HEIGHT: 68 IN | BODY MASS INDEX: 35.61 KG/M2 | WEIGHT: 235 LBS | RESPIRATION RATE: 18 BRPM

## 2023-09-29 DIAGNOSIS — D63.1 ANEMIA OF CHRONIC RENAL FAILURE, STAGE 3A: ICD-10-CM

## 2023-09-29 DIAGNOSIS — N18.32 STAGE 3B CHRONIC KIDNEY DISEASE: ICD-10-CM

## 2023-09-29 DIAGNOSIS — N18.31 ANEMIA OF CHRONIC RENAL FAILURE, STAGE 3A: ICD-10-CM

## 2023-09-29 DIAGNOSIS — D63.1 ANEMIA OF CHRONIC RENAL FAILURE, STAGE 3A: Primary | ICD-10-CM

## 2023-09-29 DIAGNOSIS — N18.31 ANEMIA OF CHRONIC RENAL FAILURE, STAGE 3A: Primary | ICD-10-CM

## 2023-09-29 LAB
ALBUMIN SERPL-MCNC: 3.7 G/DL (ref 3.5–5.2)
ALBUMIN/GLOB SERPL: 1.5 G/DL
ALP SERPL-CCNC: 82 U/L (ref 39–117)
ALT SERPL W P-5'-P-CCNC: 15 U/L (ref 1–33)
ANION GAP SERPL CALCULATED.3IONS-SCNC: 9 MMOL/L (ref 5–15)
AST SERPL-CCNC: 17 U/L (ref 1–32)
BASOPHILS # BLD AUTO: 0.01 10*3/MM3 (ref 0–0.2)
BASOPHILS NFR BLD AUTO: 0.2 % (ref 0–1.5)
BILIRUB SERPL-MCNC: 0.3 MG/DL (ref 0–1.2)
BUN SERPL-MCNC: 17 MG/DL (ref 8–23)
BUN/CREAT SERPL: 11.5 (ref 7–25)
CALCIUM SPEC-SCNC: 8.8 MG/DL (ref 8.6–10.5)
CHLORIDE SERPL-SCNC: 103 MMOL/L (ref 98–107)
CO2 SERPL-SCNC: 26 MMOL/L (ref 22–29)
CREAT SERPL-MCNC: 1.48 MG/DL (ref 0.57–1)
DEPRECATED RDW RBC AUTO: 59.6 FL (ref 37–54)
EGFRCR SERPLBLD CKD-EPI 2021: 36.3 ML/MIN/1.73
EOSINOPHIL # BLD AUTO: 0.39 10*3/MM3 (ref 0–0.4)
EOSINOPHIL NFR BLD AUTO: 6.6 % (ref 0.3–6.2)
ERYTHROCYTE [DISTWIDTH] IN BLOOD BY AUTOMATED COUNT: 17.6 % (ref 12.3–15.4)
GLOBULIN UR ELPH-MCNC: 2.5 GM/DL
GLUCOSE SERPL-MCNC: 112 MG/DL (ref 65–99)
HCT VFR BLD AUTO: 32.5 % (ref 34–46.6)
HGB BLD-MCNC: 9.7 G/DL (ref 12–15.9)
LYMPHOCYTES # BLD AUTO: 1 10*3/MM3 (ref 0.7–3.1)
LYMPHOCYTES NFR BLD AUTO: 16.8 % (ref 19.6–45.3)
MCH RBC QN AUTO: 28.1 PG (ref 26.6–33)
MCHC RBC AUTO-ENTMCNC: 29.8 G/DL (ref 31.5–35.7)
MCV RBC AUTO: 94.2 FL (ref 79–97)
MONOCYTES # BLD AUTO: 0.55 10*3/MM3 (ref 0.1–0.9)
MONOCYTES NFR BLD AUTO: 9.2 % (ref 5–12)
NEUTROPHILS NFR BLD AUTO: 3.99 10*3/MM3 (ref 1.7–7)
NEUTROPHILS NFR BLD AUTO: 67 % (ref 42.7–76)
PLATELET # BLD AUTO: 43 10*3/MM3 (ref 140–450)
POTASSIUM SERPL-SCNC: 3.8 MMOL/L (ref 3.5–5.2)
PROT SERPL-MCNC: 6.2 G/DL (ref 6–8.5)
RBC # BLD AUTO: 3.45 10*6/MM3 (ref 3.77–5.28)
SODIUM SERPL-SCNC: 138 MMOL/L (ref 136–145)
WBC NRBC COR # BLD: 5.95 10*3/MM3 (ref 3.4–10.8)

## 2023-09-29 PROCEDURE — 96372 THER/PROPH/DIAG INJ SC/IM: CPT

## 2023-09-29 PROCEDURE — 36415 COLL VENOUS BLD VENIPUNCTURE: CPT

## 2023-09-29 PROCEDURE — 25010000002 EPOETIN ALFA PER 1000 UNITS: Performed by: NURSE PRACTITIONER

## 2023-09-29 PROCEDURE — 80053 COMPREHEN METABOLIC PANEL: CPT

## 2023-09-29 PROCEDURE — 85025 COMPLETE CBC W/AUTO DIFF WBC: CPT

## 2023-09-29 RX ADMIN — ERYTHROPOIETIN 40000 UNITS: 40000 INJECTION, SOLUTION INTRAVENOUS; SUBCUTANEOUS at 08:37

## 2023-09-29 NOTE — OUTREACH NOTE
Medical Week 3 Survey      Flowsheet Row Responses   [facility] patient discharged from? [facility]   Does the patient have one of the following disease processes/diagnoses(primary or secondary)? Other   Week 3 attempt successful? No   Unsuccessful attempts Attempt 1   Discharge diagnosis GIB (gastrointestinal bleeding)            MELISSA MAY - Registered Nurse

## 2023-09-29 NOTE — TELEPHONE ENCOUNTER
CRITICAL LAB VALUE  Received call from Aliya  Hematology with CRITICAL LAB VALUE    PLT: 43    This information was sent to EMILY Madera for review

## 2023-10-02 DIAGNOSIS — N18.32 STAGE 3B CHRONIC KIDNEY DISEASE: Primary | ICD-10-CM

## 2023-10-02 DIAGNOSIS — N18.31 ANEMIA OF CHRONIC RENAL FAILURE, STAGE 3A: ICD-10-CM

## 2023-10-02 DIAGNOSIS — D63.1 ANEMIA OF CHRONIC RENAL FAILURE, STAGE 3A: ICD-10-CM

## 2023-10-04 ENCOUNTER — READMISSION MANAGEMENT (OUTPATIENT)
Dept: CALL CENTER | Facility: HOSPITAL | Age: 78
End: 2023-10-04
Payer: MEDICARE

## 2023-10-04 NOTE — OUTREACH NOTE
Medical Week 3 Survey      Flowsheet Row Responses   [facility] facility patient discharged from? [facility]   Does the patient have one of the following disease processes/diagnoses(primary or secondary)? Other   Week 3 attempt successful? No   Unsuccessful attempts Attempt 2            Марина H - Registered Nurse

## 2023-10-06 ENCOUNTER — TELEPHONE (OUTPATIENT)
Dept: ONCOLOGY | Facility: CLINIC | Age: 78
End: 2023-10-06
Payer: MEDICARE

## 2023-10-06 ENCOUNTER — INFUSION (OUTPATIENT)
Dept: ONCOLOGY | Facility: HOSPITAL | Age: 78
End: 2023-10-06
Payer: MEDICARE

## 2023-10-06 ENCOUNTER — LAB (OUTPATIENT)
Dept: LAB | Facility: HOSPITAL | Age: 78
End: 2023-10-06
Payer: MEDICARE

## 2023-10-06 VITALS
SYSTOLIC BLOOD PRESSURE: 110 MMHG | BODY MASS INDEX: 35.16 KG/M2 | WEIGHT: 232 LBS | HEIGHT: 68 IN | HEART RATE: 69 BPM | OXYGEN SATURATION: 100 % | TEMPERATURE: 97.6 F | RESPIRATION RATE: 18 BRPM | DIASTOLIC BLOOD PRESSURE: 84 MMHG

## 2023-10-06 DIAGNOSIS — N18.32 STAGE 3B CHRONIC KIDNEY DISEASE: ICD-10-CM

## 2023-10-06 DIAGNOSIS — N18.31 ANEMIA OF CHRONIC RENAL FAILURE, STAGE 3A: ICD-10-CM

## 2023-10-06 DIAGNOSIS — D63.1 ANEMIA OF CHRONIC RENAL FAILURE, STAGE 3A: ICD-10-CM

## 2023-10-06 DIAGNOSIS — D63.1 ANEMIA OF CHRONIC RENAL FAILURE, STAGE 3A: Primary | ICD-10-CM

## 2023-10-06 DIAGNOSIS — N18.31 ANEMIA OF CHRONIC RENAL FAILURE, STAGE 3A: Primary | ICD-10-CM

## 2023-10-06 DIAGNOSIS — D50.8 IRON DEFICIENCY ANEMIA SECONDARY TO INADEQUATE DIETARY IRON INTAKE: ICD-10-CM

## 2023-10-06 LAB
ALBUMIN SERPL-MCNC: 3.7 G/DL (ref 3.5–5.2)
ALBUMIN/GLOB SERPL: 1.4 G/DL
ALP SERPL-CCNC: 90 U/L (ref 39–117)
ALT SERPL W P-5'-P-CCNC: 13 U/L (ref 1–33)
ANION GAP SERPL CALCULATED.3IONS-SCNC: 10 MMOL/L (ref 5–15)
AST SERPL-CCNC: 20 U/L (ref 1–32)
BASOPHILS # BLD AUTO: 0.02 10*3/MM3 (ref 0–0.2)
BASOPHILS NFR BLD AUTO: 0.3 % (ref 0–1.5)
BILIRUB SERPL-MCNC: 0.3 MG/DL (ref 0–1.2)
BUN SERPL-MCNC: 27 MG/DL (ref 8–23)
BUN/CREAT SERPL: 17.5 (ref 7–25)
CALCIUM SPEC-SCNC: 9.1 MG/DL (ref 8.6–10.5)
CHLORIDE SERPL-SCNC: 104 MMOL/L (ref 98–107)
CO2 SERPL-SCNC: 26 MMOL/L (ref 22–29)
CREAT SERPL-MCNC: 1.54 MG/DL (ref 0.57–1)
DEPRECATED RDW RBC AUTO: 59.8 FL (ref 37–54)
EGFRCR SERPLBLD CKD-EPI 2021: 34.6 ML/MIN/1.73
EOSINOPHIL # BLD AUTO: 0.27 10*3/MM3 (ref 0–0.4)
EOSINOPHIL NFR BLD AUTO: 4.7 % (ref 0.3–6.2)
ERYTHROCYTE [DISTWIDTH] IN BLOOD BY AUTOMATED COUNT: 17.5 % (ref 12.3–15.4)
FERRITIN SERPL-MCNC: 194.8 NG/ML (ref 13–150)
GLOBULIN UR ELPH-MCNC: 2.6 GM/DL
GLUCOSE SERPL-MCNC: 99 MG/DL (ref 65–99)
HCT VFR BLD AUTO: 34 % (ref 34–46.6)
HGB BLD-MCNC: 10 G/DL (ref 12–15.9)
IRON 24H UR-MRATE: 49 MCG/DL (ref 37–145)
IRON SATN MFR SERPL: 15 % (ref 20–50)
LYMPHOCYTES # BLD AUTO: 1.05 10*3/MM3 (ref 0.7–3.1)
LYMPHOCYTES NFR BLD AUTO: 18.1 % (ref 19.6–45.3)
MCH RBC QN AUTO: 27.5 PG (ref 26.6–33)
MCHC RBC AUTO-ENTMCNC: 29.4 G/DL (ref 31.5–35.7)
MCV RBC AUTO: 93.4 FL (ref 79–97)
MONOCYTES # BLD AUTO: 0.76 10*3/MM3 (ref 0.1–0.9)
MONOCYTES NFR BLD AUTO: 13.1 % (ref 5–12)
NEUTROPHILS NFR BLD AUTO: 3.68 10*3/MM3 (ref 1.7–7)
NEUTROPHILS NFR BLD AUTO: 63.5 % (ref 42.7–76)
PLATELET # BLD AUTO: 39 10*3/MM3 (ref 140–450)
POTASSIUM SERPL-SCNC: 3.8 MMOL/L (ref 3.5–5.2)
PROT SERPL-MCNC: 6.3 G/DL (ref 6–8.5)
RBC # BLD AUTO: 3.64 10*6/MM3 (ref 3.77–5.28)
SODIUM SERPL-SCNC: 140 MMOL/L (ref 136–145)
TIBC SERPL-MCNC: 329 MCG/DL (ref 298–536)
TRANSFERRIN SERPL-MCNC: 221 MG/DL (ref 200–360)
WBC NRBC COR # BLD: 5.8 10*3/MM3 (ref 3.4–10.8)

## 2023-10-06 PROCEDURE — 80053 COMPREHEN METABOLIC PANEL: CPT

## 2023-10-06 PROCEDURE — 96372 THER/PROPH/DIAG INJ SC/IM: CPT

## 2023-10-06 PROCEDURE — 83540 ASSAY OF IRON: CPT

## 2023-10-06 PROCEDURE — 85025 COMPLETE CBC W/AUTO DIFF WBC: CPT

## 2023-10-06 PROCEDURE — 84466 ASSAY OF TRANSFERRIN: CPT

## 2023-10-06 PROCEDURE — 25010000002 EPOETIN ALFA PER 1000 UNITS: Performed by: NURSE PRACTITIONER

## 2023-10-06 PROCEDURE — 82728 ASSAY OF FERRITIN: CPT

## 2023-10-06 PROCEDURE — 36415 COLL VENOUS BLD VENIPUNCTURE: CPT

## 2023-10-06 RX ADMIN — ERYTHROPOIETIN 40000 UNITS: 40000 INJECTION, SOLUTION INTRAVENOUS; SUBCUTANEOUS at 09:32

## 2023-10-06 NOTE — TELEPHONE ENCOUNTER
CRITICAL LAB VALUE  Received call from Irene  Hematology with CRITICAL LAB VALUE OF:    PLT: 39    This information was sent to Luanne Madera NP for review

## 2023-10-12 DIAGNOSIS — I10 HYPERTENSION, BENIGN: ICD-10-CM

## 2023-10-12 RX ORDER — AMLODIPINE BESYLATE 5 MG/1
5 TABLET ORAL 2 TIMES DAILY
Qty: 180 TABLET | Refills: 2 | Status: SHIPPED | OUTPATIENT
Start: 2023-10-12

## 2023-10-13 ENCOUNTER — OFFICE VISIT (OUTPATIENT)
Dept: ONCOLOGY | Facility: CLINIC | Age: 78
End: 2023-10-13
Payer: MEDICARE

## 2023-10-13 ENCOUNTER — LAB (OUTPATIENT)
Dept: LAB | Facility: HOSPITAL | Age: 78
End: 2023-10-13
Payer: MEDICARE

## 2023-10-13 ENCOUNTER — INFUSION (OUTPATIENT)
Dept: ONCOLOGY | Facility: HOSPITAL | Age: 78
End: 2023-10-13
Payer: MEDICARE

## 2023-10-13 ENCOUNTER — TELEPHONE (OUTPATIENT)
Dept: ONCOLOGY | Facility: CLINIC | Age: 78
End: 2023-10-13

## 2023-10-13 VITALS
HEART RATE: 74 BPM | RESPIRATION RATE: 16 BRPM | HEIGHT: 68 IN | BODY MASS INDEX: 36.07 KG/M2 | WEIGHT: 238 LBS | DIASTOLIC BLOOD PRESSURE: 88 MMHG | OXYGEN SATURATION: 97 % | TEMPERATURE: 97 F | SYSTOLIC BLOOD PRESSURE: 142 MMHG

## 2023-10-13 VITALS
HEIGHT: 68 IN | DIASTOLIC BLOOD PRESSURE: 67 MMHG | RESPIRATION RATE: 18 BRPM | TEMPERATURE: 96.9 F | BODY MASS INDEX: 35.92 KG/M2 | SYSTOLIC BLOOD PRESSURE: 161 MMHG | HEART RATE: 69 BPM | WEIGHT: 237 LBS

## 2023-10-13 DIAGNOSIS — D63.1 ANEMIA OF CHRONIC RENAL FAILURE, STAGE 3A: Primary | ICD-10-CM

## 2023-10-13 DIAGNOSIS — N18.32 ANEMIA IN STAGE 3B CHRONIC KIDNEY DISEASE: ICD-10-CM

## 2023-10-13 DIAGNOSIS — D63.1 ANEMIA IN STAGE 3B CHRONIC KIDNEY DISEASE: ICD-10-CM

## 2023-10-13 DIAGNOSIS — D50.8 IRON DEFICIENCY ANEMIA SECONDARY TO INADEQUATE DIETARY IRON INTAKE: ICD-10-CM

## 2023-10-13 DIAGNOSIS — N18.31 ANEMIA OF CHRONIC RENAL FAILURE, STAGE 3A: Primary | ICD-10-CM

## 2023-10-13 DIAGNOSIS — N18.32 STAGE 3B CHRONIC KIDNEY DISEASE: ICD-10-CM

## 2023-10-13 DIAGNOSIS — N18.32 STAGE 3B CHRONIC KIDNEY DISEASE: Primary | ICD-10-CM

## 2023-10-13 LAB
ALBUMIN SERPL-MCNC: 3.8 G/DL (ref 3.5–5.2)
ALBUMIN/GLOB SERPL: 1.5 G/DL
ALP SERPL-CCNC: 88 U/L (ref 39–117)
ALT SERPL W P-5'-P-CCNC: 13 U/L (ref 1–33)
ANION GAP SERPL CALCULATED.3IONS-SCNC: 10 MMOL/L (ref 5–15)
AST SERPL-CCNC: 19 U/L (ref 1–32)
BASOPHILS # BLD AUTO: 0.05 10*3/MM3 (ref 0–0.2)
BASOPHILS NFR BLD AUTO: 0.8 % (ref 0–1.5)
BILIRUB SERPL-MCNC: 0.3 MG/DL (ref 0–1.2)
BUN SERPL-MCNC: 24 MG/DL (ref 8–23)
BUN/CREAT SERPL: 16.1 (ref 7–25)
CALCIUM SPEC-SCNC: 8.9 MG/DL (ref 8.6–10.5)
CHLORIDE SERPL-SCNC: 104 MMOL/L (ref 98–107)
CO2 SERPL-SCNC: 26 MMOL/L (ref 22–29)
CREAT SERPL-MCNC: 1.49 MG/DL (ref 0.57–1)
DEPRECATED RDW RBC AUTO: 60.4 FL (ref 37–54)
EGFRCR SERPLBLD CKD-EPI 2021: 36 ML/MIN/1.73
EOSINOPHIL # BLD AUTO: 0.28 10*3/MM3 (ref 0–0.4)
EOSINOPHIL NFR BLD AUTO: 4.6 % (ref 0.3–6.2)
ERYTHROCYTE [DISTWIDTH] IN BLOOD BY AUTOMATED COUNT: 17.7 % (ref 12.3–15.4)
FERRITIN SERPL-MCNC: 165.3 NG/ML (ref 13–150)
GLOBULIN UR ELPH-MCNC: 2.6 GM/DL
GLUCOSE SERPL-MCNC: 98 MG/DL (ref 65–99)
HCT VFR BLD AUTO: 31.5 % (ref 34–46.6)
HGB BLD-MCNC: 9.1 G/DL (ref 12–15.9)
HOLD SPECIMEN: NORMAL
IRON 24H UR-MRATE: 48 MCG/DL (ref 37–145)
IRON SATN MFR SERPL: 15 % (ref 20–50)
LYMPHOCYTES # BLD AUTO: 1.03 10*3/MM3 (ref 0.7–3.1)
LYMPHOCYTES NFR BLD AUTO: 17 % (ref 19.6–45.3)
MCH RBC QN AUTO: 27 PG (ref 26.6–33)
MCHC RBC AUTO-ENTMCNC: 28.9 G/DL (ref 31.5–35.7)
MCV RBC AUTO: 93.5 FL (ref 79–97)
MONOCYTES # BLD AUTO: 0.69 10*3/MM3 (ref 0.1–0.9)
MONOCYTES NFR BLD AUTO: 11.4 % (ref 5–12)
NEUTROPHILS NFR BLD AUTO: 3.98 10*3/MM3 (ref 1.7–7)
NEUTROPHILS NFR BLD AUTO: 65.9 % (ref 42.7–76)
PLATELET # BLD AUTO: 40 10*3/MM3 (ref 140–450)
POTASSIUM SERPL-SCNC: 4.2 MMOL/L (ref 3.5–5.2)
PROT SERPL-MCNC: 6.4 G/DL (ref 6–8.5)
RBC # BLD AUTO: 3.37 10*6/MM3 (ref 3.77–5.28)
SODIUM SERPL-SCNC: 140 MMOL/L (ref 136–145)
TIBC SERPL-MCNC: 329 MCG/DL (ref 298–536)
TRANSFERRIN SERPL-MCNC: 221 MG/DL (ref 200–360)
WBC NRBC COR # BLD: 6.05 10*3/MM3 (ref 3.4–10.8)

## 2023-10-13 PROCEDURE — 85025 COMPLETE CBC W/AUTO DIFF WBC: CPT

## 2023-10-13 PROCEDURE — 82728 ASSAY OF FERRITIN: CPT

## 2023-10-13 PROCEDURE — 83540 ASSAY OF IRON: CPT

## 2023-10-13 PROCEDURE — 80053 COMPREHEN METABOLIC PANEL: CPT

## 2023-10-13 PROCEDURE — 36415 COLL VENOUS BLD VENIPUNCTURE: CPT

## 2023-10-13 PROCEDURE — 96372 THER/PROPH/DIAG INJ SC/IM: CPT

## 2023-10-13 PROCEDURE — 84466 ASSAY OF TRANSFERRIN: CPT

## 2023-10-13 PROCEDURE — 25010000002 EPOETIN ALFA PER 1000 UNITS: Performed by: NURSE PRACTITIONER

## 2023-10-13 RX ORDER — DIPHENHYDRAMINE HYDROCHLORIDE 50 MG/ML
50 INJECTION INTRAMUSCULAR; INTRAVENOUS AS NEEDED
OUTPATIENT
Start: 2023-10-20

## 2023-10-13 RX ORDER — SODIUM CHLORIDE 9 MG/ML
250 INJECTION, SOLUTION INTRAVENOUS ONCE
OUTPATIENT
Start: 2023-10-20

## 2023-10-13 RX ORDER — FAMOTIDINE 10 MG/ML
20 INJECTION, SOLUTION INTRAVENOUS AS NEEDED
OUTPATIENT
Start: 2023-10-20

## 2023-10-13 RX ADMIN — ERYTHROPOIETIN 40000 UNITS: 40000 INJECTION, SOLUTION INTRAVENOUS; SUBCUTANEOUS at 09:17

## 2023-10-13 NOTE — TELEPHONE ENCOUNTER
CRITICAL LAB VALUE  Received call from Irene  Hematology with CRITICAL LAB VALUE    PLT: 40    This information was sent to Luanne Madera NP for review

## 2023-10-13 NOTE — PROGRESS NOTES
[facility] GROUP HEMATOLOGY & ONCOLOGY  [address]  Located within [facility] [address] [phone]    Patient Name: Marina Joe  Encounter Date: 10/13/2023  YOB: 1945  Patient Number: [id number]      HPI: Marina Joe is a  78 y.o.  female who is seen on follow-up for stage Ia left breast cancer, upper outer quadrant, receptor positive and HER-2/alix negative.  She is on adjuvant anastrozole since 05/2013. Plan for 10 years.  She is also seen for anemia from chronic kidney disease.   She was given injectafer on 09/27/22.   She was started on SREE with Retacrit on November 29, 2022.    She began to experience thrombocytopenia   Bone Marrow Biopsy 11/10/22.    Bone marrow, left iliac crest, core biopsy, aspirate smears, and clot section:  Slightly hypercellular marrow (40%) with maturing trilineage hematopoiesis.  Aspiculate smears, nondiagnostic.  No overt features of myelodysplasia and no excess blast population.  Moderately increased megakaryocytes.  2+ stainable iron.  Peripheral blood smear:  Severe thrombocytopenia.  Mild normocytic normochromic anemia.  Rare circulating schistocytes.  Normal white blood cell count with normal differential and morphology.  No circulating blasts.  Flow cytometry: No immunophenotypic evidence of abnormal myeloid maturation, increased blast population or a lymphoproliferative disorder.  Chromosome analysis: Normal female karyotype.    INTERVAL HISTORY     Ms. Joe presents to clinic today for continued follow up. She received blood transfusion August 10, 11 and 18th.  She received Inejctafer 08/04,11, 2023.  She received Retacrit August 25 for Hgb 8.1.      She was admitted 09/6/23 - 09/12/23 for GI bleed.  She has been receiving Retacrit for anemia in CKD.  In reviewing the chart, it does not appert that pt received Retacrit during retacrit hospital admission.    Today, Pt complains of  significant fatigue with her anemia.  She denies any bleeding. She had labs drawn and results were reviewed with her in office.       Oncology/Hematology History Overview Note   Oncologic history  In February 2012, she had a routine mammogram that found a nodular density in the upper outer quadrant of the left breast.  An ultrasound revealed no nodularity at that time.  Repeat ultrasound 3 months later on 5/3/2012 revealed a small cyst in the left breast but felt to be benign.  Repeat mammogram on October 31, 2012 found a lobulated lesion in the left breast at the 2 o'clock position and a stable cyst at the 12 o'clock position.  She was referred to Dr. Barkley on 11/30/2012 and biopsy was performed.  The 12:00 cyst was a fibrocystic lesion while the 2:00 lesion was an invasive mammary carcinoma, low-grade, ER positive HI positive HER-2 nu 2+, FISH unamplified.    On January 8, 2013 she underwent a left partial mastectomy with sentinel node biopsy finding invasive ductal carcinoma, 3.1 mm in greatest dimension, grade 1.  2 sentinel nodes were negative.  AJCC TNM stage was pT1aN0 M0, Stage IA.  Following surgery she underwent adjuvant radiation therapy and was then started on Arimidex for hormonal manipulation.  She was started on the Arimidex in approximately April or May 2013.  He has been recommended to continue this for 10 years.    Hematologic history  Patient with a long history of anemia secondary to iron deficiency as well as chronic kidney disease stage III.Patient has a GFR that ranges from 45-61ML/MIN.  He has been undergoing Procrit when meets guidelines for several years now.  She is also required iron replacement.  Folate > 20, B12 at 1503 and negative blood for flow cytometry on 01/07/2022.      Previous interventions  Procrit 40,000 units subcu initiated approximately February 2017  Injectafer given 9/23/2019, 9/30/2019     Malignant neoplasm of upper-outer quadrant of female breast   10/31/2012 Initial  Diagnosis    Malignant neoplasm of central portion of left female breast (CMS/HCC)     10/31/2012 Imaging    Mammogram on October 31, 2012 found a lobulated lesion in the left breast at the 2 o'clock position and a stable cyst at the 12 o'clock position.      11/30/2012 Biopsy    Dr. Barkley on 11/30/2012 and biopsy was performed.  The 12:00 cyst was a fibrocystic lesion while the 2:00 lesion was an invasive mammary carcinoma, low-grade, ER positive IA positive HER-2 nu 2+, FISH unamplified.         1/8/2013 Surgery    On January 8, 2013 she underwent a left partial mastectomy with sentinel node biopsy finding invasive ductal carcinoma, 3.1 mm in greatest dimension, grade 1.  2 sentinel nodes were negative.  AJCC TNM stage was pT1aN0 M0, Stage IA.  Hormone receptor positive HER-2 negative      Radiation    Radiation OncologyTreatment Course:  Marina Joe receivedin 33 fractions to left breast via External Beam Radiation - EBRT.     5/2013 -  Hormonal Therapy    Arimidex 1 mg daily     8/22/2022 Cancer Staged    Staging form: Breast, AJCC V7  - Pathologic stage from 8/22/2022: Stage IA (T1a, N0, cM0) - Signed by Benjamin Shah MD on 8/22/2022         PAST MEDICAL HISTORY:  ALLERGIES:  Allergies   Allergen Reactions    Aspirin GI Bleeding    Niacin Other (See Comments)     CURRENT MEDICATIONS:  Outpatient Encounter Medications as of 10/13/2023   Medication Sig Dispense Refill    albuterol sulfate  (90 Base) MCG/ACT inhaler Inhale 2 puffs Every 4 (Four) Hours As Needed for Wheezing. 2 g 3    amLODIPine (NORVASC) 5 MG tablet TAKE 1 TABLET BY MOUTH TWICE DAILY 180 tablet 2    buPROPion XL (WELLBUTRIN XL) 150 MG 24 hr tablet Take 1 tablet by mouth Daily.      Calcium Carb-Cholecalciferol (CALCIUM 600+D3 PO) Take 1 tablet by mouth Daily.      carvedilol (COREG) 6.25 MG tablet Take 1 tablet by mouth 2 (Two) Times a Day With Meals. 180 tablet 3    cetirizine (zyrTEC) 10 MG tablet Take 1 tablet by mouth Daily.       cloNIDine (CATAPRES) 0.1 MG tablet Take 1 tablet by mouth 2 (Two) Times a Day.      cyclobenzaprine (FLEXERIL) 10 MG tablet Take 1 tablet by mouth 3 (Three) Times a Day As Needed for Muscle Spasms. 90 tablet 5    Ergocalciferol (VITAMIN D2 PO) Take 50,000 Units by mouth Every 30 (Thirty) Days.      famotidine (PEPCID) 20 MG tablet Take 1 tablet by mouth 2 (Two) Times a Day Before Meals. 60 tablet 0    fluticasone (FLONASE) 50 MCG/ACT nasal spray 2 sprays into the nostril(s) as directed by provider Daily. 1 g 3    irbesartan-hydrochlorothiazide (AVALIDE) 300-12.5 MG tablet Take 1 tablet by mouth Daily.      montelukast (SINGULAIR) 10 MG tablet TAKE 1 TABLET BY MOUTH DAILY 90 tablet 1    Multiple Vitamins-Minerals (MULTIVITAMIN ADULT) tablet Take 1 tablet by mouth Daily.      pantoprazole (PROTONIX) 40 MG EC tablet Take 1 tablet by mouth 2 (Two) Times a Day Before Meals. 60 tablet 0    rOPINIRole (REQUIP) 0.5 MG tablet TAKE 1 TABLET BY MOUTH EVERY NIGHT 90 tablet 1    rosuvastatin (CRESTOR) 20 MG tablet Take 1 tablet by mouth Daily. 90 tablet 3    sertraline (ZOLOFT) 100 MG tablet Take 1 tablet by mouth Daily.      valACYclovir (VALTREX) 500 MG tablet Take 1 tablet by mouth 2 (Two) Times a Day.      [DISCONTINUED] ferrous sulfate 325 (65 FE) MG tablet Take 1 tablet by mouth Daily With Breakfast. (Patient not taking: Reported on 10/13/2023)       No facility-administered encounter medications on file as of 10/13/2023.     ADULT ILLNESSES:  Patient Active Problem List   Diagnosis Code    Malignant neoplasm of upper-outer quadrant of female breast C50.419    Anemia of chronic renal failure, stage 3 (moderate) N18.30, D63.1    Hypertension, benign I10    Hx of colonic polyps Z86.010    HX: breast cancer Z85.3    Morbidly obese E66.01    Abnormal mammogram R92.8    Breast mass N63.0    Right knee DJD M17.11    S/P lumpectomy, left breast Z98.890    Adult hypothyroidism E03.9    Rhinitis J31.0    Anemia D64.9    Anxiety  F41.9    At low risk for fall Z91.81    Breast cancer, left C50.912    Cervical pain M54.2    Chronic insomnia F51.04    Cough R05.9    Elevated lipids E78.5    Encounter for immunization Z23    Herpes zoster without complication B02.9    Influenza B J10.1    Left ear pain H92.02    Left otitis externa H60.92    Lumbar strain, initial encounter S39.012A    Myalgia M79.10    Negative depression screening Z13.31    Obesity (BMI 30-39.9) E66.9    Postmenopausal status Z78.0    Recurrent acute serous otitis media of left ear H65.05    Restless leg G25.81    Sinusitis, bacterial J32.9, B96.89    Skin lesion L98.9    Upper respiratory infection J06.9    Urinary tract infection without hematuria N39.0    Vitamin D deficiency E55.9    Stage 3b chronic kidney disease N18.32    GIB (gastrointestinal bleeding) K92.2    Acute on chronic blood loss anemia D62    Chronic idiopathic thrombocytopenia D69.6    Hypotension due to hypovolemia I95.89, E86.1    Primary hypertension I10    Pancreatic lesion K86.9     SURGERIES:  Past Surgical History:   Procedure Laterality Date    AVULSION TOENAIL PLATE      Sept 26,2018    BREAST BIOPSY Left 11/20/2012    BREAST BIOPSY      Left Breast, 1/2019 per Dr Barkley    BREAST LUMPECTOMY Left     with node bx     COLONOSCOPY  09/13/2013    small polyp at 30cm benign hyperplastic polyp, changes consistent with melanosis coli. Recall 5 years    COLONOSCOPY  11/19/2018    Tics otherwise normal exam repeat in 5 years    COLONOSCOPY  02/13/2023    Normal exam repeat in 3 years with a 2 day prep    ENDOSCOPY  02/13/2023    Gastritis, small HH    REPLACEMENT TOTAL KNEE Right     2016    TOTAL ABDOMINAL HYSTERECTOMY WITH SALPINGO OOPHORECTOMY       HEALTH MAINTENANCE ITEMS:  Health Maintenance Due   Topic Date Due    DXA SCAN  06/17/2023    COVID-19 Vaccine (5 - 2023-24 season) 09/01/2023       <no information>  Last Completed Colonoscopy            COLORECTAL CANCER SCREENING (COLONOSCOPY - Every 3  Years) Next due on 2/13/2026 09/07/2023  Fecal Occult Blood component of Occult Blood X 1, Stool - Stool, Per Rectum    09/03/2023  Fecal Occult Blood component of POC Occult Blood Stool    08/10/2023  Fecal Occult Blood component of POC Occult Blood Stool    02/13/2023  SCANNED - COLONOSCOPY    11/16/2022  Occult Blood X 3, Stool - Stool, Per Rectum    Only the first 5 history entries have been loaded, but more history exists.                  Immunization History   Administered Date(s) Administered    COVID-19 (MODERNA) 1st,2nd,3rd Dose Monovalent 03/12/2021, 03/28/2021    COVID-19 (MODERNA) Monovalent Original Booster 08/31/2022    COVID-19 (PFIZER) BIVALENT 12+YRS 12/08/2022    Flu Vaccine Quad PF >36MO 11/05/2019    Fluad Quad 65+ 11/05/2020    Fluzone High Dose =>65 Years ([employer] ONLY) 11/11/2015, 10/14/2016, 11/20/2017    Fluzone High-Dose 65+yrs 11/29/2021, 10/04/2022, 10/17/2023    Pneumococcal Conjugate 13-Valent (PCV13) 10/14/2016    Pneumococcal Polysaccharide (PPSV23) 11/05/2020    Shingrix 01/01/2021, 06/08/2021    Zoster, Unspecified 01/01/2021     Last Completed Mammogram            MAMMOGRAM (Yearly) Next due on 1/23/2024 01/23/2023  SCANNED - MAMMO    01/23/2023  MAMMO SCREENING BILATERAL W CAD    12/13/2021  SCANNED - MAMMO    12/13/2021  MAMMO SCREENING BILATERAL W CAD    12/10/2020  Mammo Screening Digital Tomosynthesis Bilateral With CAD    Only the first 5 history entries have been loaded, but more history exists.                      FAMILY HISTORY:  Family History   Problem Relation Age of Onset    Heart attack Mother     Hodgkin's lymphoma Father     Other Father     Cancer Father         hodgkins    Heart attack Brother     Skin cancer Maternal Uncle     Pancreatic cancer Maternal Uncle     Cancer Maternal Uncle         eye    Colon cancer Neg Hx     Colon polyps Neg Hx      SOCIAL HISTORY:  Social History     Socioeconomic History    Marital status:    Tobacco Use     "Smoking status: Never    Smokeless tobacco: Never   Vaping Use    Vaping Use: Never used   Substance and Sexual Activity    Alcohol use: No    Drug use: Not Currently    Sexual activity: Not Currently     Birth control/protection: Surgical       REVIEW OF SYSTEMS:    Review of Systems   Constitutional:  Negative for chills and fever.   HENT:  Negative for congestion and trouble swallowing.    Respiratory:  Negative for wheezing.    Cardiovascular:  Negative for chest pain and palpitations.   Gastrointestinal:  Negative for abdominal pain, nausea and vomiting.   Endocrine: Negative for polydipsia and polyphagia.   Genitourinary:  Negative for difficulty urinating, dysuria and flank pain.   Musculoskeletal:  Negative for gait problem and joint swelling.   Allergic/Immunologic: Negative for food allergies.   Neurological:  Negative for speech difficulty and weakness.   Hematological:  Negative for adenopathy. Does not bruise/bleed easily.   Psychiatric/Behavioral:  Negative for agitation, confusion and hallucinations.        VITAL SIGNS: /88   Pulse 74   Temp 97 °F (36.1 °C)   Resp 16   Ht 172.7 cm (68\")   Wt 108 kg (238 lb)   LMP  (LMP Unknown)   SpO2 97%   BMI 36.19 kg/m²   Pain Score    10/13/23 0816   PainSc: 0-No pain       PHYSICAL EXAMINATION:     Physical Exam  Vitals reviewed.   Constitutional:       General: She is not in acute distress.  HENT:      Head: Normocephalic and atraumatic.   Eyes:      General: No scleral icterus.  Cardiovascular:      Rate and Rhythm: Normal rate.   Pulmonary:      Effort: No respiratory distress.      Breath sounds: No wheezing or rales.   Abdominal:      General: Bowel sounds are normal.      Palpations: Abdomen is soft.      Tenderness: There is no abdominal tenderness.   Musculoskeletal:         General: No swelling.      Cervical back: Neck supple.      Right lower leg: Edema present.      Left lower leg: Edema present.   Skin:     General: Skin is warm and " dry.   Neurological:      Mental Status: She is alert and oriented to person, place, and time.   Psychiatric:         Mood and Affect: Mood normal.         Behavior: Behavior normal.         Thought Content: Thought content normal.         Judgment: Judgment normal.         LABS    Lab Results - Last 18 Months   Lab Units 10/13/23  0812 10/06/23  0849 09/29/23  0810 09/22/23  0859 09/15/23  0904 09/13/23  1059 09/12/23  0529 09/11/23  1422 09/09/23  1623 09/09/23  0752 09/07/23  0141 09/06/23  1608 09/03/23  1734 09/03/23  0535 08/25/23  0853 08/18/23  0844 08/11/23  0030 08/10/23  1754 08/04/23  0843 07/28/23  0932 06/30/23  0918 06/20/23  0733 04/11/23  0744 03/07/23  1310 02/07/23  0907 01/10/23  0905 12/27/22  1309 12/13/22  0810 09/07/22  0801 06/22/22  1419   HEMOGLOBIN g/dL 9.1* 10.0* 9.7* 9.6* 9.2*  --  8.1* 8.9*   < > 7.9*   < > 4.9*   < > 5.2*   < > 6.6*   < > 5.3*   < > 7.0*   < > 7.7*   < > 10.9* 10.5* 10.2*   < > 9.3*   < > 10.4*   HEMATOCRIT % 31.5* 34.0 32.5* 31.9* 30.2*  --  26.7* 29.1*   < > 25.4*   < > 16.6*   < > 17.7*   < > 22.1*   < > 18.4*   < > 24.8*   < > 24.0*   < > 35.0 34.4 34.7   < > 31.1*   < > 33.5*   MCV fL 93.5 93.4 94.2 93.5 94.1  --  94.0 92.7   < > 93.4   < > 97.6*  --  97.3*   < > 100.5*  --  98.4*   < > 98.0*   < > 91.3   < > 91.6 92.5 96.9   < > 96.0   < > 91.8   WBC 10*3/mm3 6.05 5.80 5.95 8.57 8.92  --  8.39 10.31   < > 10.69   < > 8.67  --  9.54   < > 6.97  --  7.61   < > 5.68   < > 6.91   < > 6.19 6.57 5.77   < > 5.75   < > 5.77   RDW % 17.7* 17.5* 17.6* 17.4* 17.1*  --  17.8* 18.4*   < > 19.7*   < > 21.5*  --  19.6*   < > 21.2*  --  19.5*   < > 18.3*   < > 16.4*   < > 16.0* 15.7* 15.9*   < > 16.2*   < > 15.8*   MPV fL  --   --   --   --   --   --   --   --   --  11.7  --   --   --   --   --  13.3*  --   --   --   --   --   --   --  12.3* 12.8* 14.0*  --   --   --  12.2*   PLATELETS 10*3/mm3 40* 39* 43* 52* 21* 21* 17* 16*   < > 20*   < > 24*  --  33*   < > 35*  --  46*   <  > 46*   < > 56*   < > 115* 91* 62*   < > 54*   < > 77*   IMM GRAN % %  --   --   --   --   --   --   --   --   --   --   --   --   --  0.8*  --   --   --   --   --   --   --   --   --  0.2  --   --   --  0.3  --  0.5   NEUTROS ABS 10*3/mm3 3.98 3.68 3.99 6.43 7.12*  --  6.24 7.96*  --  9.13*   < > 6.61  --  7.34*   < > 4.86  --  5.30   < > 3.73   < > 4.91   < > 4.00 4.16 3.44   < > 3.52   < > 3.28   LYMPHS ABS 10*3/mm3 1.03 1.05 1.00 1.07  --   --  1.25 1.33  --  0.77   < >  --   --  1.21   < >  --   --   --    < >  --    < > CANCELED  0.90   < > 1.40 1.48 1.50   < > 1.34   < > 1.50   MONOS ABS 10*3/mm3 0.69 0.76 0.55 0.68  --   --  0.73 0.86  --  0.65   < >  --   --  0.77   < >  --   --   --    < >  --    < > 0.48   < > 0.49 0.59 0.46   < > 0.51   < > 0.59   EOS ABS 10*3/mm3 0.28 0.27 0.39 0.32 0.36  --  0.10 0.02  --  0.00   < > 0.26  --  0.13   < > 0.36  --  0.15   < > 0.57*   < > CANCELED   < > 0.26 0.29 0.33   < > 0.34   < > 0.34   EOSINOPHIL ABS 10*3/mm3  --   --   --   --   --   --   --   --   --   --   --   --   --   --   --   --   --   --   --   --   --  0.62*  --   --   --   --   --   --    < >  --    EOSINOPHIL % %  --   --   --   --   --   --   --   --   --   --   --   --   --   --   --   --   --   --   --   --   --  9.0*  --   --   --   --   --   --    < >  --    BASOS ABS 10*3/mm3 0.05 0.02 0.01 0.04  --   --  0.01 0.01  --  0.01   < >  --   --  0.01   < >  --   --  0.08   < >  --    < > CANCELED   < > 0.03 0.02 0.03   < > 0.02   < > 0.03   IMMATURE GRANS (ABS) 10*3/mm3  --   --   --   --   --   --   --   --   --   --   --   --   --  0.08*  --   --   --   --   --   --   --   --   --  0.01  --   --   --  0.02  --  0.03   NRBC /100 WBC  --   --   --   --   --   --   --   --   --   --   --  2.0*  --  0.6*  --   --   --   --   --   --   --   --   --  0.0  --   --   --  0.0  --  0.0   NEUTROPHIL % %  --   --   --   --  79.8*  --   --   --   --   --   --  75.2  --   --   --  68.7  --  69.7  --  64.6  --   "71.0  --   --   --   --   --   --    < >  --    MONOCYTES % %  --   --   --   --  3.0*  --   --   --   --   --   --  6.9  --   --   --  5.1  --  1.0*  --  9.1  --  7.0  --   --   --   --   --   --    < >  --    BASOPHIL % %  --   --   --   --   --   --   --   --   --   --   --   --   --   --   --   --   --  1.0  --   --   --   --   --   --   --   --   --   --   --   --    ANISOCYTOSIS   --   --   --   --  Slight/1+  --   --   --   --   --   --  Mod/2+  --   --   --  Slight/1+  --  Slight/1+  --  Mod/2+  --   --   --   --   --   --   --   --   --   --    GIANT PLT   --   --   --   --  Slight/1+  --   --   --   --   --   --  Slight/1+  --   --   --  Large/3+  --  Large/3+  --  Large/3+  --   --   --   --   --   --   --   --   --   --     < > = values in this interval not displayed.       Lab Results - Last 18 Months   Lab Units 10/13/23  0812 10/06/23  0849 09/29/23  0810 09/22/23  0859 09/15/23  0904 09/12/23  0529 09/07/23  0840 09/06/23  1532   GLUCOSE mg/dL 98 99 112* 106* 108* 108*   < > 115*   SODIUM mmol/L 140 140 138 140 136 141   < > 139   POTASSIUM mmol/L 4.2 3.8 3.8 4.1 4.0 3.7   < > 4.0   CO2 mmol/L 26.0 26.0 26.0 27.0 26.0 27.0   < > 25.0   CHLORIDE mmol/L 104 104 103 103 101 106   < > 105   ANION GAP mmol/L 10.0 10.0 9.0 10.0 9.0 8.0   < > 9.0   CREATININE mg/dL 1.49* 1.54* 1.48* 1.38* 1.23* 1.13*   < > 1.51*   BUN mg/dL 24* 27* 17 16 17 20   < > 52*   BUN / CREAT RATIO  16.1 17.5 11.5 11.6 13.8 17.7   < > 34.4*   CALCIUM mg/dL 8.9 9.1 8.8 9.1 8.8 7.9*   < > 8.3*   ALK PHOS U/L 88 90 82 84 70  --   --  50   TOTAL PROTEIN g/dL 6.4 6.3 6.2 6.1 5.7*  --   --  5.0*   ALT (SGPT) U/L 13 13 15 16 23  --   --  9   AST (SGOT) U/L 19 20 17 15 15  --   --  14   BILIRUBIN mg/dL 0.3 0.3 0.3 0.3 0.4  --   --  <0.2   ALBUMIN g/dL 3.8 3.7 3.7 3.6 3.1*  --   --  3.1*   GLOBULIN gm/dL 2.6 2.6 2.5 2.5 2.6  --   --  1.9    < > = values in this interval not displayed.       No results for input(s): \"MSPIKE\", \"KAPPALAMB\", " "\"IGLFLC\", \"URICACID\", \"FREEKAPPAL\", \"CEA\", \"LDH\", \"REFLABREPO\" in the last [id number] hours.      Lab Results - Last 18 Months   Lab Units 10/13/23  0812 10/06/23  0849 09/06/23  1532 07/28/23  0932 06/30/23  0918 06/20/23  0733 04/11/23  0744 01/10/23  0905 12/01/22  0838   IRON mcg/dL 48 49 60 43 48  --  37   < >  --    TIBC mcg/dL 329 329 292* 350 313 334 295*   < >  --    IRON SATURATION %  --   --   --   --   --  17*  --   --   --    IRON SATURATION (TSAT) % 15* 15* 21 12* 15*  --  13*   < >  --    FERRITIN ng/mL 165.30* 194.80* 260.60* 146.10 421.50* 455.00* 726.60*   < >  --    TSH uIU/mL  --   --   --   --   --  5.880*  --   --  4.640*    < > = values in this interval not displayed.       Marina Joe reports a pain score of 0.      ASSESSMENT:  1. Stage 3b chronic kidney disease    2. Iron deficiency anemia secondary to inadequate dietary iron intake          1. Chronic ITP (idiopathic thrombocytopenia) (HCC)   Bone marrow, left iliac crest, core biopsy, aspirate smears, and clot section:  Slightly hypercellular marrow (40%) with maturing trilineage hematopoiesis.  Aspiculate smears, nondiagnostic.  No overt features of myelodysplasia and no excess blast population.  Moderately increased megakaryocytes.  2+ stainable iron.  Peripheral blood smear:  Severe thrombocytopenia.  Mild normocytic normochromic anemia.  Rare circulating schistocytes.  Normal white blood cell count with normal differential and morphology.  No circulating blasts.  Flow cytometry: No immunophenotypic evidence of abnormal myeloid maturation, increased blast population or a lymphoproliferative disorder.  Chromosome analysis: Normal female karyotype.  -Plt today stable at 40.  NO s/s of bleeding   -Stable for observation   PLAN  -Will transfuse for platelets < 10 regardless of bleeding.   -Transfuse platelets < 20 If bleeding   -Can give burst dose of steroids if platelets < 30.    2. Iron deficiency anemia   3.  Chronic Kidney Disease Stage " IIIB  Chemistry:  BUN 24, Creatinine 1.49, GFR 36.0  Anemia Profile: Iron 48, Ferritin 165, Sat 15%, TIBC 329  CBC:  Hgb 9.1, Hct 31.5  She received Inejctafer August 4 &11, 2023.    She received Retacrit August 25, September 1, 15, 22, 29, October 6, 2023  PLAN  Followed by Dr. Martin, Nephrology   She will continue Retacrit weekly today Hgb< 11 or Hct < 33  Will order additional infusion of Inejctafer to be given next week and then she will return to weekly Retacrit injections for Hgb < 11 or Hct 33    3. History of breast cancer  - Pathologic stage: Stage IA (T1a, N0, cM0)   -Invasive mammary carcinoma, low-grade, ER positive TX positive HER-2 nu 2+, FISH unamplified.  -January 8, 2013 she underwent a left partial mastectomy with sentinel node biopsy  -Following surgery she underwent adjuvant radiation therapy and was then started on Arimidex  for hormonal manipulation.  She completed it in Jan 2023.  -Mammogram 01/23/23, Benign,   -Continue Annual Screening     4.  Pancreatic lesion   -MRI Abdomen 6/26/23 15 x 14 mm cystic lesion in the pancreatic tail without septations, mural nodules or enhancement.   -Per note, Dr[facility] Carvalho at [facility] attempted to call pt to schedule EUS        PLAN:  Retacrit today, then Injectafer next week.   Then Continue Weekly labs and retacrit for Hgb < 11 OR Hct < 33  RTC in 6 weeks.  Pre-office labs for CBC, CMP, Iron profile and Ferritin   Continue current medications, treatment plans and follow up with PCP and any other providers  Care discussed with patient.  Understanding expressed.  Patient agreeable with plan.            Analilia Madera, APRN  10/13/2023

## 2023-10-17 ENCOUNTER — OFFICE VISIT (OUTPATIENT)
Dept: INTERNAL MEDICINE | Facility: CLINIC | Age: 78
End: 2023-10-17
Payer: MEDICARE

## 2023-10-17 VITALS
WEIGHT: 235 LBS | HEART RATE: 72 BPM | HEIGHT: 68 IN | BODY MASS INDEX: 35.61 KG/M2 | SYSTOLIC BLOOD PRESSURE: 156 MMHG | DIASTOLIC BLOOD PRESSURE: 80 MMHG | TEMPERATURE: 97.9 F | OXYGEN SATURATION: 98 %

## 2023-10-17 DIAGNOSIS — D69.6 THROMBOCYTOPENIA: ICD-10-CM

## 2023-10-17 DIAGNOSIS — Z23 NEED FOR INFLUENZA VACCINATION: ICD-10-CM

## 2023-10-17 DIAGNOSIS — M25.572 ARTHRALGIA OF LEFT ANKLE: ICD-10-CM

## 2023-10-17 DIAGNOSIS — I10 HYPERTENSION, BENIGN: ICD-10-CM

## 2023-10-17 DIAGNOSIS — N18.32 STAGE 3B CHRONIC KIDNEY DISEASE: ICD-10-CM

## 2023-10-17 DIAGNOSIS — K86.9 PANCREATIC LESION: ICD-10-CM

## 2023-10-17 DIAGNOSIS — I10 PRIMARY HYPERTENSION: ICD-10-CM

## 2023-10-17 NOTE — PROGRESS NOTES
Subjective   Marina Joe is a 78 y.o. female.   Chief Complaint   Patient presents with    Follow-up     4 week follow up   Anemia,  htn, cks  Having transfusion this coming Friday        History of Present Illness   Mrs. Joe presents to the office today for 4-week follow-up on CKD, anemia, hypertension, cytopenia and complaints of left ankle pain.  She states that she is continuing to follow closely with hematology and is getting iron infusions almost weekly, has 1 upcoming on Friday.  She denies excessive fatigue today.  She denies any shortness of breath, chest pain or headaches.  She states her blood pressure is more elevated this morning because she was rushing to get out of her house in order to make her appointment.  She states normally after getting her blood pressure medications in her system her blood pressure has been running anywhere from 125/72 to 143/85.  She has noted that she does bruise easily, states that she has several bruises on bilateral forearms and then also has a bruise on her right hip when she was carrying heavy boxes the day.  She states that she has been taking bleeding precautions and has not noted any actual bleeding.  She reports her stools have been soft and brown in color.  She states her left ankle has been bothering her, she states since her arthritis,  She has been rubbing it with rubbing alcohol and it does seem to help a little bit, she has never tried Voltaren gel before.  The following portions of the patient's history were reviewed and updated as appropriate: allergies, current medications, past family history, past medical history, past social history, past surgical history and problem list.    Review of Systems    Objective   Past Medical History:   Diagnosis Date    Breast cancer     CKD (chronic kidney disease)     Hypercholesteremia     Hypertension     Sinusitis     Stage 3a chronic kidney disease 10/20/2020      Past Surgical History:   Procedure Laterality Date     AVULSION TOENAIL PLATE      Sept 26,2018    BREAST BIOPSY Left 11/20/2012    BREAST BIOPSY      Left Breast, 1/2019 per Dr Barkley    BREAST LUMPECTOMY Left     with node bx     COLONOSCOPY  09/13/2013    small polyp at 30cm benign hyperplastic polyp, changes consistent with melanosis coli. Recall 5 years    COLONOSCOPY  11/19/2018    Tics otherwise normal exam repeat in 5 years    COLONOSCOPY  02/13/2023    Normal exam repeat in 3 years with a 2 day prep    ENDOSCOPY  02/13/2023    Gastritis, small HH    REPLACEMENT TOTAL KNEE Right     2016    TOTAL ABDOMINAL HYSTERECTOMY WITH SALPINGO OOPHORECTOMY          Current Outpatient Medications:     albuterol sulfate  (90 Base) MCG/ACT inhaler, Inhale 2 puffs Every 4 (Four) Hours As Needed for Wheezing., Disp: 2 g, Rfl: 3    amLODIPine (NORVASC) 5 MG tablet, TAKE 1 TABLET BY MOUTH TWICE DAILY, Disp: 180 tablet, Rfl: 2    buPROPion XL (WELLBUTRIN XL) 150 MG 24 hr tablet, Take 1 tablet by mouth Daily., Disp: , Rfl:     Calcium Carb-Cholecalciferol (CALCIUM 600+D3 PO), Take 1 tablet by mouth Daily., Disp: , Rfl:     carvedilol (COREG) 6.25 MG tablet, Take 1 tablet by mouth 2 (Two) Times a Day With Meals., Disp: 180 tablet, Rfl: 3    cetirizine (zyrTEC) 10 MG tablet, Take 1 tablet by mouth Daily., Disp: , Rfl:     cloNIDine (CATAPRES) 0.1 MG tablet, Take 1 tablet by mouth 2 (Two) Times a Day., Disp: , Rfl:     cyclobenzaprine (FLEXERIL) 10 MG tablet, Take 1 tablet by mouth 3 (Three) Times a Day As Needed for Muscle Spasms., Disp: 90 tablet, Rfl: 5    Ergocalciferol (VITAMIN D2 PO), Take 50,000 Units by mouth Every 30 (Thirty) Days., Disp: , Rfl:     famotidine (PEPCID) 20 MG tablet, Take 1 tablet by mouth 2 (Two) Times a Day Before Meals., Disp: 60 tablet, Rfl: 0    fluticasone (FLONASE) 50 MCG/ACT nasal spray, 2 sprays into the nostril(s) as directed by provider Daily., Disp: 1 g, Rfl: 3    irbesartan-hydrochlorothiazide (AVALIDE) 300-12.5 MG tablet, Take 1 tablet  "by mouth Daily., Disp: , Rfl:     montelukast (SINGULAIR) 10 MG tablet, TAKE 1 TABLET BY MOUTH DAILY, Disp: 90 tablet, Rfl: 1    Multiple Vitamins-Minerals (MULTIVITAMIN ADULT) tablet, Take 1 tablet by mouth Daily., Disp: , Rfl:     pantoprazole (PROTONIX) 40 MG EC tablet, Take 1 tablet by mouth 2 (Two) Times a Day Before Meals., Disp: 60 tablet, Rfl: 0    rOPINIRole (REQUIP) 0.5 MG tablet, TAKE 1 TABLET BY MOUTH EVERY NIGHT, Disp: 90 tablet, Rfl: 1    rosuvastatin (CRESTOR) 20 MG tablet, Take 1 tablet by mouth Daily., Disp: 90 tablet, Rfl: 3    sertraline (ZOLOFT) 100 MG tablet, Take 1 tablet by mouth Daily., Disp: , Rfl:     valACYclovir (VALTREX) 500 MG tablet, Take 1 tablet by mouth 2 (Two) Times a Day., Disp: , Rfl:     Diclofenac Sodium (VOLTAREN) 1 % gel gel, Apply 4 g topically to the appropriate area as directed 4 (Four) Times a Day As Needed (arthralgia)., Disp: 240 g, Rfl: 3      /80 (BP Location: Right arm, Patient Position: Sitting, Cuff Size: Adult) Comment: has not had medication this morning  Pulse 72   Temp 97.9 °F (36.6 °C) (Temporal)   Ht 172.7 cm (68\")   Wt 107 kg (235 lb)   LMP  (LMP Unknown)   SpO2 98%   BMI 35.73 kg/m²      Body mass index is 35.73 kg/m².          Physical Exam  Vitals and nursing note reviewed.   Constitutional:       Appearance: Normal appearance. She is obese.   HENT:      Head: Normocephalic and atraumatic.   Eyes:      Extraocular Movements: Extraocular movements intact.      Conjunctiva/sclera: Conjunctivae normal.      Pupils: Pupils are equal, round, and reactive to light.   Cardiovascular:      Rate and Rhythm: Normal rate and regular rhythm.      Pulses: Normal pulses.      Heart sounds: Normal heart sounds.   Pulmonary:      Effort: Pulmonary effort is normal.      Breath sounds: Normal breath sounds.   Abdominal:      General: Bowel sounds are normal.      Palpations: Abdomen is soft.   Musculoskeletal:         General: Swelling and tenderness (left " ankle, reports arthritis) present. Normal range of motion.      Cervical back: Normal range of motion and neck supple.      Right lower leg: No edema.   Skin:     Findings: Bruising (bilateral forarms) present.   Neurological:      General: No focal deficit present.      Mental Status: She is alert and oriented to person, place, and time. Mental status is at baseline.   Psychiatric:         Mood and Affect: Mood normal.         Behavior: Behavior normal.         Thought Content: Thought content normal.         Judgment: Judgment normal.               Assessment & Plan   Diagnoses and all orders for this visit:    1. Primary hypertension    2. Hypertension, benign    3. Chronic idiopathic thrombocytopenia    4. Stage 3b chronic kidney disease    5. Pancreatic lesion    6. Arthralgia of left ankle  -     Diclofenac Sodium (VOLTAREN) 1 % gel gel; Apply 4 g topically to the appropriate area as directed 4 (Four) Times a Day As Needed (arthralgia).  Dispense: 240 g; Refill: 3    7. Need for influenza vaccination  -     Fluzone High-Dose 65+yrs                 She is continuing to closely follow with oncology/hematology both for maintenance therapy given history of breast cancer as well as anemia and ITP.  Most recent labs were collected on 10/13/2023, GFR was 36 at that point, electrolytes in normal range.  CBC revealed hemoglobin 9.1, hematocrit 31.5, platelets 40.  She has been getting iron infusions as well as Retacrit.  She also continues to follow with Dr. Martin with nephrology for her CKD.  She is supposed to be following up with Dr. Carvalho at [facility] in regards to pancreatic lesion, is supposed to be scheduling an EUS.   Most recent admission was in regards to a GI bleed, she has not yet followed with gastroenterology, appointment appears to be on 11/13/2023  Blood pressure is fairly elevated today at 156/80, she has been instructed to give me a call if she notes her blood pressure remains this elevated at home  after taking her routine scheduled medications, we are going to continue with current therapy but as I discussed with her is very important that we keep her blood pressures controlled especially with her thrombocytopenia.  She has been given a blood pressure log and I requested that she bring this back to her next appointment.  She reports Dr. Carvalho over at [facility] is going to wait to do the EUS until her platelet count comes up.  She notes also that she has an upcoming appointment with gastroenterology.  Per her report her bowels have been moving regularly with brown soft stools, has not noted any bloody or black tarry stools or any other signs of bleeding.  Is bruising easily, reports adhering to bleeding precautions.  I advised that we could trial topical Voltaren gel for arthralgia  I have asked her to return in 2 weeks to go ahead and get updated on her COVID-19 vaccination as well, we will update her flu vaccination today.  For now we will schedule her to return in December for Medicare wellness visit, she is aware she can be seen prior to this as needed.  She does mention that she had some medications that she needed refilled but only 1 was refilled and she has 2 other ones that did not get filled but is unsure of what medications those are currently.  When she gets home she will assess the situation further and will call the office to let me know which medication she needs sent in.  Please note that portions of this note were completed with a voice recognition program.     Electronically signed by MARY Lockwood, 10/17/23, 08:04 CDT.

## 2023-10-19 DIAGNOSIS — D50.8 IRON DEFICIENCY ANEMIA SECONDARY TO INADEQUATE DIETARY IRON INTAKE: Primary | ICD-10-CM

## 2023-10-20 ENCOUNTER — INFUSION (OUTPATIENT)
Dept: ONCOLOGY | Facility: HOSPITAL | Age: 78
End: 2023-10-20
Payer: MEDICARE

## 2023-10-20 VITALS
OXYGEN SATURATION: 100 % | HEIGHT: 68 IN | HEART RATE: 62 BPM | WEIGHT: 233 LBS | TEMPERATURE: 96.9 F | SYSTOLIC BLOOD PRESSURE: 129 MMHG | RESPIRATION RATE: 16 BRPM | BODY MASS INDEX: 35.31 KG/M2 | DIASTOLIC BLOOD PRESSURE: 51 MMHG

## 2023-10-20 DIAGNOSIS — D63.1 ANEMIA OF CHRONIC RENAL FAILURE, STAGE 3 (MODERATE), UNSPECIFIED WHETHER STAGE 3A OR 3B CKD: Primary | ICD-10-CM

## 2023-10-20 DIAGNOSIS — N18.30 ANEMIA OF CHRONIC RENAL FAILURE, STAGE 3 (MODERATE), UNSPECIFIED WHETHER STAGE 3A OR 3B CKD: Primary | ICD-10-CM

## 2023-10-20 PROCEDURE — 96365 THER/PROPH/DIAG IV INF INIT: CPT

## 2023-10-20 PROCEDURE — 25010000002 FERRIC CARBOXYMALTOSE 750 MG/15ML SOLUTION 15 ML VIAL: Performed by: NURSE PRACTITIONER

## 2023-10-20 PROCEDURE — 25810000003 SODIUM CHLORIDE 0.9 % SOLUTION: Performed by: NURSE PRACTITIONER

## 2023-10-20 RX ORDER — SODIUM CHLORIDE 9 MG/ML
250 INJECTION, SOLUTION INTRAVENOUS ONCE
Status: COMPLETED | OUTPATIENT
Start: 2023-10-20 | End: 2023-10-20

## 2023-10-20 RX ORDER — FAMOTIDINE 10 MG/ML
20 INJECTION, SOLUTION INTRAVENOUS AS NEEDED
Status: DISCONTINUED | OUTPATIENT
Start: 2023-10-20 | End: 2023-10-20 | Stop reason: HOSPADM

## 2023-10-20 RX ORDER — DIPHENHYDRAMINE HYDROCHLORIDE 50 MG/ML
50 INJECTION INTRAMUSCULAR; INTRAVENOUS AS NEEDED
Status: DISCONTINUED | OUTPATIENT
Start: 2023-10-20 | End: 2023-10-20 | Stop reason: HOSPADM

## 2023-10-20 RX ADMIN — FERRIC CARBOXYMALTOSE INJECTION 750 MG: 50 INJECTION, SOLUTION INTRAVENOUS at 10:04

## 2023-10-20 RX ADMIN — SODIUM CHLORIDE 250 ML: 9 INJECTION, SOLUTION INTRAVENOUS at 10:01

## 2023-10-27 ENCOUNTER — INFUSION (OUTPATIENT)
Dept: ONCOLOGY | Facility: HOSPITAL | Age: 78
End: 2023-10-27
Payer: MEDICARE

## 2023-10-27 ENCOUNTER — LAB (OUTPATIENT)
Dept: LAB | Facility: HOSPITAL | Age: 78
End: 2023-10-27
Payer: MEDICARE

## 2023-10-27 ENCOUNTER — TELEPHONE (OUTPATIENT)
Dept: ONCOLOGY | Facility: CLINIC | Age: 78
End: 2023-10-27
Payer: MEDICARE

## 2023-10-27 VITALS
RESPIRATION RATE: 18 BRPM | DIASTOLIC BLOOD PRESSURE: 57 MMHG | OXYGEN SATURATION: 100 % | TEMPERATURE: 97.4 F | SYSTOLIC BLOOD PRESSURE: 152 MMHG | WEIGHT: 232.6 LBS | BODY MASS INDEX: 35.25 KG/M2 | HEIGHT: 68 IN | HEART RATE: 66 BPM

## 2023-10-27 DIAGNOSIS — N18.30 ANEMIA OF CHRONIC RENAL FAILURE, STAGE 3 (MODERATE), UNSPECIFIED WHETHER STAGE 3A OR 3B CKD: Primary | ICD-10-CM

## 2023-10-27 DIAGNOSIS — D63.1 ANEMIA OF CHRONIC RENAL FAILURE, STAGE 3 (MODERATE), UNSPECIFIED WHETHER STAGE 3A OR 3B CKD: Primary | ICD-10-CM

## 2023-10-27 DIAGNOSIS — D63.1 ANEMIA OF CHRONIC RENAL FAILURE, STAGE 3A: ICD-10-CM

## 2023-10-27 DIAGNOSIS — D50.8 IRON DEFICIENCY ANEMIA SECONDARY TO INADEQUATE DIETARY IRON INTAKE: ICD-10-CM

## 2023-10-27 DIAGNOSIS — N18.32 STAGE 3B CHRONIC KIDNEY DISEASE: ICD-10-CM

## 2023-10-27 DIAGNOSIS — N18.31 ANEMIA OF CHRONIC RENAL FAILURE, STAGE 3A: ICD-10-CM

## 2023-10-27 LAB
ALBUMIN SERPL-MCNC: 4.3 G/DL (ref 3.5–5.2)
ALBUMIN/GLOB SERPL: 1.4 G/DL
ALP SERPL-CCNC: 87 U/L (ref 39–117)
ALT SERPL W P-5'-P-CCNC: 20 U/L (ref 1–33)
ANION GAP SERPL CALCULATED.3IONS-SCNC: 9 MMOL/L (ref 5–15)
AST SERPL-CCNC: 26 U/L (ref 1–32)
BASOPHILS # BLD AUTO: 0.04 10*3/MM3 (ref 0–0.2)
BASOPHILS NFR BLD AUTO: 0.6 % (ref 0–1.5)
BILIRUB SERPL-MCNC: 0.3 MG/DL (ref 0–1.2)
BUN SERPL-MCNC: 23 MG/DL (ref 8–23)
BUN/CREAT SERPL: 15.6 (ref 7–25)
CALCIUM SPEC-SCNC: 9.5 MG/DL (ref 8.6–10.5)
CHLORIDE SERPL-SCNC: 102 MMOL/L (ref 98–107)
CO2 SERPL-SCNC: 28 MMOL/L (ref 22–29)
CREAT SERPL-MCNC: 1.47 MG/DL (ref 0.57–1)
DEPRECATED RDW RBC AUTO: 62.1 FL (ref 37–54)
EGFRCR SERPLBLD CKD-EPI 2021: 36.6 ML/MIN/1.73
EOSINOPHIL # BLD AUTO: 0.28 10*3/MM3 (ref 0–0.4)
EOSINOPHIL NFR BLD AUTO: 4.5 % (ref 0.3–6.2)
ERYTHROCYTE [DISTWIDTH] IN BLOOD BY AUTOMATED COUNT: 18.1 % (ref 12.3–15.4)
GLOBULIN UR ELPH-MCNC: 3.1 GM/DL
GLUCOSE SERPL-MCNC: 91 MG/DL (ref 65–99)
HCT VFR BLD AUTO: 35.7 % (ref 34–46.6)
HGB BLD-MCNC: 10.3 G/DL (ref 12–15.9)
LYMPHOCYTES # BLD AUTO: 1.19 10*3/MM3 (ref 0.7–3.1)
LYMPHOCYTES NFR BLD AUTO: 19.1 % (ref 19.6–45.3)
MCH RBC QN AUTO: 27.1 PG (ref 26.6–33)
MCHC RBC AUTO-ENTMCNC: 28.9 G/DL (ref 31.5–35.7)
MCV RBC AUTO: 93.9 FL (ref 79–97)
MONOCYTES # BLD AUTO: 0.67 10*3/MM3 (ref 0.1–0.9)
MONOCYTES NFR BLD AUTO: 10.8 % (ref 5–12)
NEUTROPHILS NFR BLD AUTO: 4.03 10*3/MM3 (ref 1.7–7)
NEUTROPHILS NFR BLD AUTO: 64.7 % (ref 42.7–76)
PLATELET # BLD AUTO: 46 10*3/MM3 (ref 140–450)
POTASSIUM SERPL-SCNC: 4.1 MMOL/L (ref 3.5–5.2)
PROT SERPL-MCNC: 7.4 G/DL (ref 6–8.5)
RBC # BLD AUTO: 3.8 10*6/MM3 (ref 3.77–5.28)
SODIUM SERPL-SCNC: 139 MMOL/L (ref 136–145)
WBC NRBC COR # BLD: 6.23 10*3/MM3 (ref 3.4–10.8)

## 2023-10-27 PROCEDURE — 96372 THER/PROPH/DIAG INJ SC/IM: CPT

## 2023-10-27 PROCEDURE — 25010000002 EPOETIN ALFA PER 1000 UNITS: Performed by: NURSE PRACTITIONER

## 2023-10-27 PROCEDURE — 85025 COMPLETE CBC W/AUTO DIFF WBC: CPT

## 2023-10-27 PROCEDURE — 36415 COLL VENOUS BLD VENIPUNCTURE: CPT

## 2023-10-27 PROCEDURE — 80053 COMPREHEN METABOLIC PANEL: CPT

## 2023-10-27 RX ADMIN — ERYTHROPOIETIN 40000 UNITS: 40000 INJECTION, SOLUTION INTRAVENOUS; SUBCUTANEOUS at 10:40

## 2023-10-27 NOTE — TELEPHONE ENCOUNTER
Call from Irene in Hematology with critical low plt of 46. Analilia HERNANDEZ was notified. Continue to monitor.

## 2023-10-31 NOTE — TELEPHONE ENCOUNTER
Appears somebody else prescribed in the past, can we call her whenever we have time to clarify as to why she is taking this chronically?

## 2023-11-02 RX ORDER — VALACYCLOVIR HYDROCHLORIDE 500 MG/1
500 TABLET, FILM COATED ORAL 2 TIMES DAILY
Qty: 180 TABLET | Refills: 3 | Status: SHIPPED | OUTPATIENT
Start: 2023-11-02

## 2023-11-03 ENCOUNTER — LAB (OUTPATIENT)
Dept: LAB | Facility: HOSPITAL | Age: 78
End: 2023-11-03
Payer: MEDICARE

## 2023-11-03 ENCOUNTER — INFUSION (OUTPATIENT)
Dept: ONCOLOGY | Facility: HOSPITAL | Age: 78
End: 2023-11-03
Payer: MEDICARE

## 2023-11-03 VITALS
HEIGHT: 68 IN | WEIGHT: 234 LBS | OXYGEN SATURATION: 99 % | DIASTOLIC BLOOD PRESSURE: 70 MMHG | RESPIRATION RATE: 18 BRPM | BODY MASS INDEX: 35.46 KG/M2 | SYSTOLIC BLOOD PRESSURE: 144 MMHG | TEMPERATURE: 97.1 F | HEART RATE: 65 BPM

## 2023-11-03 DIAGNOSIS — N18.31 ANEMIA OF CHRONIC RENAL FAILURE, STAGE 3A: ICD-10-CM

## 2023-11-03 DIAGNOSIS — N18.31 ANEMIA OF CHRONIC RENAL FAILURE, STAGE 3A: Primary | ICD-10-CM

## 2023-11-03 DIAGNOSIS — N18.32 STAGE 3B CHRONIC KIDNEY DISEASE: Primary | ICD-10-CM

## 2023-11-03 DIAGNOSIS — D63.1 ANEMIA OF CHRONIC RENAL FAILURE, STAGE 3A: Primary | ICD-10-CM

## 2023-11-03 DIAGNOSIS — N18.32 STAGE 3B CHRONIC KIDNEY DISEASE: ICD-10-CM

## 2023-11-03 DIAGNOSIS — D63.1 ANEMIA OF CHRONIC RENAL FAILURE, STAGE 3A: ICD-10-CM

## 2023-11-03 LAB
ALBUMIN SERPL-MCNC: 4 G/DL (ref 3.5–5.2)
ALBUMIN/GLOB SERPL: 1.5 G/DL
ALP SERPL-CCNC: 84 U/L (ref 39–117)
ALT SERPL W P-5'-P-CCNC: 14 U/L (ref 1–33)
ANION GAP SERPL CALCULATED.3IONS-SCNC: 8 MMOL/L (ref 5–15)
AST SERPL-CCNC: 17 U/L (ref 1–32)
BASOPHILS # BLD AUTO: 0.04 10*3/MM3 (ref 0–0.2)
BASOPHILS NFR BLD AUTO: 0.7 % (ref 0–1.5)
BILIRUB SERPL-MCNC: 0.3 MG/DL (ref 0–1.2)
BUN SERPL-MCNC: 23 MG/DL (ref 8–23)
BUN/CREAT SERPL: 17.3 (ref 7–25)
CALCIUM SPEC-SCNC: 8.7 MG/DL (ref 8.6–10.5)
CHLORIDE SERPL-SCNC: 103 MMOL/L (ref 98–107)
CO2 SERPL-SCNC: 28 MMOL/L (ref 22–29)
CREAT SERPL-MCNC: 1.33 MG/DL (ref 0.57–1)
DEPRECATED RDW RBC AUTO: 63.9 FL (ref 37–54)
EGFRCR SERPLBLD CKD-EPI 2021: 41.3 ML/MIN/1.73
EOSINOPHIL # BLD AUTO: 0.3 10*3/MM3 (ref 0–0.4)
EOSINOPHIL NFR BLD AUTO: 5.5 % (ref 0.3–6.2)
ERYTHROCYTE [DISTWIDTH] IN BLOOD BY AUTOMATED COUNT: 18.7 % (ref 12.3–15.4)
GLOBULIN UR ELPH-MCNC: 2.7 GM/DL
GLUCOSE SERPL-MCNC: 97 MG/DL (ref 65–99)
HCT VFR BLD AUTO: 35.1 % (ref 34–46.6)
HGB BLD-MCNC: 10.2 G/DL (ref 12–15.9)
LYMPHOCYTES # BLD AUTO: 1.03 10*3/MM3 (ref 0.7–3.1)
LYMPHOCYTES NFR BLD AUTO: 19 % (ref 19.6–45.3)
MCH RBC QN AUTO: 27.3 PG (ref 26.6–33)
MCHC RBC AUTO-ENTMCNC: 29.1 G/DL (ref 31.5–35.7)
MCV RBC AUTO: 94.1 FL (ref 79–97)
MONOCYTES # BLD AUTO: 0.56 10*3/MM3 (ref 0.1–0.9)
MONOCYTES NFR BLD AUTO: 10.3 % (ref 5–12)
NEUTROPHILS NFR BLD AUTO: 3.48 10*3/MM3 (ref 1.7–7)
NEUTROPHILS NFR BLD AUTO: 64.1 % (ref 42.7–76)
PLATELET # BLD AUTO: 54 10*3/MM3 (ref 140–450)
POTASSIUM SERPL-SCNC: 3.7 MMOL/L (ref 3.5–5.2)
PROT SERPL-MCNC: 6.7 G/DL (ref 6–8.5)
RBC # BLD AUTO: 3.73 10*6/MM3 (ref 3.77–5.28)
SODIUM SERPL-SCNC: 139 MMOL/L (ref 136–145)
WBC NRBC COR # BLD: 5.43 10*3/MM3 (ref 3.4–10.8)

## 2023-11-03 PROCEDURE — 85025 COMPLETE CBC W/AUTO DIFF WBC: CPT

## 2023-11-03 PROCEDURE — 25010000002 EPOETIN ALFA PER 1000 UNITS: Performed by: NURSE PRACTITIONER

## 2023-11-03 PROCEDURE — 36415 COLL VENOUS BLD VENIPUNCTURE: CPT

## 2023-11-03 PROCEDURE — 80053 COMPREHEN METABOLIC PANEL: CPT

## 2023-11-03 PROCEDURE — 96372 THER/PROPH/DIAG INJ SC/IM: CPT

## 2023-11-03 RX ADMIN — ERYTHROPOIETIN 40000 UNITS: 40000 INJECTION, SOLUTION INTRAVENOUS; SUBCUTANEOUS at 09:22

## 2023-11-10 ENCOUNTER — INFUSION (OUTPATIENT)
Dept: ONCOLOGY | Facility: HOSPITAL | Age: 78
End: 2023-11-10
Payer: MEDICARE

## 2023-11-10 ENCOUNTER — LAB (OUTPATIENT)
Dept: LAB | Facility: HOSPITAL | Age: 78
End: 2023-11-10
Payer: MEDICARE

## 2023-11-10 VITALS
WEIGHT: 236 LBS | DIASTOLIC BLOOD PRESSURE: 67 MMHG | SYSTOLIC BLOOD PRESSURE: 138 MMHG | HEART RATE: 68 BPM | OXYGEN SATURATION: 100 % | BODY MASS INDEX: 35.77 KG/M2 | HEIGHT: 68 IN | RESPIRATION RATE: 16 BRPM | TEMPERATURE: 97.2 F

## 2023-11-10 DIAGNOSIS — D50.8 IRON DEFICIENCY ANEMIA SECONDARY TO INADEQUATE DIETARY IRON INTAKE: Primary | ICD-10-CM

## 2023-11-10 DIAGNOSIS — N18.32 STAGE 3B CHRONIC KIDNEY DISEASE: Primary | ICD-10-CM

## 2023-11-10 DIAGNOSIS — N18.31 ANEMIA OF CHRONIC RENAL FAILURE, STAGE 3A: ICD-10-CM

## 2023-11-10 DIAGNOSIS — D63.1 ANEMIA OF CHRONIC RENAL FAILURE, STAGE 3A: ICD-10-CM

## 2023-11-10 LAB
BASOPHILS # BLD AUTO: 0.04 10*3/MM3 (ref 0–0.2)
BASOPHILS NFR BLD AUTO: 0.6 % (ref 0–1.5)
DEPRECATED RDW RBC AUTO: 65.1 FL (ref 37–54)
EOSINOPHIL # BLD AUTO: 0.3 10*3/MM3 (ref 0–0.4)
EOSINOPHIL NFR BLD AUTO: 4.5 % (ref 0.3–6.2)
ERYTHROCYTE [DISTWIDTH] IN BLOOD BY AUTOMATED COUNT: 18.7 % (ref 12.3–15.4)
HCT VFR BLD AUTO: 34.8 % (ref 34–46.6)
HGB BLD-MCNC: 10 G/DL (ref 12–15.9)
HOLD SPECIMEN: NORMAL
LYMPHOCYTES # BLD AUTO: 1.16 10*3/MM3 (ref 0.7–3.1)
LYMPHOCYTES NFR BLD AUTO: 17.4 % (ref 19.6–45.3)
MCH RBC QN AUTO: 27.2 PG (ref 26.6–33)
MCHC RBC AUTO-ENTMCNC: 28.7 G/DL (ref 31.5–35.7)
MCV RBC AUTO: 94.8 FL (ref 79–97)
MONOCYTES # BLD AUTO: 0.61 10*3/MM3 (ref 0.1–0.9)
MONOCYTES NFR BLD AUTO: 9.2 % (ref 5–12)
NEUTROPHILS NFR BLD AUTO: 4.51 10*3/MM3 (ref 1.7–7)
NEUTROPHILS NFR BLD AUTO: 67.8 % (ref 42.7–76)
PLATELET # BLD AUTO: 58 10*3/MM3 (ref 140–450)
PMV BLD AUTO: ABNORMAL FL
RBC # BLD AUTO: 3.67 10*6/MM3 (ref 3.77–5.28)
WBC NRBC COR # BLD: 6.65 10*3/MM3 (ref 3.4–10.8)

## 2023-11-10 PROCEDURE — 25010000002 EPOETIN ALFA PER 1000 UNITS: Performed by: INTERNAL MEDICINE

## 2023-11-10 PROCEDURE — 36415 COLL VENOUS BLD VENIPUNCTURE: CPT

## 2023-11-10 PROCEDURE — 85025 COMPLETE CBC W/AUTO DIFF WBC: CPT

## 2023-11-10 PROCEDURE — 96372 THER/PROPH/DIAG INJ SC/IM: CPT

## 2023-11-10 RX ADMIN — ERYTHROPOIETIN 40000 UNITS: 40000 INJECTION, SOLUTION INTRAVENOUS; SUBCUTANEOUS at 09:28

## 2023-11-16 RX ORDER — CYCLOBENZAPRINE HCL 10 MG
10 TABLET ORAL 3 TIMES DAILY PRN
Qty: 90 TABLET | Refills: 5 | Status: SHIPPED | OUTPATIENT
Start: 2023-11-16

## 2023-11-17 ENCOUNTER — LAB (OUTPATIENT)
Dept: LAB | Facility: HOSPITAL | Age: 78
End: 2023-11-17
Payer: MEDICARE

## 2023-11-17 ENCOUNTER — INFUSION (OUTPATIENT)
Dept: ONCOLOGY | Facility: HOSPITAL | Age: 78
End: 2023-11-17
Payer: MEDICARE

## 2023-11-17 VITALS
TEMPERATURE: 97.8 F | HEIGHT: 69 IN | OXYGEN SATURATION: 99 % | HEART RATE: 65 BPM | SYSTOLIC BLOOD PRESSURE: 151 MMHG | RESPIRATION RATE: 14 BRPM | DIASTOLIC BLOOD PRESSURE: 59 MMHG | BODY MASS INDEX: 35.25 KG/M2 | WEIGHT: 238 LBS

## 2023-11-17 DIAGNOSIS — N18.32 STAGE 3B CHRONIC KIDNEY DISEASE: ICD-10-CM

## 2023-11-17 DIAGNOSIS — N18.31 ANEMIA OF CHRONIC RENAL FAILURE, STAGE 3A: ICD-10-CM

## 2023-11-17 DIAGNOSIS — N18.32 STAGE 3B CHRONIC KIDNEY DISEASE: Primary | ICD-10-CM

## 2023-11-17 DIAGNOSIS — D63.1 ANEMIA OF CHRONIC RENAL FAILURE, STAGE 3A: ICD-10-CM

## 2023-11-17 DIAGNOSIS — D63.1 ANEMIA OF CHRONIC RENAL FAILURE, STAGE 3A: Primary | ICD-10-CM

## 2023-11-17 DIAGNOSIS — N18.31 ANEMIA OF CHRONIC RENAL FAILURE, STAGE 3A: Primary | ICD-10-CM

## 2023-11-17 LAB
ALBUMIN SERPL-MCNC: 4 G/DL (ref 3.5–5.2)
ALBUMIN/GLOB SERPL: 1.6 G/DL
ALP SERPL-CCNC: 87 U/L (ref 39–117)
ALT SERPL W P-5'-P-CCNC: 13 U/L (ref 1–33)
ANION GAP SERPL CALCULATED.3IONS-SCNC: 9 MMOL/L (ref 5–15)
AST SERPL-CCNC: 15 U/L (ref 1–32)
BASOPHILS # BLD AUTO: 0.04 10*3/MM3 (ref 0–0.2)
BASOPHILS NFR BLD AUTO: 0.7 % (ref 0–1.5)
BILIRUB SERPL-MCNC: 0.3 MG/DL (ref 0–1.2)
BUN SERPL-MCNC: 21 MG/DL (ref 8–23)
BUN/CREAT SERPL: 15.9 (ref 7–25)
CALCIUM SPEC-SCNC: 8.6 MG/DL (ref 8.6–10.5)
CHLORIDE SERPL-SCNC: 104 MMOL/L (ref 98–107)
CO2 SERPL-SCNC: 27 MMOL/L (ref 22–29)
CREAT SERPL-MCNC: 1.32 MG/DL (ref 0.57–1)
DEPRECATED RDW RBC AUTO: 62.4 FL (ref 37–54)
EGFRCR SERPLBLD CKD-EPI 2021: 41.7 ML/MIN/1.73
EOSINOPHIL # BLD AUTO: 0.33 10*3/MM3 (ref 0–0.4)
EOSINOPHIL NFR BLD AUTO: 5.6 % (ref 0.3–6.2)
ERYTHROCYTE [DISTWIDTH] IN BLOOD BY AUTOMATED COUNT: 18.4 % (ref 12.3–15.4)
GLOBULIN UR ELPH-MCNC: 2.5 GM/DL
GLUCOSE SERPL-MCNC: 107 MG/DL (ref 65–99)
HCT VFR BLD AUTO: 34.9 % (ref 34–46.6)
HGB BLD-MCNC: 10.3 G/DL (ref 12–15.9)
LYMPHOCYTES # BLD AUTO: 1.04 10*3/MM3 (ref 0.7–3.1)
LYMPHOCYTES NFR BLD AUTO: 17.6 % (ref 19.6–45.3)
MCH RBC QN AUTO: 27.2 PG (ref 26.6–33)
MCHC RBC AUTO-ENTMCNC: 29.5 G/DL (ref 31.5–35.7)
MCV RBC AUTO: 92.1 FL (ref 79–97)
MONOCYTES # BLD AUTO: 0.6 10*3/MM3 (ref 0.1–0.9)
MONOCYTES NFR BLD AUTO: 10.1 % (ref 5–12)
NEUTROPHILS NFR BLD AUTO: 3.9 10*3/MM3 (ref 1.7–7)
NEUTROPHILS NFR BLD AUTO: 65.8 % (ref 42.7–76)
PLATELET # BLD AUTO: 64 10*3/MM3 (ref 140–450)
PMV BLD AUTO: ABNORMAL FL
POTASSIUM SERPL-SCNC: 3.8 MMOL/L (ref 3.5–5.2)
PROT SERPL-MCNC: 6.5 G/DL (ref 6–8.5)
RBC # BLD AUTO: 3.79 10*6/MM3 (ref 3.77–5.28)
SODIUM SERPL-SCNC: 140 MMOL/L (ref 136–145)
WBC NRBC COR # BLD AUTO: 5.92 10*3/MM3 (ref 3.4–10.8)

## 2023-11-17 PROCEDURE — 80053 COMPREHEN METABOLIC PANEL: CPT

## 2023-11-17 PROCEDURE — 96372 THER/PROPH/DIAG INJ SC/IM: CPT

## 2023-11-17 PROCEDURE — 85025 COMPLETE CBC W/AUTO DIFF WBC: CPT

## 2023-11-17 PROCEDURE — 36415 COLL VENOUS BLD VENIPUNCTURE: CPT

## 2023-11-17 PROCEDURE — 25010000002 EPOETIN ALFA PER 1000 UNITS: Performed by: NURSE PRACTITIONER

## 2023-11-17 RX ADMIN — ERYTHROPOIETIN 40000 UNITS: 40000 INJECTION, SOLUTION INTRAVENOUS; SUBCUTANEOUS at 09:21

## 2023-11-28 ENCOUNTER — LAB (OUTPATIENT)
Dept: LAB | Facility: HOSPITAL | Age: 78
End: 2023-11-28
Payer: MEDICARE

## 2023-11-28 ENCOUNTER — OFFICE VISIT (OUTPATIENT)
Dept: ONCOLOGY | Facility: CLINIC | Age: 78
End: 2023-11-28
Payer: MEDICARE

## 2023-11-28 ENCOUNTER — APPOINTMENT (OUTPATIENT)
Dept: ONCOLOGY | Facility: HOSPITAL | Age: 78
End: 2023-11-28
Payer: MEDICARE

## 2023-11-28 VITALS
TEMPERATURE: 97.8 F | BODY MASS INDEX: 35.1 KG/M2 | HEART RATE: 63 BPM | RESPIRATION RATE: 16 BRPM | HEIGHT: 69 IN | SYSTOLIC BLOOD PRESSURE: 138 MMHG | WEIGHT: 237 LBS | OXYGEN SATURATION: 99 % | DIASTOLIC BLOOD PRESSURE: 82 MMHG

## 2023-11-28 DIAGNOSIS — D50.8 IRON DEFICIENCY ANEMIA SECONDARY TO INADEQUATE DIETARY IRON INTAKE: ICD-10-CM

## 2023-11-28 DIAGNOSIS — N18.32 STAGE 3B CHRONIC KIDNEY DISEASE: Primary | ICD-10-CM

## 2023-11-28 DIAGNOSIS — Z85.3 HISTORY OF BREAST CANCER: ICD-10-CM

## 2023-11-28 DIAGNOSIS — D69.3 CHRONIC ITP (IDIOPATHIC THROMBOCYTOPENIA): Primary | ICD-10-CM

## 2023-11-28 DIAGNOSIS — N18.31 STAGE 3A CHRONIC KIDNEY DISEASE: ICD-10-CM

## 2023-11-28 DIAGNOSIS — K86.9 PANCREATIC LESION: ICD-10-CM

## 2023-11-28 LAB
ALBUMIN SERPL-MCNC: 3.8 G/DL (ref 3.5–5.2)
ALBUMIN/GLOB SERPL: 1.4 G/DL
ALP SERPL-CCNC: 97 U/L (ref 39–117)
ALT SERPL W P-5'-P-CCNC: 14 U/L (ref 1–33)
ANION GAP SERPL CALCULATED.3IONS-SCNC: 10 MMOL/L (ref 5–15)
AST SERPL-CCNC: 19 U/L (ref 1–32)
BASOPHILS # BLD AUTO: 0.04 10*3/MM3 (ref 0–0.2)
BASOPHILS NFR BLD AUTO: 0.8 % (ref 0–1.5)
BILIRUB SERPL-MCNC: 0.2 MG/DL (ref 0–1.2)
BUN SERPL-MCNC: 14 MG/DL (ref 8–23)
BUN/CREAT SERPL: 12.5 (ref 7–25)
CALCIUM SPEC-SCNC: 8.6 MG/DL (ref 8.6–10.5)
CHLORIDE SERPL-SCNC: 103 MMOL/L (ref 98–107)
CO2 SERPL-SCNC: 28 MMOL/L (ref 22–29)
CREAT SERPL-MCNC: 1.12 MG/DL (ref 0.57–1)
DEPRECATED RDW RBC AUTO: 58.3 FL (ref 37–54)
EGFRCR SERPLBLD CKD-EPI 2021: 50.8 ML/MIN/1.73
EOSINOPHIL # BLD AUTO: 0.27 10*3/MM3 (ref 0–0.4)
EOSINOPHIL NFR BLD AUTO: 5.4 % (ref 0.3–6.2)
ERYTHROCYTE [DISTWIDTH] IN BLOOD BY AUTOMATED COUNT: 17.2 % (ref 12.3–15.4)
FERRITIN SERPL-MCNC: 205.7 NG/ML (ref 13–150)
GLOBULIN UR ELPH-MCNC: 2.7 GM/DL
GLUCOSE SERPL-MCNC: 104 MG/DL (ref 65–99)
HCT VFR BLD AUTO: 38.5 % (ref 34–46.6)
HGB BLD-MCNC: 11.3 G/DL (ref 12–15.9)
HOLD SPECIMEN: NORMAL
IMM GRANULOCYTES # BLD AUTO: 0.01 10*3/MM3 (ref 0–0.05)
IMM GRANULOCYTES NFR BLD AUTO: 0.2 % (ref 0–0.5)
IRON 24H UR-MRATE: 38 MCG/DL (ref 37–145)
IRON SATN MFR SERPL: 13 % (ref 20–50)
LYMPHOCYTES # BLD AUTO: 1.06 10*3/MM3 (ref 0.7–3.1)
LYMPHOCYTES NFR BLD AUTO: 21.2 % (ref 19.6–45.3)
MCH RBC QN AUTO: 26.8 PG (ref 26.6–33)
MCHC RBC AUTO-ENTMCNC: 29.4 G/DL (ref 31.5–35.7)
MCV RBC AUTO: 91.2 FL (ref 79–97)
MONOCYTES # BLD AUTO: 0.4 10*3/MM3 (ref 0.1–0.9)
MONOCYTES NFR BLD AUTO: 8 % (ref 5–12)
NEUTROPHILS NFR BLD AUTO: 3.22 10*3/MM3 (ref 1.7–7)
NEUTROPHILS NFR BLD AUTO: 64.4 % (ref 42.7–76)
NRBC BLD AUTO-RTO: 0 /100 WBC (ref 0–0.2)
PLATELET # BLD AUTO: 193 10*3/MM3 (ref 140–450)
PMV BLD AUTO: 12.4 FL (ref 6–12)
POTASSIUM SERPL-SCNC: 3.5 MMOL/L (ref 3.5–5.2)
PROT SERPL-MCNC: 6.5 G/DL (ref 6–8.5)
RBC # BLD AUTO: 4.22 10*6/MM3 (ref 3.77–5.28)
SODIUM SERPL-SCNC: 141 MMOL/L (ref 136–145)
TIBC SERPL-MCNC: 294 MCG/DL (ref 298–536)
TRANSFERRIN SERPL-MCNC: 197 MG/DL (ref 200–360)
WBC NRBC COR # BLD AUTO: 5 10*3/MM3 (ref 3.4–10.8)

## 2023-11-28 PROCEDURE — 82728 ASSAY OF FERRITIN: CPT

## 2023-11-28 PROCEDURE — 84466 ASSAY OF TRANSFERRIN: CPT

## 2023-11-28 PROCEDURE — 36415 COLL VENOUS BLD VENIPUNCTURE: CPT

## 2023-11-28 PROCEDURE — 80053 COMPREHEN METABOLIC PANEL: CPT

## 2023-11-28 PROCEDURE — 85025 COMPLETE CBC W/AUTO DIFF WBC: CPT

## 2023-11-28 PROCEDURE — 83540 ASSAY OF IRON: CPT

## 2023-11-28 NOTE — PROGRESS NOTES
[facility] GROUP HEMATOLOGY & ONCOLOGY  [address]  [place] [address] [phone]    Patient Name: Marina Joe  Encounter Date: 11/28/2023  YOB: 1945  Patient Number: [id number]      HPI: Marina Joe is a  78 y.o.  female who is seen on follow-up for stage Ia left breast cancer, upper outer quadrant, receptor positive and HER-2/alix negative.  She is on adjuvant anastrozole since 05/2013. Plan for 10 years.  She is also seen for anemia from chronic kidney disease.   She was given injectafer on 09/27/22.   She was started on SREE with Retacrit on November 29, 2022.    She began to experience thrombocytopenia   Bone Marrow Biopsy 11/10/22.    Bone marrow, left iliac crest, core biopsy, aspirate smears, and clot section:  Slightly hypercellular marrow (40%) with maturing trilineage hematopoiesis.  Aspiculate smears, nondiagnostic.  No overt features of myelodysplasia and no excess blast population.  Moderately increased megakaryocytes.  2+ stainable iron.  Peripheral blood smear:  Severe thrombocytopenia.  Mild normocytic normochromic anemia.  Rare circulating schistocytes.  Normal white blood cell count with normal differential and morphology.  No circulating blasts.  Flow cytometry: No immunophenotypic evidence of abnormal myeloid maturation, increased blast population or a lymphoproliferative disorder.  Chromosome analysis: Normal female karyotype.    She received blood transfusion August 10, 11 and 18th.  She received Inejctafer 08/04,11, 2023.  She received Retacrit August 25 for Hgb 8.1.       INTERVAL HISTORY     Ms. Joe presents to clinic today for continued follow up.   She has been getting Retacrit weekly.  She had injectafer 10/20/23.    She complains of shortness of breath.      She had labs drawn and results were reviewed with her in office.      Oncology/Hematology History Overview Note   Oncologic  history  In February 2012, she had a routine mammogram that found a nodular density in the upper outer quadrant of the left breast.  An ultrasound revealed no nodularity at that time.  Repeat ultrasound 3 months later on 5/3/2012 revealed a small cyst in the left breast but felt to be benign.  Repeat mammogram on October 31, 2012 found a lobulated lesion in the left breast at the 2 o'clock position and a stable cyst at the 12 o'clock position.  She was referred to Dr. Barkley on 11/30/2012 and biopsy was performed.  The 12:00 cyst was a fibrocystic lesion while the 2:00 lesion was an invasive mammary carcinoma, low-grade, ER positive NE positive HER-2 nu 2+, FISH unamplified.    On January 8, 2013 she underwent a left partial mastectomy with sentinel node biopsy finding invasive ductal carcinoma, 3.1 mm in greatest dimension, grade 1.  2 sentinel nodes were negative.  AJCC TNM stage was pT1aN0 M0, Stage IA.  Following surgery she underwent adjuvant radiation therapy and was then started on Arimidex for hormonal manipulation.  She was started on the Arimidex in approximately April or May 2013.  He has been recommended to continue this for 10 years.    Hematologic history  Patient with a long history of anemia secondary to iron deficiency as well as chronic kidney disease stage III.Patient has a GFR that ranges from 45-61ML/MIN.  He has been undergoing Procrit when meets guidelines for several years now.  She is also required iron replacement.  Folate > 20, B12 at 1503 and negative blood for flow cytometry on 01/07/2022.      Previous interventions  Procrit 40,000 units subcu initiated approximately February 2017  Injectafer given 9/23/2019, 9/30/2019     Malignant neoplasm of upper-outer quadrant of female breast   10/31/2012 Initial Diagnosis    Malignant neoplasm of central portion of left female breast (CMS/HCC)     10/31/2012 Imaging    Mammogram on October 31, 2012 found a lobulated lesion in the left breast at  the 2 o'clock position and a stable cyst at the 12 o'clock position.      11/30/2012 Biopsy    Dr. Barkley on 11/30/2012 and biopsy was performed.  The 12:00 cyst was a fibrocystic lesion while the 2:00 lesion was an invasive mammary carcinoma, low-grade, ER positive WA positive HER-2 nu 2+, FISH unamplified.         1/8/2013 Surgery    On January 8, 2013 she underwent a left partial mastectomy with sentinel node biopsy finding invasive ductal carcinoma, 3.1 mm in greatest dimension, grade 1.  2 sentinel nodes were negative.  AJCC TNM stage was pT1aN0 M0, Stage IA.  Hormone receptor positive HER-2 negative      Radiation    Radiation OncologyTreatment Course:  Marina Joe receivedin 33 fractions to left breast via External Beam Radiation - EBRT.     5/2013 -  Hormonal Therapy    Arimidex 1 mg daily     8/22/2022 Cancer Staged    Staging form: Breast, AJCC V7  - Pathologic stage from 8/22/2022: Stage IA (T1a, N0, cM0) - Signed by Benjamin Shah MD on 8/22/2022         PAST MEDICAL HISTORY:  ALLERGIES:  Allergies   Allergen Reactions    Aspirin GI Bleeding    Niacin Other (See Comments)     CURRENT MEDICATIONS:  Outpatient Encounter Medications as of 11/28/2023   Medication Sig Dispense Refill    albuterol sulfate  (90 Base) MCG/ACT inhaler Inhale 2 puffs Every 4 (Four) Hours As Needed for Wheezing. 2 g 3    amLODIPine (NORVASC) 5 MG tablet TAKE 1 TABLET BY MOUTH TWICE DAILY 180 tablet 2    buPROPion XL (WELLBUTRIN XL) 150 MG 24 hr tablet Take 1 tablet by mouth Daily.      Calcium Carb-Cholecalciferol (CALCIUM 600+D3 PO) Take 1 tablet by mouth Daily.      carvedilol (COREG) 6.25 MG tablet Take 1 tablet by mouth 2 (Two) Times a Day With Meals. 180 tablet 3    cetirizine (zyrTEC) 10 MG tablet Take 1 tablet by mouth Daily.      cloNIDine (CATAPRES) 0.1 MG tablet Take 1 tablet by mouth 2 (Two) Times a Day.      cyclobenzaprine (FLEXERIL) 10 MG tablet TAKE 1 TABLET BY MOUTH THREE TIMES DAILY AS NEEDED FOR MUSCLE  SPASMS 90 tablet 5    Diclofenac Sodium (VOLTAREN) 1 % gel gel Apply 4 g topically to the appropriate area as directed 4 (Four) Times a Day As Needed (arthralgia). 240 g 3    Ergocalciferol (VITAMIN D2 PO) Take 50,000 Units by mouth Every 30 (Thirty) Days.      famotidine (PEPCID) 20 MG tablet Take 1 tablet by mouth 2 (Two) Times a Day Before Meals. 60 tablet 0    fluticasone (FLONASE) 50 MCG/ACT nasal spray 2 sprays into the nostril(s) as directed by provider Daily. 1 g 3    irbesartan-hydrochlorothiazide (AVALIDE) 300-12.5 MG tablet Take 1 tablet by mouth Daily.      montelukast (SINGULAIR) 10 MG tablet TAKE 1 TABLET BY MOUTH DAILY 90 tablet 1    Multiple Vitamins-Minerals (MULTIVITAMIN ADULT) tablet Take 1 tablet by mouth Daily.      pantoprazole (PROTONIX) 40 MG EC tablet Take 1 tablet by mouth 2 (Two) Times a Day Before Meals. 60 tablet 0    rOPINIRole (REQUIP) 0.5 MG tablet TAKE 1 TABLET BY MOUTH EVERY NIGHT 90 tablet 1    rosuvastatin (CRESTOR) 20 MG tablet Take 1 tablet by mouth Daily. 90 tablet 3    sertraline (ZOLOFT) 100 MG tablet Take 1 tablet by mouth Daily.      valACYclovir (VALTREX) 500 MG tablet TAKE 1 TABLET BY MOUTH TWICE DAILY 180 tablet 3     No facility-administered encounter medications on file as of 11/28/2023.     ADULT ILLNESSES:  Patient Active Problem List   Diagnosis Code    Malignant neoplasm of upper-outer quadrant of female breast C50.419    Anemia of chronic renal failure, stage 3 (moderate) N18.30, D63.1    Hypertension, benign I10    Hx of colonic polyps Z86.010    HX: breast cancer Z85.3    Morbidly obese E66.01    Abnormal mammogram R92.8    Breast mass N63.0    Right knee DJD M17.11    S/P lumpectomy, left breast Z98.890    Adult hypothyroidism E03.9    Rhinitis J31.0    Anemia D64.9    Anxiety F41.9    At low risk for fall Z91.81    Breast cancer, left C50.912    Cervical pain M54.2    Chronic insomnia F51.04    Cough R05.9    Elevated lipids E78.5    Encounter for immunization  Z23    Herpes zoster without complication B02.9    Influenza B J10.1    Left ear pain H92.02    Left otitis externa H60.92    Lumbar strain, initial encounter S39.012A    Myalgia M79.10    Negative depression screening Z13.31    Obesity (BMI 30-39.9) E66.9    Postmenopausal status Z78.0    Recurrent acute serous otitis media of left ear H65.05    Restless leg G25.81    Sinusitis, bacterial J32.9, B96.89    Skin lesion L98.9    Upper respiratory infection J06.9    Urinary tract infection without hematuria N39.0    Vitamin D deficiency E55.9    Stage 3b chronic kidney disease N18.32    GIB (gastrointestinal bleeding) K92.2    Acute on chronic blood loss anemia D62    Chronic idiopathic thrombocytopenia D69.6    Hypotension due to hypovolemia I95.89, E86.1    Primary hypertension I10    Pancreatic lesion K86.9     SURGERIES:  Past Surgical History:   Procedure Laterality Date    AVULSION TOENAIL PLATE      Sept 26,2018    BREAST BIOPSY Left 11/20/2012    BREAST BIOPSY      Left Breast, 1/2019 per Dr Barkley    BREAST LUMPECTOMY Left     with node bx     COLONOSCOPY  09/13/2013    small polyp at 30cm benign hyperplastic polyp, changes consistent with melanosis coli. Recall 5 years    COLONOSCOPY  11/19/2018    Tics otherwise normal exam repeat in 5 years    COLONOSCOPY  02/13/2023    Normal exam repeat in 3 years with a 2 day prep    ENDOSCOPY  02/13/2023    Gastritis, small HH    REPLACEMENT TOTAL KNEE Right     2016    TOTAL ABDOMINAL HYSTERECTOMY WITH SALPINGO OOPHORECTOMY       HEALTH MAINTENANCE ITEMS:  Health Maintenance Due   Topic Date Due    DXA SCAN  06/17/2023    COVID-19 Vaccine (5 - 2023-24 season) 09/01/2023       <no information>  Last Completed Colonoscopy            COLORECTAL CANCER SCREENING (COLONOSCOPY - Every 3 Years) Next due on 2/13/2026 09/07/2023  Fecal Occult Blood component of Occult Blood X 1, Stool - Stool, Per Rectum    09/03/2023  Fecal Occult Blood component of POC Occult Blood  Stool    08/10/2023  Fecal Occult Blood component of POC Occult Blood Stool    02/13/2023  SCANNED - COLONOSCOPY    11/16/2022  Occult Blood X 3, Stool - Stool, Per Rectum    Only the first 5 history entries have been loaded, but more history exists.                  Immunization History   Administered Date(s) Administered    COVID-19 (MODERNA) 1st,2nd,3rd Dose Monovalent 03/12/2021, 03/28/2021    COVID-19 (MODERNA) Monovalent Original Booster 08/31/2022    COVID-19 (PFIZER) BIVALENT 12+YRS 12/08/2022    Flu Vaccine Quad PF >36MO 11/05/2019    Fluad Quad 65+ 11/05/2020    Fluzone High Dose =>65 Years ([employer] ONLY) 11/11/2015, 10/14/2016, 11/20/2017    Fluzone High-Dose 65+yrs 11/29/2021, 10/04/2022, 10/17/2023    Pneumococcal Conjugate 13-Valent (PCV13) 10/14/2016    Pneumococcal Polysaccharide (PPSV23) 11/05/2020    Shingrix 01/01/2021, 06/08/2021    Zoster, Unspecified 01/01/2021     Last Completed Mammogram            MAMMOGRAM (Yearly) Next due on 1/23/2024 01/23/2023  SCANNED - MAMMO    01/23/2023  MAMMO SCREENING BILATERAL W CAD    12/13/2021  SCANNED - MAMMO    12/13/2021  MAMMO SCREENING BILATERAL W CAD    12/10/2020  Mammo Screening Digital Tomosynthesis Bilateral With CAD    Only the first 5 history entries have been loaded, but more history exists.                      FAMILY HISTORY:  Family History   Problem Relation Age of Onset    Heart attack Mother     Hodgkin's lymphoma Father     Other Father     Cancer Father         hodgkins    Heart attack Brother     Skin cancer Maternal Uncle     Pancreatic cancer Maternal Uncle     Cancer Maternal Uncle         eye    Colon cancer Neg Hx     Colon polyps Neg Hx      SOCIAL HISTORY:  Social History     Socioeconomic History    Marital status:    Tobacco Use    Smoking status: Never    Smokeless tobacco: Never   Vaping Use    Vaping Use: Never used   Substance and Sexual Activity    Alcohol use: No    Drug use: Not Currently    Sexual activity:  "Not Currently     Birth control/protection: Surgical       REVIEW OF SYSTEMS:    Review of Systems   Constitutional:  Negative for chills and fever.   HENT:  Negative for congestion and trouble swallowing.    Respiratory:  Negative for wheezing.    Cardiovascular:  Negative for chest pain and palpitations.   Gastrointestinal:  Negative for abdominal pain, nausea and vomiting.   Endocrine: Negative for polydipsia and polyphagia.   Genitourinary:  Negative for difficulty urinating, dysuria and flank pain.   Musculoskeletal:  Negative for gait problem and joint swelling.   Allergic/Immunologic: Negative for food allergies.   Neurological:  Negative for speech difficulty and weakness.   Hematological:  Negative for adenopathy. Does not bruise/bleed easily.   Psychiatric/Behavioral:  Negative for agitation, confusion and hallucinations.        VITAL SIGNS: /82   Pulse 63   Temp 97.8 °F (36.6 °C)   Resp 16   Ht 175.3 cm (69\")   Wt 108 kg (237 lb)   LMP  (LMP Unknown)   SpO2 99%   BMI 35.00 kg/m²   Pain Score    11/28/23 0840   PainSc: 0-No pain       PHYSICAL EXAMINATION:     Physical Exam  Vitals reviewed.   Constitutional:       General: She is not in acute distress.  HENT:      Head: Normocephalic and atraumatic.   Eyes:      General: No scleral icterus.  Cardiovascular:      Rate and Rhythm: Normal rate.   Pulmonary:      Effort: No respiratory distress.      Breath sounds: No wheezing or rales.   Abdominal:      General: Bowel sounds are normal.      Palpations: Abdomen is soft.      Tenderness: There is no abdominal tenderness.   Musculoskeletal:         General: No swelling.      Cervical back: Neck supple.      Right lower leg: Edema present.      Left lower leg: Edema present.   Skin:     General: Skin is warm and dry.   Neurological:      Mental Status: She is alert and oriented to person, place, and time.   Psychiatric:         Mood and Affect: Mood normal.         Behavior: Behavior normal.       "   Thought Content: Thought content normal.         Judgment: Judgment normal.         LABS    Lab Results - Last 18 Months   Lab Units 11/28/23  0827 11/17/23  0848 11/10/23  0859 11/03/23  0849 10/27/23  0837 10/13/23  0812 09/22/23  0859 09/15/23  0904 09/09/23  1623 09/09/23  0752 09/07/23  0141 09/06/23  1608 09/03/23  1734 09/03/23  0535 08/25/23  0853 08/18/23  0844 08/11/23  0030 08/10/23  1754 08/04/23  0843 07/28/23  0932 06/30/23  0918 06/20/23  0733 04/11/23  0744 03/07/23  1310 02/07/23  0907 01/10/23  0905 12/27/22  1309 12/13/22  0810 09/07/22  0801 06/22/22  1419   HEMOGLOBIN g/dL 11.3* 10.3* 10.0* 10.2* 10.3* 9.1*   < > 9.2*   < > 7.9*   < > 4.9*   < > 5.2*   < > 6.6*   < > 5.3*   < > 7.0*   < > 7.7*   < > 10.9* 10.5* 10.2*   < > 9.3*   < > 10.4*   HEMATOCRIT % 38.5 34.9 34.8 35.1 35.7 31.5*   < > 30.2*   < > 25.4*   < > 16.6*   < > 17.7*   < > 22.1*   < > 18.4*   < > 24.8*   < > 24.0*   < > 35.0 34.4 34.7   < > 31.1*   < > 33.5*   MCV fL 91.2 92.1 94.8 94.1 93.9 93.5   < > 94.1   < > 93.4   < > 97.6*  --  97.3*   < > 100.5*  --  98.4*   < > 98.0*   < > 91.3   < > 91.6 92.5 96.9   < > 96.0   < > 91.8   WBC 10*3/mm3 5.00 5.92 6.65 5.43 6.23 6.05   < > 8.92   < > 10.69   < > 8.67  --  9.54   < > 6.97  --  7.61   < > 5.68   < > 6.91   < > 6.19 6.57 5.77   < > 5.75   < > 5.77   RDW % 17.2* 18.4* 18.7* 18.7* 18.1* 17.7*   < > 17.1*   < > 19.7*   < > 21.5*  --  19.6*   < > 21.2*  --  19.5*   < > 18.3*   < > 16.4*   < > 16.0* 15.7* 15.9*   < > 16.2*   < > 15.8*   MPV fL 12.4*  --   --   --   --   --   --   --   --  11.7  --   --   --   --   --  13.3*  --   --   --   --   --   --   --  12.3* 12.8* 14.0*  --   --   --  12.2*   PLATELETS 10*3/mm3 193 64* 58* 54* 46* 40*   < > 21*   < > 20*   < > 24*  --  33*   < > 35*  --  46*   < > 46*   < > 56*   < > 115* 91* 62*   < > 54*   < > 77*   IMM GRAN % % 0.2  --   --   --   --   --   --   --   --   --   --   --   --  0.8*  --   --   --   --   --   --   --   --   --   0.2  --   --   --  0.3  --  0.5   NEUTROS ABS 10*3/mm3 3.22 3.90 4.51 3.48 4.03 3.98   < > 7.12*   < > 9.13*   < > 6.61  --  7.34*   < > 4.86  --  5.30   < > 3.73   < > 4.91   < > 4.00 4.16 3.44   < > 3.52   < > 3.28   LYMPHS ABS 10*3/mm3 1.06 1.04 1.16 1.03 1.19 1.03   < >  --    < > 0.77   < >  --   --  1.21   < >  --   --   --    < >  --    < > CANCELED  0.90   < > 1.40 1.48 1.50   < > 1.34   < > 1.50   MONOS ABS 10*3/mm3 0.40 0.60 0.61 0.56 0.67 0.69   < >  --    < > 0.65   < >  --   --  0.77   < >  --   --   --    < >  --    < > 0.48   < > 0.49 0.59 0.46   < > 0.51   < > 0.59   EOS ABS 10*3/mm3 0.27 0.33 0.30 0.30 0.28 0.28   < > 0.36   < > 0.00   < > 0.26  --  0.13   < > 0.36  --  0.15   < > 0.57*   < > CANCELED   < > 0.26 0.29 0.33   < > 0.34   < > 0.34   EOSINOPHIL ABS 10*3/mm3  --   --   --   --   --   --   --   --   --   --   --   --   --   --   --   --   --   --   --   --   --  0.62*  --   --   --   --   --   --    < >  --    EOSINOPHIL % %  --   --   --   --   --   --   --   --   --   --   --   --   --   --   --   --   --   --   --   --   --  9.0*  --   --   --   --   --   --    < >  --    BASOS ABS 10*3/mm3 0.04 0.04 0.04 0.04 0.04 0.05   < >  --    < > 0.01   < >  --   --  0.01   < >  --   --  0.08   < >  --    < > CANCELED   < > 0.03 0.02 0.03   < > 0.02   < > 0.03   IMMATURE GRANS (ABS) 10*3/mm3 0.01  --   --   --   --   --   --   --   --   --   --   --   --  0.08*  --   --   --   --   --   --   --   --   --  0.01  --   --   --  0.02  --  0.03   NRBC /100 WBC 0.0  --   --   --   --   --   --   --   --   --   --  2.0*  --  0.6*  --   --   --   --   --   --   --   --   --  0.0  --   --   --  0.0  --  0.0   NEUTROPHIL % %  --   --   --   --   --   --   --  79.8*  --   --   --  75.2  --   --   --  68.7  --  69.7  --  64.6  --  71.0  --   --   --   --   --   --    < >  --    MONOCYTES % %  --   --   --   --   --   --   --  3.0*  --   --   --  6.9  --   --   --  5.1  --  1.0*  --  9.1  --  7.0  --   --    "--   --   --   --    < >  --    BASOPHIL % %  --   --   --   --   --   --   --   --   --   --   --   --   --   --   --   --   --  1.0  --   --   --   --   --   --   --   --   --   --   --   --    ANISOCYTOSIS   --   --   --   --   --   --   --  Slight/1+  --   --   --  Mod/2+  --   --   --  Slight/1+  --  Slight/1+  --  Mod/2+  --   --   --   --   --   --   --   --   --   --    GIANT PLT   --   --   --   --   --   --   --  Slight/1+  --   --   --  Slight/1+  --   --   --  Large/3+  --  Large/3+  --  Large/3+  --   --   --   --   --   --   --   --   --   --     < > = values in this interval not displayed.       Lab Results - Last 18 Months   Lab Units 11/28/23  0827 11/17/23  0848 11/03/23  0849 10/27/23  0920 10/13/23  0812 10/06/23  0849   GLUCOSE mg/dL 104* 107* 97 91 98 99   SODIUM mmol/L 141 140 139 139 140 140   POTASSIUM mmol/L 3.5 3.8 3.7 4.1 4.2 3.8   CO2 mmol/L 28.0 27.0 28.0 28.0 26.0 26.0   CHLORIDE mmol/L 103 104 103 102 104 104   ANION GAP mmol/L 10.0 9.0 8.0 9.0 10.0 10.0   CREATININE mg/dL 1.12* 1.32* 1.33* 1.47* 1.49* 1.54*   BUN mg/dL 14 21 23 23 24* 27*   BUN / CREAT RATIO  12.5 15.9 17.3 15.6 16.1 17.5   CALCIUM mg/dL 8.6 8.6 8.7 9.5 8.9 9.1   ALK PHOS U/L 97 87 84 87 88 90   TOTAL PROTEIN g/dL 6.5 6.5 6.7 7.4 6.4 6.3   ALT (SGPT) U/L 14 13 14 20 13 13   AST (SGOT) U/L 19 15 17 26 19 20   BILIRUBIN mg/dL 0.2 0.3 0.3 0.3 0.3 0.3   ALBUMIN g/dL 3.8 4.0 4.0 4.3 3.8 3.7   GLOBULIN gm/dL 2.7 2.5 2.7 3.1 2.6 2.6       No results for input(s): \"MSPIKE\", \"KAPPALAMB\", \"IGLFLC\", \"URICACID\", \"FREEKAPPAL\", \"CEA\", \"LDH\", \"REFLABREPO\" in the last [id number] hours.      Lab Results - Last 18 Months   Lab Units 11/28/23  0827 10/13/23  0812 10/06/23  0849 09/06/23  1532 07/28/23  0932 06/30/23  0918 06/20/23  0733 01/10/23  0905 12/01/22  0838   IRON mcg/dL 38 48 49 60 43 48  --    < >  --    TIBC mcg/dL 294* 329 329 292* 350 313 334   < >  --    IRON SATURATION %  --   --   --   --   --   --  17*  --   --    IRON " SATURATION (TSAT) % 13* 15* 15* 21 12* 15*  --    < >  --    FERRITIN ng/mL 205.70* 165.30* 194.80* 260.60* 146.10 421.50* 455.00*   < >  --    TSH uIU/mL  --   --   --   --   --   --  5.880*  --  4.640*    < > = values in this interval not displayed.       Marina Joe reports a pain score of 0.      ASSESSMENT:  No diagnosis found.        1. Chronic ITP (idiopathic thrombocytopenia) (HCC)   Bone marrow, left iliac crest, core biopsy, aspirate smears, and clot section: 9/11/23  Slightly hypercellular marrow (40%) with maturing trilineage hematopoiesis.  Aspiculate smears, nondiagnostic.  No overt features of myelodysplasia and no excess blast population.  Moderately increased megakaryocytes.  2+ stainable iron.  Peripheral blood smear:  Severe thrombocytopenia.  Mild normocytic normochromic anemia.  Rare circulating schistocytes.  Normal white blood cell count with normal differential and morphology.  No circulating blasts.  Flow cytometry: No immunophenotypic evidence of abnormal myeloid maturation, increased blast population or a lymphoproliferative disorder.  Chromosome analysis: Normal female karyotype.  -Plt today stable at 193.  NO s/s of bleeding   -Stable for observation   PLAN  -Will transfuse for platelets < 10 regardless of bleeding.   -Transfuse platelets < 20 If bleeding   -Can give burst dose of steroids if platelets < 30.    2. Iron deficiency anemia   3.  Chronic Kidney Disease Stage IIIA   -Labs today: BUN 14, Creatinine 1.12, GFR 50.8, Hgb 11.3, Hct 38.5, Iron 38, Ferritin 205, Sat 19, TIBC 294  She received Inejctafer October 20, 2023.    She received Retacrit  q 2 weeks.   PLAN  Followed by Dr. Martin, Nephrology   No Retacrit today   She will continue Retacrit biweekly today Hgb< 11 or Hct < 33  Will order additional infusion of Inejctafer to be given next week and then she will return to weekly Retacrit injections for Hgb < 11 or Hct 33    3. History of breast cancer  - Pathologic stage: Stage  IA (T1a, N0, cM0)   -Invasive mammary carcinoma, low-grade, ER positive KS positive HER-2 nu 2+, FISH unamplified.  -January 8, 2013 she underwent a left partial mastectomy with sentinel node biopsy  -Following surgery she underwent adjuvant radiation therapy and was then started on Arimidex  for hormonal manipulation.  She completed it in Jan 2023.  -Mammogram 01/23/23, Benign,   -Continue Annual Screening     4.  Pancreatic lesion   -MRI Abdomen 6/26/23 15 x 14 mm cystic lesion in the pancreatic tail without septations, mural nodules or enhancement.   -Per note, Dr. Carvalho at [facility] r/t thrombocytopenia.      PLAN:   No shot today  CBC and Retacrit q 2 weeks as indicated  RTC in 3 months with pre office labs for CBC, CMP, Iron Profile, Ferritin   Continue current medications, treatment plans and follow up with PCP and any other providers  Care discussed with patient.  Understanding expressed.  Patient agreeable with plan.            Analilia Madera, APRN  11/28/2023

## 2023-12-08 ENCOUNTER — LAB (OUTPATIENT)
Dept: INTERNAL MEDICINE | Facility: CLINIC | Age: 78
End: 2023-12-08
Payer: MEDICARE

## 2023-12-08 DIAGNOSIS — D64.9 ANEMIA, UNSPECIFIED TYPE: ICD-10-CM

## 2023-12-08 DIAGNOSIS — I10 PRIMARY HYPERTENSION: ICD-10-CM

## 2023-12-08 DIAGNOSIS — R73.03 PREDIABETES: ICD-10-CM

## 2023-12-08 DIAGNOSIS — E03.9 ADULT HYPOTHYROIDISM: ICD-10-CM

## 2023-12-08 DIAGNOSIS — E55.9 VITAMIN D DEFICIENCY: ICD-10-CM

## 2023-12-09 LAB
25(OH)D3+25(OH)D2 SERPL-MCNC: 60.9 NG/ML (ref 30–100)
ALBUMIN SERPL-MCNC: 4.4 G/DL (ref 3.5–5.2)
ALBUMIN/GLOB SERPL: 2 G/DL
ALP SERPL-CCNC: 88 U/L (ref 39–117)
ALT SERPL-CCNC: 22 U/L (ref 1–33)
APPEARANCE UR: CLEAR
AST SERPL-CCNC: 25 U/L (ref 1–32)
BACTERIA #/AREA URNS HPF: ABNORMAL /HPF
BASOPHILS # BLD AUTO: 0.03 10*3/MM3 (ref 0–0.2)
BASOPHILS NFR BLD AUTO: 0.6 % (ref 0–1.5)
BILIRUB SERPL-MCNC: 0.2 MG/DL (ref 0–1.2)
BILIRUB UR QL STRIP: NEGATIVE
BUN SERPL-MCNC: 26 MG/DL (ref 8–23)
BUN/CREAT SERPL: 20.2 (ref 7–25)
CALCIUM SERPL-MCNC: 10.2 MG/DL (ref 8.6–10.5)
CASTS URNS MICRO: ABNORMAL
CHLORIDE SERPL-SCNC: 104 MMOL/L (ref 98–107)
CHOLEST SERPL-MCNC: 164 MG/DL (ref 0–200)
CO2 SERPL-SCNC: 27.1 MMOL/L (ref 22–29)
COLOR UR: YELLOW
CREAT SERPL-MCNC: 1.29 MG/DL (ref 0.57–1)
EGFRCR SERPLBLD CKD-EPI 2021: 42.8 ML/MIN/1.73
EOSINOPHIL # BLD AUTO: 0.3 10*3/MM3 (ref 0–0.4)
EOSINOPHIL NFR BLD AUTO: 5.8 % (ref 0.3–6.2)
EPI CELLS #/AREA URNS HPF: ABNORMAL /HPF
ERYTHROCYTE [DISTWIDTH] IN BLOOD BY AUTOMATED COUNT: 16.4 % (ref 12.3–15.4)
GLOBULIN SER CALC-MCNC: 2.2 GM/DL
GLUCOSE SERPL-MCNC: 98 MG/DL (ref 65–99)
GLUCOSE UR QL STRIP: NEGATIVE
HBA1C MFR BLD: 5.3 % (ref 4.8–5.6)
HCT VFR BLD AUTO: 37.2 % (ref 34–46.6)
HDLC SERPL-MCNC: 57 MG/DL (ref 40–60)
HGB BLD-MCNC: 11.5 G/DL (ref 12–15.9)
HGB UR QL STRIP: NEGATIVE
IMM GRANULOCYTES # BLD AUTO: 0.01 10*3/MM3 (ref 0–0.05)
IMM GRANULOCYTES NFR BLD AUTO: 0.2 % (ref 0–0.5)
KETONES UR QL STRIP: NEGATIVE
LDLC SERPL CALC-MCNC: 95 MG/DL (ref 0–100)
LEUKOCYTE ESTERASE UR QL STRIP: ABNORMAL
LYMPHOCYTES # BLD AUTO: 1.35 10*3/MM3 (ref 0.7–3.1)
LYMPHOCYTES NFR BLD AUTO: 26.3 % (ref 19.6–45.3)
MCH RBC QN AUTO: 27.2 PG (ref 26.6–33)
MCHC RBC AUTO-ENTMCNC: 30.9 G/DL (ref 31.5–35.7)
MCV RBC AUTO: 87.9 FL (ref 79–97)
MONOCYTES # BLD AUTO: 0.48 10*3/MM3 (ref 0.1–0.9)
MONOCYTES NFR BLD AUTO: 9.3 % (ref 5–12)
NEUTROPHILS # BLD AUTO: 2.97 10*3/MM3 (ref 1.7–7)
NEUTROPHILS NFR BLD AUTO: 57.8 % (ref 42.7–76)
NITRITE UR QL STRIP: NEGATIVE
NRBC BLD AUTO-RTO: 0 /100 WBC (ref 0–0.2)
PH UR STRIP: 6 [PH] (ref 5–8)
PLATELET # BLD AUTO: 315 10*3/MM3 (ref 140–450)
POTASSIUM SERPL-SCNC: 4 MMOL/L (ref 3.5–5.2)
PROT SERPL-MCNC: 6.6 G/DL (ref 6–8.5)
PROT UR QL STRIP: NEGATIVE
RBC # BLD AUTO: 4.23 10*6/MM3 (ref 3.77–5.28)
RBC #/AREA URNS HPF: ABNORMAL /HPF
SODIUM SERPL-SCNC: 140 MMOL/L (ref 136–145)
SP GR UR STRIP: 1.02 (ref 1–1.03)
T4 FREE SERPL-MCNC: 1.05 NG/DL (ref 0.93–1.7)
TRIGL SERPL-MCNC: 60 MG/DL (ref 0–150)
TSH SERPL DL<=0.005 MIU/L-ACNC: 5.54 UIU/ML (ref 0.27–4.2)
UROBILINOGEN UR STRIP-MCNC: ABNORMAL MG/DL
VLDLC SERPL CALC-MCNC: 12 MG/DL (ref 5–40)
WBC # BLD AUTO: 5.14 10*3/MM3 (ref 3.4–10.8)
WBC #/AREA URNS HPF: ABNORMAL /HPF

## 2023-12-11 NOTE — PROGRESS NOTES
Labs are stable, please keep appointment on 12/12/2023 and we will discuss labs in greater detail then.

## 2023-12-12 ENCOUNTER — OFFICE VISIT (OUTPATIENT)
Dept: INTERNAL MEDICINE | Facility: CLINIC | Age: 78
End: 2023-12-12
Payer: MEDICARE

## 2023-12-12 VITALS
HEART RATE: 85 BPM | OXYGEN SATURATION: 99 % | BODY MASS INDEX: 35.25 KG/M2 | SYSTOLIC BLOOD PRESSURE: 132 MMHG | DIASTOLIC BLOOD PRESSURE: 68 MMHG | HEIGHT: 69 IN | TEMPERATURE: 97.1 F | WEIGHT: 238 LBS

## 2023-12-12 DIAGNOSIS — D69.6 THROMBOCYTOPENIA: ICD-10-CM

## 2023-12-12 DIAGNOSIS — Z78.0 POSTMENOPAUSAL: ICD-10-CM

## 2023-12-12 DIAGNOSIS — F51.04 CHRONIC INSOMNIA: ICD-10-CM

## 2023-12-12 DIAGNOSIS — E66.01 CLASS 2 SEVERE OBESITY DUE TO EXCESS CALORIES WITH SERIOUS COMORBIDITY AND BODY MASS INDEX (BMI) OF 35.0 TO 35.9 IN ADULT: ICD-10-CM

## 2023-12-12 DIAGNOSIS — Z23 NEED FOR COVID-19 VACCINE: ICD-10-CM

## 2023-12-12 DIAGNOSIS — N18.32 STAGE 3B CHRONIC KIDNEY DISEASE: Primary | ICD-10-CM

## 2023-12-12 DIAGNOSIS — C50.412 MALIGNANT NEOPLASM OF UPPER-OUTER QUADRANT OF LEFT FEMALE BREAST, UNSPECIFIED ESTROGEN RECEPTOR STATUS: ICD-10-CM

## 2023-12-12 DIAGNOSIS — D63.1 ANEMIA OF CHRONIC RENAL FAILURE, STAGE 3A: ICD-10-CM

## 2023-12-12 DIAGNOSIS — N18.31 ANEMIA OF CHRONIC RENAL FAILURE, STAGE 3A: ICD-10-CM

## 2023-12-12 DIAGNOSIS — G25.81 RESTLESS LEG: ICD-10-CM

## 2023-12-12 DIAGNOSIS — I10 PRIMARY HYPERTENSION: ICD-10-CM

## 2023-12-12 DIAGNOSIS — K86.9 PANCREATIC LESION: ICD-10-CM

## 2023-12-12 DIAGNOSIS — E03.9 ADULT HYPOTHYROIDISM: ICD-10-CM

## 2023-12-12 DIAGNOSIS — Z00.00 ENCOUNTER FOR SUBSEQUENT ANNUAL WELLNESS VISIT (AWV) IN MEDICARE PATIENT: Primary | ICD-10-CM

## 2023-12-12 DIAGNOSIS — B00.89 HERPES DERMATITIS: ICD-10-CM

## 2023-12-12 DIAGNOSIS — N18.32 STAGE 3B CHRONIC KIDNEY DISEASE: ICD-10-CM

## 2023-12-12 PROBLEM — J06.9 UPPER RESPIRATORY INFECTION: Status: RESOLVED | Noted: 2020-05-29 | Resolved: 2023-12-12

## 2023-12-12 PROBLEM — S39.012A LUMBAR STRAIN, INITIAL ENCOUNTER: Status: RESOLVED | Noted: 2020-05-29 | Resolved: 2023-12-12

## 2023-12-12 PROBLEM — N39.0 URINARY TRACT INFECTION WITHOUT HEMATURIA: Status: RESOLVED | Noted: 2020-05-29 | Resolved: 2023-12-12

## 2023-12-12 PROBLEM — J10.1 INFLUENZA B: Status: RESOLVED | Noted: 2020-05-29 | Resolved: 2023-12-12

## 2023-12-12 PROBLEM — Z91.81 AT LOW RISK FOR FALL: Status: RESOLVED | Noted: 2020-05-29 | Resolved: 2023-12-12

## 2023-12-12 PROBLEM — J31.0 RHINITIS: Status: RESOLVED | Noted: 2020-05-29 | Resolved: 2023-12-12

## 2023-12-12 PROBLEM — R05.9 COUGH: Status: RESOLVED | Noted: 2020-05-29 | Resolved: 2023-12-12

## 2023-12-12 NOTE — PROGRESS NOTES
The ABCs of the Annual Wellness Visit  Subsequent Medicare Wellness Visit    Subjective    Marina Joe is a 78 y.o. female who presents for a Subsequent Medicare Wellness Visit.    The following portions of the patient's history were reviewed and   updated as appropriate: allergies, current medications, past family history, past medical history, past social history, past surgical history, and problem list.    Compared to one year ago, the patient feels her physical   health is better.    Compared to one year ago, the patient feels her mental   health is the same.    Recent Hospitalizations:  This patient has had a [facility] admission record on file within the last 365 days.    Current Medical Providers:  Patient Care Team:  Hanh Og APRN as PCP - General (Internal Medicine)    Outpatient Medications Prior to Visit   Medication Sig Dispense Refill    albuterol sulfate  (90 Base) MCG/ACT inhaler Inhale 2 puffs Every 4 (Four) Hours As Needed for Wheezing. 2 g 3    amLODIPine (NORVASC) 5 MG tablet TAKE 1 TABLET BY MOUTH TWICE DAILY 180 tablet 2    buPROPion XL (WELLBUTRIN XL) 150 MG 24 hr tablet Take 1 tablet by mouth Daily.      Calcium Carb-Cholecalciferol (CALCIUM 600+D3 PO) Take 1 tablet by mouth Daily.      carvedilol (COREG) 6.25 MG tablet Take 1 tablet by mouth 2 (Two) Times a Day With Meals. 180 tablet 3    cetirizine (zyrTEC) 10 MG tablet Take 1 tablet by mouth Daily.      cloNIDine (CATAPRES) 0.1 MG tablet Take 1 tablet by mouth 2 (Two) Times a Day.      cyclobenzaprine (FLEXERIL) 10 MG tablet TAKE 1 TABLET BY MOUTH THREE TIMES DAILY AS NEEDED FOR MUSCLE SPASMS 90 tablet 5    Diclofenac Sodium (VOLTAREN) 1 % gel gel Apply 4 g topically to the appropriate area as directed 4 (Four) Times a Day As Needed (arthralgia). 240 g 3    Ergocalciferol (VITAMIN D2 PO) Take 50,000 Units by mouth Every 30 (Thirty) Days.      famotidine (PEPCID) 20 MG tablet Take 1 tablet by mouth 2 (Two) Times a  Day Before Meals. 60 tablet 0    fluticasone (FLONASE) 50 MCG/ACT nasal spray 2 sprays into the nostril(s) as directed by provider Daily. 1 g 3    irbesartan-hydrochlorothiazide (AVALIDE) 300-12.5 MG tablet Take 1 tablet by mouth Daily.      montelukast (SINGULAIR) 10 MG tablet TAKE 1 TABLET BY MOUTH DAILY 90 tablet 1    Multiple Vitamins-Minerals (MULTIVITAMIN ADULT) tablet Take 1 tablet by mouth Daily.      pantoprazole (PROTONIX) 40 MG EC tablet Take 1 tablet by mouth 2 (Two) Times a Day Before Meals. 60 tablet 0    rOPINIRole (REQUIP) 0.5 MG tablet TAKE 1 TABLET BY MOUTH EVERY NIGHT 90 tablet 1    rosuvastatin (CRESTOR) 20 MG tablet Take 1 tablet by mouth Daily. 90 tablet 3    sertraline (ZOLOFT) 100 MG tablet Take 1 tablet by mouth Daily.      valACYclovir (VALTREX) 500 MG tablet TAKE 1 TABLET BY MOUTH TWICE DAILY 180 tablet 3     No facility-administered medications prior to visit.       No opioid medication identified on active medication list. I have reviewed chart for other potential  high risk medication/s and harmful drug interactions in the elderly.        Aspirin is not on active medication list.  Aspirin use is not indicated based on review of current medical condition/s. Risk of harm outweighs potential benefits.  .    Patient Active Problem List   Diagnosis    Malignant neoplasm of upper-outer quadrant of female breast    Anemia of chronic renal failure, stage 3 (moderate)    Hypertension, benign    Hx of colonic polyps    HX: breast cancer    Morbidly obese    Right knee DJD    S/P lumpectomy, left breast    Adult hypothyroidism    Anemia    Anxiety    Breast cancer, left    Cervical pain    Chronic insomnia    Elevated lipids    Herpes zoster without complication    Left ear pain    Left otitis externa    Myalgia    Negative depression screening    Obesity (BMI 30-39.9)    Postmenopausal status    Recurrent acute serous otitis media of left ear    Restless leg    Sinusitis, bacterial    Skin lesion  "   Vitamin D deficiency    Stage 3b chronic kidney disease    GIB (gastrointestinal bleeding)    Acute on chronic blood loss anemia    Chronic idiopathic thrombocytopenia    Hypotension due to hypovolemia    Primary hypertension    Pancreatic lesion     Advance Care Planning  Advance Directive is not on file.  ACP discussion was held with the patient during this visit. Patient does not have an advance directive, information provided.     Objective    Vitals:    23 0749   BP: 132/68  Comment: Taken BP med   BP Location: Right arm   Patient Position: Sitting   Cuff Size: Adult   Pulse: 85   Temp: 97.1 °F (36.2 °C)   TempSrc: Temporal   SpO2: 99%   Weight: 108 kg (238 lb)   Height: 175.3 cm (69\")   PainSc: 0-No pain     Estimated body mass index is 35.15 kg/m² as calculated from the following:    Height as of this encounter: 175.3 cm (69\").    Weight as of this encounter: 108 kg (238 lb).           Does the patient have evidence of cognitive impairment? No    Lab Results   Component Value Date    CHLPL 164 2023    TRIG 60 2023    HDL 57 2023    LDL 95 2023    VLDL 12 2023    HGBA1C 5.30 2023        HEALTH RISK ASSESSMENT    Smoking Status:  Social History     Tobacco Use   Smoking Status Never   Smokeless Tobacco Never     Alcohol Consumption:  Social History     Substance and Sexual Activity   Alcohol Use No     Fall Risk Screen:    STEADI Fall Risk Assessment was completed, and patient is at LOW risk for falls.Assessment completed on:2023    Depression Screenin/12/2023     7:50 AM   PHQ-2/PHQ-9 Depression Screening   Little Interest or Pleasure in Doing Things 0-->not at all   Feeling Down, Depressed or Hopeless 0-->not at all   PHQ-9: Brief Depression Severity Measure Score 0       Health Habits and Functional and Cognitive Screenin/12/2023     7:51 AM   Functional & Cognitive Status   Do you have difficulty preparing food and eating? No   Do you " have difficulty bathing yourself, getting dressed or grooming yourself? No   Do you have difficulty using the toilet? No   Do you have difficulty moving around from place to place? No   Do you have trouble with steps or getting out of a bed or a chair? No   Current Diet Well Balanced Diet   Dental Exam Up to date   Eye Exam Up to date   Exercise (times per week) 2 times per week   Current Exercises Include Walking;Stationary Bicycling/Spin Class   Do you need help using the phone?  No   Are you deaf or do you have serious difficulty hearing?  No   Do you need help to go to places out of walking distance? No   Do you need help shopping? No   Do you need help preparing meals?  No   Do you need help with housework?  No   Do you need help with laundry? No   Do you need help taking your medications? No   Do you need help managing money? No   Do you ever drive or ride in a car without wearing a seat belt? No   Have you felt unusual stress, anger or loneliness in the last month? No   Who do you live with? Other   If you need help, do you have trouble finding someone available to you? No   Have you been bothered in the last four weeks by sexual problems? No   Do you have difficulty concentrating, remembering or making decisions? No       Age-appropriate Screening Schedule:  Refer to the list below for future screening recommendations based on patient's age, sex and/or medical conditions. Orders for these recommended tests are listed in the plan section. The patient has been provided with a written plan.    Health Maintenance   Topic Date Due    DXA SCAN  06/17/2023    TDAP/TD VACCINES (1 - Tdap) 12/12/2024 (Originally 1/31/1964)    MAMMOGRAM  01/23/2024    LIPID PANEL  12/08/2024    ANNUAL WELLNESS VISIT  12/12/2024    BMI FOLLOWUP  12/12/2024    COLORECTAL CANCER SCREENING  02/13/2026    HEPATITIS C SCREENING  Completed    COVID-19 Vaccine  Completed    INFLUENZA VACCINE  Completed    Pneumococcal Vaccine 65+  Completed     "ZOSTER VACCINE  Completed                CMS Preventative Services Quick Reference  Risk Factors Identified During Encounter  Chronic Pain: Natural history and expected course discussed. Questions answered.  Fall Risk-High or Moderate: Discussed Fall Prevention in the home  Immunizations Discussed/Encouraged: Tdap  Inactivity/Sedentary: Patient was advised to exercise at least 150 minutes a week per CDC recommendations.  Polypharmacy: Medication List reviewed  Dental Screening Recommended  Vision Screening Recommended  The above risks/problems have been discussed with the patient.  Pertinent information has been shared with the patient in the After Visit Summary.  An After Visit Summary and PPPS were made available to the patient.    Follow Up:   Next Medicare Wellness visit to be scheduled in 1 year.       Additional E&M Note during same encounter follows:  Patient has multiple medical problems which are significant and separately identifiable that require additional work above and beyond the Medicare Wellness Visit.      Chief Complaint  Medicare Wellness-subsequent (Labs printed)    Subjective                 Objective   Vital Signs:  /68 (BP Location: Right arm, Patient Position: Sitting, Cuff Size: Adult) Comment: Taken BP med  Pulse 85   Temp 97.1 °F (36.2 °C) (Temporal)   Ht 175.3 cm (69\")   Wt 108 kg (238 lb)   SpO2 99%   BMI 35.15 kg/m²     Physical Exam  Vitals and nursing note reviewed.   Constitutional:       General: She is not in acute distress.     Appearance: Normal appearance. She is obese. She is not ill-appearing, toxic-appearing or diaphoretic.   HENT:      Head: Normocephalic and atraumatic.      Right Ear: Tympanic membrane, ear canal and external ear normal. There is no impacted cerumen.      Left Ear: Tympanic membrane, ear canal and external ear normal. There is no impacted cerumen.      Nose: Nose normal.      Mouth/Throat:      Mouth: Mucous membranes are moist.      Pharynx: " Oropharynx is clear. No oropharyngeal exudate or posterior oropharyngeal erythema.   Eyes:      Extraocular Movements: Extraocular movements intact.      Conjunctiva/sclera: Conjunctivae normal.      Pupils: Pupils are equal, round, and reactive to light.   Neck:      Thyroid: No thyroid mass, thyromegaly or thyroid tenderness.      Vascular: No carotid bruit.   Cardiovascular:      Rate and Rhythm: Normal rate and regular rhythm.      Pulses: Normal pulses.      Heart sounds: Normal heart sounds.   Pulmonary:      Effort: Pulmonary effort is normal.      Breath sounds: Normal breath sounds.   Abdominal:      General: Bowel sounds are normal.      Palpations: Abdomen is soft.   Musculoskeletal:         General: Normal range of motion.      Cervical back: Normal range of motion and neck supple.      Right lower leg: No edema.      Left lower leg: No edema.      Comments: Compression stockings on     Skin:     General: Skin is warm and dry.      Capillary Refill: Capillary refill takes less than 2 seconds.   Neurological:      General: No focal deficit present.      Mental Status: She is alert and oriented to person, place, and time. Mental status is at baseline.   Psychiatric:         Mood and Affect: Mood normal.         Behavior: Behavior normal.         Thought Content: Thought content normal.         Judgment: Judgment normal.                     Assessment and Plan   Diagnoses and all orders for this visit:    1. Encounter for subsequent annual wellness visit (AWV) in Medicare patient (Primary)    2. Restless leg    3. Chronic insomnia    4. Stage 3b chronic kidney disease    5. Adult hypothyroidism    6. Primary hypertension    7. Chronic idiopathic thrombocytopenia    8. Pancreatic lesion    9. Malignant neoplasm of upper-outer quadrant of left female breast, unspecified estrogen receptor status    10. Class 2 severe obesity due to excess calories with serious comorbidity and body mass index (BMI) of 35.0 to  35.9 in adult    11. Need for COVID-19 vaccine  -     COVID-19 F23 (Pfizer) 12yrs+ (COMIRNATY)    12. Herpes dermatitis    13. Postmenopausal  -     DEXA Bone Density Axial; Future         Plan of care and recent lab results reviewed with Mrs. Joe  Overall her labs are stable, reminded her of the importance of adequate hydration concerning renal function, electrolytes were in normal range.  Some mild leukocytes in urine but no urinary symptoms.  Platelet count surprisingly was 315, 2 weeks ago 193, her anemia also seems to be improving and she has noted increased energy levels, she states that her chronic right knee pain is still a barrier for her to be more physically active.  We have discussed perhaps referring her to physical therapy for water exercise but at this time she declines that she has a fear of the water.  She did bring a blood pressure log with her here today which reveals blood pressures that are consistently less than 150/80, adequately controlled at this point in time.  She will continue with current antihypertensive management.   She does continue to follow with hematology/oncology in regards to ITP, history of left breast cancer, iron deficiency anemia related to CKD, and now also extensively following related to pancreatic lesion noted on MRI of abdomen on 6/26/2023 (further testing/evaluation has been on hold by Dr. Carvalho at [facility] related to her thrombocytopenia) she has continued to intermittently require iron infusions, Retacrit administrations, did not need one at this most recent visit.  She does not currently take levothyroxine, her TSH is mildly elevated, free T4 is in normal range, she has had improvement of her fatigue, no hair changes, issues with constipation, changes in nails, cold sensitivity etc. we will continue to monitor although I did advise that if she were to notice any changes with the symptoms that we covered to please let me know and would likely consider at that  time putting her on a low-dose of levothyroxine.  She explains that she already has mammography scheduled at [facility] on January 23, 2024, I do not believe she ever got her bone density scan done last year so we will reorder so she can get both done at the same time at the same facility.  She does take Valtrex chronically, reports anytime she stops taking the medication she has an outbreak on the lower back/buttocks region and sometimes also orally, she is currently tolerating the medication well without any adverse side effects, will continue with current treatment for now.  She also informs me that she has an appointment coming up with Dr. Carvalho at [facility] in regards to the pancreatic lesion on December 20.  Perhaps now that her platelet count is in adequate range today will be able to proceed with further evaluation/management.  She is currently up-to-date on colorectal cancer screening, on recall for 2026 at this time.  She reports RLS symptoms are currently well-managed with Requip, continue with current treatment  She reports staying up-to-date on ophthalmology and orthodontic exams, continue.  Number to utilize sunscreen when exposed to prolonged sunlight  Utilize seatbelt when in motorized vehicle  Remember to return to the office as needed prior to next scheduled appointment.    Follow Up   Return in about 6 months (around 6/12/2024) for Recheck.  Patient was given instructions and counseling regarding her condition or for health maintenance advice. Please see specific information pulled into the AVS if appropriate.       Please note that portions of this note were completed with a voice recognition program.     Electronically signed by MARY Lockwood, 12/12/23, 09:11 CST.

## 2023-12-15 ENCOUNTER — TELEPHONE (OUTPATIENT)
Dept: GASTROENTEROLOGY | Age: 78
End: 2023-12-15

## 2023-12-15 NOTE — TELEPHONE ENCOUNTER
Called patient to remind them of their procedure with Dr. Norma Ray  at [facility]  on 12/20/23 to arrive at 10:00AM    NO ANS / LM

## 2023-12-26 ENCOUNTER — INFUSION (OUTPATIENT)
Dept: ONCOLOGY | Facility: HOSPITAL | Age: 78
End: 2023-12-26
Payer: MEDICARE

## 2023-12-26 ENCOUNTER — LAB (OUTPATIENT)
Dept: LAB | Facility: HOSPITAL | Age: 78
End: 2023-12-26
Payer: MEDICARE

## 2023-12-26 VITALS
TEMPERATURE: 97.6 F | OXYGEN SATURATION: 96 % | DIASTOLIC BLOOD PRESSURE: 64 MMHG | SYSTOLIC BLOOD PRESSURE: 159 MMHG | RESPIRATION RATE: 18 BRPM | HEIGHT: 69 IN | WEIGHT: 240 LBS | BODY MASS INDEX: 35.55 KG/M2 | HEART RATE: 73 BPM

## 2023-12-26 DIAGNOSIS — N18.31 ANEMIA OF CHRONIC RENAL FAILURE, STAGE 3A: ICD-10-CM

## 2023-12-26 DIAGNOSIS — N18.31 ANEMIA OF CHRONIC RENAL FAILURE, STAGE 3A: Primary | ICD-10-CM

## 2023-12-26 DIAGNOSIS — D63.1 ANEMIA OF CHRONIC RENAL FAILURE, STAGE 3A: Primary | ICD-10-CM

## 2023-12-26 DIAGNOSIS — D63.1 ANEMIA OF CHRONIC RENAL FAILURE, STAGE 3A: ICD-10-CM

## 2023-12-26 DIAGNOSIS — N18.32 STAGE 3B CHRONIC KIDNEY DISEASE: ICD-10-CM

## 2023-12-26 LAB
ALBUMIN SERPL-MCNC: 4 G/DL (ref 3.5–5.2)
ALBUMIN/GLOB SERPL: 1.4 G/DL
ALP SERPL-CCNC: 90 U/L (ref 39–117)
ALT SERPL W P-5'-P-CCNC: 20 U/L (ref 1–33)
ANION GAP SERPL CALCULATED.3IONS-SCNC: 10 MMOL/L (ref 5–15)
AST SERPL-CCNC: 18 U/L (ref 1–32)
BASOPHILS # BLD AUTO: 0.04 10*3/MM3 (ref 0–0.2)
BASOPHILS NFR BLD AUTO: 0.7 % (ref 0–1.5)
BILIRUB SERPL-MCNC: 0.2 MG/DL (ref 0–1.2)
BUN SERPL-MCNC: 19 MG/DL (ref 8–23)
BUN/CREAT SERPL: 15.2 (ref 7–25)
CALCIUM SPEC-SCNC: 8.9 MG/DL (ref 8.6–10.5)
CHLORIDE SERPL-SCNC: 101 MMOL/L (ref 98–107)
CO2 SERPL-SCNC: 28 MMOL/L (ref 22–29)
CREAT SERPL-MCNC: 1.25 MG/DL (ref 0.57–1)
DEPRECATED RDW RBC AUTO: 56 FL (ref 37–54)
EGFRCR SERPLBLD CKD-EPI 2021: 44.5 ML/MIN/1.73
EOSINOPHIL # BLD AUTO: 0.4 10*3/MM3 (ref 0–0.4)
EOSINOPHIL NFR BLD AUTO: 6.7 % (ref 0.3–6.2)
ERYTHROCYTE [DISTWIDTH] IN BLOOD BY AUTOMATED COUNT: 17.2 % (ref 12.3–15.4)
GLOBULIN UR ELPH-MCNC: 2.9 GM/DL
GLUCOSE SERPL-MCNC: 95 MG/DL (ref 65–99)
HCT VFR BLD AUTO: 35.8 % (ref 34–46.6)
HGB BLD-MCNC: 10.8 G/DL (ref 12–15.9)
IMM GRANULOCYTES # BLD AUTO: 0.01 10*3/MM3 (ref 0–0.05)
IMM GRANULOCYTES NFR BLD AUTO: 0.2 % (ref 0–0.5)
LYMPHOCYTES # BLD AUTO: 1.52 10*3/MM3 (ref 0.7–3.1)
LYMPHOCYTES NFR BLD AUTO: 25.4 % (ref 19.6–45.3)
MCH RBC QN AUTO: 26.3 PG (ref 26.6–33)
MCHC RBC AUTO-ENTMCNC: 30.2 G/DL (ref 31.5–35.7)
MCV RBC AUTO: 87.1 FL (ref 79–97)
MONOCYTES # BLD AUTO: 0.5 10*3/MM3 (ref 0.1–0.9)
MONOCYTES NFR BLD AUTO: 8.4 % (ref 5–12)
NEUTROPHILS NFR BLD AUTO: 3.51 10*3/MM3 (ref 1.7–7)
NEUTROPHILS NFR BLD AUTO: 58.6 % (ref 42.7–76)
NRBC BLD AUTO-RTO: 0 /100 WBC (ref 0–0.2)
PLATELET # BLD AUTO: 208 10*3/MM3 (ref 140–450)
PMV BLD AUTO: 9.5 FL (ref 6–12)
POTASSIUM SERPL-SCNC: 4 MMOL/L (ref 3.5–5.2)
PROT SERPL-MCNC: 6.9 G/DL (ref 6–8.5)
RBC # BLD AUTO: 4.11 10*6/MM3 (ref 3.77–5.28)
SODIUM SERPL-SCNC: 139 MMOL/L (ref 136–145)
WBC NRBC COR # BLD AUTO: 5.98 10*3/MM3 (ref 3.4–10.8)

## 2023-12-26 PROCEDURE — 85025 COMPLETE CBC W/AUTO DIFF WBC: CPT

## 2023-12-26 PROCEDURE — 36415 COLL VENOUS BLD VENIPUNCTURE: CPT

## 2023-12-26 PROCEDURE — 80053 COMPREHEN METABOLIC PANEL: CPT

## 2023-12-26 PROCEDURE — 96372 THER/PROPH/DIAG INJ SC/IM: CPT

## 2023-12-26 PROCEDURE — 25010000002 EPOETIN ALFA PER 1000 UNITS: Performed by: NURSE PRACTITIONER

## 2023-12-26 RX ADMIN — ERYTHROPOIETIN 40000 UNITS: 40000 INJECTION, SOLUTION INTRAVENOUS; SUBCUTANEOUS at 10:32

## 2023-12-26 NOTE — PROGRESS NOTES
Patient complained of new abdominal pain to L side, 10/10 consant ache. Pt stated it may be arthritic, denies diarrhea. constipation or abdominal cramping. Pain started yesterday, relayed to Analilia HERNANDEZ. Per Analilia MAEDRA to proceed with injection, patient to follow up with PCP if pain persists.

## 2024-01-09 ENCOUNTER — LAB (OUTPATIENT)
Dept: LAB | Facility: HOSPITAL | Age: 79
End: 2024-01-09
Payer: MEDICARE

## 2024-01-09 ENCOUNTER — INFUSION (OUTPATIENT)
Dept: ONCOLOGY | Facility: HOSPITAL | Age: 79
End: 2024-01-09
Payer: MEDICARE

## 2024-01-09 VITALS
OXYGEN SATURATION: 99 % | TEMPERATURE: 96.5 F | HEIGHT: 69 IN | BODY MASS INDEX: 34.9 KG/M2 | RESPIRATION RATE: 16 BRPM | SYSTOLIC BLOOD PRESSURE: 144 MMHG | HEART RATE: 71 BPM | DIASTOLIC BLOOD PRESSURE: 66 MMHG | WEIGHT: 235.6 LBS

## 2024-01-09 DIAGNOSIS — N18.31 ANEMIA OF CHRONIC RENAL FAILURE, STAGE 3A: ICD-10-CM

## 2024-01-09 DIAGNOSIS — N18.32 STAGE 3B CHRONIC KIDNEY DISEASE: Primary | ICD-10-CM

## 2024-01-09 DIAGNOSIS — D63.1 ANEMIA OF CHRONIC RENAL FAILURE, STAGE 3 (MODERATE), UNSPECIFIED WHETHER STAGE 3A OR 3B CKD: Primary | ICD-10-CM

## 2024-01-09 DIAGNOSIS — D63.1 ANEMIA OF CHRONIC RENAL FAILURE, STAGE 3A: ICD-10-CM

## 2024-01-09 DIAGNOSIS — N18.30 ANEMIA OF CHRONIC RENAL FAILURE, STAGE 3 (MODERATE), UNSPECIFIED WHETHER STAGE 3A OR 3B CKD: Primary | ICD-10-CM

## 2024-01-09 LAB
ALBUMIN SERPL-MCNC: 4.1 G/DL (ref 3.5–5.2)
ALBUMIN/GLOB SERPL: 1.4 G/DL
ALP SERPL-CCNC: 87 U/L (ref 39–117)
ALT SERPL W P-5'-P-CCNC: 14 U/L (ref 1–33)
ANION GAP SERPL CALCULATED.3IONS-SCNC: 11 MMOL/L (ref 5–15)
AST SERPL-CCNC: 16 U/L (ref 1–32)
BASOPHILS # BLD AUTO: 0.03 10*3/MM3 (ref 0–0.2)
BASOPHILS NFR BLD AUTO: 0.5 % (ref 0–1.5)
BILIRUB SERPL-MCNC: 0.2 MG/DL (ref 0–1.2)
BUN SERPL-MCNC: 29 MG/DL (ref 8–23)
BUN/CREAT SERPL: 21 (ref 7–25)
CALCIUM SPEC-SCNC: 9.4 MG/DL (ref 8.6–10.5)
CHLORIDE SERPL-SCNC: 102 MMOL/L (ref 98–107)
CO2 SERPL-SCNC: 27 MMOL/L (ref 22–29)
CREAT SERPL-MCNC: 1.38 MG/DL (ref 0.57–1)
DEPRECATED RDW RBC AUTO: 57.2 FL (ref 37–54)
EGFRCR SERPLBLD CKD-EPI 2021: 39.5 ML/MIN/1.73
EOSINOPHIL # BLD AUTO: 0.3 10*3/MM3 (ref 0–0.4)
EOSINOPHIL NFR BLD AUTO: 5.3 % (ref 0.3–6.2)
ERYTHROCYTE [DISTWIDTH] IN BLOOD BY AUTOMATED COUNT: 17.9 % (ref 12.3–15.4)
GLOBULIN UR ELPH-MCNC: 3 GM/DL
GLUCOSE SERPL-MCNC: 132 MG/DL (ref 65–99)
HCT VFR BLD AUTO: 38 % (ref 34–46.6)
HGB BLD-MCNC: 11.5 G/DL (ref 12–15.9)
IMM GRANULOCYTES # BLD AUTO: 0.02 10*3/MM3 (ref 0–0.05)
IMM GRANULOCYTES NFR BLD AUTO: 0.4 % (ref 0–0.5)
LYMPHOCYTES # BLD AUTO: 1.39 10*3/MM3 (ref 0.7–3.1)
LYMPHOCYTES NFR BLD AUTO: 24.5 % (ref 19.6–45.3)
MCH RBC QN AUTO: 26.6 PG (ref 26.6–33)
MCHC RBC AUTO-ENTMCNC: 30.3 G/DL (ref 31.5–35.7)
MCV RBC AUTO: 87.8 FL (ref 79–97)
MONOCYTES # BLD AUTO: 0.4 10*3/MM3 (ref 0.1–0.9)
MONOCYTES NFR BLD AUTO: 7 % (ref 5–12)
NEUTROPHILS NFR BLD AUTO: 3.54 10*3/MM3 (ref 1.7–7)
NEUTROPHILS NFR BLD AUTO: 62.3 % (ref 42.7–76)
NRBC BLD AUTO-RTO: 0 /100 WBC (ref 0–0.2)
PLATELET # BLD AUTO: 254 10*3/MM3 (ref 140–450)
PMV BLD AUTO: 9.5 FL (ref 6–12)
POTASSIUM SERPL-SCNC: 3.7 MMOL/L (ref 3.5–5.2)
PROT SERPL-MCNC: 7.1 G/DL (ref 6–8.5)
RBC # BLD AUTO: 4.33 10*6/MM3 (ref 3.77–5.28)
SODIUM SERPL-SCNC: 140 MMOL/L (ref 136–145)
WBC NRBC COR # BLD AUTO: 5.68 10*3/MM3 (ref 3.4–10.8)

## 2024-01-09 PROCEDURE — 85025 COMPLETE CBC W/AUTO DIFF WBC: CPT

## 2024-01-09 PROCEDURE — 80053 COMPREHEN METABOLIC PANEL: CPT

## 2024-01-09 PROCEDURE — 36415 COLL VENOUS BLD VENIPUNCTURE: CPT

## 2024-01-09 PROCEDURE — G0463 HOSPITAL OUTPT CLINIC VISIT: HCPCS

## 2024-01-11 DIAGNOSIS — N18.31 ANEMIA OF CHRONIC RENAL FAILURE, STAGE 3A: ICD-10-CM

## 2024-01-11 DIAGNOSIS — N18.32 STAGE 3B CHRONIC KIDNEY DISEASE: Primary | ICD-10-CM

## 2024-01-11 DIAGNOSIS — D63.1 ANEMIA OF CHRONIC RENAL FAILURE, STAGE 3A: ICD-10-CM

## 2024-01-21 ENCOUNTER — HOSPITAL ENCOUNTER (EMERGENCY)
Facility: HOSPITAL | Age: 79
Discharge: HOME | End: 2024-01-21
Attending: EMERGENCY MEDICINE | Admitting: EMERGENCY MEDICINE
Payer: MEDICARE

## 2024-01-21 ENCOUNTER — APPOINTMENT (OUTPATIENT)
Dept: CT IMAGING | Facility: HOSPITAL | Age: 79
End: 2024-01-21
Payer: MEDICARE

## 2024-01-21 ENCOUNTER — APPOINTMENT (OUTPATIENT)
Dept: GENERAL RADIOLOGY | Facility: HOSPITAL | Age: 79
End: 2024-01-21
Payer: MEDICARE

## 2024-01-21 VITALS
RESPIRATION RATE: 18 BRPM | BODY MASS INDEX: 35.61 KG/M2 | DIASTOLIC BLOOD PRESSURE: 80 MMHG | WEIGHT: 235 LBS | HEIGHT: 68 IN | SYSTOLIC BLOOD PRESSURE: 150 MMHG | HEART RATE: 75 BPM | OXYGEN SATURATION: 100 % | TEMPERATURE: 98 F

## 2024-01-21 DIAGNOSIS — M54.32 SCIATICA OF LEFT SIDE: Primary | ICD-10-CM

## 2024-01-21 DIAGNOSIS — M51.36 DEGENERATIVE DISC DISEASE, LUMBAR: ICD-10-CM

## 2024-01-21 PROCEDURE — 96372 THER/PROPH/DIAG INJ SC/IM: CPT

## 2024-01-21 PROCEDURE — 73502 X-RAY EXAM HIP UNI 2-3 VIEWS: CPT

## 2024-01-21 PROCEDURE — 25010000002 DEXAMETHASONE PER 1 MG: Performed by: EMERGENCY MEDICINE

## 2024-01-21 PROCEDURE — 99284 EMERGENCY DEPT VISIT MOD MDM: CPT

## 2024-01-21 PROCEDURE — 72131 CT LUMBAR SPINE W/O DYE: CPT

## 2024-01-21 RX ORDER — METHYLPREDNISOLONE 4 MG/1
TABLET ORAL
Qty: 1 EACH | Refills: 0 | Status: SHIPPED | OUTPATIENT
Start: 2024-01-21

## 2024-01-21 RX ORDER — OXYCODONE HYDROCHLORIDE AND ACETAMINOPHEN 5; 325 MG/1; MG/1
1 TABLET ORAL EVERY 6 HOURS PRN
Qty: 6 TABLET | Refills: 0 | Status: SHIPPED | OUTPATIENT
Start: 2024-01-21 | End: 2024-01-25

## 2024-01-21 RX ORDER — DEXAMETHASONE SODIUM PHOSPHATE 10 MG/ML
10 INJECTION INTRAMUSCULAR; INTRAVENOUS ONCE
Status: COMPLETED | OUTPATIENT
Start: 2024-01-21 | End: 2024-01-21

## 2024-01-21 RX ORDER — LIDOCAINE 50 MG/G
1 PATCH TOPICAL EVERY 24 HOURS
Qty: 5 EACH | Refills: 0 | Status: SHIPPED | OUTPATIENT
Start: 2024-01-21

## 2024-01-21 RX ORDER — OXYCODONE HYDROCHLORIDE AND ACETAMINOPHEN 5; 325 MG/1; MG/1
1 TABLET ORAL ONCE
Status: COMPLETED | OUTPATIENT
Start: 2024-01-21 | End: 2024-01-21

## 2024-01-21 RX ORDER — FERROUS SULFATE 325(65) MG
325 TABLET ORAL
COMMUNITY

## 2024-01-21 RX ORDER — CYCLOBENZAPRINE HCL 10 MG
5 TABLET ORAL ONCE
Status: COMPLETED | OUTPATIENT
Start: 2024-01-21 | End: 2024-01-21

## 2024-01-21 RX ADMIN — OXYCODONE HYDROCHLORIDE AND ACETAMINOPHEN 1 TABLET: 5; 325 TABLET ORAL at 07:06

## 2024-01-21 RX ADMIN — CYCLOBENZAPRINE 5 MG: 10 TABLET, FILM COATED ORAL at 07:06

## 2024-01-21 RX ADMIN — DEXAMETHASONE SODIUM PHOSPHATE 10 MG: 10 INJECTION INTRAMUSCULAR; INTRAVENOUS at 07:57

## 2024-01-21 NOTE — ED PROVIDER NOTES
Subjective   History of Present Illness  78-year-old female presents to the emergency department with complaint of lumbar back pain, left hip pain.  She has a history of hypertension, hyperlipidemia, CKD, breast cancer.  She reports onset of pain just over 24 hours ago, approximately 4:30 in the morning upon waking yesterday.  She states she was gambling the previous day and sat in high stool from approximately 3 PM to 10 PM.  She said she started have a little bit of back discomfort and hip pain after getting up.  Patient fell asleep later that night and she woke up around fourth and more she had severe midline lumbar and left paraspinous lumbar back pain, left hip pain.  States the pain radiated down her left leg.  No associated numbness or weakness.  No bowel or bladder dysfunction.  No fever.  Pain worse with certain positions and certain ranges of motion, worse with lying flat for extended periods.    History provided by:  Patient      Review of Systems   All other systems reviewed and are negative.      Past Medical History:   Diagnosis Date    Breast cancer     CKD (chronic kidney disease)     Hypercholesteremia     Hypertension     Sinusitis     Stage 3a chronic kidney disease 10/20/2020       Allergies   Allergen Reactions    Aspirin GI Bleeding    Niacin Other (See Comments)       Past Surgical History:   Procedure Laterality Date    AVULSION TOENAIL PLATE      Sept 26,2018    BREAST BIOPSY Left 11/20/2012    BREAST BIOPSY      Left Breast, 1/2019 per Dr Barkley    BREAST LUMPECTOMY Left     with node bx     COLONOSCOPY  09/13/2013    small polyp at 30cm benign hyperplastic polyp, changes consistent with melanosis coli. Recall 5 years    COLONOSCOPY  11/19/2018    Tics otherwise normal exam repeat in 5 years    COLONOSCOPY  02/13/2023    Normal exam repeat in 3 years with a 2 day prep    ENDOSCOPY  02/13/2023    Gastritis, small HH    REPLACEMENT TOTAL KNEE Right     2016    TOTAL ABDOMINAL HYSTERECTOMY  WITH SALPINGO OOPHORECTOMY         Family History   Problem Relation Age of Onset    Heart attack Mother     Hodgkin's lymphoma Father     Other Father     Cancer Father         hodgkins    Heart attack Brother     Skin cancer Maternal Uncle     Pancreatic cancer Maternal Uncle     Cancer Maternal Uncle         eye    Colon cancer Neg Hx     Colon polyps Neg Hx        Social History     Socioeconomic History    Marital status:    Tobacco Use    Smoking status: Never    Smokeless tobacco: Never   Vaping Use    Vaping Use: Never used   Substance and Sexual Activity    Alcohol use: No    Drug use: Not Currently    Sexual activity: Not Currently     Birth control/protection: Surgical           Objective   Physical Exam  Vitals and nursing note reviewed.   Constitutional:       Appearance: Normal appearance.   HENT:      Head: Normocephalic and atraumatic.      Nose: Nose normal. No congestion or rhinorrhea.      Mouth/Throat:      Mouth: Mucous membranes are moist.      Pharynx: No oropharyngeal exudate or posterior oropharyngeal erythema.   Eyes:      Extraocular Movements: Extraocular movements intact.      Conjunctiva/sclera: Conjunctivae normal.      Pupils: Pupils are equal, round, and reactive to light.   Cardiovascular:      Rate and Rhythm: Normal rate and regular rhythm.      Heart sounds: Normal heart sounds. No murmur heard.  Pulmonary:      Effort: Pulmonary effort is normal.      Breath sounds: Normal breath sounds. No wheezing.   Abdominal:      General: Abdomen is flat. Bowel sounds are normal.      Palpations: Abdomen is soft.   Musculoskeletal:      Comments: Mild midline lumbar spine tenderness, mild left paraspinous lumbar tenderness  No left hip tenderness, full range of motion left hip   Skin:     General: Skin is warm and dry.      Capillary Refill: Capillary refill takes less than 2 seconds.   Neurological:      General: No focal deficit present.      Mental Status: She is alert and  oriented to person, place, and time. Mental status is at baseline.      Sensory: No sensory deficit.      Motor: No weakness.      Comments: No saddle anesthesia         Procedures         XR Hip With or Without Pelvis 2 - 3 View Left    Result Date: 1/21/2024  EXAMINATION: XR HIP W OR WO PELVIS 2-3 VIEW LEFT- 1/21/2024 7:12 AM  HISTORY: hip pain, lumbar back pain, hx of breast cancer.  REPORT: An AP view of the pelvis, 2 separate views of the left hip were obtained.  COMPARISON: There are no correlative imaging studies for comparison.  Osseous alignment of the hips is normal and the hip joint spaces are preserved. No fracture is identified. Dedicated views of the left hip are unremarkable. The sacrum, SI joints and other pelvic osseous structures appear unremarkable other than mild enthesopathy of the iliac wings.      No fracture, no acute osseous abnormality.  This report was signed and finalized on 1/21/2024 7:13 AM by Dr. Shane Mata MD.      CT Lumbar Spine Without Contrast    Result Date: 1/21/2024  EXAMINATION: CT LUMBAR SPINE WO CONTRAST- 1/21/2024 7:01 AM  HISTORY: lumbar back pain, left hip pain, hx of breast cancer  TOTAL DOSE: 843.29 mGy.cm (Automatic exposure control technique was implemented in an effort to keep the radiation dose as low as possible without compromising image quality)  REPORT: Spiral CT of the lumbar spine was performed without contrast, reconstructed coronal and sagittal images are also reviewed.  COMPARISON: CT abdomen and pelvis 5/23/2023.  Sagittal images demonstrate grade 1 spondylolisthesis L3-4, there is disc space collapse at T12-L1, L5-S1 with vacuum discs, mild disc space narrowing is also present L4-5 with a partial vacuum disc. Hypertrophic endplate spurring is seen throughout the lumbar spine. No fracture is identified. There is borderline canal stenosis at T12-L1 related to endplate spurs. Facet overgrowth seen at multiple levels, there is moderate spinal stenosis at  L3-4, also contributed to by broad based disc bulge. Soft tissue windows also demonstrate moderate spinal stenosis at L4-5 with bulging of the disc, facet overgrowth and ligamentum flavum hypertrophy. There is moderate bilateral neural foraminal narrowing L5-S1, greater on the left. There are no lytic or blastic lesions to indicate metastases.      No fracture, no acute osseous lumbar spine abnormality. Multilevel degenerative changes as detailed above.      This report was signed and finalized on 1/21/2024 7:06 AM by Dr. Shane Mata MD.      ED Course  ED Course as of 01/21/24 0752   Sun Jan 21, 2024   0749 Ct L-spine reveals multilevel degenerative changes, see report for full details.  No red flags or abnormal physical exam findings to suggest spinal cord compression/myelopathy.  Will give trial of pain meds, muscle relaxants.  She has CKD, will avoid NSAIDs.  She can follow-up with her primary doctor for outpatient physical therapy and referral to spine surgeon if no improvement. [AW]      ED Course User Index  [AW] Oren Yen MD                                             Medical Decision Making  Amount and/or Complexity of Data Reviewed  Radiology: ordered.    Risk  Prescription drug management.        Final diagnoses:   Sciatica of left side   Degenerative disc disease, lumbar       ED Disposition  ED Disposition       ED Disposition   Discharge    Condition   Stable    Comment   --               Hanh Og, APRN  [address] [place] [address]  [phone]    Schedule an appointment as soon as possible for a visit in 2 days           Medication List        New Prescriptions      lidocaine 5 %  Commonly known as: LIDODERM  Place 1 patch on the skin as directed by provider Daily. Remove & Discard patch within 12 hours or as directed by MD     methylPREDNISolone 4 MG dose pack  Commonly known as: MEDROL  Take as directed on package instructions.     oxyCODONE-acetaminophen  5-325 MG per tablet  Commonly known as: PERCOCET  Take 1 tablet by mouth Every 6 (Six) Hours As Needed for Severe Pain.               Where to Get Your Medications        These medications were sent to [id number] - [place], [place] - [address] - [phone]  - [phone] FX  [address] , [facility], [place] [address]      Phone: [phone]   lidocaine 5 %  methylPREDNISolone 4 MG dose pack  oxyCODONE-acetaminophen 5-325 MG per tablet            Oren Yen MD  01/21/24 4703

## 2024-01-22 ENCOUNTER — PATIENT OUTREACH (OUTPATIENT)
Dept: CASE MANAGEMENT | Facility: OTHER | Age: 79
End: 2024-01-22
Payer: MEDICARE

## 2024-01-22 NOTE — OUTREACH NOTE
AMBULATORY CASE MANAGEMENT NOTE    Name and Relationship of Patient/Support Person: Marina Joe F - Self    Patient Outreach    ACO patient seen at [facility] ED yesterday. Final diagnosis sciatica of left side and DDD. Patient is feeling some better today. She was able to  Lidocaine transdermal patches, methylprednisolone, and oxycodone. Discussed PCP follow up for possible outpatient PT referral. She is agreeable for ACM to schedule follow up appointment.    Care Coordination    Contacted PCP office to schedule ED follow up. Spoke with Deanna. Appointment scheduled for 1.25.24 @ 8:00 AM.    Patient Outreach    Spoke with patient to inform of the appointment.         Laura AMBRIZ  Ambulatory Case Management    1/22/2024, 14:12 CST

## 2024-01-23 ENCOUNTER — HOSPITAL ENCOUNTER (OUTPATIENT)
Dept: WOMENS IMAGING | Age: 79
Discharge: HOME | End: 2024-01-23
Payer: MEDICARE

## 2024-01-23 ENCOUNTER — INFUSION (OUTPATIENT)
Dept: ONCOLOGY | Facility: HOSPITAL | Age: 79
End: 2024-01-23
Payer: MEDICARE

## 2024-01-23 ENCOUNTER — LAB (OUTPATIENT)
Dept: LAB | Facility: HOSPITAL | Age: 79
End: 2024-01-23
Payer: MEDICARE

## 2024-01-23 VITALS
HEIGHT: 68 IN | RESPIRATION RATE: 18 BRPM | TEMPERATURE: 97.5 F | BODY MASS INDEX: 35.46 KG/M2 | DIASTOLIC BLOOD PRESSURE: 55 MMHG | HEART RATE: 70 BPM | OXYGEN SATURATION: 98 % | SYSTOLIC BLOOD PRESSURE: 156 MMHG | WEIGHT: 234 LBS

## 2024-01-23 DIAGNOSIS — D63.1 ANEMIA OF CHRONIC RENAL FAILURE, STAGE 3A: ICD-10-CM

## 2024-01-23 DIAGNOSIS — D63.1 ANEMIA OF CHRONIC RENAL FAILURE, STAGE 3 (MODERATE), UNSPECIFIED WHETHER STAGE 3A OR 3B CKD: Primary | ICD-10-CM

## 2024-01-23 DIAGNOSIS — Z12.31 ENCOUNTER FOR SCREENING MAMMOGRAM FOR MALIGNANT NEOPLASM OF BREAST: ICD-10-CM

## 2024-01-23 DIAGNOSIS — N18.31 ANEMIA OF CHRONIC RENAL FAILURE, STAGE 3A: ICD-10-CM

## 2024-01-23 DIAGNOSIS — N18.32 STAGE 3B CHRONIC KIDNEY DISEASE: ICD-10-CM

## 2024-01-23 DIAGNOSIS — N18.30 ANEMIA OF CHRONIC RENAL FAILURE, STAGE 3 (MODERATE), UNSPECIFIED WHETHER STAGE 3A OR 3B CKD: Primary | ICD-10-CM

## 2024-01-23 LAB
ALBUMIN SERPL-MCNC: 4.1 G/DL (ref 3.5–5.2)
ALBUMIN/GLOB SERPL: 1.2 G/DL
ALP SERPL-CCNC: 79 U/L (ref 39–117)
ALT SERPL W P-5'-P-CCNC: 19 U/L (ref 1–33)
ANION GAP SERPL CALCULATED.3IONS-SCNC: 11 MMOL/L (ref 5–15)
AST SERPL-CCNC: 18 U/L (ref 1–32)
BASOPHILS # BLD AUTO: 0.02 10*3/MM3 (ref 0–0.2)
BASOPHILS NFR BLD AUTO: 0.2 % (ref 0–1.5)
BILIRUB SERPL-MCNC: 0.2 MG/DL (ref 0–1.2)
BUN SERPL-MCNC: 30 MG/DL (ref 8–23)
BUN/CREAT SERPL: 22.9 (ref 7–25)
CALCIUM SPEC-SCNC: 9.1 MG/DL (ref 8.6–10.5)
CHLORIDE SERPL-SCNC: 101 MMOL/L (ref 98–107)
CO2 SERPL-SCNC: 28 MMOL/L (ref 22–29)
CREAT SERPL-MCNC: 1.31 MG/DL (ref 0.57–1)
DEPRECATED RDW RBC AUTO: 56.9 FL (ref 37–54)
EGFRCR SERPLBLD CKD-EPI 2021: 42.1 ML/MIN/1.73
EOSINOPHIL # BLD AUTO: 0.03 10*3/MM3 (ref 0–0.4)
EOSINOPHIL NFR BLD AUTO: 0.3 % (ref 0.3–6.2)
ERYTHROCYTE [DISTWIDTH] IN BLOOD BY AUTOMATED COUNT: 17.9 % (ref 12.3–15.4)
GLOBULIN UR ELPH-MCNC: 3.4 GM/DL
GLUCOSE SERPL-MCNC: 113 MG/DL (ref 65–99)
HCT VFR BLD AUTO: 38.8 % (ref 34–46.6)
HGB BLD-MCNC: 11.9 G/DL (ref 12–15.9)
IMM GRANULOCYTES # BLD AUTO: 0.03 10*3/MM3 (ref 0–0.05)
IMM GRANULOCYTES NFR BLD AUTO: 0.3 % (ref 0–0.5)
LYMPHOCYTES # BLD AUTO: 1.34 10*3/MM3 (ref 0.7–3.1)
LYMPHOCYTES NFR BLD AUTO: 14.3 % (ref 19.6–45.3)
MCH RBC QN AUTO: 26.6 PG (ref 26.6–33)
MCHC RBC AUTO-ENTMCNC: 30.7 G/DL (ref 31.5–35.7)
MCV RBC AUTO: 86.6 FL (ref 79–97)
MONOCYTES # BLD AUTO: 0.53 10*3/MM3 (ref 0.1–0.9)
MONOCYTES NFR BLD AUTO: 5.7 % (ref 5–12)
NEUTROPHILS NFR BLD AUTO: 7.41 10*3/MM3 (ref 1.7–7)
NEUTROPHILS NFR BLD AUTO: 79.2 % (ref 42.7–76)
NRBC BLD AUTO-RTO: 0 /100 WBC (ref 0–0.2)
PLATELET # BLD AUTO: 246 10*3/MM3 (ref 140–450)
PMV BLD AUTO: 9.2 FL (ref 6–12)
POTASSIUM SERPL-SCNC: 3.8 MMOL/L (ref 3.5–5.2)
PROT SERPL-MCNC: 7.5 G/DL (ref 6–8.5)
RBC # BLD AUTO: 4.48 10*6/MM3 (ref 3.77–5.28)
SODIUM SERPL-SCNC: 140 MMOL/L (ref 136–145)
WBC NRBC COR # BLD AUTO: 9.36 10*3/MM3 (ref 3.4–10.8)

## 2024-01-23 PROCEDURE — 77063 BREAST TOMOSYNTHESIS BI: CPT

## 2024-01-23 PROCEDURE — G0463 HOSPITAL OUTPT CLINIC VISIT: HCPCS

## 2024-01-23 PROCEDURE — 80053 COMPREHEN METABOLIC PANEL: CPT

## 2024-01-23 PROCEDURE — 36415 COLL VENOUS BLD VENIPUNCTURE: CPT

## 2024-01-23 PROCEDURE — 85025 COMPLETE CBC W/AUTO DIFF WBC: CPT

## 2024-01-25 ENCOUNTER — OFFICE VISIT (OUTPATIENT)
Dept: INTERNAL MEDICINE | Facility: CLINIC | Age: 79
End: 2024-01-25
Payer: MEDICARE

## 2024-01-25 VITALS
OXYGEN SATURATION: 98 % | DIASTOLIC BLOOD PRESSURE: 72 MMHG | WEIGHT: 233.6 LBS | HEART RATE: 73 BPM | SYSTOLIC BLOOD PRESSURE: 158 MMHG | HEIGHT: 68 IN | TEMPERATURE: 97.6 F | BODY MASS INDEX: 35.4 KG/M2

## 2024-01-25 DIAGNOSIS — M54.42 CHRONIC MIDLINE LOW BACK PAIN WITH LEFT-SIDED SCIATICA: ICD-10-CM

## 2024-01-25 DIAGNOSIS — G89.29 CHRONIC MIDLINE LOW BACK PAIN WITH LEFT-SIDED SCIATICA: ICD-10-CM

## 2024-01-25 DIAGNOSIS — N18.32 STAGE 3B CHRONIC KIDNEY DISEASE: ICD-10-CM

## 2024-01-25 DIAGNOSIS — M51.36 DEGENERATIVE DISC DISEASE, LUMBAR: Primary | ICD-10-CM

## 2024-01-25 DIAGNOSIS — Z85.3 PERSONAL HISTORY OF BREAST CANCER: Primary | ICD-10-CM

## 2024-01-25 DIAGNOSIS — R92.8 ABNORMAL MAMMOGRAM OF RIGHT BREAST: ICD-10-CM

## 2024-01-25 RX ORDER — TRIAMCINOLONE ACETONIDE 40 MG/ML
40 INJECTION, SUSPENSION INTRA-ARTICULAR; INTRAMUSCULAR ONCE
Status: COMPLETED | OUTPATIENT
Start: 2024-01-25 | End: 2024-01-25

## 2024-01-25 RX ORDER — TRAMADOL HYDROCHLORIDE 50 MG/1
50 TABLET ORAL EVERY 6 HOURS PRN
Qty: 60 TABLET | Refills: 0 | Status: SHIPPED | OUTPATIENT
Start: 2024-01-25

## 2024-01-25 RX ORDER — ACETAMINOPHEN 325 MG/1
650 TABLET ORAL EVERY 6 HOURS PRN
Qty: 60 TABLET | Refills: 0 | Status: SHIPPED | OUTPATIENT
Start: 2024-01-25

## 2024-01-25 RX ADMIN — TRIAMCINOLONE ACETONIDE 40 MG: 40 INJECTION, SUSPENSION INTRA-ARTICULAR; INTRAMUSCULAR at 08:08

## 2024-01-25 NOTE — PROGRESS NOTES
Subjective   Marina GINO Joe is a 78 y.o. female.   Chief Complaint   Patient presents with    Follow-up     Patient was in the ER 1/21/2024; sciatica of left side; prescribed medrol dose pack, and Oxycodone.   Patient states the pain will radiate down her L leg.     Med Refill     Patient needs a refill on Valtrex and Rosuvastatin.        History of Present Illness   Mrs. Joe presents to the office today for a follow-up after she presented to the emergency room on 1/21/2024 with left-sided sciatic nerve pain  Please see below for additional HPI, assessment, and plan.  The following portions of the patient's history were reviewed and updated as appropriate: allergies, current medications, past family history, past medical history, past social history, past surgical history and problem list.    Review of Systems    Objective   Past Medical History:   Diagnosis Date    Breast cancer     CKD (chronic kidney disease)     Hypercholesteremia     Hypertension     Sinusitis     Stage 3a chronic kidney disease 10/20/2020      Past Surgical History:   Procedure Laterality Date    AVULSION TOENAIL PLATE      Sept 26,2018    BREAST BIOPSY Left 11/20/2012    BREAST BIOPSY      Left Breast, 1/2019 per Dr Barkley    BREAST LUMPECTOMY Left     with node bx     COLONOSCOPY  09/13/2013    small polyp at 30cm benign hyperplastic polyp, changes consistent with melanosis coli. Recall 5 years    COLONOSCOPY  11/19/2018    Tics otherwise normal exam repeat in 5 years    COLONOSCOPY  02/13/2023    Normal exam repeat in 3 years with a 2 day prep    ENDOSCOPY  02/13/2023    Gastritis, small HH    REPLACEMENT TOTAL KNEE Right     2016    TOTAL ABDOMINAL HYSTERECTOMY WITH SALPINGO OOPHORECTOMY          Current Outpatient Medications:     albuterol sulfate  (90 Base) MCG/ACT inhaler, Inhale 2 puffs Every 4 (Four) Hours As Needed for Wheezing., Disp: 2 g, Rfl: 3    amLODIPine (NORVASC) 5 MG tablet, TAKE 1 TABLET BY MOUTH TWICE DAILY, Disp:  180 tablet, Rfl: 2    buPROPion XL (WELLBUTRIN XL) 150 MG 24 hr tablet, Take 1 tablet by mouth Daily., Disp: , Rfl:     Calcium Carb-Cholecalciferol (CALCIUM 600+D3 PO), Take 1 tablet by mouth Daily., Disp: , Rfl:     carvedilol (COREG) 6.25 MG tablet, Take 1 tablet by mouth 2 (Two) Times a Day With Meals., Disp: 180 tablet, Rfl: 3    cetirizine (zyrTEC) 10 MG tablet, Take 1 tablet by mouth Daily., Disp: , Rfl:     cloNIDine (CATAPRES) 0.1 MG tablet, Take 1 tablet by mouth 2 (Two) Times a Day., Disp: , Rfl:     cyclobenzaprine (FLEXERIL) 10 MG tablet, TAKE 1 TABLET BY MOUTH THREE TIMES DAILY AS NEEDED FOR MUSCLE SPASMS, Disp: 90 tablet, Rfl: 5    Diclofenac Sodium (VOLTAREN) 1 % gel gel, Apply 4 g topically to the appropriate area as directed 4 (Four) Times a Day As Needed (arthralgia)., Disp: 240 g, Rfl: 3    Ergocalciferol (VITAMIN D2 PO), Take 50,000 Units by mouth Every 30 (Thirty) Days., Disp: , Rfl:     famotidine (PEPCID) 20 MG tablet, Take 1 tablet by mouth 2 (Two) Times a Day Before Meals., Disp: 60 tablet, Rfl: 0    ferrous sulfate 325 (65 FE) MG tablet, Take 1 tablet by mouth Daily With Breakfast., Disp: , Rfl:     fluticasone (FLONASE) 50 MCG/ACT nasal spray, 2 sprays into the nostril(s) as directed by provider Daily., Disp: 1 g, Rfl: 3    irbesartan-hydrochlorothiazide (AVALIDE) 300-12.5 MG tablet, Take 1 tablet by mouth Daily., Disp: , Rfl:     lidocaine (LIDODERM) 5 %, Place 1 patch on the skin as directed by provider Daily. Remove & Discard patch within 12 hours or as directed by MD, Disp: 5 each, Rfl: 0    methylPREDNISolone (MEDROL) 4 MG dose pack, Take as directed on package instructions., Disp: 1 each, Rfl: 0    montelukast (SINGULAIR) 10 MG tablet, TAKE 1 TABLET BY MOUTH DAILY, Disp: 90 tablet, Rfl: 1    Multiple Vitamins-Minerals (MULTIVITAMIN ADULT) tablet, Take 1 tablet by mouth Daily., Disp: , Rfl:     pantoprazole (PROTONIX) 40 MG EC tablet, Take 1 tablet by mouth 2 (Two) Times a Day Before  "Meals., Disp: 60 tablet, Rfl: 0    rOPINIRole (REQUIP) 0.5 MG tablet, TAKE 1 TABLET BY MOUTH EVERY NIGHT, Disp: 90 tablet, Rfl: 1    rosuvastatin (CRESTOR) 20 MG tablet, Take 1 tablet by mouth Daily., Disp: 90 tablet, Rfl: 3    sertraline (ZOLOFT) 100 MG tablet, Take 1 tablet by mouth Daily., Disp: , Rfl:     valACYclovir (VALTREX) 500 MG tablet, TAKE 1 TABLET BY MOUTH TWICE DAILY, Disp: 180 tablet, Rfl: 3    acetaminophen (Tylenol) 325 MG tablet, Take 2 tablets by mouth Every 6 (Six) Hours As Needed for Mild Pain or Moderate Pain., Disp: 60 tablet, Rfl: 0    traMADol (ULTRAM) 50 MG tablet, Take 1 tablet by mouth Every 6 (Six) Hours As Needed for Moderate Pain or Severe Pain., Disp: 60 tablet, Rfl: 0  No current facility-administered medications for this visit.      /72 (BP Location: Right arm, Patient Position: Sitting, Cuff Size: Adult)   Pulse 73   Temp 97.6 °F (36.4 °C) (Temporal)   Ht 172.7 cm (68\")   Wt 106 kg (233 lb 9.6 oz)   LMP  (LMP Unknown)   SpO2 98%   BMI 35.52 kg/m²      Body mass index is 35.52 kg/m².          Physical Exam  Vitals and nursing note reviewed.   Constitutional:       General: She is not in acute distress.     Appearance: Normal appearance. She is obese. She is not ill-appearing, toxic-appearing or diaphoretic.   HENT:      Head: Normocephalic and atraumatic.   Eyes:      Extraocular Movements: Extraocular movements intact.      Conjunctiva/sclera: Conjunctivae normal.      Pupils: Pupils are equal, round, and reactive to light.   Cardiovascular:      Rate and Rhythm: Normal rate and regular rhythm.      Pulses: Normal pulses.      Heart sounds: Normal heart sounds.   Pulmonary:      Effort: Pulmonary effort is normal.      Breath sounds: Normal breath sounds.   Musculoskeletal:         General: Tenderness present.      Cervical back: Normal range of motion and neck supple.      Lumbar back: Tenderness present. No swelling, edema, deformity, signs of trauma, lacerations, " spasms or bony tenderness. Decreased range of motion. Positive left straight leg raise test. Negative right straight leg raise test. No scoliosis.      Right lower leg: No edema.      Left lower leg: No edema.   Skin:     General: Skin is warm and dry.   Neurological:      General: No focal deficit present.      Mental Status: She is alert and oriented to person, place, and time. Mental status is at baseline.      Sensory: No sensory deficit.   Psychiatric:         Mood and Affect: Mood normal.         Behavior: Behavior normal.         Thought Content: Thought content normal.         Judgment: Judgment normal.               Assessment & Plan   Diagnoses and all orders for this visit:    1. Degenerative disc disease, lumbar (Primary)  -     Ambulatory Referral to Physical Therapy Evaluate and treat  -     triamcinolone acetonide (KENALOG-40) injection 40 mg  -     traMADol (ULTRAM) 50 MG tablet; Take 1 tablet by mouth Every 6 (Six) Hours As Needed for Moderate Pain or Severe Pain.  Dispense: 60 tablet; Refill: 0  -     acetaminophen (Tylenol) 325 MG tablet; Take 2 tablets by mouth Every 6 (Six) Hours As Needed for Mild Pain or Moderate Pain.  Dispense: 60 tablet; Refill: 0    2. Chronic midline low back pain with left-sided sciatica  -     Ambulatory Referral to Physical Therapy Evaluate and treat  -     triamcinolone acetonide (KENALOG-40) injection 40 mg  -     traMADol (ULTRAM) 50 MG tablet; Take 1 tablet by mouth Every 6 (Six) Hours As Needed for Moderate Pain or Severe Pain.  Dispense: 60 tablet; Refill: 0  -     acetaminophen (Tylenol) 325 MG tablet; Take 2 tablets by mouth Every 6 (Six) Hours As Needed for Mild Pain or Moderate Pain.  Dispense: 60 tablet; Refill: 0    3. Stage 3b chronic kidney disease               Plan of care reviewed with Mrs. Joe  She presented to the ER on 1/21/2024 with complaints of low back pain that radiated to the left hip.  Pain has been ongoing for the past 24 hours, preceding  "activities included gambling at the high school from 3 in the afternoon to 10:00 at night.  At that point in time she noticed slight discomfort with the pain radiation to the left hip, she went to bed per usual and woke up around 4:00 with \"severe midline lumbar and left paraspinous lumbar back pain and left hip pain\" it was documented that there was no associated numbness, weakness, bowel or bladder dysfunction, no fever.  Pain is noted to be worse when laying flat for extended periods of time.  X-ray imaging of the hip left was negative for fracture or any acute bony abnormality.  CT of the lumbar spine performed without contrast was negative for fracture, no acute bony abnormality, noted multilevel degenerative disc changes which include, \"... mild disc space narrowing at L4-5, with partial vacuum disc.  Hypertrophic endplate spurring is seen throughout the lumbar spine.  No fracture is identified.  There is borderline canal stenosis at T12-L1 related to endplate spurs.  Facet overgrowth seen at multiple levels, there is moderate spinal stenosis at L3-4, also contributed to by broad based disc bulge.  Soft tissue windows also demonstrate moderate spinal stenosis at L4-5 with bulging at the disc, facet overgrowth, and ligamentum flavum hypertrophy.  There is moderate bilateral neural foraminal narrowing at L5-S1, greater on the left.  There are no lytic or blastic lesions to indicate metastasis.\"  Because of her known CKD NSAIDs were avoided, she was prescribed a Medrol Dosepak, oxycodone-acetaminophen 5-325 mg every 6 hours as needed in addition to lidocaine patches.  She did have recent labs obtained on 1/23/2024 which reveal baseline renal function with GFR 42.1, normal electrolytes, CBC shows continued improvement in thrombocytopenia with a normal platelet count of 246  She reports today that she only got 6 tablets of the Percocet and she is not currently out of them, it was prescribed every 6 hours as needed " which is how she took it, she states it did relieve her pain slightly.  She has continued with other conservative measures such as ice, heat, massage, topical agents.  She continues to report significant pain, she explains that it really did begin the night she was at the [employer], she was extremely stiff and then the pain became severe the next morning.  She denies any recent trauma such as falls, or any new significant activities.  No fevers, chills, loss of bowel or bladder function, complete numbness of the left lower extremity but she does report that the pain does at times wrap into the left thigh region.  She confirms that laying on her back for any extended period of time is more painful for her.  We discussed next steps, advised I could not prescribe her any additional Percocet but we can try tramadol, I encouraged her to utilize this in addition to acetaminophen, to only take for moderate to severe pain.  To continue with other measures such as the cyclobenzaprine, ice, heat, massage, also stressed the importance of her being ambulatory as much as possible but avoiding obviously strenuous activity such as lifting heavy weight. . We discussed the importance of her increasing her water intake as she is incorporating additional medications above her baseline, recent BUN was slightly more elevated. We discussed what symptoms would necessitate immediate return to the emergency room such as complete decreased sensation in the left lower extremity, loss of bowel or bladder function, significant increase in pain despite current measures.  We also discussed the risks of using opiates for management of pain, she acknowledges these risks, she states that she will report any intolerance or issues with the medication immediately.  Will proceed with referring her to physical therapy of choice, the prescription for this was given to her in addition to the phone number for the facility.  I have advised that we try  this initially, if pain worsens or does not improve with current plan we will consider referral to neurosurgery for additional evaluation and management.  Unless otherwise indicated she will keep her next scheduled appointment.  Please note that portions of this note were completed with a voice recognition program.     Electronically signed by MARY Lockwood, 01/25/24, 08:12 CST.

## 2024-02-13 ENCOUNTER — OFFICE VISIT (OUTPATIENT)
Dept: GASTROENTEROLOGY | Facility: CLINIC | Age: 79
End: 2024-02-13
Payer: MEDICARE

## 2024-02-13 VITALS
DIASTOLIC BLOOD PRESSURE: 80 MMHG | OXYGEN SATURATION: 98 % | BODY MASS INDEX: 35.46 KG/M2 | HEART RATE: 66 BPM | SYSTOLIC BLOOD PRESSURE: 140 MMHG | HEIGHT: 68 IN | WEIGHT: 234 LBS | TEMPERATURE: 96.9 F

## 2024-02-13 DIAGNOSIS — Z78.9 NONSMOKER: ICD-10-CM

## 2024-02-13 DIAGNOSIS — N18.31 STAGE 3A CHRONIC KIDNEY DISEASE: ICD-10-CM

## 2024-02-13 DIAGNOSIS — K86.9 PANCREATIC LESION: Primary | ICD-10-CM

## 2024-02-13 DIAGNOSIS — D50.9 IRON DEFICIENCY ANEMIA, UNSPECIFIED IRON DEFICIENCY ANEMIA TYPE: ICD-10-CM

## 2024-02-13 PROCEDURE — 1159F MED LIST DOCD IN RCRD: CPT | Performed by: CLINICAL NURSE SPECIALIST

## 2024-02-13 PROCEDURE — 3079F DIAST BP 80-89 MM HG: CPT | Performed by: CLINICAL NURSE SPECIALIST

## 2024-02-13 PROCEDURE — 3077F SYST BP >= 140 MM HG: CPT | Performed by: CLINICAL NURSE SPECIALIST

## 2024-02-13 PROCEDURE — 1160F RVW MEDS BY RX/DR IN RCRD: CPT | Performed by: CLINICAL NURSE SPECIALIST

## 2024-02-13 PROCEDURE — 99214 OFFICE O/P EST MOD 30 MIN: CPT | Performed by: CLINICAL NURSE SPECIALIST

## 2024-02-14 ENCOUNTER — HOSPITAL ENCOUNTER (OUTPATIENT)
Dept: ULTRASOUND IMAGING | Age: 79
Discharge: HOME | End: 2024-02-14
Payer: MEDICARE

## 2024-02-14 ENCOUNTER — HOSPITAL ENCOUNTER (OUTPATIENT)
Dept: WOMENS IMAGING | Age: 79
Discharge: HOME | End: 2024-02-14
Payer: MEDICARE

## 2024-02-14 DIAGNOSIS — R92.8 ABNORMAL MAMMOGRAPHY: ICD-10-CM

## 2024-02-14 DIAGNOSIS — Z85.3 PERSONAL HISTORY OF BREAST CANCER: ICD-10-CM

## 2024-02-14 PROCEDURE — 77065 DX MAMMO INCL CAD UNI: CPT

## 2024-02-14 PROCEDURE — 76642 ULTRASOUND BREAST LIMITED: CPT

## 2024-02-15 DIAGNOSIS — R92.8 ABNORMAL MAMMOGRAM OF RIGHT BREAST: ICD-10-CM

## 2024-02-15 DIAGNOSIS — Z85.3 PERSONAL HISTORY OF BREAST CANCER: ICD-10-CM

## 2024-02-20 ENCOUNTER — INFUSION (OUTPATIENT)
Dept: ONCOLOGY | Facility: HOSPITAL | Age: 79
End: 2024-02-20
Payer: MEDICARE

## 2024-02-20 ENCOUNTER — OFFICE VISIT (OUTPATIENT)
Dept: ONCOLOGY | Facility: CLINIC | Age: 79
End: 2024-02-20
Payer: MEDICARE

## 2024-02-20 ENCOUNTER — LAB (OUTPATIENT)
Dept: LAB | Facility: HOSPITAL | Age: 79
End: 2024-02-20
Payer: MEDICARE

## 2024-02-20 VITALS
WEIGHT: 239 LBS | HEIGHT: 68 IN | SYSTOLIC BLOOD PRESSURE: 140 MMHG | HEART RATE: 61 BPM | BODY MASS INDEX: 36.22 KG/M2 | RESPIRATION RATE: 18 BRPM | DIASTOLIC BLOOD PRESSURE: 71 MMHG

## 2024-02-20 VITALS
HEART RATE: 78 BPM | OXYGEN SATURATION: 98 % | BODY MASS INDEX: 36.1 KG/M2 | TEMPERATURE: 97.7 F | RESPIRATION RATE: 16 BRPM | DIASTOLIC BLOOD PRESSURE: 78 MMHG | WEIGHT: 238.2 LBS | SYSTOLIC BLOOD PRESSURE: 142 MMHG | HEIGHT: 68 IN

## 2024-02-20 DIAGNOSIS — D63.1 ANEMIA OF CHRONIC RENAL FAILURE, STAGE 3B: ICD-10-CM

## 2024-02-20 DIAGNOSIS — D69.3 CHRONIC ITP (IDIOPATHIC THROMBOCYTOPENIA): Primary | ICD-10-CM

## 2024-02-20 DIAGNOSIS — Z85.3 HISTORY OF BREAST CANCER: ICD-10-CM

## 2024-02-20 DIAGNOSIS — D50.8 IRON DEFICIENCY ANEMIA SECONDARY TO INADEQUATE DIETARY IRON INTAKE: ICD-10-CM

## 2024-02-20 DIAGNOSIS — D63.1 ANEMIA OF CHRONIC RENAL FAILURE, STAGE 3A: ICD-10-CM

## 2024-02-20 DIAGNOSIS — D63.1 ANEMIA OF CHRONIC RENAL FAILURE, STAGE 3A: Primary | ICD-10-CM

## 2024-02-20 DIAGNOSIS — N18.32 STAGE 3B CHRONIC KIDNEY DISEASE: ICD-10-CM

## 2024-02-20 DIAGNOSIS — K86.9 PANCREATIC LESION: ICD-10-CM

## 2024-02-20 DIAGNOSIS — N18.31 ANEMIA OF CHRONIC RENAL FAILURE, STAGE 3A: Primary | ICD-10-CM

## 2024-02-20 DIAGNOSIS — N18.32 STAGE 3B CHRONIC KIDNEY DISEASE: Primary | ICD-10-CM

## 2024-02-20 DIAGNOSIS — N18.31 ANEMIA OF CHRONIC RENAL FAILURE, STAGE 3A: ICD-10-CM

## 2024-02-20 DIAGNOSIS — N18.31 STAGE 3A CHRONIC KIDNEY DISEASE: ICD-10-CM

## 2024-02-20 DIAGNOSIS — N18.32 ANEMIA OF CHRONIC RENAL FAILURE, STAGE 3B: ICD-10-CM

## 2024-02-20 LAB
ALBUMIN SERPL-MCNC: 3.7 G/DL (ref 3.5–5.2)
ALBUMIN/GLOB SERPL: 1.1 G/DL
ALP SERPL-CCNC: 88 U/L (ref 39–117)
ALT SERPL W P-5'-P-CCNC: 36 U/L (ref 1–33)
ANION GAP SERPL CALCULATED.3IONS-SCNC: 10 MMOL/L (ref 5–15)
AST SERPL-CCNC: 42 U/L (ref 1–32)
BASOPHILS # BLD AUTO: 0.03 10*3/MM3 (ref 0–0.2)
BASOPHILS NFR BLD AUTO: 0.5 % (ref 0–1.5)
BILIRUB SERPL-MCNC: 0.2 MG/DL (ref 0–1.2)
BUN SERPL-MCNC: 25 MG/DL (ref 8–23)
BUN/CREAT SERPL: 17.4 (ref 7–25)
CALCIUM SPEC-SCNC: 9.1 MG/DL (ref 8.6–10.5)
CHLORIDE SERPL-SCNC: 101 MMOL/L (ref 98–107)
CO2 SERPL-SCNC: 28 MMOL/L (ref 22–29)
CREAT SERPL-MCNC: 1.44 MG/DL (ref 0.57–1)
DEPRECATED RDW RBC AUTO: 62.7 FL (ref 37–54)
EGFRCR SERPLBLD CKD-EPI 2021: 37.3 ML/MIN/1.73
EOSINOPHIL # BLD AUTO: 0.4 10*3/MM3 (ref 0–0.4)
EOSINOPHIL NFR BLD AUTO: 6.5 % (ref 0.3–6.2)
ERYTHROCYTE [DISTWIDTH] IN BLOOD BY AUTOMATED COUNT: 19 % (ref 12.3–15.4)
FERRITIN SERPL-MCNC: 539 NG/ML (ref 13–150)
GLOBULIN UR ELPH-MCNC: 3.4 GM/DL
GLUCOSE SERPL-MCNC: 103 MG/DL (ref 65–99)
HCT VFR BLD AUTO: 34.1 % (ref 34–46.6)
HGB BLD-MCNC: 10.3 G/DL (ref 12–15.9)
HOLD SPECIMEN: NORMAL
IMM GRANULOCYTES # BLD AUTO: 0.02 10*3/MM3 (ref 0–0.05)
IMM GRANULOCYTES NFR BLD AUTO: 0.3 % (ref 0–0.5)
IRON 24H UR-MRATE: 51 MCG/DL (ref 37–145)
IRON SATN MFR SERPL: 18 % (ref 20–50)
LYMPHOCYTES # BLD AUTO: 1.46 10*3/MM3 (ref 0.7–3.1)
LYMPHOCYTES NFR BLD AUTO: 23.5 % (ref 19.6–45.3)
MCH RBC QN AUTO: 26.9 PG (ref 26.6–33)
MCHC RBC AUTO-ENTMCNC: 30.2 G/DL (ref 31.5–35.7)
MCV RBC AUTO: 89 FL (ref 79–97)
MONOCYTES # BLD AUTO: 0.52 10*3/MM3 (ref 0.1–0.9)
MONOCYTES NFR BLD AUTO: 8.4 % (ref 5–12)
NEUTROPHILS NFR BLD AUTO: 3.77 10*3/MM3 (ref 1.7–7)
NEUTROPHILS NFR BLD AUTO: 60.8 % (ref 42.7–76)
NRBC BLD AUTO-RTO: 0 /100 WBC (ref 0–0.2)
PLATELET # BLD AUTO: 250 10*3/MM3 (ref 140–450)
PMV BLD AUTO: 8.9 FL (ref 6–12)
POTASSIUM SERPL-SCNC: 4 MMOL/L (ref 3.5–5.2)
PROT SERPL-MCNC: 7.1 G/DL (ref 6–8.5)
RBC # BLD AUTO: 3.83 10*6/MM3 (ref 3.77–5.28)
SODIUM SERPL-SCNC: 139 MMOL/L (ref 136–145)
TIBC SERPL-MCNC: 291 MCG/DL (ref 298–536)
TRANSFERRIN SERPL-MCNC: 195 MG/DL (ref 200–360)
WBC NRBC COR # BLD AUTO: 6.2 10*3/MM3 (ref 3.4–10.8)

## 2024-02-20 PROCEDURE — 80053 COMPREHEN METABOLIC PANEL: CPT

## 2024-02-20 PROCEDURE — 84466 ASSAY OF TRANSFERRIN: CPT

## 2024-02-20 PROCEDURE — 3077F SYST BP >= 140 MM HG: CPT | Performed by: NURSE PRACTITIONER

## 2024-02-20 PROCEDURE — 3078F DIAST BP <80 MM HG: CPT | Performed by: NURSE PRACTITIONER

## 2024-02-20 PROCEDURE — 85025 COMPLETE CBC W/AUTO DIFF WBC: CPT

## 2024-02-20 PROCEDURE — 99214 OFFICE O/P EST MOD 30 MIN: CPT | Performed by: NURSE PRACTITIONER

## 2024-02-20 PROCEDURE — 96372 THER/PROPH/DIAG INJ SC/IM: CPT

## 2024-02-20 PROCEDURE — 82728 ASSAY OF FERRITIN: CPT

## 2024-02-20 PROCEDURE — 25010000002 EPOETIN ALFA PER 1000 UNITS: Performed by: NURSE PRACTITIONER

## 2024-02-20 PROCEDURE — 1126F AMNT PAIN NOTED NONE PRSNT: CPT | Performed by: NURSE PRACTITIONER

## 2024-02-20 PROCEDURE — 83540 ASSAY OF IRON: CPT

## 2024-02-20 PROCEDURE — 36415 COLL VENOUS BLD VENIPUNCTURE: CPT

## 2024-02-20 RX ADMIN — ERYTHROPOIETIN 40000 UNITS: 40000 INJECTION, SOLUTION INTRAVENOUS; SUBCUTANEOUS at 09:41

## 2024-02-20 NOTE — PROGRESS NOTES
[facility] GROUP HEMATOLOGY & ONCOLOGY  [address]  [facility] [address] [phone]    Patient Name: Marina Joe  Encounter Date: 02/20/2024  YOB: 1945  Patient Number: [id number]      HPI: Marina Joe is a  79 y.o.  female who is seen on follow-up for stage Ia left breast cancer, upper outer quadrant, receptor positive and HER-2/alix negative.  She is on adjuvant anastrozole since 05/2013. Plan for 10 years.  She is also seen for anemia from chronic kidney disease.   She was given injectafer on 09/27/22.   She was started on SREE with Retacrit on November 29, 2022.    She began to experience thrombocytopenia   Bone Marrow Biopsy 11/10/22.    Bone marrow, left iliac crest, core biopsy, aspirate smears, and clot section:  Slightly hypercellular marrow (40%) with maturing trilineage hematopoiesis.  Aspiculate smears, nondiagnostic.  No overt features of myelodysplasia and no excess blast population.  Moderately increased megakaryocytes.  2+ stainable iron.  Peripheral blood smear:  Severe thrombocytopenia.  Mild normocytic normochromic anemia.  Rare circulating schistocytes.  Normal white blood cell count with normal differential and morphology.  No circulating blasts.  Flow cytometry: No immunophenotypic evidence of abnormal myeloid maturation, increased blast population or a lymphoproliferative disorder.  Chromosome analysis: Normal female karyotype.    She received blood transfusion August 10, 11 and 18th.  She received Inejctafer 08/04,11, 2023.  She received Retacrit August 25 for Hgb 8.1.       INTERVAL HISTORY     Ms. Joe presents to clinic today for continued follow up.   Her last Retacrit was December 26, 2023.    She states on 2/28/24 she will be seeing chiropractor.     She had labs drawn and results were reviewed with her in office.      Oncology/Hematology History Overview Note   Oncologic history  In  February 2012, she had a routine mammogram that found a nodular density in the upper outer quadrant of the left breast.  An ultrasound revealed no nodularity at that time.  Repeat ultrasound 3 months later on 5/3/2012 revealed a small cyst in the left breast but felt to be benign.  Repeat mammogram on October 31, 2012 found a lobulated lesion in the left breast at the 2 o'clock position and a stable cyst at the 12 o'clock position.  She was referred to Dr. Barkley on 11/30/2012 and biopsy was performed.  The 12:00 cyst was a fibrocystic lesion while the 2:00 lesion was an invasive mammary carcinoma, low-grade, ER positive CT positive HER-2 nu 2+, FISH unamplified.    On January 8, 2013 she underwent a left partial mastectomy with sentinel node biopsy finding invasive ductal carcinoma, 3.1 mm in greatest dimension, grade 1.  2 sentinel nodes were negative.  AJCC TNM stage was pT1aN0 M0, Stage IA.  Following surgery she underwent adjuvant radiation therapy and was then started on Arimidex for hormonal manipulation.  She was started on the Arimidex in approximately April or May 2013.  He has been recommended to continue this for 10 years.    Hematologic history  Patient with a long history of anemia secondary to iron deficiency as well as chronic kidney disease stage III.Patient has a GFR that ranges from 45-61ML/MIN.  He has been undergoing Procrit when meets guidelines for several years now.  She is also required iron replacement.  Folate > 20, B12 at 1503 and negative blood for flow cytometry on 01/07/2022.      Previous interventions  Procrit 40,000 units subcu initiated approximately February 2017  Injectafer given 9/23/2019, 9/30/2019     Malignant neoplasm of upper-outer quadrant of female breast   10/31/2012 Initial Diagnosis    Malignant neoplasm of central portion of left female breast (CMS/HCC)     10/31/2012 Imaging    Mammogram on October 31, 2012 found a lobulated lesion in the left breast at the 2 o'clock  position and a stable cyst at the 12 o'clock position.      11/30/2012 Biopsy    Dr. Barkley on 11/30/2012 and biopsy was performed.  The 12:00 cyst was a fibrocystic lesion while the 2:00 lesion was an invasive mammary carcinoma, low-grade, ER positive MS positive HER-2 nu 2+, FISH unamplified.         1/8/2013 Surgery    On January 8, 2013 she underwent a left partial mastectomy with sentinel node biopsy finding invasive ductal carcinoma, 3.1 mm in greatest dimension, grade 1.  2 sentinel nodes were negative.  AJCC TNM stage was pT1aN0 M0, Stage IA.  Hormone receptor positive HER-2 negative      Radiation    Radiation OncologyTreatment Course:  Marina Joe receivedin 33 fractions to left breast via External Beam Radiation - EBRT.     5/2013 -  Hormonal Therapy    Arimidex 1 mg daily     8/22/2022 Cancer Staged    Staging form: Breast, AJCC V7  - Pathologic stage from 8/22/2022: Stage IA (T1a, N0, cM0) - Signed by Benjamin Shah MD on 8/22/2022         PAST MEDICAL HISTORY:  ALLERGIES:  Allergies   Allergen Reactions    Aspirin GI Bleeding    Niacin Other (See Comments)     CURRENT MEDICATIONS:  Outpatient Encounter Medications as of 2/20/2024   Medication Sig Dispense Refill    acetaminophen (Tylenol) 325 MG tablet Take 2 tablets by mouth Every 6 (Six) Hours As Needed for Mild Pain or Moderate Pain. 60 tablet 0    albuterol sulfate  (90 Base) MCG/ACT inhaler Inhale 2 puffs Every 4 (Four) Hours As Needed for Wheezing. 2 g 3    amLODIPine (NORVASC) 5 MG tablet TAKE 1 TABLET BY MOUTH TWICE DAILY 180 tablet 2    buPROPion XL (WELLBUTRIN XL) 150 MG 24 hr tablet Take 1 tablet by mouth Daily.      Calcium Carb-Cholecalciferol (CALCIUM 600+D3 PO) Take 1 tablet by mouth Daily.      carvedilol (COREG) 6.25 MG tablet Take 1 tablet by mouth 2 (Two) Times a Day With Meals. 180 tablet 3    cetirizine (zyrTEC) 10 MG tablet Take 1 tablet by mouth Daily.      cloNIDine (CATAPRES) 0.1 MG tablet Take 1 tablet by mouth 2  (Two) Times a Day.      cyclobenzaprine (FLEXERIL) 10 MG tablet TAKE 1 TABLET BY MOUTH THREE TIMES DAILY AS NEEDED FOR MUSCLE SPASMS 90 tablet 5    Diclofenac Sodium (VOLTAREN) 1 % gel gel Apply 4 g topically to the appropriate area as directed 4 (Four) Times a Day As Needed (arthralgia). 240 g 3    Ergocalciferol (VITAMIN D2 PO) Take 50,000 Units by mouth Every 30 (Thirty) Days.      famotidine (PEPCID) 20 MG tablet Take 1 tablet by mouth 2 (Two) Times a Day Before Meals. 60 tablet 0    fluticasone (FLONASE) 50 MCG/ACT nasal spray 2 sprays into the nostril(s) as directed by provider Daily. 1 g 3    irbesartan-hydrochlorothiazide (AVALIDE) 300-12.5 MG tablet Take 1 tablet by mouth Daily.      montelukast (SINGULAIR) 10 MG tablet TAKE 1 TABLET BY MOUTH DAILY 90 tablet 1    Multiple Vitamins-Minerals (MULTIVITAMIN ADULT) tablet Take 1 tablet by mouth Daily.      pantoprazole (PROTONIX) 40 MG EC tablet Take 1 tablet by mouth 2 (Two) Times a Day Before Meals. 60 tablet 0    rOPINIRole (REQUIP) 0.5 MG tablet TAKE 1 TABLET BY MOUTH EVERY NIGHT 90 tablet 1    rosuvastatin (CRESTOR) 20 MG tablet Take 1 tablet by mouth Daily. 90 tablet 3    sertraline (ZOLOFT) 100 MG tablet Take 1 tablet by mouth Daily.      valACYclovir (VALTREX) 500 MG tablet TAKE 1 TABLET BY MOUTH TWICE DAILY 180 tablet 3    [DISCONTINUED] ferrous sulfate 325 (65 FE) MG tablet Take 1 tablet by mouth Daily With Breakfast. (Patient not taking: Reported on 2/20/2024)       No facility-administered encounter medications on file as of 2/20/2024.     ADULT ILLNESSES:  Patient Active Problem List   Diagnosis Code    Malignant neoplasm of upper-outer quadrant of female breast C50.419    Anemia of chronic renal failure, stage 3 (moderate) N18.30, D63.1    Hypertension, benign I10    Hx of colonic polyps Z86.010    HX: breast cancer Z85.3    Morbidly obese E66.01    Right knee DJD M17.11    S/P lumpectomy, left breast Z98.890    Adult hypothyroidism E03.9    Anemia  D64.9    Anxiety F41.9    Breast cancer, left C50.912    Cervical pain M54.2    Chronic insomnia F51.04    Elevated lipids E78.5    Herpes zoster without complication B02.9    Left ear pain H92.02    Left otitis externa H60.92    Myalgia M79.10    Negative depression screening Z13.31    Obesity (BMI 30-39.9) E66.9    Postmenopausal status Z78.0    Recurrent acute serous otitis media of left ear H65.05    Restless leg G25.81    Sinusitis, bacterial J32.9, B96.89    Skin lesion L98.9    Vitamin D deficiency E55.9    Stage 3b chronic kidney disease N18.32    GIB (gastrointestinal bleeding) K92.2    Acute on chronic blood loss anemia D62    Chronic idiopathic thrombocytopenia D69.6    Hypotension due to hypovolemia I95.89, E86.1    Primary hypertension I10    Pancreatic lesion K86.9     SURGERIES:  Past Surgical History:   Procedure Laterality Date    AVULSION TOENAIL PLATE      Sept 26,2018    BREAST BIOPSY Left 11/20/2012    BREAST BIOPSY      Left Breast, 1/2019 per Dr Barkley    BREAST LUMPECTOMY Left     with node bx     COLONOSCOPY  09/13/2013    small polyp at 30cm benign hyperplastic polyp, changes consistent with melanosis coli. Recall 5 years    COLONOSCOPY  11/19/2018    Tics otherwise normal exam repeat in 5 years    COLONOSCOPY  02/13/2023    Normal exam repeat in 3 years with a 2 day prep    ENDOSCOPY  02/13/2023    Gastritis, small HH    REPLACEMENT TOTAL KNEE Right     2016    TOTAL ABDOMINAL HYSTERECTOMY WITH SALPINGO OOPHORECTOMY      UPPER ENDOSCOPIC ULTRASOUND W/ FNA  12/20/2023    Pancreatic cyst s/p FNA     HEALTH MAINTENANCE ITEMS:  Health Maintenance Due   Topic Date Due    RSV Vaccine - Adults (1 - 1-dose 60+ series) Never done    DXA SCAN  06/17/2023       <no information>  Last Completed Colonoscopy            COLORECTAL CANCER SCREENING (COLONOSCOPY - Every 3 Years) Next due on 2/13/2026 09/07/2023  Fecal Occult Blood component of Occult Blood X 1, Stool - Stool, Per Rectum    09/03/2023   Fecal Occult Blood component of POC Occult Blood Stool    08/10/2023  Fecal Occult Blood component of POC Occult Blood Stool    02/13/2023  SCANNED - COLONOSCOPY    11/16/2022  Occult Blood X 3, Stool - Stool, Per Rectum    Only the first 5 history entries have been loaded, but more history exists.                  Immunization History   Administered Date(s) Administered    COVID-19 (MODERNA) 1st,2nd,3rd Dose Monovalent 03/12/2021, 03/28/2021    COVID-19 (MODERNA) Monovalent Original Booster 08/31/2022    COVID-19 (PFIZER) BIVALENT 12+YRS 12/08/2022    COVID-19 F23 (PFIZER) 12YRS+ (COMIRNATY) 12/12/2023    Flu Vaccine Quad PF >36MO 11/05/2019    Fluad Quad 65+ 11/05/2020    Fluzone High Dose =>65 Years ([employer] ONLY) 11/11/2015, 10/14/2016, 11/20/2017    Fluzone High-Dose 65+yrs 11/29/2021, 10/04/2022, 10/17/2023    Pneumococcal Conjugate 13-Valent (PCV13) 10/14/2016    Pneumococcal Polysaccharide (PPSV23) 11/05/2020    Shingrix 01/01/2021, 06/08/2021    Zoster, Unspecified 01/01/2021     Last Completed Mammogram            MAMMOGRAM (Yearly) Next due on 2/14/2025 02/14/2024  Mammo Diagnostic Bilateral With CAD    01/23/2024  SCANNED - MAMMO    01/23/2024  Mammo Screening Bilateral With CAD    01/23/2023  SCANNED - MAMMO    01/23/2023  MAMMO SCREENING BILATERAL W CAD    Only the first 5 history entries have been loaded, but more history exists.                      FAMILY HISTORY:  Family History   Problem Relation Age of Onset    Heart attack Mother     Hodgkin's lymphoma Father     Other Father     Cancer Father         hodgkins    Heart attack Brother     Skin cancer Maternal Uncle     Pancreatic cancer Maternal Uncle     Cancer Maternal Uncle         eye    Colon cancer Neg Hx     Colon polyps Neg Hx      SOCIAL HISTORY:  Social History     Socioeconomic History    Marital status:    Tobacco Use    Smoking status: Never    Smokeless tobacco: Never   Vaping Use    Vaping Use: Never used   Substance  "and Sexual Activity    Alcohol use: No    Drug use: Not Currently    Sexual activity: Not Currently     Birth control/protection: Surgical       REVIEW OF SYSTEMS:    Review of Systems   Constitutional:  Negative for chills and fever.   HENT:  Negative for congestion and trouble swallowing.    Respiratory:  Negative for wheezing.    Cardiovascular:  Negative for chest pain and palpitations.   Gastrointestinal:  Negative for abdominal pain, nausea and vomiting.   Endocrine: Negative for polydipsia and polyphagia.   Genitourinary:  Negative for difficulty urinating, dysuria and flank pain.   Musculoskeletal:  Negative for gait problem and joint swelling.   Allergic/Immunologic: Negative for food allergies.   Neurological:  Negative for speech difficulty and weakness.   Hematological:  Negative for adenopathy. Does not bruise/bleed easily.   Psychiatric/Behavioral:  Negative for agitation, confusion and hallucinations.        VITAL SIGNS: /78   Pulse 78   Temp 97.7 °F (36.5 °C)   Resp 16   Ht 172.7 cm (68\")   Wt 108 kg (238 lb 3.2 oz)   LMP  (LMP Unknown)   SpO2 98%   BMI 36.22 kg/m²   Pain Score    02/20/24 0809   PainSc: 0-No pain         PHYSICAL EXAMINATION:     Physical Exam  Vitals reviewed.   Constitutional:       General: She is not in acute distress.  HENT:      Head: Normocephalic and atraumatic.   Eyes:      General: No scleral icterus.  Cardiovascular:      Rate and Rhythm: Normal rate.   Pulmonary:      Effort: No respiratory distress.      Breath sounds: No wheezing or rales.   Abdominal:      General: Bowel sounds are normal.      Palpations: Abdomen is soft.      Tenderness: There is no abdominal tenderness.   Musculoskeletal:         General: No swelling.      Cervical back: Neck supple.      Right lower leg: Edema present.      Left lower leg: Edema present.   Skin:     General: Skin is warm and dry.   Neurological:      Mental Status: She is alert and oriented to person, place, and " time.   Psychiatric:         Mood and Affect: Mood normal.         Behavior: Behavior normal.         Thought Content: Thought content normal.         Judgment: Judgment normal.         LABS    Lab Results - Last 18 Months   Lab Units 02/20/24  0750 01/23/24  0939 01/09/24  0950 12/26/23  0942 12/08/23  0837 11/28/23  0827 09/22/23  0859 09/15/23  0904 09/09/23  1623 09/09/23  0752 09/07/23  0141 09/06/23  1608 09/03/23  1734 09/03/23  0535 08/25/23  0853 08/18/23  0844 08/11/23  0030 08/10/23  1754 08/04/23  0843 07/28/23  0932 06/30/23  0918 06/20/23  0733   HEMOGLOBIN g/dL 10.3* 11.9* 11.5* 10.8* 11.5* 11.3*   < > 9.2*   < > 7.9*   < > 4.9*   < > 5.2*   < > 6.6*   < > 5.3*   < > 7.0*   < > 7.7*   HEMATOCRIT % 34.1 38.8 38.0 35.8 37.2 38.5   < > 30.2*   < > 25.4*   < > 16.6*   < > 17.7*   < > 22.1*   < > 18.4*   < > 24.8*   < > 24.0*   MCV fL 89.0 86.6 87.8 87.1 87.9 91.2   < > 94.1   < > 93.4   < > 97.6*  --  97.3*   < > 100.5*  --  98.4*   < > 98.0*   < > 91.3   WBC 10*3/mm3 6.20 9.36 5.68 5.98 5.14 5.00   < > 8.92   < > 10.69   < > 8.67  --  9.54   < > 6.97  --  7.61   < > 5.68   < > 6.91   RDW % 19.0* 17.9* 17.9* 17.2* 16.4* 17.2*   < > 17.1*   < > 19.7*   < > 21.5*  --  19.6*   < > 21.2*  --  19.5*   < > 18.3*   < > 16.4*   MPV fL 8.9 9.2 9.5 9.5  --  12.4*  --   --   --  11.7  --   --   --   --   --  13.3*  --   --   --   --   --   --    PLATELETS 10*3/mm3 250 246 254 208 315 193   < > 21*   < > 20*   < > 24*  --  33*   < > 35*  --  46*   < > 46*   < > 56*   IMM GRAN % % 0.3 0.3 0.4 0.2  --  0.2  --   --   --   --   --   --   --  0.8*  --   --   --   --   --   --   --   --    NEUTROS ABS 10*3/mm3 3.77 7.41* 3.54 3.51 2.97 3.22   < > 7.12*   < > 9.13*   < > 6.61  --  7.34*   < > 4.86  --  5.30   < > 3.73   < > 4.91   LYMPHS ABS 10*3/mm3 1.46 1.34 1.39 1.52 1.35 1.06   < >  --    < > 0.77   < >  --   --  1.21   < >  --   --   --    < >  --    < > CANCELED  0.90   MONOS ABS 10*3/mm3 0.52 0.53 0.40 0.50 0.48  0.40   < >  --    < > 0.65   < >  --   --  0.77   < >  --   --   --    < >  --    < > 0.48   EOS ABS 10*3/mm3 0.40 0.03 0.30 0.40 0.30 0.27   < > 0.36   < > 0.00   < > 0.26  --  0.13   < > 0.36  --  0.15   < > 0.57*   < > CANCELED   EOSINOPHIL ABS 10*3/mm3  --   --   --   --   --   --   --   --   --   --   --   --   --   --   --   --   --   --   --   --   --  0.62*   EOSINOPHIL % %  --   --   --   --   --   --   --   --   --   --   --   --   --   --   --   --   --   --   --   --   --  9.0*   BASOS ABS 10*3/mm3 0.03 0.02 0.03 0.04 0.03 0.04   < >  --    < > 0.01   < >  --   --  0.01   < >  --   --  0.08   < >  --    < > CANCELED   IMMATURE GRANS (ABS) 10*3/mm3 0.02 0.03 0.02 0.01  --  0.01  --   --   --   --   --   --   --  0.08*  --   --   --   --   --   --   --   --    NRBC /100 WBC 0.0 0.0 0.0 0.0 0.0 0.0  --   --   --   --   --  2.0*  --  0.6*  --   --   --   --   --   --   --   --    NEUTROPHIL % %  --   --   --   --   --   --   --  79.8*  --   --   --  75.2  --   --   --  68.7  --  69.7  --  64.6  --  71.0   MONOCYTES % %  --   --   --   --   --   --   --  3.0*  --   --   --  6.9  --   --   --  5.1  --  1.0*  --  9.1  --  7.0   BASOPHIL % %  --   --   --   --   --   --   --   --   --   --   --   --   --   --   --   --   --  1.0  --   --   --   --    ANISOCYTOSIS   --   --   --   --   --   --   --  Slight/1+  --   --   --  Mod/2+  --   --   --  Slight/1+  --  Slight/1+  --  Mod/2+  --   --    GIANT PLT   --   --   --   --   --   --   --  Slight/1+  --   --   --  Slight/1+  --   --   --  Large/3+  --  Large/3+  --  Large/3+  --   --     < > = values in this interval not displayed.       Lab Results - Last 18 Months   Lab Units 02/20/24  0750 01/23/24  0939 01/09/24  0950 12/26/23  0942 12/08/23  0837 11/28/23  0827 11/17/23  0848   GLUCOSE mg/dL 103* 113* 132* 95 98 104* 107*   SODIUM mmol/L 139 140 140 139 140 141 140   POTASSIUM mmol/L 4.0 3.8 3.7 4.0 4.0 3.5 3.8   CO2 mmol/L 28.0 28.0 27.0 28.0 27.1 28.0 27.0  "  CHLORIDE mmol/L 101 101 102 101 104 103 104   ANION GAP mmol/L 10.0 11.0 11.0 10.0  --  10.0 9.0   CREATININE mg/dL 1.44* 1.31* 1.38* 1.25* 1.29* 1.12* 1.32*   BUN mg/dL 25* 30* 29* 19 26* 14 21   BUN / CREAT RATIO  17.4 22.9 21.0 15.2 20.2 12.5 15.9   CALCIUM mg/dL 9.1 9.1 9.4 8.9 10.2 8.6 8.6   ALK PHOS U/L 88 79 87 90 88 97 87   TOTAL PROTEIN g/dL 7.1 7.5 7.1 6.9  --  6.5 6.5   ALT (SGPT) U/L 36* 19 14 20 22 14 13   AST (SGOT) U/L 42* 18 16 18 25 19 15   BILIRUBIN mg/dL 0.2 0.2 0.2 0.2 0.2 0.2 0.3   ALBUMIN g/dL 3.7 4.1 4.1 4.0 4.4 3.8 4.0   GLOBULIN gm/dL 3.4 3.4 3.0 2.9  --  2.7 2.5       No results for input(s): \"MSPIKE\", \"KAPPALAMB\", \"IGLFLC\", \"URICACID\", \"FREEKAPPAL\", \"CEA\", \"LDH\", \"REFLABREPO\" in the last [id number] hours.      Lab Results - Last 18 Months   Lab Units 02/20/24  0750 12/08/23  0837 11/28/23  0827 10/13/23  0812 10/06/23  0849 09/06/23  1532 07/28/23  0932 06/30/23  0918 06/20/23  0733 01/10/23  0905 12/01/22  0838   IRON mcg/dL 51  --  38 48 49 60 43   < >  --    < >  --    TIBC mcg/dL 291*  --  294* 329 329 292* 350   < > 334   < >  --    IRON SATURATION %  --   --   --   --   --   --   --   --  17*  --   --    IRON SATURATION (TSAT) % 18*  --  13* 15* 15* 21 12*   < >  --    < >  --    FERRITIN ng/mL 539.00*  --  205.70* 165.30* 194.80* 260.60* 146.10   < > 455.00*   < >  --    TSH uIU/mL  --  5.540*  --   --   --   --   --   --  5.880*  --  4.640*    < > = values in this interval not displayed.       Marina Joe reports a pain score of 0.      ASSESSMENT:  1. Chronic ITP (idiopathic thrombocytopenia)    2. Anemia of chronic renal failure, stage 3b    3. Iron deficiency anemia secondary to inadequate dietary iron intake    4. History of breast cancer    5. Pancreatic lesion            1. Chronic ITP (idiopathic thrombocytopenia) (HCC)   Bone marrow, left iliac crest, core biopsy, aspirate smears, and clot section: 9/11/23  Slightly hypercellular marrow (40%) with maturing trilineage " hematopoiesis.  Aspiculate smears, nondiagnostic.  No overt features of myelodysplasia and no excess blast population.  Moderately increased megakaryocytes.  2+ stainable iron.  Peripheral blood smear:  Severe thrombocytopenia.  Mild normocytic normochromic anemia.  Rare circulating schistocytes.  Normal white blood cell count with normal differential and morphology.  No circulating blasts.  Flow cytometry: No immunophenotypic evidence of abnormal myeloid maturation, increased blast population or a lymphoproliferative disorder.  Chromosome analysis: Normal female karyotype.  -Plt today stable at 250  -Stable for observation   PLAN  -Will transfuse for platelets < 10 regardless of bleeding.   -Transfuse platelets < 20 If bleeding   -Can give burst dose of steroids if platelets < 30.    2. Iron deficiency anemia   3.  Chronic Kidney Disease Stage IIIB   -Labs today: Hgb 10.3, Hct 34.1, Iron 51, Ferritin 539, Sat 18%, TIBC 291   -BUN 25, Creatinine 1.44, GFR 37.3  - She received Inejctafer October 20, 2023.    -Her last Retacrit 12/26/23.  PLAN   She will continue Retacrit today and biweekly Hgb< 11 or Hct < 33  -Iron Deficiency is stable        4. History of breast cancer  - Pathologic stage: Stage IA (T1a, N0, cM0)   -Invasive mammary carcinoma, low-grade, ER positive AL positive HER-2 nu 2+, FISH unamplified.  -January 8, 2013 she underwent a left partial mastectomy with sentinel node biopsy  -Following surgery she underwent adjuvant radiation therapy and was then started on Arimidex  for hormonal manipulation.  She completed it in Jan 2023.  -Mammogram 2/14/24  Benign,   -Continue Annual Screening   -Has been ordered bone density but hasn't had it done. Order was sent to [facility].  Advised pt to contact their office to schedule      5.  Pancreatic lesion   -MRI Abdomen 6/26/23 15 x 14 mm cystic lesion in the pancreatic tail without septations, mural nodules or enhancement.   -Per note, Dr. Carvalho at [facility]  holding r/t thrombocytopenia.      PLAN:     CBC and Retacrit q 2 weeks as indicated  RTC in 3 months with pre office labs for CBC, CMP, Iron Profile, Ferritin   Continue current medications, treatment plans and follow up with PCP and any other providers  -Care discussed with patient.  Understanding expressed.  Patient agreeable with plan.            Analilia Madera, APRN  02/20/2024

## 2024-03-05 ENCOUNTER — INFUSION (OUTPATIENT)
Dept: ONCOLOGY | Facility: HOSPITAL | Age: 79
End: 2024-03-05
Payer: MEDICARE

## 2024-03-05 ENCOUNTER — LAB (OUTPATIENT)
Dept: LAB | Facility: HOSPITAL | Age: 79
End: 2024-03-05
Payer: MEDICARE

## 2024-03-05 VITALS
WEIGHT: 240.6 LBS | HEART RATE: 71 BPM | SYSTOLIC BLOOD PRESSURE: 124 MMHG | OXYGEN SATURATION: 100 % | HEIGHT: 68 IN | DIASTOLIC BLOOD PRESSURE: 55 MMHG | TEMPERATURE: 97.1 F | BODY MASS INDEX: 36.46 KG/M2 | RESPIRATION RATE: 20 BRPM

## 2024-03-05 DIAGNOSIS — D63.1 ANEMIA OF CHRONIC RENAL FAILURE, STAGE 3A: ICD-10-CM

## 2024-03-05 DIAGNOSIS — D63.1 ANEMIA OF CHRONIC RENAL FAILURE, STAGE 3B: ICD-10-CM

## 2024-03-05 DIAGNOSIS — N18.32 STAGE 3B CHRONIC KIDNEY DISEASE: ICD-10-CM

## 2024-03-05 DIAGNOSIS — N18.32 ANEMIA OF CHRONIC RENAL FAILURE, STAGE 3B: ICD-10-CM

## 2024-03-05 DIAGNOSIS — N18.30 ANEMIA OF CHRONIC RENAL FAILURE, STAGE 3 (MODERATE), UNSPECIFIED WHETHER STAGE 3A OR 3B CKD: Primary | ICD-10-CM

## 2024-03-05 DIAGNOSIS — N18.31 ANEMIA OF CHRONIC RENAL FAILURE, STAGE 3A: Primary | ICD-10-CM

## 2024-03-05 DIAGNOSIS — D63.1 ANEMIA OF CHRONIC RENAL FAILURE, STAGE 3 (MODERATE), UNSPECIFIED WHETHER STAGE 3A OR 3B CKD: Primary | ICD-10-CM

## 2024-03-05 DIAGNOSIS — D63.1 ANEMIA OF CHRONIC RENAL FAILURE, STAGE 3A: Primary | ICD-10-CM

## 2024-03-05 DIAGNOSIS — N18.31 ANEMIA OF CHRONIC RENAL FAILURE, STAGE 3A: ICD-10-CM

## 2024-03-05 DIAGNOSIS — D50.8 IRON DEFICIENCY ANEMIA SECONDARY TO INADEQUATE DIETARY IRON INTAKE: ICD-10-CM

## 2024-03-05 LAB
ALBUMIN SERPL-MCNC: 3.9 G/DL (ref 3.5–5.2)
ALBUMIN/GLOB SERPL: 1.4 G/DL
ALP SERPL-CCNC: 91 U/L (ref 39–117)
ALT SERPL W P-5'-P-CCNC: 20 U/L (ref 1–33)
ANION GAP SERPL CALCULATED.3IONS-SCNC: 11 MMOL/L (ref 5–15)
AST SERPL-CCNC: 18 U/L (ref 1–32)
BASOPHILS # BLD AUTO: 0.05 10*3/MM3 (ref 0–0.2)
BASOPHILS NFR BLD AUTO: 0.8 % (ref 0–1.5)
BILIRUB SERPL-MCNC: 0.3 MG/DL (ref 0–1.2)
BUN SERPL-MCNC: 23 MG/DL (ref 8–23)
BUN/CREAT SERPL: 20.9 (ref 7–25)
CALCIUM SPEC-SCNC: 9 MG/DL (ref 8.6–10.5)
CHLORIDE SERPL-SCNC: 102 MMOL/L (ref 98–107)
CO2 SERPL-SCNC: 26 MMOL/L (ref 22–29)
CREAT SERPL-MCNC: 1.1 MG/DL (ref 0.57–1)
DEPRECATED RDW RBC AUTO: 64.2 FL (ref 37–54)
EGFRCR SERPLBLD CKD-EPI 2021: 51.5 ML/MIN/1.73
EOSINOPHIL # BLD AUTO: 0.4 10*3/MM3 (ref 0–0.4)
EOSINOPHIL NFR BLD AUTO: 6.2 % (ref 0.3–6.2)
ERYTHROCYTE [DISTWIDTH] IN BLOOD BY AUTOMATED COUNT: 20 % (ref 12.3–15.4)
GLOBULIN UR ELPH-MCNC: 2.7 GM/DL
GLUCOSE SERPL-MCNC: 112 MG/DL (ref 65–99)
HCT VFR BLD AUTO: 34.7 % (ref 34–46.6)
HGB BLD-MCNC: 10.8 G/DL (ref 12–15.9)
HOLD SPECIMEN: NORMAL
IMM GRANULOCYTES # BLD AUTO: 0.01 10*3/MM3 (ref 0–0.05)
IMM GRANULOCYTES NFR BLD AUTO: 0.2 % (ref 0–0.5)
LYMPHOCYTES # BLD AUTO: 1.19 10*3/MM3 (ref 0.7–3.1)
LYMPHOCYTES NFR BLD AUTO: 18.3 % (ref 19.6–45.3)
MCH RBC QN AUTO: 27.7 PG (ref 26.6–33)
MCHC RBC AUTO-ENTMCNC: 31.1 G/DL (ref 31.5–35.7)
MCV RBC AUTO: 89 FL (ref 79–97)
MONOCYTES # BLD AUTO: 0.54 10*3/MM3 (ref 0.1–0.9)
MONOCYTES NFR BLD AUTO: 8.3 % (ref 5–12)
NEUTROPHILS NFR BLD AUTO: 4.31 10*3/MM3 (ref 1.7–7)
NEUTROPHILS NFR BLD AUTO: 66.2 % (ref 42.7–76)
NRBC BLD AUTO-RTO: 0 /100 WBC (ref 0–0.2)
PLATELET # BLD AUTO: 238 10*3/MM3 (ref 140–450)
PMV BLD AUTO: 9.4 FL (ref 6–12)
POTASSIUM SERPL-SCNC: 4 MMOL/L (ref 3.5–5.2)
PROT SERPL-MCNC: 6.6 G/DL (ref 6–8.5)
RBC # BLD AUTO: 3.9 10*6/MM3 (ref 3.77–5.28)
SODIUM SERPL-SCNC: 139 MMOL/L (ref 136–145)
WBC NRBC COR # BLD AUTO: 6.5 10*3/MM3 (ref 3.4–10.8)

## 2024-03-05 PROCEDURE — 96372 THER/PROPH/DIAG INJ SC/IM: CPT

## 2024-03-05 PROCEDURE — 85025 COMPLETE CBC W/AUTO DIFF WBC: CPT

## 2024-03-05 PROCEDURE — 80053 COMPREHEN METABOLIC PANEL: CPT

## 2024-03-05 PROCEDURE — 36415 COLL VENOUS BLD VENIPUNCTURE: CPT

## 2024-03-05 PROCEDURE — 25010000002 EPOETIN ALFA PER 1000 UNITS: Performed by: NURSE PRACTITIONER

## 2024-03-05 RX ADMIN — ERYTHROPOIETIN 40000 UNITS: 40000 INJECTION, SOLUTION INTRAVENOUS; SUBCUTANEOUS at 10:16

## 2024-03-14 ENCOUNTER — TELEPHONE (OUTPATIENT)
Dept: ONCOLOGY | Facility: CLINIC | Age: 79
End: 2024-03-14
Payer: MEDICARE

## 2024-03-14 NOTE — TELEPHONE ENCOUNTER
Caller: Marina Joe    Relationship to patient: Self    Best call back number: [phone]    Type of visit: LAB AND INJECTION    Requested date: PREFERS FRIDAY    If rescheduling, when is the original appointment: 3/19/24

## 2024-03-22 ENCOUNTER — LAB (OUTPATIENT)
Dept: LAB | Facility: HOSPITAL | Age: 79
End: 2024-03-22
Payer: MEDICARE

## 2024-03-22 ENCOUNTER — INFUSION (OUTPATIENT)
Dept: ONCOLOGY | Facility: HOSPITAL | Age: 79
End: 2024-03-22
Payer: MEDICARE

## 2024-03-22 VITALS
HEIGHT: 68 IN | SYSTOLIC BLOOD PRESSURE: 155 MMHG | OXYGEN SATURATION: 99 % | WEIGHT: 245 LBS | BODY MASS INDEX: 37.13 KG/M2 | HEART RATE: 77 BPM | RESPIRATION RATE: 18 BRPM | TEMPERATURE: 97.5 F | DIASTOLIC BLOOD PRESSURE: 61 MMHG

## 2024-03-22 DIAGNOSIS — N18.30 ANEMIA OF CHRONIC RENAL FAILURE, STAGE 3 (MODERATE), UNSPECIFIED WHETHER STAGE 3A OR 3B CKD: Primary | ICD-10-CM

## 2024-03-22 DIAGNOSIS — N18.32 STAGE 3B CHRONIC KIDNEY DISEASE: ICD-10-CM

## 2024-03-22 DIAGNOSIS — D50.8 IRON DEFICIENCY ANEMIA SECONDARY TO INADEQUATE DIETARY IRON INTAKE: ICD-10-CM

## 2024-03-22 DIAGNOSIS — N18.32 ANEMIA OF CHRONIC RENAL FAILURE, STAGE 3B: Primary | ICD-10-CM

## 2024-03-22 DIAGNOSIS — D63.1 ANEMIA OF CHRONIC RENAL FAILURE, STAGE 3A: ICD-10-CM

## 2024-03-22 DIAGNOSIS — D63.1 ANEMIA OF CHRONIC RENAL FAILURE, STAGE 3 (MODERATE), UNSPECIFIED WHETHER STAGE 3A OR 3B CKD: Primary | ICD-10-CM

## 2024-03-22 DIAGNOSIS — N18.31 ANEMIA OF CHRONIC RENAL FAILURE, STAGE 3A: ICD-10-CM

## 2024-03-22 DIAGNOSIS — D63.1 ANEMIA OF CHRONIC RENAL FAILURE, STAGE 3B: Primary | ICD-10-CM

## 2024-03-22 LAB
ALBUMIN SERPL-MCNC: 4.1 G/DL (ref 3.5–5.2)
ALBUMIN/GLOB SERPL: 1.5 G/DL
ALP SERPL-CCNC: 98 U/L (ref 39–117)
ALT SERPL W P-5'-P-CCNC: 18 U/L (ref 1–33)
ANION GAP SERPL CALCULATED.3IONS-SCNC: 12 MMOL/L (ref 5–15)
AST SERPL-CCNC: 18 U/L (ref 1–32)
BASOPHILS # BLD AUTO: 0.03 10*3/MM3 (ref 0–0.2)
BASOPHILS NFR BLD AUTO: 0.5 % (ref 0–1.5)
BILIRUB SERPL-MCNC: 0.4 MG/DL (ref 0–1.2)
BUN SERPL-MCNC: 28 MG/DL (ref 8–23)
BUN/CREAT SERPL: 22 (ref 7–25)
CALCIUM SPEC-SCNC: 9.6 MG/DL (ref 8.6–10.5)
CHLORIDE SERPL-SCNC: 102 MMOL/L (ref 98–107)
CO2 SERPL-SCNC: 27 MMOL/L (ref 22–29)
CREAT SERPL-MCNC: 1.27 MG/DL (ref 0.57–1)
DEPRECATED RDW RBC AUTO: 69.5 FL (ref 37–54)
EGFRCR SERPLBLD CKD-EPI 2021: 43.4 ML/MIN/1.73
EOSINOPHIL # BLD AUTO: 0.34 10*3/MM3 (ref 0–0.4)
EOSINOPHIL NFR BLD AUTO: 5.2 % (ref 0.3–6.2)
ERYTHROCYTE [DISTWIDTH] IN BLOOD BY AUTOMATED COUNT: 20.8 % (ref 12.3–15.4)
GLOBULIN UR ELPH-MCNC: 2.7 GM/DL
GLUCOSE SERPL-MCNC: 99 MG/DL (ref 65–99)
HCT VFR BLD AUTO: 35.1 % (ref 34–46.6)
HGB BLD-MCNC: 10.8 G/DL (ref 12–15.9)
HOLD SPECIMEN: NORMAL
IMM GRANULOCYTES # BLD AUTO: 0.02 10*3/MM3 (ref 0–0.05)
IMM GRANULOCYTES NFR BLD AUTO: 0.3 % (ref 0–0.5)
LYMPHOCYTES # BLD AUTO: 1.27 10*3/MM3 (ref 0.7–3.1)
LYMPHOCYTES NFR BLD AUTO: 19.4 % (ref 19.6–45.3)
MCH RBC QN AUTO: 27.9 PG (ref 26.6–33)
MCHC RBC AUTO-ENTMCNC: 30.8 G/DL (ref 31.5–35.7)
MCV RBC AUTO: 90.7 FL (ref 79–97)
MONOCYTES # BLD AUTO: 0.56 10*3/MM3 (ref 0.1–0.9)
MONOCYTES NFR BLD AUTO: 8.5 % (ref 5–12)
NEUTROPHILS NFR BLD AUTO: 4.34 10*3/MM3 (ref 1.7–7)
NEUTROPHILS NFR BLD AUTO: 66.1 % (ref 42.7–76)
NRBC BLD AUTO-RTO: 0 /100 WBC (ref 0–0.2)
PLATELET # BLD AUTO: 254 10*3/MM3 (ref 140–450)
PMV BLD AUTO: 9.6 FL (ref 6–12)
POTASSIUM SERPL-SCNC: 4 MMOL/L (ref 3.5–5.2)
PROT SERPL-MCNC: 6.8 G/DL (ref 6–8.5)
RBC # BLD AUTO: 3.87 10*6/MM3 (ref 3.77–5.28)
SODIUM SERPL-SCNC: 141 MMOL/L (ref 136–145)
WBC NRBC COR # BLD AUTO: 6.56 10*3/MM3 (ref 3.4–10.8)

## 2024-03-22 PROCEDURE — 80053 COMPREHEN METABOLIC PANEL: CPT

## 2024-03-22 PROCEDURE — 25010000002 EPOETIN ALFA PER 1000 UNITS: Performed by: NURSE PRACTITIONER

## 2024-03-22 PROCEDURE — 36415 COLL VENOUS BLD VENIPUNCTURE: CPT

## 2024-03-22 PROCEDURE — 96372 THER/PROPH/DIAG INJ SC/IM: CPT

## 2024-03-22 PROCEDURE — 85025 COMPLETE CBC W/AUTO DIFF WBC: CPT

## 2024-03-22 RX ADMIN — ERYTHROPOIETIN 40000 UNITS: 40000 INJECTION, SOLUTION INTRAVENOUS; SUBCUTANEOUS at 10:07

## 2024-03-22 NOTE — PROGRESS NOTES
Pt states that she has some itching at injection site for a few days after her injection. Denies that area gets red or warm, just itches then subsides. Message sent to office for Analilia HERNANDEZ to be aware. Instructed patient to let Analilia know if itching gets worse or develops in other areas beside the injection site, verbalized understanding.

## 2024-04-05 ENCOUNTER — LAB (OUTPATIENT)
Dept: LAB | Facility: HOSPITAL | Age: 79
End: 2024-04-05
Payer: MEDICARE

## 2024-04-05 ENCOUNTER — INFUSION (OUTPATIENT)
Dept: ONCOLOGY | Facility: HOSPITAL | Age: 79
End: 2024-04-05
Payer: MEDICARE

## 2024-04-05 VITALS
HEIGHT: 68 IN | HEART RATE: 71 BPM | DIASTOLIC BLOOD PRESSURE: 62 MMHG | OXYGEN SATURATION: 98 % | WEIGHT: 242.6 LBS | SYSTOLIC BLOOD PRESSURE: 137 MMHG | BODY MASS INDEX: 36.77 KG/M2 | TEMPERATURE: 97.1 F | RESPIRATION RATE: 18 BRPM

## 2024-04-05 DIAGNOSIS — N18.32 ANEMIA OF CHRONIC RENAL FAILURE, STAGE 3B: ICD-10-CM

## 2024-04-05 DIAGNOSIS — D63.1 ANEMIA OF CHRONIC RENAL FAILURE, STAGE 3 (MODERATE), UNSPECIFIED WHETHER STAGE 3A OR 3B CKD: Primary | ICD-10-CM

## 2024-04-05 DIAGNOSIS — G25.81 RESTLESS LEGS: ICD-10-CM

## 2024-04-05 DIAGNOSIS — J30.1 ALLERGIC RHINITIS DUE TO POLLEN, UNSPECIFIED SEASONALITY: ICD-10-CM

## 2024-04-05 DIAGNOSIS — D63.1 ANEMIA OF CHRONIC RENAL FAILURE, STAGE 3B: ICD-10-CM

## 2024-04-05 DIAGNOSIS — D50.8 IRON DEFICIENCY ANEMIA SECONDARY TO INADEQUATE DIETARY IRON INTAKE: ICD-10-CM

## 2024-04-05 DIAGNOSIS — N18.30 ANEMIA OF CHRONIC RENAL FAILURE, STAGE 3 (MODERATE), UNSPECIFIED WHETHER STAGE 3A OR 3B CKD: Primary | ICD-10-CM

## 2024-04-05 LAB
ALBUMIN SERPL-MCNC: 4 G/DL (ref 3.5–5.2)
ALBUMIN/GLOB SERPL: 1.3 G/DL
ALP SERPL-CCNC: 84 U/L (ref 39–117)
ALT SERPL W P-5'-P-CCNC: 16 U/L (ref 1–33)
ANION GAP SERPL CALCULATED.3IONS-SCNC: 9 MMOL/L (ref 5–15)
AST SERPL-CCNC: 16 U/L (ref 1–32)
BASOPHILS # BLD AUTO: 0.03 10*3/MM3 (ref 0–0.2)
BASOPHILS NFR BLD AUTO: 0.5 % (ref 0–1.5)
BILIRUB SERPL-MCNC: 0.4 MG/DL (ref 0–1.2)
BUN SERPL-MCNC: 23 MG/DL (ref 8–23)
BUN/CREAT SERPL: 17.3 (ref 7–25)
CALCIUM SPEC-SCNC: 9.3 MG/DL (ref 8.6–10.5)
CHLORIDE SERPL-SCNC: 104 MMOL/L (ref 98–107)
CO2 SERPL-SCNC: 28 MMOL/L (ref 22–29)
CREAT SERPL-MCNC: 1.33 MG/DL (ref 0.57–1)
DEPRECATED RDW RBC AUTO: 67.6 FL (ref 37–54)
EGFRCR SERPLBLD CKD-EPI 2021: 41 ML/MIN/1.73
EOSINOPHIL # BLD AUTO: 0.31 10*3/MM3 (ref 0–0.4)
EOSINOPHIL NFR BLD AUTO: 5.1 % (ref 0.3–6.2)
ERYTHROCYTE [DISTWIDTH] IN BLOOD BY AUTOMATED COUNT: 19.5 % (ref 12.3–15.4)
GLOBULIN UR ELPH-MCNC: 3.2 GM/DL
GLUCOSE SERPL-MCNC: 99 MG/DL (ref 65–99)
HCT VFR BLD AUTO: 35.9 % (ref 34–46.6)
HGB BLD-MCNC: 11 G/DL (ref 12–15.9)
HOLD SPECIMEN: NORMAL
IMM GRANULOCYTES # BLD AUTO: 0.02 10*3/MM3 (ref 0–0.05)
IMM GRANULOCYTES NFR BLD AUTO: 0.3 % (ref 0–0.5)
LYMPHOCYTES # BLD AUTO: 1.12 10*3/MM3 (ref 0.7–3.1)
LYMPHOCYTES NFR BLD AUTO: 18.5 % (ref 19.6–45.3)
MCH RBC QN AUTO: 28.7 PG (ref 26.6–33)
MCHC RBC AUTO-ENTMCNC: 30.6 G/DL (ref 31.5–35.7)
MCV RBC AUTO: 93.7 FL (ref 79–97)
MONOCYTES # BLD AUTO: 0.5 10*3/MM3 (ref 0.1–0.9)
MONOCYTES NFR BLD AUTO: 8.2 % (ref 5–12)
NEUTROPHILS NFR BLD AUTO: 4.09 10*3/MM3 (ref 1.7–7)
NEUTROPHILS NFR BLD AUTO: 67.4 % (ref 42.7–76)
NRBC BLD AUTO-RTO: 0 /100 WBC (ref 0–0.2)
PLATELET # BLD AUTO: 221 10*3/MM3 (ref 140–450)
PMV BLD AUTO: 9 FL (ref 6–12)
POTASSIUM SERPL-SCNC: 3.8 MMOL/L (ref 3.5–5.2)
PROT SERPL-MCNC: 7.2 G/DL (ref 6–8.5)
RBC # BLD AUTO: 3.83 10*6/MM3 (ref 3.77–5.28)
SODIUM SERPL-SCNC: 141 MMOL/L (ref 136–145)
WBC NRBC COR # BLD AUTO: 6.07 10*3/MM3 (ref 3.4–10.8)

## 2024-04-05 PROCEDURE — 80053 COMPREHEN METABOLIC PANEL: CPT

## 2024-04-05 PROCEDURE — G0463 HOSPITAL OUTPT CLINIC VISIT: HCPCS

## 2024-04-05 PROCEDURE — 85025 COMPLETE CBC W/AUTO DIFF WBC: CPT

## 2024-04-05 PROCEDURE — 36415 COLL VENOUS BLD VENIPUNCTURE: CPT

## 2024-04-05 RX ORDER — MONTELUKAST SODIUM 10 MG/1
10 TABLET ORAL DAILY
Qty: 90 TABLET | Refills: 3 | Status: SHIPPED | OUTPATIENT
Start: 2024-04-05

## 2024-04-05 RX ORDER — ROPINIROLE 0.5 MG/1
0.5 TABLET, FILM COATED ORAL NIGHTLY
Qty: 90 TABLET | Refills: 3 | Status: SHIPPED | OUTPATIENT
Start: 2024-04-05

## 2024-04-05 RX ORDER — PANTOPRAZOLE SODIUM 40 MG/1
40 TABLET, DELAYED RELEASE ORAL DAILY
Qty: 90 TABLET | Refills: 3 | Status: SHIPPED | OUTPATIENT
Start: 2024-04-05

## 2024-04-05 NOTE — PROGRESS NOTES
Held Retacrit for Hgb 11.0  (order- Hct <11) and HCT 35.9.  (order-Hct <33)verified with GEORGETTE Frias RN that no injection needed

## 2024-04-11 ENCOUNTER — TELEPHONE (OUTPATIENT)
Dept: INTERNAL MEDICINE | Facility: CLINIC | Age: 79
End: 2024-04-11

## 2024-04-19 ENCOUNTER — INFUSION (OUTPATIENT)
Dept: ONCOLOGY | Facility: HOSPITAL | Age: 79
End: 2024-04-19
Payer: MEDICARE

## 2024-04-19 ENCOUNTER — LAB (OUTPATIENT)
Dept: LAB | Facility: HOSPITAL | Age: 79
End: 2024-04-19
Payer: MEDICARE

## 2024-04-19 VITALS
WEIGHT: 245.4 LBS | HEART RATE: 66 BPM | DIASTOLIC BLOOD PRESSURE: 67 MMHG | TEMPERATURE: 97.3 F | HEIGHT: 68 IN | SYSTOLIC BLOOD PRESSURE: 152 MMHG | BODY MASS INDEX: 37.19 KG/M2 | OXYGEN SATURATION: 99 % | RESPIRATION RATE: 20 BRPM

## 2024-04-19 DIAGNOSIS — N18.31 ANEMIA OF CHRONIC RENAL FAILURE, STAGE 3A: ICD-10-CM

## 2024-04-19 DIAGNOSIS — N18.32 STAGE 3B CHRONIC KIDNEY DISEASE: ICD-10-CM

## 2024-04-19 DIAGNOSIS — D63.1 ANEMIA OF CHRONIC RENAL FAILURE, STAGE 3A: ICD-10-CM

## 2024-04-19 DIAGNOSIS — D63.1 ANEMIA OF CHRONIC RENAL FAILURE, STAGE 3A: Primary | ICD-10-CM

## 2024-04-19 DIAGNOSIS — N18.31 ANEMIA OF CHRONIC RENAL FAILURE, STAGE 3A: Primary | ICD-10-CM

## 2024-04-19 LAB
ALBUMIN SERPL-MCNC: 4.1 G/DL (ref 3.5–5.2)
ALBUMIN/GLOB SERPL: 1.5 G/DL
ALP SERPL-CCNC: 85 U/L (ref 39–117)
ALT SERPL W P-5'-P-CCNC: 15 U/L (ref 1–33)
ANION GAP SERPL CALCULATED.3IONS-SCNC: 8 MMOL/L (ref 5–15)
AST SERPL-CCNC: 17 U/L (ref 1–32)
BASOPHILS # BLD AUTO: 0.03 10*3/MM3 (ref 0–0.2)
BASOPHILS NFR BLD AUTO: 0.5 % (ref 0–1.5)
BILIRUB SERPL-MCNC: 0.3 MG/DL (ref 0–1.2)
BUN SERPL-MCNC: 27 MG/DL (ref 8–23)
BUN/CREAT SERPL: 17.6 (ref 7–25)
CALCIUM SPEC-SCNC: 9.3 MG/DL (ref 8.6–10.5)
CHLORIDE SERPL-SCNC: 103 MMOL/L (ref 98–107)
CO2 SERPL-SCNC: 28 MMOL/L (ref 22–29)
CREAT SERPL-MCNC: 1.53 MG/DL (ref 0.57–1)
DEPRECATED RDW RBC AUTO: 61.9 FL (ref 37–54)
EGFRCR SERPLBLD CKD-EPI 2021: 34.7 ML/MIN/1.73
EOSINOPHIL # BLD AUTO: 0.29 10*3/MM3 (ref 0–0.4)
EOSINOPHIL NFR BLD AUTO: 4.9 % (ref 0.3–6.2)
ERYTHROCYTE [DISTWIDTH] IN BLOOD BY AUTOMATED COUNT: 17.8 % (ref 12.3–15.4)
GLOBULIN UR ELPH-MCNC: 2.7 GM/DL
GLUCOSE SERPL-MCNC: 89 MG/DL (ref 65–99)
HCT VFR BLD AUTO: 35.8 % (ref 34–46.6)
HGB BLD-MCNC: 10.9 G/DL (ref 12–15.9)
IMM GRANULOCYTES # BLD AUTO: 0.02 10*3/MM3 (ref 0–0.05)
IMM GRANULOCYTES NFR BLD AUTO: 0.3 % (ref 0–0.5)
LYMPHOCYTES # BLD AUTO: 1.24 10*3/MM3 (ref 0.7–3.1)
LYMPHOCYTES NFR BLD AUTO: 20.8 % (ref 19.6–45.3)
MCH RBC QN AUTO: 28.6 PG (ref 26.6–33)
MCHC RBC AUTO-ENTMCNC: 30.4 G/DL (ref 31.5–35.7)
MCV RBC AUTO: 94 FL (ref 79–97)
MONOCYTES # BLD AUTO: 0.55 10*3/MM3 (ref 0.1–0.9)
MONOCYTES NFR BLD AUTO: 9.2 % (ref 5–12)
NEUTROPHILS NFR BLD AUTO: 3.82 10*3/MM3 (ref 1.7–7)
NEUTROPHILS NFR BLD AUTO: 64.3 % (ref 42.7–76)
NRBC BLD AUTO-RTO: 0 /100 WBC (ref 0–0.2)
PLATELET # BLD AUTO: 227 10*3/MM3 (ref 140–450)
PMV BLD AUTO: 9.5 FL (ref 6–12)
POTASSIUM SERPL-SCNC: 3.8 MMOL/L (ref 3.5–5.2)
PROT SERPL-MCNC: 6.8 G/DL (ref 6–8.5)
RBC # BLD AUTO: 3.81 10*6/MM3 (ref 3.77–5.28)
SODIUM SERPL-SCNC: 139 MMOL/L (ref 136–145)
WBC NRBC COR # BLD AUTO: 5.95 10*3/MM3 (ref 3.4–10.8)

## 2024-04-19 PROCEDURE — 36415 COLL VENOUS BLD VENIPUNCTURE: CPT

## 2024-04-19 PROCEDURE — 96372 THER/PROPH/DIAG INJ SC/IM: CPT

## 2024-04-19 PROCEDURE — 25010000002 EPOETIN ALFA-EPBX 40000 UNIT/ML SOLUTION: Performed by: NURSE PRACTITIONER

## 2024-04-19 PROCEDURE — 85025 COMPLETE CBC W/AUTO DIFF WBC: CPT

## 2024-04-19 PROCEDURE — 80053 COMPREHEN METABOLIC PANEL: CPT

## 2024-04-19 RX ADMIN — EPOETIN ALFA-EPBX 40000 UNITS: 40000 INJECTION, SOLUTION INTRAVENOUS; SUBCUTANEOUS at 10:15

## 2024-05-03 ENCOUNTER — INFUSION (OUTPATIENT)
Dept: ONCOLOGY | Facility: HOSPITAL | Age: 79
End: 2024-05-03
Payer: MEDICARE

## 2024-05-03 ENCOUNTER — LAB (OUTPATIENT)
Dept: LAB | Facility: HOSPITAL | Age: 79
End: 2024-05-03
Payer: MEDICARE

## 2024-05-03 VITALS
WEIGHT: 238.8 LBS | DIASTOLIC BLOOD PRESSURE: 77 MMHG | SYSTOLIC BLOOD PRESSURE: 166 MMHG | HEART RATE: 70 BPM | OXYGEN SATURATION: 99 % | HEIGHT: 68 IN | BODY MASS INDEX: 36.19 KG/M2 | RESPIRATION RATE: 20 BRPM | TEMPERATURE: 96.8 F

## 2024-05-03 DIAGNOSIS — D50.8 IRON DEFICIENCY ANEMIA SECONDARY TO INADEQUATE DIETARY IRON INTAKE: ICD-10-CM

## 2024-05-03 DIAGNOSIS — N18.32 ANEMIA OF CHRONIC RENAL FAILURE, STAGE 3B: ICD-10-CM

## 2024-05-03 DIAGNOSIS — N18.30 ANEMIA OF CHRONIC RENAL FAILURE, STAGE 3 (MODERATE), UNSPECIFIED WHETHER STAGE 3A OR 3B CKD: Primary | ICD-10-CM

## 2024-05-03 DIAGNOSIS — D63.1 ANEMIA OF CHRONIC RENAL FAILURE, STAGE 3B: ICD-10-CM

## 2024-05-03 DIAGNOSIS — D63.1 ANEMIA OF CHRONIC RENAL FAILURE, STAGE 3 (MODERATE), UNSPECIFIED WHETHER STAGE 3A OR 3B CKD: Primary | ICD-10-CM

## 2024-05-03 LAB
BASOPHILS # BLD AUTO: 0.03 10*3/MM3 (ref 0–0.2)
BASOPHILS NFR BLD AUTO: 0.5 % (ref 0–1.5)
DEPRECATED RDW RBC AUTO: 56 FL (ref 37–54)
EOSINOPHIL # BLD AUTO: 0.2 10*3/MM3 (ref 0–0.4)
EOSINOPHIL NFR BLD AUTO: 3.5 % (ref 0.3–6.2)
ERYTHROCYTE [DISTWIDTH] IN BLOOD BY AUTOMATED COUNT: 16.4 % (ref 12.3–15.4)
HCT VFR BLD AUTO: 37 % (ref 34–46.6)
HGB BLD-MCNC: 11.8 G/DL (ref 12–15.9)
IMM GRANULOCYTES # BLD AUTO: 0.02 10*3/MM3 (ref 0–0.05)
IMM GRANULOCYTES NFR BLD AUTO: 0.3 % (ref 0–0.5)
LYMPHOCYTES # BLD AUTO: 1.42 10*3/MM3 (ref 0.7–3.1)
LYMPHOCYTES NFR BLD AUTO: 24.6 % (ref 19.6–45.3)
MCH RBC QN AUTO: 29.4 PG (ref 26.6–33)
MCHC RBC AUTO-ENTMCNC: 31.9 G/DL (ref 31.5–35.7)
MCV RBC AUTO: 92 FL (ref 79–97)
MONOCYTES # BLD AUTO: 0.51 10*3/MM3 (ref 0.1–0.9)
MONOCYTES NFR BLD AUTO: 8.8 % (ref 5–12)
NEUTROPHILS NFR BLD AUTO: 3.6 10*3/MM3 (ref 1.7–7)
NEUTROPHILS NFR BLD AUTO: 62.3 % (ref 42.7–76)
NRBC BLD AUTO-RTO: 0 /100 WBC (ref 0–0.2)
PLATELET # BLD AUTO: 183 10*3/MM3 (ref 140–450)
PMV BLD AUTO: 9.2 FL (ref 6–12)
RBC # BLD AUTO: 4.02 10*6/MM3 (ref 3.77–5.28)
WBC NRBC COR # BLD AUTO: 5.78 10*3/MM3 (ref 3.4–10.8)

## 2024-05-03 PROCEDURE — G0463 HOSPITAL OUTPT CLINIC VISIT: HCPCS

## 2024-05-03 PROCEDURE — 85025 COMPLETE CBC W/AUTO DIFF WBC: CPT

## 2024-05-03 PROCEDURE — 36415 COLL VENOUS BLD VENIPUNCTURE: CPT

## 2024-05-03 NOTE — PROGRESS NOTES
Marina Joe [birth year] sciatic nerve pain left hip down to knee started Saturday morning. Went to PCP yesterday and they didn't say anything about it other than she's going to get a steroid injection in knee soon. Rates 10/10 sciatic pain, only using creams to help with pain now. Used tylenol last night that didn't help. Pt wondering if EMILY Billingsley has any advice.   After waiting patient wanted to go home, she said she would go to ER if it didn't get better. I told her SEA Kathleen said she would call if EMILY Billingsley had any advice. Pt verbalized understanding and agreed to plan of care.   Pt didn't meet procrit parameters.

## 2024-05-08 DIAGNOSIS — I10 ESSENTIAL HYPERTENSION: ICD-10-CM

## 2024-05-08 RX ORDER — ROSUVASTATIN CALCIUM 20 MG/1
20 TABLET, COATED ORAL DAILY
Qty: 90 TABLET | Refills: 3 | Status: SHIPPED | OUTPATIENT
Start: 2024-05-08

## 2024-05-08 RX ORDER — CARVEDILOL 6.25 MG/1
6.25 TABLET ORAL 2 TIMES DAILY WITH MEALS
Qty: 180 TABLET | Refills: 3 | Status: SHIPPED | OUTPATIENT
Start: 2024-05-08

## 2024-05-14 ENCOUNTER — OFFICE VISIT (OUTPATIENT)
Dept: ONCOLOGY | Facility: CLINIC | Age: 79
End: 2024-05-14
Payer: MEDICARE

## 2024-05-14 ENCOUNTER — LAB (OUTPATIENT)
Dept: LAB | Facility: HOSPITAL | Age: 79
End: 2024-05-14
Payer: MEDICARE

## 2024-05-14 VITALS
BODY MASS INDEX: 36.66 KG/M2 | TEMPERATURE: 97.1 F | RESPIRATION RATE: 16 BRPM | DIASTOLIC BLOOD PRESSURE: 76 MMHG | HEIGHT: 68 IN | SYSTOLIC BLOOD PRESSURE: 138 MMHG | OXYGEN SATURATION: 98 % | WEIGHT: 241.9 LBS | HEART RATE: 67 BPM

## 2024-05-14 DIAGNOSIS — N18.30 ANEMIA OF CHRONIC RENAL FAILURE, STAGE 3 (MODERATE), UNSPECIFIED WHETHER STAGE 3A OR 3B CKD: Primary | ICD-10-CM

## 2024-05-14 DIAGNOSIS — D50.8 IRON DEFICIENCY ANEMIA SECONDARY TO INADEQUATE DIETARY IRON INTAKE: ICD-10-CM

## 2024-05-14 DIAGNOSIS — D63.1 ANEMIA OF CHRONIC RENAL FAILURE, STAGE 3 (MODERATE), UNSPECIFIED WHETHER STAGE 3A OR 3B CKD: Primary | ICD-10-CM

## 2024-05-14 DIAGNOSIS — K86.9 PANCREATIC LESION: ICD-10-CM

## 2024-05-14 DIAGNOSIS — D63.1 ANEMIA OF CHRONIC RENAL FAILURE, STAGE 3B: Primary | ICD-10-CM

## 2024-05-14 DIAGNOSIS — Z85.3 HISTORY OF BREAST CANCER: ICD-10-CM

## 2024-05-14 DIAGNOSIS — D69.3 CHRONIC ITP (IDIOPATHIC THROMBOCYTOPENIA): ICD-10-CM

## 2024-05-14 DIAGNOSIS — N18.32 ANEMIA OF CHRONIC RENAL FAILURE, STAGE 3B: Primary | ICD-10-CM

## 2024-05-14 LAB
ALBUMIN SERPL-MCNC: 4.1 G/DL (ref 3.5–5.2)
ALBUMIN/GLOB SERPL: 1.3 G/DL
ALP SERPL-CCNC: 89 U/L (ref 39–117)
ALT SERPL W P-5'-P-CCNC: 18 U/L (ref 1–33)
ANION GAP SERPL CALCULATED.3IONS-SCNC: 8 MMOL/L (ref 5–15)
AST SERPL-CCNC: 18 U/L (ref 1–32)
BASOPHILS # BLD AUTO: 0.02 10*3/MM3 (ref 0–0.2)
BASOPHILS NFR BLD AUTO: 0.3 % (ref 0–1.5)
BILIRUB SERPL-MCNC: 0.2 MG/DL (ref 0–1.2)
BUN SERPL-MCNC: 20 MG/DL (ref 8–23)
BUN/CREAT SERPL: 13.2 (ref 7–25)
CALCIUM SPEC-SCNC: 9.6 MG/DL (ref 8.6–10.5)
CHLORIDE SERPL-SCNC: 102 MMOL/L (ref 98–107)
CO2 SERPL-SCNC: 30 MMOL/L (ref 22–29)
CREAT SERPL-MCNC: 1.52 MG/DL (ref 0.57–1)
DEPRECATED RDW RBC AUTO: 56.4 FL (ref 37–54)
EGFRCR SERPLBLD CKD-EPI 2021: 35 ML/MIN/1.73
EOSINOPHIL # BLD AUTO: 0.27 10*3/MM3 (ref 0–0.4)
EOSINOPHIL NFR BLD AUTO: 4.1 % (ref 0.3–6.2)
ERYTHROCYTE [DISTWIDTH] IN BLOOD BY AUTOMATED COUNT: 16.2 % (ref 12.3–15.4)
FERRITIN SERPL-MCNC: 444.1 NG/ML (ref 13–150)
GLOBULIN UR ELPH-MCNC: 3.2 GM/DL
GLUCOSE SERPL-MCNC: 98 MG/DL (ref 65–99)
HCT VFR BLD AUTO: 36.8 % (ref 34–46.6)
HGB BLD-MCNC: 11.4 G/DL (ref 12–15.9)
HOLD SPECIMEN: NORMAL
IMM GRANULOCYTES # BLD AUTO: 0.03 10*3/MM3 (ref 0–0.05)
IMM GRANULOCYTES NFR BLD AUTO: 0.5 % (ref 0–0.5)
IRON 24H UR-MRATE: 43 MCG/DL (ref 37–145)
IRON SATN MFR SERPL: 14 % (ref 20–50)
LYMPHOCYTES # BLD AUTO: 1.36 10*3/MM3 (ref 0.7–3.1)
LYMPHOCYTES NFR BLD AUTO: 20.8 % (ref 19.6–45.3)
MCH RBC QN AUTO: 29.2 PG (ref 26.6–33)
MCHC RBC AUTO-ENTMCNC: 31 G/DL (ref 31.5–35.7)
MCV RBC AUTO: 94.4 FL (ref 79–97)
MONOCYTES # BLD AUTO: 0.52 10*3/MM3 (ref 0.1–0.9)
MONOCYTES NFR BLD AUTO: 7.9 % (ref 5–12)
NEUTROPHILS NFR BLD AUTO: 4.35 10*3/MM3 (ref 1.7–7)
NEUTROPHILS NFR BLD AUTO: 66.4 % (ref 42.7–76)
NRBC BLD AUTO-RTO: 0 /100 WBC (ref 0–0.2)
PLATELET # BLD AUTO: 202 10*3/MM3 (ref 140–450)
PMV BLD AUTO: 8.8 FL (ref 6–12)
POTASSIUM SERPL-SCNC: 3.4 MMOL/L (ref 3.5–5.2)
PROT SERPL-MCNC: 7.3 G/DL (ref 6–8.5)
RBC # BLD AUTO: 3.9 10*6/MM3 (ref 3.77–5.28)
SODIUM SERPL-SCNC: 140 MMOL/L (ref 136–145)
TIBC SERPL-MCNC: 302 MCG/DL (ref 298–536)
TRANSFERRIN SERPL-MCNC: 203 MG/DL (ref 200–360)
WBC NRBC COR # BLD AUTO: 6.55 10*3/MM3 (ref 3.4–10.8)

## 2024-05-14 PROCEDURE — 3075F SYST BP GE 130 - 139MM HG: CPT | Performed by: NURSE PRACTITIONER

## 2024-05-14 PROCEDURE — 36415 COLL VENOUS BLD VENIPUNCTURE: CPT

## 2024-05-14 PROCEDURE — 84466 ASSAY OF TRANSFERRIN: CPT

## 2024-05-14 PROCEDURE — 1126F AMNT PAIN NOTED NONE PRSNT: CPT | Performed by: NURSE PRACTITIONER

## 2024-05-14 PROCEDURE — 85025 COMPLETE CBC W/AUTO DIFF WBC: CPT

## 2024-05-14 PROCEDURE — 80053 COMPREHEN METABOLIC PANEL: CPT

## 2024-05-14 PROCEDURE — 83540 ASSAY OF IRON: CPT

## 2024-05-14 PROCEDURE — 3078F DIAST BP <80 MM HG: CPT | Performed by: NURSE PRACTITIONER

## 2024-05-14 PROCEDURE — 82728 ASSAY OF FERRITIN: CPT

## 2024-05-14 PROCEDURE — 99214 OFFICE O/P EST MOD 30 MIN: CPT | Performed by: NURSE PRACTITIONER

## 2024-05-14 RX ORDER — NAPROXEN SODIUM 220 MG
220 TABLET ORAL 2 TIMES DAILY PRN
COMMUNITY

## 2024-05-14 NOTE — PROGRESS NOTES
[facility] H[facility]ATOLOGY & ONCOLOGY  [address]  [place] [address] [phone]    Patient Name: Marina Joe  Encounter Date: 05/14/2024  YOB: 1945  Patient Number: [id number]      HPI: Marina Joe is a  79 y.o.  female who is seen on follow-up for stage Ia left breast cancer, upper outer quadrant, receptor positive and HER-2/alxi negative.  She is on adjuvant anastrozole since 05/2013. Plan for 10 years.  She is also seen for anemia from chronic kidney disease.   She was given injectafer on 09/27/22.   She was started on SREE with Retacrit on November 29, 2022.    She began to experience thrombocytopenia   Bone Marrow Biopsy 11/10/22.    Bone marrow, left iliac crest, core biopsy, aspirate smears, and clot section:  Slightly hypercellular marrow (40%) with maturing trilineage hematopoiesis.  Aspiculate smears, nondiagnostic.  No overt features of myelodysplasia and no excess blast population.  Moderately increased megakaryocytes.  2+ stainable iron.  Peripheral blood smear:  Severe thrombocytopenia.  Mild normocytic normochromic anemia.  Rare circulating schistocytes.  Normal white blood cell count with normal differential and morphology.  No circulating blasts.  Flow cytometry: No immunophenotypic evidence of abnormal myeloid maturation, increased blast population or a lymphoproliferative disorder.  Chromosome analysis: Normal female karyotype.    She continues to receive IV iron and Retacrit for anemia in CKD.   I have reviewed the HPI and verified with the patient the accuracy of it. No changes to history since the information was documented.  Analilia Madera, APRN.  05/14/2024       INTERVAL HISTORY     Ms. Joe presents to clinic today for continued follow up.   Her last Retacrit was April 19, 2024.  She is tolerating those injections well.    She has no acute complaints today.      She had labs drawn and  results were reviewed with her in office.      Oncology/Hematology History Overview Note   Oncologic history  In February 2012, she had a routine mammogram that found a nodular density in the upper outer quadrant of the left breast.  An ultrasound revealed no nodularity at that time.  Repeat ultrasound 3 months later on 5/3/2012 revealed a small cyst in the left breast but felt to be benign.  Repeat mammogram on October 31, 2012 found a lobulated lesion in the left breast at the 2 o'clock position and a stable cyst at the 12 o'clock position.  She was referred to Dr. Barkley on 11/30/2012 and biopsy was performed.  The 12:00 cyst was a fibrocystic lesion while the 2:00 lesion was an invasive mammary carcinoma, low-grade, ER positive ND positive HER-2 nu 2+, FISH unamplified.    On January 8, 2013 she underwent a left partial mastectomy with sentinel node biopsy finding invasive ductal carcinoma, 3.1 mm in greatest dimension, grade 1.  2 sentinel nodes were negative.  AJCC TNM stage was pT1aN0 M0, Stage IA.  Following surgery she underwent adjuvant radiation therapy and was then started on Arimidex for hormonal manipulation.  She was started on the Arimidex in approximately April or May 2013.  He has been recommended to continue this for 10 years.    Hematologic history  Patient with a long history of anemia secondary to iron deficiency as well as chronic kidney disease stage III.Patient has a GFR that ranges from 45-61ML/MIN.  He has been undergoing Procrit when meets guidelines for several years now.  She is also required iron replacement.  Folate > 20, B12 at 1503 and negative blood for flow cytometry on 01/07/2022.      Previous interventions  Procrit 40,000 units subcu initiated approximately February 2017  Injectafer given 9/23/2019, 9/30/2019     Malignant neoplasm of upper-outer quadrant of female breast   10/31/2012 Initial Diagnosis    Malignant neoplasm of central portion of left female breast (CMS/HCC)      10/31/2012 Imaging    Mammogram on October 31, 2012 found a lobulated lesion in the left breast at the 2 o'clock position and a stable cyst at the 12 o'clock position.      11/30/2012 Biopsy    Dr. Barkley on 11/30/2012 and biopsy was performed.  The 12:00 cyst was a fibrocystic lesion while the 2:00 lesion was an invasive mammary carcinoma, low-grade, ER positive NC positive HER-2 nu 2+, FISH unamplified.         1/8/2013 Surgery    On January 8, 2013 she underwent a left partial mastectomy with sentinel node biopsy finding invasive ductal carcinoma, 3.1 mm in greatest dimension, grade 1.  2 sentinel nodes were negative.  AJCC TNM stage was pT1aN0 M0, Stage IA.  Hormone receptor positive HER-2 negative      Radiation    Radiation OncologyTreatment Course:  Marina Joe receivedin 33 fractions to left breast via External Beam Radiation - EBRT.     5/2013 -  Hormonal Therapy    Arimidex 1 mg daily     8/22/2022 Cancer Staged    Staging form: Breast, AJCC V7  - Pathologic stage from 8/22/2022: Stage IA (T1a, N0, cM0) - Signed by Benjamin Shah MD on 8/22/2022         PAST MEDICAL HISTORY:  ALLERGIES:  Allergies   Allergen Reactions    Aspirin GI Bleeding    Niacin Other (See Comments)     CURRENT MEDICATIONS:  Outpatient Encounter Medications as of 5/14/2024   Medication Sig Dispense Refill    albuterol sulfate  (90 Base) MCG/ACT inhaler Inhale 2 puffs Every 4 (Four) Hours As Needed for Wheezing. 2 g 3    amLODIPine (NORVASC) 5 MG tablet TAKE 1 TABLET BY MOUTH TWICE DAILY 180 tablet 2    buPROPion XL (WELLBUTRIN XL) 150 MG 24 hr tablet Take 1 tablet by mouth Daily.      Calcium Carb-Cholecalciferol (CALCIUM 600+D3 PO) Take 1 tablet by mouth Daily.      carvedilol (COREG) 6.25 MG tablet TAKE 1 TABLET BY MOUTH TWICE DAILY WITH MEALS 180 tablet 3    cetirizine (zyrTEC) 10 MG tablet Take 1 tablet by mouth Daily.      cloNIDine (CATAPRES) 0.1 MG tablet Take 1 tablet by mouth 2 (Two) Times a Day.       cyclobenzaprine (FLEXERIL) 10 MG tablet TAKE 1 TABLET BY MOUTH THREE TIMES DAILY AS NEEDED FOR MUSCLE SPASMS 90 tablet 5    Diclofenac Sodium (VOLTAREN) 1 % gel gel Apply 4 g topically to the appropriate area as directed 4 (Four) Times a Day As Needed (arthralgia). 240 g 3    Ergocalciferol (VITAMIN D2 PO) Take 50,000 Units by mouth Every 30 (Thirty) Days.      famotidine (PEPCID) 20 MG tablet Take 1 tablet by mouth 2 (Two) Times a Day Before Meals. 60 tablet 0    fluticasone (FLONASE) 50 MCG/ACT nasal spray 2 sprays into the nostril(s) as directed by provider Daily. 1 g 3    irbesartan-hydrochlorothiazide (AVALIDE) 300-12.5 MG tablet Take 1 tablet by mouth Daily.      montelukast (SINGULAIR) 10 MG tablet TAKE 1 TABLET BY MOUTH DAILY 90 tablet 3    Multiple Vitamins-Minerals (MULTIVITAMIN ADULT) tablet Take 1 tablet by mouth Daily.      naproxen sodium (ALEVE) 220 MG tablet Take 1 tablet by mouth 2 (Two) Times a Day As Needed.      pantoprazole (PROTONIX) 40 MG EC tablet TAKE 1 TABLET BY MOUTH DAILY 90 tablet 3    rOPINIRole (REQUIP) 0.5 MG tablet TAKE 1 TABLET BY MOUTH EVERY NIGHT 90 tablet 3    rosuvastatin (CRESTOR) 20 MG tablet TAKE 1 TABLET BY MOUTH DAILY 90 tablet 3    sertraline (ZOLOFT) 100 MG tablet Take 1 tablet by mouth Daily.      valACYclovir (VALTREX) 500 MG tablet TAKE 1 TABLET BY MOUTH TWICE DAILY 180 tablet 3    [DISCONTINUED] acetaminophen (Tylenol) 325 MG tablet Take 2 tablets by mouth Every 6 (Six) Hours As Needed for Mild Pain or Moderate Pain. (Patient not taking: Reported on 5/14/2024) 60 tablet 0     No facility-administered encounter medications on file as of 5/14/2024.     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    S/P lumpectomy, left breast Z98.890    Adult hypothyroidism E03.9    Anemia D64.9    Anxiety F41.9    Breast cancer, left C50.912    Cervical pain M54.2    Chronic insomnia F51.04    Elevated lipids E78.5    Herpes zoster without complication B02.9    Left ear pain H92.02    Left otitis externa H60.92    Myalgia M79.10    Negative depression screening Z13.31    Obesity (BMI 30-39.9) E66.9    Postmenopausal status Z78.0    Recurrent acute serous otitis media of left ear H65.05    Restless leg G25.81    Sinusitis, bacterial J32.9, B96.89    Skin lesion L98.9    Vitamin D deficiency E55.9    Stage 3b chronic kidney disease N18.32    GIB (gastrointestinal bleeding) K92.2    Acute on chronic blood loss anemia D62    Chronic idiopathic thrombocytopenia D69.6    Hypotension due to hypovolemia E86.1    Primary hypertension I10    Pancreatic lesion K86.9     SURGERIES:  Past Surgical History:   Procedure Laterality Date    AVULSION TOENAIL PLATE      Sept 26,2018    BREAST BIOPSY Left 11/20/2012    BREAST BIOPSY      Left Breast, 1/2019 per Dr Barkley    BREAST LUMPECTOMY Left     with node bx     COLONOSCOPY  09/13/2013    small polyp at 30cm benign hyperplastic polyp, changes consistent with melanosis coli. Recall 5 years    COLONOSCOPY  11/19/2018    Tics otherwise normal exam repeat in 5 years    COLONOSCOPY  02/13/2023    Normal exam repeat in 3 years with a 2 day prep    ENDOSCOPY  02/13/2023    Gastritis, small HH    REPLACEMENT TOTAL KNEE Right     2016    TOTAL ABDOMINAL HYSTERECTOMY WITH SALPINGO OOPHORECTOMY      UPPER ENDOSCOPIC ULTRASOUND W/ FNA  12/20/2023    Pancreatic cyst s/p FNA     HEALTH MAINTENANCE ITEMS:  Health Maintenance Due   Topic Date Due    RSV Vaccine - Adults (1 - 1-dose 60+ series) Never done    DXA SCAN  06/17/2023    COVID-19 Vaccine (6 - 2023-24 season) 04/12/2024       <no information>  Last Completed Colonoscopy            COLORECTAL CANCER SCREENING (COLONOSCOPY -  Every 3 Years) Next due on 2/13/2026 09/07/2023  Fecal Occult Blood component of Occult Blood X 1, Stool - Stool, Per Rectum    09/03/2023  Fecal Occult Blood component of POC Occult Blood Stool    08/10/2023  Fecal Occult Blood component of POC Occult Blood Stool    02/13/2023  SCANNED - COLONOSCOPY    11/16/2022  Occult Blood X 3, Stool - Stool, Per Rectum    Only the first 5 history entries have been loaded, but more history exists.                  Immunization History   Administered Date(s) Administered    COVID-19 (MODERNA) 1st,2nd,3rd Dose Monovalent 03/12/2021, 03/28/2021    COVID-19 (MODERNA) Monovalent Original Booster 08/31/2022    COVID-19 (PFIZER) BIVALENT 12+YRS 12/08/2022    COVID-19 F23 (PFIZER) 12YRS+ (COMIRNATY) 12/12/2023    Flu Vaccine Quad PF >36MO 11/05/2019    Fluad Quad 65+ 11/05/2020    Fluzone High Dose =>65 Years ([employer] ONLY) 11/11/2015, 10/14/2016, 11/20/2017    Fluzone High-Dose 65+yrs 11/29/2021, 10/04/2022, 10/17/2023    Pneumococcal Conjugate 13-Valent (PCV13) 10/14/2016    Pneumococcal Polysaccharide (PPSV23) 11/05/2020    Shingrix 01/01/2021, 06/08/2021    Zoster, Unspecified 01/01/2021     Last Completed Mammogram            MAMMOGRAM (Yearly) Next due on 2/14/2025 02/14/2024  Mammo Diagnostic Bilateral With CAD    01/23/2024  SCANNED - MAMMO    01/23/2024  MAMMO SCREENING BILATERAL W CAD    01/23/2023  SCANNED - MAMMO    01/23/2023  MAMMO SCREENING BILATERAL W CAD    Only the first 5 history entries have been loaded, but more history exists.                      FAMILY HISTORY:  Family History   Problem Relation Age of Onset    Heart attack Mother     Hodgkin's lymphoma Father     Other Father     Cancer Father         hodgkins    Heart attack Brother     Skin cancer Maternal Uncle     Pancreatic cancer Maternal Uncle     Cancer Maternal Uncle         eye    Colon cancer Neg Hx     Colon polyps Neg Hx      SOCIAL HISTORY:  Social History     Socioeconomic History     "Marital status:    Tobacco Use    Smoking status: Never    Smokeless tobacco: Never   Vaping Use    Vaping status: Never Used   Substance and Sexual Activity    Alcohol use: No    Drug use: Not Currently    Sexual activity: Not Currently     Birth control/protection: Surgical       REVIEW OF SYSTEMS:    Review of Systems   Constitutional:  Negative for chills and fever.   HENT:  Negative for congestion and trouble swallowing.    Respiratory:  Negative for wheezing.    Cardiovascular:  Negative for chest pain and palpitations.   Gastrointestinal:  Negative for abdominal pain, nausea and vomiting.   Endocrine: Negative for polydipsia and polyphagia.   Genitourinary:  Negative for difficulty urinating, dysuria and flank pain.   Musculoskeletal:  Negative for gait problem and joint swelling.   Allergic/Immunologic: Negative for food allergies.   Neurological:  Negative for speech difficulty and weakness.   Hematological:  Negative for adenopathy. Does not bruise/bleed easily.   Psychiatric/Behavioral:  Negative for agitation, confusion and hallucinations.        VITAL SIGNS: /76   Pulse 67   Temp 97.1 °F (36.2 °C)   Resp 16   Ht 172.7 cm (68\")   Wt 110 kg (241 lb 14.4 oz)   LMP  (LMP Unknown)   SpO2 98%   BMI 36.78 kg/m²   Pain Score    05/14/24 0833   PainSc: 0-No pain         PHYSICAL EXAMINATION:     Physical Exam  Vitals reviewed.   Constitutional:       General: She is not in acute distress.  HENT:      Head: Normocephalic and atraumatic.   Eyes:      General: No scleral icterus.  Cardiovascular:      Rate and Rhythm: Normal rate.   Pulmonary:      Effort: No respiratory distress.      Breath sounds: No wheezing or rales.   Abdominal:      General: Bowel sounds are normal.      Palpations: Abdomen is soft.      Tenderness: There is no abdominal tenderness.   Musculoskeletal:         General: No swelling.      Cervical back: Neck supple.      Right lower leg: Edema present.      Left lower leg: " Edema present.   Skin:     General: Skin is warm and dry.   Neurological:      Mental Status: She is alert and oriented to person, place, and time.   Psychiatric:         Mood and Affect: Mood normal.         Behavior: Behavior normal.         Thought Content: Thought content normal.         Judgment: Judgment normal.         LABS    Lab Results - Last 18 Months   Lab Units 05/14/24  0826 05/03/24  0923 04/19/24  0933 04/05/24  0931 03/22/24  0912 03/05/24  0933 09/22/23  0859 09/15/23  0904 09/07/23  0141 09/06/23  1608 08/25/23  0853 08/18/23  0844 08/11/23  0030 08/10/23  1754 08/04/23  0843 07/28/23  0932 06/30/23  0918 06/20/23  0733   HEMOGLOBIN g/dL 11.4* 11.8* 10.9* 11.0* 10.8* 10.8*   < > 9.2*   < > 4.9*   < > 6.6*   < > 5.3*   < > 7.0*   < > 7.7*   HEMATOCRIT % 36.8 37.0 35.8 35.9 35.1 34.7   < > 30.2*   < > 16.6*   < > 22.1*   < > 18.4*   < > 24.8*   < > 24.0*   MCV fL 94.4 92.0 94.0 93.7 90.7 89.0   < > 94.1   < > 97.6*   < > 100.5*  --  98.4*   < > 98.0*   < > 91.3   WBC 10*3/mm3 6.55 5.78 5.95 6.07 6.56 6.50   < > 8.92   < > 8.67   < > 6.97  --  7.61   < > 5.68   < > 6.91   RDW % 16.2* 16.4* 17.8* 19.5* 20.8* 20.0*   < > 17.1*   < > 21.5*   < > 21.2*  --  19.5*   < > 18.3*   < > 16.4*   MPV fL 8.8 9.2 9.5 9.0 9.6 9.4   < >  --    < >  --   --  13.3*  --   --   --   --   --   --    PLATELETS 10*3/mm3 202 183 227 221 254 238   < > 21*   < > 24*   < > 35*  --  46*   < > 46*   < > 56*   IMM GRAN % % 0.5 0.3 0.3 0.3 0.3 0.2   < >  --   --   --    < >  --   --   --   --   --   --   --    NEUTROS ABS 10*3/mm3 4.35 3.60 3.82 4.09 4.34 4.31   < > 7.12*   < > 6.61   < > 4.86  --  5.30   < > 3.73   < > 4.91   LYMPHS ABS 10*3/mm3 1.36 1.42 1.24 1.12 1.27 1.19   < >  --    < >  --    < >  --   --   --    < >  --    < > CANCELED  0.90   MONOS ABS 10*3/mm3 0.52 0.51 0.55 0.50 0.56 0.54   < >  --    < >  --    < >  --   --   --    < >  --    < > 0.48   EOS ABS 10*3/mm3 0.27 0.20 0.29 0.31 0.34 0.40   < > 0.36   < >  0.26   < > 0.36  --  0.15   < > 0.57*   < > CANCELED   EOSINOPHIL ABS 10*3/mm3  --   --   --   --   --   --   --   --   --   --   --   --   --   --   --   --   --  0.62*   EOSINOPHIL % %  --   --   --   --   --   --   --   --   --   --   --   --   --   --   --   --   --  9.0*   BASOS ABS 10*3/mm3 0.02 0.03 0.03 0.03 0.03 0.05   < >  --    < >  --    < >  --   --  0.08   < >  --    < > CANCELED   IMMATURE GRANS (ABS) 10*3/mm3 0.03 0.02 0.02 0.02 0.02 0.01   < >  --   --   --    < >  --   --   --   --   --   --   --    NRBC /100 WBC 0.0 0.0 0.0 0.0 0.0 0.0   < >  --   --  2.0*   < >  --   --   --   --   --   --   --    NEUTROPHIL % %  --   --   --   --   --   --   --  79.8*  --  75.2  --  68.7  --  69.7  --  64.6  --  71.0   MONOCYTES % %  --   --   --   --   --   --   --  3.0*  --  6.9  --  5.1  --  1.0*  --  9.1  --  7.0   BASOPHIL % %  --   --   --   --   --   --   --   --   --   --   --   --   --  1.0  --   --   --   --    ANISOCYTOSIS   --   --   --   --   --   --   --  Slight/1+  --  Mod/2+  --  Slight/1+  --  Slight/1+  --  Mod/2+  --   --    GIANT PLT   --   --   --   --   --   --   --  Slight/1+  --  Slight/1+  --  Large/3+  --  Large/3+  --  Large/3+  --   --     < > = values in this interval not displayed.       Lab Results - Last 18 Months   Lab Units 05/14/24  0826 04/19/24  0933 04/05/24  0938 03/22/24  0912 03/05/24  0933 02/20/24  0750   GLUCOSE mg/dL 98 89 99 99 112* 103*   SODIUM mmol/L 140 139 141 141 139 139   POTASSIUM mmol/L 3.4* 3.8 3.8 4.0 4.0 4.0   CO2 mmol/L 30.0* 28.0 28.0 27.0 26.0 28.0   CHLORIDE mmol/L 102 103 104 102 102 101   ANION GAP mmol/L 8.0 8.0 9.0 12.0 11.0 10.0   CREATININE mg/dL 1.52* 1.53* 1.33* 1.27* 1.10* 1.44*   BUN mg/dL 20 27* 23 28* 23 25*   BUN / CREAT RATIO  13.2 17.6 17.3 22.0 20.9 17.4   CALCIUM mg/dL 9.6 9.3 9.3 9.6 9.0 9.1   ALK PHOS U/L 89 85 84 98 91 88   TOTAL PROTEIN g/dL 7.3 6.8 7.2 6.8 6.6 7.1   ALT (SGPT) U/L 18 15 16 18 20 36*   AST (SGOT) U/L 18 17 16 18  "18 42*   BILIRUBIN mg/dL 0.2 0.3 0.4 0.4 0.3 0.2   ALBUMIN g/dL 4.1 4.1 4.0 4.1 3.9 3.7   GLOBULIN gm/dL 3.2 2.7 3.2 2.7 2.7 3.4       No results for input(s): \"MSPIKE\", \"KAPPALAMB\", \"IGLFLC\", \"URICACID\", \"FREEKAPPAL\", \"CEA\", \"LDH\", \"REFLABREPO\" in the last [id number] hours.      Lab Results - Last 18 Months   Lab Units 05/14/24  0826 02/20/24  0750 12/08/23  0837 11/28/23  0827 10/13/23  0812 10/06/23  0849 09/06/23  1532 06/30/23  0918 06/20/23  0733 01/10/23  0905 12/01/22  0838   IRON mcg/dL 43 51  --  38 48 49 60   < >  --    < >  --    TIBC mcg/dL 302 291*  --  294* 329 329 292*   < > 334   < >  --    IRON SATURATION %  --   --   --   --   --   --   --   --  17*  --   --    IRON SATURATION (TSAT) % 14* 18*  --  13* 15* 15* 21   < >  --    < >  --    FERRITIN ng/mL 444.10* 539.00*  --  205.70* 165.30* 194.80* 260.60*   < > 455.00*   < >  --    TSH uIU/mL  --   --  5.540*  --   --   --   --   --  5.880*  --  4.640*    < > = values in this interval not displayed.       Marina Joe reports a pain score of 0.      ASSESSMENT:  1. Anemia of chronic renal failure, stage 3 (moderate), unspecified whether stage 3a or 3b CKD    2. Chronic ITP (idiopathic thrombocytopenia)    3. Iron deficiency anemia secondary to inadequate dietary iron intake    4. History of breast cancer    5. Pancreatic lesion              1. Chronic ITP (idiopathic thrombocytopenia) (HCC)   Bone marrow, left iliac crest, core biopsy, aspirate smears, and clot section: 9/11/23  Slightly hypercellular marrow (40%) with maturing trilineage hematopoiesis.  Aspiculate smears, nondiagnostic.  No overt features of myelodysplasia and no excess blast population.  Moderately increased megakaryocytes.  2+ stainable iron.  Peripheral blood smear:  Severe thrombocytopenia.  Mild normocytic normochromic anemia.  Rare circulating schistocytes.  Normal white blood cell count with normal differential and morphology.  No circulating blasts.  Flow cytometry: No " immunophenotypic evidence of abnormal myeloid maturation, increased blast population or a lymphoproliferative disorder.  Chromosome analysis: Normal female karyotype.  -Platelets remain stable today at 202  -Stable for observation.  Continue current plan.   PLAN  -Will transfuse for platelets < 10 regardless of bleeding.   -Transfuse platelets < 20 If bleeding   -Can give burst dose of steroids if platelets < 30.    2. Iron deficiency anemia   3.  Chronic Kidney Disease Stage IIIB   -Labs today: Hgb 11.4,  Hct 36.8, Iron 43, Ferritin 444, Sat 14%, TIBC 302   -BUN 20, Creatinine 1.52, GFR 35.0  - She received Inejctafer October 20, 2023.    -Her last Retacrit 4/19/2024  PLAN  -No Retacrit indicated today as Hgb >11   She will continue CBC and  Retacrit q 3 weeks Hgb< 11 or Hct < 33  -Iron Deficiency is stable  -Ferritin is adequate and significantly over 100.  Pt continues to produce Hgb effectively  -Will replace iron when ferritin trends down to 200s  -Can continue Retacrit as long as Ferritin > 100        4. History of breast cancer  - Pathologic stage: Stage IA (T1a, N0, cM0)   -Invasive mammary carcinoma, low-grade, ER positive DC positive HER-2 nu 2+, FISH unamplified.  -January 8, 2013 she underwent a left partial mastectomy with sentinel node biopsy  -Following surgery she underwent adjuvant radiation therapy and was then started on Arimidex  for hormonal manipulation.  She completed it in Jan 2023.  -Mammogram and ultrasound 2/14/24  Benign,   -Continue Annual Screening   -Still hasn't had bone density scan     5.  Pancreatic lesion   -MRI Abdomen 6/26/23 15 x 14 mm cystic lesion in the pancreatic tail without septations, mural nodules or enhancement.   -She had FNA on 12/20/23 per Dr. Gus Carvalho  Pathology:  No malignant cells identified.  Benign ductal epithelial cells, macrophages, and rare lymphocytes compatible with cyst contents.    -Per charting, Plan is to rescan in one year.     PLAN:   No retacrit  today.   CBC and Retacrit q 3 weeks for Hgb < 11 or Hct < 33   Recheck iron in 6 weeks.   RTC in 3 months with pre office labs for CBC, CMP, Iron Profile, Ferritin   Orders have been signed.  Continue current medications, treatment plans and follow up with PCP and any other providers  -Care discussed with patient.  Understanding expressed.  Patient agreeable with plan.            Analilia Madera, APRN  05/14/2024

## 2024-06-04 ENCOUNTER — INFUSION (OUTPATIENT)
Dept: ONCOLOGY | Facility: HOSPITAL | Age: 79
End: 2024-06-04
Payer: MEDICARE

## 2024-06-04 ENCOUNTER — LAB (OUTPATIENT)
Dept: LAB | Facility: HOSPITAL | Age: 79
End: 2024-06-04
Payer: MEDICARE

## 2024-06-04 VITALS
HEIGHT: 68 IN | WEIGHT: 243.6 LBS | SYSTOLIC BLOOD PRESSURE: 140 MMHG | DIASTOLIC BLOOD PRESSURE: 45 MMHG | BODY MASS INDEX: 36.92 KG/M2 | RESPIRATION RATE: 16 BRPM | HEART RATE: 71 BPM | TEMPERATURE: 96 F | OXYGEN SATURATION: 97 %

## 2024-06-04 DIAGNOSIS — N18.30 ANEMIA OF CHRONIC RENAL FAILURE, STAGE 3 (MODERATE), UNSPECIFIED WHETHER STAGE 3A OR 3B CKD: ICD-10-CM

## 2024-06-04 DIAGNOSIS — D63.1 ANEMIA OF CHRONIC RENAL FAILURE, STAGE 3 (MODERATE), UNSPECIFIED WHETHER STAGE 3A OR 3B CKD: Primary | ICD-10-CM

## 2024-06-04 DIAGNOSIS — D63.1 ANEMIA OF CHRONIC RENAL FAILURE, STAGE 3 (MODERATE), UNSPECIFIED WHETHER STAGE 3A OR 3B CKD: ICD-10-CM

## 2024-06-04 DIAGNOSIS — N18.30 ANEMIA OF CHRONIC RENAL FAILURE, STAGE 3 (MODERATE), UNSPECIFIED WHETHER STAGE 3A OR 3B CKD: Primary | ICD-10-CM

## 2024-06-04 LAB
ALBUMIN SERPL-MCNC: 3.9 G/DL (ref 3.5–5.2)
ALBUMIN/GLOB SERPL: 1.2 G/DL
ALP SERPL-CCNC: 94 U/L (ref 39–117)
ALT SERPL W P-5'-P-CCNC: 16 U/L (ref 1–33)
ANION GAP SERPL CALCULATED.3IONS-SCNC: 9 MMOL/L (ref 5–15)
AST SERPL-CCNC: 17 U/L (ref 1–32)
BASOPHILS # BLD AUTO: 0.03 10*3/MM3 (ref 0–0.2)
BASOPHILS NFR BLD AUTO: 0.5 % (ref 0–1.5)
BILIRUB SERPL-MCNC: 0.2 MG/DL (ref 0–1.2)
BUN SERPL-MCNC: 25 MG/DL (ref 8–23)
BUN/CREAT SERPL: 17.6 (ref 7–25)
CALCIUM SPEC-SCNC: 9.4 MG/DL (ref 8.6–10.5)
CHLORIDE SERPL-SCNC: 101 MMOL/L (ref 98–107)
CO2 SERPL-SCNC: 30 MMOL/L (ref 22–29)
CREAT SERPL-MCNC: 1.42 MG/DL (ref 0.57–1)
DEPRECATED RDW RBC AUTO: 54.4 FL (ref 37–54)
EGFRCR SERPLBLD CKD-EPI 2021: 37.9 ML/MIN/1.73
EOSINOPHIL # BLD AUTO: 0.26 10*3/MM3 (ref 0–0.4)
EOSINOPHIL NFR BLD AUTO: 4.1 % (ref 0.3–6.2)
ERYTHROCYTE [DISTWIDTH] IN BLOOD BY AUTOMATED COUNT: 15.8 % (ref 12.3–15.4)
GLOBULIN UR ELPH-MCNC: 3.2 GM/DL
GLUCOSE SERPL-MCNC: 122 MG/DL (ref 65–99)
HCT VFR BLD AUTO: 37.2 % (ref 34–46.6)
HGB BLD-MCNC: 11.3 G/DL (ref 12–15.9)
IMM GRANULOCYTES # BLD AUTO: 0.02 10*3/MM3 (ref 0–0.05)
IMM GRANULOCYTES NFR BLD AUTO: 0.3 % (ref 0–0.5)
LYMPHOCYTES # BLD AUTO: 1.35 10*3/MM3 (ref 0.7–3.1)
LYMPHOCYTES NFR BLD AUTO: 21.4 % (ref 19.6–45.3)
MCH RBC QN AUTO: 28.3 PG (ref 26.6–33)
MCHC RBC AUTO-ENTMCNC: 30.4 G/DL (ref 31.5–35.7)
MCV RBC AUTO: 93.2 FL (ref 79–97)
MONOCYTES # BLD AUTO: 0.53 10*3/MM3 (ref 0.1–0.9)
MONOCYTES NFR BLD AUTO: 8.4 % (ref 5–12)
NEUTROPHILS NFR BLD AUTO: 4.13 10*3/MM3 (ref 1.7–7)
NEUTROPHILS NFR BLD AUTO: 65.3 % (ref 42.7–76)
NRBC BLD AUTO-RTO: 0 /100 WBC (ref 0–0.2)
PLATELET # BLD AUTO: 179 10*3/MM3 (ref 140–450)
PMV BLD AUTO: 9.4 FL (ref 6–12)
POTASSIUM SERPL-SCNC: 3.9 MMOL/L (ref 3.5–5.2)
PROT SERPL-MCNC: 7.1 G/DL (ref 6–8.5)
RBC # BLD AUTO: 3.99 10*6/MM3 (ref 3.77–5.28)
SODIUM SERPL-SCNC: 140 MMOL/L (ref 136–145)
WBC NRBC COR # BLD AUTO: 6.32 10*3/MM3 (ref 3.4–10.8)

## 2024-06-04 PROCEDURE — 80053 COMPREHEN METABOLIC PANEL: CPT

## 2024-06-04 PROCEDURE — G0463 HOSPITAL OUTPT CLINIC VISIT: HCPCS

## 2024-06-04 PROCEDURE — 36415 COLL VENOUS BLD VENIPUNCTURE: CPT

## 2024-06-04 PROCEDURE — 85025 COMPLETE CBC W/AUTO DIFF WBC: CPT

## 2024-06-17 RX ORDER — BUPROPION HYDROCHLORIDE 150 MG/1
150 TABLET ORAL DAILY
Qty: 90 TABLET | OUTPATIENT
Start: 2024-06-17

## 2024-06-25 ENCOUNTER — TRANSCRIBE ORDERS (OUTPATIENT)
Dept: ADMINISTRATIVE | Facility: HOSPITAL | Age: 79
End: 2024-06-25
Payer: MEDICARE

## 2024-06-25 ENCOUNTER — LAB (OUTPATIENT)
Dept: LAB | Facility: HOSPITAL | Age: 79
End: 2024-06-25
Payer: MEDICARE

## 2024-06-25 ENCOUNTER — INFUSION (OUTPATIENT)
Dept: ONCOLOGY | Facility: HOSPITAL | Age: 79
End: 2024-06-25
Payer: MEDICARE

## 2024-06-25 VITALS
TEMPERATURE: 96.9 F | RESPIRATION RATE: 18 BRPM | OXYGEN SATURATION: 97 % | WEIGHT: 244 LBS | DIASTOLIC BLOOD PRESSURE: 61 MMHG | HEART RATE: 69 BPM | SYSTOLIC BLOOD PRESSURE: 144 MMHG | HEIGHT: 68 IN | BODY MASS INDEX: 36.98 KG/M2

## 2024-06-25 DIAGNOSIS — H46.9 OPTIC NEURITIS: Primary | ICD-10-CM

## 2024-06-25 DIAGNOSIS — N18.30 ANEMIA OF CHRONIC RENAL FAILURE, STAGE 3 (MODERATE), UNSPECIFIED WHETHER STAGE 3A OR 3B CKD: ICD-10-CM

## 2024-06-25 DIAGNOSIS — D63.1 ANEMIA OF CHRONIC RENAL FAILURE, STAGE 3 (MODERATE), UNSPECIFIED WHETHER STAGE 3A OR 3B CKD: ICD-10-CM

## 2024-06-25 DIAGNOSIS — D50.8 IRON DEFICIENCY ANEMIA SECONDARY TO INADEQUATE DIETARY IRON INTAKE: ICD-10-CM

## 2024-06-25 DIAGNOSIS — G44.1 VASCULAR HEADACHE: ICD-10-CM

## 2024-06-25 LAB
ALBUMIN SERPL-MCNC: 4.1 G/DL (ref 3.5–5.2)
ALBUMIN/GLOB SERPL: 1.3 G/DL
ALP SERPL-CCNC: 90 U/L (ref 39–117)
ALT SERPL W P-5'-P-CCNC: 19 U/L (ref 1–33)
ANION GAP SERPL CALCULATED.3IONS-SCNC: 12 MMOL/L (ref 5–15)
ANISOCYTOSIS BLD QL: ABNORMAL
AST SERPL-CCNC: 20 U/L (ref 1–32)
BASOPHILS # BLD MANUAL: 0 10*3/MM3 (ref 0–0.2)
BASOPHILS NFR BLD MANUAL: 0 % (ref 0–1.5)
BILIRUB SERPL-MCNC: 0.3 MG/DL (ref 0–1.2)
BUN SERPL-MCNC: 30 MG/DL (ref 8–23)
BUN/CREAT SERPL: 12.6 (ref 7–25)
CALCIUM SPEC-SCNC: 9.7 MG/DL (ref 8.6–10.5)
CHLORIDE SERPL-SCNC: 95 MMOL/L (ref 98–107)
CLUMPED PLATELETS: PRESENT
CO2 SERPL-SCNC: 27 MMOL/L (ref 22–29)
CREAT SERPL-MCNC: 2.39 MG/DL (ref 0.57–1)
DEPRECATED RDW RBC AUTO: 52.4 FL (ref 37–54)
EGFRCR SERPLBLD CKD-EPI 2021: 20.3 ML/MIN/1.73
ELLIPTOCYTES BLD QL SMEAR: ABNORMAL
EOSINOPHIL # BLD MANUAL: 0.35 10*3/MM3 (ref 0–0.4)
EOSINOPHIL NFR BLD MANUAL: 5.1 % (ref 0.3–6.2)
ERYTHROCYTE [DISTWIDTH] IN BLOOD BY AUTOMATED COUNT: 16.1 % (ref 12.3–15.4)
FERRITIN SERPL-MCNC: 451.1 NG/ML (ref 13–150)
GIANT PLATELETS: ABNORMAL
GLOBULIN UR ELPH-MCNC: 3.2 GM/DL
GLUCOSE SERPL-MCNC: 121 MG/DL (ref 65–99)
HCT VFR BLD AUTO: 36.1 % (ref 34–46.6)
HGB BLD-MCNC: 11.7 G/DL (ref 12–15.9)
IRON 24H UR-MRATE: 56 MCG/DL (ref 37–145)
IRON SATN MFR SERPL: 16 % (ref 20–50)
LYMPHOCYTES # BLD MANUAL: 1.17 10*3/MM3 (ref 0.7–3.1)
LYMPHOCYTES NFR BLD MANUAL: 6.1 % (ref 5–12)
MCH RBC QN AUTO: 28.7 PG (ref 26.6–33)
MCHC RBC AUTO-ENTMCNC: 32.4 G/DL (ref 31.5–35.7)
MCV RBC AUTO: 88.7 FL (ref 79–97)
MONOCYTES # BLD: 0.42 10*3/MM3 (ref 0.1–0.9)
NEUTROPHILS # BLD AUTO: 4.9 10*3/MM3 (ref 1.7–7)
NEUTROPHILS NFR BLD MANUAL: 71.7 % (ref 42.7–76)
OVALOCYTES BLD QL SMEAR: ABNORMAL
PLATELET # BLD AUTO: 90 10*3/MM3 (ref 140–450)
PMV BLD AUTO: 12.3 FL (ref 6–12)
POIKILOCYTOSIS BLD QL SMEAR: ABNORMAL
POTASSIUM SERPL-SCNC: 3.7 MMOL/L (ref 3.5–5.2)
PROT SERPL-MCNC: 7.3 G/DL (ref 6–8.5)
RBC # BLD AUTO: 4.07 10*6/MM3 (ref 3.77–5.28)
SMALL PLATELETS BLD QL SMEAR: ABNORMAL
SODIUM SERPL-SCNC: 134 MMOL/L (ref 136–145)
TIBC SERPL-MCNC: 355 MCG/DL (ref 298–536)
TRANSFERRIN SERPL-MCNC: 238 MG/DL (ref 200–360)
VARIANT LYMPHS NFR BLD MANUAL: 1 % (ref 0–5)
VARIANT LYMPHS NFR BLD MANUAL: 16.2 % (ref 19.6–45.3)
WBC MORPH BLD: NORMAL
WBC NRBC COR # BLD AUTO: 6.83 10*3/MM3 (ref 3.4–10.8)

## 2024-06-25 PROCEDURE — 84466 ASSAY OF TRANSFERRIN: CPT

## 2024-06-25 PROCEDURE — 83540 ASSAY OF IRON: CPT

## 2024-06-25 PROCEDURE — 85007 BL SMEAR W/DIFF WBC COUNT: CPT

## 2024-06-25 PROCEDURE — 80053 COMPREHEN METABOLIC PANEL: CPT

## 2024-06-25 PROCEDURE — 85025 COMPLETE CBC W/AUTO DIFF WBC: CPT

## 2024-06-25 PROCEDURE — 36415 COLL VENOUS BLD VENIPUNCTURE: CPT

## 2024-06-25 PROCEDURE — 82728 ASSAY OF FERRITIN: CPT

## 2024-06-25 PROCEDURE — G0463 HOSPITAL OUTPT CLINIC VISIT: HCPCS

## 2024-06-25 RX ORDER — GABAPENTIN 300 MG/1
300 CAPSULE ORAL 3 TIMES DAILY
Status: ON HOLD | COMMUNITY
End: 2024-06-28

## 2024-06-25 NOTE — PROGRESS NOTES
Pt here for poss Retacrit.  Held Retacrit due to parameters not met.  Message sent to Analilia HERNANDEZ regarding creat 2.39, GFR 20.3, , fe sat 16, and ferritin of 451.10.  No new orders per Frannie. The office will fax lab results to Dr. Martin and advised pt to f/u with him.  She v/u. JOEL Chen RN

## 2024-06-27 ENCOUNTER — TELEPHONE (OUTPATIENT)
Dept: ONCOLOGY | Facility: CLINIC | Age: 79
End: 2024-06-27
Payer: MEDICARE

## 2024-06-27 NOTE — TELEPHONE ENCOUNTER
----- Message from Analilia Madera sent at 6/27/2024  3:43 PM CDT -----  Her platelets dropped to 90. Will you see if shes willing to recheck CBC next week

## 2024-06-28 ENCOUNTER — APPOINTMENT (OUTPATIENT)
Dept: MRI IMAGING | Facility: HOSPITAL | Age: 79
End: 2024-06-28
Payer: MEDICARE

## 2024-06-28 ENCOUNTER — APPOINTMENT (OUTPATIENT)
Dept: CT IMAGING | Facility: HOSPITAL | Age: 79
End: 2024-06-28
Payer: MEDICARE

## 2024-06-28 ENCOUNTER — HOSPITAL ENCOUNTER (INPATIENT)
Facility: HOSPITAL | Age: 79
LOS: 17 days | Discharge: HOSPICE | End: 2024-07-15
Attending: EMERGENCY MEDICINE | Admitting: INTERNAL MEDICINE
Payer: MEDICARE

## 2024-06-28 ENCOUNTER — APPOINTMENT (OUTPATIENT)
Dept: GENERAL RADIOLOGY | Facility: HOSPITAL | Age: 79
End: 2024-06-28
Payer: MEDICARE

## 2024-06-28 DIAGNOSIS — N28.89 LEFT RENAL MASS: ICD-10-CM

## 2024-06-28 DIAGNOSIS — M79.89 NODULE OF SOFT TISSUE: ICD-10-CM

## 2024-06-28 DIAGNOSIS — Z85.3 HX: BREAST CANCER: ICD-10-CM

## 2024-06-28 DIAGNOSIS — D69.6 THROMBOCYTOPENIA: ICD-10-CM

## 2024-06-28 DIAGNOSIS — Z17.0 MALIGNANT NEOPLASM OF LEFT BREAST IN FEMALE, ESTROGEN RECEPTOR POSITIVE, UNSPECIFIED SITE OF BREAST: ICD-10-CM

## 2024-06-28 DIAGNOSIS — G83.4 CAUDA EQUINA COMPRESSION: ICD-10-CM

## 2024-06-28 DIAGNOSIS — N18.9 ACUTE ON CHRONIC RENAL INSUFFICIENCY: ICD-10-CM

## 2024-06-28 DIAGNOSIS — Z74.09 IMPAIRED MOBILITY: ICD-10-CM

## 2024-06-28 DIAGNOSIS — N28.9 ACUTE ON CHRONIC RENAL INSUFFICIENCY: ICD-10-CM

## 2024-06-28 DIAGNOSIS — E27.8 RIGHT ADRENAL MASS: ICD-10-CM

## 2024-06-28 DIAGNOSIS — C79.51 METASTATIC CANCER TO SPINE: Primary | ICD-10-CM

## 2024-06-28 DIAGNOSIS — C50.912 MALIGNANT NEOPLASM OF LEFT BREAST IN FEMALE, ESTROGEN RECEPTOR POSITIVE, UNSPECIFIED SITE OF BREAST: ICD-10-CM

## 2024-06-28 LAB
ALBUMIN SERPL-MCNC: 4.4 G/DL (ref 3.5–5.2)
ALBUMIN/GLOB SERPL: 1.2 G/DL
ALP SERPL-CCNC: 85 U/L (ref 39–117)
ALT SERPL W P-5'-P-CCNC: 18 U/L (ref 1–33)
ANION GAP SERPL CALCULATED.3IONS-SCNC: 14 MMOL/L (ref 5–15)
ANION GAP SERPL CALCULATED.3IONS-SCNC: 17 MMOL/L (ref 5–15)
AST SERPL-CCNC: 22 U/L (ref 1–32)
BACTERIA UR QL AUTO: ABNORMAL /HPF
BASOPHILS # BLD AUTO: 0.03 10*3/MM3 (ref 0–0.2)
BASOPHILS NFR BLD AUTO: 0.4 % (ref 0–1.5)
BILIRUB SERPL-MCNC: 0.3 MG/DL (ref 0–1.2)
BILIRUB UR QL STRIP: NEGATIVE
BUN SERPL-MCNC: 30 MG/DL (ref 8–23)
BUN SERPL-MCNC: 32 MG/DL (ref 8–23)
BUN/CREAT SERPL: 11.6 (ref 7–25)
BUN/CREAT SERPL: 12.4 (ref 7–25)
C TRACH RRNA CVX QL NAA+PROBE: NOT DETECTED
CALCIUM SPEC-SCNC: 10 MG/DL (ref 8.6–10.5)
CALCIUM SPEC-SCNC: 9.5 MG/DL (ref 8.6–10.5)
CHLORIDE SERPL-SCNC: 95 MMOL/L (ref 98–107)
CHLORIDE SERPL-SCNC: 97 MMOL/L (ref 98–107)
CLARITY UR: CLEAR
CLUE CELLS SPEC QL WET PREP: ABNORMAL
CO2 SERPL-SCNC: 21 MMOL/L (ref 22–29)
CO2 SERPL-SCNC: 26 MMOL/L (ref 22–29)
COLOR UR: YELLOW
CREAT SERPL-MCNC: 2.59 MG/DL (ref 0.57–1)
CREAT SERPL-MCNC: 2.59 MG/DL (ref 0.57–1)
DEPRECATED RDW RBC AUTO: 52.2 FL (ref 37–54)
EGFRCR SERPLBLD CKD-EPI 2021: 18.4 ML/MIN/1.73
EGFRCR SERPLBLD CKD-EPI 2021: 18.4 ML/MIN/1.73
EOSINOPHIL # BLD AUTO: 0.23 10*3/MM3 (ref 0–0.4)
EOSINOPHIL NFR BLD AUTO: 2.8 % (ref 0.3–6.2)
ERYTHROCYTE [DISTWIDTH] IN BLOOD BY AUTOMATED COUNT: 15.9 % (ref 12.3–15.4)
GLOBULIN UR ELPH-MCNC: 3.6 GM/DL
GLUCOSE SERPL-MCNC: 94 MG/DL (ref 65–99)
GLUCOSE SERPL-MCNC: 99 MG/DL (ref 65–99)
GLUCOSE UR STRIP-MCNC: ABNORMAL MG/DL
HCT VFR BLD AUTO: 36.6 % (ref 34–46.6)
HGB BLD-MCNC: 11.6 G/DL (ref 12–15.9)
HGB UR QL STRIP.AUTO: NEGATIVE
HOLD SPECIMEN: NORMAL
HYALINE CASTS UR QL AUTO: ABNORMAL /LPF
HYDATID CYST SPEC WET PREP: ABNORMAL
IMM GRANULOCYTES # BLD AUTO: 0.03 10*3/MM3 (ref 0–0.05)
IMM GRANULOCYTES NFR BLD AUTO: 0.4 % (ref 0–0.5)
KETONES UR QL STRIP: NEGATIVE
LEUKOCYTE ESTERASE UR QL STRIP.AUTO: ABNORMAL
LIPASE SERPL-CCNC: 14 U/L (ref 13–60)
LYMPHOCYTES # BLD AUTO: 1.67 10*3/MM3 (ref 0.7–3.1)
LYMPHOCYTES NFR BLD AUTO: 20.6 % (ref 19.6–45.3)
MCH RBC QN AUTO: 28.5 PG (ref 26.6–33)
MCHC RBC AUTO-ENTMCNC: 31.7 G/DL (ref 31.5–35.7)
MCV RBC AUTO: 89.9 FL (ref 79–97)
MONOCYTES # BLD AUTO: 0.78 10*3/MM3 (ref 0.1–0.9)
MONOCYTES NFR BLD AUTO: 9.6 % (ref 5–12)
N GONORRHOEA RRNA SPEC QL NAA+PROBE: NOT DETECTED
NEUTROPHILS NFR BLD AUTO: 5.37 10*3/MM3 (ref 1.7–7)
NEUTROPHILS NFR BLD AUTO: 66.2 % (ref 42.7–76)
NITRITE UR QL STRIP: NEGATIVE
PH UR STRIP.AUTO: <=5 [PH] (ref 5–8)
PLATELET # BLD AUTO: 81 10*3/MM3 (ref 140–450)
PMV BLD AUTO: ABNORMAL FL
POTASSIUM SERPL-SCNC: 3.8 MMOL/L (ref 3.5–5.2)
POTASSIUM SERPL-SCNC: 3.9 MMOL/L (ref 3.5–5.2)
PROT SERPL-MCNC: 8 G/DL (ref 6–8.5)
PROT UR QL STRIP: ABNORMAL
RBC # BLD AUTO: 4.07 10*6/MM3 (ref 3.77–5.28)
RBC # UR STRIP: ABNORMAL /HPF
REF LAB TEST METHOD: ABNORMAL
SODIUM SERPL-SCNC: 135 MMOL/L (ref 136–145)
SODIUM SERPL-SCNC: 135 MMOL/L (ref 136–145)
SP GR UR STRIP: 1.02 (ref 1–1.03)
SQUAMOUS #/AREA URNS HPF: ABNORMAL /HPF
T VAGINALIS SPEC QL WET PREP: ABNORMAL
UROBILINOGEN UR QL STRIP: ABNORMAL
WBC # UR STRIP: ABNORMAL /HPF
WBC NRBC COR # BLD AUTO: 8.11 10*3/MM3 (ref 3.4–10.8)
WBC SPEC QL WET PREP: ABNORMAL
WHOLE BLOOD HOLD COAG: NORMAL
WHOLE BLOOD HOLD SPECIMEN: NORMAL
YEAST GENITAL QL WET PREP: ABNORMAL

## 2024-06-28 PROCEDURE — G0378 HOSPITAL OBSERVATION PER HR: HCPCS

## 2024-06-28 PROCEDURE — 72158 MRI LUMBAR SPINE W/O & W/DYE: CPT

## 2024-06-28 PROCEDURE — 0 HYDROMORPHONE 1 MG/ML SOLUTION: Performed by: EMERGENCY MEDICINE

## 2024-06-28 PROCEDURE — 25010000002 MORPHINE PER 10 MG: Performed by: EMERGENCY MEDICINE

## 2024-06-28 PROCEDURE — 81001 URINALYSIS AUTO W/SCOPE: CPT | Performed by: EMERGENCY MEDICINE

## 2024-06-28 PROCEDURE — 85025 COMPLETE CBC W/AUTO DIFF WBC: CPT | Performed by: EMERGENCY MEDICINE

## 2024-06-28 PROCEDURE — 25810000003 SODIUM CHLORIDE 0.9 % SOLUTION: Performed by: INTERNAL MEDICINE

## 2024-06-28 PROCEDURE — 99204 OFFICE O/P NEW MOD 45 MIN: CPT | Performed by: UROLOGY

## 2024-06-28 PROCEDURE — 71045 X-RAY EXAM CHEST 1 VIEW: CPT

## 2024-06-28 PROCEDURE — 25010000002 HEPARIN (PORCINE) PER 1000 UNITS: Performed by: INTERNAL MEDICINE

## 2024-06-28 PROCEDURE — 99285 EMERGENCY DEPT VISIT HI MDM: CPT

## 2024-06-28 PROCEDURE — 83690 ASSAY OF LIPASE: CPT | Performed by: EMERGENCY MEDICINE

## 2024-06-28 PROCEDURE — 74176 CT ABD & PELVIS W/O CONTRAST: CPT

## 2024-06-28 PROCEDURE — 0 GADOBENATE DIMEGLUMINE 529 MG/ML SOLUTION: Performed by: INTERNAL MEDICINE

## 2024-06-28 PROCEDURE — 80053 COMPREHEN METABOLIC PANEL: CPT | Performed by: EMERGENCY MEDICINE

## 2024-06-28 PROCEDURE — 25810000003 SODIUM CHLORIDE 0.9 % SOLUTION: Performed by: EMERGENCY MEDICINE

## 2024-06-28 PROCEDURE — 94640 AIRWAY INHALATION TREATMENT: CPT

## 2024-06-28 PROCEDURE — 99222 1ST HOSP IP/OBS MODERATE 55: CPT | Performed by: NURSE PRACTITIONER

## 2024-06-28 PROCEDURE — 87591 N.GONORRHOEAE DNA AMP PROB: CPT | Performed by: EMERGENCY MEDICINE

## 2024-06-28 PROCEDURE — 72131 CT LUMBAR SPINE W/O DYE: CPT

## 2024-06-28 PROCEDURE — 71250 CT THORAX DX C-: CPT

## 2024-06-28 PROCEDURE — 74183 MRI ABD W/O CNTR FLWD CNTR: CPT

## 2024-06-28 PROCEDURE — 87491 CHLMYD TRACH DNA AMP PROBE: CPT | Performed by: EMERGENCY MEDICINE

## 2024-06-28 PROCEDURE — 87210 SMEAR WET MOUNT SALINE/INK: CPT | Performed by: EMERGENCY MEDICINE

## 2024-06-28 PROCEDURE — 25010000002 ONDANSETRON PER 1 MG: Performed by: EMERGENCY MEDICINE

## 2024-06-28 PROCEDURE — 97165 OT EVAL LOW COMPLEX 30 MIN: CPT

## 2024-06-28 PROCEDURE — A9577 INJ MULTIHANCE: HCPCS | Performed by: INTERNAL MEDICINE

## 2024-06-28 PROCEDURE — 87086 URINE CULTURE/COLONY COUNT: CPT | Performed by: EMERGENCY MEDICINE

## 2024-06-28 RX ORDER — ONDANSETRON 2 MG/ML
4 INJECTION INTRAMUSCULAR; INTRAVENOUS EVERY 6 HOURS PRN
Status: DISCONTINUED | OUTPATIENT
Start: 2024-06-28 | End: 2024-07-15 | Stop reason: HOSPADM

## 2024-06-28 RX ORDER — PANTOPRAZOLE SODIUM 40 MG/1
40 TABLET, DELAYED RELEASE ORAL DAILY
COMMUNITY

## 2024-06-28 RX ORDER — ROSUVASTATIN CALCIUM 20 MG/1
20 TABLET, COATED ORAL DAILY
COMMUNITY

## 2024-06-28 RX ORDER — VALACYCLOVIR HYDROCHLORIDE 500 MG/1
500 TABLET, FILM COATED ORAL 2 TIMES DAILY
COMMUNITY

## 2024-06-28 RX ORDER — AMOXICILLIN 250 MG
2 CAPSULE ORAL 2 TIMES DAILY PRN
Status: DISCONTINUED | OUTPATIENT
Start: 2024-06-28 | End: 2024-07-15 | Stop reason: HOSPADM

## 2024-06-28 RX ORDER — SODIUM CHLORIDE 0.9 % (FLUSH) 0.9 %
10 SYRINGE (ML) INJECTION EVERY 12 HOURS SCHEDULED
Status: DISCONTINUED | OUTPATIENT
Start: 2024-06-28 | End: 2024-07-15 | Stop reason: HOSPADM

## 2024-06-28 RX ORDER — FLUTICASONE PROPIONATE 50 MCG
2 SPRAY, SUSPENSION (ML) NASAL DAILY
Status: DISCONTINUED | OUTPATIENT
Start: 2024-06-28 | End: 2024-07-15 | Stop reason: HOSPADM

## 2024-06-28 RX ORDER — CLONIDINE HYDROCHLORIDE 0.1 MG/1
0.1 TABLET ORAL 2 TIMES DAILY
Status: DISCONTINUED | OUTPATIENT
Start: 2024-06-28 | End: 2024-07-05

## 2024-06-28 RX ORDER — BISACODYL 10 MG
10 SUPPOSITORY, RECTAL RECTAL DAILY PRN
Status: DISCONTINUED | OUTPATIENT
Start: 2024-06-28 | End: 2024-07-15 | Stop reason: HOSPADM

## 2024-06-28 RX ORDER — CETIRIZINE HYDROCHLORIDE 10 MG/1
10 TABLET ORAL DAILY
Status: DISCONTINUED | OUTPATIENT
Start: 2024-06-28 | End: 2024-07-15 | Stop reason: HOSPADM

## 2024-06-28 RX ORDER — AMLODIPINE BESYLATE 5 MG/1
5 TABLET ORAL 2 TIMES DAILY
Status: DISCONTINUED | OUTPATIENT
Start: 2024-06-28 | End: 2024-07-15 | Stop reason: HOSPADM

## 2024-06-28 RX ORDER — ONDANSETRON 2 MG/ML
4 INJECTION INTRAMUSCULAR; INTRAVENOUS ONCE
Status: COMPLETED | OUTPATIENT
Start: 2024-06-28 | End: 2024-06-28

## 2024-06-28 RX ORDER — ROPINIROLE 0.25 MG/1
0.5 TABLET, FILM COATED ORAL NIGHTLY
Status: DISCONTINUED | OUTPATIENT
Start: 2024-06-28 | End: 2024-07-15 | Stop reason: HOSPADM

## 2024-06-28 RX ORDER — CYCLOBENZAPRINE HCL 10 MG
10 TABLET ORAL 3 TIMES DAILY PRN
COMMUNITY

## 2024-06-28 RX ORDER — ROPINIROLE 0.5 MG/1
0.5 TABLET, FILM COATED ORAL NIGHTLY
COMMUNITY

## 2024-06-28 RX ORDER — VALACYCLOVIR HYDROCHLORIDE 500 MG/1
500 TABLET, FILM COATED ORAL
Status: COMPLETED | OUTPATIENT
Start: 2024-06-29 | End: 2024-07-01

## 2024-06-28 RX ORDER — BISACODYL 5 MG/1
5 TABLET, DELAYED RELEASE ORAL DAILY PRN
Status: DISCONTINUED | OUTPATIENT
Start: 2024-06-28 | End: 2024-07-15 | Stop reason: HOSPADM

## 2024-06-28 RX ORDER — HYDRALAZINE HYDROCHLORIDE 20 MG/ML
10 INJECTION INTRAMUSCULAR; INTRAVENOUS EVERY 4 HOURS PRN
Status: DISCONTINUED | OUTPATIENT
Start: 2024-06-28 | End: 2024-07-15 | Stop reason: HOSPADM

## 2024-06-28 RX ORDER — BUPROPION HYDROCHLORIDE 150 MG/1
150 TABLET ORAL DAILY
Status: DISCONTINUED | OUTPATIENT
Start: 2024-06-28 | End: 2024-07-15 | Stop reason: HOSPADM

## 2024-06-28 RX ORDER — AMLODIPINE BESYLATE 5 MG/1
5 TABLET ORAL 2 TIMES DAILY
COMMUNITY

## 2024-06-28 RX ORDER — CYCLOBENZAPRINE HCL 10 MG
10 TABLET ORAL 3 TIMES DAILY PRN
Status: DISCONTINUED | OUTPATIENT
Start: 2024-06-28 | End: 2024-07-15 | Stop reason: HOSPADM

## 2024-06-28 RX ORDER — ALBUTEROL SULFATE 2.5 MG/3ML
2.5 SOLUTION RESPIRATORY (INHALATION) EVERY 4 HOURS PRN
Status: DISCONTINUED | OUTPATIENT
Start: 2024-06-28 | End: 2024-07-15 | Stop reason: HOSPADM

## 2024-06-28 RX ORDER — HEPARIN SODIUM 5000 [USP'U]/ML
5000 INJECTION, SOLUTION INTRAVENOUS; SUBCUTANEOUS EVERY 12 HOURS SCHEDULED
Status: DISCONTINUED | OUTPATIENT
Start: 2024-06-28 | End: 2024-06-29

## 2024-06-28 RX ORDER — SODIUM CHLORIDE 9 MG/ML
40 INJECTION, SOLUTION INTRAVENOUS AS NEEDED
Status: DISCONTINUED | OUTPATIENT
Start: 2024-06-28 | End: 2024-07-15 | Stop reason: HOSPADM

## 2024-06-28 RX ORDER — MONTELUKAST SODIUM 10 MG/1
10 TABLET ORAL NIGHTLY
Status: DISCONTINUED | OUTPATIENT
Start: 2024-06-28 | End: 2024-07-15 | Stop reason: HOSPADM

## 2024-06-28 RX ORDER — SODIUM CHLORIDE 0.9 % (FLUSH) 0.9 %
10 SYRINGE (ML) INJECTION AS NEEDED
Status: DISCONTINUED | OUTPATIENT
Start: 2024-06-28 | End: 2024-07-15 | Stop reason: HOSPADM

## 2024-06-28 RX ORDER — CARVEDILOL 6.25 MG/1
6.25 TABLET ORAL 2 TIMES DAILY WITH MEALS
Status: DISCONTINUED | OUTPATIENT
Start: 2024-06-28 | End: 2024-07-03

## 2024-06-28 RX ORDER — ROSUVASTATIN CALCIUM 10 MG/1
10 TABLET, COATED ORAL NIGHTLY
Status: DISCONTINUED | OUTPATIENT
Start: 2024-06-28 | End: 2024-07-15 | Stop reason: HOSPADM

## 2024-06-28 RX ORDER — ACETAMINOPHEN 325 MG/1
650 TABLET ORAL EVERY 6 HOURS PRN
Status: DISCONTINUED | OUTPATIENT
Start: 2024-06-28 | End: 2024-07-15 | Stop reason: HOSPADM

## 2024-06-28 RX ORDER — MONTELUKAST SODIUM 10 MG/1
10 TABLET ORAL NIGHTLY
COMMUNITY

## 2024-06-28 RX ORDER — VALACYCLOVIR HYDROCHLORIDE 500 MG/1
500 TABLET, FILM COATED ORAL 2 TIMES DAILY
Qty: 7 TABLET | Refills: 0 | Status: DISCONTINUED | OUTPATIENT
Start: 2024-06-28 | End: 2024-06-28

## 2024-06-28 RX ORDER — PANTOPRAZOLE SODIUM 40 MG/1
40 TABLET, DELAYED RELEASE ORAL DAILY
Status: DISCONTINUED | OUTPATIENT
Start: 2024-06-28 | End: 2024-07-15 | Stop reason: HOSPADM

## 2024-06-28 RX ORDER — POLYETHYLENE GLYCOL 3350 17 G/17G
17 POWDER, FOR SOLUTION ORAL DAILY PRN
Status: DISCONTINUED | OUTPATIENT
Start: 2024-06-28 | End: 2024-07-15 | Stop reason: HOSPADM

## 2024-06-28 RX ORDER — CARVEDILOL 6.25 MG/1
6.25 TABLET ORAL 2 TIMES DAILY WITH MEALS
COMMUNITY
End: 2024-07-15 | Stop reason: HOSPADM

## 2024-06-28 RX ORDER — DAPAGLIFLOZIN 10 MG/1
1 TABLET, FILM COATED ORAL DAILY
COMMUNITY

## 2024-06-28 RX ORDER — ROSUVASTATIN CALCIUM 20 MG/1
20 TABLET, COATED ORAL NIGHTLY
Status: DISCONTINUED | OUTPATIENT
Start: 2024-06-28 | End: 2024-06-28

## 2024-06-28 RX ORDER — SODIUM CHLORIDE 9 MG/ML
50 INJECTION, SOLUTION INTRAVENOUS CONTINUOUS
Status: DISCONTINUED | OUTPATIENT
Start: 2024-06-28 | End: 2024-07-03

## 2024-06-28 RX ORDER — MULTIPLE VITAMINS W/ MINERALS TAB 9MG-400MCG
1 TAB ORAL DAILY
Status: DISCONTINUED | OUTPATIENT
Start: 2024-06-28 | End: 2024-07-15 | Stop reason: HOSPADM

## 2024-06-28 RX ADMIN — CARVEDILOL 6.25 MG: 6.25 TABLET, FILM COATED ORAL at 11:45

## 2024-06-28 RX ADMIN — Medication 1 TABLET: at 11:45

## 2024-06-28 RX ADMIN — HEPARIN SODIUM 5000 UNITS: 5000 INJECTION INTRAVENOUS; SUBCUTANEOUS at 11:44

## 2024-06-28 RX ADMIN — ACETAMINOPHEN 650 MG: 325 TABLET, FILM COATED ORAL at 21:56

## 2024-06-28 RX ADMIN — ROPINIROLE HYDROCHLORIDE 0.5 MG: 0.25 TABLET, FILM COATED ORAL at 20:40

## 2024-06-28 RX ADMIN — ALBUTEROL SULFATE 2.5 MG: 2.5 SOLUTION RESPIRATORY (INHALATION) at 10:40

## 2024-06-28 RX ADMIN — PANTOPRAZOLE SODIUM 40 MG: 40 TABLET, DELAYED RELEASE ORAL at 11:44

## 2024-06-28 RX ADMIN — CLONIDINE HYDROCHLORIDE 0.1 MG: 0.1 TABLET ORAL at 11:44

## 2024-06-28 RX ADMIN — Medication 10 ML: at 11:46

## 2024-06-28 RX ADMIN — GADOBENATE DIMEGLUMINE 20 ML: 529 INJECTION, SOLUTION INTRAVENOUS at 17:38

## 2024-06-28 RX ADMIN — VALACYCLOVIR 500 MG: 500 TABLET, FILM COATED ORAL at 11:45

## 2024-06-28 RX ADMIN — HEPARIN SODIUM 5000 UNITS: 5000 INJECTION INTRAVENOUS; SUBCUTANEOUS at 20:41

## 2024-06-28 RX ADMIN — BUPROPION HYDROCHLORIDE 150 MG: 150 TABLET, EXTENDED RELEASE ORAL at 11:46

## 2024-06-28 RX ADMIN — ONDANSETRON 4 MG: 2 INJECTION INTRAMUSCULAR; INTRAVENOUS at 07:13

## 2024-06-28 RX ADMIN — SODIUM CHLORIDE 100 ML/HR: 900 INJECTION, SOLUTION INTRAVENOUS at 11:43

## 2024-06-28 RX ADMIN — MORPHINE SULFATE 4 MG: 4 INJECTION, SOLUTION INTRAMUSCULAR; INTRAVENOUS at 07:14

## 2024-06-28 RX ADMIN — Medication 10 ML: at 20:40

## 2024-06-28 RX ADMIN — SERTRALINE 100 MG: 50 TABLET, FILM COATED ORAL at 11:45

## 2024-06-28 RX ADMIN — ROSUVASTATIN CALCIUM 10 MG: 10 TABLET, FILM COATED ORAL at 20:41

## 2024-06-28 RX ADMIN — SODIUM CHLORIDE 500 ML: 9 INJECTION, SOLUTION INTRAVENOUS at 07:11

## 2024-06-28 RX ADMIN — FLUTICASONE PROPIONATE 2 SPRAY: 50 SPRAY, METERED NASAL at 11:45

## 2024-06-28 RX ADMIN — AMLODIPINE BESYLATE 5 MG: 5 TABLET ORAL at 11:45

## 2024-06-28 RX ADMIN — HYDROMORPHONE HYDROCHLORIDE 1 MG: 1 INJECTION, SOLUTION INTRAMUSCULAR; INTRAVENOUS; SUBCUTANEOUS at 09:05

## 2024-06-28 RX ADMIN — CETIRIZINE HYDROCHLORIDE 10 MG: 10 TABLET ORAL at 11:45

## 2024-06-28 RX ADMIN — MONTELUKAST SODIUM 10 MG: 10 TABLET, FILM COATED ORAL at 20:40

## 2024-06-28 NOTE — PROGRESS NOTES
"Pharmacy Renal Dose Conversion    Marina Joe is a 79 y.o. year old female  172.7 cm (68\") 111 kg (244 lb)    Estimated Creatinine Clearance: 23.4 mL/min (A) (by C-G formula based on SCr of 2.59 mg/dL (H)).   Creatinine   Date Value Ref Range Status   06/28/2024 2.59 (H) 0.57 - 1.00 mg/dL Final   06/28/2024 2.59 (H) 0.57 - 1.00 mg/dL Final   06/25/2024 2.39 (H) 0.57 - 1.00 mg/dL Final   06/26/2023 1.50 (H) 0.60 - 1.30 mg/dL Final     Comment:     Serial Number: [id number]:  [id number]   12/13/2021 1.3 (H) 0.5 - 0.9 mg/dL Final       PLAN  Based on prescribing information provided by the drug , Valtrex 500mg po Q12H and Crestor 20 mg po QHS, have been changed to Valtrex 500mg po Q24H and Crestor 10 mg po QHS.    Adjusted per the directives and guidelines established by [facility] Pharmacy and Therapeutics Committee and [facility] Medical Executive Committee.  Pharmacy will continue to monitor daily and make further adjustment(s) accordingly.    Mariusz Meek, PharmD  06/28/2416:00 CDT    "

## 2024-06-28 NOTE — PLAN OF CARE
Goal Outcome Evaluation:      Pt to room 379 from ER with DX: Left renal mass. Pt alert and oriented, c/o bilateral flank pain. Spouse at bedside. Will continue to monitor pt.

## 2024-06-28 NOTE — CONSULTS
"Referring Provider: Sonam Cintron  Reason for Consultation: Bilateral renal masses    Chief complaint: \"[bilateral flank pain]\"    Subjective     History of present illness:    Ms. Joe presents to our ER for multiple complaints including bilateral flank pain.     She reports new onset of multiple subcutaneous nodules.     Labs revealed acute on chronic kidney disease.    Urinalysis, Microscopic Only - Urine, Clean Catch (06/28/2024 07:11)    Urinalysis With Culture If Indicated - Urine, Clean Catch (06/28/2024 07:11)    Comprehensive Metabolic Panel (06/28/2024 06:17)    CBC & Differential (06/28/2024 06:17)    CT revealed bilateral renal masses and numerous subcutaneous lesions.    CT Abdomen Pelvis Stone Protocol (06/28/2024 07:59)     I have reviewed the images - unclear etiology.    Has a history breast cancer and cystic lesion of the pancreas.    Sees Oncology.    Progress Notes by Analilia Madera APRN (05/14/2024 09:15)     Review of Systems  Pertinent items are noted in HPI     History  Past Medical History:   Diagnosis Date    Breast cancer     CKD (chronic kidney disease)     Hypercholesteremia     Hypertension     Sinusitis     Stage 3a chronic kidney disease 10/20/2020       Past Surgical History:   Procedure Laterality Date    AVULSION TOENAIL PLATE      Sept 26,2018    BREAST BIOPSY Left 11/20/2012    BREAST BIOPSY      Left Breast, 1/2019 per Dr Barkley    BREAST LUMPECTOMY Left     with node bx     COLONOSCOPY  09/13/2013    small polyp at 30cm benign hyperplastic polyp, changes consistent with melanosis coli. Recall 5 years    COLONOSCOPY  11/19/2018    Tics otherwise normal exam repeat in 5 years    COLONOSCOPY  02/13/2023    Normal exam repeat in 3 years with a 2 day prep    ENDOSCOPY  02/13/2023    Gastritis, small HH    REPLACEMENT TOTAL KNEE Right     2016    TOTAL ABDOMINAL HYSTERECTOMY WITH SALPINGO OOPHORECTOMY      UPPER ENDOSCOPIC ULTRASOUND W/ FNA  12/20/2023    Pancreatic cyst s/p FNA "       Family History   Problem Relation Age of Onset    Heart attack Mother     Hodgkin's lymphoma Father     Other Father     Cancer Father         hodgkins    Heart attack Brother     Skin cancer Maternal Uncle     Pancreatic cancer Maternal Uncle     Cancer Maternal Uncle         eye    Colon cancer Neg Hx     Colon polyps Neg Hx        Social History     Socioeconomic History    Marital status:    Tobacco Use    Smoking status: Never    Smokeless tobacco: Never   Vaping Use    Vaping status: Never Used   Substance and Sexual Activity    Alcohol use: No    Drug use: Not Currently    Sexual activity: Not Currently     Birth control/protection: Surgical       Current Facility-Administered Medications   Medication Dose Route Frequency Provider Last Rate Last Admin    sodium chloride 0.9 % flush 10 mL  10 mL Intravenous PRN Sonam Argueta MD         Current Outpatient Medications   Medication Sig Dispense Refill    albuterol sulfate  (90 Base) MCG/ACT inhaler Inhale 2 puffs Every 4 (Four) Hours As Needed for Wheezing. 2 g 3    amLODIPine (NORVASC) 5 MG tablet TAKE 1 TABLET BY MOUTH TWICE DAILY 180 tablet 2    buPROPion XL (WELLBUTRIN XL) 150 MG 24 hr tablet Take 1 tablet by mouth Daily.      Calcium Carb-Cholecalciferol (CALCIUM 600+D3 PO) Take 1 tablet by mouth Daily.      carvedilol (COREG) 6.25 MG tablet TAKE 1 TABLET BY MOUTH TWICE DAILY WITH MEALS 180 tablet 3    cetirizine (zyrTEC) 10 MG tablet Take 1 tablet by mouth Daily.      cloNIDine (CATAPRES) 0.1 MG tablet Take 1 tablet by mouth 2 (Two) Times a Day.      cyclobenzaprine (FLEXERIL) 10 MG tablet TAKE 1 TABLET BY MOUTH THREE TIMES DAILY AS NEEDED FOR MUSCLE SPASMS 90 tablet 5    Diclofenac Sodium (VOLTAREN) 1 % gel gel Apply 4 g topically to the appropriate area as directed 4 (Four) Times a Day As Needed (arthralgia). 240 g 3    Ergocalciferol (VITAMIN D2 PO) Take 50,000 Units by mouth Every 30 (Thirty) Days.      famotidine (PEPCID) 20 MG  tablet Take 1 tablet by mouth 2 (Two) Times a Day Before Meals. 60 tablet 0    fluticasone (FLONASE) 50 MCG/ACT nasal spray 2 sprays into the nostril(s) as directed by provider Daily. 1 g 3    gabapentin (NEURONTIN) 300 MG capsule Take 1 capsule by mouth 3 (Three) Times a Day.      irbesartan-hydrochlorothiazide (AVALIDE) 300-12.5 MG tablet Take 1 tablet by mouth Daily.      montelukast (SINGULAIR) 10 MG tablet TAKE 1 TABLET BY MOUTH DAILY 90 tablet 3    Multiple Vitamins-Minerals (MULTIVITAMIN ADULT) tablet Take 1 tablet by mouth Daily.      naproxen sodium (ALEVE) 220 MG tablet Take 1 tablet by mouth 2 (Two) Times a Day As Needed.      pantoprazole (PROTONIX) 40 MG EC tablet TAKE 1 TABLET BY MOUTH DAILY 90 tablet 3    rOPINIRole (REQUIP) 0.5 MG tablet TAKE 1 TABLET BY MOUTH EVERY NIGHT 90 tablet 3    rosuvastatin (CRESTOR) 20 MG tablet TAKE 1 TABLET BY MOUTH DAILY 90 tablet 3    sertraline (ZOLOFT) 100 MG tablet Take 1 tablet by mouth Daily.      valACYclovir (VALTREX) 500 MG tablet TAKE 1 TABLET BY MOUTH TWICE DAILY 180 tablet 3        Allergies   Allergen Reactions    Aspirin GI Bleeding    Niacin Other (See Comments)       Objective     Vital Signs   Temp:  [97.7 °F (36.5 °C)] 97.7 °F (36.5 °C)  Heart Rate:  [79-84] 80  Resp:  [18] 18  BP: (162-177)/(93) 177/93  No intake or output data in the 24 hours ending 06/28/24 1025    Physical Exam:    General: Alert, cooperative, nontoxic, uncomfortable  Head:  Normocephalic, without obvious abnormality, atraumatic  Eyes:   Lids and lashes normal, no icterus, no pallor  Ears:  Ears appear intact with no abnormalities noted  Neck:  Supple  Back:  No costovertebral angle tenderness  Lungs: Unlabored respirations  Heart:  Regular rhythm and normal rate  Abdomen: Soft, nontender, nondistended  :  Deferred  Extremities: Moves all extremities well  Skin:  Warm and dry  Neurologic: Cranial nerves 2 - 12 grossly intact    Data Review:    As above    Assessment and  Plan:    Bilateral renal masses with new onset subcutaneous nodules: Unlikely renal primary given presentation. Would recommend Oncology consult for further work up.    GERI: No hydronephrosis noted.     UROLOGICAL DISPOSITION: No urologic intervention at this point pending opinion from Oncology.     I discussed the patient's findings and my recommendations with patient, family, and Dr. Cintron    (Please note that portions of this note were completed with a voice recognition program.)    Malachi Garay MD  06/28/24  10:25 CDT    Time: Time spent: 35 minutes spent performing evaluation and management, chart review, and discussion with patient, > 50% of time spent in face-to-face encounter

## 2024-06-28 NOTE — PLAN OF CARE
Goal Outcome Evaluation:  Plan of Care Reviewed With: patient        Progress: no change  Outcome Evaluation: OT eval completed. Pt in fowlers upon therapist arrival; A&Ox4; c/o 10/10 pain in back and BLE. Pt reports I with all BADLs/IADLs including fxl ambulation at baseline. Today, Pt performed supine<>sit utilizing bedrail with HOB elevated with Min A. Pt donned B socks while seated EOB with SBA. Pt performed sit<>stand with CGA, however, Pt had to stand and sit multiple times prior to ambulation due to pain. Pt ambulated from bed<>BR utilizing no AD with CGA. Pt performed sit<>stand toilet transfer utilizing grab bar with CGA. Pt performed lucy hygiene while seated with SBA. Pt performed hand hygiene while standing unsupported at sink with CGA. Pt additionally demos BUE weakness. Skilled OT intervention indicated in order to address deficits in fxl mobility, fxl activity tolerance, balance, strength, and use of adaptive techniques/equipment during performance of BADLs. Recommend home with assist at discharge.      Anticipated Discharge Disposition (OT): home with assist

## 2024-06-28 NOTE — ED PROVIDER NOTES
Subjective   History of Present Illness  Patient is a 79-year-old female with a history of chronic kidney disease and hypertension who presents to the ER with multiple complaints.  Patient states she has had bilateral flank pain and low back pain radiating to her bilateral lower extremities for the last 3 to 4 days.  Patient describes it as a sharp pain.  She has had associated dysuria.  She denies any known injury.  Patient states sometimes her back pain will radiate to her vaginal area.  She denies any vaginal discharge.  Patient states she was given gabapentin 2 weeks ago for knee pain and that caused her psychosis.  Patient states she stopped the medication but ever since then she has noticed knots on her arms, legs, neck and back.  She denies any fever, chest pain, shortness of air, abdominal pain, nausea vomiting diarrhea, numbness or weakness, saddle anesthesia, urinary or rectal incontinence or retention.      Review of Systems   Constitutional: Negative.    HENT: Negative.     Eyes: Negative.    Respiratory: Negative.     Cardiovascular: Negative.    Gastrointestinal: Negative.    Endocrine: Negative.    Genitourinary:  Positive for dysuria, flank pain and vaginal pain.   Musculoskeletal:  Positive for arthralgias, back pain and myalgias.   Skin:         knots   Allergic/Immunologic: Negative.    Neurological: Negative.    Hematological: Negative.    Psychiatric/Behavioral: Negative.     All other systems reviewed and are negative.      Past Medical History:   Diagnosis Date    Breast cancer     CKD (chronic kidney disease)     Hypercholesteremia     Hypertension     Sinusitis     Stage 3a chronic kidney disease 10/20/2020       Allergies   Allergen Reactions    Aspirin GI Bleeding    Niacin Other (See Comments)       Past Surgical History:   Procedure Laterality Date    AVULSION TOENAIL PLATE      Sept 26,2018    BREAST BIOPSY Left 11/20/2012    BREAST BIOPSY      Left Breast, 1/2019 per Dr Barkley     BREAST LUMPECTOMY Left     with node bx     COLONOSCOPY  09/13/2013    small polyp at 30cm benign hyperplastic polyp, changes consistent with melanosis coli. Recall 5 years    COLONOSCOPY  11/19/2018    Tics otherwise normal exam repeat in 5 years    COLONOSCOPY  02/13/2023    Normal exam repeat in 3 years with a 2 day prep    ENDOSCOPY  02/13/2023    Gastritis, small HH    REPLACEMENT TOTAL KNEE Right     2016    TOTAL ABDOMINAL HYSTERECTOMY WITH SALPINGO OOPHORECTOMY      UPPER ENDOSCOPIC ULTRASOUND W/ FNA  12/20/2023    Pancreatic cyst s/p FNA       Family History   Problem Relation Age of Onset    Heart attack Mother     Hodgkin's lymphoma Father     Other Father     Cancer Father         hodgkins    Heart attack Brother     Skin cancer Maternal Uncle     Pancreatic cancer Maternal Uncle     Cancer Maternal Uncle         eye    Colon cancer Neg Hx     Colon polyps Neg Hx        Social History     Socioeconomic History    Marital status:    Tobacco Use    Smoking status: Never    Smokeless tobacco: Never   Vaping Use    Vaping status: Never Used   Substance and Sexual Activity    Alcohol use: No    Drug use: Not Currently    Sexual activity: Not Currently     Birth control/protection: Surgical           Objective   Physical Exam  Vitals and nursing note reviewed.   Constitutional:       Appearance: She is well-developed.   HENT:      Head: Normocephalic and atraumatic.   Eyes:      Conjunctiva/sclera: Conjunctivae normal.      Pupils: Pupils are equal, round, and reactive to light.   Cardiovascular:      Rate and Rhythm: Normal rate and regular rhythm.      Heart sounds: Normal heart sounds.   Pulmonary:      Effort: Pulmonary effort is normal.      Breath sounds: Normal breath sounds.   Abdominal:      Palpations: Abdomen is soft.      Tenderness: There is no abdominal tenderness. There is right CVA tenderness and left CVA tenderness. There is no guarding or rebound.   Genitourinary:     Vagina: Normal.  No vaginal discharge, erythema, tenderness or bleeding.   Musculoskeletal:         General: No deformity. Normal range of motion.      Cervical back: Normal range of motion.      Comments: Tender to palpation to the lower lumbar spine, nontender to palpation elsewhere, normal range of motion, neurovascular intact, pulses 2+   Skin:     General: Skin is warm.      Comments: Palpable masses felt to the bilateral upper and lower extremities as well as upper back and right neck area and abdominal wall.  No signs of infection   Neurological:      Mental Status: She is alert and oriented to person, place, and time.      Cranial Nerves: Cranial nerves 2-12 are intact.      Sensory: Sensation is intact.      Motor: Motor function is intact.      Coordination: Coordination is intact.   Psychiatric:         Behavior: Behavior normal.         Procedures           ED Course                                 Lab Results (last 24 hours)       Procedure Component Value Units Date/Time    CBC & Differential [[id number]]  (Abnormal) Collected: 06/28/24 0617    Specimen: Blood Updated: 06/28/24 0658    Narrative:      The following orders were created for panel order CBC & Differential.  Procedure                               Abnormality         Status                     ---------                               -----------         ------                     CBC Auto Differential[[id number]]        Abnormal            Final result                 Please view results for these tests on the individual orders.    Comprehensive Metabolic Panel [[id number]]  (Abnormal) Collected: 06/28/24 0617    Specimen: Blood Updated: 06/28/24 0711     Glucose 99 mg/dL      BUN 32 mg/dL      Creatinine 2.59 mg/dL      Sodium 135 mmol/L      Potassium 3.9 mmol/L      Comment: Slight hemolysis detected by analyzer. Result may be falsely elevated.        Chloride 95 mmol/L      CO2 26.0 mmol/L      Calcium 10.0 mg/dL      Total Protein 8.0 g/dL      Albumin  4.4 g/dL      ALT (SGPT) 18 U/L      AST (SGOT) 22 U/L      Comment: Slight hemolysis detected by analyzer. Result may be falsely elevated.        Alkaline Phosphatase 85 U/L      Total Bilirubin 0.3 mg/dL      Globulin 3.6 gm/dL      A/G Ratio 1.2 g/dL      BUN/Creatinine Ratio 12.4     Anion Gap 14.0 mmol/L      eGFR 18.4 mL/min/1.73     Narrative:      GFR Normal >60  Chronic Kidney Disease <60  Kidney Failure <15    The GFR formula is only valid for adults with stable renal function between ages 18 and 70.    Lipase [[id number]]  (Normal) Collected: 06/28/24 0617    Specimen: Blood Updated: 06/28/24 0701     Lipase 14 U/L     CBC Auto Differential [[id number]]  (Abnormal) Collected: 06/28/24 0617    Specimen: Blood Updated: 06/28/24 0658     WBC 8.11 10*3/mm3      RBC 4.07 10*6/mm3      Hemoglobin 11.6 g/dL      Hematocrit 36.6 %      MCV 89.9 fL      MCH 28.5 pg      MCHC 31.7 g/dL      RDW 15.9 %      RDW-SD 52.2 fl      MPV --     Comment: Unable to calculate        Platelets 81 10*3/mm3      Neutrophil % 66.2 %      Lymphocyte % 20.6 %      Monocyte % 9.6 %      Eosinophil % 2.8 %      Basophil % 0.4 %      Immature Grans % 0.4 %      Neutrophils, Absolute 5.37 10*3/mm3      Lymphocytes, Absolute 1.67 10*3/mm3      Monocytes, Absolute 0.78 10*3/mm3      Eosinophils, Absolute 0.23 10*3/mm3      Basophils, Absolute 0.03 10*3/mm3      Immature Grans, Absolute 0.03 10*3/mm3     Urinalysis With Culture If Indicated - Urine, Clean Catch [[id number]]  (Abnormal) Collected: 06/28/24 0711    Specimen: Urine, Clean Catch Updated: 06/28/24 0754     Color, UA Yellow     Appearance, UA Clear     pH, UA <=5.0     Specific Gravity, UA 1.018     Glucose,  mg/dL (2+)     Ketones, UA Negative     Bilirubin, UA Negative     Blood, UA Negative     Protein, UA Trace     Leuk Esterase, UA Trace     Nitrite, UA Negative     Urobilinogen, UA 0.2 E.U./dL    Narrative:      In absence of clinical symptoms, the presence of pyuria,  bacteria, and/or nitrites on the urinalysis result does not correlate with infection.    Urinalysis, Microscopic Only - Urine, Clean Catch [[id number]]  (Abnormal) Collected: 06/28/24 0711    Specimen: Urine, Clean Catch Updated: 06/28/24 0754     RBC, UA 3-5 /HPF      WBC, UA 11-20 /HPF      Bacteria, UA None Seen /HPF      Squamous Epithelial Cells, UA 3-6 /HPF      Hyaline Casts, UA None Seen /LPF      Methodology Automated Microscopy    Urine Culture - Urine, Urine, Clean Catch [[id number]] Collected: 06/28/24 0711    Specimen: Urine, Clean Catch Updated: 06/28/24 0754    Wet Prep, Genital - Swab, Vagina [[id number]]  (Abnormal) Collected: 06/28/24 0854    Specimen: Swab from Vagina Updated: 06/28/24 0935     YEAST No yeast seen     HYPHAL ELEMENTS No Hyphal elements seen     WBC'S 1+ WBC's seen     Clue Cells, Wet Prep No Clue cells seen     Trichomonas, Wet Prep No Trichomonas seen    Chlamydia trachomatis, Neisseria gonorrhoeae, PCR - Swab, Cervix [[id number]] Collected: 06/28/24 0854    Specimen: Swab from Cervix Updated: 06/28/24 0901           XR Chest 1 View   Final Result       1. Low lung volumes with prominent vasculature may represent vascular   crowding or mild edema.       This report was signed and finalized on 6/28/2024 9:13 AM by Gatito Blackwood.          CT Lumbar Spine Without Contrast   Final Result   1. No acute fracture or malalignment.   2. Lumbar spondylosis. Loss of lumbar lordosis. A mild anterolisthesis   L3 over L4.   3. Multilevel prominent disc osteophyte complexes, facet arthropathy and   ligamental hypertrophy and resultant neural foraminal spinal canal   stenosis.                                                                   This report was signed and finalized on 6/28/2024 8:29 AM by Dr. Marito Marcano MD.          CT Abdomen Pelvis Stone Protocol   Final Result   1. Diffusely abnormal left kidney which appears heterogeneous and   enlarged worrisome for neoplasm. Normal  soft tissue extends into the   left renal pelvis and along the proximal left ureter. Right kidney also   is abnormal in its. Differential diagnosis would include infiltrative   renal cell carcinoma, lymphoma and metastatic disease.   2. There is also a new right adrenal mass worrisome for metastatic   disease.   3. Innumerable solid nodules in the subcutaneous soft tissues worrisome   for soft tissue metastasis. Cluster of nodules on the right posteriorly   at the mid to lower abdomen is close skin surface and would likely be   amenable to ultrasound-guided biopsy.           This report was signed and finalized on 6/28/2024 8:29 AM by Gatito Blackwood.                       Medical Decision Making  Patient is a 79-year-old female with a history of chronic kidney disease and hypertension who presents to the ER with multiple complaints.  Patient states she has had bilateral flank pain and low back pain radiating to her bilateral lower extremities for the last 3 to 4 days.  Patient describes it as a sharp pain.  She has had associated dysuria.  She denies any known injury.  Patient states sometimes her back pain will radiate to her vaginal area.  She denies any vaginal discharge.  Patient states she was given gabapentin 2 weeks ago for knee pain and that caused her psychosis.  Patient states she stopped the medication but ever since then she has noticed knots on her arms, legs, neck and back.  She denies any fever, chest pain, shortness of air, abdominal pain, nausea vomiting diarrhea, numbness or weakness, saddle anesthesia, urinary or rectal incontinence or retention.    Differential diagnosis: Kidney stone, pyelonephritis, muscle strain, intra-abdominal infection, back fracture    Patient was given IV fluids, morphine and Zofran.  Labs showed an elevated BUN and creatinine consistent with acute on chronic renal failure with a decreased GFR.  Labs also showed thrombocytopenia.  Chest x-ray showed low lung volumes  with prominent vasculature that could be vascular crowding versus edema. CT scan of the L-spine showed no acute fracture.  Patient did have some degenerative changes.  CT scan of the abdomen pelvis showed a diffusely abnormal left kidney which appears to be heterogeneous and enlarged worrisome for neoplasm.  There is normal soft tissue extending into the left renal pelvis and along the proximal left ureter.  Patient also had a mass to the right kidney.  This is all concerning for cancer.  Patient also has a new right adrenal mass concerning for metastatic disease and innumerable solid nodules in the subcutaneous soft tissues worrisome for soft tissue metastasis.  I discussed the case with Dr. Matt with the hospitalist service.  He recommended I discussed with Dr. Garay with urology team.  Dr. Garay felt the patient could be admitted here.  He did not see the need for emergent intervention at this time.  Patient was then admitted to 's team with the hospitalist group for further workup and treatment.    Problems Addressed:  Acute on chronic renal insufficiency: complicated acute illness or injury  Left renal mass: complicated acute illness or injury  Nodule of soft tissue: complicated acute illness or injury  Right adrenal mass: complicated acute illness or injury  Thrombocytopenia: complicated acute illness or injury    Amount and/or Complexity of Data Reviewed  Labs: ordered. Decision-making details documented in ED Course.  Radiology: ordered. Decision-making details documented in ED Course.  Discussion of management or test interpretation with external provider(s): Dr Matt with the hospitalist group  Dr Garay with urology group    Risk  Prescription drug management.  Decision regarding hospitalization.        Final diagnoses:   Left renal mass   Right adrenal mass   Nodule of soft tissue   Acute on chronic renal insufficiency   Thrombocytopenia       ED Disposition  ED Disposition        ED Disposition   Decision to Admit    Condition   --    Comment   Level of Care: Med/Surg [1]   Diagnosis: Left renal mass [[id number]]   Admitting Physician: WESLEY COOL [[id number]]   Attending Physician: WESLEY COOL [[id number]]   Certification: I Certify That Inpatient Hospital Services Are Medically Necessary For Greater Than 2 Midnights                 No follow-up provider specified.       Medication List      No changes were made to your prescriptions during this visit.            Sonam Argueta MD  06/28/24 1002

## 2024-06-28 NOTE — CONSULTS
NEUROSURGERY INITIAL HOSPITAL ENCOUNTER    Assessment/Plan:   Marina Joe is a 79 y.o. female with significant medical comorbidities to include breast cancer, CKD, hypertension, hyperlipidemia, and obesity.  She presents with a new problem of lumbar back and bilateral nondermatomal lower extremity radicular pain and dysesthesias. Physical exam findings of lower extremity hyporeflexia.  Their imaging shows an enlarged left kidney and right adrenal mass concerning for metastatic disease.  CT of the lumbar spine shows multilevel degenerative changes to include an anteriolisthesis of L3 over L4 and spondylosis at L5-S1.    Differential Diagnosis:   Axial low back pain  Bilateral nondermatomal leg pain and dysesthesias  Thrombocytopenia  Enlarged left kidney and right adrenal mass concerning for metastatic disease    Recommendations:  MRI of the lumbar spine  Neurologic exams per policy.  Call for decline  Analgesics as needed for pain  PT/OT  Additional recommendations may follow advanced imaging.    I discussed the patients findings and my recommendations with patient    Thank you very much for this interesting consult.   ___________________________________________________________________    Consult at the request of: Inpatient Neurosurgery Consult  Consult performed by: Marc Quevedo APRN  Consult ordered by: Prince Chambers MD         Reason for consult: Lower back and bilateral leg pain    Chief Complaint:   Chief Complaint   Patient presents with    Multiple Complaints       HPI: Marina Joe is a 79 y.o. female with a significant comorbidities to include breast cancer, CKD, hypertension, hyperlipidemia, and obesity.  She presents with a new problem of lumbar back and bilateral nondermatomal lower extremity radicular pain and dysesthesias.  No previous spine surgeries.  No known injury.    Ms. Joe currently resides with a friend.  She is fairly independent.  She does not require assistance to  ambulate.    2+ week onset of progressively worsening lower lumbar back pain that is constant in nature.  She additionally reports intermittent nondermatomal bilateral lower extremity pain, numbness, tingling.  She denies lower extremity weakness or falls.  Her back pain worsens with prolonged lying down, sitting, standing, and walking.  Minimal alleviation of her symptoms with use of OTC Tylenol.  She denies fevers, chills, night sweats, unexplained weight loss, saddle anesthesia, or bowel or bladder dysfunction.  She currently rates the severity of her symptoms 4/10.    Review of Systems   Constitutional: Negative.    HENT: Negative.     Eyes: Negative.    Respiratory: Negative.     Cardiovascular: Negative.    Gastrointestinal: Negative.    Endocrine: Negative.    Genitourinary: Negative.    Musculoskeletal:  Positive for back pain.   Skin: Negative.    Allergic/Immunologic: Negative.    Neurological:  Positive for numbness.   Hematological: Negative.    Psychiatric/Behavioral: Negative.     All other systems reviewed and are negative.       Past Medical History:  has a past medical history of Breast cancer, CKD (chronic kidney disease), Hypercholesteremia, Hypertension, Sinusitis, and Stage 3a chronic kidney disease (10/20/2020).    Past Surgical History:  has a past surgical history that includes Breast biopsy (Left, 11/20/2012); Breast lumpectomy (Left); Total abdominal hysterectomy w/ bilateral salpingoophorectomy; Replacement total knee (Right); Colonoscopy (09/13/2013); Avulsion toenail plate; Breast biopsy; Colonoscopy (11/19/2018); Colonoscopy (02/13/2023); Esophagogastroduodenoscopy (02/13/2023); and Upper endoscopic ultrasound w/ FNA (12/20/2023).    Family History: family history includes Cancer in her father and maternal uncle; Heart attack in her brother and mother; Hodgkin's lymphoma in her father; Other in her father; Pancreatic cancer in her maternal uncle; Skin cancer in her maternal  uncle.    Social History:  reports that she has never smoked. She has never used smokeless tobacco. She reports that she does not currently use drugs. She reports that she does not drink alcohol.    Allergies: Aspirin and Niacin    Home Medications:   Current Facility-Administered Medications:     albuterol (PROVENTIL) nebulizer solution 0.083% 2.5 mg/3mL, 2.5 mg, Nebulization, Q4H PRN, Prince Chambers MD, 2.5 mg at 06/28/24 1040    amLODIPine (NORVASC) tablet 5 mg, 5 mg, Oral, BID, Prince Chambers MD, 5 mg at 06/28/24 1145    sennosides-docusate (PERICOLACE) 8.6-50 MG per tablet 2 tablet, 2 tablet, Oral, BID PRN **AND** polyethylene glycol (MIRALAX) packet 17 g, 17 g, Oral, Daily PRN **AND** bisacodyl (DULCOLAX) EC tablet 5 mg, 5 mg, Oral, Daily PRN **AND** bisacodyl (DULCOLAX) suppository 10 mg, 10 mg, Rectal, Daily PRN, Prince Chambers MD    buPROPion XL (WELLBUTRIN XL) 24 hr tablet 150 mg, 150 mg, Oral, Daily, Prince Chambers MD, 150 mg at 06/28/24 1146    Calcium Replacement - Follow Nurse / BPA Driven Protocol, , Does not apply, PRN, Prince Chambers MD    carvedilol (COREG) tablet 6.25 mg, 6.25 mg, Oral, BID With Meals, Prince Chambers MD, 6.25 mg at 06/28/24 1145    cetirizine (zyrTEC) tablet 10 mg, 10 mg, Oral, Daily, Prince Chambers MD, 10 mg at 06/28/24 1145    cloNIDine (CATAPRES) tablet 0.1 mg, 0.1 mg, Oral, BID, Prince Chambers MD, 0.1 mg at 06/28/24 1144    cyclobenzaprine (FLEXERIL) tablet 10 mg, 10 mg, Oral, TID PRN, Prince Chambers MD    Diclofenac Sodium (VOLTAREN) 1 % gel 4 g, 4 g, Topical, 4x Daily PRN, Prince Chambers MD    fluticasone (FLONASE) 50 MCG/ACT nasal spray 2 spray, 2 spray, Nasal, Daily, Prince Chambers MD, 2 spray at 06/28/24 1145    heparin (porcine) 5000 UNIT/ML injection 5,000 Units, 5,000 Units, Subcutaneous, Q12H, Prince Chambers MD, 5,000 Units at 06/28/24 1144    Magnesium Low Dose Replacement - Follow Nurse / BPA Driven  Protocol, , Does not apply, PRN, Prince Chambers MD    montelukast (SINGULAIR) tablet 10 mg, 10 mg, Oral, Nightly, Prince Chambers MD    multivitamin with minerals 1 tablet, 1 tablet, Oral, Daily, Prince Chambers MD, 1 tablet at 06/28/24 1145    ondansetron (ZOFRAN) injection 4 mg, 4 mg, Intravenous, Q6H PRN, Prince Chambers MD    pantoprazole (PROTONIX) EC tablet 40 mg, 40 mg, Oral, Daily, Prince Chambers MD, 40 mg at 06/28/24 1144    Phosphorus Replacement - Follow Nurse / BPA Driven Protocol, , Does not apply, PRN, Prince Chambers MD    Potassium Replacement - Follow Nurse / BPA Driven Protocol, , Does not apply, PRN, Prince Chambers MD    rOPINIRole (REQUIP) tablet 0.5 mg, 0.5 mg, Oral, Nightly, Prince Chambers MD    rosuvastatin (CRESTOR) tablet 20 mg, 20 mg, Oral, Nightly, Prince Chambers MD    sertraline (ZOLOFT) tablet 100 mg, 100 mg, Oral, Daily, Prince Chambers MD, 100 mg at 06/28/24 1145    [COMPLETED] Insert Peripheral IV, , , Once **AND** sodium chloride 0.9 % flush 10 mL, 10 mL, Intravenous, PRN, Prince Chambers MD    sodium chloride 0.9 % flush 10 mL, 10 mL, Intravenous, Q12H, Prince Chambers MD, 10 mL at 06/28/24 1146    sodium chloride 0.9 % flush 10 mL, 10 mL, Intravenous, PRN, Prince Chambers MD    sodium chloride 0.9 % infusion 40 mL, 40 mL, Intravenous, PRN, Prince Chambers MD    sodium chloride 0.9 % infusion, 100 mL/hr, Intravenous, Continuous, Prince Chambers MD, Last Rate: 100 mL/hr at 06/28/24 1143, 100 mL/hr at 06/28/24 1143    valACYclovir (VALTREX) tablet 500 mg, 500 mg, Oral, BID, Prince Chambers MD, 500 mg at 06/28/24 1145    Medications: Scheduled Meds:amLODIPine, 5 mg, Oral, BID  buPROPion XL, 150 mg, Oral, Daily  carvedilol, 6.25 mg, Oral, BID With Meals  cetirizine, 10 mg, Oral, Daily  cloNIDine, 0.1 mg, Oral, BID  fluticasone, 2 spray, Nasal, Daily  heparin (porcine), 5,000 Units, Subcutaneous, Q12H  montelukast, 10  mg, Oral, Nightly  multivitamin with minerals, 1 tablet, Oral, Daily  pantoprazole, 40 mg, Oral, Daily  rOPINIRole, 0.5 mg, Oral, Nightly  rosuvastatin, 20 mg, Oral, Nightly  sertraline, 100 mg, Oral, Daily  sodium chloride, 10 mL, Intravenous, Q12H  valACYclovir, 500 mg, Oral, BID      Continuous Infusions:sodium chloride, 100 mL/hr, Last Rate: 100 mL/hr (06/28/24 1143)      PRN Meds:.  albuterol    senna-docusate sodium **AND** polyethylene glycol **AND** bisacodyl **AND** bisacodyl    Calcium Replacement - Follow Nurse / BPA Driven Protocol    cyclobenzaprine    Diclofenac Sodium    Magnesium Low Dose Replacement - Follow Nurse / BPA Driven Protocol    ondansetron    Phosphorus Replacement - Follow Nurse / BPA Driven Protocol    Potassium Replacement - Follow Nurse / BPA Driven Protocol    [COMPLETED] Insert Peripheral IV **AND** sodium chloride    sodium chloride    sodium chloride    Vital Signs  Temp:  [97.5 °F (36.4 °C)-97.7 °F (36.5 °C)] 97.5 °F (36.4 °C)  Heart Rate:  [59-84] 59  Resp:  [16-18] 16  BP: (139-177)/(71-93) 154/71    Physical Exam  Physical Exam  Vitals and nursing note reviewed.   Constitutional:       General: She is not in acute distress.     Appearance: Normal appearance. She is well-developed and well-groomed. She is obese. She is not ill-appearing, toxic-appearing or diaphoretic.      Comments: BMI 37.1   HENT:      Head: Normocephalic and atraumatic.      Right Ear: Hearing normal.      Left Ear: Hearing normal.   Eyes:      Extraocular Movements: EOM normal.      Conjunctiva/sclera: Conjunctivae normal.      Pupils: Pupils are equal, round, and reactive to light.   Neck:      Trachea: Trachea normal.   Cardiovascular:      Rate and Rhythm: Normal rate and regular rhythm.   Pulmonary:      Effort: Pulmonary effort is normal. No tachypnea, bradypnea, accessory muscle usage or respiratory distress.   Abdominal:      Palpations: Abdomen is soft.   Musculoskeletal:      Cervical back: Full  passive range of motion without pain and neck supple.   Skin:     General: Skin is warm and dry.   Neurological:      Mental Status: She is alert and oriented to person, place, and time.      GCS: GCS eye subscore is 4. GCS verbal subscore is 5. GCS motor subscore is 6.      Deep Tendon Reflexes:      Reflex Scores:       Tricep reflexes are 2+ on the right side and 2+ on the left side.       Bicep reflexes are 2+ on the right side and 2+ on the left side.       Brachioradialis reflexes are 2+ on the right side and 2+ on the left side.       Patellar reflexes are 1+ on the right side and 1+ on the left side.       Achilles reflexes are 0 on the right side and 0 on the left side.  Psychiatric:         Speech: Speech normal.         Behavior: Behavior normal. Behavior is cooperative.         Neurologic Exam     Mental Status   Oriented to person, place, and time.   Attention: normal. Concentration: normal.   Speech: speech is normal   Level of consciousness: alert  Able to name object.     Follows simple commands without prompting showing thumbs up and 2 fingers bilaterally.     Cranial Nerves     CN II   Visual fields full to confrontation.     CN III, IV, VI   Pupils are equal, round, and reactive to light.  Extraocular motions are normal.     CN V   Facial sensation intact.     CN VII   Facial expression full, symmetric.     CN VIII   CN VIII normal.     CN IX, X   CN IX normal.     CN XI   CN XI normal.     Motor Exam   Right arm tone: normal  Left arm tone: normal  Right leg tone: normal  Left leg tone: normal    Strength   Right deltoid: 5/5  Left deltoid: 5/5  Right biceps: 5/5  Left biceps: 5/5  Right triceps: 5/5  Left triceps: 5/5  Right wrist extension: 5/5  Left wrist extension: 5/5  Right iliopsoas: 4/5  Left iliopsoas: 5/5  Right quadriceps: 5/5  Left quadriceps: 5/5  Right anterior tibial: 5/5  Left anterior tibial: 5/5  Right gastroc: 5/5  Left gastroc: 5/5  Right EHL 5/5  Left EHL 5/5       Sensory  Exam   Right arm light touch: normal  Left arm light touch: normal  Right leg light touch: decreased from knee  Left leg light touch: decreased from knee    Gait, Coordination, and Reflexes     Tremor   Resting tremor: absent  Intention tremor: absent  Action tremor: absent    Reflexes   Right brachioradialis: 2+  Left brachioradialis: 2+  Right biceps: 2+  Left biceps: 2+  Right triceps: 2+  Left triceps: 2+  Right patellar: 1+  Left patellar: 1+  Right achilles: 0  Left achilles: 0  Right plantar: equivocal  Left plantar: equivocal  Right Parikh: absent  Left Parikh: absent  Right ankle clonus: absent  Left ankle clonus: absent  Right pendular knee jerk: absent  Left pendular knee jerk: absent    Results Review:   Independent review and interpretation of imaging  Imaging Results (Last 24 Hours)       Procedure Component Value Units Date/Time    XR Chest 1 View [[id number]] Collected: 06/28/24 0912     Updated: 06/28/24 0916    Narrative:      EXAM: XR CHEST 1 VW-      DATE: 6/28/2024 8:06 AM     HISTORY: multiple complaints       COMPARISON: 8/10/2023.     TECHNIQUE:  Frontal view(s) of the chest submitted.     FINDINGS:    There are low lung volumes with vascular crowding versus volume  overload/edema. There is enlargement of the cardiac silhouette. No  effusion or pneumothorax is seen.          Impression:         1. Low lung volumes with prominent vasculature may represent vascular  crowding or mild edema.     This report was signed and finalized on 6/28/2024 9:13 AM by Gatito Blackwood.       CT Abdomen Pelvis Stone Protocol [[id number]] Collected: 06/28/24 0817     Updated: 06/28/24 0832    Narrative:      EXAM: CT ABDOMEN PELVIS STONE PROTOCOL-      DATE: 6/28/2024 6:48 AM     HISTORY: flank pain       COMPARISON: 5/23/2023.     DOSE LENGTH PRODUCT: 1399.15 mGy.cm Automatic exposure control was  utilized to make radiation dose as low as reasonably achievable.     TECHNIQUE: Noncontract axial images of the  abdomen and pelvis obtained  with multiplanar reformats.     FINDINGS:  VISUALIZED CHEST: There is cardiomegaly without pericardial or pleural  effusion. There is atelectasis at the lung bases which are otherwise  grossly clear.     LIVER/BILE DUCTS: Unenhanced liver is unremarkable. Gallbladder is  distended without inflammatory change. Bile ducts are unremarkable.     KIDNEYS/URETERS: Both kidneys are abnormal in appearance. The left is  larger than the right and there is a suggestion of an underlying  infiltrative left renal mass involving the entire left kidney but  especially at the mid and lower pole extending along the left renal  pelvis and to the proximal left ureter. There are bilateral renal cysts.  There are vascular calcifications and probable nonobstructing stones in  the left kidney. There is no definite hydronephrosis.     ADRENAL: There is a new right adrenal mass worrisome for neoplasm  measuring 3.5 cm. Left adrenal gland is nodular but unchanged.        SPLEEN: Unremarkable.     PANCREAS: A cyst at the ventral aspect of the body of the pancreas is  unchanged measuring 1 cm. 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     MESENTERY: No mesenteric or retroperitoneal lymphadenopathy is seen. No  free fluid identified.     VASCULATURE: There is mild atherosclerosis of the abdominal aorta  without aneurysm.     GI TRACT: There is a small hiatal hernia. There is diverticulosis of the  colon without acute diverticulitis. No mass or obstruction is seen.     PELVIS: Urinary bladder is unremarkable. There is diverticulosis of the  sigmoid colon without acute diverticulitis. Borderline left external  iliac lymph node measures 0.9 cm in short axis on image #70 of series 3.  This is actually unchanged. Patient is status post hysterectomy.     SOFT TISSUES: There are multiple solid nodules in the subcutaneous soft  tissues which are new from the 5/23/2023 exam. One on the left  anteriorly on image #9 measures 1.5 cm. A  cluster of solid nodules on  the right posteriorly and laterally on image #38 noted largest measures  2.5 cm.     BONES: There is spondylosis of the spine and lumbar spine facet  arthropathy. No suspicious osseous lesions are seen.          Impression:      1. Diffusely abnormal left kidney which appears heterogeneous and  enlarged worrisome for neoplasm. Normal soft tissue extends into the  left renal pelvis and along the proximal left ureter. Right kidney also  is abnormal in its. Differential diagnosis would include infiltrative  renal cell carcinoma, lymphoma and metastatic disease.  2. There is also a new right adrenal mass worrisome for metastatic  disease.  3. Innumerable solid nodules in the subcutaneous soft tissues worrisome  for soft tissue metastasis. Cluster of nodules on the right posteriorly  at the mid to lower abdomen is close skin surface and would likely be  amenable to ultrasound-guided biopsy.        This report was signed and finalized on 6/28/2024 8:29 AM by Gatito Blackwood.       CT Lumbar Spine Without Contrast [[id number]] Collected: 06/28/24 0817     Updated: 06/28/24 0832    Narrative:      EXAMINATION: CT LUMBAR SPINE WO CONTRAST-      6/28/2024 6:48 AM     HISTORY: back pain     In order to have a CT radiation dose as low as reasonably achievable  Automated Exposure Control was utilized for adjustment of the mA and/or  KV according to patient size.     Total DLP = 1399.15 mGy.cm     The CT scan of the lumbar spine is performed without intravenous or  intrathecal contrast enhancement.     Images are acquired in axial plane and subsequent reconstruction in  coronal and sagittal planes.     The comparison is made with the previous study dated 1/21/2024.     There is no evidence of an acute fracture or compression.     No acute displacement or malalignment.     There is a mild levoscoliosis.     There is loss of lumbar lordosis.     There is mild anterolisthesis of L3 over L4 similar to  the previous  study. No spondylolysis.     The vertebral body heights are normal.     The loss of height of the intervertebral disc, most pronounced at L5-S1.  There is a vacuum sign at L3-4, L4-5 and L5-S1 representing degenerative  disc disease.     T12-L1: Prominent posterior osteophytes. Mild diffuse disc bulging.  Bilateral facet arthropathy right more than the left. There is right  neuroforaminal stenosis. Spinal canal is patent.     L1-2: Mild disc bulging. No significant osteophytes. Bilateral facet  arthropathy and ligamental hypertrophy. There is a mild spinal stenosis.  Neural foramina are patent.     L2-3: Mild diffuse disc bulging. Bilateral facet arthropathy and  ligamental hypertrophy. There is resultant moderate spinal stenosis.  There is mild bilateral neural foraminal stenosis.     L3-4: Moderate diffuse disc bulging/displacement central and bilateral.  There is severe facet arthropathy and ligamental hypertrophy. There is  moderate spinal stenosis. There is bilateral neuroforaminal stenosis  right more than the left.     L4-5: Small posterior osteophytes. Moderate diffuse disc bulging.  Bilateral severe facet arthropathy and ligamental hypertrophy. Moderate  spinal stenosis. Bilateral neuroforaminal stenosis.     L5-S1: Moderately prominent posterior osteophytes. Moderate diffuse disc  bulging. Bilateral facet arthropathy. There is bilateral neuroforaminal  stenosis. Spinal canal is patent.     Limited visualized paravertebral paraspinal soft tissues are  unremarkable. There are some calcification in the renal hilum  bilaterally which probably represent vascular calcification.       Impression:      1. No acute fracture or malalignment.  2. Lumbar spondylosis. Loss of lumbar lordosis. A mild anterolisthesis  L3 over L4.  3. Multilevel prominent disc osteophyte complexes, facet arthropathy and  ligamental hypertrophy and resultant neural foraminal spinal canal  stenosis.                                                   This report was signed and finalized on 6/28/2024 8:29 AM by Dr. Marito Marcano MD.             MRI brain:  MRI spine:   CT Head:  CT c-spine:  CT t-spine:  CT l-spine:      X-ray:    I reviewed the patient's new clinical results.  Lab Results (last 24 hours)       Procedure Component Value Units Date/Time    Basic Metabolic Panel [[id number]] Collected: 06/28/24 1153    Specimen: Blood Updated: 06/28/24 1243    Wet Prep, Genital - Swab, Vagina [[id number]]  (Abnormal) Collected: 06/28/24 0854    Specimen: Swab from Vagina Updated: 06/28/24 0935     YEAST No yeast seen     HYPHAL ELEMENTS No Hyphal elements seen     WBC'S 1+ WBC's seen     Clue Cells, Wet Prep No Clue cells seen     Trichomonas, Wet Prep No Trichomonas seen    Chlamydia trachomatis, Neisseria gonorrhoeae, PCR - Swab, Cervix [[id number]] Collected: 06/28/24 0854    Specimen: Swab from Cervix Updated: 06/28/24 0901    Urinalysis With Culture If Indicated - Urine, Clean Catch [[id number]]  (Abnormal) Collected: 06/28/24 0711    Specimen: Urine, Clean Catch Updated: 06/28/24 0754     Color, UA Yellow     Appearance, UA Clear     pH, UA <=5.0     Specific Gravity, UA 1.018     Glucose,  mg/dL (2+)     Ketones, UA Negative     Bilirubin, UA Negative     Blood, UA Negative     Protein, UA Trace     Leuk Esterase, UA Trace     Nitrite, UA Negative     Urobilinogen, UA 0.2 E.U./dL    Narrative:      In absence of clinical symptoms, the presence of pyuria, bacteria, and/or nitrites on the urinalysis result does not correlate with infection.    Urinalysis, Microscopic Only - Urine, Clean Catch [[id number]]  (Abnormal) Collected: 06/28/24 0711    Specimen: Urine, Clean Catch Updated: 06/28/24 0754     RBC, UA 3-5 /HPF      WBC, UA 11-20 /HPF      Bacteria, UA None Seen /HPF      Squamous Epithelial Cells, UA 3-6 /HPF      Hyaline Casts, UA None Seen /LPF      Methodology Automated Microscopy    Urine Culture - Urine, Urine, Clean  Catch [[id number]] Collected: 06/28/24 0711    Specimen: Urine, Clean Catch Updated: 06/28/24 0754    [place] Draw [[id number]] Collected: 06/28/24 0617    Specimen: Blood Updated: 06/28/24 0745    Narrative:      The following orders were created for panel order [place] Draw.  Procedure                               Abnormality         Status                     ---------                               -----------         ------                     Green Top (Gel)[[id number]]                                  Final result               Lavender Top[[id number]]                                     Final result               Red Top[[id number]]                                          Final result               Ebcker Top[[id number]]                                         Final result               Light Blue Top[[id number]]                                   Final result                 Please view results for these tests on the individual orders.    Light Blue Top [[id number]] Collected: 06/28/24 0617    Specimen: Blood Updated: 06/28/24 0745     Extra Tube Hold for add-ons.     Comment: Auto resulted       Comprehensive Metabolic Panel [[id number]]  (Abnormal) Collected: 06/28/24 0617    Specimen: Blood Updated: 06/28/24 0711     Glucose 99 mg/dL      BUN 32 mg/dL      Creatinine 2.59 mg/dL      Sodium 135 mmol/L      Potassium 3.9 mmol/L      Comment: Slight hemolysis detected by analyzer. Result may be falsely elevated.        Chloride 95 mmol/L      CO2 26.0 mmol/L      Calcium 10.0 mg/dL      Total Protein 8.0 g/dL      Albumin 4.4 g/dL      ALT (SGPT) 18 U/L      AST (SGOT) 22 U/L      Comment: Slight hemolysis detected by analyzer. Result may be falsely elevated.        Alkaline Phosphatase 85 U/L      Total Bilirubin 0.3 mg/dL      Globulin 3.6 gm/dL      A/G Ratio 1.2 g/dL      BUN/Creatinine Ratio 12.4     Anion Gap 14.0 mmol/L      eGFR 18.4 mL/min/1.73     Narrative:      GFR Normal >60  Chronic Kidney Disease  <60  Kidney Failure <15    The GFR formula is only valid for adults with stable renal function between ages 18 and 70.    Green Top (Gel) [[id number]] Collected: 06/28/24 0617    Specimen: Blood Updated: 06/28/24 0701     Extra Tube Hold for add-ons.     Comment: Auto resulted.       Lavender Top [[id number]] Collected: 06/28/24 0617    Specimen: Blood Updated: 06/28/24 0701     Extra Tube hold for add-on     Comment: Auto resulted       Red Top [[id number]] Collected: 06/28/24 0617    Specimen: Blood Updated: 06/28/24 0701     Extra Tube Hold for add-ons.     Comment: Auto resulted.       Gray Top [[id number]] Collected: 06/28/24 0617    Specimen: Blood Updated: 06/28/24 0701     Extra Tube Hold for add-ons.     Comment: Auto resulted.       Lipase [[id number]]  (Normal) Collected: 06/28/24 0617    Specimen: Blood Updated: 06/28/24 0701     Lipase 14 U/L     CBC & Differential [[id number]]  (Abnormal) Collected: 06/28/24 0617    Specimen: Blood Updated: 06/28/24 0658    Narrative:      The following orders were created for panel order CBC & Differential.  Procedure                               Abnormality         Status                     ---------                               -----------         ------                     CBC Auto Differential[[id number]]        Abnormal            Final result                 Please view results for these tests on the individual orders.    CBC Auto Differential [[id number]]  (Abnormal) Collected: 06/28/24 0617    Specimen: Blood Updated: 06/28/24 0658     WBC 8.11 10*3/mm3      RBC 4.07 10*6/mm3      Hemoglobin 11.6 g/dL      Hematocrit 36.6 %      MCV 89.9 fL      MCH 28.5 pg      MCHC 31.7 g/dL      RDW 15.9 %      RDW-SD 52.2 fl      MPV --     Comment: Unable to calculate        Platelets 81 10*3/mm3      Neutrophil % 66.2 %      Lymphocyte % 20.6 %      Monocyte % 9.6 %      Eosinophil % 2.8 %      Basophil % 0.4 %      Immature Grans % 0.4 %      Neutrophils, Absolute 5.37  10*3/mm3      Lymphocytes, Absolute 1.67 10*3/mm3      Monocytes, Absolute 0.78 10*3/mm3      Eosinophils, Absolute 0.23 10*3/mm3      Basophils, Absolute 0.03 10*3/mm3      Immature Grans, Absolute 0.03 10*3/mm3           Marc Quevedo, APRN

## 2024-06-28 NOTE — H&P
[facility] Medicine Services  HISTORY AND PHYSICAL    Date of Admission: 6/28/2024  Primary Care Physician: Hanh Og APRN Subjective   Primary Historian: Patient and boyfriend at bedside lower back pain    Chief Complaint: Lower back pain    History of Present Illness  Ms. Joe is a 79-year-old female from home with past medical history of breast cancer, chronic kidney disease stage IIIa, hypercholesterolemia, hypertension and chronic sinusitis, who presented to the emergency room with worsening back pain/flank pain, history was mostly provided by patient's boyfriend at bedside.  She developed back pain couple of weeks ago and was referred to a pain clinic doctor where she was prescribed gabapentin, after she took about 2-3 doses of the gabapentin, she developed dizziness. She came to the emergency room today because lower back pain and flank pain got even worse despite being on the gabapentin.  In the emergency room, she was extensively worked up with CT of the abdomen and lumbar spine, CT abdomen showed extensive mass of the left kidney extending up to the proximal left ureter as well as right adrenal gland mass suspicious for metastasis.  Urology was consulted from the emergency room for input.      Review of Systems   Otherwise complete ROS reviewed and negative except as mentioned in the HPI.    Past Medical History:   Past Medical History:   Diagnosis Date    Breast cancer     CKD (chronic kidney disease)     Hypercholesteremia     Hypertension     Sinusitis     Stage 3a chronic kidney disease 10/20/2020     Past Surgical History:  Past Surgical History:   Procedure Laterality Date    AVULSION TOENAIL PLATE      Sept 26,2018    BREAST BIOPSY Left 11/20/2012    BREAST BIOPSY      Left Breast, 1/2019 per Dr Barkley    BREAST LUMPECTOMY Left     with node bx     COLONOSCOPY  09/13/2013    small polyp at 30cm benign hyperplastic polyp, changes consistent with  melanosis coli. Recall 5 years    COLONOSCOPY  11/19/2018    Tics otherwise normal exam repeat in 5 years    COLONOSCOPY  02/13/2023    Normal exam repeat in 3 years with a 2 day prep    ENDOSCOPY  02/13/2023    Gastritis, small HH    REPLACEMENT TOTAL KNEE Right     2016    TOTAL ABDOMINAL HYSTERECTOMY WITH SALPINGO OOPHORECTOMY      UPPER ENDOSCOPIC ULTRASOUND W/ FNA  12/20/2023    Pancreatic cyst s/p FNA     Social History:  reports that she has never smoked. She has never used smokeless tobacco. She reports that she does not currently use drugs. She reports that she does not drink alcohol.    Family History: family history includes Cancer in her father and maternal uncle; Heart attack in her brother and mother; Hodgkin's lymphoma in her father; Other in her father; Pancreatic cancer in her maternal uncle; Skin cancer in her maternal uncle.       Allergies:  Allergies   Allergen Reactions    Aspirin GI Bleeding    Niacin Other (See Comments)       Medications:  Prior to Admission medications    Medication Sig Start Date End Date Taking? Authorizing Provider   albuterol sulfate  (90 Base) MCG/ACT inhaler Inhale 2 puffs Every 4 (Four) Hours As Needed for Wheezing. 6/30/22   Hanh Og APRN   buPROPion XL (WELLBUTRIN XL) 150 MG 24 hr tablet Take 1 tablet by mouth Daily.    Maurisio Zazueta MD   Calcium Carb-Cholecalciferol (CALCIUM 600+D3 PO) Take 1 tablet by mouth Daily.    Maurisio Zazueta MD   carvedilol (COREG) 6.25 MG tablet TAKE 1 TABLET BY MOUTH TWICE DAILY WITH MEALS 5/8/24   Gladis Ledezma APRN   cetirizine (zyrTEC) 10 MG tablet Take 1 tablet by mouth Daily.    Maurisio Zazueta MD   cloNIDine (CATAPRES) 0.1 MG tablet Take 1 tablet by mouth 2 (Two) Times a Day.    Maurisio Zazueta MD   cyclobenzaprine (FLEXERIL) 10 MG tablet TAKE 1 TABLET BY MOUTH THREE TIMES DAILY AS NEEDED FOR MUSCLE SPASMS 11/16/23   Hanh Og APRN   Diclofenac Sodium (VOLTAREN) 1 % gel  gel Apply 4 g topically to the appropriate area as directed 4 (Four) Times a Day As Needed (arthralgia). 10/17/23   Hanh Og APRN   Ergocalciferol (VITAMIN D2 PO) Take 50,000 Units by mouth Every 30 (Thirty) Days.    Maurisio Zazueta MD   famotidine (PEPCID) 20 MG tablet Take 1 tablet by mouth 2 (Two) Times a Day Before Meals. 9/11/23   Alma Zaidi APRN   fluticasone (FLONASE) 50 MCG/ACT nasal spray 2 sprays into the nostril(s) as directed by provider Daily. 6/1/22   Hanh Og APRN   gabapentin (NEURONTIN) 300 MG capsule Take 1 capsule by mouth 3 (Three) Times a Day.    Maurisio Zazueta MD   irbesartan-hydrochlorothiazide (AVALIDE) 300-12.5 MG tablet Take 1 tablet by mouth Daily.    Maurisio Zazueta MD   montelukast (SINGULAIR) 10 MG tablet TAKE 1 TABLET BY MOUTH DAILY 4/5/24   Hanh Og APRN   Multiple Vitamins-Minerals (MULTIVITAMIN ADULT) tablet Take 1 tablet by mouth Daily.    Maurisio Zazueta MD   naproxen sodium (ALEVE) 220 MG tablet Take 1 tablet by mouth 2 (Two) Times a Day As Needed.    Maurisio Zazueta MD   pantoprazole (PROTONIX) 40 MG EC tablet TAKE 1 TABLET BY MOUTH DAILY 4/5/24   Hanh Og APRN   rOPINIRole (REQUIP) 0.5 MG tablet TAKE 1 TABLET BY MOUTH EVERY NIGHT 4/5/24   Hanh Og APRN   rosuvastatin (CRESTOR) 20 MG tablet TAKE 1 TABLET BY MOUTH DAILY 5/8/24   Hanh Og APRN   sertraline (ZOLOFT) 100 MG tablet Take 1 tablet by mouth Daily.    Maurisio Zazueta MD   valACYclovir (VALTREX) 500 MG tablet TAKE 1 TABLET BY MOUTH TWICE DAILY 11/2/23   Hanh Og APRN   amLODIPine (NORVASC) 5 MG tablet TAKE 1 TABLET BY MOUTH TWICE DAILY 10/12/23 6/28/24  Hanh Og APRN     I have utilized all available immediate resources to obtain, update, or review the patient's current medications (including all prescriptions, over-the-counter products, herbals, cannabis/cannabidiol products, and  "vitamin/mineral/dietary (nutritional) supplements).    Objective     Vital Signs: /86   Pulse 79   Temp 97.7 °F (36.5 °C) (Oral)   Resp 18   Ht 172.7 cm (68\")   Wt 111 kg (244 lb)   LMP  (LMP Unknown)   SpO2 93%   BMI 37.10 kg/m²   Physical Exam   GEN: Awake, alert, interactive, in NAD  HEENT: Atraumatic, PERRLA, EOMI, Anicteric, Trachea midline  Lungs: CTAB, no wheezing/rales/rhonchi  Heart: RRR, +S1/s2, no rub  ABD: soft, nt/nd, +BS, no guarding/rebound  Extremities: atraumatic, no cyanosis, no edema  Skin: no rashes or lesions  Neuro: AAOx3, no focal deficits  Psych: normal mood & affect      Results Reviewed:  Lab Results (last 24 hours)       Procedure Component Value Units Date/Time    Wet Prep, Genital - Swab, Vagina [[id number]]  (Abnormal) Collected: 06/28/24 0854    Specimen: Swab from Vagina Updated: 06/28/24 0935     YEAST No yeast seen     HYPHAL ELEMENTS No Hyphal elements seen     WBC'S 1+ WBC's seen     Clue Cells, Wet Prep No Clue cells seen     Trichomonas, Wet Prep No Trichomonas seen    Chlamydia trachomatis, Neisseria gonorrhoeae, PCR - Swab, Cervix [[id number]] Collected: 06/28/24 0854    Specimen: Swab from Cervix Updated: 06/28/24 0901    Urinalysis With Culture If Indicated - Urine, Clean Catch [[id number]]  (Abnormal) Collected: 06/28/24 0711    Specimen: Urine, Clean Catch Updated: 06/28/24 0754     Color, UA Yellow     Appearance, UA Clear     pH, UA <=5.0     Specific Gravity, UA 1.018     Glucose,  mg/dL (2+)     Ketones, UA Negative     Bilirubin, UA Negative     Blood, UA Negative     Protein, UA Trace     Leuk Esterase, UA Trace     Nitrite, UA Negative     Urobilinogen, UA 0.2 E.U./dL    Narrative:      In absence of clinical symptoms, the presence of pyuria, bacteria, and/or nitrites on the urinalysis result does not correlate with infection.    Urinalysis, Microscopic Only - Urine, Clean Catch [[id number]]  (Abnormal) Collected: 06/28/24 0711    Specimen: Urine, " Clean Catch Updated: 06/28/24 0754     RBC, UA 3-5 /HPF      WBC, UA 11-20 /HPF      Bacteria, UA None Seen /HPF      Squamous Epithelial Cells, UA 3-6 /HPF      Hyaline Casts, UA None Seen /LPF      Methodology Automated Microscopy    Urine Culture - Urine, Urine, Clean Catch [[id number]] Collected: 06/28/24 0711    Specimen: Urine, Clean Catch Updated: 06/28/24 0754    [place] Draw [[id number]] Collected: 06/28/24 0617    Specimen: Blood Updated: 06/28/24 0745    Narrative:      The following orders were created for panel order [place] Draw.  Procedure                               Abnormality         Status                     ---------                               -----------         ------                     Green Top (Gel)[[id number]]                                  Final result               Lavender Top[[id number]]                                     Final result               Red Top[[id number]]                                          Final result               Becker Top[[id number]]                                         Final result               Light Blue Top[[id number]]                                   Final result                 Please view results for these tests on the individual orders.    Light Blue Top [[id number]] Collected: 06/28/24 0617    Specimen: Blood Updated: 06/28/24 0745     Extra Tube Hold for add-ons.     Comment: Auto resulted       Comprehensive Metabolic Panel [[id number]]  (Abnormal) Collected: 06/28/24 0617    Specimen: Blood Updated: 06/28/24 0711     Glucose 99 mg/dL      BUN 32 mg/dL      Creatinine 2.59 mg/dL      Sodium 135 mmol/L      Potassium 3.9 mmol/L      Comment: Slight hemolysis detected by analyzer. Result may be falsely elevated.        Chloride 95 mmol/L      CO2 26.0 mmol/L      Calcium 10.0 mg/dL      Total Protein 8.0 g/dL      Albumin 4.4 g/dL      ALT (SGPT) 18 U/L      AST (SGOT) 22 U/L      Comment: Slight hemolysis detected by analyzer. Result may be falsely  elevated.        Alkaline Phosphatase 85 U/L      Total Bilirubin 0.3 mg/dL      Globulin 3.6 gm/dL      A/G Ratio 1.2 g/dL      BUN/Creatinine Ratio 12.4     Anion Gap 14.0 mmol/L      eGFR 18.4 mL/min/1.73     Narrative:      GFR Normal >60  Chronic Kidney Disease <60  Kidney Failure <15    The GFR formula is only valid for adults with stable renal function between ages 18 and 70.    Green Top (Gel) [[id number]] Collected: 06/28/24 0617    Specimen: Blood Updated: 06/28/24 0701     Extra Tube Hold for add-ons.     Comment: Auto resulted.       Lavender Top [[id number]] Collected: 06/28/24 0617    Specimen: Blood Updated: 06/28/24 0701     Extra Tube hold for add-on     Comment: Auto resulted       Red Top [[id number]] Collected: 06/28/24 0617    Specimen: Blood Updated: 06/28/24 0701     Extra Tube Hold for add-ons.     Comment: Auto resulted.       Gray Top [[id number]] Collected: 06/28/24 0617    Specimen: Blood Updated: 06/28/24 0701     Extra Tube Hold for add-ons.     Comment: Auto resulted.       Lipase [[id number]]  (Normal) Collected: 06/28/24 0617    Specimen: Blood Updated: 06/28/24 0701     Lipase 14 U/L     CBC & Differential [[id number]]  (Abnormal) Collected: 06/28/24 0617    Specimen: Blood Updated: 06/28/24 0658    Narrative:      The following orders were created for panel order CBC & Differential.  Procedure                               Abnormality         Status                     ---------                               -----------         ------                     CBC Auto Differential[[id number]]        Abnormal            Final result                 Please view results for these tests on the individual orders.    CBC Auto Differential [[id number]]  (Abnormal) Collected: 06/28/24 0617    Specimen: Blood Updated: 06/28/24 0658     WBC 8.11 10*3/mm3      RBC 4.07 10*6/mm3      Hemoglobin 11.6 g/dL      Hematocrit 36.6 %      MCV 89.9 fL      MCH 28.5 pg      MCHC 31.7 g/dL      RDW 15.9 %       RDW-SD 52.2 fl      MPV --     Comment: Unable to calculate        Platelets 81 10*3/mm3      Neutrophil % 66.2 %      Lymphocyte % 20.6 %      Monocyte % 9.6 %      Eosinophil % 2.8 %      Basophil % 0.4 %      Immature Grans % 0.4 %      Neutrophils, Absolute 5.37 10*3/mm3      Lymphocytes, Absolute 1.67 10*3/mm3      Monocytes, Absolute 0.78 10*3/mm3      Eosinophils, Absolute 0.23 10*3/mm3      Basophils, Absolute 0.03 10*3/mm3      Immature Grans, Absolute 0.03 10*3/mm3           Imaging Results (Last 24 Hours)       Procedure Component Value Units Date/Time    XR Chest 1 View [[id number]] Collected: 06/28/24 0912     Updated: 06/28/24 0916    Narrative:      EXAM: XR CHEST 1 VW-      DATE: 6/28/2024 8:06 AM     HISTORY: multiple complaints       COMPARISON: 8/10/2023.     TECHNIQUE:  Frontal view(s) of the chest submitted.     FINDINGS:    There are low lung volumes with vascular crowding versus volume  overload/edema. There is enlargement of the cardiac silhouette. No  effusion or pneumothorax is seen.          Impression:         1. Low lung volumes with prominent vasculature may represent vascular  crowding or mild edema.     This report was signed and finalized on 6/28/2024 9:13 AM by Gatito Blackwood.       CT Abdomen Pelvis Stone Protocol [[id number]] Collected: 06/28/24 0817     Updated: 06/28/24 0832    Narrative:      EXAM: CT ABDOMEN PELVIS STONE PROTOCOL-      DATE: 6/28/2024 6:48 AM     HISTORY: flank pain       COMPARISON: 5/23/2023.     DOSE LENGTH PRODUCT: 1399.15 mGy.cm Automatic exposure control was  utilized to make radiation dose as low as reasonably achievable.     TECHNIQUE: Noncontract axial images of the abdomen and pelvis obtained  with multiplanar reformats.     FINDINGS:  VISUALIZED CHEST: There is cardiomegaly without pericardial or pleural  effusion. There is atelectasis at the lung bases which are otherwise  grossly clear.     LIVER/BILE DUCTS: Unenhanced liver is unremarkable.  Gallbladder is  distended without inflammatory change. Bile ducts are unremarkable.     KIDNEYS/URETERS: Both kidneys are abnormal in appearance. The left is  larger than the right and there is a suggestion of an underlying  infiltrative left renal mass involving the entire left kidney but  especially at the mid and lower pole extending along the left renal  pelvis and to the proximal left ureter. There are bilateral renal cysts.  There are vascular calcifications and probable nonobstructing stones in  the left kidney. There is no definite hydronephrosis.     ADRENAL: There is a new right adrenal mass worrisome for neoplasm  measuring 3.5 cm. Left adrenal gland is nodular but unchanged.        SPLEEN: Unremarkable.     PANCREAS: A cyst at the ventral aspect of the body of the pancreas is  unchanged measuring 1 cm. 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     MESENTERY: No mesenteric or retroperitoneal lymphadenopathy is seen. No  free fluid identified.     VASCULATURE: There is mild atherosclerosis of the abdominal aorta  without aneurysm.     GI TRACT: There is a small hiatal hernia. There is diverticulosis of the  colon without acute diverticulitis. No mass or obstruction is seen.     PELVIS: Urinary bladder is unremarkable. There is diverticulosis of the  sigmoid colon without acute diverticulitis. Borderline left external  iliac lymph node measures 0.9 cm in short axis on image #70 of series 3.  This is actually unchanged. Patient is status post hysterectomy.     SOFT TISSUES: There are multiple solid nodules in the subcutaneous soft  tissues which are new from the 5/23/2023 exam. One on the left  anteriorly on image #9 measures 1.5 cm. A cluster of solid nodules on  the right posteriorly and laterally on image #38 noted largest measures  2.5 cm.     BONES: There is spondylosis of the spine and lumbar spine facet  arthropathy. No suspicious osseous lesions are seen.          Impression:      1. Diffusely abnormal left  kidney which appears heterogeneous and  enlarged worrisome for neoplasm. Normal soft tissue extends into the  left renal pelvis and along the proximal left ureter. Right kidney also  is abnormal in its. Differential diagnosis would include infiltrative  renal cell carcinoma, lymphoma and metastatic disease.  2. There is also a new right adrenal mass worrisome for metastatic  disease.  3. Innumerable solid nodules in the subcutaneous soft tissues worrisome  for soft tissue metastasis. Cluster of nodules on the right posteriorly  at the mid to lower abdomen is close skin surface and would likely be  amenable to ultrasound-guided biopsy.        This report was signed and finalized on 6/28/2024 8:29 AM by Gatito Blackwood.       CT Lumbar Spine Without Contrast [[id number]] Collected: 06/28/24 0817     Updated: 06/28/24 0832    Narrative:      EXAMINATION: CT LUMBAR SPINE WO CONTRAST-      6/28/2024 6:48 AM     HISTORY: back pain     In order to have a CT radiation dose as low as reasonably achievable  Automated Exposure Control was utilized for adjustment of the mA and/or  KV according to patient size.     Total DLP = 1399.15 mGy.cm     The CT scan of the lumbar spine is performed without intravenous or  intrathecal contrast enhancement.     Images are acquired in axial plane and subsequent reconstruction in  coronal and sagittal planes.     The comparison is made with the previous study dated 1/21/2024.     There is no evidence of an acute fracture or compression.     No acute displacement or malalignment.     There is a mild levoscoliosis.     There is loss of lumbar lordosis.     There is mild anterolisthesis of L3 over L4 similar to the previous  study. No spondylolysis.     The vertebral body heights are normal.     The loss of height of the intervertebral disc, most pronounced at L5-S1.  There is a vacuum sign at L3-4, L4-5 and L5-S1 representing degenerative  disc disease.     T12-L1: Prominent posterior  osteophytes. Mild diffuse disc bulging.  Bilateral facet arthropathy right more than the left. There is right  neuroforaminal stenosis. Spinal canal is patent.     L1-2: Mild disc bulging. No significant osteophytes. Bilateral facet  arthropathy and ligamental hypertrophy. There is a mild spinal stenosis.  Neural foramina are patent.     L2-3: Mild diffuse disc bulging. Bilateral facet arthropathy and  ligamental hypertrophy. There is resultant moderate spinal stenosis.  There is mild bilateral neural foraminal stenosis.     L3-4: Moderate diffuse disc bulging/displacement central and bilateral.  There is severe facet arthropathy and ligamental hypertrophy. There is  moderate spinal stenosis. There is bilateral neuroforaminal stenosis  right more than the left.     L4-5: Small posterior osteophytes. Moderate diffuse disc bulging.  Bilateral severe facet arthropathy and ligamental hypertrophy. Moderate  spinal stenosis. Bilateral neuroforaminal stenosis.     L5-S1: Moderately prominent posterior osteophytes. Moderate diffuse disc  bulging. Bilateral facet arthropathy. There is bilateral neuroforaminal  stenosis. Spinal canal is patent.     Limited visualized paravertebral paraspinal soft tissues are  unremarkable. There are some calcification in the renal hilum  bilaterally which probably represent vascular calcification.       Impression:      1. No acute fracture or malalignment.  2. Lumbar spondylosis. Loss of lumbar lordosis. A mild anterolisthesis  L3 over L4.  3. Multilevel prominent disc osteophyte complexes, facet arthropathy and  ligamental hypertrophy and resultant neural foraminal spinal canal  stenosis.                                                  This report was signed and finalized on 6/28/2024 8:29 AM by Dr. Marito Marcano MD.             I have personally reviewed and interpreted the radiology studies and ECG obtained at time of admission.     Assessment / Plan   Assessment:   [facility]  Problems    Diagnosis     **Left renal mass        Treatment Plan  The patient will be admitted to my service here at [facility].     -Lower back pain  Patient's low back pain has been getting worse in the last couple of weeks, was started on gabapentin outpatient but did not help.  CT of lumbar spine is reported as follows-    IMPRESSION:  1. No acute fracture or malalignment.  2. Lumbar spondylosis. Loss of lumbar lordosis. A mild anterolisthesis  L3 over L4.  3. Multilevel prominent disc osteophyte complexes, facet arthropathy and  ligamental hypertrophy and resultant neural foraminal spinal canal  stenosis.  We will consult neurosurgery for input/recommendation.    -Acute on chronic kidney disease stage IIIa  Patient follows up with a nephrologist outpatient, but he does not come to this hospital.  We will consult nephrology for input and start patient empirically on IV fluid.    -Left renal mass/right adrenal mass on CT of the abdomen/pelvis  Urology was consulted from the emergency room and after evaluation did not recommend any intervention,rather recommended oncology consult.  Patient has multiple subcutaneous nodules that may be amenable to percutaneous biopsy, we will consult oncology and get their input before requesting for biopsy.    Other chronic medical conditions-  History of breast cancer  Hypercholesterolemia  Hypertension  Chronic sinusitis    DVT prophylaxis-heparin      Medical Decision Making  Number and Complexity of problems: High  Differential Diagnosis: Left renal carcinoma, right adrenal gland metastasis,    Conditions and Status        Condition is unchanged.     [facility] Data  External documents reviewed: No  Cardiac tracing (EKG, telemetry) interpretation: Yes  Radiology interpretation: Yes  Labs reviewed: Yes  Any tests that were considered but not ordered: No     Decision rules/scores evaluated (example [id number], [place], etc): Yes     Discussed with: Patient/boyfriend      Care Planning  Shared decision making: Yes  Code status and discussions: Yes [full code]    Disposition  Social Determinants of Health that impact treatment or disposition: Home  Estimated length of stay is unknown.     I confirmed that the patient's advanced care plan is present, code status is documented, and a surrogate decision maker is listed in the patient's medical record.     The patient's surrogate decision maker is boyfriend-Jose Enrique.     The patient was seen and examined by me on 6/28/2024 at 9:30 AM.    Electronically signed by Prince Chambers MD, 06/28/24, 10:37 CDT.

## 2024-06-28 NOTE — THERAPY EVALUATION
Patient Name: Marina Joe  : 1945    MRN: [id number]                              Today's Date: 2024       Admit Date: 2024    Visit Dx:     ICD-10-CM ICD-9-CM   1. Left renal mass  N28.89 593.9   2. Right adrenal mass  E27.8 255.8   3. Nodule of soft tissue  M79.89 729.99   4. Acute on chronic renal insufficiency  N28.9 593.9    N18.9 585.9   5. Thrombocytopenia  D69.6 287.5     Patient Active Problem List   Diagnosis    Malignant neoplasm of upper-outer quadrant of female breast    Anemia of chronic renal failure, stage 3 (moderate)    Hypertension, benign    Hx of colonic polyps    HX: breast cancer    Morbidly obese    Right knee DJD    S/P lumpectomy, left breast    Adult hypothyroidism    Anemia    Anxiety    Breast cancer, left    Cervical pain    Chronic insomnia    Elevated lipids    Herpes zoster without complication    Left ear pain    Left otitis externa    Myalgia    Negative depression screening    Obesity (BMI 30-39.9)    Postmenopausal status    Recurrent acute serous otitis media of left ear    Restless leg    Sinusitis, bacterial    Skin lesion    Vitamin D deficiency    Stage 3b chronic kidney disease    GIB (gastrointestinal bleeding)    Acute on chronic blood loss anemia    Chronic idiopathic thrombocytopenia    Hypotension due to hypovolemia    Primary hypertension    Pancreatic lesion    Left renal mass     Past Medical History:   Diagnosis Date    Breast cancer     CKD (chronic kidney disease)     Hypercholesteremia     Hypertension     Sinusitis     Stage 3a chronic kidney disease 10/20/2020     Past Surgical History:   Procedure Laterality Date    AVULSION TOENAIL PLATE          BREAST BIOPSY Left 2012    BREAST BIOPSY      Left Breast, 2019 per Dr Barkley    BREAST LUMPECTOMY Left     with node bx     COLONOSCOPY  2013    small polyp at 30cm benign hyperplastic polyp, changes consistent with melanosis coli. Recall 5 years    COLONOSCOPY   11/19/2018    Tics otherwise normal exam repeat in 5 years    COLONOSCOPY  02/13/2023    Normal exam repeat in 3 years with a 2 day prep    ENDOSCOPY  02/13/2023    Gastritis, small HH    REPLACEMENT TOTAL KNEE Right     2016    TOTAL ABDOMINAL HYSTERECTOMY WITH SALPINGO OOPHORECTOMY      UPPER ENDOSCOPIC ULTRASOUND W/ FNA  12/20/2023    Pancreatic cyst s/p FNA      General Information       Row Name 06/28/24 1402          OT Time and Intention    Document Type evaluation  cc: lower back pain. Imaging revealed extensive mass of the L kidney extending up to the proximal L ureter as well as R adrenal gland mass suspicious for metastasis.  -     Mode of Treatment occupational therapy  -LS       Row Name 06/28/24 1402          General Information    Patient Profile Reviewed yes  -LS     Prior Level of Function independent:;ADL's;all household mobility;community mobility;home management;cooking;cleaning;driving;shopping  -     Existing Precautions/Restrictions fall  -LS       Row Name 06/28/24 1402          Occupational Profile    Environmental Supports and Barriers (Occupational Profile) Tub shower. AD/DME: none  -LS       Row Name 06/28/24 1402          Living Environment    People in Home significant other  -LS       Row Name 06/28/24 1402          Home Main Entrance    Number of Stairs, Main Entrance none  -LS       Row Name 06/28/24 1402          Stairs Within Home, Primary    Number of Stairs, Within Home, Primary none  -LS       Row Name 06/28/24 1402          Cognition    Orientation Status (Cognition) oriented x 4  -LS       Row Name 06/28/24 1402          Safety Issues, Functional Mobility    Impairments Affecting Function (Mobility) balance;strength;pain;endurance/activity tolerance  -               User Key  (r) = Recorded By, (t) = Taken By, (c) = Cosigned By      Initials Name Provider Type    LS Stacey Reagan OTR/L Occupational Therapist                     Mobility/ADL's       Row Name 06/28/24  1402          Bed Mobility    Bed Mobility supine-sit;sit-supine  -LS     Supine-Sit [place] (Bed Mobility) minimum assist (75% patient effort)  -LS     Sit-Supine [place] (Bed Mobility) minimum assist (75% patient effort)  -LS     Assistive Device (Bed Mobility) bed rails;head of bed elevated  -LS       Row Name 06/28/24 1402          Transfers    Transfers sit-stand transfer;stand-sit transfer;toilet transfer  -LS       Row Name 06/28/24 1402          Sit-Stand Transfer    Sit-Stand [place] (Transfers) contact guard  -LS       Row Name 06/28/24 1402          Stand-Sit Transfer    Stand-Sit [place] (Transfers) contact guard  -LS       Row Name 06/28/24 1402          Toilet Transfer    Type (Toilet Transfer) sit-stand;stand-sit  -LS     [place] Level (Toilet Transfer) contact guard  -LS       Row Name 06/28/24 1402          Functional Mobility    Functional Mobility- Ind. Level contact guard assist  -LS     Patient was able to Ambulate yes  -LS       Row Name 06/28/24 1402          Activities of Daily Living    BADL Assessment/Intervention upper body dressing;lower body dressing;toileting  -LS       Row Name 06/28/24 1402          Upper Body Dressing Assessment/Training    [place] Level (Upper Body Dressing) upper body dressing skills;independent  -LS     Position (Upper Body Dressing) edge of bed sitting  -LS       Row Name 06/28/24 1402          Lower Body Dressing Assessment/Training    [place] Level (Lower Body Dressing) lower body dressing skills;contact guard assist  -LS     Position (Lower Body Dressing) unsupported sitting;unsupported standing  -LS       Row Name 06/28/24 1402          Toileting Assessment/Training    [place] Level (Toileting) toileting skills;contact guard assist  -LS     Position (Toileting) unsupported sitting;unsupported standing  -LS               User Key  (r) = Recorded By, (t) = Taken By, (c) = Cosigned By      Initials Name Provider Type    LS  Stacey Reagan OTR/L Occupational Therapist                   Obj/Interventions       Row Name 06/28/24 1402          Sensory Assessment (Somatosensory)    Sensory Assessment (Somatosensory) UE sensation intact  -       Row Name 06/28/24 1402          Vision Assessment/Intervention    Visual Impairment/Limitations WFL;corrective lenses full-time  -       Row Name 06/28/24 1402          Range of Motion Comprehensive    General Range of Motion bilateral upper extremity ROM WFL  -       Row Name 06/28/24 1402          Strength Comprehensive (MMT)    General Manual Muscle Testing (MMT) Assessment upper extremity strength deficits identified  -LS     Comment, General Manual Muscle Testing (MMT) Assessment B shoulder 4/5; BUE strength grossly 4+/5 at all other joints  -       Row Name 06/28/24 1402          Balance    Balance Assessment sitting static balance;sitting dynamic balance;standing static balance;standing dynamic balance  -LS     Static Sitting Balance independent  -LS     Dynamic Sitting Balance independent  -LS     Position, Sitting Balance sitting edge of bed;unsupported  -LS     Static Standing Balance contact guard  -LS     Dynamic Standing Balance contact guard  -LS               User Key  (r) = Recorded By, (t) = Taken By, (c) = Cosigned By      Initials Name Provider Type    LS Stacey Reagan OTR/L Occupational Therapist                   Goals/Plan       Row Name 06/28/24 1402          Transfer Goal 1 (OT)    Activity/Assistive Device (Transfer Goal 1, OT) toilet;tub  -LS     [place] Level/Cues Needed (Transfer Goal 1, OT) modified independence  -LS     Time Frame (Transfer Goal 1, OT) long term goal (LTG);10 days  -LS     Progress/Outcome (Transfer Goal 1, OT) new goal  -LS       [facility] Name 06/28/24 1402          Bathing Goal 1 (OT)    Activity/Device (Bathing Goal 1, OT) bathing skills, all  -LS     [place] Level/Cues Needed (Bathing Goal 1, OT) modified independence  -LS     Time  Frame (Bathing Goal 1, OT) long term goal (LTG);10 days  -LS     Progress/Outcomes (Bathing Goal 1, OT) new goal  -LS       Row Name 06/28/24 1402          Dressing Goal 1 (OT)    Activity/Device (Dressing Goal 1, OT) dressing skills, all  -LS     [place]/Cues Needed (Dressing Goal 1, OT) modified independence  -LS     Time Frame (Dressing Goal 1, OT) long term goal (LTG);10 days  -LS     Progress/Outcome (Dressing Goal 1, OT) new goal  -LS       Row Name 06/28/24 1402          Therapy Assessment/Plan (OT)    Planned Therapy Interventions (OT) activity tolerance training;functional balance retraining;occupation/activity based interventions;ROM/therapeutic exercise;adaptive equipment training;BADL retraining;strengthening exercise;transfer/mobility retraining;patient/caregiver education/training  -LS               User Key  (r) = Recorded By, (t) = Taken By, (c) = Cosigned By      Initials Name Provider Type    LS Stacey Reagan, OTR/L Occupational Therapist                   Clinical Impression       Row Name 06/28/24 1402          Pain Assessment    Pretreatment Pain Rating 10/10  -LS     Posttreatment Pain Rating 10/10  -LS     Pain Location - Side/Orientation Bilateral  -LS     Pain Location lower  -LS     Pain Location - back;extremity  -LS     Pain Intervention(s) Repositioned;Ambulation/increased activity  -LS     Additional Documentation --  -LS       Row Name 06/28/24 1402          Pain Scale: FACES Pre/Post-Treatment    Pain: FACES Scale, Pretreatment --  -       Row Name 06/28/24 1402          Plan of Care Review    Plan of Care Reviewed With patient  -LS     Progress no change  -LS     Outcome Evaluation OT eval completed. Pt in fowlers upon therapist arrival; A&Ox4; c/o 10/10 pain in back and BLE. Pt reports I with all BADLs/IADLs including fxl ambulation at baseline. Today, Pt performed supine<>sit utilizing bedrail with HOB elevated with Min A. Pt donned B socks while seated EOB with SBA. Pt  performed sit<>stand with CGA, however, Pt had to stand and sit multiple times prior to ambulation due to pain. Pt ambulated from bed<>BR utilizing no AD with CGA. Pt performed sit<>stand toilet transfer utilizing grab bar with CGA. Pt performed lucy hygiene while seated with SBA. Pt performed hand hygiene while standing unsupported at sink with CGA. Pt additionally demos BUE weakness. Skilled OT intervention indicated in order to address deficits in fxl mobility, fxl activity tolerance, balance, strength, and use of adaptive techniques/equipment during performance of BADLs. Recommend home with assist at discharge.  -       Row Name 06/28/24 1402          Therapy Assessment/Plan (OT)    Rehab Potential (OT) good, to achieve stated therapy goals  -     Criteria for Skilled Therapeutic Interventions Met (OT) yes;skilled treatment is necessary  -     Therapy Frequency (OT) 5 times/wk  -       Row Name 06/28/24 1402          Therapy Plan Review/Discharge Plan (OT)    Anticipated Discharge Disposition (OT) home with assist  -       Row Name 06/28/24 1402          Positioning and Restraints    Pre-Treatment Position in bed  -LS     Post Treatment Position bed  -LS     In Bed fowlers;side rails up x2;call light within reach;encouraged to call for assist;exit alarm on  -LS               User Key  (r) = Recorded By, (t) = Taken By, (c) = Cosigned By      Initials Name Provider Type    Stacey Alba, OTR/L Occupational Therapist                   Outcome Measures       Row Name 06/28/24 1402          How much help from another is currently needed...    Putting on and taking off regular lower body clothing? 3  -LS     Bathing (including washing, rinsing, and drying) 3  -LS     Toileting (which includes using toilet bed pan or urinal) 3  -LS     Putting on and taking off regular upper body clothing 4  -LS     Taking care of personal grooming (such as brushing teeth) 4  -LS     Eating meals 4  -LS     AM-PAC 6  Clicks Score (OT) 21  -LS       Row Name 06/28/24 1121          How much help from another person do you currently need...    Turning from your back to your side while in flat bed without using bedrails? 4  -CM     Moving from lying on back to sitting on the side of a flat bed without bedrails? 4  -CM     Moving to and from a bed to a chair (including a wheelchair)? 4  -CM     Standing up from a chair using your arms (e.g., wheelchair, bedside chair)? 4  -CM     Climbing 3-5 steps with a railing? 3  -CM     To walk in hospital room? 3  -CM     AM-PAC 6 Clicks Score (PT) 22  -CM     Highest Level of Mobility Goal 7 --> Walk 25 feet or more  -CM       Row Name 06/28/24 1402          Functional Assessment    Outcome Measure Options AM-PAC 6 Clicks Daily Activity (OT)  -LS               User Key  (r) = Recorded By, (t) = Taken By, (c) = Cosigned By      Initials Name Provider Type    Alicia Gilman RN Registered Nurse    Stacey Alba, OTR/L Occupational Therapist                    Occupational Therapy Education       Title: PT OT SLP Therapies (In Progress)       Topic: Occupational Therapy (In Progress)       Point: ADL training (Done)       Description:   Instruct learner(s) on proper safety adaptation and remediation techniques during self care or transfers.   Instruct in proper use of assistive devices.                  Learning Progress Summary             Patient Acceptance, E, VU by BRANDI at 6/28/2024 1431                         Point: Home exercise program (Not Started)       Description:   Instruct learner(s) on appropriate technique for monitoring, assisting and/or progressing therapeutic exercises/activities.                  Learner Progress:  Not documented in this visit.              Point: Precautions (Done)       Description:   Instruct learner(s) on prescribed precautions during self-care and functional transfers.                  Learning Progress Summary             Patient Acceptance, E,  VU by BRANDI at 6/28/2024 1431                         Point: Body mechanics (Done)       Description:   Instruct learner(s) on proper positioning and spine alignment during self-care, functional mobility activities and/or exercises.                  Learning Progress Summary             Patient Monica, E, VU by BRANDI at 6/28/2024 1431                                         User Key       Initials Effective Dates Name Provider Type Discipline    BRANDI 06/20/22 -  Stacey Reagan, OTR/L Occupational Therapist OT                  OT Recommendation and Plan  Planned Therapy Interventions (OT): activity tolerance training, functional balance retraining, occupation/activity based interventions, ROM/therapeutic exercise, adaptive equipment training, BADL retraining, strengthening exercise, transfer/mobility retraining, patient/caregiver education/training  Therapy Frequency (OT): 5 times/wk  Plan of Care Review  Plan of Care Reviewed With: patient  Progress: no change  Outcome Evaluation: OT eval completed. Pt in fowlers upon therapist arrival; A&Ox4; c/o 10/10 pain in back and BLE. Pt reports I with all BADLs/IADLs including fxl ambulation at baseline. Today, Pt performed supine<>sit utilizing bedrail with HOB elevated with Min A. Pt donned B socks while seated EOB with SBA. Pt performed sit<>stand with CGA, however, Pt had to stand and sit multiple times prior to ambulation due to pain. Pt ambulated from bed<>BR utilizing no AD with CGA. Pt performed sit<>stand toilet transfer utilizing grab bar with CGA. Pt performed lucy hygiene while seated with SBA. Pt performed hand hygiene while standing unsupported at sink with CGA. Pt additionally demos BUE weakness. Skilled OT intervention indicated in order to address deficits in fxl mobility, fxl activity tolerance, balance, strength, and use of adaptive techniques/equipment during performance of BADLs. Recommend home with assist at discharge.     Time Calculation:         Time  Calculation- OT       Row Name 06/28/24 1402             Time Calculation- OT    OT Start Time 1402  +10 minutes chart review  -LS      OT Stop Time 1432  -LS      OT Time Calculation (min) 30 min  -LS      OT Non-Billable Time (min) 40 min  -LS      OT Received On 06/28/24  -      OT Goal Re-Cert Due Date 07/08/24  -                User Key  (r) = Recorded By, (t) = Taken By, (c) = Cosigned By      Initials Name Provider Type     Stacey Reagan OTR/L Occupational Therapist                  Therapy Charges for Today       Code Description Service Date Service Provider Modifiers Qty    [id number] HC OT EVAL LOW COMPLEXITY 3 6/28/2024 Stacey Reagan OTR/L GO 1                 Stacey Reagan OTR/AMRY  6/28/2024

## 2024-06-28 NOTE — Clinical Note
Level of Care: Med/Surg [1]   Diagnosis: Left renal mass [685829]   Admitting Physician: WESLEY COOL [[id number]]   Attending Physician: WESLEY COOL [[id number]]   Certification: I Certify That Inpatient Hospital Services Are Medically Necessary For Greater Than 2 Midnights

## 2024-06-29 ENCOUNTER — ANESTHESIA (OUTPATIENT)
Dept: PERIOP | Facility: HOSPITAL | Age: 79
End: 2024-06-29
Payer: MEDICARE

## 2024-06-29 ENCOUNTER — ANESTHESIA EVENT (OUTPATIENT)
Dept: PERIOP | Facility: HOSPITAL | Age: 79
End: 2024-06-29
Payer: MEDICARE

## 2024-06-29 PROBLEM — C79.51 METASTATIC CANCER TO SPINE: Status: ACTIVE | Noted: 2024-06-28

## 2024-06-29 PROBLEM — G83.4 CAUDA EQUINA COMPRESSION: Status: ACTIVE | Noted: 2024-06-28

## 2024-06-29 LAB
25(OH)D3 SERPL-MCNC: 52.3 NG/ML (ref 30–100)
ABO GROUP BLD: NORMAL
ANION GAP SERPL CALCULATED.3IONS-SCNC: 11 MMOL/L (ref 5–15)
ANION GAP SERPL CALCULATED.3IONS-SCNC: 14 MMOL/L (ref 5–15)
ANTI-K: NORMAL
BACTERIA SPEC AEROBE CULT: NORMAL
BLD GP AB SCN SERPL QL: POSITIVE
BUN SERPL-MCNC: 34 MG/DL (ref 8–23)
BUN SERPL-MCNC: 37 MG/DL (ref 8–23)
BUN/CREAT SERPL: 12.5 (ref 7–25)
BUN/CREAT SERPL: 13 (ref 7–25)
CALCIUM SPEC-SCNC: 8.7 MG/DL (ref 8.6–10.5)
CALCIUM SPEC-SCNC: 9 MG/DL (ref 8.6–10.5)
CHLORIDE SERPL-SCNC: 100 MMOL/L (ref 98–107)
CHLORIDE SERPL-SCNC: 104 MMOL/L (ref 98–107)
CO2 SERPL-SCNC: 22 MMOL/L (ref 22–29)
CO2 SERPL-SCNC: 25 MMOL/L (ref 22–29)
CREAT SERPL-MCNC: 2.62 MG/DL (ref 0.57–1)
CREAT SERPL-MCNC: 2.97 MG/DL (ref 0.57–1)
CREAT UR-MCNC: 182.2 MG/DL
CRP SERPL-MCNC: 0.5 MG/DL (ref 0–0.5)
EGFRCR SERPLBLD CKD-EPI 2021: 15.6 ML/MIN/1.73
EGFRCR SERPLBLD CKD-EPI 2021: 18.2 ML/MIN/1.73
ERYTHROCYTE [SEDIMENTATION RATE] IN BLOOD: 28 MM/HR (ref 0–30)
GLUCOSE SERPL-MCNC: 90 MG/DL (ref 65–99)
GLUCOSE SERPL-MCNC: 95 MG/DL (ref 65–99)
INR PPP: 1.04 (ref 0.91–1.09)
KELL: NEGATIVE
MAGNESIUM SERPL-MCNC: 2 MG/DL (ref 1.6–2.4)
PHOSPHATE SERPL-MCNC: 4.8 MG/DL (ref 2.5–4.5)
POTASSIUM SERPL-SCNC: 3.9 MMOL/L (ref 3.5–5.2)
POTASSIUM SERPL-SCNC: 4.6 MMOL/L (ref 3.5–5.2)
PROT ?TM UR-MCNC: 16.5 MG/DL
PROTHROMBIN TIME: 14.1 SECONDS (ref 11.8–14.8)
PTH-INTACT SERPL-MCNC: 81.6 PG/ML (ref 15–65)
RH BLD: POSITIVE
SODIUM SERPL-SCNC: 136 MMOL/L (ref 136–145)
SODIUM SERPL-SCNC: 140 MMOL/L (ref 136–145)
SODIUM UR-SCNC: 23 MMOL/L
T&S EXPIRATION DATE: NORMAL
UUN 24H UR-MCNC: 602 MG/DL

## 2024-06-29 PROCEDURE — 88271 CYTOGENETICS DNA PROBE: CPT

## 2024-06-29 PROCEDURE — 93005 ELECTROCARDIOGRAM TRACING: CPT | Performed by: NEUROLOGICAL SURGERY

## 2024-06-29 PROCEDURE — 25010000002 EPINEPHRINE 1 MG/ML SOLUTION 1 ML AMPULE: Performed by: NEUROLOGICAL SURGERY

## 2024-06-29 PROCEDURE — 86870 RBC ANTIBODY IDENTIFICATION: CPT | Performed by: NEUROLOGICAL SURGERY

## 2024-06-29 PROCEDURE — 84540 ASSAY OF URINE/UREA-N: CPT | Performed by: INTERNAL MEDICINE

## 2024-06-29 PROCEDURE — 86902 BLOOD TYPE ANTIGEN DONOR EA: CPT

## 2024-06-29 PROCEDURE — 86870 RBC ANTIBODY IDENTIFICATION: CPT

## 2024-06-29 PROCEDURE — 00BT3ZX EXCISION OF SPINAL MENINGES, PERCUTANEOUS APPROACH, DIAGNOSTIC: ICD-10-PCS | Performed by: NEUROLOGICAL SURGERY

## 2024-06-29 PROCEDURE — 00NY0ZZ RELEASE LUMBAR SPINAL CORD, OPEN APPROACH: ICD-10-PCS | Performed by: NEUROLOGICAL SURGERY

## 2024-06-29 PROCEDURE — 93010 ELECTROCARDIOGRAM REPORT: CPT | Performed by: STUDENT IN AN ORGANIZED HEALTH CARE EDUCATION/TRAINING PROGRAM

## 2024-06-29 PROCEDURE — 25010000002 FENTANYL CITRATE (PF) 100 MCG/2ML SOLUTION

## 2024-06-29 PROCEDURE — 25810000003 SODIUM CHLORIDE 0.9 % SOLUTION: Performed by: INTERNAL MEDICINE

## 2024-06-29 PROCEDURE — 85610 PROTHROMBIN TIME: CPT

## 2024-06-29 PROCEDURE — 86850 RBC ANTIBODY SCREEN: CPT | Performed by: NEUROLOGICAL SURGERY

## 2024-06-29 PROCEDURE — 25010000002 MORPHINE PER 10 MG: Performed by: NEUROLOGICAL SURGERY

## 2024-06-29 PROCEDURE — 85652 RBC SED RATE AUTOMATED: CPT | Performed by: NEUROLOGICAL SURGERY

## 2024-06-29 PROCEDURE — 84100 ASSAY OF PHOSPHORUS: CPT | Performed by: INTERNAL MEDICINE

## 2024-06-29 PROCEDURE — 25010000002 DEXAMETHASONE PER 1 MG

## 2024-06-29 PROCEDURE — 25010000002 PROPOFOL 10 MG/ML EMULSION

## 2024-06-29 PROCEDURE — 25810000003 SODIUM CHLORIDE 0.9 % SOLUTION: Performed by: NEUROLOGICAL SURGERY

## 2024-06-29 PROCEDURE — 63277 BX/EXC XDRL SPINE LESN LMBR: CPT | Performed by: NEUROLOGICAL SURGERY

## 2024-06-29 PROCEDURE — 83970 ASSAY OF PARATHORMONE: CPT | Performed by: INTERNAL MEDICINE

## 2024-06-29 PROCEDURE — 36430 TRANSFUSION BLD/BLD COMPNT: CPT

## 2024-06-29 PROCEDURE — 82306 VITAMIN D 25 HYDROXY: CPT | Performed by: INTERNAL MEDICINE

## 2024-06-29 PROCEDURE — P9035 PLATELET PHERES LEUKOREDUCED: HCPCS

## 2024-06-29 PROCEDURE — 88311 DECALCIFY TISSUE: CPT | Performed by: NEUROLOGICAL SURGERY

## 2024-06-29 PROCEDURE — 86140 C-REACTIVE PROTEIN: CPT | Performed by: NEUROLOGICAL SURGERY

## 2024-06-29 PROCEDURE — 86905 BLOOD TYPING RBC ANTIGENS: CPT | Performed by: NEUROLOGICAL SURGERY

## 2024-06-29 PROCEDURE — 84156 ASSAY OF PROTEIN URINE: CPT | Performed by: INTERNAL MEDICINE

## 2024-06-29 PROCEDURE — 80048 BASIC METABOLIC PNL TOTAL CA: CPT | Performed by: INTERNAL MEDICINE

## 2024-06-29 PROCEDURE — 94799 UNLISTED PULMONARY SVC/PX: CPT

## 2024-06-29 PROCEDURE — 25010000002 BUPIVACAINE 0.25 % SOLUTION 50 ML VIAL: Performed by: NEUROLOGICAL SURGERY

## 2024-06-29 PROCEDURE — 83735 ASSAY OF MAGNESIUM: CPT | Performed by: INTERNAL MEDICINE

## 2024-06-29 PROCEDURE — 25010000002 CEFAZOLIN PER 500 MG: Performed by: NEUROLOGICAL SURGERY

## 2024-06-29 PROCEDURE — P9100 PATHOGEN TEST FOR PLATELETS: HCPCS

## 2024-06-29 PROCEDURE — 86901 BLOOD TYPING SEROLOGIC RH(D): CPT | Performed by: NEUROLOGICAL SURGERY

## 2024-06-29 PROCEDURE — 25010000002 METHYLPREDNISOLONE PER 40 MG: Performed by: NEUROLOGICAL SURGERY

## 2024-06-29 PROCEDURE — 88341 IMHCHEM/IMCYTCHM EA ADD ANTB: CPT | Performed by: NEUROLOGICAL SURGERY

## 2024-06-29 PROCEDURE — 25010000002 ONDANSETRON PER 1 MG

## 2024-06-29 PROCEDURE — 88331 PATH CONSLTJ SURG 1 BLK 1SPC: CPT | Performed by: NEUROLOGICAL SURGERY

## 2024-06-29 PROCEDURE — 86920 COMPATIBILITY TEST SPIN: CPT

## 2024-06-29 PROCEDURE — 88342 IMHCHEM/IMCYTCHM 1ST ANTB: CPT | Performed by: NEUROLOGICAL SURGERY

## 2024-06-29 PROCEDURE — 88185 FLOWCYTOMETRY/TC ADD-ON: CPT

## 2024-06-29 PROCEDURE — 86922 COMPATIBILITY TEST ANTIGLOB: CPT

## 2024-06-29 PROCEDURE — 88184 FLOWCYTOMETRY/ TC 1 MARKER: CPT

## 2024-06-29 PROCEDURE — 86900 BLOOD TYPING SEROLOGIC ABO: CPT | Performed by: NEUROLOGICAL SURGERY

## 2024-06-29 PROCEDURE — 82570 ASSAY OF URINE CREATININE: CPT | Performed by: INTERNAL MEDICINE

## 2024-06-29 PROCEDURE — 94761 N-INVAS EAR/PLS OXIMETRY MLT: CPT

## 2024-06-29 PROCEDURE — 88305 TISSUE EXAM BY PATHOLOGIST: CPT | Performed by: NEUROLOGICAL SURGERY

## 2024-06-29 PROCEDURE — 84300 ASSAY OF URINE SODIUM: CPT | Performed by: INTERNAL MEDICINE

## 2024-06-29 PROCEDURE — 88275 CYTOGENETICS 100-300: CPT

## 2024-06-29 DEVICE — KT HEMOST ABS SURGIFOAM PORCN 1GRAM: Type: IMPLANTABLE DEVICE | Site: SPINE LUMBAR | Status: FUNCTIONAL

## 2024-06-29 DEVICE — HEMOST ABS SURGIFOAM SZ100 8X12 10MM: Type: IMPLANTABLE DEVICE | Site: SPINE LUMBAR | Status: FUNCTIONAL

## 2024-06-29 DEVICE — FLOSEAL HEMOSTATIC MATRIX, 10ML
Type: IMPLANTABLE DEVICE | Site: SPINE LUMBAR | Status: FUNCTIONAL
Brand: FLOSEAL HEMOSTATIC MATRIX

## 2024-06-29 RX ORDER — ONDANSETRON 2 MG/ML
INJECTION INTRAMUSCULAR; INTRAVENOUS AS NEEDED
Status: DISCONTINUED | OUTPATIENT
Start: 2024-06-29 | End: 2024-06-29 | Stop reason: SURG

## 2024-06-29 RX ORDER — NALOXONE HCL 0.4 MG/ML
0.04 VIAL (ML) INJECTION AS NEEDED
Status: DISCONTINUED | OUTPATIENT
Start: 2024-06-29 | End: 2024-06-29 | Stop reason: HOSPADM

## 2024-06-29 RX ORDER — HYDROCODONE BITARTRATE AND ACETAMINOPHEN 5; 325 MG/1; MG/1
1 TABLET ORAL EVERY 4 HOURS PRN
Status: DISPENSED | OUTPATIENT
Start: 2024-06-29 | End: 2024-07-04

## 2024-06-29 RX ORDER — SODIUM CHLORIDE 0.9 % (FLUSH) 0.9 %
10 SYRINGE (ML) INJECTION AS NEEDED
Status: DISCONTINUED | OUTPATIENT
Start: 2024-06-29 | End: 2024-07-15 | Stop reason: HOSPADM

## 2024-06-29 RX ORDER — FLUMAZENIL 0.1 MG/ML
0.2 INJECTION INTRAVENOUS AS NEEDED
Status: DISCONTINUED | OUTPATIENT
Start: 2024-06-29 | End: 2024-06-29 | Stop reason: HOSPADM

## 2024-06-29 RX ORDER — LIDOCAINE HYDROCHLORIDE 20 MG/ML
INJECTION, SOLUTION EPIDURAL; INFILTRATION; INTRACAUDAL; PERINEURAL AS NEEDED
Status: DISCONTINUED | OUTPATIENT
Start: 2024-06-29 | End: 2024-06-29 | Stop reason: SURG

## 2024-06-29 RX ORDER — SODIUM CHLORIDE 0.9 % (FLUSH) 0.9 %
10 SYRINGE (ML) INJECTION EVERY 12 HOURS SCHEDULED
Status: DISCONTINUED | OUTPATIENT
Start: 2024-06-29 | End: 2024-07-03

## 2024-06-29 RX ORDER — ACETAMINOPHEN 160 MG/5ML
650 SOLUTION ORAL EVERY 8 HOURS
Status: DISPENSED | OUTPATIENT
Start: 2024-06-29 | End: 2024-07-01

## 2024-06-29 RX ORDER — LABETALOL HYDROCHLORIDE 5 MG/ML
5 INJECTION, SOLUTION INTRAVENOUS
Status: DISCONTINUED | OUTPATIENT
Start: 2024-06-29 | End: 2024-06-29 | Stop reason: HOSPADM

## 2024-06-29 RX ORDER — ONDANSETRON 2 MG/ML
4 INJECTION INTRAMUSCULAR; INTRAVENOUS
Status: DISCONTINUED | OUTPATIENT
Start: 2024-06-29 | End: 2024-06-29 | Stop reason: HOSPADM

## 2024-06-29 RX ORDER — MAGNESIUM HYDROXIDE 1200 MG/15ML
LIQUID ORAL AS NEEDED
Status: DISCONTINUED | OUTPATIENT
Start: 2024-06-29 | End: 2024-06-29 | Stop reason: HOSPADM

## 2024-06-29 RX ORDER — HYDROMORPHONE HYDROCHLORIDE 1 MG/ML
0.5 INJECTION, SOLUTION INTRAMUSCULAR; INTRAVENOUS; SUBCUTANEOUS
Status: DISCONTINUED | OUTPATIENT
Start: 2024-06-29 | End: 2024-06-29 | Stop reason: HOSPADM

## 2024-06-29 RX ORDER — FENTANYL CITRATE 50 UG/ML
INJECTION, SOLUTION INTRAMUSCULAR; INTRAVENOUS AS NEEDED
Status: DISCONTINUED | OUTPATIENT
Start: 2024-06-29 | End: 2024-06-29 | Stop reason: SURG

## 2024-06-29 RX ORDER — FENTANYL CITRATE 50 UG/ML
50 INJECTION, SOLUTION INTRAMUSCULAR; INTRAVENOUS
Status: DISCONTINUED | OUTPATIENT
Start: 2024-06-29 | End: 2024-06-29 | Stop reason: HOSPADM

## 2024-06-29 RX ORDER — HYDROCODONE BITARTRATE AND ACETAMINOPHEN 7.5; 325 MG/1; MG/1
1 TABLET ORAL EVERY 4 HOURS PRN
Status: DISCONTINUED | OUTPATIENT
Start: 2024-06-29 | End: 2024-07-03

## 2024-06-29 RX ORDER — DEXAMETHASONE SODIUM PHOSPHATE 4 MG/ML
INJECTION, SOLUTION INTRA-ARTICULAR; INTRALESIONAL; INTRAMUSCULAR; INTRAVENOUS; SOFT TISSUE AS NEEDED
Status: DISCONTINUED | OUTPATIENT
Start: 2024-06-29 | End: 2024-06-29 | Stop reason: SURG

## 2024-06-29 RX ORDER — ACETAMINOPHEN 325 MG/1
650 TABLET ORAL EVERY 8 HOURS
Status: DISPENSED | OUTPATIENT
Start: 2024-06-29 | End: 2024-07-01

## 2024-06-29 RX ORDER — ROCURONIUM BROMIDE 10 MG/ML
INJECTION, SOLUTION INTRAVENOUS AS NEEDED
Status: DISCONTINUED | OUTPATIENT
Start: 2024-06-29 | End: 2024-06-29 | Stop reason: SURG

## 2024-06-29 RX ORDER — ACETAMINOPHEN 650 MG/1
650 SUPPOSITORY RECTAL EVERY 8 HOURS
Status: ACTIVE | OUTPATIENT
Start: 2024-06-29 | End: 2024-07-01

## 2024-06-29 RX ORDER — OXYCODONE AND ACETAMINOPHEN 10; 325 MG/1; MG/1
1 TABLET ORAL EVERY 4 HOURS PRN
Status: DISCONTINUED | OUTPATIENT
Start: 2024-06-29 | End: 2024-06-29 | Stop reason: HOSPADM

## 2024-06-29 RX ORDER — DROPERIDOL 2.5 MG/ML
0.62 INJECTION, SOLUTION INTRAMUSCULAR; INTRAVENOUS ONCE AS NEEDED
Status: DISCONTINUED | OUTPATIENT
Start: 2024-06-29 | End: 2024-06-29 | Stop reason: HOSPADM

## 2024-06-29 RX ORDER — NEOSTIGMINE METHYLSULFATE 5 MG/5 ML
SYRINGE (ML) INTRAVENOUS AS NEEDED
Status: DISCONTINUED | OUTPATIENT
Start: 2024-06-29 | End: 2024-06-29 | Stop reason: SURG

## 2024-06-29 RX ORDER — PROPOFOL 10 MG/ML
VIAL (ML) INTRAVENOUS AS NEEDED
Status: DISCONTINUED | OUTPATIENT
Start: 2024-06-29 | End: 2024-06-29 | Stop reason: SURG

## 2024-06-29 RX ORDER — HEPARIN SODIUM 5000 [USP'U]/ML
5000 INJECTION, SOLUTION INTRAVENOUS; SUBCUTANEOUS EVERY 12 HOURS SCHEDULED
Status: DISCONTINUED | OUTPATIENT
Start: 2024-07-02 | End: 2024-07-08

## 2024-06-29 RX ORDER — IBUPROFEN 600 MG/1
600 TABLET ORAL EVERY 6 HOURS PRN
Status: DISCONTINUED | OUTPATIENT
Start: 2024-06-29 | End: 2024-06-29 | Stop reason: HOSPADM

## 2024-06-29 RX ORDER — SODIUM CHLORIDE 9 MG/ML
40 INJECTION, SOLUTION INTRAVENOUS AS NEEDED
Status: DISCONTINUED | OUTPATIENT
Start: 2024-06-29 | End: 2024-07-15 | Stop reason: HOSPADM

## 2024-06-29 RX ORDER — TRANEXAMIC ACID 100 MG/ML
INJECTION, SOLUTION INTRAVENOUS AS NEEDED
Status: DISCONTINUED | OUTPATIENT
Start: 2024-06-29 | End: 2024-06-29 | Stop reason: SURG

## 2024-06-29 RX ADMIN — Medication 10 ML: at 10:04

## 2024-06-29 RX ADMIN — SODIUM CHLORIDE 100 ML/HR: 900 INJECTION, SOLUTION INTRAVENOUS at 18:57

## 2024-06-29 RX ADMIN — CLONIDINE HYDROCHLORIDE 0.1 MG: 0.1 TABLET ORAL at 20:20

## 2024-06-29 RX ADMIN — AMLODIPINE BESYLATE 5 MG: 5 TABLET ORAL at 20:20

## 2024-06-29 RX ADMIN — Medication 10 ML: at 20:21

## 2024-06-29 RX ADMIN — HYDROCODONE BITARTRATE AND ACETAMINOPHEN 1 TABLET: 5; 325 TABLET ORAL at 20:19

## 2024-06-29 RX ADMIN — DEXAMETHASONE SODIUM PHOSPHATE 10 MG: 4 INJECTION, SOLUTION INTRAMUSCULAR; INTRAVENOUS at 16:26

## 2024-06-29 RX ADMIN — LIDOCAINE HYDROCHLORIDE 100 MG: 20 INJECTION, SOLUTION EPIDURAL; INFILTRATION; INTRACAUDAL; PERINEURAL at 14:26

## 2024-06-29 RX ADMIN — FENTANYL CITRATE 100 MCG: 50 INJECTION, SOLUTION INTRAMUSCULAR; INTRAVENOUS at 14:26

## 2024-06-29 RX ADMIN — CEFAZOLIN 2 G: 2 INJECTION, POWDER, FOR SOLUTION INTRAMUSCULAR; INTRAVENOUS at 20:18

## 2024-06-29 RX ADMIN — SODIUM CHLORIDE 100 ML/HR: 900 INJECTION, SOLUTION INTRAVENOUS at 00:13

## 2024-06-29 RX ADMIN — GLYCOPYRROLATE 0.4 MG: 0.2 INJECTION INTRAMUSCULAR; INTRAVENOUS at 16:51

## 2024-06-29 RX ADMIN — ONDANSETRON 4 MG: 2 INJECTION INTRAMUSCULAR; INTRAVENOUS at 14:40

## 2024-06-29 RX ADMIN — ROCURONIUM BROMIDE 50 MG: 10 INJECTION, SOLUTION INTRAVENOUS at 14:26

## 2024-06-29 RX ADMIN — PROPOFOL 150 MG: 10 INJECTION, EMULSION INTRAVENOUS at 14:26

## 2024-06-29 RX ADMIN — MONTELUKAST SODIUM 10 MG: 10 TABLET, FILM COATED ORAL at 20:20

## 2024-06-29 RX ADMIN — TRANEXAMIC ACID 1000 MG: 100 INJECTION, SOLUTION INTRAVENOUS at 16:02

## 2024-06-29 RX ADMIN — CYCLOBENZAPRINE 10 MG: 10 TABLET, FILM COATED ORAL at 11:07

## 2024-06-29 RX ADMIN — VALACYCLOVIR 2000 MG: 500 TABLET, FILM COATED ORAL at 14:50

## 2024-06-29 RX ADMIN — SODIUM CHLORIDE 100 ML/HR: 900 INJECTION, SOLUTION INTRAVENOUS at 11:13

## 2024-06-29 RX ADMIN — OXYCODONE HYDROCHLORIDE AND ACETAMINOPHEN 1 TABLET: 10; 325 TABLET ORAL at 17:59

## 2024-06-29 RX ADMIN — Medication 3 MG: at 16:51

## 2024-06-29 RX ADMIN — SODIUM CHLORIDE 100 ML/HR: 900 INJECTION, SOLUTION INTRAVENOUS at 20:19

## 2024-06-29 RX ADMIN — ROPINIROLE HYDROCHLORIDE 0.5 MG: 0.25 TABLET, FILM COATED ORAL at 20:20

## 2024-06-29 RX ADMIN — TRANEXAMIC ACID 1 MG/KG/HR: 100 INJECTION, SOLUTION INTRAVENOUS at 16:08

## 2024-06-29 RX ADMIN — FLUTICASONE PROPIONATE 2 SPRAY: 50 SPRAY, METERED NASAL at 10:03

## 2024-06-29 RX ADMIN — ACETAMINOPHEN 650 MG: 325 TABLET, FILM COATED ORAL at 05:44

## 2024-06-29 RX ADMIN — DEXAMETHASONE SODIUM PHOSPHATE 4 MG: 4 INJECTION, SOLUTION INTRAMUSCULAR; INTRAVENOUS at 14:40

## 2024-06-29 RX ADMIN — FENTANYL CITRATE 100 MCG: 50 INJECTION, SOLUTION INTRAMUSCULAR; INTRAVENOUS at 15:01

## 2024-06-29 RX ADMIN — ROSUVASTATIN CALCIUM 10 MG: 10 TABLET, FILM COATED ORAL at 20:20

## 2024-06-29 NOTE — PLAN OF CARE
Goal Outcome Evaluation:  Plan of Care Reviewed With: patient        Progress: no change  Outcome Evaluation: Medicated x1 for c/o back pain with tylenol with good relief per pt, voiding, up w assist x1, ambulating in rm, rm air, A&O, heparin, IVF R hand, tolerating diet, no BP or sticks on L.

## 2024-06-29 NOTE — PROGRESS NOTES
Neurosurgery Daily Progress Note    HPI:  Marina Joe is a 79 y.o. female with significant medical comorbidities to include breast cancer, CKD, hypertension, hyperlipidemia, and obesity.  She presents with a new problem of lumbar back and bilateral nondermatomal lower extremity radicular pain and dysesthesias. Physical exam findings of lower extremity hyporeflexia and right leg weakness with saddle anesthesia.  Their imaging shows an enlarged left kidney and right adrenal mass concerning for metastatic disease.  CT of the lumbar spine shows multilevel degenerative changes to include an anteriolisthesis of L3 over L4 and spondylosis at L5-S1. MRI with cord compression at L3 concerning for epidural metastasis       Assessment:   Past Medical History:   Diagnosis Date    Breast cancer     CKD (chronic kidney disease)     Hypercholesteremia     Hypertension     Sinusitis     Stage 3a chronic kidney disease 10/20/2020     Active Hospital Problems    Diagnosis     **Left renal mass        Plan:   Neuro: Stable with BLE weakness and numbness  Axial low back pain  Bilateral leg pain and dysesthesias  Enlarged left kidney and right adrenal mass concerning for metastatic disease  Remote history of breast cancer  Cauda Equina Syndrome   MRI of the spine with and without reviewed concerning for L3 epidural metastasis   Discussed risk and benefits   Favor L3 laminectomy with biopsy and decompression   Will require platelets greater than 100,000.   Stat CRP and ESR         CV: LASHANDA  Pulm: LASHANDA  :  LASHANDA  FEN: NPO for now  Endocrine: LASHANDA  Heme:  Thrombocytopenia.  Transfused 2 units of platelets.  Dispo: Pending biopsy      Chief complaint:   Back pain bilateral lower extremity numbness and tingling    HPI  Subjective  Stable    Temp:  [97.4 °F (36.3 °C)-98.3 °F (36.8 °C)] 97.4 °F (36.3 °C)  Heart Rate:  [59-83] 80  Resp:  [16-18] 16  BP: (106-177)/(40-93) 135/57    Output by Drain (mL) 06/28/24 0701 - 06/28/24 1900 06/28/24 1901  "- 06/29/24 0700 06/29/24 0701 - 06/29/24 0906 Range Total   Patient has no LDAs of requested type attached.       Objective:  Vital signs: (most recent): Blood pressure 135/57, pulse 80, temperature 97.4 °F (36.3 °C), temperature source Oral, resp. rate 16, height 172.7 cm (68\"), weight 111 kg (244 lb), SpO2 96%, not currently breastfeeding.        Neurologic Exam     Mental Status   Oriented to person, place, and time.   Speech: speech is normal   Level of consciousness: alert    Cranial Nerves     CN III, IV, VI   Pupils are equal, round, and reactive to light.  Extraocular motions are normal.     CN V   Facial sensation intact.     CN VII   Facial expression full, symmetric.     Motor Exam   Right arm pronator drift: absent  Left arm pronator drift: absent    Strength   Right deltoid: 5/5  Left deltoid: 5/5  Right biceps: 5/5  Left biceps: 5/5  Right triceps: 5/5  Left triceps: 5/5  Right iliopsoas: 4/5  Left iliopsoas: 5/5  Right quadriceps: 4/5  Left quadriceps: 5/5  Right gastroc: 5/5  Left gastroc: 5/5    Sensory Exam   Right arm light touch: normal  Left arm light touch: normal  Right leg light touch: decreased from knee  Left leg light touch: decreased from knee  Sensory deficit distribution on left: L3      Drains: * No LDAs found *    Imaging Results (Last 24 Hours)       Procedure Component Value Units Date/Time    MRI Lumbar Spine With & Without Contrast [[id number]] Collected: 06/28/24 1920     Updated: 06/28/24 1945    Addenda:        Additional findings: There are also appears to be tumor signal extending  into the right L2-L3 neural foramen.     This report was signed and finalized on 6/28/2024 7:42 PM by Warren Freire.     Signed: 06/28/24 1942 by Warren Freire DO    Narrative:      Exam: MRI LUMBAR SPINE W WO CONTRAST- 6/28/2024 3:43 PM     HISTORY: Lumbar back and bilateral nondermatomal leg pain and  dysesthesias; N28.89-Other specified disorders of kidney and ureter;  E27.8-Other " specified disorders of adrenal gland; M79.89-Other specified  soft tissue disorders; N28.9-Disorder of kidney and ureter, unspecified;  N18.9-Chronic kidney disease, unspecified; D69.6-Thrombocytopenia,  unspecified     COMPARISON: 6/28/2024 lumbar spine CT     TECHNIQUE: Multiplanar, multisequence MRI of the lumbar spine was  obtained prior to and after the administration of 20 mL intravenous  gadolinium contrast.     FINDINGS:     Grade 1 degenerative anterolisthesis of L3-L4.     Within the L3 vertebral body there is fairly diffuse T2 hyperintense  signal with associated T1 hypointense marrow infiltration and suspected  mild enhancement. There is an associated posterior enhancing soft tissue  mass extending into the spinal canal causing severe stenosis and  compression of the cauda equina nerve roots. The soft tissue component  also appears to extend superiorly to the L2-L3 disc level. There appears  to be tumor extension into the right L3-L4 foramen.     No visible fracture.     Multilevel mild degenerative disc disease with posterior disc osteophyte  complexes. Multilevel facet hypertrophic changes. No significant facet  periarticular edema. Multilevel ligamentum flavum hypertrophy.  Multilevel prominent posterior epidural fat.     Conus appears to terminate at L1-L2.     T11-T12: There appears to be a at least mild to moderate spinal canal  and mild to moderate bilateral foraminal narrowing.     T12-L1: No significant spinal canal narrowing. Moderate right foraminal  narrowing.     L1-L2: Moderate spinal canal narrowing. Mild to moderate right foraminal  narrowing.     L2-L3: Severe spinal canal narrowing. Moderate to severe right foraminal  narrowing.     L3-L4: Severe spinal canal narrowing. Moderate right foraminal  narrowing.     L4-L5: Moderate spinal canal narrowing and bilateral subarticular  narrowing encroaching the descending bilateral L5 nerve roots. Mild  bilateral foraminal narrowing.     L5-S1:  No significant spinal canal or foraminal narrowing.     Extraspinal findings: Please see separately dictated MR abdomen and CT  abdomen/pelvis from the same day.       Impression:         Neoplastic process involving the L3 vertebral body with associated  posterior soft tissue component resulting in severe spinal canal  narrowing and cauda equina nerve root compression. The soft tissue  component appears to also extend into the right L3-L4 foramen. No  associated pathological fracture.     Additional multilevel spondylotic changes including moderate to severe  spinal canal and foraminal narrowing as detailed above.           This report was signed and finalized on 6/28/2024 7:36 PM by Warren Freire.       MRI Abdomen With & Without Contrast [[id number]] Collected: 06/28/24 1826     Updated: 06/28/24 1943    Addenda:        Additional findings: Tumor signal from the left renal mass appears to  extend along the left renal vein and possibly abuts the IVC and is  suspicious for neoplastic involvement. An additional differential also  includes multifocal primary renal malignancies with metastatic disease.     This report was signed and finalized on 6/28/2024 7:40 PM by Warren Freire.     Signed: 06/28/24 1940 by Warren Freire, DO    Narrative:      EXAM: MRI ABDOMEN W WO CONTRAST-     INDICATION: Evaluate kidney and adrenal masses showed on CT-scan;  N28.89-Other specified disorders of kidney and ureter; E27.8-Other  specified disorders of adrenal gland; M79.89-Other specified soft tissue  disorders; N28.9-Disorder of kidney and ureter, unspecified;  N18.9-Chronic kidney disease, unspecified; D69.6-Thrombocytopenia,  unspecified        TECHNIQUE: Multisequence multiplanar MR images was obtained of the  abdomen prior to and at the administration of 20 mL intravenous  gadolinium contrast.        COMPARISON: CT abdomen pelvis 6/28/2024     FINDINGS:     Decrease sensitivity due to motion.     Lower Chest:  Unremarkable.     Liver: Unremarkable.     Biliary Tree: Small amount of gallbladder sludge versus layering stones.     Spleen: Tiny T2 hyperintense splenic lesion is nonspecific but favored  benign.     Pancreas: 1.5 cm pancreatic body cyst.     Adrenal Glands: 3.3 cm right adrenal nodule, new from prior CT on  5/23/2023.     Kidneys/Upper Ureters:      There is an infiltrative T2 hypointense/T1 isointense diffusion  restricting mass involving the left kidney with extension into the  proximal left collecting system/upper ureters which is difficult to  measure but measures up to 11 cm in cranial caudal dimensions. Please  note the abnormal diffusion restriction does extend below the  field-of-view into the left ureter. There are additional smaller masses  within the left renal cortex.      There are also multiple (at least 6) diffusion restricting masses within  the right kidney measuring up to 3.2 cm. There also appears to be a  diffusion restricting mass in the right renal pelvis.      These are somewhat limited in evaluation on postcontrast images due to  degree of motion, however these these masses do appear to demonstrate  some degree of enhancement.     Small left renal cyst is noted.     Gastrointestinal Tract: Colonic diverticulosis.     Lymphatics: Unremarkable.     Vasculature: Unremarkable.      Peritoneum/Retroperitoneum: Unremarkable.     Abdominal Wall/Soft Tissues: There enhancing diffusion restricting soft  tissue masses, the most dominant and largest cluster is within the right  posterior abdominal wall measuring up to 2.6 cm.     Osseous Structures: There is a diffusion restricting mass involving the  L3 vertebral body with suspected posterior extension into the spinal  canal.       Impression:            Infiltrative mass within the left kidney extending into the proximal  left collecting system as well as numerous additional solid masses in  the bilateral kidneys. Additionally there is a right  adrenal mass,  multiple subcutaneous fat soft tissue masses, and a L3 vertebral body  mass with suspected extension into the spinal canal. Findings  collectively are most consistent with metastatic disease which carries a  broad differential, however some differentials include metastatic breast  cancer, malignant melanoma, and lymphoma. The subtendinous fat soft  tissue masses would be amenable to ultrasound-guided biopsy.     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           This report was signed and finalized on 6/28/2024 6:57 PM by Warren Freire.       CT Chest Without Contrast Diagnostic [[id number]] Collected: 06/28/24 1626     Updated: 06/28/24 1639    Narrative:      CT CHEST WO CONTRAST DIAGNOSTIC- 6/28/2024 3:15 PM     HISTORY: Staging for newly diagnosed metastatic disease; N28.89-Other  specified disorders of kidney and ureter; E27.8-Other specified  disorders of adrenal gland; M79.89-Other specified soft tissue  disorders; N28.9-Disorder of kidney and ureter, unspecified;  N18.9-Chronic kidney disease, unspecified; D69.6-Thrombocytopenia,  unspecified      COMPARISON: 10/13/2022     TOTAL DOSE LENGTH PRODUCT: 413.82 mGy.cm. Automated exposure control was  also utilized to decrease patient radiation dose.     TECHNIQUE: Axial images of the chest are performed without IV contrast  secondary to renal insufficiency.      FINDINGS:    Postoperative changes of the left breast with left axillary  surgical clips. 9 mm medial left breast nodule/mass near the 8 to 9  o'clock position, new from the 2022 CT study which may represent a cyst  or solid mass. Correlation to any outside mammogram and breast  ultrasound recommended. No current mammogram or ultrasound at this  institution.     No pathologic axillary or intrathoracic lymphadenopathy. Cardiomegaly.  Very small pericardial effusion. No pleural effusions.     3.2 cm right adrenal mass.  Abnormal appearance of the left greater than  right kidney. Please refer to today's CT abdomen pelvis 6/20/2024.     Basilar atelectasis. A 6 mm nodule along the right major fissure  favoring intrafissural lymph node, present on the 10/13/2022 study.. No  suspicious pulmonary nodules or convincing intrathoracic metastasis. No  pneumothorax. No discrete endobronchial lesion.     No focal aggressive regional bony lesions. Moderate diffuse degenerative  changes of the thoracolumbar junction. New 1.5 cm nodule within the  subcutaneous tissues of the left lower chest/upper abdomen (series 2  image 98. Several soft tissue nodules within the subcutaneous tissue of  the right flank at the L1 level measuring up to 2.1 cm. Smaller 9 mm  soft tissue nodule of the posterior back just left of midline at the L2  level. A left 10 mm posterior subcutaneous nodule at the T12-L1 level.       Impression:      1. Patient with a history of left breast cancer with postoperative  changes of the superior left breast and left axillary surgical clips.  There is a 9 mm nodule/mass of the medial left breast positioned towards  the 8 to 9 o'clock position. Correlation to any recent mammogram or  breast ultrasound recommended, none at this institution. If no outside  studies demonstrating a known 9 mm medial left breast cyst, a follow-up  outpatient mammogram and left breast ultrasound should be performed for  further assessment.  2. Scattered subcutaneous soft tissue nodules of the abdominal wall.  Largest nodules within the right posterior flank measuring up to 2.1 cm  at the L1 level. These appear to represent soft tissue nodules and could  be metastatic deposits. Ultrasound recommended to establish solid  nature. Biopsy could be performed if solid and clinically indicated.  3. Cardiomegaly. Mild vascular calcification.  4. No convincing intrathoracic metastasis. Stable 6 mm nodule in the  right major fissure favoring intrafissural lymph  node.  5. Please refer to CT abdomen and pelvis report from today for  description of bilateral renal pathology as well as a 3.2 cm right  adrenal mass.     This report was signed and finalized on 6/28/2024 4:36 PM by Dr. Meaghan Phillips MD.       XR Chest 1 View [[id number]] Collected: 06/28/24 0912     Updated: 06/28/24 0916    Narrative:      EXAM: XR CHEST 1 VW-      DATE: 6/28/2024 8:06 AM     HISTORY: multiple complaints       COMPARISON: 8/10/2023.     TECHNIQUE:  Frontal view(s) of the chest submitted.     FINDINGS:    There are low lung volumes with vascular crowding versus volume  overload/edema. There is enlargement of the cardiac silhouette. No  effusion or pneumothorax is seen.          Impression:         1. Low lung volumes with prominent vasculature may represent vascular  crowding or mild edema.     This report was signed and finalized on 6/28/2024 9:13 AM by Gatito Blackwood.             Lab Results (last 24 hours)       Procedure Component Value Units Date/Time    PTH, Intact [[id number]]  (Abnormal) Collected: 06/29/24 0519    Specimen: Blood Updated: 06/29/24 0555     PTH, Intact 81.6 pg/mL     Narrative:      Results may be falsely decreased if patient taking Biotin.      Basic Metabolic Panel [[id number]]  (Abnormal) Collected: 06/29/24 0519    Specimen: Blood Updated: 06/29/24 0555     Glucose 90 mg/dL      BUN 37 mg/dL      Creatinine 2.97 mg/dL      Sodium 136 mmol/L      Potassium 3.9 mmol/L      Chloride 100 mmol/L      CO2 25.0 mmol/L      Calcium 8.7 mg/dL      BUN/Creatinine Ratio 12.5     Anion Gap 11.0 mmol/L      eGFR 15.6 mL/min/1.73     Narrative:      GFR Normal >60  Chronic Kidney Disease <60  Kidney Failure <15    The GFR formula is only valid for adults with stable renal function between ages 18 and 70.    Magnesium [[id number]]  (Normal) Collected: 06/29/24 0519    Specimen: Blood Updated: 06/29/24 0555     Magnesium 2.0 mg/dL     Phosphorus [[id number]]  (Abnormal) Collected:  "06/29/24 0519    Specimen: Blood Updated: 06/29/24 0555     Phosphorus 4.8 mg/dL     Vitamin D,25-Hydroxy [[id number]] Collected: 06/29/24 0519    Specimen: Blood Updated: 06/29/24 0534    Sodium, Urine, Random - Urine, Clean Catch [[id number]] Collected: 06/29/24 0022    Specimen: Urine, Clean Catch Updated: 06/29/24 0055     Sodium, Urine 23 mmol/L     Narrative:      Reference intervals for random urine have not been established.  Clinical usage is dependent upon physician's interpretation in combination with other laboratory tests.       Protein, Urine, Random - Urine, Clean Catch [[id number]] Collected: 06/29/24 0022    Specimen: Urine, Clean Catch Updated: 06/29/24 0054     Total Protein, Urine 16.5 mg/dL     Narrative:      Reference intervals for random urine have not been established.  Clinical usage is dependent upon physician's interpretation in combination with other laboratory tests.       Creatinine Urine Random (kidney function) GFR component - Urine, Clean Catch [[id number]] Collected: 06/29/24 0022    Specimen: Urine, Clean Catch Updated: 06/29/24 0034    Urea Nitrogen, Urine - Urine, Clean Catch [[id number]] Collected: 06/29/24 0022    Specimen: Urine, Clean Catch Updated: 06/29/24 0034    Chlamydia trachomatis, Neisseria gonorrhoeae, PCR - Swab, Cervix [[id number]]  (Normal) Collected: 06/28/24 0854    Specimen: Swab from Cervix Updated: 06/28/24 1435     Chlamydia DNA by PCR Not Detected     Neisseria gonorrhoeae by PCR Not Detected    Narrative:      Disclaimer: The Aptima Combo 2 assay is a target amplification nucleic acid probe test that utilizes target capture for the in vitro qualitative detection and differentiation of ribosomal RNA from Chlamydia trachomatis and Neisseria gonorrhoeae to aid in the diagnosis of chlamydial and/or gonococcal urogenital disease.  Cell culture was once considered to be the \"gold standard\" for detection of CT and NG.  Culture is quite specific, but scientific studies " have demonstrated that the NAAT DNA probe technologies have higher clinical sensitivities than culture.    Basic Metabolic Panel [[id number]]  (Abnormal) Collected: 06/28/24 1153    Specimen: Blood Updated: 06/28/24 1330     Glucose 94 mg/dL      BUN 30 mg/dL      Creatinine 2.59 mg/dL      Sodium 135 mmol/L      Potassium 3.8 mmol/L      Chloride 97 mmol/L      CO2 21.0 mmol/L      Calcium 9.5 mg/dL      BUN/Creatinine Ratio 11.6     Anion Gap 17.0 mmol/L      eGFR 18.4 mL/min/1.73     Narrative:      GFR Normal >60  Chronic Kidney Disease <60  Kidney Failure <15    The GFR formula is only valid for adults with stable renal function between ages 18 and 70.    Wet Prep, Genital - Swab, Vagina [[id number]]  (Abnormal) Collected: 06/28/24 0854    Specimen: Swab from Vagina Updated: 06/28/24 0935     YEAST No yeast seen     HYPHAL ELEMENTS No Hyphal elements seen     WBC'S 1+ WBC's seen     Clue Cells, Wet Prep No Clue cells seen     Trichomonas, Wet Prep No Trichomonas seen              Marcellus Pulido MD

## 2024-06-29 NOTE — ANESTHESIA PREPROCEDURE EVALUATION
Anesthesia Evaluation     Patient summary reviewed and Nursing notes reviewed   NPO Solid Status: > 8 hours  NPO Liquid Status: > 8 hours           Airway   Mallampati: II  TM distance: >3 FB  No difficulty expected  Dental - normal exam     Pulmonary - normal exam   (-) COPD, asthma, recent URI, no home oxygen  Cardiovascular - normal exam  Exercise tolerance: good (4-7 METS)    (+) hypertension, hyperlipidemia  (-) dysrhythmias, cardiac stents      Neuro/Psych  (+) numbness, psychiatric history, poor historian.  GI/Hepatic/Renal/Endo    (+) obesity, morbid obesity, GI bleeding , renal disease- CRI, thyroid problem hypothyroidism  (-) diabetes    Musculoskeletal     (+) neck pain  Abdominal    Substance History      OB/GYN          Other   arthritis,   history of cancer                  Anesthesia Plan    ASA 3     general     intravenous induction     Anesthetic plan, risks, benefits, and alternatives have been provided, discussed and informed consent has been obtained with: patient.    Use of blood products discussed with patient  Consented to blood products.    Plan discussed with CRNA.    CODE STATUS:    Level Of Support Discussed With: Patient; Health Care Surrogate  Code Status (Patient has no pulse and is not breathing): CPR (Attempt to Resuscitate)  Medical Interventions (Patient has pulse or is breathing): Full Support

## 2024-06-29 NOTE — PROGRESS NOTES
Nephrology ([facility]) Consult Note      Patient:  Marina Joe  YOB: 1945  Date of Service: 6/28/2024  MRN: [id number]   Acct: [id number]   Primary Care Physician: Hanh Og APRN  Advance Directive:   Code Status and Medical Interventions:   Ordered at: 06/28/24 1037     Level Of Support Discussed With:    Patient    Health Care Surrogate     Code Status (Patient has no pulse and is not breathing):    CPR (Attempt to Resuscitate)     Medical Interventions (Patient has pulse or is breathing):    Full Support     Admit Date: 6/28/2024       Hospital Day: 1  Referring Provider: No ref. provider found      Patient personally seen and examined.  Complete chart including Consults, Notes, Operative Reports, Labs, Cardiology, and Radiology studies reviewed as able.        Subjective:  Marina Joe is a 79 y.o. female for whom we were consulted for evaluation and treatment of acute kidney injury.  She has history of chronic kidney disease stage IIIa, breast cancer, hypertension.  She presented for evaluation of back pain and had tried the gabapentin.  She reported no NSAID usage.  She denied current chest pain, shortness of air at rest, nausea or vomiting.  MRI spine demonstrated stenosis with likely malignancy.  Neurosurgery is evaluating.  She denied appetite changes, dysuria or hematuria.    Allergies:  Aspirin and Niacin    Home Meds:  Medications Prior to Admission   Medication Sig Dispense Refill Last Dose    amLODIPine (NORVASC) 5 MG tablet Take 1 tablet by mouth 2 (Two) Times a Day.       buPROPion XL (WELLBUTRIN XL) 150 MG 24 hr tablet Take 1 tablet by mouth Daily.       Calcium Carb-Cholecalciferol (CALCIUM 600+D3 PO) Take 1 tablet by mouth Daily.       carvedilol (COREG) 6.25 MG tablet Take 1 tablet by mouth 2 (Two) Times a Day With Meals.       dapagliflozin Propanediol (Farxiga) 10 MG tablet Take 10 mg by mouth Daily.       famotidine (PEPCID) 20 MG tablet  Take 1 tablet by mouth 2 (Two) Times a Day Before Meals. 60 tablet 0     fluticasone (FLONASE) 50 MCG/ACT nasal spray 2 sprays into the nostril(s) as directed by provider Daily. 1 g 3     irbesartan-hydrochlorothiazide (AVALIDE) 300-12.5 MG tablet Take 1 tablet by mouth Daily.       montelukast (SINGULAIR) 10 MG tablet Take 1 tablet by mouth Every Night.       Multiple Vitamins-Minerals (MULTIVITAMIN ADULT) tablet Take 1 tablet by mouth Daily.       pantoprazole (PROTONIX) 40 MG EC tablet Take 1 tablet by mouth Daily.       rOPINIRole (REQUIP) 0.5 MG tablet Take 1 tablet by mouth Every Night. Take 1 hour before bedtime.       rosuvastatin (CRESTOR) 20 MG tablet Take 1 tablet by mouth Daily.       valACYclovir (VALTREX) 500 MG tablet Take 1 tablet by mouth 2 (Two) Times a Day.       albuterol sulfate  (90 Base) MCG/ACT inhaler Inhale 2 puffs Every 4 (Four) Hours As Needed for Wheezing. 2 g 3     cetirizine (zyrTEC) 10 MG tablet Take 1 tablet by mouth Daily.       cloNIDine (CATAPRES) 0.1 MG tablet Take 1 tablet by mouth 2 (Two) Times a Day.       cyclobenzaprine (FLEXERIL) 10 MG tablet Take 1 tablet by mouth 3 (Three) Times a Day As Needed for Muscle Spasms.       Diclofenac Sodium (VOLTAREN) 1 % gel gel Apply 4 g topically to the appropriate area as directed 4 (Four) Times a Day As Needed (arthralgia). 240 g 3     Ergocalciferol (VITAMIN D2 PO) Take 50,000 Units by mouth Every 30 (Thirty) Days.       naproxen sodium (ALEVE) 220 MG tablet Take 1 tablet by mouth 2 (Two) Times a Day As Needed.       sertraline (ZOLOFT) 100 MG tablet Take 1 tablet by mouth Daily.          Medicines:  Current Facility-Administered Medications   Medication Dose Route Frequency Provider Last Rate Last Admin    acetaminophen (TYLENOL) tablet 650 mg  650 mg Oral Q6H PRN Alysia Lincoln MD        albuterol (PROVENTIL) nebulizer solution 0.083% 2.5 mg/3mL  2.5 mg Nebulization Q4H PRN Prince Chambers MD   2.5 mg at 06/28/24 0804     amLODIPine (NORVASC) tablet 5 mg  5 mg Oral BID Prince Chambers MD   5 mg at 06/28/24 1145    sennosides-docusate (PERICOLACE) 8.6-50 MG per tablet 2 tablet  2 tablet Oral BID PRN Prince Chambers MD        And    polyethylene glycol (MIRALAX) packet 17 g  17 g Oral Daily PRN Prince Chambers MD        And    bisacodyl (DULCOLAX) EC tablet 5 mg  5 mg Oral Daily PRN Prince Chambers MD        And    bisacodyl (DULCOLAX) suppository 10 mg  10 mg Rectal Daily PRN Prince Chambers MD        buPROPion XL (WELLBUTRIN XL) 24 hr tablet 150 mg  150 mg Oral Daily Prince Chambers MD   150 mg at 06/28/24 1146    Calcium Replacement - Follow Nurse / BPA Driven Protocol   Does not apply PRN Prince Chambers MD        carvedilol (COREG) tablet 6.25 mg  6.25 mg Oral BID With Meals Prince Chambers MD   6.25 mg at 06/28/24 1145    cetirizine (zyrTEC) tablet 10 mg  10 mg Oral Daily Prince Chambers MD   10 mg at 06/28/24 1145    cloNIDine (CATAPRES) tablet 0.1 mg  0.1 mg Oral BID Prince Chambers MD   0.1 mg at 06/28/24 1144    cyclobenzaprine (FLEXERIL) tablet 10 mg  10 mg Oral TID PRN Prince Chambers MD        Diclofenac Sodium (VOLTAREN) 1 % gel 4 g  4 g Topical 4x Daily PRN Prince Chambers MD        fluticasone (FLONASE) 50 MCG/ACT nasal spray 2 spray  2 spray Nasal Daily Prince Chambers MD   2 spray at 06/28/24 1145    heparin (porcine) 5000 UNIT/ML injection 5,000 Units  5,000 Units Subcutaneous Q12H Prince Chambers MD   5,000 Units at 06/28/24 2041    hydrALAZINE (APRESOLINE) injection 10 mg  10 mg Intravenous Q4H PRN Prince Chambers MD        Magnesium Low Dose Replacement - Follow Nurse / BPA Driven Protocol   Does not apply PRN Prince Chambers MD        montelukast (SINGULAIR) tablet 10 mg  10 mg Oral Nightly Prince Chambers MD   10 mg at 06/28/24 2040    multivitamin with minerals 1 tablet  1 tablet Oral Daily Prince Chambers MD   1 tablet at 06/28/24 1147     ondansetron (ZOFRAN) injection 4 mg  4 mg Intravenous Q6H PRN Prince Chambers MD        pantoprazole (PROTONIX) EC tablet 40 mg  40 mg Oral Daily Prince Chambers MD   40 mg at 06/28/24 1144    Phosphorus Replacement - Follow Nurse / BPA Driven Protocol   Does not apply PRN Prince Chambers MD        Potassium Replacement - Follow Nurse / BPA Driven Protocol   Does not apply PRN Prince Chambers MD        rOPINIRole (REQUIP) tablet 0.5 mg  0.5 mg Oral Nightly Prince Chambers MD   0.5 mg at 06/28/24 2040    rosuvastatin (CRESTOR) tablet 10 mg  10 mg Oral Nightly Prince Chambers MD   10 mg at 06/28/24 2041    sertraline (ZOLOFT) tablet 100 mg  100 mg Oral Daily Prince Chambers MD   100 mg at 06/28/24 1145    sodium chloride 0.9 % flush 10 mL  10 mL Intravenous PRN Prince Chambers MD        sodium chloride 0.9 % flush 10 mL  10 mL Intravenous Q12H Prince Chambers MD   10 mL at 06/28/24 2040    sodium chloride 0.9 % flush 10 mL  10 mL Intravenous PRN Prince Chambers MD        sodium chloride 0.9 % infusion 40 mL  40 mL Intravenous PRN Prince Chambers MD        sodium chloride 0.9 % infusion  100 mL/hr Intravenous Continuous Prince Chambers  mL/hr at 06/28/24 1934 100 mL/hr at 06/28/24 1934    [START ON 6/29/2024] valACYclovir (VALTREX) tablet 500 mg  500 mg Oral Q24H Prince Chambers MD           Past Medical History:  Past Medical History:   Diagnosis Date    Breast cancer     CKD (chronic kidney disease)     Hypercholesteremia     Hypertension     Sinusitis     Stage 3a chronic kidney disease 10/20/2020       Past Surgical History:  Past Surgical History:   Procedure Laterality Date    AVULSION TOENAIL PLATE      Sept 26,2018    BREAST BIOPSY Left 11/20/2012    BREAST BIOPSY      Left Breast, 1/2019 per Dr Barkley    BREAST LUMPECTOMY Left     with node bx     COLONOSCOPY  09/13/2013    small polyp at 30cm benign hyperplastic polyp, changes consistent with melanosis  "coli. Recall 5 years    COLONOSCOPY  11/19/2018    Tics otherwise normal exam repeat in 5 years    COLONOSCOPY  02/13/2023    Normal exam repeat in 3 years with a 2 day prep    ENDOSCOPY  02/13/2023    Gastritis, small HH    REPLACEMENT TOTAL KNEE Right     2016    TOTAL ABDOMINAL HYSTERECTOMY WITH SALPINGO OOPHORECTOMY      UPPER ENDOSCOPIC ULTRASOUND W/ FNA  12/20/2023    Pancreatic cyst s/p FNA       Family History  Family History   Problem Relation Age of Onset    Heart attack Mother     Hodgkin's lymphoma Father     Other Father     Cancer Father         hodgkins    Heart attack Brother     Skin cancer Maternal Uncle     Pancreatic cancer Maternal Uncle     Cancer Maternal Uncle         eye    Colon cancer Neg Hx     Colon polyps Neg Hx        Social History  Social History     Socioeconomic History    Marital status:    Tobacco Use    Smoking status: Never    Smokeless tobacco: Never   Vaping Use    Vaping status: Never Used   Substance and Sexual Activity    Alcohol use: No    Drug use: Not Currently    Sexual activity: Not Currently     Birth control/protection: Surgical         Review of Systems:  History obtained from chart review and the patient  General ROS: No fever or chills  Respiratory ROS: No cough, shortness of breath, wheezing  Cardiovascular ROS: No chest pain or palpitations  Gastrointestinal ROS: No abdominal pain or melena  Genito-Urinary ROS: No dysuria or hematuria  Psych ROS: No anxiety and depression  14 point ROS reviewed with the patient and negative except as noted above and in the HPI unless unable to obtain.      Objective:  Patient Vitals for the past 24 hrs:   BP Temp Temp src Pulse Resp SpO2 Height Weight   06/28/24 1931 106/40 97.6 °F (36.4 °C) Oral 66 16 95 % -- --   06/28/24 1534 -- 97.6 °F (36.4 °C) Oral 67 18 96 % -- --   06/28/24 1121 -- -- -- -- -- -- 172.7 cm (68\") 111 kg (244 lb)   06/28/24 1110 154/71 97.5 °F (36.4 °C) Axillary 59 16 91 % -- --   06/28/24 1045 -- " "-- -- 73 16 99 % -- --   06/28/24 1040 -- -- -- 79 16 92 % -- --   06/28/24 1027 -- -- -- -- -- 93 % -- --   06/28/24 1016 139/86 -- -- 79 -- -- -- --   06/28/24 0953 177/93 -- -- 80 -- 93 % -- --   06/28/24 0933 -- -- -- 79 -- 91 % -- --   06/28/24 0538 162/93 97.7 °F (36.5 °C) Oral 84 18 94 % 172.7 cm (68\") 111 kg (244 lb)     No intake or output data in the 24 hours ending 06/28/24 2137  General: awake/alert   Chest:  clear to auscultation bilaterally without respiratory distress  CVS: regular rate and rhythm  Abdominal: soft, nontender, positive bowel sounds  Extremities: no cyanosis or edema  Skin: warm and dry without rash      Labs:  Results from last 7 days   Lab Units 06/28/24  0617 06/25/24  0901   WBC 10*3/mm3 8.11 6.83   HEMOGLOBIN g/dL 11.6* 11.7*   HEMATOCRIT % 36.6 36.1   PLATELETS 10*3/mm3 81* 90*         Results from last 7 days   Lab Units 06/28/24  1153 06/28/24  0617 06/25/24  0901   SODIUM mmol/L 135* 135* 134*   POTASSIUM mmol/L 3.8 3.9 3.7   CHLORIDE mmol/L 97* 95* 95*   CO2 mmol/L 21.0* 26.0 27.0   BUN mg/dL 30* 32* 30*   CREATININE mg/dL 2.59* 2.59* 2.39*   CALCIUM mg/dL 9.5 10.0 9.7   EGFR mL/min/1.73 18.4* 18.4* 20.3*   BILIRUBIN mg/dL  --  0.3 0.3   ALK PHOS U/L  --  85 90   ALT (SGPT) U/L  --  18 19   AST (SGOT) U/L  --  22 20   GLUCOSE mg/dL 94 99 121*       Radiology:   Imaging Results (Last 72 Hours)       Procedure Component Value Units Date/Time    MRI Lumbar Spine With & Without Contrast [[id number]] Collected: 06/28/24 1920     Updated: 06/28/24 1945    Addenda:        Additional findings: There are also appears to be tumor signal extending  into the right L2-L3 neural foramen.     This report was signed and finalized on 6/28/2024 7:42 PM by Warren Freire.     Signed: 06/28/24 1942 by Warren Freire, DO    Narrative:      Exam: MRI LUMBAR SPINE W WO CONTRAST- 6/28/2024 3:43 PM     HISTORY: Lumbar back and bilateral nondermatomal leg pain and  dysesthesias; N28.89-Other " specified disorders of kidney and ureter;  E27.8-Other specified disorders of adrenal gland; M79.89-Other specified  soft tissue disorders; N28.9-Disorder of kidney and ureter, unspecified;  N18.9-Chronic kidney disease, unspecified; D69.6-Thrombocytopenia,  unspecified     COMPARISON: 6/28/2024 lumbar spine CT     TECHNIQUE: Multiplanar, multisequence MRI of the lumbar spine was  obtained prior to and after the administration of 20 mL intravenous  gadolinium contrast.     FINDINGS:     Grade 1 degenerative anterolisthesis of L3-L4.     Within the L3 vertebral body there is fairly diffuse T2 hyperintense  signal with associated T1 hypointense marrow infiltration and suspected  mild enhancement. There is an associated posterior enhancing soft tissue  mass extending into the spinal canal causing severe stenosis and  compression of the cauda equina nerve roots. The soft tissue component  also appears to extend superiorly to the L2-L3 disc level. There appears  to be tumor extension into the right L3-L4 foramen.     No visible fracture.     Multilevel mild degenerative disc disease with posterior disc osteophyte  complexes. Multilevel facet hypertrophic changes. No significant facet  periarticular edema. Multilevel ligamentum flavum hypertrophy.  Multilevel prominent posterior epidural fat.     Conus appears to terminate at L1-L2.     T11-T12: There appears to be a at least mild to moderate spinal canal  and mild to moderate bilateral foraminal narrowing.     T12-L1: No significant spinal canal narrowing. Moderate right foraminal  narrowing.     L1-L2: Moderate spinal canal narrowing. Mild to moderate right foraminal  narrowing.     L2-L3: Severe spinal canal narrowing. Moderate to severe right foraminal  narrowing.     L3-L4: Severe spinal canal narrowing. Moderate right foraminal  narrowing.     L4-L5: Moderate spinal canal narrowing and bilateral subarticular  narrowing encroaching the descending bilateral L5 nerve  roots. Mild  bilateral foraminal narrowing.     L5-S1: No significant spinal canal or foraminal narrowing.     Extraspinal findings: Please see separately dictated MR abdomen and CT  abdomen/pelvis from the same day.       Impression:         Neoplastic process involving the L3 vertebral body with associated  posterior soft tissue component resulting in severe spinal canal  narrowing and cauda equina nerve root compression. The soft tissue  component appears to also extend into the right L3-L4 foramen. No  associated pathological fracture.     Additional multilevel spondylotic changes including moderate to severe  spinal canal and foraminal narrowing as detailed above.           This report was signed and finalized on 6/28/2024 7:36 PM by Warren Freire.       MRI Abdomen With & Without Contrast [[id number]] Collected: 06/28/24 1826     Updated: 06/28/24 1943    Addenda:        Additional findings: Tumor signal from the left renal mass appears to  extend along the left renal vein and possibly abuts the IVC and is  suspicious for neoplastic involvement. An additional differential also  includes multifocal primary renal malignancies with metastatic disease.     This report was signed and finalized on 6/28/2024 7:40 PM by Warren Freire.     Signed: 06/28/24 1940 by Warren Freire DO    Narrative:      EXAM: MRI ABDOMEN W WO CONTRAST-     INDICATION: Evaluate kidney and adrenal masses showed on CT-scan;  N28.89-Other specified disorders of kidney and ureter; E27.8-Other  specified disorders of adrenal gland; M79.89-Other specified soft tissue  disorders; N28.9-Disorder of kidney and ureter, unspecified;  N18.9-Chronic kidney disease, unspecified; D69.6-Thrombocytopenia,  unspecified        TECHNIQUE: Multisequence multiplanar MR images was obtained of the  abdomen prior to and at the administration of 20 mL intravenous  gadolinium contrast.        COMPARISON: CT abdomen pelvis 6/28/2024     FINDINGS:     Decrease  sensitivity due to motion.     Lower Chest: Unremarkable.     Liver: Unremarkable.     Biliary Tree: Small amount of gallbladder sludge versus layering stones.     Spleen: Tiny T2 hyperintense splenic lesion is nonspecific but favored  benign.     Pancreas: 1.5 cm pancreatic body cyst.     Adrenal Glands: 3.3 cm right adrenal nodule, new from prior CT on  5/23/2023.     Kidneys/Upper Ureters:      There is an infiltrative T2 hypointense/T1 isointense diffusion  restricting mass involving the left kidney with extension into the  proximal left collecting system/upper ureters which is difficult to  measure but measures up to 11 cm in cranial caudal dimensions. Please  note the abnormal diffusion restriction does extend below the  field-of-view into the left ureter. There are additional smaller masses  within the left renal cortex.      There are also multiple (at least 6) diffusion restricting masses within  the right kidney measuring up to 3.2 cm. There also appears to be a  diffusion restricting mass in the right renal pelvis.      These are somewhat limited in evaluation on postcontrast images due to  degree of motion, however these these masses do appear to demonstrate  some degree of enhancement.     Small left renal cyst is noted.     Gastrointestinal Tract: Colonic diverticulosis.     Lymphatics: Unremarkable.     Vasculature: Unremarkable.      Peritoneum/Retroperitoneum: Unremarkable.     Abdominal Wall/Soft Tissues: There enhancing diffusion restricting soft  tissue masses, the most dominant and largest cluster is within the right  posterior abdominal wall measuring up to 2.6 cm.     Osseous Structures: There is a diffusion restricting mass involving the  L3 vertebral body with suspected posterior extension into the spinal  canal.       Impression:            Infiltrative mass within the left kidney extending into the proximal  left collecting system as well as numerous additional solid masses in  the  bilateral kidneys. Additionally there is a right adrenal mass,  multiple subcutaneous fat soft tissue masses, and a L3 vertebral body  mass with suspected extension into the spinal canal. Findings  collectively are most consistent with metastatic disease which carries a  broad differential, however some differentials include metastatic breast  cancer, malignant melanoma, and lymphoma. The subtendinous fat soft  tissue masses would be amenable to ultrasound-guided biopsy.     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           This report was signed and finalized on 6/28/2024 6:57 PM by Warren Freire.       CT Chest Without Contrast Diagnostic [[id number]] Collected: 06/28/24 1626     Updated: 06/28/24 1639    Narrative:      CT CHEST WO CONTRAST DIAGNOSTIC- 6/28/2024 3:15 PM     HISTORY: Staging for newly diagnosed metastatic disease; N28.89-Other  specified disorders of kidney and ureter; E27.8-Other specified  disorders of adrenal gland; M79.89-Other specified soft tissue  disorders; N28.9-Disorder of kidney and ureter, unspecified;  N18.9-Chronic kidney disease, unspecified; D69.6-Thrombocytopenia,  unspecified      COMPARISON: 10/13/2022     TOTAL DOSE LENGTH PRODUCT: 413.82 mGy.cm. Automated exposure control was  also utilized to decrease patient radiation dose.     TECHNIQUE: Axial images of the chest are performed without IV contrast  secondary to renal insufficiency.      FINDINGS:    Postoperative changes of the left breast with left axillary  surgical clips. 9 mm medial left breast nodule/mass near the 8 to 9  o'clock position, new from the 2022 CT study which may represent a cyst  or solid mass. Correlation to any outside mammogram and breast  ultrasound recommended. No current mammogram or ultrasound at this  institution.     No pathologic axillary or intrathoracic lymphadenopathy. Cardiomegaly.  Very small pericardial effusion. No  pleural effusions.     3.2 cm right adrenal mass. Abnormal appearance of the left greater than  right kidney. Please refer to today's CT abdomen pelvis 6/20/2024.     Basilar atelectasis. A 6 mm nodule along the right major fissure  favoring intrafissural lymph node, present on the 10/13/2022 study.. No  suspicious pulmonary nodules or convincing intrathoracic metastasis. No  pneumothorax. No discrete endobronchial lesion.     No focal aggressive regional bony lesions. Moderate diffuse degenerative  changes of the thoracolumbar junction. New 1.5 cm nodule within the  subcutaneous tissues of the left lower chest/upper abdomen (series 2  image 98. Several soft tissue nodules within the subcutaneous tissue of  the right flank at the L1 level measuring up to 2.1 cm. Smaller 9 mm  soft tissue nodule of the posterior back just left of midline at the L2  level. A left 10 mm posterior subcutaneous nodule at the T12-L1 level.       Impression:      1. Patient with a history of left breast cancer with postoperative  changes of the superior left breast and left axillary surgical clips.  There is a 9 mm nodule/mass of the medial left breast positioned towards  the 8 to 9 o'clock position. Correlation to any recent mammogram or  breast ultrasound recommended, none at this institution. If no outside  studies demonstrating a known 9 mm medial left breast cyst, a follow-up  outpatient mammogram and left breast ultrasound should be performed for  further assessment.  2. Scattered subcutaneous soft tissue nodules of the abdominal wall.  Largest nodules within the right posterior flank measuring up to 2.1 cm  at the L1 level. These appear to represent soft tissue nodules and could  be metastatic deposits. Ultrasound recommended to establish solid  nature. Biopsy could be performed if solid and clinically indicated.  3. Cardiomegaly. Mild vascular calcification.  4. No convincing intrathoracic metastasis. Stable 6 mm nodule in  the  right major fissure favoring intrafissural lymph node.  5. Please refer to CT abdomen and pelvis report from today for  description of bilateral renal pathology as well as a 3.2 cm right  adrenal mass.     This report was signed and finalized on 6/28/2024 4:36 PM by Dr. Meaghan Phillips MD.       XR Chest 1 View [[id number]] Collected: 06/28/24 0912     Updated: 06/28/24 0916    Narrative:      EXAM: XR CHEST 1 VW-      DATE: 6/28/2024 8:06 AM     HISTORY: multiple complaints       COMPARISON: 8/10/2023.     TECHNIQUE:  Frontal view(s) of the chest submitted.     FINDINGS:    There are low lung volumes with vascular crowding versus volume  overload/edema. There is enlargement of the cardiac silhouette. No  effusion or pneumothorax is seen.          Impression:         1. Low lung volumes with prominent vasculature may represent vascular  crowding or mild edema.     This report was signed and finalized on 6/28/2024 9:13 AM by Gatito Blackwood.       CT Abdomen Pelvis Stone Protocol [[id number]] Collected: 06/28/24 0817     Updated: 06/28/24 0832    Narrative:      EXAM: CT ABDOMEN PELVIS STONE PROTOCOL-      DATE: 6/28/2024 6:48 AM     HISTORY: flank pain       COMPARISON: 5/23/2023.     DOSE LENGTH PRODUCT: 1399.15 mGy.cm Automatic exposure control was  utilized to make radiation dose as low as reasonably achievable.     TECHNIQUE: Noncontract axial images of the abdomen and pelvis obtained  with multiplanar reformats.     FINDINGS:  VISUALIZED CHEST: There is cardiomegaly without pericardial or pleural  effusion. There is atelectasis at the lung bases which are otherwise  grossly clear.     LIVER/BILE DUCTS: Unenhanced liver is unremarkable. Gallbladder is  distended without inflammatory change. Bile ducts are unremarkable.     KIDNEYS/URETERS: Both kidneys are abnormal in appearance. The left is  larger than the right and there is a suggestion of an underlying  infiltrative left renal mass involving the entire  left kidney but  especially at the mid and lower pole extending along the left renal  pelvis and to the proximal left ureter. There are bilateral renal cysts.  There are vascular calcifications and probable nonobstructing stones in  the left kidney. There is no definite hydronephrosis.     ADRENAL: There is a new right adrenal mass worrisome for neoplasm  measuring 3.5 cm. Left adrenal gland is nodular but unchanged.        SPLEEN: Unremarkable.     PANCREAS: A cyst at the ventral aspect of the body of the pancreas is  unchanged measuring 1 cm. 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     MESENTERY: No mesenteric or retroperitoneal lymphadenopathy is seen. No  free fluid identified.     VASCULATURE: There is mild atherosclerosis of the abdominal aorta  without aneurysm.     GI TRACT: There is a small hiatal hernia. There is diverticulosis of the  colon without acute diverticulitis. No mass or obstruction is seen.     PELVIS: Urinary bladder is unremarkable. There is diverticulosis of the  sigmoid colon without acute diverticulitis. Borderline left external  iliac lymph node measures 0.9 cm in short axis on image #70 of series 3.  This is actually unchanged. Patient is status post hysterectomy.     SOFT TISSUES: There are multiple solid nodules in the subcutaneous soft  tissues which are new from the 5/23/2023 exam. One on the left  anteriorly on image #9 measures 1.5 cm. A cluster of solid nodules on  the right posteriorly and laterally on image #38 noted largest measures  2.5 cm.     BONES: There is spondylosis of the spine and lumbar spine facet  arthropathy. No suspicious osseous lesions are seen.          Impression:      1. Diffusely abnormal left kidney which appears heterogeneous and  enlarged worrisome for neoplasm. Normal soft tissue extends into the  left renal pelvis and along the proximal left ureter. Right kidney also  is abnormal in its. Differential diagnosis would include infiltrative  renal cell carcinoma,  lymphoma and metastatic disease.  2. There is also a new right adrenal mass worrisome for metastatic  disease.  3. Innumerable solid nodules in the subcutaneous soft tissues worrisome  for soft tissue metastasis. Cluster of nodules on the right posteriorly  at the mid to lower abdomen is close skin surface and would likely be  amenable to ultrasound-guided biopsy.        This report was signed and finalized on 6/28/2024 8:29 AM by Gatito Blackwood.       CT Lumbar Spine Without Contrast [[id number]] Collected: 06/28/24 0817     Updated: 06/28/24 0832    Narrative:      EXAMINATION: CT LUMBAR SPINE WO CONTRAST-      6/28/2024 6:48 AM     HISTORY: back pain     In order to have a CT radiation dose as low as reasonably achievable  Automated Exposure Control was utilized for adjustment of the mA and/or  KV according to patient size.     Total DLP = 1399.15 mGy.cm     The CT scan of the lumbar spine is performed without intravenous or  intrathecal contrast enhancement.     Images are acquired in axial plane and subsequent reconstruction in  coronal and sagittal planes.     The comparison is made with the previous study dated 1/21/2024.     There is no evidence of an acute fracture or compression.     No acute displacement or malalignment.     There is a mild levoscoliosis.     There is loss of lumbar lordosis.     There is mild anterolisthesis of L3 over L4 similar to the previous  study. No spondylolysis.     The vertebral body heights are normal.     The loss of height of the intervertebral disc, most pronounced at L5-S1.  There is a vacuum sign at L3-4, L4-5 and L5-S1 representing degenerative  disc disease.     T12-L1: Prominent posterior osteophytes. Mild diffuse disc bulging.  Bilateral facet arthropathy right more than the left. There is right  neuroforaminal stenosis. Spinal canal is patent.     L1-2: Mild disc bulging. No significant osteophytes. Bilateral facet  arthropathy and ligamental hypertrophy. There is  "a mild spinal stenosis.  Neural foramina are patent.     L2-3: Mild diffuse disc bulging. Bilateral facet arthropathy and  ligamental hypertrophy. There is resultant moderate spinal stenosis.  There is mild bilateral neural foraminal stenosis.     L3-4: Moderate diffuse disc bulging/displacement central and bilateral.  There is severe facet arthropathy and ligamental hypertrophy. There is  moderate spinal stenosis. There is bilateral neuroforaminal stenosis  right more than the left.     L4-5: Small posterior osteophytes. Moderate diffuse disc bulging.  Bilateral severe facet arthropathy and ligamental hypertrophy. Moderate  spinal stenosis. Bilateral neuroforaminal stenosis.     L5-S1: Moderately prominent posterior osteophytes. Moderate diffuse disc  bulging. Bilateral facet arthropathy. There is bilateral neuroforaminal  stenosis. Spinal canal is patent.     Limited visualized paravertebral paraspinal soft tissues are  unremarkable. There are some calcification in the renal hilum  bilaterally which probably represent vascular calcification.       Impression:      1. No acute fracture or malalignment.  2. Lumbar spondylosis. Loss of lumbar lordosis. A mild anterolisthesis  L3 over L4.  3. Multilevel prominent disc osteophyte complexes, facet arthropathy and  ligamental hypertrophy and resultant neural foraminal spinal canal  stenosis.                                                  This report was signed and finalized on 6/28/2024 8:29 AM by Dr. Marito Marcano MD.               Culture:  No results found for: \"BLOODCX\", \"URINECX\", \"WOUNDCX\", \"MRSACX\", \"RESPCX\", \"STOOLCX\"      Assessment   Acute kidney injury  CKD stage IIIa  Spinal stenosis  Left renal mass      Plan:  Discussed with patient, nursing  Workup reviewed today  Monitor labs  Urology evaluation reviewed  IV fluid trial  Awaiting further neurosurgical evaluation  Reassess in the morning      Thank you for the consult, we appreciate the opportunity " to provide care to your patients.  Feel free to contact me if I can be of any further assistance.      Aniceto Coleman MD  6/28/2024  21:37 CDT

## 2024-06-29 NOTE — OP NOTE
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 female with significant medical comorbidities to include breast cancer, CKD, hypertension, hyperlipidemia, and obesity.  She presents with a new problem of lumbar back and bilateral nondermatomal lower extremity radicular pain and dysesthesias. Physical exam findings of lower extremity hyporeflexia.  Their imaging shows an enlarged left kidney and right adrenal mass concerning for metastatic disease.  CT of the lumbar spine shows multilevel degenerative changes to include an anteriolisthesis of L3 over L4 and spondylosis at L5-S1.     The risks and benefits of the procedure were discussed. The patient accepted and understood all potential risks and complications including infection, CSF leak, hematoma, damage to nerve roots or spinal cord, bowel or bladder incontinence, difficulty walking or weakness.  After considering options, the patient requested that we proceed with the procedure.    DESCRIPTION OF OPERATION:   On the day of surgery, the patient was brought to the preoperative holding area where IV access was obtained. Prophylactic intravenous antibiotics were administered. The patient was then brought to the major operative suite.     While on the  \[facility] [place] [facility]\"", the patient underwent an uneventful induction of general anesthetic with placement of endotracheal tube. TEDs and SCD hoses were applied.  The patient was then placed in prone position on a Brian table.  All pressure points were inspected and appropriately padded, and the patient was secured to the table. Her back was sterilely prepped with alcohol.  ChloraPrep was then applied and allowed to dry for 3 minutes, and the patient was draped in the usual fashion.  At this point a WHO surgical timeout was performed with all members of the surgical team.      With the patient's relevant imaging dominantly displayed, lateral fluoroscopy was brought into the field and an 18-gauge spinal needle was inserted 1 cm right of midline and used to localize the L3 lamina and vertebral body.  The skin was infused with 0.25% Marcaine with epinephrine.  A #10 blade was used to incise the skin and the initial dilator was placed and docked against the lamina spinous process junction.  At this point a lateral image was obtained to ensure correct level, and then swung into the AP position to ensure that we had not crossed midline.    Once confirmed serial dilation was performed.  A final tube size 20x70 mm was placed and the internal dilators were removed.  Microscope was then brought into the field and the small amount of remaining paraspinal muscles were removed with Bovie cautery thus exposing the lamina.  Under magnification and using a 3 mm side cutting andrew the lamina and medial aspect of the facet at L3/4 was removed exposing the ligamentum flavum.    The tube was then redirected to the contralateral side, superiorly, and medially and the underside of the lamina on both superior and inferior aspect were fully removed exposing the ligamentum flavum to the contralateral side and contralateral foramen.  Next a 2 and 3 mm Kerrison was used to widen the exposure.  The bone edges were then waxed.  A straight curette was  used to puncture the ligamentum flavum and expose the dura.  A 2 mm Kerrison was then used to remove the ligamentum flavum.  A 90 degree Kerrisons were then used to remove the ligamentum flavum and the medial facet on the contralateral side.  This was done until both the ipsilateral, midline, and contralateral dura were fully decompressed.  Next attention was turned to the foramen bilaterally.  A 40 degree 2 mm Kerrison was used to decompress the ipsilateral foramen and a 90 degree 2 mm Kerrison was used to decompress the contralateral foramen until a nerve hook could be stuck easily out the foramen. The wound was then copiously irrigated.      Penfield 4 was then inserted lateral to the dura and the dura was medialized.  A left nerve root retractor was then inserted underneath the dura.  A purpleish mass was noted in the anterior epidural space.  Several biopsies of this were taken and sent to pathology.  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  The tube was then directed superiorly and several more biopsies were taken.  All of the visualized tumor was resected.  There was extensive bleeding.  Floseal and direct compression was used to achieve hemostasis.  The left nerve root retractor was removed.  Several rounds of hemostasis were used with both Floseal and thrombin-soaked Gelfoam.    The tubular retractor was then slowly removed taking care to address hemostasis in the muscles fascia and subcutaneous tissues on the way out.  A 0 Vicryl on a UR6 needle was used to close the fascia.      The subcutaneous tissues were closed with 2-0 Vicryl.  A 4-0 Monocryl was used to close the skin.  The patient was awoken and found to be at her neurological baseline.  She was successfully extubated and transferred to the postoperative care unit in stable condition.     Estimated Blood Loss: 100ml    Complications: None    Implants:   Implant Name Type Inv. Item Serial No.  Lot No. LRB No.  Used Action   HEMOST ABS SURGIFOAM  8X12 10MM - [id number] Implant HEMOST ABS SURGIFOAM  8X12 10MM  [employer] [id number] Right 1 Implanted   KT HEMOST ABS SURGIFOAM PORCN 1GRAM - [id number] Implant KT HEMOST ABS SURGIFOAM PORCN 1GRAM  [employer]  [employer] [id number] Right 2 Implanted   KT SEAL HEMOS ABS FLOSEAL MATRX FAST/PREP 10ML - [id number] Implant KT SEAL HEMOS ABS FLOSEAL MATRX FAST/PREP 10ML  [facility] [id number] Right 4 Implanted       Specimens:                Specimens       ID Source Type Tests Collected By Collected At Frozen?    A Spine, Lumbar Tissue TISSUE PATHOLOGY EXAM   Marcellus Pulido MD 6/29/24 1526 Yes    Description: L3 TUMOR    Comment: FROZEN FOR PERMANENT              Drains: * No LDAs found *

## 2024-06-29 NOTE — PROGRESS NOTES
Nephrology ([facility]) Consult Note      Patient:  Marina Joe  YOB: 1945  Date of Service: 6/29/2024  MRN: [id number]   Acct: [id number]   Primary Care Physician: Hanh Og APRN  Advance Directive:   Code Status and Medical Interventions:   Ordered at: 06/28/24 1037     Level Of Support Discussed With:    Patient    Health Care Surrogate     Code Status (Patient has no pulse and is not breathing):    CPR (Attempt to Resuscitate)     Medical Interventions (Patient has pulse or is breathing):    Full Support     Admit Date: 6/28/2024       Hospital Day: 1  Referring Provider: No ref. provider found      Patient personally seen and examined.  Complete chart including Consults, Notes, Operative Reports, Labs, Cardiology, and Radiology studies reviewed as able.        Subjective:  Marina Joe is a 79 y.o. female for whom we were consulted for evaluation and treatment of acute kidney injury.  She has history of chronic kidney disease stage IIIa, breast cancer, hypertension.  She presented for evaluation of back pain and had tried the gabapentin.  She reported no NSAID usage.  She denied current chest pain, shortness of air at rest, nausea or vomiting.  MRI spine demonstrated stenosis with likely malignancy.  Neurosurgery was evaluating.  She denied appetite changes, dysuria or hematuria.    Today, no overnight events.  Neurosurgical surgery upcoming.  Denied other complaints on questioning aside from continued pain issues.    Allergies:  Aspirin and Niacin    Home Meds:  Medications Prior to Admission   Medication Sig Dispense Refill Last Dose    amLODIPine (NORVASC) 5 MG tablet Take 1 tablet by mouth 2 (Two) Times a Day.       buPROPion XL (WELLBUTRIN XL) 150 MG 24 hr tablet Take 1 tablet by mouth Daily.       Calcium Carb-Cholecalciferol (CALCIUM 600+D3 PO) Take 1 tablet by mouth Daily.       carvedilol (COREG) 6.25 MG tablet Take 1 tablet by mouth 2 (Two) Times  a Day With Meals.       dapagliflozin Propanediol (Farxiga) 10 MG tablet Take 10 mg by mouth Daily.       famotidine (PEPCID) 20 MG tablet Take 1 tablet by mouth 2 (Two) Times a Day Before Meals. 60 tablet 0     fluticasone (FLONASE) 50 MCG/ACT nasal spray 2 sprays into the nostril(s) as directed by provider Daily. 1 g 3     irbesartan-hydrochlorothiazide (AVALIDE) 300-12.5 MG tablet Take 1 tablet by mouth Daily.       montelukast (SINGULAIR) 10 MG tablet Take 1 tablet by mouth Every Night.       Multiple Vitamins-Minerals (MULTIVITAMIN ADULT) tablet Take 1 tablet by mouth Daily.       pantoprazole (PROTONIX) 40 MG EC tablet Take 1 tablet by mouth Daily.       rOPINIRole (REQUIP) 0.5 MG tablet Take 1 tablet by mouth Every Night. Take 1 hour before bedtime.       rosuvastatin (CRESTOR) 20 MG tablet Take 1 tablet by mouth Daily.       valACYclovir (VALTREX) 500 MG tablet Take 1 tablet by mouth 2 (Two) Times a Day.       albuterol sulfate  (90 Base) MCG/ACT inhaler Inhale 2 puffs Every 4 (Four) Hours As Needed for Wheezing. 2 g 3     cetirizine (zyrTEC) 10 MG tablet Take 1 tablet by mouth Daily.       cloNIDine (CATAPRES) 0.1 MG tablet Take 1 tablet by mouth 2 (Two) Times a Day.       cyclobenzaprine (FLEXERIL) 10 MG tablet Take 1 tablet by mouth 3 (Three) Times a Day As Needed for Muscle Spasms.       Diclofenac Sodium (VOLTAREN) 1 % gel gel Apply 4 g topically to the appropriate area as directed 4 (Four) Times a Day As Needed (arthralgia). 240 g 3     Ergocalciferol (VITAMIN D2 PO) Take 50,000 Units by mouth Every 30 (Thirty) Days.       naproxen sodium (ALEVE) 220 MG tablet Take 1 tablet by mouth 2 (Two) Times a Day As Needed.       sertraline (ZOLOFT) 100 MG tablet Take 1 tablet by mouth Daily.          Medicines:  Current Facility-Administered Medications   Medication Dose Route Frequency Provider Last Rate Last Admin    *MIX* 50 mL bupivacaine 0.25% and 0.25 mL EPINEPHrine 1 mg/mL    Marcellus Alarcon  MD Oscar   30 mL at 06/29/24 1449    [Transfer Hold] acetaminophen (TYLENOL) tablet 650 mg  650 mg Oral Q6H PRN Alysia Lincoln MD   650 mg at 06/29/24 0544    [Transfer Hold] albuterol (PROVENTIL) nebulizer solution 0.083% 2.5 mg/3mL  2.5 mg Nebulization Q4H PRN Prince Chambers MD   2.5 mg at 06/28/24 1040    amLODIPine (NORVASC) tablet 5 mg  5 mg Oral BID Prince Chambers MD   5 mg at 06/28/24 1145    [Transfer Hold] sennosides-docusate (PERICOLACE) 8.6-50 MG per tablet 2 tablet  2 tablet Oral BID PRN Prince Chambers MD        And    [Transfer Hold] polyethylene glycol (MIRALAX) packet 17 g  17 g Oral Daily PRN Prince Chmabers MD        And    [Transfer Hold] bisacodyl (DULCOLAX) EC tablet 5 mg  5 mg Oral Daily PRN Prince Chambers MD        And    [Transfer Hold] bisacodyl (DULCOLAX) suppository 10 mg  10 mg Rectal Daily PRN Prince Chambers MD        [Transfer Hold] buPROPion XL (WELLBUTRIN XL) 24 hr tablet 150 mg  150 mg Oral Daily Prince Chambers MD   150 mg at 06/28/24 1146    [Transfer Hold] Calcium Replacement - Follow Nurse / BPA Driven Protocol   Does not apply PRN Prince Chambers MD        carvedilol (COREG) tablet 6.25 mg  6.25 mg Oral BID With Meals Prince Chambers MD   6.25 mg at 06/28/24 1145    [Transfer Hold] cetirizine (zyrTEC) tablet 10 mg  10 mg Oral Daily Prince Chambers MD   10 mg at 06/28/24 1145    cloNIDine (CATAPRES) tablet 0.1 mg  0.1 mg Oral BID Prince Chambers MD   0.1 mg at 06/28/24 1144    [Transfer Hold] cyclobenzaprine (FLEXERIL) tablet 10 mg  10 mg Oral TID PRN Prince Chambers MD   10 mg at 06/29/24 1107    [Transfer Hold] Diclofenac Sodium (VOLTAREN) 1 % gel 4 g  4 g Topical 4x Daily PRN Prince Chambers MD        [Transfer Hold] fluticasone (FLONASE) 50 MCG/ACT nasal spray 2 spray  2 spray Nasal Daily Prince Chambers MD   2 spray at 06/29/24 1003    [Transfer Hold] heparin (porcine) 5000 UNIT/ML injection 5,000 Units  5,000 Units  Subcutaneous Q12H Prince Chambers MD   5,000 Units at 06/28/24 2041    hydrALAZINE (APRESOLINE) injection 10 mg  10 mg Intravenous Q4H PRN Prince Chambers MD        [Transfer Hold] Magnesium Low Dose Replacement - Follow Nurse / BPA Driven Protocol   Does not apply Prince Jaffe MD        [Transfer Hold] montelukast (SINGULAIR) tablet 10 mg  10 mg Oral Nightly Prince Chambers MD   10 mg at 06/28/24 2040    morphine 2mg/methylprednisolone 40mg topical 3 mL syringe    PRN Marcellus Pulido MD   1 each at 06/29/24 1449    [Transfer Hold] multivitamin with minerals 1 tablet  1 tablet Oral Daily Prince Chambers MD   1 tablet at 06/28/24 1145    [Transfer Hold] ondansetron (ZOFRAN) injection 4 mg  4 mg Intravenous Q6H PRN Prince Chambers MD        [Transfer Hold] pantoprazole (PROTONIX) EC tablet 40 mg  40 mg Oral Daily Prince Chambers MD   40 mg at 06/28/24 1144    [Transfer Hold] Phosphorus Replacement - Follow Nurse / BPA Driven Protocol   Does not apply Prince Jaffe MD        Potassium Replacement - Follow Nurse / BPA Driven Protocol   Does not apply Prince Jaffe MD        [Transfer Hold] rOPINIRole (REQUIP) tablet 0.5 mg  0.5 mg Oral Nightly Prince Chambers MD   0.5 mg at 06/28/24 2040    [Transfer Hold] rosuvastatin (CRESTOR) tablet 10 mg  10 mg Oral Nightly Prince Chambers MD   10 mg at 06/28/24 2041    [Transfer Hold] sertraline (ZOLOFT) tablet 100 mg  100 mg Oral Daily Prince Chambers MD   100 mg at 06/28/24 1145    sodium chloride (NS) irrigation solution    PRN Marcellus Pulido MD   2,000 mL at 06/29/24 1449    [Transfer Hold] sodium chloride 0.9 % flush 10 mL  10 mL Intravenous PRN Prince Chambers MD        [Transfer Hold] sodium chloride 0.9 % flush 10 mL  10 mL Intravenous Q12H Prince Chambers MD   10 mL at 06/29/24 1004    [Transfer Hold] sodium chloride 0.9 % flush 10 mL  10 mL Intravenous PRN Prince Chambers MD         [Transfer Hold] sodium chloride 0.9 % infusion 40 mL  40 mL Intravenous PRN Prince Chambers MD        sodium chloride 0.9 % infusion  100 mL/hr Intravenous Continuous Prince Chambers  mL/hr at 06/29/24 1424 Currently Infusing at 06/29/24 1424    valACYclovir (VALTREX) tablet 500 mg  500 mg Oral Q24H Prince Chambers MD   2,000 mg at 06/29/24 1450     Facility-Administered Medications Ordered in Other Encounters   Medication Dose Route Frequency Provider Last Rate Last Admin    dexAMETHasone (DECADRON) injection   Intravenous PRN Mariusz Slaughter CRNA   4 mg at 06/29/24 1440    fentaNYL citrate (PF) (SUBLIMAZE) injection   Intravenous PRN Mariusz Slaughter CRNA   100 mcg at 06/29/24 1501    lidocaine PF 2% (XYLOCAINE) injection   Intravenous PRN Mariusz Slaughter CRNA   100 mg at 06/29/24 1426    ondansetron (ZOFRAN) injection   Intravenous PRN Mariusz Slaughter CRNA   4 mg at 06/29/24 1440    Propofol (DIPRIVAN) injection   Intravenous PRN Mariusz Slaughter CRNA   150 mg at 06/29/24 1426    rocuronium (ZEMURON) injection   Intravenous PRN Mariusz Slaughter CRNA   50 mg at 06/29/24 1426    Tranexamic Acid Sterile Solution   Intravenous PRN Mariusz Slaughter CRNA   1,000 mg at 06/29/24 1602       Past Medical History:  Past Medical History:   Diagnosis Date    Breast cancer     CKD (chronic kidney disease)     Hypercholesteremia     Hypertension     Sinusitis     Stage 3a chronic kidney disease 10/20/2020       Past Surgical History:  Past Surgical History:   Procedure Laterality Date    AVULSION TOENAIL PLATE      Sept 26,2018    BREAST BIOPSY Left 11/20/2012    BREAST BIOPSY      Left Breast, 1/2019 per Dr Barkley    BREAST LUMPECTOMY Left     with node bx     COLONOSCOPY  09/13/2013    small polyp at 30cm benign hyperplastic polyp, changes consistent with melanosis coli. Recall 5 years    COLONOSCOPY  11/19/2018    Tics otherwise normal exam repeat in 5 years    COLONOSCOPY  02/13/2023    Normal exam repeat  in 3 years with a 2 day prep    ENDOSCOPY  02/13/2023    Gastritis, small HH    REPLACEMENT TOTAL KNEE Right     2016    TOTAL ABDOMINAL HYSTERECTOMY WITH SALPINGO OOPHORECTOMY      UPPER ENDOSCOPIC ULTRASOUND W/ FNA  12/20/2023    Pancreatic cyst s/p FNA       Family History  Family History   Problem Relation Age of Onset    Heart attack Mother     Hodgkin's lymphoma Father     Other Father     Cancer Father         hodgkins    Heart attack Brother     Skin cancer Maternal Uncle     Pancreatic cancer Maternal Uncle     Cancer Maternal Uncle         eye    Colon cancer Neg Hx     Colon polyps Neg Hx        Social History  Social History     Socioeconomic History    Marital status:    Tobacco Use    Smoking status: Never    Smokeless tobacco: Never   Vaping Use    Vaping status: Never Used   Substance and Sexual Activity    Alcohol use: No    Drug use: Not Currently    Sexual activity: Not Currently     Birth control/protection: Surgical         Review of Systems:  History obtained from chart review and the patient  General ROS: No fever or chills  Respiratory ROS: No cough, shortness of breath, wheezing  Cardiovascular ROS: No chest pain or palpitations  Gastrointestinal ROS: No abdominal pain or melena  Genito-Urinary ROS: No dysuria or hematuria  Psych ROS: No anxiety and depression  14 point ROS reviewed with the patient and negative except as noted above and in the HPI unless unable to obtain.      Objective:  Patient Vitals for the past 24 hrs:   BP Temp Temp src Pulse Resp SpO2   06/29/24 1329 143/52 98.9 °F (37.2 °C) Oral 80 16 98 %   06/29/24 1320 143/52 98.9 °F (37.2 °C) Oral 80 16 98 %   06/29/24 1311 146/56 98.9 °F (37.2 °C) Oral 81 16 99 %   06/29/24 1233 145/74 98.9 °F (37.2 °C) Oral 78 16 98 %   06/29/24 1055 132/61 98 °F (36.7 °C) Oral 73 16 98 %   06/29/24 0803 135/57 97.4 °F (36.3 °C) Oral 80 16 96 %   06/29/24 0306 154/59 98.3 °F (36.8 °C) Oral 83 16 94 %   06/28/24 2324 133/61 98.2 °F  (36.8 °C) Oral 74 16 94 %   06/28/24 1931 106/40 97.6 °F (36.4 °C) Oral 66 16 95 %       Intake/Output Summary (Last 24 hours) at 6/29/2024 1606  Last data filed at 6/29/2024 1320  Gross per 24 hour   Intake 1216 ml   Output 250 ml   Net 966 ml     General: awake/alert   Chest:  clear to auscultation bilaterally without respiratory distress  CVS: regular rate and rhythm  Abdominal: soft, nontender, positive bowel sounds  Extremities: no cyanosis or edema  Skin: warm and dry without rash      Labs:  Results from last 7 days   Lab Units 06/28/24  0617 06/25/24  0901   WBC 10*3/mm3 8.11 6.83   HEMOGLOBIN g/dL 11.6* 11.7*   HEMATOCRIT % 36.6 36.1   PLATELETS 10*3/mm3 81* 90*         Results from last 7 days   Lab Units 06/29/24  0519 06/28/24  1153 06/28/24  0617 06/25/24  0901   SODIUM mmol/L 136 135* 135* 134*   POTASSIUM mmol/L 3.9 3.8 3.9 3.7   CHLORIDE mmol/L 100 97* 95* 95*   CO2 mmol/L 25.0 21.0* 26.0 27.0   BUN mg/dL 37* 30* 32* 30*   CREATININE mg/dL 2.97* 2.59* 2.59* 2.39*   CALCIUM mg/dL 8.7 9.5 10.0 9.7   EGFR mL/min/1.73 15.6* 18.4* 18.4* 20.3*   BILIRUBIN mg/dL  --   --  0.3 0.3   ALK PHOS U/L  --   --  85 90   ALT (SGPT) U/L  --   --  18 19   AST (SGOT) U/L  --   --  22 20   GLUCOSE mg/dL 90 94 99 121*       Radiology:   Imaging Results (Last 72 Hours)       Procedure Component Value Units Date/Time    MRI Lumbar Spine With & Without Contrast [[id number]] Collected: 06/28/24 1920     Updated: 06/28/24 1945    Addenda:        Additional findings: There are also appears to be tumor signal extending  into the right L2-L3 neural foramen.     This report was signed and finalized on 6/28/2024 7:42 PM by Warren Freire.     Signed: 06/28/24 1942 by Warren Freire DO    Narrative:      Exam: MRI LUMBAR SPINE W WO CONTRAST- 6/28/2024 3:43 PM     HISTORY: Lumbar back and bilateral nondermatomal leg pain and  dysesthesias; N28.89-Other specified disorders of kidney and ureter;  E27.8-Other specified disorders of  adrenal gland; M79.89-Other specified  soft tissue disorders; N28.9-Disorder of kidney and ureter, unspecified;  N18.9-Chronic kidney disease, unspecified; D69.6-Thrombocytopenia,  unspecified     COMPARISON: 6/28/2024 lumbar spine CT     TECHNIQUE: Multiplanar, multisequence MRI of the lumbar spine was  obtained prior to and after the administration of 20 mL intravenous  gadolinium contrast.     FINDINGS:     Grade 1 degenerative anterolisthesis of L3-L4.     Within the L3 vertebral body there is fairly diffuse T2 hyperintense  signal with associated T1 hypointense marrow infiltration and suspected  mild enhancement. There is an associated posterior enhancing soft tissue  mass extending into the spinal canal causing severe stenosis and  compression of the cauda equina nerve roots. The soft tissue component  also appears to extend superiorly to the L2-L3 disc level. There appears  to be tumor extension into the right L3-L4 foramen.     No visible fracture.     Multilevel mild degenerative disc disease with posterior disc osteophyte  complexes. Multilevel facet hypertrophic changes. No significant facet  periarticular edema. Multilevel ligamentum flavum hypertrophy.  Multilevel prominent posterior epidural fat.     Conus appears to terminate at L1-L2.     T11-T12: There appears to be a at least mild to moderate spinal canal  and mild to moderate bilateral foraminal narrowing.     T12-L1: No significant spinal canal narrowing. Moderate right foraminal  narrowing.     L1-L2: Moderate spinal canal narrowing. Mild to moderate right foraminal  narrowing.     L2-L3: Severe spinal canal narrowing. Moderate to severe right foraminal  narrowing.     L3-L4: Severe spinal canal narrowing. Moderate right foraminal  narrowing.     L4-L5: Moderate spinal canal narrowing and bilateral subarticular  narrowing encroaching the descending bilateral L5 nerve roots. Mild  bilateral foraminal narrowing.     L5-S1: No significant spinal  canal or foraminal narrowing.     Extraspinal findings: Please see separately dictated MR abdomen and CT  abdomen/pelvis from the same day.       Impression:         Neoplastic process involving the L3 vertebral body with associated  posterior soft tissue component resulting in severe spinal canal  narrowing and cauda equina nerve root compression. The soft tissue  component appears to also extend into the right L3-L4 foramen. No  associated pathological fracture.     Additional multilevel spondylotic changes including moderate to severe  spinal canal and foraminal narrowing as detailed above.           This report was signed and finalized on 6/28/2024 7:36 PM by Warren Freire.       MRI Abdomen With & Without Contrast [[id number]] Collected: 06/28/24 1826     Updated: 06/28/24 1943    Addenda:        Additional findings: Tumor signal from the left renal mass appears to  extend along the left renal vein and possibly abuts the IVC and is  suspicious for neoplastic involvement. An additional differential also  includes multifocal primary renal malignancies with metastatic disease.     This report was signed and finalized on 6/28/2024 7:40 PM by Warren Freire.     Signed: 06/28/24 1940 by Warren Freire,     Narrative:      EXAM: MRI ABDOMEN W WO CONTRAST-     INDICATION: Evaluate kidney and adrenal masses showed on CT-scan;  N28.89-Other specified disorders of kidney and ureter; E27.8-Other  specified disorders of adrenal gland; M79.89-Other specified soft tissue  disorders; N28.9-Disorder of kidney and ureter, unspecified;  N18.9-Chronic kidney disease, unspecified; D69.6-Thrombocytopenia,  unspecified        TECHNIQUE: Multisequence multiplanar MR images was obtained of the  abdomen prior to and at the administration of 20 mL intravenous  gadolinium contrast.        COMPARISON: CT abdomen pelvis 6/28/2024     FINDINGS:     Decrease sensitivity due to motion.     Lower Chest: Unremarkable.     Liver:  Unremarkable.     Biliary Tree: Small amount of gallbladder sludge versus layering stones.     Spleen: Tiny T2 hyperintense splenic lesion is nonspecific but favored  benign.     Pancreas: 1.5 cm pancreatic body cyst.     Adrenal Glands: 3.3 cm right adrenal nodule, new from prior CT on  5/23/2023.     Kidneys/Upper Ureters:      There is an infiltrative T2 hypointense/T1 isointense diffusion  restricting mass involving the left kidney with extension into the  proximal left collecting system/upper ureters which is difficult to  measure but measures up to 11 cm in cranial caudal dimensions. Please  note the abnormal diffusion restriction does extend below the  field-of-view into the left ureter. There are additional smaller masses  within the left renal cortex.      There are also multiple (at least 6) diffusion restricting masses within  the right kidney measuring up to 3.2 cm. There also appears to be a  diffusion restricting mass in the right renal pelvis.      These are somewhat limited in evaluation on postcontrast images due to  degree of motion, however these these masses do appear to demonstrate  some degree of enhancement.     Small left renal cyst is noted.     Gastrointestinal Tract: Colonic diverticulosis.     Lymphatics: Unremarkable.     Vasculature: Unremarkable.      Peritoneum/Retroperitoneum: Unremarkable.     Abdominal Wall/Soft Tissues: There enhancing diffusion restricting soft  tissue masses, the most dominant and largest cluster is within the right  posterior abdominal wall measuring up to 2.6 cm.     Osseous Structures: There is a diffusion restricting mass involving the  L3 vertebral body with suspected posterior extension into the spinal  canal.       Impression:            Infiltrative mass within the left kidney extending into the proximal  left collecting system as well as numerous additional solid masses in  the bilateral kidneys. Additionally there is a right adrenal mass,  multiple  subcutaneous fat soft tissue masses, and a L3 vertebral body  mass with suspected extension into the spinal canal. Findings  collectively are most consistent with metastatic disease which carries a  broad differential, however some differentials include metastatic breast  cancer, malignant melanoma, and lymphoma. The subtendinous fat soft  tissue masses would be amenable to ultrasound-guided biopsy.     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           This report was signed and finalized on 6/28/2024 6:57 PM by Warren Freire.       CT Chest Without Contrast Diagnostic [[id number]] Collected: 06/28/24 1626     Updated: 06/28/24 1639    Narrative:      CT CHEST WO CONTRAST DIAGNOSTIC- 6/28/2024 3:15 PM     HISTORY: Staging for newly diagnosed metastatic disease; N28.89-Other  specified disorders of kidney and ureter; E27.8-Other specified  disorders of adrenal gland; M79.89-Other specified soft tissue  disorders; N28.9-Disorder of kidney and ureter, unspecified;  N18.9-Chronic kidney disease, unspecified; D69.6-Thrombocytopenia,  unspecified      COMPARISON: 10/13/2022     TOTAL DOSE LENGTH PRODUCT: 413.82 mGy.cm. Automated exposure control was  also utilized to decrease patient radiation dose.     TECHNIQUE: Axial images of the chest are performed without IV contrast  secondary to renal insufficiency.      FINDINGS:    Postoperative changes of the left breast with left axillary  surgical clips. 9 mm medial left breast nodule/mass near the 8 to 9  o'clock position, new from the 2022 CT study which may represent a cyst  or solid mass. Correlation to any outside mammogram and breast  ultrasound recommended. No current mammogram or ultrasound at this  institution.     No pathologic axillary or intrathoracic lymphadenopathy. Cardiomegaly.  Very small pericardial effusion. No pleural effusions.     3.2 cm right adrenal mass. Abnormal appearance of the  left greater than  right kidney. Please refer to today's CT abdomen pelvis 6/20/2024.     Basilar atelectasis. A 6 mm nodule along the right major fissure  favoring intrafissural lymph node, present on the 10/13/2022 study.. No  suspicious pulmonary nodules or convincing intrathoracic metastasis. No  pneumothorax. No discrete endobronchial lesion.     No focal aggressive regional bony lesions. Moderate diffuse degenerative  changes of the thoracolumbar junction. New 1.5 cm nodule within the  subcutaneous tissues of the left lower chest/upper abdomen (series 2  image 98. Several soft tissue nodules within the subcutaneous tissue of  the right flank at the L1 level measuring up to 2.1 cm. Smaller 9 mm  soft tissue nodule of the posterior back just left of midline at the L2  level. A left 10 mm posterior subcutaneous nodule at the T12-L1 level.       Impression:      1. Patient with a history of left breast cancer with postoperative  changes of the superior left breast and left axillary surgical clips.  There is a 9 mm nodule/mass of the medial left breast positioned towards  the 8 to 9 o'clock position. Correlation to any recent mammogram or  breast ultrasound recommended, none at this institution. If no outside  studies demonstrating a known 9 mm medial left breast cyst, a follow-up  outpatient mammogram and left breast ultrasound should be performed for  further assessment.  2. Scattered subcutaneous soft tissue nodules of the abdominal wall.  Largest nodules within the right posterior flank measuring up to 2.1 cm  at the L1 level. These appear to represent soft tissue nodules and could  be metastatic deposits. Ultrasound recommended to establish solid  nature. Biopsy could be performed if solid and clinically indicated.  3. Cardiomegaly. Mild vascular calcification.  4. No convincing intrathoracic metastasis. Stable 6 mm nodule in the  right major fissure favoring intrafissural lymph node.  5. Please refer to CT  abdomen and pelvis report from today for  description of bilateral renal pathology as well as a 3.2 cm right  adrenal mass.     This report was signed and finalized on 6/28/2024 4:36 PM by Dr. Meaghan Phillips MD.       XR Chest 1 View [[id number]] Collected: 06/28/24 0912     Updated: 06/28/24 0916    Narrative:      EXAM: XR CHEST 1 VW-      DATE: 6/28/2024 8:06 AM     HISTORY: multiple complaints       COMPARISON: 8/10/2023.     TECHNIQUE:  Frontal view(s) of the chest submitted.     FINDINGS:    There are low lung volumes with vascular crowding versus volume  overload/edema. There is enlargement of the cardiac silhouette. No  effusion or pneumothorax is seen.          Impression:         1. Low lung volumes with prominent vasculature may represent vascular  crowding or mild edema.     This report was signed and finalized on 6/28/2024 9:13 AM by Gatito Blackwood.       CT Abdomen Pelvis Stone Protocol [[id number]] Collected: 06/28/24 0817     Updated: 06/28/24 0832    Narrative:      EXAM: CT ABDOMEN PELVIS STONE PROTOCOL-      DATE: 6/28/2024 6:48 AM     HISTORY: flank pain       COMPARISON: 5/23/2023.     DOSE LENGTH PRODUCT: 1399.15 mGy.cm Automatic exposure control was  utilized to make radiation dose as low as reasonably achievable.     TECHNIQUE: Noncontract axial images of the abdomen and pelvis obtained  with multiplanar reformats.     FINDINGS:  VISUALIZED CHEST: There is cardiomegaly without pericardial or pleural  effusion. There is atelectasis at the lung bases which are otherwise  grossly clear.     LIVER/BILE DUCTS: Unenhanced liver is unremarkable. Gallbladder is  distended without inflammatory change. Bile ducts are unremarkable.     KIDNEYS/URETERS: Both kidneys are abnormal in appearance. The left is  larger than the right and there is a suggestion of an underlying  infiltrative left renal mass involving the entire left kidney but  especially at the mid and lower pole extending along the left  renal  pelvis and to the proximal left ureter. There are bilateral renal cysts.  There are vascular calcifications and probable nonobstructing stones in  the left kidney. There is no definite hydronephrosis.     ADRENAL: There is a new right adrenal mass worrisome for neoplasm  measuring 3.5 cm. Left adrenal gland is nodular but unchanged.        SPLEEN: Unremarkable.     PANCREAS: A cyst at the ventral aspect of the body of the pancreas is  unchanged measuring 1 cm. 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     MESENTERY: No mesenteric or retroperitoneal lymphadenopathy is seen. No  free fluid identified.     VASCULATURE: There is mild atherosclerosis of the abdominal aorta  without aneurysm.     GI TRACT: There is a small hiatal hernia. There is diverticulosis of the  colon without acute diverticulitis. No mass or obstruction is seen.     PELVIS: Urinary bladder is unremarkable. There is diverticulosis of the  sigmoid colon without acute diverticulitis. Borderline left external  iliac lymph node measures 0.9 cm in short axis on image #70 of series 3.  This is actually unchanged. Patient is status post hysterectomy.     SOFT TISSUES: There are multiple solid nodules in the subcutaneous soft  tissues which are new from the 5/23/2023 exam. One on the left  anteriorly on image #9 measures 1.5 cm. A cluster of solid nodules on  the right posteriorly and laterally on image #38 noted largest measures  2.5 cm.     BONES: There is spondylosis of the spine and lumbar spine facet  arthropathy. No suspicious osseous lesions are seen.          Impression:      1. Diffusely abnormal left kidney which appears heterogeneous and  enlarged worrisome for neoplasm. Normal soft tissue extends into the  left renal pelvis and along the proximal left ureter. Right kidney also  is abnormal in its. Differential diagnosis would include infiltrative  renal cell carcinoma, lymphoma and metastatic disease.  2. There is also a new right adrenal mass  worrisome for metastatic  disease.  3. Innumerable solid nodules in the subcutaneous soft tissues worrisome  for soft tissue metastasis. Cluster of nodules on the right posteriorly  at the mid to lower abdomen is close skin surface and would likely be  amenable to ultrasound-guided biopsy.        This report was signed and finalized on 6/28/2024 8:29 AM by Gatito Blackwood.       CT Lumbar Spine Without Contrast [[id number]] Collected: 06/28/24 0817     Updated: 06/28/24 0832    Narrative:      EXAMINATION: CT LUMBAR SPINE WO CONTRAST-      6/28/2024 6:48 AM     HISTORY: back pain     In order to have a CT radiation dose as low as reasonably achievable  Automated Exposure Control was utilized for adjustment of the mA and/or  KV according to patient size.     Total DLP = 1399.15 mGy.cm     The CT scan of the lumbar spine is performed without intravenous or  intrathecal contrast enhancement.     Images are acquired in axial plane and subsequent reconstruction in  coronal and sagittal planes.     The comparison is made with the previous study dated 1/21/2024.     There is no evidence of an acute fracture or compression.     No acute displacement or malalignment.     There is a mild levoscoliosis.     There is loss of lumbar lordosis.     There is mild anterolisthesis of L3 over L4 similar to the previous  study. No spondylolysis.     The vertebral body heights are normal.     The loss of height of the intervertebral disc, most pronounced at L5-S1.  There is a vacuum sign at L3-4, L4-5 and L5-S1 representing degenerative  disc disease.     T12-L1: Prominent posterior osteophytes. Mild diffuse disc bulging.  Bilateral facet arthropathy right more than the left. There is right  neuroforaminal stenosis. Spinal canal is patent.     L1-2: Mild disc bulging. No significant osteophytes. Bilateral facet  arthropathy and ligamental hypertrophy. There is a mild spinal stenosis.  Neural foramina are patent.     L2-3: Mild diffuse  "disc bulging. Bilateral facet arthropathy and  ligamental hypertrophy. There is resultant moderate spinal stenosis.  There is mild bilateral neural foraminal stenosis.     L3-4: Moderate diffuse disc bulging/displacement central and bilateral.  There is severe facet arthropathy and ligamental hypertrophy. There is  moderate spinal stenosis. There is bilateral neuroforaminal stenosis  right more than the left.     L4-5: Small posterior osteophytes. Moderate diffuse disc bulging.  Bilateral severe facet arthropathy and ligamental hypertrophy. Moderate  spinal stenosis. Bilateral neuroforaminal stenosis.     L5-S1: Moderately prominent posterior osteophytes. Moderate diffuse disc  bulging. Bilateral facet arthropathy. There is bilateral neuroforaminal  stenosis. Spinal canal is patent.     Limited visualized paravertebral paraspinal soft tissues are  unremarkable. There are some calcification in the renal hilum  bilaterally which probably represent vascular calcification.       Impression:      1. No acute fracture or malalignment.  2. Lumbar spondylosis. Loss of lumbar lordosis. A mild anterolisthesis  L3 over L4.  3. Multilevel prominent disc osteophyte complexes, facet arthropathy and  ligamental hypertrophy and resultant neural foraminal spinal canal  stenosis.                                                  This report was signed and finalized on 6/28/2024 8:29 AM by Dr. Marito Marcano MD.               Culture:  No results found for: \"BLOODCX\", \"URINECX\", \"WOUNDCX\", \"MRSACX\", \"RESPCX\", \"STOOLCX\"      Assessment   Acute kidney injury  CKD stage IIIa  Spinal stenosis  Left renal mass  Cauda equina syndrome    Plan:  Discussed with patient, nursing, family  Workup reviewed today  Monitor labs  Urology evaluation reviewed  IV fluid trial  Reviewed further neurosurgical evaluation  Reassess in the morning      Thank you for the consult, we appreciate the opportunity to provide care to your patients.  Feel free to " contact me if I can be of any further assistance.      Aniceto Coleman MD  6/29/2024  16:06 CDT

## 2024-06-29 NOTE — PROGRESS NOTES
[facility] Medicine Services  INPATIENT PROGRESS NOTE    Patient Name: Marina Joe  Date of Admission: 6/28/2024  Today's Date: 06/29/24  Length of Stay: 1  Primary Care Physician: Hanh Og APRN    Subjective   Chief Complaint: Lower back pain      Today  Patient was crying this morning when I came to her room, the neurosurgeon discussed possibility of malignancy being the cause of her problem.      HPI   Ms. Joe is a 79-year-old female from home with past medical history of breast cancer, chronic kidney disease stage IIIa, hypercholesterolemia, hypertension and chronic sinusitis, who presented to the emergency room with worsening back pain/flank pain, history was mostly provided by patient's boyfriend at bedside.  She developed back pain couple of weeks ago and was referred to a pain clinic doctor where she was prescribed gabapentin, after she took about 2-3 doses of the gabapentin, she developed dizziness. She came to the emergency room today because lower back pain and flank pain got even worse despite being on the gabapentin.  In the emergency room, she was extensively worked up with CT of the abdomen and lumbar spine, CT abdomen showed extensive mass of the left kidney extending up to the proximal left ureter as well as right adrenal gland mass suspicious for metastasis.  Urology was consulted from the emergency room for input.      Review of Systems   All pertinent negatives and positives are as above. All other systems have been reviewed and are negative unless otherwise stated.     Objective    Temp:  [97 °F (36.1 °C)-98.9 °F (37.2 °C)] 97.2 °F (36.2 °C)  Heart Rate:  [64-83] 77  Resp:  [16-20] 20  BP: (106-154)/(40-74) 137/60  Physical Exam    GEN: Awake, alert, interactive, in NAD  HEENT: Atraumatic, PERRLA, EOMI, Anicteric, Trachea midline  Lungs: CTAB, no wheezing/rales/rhonchi  Heart: RRR, +S1/s2, no rub  ABD: soft, nt/nd, +BS, no  guarding/rebound  Extremities: atraumatic, no cyanosis, no edema  Skin: no rashes or lesions  Neuro: AAOx3, no focal deficits  Psych: normal mood & affect    Results Review:  I have reviewed the labs, radiology results, and diagnostic studies.    Laboratory Data:   Results from last 7 days   Lab Units 06/28/24  0617 06/25/24  0901   WBC 10*3/mm3 8.11 6.83   HEMOGLOBIN g/dL 11.6* 11.7*   HEMATOCRIT % 36.6 36.1   PLATELETS 10*3/mm3 81* 90*        Results from last 7 days   Lab Units 06/29/24  0519 06/28/24  1153 06/28/24  0617 06/25/24  0901   SODIUM mmol/L 136 135* 135* 134*   POTASSIUM mmol/L 3.9 3.8 3.9 3.7   CHLORIDE mmol/L 100 97* 95* 95*   CO2 mmol/L 25.0 21.0* 26.0 27.0   BUN mg/dL 37* 30* 32* 30*   CREATININE mg/dL 2.97* 2.59* 2.59* 2.39*   CALCIUM mg/dL 8.7 9.5 10.0 9.7   BILIRUBIN mg/dL  --   --  0.3 0.3   ALK PHOS U/L  --   --  85 90   ALT (SGPT) U/L  --   --  18 19   AST (SGOT) U/L  --   --  22 20   GLUCOSE mg/dL 90 94 99 121*       Culture Data:   Urine Culture   Date Value Ref Range Status   06/28/2024 >100,000 CFU/mL Mixed Bhavana Isolated  Final       Radiology Data:   Imaging Results (Last 24 Hours)       Procedure Component Value Units Date/Time    MRI Lumbar Spine With & Without Contrast [[id number]] Collected: 06/28/24 1920     Updated: 06/28/24 1945    Addenda:        Additional findings: There are also appears to be tumor signal extending  into the right L2-L3 neural foramen.     This report was signed and finalized on 6/28/2024 7:42 PM by Warren Freire.     Signed: 06/28/24 1942 by Warren Freire DO    Narrative:      Exam: MRI LUMBAR SPINE W WO CONTRAST- 6/28/2024 3:43 PM     HISTORY: Lumbar back and bilateral nondermatomal leg pain and  dysesthesias; N28.89-Other specified disorders of kidney and ureter;  E27.8-Other specified disorders of adrenal gland; M79.89-Other specified  soft tissue disorders; N28.9-Disorder of kidney and ureter, unspecified;  N18.9-Chronic kidney disease,  unspecified; D69.6-Thrombocytopenia,  unspecified     COMPARISON: 6/28/2024 lumbar spine CT     TECHNIQUE: Multiplanar, multisequence MRI of the lumbar spine was  obtained prior to and after the administration of 20 mL intravenous  gadolinium contrast.     FINDINGS:     Grade 1 degenerative anterolisthesis of L3-L4.     Within the L3 vertebral body there is fairly diffuse T2 hyperintense  signal with associated T1 hypointense marrow infiltration and suspected  mild enhancement. There is an associated posterior enhancing soft tissue  mass extending into the spinal canal causing severe stenosis and  compression of the cauda equina nerve roots. The soft tissue component  also appears to extend superiorly to the L2-L3 disc level. There appears  to be tumor extension into the right L3-L4 foramen.     No visible fracture.     Multilevel mild degenerative disc disease with posterior disc osteophyte  complexes. Multilevel facet hypertrophic changes. No significant facet  periarticular edema. Multilevel ligamentum flavum hypertrophy.  Multilevel prominent posterior epidural fat.     Conus appears to terminate at L1-L2.     T11-T12: There appears to be a at least mild to moderate spinal canal  and mild to moderate bilateral foraminal narrowing.     T12-L1: No significant spinal canal narrowing. Moderate right foraminal  narrowing.     L1-L2: Moderate spinal canal narrowing. Mild to moderate right foraminal  narrowing.     L2-L3: Severe spinal canal narrowing. Moderate to severe right foraminal  narrowing.     L3-L4: Severe spinal canal narrowing. Moderate right foraminal  narrowing.     L4-L5: Moderate spinal canal narrowing and bilateral subarticular  narrowing encroaching the descending bilateral L5 nerve roots. Mild  bilateral foraminal narrowing.     L5-S1: No significant spinal canal or foraminal narrowing.     Extraspinal findings: Please see separately dictated MR abdomen and CT  abdomen/pelvis from the same day.        Impression:         Neoplastic process involving the L3 vertebral body with associated  posterior soft tissue component resulting in severe spinal canal  narrowing and cauda equina nerve root compression. The soft tissue  component appears to also extend into the right L3-L4 foramen. No  associated pathological fracture.     Additional multilevel spondylotic changes including moderate to severe  spinal canal and foraminal narrowing as detailed above.           This report was signed and finalized on 6/28/2024 7:36 PM by Warren Freire.       MRI Abdomen With & Without Contrast [[id number]] Collected: 06/28/24 1826     Updated: 06/28/24 1943    Addenda:        Additional findings: Tumor signal from the left renal mass appears to  extend along the left renal vein and possibly abuts the IVC and is  suspicious for neoplastic involvement. An additional differential also  includes multifocal primary renal malignancies with metastatic disease.     This report was signed and finalized on 6/28/2024 7:40 PM by Warren Freire.     Signed: 06/28/24 1940 by Warren Freire, DO    Narrative:      EXAM: MRI ABDOMEN W WO CONTRAST-     INDICATION: Evaluate kidney and adrenal masses showed on CT-scan;  N28.89-Other specified disorders of kidney and ureter; E27.8-Other  specified disorders of adrenal gland; M79.89-Other specified soft tissue  disorders; N28.9-Disorder of kidney and ureter, unspecified;  N18.9-Chronic kidney disease, unspecified; D69.6-Thrombocytopenia,  unspecified        TECHNIQUE: Multisequence multiplanar MR images was obtained of the  abdomen prior to and at the administration of 20 mL intravenous  gadolinium contrast.        COMPARISON: CT abdomen pelvis 6/28/2024     FINDINGS:     Decrease sensitivity due to motion.     Lower Chest: Unremarkable.     Liver: Unremarkable.     Biliary Tree: Small amount of gallbladder sludge versus layering stones.     Spleen: Tiny T2 hyperintense splenic lesion is nonspecific  but favored  benign.     Pancreas: 1.5 cm pancreatic body cyst.     Adrenal Glands: 3.3 cm right adrenal nodule, new from prior CT on  5/23/2023.     Kidneys/Upper Ureters:      There is an infiltrative T2 hypointense/T1 isointense diffusion  restricting mass involving the left kidney with extension into the  proximal left collecting system/upper ureters which is difficult to  measure but measures up to 11 cm in cranial caudal dimensions. Please  note the abnormal diffusion restriction does extend below the  field-of-view into the left ureter. There are additional smaller masses  within the left renal cortex.      There are also multiple (at least 6) diffusion restricting masses within  the right kidney measuring up to 3.2 cm. There also appears to be a  diffusion restricting mass in the right renal pelvis.      These are somewhat limited in evaluation on postcontrast images due to  degree of motion, however these these masses do appear to demonstrate  some degree of enhancement.     Small left renal cyst is noted.     Gastrointestinal Tract: Colonic diverticulosis.     Lymphatics: Unremarkable.     Vasculature: Unremarkable.      Peritoneum/Retroperitoneum: Unremarkable.     Abdominal Wall/Soft Tissues: There enhancing diffusion restricting soft  tissue masses, the most dominant and largest cluster is within the right  posterior abdominal wall measuring up to 2.6 cm.     Osseous Structures: There is a diffusion restricting mass involving the  L3 vertebral body with suspected posterior extension into the spinal  canal.       Impression:            Infiltrative mass within the left kidney extending into the proximal  left collecting system as well as numerous additional solid masses in  the bilateral kidneys. Additionally there is a right adrenal mass,  multiple subcutaneous fat soft tissue masses, and a L3 vertebral body  mass with suspected extension into the spinal canal. Findings  collectively are most consistent  with metastatic disease which carries a  broad differential, however some differentials include metastatic breast  cancer, malignant melanoma, and lymphoma. The subtendinous fat soft  tissue masses would be amenable to ultrasound-guided biopsy.     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           This report was signed and finalized on 6/28/2024 6:57 PM by Warren Freire.               I have reviewed the patient's current medications.     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  MRI of the lumbar spine reported as follows-  IMPRESSION:   Neoplastic process involving the L3 vertebral body with associated  posterior soft tissue component resulting in severe spinal canal  narrowing and cauda equina nerve root compression. The soft tissue  component appears to also extend into the right L3-L4 foramen. No  associated pathological fracture.  Additional multilevel spondylotic changes including moderate to severe  spinal canal and foraminal narrowing as detailed above.  Neurosurgery is on consult and did the following procedure-  Procedure(s):  LUMBAR LAMINECTOMY L3 WITH DECOMPRESSION 1-2 LEVELS-RIGHT     Decompression -  Lumbar laminectomy, medial facetectomy for decompression of the spinal cord  Open laminectomy and biopsy of epidural neoplasm  Use of intraoperative microscope      -Acute on chronic kidney disease stage IIIa  Patient follows up with a nephrologist outpatient, but he does not come to this hospital.  Nephrologist consulted and helping with management while patient is in-house.     -Left renal mass/right adrenal mass on CT of the abdomen/pelvis  Urology was  consulted from the emergency room and after evaluation did not recommend any intervention,rather recommended oncology consult.  Patient has multiple subcutaneous nodules that may be amenable to percutaneous biopsy, we will consult oncology and get their input before requesting for biopsy [pending oncology evaluation].     Other chronic medical conditions-  History of breast cancer  Hypercholesterolemia  Hypertension  Chronic sinusitis     DVT prophylaxis-heparin       Medical Decision Making  Number and Complexity of problems: High    Conditions and Status        Condition is unchanged.     MDM Data  External documents reviewed: No  Cardiac tracing (EKG, telemetry) interpretation: Yes  Radiology interpretation: Yes  Labs reviewed: Yes  Any tests that were considered but not ordered: No     Decision rules/scores evaluated (example [id number], Wells, etc): Yes     Discussed with: Patient     Care Planning  Shared decision making: Yes  Code status and discussions: Yes [full code]    Disposition  Social Determinants of Health that impact treatment or disposition: No  I expect the patient to be discharged to unknown in unknown days.     Electronically signed by Prince Chambers MD, 06/29/24, 18:28 CDT.

## 2024-06-29 NOTE — ANESTHESIA PROCEDURE NOTES
Airway  Urgency: elective    Date/Time: 6/29/2024 2:27 PM  Airway not difficult    General Information and Staff    Patient location during procedure: OR    Indications and Patient Condition  Indications for airway management: airway protection    Preoxygenated: yes  Mask difficulty assessment: 1 - vent by mask    Final Airway Details  Final airway type: endotracheal airway      Successful airway: ETT  Cuffed: yes   Successful intubation technique: direct laryngoscopy  Endotracheal tube insertion site: oral  Blade: Kaiser  Blade size: 3  ETT size (mm): 7.0  Cormack-Lehane Classification: grade IIa - partial view of glottis  Placement verified by: chest auscultation and capnometry   Measured from: teeth  ETT/EBT  to teeth (cm): 21  Number of attempts at approach: 2  Assessment: lips, teeth, and gum same as pre-op and atraumatic intubation    Additional Comments  No view with first attempt at DL

## 2024-06-30 LAB
ALBUMIN SERPL-MCNC: 3.9 G/DL (ref 3.5–5.2)
ALBUMIN/GLOB SERPL: 1.3 G/DL
ALP SERPL-CCNC: 78 U/L (ref 39–117)
ALT SERPL W P-5'-P-CCNC: 21 U/L (ref 1–33)
ANION GAP SERPL CALCULATED.3IONS-SCNC: 12 MMOL/L (ref 5–15)
AST SERPL-CCNC: 35 U/L (ref 1–32)
BASOPHILS # BLD AUTO: 0 10*3/MM3 (ref 0–0.2)
BASOPHILS NFR BLD AUTO: 0 % (ref 0–1.5)
BH BB BLOOD EXPIRATION DATE: NORMAL
BH BB BLOOD EXPIRATION DATE: NORMAL
BH BB BLOOD TYPE BARCODE: 7300
BH BB BLOOD TYPE BARCODE: 7300
BH BB DISPENSE STATUS: NORMAL
BH BB DISPENSE STATUS: NORMAL
BH BB PRODUCT CODE: NORMAL
BH BB PRODUCT CODE: NORMAL
BH BB UNIT NUMBER: NORMAL
BH BB UNIT NUMBER: NORMAL
BILIRUB SERPL-MCNC: <0.2 MG/DL (ref 0–1.2)
BUN SERPL-MCNC: 34 MG/DL (ref 8–23)
BUN/CREAT SERPL: 14.2 (ref 7–25)
CALCIUM SPEC-SCNC: 8.4 MG/DL (ref 8.6–10.5)
CHLORIDE SERPL-SCNC: 101 MMOL/L (ref 98–107)
CO2 SERPL-SCNC: 24 MMOL/L (ref 22–29)
CREAT SERPL-MCNC: 2.39 MG/DL (ref 0.57–1)
DEPRECATED RDW RBC AUTO: 52.1 FL (ref 37–54)
EGFRCR SERPLBLD CKD-EPI 2021: 20.3 ML/MIN/1.73
EOSINOPHIL # BLD AUTO: 0 10*3/MM3 (ref 0–0.4)
EOSINOPHIL NFR BLD AUTO: 0 % (ref 0.3–6.2)
ERYTHROCYTE [DISTWIDTH] IN BLOOD BY AUTOMATED COUNT: 15.8 % (ref 12.3–15.4)
GLOBULIN UR ELPH-MCNC: 2.9 GM/DL
GLUCOSE SERPL-MCNC: 120 MG/DL (ref 65–99)
HCT VFR BLD AUTO: 29.9 % (ref 34–46.6)
HGB BLD-MCNC: 9.5 G/DL (ref 12–15.9)
IMM GRANULOCYTES # BLD AUTO: 0.06 10*3/MM3 (ref 0–0.05)
IMM GRANULOCYTES NFR BLD AUTO: 0.7 % (ref 0–0.5)
INR PPP: 1.04 (ref 0.91–1.09)
LYMPHOCYTES # BLD AUTO: 0.64 10*3/MM3 (ref 0.7–3.1)
LYMPHOCYTES NFR BLD AUTO: 7.4 % (ref 19.6–45.3)
MCH RBC QN AUTO: 28.7 PG (ref 26.6–33)
MCHC RBC AUTO-ENTMCNC: 31.8 G/DL (ref 31.5–35.7)
MCV RBC AUTO: 90.3 FL (ref 79–97)
MONOCYTES # BLD AUTO: 0.41 10*3/MM3 (ref 0.1–0.9)
MONOCYTES NFR BLD AUTO: 4.7 % (ref 5–12)
NEUTROPHILS NFR BLD AUTO: 7.53 10*3/MM3 (ref 1.7–7)
NEUTROPHILS NFR BLD AUTO: 87.2 % (ref 42.7–76)
NRBC BLD AUTO-RTO: 0 /100 WBC (ref 0–0.2)
PLATELET # BLD AUTO: 91 10*3/MM3 (ref 140–450)
PMV BLD AUTO: 12.5 FL (ref 6–12)
POTASSIUM SERPL-SCNC: 4 MMOL/L (ref 3.5–5.2)
PROT SERPL-MCNC: 6.8 G/DL (ref 6–8.5)
PROTHROMBIN TIME: 14 SECONDS (ref 11.8–14.8)
QT INTERVAL: 406 MS
QTC INTERVAL: 453 MS
RBC # BLD AUTO: 3.31 10*6/MM3 (ref 3.77–5.28)
SODIUM SERPL-SCNC: 137 MMOL/L (ref 136–145)
UNIT  ABO: NORMAL
UNIT  ABO: NORMAL
UNIT  RH: NORMAL
UNIT  RH: NORMAL
WBC NRBC COR # BLD AUTO: 8.64 10*3/MM3 (ref 3.4–10.8)

## 2024-06-30 PROCEDURE — P9100 PATHOGEN TEST FOR PLATELETS: HCPCS

## 2024-06-30 PROCEDURE — 97116 GAIT TRAINING THERAPY: CPT

## 2024-06-30 PROCEDURE — 99222 1ST HOSP IP/OBS MODERATE 55: CPT | Performed by: INTERNAL MEDICINE

## 2024-06-30 PROCEDURE — 25010000002 CEFAZOLIN PER 500 MG: Performed by: NEUROLOGICAL SURGERY

## 2024-06-30 PROCEDURE — 36430 TRANSFUSION BLD/BLD COMPNT: CPT

## 2024-06-30 PROCEDURE — 85610 PROTHROMBIN TIME: CPT | Performed by: NEUROLOGICAL SURGERY

## 2024-06-30 PROCEDURE — 80053 COMPREHEN METABOLIC PANEL: CPT | Performed by: NEUROLOGICAL SURGERY

## 2024-06-30 PROCEDURE — 97168 OT RE-EVAL EST PLAN CARE: CPT

## 2024-06-30 PROCEDURE — 25810000003 SODIUM CHLORIDE 0.9 % SOLUTION: Performed by: NEUROLOGICAL SURGERY

## 2024-06-30 PROCEDURE — 85025 COMPLETE CBC W/AUTO DIFF WBC: CPT | Performed by: NEUROLOGICAL SURGERY

## 2024-06-30 PROCEDURE — 99024 POSTOP FOLLOW-UP VISIT: CPT | Performed by: NEUROLOGICAL SURGERY

## 2024-06-30 PROCEDURE — P9037 PLATE PHERES LEUKOREDU IRRAD: HCPCS

## 2024-06-30 PROCEDURE — 97161 PT EVAL LOW COMPLEX 20 MIN: CPT

## 2024-06-30 RX ADMIN — CYCLOBENZAPRINE 10 MG: 10 TABLET, FILM COATED ORAL at 23:56

## 2024-06-30 RX ADMIN — CYCLOBENZAPRINE 10 MG: 10 TABLET, FILM COATED ORAL at 02:17

## 2024-06-30 RX ADMIN — SODIUM CHLORIDE 100 ML/HR: 900 INJECTION, SOLUTION INTRAVENOUS at 21:06

## 2024-06-30 RX ADMIN — PANTOPRAZOLE SODIUM 40 MG: 40 TABLET, DELAYED RELEASE ORAL at 10:10

## 2024-06-30 RX ADMIN — Medication 10 ML: at 10:09

## 2024-06-30 RX ADMIN — AMLODIPINE BESYLATE 5 MG: 5 TABLET ORAL at 10:09

## 2024-06-30 RX ADMIN — CARVEDILOL 6.25 MG: 6.25 TABLET, FILM COATED ORAL at 10:09

## 2024-06-30 RX ADMIN — Medication 10 ML: at 21:06

## 2024-06-30 RX ADMIN — BUPROPION HYDROCHLORIDE 150 MG: 150 TABLET, EXTENDED RELEASE ORAL at 10:09

## 2024-06-30 RX ADMIN — CARVEDILOL 6.25 MG: 6.25 TABLET, FILM COATED ORAL at 17:38

## 2024-06-30 RX ADMIN — MONTELUKAST SODIUM 10 MG: 10 TABLET, FILM COATED ORAL at 21:05

## 2024-06-30 RX ADMIN — HYDROCODONE BITARTRATE AND ACETAMINOPHEN 1 TABLET: 7.5; 325 TABLET ORAL at 23:56

## 2024-06-30 RX ADMIN — ACETAMINOPHEN 650 MG: 325 TABLET, FILM COATED ORAL at 11:19

## 2024-06-30 RX ADMIN — CEFAZOLIN 2000 MG: 2 INJECTION, POWDER, FOR SOLUTION INTRAMUSCULAR; INTRAVENOUS at 12:08

## 2024-06-30 RX ADMIN — CETIRIZINE HYDROCHLORIDE 10 MG: 10 TABLET ORAL at 10:09

## 2024-06-30 RX ADMIN — SODIUM CHLORIDE 100 ML/HR: 900 INJECTION, SOLUTION INTRAVENOUS at 07:24

## 2024-06-30 RX ADMIN — CLONIDINE HYDROCHLORIDE 0.1 MG: 0.1 TABLET ORAL at 10:10

## 2024-06-30 RX ADMIN — CLONIDINE HYDROCHLORIDE 0.1 MG: 0.1 TABLET ORAL at 21:05

## 2024-06-30 RX ADMIN — VALACYCLOVIR 500 MG: 500 TABLET, FILM COATED ORAL at 10:09

## 2024-06-30 RX ADMIN — ROSUVASTATIN CALCIUM 10 MG: 10 TABLET, FILM COATED ORAL at 21:05

## 2024-06-30 RX ADMIN — ROPINIROLE HYDROCHLORIDE 0.5 MG: 0.25 TABLET, FILM COATED ORAL at 21:05

## 2024-06-30 RX ADMIN — Medication 1 TABLET: at 10:09

## 2024-06-30 RX ADMIN — CEFAZOLIN 2000 MG: 2 INJECTION, POWDER, FOR SOLUTION INTRAMUSCULAR; INTRAVENOUS at 02:18

## 2024-06-30 RX ADMIN — FLUTICASONE PROPIONATE 2 SPRAY: 50 SPRAY, METERED NASAL at 10:09

## 2024-06-30 RX ADMIN — AMLODIPINE BESYLATE 5 MG: 5 TABLET ORAL at 21:05

## 2024-06-30 RX ADMIN — HYDROCODONE BITARTRATE AND ACETAMINOPHEN 1 TABLET: 7.5; 325 TABLET ORAL at 02:17

## 2024-06-30 RX ADMIN — SERTRALINE 100 MG: 50 TABLET, FILM COATED ORAL at 10:09

## 2024-06-30 NOTE — PLAN OF CARE
Goal Outcome Evaluation:           Progress: no change  Outcome Evaluation: Patient up with assist too bsc. Voiding. IVf infusing per order. Platelets given per order. Dressing c/d/i.

## 2024-06-30 NOTE — ANESTHESIA POSTPROCEDURE EVALUATION
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        Post Anesthesia Care and Evaluation    Patient location during evaluation: PACU  Patient participation: complete - patient participated  Level of consciousness: awake and alert  Pain management: adequate    Airway patency: patent  Anesthetic complications: No anesthetic complications    Cardiovascular status: acceptable  Respiratory status: acceptable  Hydration status: acceptable    Comments: Blood pressure 146/60, pulse 82, temperature 98.1 °F (36.7 °C), temperature source Oral, resp. rate 18, height 172.7 cm (68\"), weight 111 kg (244 lb), SpO2 98%, not currently breastfeeding.    Pt discharged from PACU based on cristobal score >8    "

## 2024-06-30 NOTE — THERAPY TREATMENT NOTE
Acute Care - Physical Therapy Treatment Note  [facility]     Patient Name: Marina Joe  : 1945  MRN: [id number]  Today's Date: 2024      Visit Dx:     ICD-10-CM ICD-9-CM   1. 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 Left renal mass  N28.89 593.9   3. Right adrenal mass  E27.8 255.8   4. Nodule of soft tissue  M79.89 729.99   5. Acute on chronic renal insufficiency  N28.9 593.9    N18.9 585.9   6. Thrombocytopenia  D69.6 287.5   7. Cauda equina compression  G83.4 344.60   8. Impaired mobility [Z74.09]  Z74.09 799.89     Patient Active Problem List   Diagnosis    Malignant neoplasm of upper-outer quadrant of female breast    Anemia of chronic renal failure, stage 3 (moderate)    Hypertension, benign    Hx of colonic polyps    HX: breast cancer    Morbidly obese    Right knee DJD    S/P lumpectomy, left breast    Adult hypothyroidism    Anemia    Anxiety    Breast cancer, left    Cervical pain    Chronic insomnia    Elevated lipids    Herpes zoster without complication    Left ear pain    Left otitis externa    Myalgia    Negative depression screening    Obesity (BMI 30-39.9)    Postmenopausal status    Recurrent acute serous otitis media of left ear    Restless leg    Sinusitis, bacterial    Skin lesion    Vitamin D deficiency    Stage 3b chronic kidney disease    GIB (gastrointestinal bleeding)    Acute on chronic blood loss anemia    Chronic idiopathic thrombocytopenia    Hypotension due to hypovolemia    Primary hypertension    Pancreatic lesion    Left renal mass    Cauda equina compression    Metastatic cancer to spine     Past Medical History:   Diagnosis Date    Breast cancer     CKD (chronic kidney disease)     Hypercholesteremia     Hypertension     Sinusitis     Stage 3a chronic kidney disease 10/20/2020     Past Surgical History:   Procedure Laterality Date    AVULSION TOENAIL PLATE          BREAST BIOPSY Left 2012    BREAST BIOPSY      Left Breast, 2019 per Dr Barkley     BREAST LUMPECTOMY Left     with node bx     COLONOSCOPY  09/13/2013    small polyp at 30cm benign hyperplastic polyp, changes consistent with melanosis coli. Recall 5 years    COLONOSCOPY  11/19/2018    Tics otherwise normal exam repeat in 5 years    COLONOSCOPY  02/13/2023    Normal exam repeat in 3 years with a 2 day prep    ENDOSCOPY  02/13/2023    Gastritis, small HH    REPLACEMENT TOTAL KNEE Right     2016    TOTAL ABDOMINAL HYSTERECTOMY WITH SALPINGO OOPHORECTOMY      UPPER ENDOSCOPIC ULTRASOUND W/ FNA  12/20/2023    Pancreatic cyst s/p FNA     PT Assessment (Last 12 Hours)       PT Evaluation and Treatment       [facility] Name 06/30/24 1319          Physical Therapy Time and Intention    Subjective Information complains of;pain;weakness  -     Document Type therapy note (daily note)  -     Mode of Treatment physical therapy  -AH       Row Name 06/30/24 1319          General Information    Existing Precautions/Restrictions brace worn when out of bed;fall;LSO;spinal  -AH       Row Name 06/30/24 1319          Pain    Pretreatment Pain Rating 10/10  -     Posttreatment Pain Rating 10/10  -     Pain Location - Side/Orientation Right  -     Pain Location lower  -     Pain Location - extremity;back  -     Pain Intervention(s) Medication (See MAR);Repositioned;Ambulation/increased activity  -[facility] Name 06/30/24 1319          Bed Mobility    Comment, (Bed Mobility) sitting EOB  -AH       Row Name 06/30/24 1319          Transfers    Transfers toilet transfer  -AH       Row Name 06/30/24 1319          Sit-Stand Transfer    Sit-Stand [place] (Transfers) minimum assist (75% patient effort);verbal cues  -AH       Row Name 06/30/24 1319          Stand-Sit Transfer    Stand-Sit [place] (Transfers) contact guard;minimum assist (75% patient effort);verbal cues  -AH       Row Name 06/30/24 1319          Toilet Transfer    [place] Level (Toilet Transfer) contact guard;minimum assist (75% patient  effort);verbal cues  -     Assistive Device (Toilet Transfer) commode;grab bars/safety frame;walker, front-wheeled  -AH       Row Name 06/30/24 1319          Gait/Stairs (Locomotion)    [place] Level (Gait) contact guard;verbal cues  -     Assistive Device (Gait) walker, front-wheeled  -     Distance in Feet (Gait) 15  15 X 2  -AH       Row Name             Wound 06/29/24 1458 lumbar spine Incision    Wound - Properties Group Placement Date: 06/29/24  -KT Placement Time: 1458  -KT Present on Original Admission: N  -KT Location: lumbar spine  -KT Primary Wound Type: Incision  -KT    Retired Wound - Properties Group Placement Date: 06/29/24  -KT Placement Time: 1458  -KT Present on Original Admission: N  -KT Location: lumbar spine  -KT Primary Wound Type: Incision  -KT    Retired Wound - Properties Group Date first assessed: 06/29/24  -KT Time first assessed: 1458  -KT Present on Original Admission: N  -KT Location: lumbar spine  -KT Primary Wound Type: Incision  -KT      Row Name 06/30/24 1319          Positioning and Restraints    Pre-Treatment Position in bed  -     Post Treatment Position bed  -     In Bed sitting EOB;call light within reach;encouraged to call for assist;with family/caregiver  -               User Key  (r) = Recorded By, (t) = Taken By, (c) = Cosigned By      Initials Name Provider Type     Goldie Paul PTA Physical Therapist Assistant    Shravan Ordonez, RN Registered Nurse                    Physical Therapy Education       Title: PT OT SLP Therapies (Done)       Topic: Physical Therapy (Done)       Point: Mobility training (Done)       Learning Progress Summary             Patient Acceptance, E,D, DU,VU by  at 6/30/2024 0951    Comment: benefits of PT and POC, call for A OOB, no BLT, LSO when OOB                         Point: Body mechanics (Done)       Learning Progress Summary             Patient Acceptance, E,D, DU,VU by  at 6/30/2024 0951    Comment: benefits of  PT and POC, call for A OOB, no BLT, LSO when OOB                         Point: Precautions (Done)       Learning Progress Summary             Patient Acceptance, E,D, DU,VU by  at 6/30/2024 0951    Comment: benefits of PT and POC, call for A OOB, no BLT, LSO when OOB                                         User Key       Initials Effective Dates Name Provider Type Discipline     02/03/23 -  Elile León, PT Physical Therapist PT                  PT Recommendation and Plan             Time Calculation:    PT Charges       Row Name 06/30/24 1345 06/30/24 0952          Time Calculation    Start Time 1319  - 0858  -     Stop Time 1342  - 0952  [facility]     Time Calculation (min) 23 min  - 54 min  -     PT Received On -- 06/30/24  -     PT Goal Re-Cert Due Date -- 07/10/24  -        Time Calculation- PT    Total Timed Code Minutes- PT 23 minute(s)  - --        Timed Charges    25010 - Gait Training Minutes  23  - --        Untimed Charges    PT Eval/Re-eval Minutes -- 54  -        Total Minutes    Timed Charges Total Minutes 23  -AH --     Untimed Charges Total Minutes -- 54  -      Total Minutes 23  -AH 54  -               User Key  (r) = Recorded By, (t) = Taken By, (c) = Cosigned By      Initials Name Provider Type     Goldie Paul PTA Physical Therapist Assistant     Eliel León, PT Physical Therapist                  Therapy Charges for Today       Code Description Service Date Service Provider Modifiers Qty    [id number] HC GAIT TRAINING EA 15 MIN 6/30/2024 Goldie Paul PTA GP 2            PT G-Codes  Outcome Measure Options: AM-PAC 6 Clicks Basic Mobility (PT)  AM-PAC 6 Clicks Score (PT): 17  AM-PAC 6 Clicks Score (OT): (P) 21    Goldie Paul PTA  6/30/2024

## 2024-06-30 NOTE — PROGRESS NOTES
[facility] Medicine Services  INPATIENT PROGRESS NOTE    Patient Name: Marina Joe  Date of Admission: 6/28/2024  Today's Date: 06/30/24  Length of Stay: 2  Primary Care Physician: Hanh Og APRN    Subjective   Chief Complaint: Lower back pain      Today  Had extensive discussion with patient and significant other at bedside, I was lee about probability of cancer being very high.    HPI   Ms. Joe is a 79-year-old female from home with past medical history of breast cancer, chronic kidney disease stage IIIa, hypercholesterolemia, hypertension and chronic sinusitis, who presented to the emergency room with worsening back pain/flank pain, history was mostly provided by patient's boyfriend at bedside.  She developed back pain couple of weeks ago and was referred to a pain clinic doctor where she was prescribed gabapentin, after she took about 2-3 doses of the gabapentin, she developed dizziness. She came to the emergency room today because lower back pain and flank pain got even worse despite being on the gabapentin.  In the emergency room, she was extensively worked up with CT of the abdomen and lumbar spine, CT abdomen showed extensive mass of the left kidney extending up to the proximal left ureter as well as right adrenal gland mass suspicious for metastasis.  Urology was consulted from the emergency room for input.      Review of Systems   All pertinent negatives and positives are as above. All other systems have been reviewed and are negative unless otherwise stated.     Objective    Temp:  [97.2 °F (36.2 °C)-98.4 °F (36.9 °C)] 97.5 °F (36.4 °C)  Heart Rate:  [75-88] 80  Resp:  [18-20] 18  BP: (126-168)/(52-71) 129/61  Physical Exam    GEN: Awake, alert, interactive, in NAD  HEENT: Atraumatic, PERRLA, EOMI, Anicteric, Trachea midline  Lungs: CTAB, no wheezing/rales/rhonchi  Heart: RRR, +S1/s2, no rub  ABD: soft, nt/nd, +BS, no guarding/rebound  Extremities:  atraumatic, no cyanosis, no edema  Skin: no rashes or lesions  Neuro: AAOx3, no focal deficits  Psych: normal mood & affect    Results Review:  I have reviewed the labs, radiology results, and diagnostic studies.    Laboratory Data:   Results from last 7 days   Lab Units 06/30/24  0434 06/28/24  0617 06/25/24  0901   WBC 10*3/mm3 8.64 8.11 6.83   HEMOGLOBIN g/dL 9.5* 11.6* 11.7*   HEMATOCRIT % 29.9* 36.6 36.1   PLATELETS 10*3/mm3 91* 81* 90*        Results from last 7 days   Lab Units 06/30/24  0434 06/29/24  1858 06/29/24  0519 06/28/24  1153 06/28/24  0617 06/25/24  0901   SODIUM mmol/L 137 140 136   < > 135* 134*   POTASSIUM mmol/L 4.0 4.6 3.9   < > 3.9 3.7   CHLORIDE mmol/L 101 104 100   < > 95* 95*   CO2 mmol/L 24.0 22.0 25.0   < > 26.0 27.0   BUN mg/dL 34* 34* 37*   < > 32* 30*   CREATININE mg/dL 2.39* 2.62* 2.97*   < > 2.59* 2.39*   CALCIUM mg/dL 8.4* 9.0 8.7   < > 10.0 9.7   BILIRUBIN mg/dL <0.2  --   --   --  0.3 0.3   ALK PHOS U/L 78  --   --   --  85 90   ALT (SGPT) U/L 21  --   --   --  18 19   AST (SGOT) U/L 35*  --   --   --  22 20   GLUCOSE mg/dL 120* 95 90   < > 99 121*    < > = values in this interval not displayed.       Culture Data:   Urine Culture   Date Value Ref Range Status   06/28/2024 >100,000 CFU/mL Mixed [place] Isolated  Final       Radiology Data:   Imaging Results (Last 24 Hours)       ** No results found for the last 24 hours. **            I have reviewed the patient's current medications.     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  MRI of the lumbar spine reported as follows-  IMPRESSION:   Neoplastic process involving the L3 vertebral body with associated  posterior soft tissue component resulting in severe  spinal canal  narrowing and cauda equina nerve root compression. The soft tissue  component appears to also extend into the right L3-L4 foramen. No  associated pathological fracture.  Additional multilevel spondylotic changes including moderate to severe  spinal canal and foraminal narrowing as detailed above.  Neurosurgery is on consult and did the following procedure-  Procedure(s):  LUMBAR LAMINECTOMY L3 WITH DECOMPRESSION 1-2 LEVELS-RIGHT     Decompression -  Lumbar laminectomy, medial facetectomy for decompression of the spinal cord  Open laminectomy and biopsy of epidural neoplasm  Use of intraoperative microscope      -Acute on chronic kidney disease stage IIIa  Patient follows up with a nephrologist outpatient, but he does not come to this hospital.  Nephrologist consulted and helping with management while patient is in-house.     -Left renal mass/right adrenal mass on CT of the abdomen/pelvis  Urology was consulted from the emergency room and after evaluation did not recommend any intervention,rather recommended oncology consult.  Patient has multiple subcutaneous nodules that may be amenable to percutaneous biopsy.  Oncology is on consult and has evaluated patient, will await for tentative diagnosis from lumbar spine biopsy before making recommendations      Other chronic medical conditions-  History of breast cancer  Hypercholesterolemia  Hypertension  Chronic sinusitis     DVT prophylaxis-heparin       Medical Decision Making  Number and Complexity of problems: High    Conditions and Status        Condition is unchanged.     MDM Data  External documents reviewed: No  Cardiac tracing (EKG, telemetry) interpretation: Yes  Radiology interpretation: Yes  Labs reviewed: Yes  Any tests that were considered but not ordered: No     Decision rules/scores evaluated (example [id number], [place], etc): Yes     Discussed with: Patient     Care Planning  Shared decision making: Yes  Code status and discussions: Yes  [full code]    Disposition  Social Determinants of Health that impact treatment or disposition: No  I expect the patient to be discharged to unknown in unknown days.     Electronically signed by Prince Chambers MD, 06/30/24, 18:01 CDT.

## 2024-06-30 NOTE — CONSULTS
[facility] Oncology/Hematology Services  CONSULT NOTE    PATIENT NAME:  Marina Joe  YOB: 1945  PATIENT MRN:  [id number]    Date of Admission:  6/28/2024  Consultation Date:  6/30/2024  Referring Provider: No ref. provider found    Subjective     Chief complaint: Lower back pain    Reason for Consultation: Left kidney mass and right adrenal mass concerning for metastatic disease    History of present illness:   Marina Joe 79 y.o. 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    She reports that she has been experiencing back pain for several weeks. Since her presentation on 6/28/2024 she had the following workup:  - CT-A/P showed abnormal heterogenous left kidney mass, abnormal right kidney, new right adrenal mass, innumerable solid nodules in the subcutaneous soft tissues all concerning for metastatic disease process.  - CT lumbar spine showing multilevel prominent disc osteophyte complexes acet arthropathy and ligamental hypertrophy and resultant neural foraminal spinal canal stenosis.  - CT chest showed a left breast nodule of 9 mm, scattered subcutaneous soft tissue nodules in the abdominal wall, otherwise there is no sign of intrathoracic metastases.  - MRI-abdomen showed infiltrative mass within the left kidney extending into the proximal left collecting system as well as numerous additional solid masses in the bilateral kidneys. Additionally there is a right adrenal mass, multiple subcutaneous fat soft tissue masses, and a L3 vertebral body mass with suspected extension into the spinal canal. 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  - MRI-L-spine: Neoplastic process involving the L3 vertebral body with associated posterior soft tissue component resulting in severe spinal canal narrowing and cauda equina nerve root compression. The soft tissue component appears to also extend into the right L3-L4 foramen. No associated pathological fracture. There are also appears to be tumor signal extending into the right L2-L3 neural foramen.    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    Past Medical History:  Past Medical History:   Diagnosis Date    Breast cancer     CKD (chronic kidney disease)     Hypercholesteremia     Hypertension     Sinusitis     Stage 3a chronic kidney disease 10/20/2020     Prior Surgeries:  Past Surgical History:   Procedure Laterality Date    AVULSION TOENAIL PLATE      Sept 26,2018    BREAST BIOPSY Left 11/20/2012    BREAST BIOPSY      Left Breast, 1/2019 per Dr Barkley    BREAST LUMPECTOMY Left     with node bx     COLONOSCOPY  09/13/2013    small polyp at 30cm benign hyperplastic polyp, changes consistent with melanosis coli. Recall 5 years    COLONOSCOPY  11/19/2018    Tics otherwise normal exam repeat in 5 years    COLONOSCOPY  02/13/2023    Normal exam repeat in 3 years with a 2 day prep    ENDOSCOPY  02/13/2023    Gastritis, small HH    REPLACEMENT TOTAL KNEE Right     2016    TOTAL ABDOMINAL HYSTERECTOMY WITH SALPINGO OOPHORECTOMY      UPPER ENDOSCOPIC ULTRASOUND W/ FNA  12/20/2023    Pancreatic cyst s/p FNA        Allergies:Aspirin and Niacin  PTA Meds:  Medications Prior to Admission   Medication Sig Dispense Refill Last Dose    amLODIPine (NORVASC) 5 MG tablet Take 1 tablet by mouth 2 (Two) Times a Day.       buPROPion XL (WELLBUTRIN XL) 150 MG  24 hr tablet Take 1 tablet by mouth Daily.       Calcium Carb-Cholecalciferol (CALCIUM 600+D3 PO) Take 1 tablet by mouth Daily.       carvedilol (COREG) 6.25 MG tablet Take 1 tablet by mouth 2 (Two) Times a Day With Meals.       dapagliflozin Propanediol (Farxiga) 10 MG tablet Take 10 mg by mouth Daily.       famotidine (PEPCID) 20 MG tablet Take 1 tablet by mouth 2 (Two) Times a Day Before Meals. 60 tablet 0     fluticasone (FLONASE) 50 MCG/ACT nasal spray 2 sprays into the nostril(s) as directed by provider Daily. 1 g 3     irbesartan-hydrochlorothiazide (AVALIDE) 300-12.5 MG tablet Take 1 tablet by mouth Daily.       montelukast (SINGULAIR) 10 MG tablet Take 1 tablet by mouth Every Night.       Multiple Vitamins-Minerals (MULTIVITAMIN ADULT) tablet Take 1 tablet by mouth Daily.       pantoprazole (PROTONIX) 40 MG EC tablet Take 1 tablet by mouth Daily.       rOPINIRole (REQUIP) 0.5 MG tablet Take 1 tablet by mouth Every Night. Take 1 hour before bedtime.       rosuvastatin (CRESTOR) 20 MG tablet Take 1 tablet by mouth Daily.       valACYclovir (VALTREX) 500 MG tablet Take 1 tablet by mouth 2 (Two) Times a Day.       albuterol sulfate  (90 Base) MCG/ACT inhaler Inhale 2 puffs Every 4 (Four) Hours As Needed for Wheezing. 2 g 3     cetirizine (zyrTEC) 10 MG tablet Take 1 tablet by mouth Daily.       cloNIDine (CATAPRES) 0.1 MG tablet Take 1 tablet by mouth 2 (Two) Times a Day.       cyclobenzaprine (FLEXERIL) 10 MG tablet Take 1 tablet by mouth 3 (Three) Times a Day As Needed for Muscle Spasms.       Diclofenac Sodium (VOLTAREN) 1 % gel gel Apply 4 g topically to the appropriate area as directed 4 (Four) Times a Day As Needed (arthralgia). 240 g 3     Ergocalciferol (VITAMIN D2 PO) Take 50,000 Units by mouth Every 30 (Thirty) Days.       naproxen sodium (ALEVE) 220 MG tablet Take 1 tablet by mouth 2 (Two) Times a Day As Needed.       sertraline (ZOLOFT) 100 MG tablet Take 1 tablet by mouth Daily.          Current Meds:   Current Facility-Administered Medications   Medication Dose Route Frequency Provider Last Rate Last Admin    acetaminophen (TYLENOL) tablet 650 mg  650 mg Oral Q8H Marcellus Pulido MD        Or    acetaminophen (TYLENOL) 160 MG/5ML oral solution 650 mg  650 mg Oral Q8H Marcellus Pulido MD        Or    acetaminophen (TYLENOL) suppository 650 mg  650 mg Rectal Q8H Marcellus Pulido MD        acetaminophen (TYLENOL) tablet 650 mg  650 mg Oral Q6H PRN Marcellus Pulido MD   650 mg at 06/29/24 0544    albuterol (PROVENTIL) nebulizer solution 0.083% 2.5 mg/3mL  2.5 mg Nebulization Q4H PRN Marcellus Pulido MD   2.5 mg at 06/28/24 1040    amLODIPine (NORVASC) tablet 5 mg  5 mg Oral BID Marcellus Pulido MD   5 mg at 06/30/24 1009    sennosides-docusate (PERICOLACE) 8.6-50 MG per tablet 2 tablet  2 tablet Oral BID PRN Marcellus Pulido MD        And    polyethylene glycol (MIRALAX) packet 17 g  17 g Oral Daily PRN Marcellus Pulido MD        And    bisacodyl (DULCOLAX) EC tablet 5 mg  5 mg Oral Daily PRN Marcellus Pulido MD        And    bisacodyl (DULCOLAX) suppository 10 mg  10 mg Rectal Daily PRN Marcellus Pluido MD        buPROPion XL (WELLBUTRIN XL) 24 hr tablet 150 mg  150 mg Oral Daily Marcellus Pulido MD   150 mg at 06/30/24 1009    Calcium Replacement - Follow Nurse / BPA Driven Protocol   Does not apply PRN Marcellus Pulido MD        carvedilol (COREG) tablet 6.25 mg  6.25 mg Oral BID With Meals Marcellus Pulido MD   6.25 mg at 06/30/24 1009    ceFAZolin 2000 mg IVPB in 100 mL NS (MBP)  2,000 mg Intravenous Q8H Marcellus Pulido MD   2,000 mg at 06/30/24 0218    cetirizine (zyrTEC) tablet 10 mg  10 mg Oral Daily Marcellus Pulido MD   10 mg at 06/30/24 1009    cloNIDine (CATAPRES) tablet 0.1 mg  0.1 mg Oral BID Marcellus Pulido MD   0.1 mg at 06/30/24 1010    cyclobenzaprine (FLEXERIL) tablet  10 mg  10 mg Oral TID PRN Marcellus Pulido MD   10 mg at 06/30/24 0217    Diclofenac Sodium (VOLTAREN) 1 % gel 4 g  4 g Topical 4x Daily PRN Marcellus Pulido MD        fluticasone (FLONASE) 50 MCG/ACT nasal spray 2 spray  2 spray Nasal Daily Marcellus Pulido MD   2 spray at 06/30/24 1009    [START ON 7/2/2024] heparin (porcine) 5000 UNIT/ML injection 5,000 Units  5,000 Units Subcutaneous Q12H Marcellus Pulido MD        hydrALAZINE (APRESOLINE) injection 10 mg  10 mg Intravenous Q4H PRN Marcellus Pulido MD        HYDROcodone-acetaminophen (NORCO) 5-325 MG per tablet 1 tablet  1 tablet Oral Q4H PRN Marcellus Pulido MD   1 tablet at 06/29/24 2019    HYDROcodone-acetaminophen (NORCO) 7.5-325 MG per tablet 1 tablet  1 tablet Oral Q4H PRN Marcellus Pulido MD   1 tablet at 06/30/24 0217    Magnesium Low Dose Replacement - Follow Nurse / BPA Driven Protocol   Does not apply PRN Marcellus Pulido MD        montelukast (SINGULAIR) tablet 10 mg  10 mg Oral Nightly Marcellus Pulido MD   10 mg at 06/29/24 2020    multivitamin with minerals 1 tablet  1 tablet Oral Daily Marcellus Pulido MD   1 tablet at 06/30/24 1009    ondansetron (ZOFRAN) injection 4 mg  4 mg Intravenous Q6H PRN Marcellus Pulido MD        pantoprazole (PROTONIX) EC tablet 40 mg  40 mg Oral Daily Marcellus Pulido MD   40 mg at 06/30/24 1010    Phosphorus Replacement - Follow Nurse / BPA Driven Protocol   Does not apply PRN Marcellus Pulido MD        Potassium Replacement - Follow Nurse / BPA Driven Protocol   Does not apply PRN Marcellus Pulido MD        rOPINIRole (REQUIP) tablet 0.5 mg  0.5 mg Oral Nightly Marcellus Pulido MD   0.5 mg at 06/29/24 2020    rosuvastatin (CRESTOR) tablet 10 mg  10 mg Oral Nightly Marcellus Pulido MD   10 mg at 06/29/24 2020    sertraline (ZOLOFT) tablet 100 mg  100 mg Oral Daily Marcellus Pulido MD   100 mg at  06/30/24 1009    sodium chloride 0.9 % flush 10 mL  10 mL Intravenous PRN Marcellus Pulido MD        sodium chloride 0.9 % flush 10 mL  10 mL Intravenous Q12H Marcellus Pulido MD   10 mL at 06/30/24 1009    sodium chloride 0.9 % flush 10 mL  10 mL Intravenous PRN Marcellus Pulido MD        sodium chloride 0.9 % flush 10 mL  10 mL Intravenous Q12H Marcellus Pulido MD   10 mL at 06/30/24 1009    sodium chloride 0.9 % flush 10 mL  10 mL Intravenous PRN Marcellus Pulido MD        sodium chloride 0.9 % infusion 40 mL  40 mL Intravenous PRN Marcellus Pulido MD        sodium chloride 0.9 % infusion 40 mL  40 mL Intravenous PRN Marcellus Pulido MD        sodium chloride 0.9 % infusion  100 mL/hr Intravenous Continuous Marcellus Pulido  mL/hr at 06/30/24 0724 100 mL/hr at 06/30/24 0724    valACYclovir (VALTREX) tablet 500 mg  500 mg Oral Q24H Prince Chambers MD   500 mg at 06/30/24 1009       Family History:family history includes Cancer in her father and maternal uncle; Heart attack in her brother and mother; Hodgkin's lymphoma in her father; Other in her father; Pancreatic cancer in her maternal uncle; Skin cancer in her maternal uncle.   Social History: reports that she has never smoked. She has never used smokeless tobacco. She reports that she does not currently use drugs. She reports that she does not drink alcohol.    Objective      Vital Signs   Temp:  [97 °F (36.1 °C)-98.9 °F (37.2 °C)] 98.1 °F (36.7 °C)  Heart Rate:  [64-88] 82  Resp:  [16-20] 18  BP: (126-168)/(52-74) 146/60    GENERAL: Alert and oriented, no apparent distress  HEENT: No scleral icterus  NECK: Supple  HEART: Regular rate and rhythm  LUNGS: Clear to auscultation bilaterally  ABDOMEN: Soft, nontender, nondistended  EXTREMITIES: Symmetric  SKIN: No jaundice  PSYCHIATRIC: Full affect  NEUROLOGIC: Stable gait    Results from last 7 days   Lab Units 06/30/24  0434 06/28/24  0617  06/25/24  0901   WBC 10*3/mm3 8.64 8.11 6.83   HEMOGLOBIN g/dL 9.5* 11.6* 11.7*   HEMATOCRIT % 29.9* 36.6 36.1   PLATELETS 10*3/mm3 91* 81* 90*     Results from last 7 days   Lab Units 06/30/24  0434 06/29/24  1858 06/29/24  0519 06/28/24  1153 06/28/24  0617 06/25/24  0901   SODIUM mmol/L 137 140 136   < > 135* 134*   POTASSIUM mmol/L 4.0 4.6 3.9   < > 3.9 3.7   CHLORIDE mmol/L 101 104 100   < > 95* 95*   CO2 mmol/L 24.0 22.0 25.0   < > 26.0 27.0   BUN mg/dL 34* 34* 37*   < > 32* 30*   CREATININE mg/dL 2.39* 2.62* 2.97*   < > 2.59* 2.39*   CALCIUM mg/dL 8.4* 9.0 8.7   < > 10.0 9.7   BILIRUBIN mg/dL <0.2  --   --   --  0.3 0.3   ALK PHOS U/L 78  --   --   --  85 90   ALT (SGPT) U/L 21  --   --   --  18 19   AST (SGOT) U/L 35*  --   --   --  22 20   GLUCOSE mg/dL 120* 95 90   < > 99 121*    < > = values in this interval not displayed.       Assessment/Plan      ASSESSMENT:   Lower back pain  Cord compression at L3 concerning for epidural metastasis s/p laminectomy and decompression  Left kidney mass and right adrenal mass, concerning for metastatic disease process  Diffuse soft tissue abdominal wall nodules.  History of stage I left breast cancer  Left breast nodule of 9 mm  Anemia  Thrombocytopenia    PLAN:  - The patient's clinical picture is concerning for metastatic disease process, given large heterogenous left kidney mass, new right adrenal nodule, multiple subcutaneous soft tissue nodules, and cord compression at L3 concerning for epidural metastasis.  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  - We will await final pathology results to further explore treatment options.  - The patient may benefit from a PET-scan and possible a brain MRI for staging.  - Plan to obtain diagnostic mammography to evaluate the left breast nodule.  - I discussed with the patient that we will await final  pathology results before discussing prognosis in more details, as well as discussing possible treatment options.    Kassi De Luna MD  06/30/24  11:05 CDT

## 2024-06-30 NOTE — PLAN OF CARE
Problem: Adult Inpatient Plan of Care  Goal: Plan of Care Review  Recent Flowsheet Documentation  Taken 6/30/2024 0858 by Eliel León, PT  Plan of Care Reviewed With: patient  Outcome Evaluation: PT IE complete.  A&Ox4.  C/o 10/10 back pain.  Sitting EOB with LSO eating breakfast upon entering room.  Pt is independent PLOF.  Today she is min A x2 sit to stand.  Ambulated 20ft CGA x1 w/ RW.  PT to see for progressive ambulation and strengthening.  Recommend home w/ A possibly OPPT at DC.  Thank you for referral.   Goal Outcome Evaluation:  Plan of Care Reviewed With: patient           Outcome Evaluation: PT IE complete.  A&Ox4.  C/o 10/10 back pain.  Sitting EOB with LSO eating breakfast upon entering room.  Pt is independent PLOF.  Today she is min A x2 sit to stand.  Ambulated 20ft CGA x1 w/ RW.  PT to see for progressive ambulation and strengthening.  Recommend home w/ A possibly OPPT at DC.  Thank you for referral.      Anticipated Discharge Disposition (PT): home with assist, home with outpatient therapy services

## 2024-06-30 NOTE — PROGRESS NOTES
Neurosurgery Daily Progress Note    HPI:  Marina Joe is a 79 y.o. female with significant medical comorbidities to include breast cancer, CKD, hypertension, hyperlipidemia, and obesity.  She presents with a new problem of lumbar back and bilateral nondermatomal lower extremity radicular pain and dysesthesias. Physical exam findings of lower extremity hyporeflexia and right leg weakness with saddle anesthesia.  Their imaging shows an enlarged left kidney and right adrenal mass concerning for metastatic disease.  CT of the lumbar spine shows multilevel degenerative changes to include an anteriolisthesis of L3 over L4 and spondylosis at L5-S1. 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   LSO when out of bed   1 Day Post-Op L3 laminectomy and biopsy of epidural mass    CV: LASHANDA  Pulm: LASHANDA  :  LASHANDA  FEN: NPO for now  Endocrine: LASHANDA  Heme:  Thrombocytopenia.  Transfused 2 units of platelets in OR and again today.   Goal > 150K postop  Dispo: Max PT/OT with brace      Chief complaint:   Back pain bilateral lower extremity numbness and  "tingling    HPI  Subjective  Stable right proximal leg weakness,    Temp:  [97 °F (36.1 °C)-98.9 °F (37.2 °C)] 98 °F (36.7 °C)  Heart Rate:  [64-88] 78  Resp:  [16-20] 20  BP: (126-168)/(52-74) 126/52    Output by Drain (mL) 06/29/24 0701 - 06/29/24 1900 06/29/24 1901 - 06/30/24 0700 06/30/24 0701 - 06/30/24 0902 Range Total   Patient has no LDAs of requested type attached.       Objective:  Vital signs: (most recent): Blood pressure 126/52, pulse 78, temperature 98 °F (36.7 °C), temperature source Oral, resp. rate 20, height 172.7 cm (68\"), weight 111 kg (244 lb), SpO2 94%, not currently breastfeeding.        Neurologic Exam     Mental Status   Oriented to person, place, and time.   Speech: speech is normal   Level of consciousness: alert    Cranial Nerves     CN III, IV, VI   Pupils are equal, round, and reactive to light.  Extraocular motions are normal.     CN V   Facial sensation intact.     CN VII   Facial expression full, symmetric.     Motor Exam   Right arm pronator drift: absent  Left arm pronator drift: absent    Strength   Right deltoid: 5/5  Left deltoid: 5/5  Right biceps: 5/5  Left biceps: 5/5  Right triceps: 5/5  Left triceps: 5/5  Right iliopsoas: 4/5  Left iliopsoas: 5/5  Right quadriceps: 4/5  Left quadriceps: 5/5  Right gastroc: 5/5  Left gastroc: 5/5    Sensory Exam   Right arm light touch: normal  Left arm light touch: normal  Right leg light touch: decreased from knee  Left leg light touch: normal  Sensory deficit distribution on right: L3      Drains: * No LDAs found *    Imaging Results (Last 24 Hours)       ** No results found for the last 24 hours. **          Lab Results (last 24 hours)       Procedure Component Value Units Date/Time    Comprehensive Metabolic Panel [[id number]]  (Abnormal) Collected: 06/30/24 0434    Specimen: Blood Updated: 06/30/24 0530     Glucose 120 mg/dL      BUN 34 mg/dL      Creatinine 2.39 mg/dL      Sodium 137 mmol/L      Potassium 4.0 mmol/L      Chloride 101 " mmol/L      CO2 24.0 mmol/L      Calcium 8.4 mg/dL      Total Protein 6.8 g/dL      Albumin 3.9 g/dL      ALT (SGPT) 21 U/L      AST (SGOT) 35 U/L      Alkaline Phosphatase 78 U/L      Total Bilirubin <0.2 mg/dL      Globulin 2.9 gm/dL      A/G Ratio 1.3 g/dL      BUN/Creatinine Ratio 14.2     Anion Gap 12.0 mmol/L      eGFR 20.3 mL/min/1.73     Narrative:      GFR Normal >60  Chronic Kidney Disease <60  Kidney Failure <15    The GFR formula is only valid for adults with stable renal function between ages 18 and 70.    CBC & Differential [[id number]]  (Abnormal) Collected: 06/30/24 0434    Specimen: Blood Updated: 06/30/24 0524    Narrative:      The following orders were created for panel order CBC & Differential.  Procedure                               Abnormality         Status                     ---------                               -----------         ------                     CBC Auto Differential[[id number]]        Abnormal            Final result                 Please view results for these tests on the individual orders.    CBC Auto Differential [[id number]]  (Abnormal) Collected: 06/30/24 0434    Specimen: Blood Updated: 06/30/24 0524     WBC 8.64 10*3/mm3      RBC 3.31 10*6/mm3      Hemoglobin 9.5 g/dL      Hematocrit 29.9 %      MCV 90.3 fL      MCH 28.7 pg      MCHC 31.8 g/dL      RDW 15.8 %      RDW-SD 52.1 fl      MPV 12.5 fL      Platelets 91 10*3/mm3      Neutrophil % 87.2 %      Lymphocyte % 7.4 %      Monocyte % 4.7 %      Eosinophil % 0.0 %      Basophil % 0.0 %      Immature Grans % 0.7 %      Neutrophils, Absolute 7.53 10*3/mm3      Lymphocytes, Absolute 0.64 10*3/mm3      Monocytes, Absolute 0.41 10*3/mm3      Eosinophils, Absolute 0.00 10*3/mm3      Basophils, Absolute 0.00 10*3/mm3      Immature Grans, Absolute 0.06 10*3/mm3      nRBC 0.0 /100 WBC     Protime-INR [[id number]]  (Normal) Collected: 06/30/24 0434    Specimen: Blood Updated: 06/30/24 0519     Protime 14.0 Seconds      INR 1.04     Basic Metabolic Panel [[id number]]  (Abnormal) Collected: 06/29/24 1858    Specimen: Blood Updated: 06/29/24 1922     Glucose 95 mg/dL      BUN 34 mg/dL      Creatinine 2.62 mg/dL      Sodium 140 mmol/L      Potassium 4.6 mmol/L      Comment: Slight hemolysis detected by analyzer. Result may be falsely elevated.        Chloride 104 mmol/L      CO2 22.0 mmol/L      Calcium 9.0 mg/dL      BUN/Creatinine Ratio 13.0     Anion Gap 14.0 mmol/L      eGFR 18.2 mL/min/1.73     Narrative:      GFR Normal >60  Chronic Kidney Disease <60  Kidney Failure <15    The GFR formula is only valid for adults with stable renal function between ages 18 and 70.    Protime-INR [[id number]]  (Normal) Collected: 06/29/24 1642    Specimen: Blood from Arm, Right Updated: 06/29/24 1654     Protime 14.1 Seconds      INR 1.04    Vitamin D,25-Hydroxy [[id number]]  (Normal) Collected: 06/29/24 0519    Specimen: Blood Updated: 06/29/24 1236     25 Hydroxy, Vitamin D 52.3 ng/ml     Narrative:      Reference Range for Total Vitamin D 25(OH)     Deficiency <20.0 ng/mL   Insufficiency 21-29 ng/mL   Sufficiency  ng/mL  Toxicity >100 ng/ml      Creatinine Urine Random (kidney function) GFR component - Urine, Clean Catch [[id number]] Collected: 06/29/24 0022    Specimen: Urine, Clean Catch Updated: 06/29/24 1215     Creatinine, Urine 182.2 mg/dL     Narrative:      Reference intervals for random urine have not been established.  Clinical usage is dependent upon physician's interpretation in combination with other laboratory tests.       Urea Nitrogen, Urine - Urine, Clean Catch [[id number]] Collected: 06/29/24 0022    Specimen: Urine, Clean Catch Updated: 06/29/24 1215     Urea Nitrogen, Urine 602 mg/dL     Narrative:      Reference intervals for random urine have not been established.  Clinical usage is dependent upon physician's interpretation in combination with other laboratory tests.       Urine Culture - Urine, Urine, Clean Catch [[id number]]  Collected: 06/28/24 0711    Specimen: Urine, Clean Catch Updated: 06/29/24 1141     Urine Culture >100,000 CFU/mL Mixed Bhavana Isolated    Narrative:      Specimen contains mixed organisms of questionable pathogenicity suggestive of contamination. If symptoms persist, suggest recollection.  Colonization of the urinary tract without infection is common. Treatment is discouraged unless the patient is symptomatic, pregnant, or undergoing an invasive urologic procedure.    Sedimentation Rate [[id number]]  (Normal) Collected: 06/29/24 0936    Specimen: Blood Updated: 06/29/24 0957     Sed Rate 28 mm/hr     C-reactive Protein [[id number]]  (Normal) Collected: 06/29/24 0519    Specimen: Blood Updated: 06/29/24 0942     C-Reactive Protein 0.50 mg/dL               Marcellus Pulido MD

## 2024-06-30 NOTE — PLAN OF CARE
Goal Outcome Evaluation:      Patient A/O x4 this shift. Patient treated for pain per MAR. Patient rec'd IV abx overnight. Patient able to get up x2 assist/back brace to bsc. Batista cath had been removed prior to patient arrived to the room from recovery per dayshift report at shift change yesterday. Will update oncoming dayshift nurse at bedside shift report in the a.m. as appropriate.

## 2024-06-30 NOTE — PLAN OF CARE
Goal Outcome Evaluation:  Plan of Care Reviewed With: patient, spouse        Progress: improving  Outcome Evaluation: OT eval completed. Pt is A&Ox4. Pt educated on spinal precautions and donning/doffing LSO brace, pt demo understanding.Pt donned brace Max A and LB dressing of donning socks CGA while setaed EOB.  Pt demo fxl t/fs Min A likely d/t reported pain in RLE. Pt demo fxl mobility CGA using a walker and demo decreased foot clearance of RLE. Pt demo decreased activity tolerance and standing balance/tolerance. It is anticipated pt will require Min A for bathing and toileting. Skilled OT recomended at this time to address deficits. OT recs include dc to home w/assistance. OT POC discussed w/pt.      Anticipated Discharge Disposition (OT): home with assist

## 2024-06-30 NOTE — THERAPY EVALUATION
Patient Name: Marina Joe  : 1945    MRN: [id number]                              Today's Date: 2024       Admit Date: 2024    Visit Dx:     ICD-10-CM ICD-9-CM   1. 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 Left renal mass  N28.89 593.9   3. Right adrenal mass  E27.8 255.8   4. Nodule of soft tissue  M79.89 729.99   5. Acute on chronic renal insufficiency  N28.9 593.9    N18.9 585.9   6. Thrombocytopenia  D69.6 287.5   7. Cauda equina compression  G83.4 344.60   8. Impaired mobility [Z74.09]  Z74.09 799.89     Patient Active Problem List   Diagnosis    Malignant neoplasm of upper-outer quadrant of female breast    Anemia of chronic renal failure, stage 3 (moderate)    Hypertension, benign    Hx of colonic polyps    HX: breast cancer    Morbidly obese    Right knee DJD    S/P lumpectomy, left breast    Adult hypothyroidism    Anemia    Anxiety    Breast cancer, left    Cervical pain    Chronic insomnia    Elevated lipids    Herpes zoster without complication    Left ear pain    Left otitis externa    Myalgia    Negative depression screening    Obesity (BMI 30-39.9)    Postmenopausal status    Recurrent acute serous otitis media of left ear    Restless leg    Sinusitis, bacterial    Skin lesion    Vitamin D deficiency    Stage 3b chronic kidney disease    GIB (gastrointestinal bleeding)    Acute on chronic blood loss anemia    Chronic idiopathic thrombocytopenia    Hypotension due to hypovolemia    Primary hypertension    Pancreatic lesion    Left renal mass    Cauda equina compression    Metastatic cancer to spine     Past Medical History:   Diagnosis Date    Breast cancer     CKD (chronic kidney disease)     Hypercholesteremia     Hypertension     Sinusitis     Stage 3a chronic kidney disease 10/20/2020     Past Surgical History:   Procedure Laterality Date    AVULSION TOENAIL PLATE          BREAST BIOPSY Left 2012    BREAST BIOPSY      Left Breast, 2019 per Dr Barkley     BREAST LUMPECTOMY Left     with node bx     COLONOSCOPY  09/13/2013    small polyp at 30cm benign hyperplastic polyp, changes consistent with melanosis coli. Recall 5 years    COLONOSCOPY  11/19/2018    Tics otherwise normal exam repeat in 5 years    COLONOSCOPY  02/13/2023    Normal exam repeat in 3 years with a 2 day prep    ENDOSCOPY  02/13/2023    Gastritis, small HH    REPLACEMENT TOTAL KNEE Right     2016    TOTAL ABDOMINAL HYSTERECTOMY WITH SALPINGO OOPHORECTOMY      UPPER ENDOSCOPIC ULTRASOUND W/ FNA  12/20/2023    Pancreatic cyst s/p FNA      General Information       Row Name 06/30/24 0858          Physical Therapy Time and Intention    Document Type evaluation  LUMBAR LAMINECTOMY L3 WITH DECOMPRESSION 1-2 LEVELS-RIGHT 6.29.24  -     Mode of Treatment physical therapy;co-treatment  -       Row Name 06/30/24 0858          General Information    Patient Profile Reviewed yes  -     Prior Level of Function independent:;community mobility;bed mobility;home management;cooking;cleaning;driving;shopping  -     Existing Precautions/Restrictions fall;LSO;brace worn when out of bed  -     Barriers to Rehab none identified  -       Row Name 06/30/24 0858          Living Environment    People in Home significant other  -       Row Name 06/30/24 0858          Home Main Entrance    Number of Stairs, Main Entrance none  -       Row Name 06/30/24 0858          Cognition    Orientation Status (Cognition) oriented x 4  -       Row Name 06/30/24 0858          Safety Issues, Functional Mobility    Safety Issues Affecting Function (Mobility) ability to follow commands  -     Impairments Affecting Function (Mobility) balance;endurance/activity tolerance;pain;strength  -               User Key  (r) = Recorded By, (t) = Taken By, (c) = Cosigned By      Initials Name Provider Type     Eliel León, PT Physical Therapist                   Mobility       Row Name 06/30/24 0858          Bed Mobility     Comment, (Bed Mobility) sitting EOB upon entering room  -[facility] Name 06/30/24 0858          Sit-Stand Transfer    Sit-Stand [place] (Transfers) verbal cues;minimum assist (75% patient effort);2 person assist  -[facility] Name 06/30/24 0858          Gait/Stairs (Locomotion)    [place] Level (Gait) contact guard  -     Assistive Device (Gait) walker, front-wheeled  -     Distance in Feet (Gait) 20  -               User Key  (r) = Recorded By, (t) = Taken By, (c) = Cosigned By      Initials Name Provider Type     Eliel León, PT Physical Therapist                   Obj/Interventions       [facility] Name 06/30/24 0858          Range of Motion Comprehensive    General Range of Motion bilateral upper extremity ROM WFL;bilateral lower extremity ROM WFL  -[facility] Name 06/30/24 0858          Strength Comprehensive (MMT)    General Manual Muscle Testing (MMT) Assessment upper extremity strength deficits identified;lower extremity strength deficits identified  -LH       Row Name 06/30/24 0858          Sensory Assessment (Somatosensory)    Sensory Assessment (Somatosensory) sensation intact  -               User Key  (r) = Recorded By, (t) = Taken By, (c) = Cosigned By      Initials Name Provider Type     Eliel León, PT Physical Therapist                   Goals/Plan       Row Name 06/30/24 0858          Bed Mobility Goal 1 (PT)    Activity/Assistive Device (Bed Mobility Goal 1, PT) rolling to left;rolling to right;sidelying to sit/sit to sidelying  -     [place] Level/Cues Needed (Bed Mobility Goal 1, PT) independent  -     Time Frame (Bed Mobility Goal 1, PT) 10 days  -     Progress/Outcomes (Bed Mobility Goal 1, PT) new goal  -[facility] Name 06/30/24 0858          Transfer Goal 1 (PT)    Activity/Assistive Device (Transfer Goal 1, PT) sit-to-stand/stand-to-sit  -     [place] Level/Cues Needed (Transfer Goal 1, PT) independent  -     Time Frame (Transfer Goal 1, PT) 10  days  -     Progress/Outcome (Transfer Goal 1, PT) new goal  -       Row Name 06/30/24 0858          Gait Training Goal 1 (PT)    Activity/Assistive Device (Gait Training Goal 1, PT) gait (walking locomotion);assistive device use  -     [place] Level (Gait Training Goal 1, PT) standby assist  -     Distance (Gait Training Goal 1, PT) 150ft  -     Time Frame (Gait Training Goal 1, PT) 10 days  -     Progress/Outcome (Gait Training Goal 1, PT) new goal  -       Row Name 06/30/24 0858          Therapy Assessment/Plan (PT)    Planned Therapy Interventions (PT) bed mobility training;gait training;home exercise program;strengthening;postural re-education;patient/family education;orthotic fitting/training;transfer training  -               User Key  (r) = Recorded By, (t) = Taken By, (c) = Cosigned By      Initials Name Provider Type     Eliel León, PT Physical Therapist                   Clinical Impression       Row Name 06/30/24 0858          Pain    Pretreatment Pain Rating 10/10  -     Posttreatment Pain Rating 10/10  -     Pain Location - back  -     Pain Intervention(s) Medication (See MAR);Repositioned;Ambulation/increased activity  -       Row Name 06/30/24 0858          Plan of Care Review    Plan of Care Reviewed With patient  -     Outcome Evaluation PT IE complete.  A&Ox4.  C/o 10/10 back pain.  Sitting EOB with LSO eating breakfast upon entering room.  Pt is independent PLOF.  Today she is min A x2 sit to stand.  Ambulated 20ft CGA x1 w/ RW.  PT to see for progressive ambulation and strengthening.  Recommend home w/ A possibly OPPT at [place].  Thank you for referral.  -       Row Name 06/30/24 0858          Therapy Assessment/Plan (PT)    Patient/Family Therapy Goals Statement (PT) return home  -     Rehab Potential (PT) good, to achieve stated therapy goals  -     Criteria for Skilled Interventions Met (PT) yes;skilled treatment is necessary  -     Therapy Frequency  (PT) 2 times/day  -     Predicted Duration of Therapy Intervention (PT) until dc  -       Row Name 06/30/24 0858          Positioning and Restraints    Pre-Treatment Position in bed  -LH     Post Treatment Position bed  -LH     In Bed sitting EOB;call light within reach;encouraged to call for assist;side rails up x2;with family/caregiver  -               User Key  (r) = Recorded By, (t) = Taken By, (c) = Cosigned By      Initials Name Provider Type     Eliel León, PT Physical Therapist                   Outcome Measures       Row Name 06/30/24 0858          How much help from another person do you currently need...    Turning from your back to your side while in flat bed without using bedrails? 3  -LH     Moving from lying on back to sitting on the side of a flat bed without bedrails? 3  -LH     Moving to and from a bed to a chair (including a wheelchair)? 3  -LH     Standing up from a chair using your arms (e.g., wheelchair, bedside chair)? 3  -LH     Climbing 3-5 steps with a railing? 2  -LH     To walk in hospital room? 3  -     AM-PAC 6 Clicks Score (PT) 17  -     Highest Level of Mobility Goal 5 --> Static standing  -       Row Name 06/30/24 0858 06/30/24 0855       Functional Assessment    Outcome Measure Options AM-PAC 6 Clicks Basic Mobility (PT)  - AM-PAC 6 Clicks Daily Activity (OT) (P)   -KJ              User Key  (r) = Recorded By, (t) = Taken By, (c) = Cosigned By      Initials Name Provider Type     Eliel León, PT Physical Therapist    Joanna Martinez, OT Student OT Student                                 Physical Therapy Education       Title: PT OT SLP Therapies (Done)       Topic: Physical Therapy (Done)       Point: Mobility training (Done)       Learning Progress Summary             Patient Acceptance, E,D, DU,VU by  at 6/30/2024 0951    Comment: benefits of PT and POC, call for A OOB, no BLT, LSO when OOB                         Point: Body mechanics (Done)        Learning Progress Summary             Patient Acceptance, E,D, DU,VU by  at 6/30/2024 0951    Comment: benefits of PT and POC, call for A OOB, no BLT, LSO when OOB                         Point: Precautions (Done)       Learning Progress Summary             Patient Acceptance, E,D, DU,VU by  at 6/30/2024 0951    Comment: benefits of PT and POC, call for A OOB, no BLT, LSO when OOB                                         User Key       Initials Effective Dates Name Provider Type Discipline     02/03/23 -  Eliel León, PT Physical Therapist PT                  PT Recommendation and Plan  Planned Therapy Interventions (PT): bed mobility training, gait training, home exercise program, strengthening, postural re-education, patient/family education, orthotic fitting/training, transfer training  Plan of Care Reviewed With: patient  Outcome Evaluation: PT IE complete.  A&Ox4.  C/o 10/10 back pain.  Sitting EOB with LSO eating breakfast upon entering room.  Pt is independent PLOF.  Today she is min A x2 sit to stand.  Ambulated 20ft CGA x1 w/ RW.  PT to see for progressive ambulation and strengthening.  Recommend home w/ A possibly OPPT at [place].  Thank you for referral.     Time Calculation:         PT Charges       Row Name 06/30/24 0952             Time Calculation    Start Time 0858  -      Stop Time 0952  -      Time Calculation (min) 54 min  -      PT Received On 06/30/24  -      PT Goal Re-Cert Due Date 07/10/24  -         Untimed Charges    PT Eval/Re-eval Minutes 54  -         Total Minutes    Untimed Charges Total Minutes 54  -       Total Minutes 54  -                User Key  (r) = Recorded By, (t) = Taken By, (c) = Cosigned By      Initials Name Provider Type     Eliel León, PT Physical Therapist                  Therapy Charges for Today       Code Description Service Date Service Provider Modifiers Qty    [id number] HC PT EVAL LOW COMPLEXITY 4 6/30/2024 Eliel León, PT GP 1             PT G-Codes  Outcome Measure Options: AM-PAC 6 Clicks Basic Mobility (PT)  AM-PAC 6 Clicks Score (PT): 17  AM-PAC 6 Clicks Score (OT): (P) 21  PT Discharge Summary  Anticipated Discharge Disposition (PT): home with assist, home with outpatient therapy services    Eliel León, PT  6/30/2024

## 2024-06-30 NOTE — THERAPY RE-EVALUATION
Patient Name: Marina Joe  : 1945    MRN: [id number]                              Today's Date: 2024       Admit Date: 2024    Visit Dx:     ICD-10-CM ICD-9-CM   1. 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 Left renal mass  N28.89 593.9   3. Right adrenal mass  E27.8 255.8   4. Nodule of soft tissue  M79.89 729.99   5. Acute on chronic renal insufficiency  N28.9 593.9    N18.9 585.9   6. Thrombocytopenia  D69.6 287.5   7. Cauda equina compression  G83.4 344.60   8. Impaired mobility [Z74.09]  Z74.09 799.89     Patient Active Problem List   Diagnosis    Malignant neoplasm of upper-outer quadrant of female breast    Anemia of chronic renal failure, stage 3 (moderate)    Hypertension, benign    Hx of colonic polyps    HX: breast cancer    Morbidly obese    Right knee DJD    S/P lumpectomy, left breast    Adult hypothyroidism    Anemia    Anxiety    Breast cancer, left    Cervical pain    Chronic insomnia    Elevated lipids    Herpes zoster without complication    Left ear pain    Left otitis externa    Myalgia    Negative depression screening    Obesity (BMI 30-39.9)    Postmenopausal status    Recurrent acute serous otitis media of left ear    Restless leg    Sinusitis, bacterial    Skin lesion    Vitamin D deficiency    Stage 3b chronic kidney disease    GIB (gastrointestinal bleeding)    Acute on chronic blood loss anemia    Chronic idiopathic thrombocytopenia    Hypotension due to hypovolemia    Primary hypertension    Pancreatic lesion    Left renal mass    Cauda equina compression    Metastatic cancer to spine     Past Medical History:   Diagnosis Date    Breast cancer     CKD (chronic kidney disease)     Hypercholesteremia     Hypertension     Sinusitis     Stage 3a chronic kidney disease 10/20/2020     Past Surgical History:   Procedure Laterality Date    AVULSION TOENAIL PLATE          BREAST BIOPSY Left 2012    BREAST BIOPSY      Left Breast, 2019 per Dr Barkley     BREAST LUMPECTOMY Left     with node bx     COLONOSCOPY  09/13/2013    small polyp at 30cm benign hyperplastic polyp, changes consistent with melanosis coli. Recall 5 years    COLONOSCOPY  11/19/2018    Tics otherwise normal exam repeat in 5 years    COLONOSCOPY  02/13/2023    Normal exam repeat in 3 years with a 2 day prep    ENDOSCOPY  02/13/2023    Gastritis, small HH    REPLACEMENT TOTAL KNEE Right     2016    TOTAL ABDOMINAL HYSTERECTOMY WITH SALPINGO OOPHORECTOMY      UPPER ENDOSCOPIC ULTRASOUND W/ FNA  12/20/2023    Pancreatic cyst s/p FNA      General Information       Row Name 06/30/24 0855          OT Time and Intention    Document Type re-evaluation  Left renal mass, Cauda equina compression, Metastatic cancer to spine w/lumbar laminectomy L3 w/decompression. PMH: breast cancer, Hypercholesterolemia, Hypertension, Chronic sinusitis, chronic kidney disease stage IIIa  -CS (r) KJ (t) CS (c)     Mode of Treatment occupational therapy;co-treatment  -CS (r) KJ (t) CS (c)       Row Name 06/30/24 0859          General Information    Patient Profile Reviewed yes  -CS (r) KJ (t) CS (c)     Prior Level of Function independent:;ADL's;shopping;driving;all household mobility  -CS (r) KJ (t) CS (c)     Existing Precautions/Restrictions fall;spinal;LSO;brace worn when out of bed  -CS (r) KJ (t) CS (c)     Barriers to Rehab physical barrier  -CS (r) KJ (t) CS (c)       Row Name 06/30/24 0855          Occupational Profile    Environmental Supports and Barriers (Occupational Profile) Tub shower, no AD/DME, uses walker for mobility  -CS (r) KJ (t) CS (c)       Row Name 06/30/24 0855          Living Environment    People in Home spouse  -CS (r) KJ (t) CS (c)       Row Name 06/30/24 0855          Home Main Entrance    Number of Stairs, Main Entrance none  -CS (r) KJ (t) CS (c)       Row Name 06/30/24 0855          Stairs Within Home, Primary    Number of Stairs, Within Home, Primary none  -CS (r) KJ (t) CS (c)       Row Name  06/30/24 0855          Cognition    Orientation Status (Cognition) oriented x 4  -CS (r) KJ (t) CS (c)       Row Name 06/30/24 0855          Safety Issues, Functional Mobility    Impairments Affecting Function (Mobility) balance;pain;endurance/activity tolerance  -CS (r) KJ (t) CS (c)               User Key  (r) = Recorded By, (t) = Taken By, (c) = Cosigned By      Initials Name Provider Type    Arpita Blanc, OTR/L, EMERALD Occupational Therapist    Joanna Martinez, OT Student OT Student                     Mobility/ADL's       Row Name 06/30/24 0855          Sit-Stand Transfer    Sit-Stand [place] (Transfers) minimum assist (75% patient effort);2 person assist  -CS (r) KJ (t) CS (c)     Assistive Device (Sit-Stand Transfers) walker, front-wheeled  -CS (r) KJ (t) CS (c)       Row Name 06/30/24 0855          Stand-Sit Transfer    Stand-Sit [place] (Transfers) minimum assist (75% patient effort)  -CS (r) KJ (t) CS (c)     Assistive Device (Stand-Sit Transfers) walker, front-wheeled  -CS (r) KJ (t) CS (c)       Row Name 06/30/24 0855          Functional Mobility    Functional Mobility- Ind. Level contact guard assist;verbal cues required  -CS (r) KJ (t) CS (c)     Functional Mobility- Device walker, front-wheeled  -CS (r) KJ (t) CS (c)     Functional Mobility- Safety Issues weight-shifting ability decreased  -CS (r) KJ (t) CS (c)       Row Name 06/30/24 0855          Activities of Daily Living    BADL Assessment/Intervention lower body dressing  -CS (r) KJ (t) CS (c)       Row Name 06/30/24 0855          Lower Body Dressing Assessment/Training    [place] Level (Lower Body Dressing) don;doff;socks;contact guard assist  -CS (r) KJ (t) CS (c)     Position (Lower Body Dressing) edge of bed sitting;unsupported sitting  -CS (r) KJ (t) CS (c)               User Key  (r) = Recorded By, (t) = Taken By, (c) = Cosigned By      Initials Name Provider Type    Arpita Blanc, OTR/L, EMERALD Occupational  Therapist    Joanna Martinez, OT Student OT Student                   Obj/Interventions       Row Name 06/30/24 0855          Sensory Assessment (Somatosensory)    Sensory Assessment (Somatosensory) UE sensation intact  -CS (r) KJ (t) CS (c)       Row Name 06/30/24 0855          Vision Assessment/Intervention    Visual Impairment/Limitations other (see comments);corrective lenses full-time  Pt reports unable to see out of L eye.  -CS (r) KJ (t) CS (c)       Row Name 06/30/24 0855          Range of Motion Comprehensive    General Range of Motion bilateral upper extremity ROM WNL  -CS (r) KJ (t) CS (c)       Row Name 06/30/24 0855          Strength Comprehensive (MMT)    Comment, General Manual Muscle Testing (MMT) Assessment BUE MMT grossly 4/5  -CS (r) KJ (t) CS (c)       Row Name 06/30/24 0855          Balance    Balance Assessment sitting static balance;sitting dynamic balance;sit to stand dynamic balance;standing static balance;standing dynamic balance  -CS (r) KJ (t) CS (c)     Static Sitting Balance independent  -CS (r) KJ (t) CS (c)     Dynamic Sitting Balance independent  -CS (r) KJ (t) CS (c)     Position, Sitting Balance unsupported;sitting edge of bed  -CS (r) KJ (t) CS (c)     Sit to Stand Dynamic Balance minimal assist;2-person assist  -CS (r) KJ (t) CS (c)     Static Standing Balance contact guard  -CS (r) KJ (t) CS (c)     Dynamic Standing Balance contact guard  -CS (r) KJ (t) CS (c)     Position/Device Used, Standing Balance supported;walker, front-wheeled  -CS (r) KJ (t) CS (c)     Balance Interventions sitting;standing;sit to stand;supported;dynamic;occupation based/functional task;static  -CS (r) KJ (t) CS (c)               User Key  (r) = Recorded By, (t) = Taken By, (c) = Cosigned By      Initials Name Provider Type    CS Arpita Herrera, OTR/L, CNT Occupational Therapist    Joanna Martinez, OT Student OT Student                   Goals/Plan       Row Name 06/30/24 0855           Transfer Goal 1 (OT)    Activity/Assistive Device (Transfer Goal 1, OT) toilet;tub  -CS (r) KJ (t) CS (c)     [place] Level/Cues Needed (Transfer Goal 1, OT) modified independence  -CS (r) KJ (t) CS (c)     Time Frame (Transfer Goal 1, OT) long term goal (LTG)  -CS (r) KJ (t) CS (c)     Progress/Outcome (Transfer Goal 1, OT) goal ongoing  -CS (r) KJ (t) CS (c)       Row Name 06/30/24 0855          Bathing Goal 1 (OT)    Activity/Device (Bathing Goal 1, OT) bathing skills, all  -CS (r) KJ (t) CS (c)     [place] Level/Cues Needed (Bathing Goal 1, OT) modified independence  -CS (r) KJ (t) CS (c)     Time Frame (Bathing Goal 1, OT) long term goal (LTG)  -CS (r) KJ (t) CS (c)     Progress/Outcomes (Bathing Goal 1, OT) goal ongoing  -CS (r) KJ (t) CS (c)       Row Name 06/30/24 0855          Dressing Goal 1 (OT)    Activity/Device (Dressing Goal 1, OT) dressing skills, all  -CS (r) KJ (t) CS (c)     [place]/Cues Needed (Dressing Goal 1, OT) modified independence  -CS (r) KJ (t) CS (c)     Time Frame (Dressing Goal 1, OT) long term goal (LTG)  -CS (r) KJ (t) CS (c)     Progress/Outcome (Dressing Goal 1, OT) goal ongoing  -CS (r) KJ (t) CS (c)       Row Name 06/30/24 0855          Toileting Goal 1 (OT)    Activity/Device (Toileting Goal 1, OT) toileting skills, all  -CS (r) KJ (t) CS (c)     [place] Level/Cues Needed (Toileting Goal 1, OT) modified independence  -CS (r) KJ (t) CS (c)     Time Frame (Toileting Goal 1, OT) long term goal (LTG)  -CS (r) KJ (t) CS (c)     Progress/Outcome (Toileting Goal 1, OT) goal ongoing  -CS (r) KJ (t) CS (c)       Row Name 06/30/24 0855          Therapy Assessment/Plan (OT)    Planned Therapy Interventions (OT) activity tolerance training;adaptive equipment training;BADL retraining;functional balance retraining;occupation/activity based interventions;orthotic fabrication/fitting/training;patient/caregiver education/training;ROM/therapeutic exercise;strengthening  exercise;transfer/mobility retraining  -CS (r) KJ (t) CS (c)               User Key  (r) = Recorded By, (t) = Taken By, (c) = Cosigned By      Initials Name Provider Type    CS Arpita Herrera S, OTR/L, CNT Occupational Therapist    Joanna Martinez, OT Student OT Student                   Clinical Impression       Row Name 06/30/24 0864          Pain Assessment    Pretreatment Pain Rating 10/10  -CS (r) KJ (t) CS (c)     Posttreatment Pain Rating 10/10  -CS (r) KJ (t) CS (c)     Pain Location lower  -CS (r) KJ (t) CS (c)     Pain Location - back  -CS (r) KJ (t) CS (c)     Pain Intervention(s) Medication (See MAR);Repositioned;Ambulation/increased activity  -CS (r) KJ (t) CS (c)       Row Name 06/30/24 0805          Plan of Care Review    Plan of Care Reviewed With patient;spouse  -CS (r) KJ (t) CS (c)     Progress improving  -CS (r) KJ (t) CS (c)     Outcome Evaluation OT eval completed. Pt is A&Ox4. Pt educated on spinal precautions and donning/doffing LSO brace, pt demo understanding.Pt donned brace Max A and LB dressing of donning socks CGA while setaed EOB.  Pt demo fxl t/fs Min A likely d/t reported pain in RLE. Pt demo fxl mobility CGA using a walker and demo decreased foot clearance of RLE. Pt demo decreased activity tolerance and standing balance/tolerance. It is anticipated pt will require Min A for bathing and toileting. Skilled OT recomended at this time to address deficits. OT recs include dc to home w/assistance. OT POC discussed w/pt.  -CS (r) KJ (t) CS (c)       Row Name 06/30/24 0872          Therapy Assessment/Plan (OT)    Patient/Family Therapy Goal Statement (OT) return to home  -CS (r) KJ (t) CS (c)     Rehab Potential (OT) good, to achieve stated therapy goals  -CS (r) KJ (t) CS (c)     Criteria for Skilled Therapeutic Interventions Met (OT) yes;skilled treatment is necessary  -CS (r) KJ (t) CS (c)     Therapy Frequency (OT) 5 times/wk  -CS (r) KJ (t) CS (c)       Row Name 06/30/24 6014           Therapy Plan Review/Discharge Plan (OT)    Equipment Needs Upon Discharge (OT) tub bench;reacher;bathing equipment;shower chair  -CS (r) KJ (t) CS (c)     Anticipated Discharge Disposition (OT) home with assist  -CS (r) KJ (t) CS (c)       Row Name 06/30/24 0855          Positioning and Restraints    Pre-Treatment Position in bed  -CS (r) KJ (t) CS (c)     Post Treatment Position bed  -CS (r) KJ (t) CS (c)     In Bed sitting EOB;call light within reach;encouraged to call for assist;with family/caregiver  -CS (r) KJ (t) CS (c)               User Key  (r) = Recorded By, (t) = Taken By, (c) = Cosigned By      Initials Name Provider Type    CS Arpita Herrera, OTR/L, CNT Occupational Therapist    oJanna Martinez, OT Student OT Student                   Outcome Measures       Row Name 06/30/24 0855          How much help from another is currently needed...    Putting on and taking off regular lower body clothing? 3  -CS (r) KJ (t) CS (c)     Bathing (including washing, rinsing, and drying) 3  -CS (r) KJ (t) CS (c)     Toileting (which includes using toilet bed pan or urinal) 3  -CS (r) KJ (t) CS (c)     Putting on and taking off regular upper body clothing 4  -CS (r) KJ (t) CS (c)     Taking care of personal grooming (such as brushing teeth) 4  -CS (r) KJ (t) CS (c)     Eating meals 4  -CS (r) KJ (t) CS (c)     AM-PAC 6 Clicks Score (OT) 21  -CS (r) KJ (t)       Row Name 06/30/24 0858 06/30/24 0815       How much help from another person do you currently need...    Turning from your back to your side while in flat bed without using bedrails? 3  -LH 3  -CF    Moving from lying on back to sitting on the side of a flat bed without bedrails? 3  -LH 3  -CF    Moving to and from a bed to a chair (including a wheelchair)? 3  -LH 3  -CF    Standing up from a chair using your arms (e.g., wheelchair, bedside chair)? 3  -LH 3  -CF    Climbing 3-5 steps with a railing? 2  -LH 2  -CF    To walk in hospital room? 3  -LH 3   -CF    AM-PAC 6 Clicks Score (PT) 17  - 17  -CF    Highest Level of Mobility Goal 5 --> Static standing  - 5 --> Static standing  -CF      Row Name 06/30/24 0858 06/30/24 0855       Functional Assessment    Outcome Measure Options AM-PAC 6 Clicks Basic Mobility (PT)  - AM-PAC 6 Clicks Daily Activity (OT)  -CS (r) KJ (t) CS (c)              User Key  (r) = Recorded By, (t) = Taken By, (c) = Cosigned By      Initials Name Provider Type     Eliel León, PT Physical Therapist    CS Arpita Herrera, OTR/L, CNT Occupational Therapist    CF Ainsley Marie, RN Registered Nurse    Joanna Martinez, OT Student OT Student                    Occupational Therapy Education       Title: PT OT SLP Therapies (Done)       Topic: Occupational Therapy (Done)       Point: ADL training (Done)       Description:   Instruct learner(s) on proper safety adaptation and remediation techniques during self care or transfers.   Instruct in proper use of assistive devices.                  Learning Progress Summary             Patient Acceptance, E, VU by NANETTE at 6/30/2024 0941    Acceptance, E, VU by BRANDI at 6/28/2024 1431                         Point: Home exercise program (Done)       Description:   Instruct learner(s) on appropriate technique for monitoring, assisting and/or progressing therapeutic exercises/activities.                  Learning Progress Summary             Patient Acceptance, E, VU by NANETTE at 6/30/2024 0941                         Point: Precautions (Done)       Description:   Instruct learner(s) on prescribed precautions during self-care and functional transfers.                  Learning Progress Summary             Patient Acceptance, E, VU by NANETTE at 6/30/2024 0941    Acceptance, E, VU by BRANDI at 6/28/2024 1431                         Point: Body mechanics (Done)       Description:   Instruct learner(s) on proper positioning and spine alignment during self-care, functional mobility activities and/or  exercises.                  Learning Progress Summary             Patient Acceptance, E, VU by NANETTE at 6/30/2024 0941    Acceptance, E, VU by BRANDI at 6/28/2024 1431                                         User Key       Initials Effective Dates Name Provider Type Discipline     06/20/22 -  Stacey Reagan, OTR/L Occupational Therapist OT    NANETTE 04/15/24 -  Joanna Marie OT Student OT Student OT                  OT Recommendation and Plan  Planned Therapy Interventions (OT): activity tolerance training, adaptive equipment training, BADL retraining, functional balance retraining, occupation/activity based interventions, orthotic fabrication/fitting/training, patient/caregiver education/training, ROM/therapeutic exercise, strengthening exercise, transfer/mobility retraining  Therapy Frequency (OT): 5 times/wk  Plan of Care Review  Plan of Care Reviewed With: patient, spouse  Progress: improving  Outcome Evaluation: OT eval completed. Pt is A&Ox4. Pt educated on spinal precautions and donning/doffing LSO brace, pt demo understanding.Pt donned brace Max A and LB dressing of donning socks CGA while setaed EOB.  Pt demo fxl t/fs Min A likely d/t reported pain in RLE. Pt demo fxl mobility CGA using a walker and demo decreased foot clearance of RLE. Pt demo decreased activity tolerance and standing balance/tolerance. It is anticipated pt will require Min A for bathing and toileting. Skilled OT recomended at this time to address deficits. OT recs include dc to home w/assistance. OT POC discussed w/pt.     Time Calculation:         Time Calculation- OT       Row Name 06/30/24 1345 06/30/24 0855          Time Calculation- OT    OT Start Time -- 0855  10 min chart review.  -CS (r) KJ (t) CS (c)     OT Stop Time -- 0915  -CS (r) KJ (t) CS (c)     OT Time Calculation (min) -- 20 min  -CS (r) KJ (t)     OT Received On -- 06/30/24  -CS (r) KJ (t) CS (c)     OT Goal Re-Cert Due Date -- 07/10/24  -CS (r) KJ (t) CS (c)        Timed  Charges    95376 - Gait Training Minutes  23  -AH --        Untimed Charges    OT Eval/Re-eval Minutes -- 30  -CS (r) KJ (t) CS (c)        Total Minutes    Timed Charges Total Minutes 23  -AH --     Untimed Charges Total Minutes -- 30  -CS (r) KJ (t)      Total Minutes 23  -AH 30  -CS (r) KJ (t)               User Key  (r) = Recorded By, (t) = Taken By, (c) = Cosigned By      Initials Name Provider Type     Goldie Paul, PTA Physical Therapist Assistant    CS Arpita Herrera, OTR/L, CNT Occupational Therapist    Joanna Martinez, OT Student OT Student                           Joanna Marie, OT Student  6/30/2024

## 2024-07-01 ENCOUNTER — APPOINTMENT (OUTPATIENT)
Dept: ULTRASOUND IMAGING | Facility: HOSPITAL | Age: 79
End: 2024-07-01
Payer: MEDICARE

## 2024-07-01 ENCOUNTER — APPOINTMENT (OUTPATIENT)
Dept: MRI IMAGING | Facility: HOSPITAL | Age: 79
End: 2024-07-01
Payer: MEDICARE

## 2024-07-01 LAB
ANION GAP SERPL CALCULATED.3IONS-SCNC: 11 MMOL/L (ref 5–15)
APTT PPP: 26.8 SECONDS (ref 24.5–36)
BASOPHILS # BLD AUTO: 0.02 10*3/MM3 (ref 0–0.2)
BASOPHILS # BLD AUTO: 0.05 10*3/MM3 (ref 0–0.2)
BASOPHILS NFR BLD AUTO: 0.2 % (ref 0–1.5)
BASOPHILS NFR BLD AUTO: 0.5 % (ref 0–1.5)
BH BB BLOOD EXPIRATION DATE: NORMAL
BH BB BLOOD EXPIRATION DATE: NORMAL
BH BB BLOOD TYPE BARCODE: 6200
BH BB BLOOD TYPE BARCODE: 6200
BH BB DISPENSE STATUS: NORMAL
BH BB DISPENSE STATUS: NORMAL
BH BB PRODUCT CODE: NORMAL
BH BB PRODUCT CODE: NORMAL
BH BB UNIT NUMBER: NORMAL
BH BB UNIT NUMBER: NORMAL
BUN SERPL-MCNC: 37 MG/DL (ref 8–23)
BUN/CREAT SERPL: 17 (ref 7–25)
CALCIUM SPEC-SCNC: 8.5 MG/DL (ref 8.6–10.5)
CHLORIDE SERPL-SCNC: 102 MMOL/L (ref 98–107)
CO2 SERPL-SCNC: 24 MMOL/L (ref 22–29)
CREAT SERPL-MCNC: 2.18 MG/DL (ref 0.57–1)
CYTOLOGIST CVX/VAG CYTO: NORMAL
DEPRECATED RDW RBC AUTO: 53.9 FL (ref 37–54)
DEPRECATED RDW RBC AUTO: 54.1 FL (ref 37–54)
EGFRCR SERPLBLD CKD-EPI 2021: 22.7 ML/MIN/1.73
EOSINOPHIL # BLD AUTO: 0.26 10*3/MM3 (ref 0–0.4)
EOSINOPHIL # BLD AUTO: 0.34 10*3/MM3 (ref 0–0.4)
EOSINOPHIL NFR BLD AUTO: 3.2 % (ref 0.3–6.2)
EOSINOPHIL NFR BLD AUTO: 3.7 % (ref 0.3–6.2)
ERYTHROCYTE [DISTWIDTH] IN BLOOD BY AUTOMATED COUNT: 16.2 % (ref 12.3–15.4)
ERYTHROCYTE [DISTWIDTH] IN BLOOD BY AUTOMATED COUNT: 16.3 % (ref 12.3–15.4)
FIBRINOGEN PPP-MCNC: 521 MG/DL (ref 240–460)
FSP PPP LA-ACNC: NORMAL
GLUCOSE SERPL-MCNC: 98 MG/DL (ref 65–99)
HCT VFR BLD AUTO: 27.8 % (ref 34–46.6)
HCT VFR BLD AUTO: 29.2 % (ref 34–46.6)
HGB BLD-MCNC: 8.7 G/DL (ref 12–15.9)
HGB BLD-MCNC: 9.2 G/DL (ref 12–15.9)
IMM GRANULOCYTES # BLD AUTO: 0.04 10*3/MM3 (ref 0–0.05)
IMM GRANULOCYTES # BLD AUTO: 0.05 10*3/MM3 (ref 0–0.05)
IMM GRANULOCYTES NFR BLD AUTO: 0.4 % (ref 0–0.5)
IMM GRANULOCYTES NFR BLD AUTO: 0.6 % (ref 0–0.5)
INR PPP: 0.99 (ref 0.91–1.09)
LYMPHOCYTES # BLD AUTO: 1.24 10*3/MM3 (ref 0.7–3.1)
LYMPHOCYTES # BLD AUTO: 1.25 10*3/MM3 (ref 0.7–3.1)
LYMPHOCYTES NFR BLD AUTO: 13.6 % (ref 19.6–45.3)
LYMPHOCYTES NFR BLD AUTO: 15.1 % (ref 19.6–45.3)
MCH RBC QN AUTO: 28.3 PG (ref 26.6–33)
MCH RBC QN AUTO: 28.5 PG (ref 26.6–33)
MCHC RBC AUTO-ENTMCNC: 31.3 G/DL (ref 31.5–35.7)
MCHC RBC AUTO-ENTMCNC: 31.5 G/DL (ref 31.5–35.7)
MCV RBC AUTO: 90.4 FL (ref 79–97)
MCV RBC AUTO: 90.6 FL (ref 79–97)
MONOCYTES # BLD AUTO: 0.89 10*3/MM3 (ref 0.1–0.9)
MONOCYTES # BLD AUTO: 1.02 10*3/MM3 (ref 0.1–0.9)
MONOCYTES NFR BLD AUTO: 10.8 % (ref 5–12)
MONOCYTES NFR BLD AUTO: 11.1 % (ref 5–12)
NEUTROPHILS NFR BLD AUTO: 5.76 10*3/MM3 (ref 1.7–7)
NEUTROPHILS NFR BLD AUTO: 6.5 10*3/MM3 (ref 1.7–7)
NEUTROPHILS NFR BLD AUTO: 70.1 % (ref 42.7–76)
NEUTROPHILS NFR BLD AUTO: 70.7 % (ref 42.7–76)
NRBC BLD AUTO-RTO: 0 /100 WBC (ref 0–0.2)
NRBC BLD AUTO-RTO: 0 /100 WBC (ref 0–0.2)
PATH INTERP BLD-IMP: NORMAL
PLATELET # BLD AUTO: 100 10*3/MM3 (ref 140–450)
PLATELET # BLD AUTO: 74 10*3/MM3 (ref 140–450)
PMV BLD AUTO: 11.4 FL (ref 6–12)
PMV BLD AUTO: 12 FL (ref 6–12)
POTASSIUM SERPL-SCNC: 3.7 MMOL/L (ref 3.5–5.2)
PROTHROMBIN TIME: 13.5 SECONDS (ref 11.8–14.8)
RBC # BLD AUTO: 3.07 10*6/MM3 (ref 3.77–5.28)
RBC # BLD AUTO: 3.23 10*6/MM3 (ref 3.77–5.28)
SODIUM SERPL-SCNC: 137 MMOL/L (ref 136–145)
UNIT  ABO: NORMAL
UNIT  ABO: NORMAL
UNIT  RH: NORMAL
UNIT  RH: NORMAL
WBC NRBC COR # BLD AUTO: 8.22 10*3/MM3 (ref 3.4–10.8)
WBC NRBC COR # BLD AUTO: 9.2 10*3/MM3 (ref 3.4–10.8)

## 2024-07-01 PROCEDURE — 80048 BASIC METABOLIC PNL TOTAL CA: CPT | Performed by: INTERNAL MEDICINE

## 2024-07-01 PROCEDURE — 76942 ECHO GUIDE FOR BIOPSY: CPT

## 2024-07-01 PROCEDURE — 97116 GAIT TRAINING THERAPY: CPT

## 2024-07-01 PROCEDURE — 36430 TRANSFUSION BLD/BLD COMPNT: CPT

## 2024-07-01 PROCEDURE — 85362 FIBRIN DEGRADATION PRODUCTS: CPT | Performed by: INTERNAL MEDICINE

## 2024-07-01 PROCEDURE — 88305 TISSUE EXAM BY PATHOLOGIST: CPT | Performed by: HOSPITALIST

## 2024-07-01 PROCEDURE — 99232 SBSQ HOSP IP/OBS MODERATE 35: CPT | Performed by: INTERNAL MEDICINE

## 2024-07-01 PROCEDURE — 97535 SELF CARE MNGMENT TRAINING: CPT

## 2024-07-01 PROCEDURE — 85384 FIBRINOGEN ACTIVITY: CPT | Performed by: INTERNAL MEDICINE

## 2024-07-01 PROCEDURE — 97530 THERAPEUTIC ACTIVITIES: CPT

## 2024-07-01 PROCEDURE — P9035 PLATELET PHERES LEUKOREDUCED: HCPCS

## 2024-07-01 PROCEDURE — 88172 CYTP DX EVAL FNA 1ST EA SITE: CPT | Performed by: HOSPITALIST

## 2024-07-01 PROCEDURE — 99497 ADVNCD CARE PLAN 30 MIN: CPT | Performed by: CLINICAL NURSE SPECIALIST

## 2024-07-01 PROCEDURE — 85025 COMPLETE CBC W/AUTO DIFF WBC: CPT | Performed by: NURSE PRACTITIONER

## 2024-07-01 PROCEDURE — 99223 1ST HOSP IP/OBS HIGH 75: CPT | Performed by: RADIOLOGY

## 2024-07-01 PROCEDURE — 85610 PROTHROMBIN TIME: CPT | Performed by: INTERNAL MEDICINE

## 2024-07-01 PROCEDURE — 76999 ECHO EXAMINATION PROCEDURE: CPT

## 2024-07-01 PROCEDURE — 85730 THROMBOPLASTIN TIME PARTIAL: CPT | Performed by: INTERNAL MEDICINE

## 2024-07-01 PROCEDURE — 99223 1ST HOSP IP/OBS HIGH 75: CPT | Performed by: CLINICAL NURSE SPECIALIST

## 2024-07-01 PROCEDURE — 86022 PLATELET ANTIBODIES: CPT | Performed by: INTERNAL MEDICINE

## 2024-07-01 PROCEDURE — 99024 POSTOP FOLLOW-UP VISIT: CPT | Performed by: NURSE PRACTITIONER

## 2024-07-01 PROCEDURE — P9100 PATHOGEN TEST FOR PLATELETS: HCPCS

## 2024-07-01 PROCEDURE — 0JB83ZX EXCISION OF ABDOMEN SUBCUTANEOUS TISSUE AND FASCIA, PERCUTANEOUS APPROACH, DIAGNOSTIC: ICD-10-PCS | Performed by: RADIOLOGY

## 2024-07-01 PROCEDURE — 85060 BLOOD SMEAR INTERPRETATION: CPT | Performed by: INTERNAL MEDICINE

## 2024-07-01 PROCEDURE — 70551 MRI BRAIN STEM W/O DYE: CPT

## 2024-07-01 RX ADMIN — Medication 1 TABLET: at 10:05

## 2024-07-01 RX ADMIN — CYCLOBENZAPRINE 10 MG: 10 TABLET, FILM COATED ORAL at 08:17

## 2024-07-01 RX ADMIN — HYDROCODONE BITARTRATE AND ACETAMINOPHEN 1 TABLET: 7.5; 325 TABLET ORAL at 12:18

## 2024-07-01 RX ADMIN — HYDROCODONE BITARTRATE AND ACETAMINOPHEN 1 TABLET: 7.5; 325 TABLET ORAL at 08:17

## 2024-07-01 RX ADMIN — CYCLOBENZAPRINE 10 MG: 10 TABLET, FILM COATED ORAL at 20:45

## 2024-07-01 RX ADMIN — SERTRALINE 100 MG: 50 TABLET, FILM COATED ORAL at 10:05

## 2024-07-01 RX ADMIN — VALACYCLOVIR 500 MG: 500 TABLET, FILM COATED ORAL at 10:06

## 2024-07-01 RX ADMIN — BUPROPION HYDROCHLORIDE 150 MG: 150 TABLET, EXTENDED RELEASE ORAL at 10:05

## 2024-07-01 RX ADMIN — CETIRIZINE HYDROCHLORIDE 10 MG: 10 TABLET ORAL at 10:05

## 2024-07-01 RX ADMIN — HYDROCODONE BITARTRATE AND ACETAMINOPHEN 1 TABLET: 7.5; 325 TABLET ORAL at 20:45

## 2024-07-01 RX ADMIN — AMLODIPINE BESYLATE 5 MG: 5 TABLET ORAL at 20:32

## 2024-07-01 RX ADMIN — SENNOSIDES AND DOCUSATE SODIUM 2 TABLET: 50; 8.6 TABLET ORAL at 20:45

## 2024-07-01 RX ADMIN — AMLODIPINE BESYLATE 5 MG: 5 TABLET ORAL at 10:06

## 2024-07-01 RX ADMIN — Medication 10 ML: at 10:06

## 2024-07-01 RX ADMIN — CLONIDINE HYDROCHLORIDE 0.1 MG: 0.1 TABLET ORAL at 20:33

## 2024-07-01 RX ADMIN — MONTELUKAST SODIUM 10 MG: 10 TABLET, FILM COATED ORAL at 20:31

## 2024-07-01 RX ADMIN — CARVEDILOL 6.25 MG: 6.25 TABLET, FILM COATED ORAL at 10:06

## 2024-07-01 RX ADMIN — ROPINIROLE HYDROCHLORIDE 0.5 MG: 0.25 TABLET, FILM COATED ORAL at 20:31

## 2024-07-01 RX ADMIN — ROSUVASTATIN CALCIUM 10 MG: 10 TABLET, FILM COATED ORAL at 20:31

## 2024-07-01 RX ADMIN — CLONIDINE HYDROCHLORIDE 0.1 MG: 0.1 TABLET ORAL at 10:06

## 2024-07-01 RX ADMIN — PANTOPRAZOLE SODIUM 40 MG: 40 TABLET, DELAYED RELEASE ORAL at 10:05

## 2024-07-01 NOTE — PROGRESS NOTES
[facility] Medicine Services  INPATIENT PROGRESS NOTE    Patient Name: Marina Joe  Date of Admission: 6/28/2024  Today's Date: 07/01/24  Length of Stay: 3  Primary Care Physician: Hanh Og APRN    Subjective   Chief Complaint: Lower back pain          HPI   Ms. Joe is a 79-year-old female from home with past medical history of breast cancer, chronic kidney disease stage IIIa, hypercholesterolemia, hypertension and chronic sinusitis, who presented to the emergency room with worsening back pain/flank pain, history was mostly provided by patient's boyfriend at bedside.  She developed back pain couple of weeks ago and was referred to a pain clinic doctor where she was prescribed gabapentin, after she took about 2-3 doses of the gabapentin, she developed dizziness. She came to the emergency room today because lower back pain and flank pain got even worse despite being on the gabapentin.  In the emergency room, she was extensively worked up with CT of the abdomen and lumbar spine, CT abdomen showed extensive mass of the left kidney extending up to the proximal left ureter as well as right adrenal gland mass suspicious for metastasis.  Urology was consulted from the emergency room for input.  7/1  As above history of chronic kidney disease hyperlipidemia hypertension presented with back pain found to have kidney mass suspicious for metastasis patient CT of the lumbar spine shows multilevel degenerative changes to include an anteriolisthesis of L3 over L4 and spondylosis at L5-S1. 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 All other systems have been reviewed and are negative unless otherwise stated.     Objective    Temp:  [97.5 °F (36.4 °C)-98.7 °F (37.1 °C)] 98.2 °F (36.8 °C)  Heart Rate:  [80-87] 81  Resp:  [18] 18  BP: (129-166)/(52-67) 151/65  Physical Exam    GEN: Awake, alert, interactive, in NAD  HEENT: Atraumatic, PERRLA, EOMI, Anicteric, Trachea midline  Lungs: CTAB, no wheezing/rales/rhonchi  Heart: RRR, +S1/s2, no rub  ABD: soft, nt/nd, +BS, no guarding/rebound  Extremities: atraumatic, no cyanosis, no edema  Skin: no rashes or lesions  Neuro: AAOx3, no focal deficits  Psych: normal mood & affect    Results Review:  I have reviewed the labs, radiology results, and diagnostic studies.    Laboratory Data:   Results from last 7 days   Lab Units 06/30/24  0434 06/28/24  0617 06/25/24  0901   WBC 10*3/mm3 8.64 8.11 6.83   HEMOGLOBIN g/dL 9.5* 11.6* 11.7*   HEMATOCRIT % 29.9* 36.6 36.1   PLATELETS 10*3/mm3 91* 81* 90*        Results from last 7 days   Lab Units 07/01/24  0432 06/30/24  0434 06/29/24  1858 06/28/24  1153 06/28/24  0617 06/25/24  0901   SODIUM mmol/L 137 137 140   < > 135* 134*   POTASSIUM mmol/L 3.7 4.0 4.6   < > 3.9 3.7   CHLORIDE mmol/L 102 101 104   < > 95* 95*   CO2 mmol/L 24.0 24.0 22.0   < > 26.0 27.0   BUN mg/dL 37* 34* 34*   < > 32* 30*   CREATININE mg/dL 2.18* 2.39* 2.62*   < > 2.59* 2.39*   CALCIUM mg/dL 8.5* 8.4* 9.0   < > 10.0 9.7   BILIRUBIN mg/dL  --  <0.2  --   --  0.3 0.3   ALK PHOS U/L  --  78  --   --  85 90   ALT (SGPT) U/L  --  21  --   --  18 19   AST (SGOT) U/L  --  35*  --   --  22 20   GLUCOSE mg/dL 98 120* 95   < > 99 121*    < > = values in this interval not displayed.       Culture Data:   Urine Culture   Date Value Ref Range Status   06/28/2024 >100,000 CFU/mL Mixed Bhavana Isolated  Final       Radiology Data:   Imaging Results (Last 24 Hours)       Procedure  Component Value Units Date/Time    MRI Brain Without Contrast [[id number]] Resulted: 07/01/24 0906     Updated: 07/01/24 0906            I have reviewed the patient's current medications.     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  MRI of the lumbar spine reported as follows-  IMPRESSION:   Neoplastic process involving the L3 vertebral body with associated  posterior soft tissue component resulting in severe spinal canal  narrowing and cauda equina nerve root compression. The soft tissue  component appears to also extend into the right L3-L4 foramen. No  associated pathological fracture.  Additional multilevel spondylotic changes including moderate to severe  spinal canal and foraminal narrowing as detailed above.  Neurosurgery is on consult and did the following procedure-  Procedure(s):  LUMBAR LAMINECTOMY L3 WITH DECOMPRESSION 1-2 LEVELS-RIGHT     Decompression -  Lumbar laminectomy, medial facetectomy for decompression of the spinal cord  Open laminectomy and biopsy of epidural neoplasm  Use of intraoperative microscope      -Acute on chronic kidney disease stage IIIa  Patient follows up with a nephrologist outpatient, but he does not come to this hospital.  Nephrologist consulted and helping with management while patient is in-house.     -Left renal mass/right adrenal mass on CT of the abdomen/pelvis  Urology was consulted from the emergency room and after evaluation did not recommend any intervention,rather recommended oncology consult.  Patient has multiple subcutaneous nodules that may be amenable to percutaneous biopsy.  Oncology is on consult and has evaluated patient, will await for tentative diagnosis from lumbar spine biopsy  before making recommendations      Other chronic medical conditions-  History of breast cancer  Hypercholesterolemia  Hypertension  Chronic sinusitis     DVT prophylaxis-heparin       Medical Decision Making  Number and Complexity of problems: High    Conditions and Status        Condition is unchanged.     MDM Data  External documents reviewed: No  Cardiac tracing (EKG, telemetry) interpretation: Yes  Radiology interpretation: Yes  Labs reviewed: Yes  Any tests that were considered but not ordered: No     Decision rules/scores evaluated (example [id number], Wells, etc): Yes     Discussed with: Patient     Care Planning  Shared decision making: Yes  Code status and discussions: Yes [full code]    Disposition  Social Determinants of Health that impact treatment or disposition: No  I expect the patient to be discharged to unknown in unknown days.     Electronically signed by Alysia Lincoln MD, 07/01/24, 09:38 CDT.

## 2024-07-01 NOTE — PROGRESS NOTES
Nephrology ([facility]) progress Note      Patient:  Marina Joe  YOB: 1945  Date of Service: 7/1/2024  MRN: [id number]   Acct: [id number]   Primary Care Physician: Hanh Og APRN  Advance Directive:   Code Status and Medical Interventions:   Ordered at: 07/01/24 1547     Medical Intervention Limits:    No intubation (DNI)    No artificial nutrition     Level Of Support Discussed With:    Patient     Code Status (Patient has no pulse and is not breathing):    No CPR (Do Not Attempt to Resuscitate)     Medical Interventions (Patient has pulse or is breathing):    Limited Support     Admit Date: 6/28/2024       Hospital Day: 3  Referring Provider: No ref. provider found      Patient personally seen and examined.  Complete chart including Consults, Notes, Operative Reports, Labs, Cardiology, and Radiology studies reviewed as able.        Subjective:  Marina Joe is a 79 y.o. female for whom we were consulted for evaluation and treatment of acute kidney injury.  She has history of chronic kidney disease stage IIIa, breast cancer, hypertension.  She presented for evaluation of back pain and had tried the gabapentin.  She reported no NSAID usage.  She denied current chest pain, shortness of air at rest, nausea or vomiting.  MRI spine demonstrated stenosis with likely malignancy.  Neurosurgery was evaluating.  She denied appetite changes, dysuria or hematuria.    Today, no overnight events.  Denied other complaints on questioning.  Tolerating diet.  Has adequate urine output.     Allergies:  Aspirin and Niacin    Home Meds:  Medications Prior to Admission   Medication Sig Dispense Refill Last Dose    amLODIPine (NORVASC) 5 MG tablet Take 1 tablet by mouth 2 (Two) Times a Day.       buPROPion XL (WELLBUTRIN XL) 150 MG 24 hr tablet Take 1 tablet by mouth Daily.       Calcium Carb-Cholecalciferol (CALCIUM 600+D3 PO) Take 1 tablet by mouth Daily.       carvedilol (COREG)  6.25 MG tablet Take 1 tablet by mouth 2 (Two) Times a Day With Meals.       dapagliflozin Propanediol (Farxiga) 10 MG tablet Take 10 mg by mouth Daily.       famotidine (PEPCID) 20 MG tablet Take 1 tablet by mouth 2 (Two) Times a Day Before Meals. 60 tablet 0     fluticasone (FLONASE) 50 MCG/ACT nasal spray 2 sprays into the nostril(s) as directed by provider Daily. 1 g 3     irbesartan-hydrochlorothiazide (AVALIDE) 300-12.5 MG tablet Take 1 tablet by mouth Daily.       montelukast (SINGULAIR) 10 MG tablet Take 1 tablet by mouth Every Night.       Multiple Vitamins-Minerals (MULTIVITAMIN ADULT) tablet Take 1 tablet by mouth Daily.       pantoprazole (PROTONIX) 40 MG EC tablet Take 1 tablet by mouth Daily.       rOPINIRole (REQUIP) 0.5 MG tablet Take 1 tablet by mouth Every Night. Take 1 hour before bedtime.       rosuvastatin (CRESTOR) 20 MG tablet Take 1 tablet by mouth Daily.       valACYclovir (VALTREX) 500 MG tablet Take 1 tablet by mouth 2 (Two) Times a Day.       albuterol sulfate  (90 Base) MCG/ACT inhaler Inhale 2 puffs Every 4 (Four) Hours As Needed for Wheezing. 2 g 3     cetirizine (zyrTEC) 10 MG tablet Take 1 tablet by mouth Daily.       cloNIDine (CATAPRES) 0.1 MG tablet Take 1 tablet by mouth 2 (Two) Times a Day.       cyclobenzaprine (FLEXERIL) 10 MG tablet Take 1 tablet by mouth 3 (Three) Times a Day As Needed for Muscle Spasms.       Diclofenac Sodium (VOLTAREN) 1 % gel gel Apply 4 g topically to the appropriate area as directed 4 (Four) Times a Day As Needed (arthralgia). 240 g 3     Ergocalciferol (VITAMIN D2 PO) Take 50,000 Units by mouth Every 30 (Thirty) Days.       naproxen sodium (ALEVE) 220 MG tablet Take 1 tablet by mouth 2 (Two) Times a Day As Needed.       sertraline (ZOLOFT) 100 MG tablet Take 1 tablet by mouth Daily.          Medicines:  Current Facility-Administered Medications   Medication Dose Route Frequency Provider Last Rate Last Admin    acetaminophen (TYLENOL) tablet 650  mg  650 mg Oral Q8H Marcellus Pulido MD   650 mg at 06/30/24 1119    Or    acetaminophen (TYLENOL) 160 MG/5ML oral solution 650 mg  650 mg Oral Q8H Marcellus Pulido MD        Or    acetaminophen (TYLENOL) suppository 650 mg  650 mg Rectal Q8H Marcellus Pulido MD        acetaminophen (TYLENOL) tablet 650 mg  650 mg Oral Q6H PRN Marcellus Pulido MD   650 mg at 06/29/24 0544    albuterol (PROVENTIL) nebulizer solution 0.083% 2.5 mg/3mL  2.5 mg Nebulization Q4H PRN Marcellus Pulido MD   2.5 mg at 06/28/24 1040    amLODIPine (NORVASC) tablet 5 mg  5 mg Oral BID Marcellus Pulido MD   5 mg at 07/01/24 1006    sennosides-docusate (PERICOLACE) 8.6-50 MG per tablet 2 tablet  2 tablet Oral BID PRN Marcellus Pulido MD        And    polyethylene glycol (MIRALAX) packet 17 g  17 g Oral Daily PRN Marcellus Pulido MD        And    bisacodyl (DULCOLAX) EC tablet 5 mg  5 mg Oral Daily PRN Marcellus Pulido MD        And    bisacodyl (DULCOLAX) suppository 10 mg  10 mg Rectal Daily PRN Marcellus Pulido MD        buPROPion XL (WELLBUTRIN XL) 24 hr tablet 150 mg  150 mg Oral Daily Marcellus Pulido MD   150 mg at 07/01/24 1005    Calcium Replacement - Follow Nurse / BPA Driven Protocol   Does not apply PRN Marcellus Pulido MD        carvedilol (COREG) tablet 6.25 mg  6.25 mg Oral BID With Meals Marcellus Pulido MD   6.25 mg at 07/01/24 1006    cetirizine (zyrTEC) tablet 10 mg  10 mg Oral Daily Marcellus Pulido MD   10 mg at 07/01/24 1005    cloNIDine (CATAPRES) tablet 0.1 mg  0.1 mg Oral BID Marcellus Pulido MD   0.1 mg at 07/01/24 1006    cyclobenzaprine (FLEXERIL) tablet 10 mg  10 mg Oral TID PRN Marcellus Pulido MD   10 mg at 07/01/24 0817    Diclofenac Sodium (VOLTAREN) 1 % gel 4 g  4 g Topical 4x Daily PRN Marcellus Pulido MD        fluticasone (FLONASE) 50 MCG/ACT nasal spray 2 spray  2 spray Nasal Daily Ashok  Marcellus Moore MD   2 spray at 06/30/24 1009    [START ON 7/2/2024] heparin (porcine) 5000 UNIT/ML injection 5,000 Units  5,000 Units Subcutaneous Q12H Marcellus Pulido MD        hydrALAZINE (APRESOLINE) injection 10 mg  10 mg Intravenous Q4H PRN Marcellus Pulido MD        HYDROcodone-acetaminophen (NORCO) 5-325 MG per tablet 1 tablet  1 tablet Oral Q4H PRN Marcellus Pulido MD   1 tablet at 06/29/24 2019    HYDROcodone-acetaminophen (NORCO) 7.5-325 MG per tablet 1 tablet  1 tablet Oral Q4H PRN Marcellus Pulido MD   1 tablet at 07/01/24 1218    Magnesium Low Dose Replacement - Follow Nurse / BPA Driven Protocol   Does not apply Marcellus Alarcon MD        montelukast (SINGULAIR) tablet 10 mg  10 mg Oral Nightly Marcellus Pulido MD   10 mg at 06/30/24 2105    multivitamin with minerals 1 tablet  1 tablet Oral Daily Marcellus Pulido MD   1 tablet at 07/01/24 1005    ondansetron (ZOFRAN) injection 4 mg  4 mg Intravenous Q6H PRN Marcellus Pulido MD        pantoprazole (PROTONIX) EC tablet 40 mg  40 mg Oral Daily Marcellus Pulido MD   40 mg at 07/01/24 1005    Phosphorus Replacement - Follow Nurse / BPA Driven Protocol   Does not apply Marcellus Alarcon MD        Potassium Replacement - Follow Nurse / BPA Driven Protocol   Does not apply Marcellus Alarcon MD        rOPINIRole (REQUIP) tablet 0.5 mg  0.5 mg Oral Nightly Marcellus Pulido MD   0.5 mg at 06/30/24 2105    rosuvastatin (CRESTOR) tablet 10 mg  10 mg Oral Nightly Marcellus Pulido MD   10 mg at 06/30/24 2105    sertraline (ZOLOFT) tablet 100 mg  100 mg Oral Daily Marcellus Pulido MD   100 mg at 07/01/24 1005    sodium chloride 0.9 % flush 10 mL  10 mL Intravenous PRN Marcellus Pulido MD        sodium chloride 0.9 % flush 10 mL  10 mL Intravenous Q12H Marcellus Pulido MD   10 mL at 06/30/24 2106    sodium chloride 0.9 % flush 10 mL  10 mL  Intravenous PRN Marcellus Pulido MD        sodium chloride 0.9 % flush 10 mL  10 mL Intravenous Q12H Marcellus Pulido MD   10 mL at 07/01/24 1006    sodium chloride 0.9 % flush 10 mL  10 mL Intravenous PRN Marcellus Pulido MD        sodium chloride 0.9 % infusion 40 mL  40 mL Intravenous PRN Marcellus Pulido MD        sodium chloride 0.9 % infusion 40 mL  40 mL Intravenous PRN Marcellus Pulido MD        sodium chloride 0.9 % infusion  100 mL/hr Intravenous Continuous Marcellus Pulido  mL/hr at 06/30/24 2106 100 mL/hr at 06/30/24 2106       Past Medical History:  Past Medical History:   Diagnosis Date    Breast cancer     CKD (chronic kidney disease)     Hypercholesteremia     Hypertension     Sinusitis     Stage 3a chronic kidney disease 10/20/2020       Past Surgical History:  Past Surgical History:   Procedure Laterality Date    AVULSION TOENAIL PLATE      Sept 26,2018    BREAST BIOPSY Left 11/20/2012    BREAST BIOPSY      Left Breast, 1/2019 per Dr Barkley    BREAST LUMPECTOMY Left     with node bx     COLONOSCOPY  09/13/2013    small polyp at 30cm benign hyperplastic polyp, changes consistent with melanosis coli. Recall 5 years    COLONOSCOPY  11/19/2018    Tics otherwise normal exam repeat in 5 years    COLONOSCOPY  02/13/2023    Normal exam repeat in 3 years with a 2 day prep    ENDOSCOPY  02/13/2023    Gastritis, small HH    LUMBAR LAMINECTOMY Right 6/29/2024    Procedure: LUMBAR LAMINECTOMY L3 WITH DECOMPRESSION 1-2 LEVELS-RIGHT;  Surgeon: Marcellus Pulido MD;  Location: [facility] OR;  Service: Neurosurgery;  Laterality: Right;    REPLACEMENT TOTAL KNEE Right     2016    TOTAL ABDOMINAL HYSTERECTOMY WITH SALPINGO OOPHORECTOMY      UPPER ENDOSCOPIC ULTRASOUND W/ FNA  12/20/2023    Pancreatic cyst s/p FNA       Family History  Family History   Problem Relation Age of Onset    Heart attack Mother     Hodgkin's lymphoma Father     Other Father     Cancer Father          hodgkins    Heart attack Brother     Skin cancer Maternal Uncle     Pancreatic cancer Maternal Uncle     Cancer Maternal Uncle         eye    Colon cancer Neg Hx     Colon polyps Neg Hx        Social History  Social History     Socioeconomic History    Marital status:    Tobacco Use    Smoking status: Never    Smokeless tobacco: Never   Vaping Use    Vaping status: Never Used   Substance and Sexual Activity    Alcohol use: No    Drug use: Not Currently    Sexual activity: Not Currently     Birth control/protection: Surgical         Review of Systems:  History obtained from chart review and the patient  General ROS: No fever or chills  Respiratory ROS: No cough, shortness of breath, wheezing  Cardiovascular ROS: No chest pain or palpitations  Gastrointestinal ROS: No abdominal pain or melena  Genito-Urinary ROS: No dysuria or hematuria  Psych ROS: No anxiety and depression  14 point ROS reviewed with the patient and negative except as noted above and in the HPI unless unable to obtain.      Objective:  Patient Vitals for the past 24 hrs:   BP Temp Temp src Pulse Resp SpO2   07/01/24 1543 138/65 97.8 °F (36.6 °C) Oral 70 18 96 %   07/01/24 1528 129/92 97.6 °F (36.4 °C) Oral 66 18 97 %   07/01/24 1449 126/92 97.5 °F (36.4 °C) Oral 66 18 96 %   07/01/24 1330 140/58 97.3 °F (36.3 °C) Oral 71 18 95 %   07/01/24 1215 156/72 98 °F (36.7 °C) Oral 68 20 96 %   07/01/24 1133 138/55 97.6 °F (36.4 °C) Oral 69 18 98 %   07/01/24 0840 151/65 98.2 °F (36.8 °C) Oral 81 18 94 %   07/01/24 0426 166/67 98.7 °F (37.1 °C) Oral 82 18 94 %   06/30/24 2054 149/64 98.3 °F (36.8 °C) Oral 84 18 94 %       Intake/Output Summary (Last 24 hours) at 7/1/2024 1806  Last data filed at 7/1/2024 1432  Gross per 24 hour   Intake 900 ml   Output 800 ml   Net 100 ml     General: awake/alert   Chest:  clear to auscultation bilaterally without respiratory distress  CVS: regular rate and rhythm  Abdominal: soft, nontender, positive bowel  sounds  Extremities: no cyanosis or edema  Skin: warm and dry without rash      Labs:  Results from last 7 days   Lab Units 07/01/24  1053 06/30/24  0434 06/28/24  0617   WBC 10*3/mm3 8.22 8.64 8.11   HEMOGLOBIN g/dL 9.2* 9.5* 11.6*   HEMATOCRIT % 29.2* 29.9* 36.6   PLATELETS 10*3/mm3 74* 91* 81*         Results from last 7 days   Lab Units 07/01/24  0432 06/30/24  0434 06/29/24  1858 06/28/24  1153 06/28/24  0617 06/25/24  0901   SODIUM mmol/L 137 137 140   < > 135* 134*   POTASSIUM mmol/L 3.7 4.0 4.6   < > 3.9 3.7   CHLORIDE mmol/L 102 101 104   < > 95* 95*   CO2 mmol/L 24.0 24.0 22.0   < > 26.0 27.0   BUN mg/dL 37* 34* 34*   < > 32* 30*   CREATININE mg/dL 2.18* 2.39* 2.62*   < > 2.59* 2.39*   CALCIUM mg/dL 8.5* 8.4* 9.0   < > 10.0 9.7   EGFR mL/min/1.73 22.7* 20.3* 18.2*   < > 18.4* 20.3*   BILIRUBIN mg/dL  --  <0.2  --   --  0.3 0.3   ALK PHOS U/L  --  78  --   --  85 90   ALT (SGPT) U/L  --  21  --   --  18 19   AST (SGOT) U/L  --  35*  --   --  22 20   GLUCOSE mg/dL 98 120* 95   < > 99 121*    < > = values in this interval not displayed.       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 Patient reports  history of breast cancer in 2013 status post surgery and radiation  therapy.        COMPARISON: 6/28/2024.     TECHNIQUE: Representative images and report stored to PACS  per  institutional protocol and state regulations.     PROCEDURE:  Preprocedure imaging performed demonstrating multiple peripherally  echogenic, centrally hypoechoic nodules. These demonstrated internal  vascularity. A superficial nodule RIGHT flank nodule was selected and  patient positioned with consideration for her comfort.     Risks, benefits and alternatives to the procedure were discussed with  the patient. Specifically, risks of bleeding, infection, allergy to  medicine, and non-diagnostic biopsy results were discussed with the  patient. Verbal and written informed consent was obtained.     A timeout was performed including the patient's name, date of birth,  biopsy site and laterality.     Using ultrasound, a site for biopsy of RIGHT flank subcutaneous nodule  was selected, marked and prepped in the usual sterile fashion. 1 percent   lidocaine was used for local anesthesia.     Using ultrasound guidance, RIGHT flank subcutaneous nodule biopsy was  performed using 18 gauge Bard core needle biopsy device. 5 passes were  made. One core was used for touch prep. 2 cores were placed in formalin  and 2 cores were placed in RPMI.     Cytology deemed sample adequate. Biopsy samples were taken by cytology.      The patient tolerated the procedure well. No evidence of complication.     The patient returned to the floor in stable condition and agrees to  follow up with referring clinician for biopsy results.           Impression:      1. Successful ultrasound guided core needle biopsy of RIGHT flank  subcutaneous nodule.        This report was signed and finalized on 7/1/2024 2:36 PM by Dr Sonam Quach MD.       US Soft Tissue [[id number]] Resulted: 07/01/24 1446     Updated: 07/01/24 1446    MRI Brain Without Contrast [[id number]] Collected: 07/01/24 0949     Updated: 07/01/24 1000    Narrative:      EXAMINATION: MRI BRAIN WO CONTRAST-  7/1/2024 9:49 AM      HISTORY: Staging; C79.51-Secondary malignant neoplasm of  bone;  N28.89-Other specified disorders of kidney and ureter; E27.8-Other  specified disorders of adrenal gland; M79.89-Other specified soft tissue  disorders; N28.9-Disorder of kidney and ureter, unspecified;  N18.9-Chronic kidney disease, unspecified; D69.6-Thrombocytopenia,  unspecified; G83.4-Cauda equina syndrome; Z74.09-Other reduced mobility     COMPARISON: 8/10/2023     TECHNIQUE: Multiplanar imaging of the brain was performed in a routine  fashion. No contrast was administered.     FINDINGS:  No restricted diffusion. No mass effect or midline shift. It should be  noted that no intravenous contrast was administered, limiting evaluation  for intracranial metastatic disease. Within the limitations of this  exam, I don't see any definite evidence of intracranial metastatic  disease. Increased FLAIR signal intensity scattered throughout the  subcortical and periventricular white matter. Basilar cisterns are  preserved. Grey and white matter demonstrates normal MR signal. No  abnormal extra axial fluid collections are noted. No definite mass  effect or midline shift identified.     Proximal cervical spinal cord, brainstem, and cerebellum are  unremarkable. Normal cerebrovascular flow voids are seen. Bilateral  globes and orbits are normal in appearance.     Opacification of the LEFT sphenoid sinus with high T1 signal which may  represent some proteinaceous fluid.          Impression:         1.  No definite evidence of intracranial metastatic disease within the  limitations of this noncontrast examination.     2.  Fairly extensive periventricular white matter signal abnormality,  likely related to chronic microvascular ischemic change.     3.  Opacification of the LEFT sphenoid sinus with what is likely  proteinaceous fluid.                                                       This report was signed and finalized on 7/1/2024 9:53 AM by Dr. Armando Calixto MD.       MRI Lumbar Spine With & Without Contrast  [[id number]] Collected: 06/28/24 1920     Updated: 06/28/24 1945    Addenda:        Additional findings: There are also appears to be tumor signal extending  into the right L2-L3 neural foramen.     This report was signed and finalized on 6/28/2024 7:42 PM by Warren Freire.     Signed: 06/28/24 1942 by Warren Freire, DO    Narrative:      Exam: MRI LUMBAR SPINE W WO CONTRAST- 6/28/2024 3:43 PM     HISTORY: Lumbar back and bilateral nondermatomal leg pain and  dysesthesias; N28.89-Other specified disorders of kidney and ureter;  E27.8-Other specified disorders of adrenal gland; M79.89-Other specified  soft tissue disorders; N28.9-Disorder of kidney and ureter, unspecified;  N18.9-Chronic kidney disease, unspecified; D69.6-Thrombocytopenia,  unspecified     COMPARISON: 6/28/2024 lumbar spine CT     TECHNIQUE: Multiplanar, multisequence MRI of the lumbar spine was  obtained prior to and after the administration of 20 mL intravenous  gadolinium contrast.     FINDINGS:     Grade 1 degenerative anterolisthesis of L3-L4.     Within the L3 vertebral body there is fairly diffuse T2 hyperintense  signal with associated T1 hypointense marrow infiltration and suspected  mild enhancement. There is an associated posterior enhancing soft tissue  mass extending into the spinal canal causing severe stenosis and  compression of the cauda equina nerve roots. The soft tissue component  also appears to extend superiorly to the L2-L3 disc level. There appears  to be tumor extension into the right L3-L4 foramen.     No visible fracture.     Multilevel mild degenerative disc disease with posterior disc osteophyte  complexes. Multilevel facet hypertrophic changes. No significant facet  periarticular edema. Multilevel ligamentum flavum hypertrophy.  Multilevel prominent posterior epidural fat.     Conus appears to terminate at L1-L2.     T11-T12: There appears to be a at least mild to moderate spinal canal  and mild to moderate  bilateral foraminal narrowing.     T12-L1: No significant spinal canal narrowing. Moderate right foraminal  narrowing.     L1-L2: Moderate spinal canal narrowing. Mild to moderate right foraminal  narrowing.     L2-L3: Severe spinal canal narrowing. Moderate to severe right foraminal  narrowing.     L3-L4: Severe spinal canal narrowing. Moderate right foraminal  narrowing.     L4-L5: Moderate spinal canal narrowing and bilateral subarticular  narrowing encroaching the descending bilateral L5 nerve roots. Mild  bilateral foraminal narrowing.     L5-S1: No significant spinal canal or foraminal narrowing.     Extraspinal findings: Please see separately dictated MR abdomen and CT  abdomen/pelvis from the same day.       Impression:         Neoplastic process involving the L3 vertebral body with associated  posterior soft tissue component resulting in severe spinal canal  narrowing and cauda equina nerve root compression. The soft tissue  component appears to also extend into the right L3-L4 foramen. No  associated pathological fracture.     Additional multilevel spondylotic changes including moderate to severe  spinal canal and foraminal narrowing as detailed above.           This report was signed and finalized on 6/28/2024 7:36 PM by Warren Freire.       MRI Abdomen With & Without Contrast [[id number]] Collected: 06/28/24 1826     Updated: 06/28/24 1943    Addenda:        Additional findings: Tumor signal from the left renal mass appears to  extend along the left renal vein and possibly abuts the IVC and is  suspicious for neoplastic involvement. An additional differential also  includes multifocal primary renal malignancies with metastatic disease.     This report was signed and finalized on 6/28/2024 7:40 PM by Warren Freire.     Signed: 06/28/24 1940 by Warren Freire,     Narrative:      EXAM: MRI ABDOMEN W WO CONTRAST-     INDICATION: Evaluate kidney and adrenal masses showed on CT-scan;  N28.89-Other  specified disorders of kidney and ureter; E27.8-Other  specified disorders of adrenal gland; M79.89-Other specified soft tissue  disorders; N28.9-Disorder of kidney and ureter, unspecified;  N18.9-Chronic kidney disease, unspecified; D69.6-Thrombocytopenia,  unspecified        TECHNIQUE: Multisequence multiplanar MR images was obtained of the  abdomen prior to and at the administration of 20 mL intravenous  gadolinium contrast.        COMPARISON: CT abdomen pelvis 6/28/2024     FINDINGS:     Decrease sensitivity due to motion.     Lower Chest: Unremarkable.     Liver: Unremarkable.     Biliary Tree: Small amount of gallbladder sludge versus layering stones.     Spleen: Tiny T2 hyperintense splenic lesion is nonspecific but favored  benign.     Pancreas: 1.5 cm pancreatic body cyst.     Adrenal Glands: 3.3 cm right adrenal nodule, new from prior CT on  5/23/2023.     Kidneys/Upper Ureters:      There is an infiltrative T2 hypointense/T1 isointense diffusion  restricting mass involving the left kidney with extension into the  proximal left collecting system/upper ureters which is difficult to  measure but measures up to 11 cm in cranial caudal dimensions. Please  note the abnormal diffusion restriction does extend below the  field-of-view into the left ureter. There are additional smaller masses  within the left renal cortex.      There are also multiple (at least 6) diffusion restricting masses within  the right kidney measuring up to 3.2 cm. There also appears to be a  diffusion restricting mass in the right renal pelvis.      These are somewhat limited in evaluation on postcontrast images due to  degree of motion, however these these masses do appear to demonstrate  some degree of enhancement.     Small left renal cyst is noted.     Gastrointestinal Tract: Colonic diverticulosis.     Lymphatics: Unremarkable.     Vasculature: Unremarkable.      Peritoneum/Retroperitoneum: Unremarkable.     Abdominal Wall/Soft  "Tissues: There enhancing diffusion restricting soft  tissue masses, the most dominant and largest cluster is within the right  posterior abdominal wall measuring up to 2.6 cm.     Osseous Structures: There is a diffusion restricting mass involving the  L3 vertebral body with suspected posterior extension into the spinal  canal.       Impression:            Infiltrative mass within the left kidney extending into the proximal  left collecting system as well as numerous additional solid masses in  the bilateral kidneys. Additionally there is a right adrenal mass,  multiple subcutaneous fat soft tissue masses, and a L3 vertebral body  mass with suspected extension into the spinal canal. Findings  collectively are most consistent with metastatic disease which carries a  broad differential, however some differentials include metastatic breast  cancer, malignant melanoma, and lymphoma. The subtendinous fat soft  tissue masses would be amenable to ultrasound-guided biopsy.     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           This report was signed and finalized on 6/28/2024 6:57 PM by Warren Freire.               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

## 2024-07-01 NOTE — PROGRESS NOTES
Nephrology ([facility]) Consult Note      Patient:  Marina Joe  YOB: 1945  Date of Service: 6/30/2024  MRN: [id number]   Acct: [id number]   Primary Care Physician: Hanh Og APRN  Advance Directive:   Code Status and Medical Interventions:   Ordered at: 06/28/24 1037     Level Of Support Discussed With:    Patient    Health Care Surrogate     Code Status (Patient has no pulse and is not breathing):    CPR (Attempt to Resuscitate)     Medical Interventions (Patient has pulse or is breathing):    Full Support     Admit Date: 6/28/2024       Hospital Day: 2  Referring Provider: No ref. provider found      Patient personally seen and examined.  Complete chart including Consults, Notes, Operative Reports, Labs, Cardiology, and Radiology studies reviewed as able.        Subjective:  Marina Joe is a 79 y.o. female for whom we were consulted for evaluation and treatment of acute kidney injury.  She has history of chronic kidney disease stage IIIa, breast cancer, hypertension.  She presented for evaluation of back pain and had tried the gabapentin.  She reported no NSAID usage.  She denied current chest pain, shortness of air at rest, nausea or vomiting.  MRI spine demonstrated stenosis with likely malignancy.  Neurosurgery was evaluating.  She denied appetite changes, dysuria or hematuria.    Today, no overnight events.  Denied other complaints on questioning.  No family present today.  Tolerating diet.      Allergies:  Aspirin and Niacin    Home Meds:  Medications Prior to Admission   Medication Sig Dispense Refill Last Dose    amLODIPine (NORVASC) 5 MG tablet Take 1 tablet by mouth 2 (Two) Times a Day.       buPROPion XL (WELLBUTRIN XL) 150 MG 24 hr tablet Take 1 tablet by mouth Daily.       Calcium Carb-Cholecalciferol (CALCIUM 600+D3 PO) Take 1 tablet by mouth Daily.       carvedilol (COREG) 6.25 MG tablet Take 1 tablet by mouth 2 (Two) Times a Day With Meals.        dapagliflozin Propanediol (Farxiga) 10 MG tablet Take 10 mg by mouth Daily.       famotidine (PEPCID) 20 MG tablet Take 1 tablet by mouth 2 (Two) Times a Day Before Meals. 60 tablet 0     fluticasone (FLONASE) 50 MCG/ACT nasal spray 2 sprays into the nostril(s) as directed by provider Daily. 1 g 3     irbesartan-hydrochlorothiazide (AVALIDE) 300-12.5 MG tablet Take 1 tablet by mouth Daily.       montelukast (SINGULAIR) 10 MG tablet Take 1 tablet by mouth Every Night.       Multiple Vitamins-Minerals (MULTIVITAMIN ADULT) tablet Take 1 tablet by mouth Daily.       pantoprazole (PROTONIX) 40 MG EC tablet Take 1 tablet by mouth Daily.       rOPINIRole (REQUIP) 0.5 MG tablet Take 1 tablet by mouth Every Night. Take 1 hour before bedtime.       rosuvastatin (CRESTOR) 20 MG tablet Take 1 tablet by mouth Daily.       valACYclovir (VALTREX) 500 MG tablet Take 1 tablet by mouth 2 (Two) Times a Day.       albuterol sulfate  (90 Base) MCG/ACT inhaler Inhale 2 puffs Every 4 (Four) Hours As Needed for Wheezing. 2 g 3     cetirizine (zyrTEC) 10 MG tablet Take 1 tablet by mouth Daily.       cloNIDine (CATAPRES) 0.1 MG tablet Take 1 tablet by mouth 2 (Two) Times a Day.       cyclobenzaprine (FLEXERIL) 10 MG tablet Take 1 tablet by mouth 3 (Three) Times a Day As Needed for Muscle Spasms.       Diclofenac Sodium (VOLTAREN) 1 % gel gel Apply 4 g topically to the appropriate area as directed 4 (Four) Times a Day As Needed (arthralgia). 240 g 3     Ergocalciferol (VITAMIN D2 PO) Take 50,000 Units by mouth Every 30 (Thirty) Days.       naproxen sodium (ALEVE) 220 MG tablet Take 1 tablet by mouth 2 (Two) Times a Day As Needed.       sertraline (ZOLOFT) 100 MG tablet Take 1 tablet by mouth Daily.          Medicines:  Current Facility-Administered Medications   Medication Dose Route Frequency Provider Last Rate Last Admin    acetaminophen (TYLENOL) tablet 650 mg  650 mg Oral Q8H Marcellus Pulido MD   650 mg at 06/30/24 9865     Or    acetaminophen (TYLENOL) 160 MG/5ML oral solution 650 mg  650 mg Oral Q8H Marcellus Pulido MD        Or    acetaminophen (TYLENOL) suppository 650 mg  650 mg Rectal Q8H Marcellus Pulido MD        acetaminophen (TYLENOL) tablet 650 mg  650 mg Oral Q6H PRN Marcellus Pulido MD   650 mg at 06/29/24 0544    albuterol (PROVENTIL) nebulizer solution 0.083% 2.5 mg/3mL  2.5 mg Nebulization Q4H PRN Marcellus Pulido MD   2.5 mg at 06/28/24 1040    amLODIPine (NORVASC) tablet 5 mg  5 mg Oral BID Marcellus Pulido MD   5 mg at 06/30/24 1009    sennosides-docusate (PERICOLACE) 8.6-50 MG per tablet 2 tablet  2 tablet Oral BID PRN Marcellus Pulido MD        And    polyethylene glycol (MIRALAX) packet 17 g  17 g Oral Daily PRN Marcellus Pulido MD        And    bisacodyl (DULCOLAX) EC tablet 5 mg  5 mg Oral Daily PRN Marcellus Pulido MD        And    bisacodyl (DULCOLAX) suppository 10 mg  10 mg Rectal Daily PRN Marcellus Pulido MD        buPROPion XL (WELLBUTRIN XL) 24 hr tablet 150 mg  150 mg Oral Daily Marcellus Pulido MD   150 mg at 06/30/24 1009    Calcium Replacement - Follow Nurse / BPA Driven Protocol   Does not apply PRN Marcellus Pulido MD        carvedilol (COREG) tablet 6.25 mg  6.25 mg Oral BID With Meals Marcellus Pulido MD   6.25 mg at 06/30/24 1738    cetirizine (zyrTEC) tablet 10 mg  10 mg Oral Daily Marcellus Pulido MD   10 mg at 06/30/24 1009    cloNIDine (CATAPRES) tablet 0.1 mg  0.1 mg Oral BID Marcellus Pulido MD   0.1 mg at 06/30/24 1010    cyclobenzaprine (FLEXERIL) tablet 10 mg  10 mg Oral TID PRN Marcellus Pulido MD   10 mg at 06/30/24 0217    Diclofenac Sodium (VOLTAREN) 1 % gel 4 g  4 g Topical 4x Daily PRN Marcellus Pulido MD        fluticasone (FLONASE) 50 MCG/ACT nasal spray 2 spray  2 spray Nasal Daily Marcellus Pulido MD   2 spray at 06/30/24 1009    [START ON 7/2/2024] heparin  (porcine) 5000 UNIT/ML injection 5,000 Units  5,000 Units Subcutaneous Q12H Marcellus Pulido MD        hydrALAZINE (APRESOLINE) injection 10 mg  10 mg Intravenous Q4H PRN Marcellus Pulido MD        HYDROcodone-acetaminophen (NORCO) 5-325 MG per tablet 1 tablet  1 tablet Oral Q4H PRN Marceluls Pulido MD   1 tablet at 06/29/24 2019    HYDROcodone-acetaminophen (NORCO) 7.5-325 MG per tablet 1 tablet  1 tablet Oral Q4H PRN Marcellus Pulido MD   1 tablet at 06/30/24 0217    Magnesium Low Dose Replacement - Follow Nurse / BPA Driven Protocol   Does not apply PRN Marcellus Pulido MD        montelukast (SINGULAIR) tablet 10 mg  10 mg Oral Nightly Marcellus Pulido MD   10 mg at 06/29/24 2020    multivitamin with minerals 1 tablet  1 tablet Oral Daily Marcellus Pulido MD   1 tablet at 06/30/24 1009    ondansetron (ZOFRAN) injection 4 mg  4 mg Intravenous Q6H PRN Marcellus Pulido MD        pantoprazole (PROTONIX) EC tablet 40 mg  40 mg Oral Daily Marcellus Pulido MD   40 mg at 06/30/24 1010    Phosphorus Replacement - Follow Nurse / BPA Driven Protocol   Does not apply PRN Marcellus Pulido MD        Potassium Replacement - Follow Nurse / BPA Driven Protocol   Does not apply Marcellus Alarcon MD        rOPINIRole (REQUIP) tablet 0.5 mg  0.5 mg Oral Nightly Marcellus Pulido MD   0.5 mg at 06/29/24 2020    rosuvastatin (CRESTOR) tablet 10 mg  10 mg Oral Nightly Marcellus Pulido MD   10 mg at 06/29/24 2020    sertraline (ZOLOFT) tablet 100 mg  100 mg Oral Daily Marcellus Pulido MD   100 mg at 06/30/24 1009    sodium chloride 0.9 % flush 10 mL  10 mL Intravenous PRN Marcellus Pulido MD        sodium chloride 0.9 % flush 10 mL  10 mL Intravenous Q12H Marcellus Pulido MD   10 mL at 06/30/24 1009    sodium chloride 0.9 % flush 10 mL  10 mL Intravenous PRN Marcellus Pulido MD        sodium chloride 0.9 % flush 10 mL   10 mL Intravenous Q12H Marcellus Pulido MD   10 mL at 06/30/24 1009    sodium chloride 0.9 % flush 10 mL  10 mL Intravenous PRN Marcellus Pulido MD        sodium chloride 0.9 % infusion 40 mL  40 mL Intravenous PRN Marcellus Pulido MD        sodium chloride 0.9 % infusion 40 mL  40 mL Intravenous PRN Marcellus Pulido MD        sodium chloride 0.9 % infusion  100 mL/hr Intravenous Continuous Marcellus Pulido  mL/hr at 06/30/24 0724 100 mL/hr at 06/30/24 0724    valACYclovir (VALTREX) tablet 500 mg  500 mg Oral Q24H Prince Chambers MD   500 mg at 06/30/24 1009       Past Medical History:  Past Medical History:   Diagnosis Date    Breast cancer     CKD (chronic kidney disease)     Hypercholesteremia     Hypertension     Sinusitis     Stage 3a chronic kidney disease 10/20/2020       Past Surgical History:  Past Surgical History:   Procedure Laterality Date    AVULSION TOENAIL PLATE      Sept 26,2018    BREAST BIOPSY Left 11/20/2012    BREAST BIOPSY      Left Breast, 1/2019 per Dr Barkley    BREAST LUMPECTOMY Left     with node bx     COLONOSCOPY  09/13/2013    small polyp at 30cm benign hyperplastic polyp, changes consistent with melanosis coli. Recall 5 years    COLONOSCOPY  11/19/2018    Tics otherwise normal exam repeat in 5 years    COLONOSCOPY  02/13/2023    Normal exam repeat in 3 years with a 2 day prep    ENDOSCOPY  02/13/2023    Gastritis, small HH    REPLACEMENT TOTAL KNEE Right     2016    TOTAL ABDOMINAL HYSTERECTOMY WITH SALPINGO OOPHORECTOMY      UPPER ENDOSCOPIC ULTRASOUND W/ FNA  12/20/2023    Pancreatic cyst s/p FNA       Family History  Family History   Problem Relation Age of Onset    Heart attack Mother     Hodgkin's lymphoma Father     Other Father     Cancer Father         hodgkins    Heart attack Brother     Skin cancer Maternal Uncle     Pancreatic cancer Maternal Uncle     Cancer Maternal Uncle         eye    Colon cancer Neg Hx     Colon polyps Neg  Hx        Social History  Social History     Socioeconomic History    Marital status:    Tobacco Use    Smoking status: Never    Smokeless tobacco: Never   Vaping Use    Vaping status: Never Used   Substance and Sexual Activity    Alcohol use: No    Drug use: Not Currently    Sexual activity: Not Currently     Birth control/protection: Surgical         Review of Systems:  History obtained from chart review and the patient  General ROS: No fever or chills  Respiratory ROS: No cough, shortness of breath, wheezing  Cardiovascular ROS: No chest pain or palpitations  Gastrointestinal ROS: No abdominal pain or melena  Genito-Urinary ROS: No dysuria or hematuria  Psych ROS: No anxiety and depression  14 point ROS reviewed with the patient and negative except as noted above and in the HPI unless unable to obtain.      Objective:  Patient Vitals for the past 24 hrs:   BP Temp Temp src Pulse Resp SpO2   06/30/24 1617 129/61 97.5 °F (36.4 °C) Oral 80 18 94 %   06/30/24 1130 142/60 98.1 °F (36.7 °C) -- 83 18 98 %   06/30/24 1051 146/60 98.1 °F (36.7 °C) Oral 82 18 98 %   06/30/24 1039 144/52 98.2 °F (36.8 °C) Oral 84 18 98 %   06/30/24 1020 144/52 98.2 °F (36.8 °C) Oral 84 18 97 %   06/30/24 1014 148/58 98.2 °F (36.8 °C) Oral 87 18 98 %   06/30/24 1004 140/65 98.1 °F (36.7 °C) Oral 84 18 96 %   06/30/24 0938 140/65 98.1 °F (36.7 °C) Oral 84 18 96 %   06/30/24 0735 126/52 98 °F (36.7 °C) Oral 78 20 94 %   06/30/24 0447 168/70 97.2 °F (36.2 °C) Oral 88 20 97 %   06/30/24 0006 150/57 98.4 °F (36.9 °C) Oral 78 20 96 %   06/29/24 2018 -- -- -- 82 -- 93 %       Intake/Output Summary (Last 24 hours) at 6/30/2024 1925  Last data filed at 6/30/2024 1617  Gross per 24 hour   Intake 963.83 ml   Output 1500 ml   Net -536.17 ml     General: awake/alert   Chest:  clear to auscultation bilaterally without respiratory distress  CVS: regular rate and rhythm  Abdominal: soft, nontender, positive bowel sounds  Extremities: no cyanosis or  edema  Skin: warm and dry without rash      Labs:  Results from last 7 days   Lab Units 06/30/24  0434 06/28/24  0617 06/25/24  0901   WBC 10*3/mm3 8.64 8.11 6.83   HEMOGLOBIN g/dL 9.5* 11.6* 11.7*   HEMATOCRIT % 29.9* 36.6 36.1   PLATELETS 10*3/mm3 91* 81* 90*         Results from last 7 days   Lab Units 06/30/24  0434 06/29/24  1858 06/29/24  0519 06/28/24  1153 06/28/24  0617 06/25/24  0901   SODIUM mmol/L 137 140 136   < > 135* 134*   POTASSIUM mmol/L 4.0 4.6 3.9   < > 3.9 3.7   CHLORIDE mmol/L 101 104 100   < > 95* 95*   CO2 mmol/L 24.0 22.0 25.0   < > 26.0 27.0   BUN mg/dL 34* 34* 37*   < > 32* 30*   CREATININE mg/dL 2.39* 2.62* 2.97*   < > 2.59* 2.39*   CALCIUM mg/dL 8.4* 9.0 8.7   < > 10.0 9.7   EGFR mL/min/1.73 20.3* 18.2* 15.6*   < > 18.4* 20.3*   BILIRUBIN mg/dL <0.2  --   --   --  0.3 0.3   ALK PHOS U/L 78  --   --   --  85 90   ALT (SGPT) U/L 21  --   --   --  18 19   AST (SGOT) U/L 35*  --   --   --  22 20   GLUCOSE mg/dL 120* 95 90   < > 99 121*    < > = values in this interval not displayed.       Radiology:   Imaging Results (Last 72 Hours)       Procedure Component Value Units Date/Time    MRI Lumbar Spine With & Without Contrast [[id number]] Collected: 06/28/24 1920     Updated: 06/28/24 1945    Addenda:        Additional findings: There are also appears to be tumor signal extending  into the right L2-L3 neural foramen.     This report was signed and finalized on 6/28/2024 7:42 PM by Warren Freire.     Signed: 06/28/24 1942 by Tani, Warren S, DO    Narrative:      Exam: MRI LUMBAR SPINE W WO CONTRAST- 6/28/2024 3:43 PM     HISTORY: Lumbar back and bilateral nondermatomal leg pain and  dysesthesias; N28.89-Other specified disorders of kidney and ureter;  E27.8-Other specified disorders of adrenal gland; M79.89-Other specified  soft tissue disorders; N28.9-Disorder of kidney and ureter, unspecified;  N18.9-Chronic kidney disease, unspecified; D69.6-Thrombocytopenia,  unspecified     COMPARISON:  6/28/2024 lumbar spine CT     TECHNIQUE: Multiplanar, multisequence MRI of the lumbar spine was  obtained prior to and after the administration of 20 mL intravenous  gadolinium contrast.     FINDINGS:     Grade 1 degenerative anterolisthesis of L3-L4.     Within the L3 vertebral body there is fairly diffuse T2 hyperintense  signal with associated T1 hypointense marrow infiltration and suspected  mild enhancement. There is an associated posterior enhancing soft tissue  mass extending into the spinal canal causing severe stenosis and  compression of the cauda equina nerve roots. The soft tissue component  also appears to extend superiorly to the L2-L3 disc level. There appears  to be tumor extension into the right L3-L4 foramen.     No visible fracture.     Multilevel mild degenerative disc disease with posterior disc osteophyte  complexes. Multilevel facet hypertrophic changes. No significant facet  periarticular edema. Multilevel ligamentum flavum hypertrophy.  Multilevel prominent posterior epidural fat.     Conus appears to terminate at L1-L2.     T11-T12: There appears to be a at least mild to moderate spinal canal  and mild to moderate bilateral foraminal narrowing.     T12-L1: No significant spinal canal narrowing. Moderate right foraminal  narrowing.     L1-L2: Moderate spinal canal narrowing. Mild to moderate right foraminal  narrowing.     L2-L3: Severe spinal canal narrowing. Moderate to severe right foraminal  narrowing.     L3-L4: Severe spinal canal narrowing. Moderate right foraminal  narrowing.     L4-L5: Moderate spinal canal narrowing and bilateral subarticular  narrowing encroaching the descending bilateral L5 nerve roots. Mild  bilateral foraminal narrowing.     L5-S1: No significant spinal canal or foraminal narrowing.     Extraspinal findings: Please see separately dictated MR abdomen and CT  abdomen/pelvis from the same day.       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 The soft tissue  component appears to also extend into the right L3-L4 foramen. No  associated pathological fracture.     Additional multilevel spondylotic changes including moderate to severe  spinal canal and foraminal narrowing as detailed above.           This report was signed and finalized on 6/28/2024 7:36 PM by Warren Freire.       MRI Abdomen With & Without Contrast [[id number]] Collected: 06/28/24 1826     Updated: 06/28/24 1943    Addenda:        Additional findings: Tumor signal from the left renal mass appears to  extend along the left renal vein and possibly abuts the IVC and is  suspicious for neoplastic involvement. An additional differential also  includes multifocal primary renal malignancies with metastatic disease.     This report was signed and finalized on 6/28/2024 7:40 PM by Warren Freire.     Signed: 06/28/24 1940 by Warren Freire, DO    Narrative:      EXAM: MRI ABDOMEN W WO CONTRAST-     INDICATION: Evaluate kidney and adrenal masses showed on CT-scan;  N28.89-Other specified disorders of kidney and ureter; E27.8-Other  specified disorders of adrenal gland; M79.89-Other specified soft tissue  disorders; N28.9-Disorder of kidney and ureter, unspecified;  N18.9-Chronic kidney disease, unspecified; D69.6-Thrombocytopenia,  unspecified        TECHNIQUE: Multisequence multiplanar MR images was obtained of the  abdomen prior to and at the administration of 20 mL intravenous  gadolinium contrast.        COMPARISON: CT abdomen pelvis 6/28/2024     FINDINGS:     Decrease sensitivity due to motion.     Lower Chest: Unremarkable.     Liver: Unremarkable.     Biliary Tree: Small amount of gallbladder sludge versus layering stones.     Spleen: Tiny T2 hyperintense splenic lesion is nonspecific but favored  benign.     Pancreas: 1.5 cm pancreatic body cyst.      Adrenal Glands: 3.3 cm right adrenal nodule, new from prior CT on  5/23/2023.     Kidneys/Upper Ureters:      There is an infiltrative T2 hypointense/T1 isointense diffusion  restricting mass involving the left kidney with extension into the  proximal left collecting system/upper ureters which is difficult to  measure but measures up to 11 cm in cranial caudal dimensions. Please  note the abnormal diffusion restriction does extend below the  field-of-view into the left ureter. There are additional smaller masses  within the left renal cortex.      There are also multiple (at least 6) diffusion restricting masses within  the right kidney measuring up to 3.2 cm. There also appears to be a  diffusion restricting mass in the right renal pelvis.      These are somewhat limited in evaluation on postcontrast images due to  degree of motion, however these these masses do appear to demonstrate  some degree of enhancement.     Small left renal cyst is noted.     Gastrointestinal Tract: Colonic diverticulosis.     Lymphatics: Unremarkable.     Vasculature: Unremarkable.      Peritoneum/Retroperitoneum: Unremarkable.     Abdominal Wall/Soft Tissues: There enhancing diffusion restricting soft  tissue masses, the most dominant and largest cluster is within the right  posterior abdominal wall measuring up to 2.6 cm.     Osseous Structures: There is a diffusion restricting mass involving the  L3 vertebral body with suspected posterior extension into the spinal  canal.       Impression:            Infiltrative mass within the left kidney extending into the proximal  left collecting system as well as numerous additional solid masses in  the bilateral kidneys. Additionally there is a right adrenal mass,  multiple subcutaneous fat soft tissue masses, and a L3 vertebral body  mass with suspected extension into the spinal canal. Findings  collectively are most consistent with metastatic disease which carries a  broad differential,  however some differentials include metastatic breast  cancer, malignant melanoma, and lymphoma. The subtendinous fat soft  tissue masses would be amenable to ultrasound-guided biopsy.     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           This report was signed and finalized on 6/28/2024 6:57 PM by Warren Freire.       CT Chest Without Contrast Diagnostic [[id number]] Collected: 06/28/24 1626     Updated: 06/28/24 1639    Narrative:      CT CHEST WO CONTRAST DIAGNOSTIC- 6/28/2024 3:15 PM     HISTORY: Staging for newly diagnosed metastatic disease; N28.89-Other  specified disorders of kidney and ureter; E27.8-Other specified  disorders of adrenal gland; M79.89-Other specified soft tissue  disorders; N28.9-Disorder of kidney and ureter, unspecified;  N18.9-Chronic kidney disease, unspecified; D69.6-Thrombocytopenia,  unspecified      COMPARISON: 10/13/2022     TOTAL DOSE LENGTH PRODUCT: 413.82 mGy.cm. Automated exposure control was  also utilized to decrease patient radiation dose.     TECHNIQUE: Axial images of the chest are performed without IV contrast  secondary to renal insufficiency.      FINDINGS:    Postoperative changes of the left breast with left axillary  surgical clips. 9 mm medial left breast nodule/mass near the 8 to 9  o'clock position, new from the 2022 CT study which may represent a cyst  or solid mass. Correlation to any outside mammogram and breast  ultrasound recommended. No current mammogram or ultrasound at this  institution.     No pathologic axillary or intrathoracic lymphadenopathy. Cardiomegaly.  Very small pericardial effusion. No pleural effusions.     3.2 cm right adrenal mass. Abnormal appearance of the left greater than  right kidney. Please refer to today's CT abdomen pelvis 6/20/2024.     Basilar atelectasis. A 6 mm nodule along the right major fissure  favoring intrafissural lymph node, present on the 10/13/2022  study.. No  suspicious pulmonary nodules or convincing intrathoracic metastasis. No  pneumothorax. No discrete endobronchial lesion.     No focal aggressive regional bony lesions. Moderate diffuse degenerative  changes of the thoracolumbar junction. New 1.5 cm nodule within the  subcutaneous tissues of the left lower chest/upper abdomen (series 2  image 98. Several soft tissue nodules within the subcutaneous tissue of  the right flank at the L1 level measuring up to 2.1 cm. Smaller 9 mm  soft tissue nodule of the posterior back just left of midline at the L2  level. A left 10 mm posterior subcutaneous nodule at the T12-L1 level.       Impression:      1. Patient with a history of left breast cancer with postoperative  changes of the superior left breast and left axillary surgical clips.  There is a 9 mm nodule/mass of the medial left breast positioned towards  the 8 to 9 o'clock position. Correlation to any recent mammogram or  breast ultrasound recommended, none at this institution. If no outside  studies demonstrating a known 9 mm medial left breast cyst, a follow-up  outpatient mammogram and left breast ultrasound should be performed for  further assessment.  2. Scattered subcutaneous soft tissue nodules of the abdominal wall.  Largest nodules within the right posterior flank measuring up to 2.1 cm  at the L1 level. These appear to represent soft tissue nodules and could  be metastatic deposits. Ultrasound recommended to establish solid  nature. Biopsy could be performed if solid and clinically indicated.  3. Cardiomegaly. Mild vascular calcification.  4. No convincing intrathoracic metastasis. Stable 6 mm nodule in the  right major fissure favoring intrafissural lymph node.  5. Please refer to CT abdomen and pelvis report from today for  description of bilateral renal pathology as well as a 3.2 cm right  adrenal mass.     This report was signed and finalized on 6/28/2024 4:36 PM by Dr. Meaghan Phillips MD.        XR Chest 1 View [[id number]] Collected: 06/28/24 0912     Updated: 06/28/24 0916    Narrative:      EXAM: XR CHEST 1 VW-      DATE: 6/28/2024 8:06 AM     HISTORY: multiple complaints       COMPARISON: 8/10/2023.     TECHNIQUE:  Frontal view(s) of the chest submitted.     FINDINGS:    There are low lung volumes with vascular crowding versus volume  overload/edema. There is enlargement of the cardiac silhouette. No  effusion or pneumothorax is seen.          Impression:         1. Low lung volumes with prominent vasculature may represent vascular  crowding or mild edema.     This report was signed and finalized on 6/28/2024 9:13 AM by Gatito Blackwood.       CT Abdomen Pelvis Stone Protocol [[id number]] Collected: 06/28/24 0817     Updated: 06/28/24 0832    Narrative:      EXAM: CT ABDOMEN PELVIS STONE PROTOCOL-      DATE: 6/28/2024 6:48 AM     HISTORY: flank pain       COMPARISON: 5/23/2023.     DOSE LENGTH PRODUCT: 1399.15 mGy.cm Automatic exposure control was  utilized to make radiation dose as low as reasonably achievable.     TECHNIQUE: Noncontract axial images of the abdomen and pelvis obtained  with multiplanar reformats.     FINDINGS:  VISUALIZED CHEST: There is cardiomegaly without pericardial or pleural  effusion. There is atelectasis at the lung bases which are otherwise  grossly clear.     LIVER/BILE DUCTS: Unenhanced liver is unremarkable. Gallbladder is  distended without inflammatory change. Bile ducts are unremarkable.     KIDNEYS/URETERS: Both kidneys are abnormal in appearance. The left is  larger than the right and there is a suggestion of an underlying  infiltrative left renal mass involving the entire left kidney but  especially at the mid and lower pole extending along the left renal  pelvis and to the proximal left ureter. There are bilateral renal cysts.  There are vascular calcifications and probable nonobstructing stones in  the left kidney. There is no definite hydronephrosis.     ADRENAL:  There is a new right adrenal mass worrisome for neoplasm  measuring 3.5 cm. Left adrenal gland is nodular but unchanged.        SPLEEN: Unremarkable.     PANCREAS: A cyst at the ventral aspect of the body of the pancreas is  unchanged measuring 1 cm. 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     MESENTERY: No mesenteric or retroperitoneal lymphadenopathy is seen. No  free fluid identified.     VASCULATURE: There is mild atherosclerosis of the abdominal aorta  without aneurysm.     GI TRACT: There is a small hiatal hernia. There is diverticulosis of the  colon without acute diverticulitis. No mass or obstruction is seen.     PELVIS: Urinary bladder is unremarkable. There is diverticulosis of the  sigmoid colon without acute diverticulitis. Borderline left external  iliac lymph node measures 0.9 cm in short axis on image #70 of series 3.  This is actually unchanged. Patient is status post hysterectomy.     SOFT TISSUES: There are multiple solid nodules in the subcutaneous soft  tissues which are new from the 5/23/2023 exam. One on the left  anteriorly on image #9 measures 1.5 cm. A cluster of solid nodules on  the right posteriorly and laterally on image #38 noted largest measures  2.5 cm.     BONES: There is spondylosis of the spine and lumbar spine facet  arthropathy. No suspicious osseous lesions are seen.          Impression:      1. Diffusely abnormal left kidney which appears heterogeneous and  enlarged worrisome for neoplasm. Normal soft tissue extends into the  left renal pelvis and along the proximal left ureter. Right kidney also  is abnormal in its. Differential diagnosis would include infiltrative  renal cell carcinoma, lymphoma and metastatic disease.  2. There is also a new right adrenal mass worrisome for metastatic  disease.  3. Innumerable solid nodules in the subcutaneous soft tissues worrisome  for soft tissue metastasis. Cluster of nodules on the right posteriorly  at the mid to lower abdomen is close skin  surface and would likely be  amenable to ultrasound-guided biopsy.        This report was signed and finalized on 6/28/2024 8:29 AM by Gatito Blackwood.       CT Lumbar Spine Without Contrast [[id number]] Collected: 06/28/24 0817     Updated: 06/28/24 0832    Narrative:      EXAMINATION: CT LUMBAR SPINE WO CONTRAST-      6/28/2024 6:48 AM     HISTORY: back pain     In order to have a CT radiation dose as low as reasonably achievable  Automated Exposure Control was utilized for adjustment of the mA and/or  KV according to patient size.     Total DLP = 1399.15 mGy.cm     The CT scan of the lumbar spine is performed without intravenous or  intrathecal contrast enhancement.     Images are acquired in axial plane and subsequent reconstruction in  coronal and sagittal planes.     The comparison is made with the previous study dated 1/21/2024.     There is no evidence of an acute fracture or compression.     No acute displacement or malalignment.     There is a mild levoscoliosis.     There is loss of lumbar lordosis.     There is mild anterolisthesis of L3 over L4 similar to the previous  study. No spondylolysis.     The vertebral body heights are normal.     The loss of height of the intervertebral disc, most pronounced at L5-S1.  There is a vacuum sign at L3-4, L4-5 and L5-S1 representing degenerative  disc disease.     T12-L1: Prominent posterior osteophytes. Mild diffuse disc bulging.  Bilateral facet arthropathy right more than the left. There is right  neuroforaminal stenosis. Spinal canal is patent.     L1-2: Mild disc bulging. No significant osteophytes. Bilateral facet  arthropathy and ligamental hypertrophy. There is a mild spinal stenosis.  Neural foramina are patent.     L2-3: Mild diffuse disc bulging. Bilateral facet arthropathy and  ligamental hypertrophy. There is resultant moderate spinal stenosis.  There is mild bilateral neural foraminal stenosis.     L3-4: Moderate diffuse disc bulging/displacement  "central and bilateral.  There is severe facet arthropathy and ligamental hypertrophy. There is  moderate spinal stenosis. There is bilateral neuroforaminal stenosis  right more than the left.     L4-5: Small posterior osteophytes. Moderate diffuse disc bulging.  Bilateral severe facet arthropathy and ligamental hypertrophy. Moderate  spinal stenosis. Bilateral neuroforaminal stenosis.     L5-S1: Moderately prominent posterior osteophytes. Moderate diffuse disc  bulging. Bilateral facet arthropathy. There is bilateral neuroforaminal  stenosis. Spinal canal is patent.     Limited visualized paravertebral paraspinal soft tissues are  unremarkable. There are some calcification in the renal hilum  bilaterally which probably represent vascular calcification.       Impression:      1. No acute fracture or malalignment.  2. Lumbar spondylosis. Loss of lumbar lordosis. A mild anterolisthesis  L3 over L4.  3. Multilevel prominent disc osteophyte complexes, facet arthropathy and  ligamental hypertrophy and resultant neural foraminal spinal canal  stenosis.                                                  This report was signed and finalized on 6/28/2024 8:29 AM by Dr. Marito Marcano MD.               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  Feel free to contact me if I can be of any further assistance.      Aniceto Coleman MD  6/30/2024  19:25 CDT    "

## 2024-07-01 NOTE — PROGRESS NOTES
Neurosurgery Daily Progress Note    HPI:  Marina Joe is a 79 y.o. female with significant medical comorbidities to include breast cancer, CKD, hypertension, hyperlipidemia, and obesity.  She presents with a new problem of lumbar back and bilateral nondermatomal lower extremity radicular pain and dysesthesias. Physical exam findings of lower extremity hyporeflexia and right leg weakness with saddle anesthesia.  Their imaging shows an enlarged left kidney and right adrenal mass concerning for metastatic disease.  CT of the lumbar spine shows multilevel degenerative changes to include an anteriolisthesis of L3 over L4 and spondylosis at L5-S1. 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  Left thigh numbness improving.  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      2 Days Post-Op (6/29/2024) L3 laminectomy and biopsy of epidural mass  Surgical pathology pending  LSO when out of bed  Continue neuroexam per policy.  Call for appointment  CV: LASHANDA  Pulm: LASHANDA  :  LASHANDA  FEN: NPO for now  Endocrine: LASHANDA  Heme:  Thrombocytopenia.  PLT 91 from 6/30/2024.  Repeat CBC if  "<100 administer 2 units of platelets.  Goal > 150K postop  Dispo: Max PT/OT with brace    Chief complaint:   Back pain bilateral lower extremity numbness and tingling    HPI  Subjective  Stable right proximal leg weakness,    Temp:  [97.5 °F (36.4 °C)-98.7 °F (37.1 °C)] 98.2 °F (36.8 °C)  Heart Rate:  [80-87] 81  Resp:  [18] 18  BP: (129-166)/(52-67) 151/65    Output by Drain (mL) 06/30/24 0701 - 06/30/24 1900 06/30/24 1901 - 07/01/24 0700 07/01/24 0701 - 07/01/24 0927 Range Total   Patient has no LDAs of requested type attached.     Objective:  Vital signs: (most recent): Blood pressure 151/65, pulse 81, temperature 98.2 °F (36.8 °C), temperature source Oral, resp. rate 18, height 172.7 cm (68\"), weight 111 kg (244 lb), SpO2 94%, not currently breastfeeding.        Neurologic Exam     Mental Status   Oriented to person, place, and time.   Speech: speech is normal   Level of consciousness: alert    Cranial Nerves     CN III, IV, VI   Pupils are equal, round, and reactive to light.  Extraocular motions are normal.     CN V   Facial sensation intact.     CN VII   Facial expression full, symmetric.     Motor Exam   Right arm pronator drift: absent  Left arm pronator drift: absent    Strength   Right deltoid: 5/5  Left deltoid: 5/5  Right biceps: 5/5  Left biceps: 5/5  Right triceps: 5/5  Left triceps: 5/5  Right iliopsoas: 4/5  Left iliopsoas: 5/5  Right quadriceps: 4/5  Left quadriceps: 5/5  Right gastroc: 5/5  Left gastroc: 5/5    Sensory Exam   Right arm light touch: normal  Left arm light touch: normal  Right leg light touch: decreased from knee  Left leg light touch: normal  Sensory deficit distribution on right: L3    Lumbar incision(s): C, D, I    Drains: * No LDAs found *    Imaging Results (Last 24 Hours)       Procedure Component Value Units Date/Time    MRI Brain Without Contrast [[id number]] Resulted: 07/01/24 0906     Updated: 07/01/24 0906          Lab Results (last 24 hours)       Procedure Component " Value Units Date/Time    Basic Metabolic Panel [[id number]]  (Abnormal) Collected: 07/01/24 0432    Specimen: Blood Updated: 07/01/24 0533     Glucose 98 mg/dL      BUN 37 mg/dL      Creatinine 2.18 mg/dL      Sodium 137 mmol/L      Potassium 3.7 mmol/L      Chloride 102 mmol/L      CO2 24.0 mmol/L      Calcium 8.5 mg/dL      BUN/Creatinine Ratio 17.0     Anion Gap 11.0 mmol/L      eGFR 22.7 mL/min/1.73     Narrative:      GFR Normal >60  Chronic Kidney Disease <60  Kidney Failure <15    The GFR formula is only valid for adults with stable renal function between ages 18 and 70.    Tissue Pathology Exam [[id number]] Collected: 06/29/24 1526    Specimen: Tissue from Spine, Lumbar Updated: 06/30/24 1501          MARY Rose

## 2024-07-01 NOTE — CONSULTS
[facility]    Palliative Care Initial Consult   Attending Physician: Alysia Lincoln MD  Referring Provider: Alysia Lincoln MD    Reason for Referral: assistance with clarification of goals of care and support for patient and family  Family/Support: Friend, Jose Enrique and children    Code Status and Medical Interventions:   Ordered at: 06/28/24 1037     Level Of Support Discussed With:    Patient    Health Care Surrogate     Code Status (Patient has no pulse and is not breathing):    CPR (Attempt to Resuscitate)     Medical Interventions (Patient has pulse or is breathing):    Full Support     Goals of Care: TBD.    HPI:   79 y.o. female has a past medical history of Breast cancer, CKD stage III, hyperlipidemia, Hypertension, and chronic sinusitis.  ED visit 1/21/2024 due to left-sided sciatica. Patient presented to [facility] on 6/28/2024 related to lower back pain.  Of particular note ongoing back pain and referred to pain clinic, prescribed gabapentin but further developed dizziness after 2-3 doses.  ED workup shows creatinine 2.59, anemia, urine culture negative.  Aberrant CT findings led to further diagnostic workup with MRI abdomen which showed infiltrative mass within the left kidney extending into the proximal left collecting system, left renal vein and possibly abuts the IVC, as well as, numerous additional solid masses in the bilateral kidneys.  There is a right adrenal mass, multiple subcutaneous fat tissue masses and an L3 vertebral body mass with suspected extension into the spinal canal.  Findings most consistent with metastatic disease.  MRI lumbar spine shows neoplastic process involving the L3 vertebral body with associated posterior soft tissue component resulting in severe spinal canal narrowing and cauda equina nerve root compression.  Additional multilevel spondylitic changes including moderate to severe spinal canal and foraminal  narrowing.  Neurosurgery and nephrology consulted.  Underwent lumbar laminectomy L3 with decompression of the spinal cord and biopsy by Dr. 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  Oncology consulted, recommend further staging diagnostics.  Anticipating biopsy of abdominal subcutaneous nodules.  MRI brain shows no definite evidence of metastatic disease.  Fairly extensive cerebral microvascular disease.  Radiation oncology consulted for postop radiation.  Continues to work with therapy, LSO when out of bed per neurosurgery recommendation.  Advance directive completed today with assistance from palliative  and , patient named her friend Jose Enrique followed by 2 children Kailee and Marques as healthcare surrogates.  Initially off unit at 2:16 PM for ultrasound-guided right flank nodule biopsy.  Patient currently sitting on side of bed and seen in conjunction with Dr. Parra, radiation oncologist, who plans for simulation tomorrow followed by palliative radiation treatments 5-10 treatment fractions starting Monday.  Friend/Jose Enrique HARRIS at bedside. Palliative Care Spoke With: patient and HCS Due to the Palliative Care Topics Discussed: palliative care, goals of care, care options, and resuscitation status we will establish an advance care plan.   Advance Care Planning   Advance Care Planning Discussion: Spoke with patient and friend/Jose Enrique HARRIS in room.  Explored events leading to current hospitalization and guarded long-term prognosis secondary to suspected metastatic disease and comorbidities.  Explored medical priorities including CODE STATUS and patient has elected to de-escalate care measures to no CPR/limited support interventions, no intubation, and no artificial nutrition either temporary or permanent.  Based upon these goals we will plan to complete a MOST document prior to discharge.  Jose Enrique Hodges  verbalizes understanding of patient wishes.  States she has had a history of receiving radiation treatments in the past and aware of expectations.  Awaiting further cytology to determine treatment options.  Questions encouraged and support given.     Review of Systems   Constitutional: Positive for malaise/fatigue.   Respiratory:  Negative for shortness of breath.    Musculoskeletal:  Positive for muscle weakness.   Genitourinary:  Negative for dysuria.   Neurological:  Positive for loss of balance and weakness.   Psychiatric/Behavioral:  The patient is not nervous/anxious.      1- Pain Assessment  Nonverbal Indicators of Pain: nonverbal indicators absent  CPOT Facial Expression: 0-->relaxed, neutral  CPOT Body Movements: 0-->absence of movements  CPOT Muscle Tension: 0-->relaxed  Ventilator Compliance/Vocalization: 0-->talking in normal tone or no sound  CPOT Score: 0  Pain Location: back, extremity  Pain Description: spasm, constant, aching    Past Medical History:   Diagnosis Date    Breast cancer     CKD (chronic kidney disease)     Hypercholesteremia     Hypertension     Sinusitis     Stage 3a chronic kidney disease 10/20/2020     Past Surgical History:   Procedure Laterality Date    AVULSION TOENAIL PLATE      Sept 26,2018    BREAST BIOPSY Left 11/20/2012    BREAST BIOPSY      Left Breast, 1/2019 per Dr Barkley    BREAST LUMPECTOMY Left     with node bx     COLONOSCOPY  09/13/2013    small polyp at 30cm benign hyperplastic polyp, changes consistent with melanosis coli. Recall 5 years    COLONOSCOPY  11/19/2018    Tics otherwise normal exam repeat in 5 years    COLONOSCOPY  02/13/2023    Normal exam repeat in 3 years with a 2 day prep    ENDOSCOPY  02/13/2023    Gastritis, small HH    LUMBAR LAMINECTOMY Right 6/29/2024    Procedure: LUMBAR LAMINECTOMY L3 WITH DECOMPRESSION 1-2 LEVELS-RIGHT;  Surgeon: Marcellus Pulido MD;  Location: [facility];  Service: Neurosurgery;  Laterality: Right;    REPLACEMENT  TOTAL KNEE Right     2016    TOTAL ABDOMINAL HYSTERECTOMY WITH SALPINGO OOPHORECTOMY      UPPER ENDOSCOPIC ULTRASOUND W/ FNA  12/20/2023    Pancreatic cyst s/p FNA     Social History     Socioeconomic History    Marital status:    Tobacco Use    Smoking status: Never    Smokeless tobacco: Never   Vaping Use    Vaping status: Never Used   Substance and Sexual Activity    Alcohol use: No    Drug use: Not Currently    Sexual activity: Not Currently     Birth control/protection: Surgical         Current Facility-Administered Medications:     acetaminophen (TYLENOL) tablet 650 mg, 650 mg, Oral, Q8H, 650 mg at 06/30/24 1119 **OR** acetaminophen (TYLENOL) 160 MG/5ML oral solution 650 mg, 650 mg, Oral, Q8H **OR** acetaminophen (TYLENOL) suppository 650 mg, 650 mg, Rectal, Q8H, Marcellus Pulido MD    acetaminophen (TYLENOL) tablet 650 mg, 650 mg, Oral, Q6H PRN, aMrcellus Pulido MD, 650 mg at 06/29/24 0544    albuterol (PROVENTIL) nebulizer solution 0.083% 2.5 mg/3mL, 2.5 mg, Nebulization, Q4H PRN, Marcellus Pulido MD, 2.5 mg at 06/28/24 1040    amLODIPine (NORVASC) tablet 5 mg, 5 mg, Oral, BID, Marcellus Pulido MD, 5 mg at 07/01/24 1006    sennosides-docusate (PERICOLACE) 8.6-50 MG per tablet 2 tablet, 2 tablet, Oral, BID PRN **AND** polyethylene glycol (MIRALAX) packet 17 g, 17 g, Oral, Daily PRN **AND** bisacodyl (DULCOLAX) EC tablet 5 mg, 5 mg, Oral, Daily PRN **AND** bisacodyl (DULCOLAX) suppository 10 mg, 10 mg, Rectal, Daily PRN, Marcellus Pulido MD    buPROPion XL (WELLBUTRIN XL) 24 hr tablet 150 mg, 150 mg, Oral, Daily, Marcellus Pulido MD, 150 mg at 07/01/24 1005    Calcium Replacement - Follow Nurse / BPA Driven Protocol, , Does not apply, PRN, Marcellus Pulido MD    carvedilol (COREG) tablet 6.25 mg, 6.25 mg, Oral, BID With Meals, Marcellus Pulido MD, 6.25 mg at 07/01/24 1006    cetirizine (zyrTEC) tablet 10 mg, 10 mg, Oral, Daily, Marcellus Pulido  MD Oscar, 10 mg at 07/01/24 1005    cloNIDine (CATAPRES) tablet 0.1 mg, 0.1 mg, Oral, BID, Marcellus Pulido MD, 0.1 mg at 07/01/24 1006    cyclobenzaprine (FLEXERIL) tablet 10 mg, 10 mg, Oral, TID PRN, Marcellus Pulido MD, 10 mg at 07/01/24 0817    Diclofenac Sodium (VOLTAREN) 1 % gel 4 g, 4 g, Topical, 4x Daily PRN, Marcellus Pulido MD    fluticasone (FLONASE) 50 MCG/ACT nasal spray 2 spray, 2 spray, Nasal, Daily, Marcellus Pulido MD, 2 spray at 06/30/24 1009    [START ON 7/2/2024] heparin (porcine) 5000 UNIT/ML injection 5,000 Units, 5,000 Units, Subcutaneous, Q12H, Marcellus Pulido MD    hydrALAZINE (APRESOLINE) injection 10 mg, 10 mg, Intravenous, Q4H PRN, Marcellus Pulido MD    HYDROcodone-acetaminophen (NORCO) 5-325 MG per tablet 1 tablet, 1 tablet, Oral, Q4H PRN, Marcellus Pulido MD, 1 tablet at 06/29/24 2019    HYDROcodone-acetaminophen (NORCO) 7.5-325 MG per tablet 1 tablet, 1 tablet, Oral, Q4H PRN, Marcellus Pulido MD, 1 tablet at 07/01/24 0817    Magnesium Low Dose Replacement - Follow Nurse / BPA Driven Protocol, , Does not apply, PRN, Marcellus Pulido MD    montelukast (SINGULAIR) tablet 10 mg, 10 mg, Oral, Nightly, Marcellus Pulido MD, 10 mg at 06/30/24 2105    multivitamin with minerals 1 tablet, 1 tablet, Oral, Daily, Marcellus Pulido MD, 1 tablet at 07/01/24 1005    ondansetron (ZOFRAN) injection 4 mg, 4 mg, Intravenous, Q6H PRN, Marcellus Pulido MD    pantoprazole (PROTONIX) EC tablet 40 mg, 40 mg, Oral, Daily, Marcellus Pulido MD, 40 mg at 07/01/24 1005    Phosphorus Replacement - Follow Nurse / BPA Driven Protocol, , Does not apply, Ashok DE JESUS William Lee, MD    Potassium Replacement - Follow Nurse / BPA Driven Protocol, , Does not apply, Ashok DE JESUS William Lee, MD    rOPINIRole (REQUIP) tablet 0.5 mg, 0.5 mg, Oral, Nightly, Marcellus Pulido MD, 0.5 mg at 06/30/24 2105    rosuvastatin  "(CRESTOR) tablet 10 mg, 10 mg, Oral, Nightly, Marcellus Pulido MD, 10 mg at 06/30/24 2105    sertraline (ZOLOFT) tablet 100 mg, 100 mg, Oral, Daily, Marcellus Pulido MD, 100 mg at 07/01/24 1005    [COMPLETED] Insert Peripheral IV, , , Once **AND** sodium chloride 0.9 % flush 10 mL, 10 mL, Intravenous, PRN, Marcellus Pulido MD    sodium chloride 0.9 % flush 10 mL, 10 mL, Intravenous, Q12H, Marcellus Pulido MD, 10 mL at 06/30/24 2106    sodium chloride 0.9 % flush 10 mL, 10 mL, Intravenous, PRN, Marcellus Pulido MD    sodium chloride 0.9 % flush 10 mL, 10 mL, Intravenous, Q12H, Marcellus Pulido MD, 10 mL at 07/01/24 1006    sodium chloride 0.9 % flush 10 mL, 10 mL, Intravenous, PRN, Marcellus Pulido MD    sodium chloride 0.9 % infusion 40 mL, 40 mL, Intravenous, PRN, Marcellus Pulido MD    sodium chloride 0.9 % infusion 40 mL, 40 mL, Intravenous, PRN, Marcellus Pulido MD    sodium chloride 0.9 % infusion, 100 mL/hr, Intravenous, Continuous, Marcellus Pulido MD, Last Rate: 100 mL/hr at 06/30/24 2106, 100 mL/hr at 06/30/24 2106    Allergies   Allergen Reactions    Aspirin GI Bleeding    Niacin Other (See Comments)     I have utilized all available immediate resources to obtain, update, or review the patient's current medications (including all prescriptions, over-the-counter products, herbals, cannabis/cannabidiol products, and vitamin/mineral/dietary (nutritional) supplements) for name, route of administration, type, dose and frequency.      Intake/Output Summary (Last 24 hours) at 7/1/2024 1111  Last data filed at 7/1/2024 0900  Gross per 24 hour   Intake 610 ml   Output 1450 ml   Net -840 ml       Physical Exam:    Diagnostics: Reviewed  /65 (BP Location: Right arm, Patient Position: Lying)   Pulse 81   Temp 98.2 °F (36.8 °C) (Oral)   Resp 18   Ht 172.7 cm (68\")   Wt 111 kg (244 lb)   LMP  (LMP Unknown)   SpO2 94%   BMI 37.10 kg/m² "     Vitals and nursing note reviewed.   Constitutional:       Appearance: Not in distress.   Eyes:      General: Lids are normal.      Pupils: Pupils are equal, round, and reactive to light.   HENT:      Head: Normocephalic.   Pulmonary:      Effort: Pulmonary effort is normal.   Cardiovascular:      Normal rate.   Edema:     Peripheral edema present.  Abdominal:      Palpations: Abdomen is soft.   Musculoskeletal: Normal range of motion.      Cervical back: Neck supple. Skin:     General: Skin is warm.   Genitourinary:     Comments: No reported dysuria  Neurological:      Mental Status: Alert, oriented to person, place, and time and oriented to person, place and time.   Psychiatric:         Mood and Affect: Mood normal.         Cognition and Memory: Cognition normal.       Patient status: Disease state: Controlled with current treatments.  Current Functional status: Palliative Performance Scale Score: Performance 40% based on the following measures: Ambulation: Mainly in bed, Activity and Evidence of Disease: Unable to do any work, extensive evidence of disease, Self-Care: Mainly assistance required,  Intake: Normal or reduced, LOC: Full, drowsy or confusion   Baseline Functional status: Palliative Performance Scale Score: Performance 70% based on the following measures: Ambulation: Reduced, Activity and Evidence of Disease: Unable to do normal work, some evidence of disease, Self-Care: Fully independent,  Intake: Normal or reduced, LOC: Full   Baseline ECOG Status(3) Capable of limited self-care, confined to bed or chair > 50% of waking hours.  Nutritional status: Albumin 3.9 Body mass index is 37.1 kg/m².         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 plan: Goals of care include status no CPR/limited support interventions.    Family support: The patient receives support from her children and friend, Jose Enrique ..  Advance Directives: Advance Directive Status: Patient does not have advance directive   POA/Healthcare surrogate-Advance directive completed 7/1 with assistance from palliative  and , patient named her friend Jose Enrique followed by 2 children, Kailee and Marques as healthcare surrogates..    2.  Palliative care encounter  - Prognosis is guarded long-term secondary to suspected metastatic disease and comorbidities..  -Patient and HCS appears to have good prognostic awareness.     -Neurosurgery and nephrology consulted.    6/29-Underwent lumbar laminectomy L3 with decompression of the spinal cord and biopsy by Dr. 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    -Oncology consulted, recommend further staging diagnostics.  Anticipating biopsy of abdominal subcutaneous nodules.  MRI brain shows no definite evidence of metastatic disease.    -Radiation oncology consulted for postop radiation.   -Continues to work with therapy, LSO when out of bed per neurosurgery recommendation.    7/1-Advance directive completed today with assistance from palliative  and , patient named her friend Jose Enrique followed by 2 children Kailee and Marques as healthcare surrogates.    7/1-CODE STATUS changes no CPR/limited support interventions, no intubation and no artificial nutrition.    -Will plan to complete a MOST document prior to discharge with assistance from palliative social  worker  -Radiation oncology plans for simulation tomorrow followed by palliative radiation treatments 5-10 treatment fractions starting Monday  -Awaiting cytology to further determine treatment options.      Thank you for this consult and allowing us to participate in patient's plan of care. Palliative Care Team will continue to follow patient.     20 minutes spent on advance care planning-discussing with patient and/or family, answering questions, providing some guidance about a plan and documentation of care, and coordinating care face to face.    Lauren Kuo, MARY  7/1/2024  11:11 CDT

## 2024-07-01 NOTE — CASE MANAGEMENT/SOCIAL WORK
Discharge Planning Assessment   [place]     Patient Name: Marina Joe  MRN: [id number]  Today's Date: 7/1/2024    Admit Date: 6/28/2024        Discharge Needs Assessment       Row Name 07/01/24 0951       Living Environment    People in Home significant other    Name(s) of People in Home Jose Enrique Nieves (Friend)  [phone]    Current Living Arrangements home    Potentially Unsafe Housing Conditions none    In the past 12 months has the electric, gas, oil, or water company threatened to shut off services in your home? No    Primary Care Provided by self    Provides Primary Care For no one    Family Caregiver if Needed significant other    Quality of Family Relationships helpful;involved;supportive    Able to Return to Prior Arrangements yes       Resource/Environmental Concerns    Resource/Environmental Concerns none    Transportation Concerns none       Transportation Needs    In the past 12 months, has lack of transportation kept you from medical appointments or from getting medications? no    In the past 12 months, has lack of transportation kept you from meetings, work, or from getting things needed for daily living? No       Food Insecurity    Within the past 12 months, you worried that your food would run out before you got the money to buy more. Never true    Within the past 12 months, the food you bought just didn't last and you didn't have money to get more. Never true       Transition Planning    Patient/Family Anticipates Transition to home    Patient/Family Anticipated Services at Transition none    Transportation Anticipated family or friend will provide       Discharge Needs Assessment    Equipment Currently Used at Home none    Anticipated Changes Related to Illness none    Equipment Needed After Discharge none    Discharge Coordination/Progress Lives at home with sig other. NO DME. Has MCR coverage & PCP is Hanh Og APRN.  Denies any needs at this time.  CM/SS will follow for any  needs.                   Discharge Plan    No documentation.                 Continued Care and Services - Admitted Since 6/28/2024    No active coordination exists for this encounter.          Demographic Summary    No documentation.                  Functional Status    No documentation.                  Psychosocial    No documentation.                  Abuse/Neglect    No documentation.                  Legal    No documentation.                  Substance Abuse    No documentation.                  Patient Forms    No documentation.                     Naomi Joe RN

## 2024-07-01 NOTE — ACP (ADVANCE CARE PLANNING)
Date of Advanced Steps ACP conversation: 7/1/2024  Location of conversation: Patient's room   ACP Facilitator: Rob Choe  Referral Source: Palliative Care  Time spent in conversation and documentation: under 30 minutes.        Documents Completed:         Living Will Directive:YES      Mrs Joe completed an Advance Directive prior to this encounter with the assistance of Palliative Care.  The document was completed and the original and copies were issued for her use.  A copy was faxed to HIM.

## 2024-07-01 NOTE — THERAPY TREATMENT NOTE
Acute Care - Occupational Therapy Treatment Note  [facility]     Patient Name: Marina Joe  : 1945  MRN: [id number]  Today's Date: 2024             Admit Date: 2024       ICD-10-CM ICD-9-CM   1. 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 Left renal mass  N28.89 593.9   3. Right adrenal mass  E27.8 255.8   4. Nodule of soft tissue  M79.89 729.99   5. Acute on chronic renal insufficiency  N28.9 593.9    N18.9 585.9   6. Thrombocytopenia  D69.6 287.5   7. Cauda equina compression  G83.4 344.60   8. Impaired mobility [Z74.09]  Z74.09 799.89     Patient Active Problem List   Diagnosis    Malignant neoplasm of upper-outer quadrant of female breast    Anemia of chronic renal failure, stage 3 (moderate)    Hypertension, benign    Hx of colonic polyps    HX: breast cancer    Morbidly obese    Right knee DJD    S/P lumpectomy, left breast    Adult hypothyroidism    Anemia    Anxiety    Breast cancer, left    Cervical pain    Chronic insomnia    Elevated lipids    Herpes zoster without complication    Left ear pain    Left otitis externa    Myalgia    Negative depression screening    Obesity (BMI 30-39.9)    Postmenopausal status    Recurrent acute serous otitis media of left ear    Restless leg    Sinusitis, bacterial    Skin lesion    Vitamin D deficiency    Stage 3b chronic kidney disease    GIB (gastrointestinal bleeding)    Acute on chronic blood loss anemia    Chronic idiopathic thrombocytopenia    Hypotension due to hypovolemia    Primary hypertension    Pancreatic lesion    Left renal mass    Cauda equina compression    Metastatic cancer to spine     Past Medical History:   Diagnosis Date    Breast cancer     CKD (chronic kidney disease)     Hypercholesteremia     Hypertension     Sinusitis     Stage 3a chronic kidney disease 10/20/2020     Past Surgical History:   Procedure Laterality Date    AVULSION TOENAIL PLATE          BREAST BIOPSY Left 2012    BREAST BIOPSY      Left  Breast, 1/2019 per Dr Barkley    BREAST LUMPECTOMY Left     with node bx     COLONOSCOPY  09/13/2013    small polyp at 30cm benign hyperplastic polyp, changes consistent with melanosis coli. Recall 5 years    COLONOSCOPY  11/19/2018    Tics otherwise normal exam repeat in 5 years    COLONOSCOPY  02/13/2023    Normal exam repeat in 3 years with a 2 day prep    ENDOSCOPY  02/13/2023    Gastritis, small HH    LUMBAR LAMINECTOMY Right 6/29/2024    Procedure: LUMBAR LAMINECTOMY L3 WITH DECOMPRESSION 1-2 LEVELS-RIGHT;  Surgeon: Marcellus Pulido MD;  Location: [facility] OR;  Service: Neurosurgery;  Laterality: Right;    REPLACEMENT TOTAL KNEE Right     2016    TOTAL ABDOMINAL HYSTERECTOMY WITH SALPINGO OOPHORECTOMY      UPPER ENDOSCOPIC ULTRASOUND W/ FNA  12/20/2023    Pancreatic cyst s/p FNA         OT ASSESSMENT FLOWSHEET (Last 12 Hours)       OT Evaluation and Treatment       Row Name 07/01/24 0737                   OT Time and Intention    Subjective Information complains of;pain  -EC        Document Type therapy note (daily note)  -EC        Mode of Treatment occupational therapy  -EC           General Information    Patient Profile Reviewed yes  -EC        Existing Precautions/Restrictions brace worn when out of bed;fall;LSO;spinal  -EC           Pain Assessment    Pretreatment Pain Rating 10/10  -EC        Posttreatment Pain Rating 10/10  -EC        Pain Location - Side/Orientation Bilateral  -EC        Pain Location lower  -EC        Pain Location - back;extremity  -EC        Pain Intervention(s) Repositioned;Ambulation/increased activity;Medication (See MAR)  -EC           Activities of Daily Living    BADL Assessment/Intervention grooming;toileting;upper body dressing  -EC           Upper Body Dressing Assessment/Training    [place] Level (Upper Body Dressing) upper body dressing skills;set up  -EC        Position (Upper Body Dressing) edge of bed sitting  -EC        Comment, (Upper Body Dressing) LSO  brace  -EC           Grooming Assessment/Training    [place] Level (Grooming) oral care regimen;wash face, hands;set up  -EC        Oral Care teeth brushed - regular toothbrush  -EC        Position (Grooming) unsupported sitting  -EC           Toileting Assessment/Training    [place] Level (Toileting) perform perineal hygiene;standby assist;maximum assist (25% patient effort)  -EC        Position (Toileting) unsupported sitting;supported standing  -EC           Bed Mobility    Bed Mobility rolling left;rolling right;scooting/bridging;sidelying-sit;sit-sidelying  -EC        Rolling Left [place] (Bed Mobility) verbal cues;contact guard  -EC        Rolling Right [place] (Bed Mobility) verbal cues;contact guard  -EC        Scooting/Bridging [place] (Bed Mobility) verbal cues;standby assist  -EC        Sidelying-Sit [place] (Bed Mobility) minimum assist (75% patient effort)  -EC        Sit-Sidelying [place] (Bed Mobility) moderate assist (50% patient effort)  -EC        Assistive Device (Bed Mobility) bed rails;head of bed elevated  -EC           Functional Mobility    Functional Mobility- Ind. Level contact guard assist;verbal cues required  -EC        Functional Mobility- Device walker, front-wheeled  -EC        Functional Mobility- Comment bed<>BR  -EC           Transfer Assessment/Treatment    Transfers sit-stand transfer;stand-sit transfer;toilet transfer  -EC           Sit-Stand Transfer    Sit-Stand [place] (Transfers) verbal cues;minimum assist (75% patient effort)  -EC        Assistive Device (Sit-Stand Transfers) walker, front-wheeled  -EC        Comment, (Sit-Stand Transfer) increased time  -EC           Stand-Sit Transfer    Stand-Sit [place] (Transfers) verbal cues;contact guard  -EC        Assistive Device (Stand-Sit Transfers) walker, front-wheeled  -EC           Toilet Transfer    Type (Toilet Transfer) sit-stand;stand-sit  -EC        [place] Level (Toilet  Transfer) verbal cues;minimum assist (75% patient effort)  -EC        Assistive Device (Toilet Transfer) commode;grab bars/safety frame;walker, front-wheeled  -EC           Wound 06/29/24 1458 lumbar spine Incision    Wound - Properties Group Placement Date: 06/29/24  -KT Placement Time: 1458  -KT Present on Original Admission: N  -KT Location: lumbar spine  -KT Primary Wound Type: Incision  -KT    Retired Wound - Properties Group Placement Date: 06/29/24  -KT Placement Time: 1458  -KT Present on Original Admission: N  -KT Location: lumbar spine  -KT Primary Wound Type: Incision  -KT    Retired Wound - Properties Group Date first assessed: 06/29/24  -KT Time first assessed: 1458 -KT Present on Original Admission: N  -KT Location: lumbar spine  -KT Primary Wound Type: Incision  -KT       Plan of Care Review    Plan of Care Reviewed With patient  -EC        Progress improving  -EC        Outcome Evaluation OT tx completed. Pt c/o 10/10 lower back & leg pain, states her spasms are worse this AM but is agreeable to OOB. Pt performs bed mobility with Kar, she needs increased time for all movement d/t her pain. Dons LSO brace with setup. Pt stood with Kar & vcs for tech'q. She amb to the BR to perform grooming & toileting tasks in supported sitting. While in standing, she was unable to take her hands off the walker therefore needed maxA for perineal hygiene. Pt returned to the bed, RN aware of her pain level. Educational handout given to pt regarding LSO brace wear & spinal precautions. She would benefit from cont OT to maximize her I & safety with ADLs.  -EC           Positioning and Restraints    Pre-Treatment Position in bed  -EC        Post Treatment Position bed  -EC        In Bed fowlers;call light within reach;encouraged to call for assist;side rails up x2  -EC                  User Key  (r) = Recorded By, (t) = Taken By, (c) = Cosigned By      Initials Name Effective Dates    Shravan Ordonez, KUMAR 02/17/22 -      Davida Tanner OTR/L 10/13/23 -                      Occupational Therapy Education       Title: PT OT SLP Therapies (Done)       Topic: Occupational Therapy (Done)       Point: ADL training (Done)       Description:   Instruct learner(s) on proper safety adaptation and remediation techniques during self care or transfers.   Instruct in proper use of assistive devices.                  Learning Progress Summary             Patient Acceptance, E, VU by EC at 7/1/2024 0911    Acceptance, E, VU by KJ at 6/30/2024 0941    Acceptance, E, VU by LS at 6/28/2024 1431                         Point: Home exercise program (Done)       Description:   Instruct learner(s) on appropriate technique for monitoring, assisting and/or progressing therapeutic exercises/activities.                  Learning Progress Summary             Patient Acceptance, E, VU by EC at 7/1/2024 0911    Acceptance, E, VU by KJ at 6/30/2024 0941                         Point: Precautions (Done)       Description:   Instruct learner(s) on prescribed precautions during self-care and functional transfers.                  Learning Progress Summary             Patient Acceptance, E, VU by EC at 7/1/2024 0911    Acceptance, E, VU by KJ at 6/30/2024 0941    Acceptance, E, VU by LS at 6/28/2024 1431                         Point: Body mechanics (Done)       Description:   Instruct learner(s) on proper positioning and spine alignment during self-care, functional mobility activities and/or exercises.                  Learning Progress Summary             Patient Acceptance, E, VU by EC at 7/1/2024 0911    Acceptance, E, VU by KJ at 6/30/2024 0941    Acceptance, E, VU by LS at 6/28/2024 1431                                         User Key       Initials Effective Dates Name Provider Type Discipline    BRANDI 06/20/22 -  Stacey Reagan, OTR/L Occupational Therapist OT    KJ 04/15/24 -  Joanna Marie OT Student OT Student OT    EC 10/13/23 -  Davida Reyes  OTR/L Occupational Therapist OT                      OT Recommendation and Plan     Plan of Care Review  Plan of Care Reviewed With: patient  Progress: improving  Outcome Evaluation: OT tx completed. Pt c/o 10/10 lower back & leg pain, states her spasms are worse this AM but is agreeable to OOB. Pt performs bed mobility with Kar, she needs increased time for all movement d/t her pain. Dons LSO brace with setup. Pt stood with Kar & vcs for tech'q. She amb to the BR to perform grooming & toileting tasks in supported sitting. While in standing, she was unable to take her hands off the walker therefore needed maxA for perineal hygiene. Pt returned to the bed, RN aware of her pain level. Educational handout given to pt regarding LSO brace wear & spinal precautions. She would benefit from cont OT to maximize her I & safety with ADLs.  Plan of Care Reviewed With: patient  Outcome Evaluation: OT tx completed. Pt c/o 10/10 lower back & leg pain, states her spasms are worse this AM but is agreeable to OOB. Pt performs bed mobility with Kar, she needs increased time for all movement d/t her pain. Dons LSO brace with setup. Pt stood with Kar & vcs for tech'q. She amb to the BR to perform grooming & toileting tasks in supported sitting. While in standing, she was unable to take her hands off the walker therefore needed maxA for perineal hygiene. Pt returned to the bed, RN aware of her pain level. Educational handout given to pt regarding LSO brace wear & spinal precautions. She would benefit from cont OT to maximize her I & safety with ADLs.     Outcome Measures       Row Name 07/01/24 0900             How much help from another is currently needed...    Putting on and taking off regular lower body clothing? 3  -EC      Bathing (including washing, rinsing, and drying) 3  -EC      Toileting (which includes using toilet bed pan or urinal) 3  -EC      Putting on and taking off regular upper body clothing 4  -EC      Taking care  of personal grooming (such as brushing teeth) 4  -EC      Eating meals 4  -EC      AM-PAC 6 Clicks Score (OT) 21  -EC         Functional Assessment    Outcome Measure Options AM-PAC 6 Clicks Daily Activity (OT)  -EC                User Key  (r) = Recorded By, (t) = Taken By, (c) = Cosigned By      Initials Name Provider Type    EC Davida Reyes OTR/L Occupational Therapist                    Time Calculation:    Time Calculation- OT       Row Name 07/01/24 0813             Time Calculation- OT    OT Start Time 0739  -EC      OT Stop Time 0812  -EC      OT Time Calculation (min) 33 min  -EC      Total Timed Code Minutes- OT 33 minute(s)  -EC      OT Received On 07/01/24  -EC         Timed Charges    82011 - OT Self Care/Mgmt Minutes 33  -EC         Total Minutes    Timed Charges Total Minutes 33  -EC       Total Minutes 33  -EC                User Key  (r) = Recorded By, (t) = Taken By, (c) = Cosigned By      Initials Name Provider Type    EC Davida Reyes OTR/L Occupational Therapist                           LORETTA Rodriguez/MARY  7/1/2024

## 2024-07-01 NOTE — PLAN OF CARE
Goal Outcome Evaluation:           Progress: no change  Outcome Evaluation: Platelets 74 this am, x2 units of platelets given. redraw CBC timed to 2000, goal to be above 100. New VATs PIV to R wrist area. up to BSC x2 and LSO brace. Ambulated in room with PT. PRN Harrells given x2. Plan to start CT simulation tomorrow and plans to initiate palliative radiation therapy to the L-spine possibly next week. Pallative following, change of code status, and advanced directive completed.

## 2024-07-01 NOTE — THERAPY TREATMENT NOTE
Acute Care - Physical Therapy Treatment Note  [facility]     Patient Name: Marina Jeo  : 1945  MRN: [id number]  Today's Date: 2024      Visit Dx:     ICD-10-CM ICD-9-CM   1. 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 Left renal mass  N28.89 593.9   3. Right adrenal mass  E27.8 255.8   4. Nodule of soft tissue  M79.89 729.99   5. Acute on chronic renal insufficiency  N28.9 593.9    N18.9 585.9   6. Thrombocytopenia  D69.6 287.5   7. Cauda equina compression  G83.4 344.60   8. Impaired mobility [Z74.09]  Z74.09 799.89     Patient Active Problem List   Diagnosis    Malignant neoplasm of upper-outer quadrant of female breast    Anemia of chronic renal failure, stage 3 (moderate)    Hypertension, benign    Hx of colonic polyps    HX: breast cancer    Morbidly obese    Right knee DJD    S/P lumpectomy, left breast    Adult hypothyroidism    Anemia    Anxiety    Breast cancer, left    Cervical pain    Chronic insomnia    Elevated lipids    Herpes zoster without complication    Left ear pain    Left otitis externa    Myalgia    Negative depression screening    Obesity (BMI 30-39.9)    Postmenopausal status    Recurrent acute serous otitis media of left ear    Restless leg    Sinusitis, bacterial    Skin lesion    Vitamin D deficiency    Stage 3b chronic kidney disease    GIB (gastrointestinal bleeding)    Acute on chronic blood loss anemia    Chronic idiopathic thrombocytopenia    Hypotension due to hypovolemia    Primary hypertension    Pancreatic lesion    Left renal mass    Cauda equina compression    Metastatic cancer to spine     Past Medical History:   Diagnosis Date    Breast cancer     CKD (chronic kidney disease)     Hypercholesteremia     Hypertension     Sinusitis     Stage 3a chronic kidney disease 10/20/2020     Past Surgical History:   Procedure Laterality Date    AVULSION TOENAIL PLATE          BREAST BIOPSY Left 2012    BREAST BIOPSY      Left Breast, 2019 per Dr Barkley     BREAST LUMPECTOMY Left     with node bx     COLONOSCOPY  09/13/2013    small polyp at 30cm benign hyperplastic polyp, changes consistent with melanosis coli. Recall 5 years    COLONOSCOPY  11/19/2018    Tics otherwise normal exam repeat in 5 years    COLONOSCOPY  02/13/2023    Normal exam repeat in 3 years with a 2 day prep    ENDOSCOPY  02/13/2023    Gastritis, small HH    LUMBAR LAMINECTOMY Right 6/29/2024    Procedure: LUMBAR LAMINECTOMY L3 WITH DECOMPRESSION 1-2 LEVELS-RIGHT;  Surgeon: Marcellus Pulido MD;  Location:  [facility] OR;  Service: Neurosurgery;  Laterality: Right;    REPLACEMENT TOTAL KNEE Right     2016    TOTAL ABDOMINAL HYSTERECTOMY WITH SALPINGO OOPHORECTOMY      UPPER ENDOSCOPIC ULTRASOUND W/ FNA  12/20/2023    Pancreatic cyst s/p FNA     PT Assessment (Last 12 Hours)       PT Evaluation and Treatment       Row Name 07/01/24 1057          Physical Therapy Time and Intention    Subjective Information complains of;pain  -AB     Document Type therapy note (daily note)  -AB     Mode of Treatment physical therapy  -AB       Row Name 07/01/24 1057          General Information    Existing Precautions/Restrictions brace worn when out of bed;fall;LSO;spinal  -AB       Row Name 07/01/24 1057          Pain    Pretreatment Pain Rating 10/10  -AB     Posttreatment Pain Rating 10/10  -AB     Pain Location - Side/Orientation Bilateral  -AB     Pain Location lower  -AB     Pain Location - back;extremity  -AB       Row Name 07/01/24 1057          Bed Mobility    Sidelying-Sit [place] (Bed Mobility) other (see comments)  sitting EOB  -AB     Sit-Sidelying [place] (Bed Mobility) moderate assist (50% patient effort)  -AB       Row Name 07/01/24 1057          Sit-Stand Transfer    Sit-Stand [place] (Transfers) verbal cues;minimum assist (75% patient effort)  -AB     Assistive Device (Sit-Stand Transfers) walker, front-wheeled  -AB     Comment, (Sit-Stand Transfer) increased time  -AB       Row  Name 07/01/24 1057          Stand-Sit Transfer    Stand-Sit [place] (Transfers) verbal cues;contact guard  -AB     Assistive Device (Stand-Sit Transfers) walker, front-wheeled  -AB       Row Name 07/01/24 1057          Toilet Transfer    Type (Toilet Transfer) sit-stand;stand-sit  -AB     [place] Level (Toilet Transfer) verbal cues;minimum assist (75% patient effort)  -AB     Assistive Device (Toilet Transfer) commode;grab bars/safety frame;walker, front-wheeled  -AB       Row Name 07/01/24 1057          Gait/Stairs (Locomotion)    [place] Level (Gait) contact guard;verbal cues  -AB     Assistive Device (Gait) walker, front-wheeled  -AB     Distance in Feet (Gait) 30  -AB       Row Name             Wound 06/29/24 1458 lumbar spine Incision    Wound - Properties Group Placement Date: 06/29/24  -KT Placement Time: 1458  -KT Present on Original Admission: N  -KT Location: lumbar spine  -KT Primary Wound Type: Incision  -KT    Retired Wound - Properties Group Placement Date: 06/29/24  -KT Placement Time: 1458  -KT Present on Original Admission: N  -KT Location: lumbar spine  -KT Primary Wound Type: Incision  -KT    Retired Wound - Properties Group Date first assessed: 06/29/24  -KT Time first assessed: 1458  -KT Present on Original Admission: N  -KT Location: lumbar spine  -KT Primary Wound Type: Incision  -KT      Row Name 07/01/24 1057          Positioning and Restraints    Pre-Treatment Position in bed  -AB     Post Treatment Position bed  -AB     In Bed fowlers;call light within reach  -AB               User Key  (r) = Recorded By, (t) = Taken By, (c) = Cosigned By      Initials Name Provider Type    AB Carlene Smith PTA Physical Therapist Assistant    Shravan Ordonez, RN Registered Nurse                    Physical Therapy Education       Title: PT OT SLP Therapies (Done)       Topic: Physical Therapy (Done)       Point: Mobility training (Done)       Learning Progress Summary             Patient  Acceptance, E,D, DU,VU by  at 6/30/2024 0951    Comment: benefits of PT and POC, call for A OOB, no BLT, LSO when OOB                         Point: Body mechanics (Done)       Learning Progress Summary             Patient Acceptance, E,D, DU,VU by  at 6/30/2024 0951    Comment: benefits of PT and POC, call for A OOB, no BLT, LSO when OOB                         Point: Precautions (Done)       Learning Progress Summary             Patient Acceptance, E,D, DU,VU by  at 6/30/2024 0951    Comment: benefits of PT and POC, call for A OOB, no BLT, LSO when OOB                                         User Key       Initials Effective Dates Name Provider Type Discipline     02/03/23 -  Eliel León, PT Physical Therapist PT                  PT Recommendation and Plan         Outcome Measures       Row Name 07/01/24 0900             How much help from another is currently needed...    Putting on and taking off regular lower body clothing? 3  -EC      Bathing (including washing, rinsing, and drying) 3  -EC      Toileting (which includes using toilet bed pan or urinal) 3  -EC      Putting on and taking off regular upper body clothing 4  -EC      Taking care of personal grooming (such as brushing teeth) 4  -EC      Eating meals 4  -EC      AM-PAC 6 Clicks Score (OT) 21  -EC         Functional Assessment    Outcome Measure Options AM-PAC 6 Clicks Daily Activity (OT)  -EC                User Key  (r) = Recorded By, (t) = Taken By, (c) = Cosigned By      Initials Name Provider Type    EC Davida Reyes, OTR/L Occupational Therapist                     Time Calculation:    PT Charges       Row Name 07/01/24 1057             Time Calculation    Start Time 1057  -AB      Stop Time 1121  -AB      Time Calculation (min) 24 min  -AB      PT Received On 07/01/24  -AB         Time Calculation- PT    Total Timed Code Minutes- PT 24 minute(s)  -AB                User Key  (r) = Recorded By, (t) = Taken By, (c) = Cosigned By       Initials Name Provider Type    AB Carlene Smith, LAZARO Physical Therapist Assistant                      PT G-Codes  Outcome Measure Options: AM-PAC 6 Clicks Daily Activity (OT)  AM-PAC 6 Clicks Score (PT): 14  AM-PAC 6 Clicks Score (OT): 21    Carlene Smith PTA  7/1/2024

## 2024-07-01 NOTE — CONSULTS
Inpatient Radiation Oncology Consult  Consult performed by: Raul Parra MD  Consult ordered by: Marcellus Pulido MD       Subjective     REASON FOR CONSULTATION: Evaluate for possible postoperative radiation therapy following recent surgical decompression of L3 vertebral body. Provide an opinion on any further workup or treatment                             REQUESTING PHYSICIAN: Marcellus Pulido MD-Neurosurgery    RECORDS OBTAINED:  Records of the patients history including those obtained from the referring provider were reviewed and summarized in detail.    HISTORY OF PRESENT ILLNESS:  The patient is a 79 y.o. year old female who is here for an opinion about the above issue.    History of Present Illness   Patient has a past medical history of Breast cancer, CKD stage III, hyperlipidemia, Hypertension, and chronic sinusitis.  The patient is known to our department having received adjuvant radiation therapy to the left breast following breast conserving surgery to a total of 6040 cGy in 33 fractions completing on 4/10/2013 for a stage I, ER/NH positive, breast cancer.      ED visit 1/21/2024 due to left-sided sciatica. Patient presented to [facility] on 6/28/2024 related to lower back pain.  Of particular note ongoing back pain and referred to pain clinic, prescribed gabapentin but further developed dizziness after 2-3 doses.  ED workup shows creatinine 2.59, anemia, urine culture negative.  Aberrant CT findings led to further diagnostic workup with MRI abdomen which showed infiltrative mass within the left kidney extending into the proximal left collecting system, left renal vein and possibly abuts the IVC, as well as, numerous additional solid masses in the bilateral kidneys.  There is a right adrenal mass, multiple subcutaneous fat tissue masses and an L3 vertebral body mass with suspected extension into the spinal canal.  Findings most consistent with metastatic disease.  MRI lumbar  spine shows neoplastic process involving the L3 vertebral body with associated posterior soft tissue component resulting in severe spinal canal narrowing and cauda equina nerve root compression.  Additional multilevel spondylitic changes including moderate to severe spinal canal and foraminal narrowing.  Neurosurgery and nephrology consulted.  Underwent lumbar laminectomy L3 with decompression of the spinal cord and biopsy by Dr. 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  Oncology consulted, recommend further staging diagnostics.  Anticipating biopsy of abdominal subcutaneous nodules.  MRI brain shows no definite evidence of metastatic disease.  Fairly extensive cerebral microvascular disease.  Radiation oncology consulted for postop radiation.  Continues to work with therapy, LSO when out of bed per neurosurgery recommendation.  Advance directive completed today with assistance from palliative  and , patient named her friend Jose Enrique followed by 2 children Kailee and Marques as healthcare surrogates.     Past Medical History:   Diagnosis Date    Breast cancer     CKD (chronic kidney disease)     Hypercholesteremia     Hypertension     Sinusitis     Stage 3a chronic kidney disease 10/20/2020        Past Surgical History:   Procedure Laterality Date    AVULSION TOENAIL PLATE      Sept 26,2018    BREAST BIOPSY Left 11/20/2012    BREAST BIOPSY      Left Breast, 1/2019 per Dr Barkley    BREAST LUMPECTOMY Left     with node bx     COLONOSCOPY  09/13/2013    small polyp at 30cm benign hyperplastic polyp, changes consistent with melanosis coli. Recall 5 years    COLONOSCOPY  11/19/2018    Tics otherwise normal exam repeat in 5 years    COLONOSCOPY  02/13/2023    Normal exam repeat in 3 years with a 2 day prep    ENDOSCOPY  02/13/2023    Gastritis, small HH    LUMBAR LAMINECTOMY Right 6/29/2024    Procedure:  LUMBAR LAMINECTOMY L3 WITH DECOMPRESSION 1-2 LEVELS-RIGHT;  Surgeon: Marcellus Pulido MD;  Location: [facility] OR;  Service: Neurosurgery;  Laterality: Right;    REPLACEMENT TOTAL KNEE Right     2016    TOTAL ABDOMINAL HYSTERECTOMY WITH SALPINGO OOPHORECTOMY      UPPER ENDOSCOPIC ULTRASOUND W/ FNA  12/20/2023    Pancreatic cyst s/p FNA        No current facility-administered medications on file prior to encounter.     Current Outpatient Medications on File Prior to Encounter   Medication Sig Dispense Refill    amLODIPine (NORVASC) 5 MG tablet Take 1 tablet by mouth 2 (Two) Times a Day.      buPROPion XL (WELLBUTRIN XL) 150 MG 24 hr tablet Take 1 tablet by mouth Daily.      Calcium Carb-Cholecalciferol (CALCIUM 600+D3 PO) Take 1 tablet by mouth Daily.      carvedilol (COREG) 6.25 MG tablet Take 1 tablet by mouth 2 (Two) Times a Day With Meals.      dapagliflozin Propanediol (Farxiga) 10 MG tablet Take 10 mg by mouth Daily.      famotidine (PEPCID) 20 MG tablet Take 1 tablet by mouth 2 (Two) Times a Day Before Meals. 60 tablet 0    fluticasone (FLONASE) 50 MCG/ACT nasal spray 2 sprays into the nostril(s) as directed by provider Daily. 1 g 3    irbesartan-hydrochlorothiazide (AVALIDE) 300-12.5 MG tablet Take 1 tablet by mouth Daily.      montelukast (SINGULAIR) 10 MG tablet Take 1 tablet by mouth Every Night.      Multiple Vitamins-Minerals (MULTIVITAMIN ADULT) tablet Take 1 tablet by mouth Daily.      pantoprazole (PROTONIX) 40 MG EC tablet Take 1 tablet by mouth Daily.      rOPINIRole (REQUIP) 0.5 MG tablet Take 1 tablet by mouth Every Night. Take 1 hour before bedtime.      rosuvastatin (CRESTOR) 20 MG tablet Take 1 tablet by mouth Daily.      valACYclovir (VALTREX) 500 MG tablet Take 1 tablet by mouth 2 (Two) Times a Day.      albuterol sulfate  (90 Base) MCG/ACT inhaler Inhale 2 puffs Every 4 (Four) Hours As Needed for Wheezing. 2 g 3    cetirizine (zyrTEC) 10 MG tablet Take 1 tablet by mouth Daily.       cloNIDine (CATAPRES) 0.1 MG tablet Take 1 tablet by mouth 2 (Two) Times a Day.      cyclobenzaprine (FLEXERIL) 10 MG tablet Take 1 tablet by mouth 3 (Three) Times a Day As Needed for Muscle Spasms.      Diclofenac Sodium (VOLTAREN) 1 % gel gel Apply 4 g topically to the appropriate area as directed 4 (Four) Times a Day As Needed (arthralgia). 240 g 3    Ergocalciferol (VITAMIN D2 PO) Take 50,000 Units by mouth Every 30 (Thirty) Days.      naproxen sodium (ALEVE) 220 MG tablet Take 1 tablet by mouth 2 (Two) Times a Day As Needed.      sertraline (ZOLOFT) 100 MG tablet Take 1 tablet by mouth Daily.          ALLERGIES:    Allergies   Allergen Reactions    Aspirin GI Bleeding    Niacin Other (See Comments)        Social History     Socioeconomic History    Marital status:    Tobacco Use    Smoking status: Never    Smokeless tobacco: Never   Vaping Use    Vaping status: Never Used   Substance and Sexual Activity    Alcohol use: No    Drug use: Not Currently    Sexual activity: Not Currently     Birth control/protection: Surgical        Family History   Problem Relation Age of Onset    Heart attack Mother     Hodgkin's lymphoma Father     Other Father     Cancer Father         hodgkins    Heart attack Brother     Skin cancer Maternal Uncle     Pancreatic cancer Maternal Uncle     Cancer Maternal Uncle         eye    Colon cancer Neg Hx     Colon polyps Neg Hx         Review of Systems   Constitutional:  Positive for activity change.   HENT: Negative.     Eyes: Negative.    Respiratory: Negative.     Cardiovascular: Negative.    Gastrointestinal:  Positive for constipation. Negative for blood in stool, diarrhea, nausea and vomiting.   Genitourinary:  Negative for dysuria, hematuria, pelvic pain, vaginal bleeding and vaginal discharge.   Musculoskeletal:  Positive for back pain and gait problem.        Patient reports significant change in ability to ambulate since admission.  She now states she requires  significant support and a walker to ambulate   Skin:         Reports palpable subcutaneous nodules in various places including, but not limited to left breast, right flank, anterior right leg,    Neurological:  Positive for weakness. Negative for dizziness, seizures, syncope, speech difficulty and headaches.   Psychiatric/Behavioral: Negative.          Objective     Vitals:    07/01/24 0426 07/01/24 0840 07/01/24 1133 07/01/24 1215   BP: 166/67 151/65 138/55 156/72   BP Location: Right arm Right arm     Patient Position: Lying Lying     Pulse: 82 81 69 68   Resp: 18 18 18 20   Temp: 98.7 °F (37.1 °C) 98.2 °F (36.8 °C) 97.6 °F (36.4 °C) 98 °F (36.7 °C)   TempSrc: Oral Oral Oral Oral   SpO2: 94% 94% 98% 96%   Weight:       Height:             5/14/2024     8:34 AM   Current Status   ECOG score 1       Physical Exam  Vitals reviewed.   Constitutional:       General: She is in acute distress.      Appearance: She is obese. She is not ill-appearing or toxic-appearing.      Comments: Patient sitting up in hospital bed, but limited to hospital bed secondary to lower extremity pain and weakness primarily in the right lower extremity.   HENT:      Head: Normocephalic and atraumatic.      Mouth/Throat:      Mouth: Mucous membranes are moist.      Pharynx: Oropharynx is clear.   Eyes:      Extraocular Movements: Extraocular movements intact.      Pupils: Pupils are equal, round, and reactive to light.   Cardiovascular:      Rate and Rhythm: Normal rate and regular rhythm.   Pulmonary:      Effort: Pulmonary effort is normal.      Breath sounds: Normal breath sounds.   Chest:   Breasts:     Left: Normal.      Comments: There is a well-healed surgical scar on the upper outer quadrant of the left breast.  I was not able to palpate the mass that the patient reports.  Abdominal:      General: There is no distension.      Palpations: Abdomen is soft.      Tenderness: There is no abdominal tenderness.   Musculoskeletal:          General: Normal range of motion.      Cervical back: Normal range of motion.   Lymphadenopathy:      Cervical: No cervical adenopathy.   Skin:     General: Skin is warm and dry.   Neurological:      Mental Status: She is alert and oriented to person, place, and time.      Cranial Nerves: No cranial nerve deficit.      Gait: Gait abnormal.   Psychiatric:         Mood and Affect: Mood normal.         Behavior: Behavior normal.         Thought Content: Thought content normal.         Judgment: Judgment normal.           RECENT LABS:  Hematology WBC   Date Value Ref Range Status   07/01/2024 8.22 3.40 - 10.80 10*3/mm3 Final     RBC   Date Value Ref Range Status   07/01/2024 3.23 (L) 3.77 - 5.28 10*6/mm3 Final     Hemoglobin   Date Value Ref Range Status   07/01/2024 9.2 (L) 12.0 - 15.9 g/dL Final     Hematocrit   Date Value Ref Range Status   07/01/2024 29.2 (L) 34.0 - 46.6 % Final     Platelets   Date Value Ref Range Status   07/01/2024 74 (L) 140 - 450 10*3/mm3 Final          Assessment & Plan   Problems Addressed this Visit          Other    HX: breast cancer    Relevant Orders    US Soft Tissue    Chronic idiopathic thrombocytopenia    Relevant Medications    heparin (porcine) 5000 UNIT/ML injection 5,000 Units (Start on 7/2/2024  9:00 AM)    * (Principal) Left renal mass    Cauda equina compression    Relevant Orders    Case Request (Completed)    Tissue Pathology Exam    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    Relevant Orders    Case Request (Completed)    Tissue Pathology Exam     Other Visit Diagnoses       Right adrenal mass        Nodule of soft tissue        Acute on chronic renal insufficiency        Impaired mobility [Z74.09]              Diagnoses         Codes Comments    Metastatic cancer to spine    -  Primary ICD-10-CM: C79.51  ICD-9-CM: 198.5     Left renal mass     ICD-10-CM: N28.89  ICD-9-CM: 593.9     Right adrenal mass     ICD-10-CM: E27.8  ICD-9-CM: 255.8     Nodule of soft tissue     ICD-10-CM:  M79.89  ICD-9-CM: 729.99     Acute on chronic renal insufficiency     ICD-10-CM: N28.9, N18.9  ICD-9-CM: 593.9, 585.9     Thrombocytopenia     ICD-10-CM: D69.6  ICD-9-CM: 287.5     Cauda equina compression     ICD-10-CM: G83.4  ICD-9-CM: 344.60     Impaired mobility [Z74.09]     ICD-10-CM: Z74.09  ICD-9-CM: 799.89     HX: breast cancer     ICD-10-CM: Z85.3  ICD-9-CM: V10.3           The patient now has evidence of metastatic disease with preliminary diagnosis of small round blue cells from the decompression laminectomy.  This is not consistent with her original breast cancer diagnosis and may be a new primary with suspicious lesions seen in the left kidney and right adrenal gland.    I have reviewed the patient's images directly and spoke to Dr. Pulido directly.  As we await final and more definitive pathology on the metastatic lesion from T3, we agree that given the histologic characteristics at this time, as well as the clinical picture, the risk of recurrence and progression of what appears to be a high-grade lesion is significant.  We also agree that a much more expedient delivery of postoperative radiation therapy to the lumbar region is indicated in an effort to prevent disease progression and further morbidity.  We will move forward with CT simulation tomorrow and plans to initiate palliative radiation therapy to the L-spine starting next week Monday, delivering daily 2000 to 3000 cGy over 5-10 daily fractions.    Fortunately the patient had an MRI of the brain today that reveals no evidence of metastatic disease.  Pathology from biopsiesof subcutaneous nodules from the right flank are pending.  The patient reports a palpable mass in the left breast that I could not palpate that should be considered to be evaluated as well.

## 2024-07-01 NOTE — PLAN OF CARE
Goal Outcome Evaluation:  Plan of Care Reviewed With: patient        Progress: improving  Outcome Evaluation: OT tx completed. Pt c/o 10/10 lower back & leg pain, states her spasms are worse this AM but is agreeable to OOB. Pt performs bed mobility with Kar, she needs increased time for all movement d/t her pain. Dons LSO brace with setup. Pt stood with Kar & vcs for tech'q. She amb to the BR to perform grooming & toileting tasks in supported sitting. While in standing, she was unable to take her hands off the walker therefore needed maxA for perineal hygiene. Pt returned to the bed, RN aware of her pain level. Educational handout given to pt regarding LSO brace wear & spinal precautions. She would benefit from cont OT to maximize her I & safety with ADLs.

## 2024-07-01 NOTE — PROGRESS NOTES
"[facility] Progress Note    Progress Note    Patient:  Marina Joe  YOB: 1945  Date of Service: 7/1/2024  MRN: [id number]   Acct: [id number]   Primary Care Physician: Hanh Og APRN  Advance Directive:   Code Status and Medical Interventions:   Ordered at: 06/28/24 1037     Level Of Support Discussed With:    Patient    Health Care Surrogate     Code Status (Patient has no pulse and is not breathing):    CPR (Attempt to Resuscitate)     Medical Interventions (Patient has pulse or is breathing):    Full Support     Admit Date: 6/28/2024       Hospital Day: 3      Subjective:     Chief Compliant: \"Back spasms\"    Subjective: Restless night due to back discomfort and, \"spasms\".  States she has been ambulatory to and from the bathroom and has been urinating well.  No BMs so far.    Interval history:  Marina Joe 79 y.o. 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     She reports that she has been experiencing back pain for several weeks. Since her presentation on 6/28/2024 she had the following workup:  - CT-A/P showed abnormal heterogenous left kidney mass, abnormal right kidney, new right adrenal mass, innumerable solid nodules in the subcutaneous soft tissues all concerning for metastatic disease process.  - CT lumbar spine showing multilevel prominent disc osteophyte complexes acet arthropathy and ligamental hypertrophy and resultant neural foraminal spinal canal stenosis.  - CT chest showed a left breast nodule of 9 mm, scattered subcutaneous soft tissue nodules in the abdominal wall, otherwise there is no sign of intrathoracic metastases.  - MRI-abdomen showed infiltrative mass within the left kidney extending into the proximal left collecting system as " "well as numerous additional solid masses in the bilateral kidneys. Additionally there is a right adrenal mass, multiple subcutaneous fat soft tissue masses, and a L3 vertebral body mass with suspected extension into the spinal canal. 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  - MRI-L-spine: Neoplastic process involving the L3 vertebral body with associated posterior soft tissue component resulting in severe spinal canal narrowing and cauda equina nerve root compression. The soft tissue component appears to also extend into the right L3-L4 foramen. No associated pathological fracture. There are also appears to be tumor signal extending into the right L2-L3 neural foramen.     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    Review of Systems  Review of Systems:   Constitutional: Fatigue.  Says she has been ambulatory with assistance.  Appetite fair.  No fever / No chills / No sweats  HEENT:  No sore throat / No hoarseness / No vision changes  CV:  No chest pain / No palpitations / No orthopnea  Respiratory:  No cough / No shortness of breath / No sputum / No hemoptysis  GI:  No nausea / No vomiting / No abdominal pain / No diarrhea / +constipation (no bowel movement so far)  :  No dysuria / No hesitancy / No urgency / No hematuria  Neuro:  + Lower extremity muscle weakness / No dysphagia / No headache / No paresthesias  Musculoskeletal:  No edema / No cyanosis / + postop back pain    Vitals: /67 (BP Location: Right arm, Patient Position: Lying)   Pulse 82   Temp 98.7 °F (37.1 °C) (Oral)   Resp 18   Ht 172.7 cm (68\") "   Wt 111 kg (244 lb)   LMP  (LMP Unknown)   SpO2 94%   BMI 37.10 kg/m²     Physical Exam  Physical Exam:  General appearance: Pleasant, obese, modestly elderly female alert, appears stated age and cooperative.  In no acute distress while in bed.  Skin:  Skin color a bit pale. No rashes or lesions  HEENT:  Head: Normal, normocephalic, atraumatic.  Neck:  no adenopathy, no tenderness/mass/nodules  Lungs:  clear to auscultation bilaterally  Heart:  regular rate and rhythm  Abdomen:  soft, non-tender  Extremities:  extremities normal, atraumatic, no cyanosis or edema  Neurologic:  Mental status: Alert, oriented, thought content appropriate    24HR INTAKE/OUTPUT:    Intake/Output Summary (Last 24 hours) at 7/1/2024 0725  Last data filed at 7/1/2024 0426  Gross per 24 hour   Intake 620 ml   Output 1650 ml   Net -1030 ml        Batista Catheter: No    Diet: Diet: Diabetic; Consistent Carbohydrate; Fluid Consistency: Thin (IDDSI 0)      Medications:   Scheduled Meds:acetaminophen, 650 mg, Oral, Q8H   Or  acetaminophen, 650 mg, Oral, Q8H   Or  acetaminophen, 650 mg, Rectal, Q8H  amLODIPine, 5 mg, Oral, BID  buPROPion XL, 150 mg, Oral, Daily  carvedilol, 6.25 mg, Oral, BID With Meals  cetirizine, 10 mg, Oral, Daily  cloNIDine, 0.1 mg, Oral, BID  fluticasone, 2 spray, Nasal, Daily  [START ON 7/2/2024] heparin (porcine), 5,000 Units, Subcutaneous, Q12H  montelukast, 10 mg, Oral, Nightly  multivitamin with minerals, 1 tablet, Oral, Daily  pantoprazole, 40 mg, Oral, Daily  rOPINIRole, 0.5 mg, Oral, Nightly  rosuvastatin, 10 mg, Oral, Nightly  sertraline, 100 mg, Oral, Daily  sodium chloride, 10 mL, Intravenous, Q12H  sodium chloride, 10 mL, Intravenous, Q12H  valACYclovir, 500 mg, Oral, Q24H        Continuous Infusions:sodium chloride, 100 mL/hr, Last Rate: 100 mL/hr (06/30/24 2106)        Labs:     Results from last 7 days   Lab Units 06/30/24  0434 06/28/24  0617 06/25/24  0901   WBC 10*3/mm3 8.64 8.11 6.83   HEMOGLOBIN g/dL  "9.5* 11.6* 11.7*   HEMATOCRIT % 29.9* 36.6 36.1   PLATELETS 10*3/mm3 91* 81* 90*        Results from last 7 days   Lab Units 07/01/24  0432 06/30/24  0434 06/29/24  1858 06/28/24  1153 06/28/24  0617 06/25/24  0901   SODIUM mmol/L 137 137 140   < > 135* 134*   POTASSIUM mmol/L 3.7 4.0 4.6   < > 3.9 3.7   CHLORIDE mmol/L 102 101 104   < > 95* 95*   CO2 mmol/L 24.0 24.0 22.0   < > 26.0 27.0   BUN mg/dL 37* 34* 34*   < > 32* 30*   CREATININE mg/dL 2.18* 2.39* 2.62*   < > 2.59* 2.39*   CALCIUM mg/dL 8.5* 8.4* 9.0   < > 10.0 9.7   BILIRUBIN mg/dL  --  <0.2  --   --  0.3 0.3   ALK PHOS U/L  --  78  --   --  85 90   ALT (SGPT) U/L  --  21  --   --  18 19   AST (SGOT) U/L  --  35*  --   --  22 20   GLUCOSE mg/dL 98 120* 95   < > 99 121*    < > = values in this interval not displayed.       ABG:  No results found for: \"PHART\", \"PO2ART\", \"ALA8GUR\"    Culture Data:   Urine Culture   Date Value Ref Range Status   06/28/2024 >100,000 CFU/mL Mixed Bhavana Isolated  Final       No results found for: \"PATHINTERP\"    Radiology:     Imaging Results (Last 7 Days)       Procedure Component Value Units Date/Time    MRI Lumbar Spine With & Without Contrast [[id number]] Collected: 06/28/24 1920     Updated: 06/28/24 1945    Addenda:        Additional findings: There are also appears to be tumor signal extending  into the right L2-L3 neural foramen.     This report was signed and finalized on 6/28/2024 7:42 PM by Warren Freire.     Signed: 06/28/24 1942 by Warren Freire,     Narrative:      Exam: MRI LUMBAR SPINE W WO CONTRAST- 6/28/2024 3:43 PM     HISTORY: Lumbar back and bilateral nondermatomal leg pain and  dysesthesias; N28.89-Other specified disorders of kidney and ureter;  E27.8-Other specified disorders of adrenal gland; M79.89-Other specified  soft tissue disorders; N28.9-Disorder of kidney and ureter, unspecified;  N18.9-Chronic kidney disease, unspecified; D69.6-Thrombocytopenia,  unspecified     COMPARISON: 6/28/2024 " lumbar spine CT     TECHNIQUE: Multiplanar, multisequence MRI of the lumbar spine was  obtained prior to and after the administration of 20 mL intravenous  gadolinium contrast.     FINDINGS:     Grade 1 degenerative anterolisthesis of L3-L4.     Within the L3 vertebral body there is fairly diffuse T2 hyperintense  signal with associated T1 hypointense marrow infiltration and suspected  mild enhancement. There is an associated posterior enhancing soft tissue  mass extending into the spinal canal causing severe stenosis and  compression of the cauda equina nerve roots. The soft tissue component  also appears to extend superiorly to the L2-L3 disc level. There appears  to be tumor extension into the right L3-L4 foramen.     No visible fracture.     Multilevel mild degenerative disc disease with posterior disc osteophyte  complexes. Multilevel facet hypertrophic changes. No significant facet  periarticular edema. Multilevel ligamentum flavum hypertrophy.  Multilevel prominent posterior epidural fat.     Conus appears to terminate at L1-L2.     T11-T12: There appears to be a at least mild to moderate spinal canal  and mild to moderate bilateral foraminal narrowing.     T12-L1: No significant spinal canal narrowing. Moderate right foraminal  narrowing.     L1-L2: Moderate spinal canal narrowing. Mild to moderate right foraminal  narrowing.     L2-L3: Severe spinal canal narrowing. Moderate to severe right foraminal  narrowing.     L3-L4: Severe spinal canal narrowing. Moderate right foraminal  narrowing.     L4-L5: Moderate spinal canal narrowing and bilateral subarticular  narrowing encroaching the descending bilateral L5 nerve roots. Mild  bilateral foraminal narrowing.     L5-S1: No significant spinal canal or foraminal narrowing.     Extraspinal findings: Please see separately dictated MR abdomen and CT  abdomen/pelvis from the same day.       Impression:         Neoplastic process involving the L3 vertebral body with  associated  posterior soft tissue component resulting in severe spinal canal  narrowing and cauda equina nerve root compression. The soft tissue  component appears to also extend into the right L3-L4 foramen. No  associated pathological fracture.     Additional multilevel spondylotic changes including moderate to severe  spinal canal and foraminal narrowing as detailed above.           This report was signed and finalized on 6/28/2024 7:36 PM by Warren Freire.       MRI Abdomen With & Without Contrast [[id number]] Collected: 06/28/24 1826     Updated: 06/28/24 1943    Addenda:        Additional findings: Tumor signal from the left renal mass appears to  extend along the left renal vein and possibly abuts the IVC and is  suspicious for neoplastic involvement. An additional differential also  includes multifocal primary renal malignancies with metastatic disease.     This report was signed and finalized on 6/28/2024 7:40 PM by Warren Freire.     Signed: 06/28/24 1940 by Warren Freire DO    Narrative:      EXAM: MRI ABDOMEN W WO CONTRAST-     INDICATION: Evaluate kidney and adrenal masses showed on CT-scan;  N28.89-Other specified disorders of kidney and ureter; E27.8-Other  specified disorders of adrenal gland; M79.89-Other specified soft tissue  disorders; N28.9-Disorder of kidney and ureter, unspecified;  N18.9-Chronic kidney disease, unspecified; D69.6-Thrombocytopenia,  unspecified        TECHNIQUE: Multisequence multiplanar MR images was obtained of the  abdomen prior to and at the administration of 20 mL intravenous  gadolinium contrast.        COMPARISON: CT abdomen pelvis 6/28/2024     FINDINGS:     Decrease sensitivity due to motion.     Lower Chest: Unremarkable.     Liver: Unremarkable.     Biliary Tree: Small amount of gallbladder sludge versus layering stones.     Spleen: Tiny T2 hyperintense splenic lesion is nonspecific but favored  benign.     Pancreas: 1.5 cm pancreatic body cyst.     Adrenal  Glands: 3.3 cm right adrenal nodule, new from prior CT on  5/23/2023.     Kidneys/Upper Ureters:      There is an infiltrative T2 hypointense/T1 isointense diffusion  restricting mass involving the left kidney with extension into the  proximal left collecting system/upper ureters which is difficult to  measure but measures up to 11 cm in cranial caudal dimensions. Please  note the abnormal diffusion restriction does extend below the  field-of-view into the left ureter. There are additional smaller masses  within the left renal cortex.      There are also multiple (at least 6) diffusion restricting masses within  the right kidney measuring up to 3.2 cm. There also appears to be a  diffusion restricting mass in the right renal pelvis.      These are somewhat limited in evaluation on postcontrast images due to  degree of motion, however these these masses do appear to demonstrate  some degree of enhancement.     Small left renal cyst is noted.     Gastrointestinal Tract: Colonic diverticulosis.     Lymphatics: Unremarkable.     Vasculature: Unremarkable.      Peritoneum/Retroperitoneum: Unremarkable.     Abdominal Wall/Soft Tissues: There enhancing diffusion restricting soft  tissue masses, the most dominant and largest cluster is within the right  posterior abdominal wall measuring up to 2.6 cm.     Osseous Structures: There is a diffusion restricting mass involving the  L3 vertebral body with suspected posterior extension into the spinal  canal.       Impression:            Infiltrative mass within the left kidney extending into the proximal  left collecting system as well as numerous additional solid masses in  the bilateral kidneys. Additionally there is a right adrenal mass,  multiple subcutaneous fat soft tissue masses, and a L3 vertebral body  mass with suspected extension into the spinal canal. Findings  collectively are most consistent with metastatic disease which carries a  broad differential, however some  differentials include metastatic breast  cancer, malignant melanoma, and lymphoma. The subtendinous fat soft  tissue masses would be amenable to ultrasound-guided biopsy.     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           This report was signed and finalized on 6/28/2024 6:57 PM by Warren Freire.       CT Chest Without Contrast Diagnostic [[id number]] Collected: 06/28/24 1626     Updated: 06/28/24 1639    Narrative:      CT CHEST WO CONTRAST DIAGNOSTIC- 6/28/2024 3:15 PM     HISTORY: Staging for newly diagnosed metastatic disease; N28.89-Other  specified disorders of kidney and ureter; E27.8-Other specified  disorders of adrenal gland; M79.89-Other specified soft tissue  disorders; N28.9-Disorder of kidney and ureter, unspecified;  N18.9-Chronic kidney disease, unspecified; D69.6-Thrombocytopenia,  unspecified      COMPARISON: 10/13/2022     TOTAL DOSE LENGTH PRODUCT: 413.82 mGy.cm. Automated exposure control was  also utilized to decrease patient radiation dose.     TECHNIQUE: Axial images of the chest are performed without IV contrast  secondary to renal insufficiency.      FINDINGS:    Postoperative changes of the left breast with left axillary  surgical clips. 9 mm medial left breast nodule/mass near the 8 to 9  o'clock position, new from the 2022 CT study which may represent a cyst  or solid mass. Correlation to any outside mammogram and breast  ultrasound recommended. No current mammogram or ultrasound at this  institution.     No pathologic axillary or intrathoracic lymphadenopathy. Cardiomegaly.  Very small pericardial effusion. No pleural effusions.     3.2 cm right adrenal mass. Abnormal appearance of the left greater than  right kidney. Please refer to today's CT abdomen pelvis 6/20/2024.     Basilar atelectasis. A 6 mm nodule along the right major fissure  favoring intrafissural lymph node, present on the 10/13/2022 study..  No  suspicious pulmonary nodules or convincing intrathoracic metastasis. No  pneumothorax. No discrete endobronchial lesion.     No focal aggressive regional bony lesions. Moderate diffuse degenerative  changes of the thoracolumbar junction. New 1.5 cm nodule within the  subcutaneous tissues of the left lower chest/upper abdomen (series 2  image 98. Several soft tissue nodules within the subcutaneous tissue of  the right flank at the L1 level measuring up to 2.1 cm. Smaller 9 mm  soft tissue nodule of the posterior back just left of midline at the L2  level. A left 10 mm posterior subcutaneous nodule at the T12-L1 level.       Impression:      1. Patient with a history of left breast cancer with postoperative  changes of the superior left breast and left axillary surgical clips.  There is a 9 mm nodule/mass of the medial left breast positioned towards  the 8 to 9 o'clock position. Correlation to any recent mammogram or  breast ultrasound recommended, none at this institution. If no outside  studies demonstrating a known 9 mm medial left breast cyst, a follow-up  outpatient mammogram and left breast ultrasound should be performed for  further assessment.  2. Scattered subcutaneous soft tissue nodules of the abdominal wall.  Largest nodules within the right posterior flank measuring up to 2.1 cm  at the L1 level. These appear to represent soft tissue nodules and could  be metastatic deposits. Ultrasound recommended to establish solid  nature. Biopsy could be performed if solid and clinically indicated.  3. Cardiomegaly. Mild vascular calcification.  4. No convincing intrathoracic metastasis. Stable 6 mm nodule in the  right major fissure favoring intrafissural lymph node.  5. Please refer to CT abdomen and pelvis report from today for  description of bilateral renal pathology as well as a 3.2 cm right  adrenal mass.     This report was signed and finalized on 6/28/2024 4:36 PM by Dr. Meaghan Phillips MD.       XR  Chest 1 View [[id number]] Collected: 06/28/24 0912     Updated: 06/28/24 0916    Narrative:      EXAM: XR CHEST 1 VW-      DATE: 6/28/2024 8:06 AM     HISTORY: multiple complaints       COMPARISON: 8/10/2023.     TECHNIQUE:  Frontal view(s) of the chest submitted.     FINDINGS:    There are low lung volumes with vascular crowding versus volume  overload/edema. There is enlargement of the cardiac silhouette. No  effusion or pneumothorax is seen.          Impression:         1. Low lung volumes with prominent vasculature may represent vascular  crowding or mild edema.     This report was signed and finalized on 6/28/2024 9:13 AM by Gatito Blackwood.       CT Abdomen Pelvis Stone Protocol [[id number]] Collected: 06/28/24 0817     Updated: 06/28/24 0832    Narrative:      EXAM: CT ABDOMEN PELVIS STONE PROTOCOL-      DATE: 6/28/2024 6:48 AM     HISTORY: flank pain       COMPARISON: 5/23/2023.     DOSE LENGTH PRODUCT: 1399.15 mGy.cm Automatic exposure control was  utilized to make radiation dose as low as reasonably achievable.     TECHNIQUE: Noncontract axial images of the abdomen and pelvis obtained  with multiplanar reformats.     FINDINGS:  VISUALIZED CHEST: There is cardiomegaly without pericardial or pleural  effusion. There is atelectasis at the lung bases which are otherwise  grossly clear.     LIVER/BILE DUCTS: Unenhanced liver is unremarkable. Gallbladder is  distended without inflammatory change. Bile ducts are unremarkable.     KIDNEYS/URETERS: Both kidneys are abnormal in appearance. The left is  larger than the right and there is a suggestion of an underlying  infiltrative left renal mass involving the entire left kidney but  especially at the mid and lower pole extending along the left renal  pelvis and to the proximal left ureter. There are bilateral renal cysts.  There are vascular calcifications and probable nonobstructing stones in  the left kidney. There is no definite hydronephrosis.     ADRENAL: There  is a new right adrenal mass worrisome for neoplasm  measuring 3.5 cm. Left adrenal gland is nodular but unchanged.        SPLEEN: Unremarkable.     PANCREAS: A cyst at the ventral aspect of the body of the pancreas is  unchanged measuring 1 cm. 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     MESENTERY: No mesenteric or retroperitoneal lymphadenopathy is seen. No  free fluid identified.     VASCULATURE: There is mild atherosclerosis of the abdominal aorta  without aneurysm.     GI TRACT: There is a small hiatal hernia. There is diverticulosis of the  colon without acute diverticulitis. No mass or obstruction is seen.     PELVIS: Urinary bladder is unremarkable. There is diverticulosis of the  sigmoid colon without acute diverticulitis. Borderline left external  iliac lymph node measures 0.9 cm in short axis on image #70 of series 3.  This is actually unchanged. Patient is status post hysterectomy.     SOFT TISSUES: There are multiple solid nodules in the subcutaneous soft  tissues which are new from the 5/23/2023 exam. One on the left  anteriorly on image #9 measures 1.5 cm. A cluster of solid nodules on  the right posteriorly and laterally on image #38 noted largest measures  2.5 cm.     BONES: There is spondylosis of the spine and lumbar spine facet  arthropathy. No suspicious osseous lesions are seen.          Impression:      1. Diffusely abnormal left kidney which appears heterogeneous and  enlarged worrisome for neoplasm. Normal soft tissue extends into the  left renal pelvis and along the proximal left ureter. Right kidney also  is abnormal in its. Differential diagnosis would include infiltrative  renal cell carcinoma, lymphoma and metastatic disease.  2. There is also a new right adrenal mass worrisome for metastatic  disease.  3. Innumerable solid nodules in the subcutaneous soft tissues worrisome  for soft tissue metastasis. Cluster of nodules on the right posteriorly  at the mid to lower abdomen is close skin  surface and would likely be  amenable to ultrasound-guided biopsy.        This report was signed and finalized on 6/28/2024 8:29 AM by Gatito Blackwood.       CT Lumbar Spine Without Contrast [[id number]] Collected: 06/28/24 0817     Updated: 06/28/24 0832    Narrative:      EXAMINATION: CT LUMBAR SPINE WO CONTRAST-      6/28/2024 6:48 AM     HISTORY: back pain     In order to have a CT radiation dose as low as reasonably achievable  Automated Exposure Control was utilized for adjustment of the mA and/or  KV according to patient size.     Total DLP = 1399.15 mGy.cm     The CT scan of the lumbar spine is performed without intravenous or  intrathecal contrast enhancement.     Images are acquired in axial plane and subsequent reconstruction in  coronal and sagittal planes.     The comparison is made with the previous study dated 1/21/2024.     There is no evidence of an acute fracture or compression.     No acute displacement or malalignment.     There is a mild levoscoliosis.     There is loss of lumbar lordosis.     There is mild anterolisthesis of L3 over L4 similar to the previous  study. No spondylolysis.     The vertebral body heights are normal.     The loss of height of the intervertebral disc, most pronounced at L5-S1.  There is a vacuum sign at L3-4, L4-5 and L5-S1 representing degenerative  disc disease.     T12-L1: Prominent posterior osteophytes. Mild diffuse disc bulging.  Bilateral facet arthropathy right more than the left. There is right  neuroforaminal stenosis. Spinal canal is patent.     L1-2: Mild disc bulging. No significant osteophytes. Bilateral facet  arthropathy and ligamental hypertrophy. There is a mild spinal stenosis.  Neural foramina are patent.     L2-3: Mild diffuse disc bulging. Bilateral facet arthropathy and  ligamental hypertrophy. There is resultant moderate spinal stenosis.  There is mild bilateral neural foraminal stenosis.     L3-4: Moderate diffuse disc bulging/displacement  central and bilateral.  There is severe facet arthropathy and ligamental hypertrophy. There is  moderate spinal stenosis. There is bilateral neuroforaminal stenosis  right more than the left.     L4-5: Small posterior osteophytes. Moderate diffuse disc bulging.  Bilateral severe facet arthropathy and ligamental hypertrophy. Moderate  spinal stenosis. Bilateral neuroforaminal stenosis.     L5-S1: Moderately prominent posterior osteophytes. Moderate diffuse disc  bulging. Bilateral facet arthropathy. There is bilateral neuroforaminal  stenosis. Spinal canal is patent.     Limited visualized paravertebral paraspinal soft tissues are  unremarkable. There are some calcification in the renal hilum  bilaterally which probably represent vascular calcification.       Impression:      1. No acute fracture or malalignment.  2. Lumbar spondylosis. Loss of lumbar lordosis. A mild anterolisthesis  L3 over L4.  3. Multilevel prominent disc osteophyte complexes, facet arthropathy and  ligamental hypertrophy and resultant neural foraminal spinal canal  stenosis.                                                  This report was signed and finalized on 6/28/2024 8:29 AM by Dr. Marito Marcano MD.                 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  History of stage I left breast cancer  Left breast nodule of 9 mm  Anemia.  Contribution from CKD+/- malignancy/anemia of chronic disease  -Hgb 9.5; MCV 90.3, 6/30/2024 (prior: Hgb 11.3-11.8)  Thrombocytopenia  -91,000, 6/30/2024 (prior patito: 81,000)  CKD 4  -GFR 22.7, 7/1/2024 (prior: 15.6/20.3)     PLAN:  - The  patient's clinical picture is concerning for metastatic disease process, given large heterogenous left kidney mass, new right adrenal nodule, multiple subcutaneous soft tissue nodules, and cord compression at L3 concerning for epidural metastasis.  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  - We will await final pathology results to further explore treatment options.  - Outpatient staging PET-scan   - Schedule brain MRI (noncontrast) for staging.  - Obtain diagnostic mammography to evaluate the left breast nodule.  -Schedule biopsy of abdominal subcutaneous nodules  -Manual blood smear, PT/PTT, fibrinogen, platelet antibody profile  - Again discussed with the patient that we will await final pathology results before discussing prognosis and possible treatment options.  I cautioned that therapy will likely have been palliative rather than curative intent.        I spent ~35 minutes caring for Marina on this date of service. This time includes time spent by me in the following activities: preparing for the visit, reviewing tests, performing a medically appropriate examination and/or evaluation, counseling and educating the patient/family/caregiver, ordering medications, tests, or procedures and documenting information in the medical record

## 2024-07-01 NOTE — PLAN OF CARE
Goal Outcome Evaluation:         Patient A/O x4 this  shift. Patient treated for pain per MAR. Patient remained able to get up x1 assist back/forth to bsc. Patient able to get some rest between care. Will update oncoming dayshift nurse at bedside shift report in the a.m. as appropriate.

## 2024-07-02 LAB
ANION GAP SERPL CALCULATED.3IONS-SCNC: 11 MMOL/L (ref 5–15)
BASOPHILS # BLD AUTO: 0.03 10*3/MM3 (ref 0–0.2)
BASOPHILS # BLD AUTO: 0.03 10*3/MM3 (ref 0–0.2)
BASOPHILS NFR BLD AUTO: 0.3 % (ref 0–1.5)
BASOPHILS NFR BLD AUTO: 0.4 % (ref 0–1.5)
BH BB BLOOD EXPIRATION DATE: NORMAL
BH BB BLOOD EXPIRATION DATE: NORMAL
BH BB BLOOD TYPE BARCODE: 5100
BH BB BLOOD TYPE BARCODE: 7300
BH BB DISPENSE STATUS: NORMAL
BH BB DISPENSE STATUS: NORMAL
BH BB PRODUCT CODE: NORMAL
BH BB PRODUCT CODE: NORMAL
BH BB UNIT NUMBER: NORMAL
BH BB UNIT NUMBER: NORMAL
BUN SERPL-MCNC: 30 MG/DL (ref 8–23)
BUN/CREAT SERPL: 18 (ref 7–25)
CALCIUM SPEC-SCNC: 8.9 MG/DL (ref 8.6–10.5)
CHLORIDE SERPL-SCNC: 103 MMOL/L (ref 98–107)
CO2 SERPL-SCNC: 24 MMOL/L (ref 22–29)
CREAT SERPL-MCNC: 1.67 MG/DL (ref 0.57–1)
DEPRECATED RDW RBC AUTO: 53.4 FL (ref 37–54)
DEPRECATED RDW RBC AUTO: 54.2 FL (ref 37–54)
EGFRCR SERPLBLD CKD-EPI 2021: 31.2 ML/MIN/1.73
EOSINOPHIL # BLD AUTO: 0.28 10*3/MM3 (ref 0–0.4)
EOSINOPHIL # BLD AUTO: 0.3 10*3/MM3 (ref 0–0.4)
EOSINOPHIL NFR BLD AUTO: 3.4 % (ref 0.3–6.2)
EOSINOPHIL NFR BLD AUTO: 3.5 % (ref 0.3–6.2)
ERYTHROCYTE [DISTWIDTH] IN BLOOD BY AUTOMATED COUNT: 16.2 % (ref 12.3–15.4)
ERYTHROCYTE [DISTWIDTH] IN BLOOD BY AUTOMATED COUNT: 16.3 % (ref 12.3–15.4)
GLUCOSE SERPL-MCNC: 107 MG/DL (ref 65–99)
HCT VFR BLD AUTO: 25.3 % (ref 34–46.6)
HCT VFR BLD AUTO: 26.1 % (ref 34–46.6)
HGB BLD-MCNC: 7.9 G/DL (ref 12–15.9)
HGB BLD-MCNC: 8.3 G/DL (ref 12–15.9)
IMM GRANULOCYTES # BLD AUTO: 0.05 10*3/MM3 (ref 0–0.05)
IMM GRANULOCYTES # BLD AUTO: 0.06 10*3/MM3 (ref 0–0.05)
IMM GRANULOCYTES NFR BLD AUTO: 0.6 % (ref 0–0.5)
IMM GRANULOCYTES NFR BLD AUTO: 0.7 % (ref 0–0.5)
LYMPHOCYTES # BLD AUTO: 0.93 10*3/MM3 (ref 0.7–3.1)
LYMPHOCYTES # BLD AUTO: 1.07 10*3/MM3 (ref 0.7–3.1)
LYMPHOCYTES NFR BLD AUTO: 10.8 % (ref 19.6–45.3)
LYMPHOCYTES NFR BLD AUTO: 12.9 % (ref 19.6–45.3)
MCH RBC QN AUTO: 28.2 PG (ref 26.6–33)
MCH RBC QN AUTO: 28.4 PG (ref 26.6–33)
MCHC RBC AUTO-ENTMCNC: 31.2 G/DL (ref 31.5–35.7)
MCHC RBC AUTO-ENTMCNC: 31.8 G/DL (ref 31.5–35.7)
MCV RBC AUTO: 89.4 FL (ref 79–97)
MCV RBC AUTO: 90.4 FL (ref 79–97)
MONOCYTES # BLD AUTO: 0.76 10*3/MM3 (ref 0.1–0.9)
MONOCYTES # BLD AUTO: 0.84 10*3/MM3 (ref 0.1–0.9)
MONOCYTES NFR BLD AUTO: 10.2 % (ref 5–12)
MONOCYTES NFR BLD AUTO: 8.8 % (ref 5–12)
NEUTROPHILS NFR BLD AUTO: 6 10*3/MM3 (ref 1.7–7)
NEUTROPHILS NFR BLD AUTO: 6.55 10*3/MM3 (ref 1.7–7)
NEUTROPHILS NFR BLD AUTO: 72.5 % (ref 42.7–76)
NEUTROPHILS NFR BLD AUTO: 75.9 % (ref 42.7–76)
NRBC BLD AUTO-RTO: 0 /100 WBC (ref 0–0.2)
NRBC BLD AUTO-RTO: 0 /100 WBC (ref 0–0.2)
PLATELET # BLD AUTO: 110 10*3/MM3 (ref 140–450)
PLATELET # BLD AUTO: 75 10*3/MM3 (ref 140–450)
PMV BLD AUTO: 12.7 FL (ref 6–12)
PMV BLD AUTO: 13.1 FL (ref 6–12)
POTASSIUM SERPL-SCNC: 3.6 MMOL/L (ref 3.5–5.2)
POTASSIUM SERPL-SCNC: 4.3 MMOL/L (ref 3.5–5.2)
RAD ONC ARIA COURSE END DATE: NORMAL
RAD ONC ARIA COURSE ID: NORMAL
RAD ONC ARIA COURSE INTENT: NORMAL
RAD ONC ARIA COURSE LAST TREATMENT DATE: NORMAL
RAD ONC ARIA COURSE START DATE: NORMAL
RAD ONC ARIA COURSE TREATMENT ELAPSED DAYS: 44
RAD ONC ARIA FIRST TREATMENT DATE: NORMAL
RAD ONC ARIA PLAN FRACTIONS TREATED TO DATE: 28
RAD ONC ARIA PLAN FRACTIONS TREATED TO DATE: 5
RAD ONC ARIA PLAN ID: NORMAL
RAD ONC ARIA PLAN ID: NORMAL
RAD ONC ARIA PLAN NAME: NORMAL
RAD ONC ARIA PLAN NAME: NORMAL
RAD ONC ARIA PLAN PRESCRIBED DOSE PER FRACTION: 1.8 GY
RAD ONC ARIA PLAN PRESCRIBED DOSE PER FRACTION: 2 GY
RAD ONC ARIA PLAN PRIMARY REFERENCE POINT: NORMAL
RAD ONC ARIA PLAN PRIMARY REFERENCE POINT: NORMAL
RAD ONC ARIA PLAN TOTAL FRACTIONS PRESCRIBED: 28
RAD ONC ARIA PLAN TOTAL FRACTIONS PRESCRIBED: 5
RAD ONC ARIA PLAN TOTAL PRESCRIBED DOSE: 1000 CGY
RAD ONC ARIA PLAN TOTAL PRESCRIBED DOSE: 5040 CGY
RAD ONC ARIA REFERENCE POINT DOSAGE GIVEN TO DATE: 50.4 GY
RAD ONC ARIA REFERENCE POINT DOSAGE GIVEN TO DATE: 60.4 GY
RAD ONC ARIA REFERENCE POINT ID: NORMAL
RAD ONC ARIA REFERENCE POINT ID: NORMAL
RBC # BLD AUTO: 2.8 10*6/MM3 (ref 3.77–5.28)
RBC # BLD AUTO: 2.92 10*6/MM3 (ref 3.77–5.28)
SODIUM SERPL-SCNC: 138 MMOL/L (ref 136–145)
UNIT  ABO: NORMAL
UNIT  ABO: NORMAL
UNIT  RH: NORMAL
UNIT  RH: NORMAL
WBC NRBC COR # BLD AUTO: 8.27 10*3/MM3 (ref 3.4–10.8)
WBC NRBC COR # BLD AUTO: 8.63 10*3/MM3 (ref 3.4–10.8)

## 2024-07-02 PROCEDURE — 80048 BASIC METABOLIC PNL TOTAL CA: CPT | Performed by: INTERNAL MEDICINE

## 2024-07-02 PROCEDURE — 36430 TRANSFUSION BLD/BLD COMPNT: CPT

## 2024-07-02 PROCEDURE — 25010000002 HEPARIN (PORCINE) PER 1000 UNITS: Performed by: NEUROLOGICAL SURGERY

## 2024-07-02 PROCEDURE — 25810000003 SODIUM CHLORIDE 0.9 % SOLUTION: Performed by: NEUROLOGICAL SURGERY

## 2024-07-02 PROCEDURE — P9035 PLATELET PHERES LEUKOREDUCED: HCPCS

## 2024-07-02 PROCEDURE — 99024 POSTOP FOLLOW-UP VISIT: CPT | Performed by: NURSE PRACTITIONER

## 2024-07-02 PROCEDURE — 97530 THERAPEUTIC ACTIVITIES: CPT

## 2024-07-02 PROCEDURE — 99233 SBSQ HOSP IP/OBS HIGH 50: CPT | Performed by: INTERNAL MEDICINE

## 2024-07-02 PROCEDURE — 84132 ASSAY OF SERUM POTASSIUM: CPT | Performed by: HOSPITALIST

## 2024-07-02 PROCEDURE — 99232 SBSQ HOSP IP/OBS MODERATE 35: CPT | Performed by: CLINICAL NURSE SPECIALIST

## 2024-07-02 PROCEDURE — 97116 GAIT TRAINING THERAPY: CPT

## 2024-07-02 PROCEDURE — 77334 RADIATION TREATMENT AID(S): CPT | Performed by: RADIOLOGY

## 2024-07-02 PROCEDURE — P9037 PLATE PHERES LEUKOREDU IRRAD: HCPCS

## 2024-07-02 PROCEDURE — 85025 COMPLETE CBC W/AUTO DIFF WBC: CPT | Performed by: NURSE PRACTITIONER

## 2024-07-02 PROCEDURE — 77263 THER RADIOLOGY TX PLNG CPLX: CPT | Performed by: RADIOLOGY

## 2024-07-02 PROCEDURE — 25010000002 METHYLNALTREXONE 12 MG/0.6ML SOLUTION: Performed by: NURSE PRACTITIONER

## 2024-07-02 PROCEDURE — 77290 THER RAD SIMULAJ FIELD CPLX: CPT | Performed by: RADIOLOGY

## 2024-07-02 PROCEDURE — P9100 PATHOGEN TEST FOR PLATELETS: HCPCS

## 2024-07-02 PROCEDURE — 97535 SELF CARE MNGMENT TRAINING: CPT

## 2024-07-02 RX ORDER — POTASSIUM CHLORIDE 750 MG/1
40 CAPSULE, EXTENDED RELEASE ORAL EVERY 4 HOURS
Status: COMPLETED | OUTPATIENT
Start: 2024-07-02 | End: 2024-07-02

## 2024-07-02 RX ADMIN — METHYLNALTREXONE BROMIDE 6 MG: 12 INJECTION, SOLUTION SUBCUTANEOUS at 11:25

## 2024-07-02 RX ADMIN — BUPROPION HYDROCHLORIDE 150 MG: 150 TABLET, EXTENDED RELEASE ORAL at 08:34

## 2024-07-02 RX ADMIN — ROSUVASTATIN CALCIUM 10 MG: 10 TABLET, FILM COATED ORAL at 20:48

## 2024-07-02 RX ADMIN — HYDROCODONE BITARTRATE AND ACETAMINOPHEN 1 TABLET: 7.5; 325 TABLET ORAL at 06:36

## 2024-07-02 RX ADMIN — CLONIDINE HYDROCHLORIDE 0.1 MG: 0.1 TABLET ORAL at 08:34

## 2024-07-02 RX ADMIN — SERTRALINE 100 MG: 50 TABLET, FILM COATED ORAL at 08:34

## 2024-07-02 RX ADMIN — Medication 1 TABLET: at 08:34

## 2024-07-02 RX ADMIN — CETIRIZINE HYDROCHLORIDE 10 MG: 10 TABLET ORAL at 08:34

## 2024-07-02 RX ADMIN — HEPARIN SODIUM 5000 UNITS: 5000 INJECTION INTRAVENOUS; SUBCUTANEOUS at 08:35

## 2024-07-02 RX ADMIN — MONTELUKAST SODIUM 10 MG: 10 TABLET, FILM COATED ORAL at 20:48

## 2024-07-02 RX ADMIN — HYDROCODONE BITARTRATE AND ACETAMINOPHEN 1 TABLET: 7.5; 325 TABLET ORAL at 00:40

## 2024-07-02 RX ADMIN — HYDROCODONE BITARTRATE AND ACETAMINOPHEN 1 TABLET: 7.5; 325 TABLET ORAL at 10:45

## 2024-07-02 RX ADMIN — CYCLOBENZAPRINE 10 MG: 10 TABLET, FILM COATED ORAL at 16:15

## 2024-07-02 RX ADMIN — HYDROCODONE BITARTRATE AND ACETAMINOPHEN 1 TABLET: 5; 325 TABLET ORAL at 16:15

## 2024-07-02 RX ADMIN — PANTOPRAZOLE SODIUM 40 MG: 40 TABLET, DELAYED RELEASE ORAL at 08:35

## 2024-07-02 RX ADMIN — HYDROCODONE BITARTRATE AND ACETAMINOPHEN 1 TABLET: 7.5; 325 TABLET ORAL at 21:13

## 2024-07-02 RX ADMIN — AMLODIPINE BESYLATE 5 MG: 5 TABLET ORAL at 08:34

## 2024-07-02 RX ADMIN — ROPINIROLE HYDROCHLORIDE 0.5 MG: 0.25 TABLET, FILM COATED ORAL at 20:48

## 2024-07-02 RX ADMIN — HEPARIN SODIUM 5000 UNITS: 5000 INJECTION INTRAVENOUS; SUBCUTANEOUS at 20:48

## 2024-07-02 RX ADMIN — POTASSIUM CHLORIDE 40 MEQ: 750 CAPSULE, EXTENDED RELEASE ORAL at 11:24

## 2024-07-02 RX ADMIN — SODIUM CHLORIDE 100 ML/HR: 900 INJECTION, SOLUTION INTRAVENOUS at 04:49

## 2024-07-02 RX ADMIN — FLUTICASONE PROPIONATE 2 SPRAY: 50 SPRAY, METERED NASAL at 08:37

## 2024-07-02 RX ADMIN — AMLODIPINE BESYLATE 5 MG: 5 TABLET ORAL at 20:47

## 2024-07-02 RX ADMIN — POTASSIUM CHLORIDE 40 MEQ: 750 CAPSULE, EXTENDED RELEASE ORAL at 06:36

## 2024-07-02 RX ADMIN — CARVEDILOL 6.25 MG: 6.25 TABLET, FILM COATED ORAL at 08:34

## 2024-07-02 RX ADMIN — CARVEDILOL 6.25 MG: 6.25 TABLET, FILM COATED ORAL at 18:59

## 2024-07-02 RX ADMIN — CLONIDINE HYDROCHLORIDE 0.1 MG: 0.1 TABLET ORAL at 20:48

## 2024-07-02 NOTE — THERAPY TREATMENT NOTE
Acute Care - Physical Therapy Treatment Note  [facility]     Patient Name: Marina Joe  : 1945  MRN: [id number]  Today's Date: 2024      Visit Dx:     ICD-10-CM ICD-9-CM   1. 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 Left renal mass  N28.89 593.9   3. Right adrenal mass  E27.8 255.8   4. Nodule of soft tissue  M79.89 729.99   5. Acute on chronic renal insufficiency  N28.9 593.9    N18.9 585.9   6. Thrombocytopenia  D69.6 287.5   7. Cauda equina compression  G83.4 344.60   8. Impaired mobility [Z74.09]  Z74.09 799.89   9. HX: breast cancer  Z85.3 V10.3     Patient Active Problem List   Diagnosis    Malignant neoplasm of upper-outer quadrant of female breast    Anemia of chronic renal failure, stage 3 (moderate)    Hypertension, benign    Hx of colonic polyps    HX: breast cancer    Morbidly obese    Right knee DJD    S/P lumpectomy, left breast    Adult hypothyroidism    Anemia    Anxiety    Breast cancer, left    Cervical pain    Chronic insomnia    Elevated lipids    Herpes zoster without complication    Left ear pain    Left otitis externa    Myalgia    Negative depression screening    Obesity (BMI 30-39.9)    Postmenopausal status    Recurrent acute serous otitis media of left ear    Restless leg    Sinusitis, bacterial    Skin lesion    Vitamin D deficiency    Stage 3b chronic kidney disease    GIB (gastrointestinal bleeding)    Acute on chronic blood loss anemia    Chronic idiopathic thrombocytopenia    Hypotension due to hypovolemia    Primary hypertension    Pancreatic lesion    Left renal mass    Cauda equina compression    Metastatic cancer to spine     Past Medical History:   Diagnosis Date    Breast cancer     CKD (chronic kidney disease)     Hypercholesteremia     Hypertension     Sinusitis     Stage 3a chronic kidney disease 10/20/2020     Past Surgical History:   Procedure Laterality Date    AVULSION TOENAIL PLATE          BREAST BIOPSY Left 2012    BREAST BIOPSY       Left Breast, 1/2019 per Dr Barkley    BREAST LUMPECTOMY Left     with node bx     COLONOSCOPY  09/13/2013    small polyp at 30cm benign hyperplastic polyp, changes consistent with melanosis coli. Recall 5 years    COLONOSCOPY  11/19/2018    Tics otherwise normal exam repeat in 5 years    COLONOSCOPY  02/13/2023    Normal exam repeat in 3 years with a 2 day prep    ENDOSCOPY  02/13/2023    Gastritis, small HH    LUMBAR LAMINECTOMY Right 6/29/2024    Procedure: LUMBAR LAMINECTOMY L3 WITH DECOMPRESSION 1-2 LEVELS-RIGHT;  Surgeon: Marcellus Pulido MD;  Location: [facility] OR;  Service: Neurosurgery;  Laterality: Right;    REPLACEMENT TOTAL KNEE Right     2016    TOTAL ABDOMINAL HYSTERECTOMY WITH SALPINGO OOPHORECTOMY      UPPER ENDOSCOPIC ULTRASOUND W/ FNA  12/20/2023    Pancreatic cyst s/p FNA     PT Assessment (Last 12 Hours)       PT Evaluation and Treatment       Row Name 07/02/24 1336 07/02/24 1300       Physical Therapy Time and Intention    Subjective Information complains of;pain  -AB --    Document Type therapy note (daily note)  -AB --    Mode of Treatment physical therapy  -AB --  -AB      Row Name 07/02/24 0817          Physical Therapy Time and Intention    Subjective Information complains of;pain  -AB     Document Type therapy note (daily note)  -AB     Mode of Treatment physical therapy  -AB       Row Name 07/02/24 1336 07/02/24 0817       General Information    Existing Precautions/Restrictions brace worn when out of bed;fall;LSO;spinal  -AB brace worn when out of bed;fall;LSO;spinal  -AB      Row Name 07/02/24 1336 07/02/24 0817       Pain    Pretreatment Pain Rating 10/10  -AB 10/10  -AB    Posttreatment Pain Rating 10/10  -AB 10/10  -AB    Pain Location - Side/Orientation Bilateral  -AB --    Pain Location lower  -AB --    Pain Location - back;extremity  -AB --      Row Name 07/02/24 1336 07/02/24 0817       Bed Mobility    Rolling Left Bland (Bed Mobility) -- verbal cues;minimum assist  (75% patient effort)  -AB    Sidelying-Sit Bastian (Bed Mobility) not tested  sitting EOB  -AB verbal cues;minimum assist (75% patient effort)  -AB    Sit-Sidelying Bastian (Bed Mobility) not tested  on BSC  -AB not tested  up in chair  -AB      Row Name 07/02/24 1336 07/02/24 0817       Sit-Stand Transfer    Sit-Stand Bastian (Transfers) verbal cues;contact guard;minimum assist (75% patient effort)  -AB verbal cues;contact guard  -AB    Assistive Device (Sit-Stand Transfers) walker, front-wheeled  -AB walker, front-wheeled  -AB      Row Name 07/02/24 1336 07/02/24 0817       Stand-Sit Transfer    Stand-Sit Bastian (Transfers) verbal cues;contact guard;minimum assist (75% patient effort)  -AB verbal cues;contact guard  -AB    Assistive Device (Stand-Sit Transfers) walker, front-wheeled  -AB walker, front-wheeled  -AB      Row Name 07/02/24 1336 07/02/24 0817       Toilet Transfer    Type (Toilet Transfer) sit-stand;stand-sit  -AB sit-stand;stand-sit  -AB    Bastian Level (Toilet Transfer) verbal cues;contact guard  -AB verbal cues;contact guard  -AB    Assistive Device (Toilet Transfer) commode;grab bars/safety frame;walker, front-wheeled  -AB commode;grab bars/safety frame;walker, front-wheeled  -AB      Row Name 07/02/24 1336 07/02/24 0817       Gait/Stairs (Locomotion)    Bastian Level (Gait) contact guard;verbal cues  -AB contact guard;verbal cues  -AB    Assistive Device (Gait) walker, front-wheeled  -AB walker, front-wheeled  -AB    Distance in Feet (Gait) 30  -AB 60  -AB    Bilateral Gait Deviations forward flexed posture  -AB forward flexed posture  -AB    Left Sided Gait Deviations heel strike decreased  -AB heel strike decreased  -AB    Comment, (Gait/Stairs) decreased distance due to pain  -AB --      Row Name             Wound 06/29/24 1458 lumbar spine Incision    Wound - Properties Group Placement Date: 06/29/24  -KT Placement Time: 1458 -KT Present on Original Admission:  N  -KT Location: lumbar spine  -KT Primary Wound Type: Incision  -KT    Retired Wound - Properties Group Placement Date: 06/29/24  -KT Placement Time: 1458  -KT Present on Original Admission: N  -KT Location: lumbar spine  -KT Primary Wound Type: Incision  -KT    Retired Wound - Properties Group Date first assessed: 06/29/24  -KT Time first assessed: 1458  -KT Present on Original Admission: N  -KT Location: lumbar spine  -KT Primary Wound Type: Incision  -KT      Row Name 07/02/24 1336 07/02/24 0817       Positioning and Restraints    Pre-Treatment Position in bed  -AB in bed  -AB    Post Treatment Position bsc  -AB chair  -AB    In Chair -- sitting;call light within reach  -AB    On BS commode sitting;call light within reach;with nsg  -AB --              User Key  (r) = Recorded By, (t) = Taken By, (c) = Cosigned By      Initials Name Provider Type    AB Carlene Smith, PTA Physical Therapist Assistant    Shravan Ordonez, RN Registered Nurse                    Physical Therapy Education       Title: PT OT SLP Therapies (Done)       Topic: Physical Therapy (Done)       Point: Mobility training (Done)       Learning Progress Summary             Patient Acceptance, E,D, DU,VU by  at 6/30/2024 0951    Comment: benefits of PT and POC, call for A OOB, no BLT, LSO when OOB                         Point: Body mechanics (Done)       Learning Progress Summary             Patient Acceptance, E,D, DU,VU by  at 6/30/2024 0951    Comment: benefits of PT and POC, call for A OOB, no BLT, LSO when OOB                         Point: Precautions (Done)       Learning Progress Summary             Patient Acceptance, E,D, DU,VU by  at 6/30/2024 0951    Comment: benefits of PT and POC, call for A OOB, no BLT, LSO when OOB                                         User Key       Initials Effective Dates Name Provider Type [facility] 02/03/23 -  Eliel León, PT Physical Therapist PT                  PT Recommendation and  Plan         Outcome Measures       Row Name 07/01/24 0900             How much help from another is currently needed...    Putting on and taking off regular lower body clothing? 3  -EC      Bathing (including washing, rinsing, and drying) 3  -EC      Toileting (which includes using toilet bed pan or urinal) 3  -EC      Putting on and taking off regular upper body clothing 4  -EC      Taking care of personal grooming (such as brushing teeth) 4  -EC      Eating meals 4  -EC      AM-PAC 6 Clicks Score (OT) 21  -EC         Functional Assessment    Outcome Measure Options AM-PAC 6 Clicks Daily Activity (OT)  -EC                User Key  (r) = Recorded By, (t) = Taken By, (c) = Cosigned By      Initials Name Provider Type    Davida Tanner OTR/L Occupational Therapist                     Time Calculation:    PT Charges       Row Name 07/02/24 1336 07/02/24 0817          Time Calculation    Start Time 1336  -AB 0817  -AB     Stop Time 1346  -AB 0846  -AB     Time Calculation (min) 10 min  -AB 29 min  -AB     PT Received On 07/02/24  -AB 07/02/24  -AB        Time Calculation- PT    Total Timed Code Minutes- PT 10 minute(s)  -AB 29 minute(s)  -AB               User Key  (r) = Recorded By, (t) = Taken By, (c) = Cosigned By      Initials Name Provider Type    AB Carlene Smith PTA Physical Therapist Assistant                  Therapy Charges for Today       Code Description Service Date Service Provider Modifiers Qty    15236540253 HC GAIT TRAINING EA 15 MIN 7/1/2024 Carlene Smith, PTA GP 1    17245587524 HC PT THERAPEUTIC ACT EA 15 MIN 7/1/2024 Carlene Smith, PTA GP 1    96792909466 HC PT THERAPEUTIC ACT EA 15 MIN 7/2/2024 Carlene Smith, PTA GP 1    62606790665 HC GAIT TRAINING EA 15 MIN 7/2/2024 Carlene Smith, PTA GP 1            PT G-Codes  Outcome Measure Options: AM-PAC 6 Clicks Daily Activity (OT)  AM-PAC 6 Clicks Score (PT): 15  AM-PAC 6 Clicks Score (OT): 21    Carlene Smith PTA  7/2/2024

## 2024-07-02 NOTE — PLAN OF CARE
Goal Outcome Evaluation:  Plan of Care Reviewed With: patient        Progress: no change  Outcome Evaluation: US guided IV to R wrist area, NS @ 50mL/hr infusing per order. up to BSC x2 and LSO brace. Ambulated in room with PT. PRN Norco given x2. Redraw platelets this am 75, x2 more units of platelets transfused this shift, awaiting redraw; goal for platelets to be over 100. vitals stable, A/Ox4. fall precautions, safety maintained.

## 2024-07-02 NOTE — PROGRESS NOTES
Neurosurgery Daily Progress Note    HPI:  Marina Joe is a 79 y.o. female with significant medical comorbidities to include breast cancer, CKD, hypertension, hyperlipidemia, and obesity.  She presents with a new problem of lumbar back and bilateral nondermatomal lower extremity radicular pain and dysesthesias. Physical exam findings of lower extremity hyporeflexia and right leg weakness with saddle anesthesia.  Their imaging shows an enlarged left kidney and right adrenal mass concerning for metastatic disease.  CT of the lumbar spine shows multilevel degenerative changes to include an anteriolisthesis of L3 over L4 and spondylosis at L5-S1. MRI with cord compression at L3 concerning for epidural metastasis     Assessment:   Past Medical History:   Diagnosis Date    Breast cancer     CKD (chronic kidney disease)     Hypercholesteremia     Hypertension     Sinusitis     Stage 3a chronic kidney disease 10/20/2020     Active Hospital Problems    Diagnosis     **Left renal mass     Cauda equina compression     Metastatic cancer to spine      Plan:   Neuro: Stable.  Continues to complain of back and nondermatomal bilateral leg pain.  No significant focal weakness today.  Endorses intermittent numbness and tingling to the right lower extremity.  Ambulating with walker with PT.     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      3 Days Post-Op (6/29/2024) L3 laminectomy and biopsy of epidural mass  Surgical pathology pending  LSO when out of bed  Continue neuroexam per policy.  Call for neurologic decline  CV: LASHANDA  Pulm:  "LASHANDA  GI: No BM TD.  Add Relistor  : Voiding spontaneously.  Bladder scans and I/O catheter policy.  FEN: Tolerating regular diet.  Endocrine: LASHANDA  Heme:  Thrombocytopenia.  PLT post 2 units, 100.  Repeat CBC pending. Goal > 100-150K postop  Dispo: Max PT/OT with brace    Chief complaint:   Back pain bilateral lower extremity numbness and tingling    HPI  Subjective  Stable right proximal leg weakness,    Temp:  [97.3 °F (36.3 °C)-98.4 °F (36.9 °C)] 98.3 °F (36.8 °C)  Heart Rate:  [66-83] 75  Resp:  [16-20] 16  BP: (126-165)/(55-92) 152/60    Output by Drain (mL) 07/01/24 0701 - 07/01/24 1900 07/01/24 1901 - 07/02/24 0700 07/02/24 0701 - 07/02/24 0852 Range Total   Requested LDAs do not have output data documented.     Objective:  Vital signs: (most recent): Blood pressure 152/60, pulse 75, temperature 98.3 °F (36.8 °C), temperature source Oral, resp. rate 16, height 172.7 cm (68\"), weight 111 kg (244 lb), SpO2 95%, not currently breastfeeding.        Neurologic Exam     Mental Status   Oriented to person, place, and time.   Speech: speech is normal   Level of consciousness: alert    Cranial Nerves     CN III, IV, VI   Pupils are equal, round, and reactive to light.  Extraocular motions are normal.     CN V   Facial sensation intact.     CN VII   Facial expression full, symmetric.     Motor Exam   Right arm pronator drift: absent  Left arm pronator drift: absent    Strength   Right deltoid: 5/5  Left deltoid: 5/5  Right biceps: 5/5  Left biceps: 5/5  Right triceps: 5/5  Left triceps: 5/5  Right iliopsoas: 5/5  Left iliopsoas: 5/5  Right quadriceps: 5/5  Left quadriceps: 5/5  Right anterior tibial: 5/5  Left anterior tibial: 5/5  Right gastroc: 5/5  Left gastroc: 5/5    Sensory Exam   Right arm light touch: normal  Left arm light touch: normal  Right leg light touch: decreased from knee  Left leg light touch: normal  Sensory deficit distribution on right: L3    Lumbar incision(s): C, D, I    Drains: * No LDAs found " *    Imaging Results (Last 24 Hours)       Procedure Component Value Units Date/Time    US Guided Tissue Biopsy [[id number]] Collected: 07/01/24 1430     Updated: 07/02/24 0700    Narrative:      EXAM: US GUIDED TISSUE BIOPSY-      DATE: 7/1/2024 12:38 PM     HISTORY: Assess for metastatic disease; 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 Patient reports  history of breast cancer in 2013 status post surgery and radiation  therapy.        COMPARISON: 6/28/2024.     TECHNIQUE: Representative images and report stored to PACS per  institutional protocol and state regulations.     PROCEDURE:  Preprocedure imaging performed demonstrating multiple peripherally  echogenic, centrally hypoechoic nodules. These demonstrated internal  vascularity. A superficial nodule RIGHT flank nodule was selected and  patient positioned with consideration for her comfort.     Risks, benefits and alternatives to the procedure were discussed with  the patient. Specifically, risks of bleeding, infection, allergy to  medicine, and non-diagnostic biopsy results were discussed with the  patient. Verbal and written informed consent was obtained.     A timeout was performed including the patient's name, date of birth,  biopsy site and laterality.     Using ultrasound, a site for biopsy of RIGHT flank subcutaneous nodule  was selected, marked and prepped in the usual sterile fashion. 1 percent   lidocaine was used for local anesthesia.     Using ultrasound guidance, RIGHT flank subcutaneous nodule biopsy was  performed using 18 gauge Bard core needle biopsy device. 5 passes were  made. One core was used for touch prep. 2  cores were placed in formalin  and 2 cores were placed in RPMI.     Cytology deemed sample adequate. Biopsy samples were taken by cytology.      The patient tolerated the procedure well. No evidence of complication.     The patient returned to the floor in stable condition and agrees to  follow up with referring clinician for biopsy results.           Impression:      1. Successful ultrasound guided core needle biopsy of RIGHT flank  subcutaneous nodule.        This report was signed and finalized on 7/1/2024 2:36 PM by Dr Sonam Quach MD.       US Soft Tissue [[id number]] Collected: 07/01/24 1430     Updated: 07/02/24 0700    Narrative:      EXAM: US GUIDED TISSUE BIOPSY-      DATE: 7/1/2024 12:38 PM     HISTORY: Assess for metastatic disease; 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 Patient reports  history of breast cancer in 2013 status post surgery and radiation  therapy.        COMPARISON: 6/28/2024.     TECHNIQUE: Representative images and report stored to PACS per  institutional protocol and state regulations.     PROCEDURE:  Preprocedure imaging performed demonstrating multiple peripherally  echogenic, centrally hypoechoic nodules. These demonstrated internal  vascularity. A superficial nodule RIGHT flank nodule was selected and  patient positioned with consideration for her comfort.     Risks, benefits and alternatives to the procedure were discussed with  the patient. Specifically, risks of bleeding, infection, allergy to  medicine, and non-diagnostic biopsy results were discussed with the  patient. Verbal and written informed consent was obtained.     A timeout was performed  including the patient's name, date of birth,  biopsy site and laterality.     Using ultrasound, a site for biopsy of RIGHT flank subcutaneous nodule  was selected, marked and prepped in the usual sterile fashion. 1 percent   lidocaine was used for local anesthesia.     Using ultrasound guidance, RIGHT flank subcutaneous nodule biopsy was  performed using 18 gauge Bard core needle biopsy device. 5 passes were  made. One core was used for touch prep. 2 cores were placed in formalin  and 2 cores were placed in RPMI.     Cytology deemed sample adequate. Biopsy samples were taken by cytology.      The patient tolerated the procedure well. No evidence of complication.     The patient returned to the floor in stable condition and agrees to  follow up with referring clinician for biopsy results.           Impression:      1. Successful ultrasound guided core needle biopsy of RIGHT flank  subcutaneous nodule.        This report was signed and finalized on 7/1/2024 2:36 PM by Dr Sonam Quach MD.       MRI Brain Without Contrast [[id number]] Collected: 07/01/24 0949     Updated: 07/01/24 1000    Narrative:      EXAMINATION: MRI BRAIN WO CONTRAST-  7/1/2024 9:49 AM      HISTORY: Staging; C79.51-Secondary malignant neoplasm of bone;  N28.89-Other specified disorders of kidney and ureter; E27.8-Other  specified disorders of adrenal gland; M79.89-Other specified soft tissue  disorders; N28.9-Disorder of kidney and ureter, unspecified;  N18.9-Chronic kidney disease, unspecified; D69.6-Thrombocytopenia,  unspecified; G83.4-Cauda equina syndrome; Z74.09-Other reduced mobility     COMPARISON: 8/10/2023     TECHNIQUE: Multiplanar imaging of the brain was performed in a routine  fashion. No contrast was administered.     FINDINGS:  No restricted diffusion. No mass effect or midline shift. It should be  noted that no intravenous contrast was administered, limiting evaluation  for intracranial metastatic disease. Within the limitations  of this  exam, I don't see any definite evidence of intracranial metastatic  disease. Increased FLAIR signal intensity scattered throughout the  subcortical and periventricular white matter. Basilar cisterns are  preserved. Grey and white matter demonstrates normal MR signal. No  abnormal extra axial fluid collections are noted. No definite mass  effect or midline shift identified.     Proximal cervical spinal cord, brainstem, and cerebellum are  unremarkable. Normal cerebrovascular flow voids are seen. Bilateral  globes and orbits are normal in appearance.     Opacification of the LEFT sphenoid sinus with high T1 signal which may  represent some proteinaceous fluid.          Impression:         1.  No definite evidence of intracranial metastatic disease within the  limitations of this noncontrast examination.     2.  Fairly extensive periventricular white matter signal abnormality,  likely related to chronic microvascular ischemic change.     3.  Opacification of the LEFT sphenoid sinus with what is likely  proteinaceous fluid.                                                       This report was signed and finalized on 7/1/2024 9:53 AM by Dr. Armando Calixto MD.             Lab Results (last 24 hours)       Procedure Component Value Units Date/Time    CBC & Differential [[id number]] Collected: 07/02/24 0829    Specimen: Blood Updated: 07/02/24 0846    Narrative:      The following orders were created for panel order CBC & Differential.  Procedure                               Abnormality         Status                     ---------                               -----------         ------                     CBC Auto Differential[[id number]]                            In process                   Please view results for these tests on the individual orders.    CBC Auto Differential [[id number]] Collected: 07/02/24 0829    Specimen: Blood Updated: 07/02/24 0846    Basic Metabolic Panel [[id number]]  (Abnormal)  Collected: 07/02/24 0414    Specimen: Blood Updated: 07/02/24 0533     Glucose 107 mg/dL      BUN 30 mg/dL      Creatinine 1.67 mg/dL      Sodium 138 mmol/L      Potassium 3.6 mmol/L      Chloride 103 mmol/L      CO2 24.0 mmol/L      Calcium 8.9 mg/dL      BUN/Creatinine Ratio 18.0     Anion Gap 11.0 mmol/L      eGFR 31.2 mL/min/1.73     Narrative:      GFR Normal >60  Chronic Kidney Disease <60  Kidney Failure <15    The GFR formula is only valid for adults with stable renal function between ages 18 and 70.    CBC & Differential [[id number]]  (Abnormal) Collected: 07/01/24 1940    Specimen: Blood Updated: 07/01/24 2138    Narrative:      The following orders were created for panel order CBC & Differential.  Procedure                               Abnormality         Status                     ---------                               -----------         ------                     CBC Auto Differential[[id number]]        Abnormal            Final result                 Please view results for these tests on the individual orders.    CBC Auto Differential [[id number]]  (Abnormal) Collected: 07/01/24 1940    Specimen: Blood Updated: 07/01/24 2138     WBC 9.20 10*3/mm3      RBC 3.07 10*6/mm3      Hemoglobin 8.7 g/dL      Hematocrit 27.8 %      MCV 90.6 fL      MCH 28.3 pg      MCHC 31.3 g/dL      RDW 16.3 %      RDW-SD 53.9 fl      MPV 12.0 fL      Platelets 100 10*3/mm3      Neutrophil % 70.7 %      Lymphocyte % 13.6 %      Monocyte % 11.1 %      Eosinophil % 3.7 %      Basophil % 0.5 %      Immature Grans % 0.4 %      Neutrophils, Absolute 6.50 10*3/mm3      Lymphocytes, Absolute 1.25 10*3/mm3      Monocytes, Absolute 1.02 10*3/mm3      Eosinophils, Absolute 0.34 10*3/mm3      Basophils, Absolute 0.05 10*3/mm3      Immature Grans, Absolute 0.04 10*3/mm3      nRBC 0.0 /100 WBC     Peripheral Blood Smear [[id number]] Collected: 07/01/24 1053    Specimen: Blood Updated: 07/01/24 1817     Performed by: Ewa Vu MD      Pathologist Interpretation --     Moderate normochromic normocytic anemia    Normal total white blood cell count with the following manual differential:  Neutrophils 72%  Lymphocytes 20%  Monocytes 4%  Eosinophils 4%  Moderate peripheral thrombocytopenia    No schistocytes identified  No platelet clumps identified  No morulae identified in granulocytes or monocytes    Fine Needle Aspiration [[id number]] Collected: 07/01/24 1402    Specimen: Aspirate from Flank, right Updated: 07/01/24 1430    Fibrin Split Products [[id number]]  (Normal) Collected: 07/01/24 1053    Specimen: Blood Updated: 07/01/24 1204     Fibrin Split Products Less Than 5 mcg/mL    Fibrinogen [[id number]]  (Abnormal) Collected: 07/01/24 1053    Specimen: Blood Updated: 07/01/24 1130     Fibrinogen 521 mg/dL     Protime-INR [[id number]]  (Normal) Collected: 07/01/24 1053    Specimen: Blood Updated: 07/01/24 1130     Protime 13.5 Seconds      INR 0.99    aPTT [[id number]]  (Normal) Collected: 07/01/24 1053    Specimen: Blood Updated: 07/01/24 1130     PTT 26.8 seconds     CBC & Differential [[id number]]  (Abnormal) Collected: 07/01/24 1053    Specimen: Blood Updated: 07/01/24 1114    Narrative:      The following orders were created for panel order CBC & Differential.  Procedure                               Abnormality         Status                     ---------                               -----------         ------                     CBC Auto Differential[[id number]]        Abnormal            Final result                 Please view results for these tests on the individual orders.    CBC Auto Differential [[id number]]  (Abnormal) Collected: 07/01/24 1053    Specimen: Blood Updated: 07/01/24 1114     WBC 8.22 10*3/mm3      RBC 3.23 10*6/mm3      Hemoglobin 9.2 g/dL      Hematocrit 29.2 %      MCV 90.4 fL      MCH 28.5 pg      MCHC 31.5 g/dL      RDW 16.2 %      RDW-SD 54.1 fl      MPV 11.4 fL      Platelets 74 10*3/mm3      Neutrophil % 70.1 %       Lymphocyte % 15.1 %      Monocyte % 10.8 %      Eosinophil % 3.2 %      Basophil % 0.2 %      Immature Grans % 0.6 %      Neutrophils, Absolute 5.76 10*3/mm3      Lymphocytes, Absolute 1.24 10*3/mm3      Monocytes, Absolute 0.89 10*3/mm3      Eosinophils, Absolute 0.26 10*3/mm3      Basophils, Absolute 0.02 10*3/mm3      Immature Grans, Absolute 0.05 10*3/mm3      nRBC 0.0 /100 WBC     Platelet Antibody Profile [[id number]] Collected: 07/01/24 1053    Specimen: Blood Updated: 07/01/24 1102          Marc Quevedo APRN

## 2024-07-02 NOTE — PLAN OF CARE
Problem: Palliative Care  Goal: Enhanced Quality of Life  Outcome: Ongoing, Progressing  Intervention: Optimize Psychosocial Wellbeing  Flowsheets (Taken 7/2/2024 0810)  Supportive Measures:   active listening utilized   decision-making supported    Pt is A&Ox4. VSS. Room air. Pt is very pleasant. She is feeling better than yesterday. She states she is a little sore. She has been working with therapy. MOST form reviewed and initiated. No questions or concerns at this time.    Will continue to follow and provide support.    Secure message sent to attending for signature on MOST document.      Daysi Botello LCSW, APHSW-C  7/2/2024

## 2024-07-02 NOTE — PROGRESS NOTES
"[facility] Progress Note    Progress Note    Patient:  Marina Joe  YOB: 1945  Date of Service: 7/2/2024  MRN: [id number]   Acct: [id number]   Primary Care Physician: Hanh Og APRN  Advance Directive:   Code Status and Medical Interventions:   Ordered at: 07/01/24 1547     Medical Intervention Limits:    No intubation (DNI)    No artificial nutrition     Level Of Support Discussed With:    Patient     Code Status (Patient has no pulse and is not breathing):    No CPR (Do Not Attempt to Resuscitate)     Medical Interventions (Patient has pulse or is breathing):    Limited Support     Admit Date: 6/28/2024       Hospital Day: 4      Subjective:     Chief Compliant: States \"back spasms as usual\"    Subjective: She has more restful night in spite of lingering back discomfort, \"sometimes 7/10 \".  States she has been urinating well.  No BMs yet.    Interval history:  Marina Joe 79 y.o. 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     She reports that she has been experiencing back pain for several weeks. Since her presentation on 6/28/2024 she had the following workup:  - CT-A/P showed abnormal heterogenous left kidney mass, abnormal right kidney, new right adrenal mass, innumerable solid nodules in the subcutaneous soft tissues all concerning for metastatic disease process.  - CT lumbar spine showing multilevel prominent disc osteophyte complexes acet arthropathy and ligamental hypertrophy and resultant neural foraminal spinal canal stenosis.  - CT chest showed a left breast nodule of 9 mm, scattered subcutaneous soft tissue nodules in the abdominal wall, otherwise there is no sign of intrathoracic metastases.  - MRI-abdomen showed infiltrative mass within the " "left kidney extending into the proximal left collecting system as well as numerous additional solid masses in the bilateral kidneys. Additionally there is a right adrenal mass, multiple subcutaneous fat soft tissue masses, and a L3 vertebral body mass with suspected extension into the spinal canal. 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  - MRI-L-spine: Neoplastic process involving the L3 vertebral body with associated posterior soft tissue component resulting in severe spinal canal narrowing and cauda equina nerve root compression. The soft tissue component appears to also extend into the right L3-L4 foramen. No associated pathological fracture. There are also appears to be tumor signal extending into the right L2-L3 neural foramen.     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    Review of Systems  Review of Systems:   Constitutional: Fatigue.  Says she has been ambulatory with a walker and assistance.  Appetite fair.  \"Not great.\"  No fever / No chills / No sweats  HEENT:  No sore throat / No hoarseness / No vision changes  CV:  No chest pain / No palpitations / No orthopnea  Respiratory:  No cough / No shortness of breath / No sputum / No hemoptysis  GI:  No nausea / No vomiting / No abdominal pain / No diarrhea / +constipation   :  No dysuria / No hesitancy / No urgency / No hematuria  Neuro:  + Lower extremity muscle weakness / No dysphagia / No headache / No paresthesias  Musculoskeletal:  No edema / No cyanosis / + postop back pain    Vitals: /55 (BP Location: Right arm, Patient Position: Lying)   Pulse " "83   Temp 98.4 °F (36.9 °C) (Oral)   Resp 16   Ht 172.7 cm (68\")   Wt 111 kg (244 lb)   LMP  (LMP Unknown)   SpO2 98%   BMI 37.10 kg/m²     Physical Exam  Physical Exam:  General appearance: Pleasant, obese, modestly elderly female alert, appears stated age and cooperative.  In no acute distress while in bed.  Skin:  Skin color a bit pale. No rashes or lesions  HEENT:  Head: Normal, normocephalic, atraumatic.  Neck:  no adenopathy, no tenderness/mass/nodules  Lungs:  clear to auscultation bilaterally  Heart:  regular rate and rhythm  Abdomen:  soft, non-tender  Extremities:  extremities normal, atraumatic, no cyanosis or edema  Neurologic:  Mental status: Alert, oriented, thought content appropriate    24HR INTAKE/OUTPUT:    Intake/Output Summary (Last 24 hours) at 7/2/2024 0737  Last data filed at 7/2/2024 0054  Gross per 24 hour   Intake 1200 ml   Output 950 ml   Net 250 ml        Batista Catheter: Yes    Diet: Diet: Diabetic; Consistent Carbohydrate; Fluid Consistency: Thin (IDDSI 0)      Medications:   Scheduled Meds:amLODIPine, 5 mg, Oral, BID  buPROPion XL, 150 mg, Oral, Daily  carvedilol, 6.25 mg, Oral, BID With Meals  cetirizine, 10 mg, Oral, Daily  cloNIDine, 0.1 mg, Oral, BID  fluticasone, 2 spray, Nasal, Daily  heparin (porcine), 5,000 Units, Subcutaneous, Q12H  montelukast, 10 mg, Oral, Nightly  multivitamin with minerals, 1 tablet, Oral, Daily  pantoprazole, 40 mg, Oral, Daily  potassium chloride ER, 40 mEq, Oral, Q4H  rOPINIRole, 0.5 mg, Oral, Nightly  rosuvastatin, 10 mg, Oral, Nightly  sertraline, 100 mg, Oral, Daily  sodium chloride, 10 mL, Intravenous, Q12H  sodium chloride, 10 mL, Intravenous, Q12H        Continuous Infusions:sodium chloride, 100 mL/hr, Last Rate: 100 mL/hr (07/02/24 8776)        Labs:     Results from last 7 days   Lab Units 07/01/24  1940 07/01/24  1053 06/30/24  0434   WBC 10*3/mm3 9.20 8.22 8.64   HEMOGLOBIN g/dL 8.7* 9.2* 9.5*   HEMATOCRIT % 27.8* 29.2* 29.9*   PLATELETS " "10*3/mm3 100* 74* 91*     06/25/2024 0901 06/25/2024 0935 Iron Profile [[id number]]   (Abnormal)   Blood    Final result Component Value Units   Iron 56 mcg/dL   Iron Saturation (TSAT) 16 Low  %   Transferrin 238 mg/dL   TIBC 355 mcg/dL          06/25/2024 0901 06/25/2024 0940 Ferritin [[id number]]    (Abnormal)   Blood    Final result Component Value Units   Ferritin 451.10 High  ng/mL               07/01/2024 1053 07/01/2024 1130 Fibrinogen [[id number]]   (Abnormal)   Blood    Final result Component Value Units   Fibrinogen 521 High  mg/dL          07/01/2024 1053 07/01/2024 1204 Fibrin Split Products [[id number]]   Blood    Final result Component Value   Fibrin Split Products Less Than 5 mcg/mL          07/01/2024 1053 07/01/2024 1102 Platelet Antibody Profile [[id number]]   Blood    In process Component Value   No component results          07/01/2024 1053 07/01/2024 1130 Protime-INR [[id number]]   Blood    Final result Component Value Units   Protime 13.5 Seconds   INR 0.99           07/01/2024 1053 07/01/2024 1130 aPTT [[id number]]   Blood    Final result Component Value Units   PTT 26.8 seconds                    Results from last 7 days   Lab Units 07/02/24  0414 07/01/24  0432 06/30/24  0434 06/28/24  1153 06/28/24  0617 06/25/24  0901   SODIUM mmol/L 138 137 137   < > 135* 134*   POTASSIUM mmol/L 3.6 3.7 4.0   < > 3.9 3.7   CHLORIDE mmol/L 103 102 101   < > 95* 95*   CO2 mmol/L 24.0 24.0 24.0   < > 26.0 27.0   BUN mg/dL 30* 37* 34*   < > 32* 30*   CREATININE mg/dL 1.67* 2.18* 2.39*   < > 2.59* 2.39*   CALCIUM mg/dL 8.9 8.5* 8.4*   < > 10.0 9.7   BILIRUBIN mg/dL  --   --  <0.2  --  0.3 0.3   ALK PHOS U/L  --   --  78  --  85 90   ALT (SGPT) U/L  --   --  21  --  18 19   AST (SGOT) U/L  --   --  35*  --  22 20   GLUCOSE mg/dL 107* 98 120*   < > 99 121*    < > = values in this interval not displayed.       ABG:  No results found for: \"PHART\", \"PO2ART\", \"KGX9MRJ\"    Culture Data:   Urine Culture   Date Value Ref " Range Status   06/28/2024 >100,000 CFU/mL Mixed Bhavana Isolated  Final       Lab Results   Component Value Date    PATHINTERP  07/01/2024     Moderate normochromic normocytic anemia    Normal total white blood cell count with the following manual differential:  Neutrophils 72%  Lymphocytes 20%  Monocytes 4%  Eosinophils 4%  Moderate peripheral thrombocytopenia    No schistocytes identified  No platelet clumps identified  No morulae identified in granulocytes or monocytes       Radiology:     Imaging Results (Last 7 Days)       Procedure Component Value Units Date/Time    US Guided Tissue Biopsy [[id number]] Collected: 07/01/24 1430     Updated: 07/02/24 0700    Narrative:      EXAM: US GUIDED TISSUE BIOPSY-      DATE: 7/1/2024 12:38 PM     HISTORY: Assess for metastatic disease; 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 Patient reports  history of breast cancer in 2013 status post surgery and radiation  therapy.        COMPARISON: 6/28/2024.     TECHNIQUE: Representative images and report stored to PACS per  institutional protocol and state regulations.     PROCEDURE:  Preprocedure imaging performed demonstrating multiple peripherally  echogenic, centrally hypoechoic nodules. These demonstrated internal  vascularity. A superficial nodule RIGHT flank nodule was selected and  patient positioned with consideration for her comfort.     Risks, benefits and alternatives to the procedure were discussed with  the patient. Specifically, risks of bleeding, infection, allergy to  medicine, and non-diagnostic biopsy results were discussed with the  patient. Verbal and written informed consent was  obtained.     A timeout was performed including the patient's name, date of birth,  biopsy site and laterality.     Using ultrasound, a site for biopsy of RIGHT flank subcutaneous nodule  was selected, marked and prepped in the usual sterile fashion. 1 percent   lidocaine was used for local anesthesia.     Using ultrasound guidance, RIGHT flank subcutaneous nodule biopsy was  performed using 18 gauge Bard core needle biopsy device. 5 passes were  made. One core was used for touch prep. 2 cores were placed in formalin  and 2 cores were placed in RPMI.     Cytology deemed sample adequate. Biopsy samples were taken by cytology.      The patient tolerated the procedure well. No evidence of complication.     The patient returned to the floor in stable condition and agrees to  follow up with referring clinician for biopsy results.           Impression:      1. Successful ultrasound guided core needle biopsy of RIGHT flank  subcutaneous nodule.        This report was signed and finalized on 7/1/2024 2:36 PM by Dr Sonam Quach MD.       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 Patient reports  history of breast cancer in 2013 status post surgery and radiation  therapy.        COMPARISON: 6/28/2024.     TECHNIQUE: Representative images and report stored to PACS per  institutional protocol and state regulations.     PROCEDURE:  Preprocedure imaging performed demonstrating multiple  peripherally  echogenic, centrally hypoechoic nodules. These demonstrated internal  vascularity. A superficial nodule RIGHT flank nodule was selected and  patient positioned with consideration for her comfort.     Risks, benefits and alternatives to the procedure were discussed with  the patient. Specifically, risks of bleeding, infection, allergy to  medicine, and non-diagnostic biopsy results were discussed with the  patient. Verbal and written informed consent was obtained.     A timeout was performed including the patient's name, date of birth,  biopsy site and laterality.     Using ultrasound, a site for biopsy of RIGHT flank subcutaneous nodule  was selected, marked and prepped in the usual sterile fashion. 1 percent   lidocaine was used for local anesthesia.     Using ultrasound guidance, RIGHT flank subcutaneous nodule biopsy was  performed using 18 gauge Bard core needle biopsy device. 5 passes were  made. One core was used for touch prep. 2 cores were placed in formalin  and 2 cores were placed in RPMI.     Cytology deemed sample adequate. Biopsy samples were taken by cytology.      The patient tolerated the procedure well. No evidence of complication.     The patient returned to the floor in stable condition and agrees to  follow up with referring clinician for biopsy results.           Impression:      1. Successful ultrasound guided core needle biopsy of RIGHT flank  subcutaneous nodule.        This report was signed and finalized on 7/1/2024 2:36 PM by Dr Sonam Quach MD.       MRI Brain Without Contrast [[id number]] Collected: 07/01/24 0949     Updated: 07/01/24 1000    Narrative:      EXAMINATION: MRI BRAIN WO CONTRAST-  7/1/2024 9:49 AM      HISTORY: Staging; C79.51-Secondary malignant neoplasm of bone;  N28.89-Other specified disorders of kidney and ureter; E27.8-Other  specified disorders of adrenal gland; M79.89-Other specified soft tissue  disorders; N28.9-Disorder of kidney and ureter,  unspecified;  N18.9-Chronic kidney disease, unspecified; D69.6-Thrombocytopenia,  unspecified; G83.4-Cauda equina syndrome; Z74.09-Other reduced mobility     COMPARISON: 8/10/2023     TECHNIQUE: Multiplanar imaging of the brain was performed in a routine  fashion. No contrast was administered.     FINDINGS:  No restricted diffusion. No mass effect or midline shift. It should be  noted that no intravenous contrast was administered, limiting evaluation  for intracranial metastatic disease. Within the limitations of this  exam, I don't see any definite evidence of intracranial metastatic  disease. Increased FLAIR signal intensity scattered throughout the  subcortical and periventricular white matter. Basilar cisterns are  preserved. Grey and white matter demonstrates normal MR signal. No  abnormal extra axial fluid collections are noted. No definite mass  effect or midline shift identified.     Proximal cervical spinal cord, brainstem, and cerebellum are  unremarkable. Normal cerebrovascular flow voids are seen. Bilateral  globes and orbits are normal in appearance.     Opacification of the LEFT sphenoid sinus with high T1 signal which may  represent some proteinaceous fluid.          Impression:         1.  No definite evidence of intracranial metastatic disease within the  limitations of this noncontrast examination.     2.  Fairly extensive periventricular white matter signal abnormality,  likely related to chronic microvascular ischemic change.     3.  Opacification of the LEFT sphenoid sinus with what is likely  proteinaceous fluid.                                                       This report was signed and finalized on 7/1/2024 9:53 AM by Dr. Armando Calixto MD.       MRI Lumbar Spine With & Without Contrast [[id number]] Collected: 06/28/24 1920     Updated: 06/28/24 1945    Addenda:        Additional findings: There are also appears to be tumor signal extending  into the right L2-L3 neural foramen.     This  report was signed and finalized on 6/28/2024 7:42 PM by Warren Freire.     Signed: 06/28/24 1942 by Warren Freire DO    Narrative:      Exam: MRI LUMBAR SPINE W WO CONTRAST- 6/28/2024 3:43 PM     HISTORY: Lumbar back and bilateral nondermatomal leg pain and  dysesthesias; N28.89-Other specified disorders of kidney and ureter;  E27.8-Other specified disorders of adrenal gland; M79.89-Other specified  soft tissue disorders; N28.9-Disorder of kidney and ureter, unspecified;  N18.9-Chronic kidney disease, unspecified; D69.6-Thrombocytopenia,  unspecified     COMPARISON: 6/28/2024 lumbar spine CT     TECHNIQUE: Multiplanar, multisequence MRI of the lumbar spine was  obtained prior to and after the administration of 20 mL intravenous  gadolinium contrast.     FINDINGS:     Grade 1 degenerative anterolisthesis of L3-L4.     Within the L3 vertebral body there is fairly diffuse T2 hyperintense  signal with associated T1 hypointense marrow infiltration and suspected  mild enhancement. There is an associated posterior enhancing soft tissue  mass extending into the spinal canal causing severe stenosis and  compression of the cauda equina nerve roots. The soft tissue component  also appears to extend superiorly to the L2-L3 disc level. There appears  to be tumor extension into the right L3-L4 foramen.     No visible fracture.     Multilevel mild degenerative disc disease with posterior disc osteophyte  complexes. Multilevel facet hypertrophic changes. No significant facet  periarticular edema. Multilevel ligamentum flavum hypertrophy.  Multilevel prominent posterior epidural fat.     Conus appears to terminate at L1-L2.     T11-T12: There appears to be a at least mild to moderate spinal canal  and mild to moderate bilateral foraminal narrowing.     T12-L1: No significant spinal canal narrowing. Moderate right foraminal  narrowing.     L1-L2: Moderate spinal canal narrowing. Mild to moderate right foraminal  narrowing.      L2-L3: Severe spinal canal narrowing. Moderate to severe right foraminal  narrowing.     L3-L4: Severe spinal canal narrowing. Moderate right foraminal  narrowing.     L4-L5: Moderate spinal canal narrowing and bilateral subarticular  narrowing encroaching the descending bilateral L5 nerve roots. Mild  bilateral foraminal narrowing.     L5-S1: No significant spinal canal or foraminal narrowing.     Extraspinal findings: Please see separately dictated MR abdomen and CT  abdomen/pelvis from the same day.       Impression:         Neoplastic process involving the L3 vertebral body with associated  posterior soft tissue component resulting in severe spinal canal  narrowing and cauda equina nerve root compression. The soft tissue  component appears to also extend into the right L3-L4 foramen. No  associated pathological fracture.     Additional multilevel spondylotic changes including moderate to severe  spinal canal and foraminal narrowing as detailed above.           This report was signed and finalized on 6/28/2024 7:36 PM by Warren Freire.       MRI Abdomen With & Without Contrast [[id number]] Collected: 06/28/24 1826     Updated: 06/28/24 1943    Addenda:        Additional findings: Tumor signal from the left renal mass appears to  extend along the left renal vein and possibly abuts the IVC and is  suspicious for neoplastic involvement. An additional differential also  includes multifocal primary renal malignancies with metastatic disease.     This report was signed and finalized on 6/28/2024 7:40 PM by Warren Freire.     Signed: 06/28/24 1940 by Warren Freire DO    Narrative:      EXAM: MRI ABDOMEN W WO CONTRAST-     INDICATION: Evaluate kidney and adrenal masses showed on CT-scan;  N28.89-Other specified disorders of kidney and ureter; E27.8-Other  specified disorders of adrenal gland; M79.89-Other specified soft tissue  disorders; N28.9-Disorder of kidney and ureter, unspecified;  N18.9-Chronic kidney  disease, unspecified; D69.6-Thrombocytopenia,  unspecified        TECHNIQUE: Multisequence multiplanar MR images was obtained of the  abdomen prior to and at the administration of 20 mL intravenous  gadolinium contrast.        COMPARISON: CT abdomen pelvis 6/28/2024     FINDINGS:     Decrease sensitivity due to motion.     Lower Chest: Unremarkable.     Liver: Unremarkable.     Biliary Tree: Small amount of gallbladder sludge versus layering stones.     Spleen: Tiny T2 hyperintense splenic lesion is nonspecific but favored  benign.     Pancreas: 1.5 cm pancreatic body cyst.     Adrenal Glands: 3.3 cm right adrenal nodule, new from prior CT on  5/23/2023.     Kidneys/Upper Ureters:      There is an infiltrative T2 hypointense/T1 isointense diffusion  restricting mass involving the left kidney with extension into the  proximal left collecting system/upper ureters which is difficult to  measure but measures up to 11 cm in cranial caudal dimensions. Please  note the abnormal diffusion restriction does extend below the  field-of-view into the left ureter. There are additional smaller masses  within the left renal cortex.      There are also multiple (at least 6) diffusion restricting masses within  the right kidney measuring up to 3.2 cm. There also appears to be a  diffusion restricting mass in the right renal pelvis.      These are somewhat limited in evaluation on postcontrast images due to  degree of motion, however these these masses do appear to demonstrate  some degree of enhancement.     Small left renal cyst is noted.     Gastrointestinal Tract: Colonic diverticulosis.     Lymphatics: Unremarkable.     Vasculature: Unremarkable.      Peritoneum/Retroperitoneum: Unremarkable.     Abdominal Wall/Soft Tissues: There enhancing diffusion restricting soft  tissue masses, the most dominant and largest cluster is within the right  posterior abdominal wall measuring up to 2.6 cm.     Osseous Structures: There is a  diffusion restricting mass involving the  L3 vertebral body with suspected posterior extension into the spinal  canal.       Impression:            Infiltrative mass within the left kidney extending into the proximal  left collecting system as well as numerous additional solid masses in  the bilateral kidneys. Additionally there is a right adrenal mass,  multiple subcutaneous fat soft tissue masses, and a L3 vertebral body  mass with suspected extension into the spinal canal. Findings  collectively are most consistent with metastatic disease which carries a  broad differential, however some differentials include metastatic breast  cancer, malignant melanoma, and lymphoma. The subtendinous fat soft  tissue masses would be amenable to ultrasound-guided biopsy.     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           This report was signed and finalized on 6/28/2024 6:57 PM by Warren Freire.       CT Chest Without Contrast Diagnostic [[id number]] Collected: 06/28/24 1626     Updated: 06/28/24 1639    Narrative:      CT CHEST WO CONTRAST DIAGNOSTIC- 6/28/2024 3:15 PM     HISTORY: Staging for newly diagnosed metastatic disease; N28.89-Other  specified disorders of kidney and ureter; E27.8-Other specified  disorders of adrenal gland; M79.89-Other specified soft tissue  disorders; N28.9-Disorder of kidney and ureter, unspecified;  N18.9-Chronic kidney disease, unspecified; D69.6-Thrombocytopenia,  unspecified      COMPARISON: 10/13/2022     TOTAL DOSE LENGTH PRODUCT: 413.82 mGy.cm. Automated exposure control was  also utilized to decrease patient radiation dose.     TECHNIQUE: Axial images of the chest are performed without IV contrast  secondary to renal insufficiency.      FINDINGS:    Postoperative changes of the left breast with left axillary  surgical clips. 9 mm medial left breast nodule/mass near the 8 to 9  o'clock position, new from the 2022 CT  study which may represent a cyst  or solid mass. Correlation to any outside mammogram and breast  ultrasound recommended. No current mammogram or ultrasound at this  institution.     No pathologic axillary or intrathoracic lymphadenopathy. Cardiomegaly.  Very small pericardial effusion. No pleural effusions.     3.2 cm right adrenal mass. Abnormal appearance of the left greater than  right kidney. Please refer to today's CT abdomen pelvis 6/20/2024.     Basilar atelectasis. A 6 mm nodule along the right major fissure  favoring intrafissural lymph node, present on the 10/13/2022 study.. No  suspicious pulmonary nodules or convincing intrathoracic metastasis. No  pneumothorax. No discrete endobronchial lesion.     No focal aggressive regional bony lesions. Moderate diffuse degenerative  changes of the thoracolumbar junction. New 1.5 cm nodule within the  subcutaneous tissues of the left lower chest/upper abdomen (series 2  image 98. Several soft tissue nodules within the subcutaneous tissue of  the right flank at the L1 level measuring up to 2.1 cm. Smaller 9 mm  soft tissue nodule of the posterior back just left of midline at the L2  level. A left 10 mm posterior subcutaneous nodule at the T12-L1 level.       Impression:      1. Patient with a history of left breast cancer with postoperative  changes of the superior left breast and left axillary surgical clips.  There is a 9 mm nodule/mass of the medial left breast positioned towards  the 8 to 9 o'clock position. Correlation to any recent mammogram or  breast ultrasound recommended, none at this institution. If no outside  studies demonstrating a known 9 mm medial left breast cyst, a follow-up  outpatient mammogram and left breast ultrasound should be performed for  further assessment.  2. Scattered subcutaneous soft tissue nodules of the abdominal wall.  Largest nodules within the right posterior flank measuring up to 2.1 cm  at the L1 level. These appear to  represent soft tissue nodules and could  be metastatic deposits. Ultrasound recommended to establish solid  nature. Biopsy could be performed if solid and clinically indicated.  3. Cardiomegaly. Mild vascular calcification.  4. No convincing intrathoracic metastasis. Stable 6 mm nodule in the  right major fissure favoring intrafissural lymph node.  5. Please refer to CT abdomen and pelvis report from today for  description of bilateral renal pathology as well as a 3.2 cm right  adrenal mass.     This report was signed and finalized on 6/28/2024 4:36 PM by Dr. Meaghan Phillips MD.       XR Chest 1 View [[id number]] Collected: 06/28/24 0912     Updated: 06/28/24 0916    Narrative:      EXAM: XR CHEST 1 VW-      DATE: 6/28/2024 8:06 AM     HISTORY: multiple complaints       COMPARISON: 8/10/2023.     TECHNIQUE:  Frontal view(s) of the chest submitted.     FINDINGS:    There are low lung volumes with vascular crowding versus volume  overload/edema. There is enlargement of the cardiac silhouette. No  effusion or pneumothorax is seen.          Impression:         1. Low lung volumes with prominent vasculature may represent vascular  crowding or mild edema.     This report was signed and finalized on 6/28/2024 9:13 AM by Gatito Blackwood.       CT Abdomen Pelvis Stone Protocol [[id number]] Collected: 06/28/24 0817     Updated: 06/28/24 0832    Narrative:      EXAM: CT ABDOMEN PELVIS STONE PROTOCOL-      DATE: 6/28/2024 6:48 AM     HISTORY: flank pain       COMPARISON: 5/23/2023.     DOSE LENGTH PRODUCT: 1399.15 mGy.cm Automatic exposure control was  utilized to make radiation dose as low as reasonably achievable.     TECHNIQUE: Noncontract axial images of the abdomen and pelvis obtained  with multiplanar reformats.     FINDINGS:  VISUALIZED CHEST: There is cardiomegaly without pericardial or pleural  effusion. There is atelectasis at the lung bases which are otherwise  grossly clear.     LIVER/BILE DUCTS: Unenhanced liver  is unremarkable. Gallbladder is  distended without inflammatory change. Bile ducts are unremarkable.     KIDNEYS/URETERS: Both kidneys are abnormal in appearance. The left is  larger than the right and there is a suggestion of an underlying  infiltrative left renal mass involving the entire left kidney but  especially at the mid and lower pole extending along the left renal  pelvis and to the proximal left ureter. There are bilateral renal cysts.  There are vascular calcifications and probable nonobstructing stones in  the left kidney. There is no definite hydronephrosis.     ADRENAL: There is a new right adrenal mass worrisome for neoplasm  measuring 3.5 cm. Left adrenal gland is nodular but unchanged.        SPLEEN: Unremarkable.     PANCREAS: A cyst at the ventral aspect of the body of the pancreas is  unchanged measuring 1 cm. 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     MESENTERY: No mesenteric or retroperitoneal lymphadenopathy is seen. No  free fluid identified.     VASCULATURE: There is mild atherosclerosis of the abdominal aorta  without aneurysm.     GI TRACT: There is a small hiatal hernia. There is diverticulosis of the  colon without acute diverticulitis. No mass or obstruction is seen.     PELVIS: Urinary bladder is unremarkable. There is diverticulosis of the  sigmoid colon without acute diverticulitis. Borderline left external  iliac lymph node measures 0.9 cm in short axis on image #70 of series 3.  This is actually unchanged. Patient is status post hysterectomy.     SOFT TISSUES: There are multiple solid nodules in the subcutaneous soft  tissues which are new from the 5/23/2023 exam. One on the left  anteriorly on image #9 measures 1.5 cm. A cluster of solid nodules on  the right posteriorly and laterally on image #38 noted largest measures  2.5 cm.     BONES: There is spondylosis of the spine and lumbar spine facet  arthropathy. No suspicious osseous lesions are seen.          Impression:      1.  Diffusely abnormal left kidney which appears heterogeneous and  enlarged worrisome for neoplasm. Normal soft tissue extends into the  left renal pelvis and along the proximal left ureter. Right kidney also  is abnormal in its. Differential diagnosis would include infiltrative  renal cell carcinoma, lymphoma and metastatic disease.  2. There is also a new right adrenal mass worrisome for metastatic  disease.  3. Innumerable solid nodules in the subcutaneous soft tissues worrisome  for soft tissue metastasis. Cluster of nodules on the right posteriorly  at the mid to lower abdomen is close skin surface and would likely be  amenable to ultrasound-guided biopsy.        This report was signed and finalized on 6/28/2024 8:29 AM by Gatito Blackwood.       CT Lumbar Spine Without Contrast [[id number]] Collected: 06/28/24 0817     Updated: 06/28/24 0832    Narrative:      EXAMINATION: CT LUMBAR SPINE WO CONTRAST-      6/28/2024 6:48 AM     HISTORY: back pain     In order to have a CT radiation dose as low as reasonably achievable  Automated Exposure Control was utilized for adjustment of the mA and/or  KV according to patient size.     Total DLP = 1399.15 mGy.cm     The CT scan of the lumbar spine is performed without intravenous or  intrathecal contrast enhancement.     Images are acquired in axial plane and subsequent reconstruction in  coronal and sagittal planes.     The comparison is made with the previous study dated 1/21/2024.     There is no evidence of an acute fracture or compression.     No acute displacement or malalignment.     There is a mild levoscoliosis.     There is loss of lumbar lordosis.     There is mild anterolisthesis of L3 over L4 similar to the previous  study. No spondylolysis.     The vertebral body heights are normal.     The loss of height of the intervertebral disc, most pronounced at L5-S1.  There is a vacuum sign at L3-4, L4-5 and L5-S1 representing degenerative  disc disease.     T12-L1:  Prominent posterior osteophytes. Mild diffuse disc bulging.  Bilateral facet arthropathy right more than the left. There is right  neuroforaminal stenosis. Spinal canal is patent.     L1-2: Mild disc bulging. No significant osteophytes. Bilateral facet  arthropathy and ligamental hypertrophy. There is a mild spinal stenosis.  Neural foramina are patent.     L2-3: Mild diffuse disc bulging. Bilateral facet arthropathy and  ligamental hypertrophy. There is resultant moderate spinal stenosis.  There is mild bilateral neural foraminal stenosis.     L3-4: Moderate diffuse disc bulging/displacement central and bilateral.  There is severe facet arthropathy and ligamental hypertrophy. There is  moderate spinal stenosis. There is bilateral neuroforaminal stenosis  right more than the left.     L4-5: Small posterior osteophytes. Moderate diffuse disc bulging.  Bilateral severe facet arthropathy and ligamental hypertrophy. Moderate  spinal stenosis. Bilateral neuroforaminal stenosis.     L5-S1: Moderately prominent posterior osteophytes. Moderate diffuse disc  bulging. Bilateral facet arthropathy. There is bilateral neuroforaminal  stenosis. Spinal canal is patent.     Limited visualized paravertebral paraspinal soft tissues are  unremarkable. There are some calcification in the renal hilum  bilaterally which probably represent vascular calcification.       Impression:      1. No acute fracture or malalignment.  2. Lumbar spondylosis. Loss of lumbar lordosis. A mild anterolisthesis  L3 over L4.  3. Multilevel prominent disc osteophyte complexes, facet arthropathy and  ligamental hypertrophy and resultant neural foraminal spinal canal  stenosis.                                                  This report was signed and finalized on 6/28/2024 8:29 AM by Dr. Marito Marcano MD.                 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  Cerebral microvascular disease noted.  Diffuse soft tissue abdominal wall nodules.  - 7/1/24-ultrasound-guided core needle biopsy of right flank subcutaneous nodule.  Cytology/path pending  History of stage I left breast cancer  Left breast nodule of 9 mm  Anemia.  Contributions from CKD+/- malignancy/anemia of chronic disease  -Hgb 8.7; MCV 90.6, 7/2/2024 (prior: Hgb 9.5-11.8)  -6/25/24-iron 56, iron saturation 16%, TIBC 355, ferritin 451  Thrombocytopenia.  ITP?  MDS?  Myelopthisis?  -100,000, 7/2/2024 (prior patito: 81,000).  Has needed platelet transfusions to maintain >/=100,000  - No evidence for DIC-7/1/2024: Fibrinogen 521, FSP<5, PT 13.5/INR 0.9, PTT 26.8  - No evidence for MAHA/TTP- 7/1/24: Manual blood smear--moderate normochromic normocytic anemia.  Normal total white blood cell count with 72 segs, 20 lymphs, 4 monos, 4 eos.  Moderate peripheral thrombocytopenia.  No schistocytes identified.  No platelet clumps identified.  No morulae identified in granulocytes or monocytes.  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   Poor overall prognosis     PLAN:  -Reviewed labs, 7/1/24 and 7/2/24.  Note limiting anemia, stable thrombocytopenia (platelet transfusions), normal PT/PTT, normal fibrinogen, normal FSP (no DIC), no evidence for MAHA (no schistocytes).  - Have explained that her clinical picture is concerning for metastatic disease process, given large heterogenous left kidney mass, new right adrenal nodule, multiple subcutaneous soft tissue nodules, and cord compression at L3 concerning for epidural metastasis.   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  Awaiting final pathology results to further explore treatment options. I had cautioned that therapy will likely have a palliative rather than curative intent.  -Reviewed brain MRI, 7/1/2024 (below).  No mets   -Await cytology report on right flank subcutaneous needle biopsy, 7/1/2024.    - Outpatient staging PET-scan   - Obtain diagnostic mammography to evaluate the left breast nodule.  -Await platelet antibody profile  -Plan for postop radiation therapy to the lumbar region is noted.  No active radiation to L-spine anticipated beginning Monday with 2000 to 3000 cGy over 5-20 daily fractions  -Appreciate palliative care assistance.     I spent ~37 minutes caring for Marina on this date of service. This time includes time spent by me in the following activities: preparing for the visit, reviewing tests, performing a medically appropriate examination and/or evaluation, counseling and educating the patient/family/caregiver, ordering medications, tests, or procedures and documenting information in the medical record

## 2024-07-02 NOTE — PROGRESS NOTES
Nephrology ([facility]) progress Note      Patient:  Marina Joe  YOB: 1945  Date of Service: 7/2/2024  MRN: [id number]   Acct: [id number]   Primary Care Physician: Hanh Og APRN  Advance Directive:   Code Status and Medical Interventions:   Ordered at: 07/01/24 1547     Medical Intervention Limits:    No intubation (DNI)    No artificial nutrition     Level Of Support Discussed With:    Patient     Code Status (Patient has no pulse and is not breathing):    No CPR (Do Not Attempt to Resuscitate)     Medical Interventions (Patient has pulse or is breathing):    Limited Support     Admit Date: 6/28/2024       Hospital Day: 4  Referring Provider: No ref. provider found      Patient personally seen and examined.  Complete chart including Consults, Notes, Operative Reports, Labs, Cardiology, and Radiology studies reviewed as able.        Subjective:  Marina Joe is a 79 y.o. female for whom we were consulted for evaluation and treatment of acute kidney injury.  She has history of chronic kidney disease stage IIIa, breast cancer, hypertension.  She presented for evaluation of back pain and had tried the gabapentin.  She reported no NSAID usage.  She denied current chest pain, shortness of air at rest, nausea or vomiting.  MRI spine demonstrated stenosis with likely malignancy.  Neurosurgery was evaluating.  She denied appetite changes, dysuria or hematuria.    Today, no overnight events.  Denied other complaints on questioning.  Tolerating diet.  Has adequate urine output.  She was getting platelet transfusion.    Allergies:  Aspirin and Niacin    Home Meds:  Medications Prior to Admission   Medication Sig Dispense Refill Last Dose    amLODIPine (NORVASC) 5 MG tablet Take 1 tablet by mouth 2 (Two) Times a Day.       buPROPion XL (WELLBUTRIN XL) 150 MG 24 hr tablet Take 1 tablet by mouth Daily.       Calcium Carb-Cholecalciferol (CALCIUM 600+D3 PO) Take 1 tablet by  mouth Daily.       carvedilol (COREG) 6.25 MG tablet Take 1 tablet by mouth 2 (Two) Times a Day With Meals.       dapagliflozin Propanediol (Farxiga) 10 MG tablet Take 10 mg by mouth Daily.       famotidine (PEPCID) 20 MG tablet Take 1 tablet by mouth 2 (Two) Times a Day Before Meals. 60 tablet 0     fluticasone (FLONASE) 50 MCG/ACT nasal spray 2 sprays into the nostril(s) as directed by provider Daily. 1 g 3     irbesartan-hydrochlorothiazide (AVALIDE) 300-12.5 MG tablet Take 1 tablet by mouth Daily.       montelukast (SINGULAIR) 10 MG tablet Take 1 tablet by mouth Every Night.       Multiple Vitamins-Minerals (MULTIVITAMIN ADULT) tablet Take 1 tablet by mouth Daily.       pantoprazole (PROTONIX) 40 MG EC tablet Take 1 tablet by mouth Daily.       rOPINIRole (REQUIP) 0.5 MG tablet Take 1 tablet by mouth Every Night. Take 1 hour before bedtime.       rosuvastatin (CRESTOR) 20 MG tablet Take 1 tablet by mouth Daily.       valACYclovir (VALTREX) 500 MG tablet Take 1 tablet by mouth 2 (Two) Times a Day.       albuterol sulfate  (90 Base) MCG/ACT inhaler Inhale 2 puffs Every 4 (Four) Hours As Needed for Wheezing. 2 g 3     cetirizine (zyrTEC) 10 MG tablet Take 1 tablet by mouth Daily.       cloNIDine (CATAPRES) 0.1 MG tablet Take 1 tablet by mouth 2 (Two) Times a Day.       cyclobenzaprine (FLEXERIL) 10 MG tablet Take 1 tablet by mouth 3 (Three) Times a Day As Needed for Muscle Spasms.       Diclofenac Sodium (VOLTAREN) 1 % gel gel Apply 4 g topically to the appropriate area as directed 4 (Four) Times a Day As Needed (arthralgia). 240 g 3     Ergocalciferol (VITAMIN D2 PO) Take 50,000 Units by mouth Every 30 (Thirty) Days.       naproxen sodium (ALEVE) 220 MG tablet Take 1 tablet by mouth 2 (Two) Times a Day As Needed.       sertraline (ZOLOFT) 100 MG tablet Take 1 tablet by mouth Daily.          Medicines:  Current Facility-Administered Medications   Medication Dose Route Frequency Provider Last Rate Last Admin     acetaminophen (TYLENOL) tablet 650 mg  650 mg Oral Q6H PRN Marcellus Pulido MD   650 mg at 06/29/24 0544    albuterol (PROVENTIL) nebulizer solution 0.083% 2.5 mg/3mL  2.5 mg Nebulization Q4H PRN Marcellus Pulido MD   2.5 mg at 06/28/24 1040    amLODIPine (NORVASC) tablet 5 mg  5 mg Oral BID Marcellus Pulido MD   5 mg at 07/02/24 0834    sennosides-docusate (PERICOLACE) 8.6-50 MG per tablet 2 tablet  2 tablet Oral BID PRN Marcellus Pulido MD   2 tablet at 07/01/24 2045    And    polyethylene glycol (MIRALAX) packet 17 g  17 g Oral Daily PRN Marcellus Pulido MD        And    bisacodyl (DULCOLAX) EC tablet 5 mg  5 mg Oral Daily PRN Marcellus Pulido MD        And    bisacodyl (DULCOLAX) suppository 10 mg  10 mg Rectal Daily PRN Marcellus Pulido MD        buPROPion XL (WELLBUTRIN XL) 24 hr tablet 150 mg  150 mg Oral Daily Marcellus Pulido MD   150 mg at 07/02/24 0834    Calcium Replacement - Follow Nurse / BPA Driven Protocol   Does not apply PRN Marcellus Pulido MD        carvedilol (COREG) tablet 6.25 mg  6.25 mg Oral BID With Meals Marcellus Pulido MD   6.25 mg at 07/02/24 0834    cetirizine (zyrTEC) tablet 10 mg  10 mg Oral Daily Marcellus Pulido MD   10 mg at 07/02/24 0834    cloNIDine (CATAPRES) tablet 0.1 mg  0.1 mg Oral BID Marcellus Pulido MD   0.1 mg at 07/02/24 0834    cyclobenzaprine (FLEXERIL) tablet 10 mg  10 mg Oral TID PRN Marcellus Pulido MD   10 mg at 07/01/24 2045    Diclofenac Sodium (VOLTAREN) 1 % gel 4 g  4 g Topical 4x Daily PRN Marcellus Pulido MD        fluticasone (FLONASE) 50 MCG/ACT nasal spray 2 spray  2 spray Nasal Daily Marcellus Pulido MD   2 spray at 07/02/24 0837    heparin (porcine) 5000 UNIT/ML injection 5,000 Units  5,000 Units Subcutaneous Q12H Marcellus Pulido MD   5,000 Units at 07/02/24 0835    hydrALAZINE (APRESOLINE) injection 10 mg  10 mg Intravenous Q4H PRN  Marcellus Pulido MD        HYDROcodone-acetaminophen (NORCO) 5-325 MG per tablet 1 tablet  1 tablet Oral Q4H PRN Marcellus Pulido MD   1 tablet at 06/29/24 2019    HYDROcodone-acetaminophen (NORCO) 7.5-325 MG per tablet 1 tablet  1 tablet Oral Q4H PRN Marcellus Pulido MD   1 tablet at 07/02/24 1045    Magnesium Low Dose Replacement - Follow Nurse / BPA Driven Protocol   Does not apply PRN Marcellus Pulido MD        methylnaltrexone (RELISTOR) injection 6 mg  6 mg Subcutaneous Every Other Day Marc Quevedo APRN   6 mg at 07/02/24 1125    montelukast (SINGULAIR) tablet 10 mg  10 mg Oral Nightly Marcellus Pulido MD   10 mg at 07/01/24 2031    multivitamin with minerals 1 tablet  1 tablet Oral Daily Marcellus Pulido MD   1 tablet at 07/02/24 0834    ondansetron (ZOFRAN) injection 4 mg  4 mg Intravenous Q6H PRN Marcellus Pulido MD        pantoprazole (PROTONIX) EC tablet 40 mg  40 mg Oral Daily Marcellus Pulido MD   40 mg at 07/02/24 0835    Phosphorus Replacement - Follow Nurse / BPA Driven Protocol   Does not apply PRN Marcellus Pulido MD        Potassium Replacement - Follow Nurse / BPA Driven Protocol   Does not apply PRN Marcellus Pulido MD        rOPINIRole (REQUIP) tablet 0.5 mg  0.5 mg Oral Nightly Marcellus Pulido MD   0.5 mg at 07/01/24 2031    rosuvastatin (CRESTOR) tablet 10 mg  10 mg Oral Nightly Marcellus Pulido MD   10 mg at 07/01/24 2031    sertraline (ZOLOFT) tablet 100 mg  100 mg Oral Daily Marcellus Pulido MD   100 mg at 07/02/24 0834    sodium chloride 0.9 % flush 10 mL  10 mL Intravenous PRN Marcellus Pulido MD        sodium chloride 0.9 % flush 10 mL  10 mL Intravenous Q12H Marcellus Pulido MD   10 mL at 06/30/24 2106    sodium chloride 0.9 % flush 10 mL  10 mL Intravenous PRN Marcellus Pulido MD        sodium chloride 0.9 % flush 10 mL  10 mL Intravenous Q12H Marcellus Pulido MD    10 mL at 07/01/24 1006    sodium chloride 0.9 % flush 10 mL  10 mL Intravenous PRN Marcellus Pulido MD        sodium chloride 0.9 % infusion 40 mL  40 mL Intravenous PRN Marcellus Pulido MD        sodium chloride 0.9 % infusion 40 mL  40 mL Intravenous PRN Marcellus Pulido MD        sodium chloride 0.9 % infusion  100 mL/hr Intravenous Continuous Marcellus Pulido  mL/hr at 07/02/24 0449 100 mL/hr at 07/02/24 0449       Past Medical History:  Past Medical History:   Diagnosis Date    Breast cancer     CKD (chronic kidney disease)     Hypercholesteremia     Hypertension     Sinusitis     Stage 3a chronic kidney disease 10/20/2020       Past Surgical History:  Past Surgical History:   Procedure Laterality Date    AVULSION TOENAIL PLATE      Sept 26,2018    BREAST BIOPSY Left 11/20/2012    BREAST BIOPSY      Left Breast, 1/2019 per Dr Barkley    BREAST LUMPECTOMY Left     with node bx     COLONOSCOPY  09/13/2013    small polyp at 30cm benign hyperplastic polyp, changes consistent with melanosis coli. Recall 5 years    COLONOSCOPY  11/19/2018    Tics otherwise normal exam repeat in 5 years    COLONOSCOPY  02/13/2023    Normal exam repeat in 3 years with a 2 day prep    ENDOSCOPY  02/13/2023    Gastritis, small HH    LUMBAR LAMINECTOMY Right 6/29/2024    Procedure: LUMBAR LAMINECTOMY L3 WITH DECOMPRESSION 1-2 LEVELS-RIGHT;  Surgeon: Marcellus Pulido MD;  Location: [facility];  Service: Neurosurgery;  Laterality: Right;    REPLACEMENT TOTAL KNEE Right     2016    TOTAL ABDOMINAL HYSTERECTOMY WITH SALPINGO OOPHORECTOMY      UPPER ENDOSCOPIC ULTRASOUND W/ FNA  12/20/2023    Pancreatic cyst s/p FNA       Family History  Family History   Problem Relation Age of Onset    Heart attack Mother     Hodgkin's lymphoma Father     Other Father     Cancer Father         hodgkins    Heart attack Brother     Skin cancer Maternal Uncle     Pancreatic cancer Maternal Uncle     Cancer Maternal Uncle          eye    Colon cancer Neg Hx     Colon polyps Neg Hx        Social History  Social History     Socioeconomic History    Marital status:    Tobacco Use    Smoking status: Never    Smokeless tobacco: Never   Vaping Use    Vaping status: Never Used   Substance and Sexual Activity    Alcohol use: No    Drug use: Not Currently    Sexual activity: Not Currently     Birth control/protection: Surgical         Review of Systems:  History obtained from chart review and the patient  General ROS: No fever or chills  Respiratory ROS: No cough, shortness of breath, wheezing  Cardiovascular ROS: No chest pain or palpitations  Gastrointestinal ROS: No abdominal pain or melena  Genito-Urinary ROS: No dysuria or hematuria  Psych ROS: No anxiety and depression  14 point ROS reviewed with the patient and negative except as noted above and in the HPI unless unable to obtain.      Objective:  Patient Vitals for the past 24 hrs:   BP Temp Temp src Pulse Resp SpO2   07/02/24 1532 146/66 97.5 °F (36.4 °C) Oral 72 18 94 %   07/02/24 1338 153/56 97.4 °F (36.3 °C) Oral 78 18 95 %   07/02/24 1321 153/92 98.3 °F (36.8 °C) Axillary 82 18 95 %   07/02/24 1230 155/94 98.5 °F (36.9 °C) Axillary 75 18 96 %   07/02/24 1136 132/51 99 °F (37.2 °C) Axillary 70 18 96 %   07/02/24 1121 125/58 97.6 °F (36.4 °C) Oral 66 16 95 %   07/02/24 1058 125/58 97.6 °F (36.4 °C) Oral 66 16 96 %   07/02/24 0746 152/60 98.3 °F (36.8 °C) Oral 75 16 95 %   07/02/24 0319 165/55 98.4 °F (36.9 °C) Oral 83 16 98 %   07/01/24 2334 165/76 97.7 °F (36.5 °C) Oral 83 16 98 %   07/01/24 1958 152/57 97.8 °F (36.6 °C) Oral 79 16 97 %   07/01/24 1800 130/66 97.9 °F (36.6 °C) Oral 73 20 94 %       Intake/Output Summary (Last 24 hours) at 7/2/2024 1547  Last data filed at 7/2/2024 1532  Gross per 24 hour   Intake 1020 ml   Output 1150 ml   Net -130 ml     General: awake/alert   Chest:  clear to auscultation bilaterally without respiratory distress  CVS: regular rate and  rhythm  Abdominal: soft, nontender, positive bowel sounds  Extremities: no cyanosis or edema  Skin: warm and dry without rash      Labs:  Results from last 7 days   Lab Units 07/02/24  0829 07/01/24  1940 07/01/24  1053   WBC 10*3/mm3 8.27 9.20 8.22   HEMOGLOBIN g/dL 8.3* 8.7* 9.2*   HEMATOCRIT % 26.1* 27.8* 29.2*   PLATELETS 10*3/mm3 75* 100* 74*         Results from last 7 days   Lab Units 07/02/24  1414 07/02/24  0414 07/01/24  0432 06/30/24  0434 06/28/24  1153 06/28/24  0617   SODIUM mmol/L  --  138 137 137   < > 135*   POTASSIUM mmol/L 4.3 3.6 3.7 4.0   < > 3.9   CHLORIDE mmol/L  --  103 102 101   < > 95*   CO2 mmol/L  --  24.0 24.0 24.0   < > 26.0   BUN mg/dL  --  30* 37* 34*   < > 32*   CREATININE mg/dL  --  1.67* 2.18* 2.39*   < > 2.59*   CALCIUM mg/dL  --  8.9 8.5* 8.4*   < > 10.0   EGFR mL/min/1.73  --  31.2* 22.7* 20.3*   < > 18.4*   BILIRUBIN mg/dL  --   --   --  <0.2  --  0.3   ALK PHOS U/L  --   --   --  78  --  85   ALT (SGPT) U/L  --   --   --  21  --  18   AST (SGOT) U/L  --   --   --  35*  --  22   GLUCOSE mg/dL  --  107* 98 120*   < > 99    < > = values in this interval not displayed.       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 Patient reports  history of breast cancer in 2013 status post surgery and radiation  therapy.        COMPARISON: 6/28/2024.     TECHNIQUE:  Representative images and report stored to PACS per  institutional protocol and state regulations.     PROCEDURE:  Preprocedure imaging performed demonstrating multiple peripherally  echogenic, centrally hypoechoic nodules. These demonstrated internal  vascularity. A superficial nodule RIGHT flank nodule was selected and  patient positioned with consideration for her comfort.     Risks, benefits and alternatives to the procedure were discussed with  the patient. Specifically, risks of bleeding, infection, allergy to  medicine, and non-diagnostic biopsy results were discussed with the  patient. Verbal and written informed consent was obtained.     A timeout was performed including the patient's name, date of birth,  biopsy site and laterality.     Using ultrasound, a site for biopsy of RIGHT flank subcutaneous nodule  was selected, marked and prepped in the usual sterile fashion. 1 percent   lidocaine was used for local anesthesia.     Using ultrasound guidance, RIGHT flank subcutaneous nodule biopsy was  performed using 18 gauge Bard core needle biopsy device. 5 passes were  made. One core was used for touch prep. 2 cores were placed in formalin  and 2 cores were placed in RPMI.     Cytology deemed sample adequate. Biopsy samples were taken by cytology.      The patient tolerated the procedure well. No evidence of complication.     The patient returned to the floor in stable condition and agrees to  follow up with referring clinician for biopsy results.           Impression:      1. Successful ultrasound guided core needle biopsy of RIGHT flank  subcutaneous nodule.        This report was signed and finalized on 7/1/2024 2:36 PM by Dr Sonam Quach MD.       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 Patient reports  history of breast cancer in 2013 status post surgery and radiation  therapy.        COMPARISON: 6/28/2024.     TECHNIQUE: Representative images and report stored to PACS per  institutional protocol and state regulations.     PROCEDURE:  Preprocedure imaging performed demonstrating multiple peripherally  echogenic, centrally hypoechoic nodules. These demonstrated internal  vascularity. A superficial nodule RIGHT flank nodule was selected and  patient positioned with consideration for her comfort.     Risks, benefits and alternatives to the procedure were discussed with  the patient. Specifically, risks of bleeding, infection, allergy to  medicine, and non-diagnostic biopsy results were discussed with the  patient. Verbal and written informed consent was obtained.     A timeout was performed including the patient's name, date of birth,  biopsy site and laterality.     Using ultrasound, a site for biopsy of RIGHT flank subcutaneous nodule  was selected, marked and prepped in the usual sterile fashion. 1 percent   lidocaine was used for local anesthesia.     Using ultrasound guidance, RIGHT flank subcutaneous nodule biopsy was  performed using 18 gauge Bard core needle biopsy device. 5 passes were  made. One core was used for touch prep. 2 cores were placed in formalin  and 2 cores were placed in RPMI.     Cytology deemed sample adequate. Biopsy samples were taken by cytology.      The patient tolerated the procedure well. No evidence of complication.     The patient returned to the floor in stable condition and agrees to  follow up with referring clinician for biopsy results.            Impression:      1. Successful ultrasound guided core needle biopsy of RIGHT flank  subcutaneous nodule.        This report was signed and finalized on 7/1/2024 2:36 PM by Dr Sonam Quach MD.       MRI Brain Without Contrast [[id number]] Collected: 07/01/24 0949     Updated: 07/01/24 1000    Narrative:      EXAMINATION: MRI BRAIN WO CONTRAST-  7/1/2024 9:49 AM      HISTORY: Staging; C79.51-Secondary malignant neoplasm of bone;  N28.89-Other specified disorders of kidney and ureter; E27.8-Other  specified disorders of adrenal gland; M79.89-Other specified soft tissue  disorders; N28.9-Disorder of kidney and ureter, unspecified;  N18.9-Chronic kidney disease, unspecified; D69.6-Thrombocytopenia,  unspecified; G83.4-Cauda equina syndrome; Z74.09-Other reduced mobility     COMPARISON: 8/10/2023     TECHNIQUE: Multiplanar imaging of the brain was performed in a routine  fashion. No contrast was administered.     FINDINGS:  No restricted diffusion. No mass effect or midline shift. It should be  noted that no intravenous contrast was administered, limiting evaluation  for intracranial metastatic disease. Within the limitations of this  exam, I don't see any definite evidence of intracranial metastatic  disease. Increased FLAIR signal intensity scattered throughout the  subcortical and periventricular white matter. Basilar cisterns are  preserved. Grey and white matter demonstrates normal MR signal. No  abnormal extra axial fluid collections are noted. No definite mass  effect or midline shift identified.     Proximal cervical spinal cord, brainstem, and cerebellum are  unremarkable. Normal cerebrovascular flow voids are seen. Bilateral  globes and orbits are normal in appearance.     Opacification of the LEFT sphenoid sinus with high T1 signal which may  represent some proteinaceous fluid.          Impression:         1.  No definite evidence of intracranial metastatic disease within the  limitations of this  "noncontrast examination.     2.  Fairly extensive periventricular white matter signal abnormality,  likely related to chronic microvascular ischemic change.     3.  Opacification of the LEFT sphenoid sinus with what is likely  proteinaceous fluid.                                                       This report was signed and finalized on 7/1/2024 9:53 AM by Dr. Armando Calixto MD.               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        Jean Alexander MD  7/2/2024  15:47 CDT    "

## 2024-07-02 NOTE — PROGRESS NOTES
[facility] Palliative Care Services    Palliative Care Daily Progress Note   Chief complaint-follow up support for patient/family    Code Status:   Code Status and Medical Interventions:   Ordered at: 07/01/24 3566     Medical Intervention Limits:    No intubation (DNI)    No artificial nutrition     Level Of Support Discussed With:    Patient     Code Status (Patient has no pulse and is not breathing):    No CPR (Do Not Attempt to Resuscitate)     Medical Interventions (Patient has pulse or is breathing):    Limited Support      Advanced Directives: Advance Directive Status: Patient does not have advance directive   Goals of Care: Ongoing.     S: Medical record reviewed. Events noted. Awaiting cytology to further determine treatment options. Radiation oncology plans for simulation today followed by palliative radiation treatments 5-10 treatment fractions starting Monday 7/8.  Laying in bed without apparent distress.  Complains of back pain.  Staff notified for medication per patient request.  No family at bedside.     Review of Systems   Constitutional: Positive for malaise/fatigue.   Respiratory:  Negative for shortness of breath.    Musculoskeletal:  Positive for muscle weakness and back pain.   Genitourinary:  Negative for dysuria.   Neurological:  Positive for loss of balance and weakness.   Psychiatric/Behavioral:  The patient is not nervous/anxious    Pain Assessment  Preferred Pain Scale: number (Numeric Rating Pain Scale)  Nonverbal Indicators of Pain: nonverbal indicators absent  CPOT Facial Expression: 0-->relaxed, neutral  CPOT Body Movements: 0-->absence of movements  CPOT Muscle Tension: 0-->relaxed  Ventilator Compliance/Vocalization: 0-->talking in normal tone or no sound  CPOT Score: 0  Pain Location: back, extremity  Pain Description: spasm, constant, aching    O:     Intake/Output Summary (Last 24 hours) at 7/2/2024 1051  Last data filed at 7/2/2024 0900  Gross per 24 hour    Intake 1080 ml   Output 750 ml   Net 330 ml       Diagnostics and current medications: Reviewed.      Current Facility-Administered Medications:     acetaminophen (TYLENOL) tablet 650 mg, 650 mg, Oral, Q6H PRN, Marcellus Pulido MD, 650 mg at 06/29/24 0544    albuterol (PROVENTIL) nebulizer solution 0.083% 2.5 mg/3mL, 2.5 mg, Nebulization, Q4H PRN, Marcellus Pulido MD, 2.5 mg at 06/28/24 1040    amLODIPine (NORVASC) tablet 5 mg, 5 mg, Oral, BID, Marcellus Pulido MD, 5 mg at 07/02/24 0834    sennosides-docusate (PERICOLACE) 8.6-50 MG per tablet 2 tablet, 2 tablet, Oral, BID PRN, 2 tablet at 07/01/24 2045 **AND** polyethylene glycol (MIRALAX) packet 17 g, 17 g, Oral, Daily PRN **AND** bisacodyl (DULCOLAX) EC tablet 5 mg, 5 mg, Oral, Daily PRN **AND** bisacodyl (DULCOLAX) suppository 10 mg, 10 mg, Rectal, Daily PRN, Marcellus Pulido MD    buPROPion XL (WELLBUTRIN XL) 24 hr tablet 150 mg, 150 mg, Oral, Daily, Marcellus Pulido MD, 150 mg at 07/02/24 0834    Calcium Replacement - Follow Nurse / BPA Driven Protocol, , Does not apply, PRN, Marcellus Pulido MD    carvedilol (COREG) tablet 6.25 mg, 6.25 mg, Oral, BID With Meals, Marcellus Pulido MD, 6.25 mg at 07/02/24 0834    cetirizine (zyrTEC) tablet 10 mg, 10 mg, Oral, Daily, Marcellus Pulido MD, 10 mg at 07/02/24 0834    cloNIDine (CATAPRES) tablet 0.1 mg, 0.1 mg, Oral, BID, Marcellus Pulido MD, 0.1 mg at 07/02/24 0834    cyclobenzaprine (FLEXERIL) tablet 10 mg, 10 mg, Oral, TID PRN, Marcellus Pulido MD, 10 mg at 07/01/24 2045    Diclofenac Sodium (VOLTAREN) 1 % gel 4 g, 4 g, Topical, 4x Daily PRN, Marcellus Pulido MD    fluticasone (FLONASE) 50 MCG/ACT nasal spray 2 spray, 2 spray, Nasal, Daily, Marcellus Pulido MD, 2 spray at 07/02/24 0837    heparin (porcine) 5000 UNIT/ML injection 5,000 Units, 5,000 Units, Subcutaneous, Q12H, Marcellus Pulido MD, 5,000 Units at 07/02/24 0835     hydrALAZINE (APRESOLINE) injection 10 mg, 10 mg, Intravenous, Q4H PRN, Marcellus Pulido MD    HYDROcodone-acetaminophen (NORCO) 5-325 MG per tablet 1 tablet, 1 tablet, Oral, Q4H PRN, Marcellus Pulido MD, 1 tablet at 06/29/24 2019    HYDROcodone-acetaminophen (NORCO) 7.5-325 MG per tablet 1 tablet, 1 tablet, Oral, Q4H PRN, Marcellus Pulido MD, 1 tablet at 07/02/24 1045    Magnesium Low Dose Replacement - Follow Nurse / BPA Driven Protocol, , Does not apply, PRN, Marcellus Pulido MD    methylnaltrexone (RELISTOR) injection 6 mg, 6 mg, Subcutaneous, Every Other Day, Marc Quevedo APRN    montelukast (SINGULAIR) tablet 10 mg, 10 mg, Oral, Nightly, Marcellus Pulido MD, 10 mg at 07/01/24 2031    multivitamin with minerals 1 tablet, 1 tablet, Oral, Daily, Marcellus Pulido MD, 1 tablet at 07/02/24 0834    ondansetron (ZOFRAN) injection 4 mg, 4 mg, Intravenous, Q6H PRN, Marcellus Pulido MD    pantoprazole (PROTONIX) EC tablet 40 mg, 40 mg, Oral, Daily, Marcellus Pulido MD, 40 mg at 07/02/24 0835    Phosphorus Replacement - Follow Nurse / BPA Driven Protocol, , Does not apply, PRN, Marcellus Pulido MD    potassium chloride (MICRO-K/KLOR-CON) CR capsule, 40 mEq, Oral, Q4H, Alysia Lincoln MD, 40 mEq at 07/02/24 0636    Potassium Replacement - Follow Nurse / BPA Driven Protocol, , Does not apply, PRN, Marcellus Pulido MD    rOPINIRole (REQUIP) tablet 0.5 mg, 0.5 mg, Oral, Nightly, Marcellus Pulido MD, 0.5 mg at 07/01/24 2031    rosuvastatin (CRESTOR) tablet 10 mg, 10 mg, Oral, Nightly, Marcellus Pulido MD, 10 mg at 07/01/24 2031    sertraline (ZOLOFT) tablet 100 mg, 100 mg, Oral, Daily, Marcellus Pulido MD, 100 mg at 07/02/24 0834    [COMPLETED] Insert Peripheral IV, , , Once **AND** sodium chloride 0.9 % flush 10 mL, 10 mL, Intravenous, PRN, Marcellus Pulido MD    sodium chloride 0.9 % flush 10 mL, 10 mL, Intravenous, Q12H,  "Marcellus Pulido MD, 10 mL at 06/30/24 2106    sodium chloride 0.9 % flush 10 mL, 10 mL, Intravenous, PRN, Marcellus Pulido MD    sodium chloride 0.9 % flush 10 mL, 10 mL, Intravenous, Q12H, Marcellus Pulido MD, 10 mL at 07/01/24 1006    sodium chloride 0.9 % flush 10 mL, 10 mL, Intravenous, PRN, Marcellus Pulido MD    sodium chloride 0.9 % infusion 40 mL, 40 mL, Intravenous, PRN, Marcellus Pulido MD    sodium chloride 0.9 % infusion 40 mL, 40 mL, Intravenous, PRN, Marcellus Pulido MD    sodium chloride 0.9 % infusion, 100 mL/hr, Intravenous, Continuous, Marcellus Pulido MD, Last Rate: 100 mL/hr at 07/02/24 0449, 100 mL/hr at 07/02/24 0449    Allergies   Allergen Reactions    Aspirin GI Bleeding    Niacin Other (See Comments)     I have utilized all available immediate resources to obtain, update, or review the patient's current medications (including all prescriptions, over-the-counter products, herbals, cannabis/cannabidiol products, and vitamin/mineral/dietary (nutritional) supplements) for name, route of administration, type, dose and frequency.    A:    /60 (BP Location: Right arm, Patient Position: Lying)   Pulse 75   Temp 98.3 °F (36.8 °C) (Oral)   Resp 16   Ht 172.7 cm (68\")   Wt 111 kg (244 lb)   LMP  (LMP Unknown)   SpO2 95%   BMI 37.10 kg/m²     Vitals and nursing note reviewed.   Constitutional:       Appearance: Not in distress.   Eyes:      General: Lids are normal.      Pupils: Pupils are equal, round, and reactive to light.   HENT:      Head: Normocephalic.   Pulmonary:      Effort: Pulmonary effort is normal.   Cardiovascular:      Normal rate.   Edema:     Peripheral edema present.  Abdominal:      Palpations: Abdomen is soft.   Musculoskeletal: Normal range of motion.      Cervical back: Neck supple.   Skin:     General: Skin is warm.   Genitourinary:     Comments: No reported dysuria  Neurological:      Mental Status: Alert, oriented " to person, place, and time and oriented to person, place and time.   Psychiatric:         Mood and Affect: Mood normal.         Cognition and Memory: Cognition normal.      Patient status: Disease state: Controlled with current treatments.  Current Functional status: Palliative Performance Scale Score: Performance 40% based on the following measures: Ambulation: Mainly in bed, Activity and Evidence of Disease: Unable to do any work, extensive evidence of disease, Self-Care: Mainly assistance required,  Intake: Normal or reduced, LOC: Full, drowsy or confusion   Baseline Functional status: Palliative Performance Scale Score: Performance 70% based on the following measures: Ambulation: Reduced, Activity and Evidence of Disease: Unable to do normal work, some evidence of disease, Self-Care: Fully independent,  Intake: Normal or reduced, LOC: Full   Baseline ECOG Status(3) Capable of limited self-care, confined to bed or chair > 50% of waking hours.  Nutritional status: Albumin 3.9 Body mass index is 37.1 kg/m².      Hospital Problem List      Left renal mass    Cauda equina compression    Metastatic cancer to spine     Impression/Problem List:     Cord compression at L3 concerning for epidural metastases s/p laminectomy and decompression 6/29/2024  Left renal mass, right adrenal mass, and abdominal soft tissue nodules concerning for metastatic disease  Cerebral microvascular disease, per MRI  History of breast cancer, stage I  Left breast nodule 9 mm  Anemia  Thrombocytopenia  Acute kidney injury on chronic kidney disease stage III  Hyperlipidemia  Hypertension     Recommendations/Plan:  1. plan: Goals of care include status no CPR/limited support interventions.     Family support: The patient receives support from her children and friend, Jose Enrique ..  Advance Directives: Advance Directive Status: Patient does not have advance directive   POA/Healthcare surrogate-Advance directive completed 7/1 with assistance from  palliative  and , patient named her friend Jose Enrique followed by 2 children, Kailee and Marques as healthcare surrogates..     2.  Palliative care encounter  - Prognosis is guarded long-term secondary to suspected metastatic disease and comorbidities..  -Patient and HCS appears to have good prognostic awareness.      -Neurosurgery and nephrology consulted.    6/29-Underwent lumbar laminectomy L3 with decompression of the spinal cord and biopsy by Dr. 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    -Oncology consulted, recommend further staging diagnostics.  Anticipating biopsy of abdominal subcutaneous nodules.  MRI brain shows no definite evidence of metastatic disease.    -Radiation oncology consulted for postop radiation.   -Continues to work with therapy, LSO when out of bed per neurosurgery recommendation.    7/1-Advance directive completed today with assistance from palliative  and , patient named her friend Jose Enrique followed by 2 children Kailee and Marques as healthcare surrogates.     7/1-CODE STATUS changes no CPR/limited support interventions, no intubation and no artificial nutrition.    -Will plan to complete a MOST document prior to discharge with assistance from palliative     7/2-continue supportive measures  -Radiation oncology plans for simulation tomorrow followed by palliative radiation treatments 5-10 treatment fractions starting Monday  -Awaiting cytology to further determine treatment options.  -A MOST document has been reviewed and initiated, attending to complete      Thank you for allowing us to participate in patient's plan of care. Palliative Care Team will continue to follow patient.     Lauren Kuo, MARY  7/2/2024  10:51 CDT

## 2024-07-02 NOTE — THERAPY TREATMENT NOTE
Acute Care - Physical Therapy Treatment Note  [facility]     Patient Name: Marina Joe  : 1945  MRN: [id number]  Today's Date: 2024      Visit Dx:     ICD-10-CM ICD-9-CM   1. 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 Left renal mass  N28.89 593.9   3. Right adrenal mass  E27.8 255.8   4. Nodule of soft tissue  M79.89 729.99   5. Acute on chronic renal insufficiency  N28.9 593.9    N18.9 585.9   6. Thrombocytopenia  D69.6 287.5   7. Cauda equina compression  G83.4 344.60   8. Impaired mobility [Z74.09]  Z74.09 799.89   9. HX: breast cancer  Z85.3 V10.3     Patient Active Problem List   Diagnosis    Malignant neoplasm of upper-outer quadrant of female breast    Anemia of chronic renal failure, stage 3 (moderate)    Hypertension, benign    Hx of colonic polyps    HX: breast cancer    Morbidly obese    Right knee DJD    S/P lumpectomy, left breast    Adult hypothyroidism    Anemia    Anxiety    Breast cancer, left    Cervical pain    Chronic insomnia    Elevated lipids    Herpes zoster without complication    Left ear pain    Left otitis externa    Myalgia    Negative depression screening    Obesity (BMI 30-39.9)    Postmenopausal status    Recurrent acute serous otitis media of left ear    Restless leg    Sinusitis, bacterial    Skin lesion    Vitamin D deficiency    Stage 3b chronic kidney disease    GIB (gastrointestinal bleeding)    Acute on chronic blood loss anemia    Chronic idiopathic thrombocytopenia    Hypotension due to hypovolemia    Primary hypertension    Pancreatic lesion    Left renal mass    Cauda equina compression    Metastatic cancer to spine     Past Medical History:   Diagnosis Date    Breast cancer     CKD (chronic kidney disease)     Hypercholesteremia     Hypertension     Sinusitis     Stage 3a chronic kidney disease 10/20/2020     Past Surgical History:   Procedure Laterality Date    AVULSION TOENAIL PLATE          BREAST BIOPSY Left 2012    BREAST BIOPSY       Left Breast, 1/2019 per Dr Barkley    BREAST LUMPECTOMY Left     with node bx     COLONOSCOPY  09/13/2013    small polyp at 30cm benign hyperplastic polyp, changes consistent with melanosis coli. Recall 5 years    COLONOSCOPY  11/19/2018    Tics otherwise normal exam repeat in 5 years    COLONOSCOPY  02/13/2023    Normal exam repeat in 3 years with a 2 day prep    ENDOSCOPY  02/13/2023    Gastritis, small HH    LUMBAR LAMINECTOMY Right 6/29/2024    Procedure: LUMBAR LAMINECTOMY L3 WITH DECOMPRESSION 1-2 LEVELS-RIGHT;  Surgeon: Marcellus Pulido MD;  Location: [facility] OR;  Service: Neurosurgery;  Laterality: Right;    REPLACEMENT TOTAL KNEE Right     2016    TOTAL ABDOMINAL HYSTERECTOMY WITH SALPINGO OOPHORECTOMY      UPPER ENDOSCOPIC ULTRASOUND W/ FNA  12/20/2023    Pancreatic cyst s/p FNA     PT Assessment (Last 12 Hours)       PT Evaluation and Treatment       Row Name 07/02/24 0817          Physical Therapy Time and Intention    Subjective Information complains of;pain  -AB     Document Type therapy note (daily note)  -AB     Mode of Treatment physical therapy  -AB       Row Name 07/02/24 0817          General Information    Existing Precautions/Restrictions brace worn when out of bed;fall;LSO;spinal  -AB       Row Name 07/02/24 0817          Pain    Pretreatment Pain Rating 10/10  -AB     Posttreatment Pain Rating 10/10  -AB       Row Name 07/02/24 0817          Bed Mobility    Rolling Left [place] (Bed Mobility) verbal cues;minimum assist (75% patient effort)  -AB     Sidelying-Sit [place] (Bed Mobility) verbal cues;minimum assist (75% patient effort)  -AB     Sit-Sidelying [place] (Bed Mobility) not tested  up in chair  -AB       Row Name 07/02/24 0817          Sit-Stand Transfer    Sit-Stand [place] (Transfers) verbal cues;contact guard  -AB     Assistive Device (Sit-Stand Transfers) walker, front-wheeled  -AB       Row Name 07/02/24 0817          Stand-Sit Transfer    Stand-Sit  [place] (Transfers) verbal cues;contact guard  -AB     Assistive Device (Stand-Sit Transfers) walker, front-wheeled  -AB       Row Name 07/02/24 0817          Toilet Transfer    Type (Toilet Transfer) sit-stand;stand-sit  -AB     [place] Level (Toilet Transfer) verbal cues;contact guard  -AB     Assistive Device (Toilet Transfer) commode;grab bars/safety frame;walker, front-wheeled  -AB       Row Name 07/02/24 0817          Gait/Stairs (Locomotion)    [place] Level (Gait) contact guard;verbal cues  -AB     Assistive Device (Gait) walker, front-wheeled  -AB     Distance in Feet (Gait) 60  -AB     Bilateral Gait Deviations forward flexed posture  -AB     Left Sided Gait Deviations heel strike decreased  -AB       Row Name             Wound 06/29/24 1458 lumbar spine Incision    Wound - Properties Group Placement Date: 06/29/24  -KT Placement Time: 1458  -KT Present on Original Admission: N  -KT Location: lumbar spine  -KT Primary Wound Type: Incision  -KT    Retired Wound - Properties Group Placement Date: 06/29/24  -KT Placement Time: 1458  -KT Present on Original Admission: N  -KT Location: lumbar spine  -KT Primary Wound Type: Incision  -KT    Retired Wound - Properties Group Date first assessed: 06/29/24  -KT Time first assessed: 1458  -KT Present on Original Admission: N  -KT Location: lumbar spine  -KT Primary Wound Type: Incision  -KT      Row Name 07/02/24 0817          Positioning and Restraints    Pre-Treatment Position in bed  -AB     Post Treatment Position chair  -AB     In Chair sitting;call light within reach  -AB               User Key  (r) = Recorded By, (t) = Taken By, (c) = Cosigned By      Initials Name Provider Type    AB Carlene Smith PTA Physical Therapist Assistant    Shravan Ordonez, RN Registered Nurse                    Physical Therapy Education       Title: PT OT SLP Therapies (Done)       Topic: Physical Therapy (Done)       Point: Mobility training (Done)        Learning Progress Summary             Patient Acceptance, E,D, DU,VU by  at 6/30/2024 0951    Comment: benefits of PT and POC, call for A OOB, no BLT, LSO when OOB                         Point: Body mechanics (Done)       Learning Progress Summary             Patient Acceptance, E,D, DU,VU by  at 6/30/2024 0951    Comment: benefits of PT and POC, call for A OOB, no BLT, LSO when OOB                         Point: Precautions (Done)       Learning Progress Summary             Patient Acceptance, E,D, DU,VU by  at 6/30/2024 0951    Comment: benefits of PT and POC, call for A OOB, no BLT, LSO when OOB                                         User Key       Initials Effective Dates Name Provider Type [facility] 02/03/23 -  Eliel León, PT Physical Therapist PT                  PT Recommendation and Plan         Outcome Measures       Row Name 07/01/24 0900             How much help from another is currently needed...    Putting on and taking off regular lower body clothing? 3  -EC      Bathing (including washing, rinsing, and drying) 3  -EC      Toileting (which includes using toilet bed pan or urinal) 3  -EC      Putting on and taking off regular upper body clothing 4  -EC      Taking care of personal grooming (such as brushing teeth) 4  -EC      Eating meals 4  -EC      AM-PAC 6 Clicks Score (OT) 21  -EC         Functional Assessment    Outcome Measure Options AM-PAC 6 Clicks Daily Activity (OT)  -EC                User Key  (r) = Recorded By, (t) = Taken By, (c) = Cosigned By      Initials Name Provider Type    EC Davida Reyes, OTR/L Occupational Therapist                     Time Calculation:    PT Charges       Row Name 07/02/24 0817             Time Calculation    Start Time 0817  -AB      Stop Time 0846  -AB      Time Calculation (min) 29 min  -AB      PT Received On 07/02/24  -AB         Time Calculation- PT    Total Timed Code Minutes- PT 29 minute(s)  -AB                User Key  (r) = Recorded  By, (t) = Taken By, (c) = Cosigned By      Initials Name Provider Type    AB Carlene Smith, PTA Physical Therapist Assistant                  Therapy Charges for Today       Code Description Service Date Service Provider Modifiers Qty    27333097804 HC GAIT TRAINING EA 15 MIN 7/1/2024 Carlene Smith, PTA GP 1    35058469265 HC PT THERAPEUTIC ACT EA 15 MIN 7/1/2024 Carlene Smith, PTA GP 1    64883872631 HC PT THERAPEUTIC ACT EA 15 MIN 7/2/2024 Carlene Smith, PTA GP 1    12340759727 HC GAIT TRAINING EA 15 MIN 7/2/2024 Carlene Smith, PTA GP 1            PT G-Codes  Outcome Measure Options: AM-PAC 6 Clicks Daily Activity (OT)  AM-PAC 6 Clicks Score (PT): 14  AM-PAC 6 Clicks Score (OT): 21    Carlene Smith PTA  7/2/2024

## 2024-07-02 NOTE — PLAN OF CARE
Problem: Adult Inpatient Plan of Care  Goal: Plan of Care Review  Outcome: Ongoing, Progressing  Flowsheets (Taken 7/2/2024 2993)  Progress: no change  Plan of Care Reviewed With: patient  Outcome Evaluation: Patient rested well. Complains of pain x1 this far. IVF infusing per order. Voiding per purewick. VSS. Safety maintained.

## 2024-07-02 NOTE — THERAPY TREATMENT NOTE
Patient Name: Marina Joe  : 1945    MRN: [id number]                              Today's Date: 2024       Admit Date: 2024    Visit Dx:     ICD-10-CM ICD-9-CM   1. 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 Left renal mass  N28.89 593.9   3. Right adrenal mass  E27.8 255.8   4. Nodule of soft tissue  M79.89 729.99   5. Acute on chronic renal insufficiency  N28.9 593.9    N18.9 585.9   6. Thrombocytopenia  D69.6 287.5   7. Cauda equina compression  G83.4 344.60   8. Impaired mobility [Z74.09]  Z74.09 799.89   9. HX: breast cancer  Z85.3 V10.3     Patient Active Problem List   Diagnosis    Malignant neoplasm of upper-outer quadrant of female breast    Anemia of chronic renal failure, stage 3 (moderate)    Hypertension, benign    Hx of colonic polyps    HX: breast cancer    Morbidly obese    Right knee DJD    S/P lumpectomy, left breast    Adult hypothyroidism    Anemia    Anxiety    Breast cancer, left    Cervical pain    Chronic insomnia    Elevated lipids    Herpes zoster without complication    Left ear pain    Left otitis externa    Myalgia    Negative depression screening    Obesity (BMI 30-39.9)    Postmenopausal status    Recurrent acute serous otitis media of left ear    Restless leg    Sinusitis, bacterial    Skin lesion    Vitamin D deficiency    Stage 3b chronic kidney disease    GIB (gastrointestinal bleeding)    Acute on chronic blood loss anemia    Chronic idiopathic thrombocytopenia    Hypotension due to hypovolemia    Primary hypertension    Pancreatic lesion    Left renal mass    Cauda equina compression    Metastatic cancer to spine     Past Medical History:   Diagnosis Date    Breast cancer     CKD (chronic kidney disease)     Hypercholesteremia     Hypertension     Sinusitis     Stage 3a chronic kidney disease 10/20/2020     Past Surgical History:   Procedure Laterality Date    AVULSION TOENAIL PLATE          BREAST BIOPSY Left 2012    BREAST BIOPSY       Left Breast, 1/2019 per Dr Barkley    BREAST LUMPECTOMY Left     with node bx     COLONOSCOPY  09/13/2013    small polyp at 30cm benign hyperplastic polyp, changes consistent with melanosis coli. Recall 5 years    COLONOSCOPY  11/19/2018    Tics otherwise normal exam repeat in 5 years    COLONOSCOPY  02/13/2023    Normal exam repeat in 3 years with a 2 day prep    ENDOSCOPY  02/13/2023    Gastritis, small HH    LUMBAR LAMINECTOMY Right 6/29/2024    Procedure: LUMBAR LAMINECTOMY L3 WITH DECOMPRESSION 1-2 LEVELS-RIGHT;  Surgeon: Marcellus Pulido MD;  Location: [facility] OR;  Service: Neurosurgery;  Laterality: Right;    REPLACEMENT TOTAL KNEE Right     2016    TOTAL ABDOMINAL HYSTERECTOMY WITH SALPINGO OOPHORECTOMY      UPPER ENDOSCOPIC ULTRASOUND W/ FNA  12/20/2023    Pancreatic cyst s/p FNA      General Information       Row Name 07/02/24 1346          OT Time and Intention    Document Type therapy note (daily note)  -CS (r) HH (t) CS (c)     Mode of Treatment occupational therapy  -CS (r) HH (t) CS (c)       Row Name 07/02/24 1342          General Information    Patient Profile Reviewed yes  -CS (r) HH (t) CS (c)     Existing Precautions/Restrictions brace worn when out of bed;fall;LSO;spinal  -CS (r) HH (t) CS (c)       Row Name 07/02/24 1342          Cognition    Orientation Status (Cognition) oriented x 4  -CS (r) HH (t) CS (c)       Row Name 07/02/24 134          Safety Issues, Functional Mobility    Impairments Affecting Function (Mobility) balance;endurance/activity tolerance;pain;strength  -CS (r) HH (t) CS (c)               User Key  (r) = Recorded By, (t) = Taken By, (c) = Cosigned By      Initials Name Provider Type    CS Arpita Herrera S, OTR/L, CNT Occupational Therapist    HH Kim Freed, OT Student OT Student                     Mobility/ADL's       Row Name 07/02/24 1340          Bed Mobility    Comment, (Bed Mobility) Pt on BSC on arrival  -CS (r) HH (t) CS (c)       Row Name 07/02/24 1942           Transfers    Transfers sit-stand transfer;stand-sit transfer;toilet transfer  -CS (r) HH (t) CS (c)       Row Name 07/02/24 1346          Sit-Stand Transfer    Sit-Stand [place] (Transfers) contact guard;minimum assist (75% patient effort);verbal cues  -CS (r) HH (t) CS (c)     Assistive Device (Sit-Stand Transfers) walker, front-wheeled  -CS (r) HH (t) CS (c)       Row Name 07/02/24 1346          Stand-Sit Transfer    Stand-Sit [place] (Transfers) contact guard;verbal cues  -CS (r) HH (t) CS (c)     Assistive Device (Stand-Sit Transfers) walker, front-wheeled  -CS (r) HH (t) CS (c)       Row Name 07/02/24 1346          Toilet Transfer    Type (Toilet Transfer) sit-stand;stand-sit  -CS (r) HH (t) CS (c)     [place] Level (Toilet Transfer) minimum assist (75% patient effort);verbal cues  -CS (r) HH (t) CS (c)     Assistive Device (Toilet Transfer) commode, bedside without drop arms;walker, front-wheeled  -CS (r) HH (t) CS (c)       Row Name 07/02/24 1346          Activities of Daily Living    BADL Assessment/Intervention toileting;lower body dressing  -CS (r) HH (t) CS (c)       Row Name 07/02/24 1346          Lower Body Dressing Assessment/Training    [place] Level (Lower Body Dressing) doff;don;socks;moderate assist (50% patient effort)  -CS (r) HH (t) CS (c)     Position (Lower Body Dressing) edge of bed sitting;unsupported sitting  -CS (r) HH (t) CS (c)       Row Name 07/02/24 1346          Toileting Assessment/Training    [place] Level (Toileting) adjust/manage clothing;moderate assist (50% patient effort)  -CS (r) HH (t) CS (c)     Position (Toileting) supported sitting;supported standing  -CS (r) HH (t) CS (c)               User Key  (r) = Recorded By, (t) = Taken By, (c) = Cosigned By      Initials Name Provider Type    Arpita Blanc, OTR/L, CNT Occupational Therapist    Kim Garcia, OT Student OT Student                   Obj/Interventions       Row Name 07/02/24  1346          Balance    Balance Assessment sitting static balance;sitting dynamic balance;sit to stand dynamic balance;standing static balance;standing dynamic balance  -CS (r) HH (t) CS (c)     Static Sitting Balance independent  -CS (r) HH (t) CS (c)     Dynamic Sitting Balance supervision  -CS (r) HH (t) CS (c)     Position, Sitting Balance unsupported;sitting edge of bed  -CS (r) HH (t) CS (c)     Sit to Stand Dynamic Balance contact guard;minimal assist  -CS (r) HH (t) CS (c)     Static Standing Balance contact guard  -CS (r) HH (t) CS (c)     Dynamic Standing Balance contact guard  -CS (r) HH (t) CS (c)     Position/Device Used, Standing Balance walker, front-wheeled  -CS (r) HH (t) CS (c)     Balance Interventions sitting;standing;sit to stand;static;dynamic;occupation based/functional task  -CS (r) HH (t) CS (c)               User Key  (r) = Recorded By, (t) = Taken By, (c) = Cosigned By      Initials Name Provider Type    CS Arpita Herrera S, OTR/L, CNT Occupational Therapist     Kim Freed, OT Student OT Student                   Goals/Plan    No documentation.                  Clinical Impression       Row Name 07/02/24 1346          Pain Assessment    Pretreatment Pain Rating 10/10  -CS (r) HH (t) CS (c)     Posttreatment Pain Rating 10/10  -CS (r) HH (t) CS (c)     Pain Location - Side/Orientation Bilateral  -CS (r) HH (t) CS (c)     Pain Location lower  -CS (r) HH (t) CS (c)     Pain Location - back  -CS (r) HH (t) CS (c)     Pain Intervention(s) Repositioned;Ambulation/increased activity  -CS (r) HH (t) CS (c)       Row Name 07/02/24 1346          Plan of Care Review    Plan of Care Reviewed With patient  -CS (r) HH (t) CS (c)     Progress no change  -CS (r) HH (t) CS (c)     Outcome Evaluation OT treatment completed. Pt A&Ox4; c/o 10/10 lower back pain and LE weakness. Pt on BS on arrival. Pt demo CGA-Min A for sit<>stand transfer from BSC with FWW and verbal cues for body mechanics/safety. Pt  "demo Mod A to manage clothing and for LB dressing seated EOB likely d/t pain. Pt demo SBA for grooming/oral hygiene; encouraged pt to complete in standing; however, pt reports \"in too much pain.\" Pt educated on spinal precautions and adaptive equipment/strategies to increase I in ADL participation and demo'd use of sock aid x 4 trials. OT to continue POC.  -CS (r) HH (t) CS (c)       Row Name 07/02/24 1346          Positioning and Restraints    Pre-Treatment Position bedside commode  -CS (r) HH (t) CS (c)     Post Treatment Position bed  -CS (r) HH (t) CS (c)     In Bed sitting EOB;call light within reach;encouraged to call for assist;with other staff  -CS (r) HH (t) CS (c)               User Key  (r) = Recorded By, (t) = Taken By, (c) = Cosigned By      Initials Name Provider Type    CS Arpita Herrera, OTR/L, CNT Occupational Therapist    Kim Garcia, OT Student OT Student                   Outcome Measures       Row Name 07/02/24 1346          How much help from another is currently needed...    Putting on and taking off regular lower body clothing? 2  -CS (r) HH (t) CS (c)     Bathing (including washing, rinsing, and drying) 2  -CS (r) HH (t) CS (c)     Toileting (which includes using toilet bed pan or urinal) 2  -CS (r) HH (t) CS (c)     Putting on and taking off regular upper body clothing 4  -CS (r) HH (t) CS (c)     Taking care of personal grooming (such as brushing teeth) 4  -CS (r) HH (t) CS (c)     Eating meals 4  -CS (r) HH (t) CS (c)     AM-PAC 6 Clicks Score (OT) 18  -CS (r) HH (t)       Row Name 07/02/24 0873          How much help from another person do you currently need...    Turning from your back to your side while in flat bed without using bedrails? 3  -AW     Moving from lying on back to sitting on the side of a flat bed without bedrails? 3  -AW     Moving to and from a bed to a chair (including a wheelchair)? 2  -AW     Standing up from a chair using your arms (e.g., wheelchair, bedside " chair)? 2  -AW     Climbing 3-5 steps with a railing? 2  -AW     To walk in hospital room? 3  -AW     AM-PAC 6 Clicks Score (PT) 15  -AW     Highest Level of Mobility Goal 4 --> Transfer to chair/commode  -AW       Row Name 07/02/24 1346          Functional Assessment    Outcome Measure Options AM-PAC 6 Clicks Daily Activity (OT)  -CS (r) HH (t) CS (c)               User Key  (r) = Recorded By, (t) = Taken By, (c) = Cosigned By      Initials Name Provider Type    CS Arpita Herrera S, OTR/L, CNT Occupational Therapist    Kim Garcia, OT Student OT Student    Sonam Caban, RN Registered Nurse                    Occupational Therapy Education       Title: PT OT SLP Therapies (Done)       Topic: Occupational Therapy (Done)       Point: ADL training (Done)       Description:   Instruct learner(s) on proper safety adaptation and remediation techniques during self care or transfers.   Instruct in proper use of assistive devices.                  Learning Progress Summary             Patient Acceptance, E, VU by  at 7/2/2024 1436    Acceptance, E, VU by EC at 7/1/2024 0911    Acceptance, E, VU by KJ at 6/30/2024 0941    Acceptance, E, VU by LS at 6/28/2024 1431                         Point: Home exercise program (Done)       Description:   Instruct learner(s) on appropriate technique for monitoring, assisting and/or progressing therapeutic exercises/activities.                  Learning Progress Summary             Patient Acceptance, E, VU by  at 7/2/2024 1436    Acceptance, E, VU by EC at 7/1/2024 0911    Acceptance, E, VU by KJ at 6/30/2024 0941                         Point: Precautions (Done)       Description:   Instruct learner(s) on prescribed precautions during self-care and functional transfers.                  Learning Progress Summary             Patient Acceptance, E, VU by  at 7/2/2024 1436    Acceptance, E, VU by EC at 7/1/2024 0911    Acceptance, E, VU by KJ at 6/30/2024 0941     "Acceptance, E, VU by LS at 6/28/2024 1431                         Point: Body mechanics (Done)       Description:   Instruct learner(s) on proper positioning and spine alignment during self-care, functional mobility activities and/or exercises.                  Learning Progress Summary             Patient Acceptance, E, VU by  at 7/2/2024 1436    Acceptance, E, VU by EC at 7/1/2024 0911    Acceptance, E, VU by KJ at 6/30/2024 0941    Acceptance, E, VU by LS at 6/28/2024 1431                                         User Key       Initials Effective Dates Name Provider Type Discipline     06/20/22 -  Stacey Reagan, OTR/L Occupational Therapist OT    KJ 04/15/24 -  Joanna Marie, OT Student OT Student OT    EC 10/13/23 -  Davida Reyes OTR/L Occupational Therapist OT     04/15/24 -  Kim Freed, OT Student OT Student OT                  OT Recommendation and Plan     Plan of Care Review  Plan of Care Reviewed With: patient  Progress: no change  Outcome Evaluation: OT treatment completed. Pt A&Ox4; c/o 10/10 lower back pain and LE weakness. Pt on BSC on arrival. Pt demo CGA-Min A for sit<>stand transfer from BSC with FWW and verbal cues for body mechanics/safety. Pt demo Mod A to manage clothing and for LB dressing seated EOB likely d/t pain. Pt demo SBA for grooming/oral hygiene; encouraged pt to complete in standing; however, pt reports \"in too much pain.\" Pt educated on spinal precautions and adaptive equipment/strategies to increase I in ADL participation and demo'd use of sock aid x 4 trials. OT to continue POC.     Time Calculation:         Time Calculation- OT       Row Name 07/02/24 1415             Time Calculation- OT    OT Start Time 1346  -CS (r) HH (t) CS (c)      OT Stop Time 1414  -CS (r) HH (t) CS (c)      OT Time Calculation (min) 28 min  -CS (r) HH (t)      Total Timed Code Minutes- OT 28 minute(s)  -CS (r) HH (t) CS (c)      OT Received On 07/02/24  -CS (r) HH (t) CS (c)         Timed " Charges    49799 - OT Self Care/Mgmt Minutes 28  -CS (r) HH (t) CS (c)         Total Minutes    Timed Charges Total Minutes 28  -CS (r) HH (t)       Total Minutes 28  -CS (r) HH (t)                User Key  (r) = Recorded By, (t) = Taken By, (c) = Cosigned By      Initials Name Provider Type    CS Arpita Herrera S, OTR/L, CNT Occupational Therapist    Kim Garcia, OT Student OT Student                           Kim Freed, OT Student  7/2/2024

## 2024-07-02 NOTE — PLAN OF CARE
"Goal Outcome Evaluation:  Plan of Care Reviewed With: patient        Progress: no change  Outcome Evaluation: OT treatment completed. Pt A&Ox4; c/o 10/10 lower back pain and LE weakness. Pt on BSC on arrival. Pt demo CGA-Min A for sit<>stand transfer from BS with FWW and verbal cues for body mechanics/safety. Pt demo Mod A to manage clothing and for LB dressing seated EOB likely d/t pain. Pt demo SBA for grooming/oral hygiene; encouraged pt to complete in standing; however, pt reports \"in too much pain.\" Pt educated on spinal precautions and adaptive equipment/strategies to increase I in ADL participation and demo'd use of sock aid x 4 trials. OT to continue POC.                               "

## 2024-07-03 ENCOUNTER — APPOINTMENT (OUTPATIENT)
Dept: GENERAL RADIOLOGY | Facility: HOSPITAL | Age: 79
End: 2024-07-03
Payer: MEDICARE

## 2024-07-03 ENCOUNTER — SPECIALTY PHARMACY (OUTPATIENT)
Dept: ONCOLOGY | Facility: HOSPITAL | Age: 79
End: 2024-07-03
Payer: MEDICARE

## 2024-07-03 DIAGNOSIS — C50.912 MALIGNANT NEOPLASM OF LEFT BREAST IN FEMALE, ESTROGEN RECEPTOR POSITIVE, UNSPECIFIED SITE OF BREAST: Primary | ICD-10-CM

## 2024-07-03 DIAGNOSIS — Z17.0 MALIGNANT NEOPLASM OF LEFT BREAST IN FEMALE, ESTROGEN RECEPTOR POSITIVE, UNSPECIFIED SITE OF BREAST: Primary | ICD-10-CM

## 2024-07-03 LAB
ANION GAP SERPL CALCULATED.3IONS-SCNC: 9 MMOL/L (ref 5–15)
BASOPHILS # BLD AUTO: 0.04 10*3/MM3 (ref 0–0.2)
BASOPHILS NFR BLD AUTO: 0.6 % (ref 0–1.5)
BH BB BLOOD EXPIRATION DATE: NORMAL
BH BB BLOOD TYPE BARCODE: 5100
BH BB BLOOD TYPE BARCODE: 5100
BH BB BLOOD TYPE BARCODE: 6200
BH BB BLOOD TYPE BARCODE: 6200
BH BB DISPENSE STATUS: NORMAL
BH BB PRODUCT CODE: NORMAL
BH BB UNIT NUMBER: NORMAL
BUN SERPL-MCNC: 27 MG/DL (ref 8–23)
BUN/CREAT SERPL: 18.4 (ref 7–25)
CALCIUM SPEC-SCNC: 8.9 MG/DL (ref 8.6–10.5)
CHLORIDE SERPL-SCNC: 103 MMOL/L (ref 98–107)
CO2 SERPL-SCNC: 25 MMOL/L (ref 22–29)
CREAT SERPL-MCNC: 1.47 MG/DL (ref 0.57–1)
CROSSMATCH INTERPRETATION: NORMAL
CROSSMATCH INTERPRETATION: NORMAL
DEPRECATED RDW RBC AUTO: 54.6 FL (ref 37–54)
EGFRCR SERPLBLD CKD-EPI 2021: 36.4 ML/MIN/1.73
EOSINOPHIL # BLD AUTO: 0.34 10*3/MM3 (ref 0–0.4)
EOSINOPHIL NFR BLD AUTO: 4.7 % (ref 0.3–6.2)
ERYTHROCYTE [DISTWIDTH] IN BLOOD BY AUTOMATED COUNT: 16.5 % (ref 12.3–15.4)
GLUCOSE SERPL-MCNC: 93 MG/DL (ref 65–99)
HCT VFR BLD AUTO: 26.3 % (ref 34–46.6)
HGB BLD-MCNC: 8.2 G/DL (ref 12–15.9)
IMM GRANULOCYTES # BLD AUTO: 0.04 10*3/MM3 (ref 0–0.05)
IMM GRANULOCYTES NFR BLD AUTO: 0.6 % (ref 0–0.5)
LDH SERPL-CCNC: 388 U/L (ref 135–214)
LYMPHOCYTES # BLD AUTO: 1.04 10*3/MM3 (ref 0.7–3.1)
LYMPHOCYTES NFR BLD AUTO: 14.4 % (ref 19.6–45.3)
MCH RBC QN AUTO: 28.1 PG (ref 26.6–33)
MCHC RBC AUTO-ENTMCNC: 31.2 G/DL (ref 31.5–35.7)
MCV RBC AUTO: 90.1 FL (ref 79–97)
MONOCYTES # BLD AUTO: 0.76 10*3/MM3 (ref 0.1–0.9)
MONOCYTES NFR BLD AUTO: 10.5 % (ref 5–12)
NEUTROPHILS NFR BLD AUTO: 4.99 10*3/MM3 (ref 1.7–7)
NEUTROPHILS NFR BLD AUTO: 69.2 % (ref 42.7–76)
NRBC BLD AUTO-RTO: 0 /100 WBC (ref 0–0.2)
PLATELET # BLD AUTO: 106 10*3/MM3 (ref 140–450)
PMV BLD AUTO: 13 FL (ref 6–12)
POTASSIUM SERPL-SCNC: 4.2 MMOL/L (ref 3.5–5.2)
RBC # BLD AUTO: 2.92 10*6/MM3 (ref 3.77–5.28)
SODIUM SERPL-SCNC: 137 MMOL/L (ref 136–145)
UNIT  ABO: NORMAL
UNIT  RH: NORMAL
WBC NRBC COR # BLD AUTO: 7.21 10*3/MM3 (ref 3.4–10.8)

## 2024-07-03 PROCEDURE — 97116 GAIT TRAINING THERAPY: CPT

## 2024-07-03 PROCEDURE — 77295 3-D RADIOTHERAPY PLAN: CPT | Performed by: RADIOLOGY

## 2024-07-03 PROCEDURE — 77334 RADIATION TREATMENT AID(S): CPT | Performed by: RADIOLOGY

## 2024-07-03 PROCEDURE — 80048 BASIC METABOLIC PNL TOTAL CA: CPT | Performed by: INTERNAL MEDICINE

## 2024-07-03 PROCEDURE — 99233 SBSQ HOSP IP/OBS HIGH 50: CPT | Performed by: INTERNAL MEDICINE

## 2024-07-03 PROCEDURE — 77300 RADIATION THERAPY DOSE PLAN: CPT | Performed by: RADIOLOGY

## 2024-07-03 PROCEDURE — 97535 SELF CARE MNGMENT TRAINING: CPT

## 2024-07-03 PROCEDURE — 25010000002 HEPARIN (PORCINE) PER 1000 UNITS: Performed by: NEUROLOGICAL SURGERY

## 2024-07-03 PROCEDURE — 83615 LACTATE (LD) (LDH) ENZYME: CPT | Performed by: INTERNAL MEDICINE

## 2024-07-03 PROCEDURE — 99024 POSTOP FOLLOW-UP VISIT: CPT | Performed by: NURSE PRACTITIONER

## 2024-07-03 PROCEDURE — 99232 SBSQ HOSP IP/OBS MODERATE 35: CPT | Performed by: CLINICAL NURSE SPECIALIST

## 2024-07-03 PROCEDURE — 71045 X-RAY EXAM CHEST 1 VIEW: CPT

## 2024-07-03 PROCEDURE — 85025 COMPLETE CBC W/AUTO DIFF WBC: CPT | Performed by: NURSE PRACTITIONER

## 2024-07-03 PROCEDURE — 25010000002 FUROSEMIDE PER 20 MG: Performed by: HOSPITALIST

## 2024-07-03 PROCEDURE — 25810000003 SODIUM CHLORIDE 0.9 % SOLUTION: Performed by: INTERNAL MEDICINE

## 2024-07-03 PROCEDURE — 97530 THERAPEUTIC ACTIVITIES: CPT

## 2024-07-03 PROCEDURE — 99497 ADVNCD CARE PLAN 30 MIN: CPT | Performed by: CLINICAL NURSE SPECIALIST

## 2024-07-03 RX ORDER — ALLOPURINOL 300 MG/1
300 TABLET ORAL DAILY
Status: DISCONTINUED | OUTPATIENT
Start: 2024-07-03 | End: 2024-07-08

## 2024-07-03 RX ORDER — LIDOCAINE 4 G/G
1 PATCH TOPICAL
Status: DISCONTINUED | OUTPATIENT
Start: 2024-07-03 | End: 2024-07-15 | Stop reason: HOSPADM

## 2024-07-03 RX ORDER — BISACODYL 10 MG
10 SUPPOSITORY, RECTAL RECTAL ONCE
Status: COMPLETED | OUTPATIENT
Start: 2024-07-03 | End: 2024-07-03

## 2024-07-03 RX ORDER — CARVEDILOL 6.25 MG/1
12.5 TABLET ORAL 2 TIMES DAILY WITH MEALS
Status: DISCONTINUED | OUTPATIENT
Start: 2024-07-03 | End: 2024-07-04

## 2024-07-03 RX ORDER — LACTULOSE 20 G/30ML
30 SOLUTION ORAL 3 TIMES DAILY
Status: DISCONTINUED | OUTPATIENT
Start: 2024-07-03 | End: 2024-07-15 | Stop reason: HOSPADM

## 2024-07-03 RX ORDER — HYDROCODONE BITARTRATE AND ACETAMINOPHEN 10; 325 MG/1; MG/1
1 TABLET ORAL EVERY 4 HOURS PRN
Status: DISCONTINUED | OUTPATIENT
Start: 2024-07-03 | End: 2024-07-08

## 2024-07-03 RX ORDER — FUROSEMIDE 10 MG/ML
20 INJECTION INTRAMUSCULAR; INTRAVENOUS ONCE
Status: COMPLETED | OUTPATIENT
Start: 2024-07-03 | End: 2024-07-03

## 2024-07-03 RX ADMIN — LACTULOSE 30 G: 20 SOLUTION ORAL at 16:12

## 2024-07-03 RX ADMIN — PANTOPRAZOLE SODIUM 40 MG: 40 TABLET, DELAYED RELEASE ORAL at 08:36

## 2024-07-03 RX ADMIN — CYCLOBENZAPRINE 10 MG: 10 TABLET, FILM COATED ORAL at 20:47

## 2024-07-03 RX ADMIN — LACTULOSE 30 G: 20 SOLUTION ORAL at 20:46

## 2024-07-03 RX ADMIN — CARVEDILOL 12.5 MG: 6.25 TABLET, FILM COATED ORAL at 18:17

## 2024-07-03 RX ADMIN — HYDROCODONE BITARTRATE AND ACETAMINOPHEN 1 TABLET: 7.5; 325 TABLET ORAL at 10:53

## 2024-07-03 RX ADMIN — Medication 1 TABLET: at 08:36

## 2024-07-03 RX ADMIN — HYDROCODONE BITARTRATE AND ACETAMINOPHEN 1 TABLET: 10; 325 TABLET ORAL at 20:47

## 2024-07-03 RX ADMIN — HYDROCODONE BITARTRATE AND ACETAMINOPHEN 1 TABLET: 5; 325 TABLET ORAL at 06:40

## 2024-07-03 RX ADMIN — POLYETHYLENE GLYCOL 3350 17 G: 17 POWDER, FOR SOLUTION ORAL at 08:36

## 2024-07-03 RX ADMIN — CLONIDINE HYDROCHLORIDE 0.1 MG: 0.1 TABLET ORAL at 08:36

## 2024-07-03 RX ADMIN — Medication 10 ML: at 20:47

## 2024-07-03 RX ADMIN — CARVEDILOL 6.25 MG: 6.25 TABLET, FILM COATED ORAL at 08:36

## 2024-07-03 RX ADMIN — HYDROCODONE BITARTRATE AND ACETAMINOPHEN 1 TABLET: 10; 325 TABLET ORAL at 16:12

## 2024-07-03 RX ADMIN — BISACODYL 10 MG: 10 SUPPOSITORY RECTAL at 14:14

## 2024-07-03 RX ADMIN — AMLODIPINE BESYLATE 5 MG: 5 TABLET ORAL at 08:36

## 2024-07-03 RX ADMIN — FUROSEMIDE 20 MG: 10 INJECTION, SOLUTION INTRAMUSCULAR; INTRAVENOUS at 10:51

## 2024-07-03 RX ADMIN — CETIRIZINE HYDROCHLORIDE 10 MG: 10 TABLET ORAL at 08:36

## 2024-07-03 RX ADMIN — ROPINIROLE HYDROCHLORIDE 0.5 MG: 0.25 TABLET, FILM COATED ORAL at 20:46

## 2024-07-03 RX ADMIN — SERTRALINE 100 MG: 50 TABLET, FILM COATED ORAL at 08:36

## 2024-07-03 RX ADMIN — LIDOCAINE 1 PATCH: 4 PATCH TOPICAL at 14:15

## 2024-07-03 RX ADMIN — CYCLOBENZAPRINE 10 MG: 10 TABLET, FILM COATED ORAL at 02:32

## 2024-07-03 RX ADMIN — AMLODIPINE BESYLATE 5 MG: 5 TABLET ORAL at 20:46

## 2024-07-03 RX ADMIN — HYDROCODONE BITARTRATE AND ACETAMINOPHEN 1 TABLET: 7.5; 325 TABLET ORAL at 02:32

## 2024-07-03 RX ADMIN — HEPARIN SODIUM 5000 UNITS: 5000 INJECTION INTRAVENOUS; SUBCUTANEOUS at 08:37

## 2024-07-03 RX ADMIN — ALLOPURINOL 300 MG: 300 TABLET ORAL at 08:36

## 2024-07-03 RX ADMIN — BUPROPION HYDROCHLORIDE 150 MG: 150 TABLET, EXTENDED RELEASE ORAL at 08:36

## 2024-07-03 RX ADMIN — DICLOFENAC SODIUM 4 G: 10 GEL TOPICAL at 20:53

## 2024-07-03 RX ADMIN — MONTELUKAST SODIUM 10 MG: 10 TABLET, FILM COATED ORAL at 20:46

## 2024-07-03 RX ADMIN — ROSUVASTATIN CALCIUM 10 MG: 10 TABLET, FILM COATED ORAL at 20:46

## 2024-07-03 RX ADMIN — SODIUM CHLORIDE 50 ML/HR: 900 INJECTION, SOLUTION INTRAVENOUS at 02:26

## 2024-07-03 RX ADMIN — CYCLOBENZAPRINE 10 MG: 10 TABLET, FILM COATED ORAL at 12:45

## 2024-07-03 RX ADMIN — CLONIDINE HYDROCHLORIDE 0.1 MG: 0.1 TABLET ORAL at 20:47

## 2024-07-03 NOTE — THERAPY TREATMENT NOTE
Acute Care - Physical Therapy Treatment Note  [facility]     Patient Name: Marina Joe  : 1945  MRN: [id number]  Today's Date: 7/3/2024      Visit Dx:     ICD-10-CM ICD-9-CM   1. 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 Left renal mass  N28.89 593.9   3. Right adrenal mass  E27.8 255.8   4. Nodule of soft tissue  M79.89 729.99   5. Acute on chronic renal insufficiency  N28.9 593.9    N18.9 585.9   6. Thrombocytopenia  D69.6 287.5   7. Cauda equina compression  G83.4 344.60   8. Impaired mobility [Z74.09]  Z74.09 799.89   9. HX: breast cancer  Z85.3 V10.3     Patient Active Problem List   Diagnosis    Malignant neoplasm of upper-outer quadrant of female breast    Anemia of chronic renal failure, stage 3 (moderate)    Hypertension, benign    Hx of colonic polyps    HX: breast cancer    Morbidly obese    Right knee DJD    S/P lumpectomy, left breast    Adult hypothyroidism    Anemia    Anxiety    Breast cancer, left    Cervical pain    Chronic insomnia    Elevated lipids    Herpes zoster without complication    Left ear pain    Left otitis externa    Myalgia    Negative depression screening    Obesity (BMI 30-39.9)    Postmenopausal status    Recurrent acute serous otitis media of left ear    Restless leg    Sinusitis, bacterial    Skin lesion    Vitamin D deficiency    Stage 3b chronic kidney disease    GIB (gastrointestinal bleeding)    Acute on chronic blood loss anemia    Chronic idiopathic thrombocytopenia    Hypotension due to hypovolemia    Primary hypertension    Pancreatic lesion    Left renal mass    Cauda equina compression    Metastatic cancer to spine     Past Medical History:   Diagnosis Date    Breast cancer     CKD (chronic kidney disease)     Hypercholesteremia     Hypertension     Sinusitis     Stage 3a chronic kidney disease 10/20/2020     Past Surgical History:   Procedure Laterality Date    AVULSION TOENAIL PLATE          BREAST BIOPSY Left 2012    BREAST BIOPSY       Left Breast, 1/2019 per Dr Barkley    BREAST LUMPECTOMY Left     with node bx     COLONOSCOPY  09/13/2013    small polyp at 30cm benign hyperplastic polyp, changes consistent with melanosis coli. Recall 5 years    COLONOSCOPY  11/19/2018    Tics otherwise normal exam repeat in 5 years    COLONOSCOPY  02/13/2023    Normal exam repeat in 3 years with a 2 day prep    ENDOSCOPY  02/13/2023    Gastritis, small HH    LUMBAR LAMINECTOMY Right 6/29/2024    Procedure: LUMBAR LAMINECTOMY L3 WITH DECOMPRESSION 1-2 LEVELS-RIGHT;  Surgeon: Marcellus Pulido MD;  Location: [facility] OR;  Service: Neurosurgery;  Laterality: Right;    REPLACEMENT TOTAL KNEE Right     2016    TOTAL ABDOMINAL HYSTERECTOMY WITH SALPINGO OOPHORECTOMY      UPPER ENDOSCOPIC ULTRASOUND W/ FNA  12/20/2023    Pancreatic cyst s/p FNA     PT Assessment (Last 12 Hours)       PT Evaluation and Treatment       Row Name 07/03/24 0949          Physical Therapy Time and Intention    Subjective Information complains of;pain;weakness  -NW     Document Type therapy note (daily note)  -NW     Mode of Treatment physical therapy  -NW       Row Name 07/03/24 0949          General Information    Existing Precautions/Restrictions brace worn when out of bed;fall;LSO;spinal  -NW       Row Name 07/03/24 0949          Pain    Pretreatment Pain Rating 10/10  -NW     Posttreatment Pain Rating 10/10  -NW     Pain Location - Side/Orientation Bilateral  -NW     Pain Location lower  -NW     Pain Location - back  -NW     Pre/Posttreatment Pain Comment radiating down RLE  -NW       Row Name 07/03/24 0949          Bed Mobility    Sidelying-Sit [place] (Bed Mobility) not tested  sitting EOB  -NW       Row Name 07/03/24 0949          Sit-Stand Transfer    Sit-Stand [place] (Transfers) verbal cues;minimum assist (75% patient effort);moderate assist (50% patient effort)  -NW     Assistive Device (Sit-Stand Transfers) walker, front-wheeled  -NW     Comment, (Sit-Stand  Transfer) pt w/ mult attempts to stand before able to get upright in order to take steps to bsc, pt w difficult time getting up from bsc and bk to EOB. pt attempt to amb but unable due to weakness and pain, pt sat rested then tried again and was able to then amb to door and bk to chair w/ mult rests, anatalgic gait and fwd flex posture  -NW       Row Name 07/03/24 0949          Stand-Sit Transfer    Stand-Sit [place] (Transfers) verbal cues;contact guard;minimum assist (75% patient effort)  -NW     Assistive Device (Stand-Sit Transfers) walker, front-wheeled  -NW       Row Name 07/03/24 0949          Toilet Transfer    Type (Toilet Transfer) sit-stand;stand-sit  -NW     [place] Level (Toilet Transfer) verbal cues;contact guard;minimum assist (75% patient effort)  -NW     Assistive Device (Toilet Transfer) commode;grab bars/safety frame;walker, front-wheeled  -NW       Row Name 07/03/24 0949          Gait/Stairs (Locomotion)    [place] Level (Gait) contact guard;verbal cues;minimum assist (75% patient effort)  -NW     Assistive Device (Gait) walker, front-wheeled  -NW     Distance in Feet (Gait) 10  10x3  -NW     Pattern (Gait) step-to  -NW     Bilateral Gait [place] forward flexed posture  -NW     Left Sided Gait Deviations heel strike decreased  -NW       Row Name 07/03/24 0949          Safety Issues, Functional Mobility    Impairments Affecting Function (Mobility) pain  -NW       Row Name             Wound 06/29/24 1458 lumbar spine Incision    Wound - Properties Group Placement Date: 06/29/24  -KT Placement Time: 1458  -KT Present on Original Admission: N  -KT Location: lumbar spine  -KT Primary Wound Type: Incision  -KT    Retired Wound - Properties Group Placement Date: 06/29/24  -KT Placement Time: 1458  -KT Present on Original Admission: N  -KT Location: lumbar spine  -KT Primary Wound Type: Incision  -KT    Retired Wound - Properties Group Date first assessed: 06/29/24  -KT Time first  assessed: 1458  -KT Present on Original Admission: N  -KT Location: lumbar spine  -KT Primary Wound Type: Incision  -KT      Row Name 07/03/24 0949          Positioning and Restraints    Pre-Treatment Position in bed  -NW     Post Treatment Position chair  -NW     In Chair reclined;call light within reach;encouraged to call for assist  -NW               User Key  (r) = Recorded By, (t) = Taken By, (c) = Cosigned By      Initials Name Provider Type    Yuly Rodriguez PTA Physical Therapist Assistant    Shravan Orodnez RN Registered Nurse                    Physical Therapy Education       Title: PT OT SLP Therapies (Done)       Topic: Physical Therapy (Done)       Point: Mobility training (Done)       Learning Progress Summary             Patient Acceptance, E,D, DU,VU by  at 6/30/2024 0951    Comment: benefits of PT and POC, call for A OOB, no BLT, LSO when OOB                         Point: Body mechanics (Done)       Learning Progress Summary             Patient Acceptance, E,D, DU,VU by  at 6/30/2024 0951    Comment: benefits of PT and POC, call for A OOB, no BLT, LSO when OOB                         Point: Precautions (Done)       Learning Progress Summary             Patient Acceptance, E,D, DU,VU by  at 6/30/2024 0951    Comment: benefits of PT and POC, call for A OOB, no BLT, LSO when OOB                                         User Key       Initials Effective Dates Name Provider Type [facility] 02/03/23 -  Eliel León, PT Physical Therapist PT                  PT Recommendation and Plan         Outcome Measures       Row Name 07/01/24 0900             How much help from another is currently needed...    Putting on and taking off regular lower body clothing? 3  -EC      Bathing (including washing, rinsing, and drying) 3  -EC      Toileting (which includes using toilet bed pan or urinal) 3  -EC      Putting on and taking off regular upper body clothing 4  -EC      Taking care of personal  grooming (such as brushing teeth) 4  -EC      Eating meals 4  -EC      AM-PAC 6 Clicks Score (OT) 21  -EC         Functional Assessment    Outcome Measure Options AM-PAC 6 Clicks Daily Activity (OT)  -EC                User Key  (r) = Recorded By, (t) = Taken By, (c) = Cosigned By      Initials Name Provider Type    Davida Tanner, OTR/L Occupational Therapist                     Time Calculation:         PT G-Codes  Outcome Measure Options: AM-PAC 6 Clicks Daily Activity (OT)  AM-PAC 6 Clicks Score (PT): 14  AM-PAC 6 Clicks Score (OT): 18    Yuly Khalil, PTA  7/3/2024

## 2024-07-03 NOTE — PROGRESS NOTES
[facility] Medicine Services  INPATIENT PROGRESS NOTE    Patient Name: Marina Joe  Date of Admission: 6/28/2024  Today's Date: 07/03/24  Length of Stay: 5  Primary Care Physician: Hanh Og APRN    Subjective   Chief Complaint: Lower back pain          HPI   Ms. Joe is a 79-year-old female from home with past medical history of breast cancer, chronic kidney disease stage IIIa, hypercholesterolemia, hypertension and chronic sinusitis, who presented to the emergency room with worsening back pain/flank pain, history was mostly provided by patient's boyfriend at bedside.  She developed back pain couple of weeks ago and was referred to a pain clinic doctor where she was prescribed gabapentin, after she took about 2-3 doses of the gabapentin, she developed dizziness. She came to the emergency room today because lower back pain and flank pain got even worse despite being on the gabapentin.  In the emergency room, she was extensively worked up with CT of the abdomen and lumbar spine, CT abdomen showed extensive mass of the left kidney extending up to the proximal left ureter as well as right adrenal gland mass suspicious for metastasis.  Urology was consulted from the emergency room for input.  7/1  As above history of chronic kidney disease hyperlipidemia hypertension presented with back pain found to have kidney mass suspicious for metastasis patient CT of the lumbar spine shows multilevel degenerative changes to include an anteriolisthesis of L3 over L4 and spondylosis at L5-S1. 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 awaiting final pathology will consult with palliative  team to address the CODE STATUS and plan of care  7/2  Appreciate palliative care the patient is currently DNR, appreciate neurosurgery status post L3 laminectomy and decompression on June 29 for cauda equina, appreciate oncology continues to work with physical therapy appreciate  radiation oncology patient started radiation today  7/3  Patient oxygen is 85% on 8 L, patient blood pressure is poorly controlled increase her Coreg we will repeat her chest x-ray chest x-ray from before was showing some congestion IV Hep-Lock will give her 1 dose of Lasix  Review of Systems   All pertinent negatives and positives are as above. All other systems have been reviewed and are negative unless otherwise stated.     Objective    Temp:  [97.2 °F (36.2 °C)-99 °F (37.2 °C)] 97.2 °F (36.2 °C)  Heart Rate:  [66-94] 94  Resp:  [16-18] 16  BP: (125-170)/(51-94) 170/72  Physical Exam    GEN: Awake, alert, interactive, in NAD  HEENT: Atraumatic, PERRLA, EOMI, Anicteric, Trachea midline  Lungs: CTAB, no wheezing/rales/rhonchi  Heart: RRR, +S1/s2, no rub  ABD: soft, nt/nd, +BS, no guarding/rebound  Extremities: atraumatic, no cyanosis, no edema  Skin: no rashes or lesions  Neuro: AAOx3, no focal deficits  Psych: normal mood & affect    Results Review:  I have reviewed the labs, radiology results, and diagnostic studies.    Laboratory Data:   Results from last 7 days   Lab Units 07/03/24  0440 07/02/24  1823 07/02/24  0829   WBC 10*3/mm3 7.21 8.63 8.27   HEMOGLOBIN g/dL 8.2* 7.9* 8.3*   HEMATOCRIT % 26.3* 25.3* 26.1*   PLATELETS 10*3/mm3 106* 110* 75*        Results from last 7 days   Lab Units 07/03/24  0440 07/02/24  1414 07/02/24  0414 07/01/24  0432 06/30/24  0434 06/28/24  1153 06/28/24  0617   SODIUM mmol/L 137  --  138 137 137   < > 135*   POTASSIUM mmol/L 4.2 4.3 3.6 3.7 4.0   < > 3.9   CHLORIDE mmol/L 103  --  103 102 101   < > 95*   CO2 mmol/L 25.0  --  24.0 24.0 24.0   < > 26.0   BUN mg/dL 27*  --  30* 37* 34*   < > 32*    CREATININE mg/dL 1.47*  --  1.67* 2.18* 2.39*   < > 2.59*   CALCIUM mg/dL 8.9  --  8.9 8.5* 8.4*   < > 10.0   BILIRUBIN mg/dL  --   --   --   --  <0.2  --  0.3   ALK PHOS U/L  --   --   --   --  78  --  85   ALT (SGPT) U/L  --   --   --   --  21  --  18   AST (SGOT) U/L  --   --   --   --  35*  --  22   GLUCOSE mg/dL 93  --  107* 98 120*   < > 99    < > = values in this interval not displayed.       Culture Data:   Urine Culture   Date Value Ref Range Status   06/28/2024 >100,000 CFU/mL Mixed Bhavana Isolated  Final       Radiology Data:   Imaging Results (Last 24 Hours)       ** No results found for the last 24 hours. **            I have reviewed the patient's current medications.     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  MRI of the lumbar spine reported as follows-  IMPRESSION:   Neoplastic process involving the L3 vertebral body with associated  posterior soft tissue component resulting in severe spinal canal  narrowing and cauda equina nerve root compression. The soft tissue  component appears to also extend into the right L3-L4 foramen. No  associated pathological fracture.  Additional multilevel spondylotic changes including moderate to severe  spinal canal and foraminal narrowing as detailed above.  Neurosurgery is on consult and did the following procedure-  Procedure(s):  LUMBAR LAMINECTOMY L3 WITH DECOMPRESSION 1-2 LEVELS-RIGHT     Decompression -  Lumbar laminectomy, medial facetectomy for decompression of the spinal cord  Open laminectomy and biopsy of epidural neoplasm  Use of intraoperative microscope      -Acute on chronic kidney disease stage IIIa  Patient follows up with a nephrologist outpatient, but he does not come  to this hospital.  Nephrologist consulted and helping with management while patient is in-house.     -Left renal mass/right adrenal mass on CT of the abdomen/pelvis  Urology was consulted from the emergency room and after evaluation did not recommend any intervention,rather recommended oncology consult.  Patient has multiple subcutaneous nodules that may be amenable to percutaneous biopsy.  Oncology is on consult and has evaluated patient, will await for tentative diagnosis from lumbar spine biopsy before making recommendations      Other chronic medical conditions-  History of breast cancer  Hypercholesterolemia  Hypertension  Chronic sinusitis     DVT prophylaxis-heparin       Medical Decision Making  Number and Complexity of problems: High    Conditions and Status        Condition is unchanged.     [facility] Data  External documents reviewed: No  Cardiac tracing (EKG, telemetry) interpretation: Yes  Radiology interpretation: Yes  Labs reviewed: Yes  Any tests that were considered but not ordered: No     Decision rules/scores evaluated (example [id number], Wells, etc): Yes     Discussed with: Patient     Care Planning  Shared decision making: Yes  Code status and discussions: Yes [full code]    Disposition  Social Determinants of Health that impact treatment or disposition: No  I expect the patient to be discharged to unknown in unknown days.     Electronically signed by Alysia Lincoln MD, 07/03/24, 10:19 CDT.

## 2024-07-03 NOTE — PROGRESS NOTES
Specialty Pharmacy Patient Management Program  Oncology Initial Assessment IN-PERSON (PATIENT WAS INPATIENT)       Marina Joe is a 79 y.o. female with CLL seen seen by an Oncology provider and enrolled in the Oncology Patient Management program offered by [facility].  An initial outreach was conducted, including assessment of therapy appropriateness and specialty medication education for Imbruvica. The patient was introduced to services offered by [facility], including: regular assessments, refill coordination, curbside pick-up or mail order delivery options, prior authorization maintenance, and financial assistance programs as applicable. The patient was also provided with contact information for the pharmacy team.     Diagnosis (Documented by Provider):  Small lymphocytic lymphoma/chronic lymphocytic leukemia   -Tumor stage: IV  -Tumor burden:   -6/28/2024 CT-A/P showed abnormal heterogenous left kidney mass, abnormal right kidney, new right adrenal mass, innumerable solid nodules in the subcutaneous soft tissues all concerning for metastatic disease process.  -6/28/2024, CT lumbar spine showing multilevel prominent disc osteophyte complexes acet arthropathy and ligamental hypertrophy and resultant neural foraminal spinal canal stenosis.  -6/28/2024, CT chest showed a left breast nodule of 9 mm, scattered subcutaneous soft tissue nodules in the abdominal wall, otherwise there is no sign of intrathoracic metastases.  -6/28/2024, MRI-abdomen showed infiltrative mass within the left kidney extending into the proximal left collecting system as well as numerous additional solid masses in the bilateral kidneys. Additionally there is a right adrenal mass, multiple subcutaneous fat soft tissue masses, and a L3 vertebral body mass with suspected extension into the spinal canal. 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  -6/20/2024, MRI-L-spine: Neoplastic process involving the L3 vertebral body with associated posterior soft tissue component resulting in severe spinal canal narrowing and cauda equina nerve root compression. The soft tissue component appears to also extend into the right L3-L4 foramen. No associated pathological fracture. There are also appears to be tumor signal extending into the right L2-L3 neural foramen.  - 7/1/2024-MRI brain-no definite evidence of intracranial metastatic disease within the limitations of noncontrast exam.  Cerebral microvascular disease noted.  - 7/1/24-ultrasound-guided core needle biopsy of right flank subcutaneous nodule.  FINAL DIAGNOSIS:  Abnormal B-cell population which may represent small lymphocytic lymphoma/chronic lymphocytic leukemia (SLL/CLL).    Oncology Provider: Nagi Maya MD ([facility] [place] [facility])  Oral Chemotherapy Regimen: ibrutinib [Imbruvica] 420 mg PO daily  Consult Message Received:  Received a \"Pharmacy Consult\" while patient was admitted on 3C (room 379) for ibrutinib teaching to be provided to the patient.    Initiation Progress Updates:      Script Review and Evaluation:  Oral Treatment Plan Signed?: Yes   Oral Treatment Plan Released?: Yes, released on 7/3/2024.   Specialty Pharmacy Selected:  [facility]   Prior Authorization Status: Approved   Financial Assistance Status: Approved, patient has a co-pay amount of $4.20   Predicted Shipping Date: Pending [facility] shipment / credit card information being gathered from patient   Predicted Start Date: Day 1 with concurrent radiation.     Oncology/Hematology History Overview Note    Oncology/Hematology History Overview Note   Oncologic history  In February 2012, she had a routine mammogram that found a nodular density in the upper outer quadrant of the left breast.  An ultrasound " revealed no nodularity at that time.  Repeat ultrasound 3 months later on 5/3/2012 revealed a small cyst in the left breast but felt to be benign.  Repeat mammogram on October 31, 2012 found a lobulated lesion in the left breast at the 2 o'clock position and a stable cyst at the 12 o'clock position.  She was referred to Dr. Barkley on 11/30/2012 and biopsy was performed.  The 12:00 cyst was a fibrocystic lesion while the 2:00 lesion was an invasive mammary carcinoma, low-grade, ER positive HI positive HER-2 nu 2+, FISH unamplified.    On January 8, 2013 she underwent a left partial mastectomy with sentinel node biopsy finding invasive ductal carcinoma, 3.1 mm in greatest dimension, grade 1.  2 sentinel nodes were negative.  AJCC TNM stage was pT1aN0 M0, Stage IA.  Following surgery she underwent adjuvant radiation therapy and was then started on Arimidex for hormonal manipulation.  She was started on the Arimidex in approximately April or May 2013.  He has been recommended to continue this for 10 years.    Hematologic history  Patient with a long history of anemia secondary to iron deficiency as well as chronic kidney disease stage III.Patient has a GFR that ranges from 45-61ML/MIN.  He has been undergoing Procrit when meets guidelines for several years now.  She is also required iron replacement.  Folate > 20, B12 at 1503 and negative blood for flow cytometry on 01/07/2022.      Previous interventions  Procrit 40,000 units subcu initiated approximately February 2017  Injectafer given 9/23/2019, 9/30/2019     Malignant neoplasm of upper-outer quadrant of female breast   10/31/2012 Initial Diagnosis    Malignant neoplasm of central portion of left female breast (CMS/HCC)     10/31/2012 Imaging    Mammogram on October 31, 2012 found a lobulated lesion in the left breast at the 2 o'clock position and a stable cyst at the 12 o'clock position.      11/30/2012 Biopsy    Dr. Barkley on 11/30/2012 and biopsy was performed.   The 12:00 cyst was a fibrocystic lesion while the 2:00 lesion was an invasive mammary carcinoma, low-grade, ER positive LA positive HER-2 nu 2+, FISH unamplified.         1/8/2013 Surgery    On January 8, 2013 she underwent a left partial mastectomy with sentinel node biopsy finding invasive ductal carcinoma, 3.1 mm in greatest dimension, grade 1.  2 sentinel nodes were negative.  AJCC TNM stage was pT1aN0 M0, Stage IA.  Hormone receptor positive HER-2 negative      Radiation    Radiation OncologyTreatment Course:  Marina Joe receivedin 33 fractions to left breast via External Beam Radiation - EBRT.     5/2013 -  Hormonal Therapy    Arimidex 1 mg daily     8/22/2022 Cancer Staged    Staging form: Breast, AJCC V7  - Pathologic stage from 8/22/2022: Stage IA (T1a, N0, cM0) - Signed by Benjamin Shah MD on 8/22/2022     Breast cancer, left   5/29/2020 Initial Diagnosis    Breast cancer, left     7/3/2024 -  Chemotherapy    ORAL Oncology - Ibrutinib         Problem List   Medicines reviewed by Daysi Simon, PharmD on 7/3/2024 at  4:11 PM  Patient Active Problem List   Diagnosis Code    Malignant neoplasm of upper-outer quadrant of female breast C50.419    Anemia of chronic renal failure, stage 3 (moderate) N18.30, D63.1    Hypertension, benign I10    Hx of colonic polyps Z86.010    HX: breast cancer Z85.3    Morbidly obese E66.01    Right knee DJD M17.11    S/P lumpectomy, left breast Z98.890    Adult hypothyroidism E03.9    Anemia D64.9    Anxiety F41.9    Breast cancer, left C50.912    Cervical pain M54.2    Chronic insomnia F51.04    Elevated lipids E78.5    Herpes zoster without complication B02.9    Left ear pain H92.02    Left otitis externa H60.92    Myalgia M79.10    Negative depression screening Z13.31    Obesity (BMI 30-39.9) E66.9    Postmenopausal status Z78.0    Recurrent acute serous otitis media of left ear H65.05    Restless leg G25.81    Sinusitis, bacterial J32.9, B96.89    Skin lesion L98.9     Vitamin D deficiency E55.9    Stage 3b chronic kidney disease N18.32    GIB (gastrointestinal bleeding) K92.2    Acute on chronic blood loss anemia D62    Chronic idiopathic thrombocytopenia D69.6    Hypotension due to hypovolemia E86.1    Primary hypertension I10    Pancreatic lesion K86.9    Left renal mass N28.89    Cauda equina compression G83.4    Metastatic cancer to spine C79.51       Specialty Pharmacy Goal:   Goals Addressed Today    None         Relevant Past Medical History, Comorbidities, and Vaccines:  Relevant medical history and concomitant health conditions were discussed with the patient. The patient's chart has been reviewed for relevant past medical history and comorbid health conditions and updated as necessary.  Vaccines are coordinated by the patient's oncologist and primary care provider.  Past Medical History:   Diagnosis Date    Breast cancer     CKD (chronic kidney disease)     Hypercholesteremia     Hypertension     Sinusitis     Stage 3a chronic kidney disease 10/20/2020     Social History     Socioeconomic History    Marital status:    Tobacco Use    Smoking status: Never    Smokeless tobacco: Never   Vaping Use    Vaping status: Never Used   Substance and Sexual Activity    Alcohol use: No    Drug use: Not Currently    Sexual activity: Not Currently     Birth control/protection: Surgical       Relevant Laboratory Values:  The labs listed below have been reviewed. No dose adjustments are needed for the oral specialty medication(s) based on the labs.    Lab Results   Component Value Date    GLUCOSE 93 07/03/2024    CALCIUM 8.9 07/03/2024     07/03/2024    K 4.2 07/03/2024    CO2 25.0 07/03/2024     07/03/2024    BUN 27 (H) 07/03/2024    CREATININE 1.47 (H) 07/03/2024    EGFRIFAFRI 55 (L) 01/05/2022    EGFRIFNONA 40 (A) 12/13/2021    BCR 18.4 07/03/2024    ANIONGAP 9.0 07/03/2024     Lab Results   Component Value Date    WBC 7.21 07/03/2024    RBC 2.92 (L) 07/03/2024     HGB 8.2 (L) 07/03/2024    HCT 26.3 (L) 07/03/2024    MCV 90.1 07/03/2024    MCH 28.1 07/03/2024    MCHC 31.2 (L) 07/03/2024    RDW 16.5 (H) 07/03/2024    RDWSD 54.6 (H) 07/03/2024    MPV 13.0 (H) 07/03/2024     (L) 07/03/2024    NEUTRORELPCT 69.2 07/03/2024    LYMPHORELPCT 14.4 (L) 07/03/2024    MONORELPCT 10.5 07/03/2024    EOSRELPCT 4.7 07/03/2024    BASORELPCT 0.6 07/03/2024    AUTOIGPER 0.6 (H) 07/03/2024    NEUTROABS 4.99 07/03/2024    LYMPHSABS 1.04 07/03/2024    MONOSABS 0.76 07/03/2024    EOSABS 0.34 07/03/2024    BASOSABS 0.04 07/03/2024    AUTOIGNUM 0.04 07/03/2024    NRBC 0.0 07/03/2024       Allergy Review:  Known allergies and reactions were discussed with the patient. The patient's chart has been reviewed for allergy information and updated as necessary.   Allergies   Allergen Reactions    Aspirin GI Bleeding    Niacin Other (See Comments)        Current Medication List:  This medication list has been reviewed with the patient and evaluated for any interactions or necessary modifications/recommendations, and updated to include all prescription medications, OTC medications, and supplements the patient is currently taking.  This list reflects what is contained in the patient's profile, which has also been marked as reviewed to communicate to other providers it is the most up to date version of the patient's current medication therapy.   Prior to Admission medications    Medication Sig Start Date End Date Taking? Authorizing Provider   amLODIPine (NORVASC) 5 MG tablet Take 1 tablet by mouth 2 (Two) Times a Day.    Maurisio Zazueta MD   buPROPion XL (WELLBUTRIN XL) 150 MG 24 hr tablet Take 1 tablet by mouth Daily.    Maurisio Zazueta MD   Calcium Carb-Cholecalciferol (CALCIUM 600+D3 PO) Take 1 tablet by mouth Daily.    Maurisio Zazueta MD   carvedilol (COREG) 6.25 MG tablet Take 1 tablet by mouth 2 (Two) Times a Day With Meals.    Provider, MD Maurisio   cetirizine (zyrTEC) 10  MG tablet Take 1 tablet by mouth Daily.    Maurisio Zazueta MD   cyclobenzaprine (FLEXERIL) 10 MG tablet Take 1 tablet by mouth 3 (Three) Times a Day As Needed for Muscle Spasms.    Maurisio Zazueta MD   dapagliflozin Propanediol (Farxiga) 10 MG tablet Take 10 mg by mouth Daily.    Maurisio Zazueta MD   Diclofenac Sodium (VOLTAREN) 1 % gel gel Apply 4 g topically to the appropriate area as directed 4 (Four) Times a Day As Needed (arthralgia). 10/17/23   Hanh Og APRN   Ergocalciferol (VITAMIN D2 PO) Take 50,000 Units by mouth Every 30 (Thirty) Days.    Maurisio Zazueta MD   famotidine (PEPCID) 20 MG tablet Take 1 tablet by mouth 2 (Two) Times a Day Before Meals. 9/11/23   Alma Zaidi APRN   fluticasone (FLONASE) 50 MCG/ACT nasal spray 2 sprays into the nostril(s) as directed by provider Daily. 6/1/22   Hanh Og APRN   ibrutinib (IMBRUVICA) 420 MG tablet tablet Take 1 tablet by mouth Daily. 7/3/24   Nagi Maya MD   irbesartan-hydrochlorothiazide (AVALIDE) 300-12.5 MG tablet Take 1 tablet by mouth Daily.    Maurisio Zazueta MD   montelukast (SINGULAIR) 10 MG tablet Take 1 tablet by mouth Every Night.    Maurisio Zazueta MD   Multiple Vitamins-Minerals (MULTIVITAMIN ADULT) tablet Take 1 tablet by mouth Daily.    Maurisio Zazueta MD   naproxen sodium (ALEVE) 220 MG tablet Take 1 tablet by mouth 2 (Two) Times a Day As Needed.    Maurisio Zazueta MD   pantoprazole (PROTONIX) 40 MG EC tablet Take 1 tablet by mouth Daily.    Maurisio Zazueta MD   rOPINIRole (REQUIP) 0.5 MG tablet Take 1 tablet by mouth Every Night. Take 1 hour before bedtime.    Maurisio Zazueta MD   rosuvastatin (CRESTOR) 20 MG tablet Take 1 tablet by mouth Daily.    Maurisio Zazueta MD   sertraline (ZOLOFT) 100 MG tablet Take 1 tablet by mouth Daily.    Maurisio Zazueta MD   valACYclovir (VALTREX) 500 MG tablet Take 1 tablet by mouth 2 (Two) Times a Day.     ProviderMaurisio MD   albuterol sulfate  (90 Base) MCG/ACT inhaler Inhale 2 puffs Every 4 (Four) Hours As Needed for Wheezing. 6/30/22 7/3/24  Hanh Og APRN   amLODIPine (NORVASC) 5 MG tablet TAKE 1 TABLET BY MOUTH TWICE DAILY 10/12/23 6/28/24  Hanh Og APRN   carvedilol (COREG) 6.25 MG tablet TAKE 1 TABLET BY MOUTH TWICE DAILY WITH MEALS 5/8/24 6/28/24  Gladis Ledezma APRN   cloNIDine (CATAPRES) 0.1 MG tablet Take 1 tablet by mouth 2 (Two) Times a Day.  7/3/24  Maurisio Zazueta MD   cyclobenzaprine (FLEXERIL) 10 MG tablet TAKE 1 TABLET BY MOUTH THREE TIMES DAILY AS NEEDED FOR MUSCLE SPASMS 11/16/23 6/28/24  Hanh Og APRN   gabapentin (NEURONTIN) 300 MG capsule Take 1 capsule by mouth 3 (Three) Times a Day.  Patient not taking: Reported on 6/28/2024 6/28/24  Maurisio Zazueta MD   montelukast (SINGULAIR) 10 MG tablet TAKE 1 TABLET BY MOUTH DAILY 4/5/24 6/28/24  Hanh Og APRN   pantoprazole (PROTONIX) 40 MG EC tablet TAKE 1 TABLET BY MOUTH DAILY 4/5/24 6/28/24  Hanh Og APRN   rOPINIRole (REQUIP) 0.5 MG tablet TAKE 1 TABLET BY MOUTH EVERY NIGHT 4/5/24 6/28/24  Hanh Og APRN   rosuvastatin (CRESTOR) 20 MG tablet TAKE 1 TABLET BY MOUTH DAILY 5/8/24 6/28/24  Hanh Og APRN   valACYclovir (VALTREX) 500 MG tablet TAKE 1 TABLET BY MOUTH TWICE DAILY 11/2/23 6/28/24  Hanh Og APRN       Medication(s) Review:   Medicines reviewed by Daysi Simon, PharmD on 7/3/2024 at  4:11 PM  Prior to Admission medications    Medication Sig Start Date End Date Taking? Authorizing Provider   amLODIPine (NORVASC) 5 MG tablet Take 1 tablet by mouth 2 (Two) Times a Day.    ProviderMaurisio MD   buPROPion XL (WELLBUTRIN XL) 150 MG 24 hr tablet Take 1 tablet by mouth Daily.    ProviderMaurisio MD   Calcium Carb-Cholecalciferol (CALCIUM 600+D3 PO) Take 1 tablet by mouth Daily.    ProviderMaurisio MD    carvedilol (COREG) 6.25 MG tablet Take 1 tablet by mouth 2 (Two) Times a Day With Meals.    Maurisio Zazueta MD   cetirizine (zyrTEC) 10 MG tablet Take 1 tablet by mouth Daily.    Maurisio Zazueta MD   cyclobenzaprine (FLEXERIL) 10 MG tablet Take 1 tablet by mouth 3 (Three) Times a Day As Needed for Muscle Spasms.    Maurisio Zazueta MD   dapagliflozin Propanediol (Farxiga) 10 MG tablet Take 10 mg by mouth Daily.    Maurisio Zazueta MD   Diclofenac Sodium (VOLTAREN) 1 % gel gel Apply 4 g topically to the appropriate area as directed 4 (Four) Times a Day As Needed (arthralgia). 10/17/23   Hanh Og APRN   Ergocalciferol (VITAMIN D2 PO) Take 50,000 Units by mouth Every 30 (Thirty) Days.    Maurisio Zazueta MD   famotidine (PEPCID) 20 MG tablet Take 1 tablet by mouth 2 (Two) Times a Day Before Meals. 9/11/23   Alma Zaidi APRN   fluticasone (FLONASE) 50 MCG/ACT nasal spray 2 sprays into the nostril(s) as directed by provider Daily. 6/1/22   Hanh Og APRN   ibrutinib (IMBRUVICA) 420 MG tablet tablet Take 1 tablet by mouth Daily. 7/3/24   Nagi Maya MD   irbesartan-hydrochlorothiazide (AVALIDE) 300-12.5 MG tablet Take 1 tablet by mouth Daily.    Maurisio Zazueta MD   montelukast (SINGULAIR) 10 MG tablet Take 1 tablet by mouth Every Night.    Maurisio Zazueta MD   Multiple Vitamins-Minerals (MULTIVITAMIN ADULT) tablet Take 1 tablet by mouth Daily.    Maurisio Zazueta MD   naproxen sodium (ALEVE) 220 MG tablet Take 1 tablet by mouth 2 (Two) Times a Day As Needed.    Maurisio Zazueta MD   pantoprazole (PROTONIX) 40 MG EC tablet Take 1 tablet by mouth Daily.    Maurisio Zazueta MD   rOPINIRole (REQUIP) 0.5 MG tablet Take 1 tablet by mouth Every Night. Take 1 hour before bedtime.    Maurisio Zazueta MD   rosuvastatin (CRESTOR) 20 MG tablet Take 1 tablet by mouth Daily.    Maurisio Zazueta MD   sertraline (ZOLOFT) 100 MG tablet  Take 1 tablet by mouth Daily.    Maurisio Zazueta MD   valACYclovir (VALTREX) 500 MG tablet Take 1 tablet by mouth 2 (Two) Times a Day.    Maurisio Zazueta MD   albuterol sulfate  (90 Base) MCG/ACT inhaler Inhale 2 puffs Every 4 (Four) Hours As Needed for Wheezing. 6/30/22 7/3/24  Hanh Og APRN   amLODIPine (NORVASC) 5 MG tablet TAKE 1 TABLET BY MOUTH TWICE DAILY 10/12/23 6/28/24  Hanh Og APRN   carvedilol (COREG) 6.25 MG tablet TAKE 1 TABLET BY MOUTH TWICE DAILY WITH MEALS 5/8/24 6/28/24  Gladis Ledezma APRN   cloNIDine (CATAPRES) 0.1 MG tablet Take 1 tablet by mouth 2 (Two) Times a Day.  7/3/24  Maurisio Zazueta MD   cyclobenzaprine (FLEXERIL) 10 MG tablet TAKE 1 TABLET BY MOUTH THREE TIMES DAILY AS NEEDED FOR MUSCLE SPASMS 11/16/23 6/28/24  Hanh Og APRN   gabapentin (NEURONTIN) 300 MG capsule Take 1 capsule by mouth 3 (Three) Times a Day.  Patient not taking: Reported on 6/28/2024 6/28/24  Maurisio Zazueta MD   montelukast (SINGULAIR) 10 MG tablet TAKE 1 TABLET BY MOUTH DAILY 4/5/24 6/28/24  Hanh Og APRN   pantoprazole (PROTONIX) 40 MG EC tablet TAKE 1 TABLET BY MOUTH DAILY 4/5/24 6/28/24  Hanh Og APRN   rOPINIRole (REQUIP) 0.5 MG tablet TAKE 1 TABLET BY MOUTH EVERY NIGHT 4/5/24 6/28/24  Hanh Og APRN   rosuvastatin (CRESTOR) 20 MG tablet TAKE 1 TABLET BY MOUTH DAILY 5/8/24 6/28/24  Hanh Og APRN   valACYclovir (VALTREX) 500 MG tablet TAKE 1 TABLET BY MOUTH TWICE DAILY 11/2/23 6/28/24  Hanh Og APRN       Medication Regimen Assessment:  Administration: Patient is taking every day at the same time  and as prescribed .   Patient can self administer medications: yes  Medication Follow-Up Plan: Refill coordination    Drug-Drug Interaction(s) Assessment:  Reviewed concomitant medications, allergies, labs, comorbidities/medical history, quality of life, and immunization history.   Completed  medication reconciliation today to assess for drug interactions. Patient denies starting or stopping any medications.    Drug-drug interactions noted and discussed during education: no significant drug interactions noted. . Reminded the patient to let us know before making any changes or starting any new prescription or OTC medications so we can first assess drug interactions.  Drug-food interactions noted and discussed during education. Patient was instructed to avoid eating grapefruit and drinking grapefruit juice, eating [place] oranges (commonly found in orange marmalade), eating starfruit, and eating pomegranate and drinking pomegranate juice  Vaccines are coordinated by the patient's oncologist and primary care provider.    Prophylaxis Medications Assessment:  Bone Health (such as calcium/vitamin D, bisphosphonate, RANKL inhibitor) - possible indicated. The patient is having a scheduled bone marrow biopsy to assess for marrow involvement by CLL/SLL + outpatient staging PET-scan + obtain outpatient diagnostic mammography to evaluate the left breast nodule. Await platelet antibody profile. Also planning for postop radiation therapy to the lumbar region.  Radiation to L-spine anticipated beginning Monday with 2000 to 3000 cGy over 5-20 daily fractions  VTE prophylaxis - Not Indicated   Prophylactic antimicrobials - Not Indicated   Tumor lysis syndrome prophylaxis - Risk for TLS High. The patient will start taking allopurinol 300 mg PO daily. Adequate hydration was discussed during education.    Initial Education Provided for Specialty Medication:  The patient has been provided with the following education. All questions and concerns have been addressed prior to the patient receiving the medication, and the patient has verbalized understanding of the education and any materials provided.  Additional patient education shall be provided and documented upon request by the patient, provider or payer.      Provided  Patient With: Chemo calendar to help improve adherence., Education sheets about the medication, 24-hour clinic phone number and my contact information and instructions to call should additional questions arise.     Medication Education Sheets Provided: (select all that apply)  Oral Specialty Medication: Imbruvica (ibrutinib)    TOPICS COMMENTS   Storage and Handling of Oral Specialty Medication Store in the original container, in a dry location out of direct sunlight, and out of reach of children or pets. and Store at room temperature.  Discussed safe handling and what to do with any unused medication.   Administration of Oral Specialty Medication Take with food at the same time(s) each day., Take with a full glass of water., and Do not crush or chew tablets.   Adherence to Oral Specialty Regimen and Handling Missed Doses Patient is likely to have good treatment adherence; reinforced the importance of adherence. Reviewed how to address missed doses and encouraged the patient to let us know of any missed doses.   Anemia: role of RBC, cause, s/s, ways to manage, role of transfusion Reviewed the role of RBC and the use of transfusions if hemoglobin decreases too much.  Patient to notify us if she experiences shortness of breath, dizziness, or palpitations.  Also let patient know that she could feel more tired than usual and to try to stay active, but rest if she needs to.    Thrombocytopenia: role of platelet, cause, s/s, ways to prevent bleeding, things to avoid, when to seek help Reviewed the role of platelets in blood clotting and when to call clinic (bloody nose that bleeds for 5 minutes despite pressure, a cut that won't stop bleeding despite pressure, gums that bleed excessively with brushing or flossing). Recommended using an electric razor, soft bristle toothbrush, and blowing your nose gently.    Neutropenia: role of WBC, cause, infection precautions, s/s of infection, when to call MD Reviewed the role of WBC,  good infection prevention practices, and when to call the clinic (temperature 100.4F, sore throat, burning urination, etc).   Nutrition and Appetite Changes:  importance of maintaining healthy diet & weight, ways to manage to improve intake, dietary consult, exercise regimen, electrolyte and/or blood glucose abnormalities Decreased Appetite: Discussed the risk of decreased appetite. Recommended eating smaller, more frequent meals. Instructed the patient to contact the clinic if losing weight or having difficulty eating enough to maintain energy level.   Diarrhea: causes, s/s of dehydration, ways to manage, dietary changes, when to call MD Chemotherapy : Discussed the risk of diarrhea. Instructed patient on use OTC loperamide with diarrhea onset, but emphasized the importance of calling the clinic if 4-6 episodes in 24 hours not relieved by OTC loperamide.   Constipation: causes, ways to manage, dietary changes, when to call MD Provided supplementary handout with instructions for use of docusate and other OTC therapies to manage constipation.  Patient was instructed to call us if medications aren't working.   Nausea/Vomiting: cause, use of antiemetics, dietary changes, when to call MD Emetic risk: Low    Instructed the patient to take the scheduled anti-nausea medications even if she does not feel nauseous.  I explained this is to prevent delayed nausea.    Instructed the patient to take a dose of the PRN medication at the first onset of nausea and if it's not working to call us for additional medications.  Also provided non-drug measures to mitigate nausea.   Mouth Sores: causes, oral care, ways to manage Mouth sores can be prevented by making a mouth wash mixture of salt, baking soda, and water. The patient was instructed to swish and spit four times daily after meals and before bedtime.  Use of a soft bristle toothbrush was recommended.  The patient was instructed to avoid alcohol-containing OTC mouthwashes.     Alopecia: cause, ways to manage, resources Discussed the possibility of hair loss with the patient. Informed patient that she could request a prescription for a wig if desired and most of the cost is usually covered by insurance. Recommended covering the head with a hat and/or protecting the skin on the head with SPF 30 or higher.    Nervous System Changes: causes, s/s, neuropathies, cognitive changes, ways to manage discussed the adverse effect of peripheral neuropathy and signs/symptoms associated with this adverse effect. Instructed patient to call if symptoms become more frequent or worsen.    Pain: causes, ways to manage Chemo: Discussed muscle and joint aches/pains with chemotherapy, and recommended the use of OTC pain relief with ibuprofen or acetaminophen if needed.   Skin/Nail Changes: cause, s/s, ways to manage Immunotherapy - Discussed potential for a rash or itchy skin from immunotherapy, offered nonpharmacologic prevention strategies, and instructed patient to call if a rash develops that worsens or gets larger   Organ Toxicities: cause, s/s, need for diagnostic tests, labs, when to notify MD Discussed potential effects on organ systems, monitoring, diagnostic tests, labs, and when to notify the MD. Discussed the signs/symptoms of the following: cardiotoxicity, eye changes (blurry vision, watery eyes, photophobia), hypertension - recommended close monitoring of blood pressure, provided BP log, and explained how to track values, lung changes, nephrotoxicity, and skin changes.     Adherence and Self-Administration Assessment:  Barriers to Patient Adherence and/or Self-Administration: none reported  Methods for Supporting Patient Adherence and/or Self-Administration: dosing calendar  Expected duration of therapy: Until disease progression or intolerable toxicity    Goals of Therapy:  Patient Goals of Therapy:   Consistently take medications as prescribed  Patient will adhere to medication  regimen  Patient will report any medication side effects to healthcare provider  Clinical Goals:    Goals Addressed Today    None       Support patient understanding of medication regimen  Ensure patient knows the pharmacy contact information  Schedule regular follow-up to monitor the treatment serious adverse events  Schedule regular follow-up to confirm medication adherence  Schedule regular follow-up to monitor disease progression or stability    Quality of Life Assessment:  The patient's current (pre-therapy) quality of life was discussed with the patient. The QOL segment of this outreach has been reviewed and updated.   Quality of Life Score: 5/10 (patient has significant back pain - a diagnosis for being admitted to our facility)    Reassessment Plan and Follow-Up:  Pharmacist to perform regular reassessments no more than (6) months from the previous assessment.  Welcome information and patient satisfaction survey to be sent by retail team with patient's initial fill.  Care Coordinator to set up future refill outreaches, coordinate prescription delivery, and escalate clinical questions to pharmacist.     Additional Plans, Therapy Recommendations or Therapy Problems to Be Addressed: none at this time     Attestation:  I attest the patient was actively involved in and has agreed to the above plan of care. If the prescribed therapy is at any point deemed not appropriate based on the current or future assessments, a consultation will be initiated with the patient's specialty care provider to determine the best course of action. The revised plan of therapy will be documented along with any required assessments and/or additional patient education provided.     Finally, all questions and concerns today were addressed to the best of my knowledge within the initial clinical assessment office visit. Patient verbalized they had the [facility] contact information for any future concerns or questions.          Electronically signed 07/03/2024 16:13 CDT by:  Daysi Simon, [place]  Hematology & Oncology Specialty Pharmacist  [facility]  Phone: [phone]

## 2024-07-03 NOTE — PROGRESS NOTES
"[facility] Progress Note    Progress Note    Patient:  Marina Joe  YOB: 1945  Date of Service: 7/3/2024  MRN: [id number]   Acct: [id number]   Primary Care Physician: Hanh Og APRN  Advance Directive:   Code Status and Medical Interventions:   Ordered at: 07/01/24 1547     Medical Intervention Limits:    No intubation (DNI)    No artificial nutrition     Level Of Support Discussed With:    Patient     Code Status (Patient has no pulse and is not breathing):    No CPR (Do Not Attempt to Resuscitate)     Medical Interventions (Patient has pulse or is breathing):    Limited Support     Admit Date: 6/28/2024       Hospital Day: 5      Subjective:     Chief Compliant: No new complaints.  Back spasms    Subjective: Back pain, \"spasms\".  States she has been out of bed with assistance and ambulatory with a walker.  Denies fevers or drenching night sweats.    Interval history:  Marina Joe 79 y.o. 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     She reports that she has been experiencing back pain for several weeks. Since her presentation on 6/28/2024 she had the following workup:  - CT-A/P showed abnormal heterogenous left kidney mass, abnormal right kidney, new right adrenal mass, innumerable solid nodules in the subcutaneous soft tissues all concerning for metastatic disease process.  - CT lumbar spine showing multilevel prominent disc osteophyte complexes acet arthropathy and ligamental hypertrophy and resultant neural foraminal spinal canal stenosis.  - CT chest showed a left breast nodule of 9 mm, scattered subcutaneous soft tissue nodules in the abdominal wall, otherwise there is no sign of intrathoracic metastases.  - MRI-abdomen showed infiltrative mass " within the left kidney extending into the proximal left collecting system as well as numerous additional solid masses in the bilateral kidneys. Additionally there is a right adrenal mass, multiple subcutaneous fat soft tissue masses, and a L3 vertebral body mass with suspected extension into the spinal canal. 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  - MRI-L-spine: Neoplastic process involving the L3 vertebral body with associated posterior soft tissue component resulting in severe spinal canal narrowing and cauda equina nerve root compression. The soft tissue component appears to also extend into the right L3-L4 foramen. No associated pathological fracture. There are also appears to be tumor signal extending into the right L2-L3 neural foramen.     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  Final diagnosis: L3 epidural soft tissue mass, biopsy: Abnormal B-cell population with some features consistent with small lymphocytic lymphoma/chronic lymphocytic leukemia.  Comment:  Flow Cytometry Summary    Flow cytometry was performed at [facility] ([id number]).  Flow cytometry shows 64% of the sample as an abnormal monoclonal B-cell population with mixed cell size.  FISH testing is pending.  This may represent a B-cell small lymphocytic lymphoma/chronic lymphocytic leukemia.  The cell size of the abnormal cells appears mixed.  This may represent increased prolymphocytes, paraimmunoblasts, and or large cells and progression/transformation is in the differential diagnosis.  Intact lymph node architecture is not present in this  "fragmented paraspinal mass for further evaluation.  Clinical correlation is recommended.          Review of Systems  Review of Systems:   Constitutional: Fatigue.  Says she has been ambulatory with a walker and assistance.  Appetite fair.  \"Still not great.\"  No fever / No chills / No sweats  HEENT:  No sore throat / No hoarseness / No vision changes  CV:  No chest pain / No palpitations / No orthopnea  Respiratory:  No cough / No shortness of breath / No sputum / No hemoptysis  GI:  No nausea / No vomiting / No abdominal pain / No diarrhea / +constipation   :  No dysuria / No hesitancy / No urgency / No hematuria  Neuro:  + Lower extremity muscle weakness / No dysphagia / No headache / No paresthesias  Musculoskeletal:  No edema / No cyanosis / + postop back pain    Vitals: /80 (BP Location: Right arm, Patient Position: Sitting)   Pulse 70   Temp 98.4 °F (36.9 °C) (Oral)   Resp 16   Ht 172.7 cm (68\")   Wt 111 kg (244 lb)   LMP  (LMP Unknown)   SpO2 95%   BMI 37.10 kg/m²     Physical Exam  Physical Exam:  General appearance: Pleasant, obese, modestly elderly female alert, appears stated age and cooperative.  In no acute distress while in bed.  Skin:  Skin color a bit pale. No rashes or lesions  HEENT:  Head: Normal, normocephalic, atraumatic.  Neck:  no adenopathy, no tenderness/mass/nodules  Lungs:  clear to auscultation bilaterally  Heart:  regular rate and rhythm  Abdomen:  soft, non-tender.  No overt splenomegaly (habitus precludes an adequate exam)  Extremities:  extremities normal, atraumatic, no cyanosis or edema  Lymphatics: No obvious adenopathy in the cervical, supraclavicular, axillary or inguinal tri regions.  Neurologic:  Mental status: Alert, oriented, thought content appropriate    24HR INTAKE/OUTPUT:    Intake/Output Summary (Last 24 hours) at 7/3/2024 0799  Last data filed at 7/3/2024 0404  Gross per 24 hour   Intake 720 ml   Output 1700 ml   Net -980 ml        Batista Catheter: " Yes    Diet: Diet: Diabetic; Consistent Carbohydrate; Fluid Consistency: Thin (IDDSI 0)      Medications:   Scheduled Meds:amLODIPine, 5 mg, Oral, BID  buPROPion XL, 150 mg, Oral, Daily  carvedilol, 6.25 mg, Oral, BID With Meals  cetirizine, 10 mg, Oral, Daily  cloNIDine, 0.1 mg, Oral, BID  fluticasone, 2 spray, Nasal, Daily  heparin (porcine), 5,000 Units, Subcutaneous, Q12H  methylnaltrexone, 6 mg, Subcutaneous, Every Other Day  montelukast, 10 mg, Oral, Nightly  multivitamin with minerals, 1 tablet, Oral, Daily  pantoprazole, 40 mg, Oral, Daily  rOPINIRole, 0.5 mg, Oral, Nightly  rosuvastatin, 10 mg, Oral, Nightly  sertraline, 100 mg, Oral, Daily  sodium chloride, 10 mL, Intravenous, Q12H  sodium chloride, 10 mL, Intravenous, Q12H        Continuous Infusions:sodium chloride, 50 mL/hr, Last Rate: 50 mL/hr (07/03/24 0226)        Labs:     Results from last 7 days   Lab Units 07/03/24  0440 07/02/24  1823 07/02/24  0829   WBC 10*3/mm3 7.21 8.63 8.27   HEMOGLOBIN g/dL 8.2* 7.9* 8.3*   HEMATOCRIT % 26.3* 25.3* 26.1*   PLATELETS 10*3/mm3 106* 110* 75*     06/25/2024 0901 06/25/2024 0935 Iron Profile [[id number]]   (Abnormal)   Blood    Final result Component Value Units   Iron 56 mcg/dL   Iron Saturation (TSAT) 16 Low  %   Transferrin 238 mg/dL   TIBC 355 mcg/dL          06/25/2024 0901 06/25/2024 0940 Ferritin [[id number]]    (Abnormal)   Blood    Final result Component Value Units   Ferritin 451.10 High  ng/mL               07/01/2024 1053 07/01/2024 1130 Fibrinogen [[id number]]   (Abnormal)   Blood    Final result Component Value Units   Fibrinogen 521 High  mg/dL          07/01/2024 1053 07/01/2024 1204 Fibrin Split Products [[id number]]   Blood    Final result Component Value   Fibrin Split Products Less Than 5 mcg/mL          07/01/2024 1053 07/01/2024 1102 Platelet Antibody Profile [[id number]]   Blood    In process Component Value   No component results          07/01/2024 1053 07/01/2024 1130 Protime-INR  "[[id number]]   Blood    Final result Component Value Units   Protime 13.5 Seconds   INR 0.99           07/01/2024 1053 07/01/2024 1130 aPTT [[id number]]   Blood    Final result Component Value Units   PTT 26.8 seconds                    Results from last 7 days   Lab Units 07/03/24  0440 07/02/24  1414 07/02/24  0414 07/01/24  0432 06/30/24  0434 06/28/24  1153 06/28/24  0617   SODIUM mmol/L 137  --  138 137 137   < > 135*   POTASSIUM mmol/L 4.2 4.3 3.6 3.7 4.0   < > 3.9   CHLORIDE mmol/L 103  --  103 102 101   < > 95*   CO2 mmol/L 25.0  --  24.0 24.0 24.0   < > 26.0   BUN mg/dL 27*  --  30* 37* 34*   < > 32*   CREATININE mg/dL 1.47*  --  1.67* 2.18* 2.39*   < > 2.59*   CALCIUM mg/dL 8.9  --  8.9 8.5* 8.4*   < > 10.0   BILIRUBIN mg/dL  --   --   --   --  <0.2  --  0.3   ALK PHOS U/L  --   --   --   --  78  --  85   ALT (SGPT) U/L  --   --   --   --  21  --  18   AST (SGOT) U/L  --   --   --   --  35*  --  22   GLUCOSE mg/dL 93  --  107* 98 120*   < > 99    < > = values in this interval not displayed.       ABG:  No results found for: \"PHART\", \"PO2ART\", \"NEF0YNM\"    Culture Data:   Urine Culture   Date Value Ref Range Status   06/28/2024 >100,000 CFU/mL Mixed Bhavana Isolated  Final       Lab Results   Component Value Date    PATHINTERP  07/01/2024     Moderate normochromic normocytic anemia    Normal total white blood cell count with the following manual differential:  Neutrophils 72%  Lymphocytes 20%  Monocytes 4%  Eosinophils 4%  Moderate peripheral thrombocytopenia    No schistocytes identified  No platelet clumps identified  No morulae identified in granulocytes or monocytes       Radiology:     Imaging Results (Last 7 Days)       Procedure Component Value Units Date/Time    US Guided Tissue Biopsy [[id number]] Collected: 07/01/24 1430     Updated: 07/02/24 0700    Narrative:      EXAM: US GUIDED TISSUE BIOPSY-      DATE: 7/1/2024 12:38 PM     HISTORY: Assess for metastatic disease; 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 Patient reports  history of breast cancer in 2013 status post surgery and radiation  therapy.        COMPARISON: 6/28/2024.     TECHNIQUE: Representative images and report stored to PACS per  institutional protocol and state regulations.     PROCEDURE:  Preprocedure imaging performed demonstrating multiple peripherally  echogenic, centrally hypoechoic nodules. These demonstrated internal  vascularity. A superficial nodule RIGHT flank nodule was selected and  patient positioned with consideration for her comfort.     Risks, benefits and alternatives to the procedure were discussed with  the patient. Specifically, risks of bleeding, infection, allergy to  medicine, and non-diagnostic biopsy results were discussed with the  patient. Verbal and written informed consent was obtained.     A timeout was performed including the patient's name, date of birth,  biopsy site and laterality.     Using ultrasound, a site for biopsy of RIGHT flank subcutaneous nodule  was selected, marked and prepped in the usual sterile fashion. 1 percent   lidocaine was used for local anesthesia.     Using ultrasound guidance, RIGHT flank subcutaneous nodule biopsy was  performed using 18 gauge Bard core needle biopsy device. 5 passes were  made. One core was used for touch prep. 2 cores were placed in formalin  and 2 cores were placed in RPMI.     Cytology deemed sample adequate. Biopsy samples were taken by cytology.      The patient tolerated the procedure well. No evidence of complication.     The patient returned to the floor in stable condition and agrees to  follow up with referring  clinician for biopsy results.           Impression:      1. Successful ultrasound guided core needle biopsy of RIGHT flank  subcutaneous nodule.        This report was signed and finalized on 7/1/2024 2:36 PM by Dr Sonam Quach MD.       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 Patient reports  history of breast cancer in 2013 status post surgery and radiation  therapy.        COMPARISON: 6/28/2024.     TECHNIQUE: Representative images and report stored to PACS per  institutional protocol and state regulations.     PROCEDURE:  Preprocedure imaging performed demonstrating multiple peripherally  echogenic, centrally hypoechoic nodules. These demonstrated internal  vascularity. A superficial nodule RIGHT flank nodule was selected and  patient positioned with consideration for her comfort.     Risks, benefits and alternatives to the procedure were discussed with  the patient. Specifically, risks of bleeding, infection, allergy to  medicine, and non-diagnostic biopsy results were discussed with the  patient. Verbal and written informed consent was obtained.     A timeout was performed including the patient's name, date of birth,  biopsy site and laterality.     Using ultrasound, a site for biopsy of RIGHT flank subcutaneous nodule  was selected, marked and prepped in the usual sterile fashion. 1 percent   lidocaine was used for local anesthesia.     Using ultrasound guidance, RIGHT flank  subcutaneous nodule biopsy was  performed using 18 gauge Bard core needle biopsy device. 5 passes were  made. One core was used for touch prep. 2 cores were placed in formalin  and 2 cores were placed in RPMI.     Cytology deemed sample adequate. Biopsy samples were taken by cytology.      The patient tolerated the procedure well. No evidence of complication.     The patient returned to the floor in stable condition and agrees to  follow up with referring clinician for biopsy results.           Impression:      1. Successful ultrasound guided core needle biopsy of RIGHT flank  subcutaneous nodule.        This report was signed and finalized on 7/1/2024 2:36 PM by Dr Sonam Quach MD.       MRI Brain Without Contrast [[id number]] Collected: 07/01/24 0949     Updated: 07/01/24 1000    Narrative:      EXAMINATION: MRI BRAIN WO CONTRAST-  7/1/2024 9:49 AM      HISTORY: Staging; C79.51-Secondary malignant neoplasm of bone;  N28.89-Other specified disorders of kidney and ureter; E27.8-Other  specified disorders of adrenal gland; M79.89-Other specified soft tissue  disorders; N28.9-Disorder of kidney and ureter, unspecified;  N18.9-Chronic kidney disease, unspecified; D69.6-Thrombocytopenia,  unspecified; G83.4-Cauda equina syndrome; Z74.09-Other reduced mobility     COMPARISON: 8/10/2023     TECHNIQUE: Multiplanar imaging of the brain was performed in a routine  fashion. No contrast was administered.     FINDINGS:  No restricted diffusion. No mass effect or midline shift. It should be  noted that no intravenous contrast was administered, limiting evaluation  for intracranial metastatic disease. Within the limitations of this  exam, I don't see any definite evidence of intracranial metastatic  disease. Increased FLAIR signal intensity scattered throughout the  subcortical and periventricular white matter. Basilar cisterns are  preserved. Grey and white matter demonstrates normal MR signal. No  abnormal extra axial fluid  collections are noted. No definite mass  effect or midline shift identified.     Proximal cervical spinal cord, brainstem, and cerebellum are  unremarkable. Normal cerebrovascular flow voids are seen. Bilateral  globes and orbits are normal in appearance.     Opacification of the LEFT sphenoid sinus with high T1 signal which may  represent some proteinaceous fluid.          Impression:         1.  No definite evidence of intracranial metastatic disease within the  limitations of this noncontrast examination.     2.  Fairly extensive periventricular white matter signal abnormality,  likely related to chronic microvascular ischemic change.     3.  Opacification of the LEFT sphenoid sinus with what is likely  proteinaceous fluid.                                                       This report was signed and finalized on 7/1/2024 9:53 AM by Dr. Armando Calixto MD.       MRI Lumbar Spine With & Without Contrast [[id number]] Collected: 06/28/24 1920     Updated: 06/28/24 1945    Addenda:        Additional findings: There are also appears to be tumor signal extending  into the right L2-L3 neural foramen.     This report was signed and finalized on 6/28/2024 7:42 PM by Warren Freire.     Signed: 06/28/24 1942 by Warren Freire, DO    Narrative:      Exam: MRI LUMBAR SPINE W WO CONTRAST- 6/28/2024 3:43 PM     HISTORY: Lumbar back and bilateral nondermatomal leg pain and  dysesthesias; N28.89-Other specified disorders of kidney and ureter;  E27.8-Other specified disorders of adrenal gland; M79.89-Other specified  soft tissue disorders; N28.9-Disorder of kidney and ureter, unspecified;  N18.9-Chronic kidney disease, unspecified; D69.6-Thrombocytopenia,  unspecified     COMPARISON: 6/28/2024 lumbar spine CT     TECHNIQUE: Multiplanar, multisequence MRI of the lumbar spine was  obtained prior to and after the administration of 20 mL intravenous  gadolinium contrast.     FINDINGS:     Grade 1 degenerative anterolisthesis  of L3-L4.     Within the L3 vertebral body there is fairly diffuse T2 hyperintense  signal with associated T1 hypointense marrow infiltration and suspected  mild enhancement. There is an associated posterior enhancing soft tissue  mass extending into the spinal canal causing severe stenosis and  compression of the cauda equina nerve roots. The soft tissue component  also appears to extend superiorly to the L2-L3 disc level. There appears  to be tumor extension into the right L3-L4 foramen.     No visible fracture.     Multilevel mild degenerative disc disease with posterior disc osteophyte  complexes. Multilevel facet hypertrophic changes. No significant facet  periarticular edema. Multilevel ligamentum flavum hypertrophy.  Multilevel prominent posterior epidural fat.     Conus appears to terminate at L1-L2.     T11-T12: There appears to be a at least mild to moderate spinal canal  and mild to moderate bilateral foraminal narrowing.     T12-L1: No significant spinal canal narrowing. Moderate right foraminal  narrowing.     L1-L2: Moderate spinal canal narrowing. Mild to moderate right foraminal  narrowing.     L2-L3: Severe spinal canal narrowing. Moderate to severe right foraminal  narrowing.     L3-L4: Severe spinal canal narrowing. Moderate right foraminal  narrowing.     L4-L5: Moderate spinal canal narrowing and bilateral subarticular  narrowing encroaching the descending bilateral L5 nerve roots. Mild  bilateral foraminal narrowing.     L5-S1: No significant spinal canal or foraminal narrowing.     Extraspinal findings: Please see separately dictated MR abdomen and CT  abdomen/pelvis from the same day.       Impression:         Neoplastic process involving the L3 vertebral body with associated  posterior soft tissue component resulting in severe spinal canal  narrowing and cauda equina nerve root compression. The soft tissue  component appears to also extend into the right L3-L4 foramen. No  associated  pathological fracture.     Additional multilevel spondylotic changes including moderate to severe  spinal canal and foraminal narrowing as detailed above.           This report was signed and finalized on 6/28/2024 7:36 PM by Warren Freire.       MRI Abdomen With & Without Contrast [[id number]] Collected: 06/28/24 1826     Updated: 06/28/24 1943    Addenda:        Additional findings: Tumor signal from the left renal mass appears to  extend along the left renal vein and possibly abuts the IVC and is  suspicious for neoplastic involvement. An additional differential also  includes multifocal primary renal malignancies with metastatic disease.     This report was signed and finalized on 6/28/2024 7:40 PM by Warren Freire.     Signed: 06/28/24 1940 by Warren Freire,     Narrative:      EXAM: MRI ABDOMEN W WO CONTRAST-     INDICATION: Evaluate kidney and adrenal masses showed on CT-scan;  N28.89-Other specified disorders of kidney and ureter; E27.8-Other  specified disorders of adrenal gland; M79.89-Other specified soft tissue  disorders; N28.9-Disorder of kidney and ureter, unspecified;  N18.9-Chronic kidney disease, unspecified; D69.6-Thrombocytopenia,  unspecified        TECHNIQUE: Multisequence multiplanar MR images was obtained of the  abdomen prior to and at the administration of 20 mL intravenous  gadolinium contrast.        COMPARISON: CT abdomen pelvis 6/28/2024     FINDINGS:     Decrease sensitivity due to motion.     Lower Chest: Unremarkable.     Liver: Unremarkable.     Biliary Tree: Small amount of gallbladder sludge versus layering stones.     Spleen: Tiny T2 hyperintense splenic lesion is nonspecific but favored  benign.     Pancreas: 1.5 cm pancreatic body cyst.     Adrenal Glands: 3.3 cm right adrenal nodule, new from prior CT on  5/23/2023.     Kidneys/Upper Ureters:      There is an infiltrative T2 hypointense/T1 isointense diffusion  restricting mass involving the left kidney with extension  into the  proximal left collecting system/upper ureters which is difficult to  measure but measures up to 11 cm in cranial caudal dimensions. Please  note the abnormal diffusion restriction does extend below the  field-of-view into the left ureter. There are additional smaller masses  within the left renal cortex.      There are also multiple (at least 6) diffusion restricting masses within  the right kidney measuring up to 3.2 cm. There also appears to be a  diffusion restricting mass in the right renal pelvis.      These are somewhat limited in evaluation on postcontrast images due to  degree of motion, however these these masses do appear to demonstrate  some degree of enhancement.     Small left renal cyst is noted.     Gastrointestinal Tract: Colonic diverticulosis.     Lymphatics: Unremarkable.     Vasculature: Unremarkable.      Peritoneum/Retroperitoneum: Unremarkable.     Abdominal Wall/Soft Tissues: There enhancing diffusion restricting soft  tissue masses, the most dominant and largest cluster is within the right  posterior abdominal wall measuring up to 2.6 cm.     Osseous Structures: There is a diffusion restricting mass involving the  L3 vertebral body with suspected posterior extension into the spinal  canal.       Impression:            Infiltrative mass within the left kidney extending into the proximal  left collecting system as well as numerous additional solid masses in  the bilateral kidneys. Additionally there is a right adrenal mass,  multiple subcutaneous fat soft tissue masses, and a L3 vertebral body  mass with suspected extension into the spinal canal. Findings  collectively are most consistent with metastatic disease which carries a  broad differential, however some differentials include metastatic breast  cancer, malignant melanoma, and lymphoma. The subtendinous fat soft  tissue masses would be amenable to ultrasound-guided biopsy.     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           This report was signed and finalized on 6/28/2024 6:57 PM by Warren Freire.       CT Chest Without Contrast Diagnostic [[id number]] Collected: 06/28/24 1626     Updated: 06/28/24 1639    Narrative:      CT CHEST WO CONTRAST DIAGNOSTIC- 6/28/2024 3:15 PM     HISTORY: Staging for newly diagnosed metastatic disease; N28.89-Other  specified disorders of kidney and ureter; E27.8-Other specified  disorders of adrenal gland; M79.89-Other specified soft tissue  disorders; N28.9-Disorder of kidney and ureter, unspecified;  N18.9-Chronic kidney disease, unspecified; D69.6-Thrombocytopenia,  unspecified      COMPARISON: 10/13/2022     TOTAL DOSE LENGTH PRODUCT: 413.82 mGy.cm. Automated exposure control was  also utilized to decrease patient radiation dose.     TECHNIQUE: Axial images of the chest are performed without IV contrast  secondary to renal insufficiency.      FINDINGS:    Postoperative changes of the left breast with left axillary  surgical clips. 9 mm medial left breast nodule/mass near the 8 to 9  o'clock position, new from the 2022 CT study which may represent a cyst  or solid mass. Correlation to any outside mammogram and breast  ultrasound recommended. No current mammogram or ultrasound at this  institution.     No pathologic axillary or intrathoracic lymphadenopathy. Cardiomegaly.  Very small pericardial effusion. No pleural effusions.     3.2 cm right adrenal mass. Abnormal appearance of the left greater than  right kidney. Please refer to today's CT abdomen pelvis 6/20/2024.     Basilar atelectasis. A 6 mm nodule along the right major fissure  favoring intrafissural lymph node, present on the 10/13/2022 study.. No  suspicious pulmonary nodules or convincing intrathoracic metastasis. No  pneumothorax. No discrete endobronchial lesion.     No focal aggressive regional bony lesions. Moderate diffuse degenerative  changes of the  thoracolumbar junction. New 1.5 cm nodule within the  subcutaneous tissues of the left lower chest/upper abdomen (series 2  image 98. Several soft tissue nodules within the subcutaneous tissue of  the right flank at the L1 level measuring up to 2.1 cm. Smaller 9 mm  soft tissue nodule of the posterior back just left of midline at the L2  level. A left 10 mm posterior subcutaneous nodule at the T12-L1 level.       Impression:      1. Patient with a history of left breast cancer with postoperative  changes of the superior left breast and left axillary surgical clips.  There is a 9 mm nodule/mass of the medial left breast positioned towards  the 8 to 9 o'clock position. Correlation to any recent mammogram or  breast ultrasound recommended, none at this institution. If no outside  studies demonstrating a known 9 mm medial left breast cyst, a follow-up  outpatient mammogram and left breast ultrasound should be performed for  further assessment.  2. Scattered subcutaneous soft tissue nodules of the abdominal wall.  Largest nodules within the right posterior flank measuring up to 2.1 cm  at the L1 level. These appear to represent soft tissue nodules and could  be metastatic deposits. Ultrasound recommended to establish solid  nature. Biopsy could be performed if solid and clinically indicated.  3. Cardiomegaly. Mild vascular calcification.  4. No convincing intrathoracic metastasis. Stable 6 mm nodule in the  right major fissure favoring intrafissural lymph node.  5. Please refer to CT abdomen and pelvis report from today for  description of bilateral renal pathology as well as a 3.2 cm right  adrenal mass.     This report was signed and finalized on 6/28/2024 4:36 PM by Dr. Meaghan Phillips MD.       XR Chest 1 View [[id number]] Collected: 06/28/24 0912     Updated: 06/28/24 0916    Narrative:      EXAM: XR CHEST 1 VW-      DATE: 6/28/2024 8:06 AM     HISTORY: multiple complaints       COMPARISON: 8/10/2023.     TECHNIQUE:   Frontal view(s) of the chest submitted.     FINDINGS:    There are low lung volumes with vascular crowding versus volume  overload/edema. There is enlargement of the cardiac silhouette. No  effusion or pneumothorax is seen.          Impression:         1. Low lung volumes with prominent vasculature may represent vascular  crowding or mild edema.     This report was signed and finalized on 6/28/2024 9:13 AM by Gatito Blackwood.       CT Abdomen Pelvis Stone Protocol [[id number]] Collected: 06/28/24 0817     Updated: 06/28/24 0832    Narrative:      EXAM: CT ABDOMEN PELVIS STONE PROTOCOL-      DATE: 6/28/2024 6:48 AM     HISTORY: flank pain       COMPARISON: 5/23/2023.     DOSE LENGTH PRODUCT: 1399.15 mGy.cm Automatic exposure control was  utilized to make radiation dose as low as reasonably achievable.     TECHNIQUE: Noncontract axial images of the abdomen and pelvis obtained  with multiplanar reformats.     FINDINGS:  VISUALIZED CHEST: There is cardiomegaly without pericardial or pleural  effusion. There is atelectasis at the lung bases which are otherwise  grossly clear.     LIVER/BILE DUCTS: Unenhanced liver is unremarkable. Gallbladder is  distended without inflammatory change. Bile ducts are unremarkable.     KIDNEYS/URETERS: Both kidneys are abnormal in appearance. The left is  larger than the right and there is a suggestion of an underlying  infiltrative left renal mass involving the entire left kidney but  especially at the mid and lower pole extending along the left renal  pelvis and to the proximal left ureter. There are bilateral renal cysts.  There are vascular calcifications and probable nonobstructing stones in  the left kidney. There is no definite hydronephrosis.     ADRENAL: There is a new right adrenal mass worrisome for neoplasm  measuring 3.5 cm. Left adrenal gland is nodular but unchanged.        SPLEEN: Unremarkable.     PANCREAS: A cyst at the ventral aspect of the body of the pancreas  is  unchanged measuring 1 cm. 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     MESENTERY: No mesenteric or retroperitoneal lymphadenopathy is seen. No  free fluid identified.     VASCULATURE: There is mild atherosclerosis of the abdominal aorta  without aneurysm.     GI TRACT: There is a small hiatal hernia. There is diverticulosis of the  colon without acute diverticulitis. No mass or obstruction is seen.     PELVIS: Urinary bladder is unremarkable. There is diverticulosis of the  sigmoid colon without acute diverticulitis. Borderline left external  iliac lymph node measures 0.9 cm in short axis on image #70 of series 3.  This is actually unchanged. Patient is status post hysterectomy.     SOFT TISSUES: There are multiple solid nodules in the subcutaneous soft  tissues which are new from the 5/23/2023 exam. One on the left  anteriorly on image #9 measures 1.5 cm. A cluster of solid nodules on  the right posteriorly and laterally on image #38 noted largest measures  2.5 cm.     BONES: There is spondylosis of the spine and lumbar spine facet  arthropathy. No suspicious osseous lesions are seen.          Impression:      1. Diffusely abnormal left kidney which appears heterogeneous and  enlarged worrisome for neoplasm. Normal soft tissue extends into the  left renal pelvis and along the proximal left ureter. Right kidney also  is abnormal in its. Differential diagnosis would include infiltrative  renal cell carcinoma, lymphoma and metastatic disease.  2. There is also a new right adrenal mass worrisome for metastatic  disease.  3. Innumerable solid nodules in the subcutaneous soft tissues worrisome  for soft tissue metastasis. Cluster of nodules on the right posteriorly  at the mid to lower abdomen is close skin surface and would likely be  amenable to ultrasound-guided biopsy.        This report was signed and finalized on 6/28/2024 8:29 AM by Gatito Blackwood.       CT Lumbar Spine Without Contrast [[id number]] Collected:  06/28/24 0817     Updated: 06/28/24 0832    Narrative:      EXAMINATION: CT LUMBAR SPINE WO CONTRAST-      6/28/2024 6:48 AM     HISTORY: back pain     In order to have a CT radiation dose as low as reasonably achievable  Automated Exposure Control was utilized for adjustment of the mA and/or  KV according to patient size.     Total DLP = 1399.15 mGy.cm     The CT scan of the lumbar spine is performed without intravenous or  intrathecal contrast enhancement.     Images are acquired in axial plane and subsequent reconstruction in  coronal and sagittal planes.     The comparison is made with the previous study dated 1/21/2024.     There is no evidence of an acute fracture or compression.     No acute displacement or malalignment.     There is a mild levoscoliosis.     There is loss of lumbar lordosis.     There is mild anterolisthesis of L3 over L4 similar to the previous  study. No spondylolysis.     The vertebral body heights are normal.     The loss of height of the intervertebral disc, most pronounced at L5-S1.  There is a vacuum sign at L3-4, L4-5 and L5-S1 representing degenerative  disc disease.     T12-L1: Prominent posterior osteophytes. Mild diffuse disc bulging.  Bilateral facet arthropathy right more than the left. There is right  neuroforaminal stenosis. Spinal canal is patent.     L1-2: Mild disc bulging. No significant osteophytes. Bilateral facet  arthropathy and ligamental hypertrophy. There is a mild spinal stenosis.  Neural foramina are patent.     L2-3: Mild diffuse disc bulging. Bilateral facet arthropathy and  ligamental hypertrophy. There is resultant moderate spinal stenosis.  There is mild bilateral neural foraminal stenosis.     L3-4: Moderate diffuse disc bulging/displacement central and bilateral.  There is severe facet arthropathy and ligamental hypertrophy. There is  moderate spinal stenosis. There is bilateral neuroforaminal stenosis  right more than the left.     L4-5: Small posterior  osteophytes. Moderate diffuse disc bulging.  Bilateral severe facet arthropathy and ligamental hypertrophy. Moderate  spinal stenosis. Bilateral neuroforaminal stenosis.     L5-S1: Moderately prominent posterior osteophytes. Moderate diffuse disc  bulging. Bilateral facet arthropathy. There is bilateral neuroforaminal  stenosis. Spinal canal is patent.     Limited visualized paravertebral paraspinal soft tissues are  unremarkable. There are some calcification in the renal hilum  bilaterally which probably represent vascular calcification.       Impression:      1. No acute fracture or malalignment.  2. Lumbar spondylosis. Loss of lumbar lordosis. A mild anterolisthesis  L3 over L4.  3. Multilevel prominent disc osteophyte complexes, facet arthropathy and  ligamental hypertrophy and resultant neural foraminal spinal canal  stenosis.                                                  This report was signed and finalized on 6/28/2024 8:29 AM by Dr. Marito Marcano MD.                 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  -6/28/2024, CT lumbar spine showing multilevel prominent disc osteophyte complexes acet arthropathy and ligamental hypertrophy and resultant neural foraminal spinal canal stenosis.  -6/28/2024, CT chest showed a left breast nodule of 9 mm, scattered subcutaneous soft tissue nodules in the abdominal wall, otherwise there is no sign of intrathoracic metastases.  -6/28/2024, MRI-abdomen showed infiltrative mass within the left kidney extending into the proximal left collecting system  as well as numerous additional solid masses in the bilateral kidneys. Additionally there is a right adrenal mass, multiple subcutaneous fat soft tissue masses, and a L3 vertebral body mass with suspected extension into the spinal canal. 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  -6/20/2024, MRI-L-spine: Neoplastic process involving the L3 vertebral body with associated posterior soft tissue component resulting in severe spinal canal narrowing and cauda equina nerve root compression. The soft tissue component appears to also extend into the right L3-L4 foramen. No associated pathological fracture. There are also appears to be tumor signal extending into the right L2-L3 neural foramen.  - 7/1/2024-MRI brain-no definite evidence of intracranial metastatic disease within the limitations of noncontrast exam.  Cerebral microvascular disease noted.  - 7/1/24-ultrasound-guided core needle biopsy of right flank subcutaneous nodule.  FINAL DIAGNOSIS:  Abnormal B-cell population which may represent small lymphocytic lymphoma/chronic lymphocytic leukemia (SLL/CLL).    -Complications of tumor: Cord compression at L3 concerning for epidural metastasis s/p laminectomy and decompression, 6/29/24  -Tumor status:  -Systemically untreated.  -Plan for postop radiation therapy to the lumbar region is noted.  Radiation to L-spine anticipated beginning Monday with 2000 to 3000 cGy over 5-20 daily fractions    Diffuse soft tissue abdominal wall nodules.  - 7/1/24-ultrasound-guided core needle biopsy of right flank subcutaneous nodule.  FINAL DIAGNOSIS:  Abnormal B-cell population which may represent small lymphocytic lymphoma/chronic lymphocytic leukemia (SLL/CLL).  Comment  Please see additional report [id number] in the associated flow cytometry report. The flow cytometry report showed  scatter  properties compatible with mixed cell size with features of B-cell small lymphocytic lymphoma/chronic lymphocytic  leukemia (B-SLL/). FISH testing is ordered and pending. This may represent progression/transformation. Correlation with  clinical, laboratory, and morphologic data may be necessary to better define this lymphoid neoplasm.    3.   History of stage I left breast cancer.  Now with left breast nodule of 9 mm  4.   Anemia.  Contributions from CKD+/- malignancy/anemia of chronic disease  -Hgb 8.2; MCV 90.1, 7/2/2024 (prior: Hgb 8.7 -11.8)  -6/25/24-iron 56, iron saturation 16%, TIBC 355, ferritin 451  5.  Thrombocytopenia.  ITP?  MDS?  Myelopthisis?  -106,000, 7/3/2024 (prior patito: 81,000).  Has needed platelet transfusions to maintain >/=100,000  - No evidence for DIC-7/1/2024: Fibrinogen 521, FSP<5, PT 13.5/INR 0.9, PTT 26.8  - No evidence for MAHA/TTP- 7/1/24: Manual blood smear--moderate normochromic normocytic anemia.  Normal total white blood cell count with 72 segs, 20 lymphs, 4 monos, 4 eos.  Moderate peripheral thrombocytopenia.  No schistocytes identified.  No platelet clumps identified.  No morulae identified in granulocytes or monocytes.  6.   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  GFR 36.4, 7/3/2024 (prior: 15.6- 31.2)  7.   Guarded prognosis     PLAN:  -Reviewed labs, 7/1/24 - 7/3/24.  Note dwindling anemia, thrombocytopenia (has received platelet transfusions), previously normal PT/PTT, normal fibrinogen, normal FSP (no DIC), no evidence for MAHA (no schistocytes).  -Apprised of pathological diagnosis from L3 biopsy and abdominal wall nodules (above) consistent with small lymphocytic lymphoma/chronic lymphocytic leukemia though FISH studies are pending.  May have a more aggressive process/transformation.  Needs a bone marrow biopsy.  Patient agrees to proceed.    -Start allopurinol 300 mg daily  -Teaching sheets for ibrutinib.  Pharmacy to provide.  Anticipate initiation of drug after marrow biopsy  and allopurinol loading  -Schedule bone marrow biopsy to assess for marrow involvement by CLL/SLL  - Outpatient staging PET-scan   - Obtain outpatient diagnostic mammography to evaluate the left breast nodule.  -Await platelet antibody profile  -Plan for postop radiation therapy to the lumbar region.  Radiation to L-spine anticipated beginning Monday with 2000 to 3000 cGy over 5-20 daily fractions  -Appreciate palliative care assistance.     I spent ~40 minutes caring for Marina on this date of service. This time includes time spent by me in the following activities: preparing for the visit, reviewing tests, performing a medically appropriate examination and/or evaluation, counseling and educating the patient/family/caregiver, ordering medications, tests, or procedures and documenting information in the medical record

## 2024-07-03 NOTE — PLAN OF CARE
Goal Outcome Evaluation:  Plan of Care Reviewed With: patient        Progress: no change  Outcome Evaluation: Pt sitting EOB upon entering room, pt w/ mult attempts to stand before able to get upright min/mod x1 in order to take steps to bsc, pt w difficult time getting up from bsc and bk to EOB. pt attempt to amb but unable due to weakness and pain, pt sat rested then tried again and was able to then amb to door and bk to chair w/ mult rests, min x1 anatalgic gait and fwd flex posture. Pt will need rehab upon d/c for cont strength, mobiltiy and safety

## 2024-07-03 NOTE — THERAPY TREATMENT NOTE
Patient Name: Marina Joe  : 1945    MRN: [id number]                              Today's Date: 7/3/2024       Admit Date: 2024    Visit Dx:     ICD-10-CM ICD-9-CM   1. 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 Left renal mass  N28.89 593.9   3. Right adrenal mass  E27.8 255.8   4. Nodule of soft tissue  M79.89 729.99   5. Acute on chronic renal insufficiency  N28.9 593.9    N18.9 585.9   6. Thrombocytopenia  D69.6 287.5   7. Cauda equina compression  G83.4 344.60   8. Impaired mobility [Z74.09]  Z74.09 799.89   9. HX: breast cancer  Z85.3 V10.3     Patient Active Problem List   Diagnosis    Malignant neoplasm of upper-outer quadrant of female breast    Anemia of chronic renal failure, stage 3 (moderate)    Hypertension, benign    Hx of colonic polyps    HX: breast cancer    Morbidly obese    Right knee DJD    S/P lumpectomy, left breast    Adult hypothyroidism    Anemia    Anxiety    Breast cancer, left    Cervical pain    Chronic insomnia    Elevated lipids    Herpes zoster without complication    Left ear pain    Left otitis externa    Myalgia    Negative depression screening    Obesity (BMI 30-39.9)    Postmenopausal status    Recurrent acute serous otitis media of left ear    Restless leg    Sinusitis, bacterial    Skin lesion    Vitamin D deficiency    Stage 3b chronic kidney disease    GIB (gastrointestinal bleeding)    Acute on chronic blood loss anemia    Chronic idiopathic thrombocytopenia    Hypotension due to hypovolemia    Primary hypertension    Pancreatic lesion    Left renal mass    Cauda equina compression    Metastatic cancer to spine     Past Medical History:   Diagnosis Date    Breast cancer     CKD (chronic kidney disease)     Hypercholesteremia     Hypertension     Sinusitis     Stage 3a chronic kidney disease 10/20/2020     Past Surgical History:   Procedure Laterality Date    AVULSION TOENAIL PLATE          BREAST BIOPSY Left 2012    BREAST BIOPSY       Left Breast, 1/2019 per Dr Barkley    BREAST LUMPECTOMY Left     with node bx     COLONOSCOPY  09/13/2013    small polyp at 30cm benign hyperplastic polyp, changes consistent with melanosis coli. Recall 5 years    COLONOSCOPY  11/19/2018    Tics otherwise normal exam repeat in 5 years    COLONOSCOPY  02/13/2023    Normal exam repeat in 3 years with a 2 day prep    ENDOSCOPY  02/13/2023    Gastritis, small HH    LUMBAR LAMINECTOMY Right 6/29/2024    Procedure: LUMBAR LAMINECTOMY L3 WITH DECOMPRESSION 1-2 LEVELS-RIGHT;  Surgeon: Marcellus Pulido MD;  Location: [facility] OR;  Service: Neurosurgery;  Laterality: Right;    REPLACEMENT TOTAL KNEE Right     2016    TOTAL ABDOMINAL HYSTERECTOMY WITH SALPINGO OOPHORECTOMY      UPPER ENDOSCOPIC ULTRASOUND W/ FNA  12/20/2023    Pancreatic cyst s/p FNA      General Information       Row Name 07/03/24 1049          OT Time and Intention    Document Type therapy note (daily note)  -CS (r) HH (t) CS (c)     Mode of Treatment occupational therapy  -CS (r) HH (t) CS (c)       Row Name 07/03/24 1049          General Information    Patient Profile Reviewed yes  -CS (r) HH (t) CS (c)     Existing Precautions/Restrictions brace worn when out of bed;fall;LSO;spinal  -CS (r) HH (t) CS (c)       Row Name 07/03/24 1049          Cognition    Orientation Status (Cognition) oriented x 4  -CS (r) HH (t) CS (c)       Row Name 07/03/24 1049          Safety Issues, Functional Mobility    Impairments Affecting Function (Mobility) pain;strength;balance;endurance/activity tolerance  -CS (r) HH (t) CS (c)               User Key  (r) = Recorded By, (t) = Taken By, (c) = Cosigned By      Initials Name Provider Type    CS Arpita Herrera S, OTR/L, CNT Occupational Therapist    HH Kim Freed, OT Student OT Student                     Mobility/ADL's       Row Name 07/03/24 1049          Bed Mobility    Comment, (Bed Mobility) Sitting in chair on arrival  -CS (r) HH (t) CS (c)       Row Name 07/03/24  1049          Transfers    Transfers sit-stand transfer;stand-sit transfer;toilet transfer  -CS (r) HH (t) CS (c)       Row Name 07/03/24 1049          Sit-Stand Transfer    Sit-Stand [place] (Transfers) moderate assist (50% patient effort);verbal cues  -CS (r) HH (t) CS (c)     Assistive Device (Sit-Stand Transfers) walker, front-wheeled  -CS (r) HH (t) CS (c)       Row Name 07/03/24 1049          Stand-Sit Transfer    Stand-Sit [place] (Transfers) moderate assist (50% patient effort);verbal cues  -CS (r) HH (t) CS (c)     Assistive Device (Stand-Sit Transfers) walker, front-wheeled  -CS (r) HH (t) CS (c)       Row Name 07/03/24 1049          Toilet Transfer    Type (Toilet Transfer) sit-stand;stand-sit  -CS (r) HH (t) CS (c)     [place] Level (Toilet Transfer) moderate assist (50% patient effort);verbal cues;2 person assist  -CS (r) HH (t) CS (c)     Assistive Device (Toilet Transfer) commode;grab bars/safety frame;walker, front-wheeled  -CS (r) HH (t) CS (c)       Row Name 07/03/24 1049          Functional Mobility    Functional Mobility- Ind. Level contact guard assist;verbal cues required  -CS (r) HH (t) CS (c)     Functional Mobility- Device walker, front-wheeled  -CS (r) HH (t) CS (c)       Row Name 07/03/24 1049          Activities of Daily Living    BADL Assessment/Intervention toileting;upper body dressing  -CS (r) HH (t) CS (c)       Row Name 07/03/24 1049          Upper Body Dressing Assessment/Training    [place] Level (Upper Body Dressing) upper body dressing skills;moderate assist (50% patient effort)  -CS (r) HH (t) CS (c)     Position (Upper Body Dressing) supported sitting  -CS (r) HH (t) CS (c)       Row Name 07/03/24 1049          Toileting Assessment/Training    [place] Level (Toileting) toileting skills;maximum assist (25% patient effort)  -CS (r) HH (t) CS (c)     Position (Toileting) supported sitting;supported standing  -CS (r) HH (t) CS (c)               User Key   (r) = Recorded By, (t) = Taken By, (c) = Cosigned By      Initials Name Provider Type    Arpita Blanc, OTR/L, EMERALD Occupational Therapist    Kim Garcia, OT Student OT Student                   Obj/Interventions       Row Name 07/03/24 1049          Balance    Balance Assessment sitting static balance;sitting dynamic balance;sit to stand dynamic balance;standing static balance;standing dynamic balance  -CS (r) HH (t) CS (c)     Static Sitting Balance independent  -CS (r) HH (t) CS (c)     Dynamic Sitting Balance supervision  -CS (r) HH (t) CS (c)     Position, Sitting Balance sitting in chair  -CS (r) HH (t) CS (c)     Sit to Stand Dynamic Balance moderate assist  -CS (r) HH (t) CS (c)     Static Standing Balance contact guard  -CS (r) HH (t) CS (c)     Dynamic Standing Balance contact guard  -CS (r) HH (t) CS (c)     Position/Device Used, Standing Balance walker, front-wheeled  -CS (r) HH (t) CS (c)     Balance Interventions sitting;sit to stand;static;dynamic;occupation based/functional task  -CS (r) HH (t) CS (c)               User Key  (r) = Recorded By, (t) = Taken By, (c) = Cosigned By      Initials Name Provider Type    TAM Arpita Herrera, OTR/L, EMERALD Occupational Therapist    Kim Garcia, OT Student OT Student                   Goals/Plan    No documentation.                  Clinical Impression       Row Name 07/03/24 1049          Pain Assessment    Pretreatment Pain Rating 8/10  -CS (r) HH (t) CS (c)     Posttreatment Pain Rating 10/10  -CS (r) HH (t) CS (c)     Pain Location - Side/Orientation Right  -CS (r) HH (t) CS (c)     Pain Location - knee  -CS (r) HH (t) CS (c)     Pain Intervention(s) Medication (See MAR);Repositioned;Ambulation/increased activity  -CS (r) HH (t) CS (c)       Row Name 07/03/24 1049          Plan of Care Review    Plan of Care Reviewed With patient  -CS (r) HH (t) CS (c)     Progress no change  -CS (r) HH (t) CS (c)     Outcome Evaluation OT treatment completed.  Pt A&Ox4; c/o 8/10 R knee pain. Pt sitting in chair on arrival. Pt demo Mod A for UB dressing and to don/doff LSO brace likely d/t pain. Pt required multiple attempts to demo sit<>stand; required Mod A to achieve. Pt completed functional mobility from chair<>bathroom with CGA and FWW for safety. Pt demo toileting hygiene Max A in supported standing d/t pt not being able to remove hands from walker to stand. Pt required Mod A x 2 for toilet transfer with FWW. SOB noted d/t increased RRs during functional tasks and mobility. Pt returned to chair and demo SBA for grooming task seated in chair d/t fatigue and weakness. Pt educated on pursed lip breathing and energy conservation techniques for ADL particiption. OT to continue POC.  -CS (r) HH (t) CS (c)       Row Name 07/03/24 1045          Positioning and Restraints    Pre-Treatment Position sitting in chair/recliner  -CS (r) HH (t) CS (c)     Post Treatment Position chair  -CS (r) HH (t) CS (c)     In Chair sitting;call light within reach;encouraged to call for assist;with other staff;with family/caregiver  -CS (r) HH (t) CS (c)               User Key  (r) = Recorded By, (t) = Taken By, (c) = Cosigned By      Initials Name Provider Type    CS Arpita Herrera, OTR/L, CNT Occupational Therapist    Kim Garcia, OT Student OT Student                   Outcome Measures       Row Name 07/03/24 1041          How much help from another is currently needed...    Putting on and taking off regular lower body clothing? 2  -CS (r) HH (t) CS (c)     Bathing (including washing, rinsing, and drying) 2  -CS (r) HH (t) CS (c)     Toileting (which includes using toilet bed pan or urinal) 2  -CS (r) HH (t) CS (c)     Putting on and taking off regular upper body clothing 2  -CS (r) HH (t) CS (c)     Taking care of personal grooming (such as brushing teeth) 4  -CS (r) HH (t) CS (c)     Eating meals 4  -CS (r) HH (t) CS (c)     AM-PAC 6 Clicks Score (OT) 16  -CS (r) HH (t)       Row  Name 07/03/24 1049          Functional Assessment    Outcome Measure Options AM-PAC 6 Clicks Daily Activity (OT)  -CS (r) HH (t) CS (c)               User Key  (r) = Recorded By, (t) = Taken By, (c) = Cosigned By      Initials Name Provider Type    CS Arpita Herrera S, OTR/L, CNT Occupational Therapist    Kim Garcia, OT Student OT Student                    Occupational Therapy Education       Title: PT OT SLP Therapies (Done)       Topic: Occupational Therapy (Done)       Point: ADL training (Done)       Description:   Instruct learner(s) on proper safety adaptation and remediation techniques during self care or transfers.   Instruct in proper use of assistive devices.                  Learning Progress Summary             Patient Acceptance, E, VU by  at 7/3/2024 1141    Acceptance, E, VU by  at 7/2/2024 1436    Acceptance, E, VU by EC at 7/1/2024 0911    Acceptance, E, VU by KJ at 6/30/2024 0941    Acceptance, E, VU by LS at 6/28/2024 1431                         Point: Home exercise program (Done)       Description:   Instruct learner(s) on appropriate technique for monitoring, assisting and/or progressing therapeutic exercises/activities.                  Learning Progress Summary             Patient Acceptance, E, VU by  at 7/3/2024 1141    Acceptance, E, VU by  at 7/2/2024 1436    Acceptance, E, VU by EC at 7/1/2024 0911    Acceptance, E, VU by KJ at 6/30/2024 0941                         Point: Precautions (Done)       Description:   Instruct learner(s) on prescribed precautions during self-care and functional transfers.                  Learning Progress Summary             Patient Acceptance, E, VU by  at 7/3/2024 1141    Acceptance, E, VU by  at 7/2/2024 1436    Acceptance, E, VU by EC at 7/1/2024 0911    Acceptance, E, VU by KJ at 6/30/2024 0941    Acceptance, E, VU by LS at 6/28/2024 1431                         Point: Body mechanics (Done)       Description:   Instruct learner(s) on  proper positioning and spine alignment during self-care, functional mobility activities and/or exercises.                  Learning Progress Summary             Patient Acceptance, E, VU by  at 7/3/2024 1141    Acceptance, E, VU by  at 7/2/2024 1436    Acceptance, E, VU by EC at 7/1/2024 0911    Acceptance, E, VU by KJ at 6/30/2024 0941    Acceptance, E, VU by  at 6/28/2024 1431                                         User Key       Initials Effective Dates Name Provider Type Discipline     06/20/22 -  Stacey Reagan, OTR/L Occupational Therapist OT    KJ 04/15/24 -  Joanna Marie, OT Student OT Student OT    EC 10/13/23 -  Davida Reyes OTR/L Occupational Therapist OT     04/15/24 -  Kim Freed, OT Student OT Student OT                  OT Recommendation and Plan     Plan of Care Review  Plan of Care Reviewed With: patient  Progress: no change  Outcome Evaluation: OT treatment completed. Pt A&Ox4; c/o 8/10 R knee pain. Pt sitting in chair on arrival. Pt demo Mod A for UB dressing and to don/doff LSO brace likely d/t pain. Pt required multiple attempts to demo sit<>stand; required Mod A to achieve. Pt completed functional mobility from chair<>bathroom with CGA and FWW for safety. Pt demo toileting hygiene Max A in supported standing d/t pt not being able to remove hands from walker to stand. Pt required Mod A x 2 for toilet transfer with FWW. SOB noted d/t increased RRs during functional tasks and mobility. Pt returned to chair and demo SBA for grooming task seated in chair d/t fatigue and weakness. Pt educated on pursed lip breathing and energy conservation techniques for ADL particiption. OT to continue POC.     Time Calculation:         Time Calculation- OT       Row Name 07/03/24 1130 07/03/24 1030          Time Calculation- OT    OT Start Time 1049  -CS (r) HH (t) CS (c) --     OT Stop Time 1128  -CS (r) HH (t) CS (c) --     OT Time Calculation (min) 39 min  -CS (r) HH (t) --     Total Timed  Code Minutes- OT 39 minute(s)  -CS (r) HH (t) CS (c) --     OT Received On 07/03/24  -CS (r) HH (t) CS (c) --        Timed Charges    87750 - Gait Training Minutes  -- 12  -NW     79491 - OT Self Care/Mgmt Minutes 39  -CS (r) HH (t) CS (c) --        Total Minutes    Timed Charges Total Minutes 39  -CS (r) HH (t) 12  -NW      Total Minutes 39  -CS (r) HH (t) 12  -NW               User Key  (r) = Recorded By, (t) = Taken By, (c) = Cosigned By      Initials Name Provider Type    CS Arpita Herrera S, OTR/L, CNT Occupational Therapist    NW Yuly Khaill, PTA Physical Therapist Assistant    Kim Garcia, OT Student OT Student                           Kim Freed, OT Student  7/3/2024

## 2024-07-03 NOTE — PLAN OF CARE
Goal Outcome Evaluation:  Plan of Care Reviewed With: patient        Progress: no change  Outcome Evaluation: US guided IV to R wrist area, saline locked, IVF d/c'd. IV lasix x1, c/o shortness of breath. up to BSC x2 and LSO brace. c/o constipation, see MAR; refuses fleets enema.  Ambulated in room with PT, OT treatment. Norco dose increased per pallative care, lidocaine patch to lower back. Platelets 106. vitals stable, A/Ox4. fall precautions, safety maintained.

## 2024-07-03 NOTE — PLAN OF CARE
Problem: Adult Inpatient Plan of Care  Goal: Plan of Care Review  Outcome: Ongoing, Progressing  Flowsheets (Taken 7/3/2024 1411)  Progress: no change  Plan of Care Reviewed With: patient  Outcome Evaluation: Patient rested well. Complains of pain x2 this shift. IVF infusing per order. Voiding per purewick. A&O. VSS. Safety maintained.

## 2024-07-03 NOTE — PROGRESS NOTES
"            [facility]    Palliative Care Daily Progress Note   Chief complaint-follow up support for patient/family    Code Status:   Code Status and Medical Interventions:   Ordered at: 07/01/24 1545     Medical Intervention Limits:    No intubation (DNI)    No artificial nutrition     Level Of Support Discussed With:    Patient     Code Status (Patient has no pulse and is not breathing):    No CPR (Do Not Attempt to Resuscitate)     Medical Interventions (Patient has pulse or is breathing):    Limited Support      Advanced Directives: Advance Directive Status: Patient does not have advance directive   Goals of Care: Ongoing.     S: Medical record reviewed. Events noted.  Cytology shows abnormal B-cell peripheral proliferation with some features consistent with small lymphocytic lymphoma/chronic lymphocytic leukemia, though FISH studies are pending.  Oncology plans for bone marrow biopsy.  Anticipating initiation of ibrutinib.  Allopurinol initiated.  Plan for outpatient staging PET scan and outpatient diagnostic mammography to evaluate left breast nodule.  Radiation oncology plans for  palliative radiation treatments 5-10 treatment fractions starting Monday 7/8, simulated 7/2.  Neurosurgery recommends PT OT with brace and outpatient follow-up in 2 weeks.  Received 2 doses of Flexeril and 6 doses of Norco (40 mg oral morphine equivalent) over the last 24 hours.  Currently sitting up in chair, pain not well-controlled 10/10.  Describes pain as sharp and constant to lower back/sacral area, below surgical incision/dressing.  States radiculopathy to right leg.  Last received a dose of Norco approximately 2 hours ago.  Nursing to provide as needed Flexeril.  Declines trial of gabapentin secondary to side effects of increased dizziness most recently.  Additional orders placed.  Advance Care Planning explored diagnostic findings from cytology.\"When I came in I wasn't expecting " "cancer\".  Verbalizes understanding treatment not curative in nature.  Discussed plans for palliative radiation for pain management and expectations.  Explored in the scenario of treatment intolerance and/or no desire for further aggressive interventions or hospitalizations best supportive care directed by hospice.  Explored discharge options, anticipating home with home health and PT/OT.  Declined SNF and reports will have adequate family caregivers.      Review of Systems   Constitutional: Positive for malaise/fatigue.   Respiratory:  Negative for shortness of breath.    Musculoskeletal:  Positive for muscle weakness and back pain.   Genitourinary:  Negative for dysuria.   Neurological:  Positive for loss of balance and weakness.   Psychiatric/Behavioral:  The patient is not nervous/anxious    Pain Assessment  Preferred Pain Scale: number (Numeric Rating Pain Scale)  Nonverbal Indicators of Pain: nonverbal indicators absent  CPOT Facial Expression: 0-->relaxed, neutral  CPOT Body Movements: 0-->absence of movements  CPOT Muscle Tension: 0-->relaxed  Ventilator Compliance/Vocalization: 0-->talking in normal tone or no sound  CPOT Score: 0  Pain Location: back  Pain Description: spasm, constant, aching    O:     Intake/Output Summary (Last 24 hours) at 7/3/2024 1127  Last data filed at 7/3/2024 0900  Gross per 24 hour   Intake 720 ml   Output 1700 ml   Net -980 ml       Diagnostics and current medications: Reviewed.      Current Facility-Administered Medications:     acetaminophen (TYLENOL) tablet 650 mg, 650 mg, Oral, Q6H PRN, Marcellus Pulido MD, 650 mg at 06/29/24 0544    albuterol (PROVENTIL) nebulizer solution 0.083% 2.5 mg/3mL, 2.5 mg, Nebulization, Q4H PRN, Marcellus Pulido MD, 2.5 mg at 06/28/24 1040    allopurinol (ZYLOPRIM) tablet 300 mg, 300 mg, Oral, Daily, Nagi Maya MD, 300 mg at 07/03/24 0836    amLODIPine (NORVASC) tablet 5 mg, 5 mg, Oral, BID, Marcellus Pulido MD, 5 mg " at 07/03/24 0836    sennosides-docusate (PERICOLACE) 8.6-50 MG per tablet 2 tablet, 2 tablet, Oral, BID PRN, 2 tablet at 07/01/24 2045 **AND** polyethylene glycol (MIRALAX) packet 17 g, 17 g, Oral, Daily PRN, 17 g at 07/03/24 0836 **AND** bisacodyl (DULCOLAX) EC tablet 5 mg, 5 mg, Oral, Daily PRN **AND** bisacodyl (DULCOLAX) suppository 10 mg, 10 mg, Rectal, Daily PRN, Marcellus Pulido MD    buPROPion XL (WELLBUTRIN XL) 24 hr tablet 150 mg, 150 mg, Oral, Daily, Marcellus Pulido MD, 150 mg at 07/03/24 0836    Calcium Replacement - Follow Nurse / BPA Driven Protocol, , Does not apply, PRN, Marcellus Pulido MD    carvedilol (COREG) tablet 12.5 mg, 12.5 mg, Oral, BID With Meals, Alysia Lincoln MD    cetirizine (zyrTEC) tablet 10 mg, 10 mg, Oral, Daily, Marcellus Pulido MD, 10 mg at 07/03/24 0836    cloNIDine (CATAPRES) tablet 0.1 mg, 0.1 mg, Oral, BID, Marcellus Pulido MD, 0.1 mg at 07/03/24 0836    cyclobenzaprine (FLEXERIL) tablet 10 mg, 10 mg, Oral, TID PRN, Marcellus Pulido MD, 10 mg at 07/03/24 0232    Diclofenac Sodium (VOLTAREN) 1 % gel 4 g, 4 g, Topical, 4x Daily PRN, Marcellus Pulido MD    fluticasone (FLONASE) 50 MCG/ACT nasal spray 2 spray, 2 spray, Nasal, Daily, Marcellus Pulido MD, 2 spray at 07/02/24 0837    heparin (porcine) 5000 UNIT/ML injection 5,000 Units, 5,000 Units, Subcutaneous, Q12H, Marcellus Pulido MD, 5,000 Units at 07/03/24 0837    hydrALAZINE (APRESOLINE) injection 10 mg, 10 mg, Intravenous, Q4H PRN, Marcellus Pulido MD    HYDROcodone-acetaminophen (NORCO) 5-325 MG per tablet 1 tablet, 1 tablet, Oral, Q4H PRN, Marcellus Pulido MD, 1 tablet at 07/03/24 0640    HYDROcodone-acetaminophen (NORCO) 7.5-325 MG per tablet 1 tablet, 1 tablet, Oral, Q4H PRN, Marcellus Pulido MD, 1 tablet at 07/03/24 1053    Magnesium Low Dose Replacement - Follow Nurse / BPA Driven Protocol, , Does not apply, PRN, Marcellus Pulido  MD Oscar    methylnaltrexone (RELISTOR) injection 6 mg, 6 mg, Subcutaneous, Every Other Day, Marc Quevedo, APRN, 6 mg at 07/02/24 1125    montelukast (SINGULAIR) tablet 10 mg, 10 mg, Oral, Nightly, Marcellus Pulido MD, 10 mg at 07/02/24 2048    multivitamin with minerals 1 tablet, 1 tablet, Oral, Daily, Marcellus Pulido MD, 1 tablet at 07/03/24 0836    ondansetron (ZOFRAN) injection 4 mg, 4 mg, Intravenous, Q6H PRN, Marcellus Pulido MD    pantoprazole (PROTONIX) EC tablet 40 mg, 40 mg, Oral, Daily, Marcellus Pulido MD, 40 mg at 07/03/24 0836    Pharmacy Consult, , Does not apply, Continuous PRN, Nagi Maya MD    Phosphorus Replacement - Follow Nurse / BPA Driven Protocol, , Does not apply, PRN, Marcellus Pulido MD    Potassium Replacement - Follow Nurse / BPA Driven Protocol, , Does not apply, PRN, Marcellus Pulido MD    rOPINIRole (REQUIP) tablet 0.5 mg, 0.5 mg, Oral, Nightly, Marcellus Pulido MD, 0.5 mg at 07/02/24 2048    rosuvastatin (CRESTOR) tablet 10 mg, 10 mg, Oral, Nightly, Marcellus Pulido MD, 10 mg at 07/02/24 2048    sertraline (ZOLOFT) tablet 100 mg, 100 mg, Oral, Daily, Marcellus Pulido MD, 100 mg at 07/03/24 0836    [COMPLETED] Insert Peripheral IV, , , Once **AND** sodium chloride 0.9 % flush 10 mL, 10 mL, Intravenous, PRN, Marcellus Pulido MD    sodium chloride 0.9 % flush 10 mL, 10 mL, Intravenous, Q12H, Marcellus Pulido MD, 10 mL at 06/30/24 2106    sodium chloride 0.9 % flush 10 mL, 10 mL, Intravenous, PRN, Marcellus Pulido MD    sodium chloride 0.9 % flush 10 mL, 10 mL, Intravenous, Q12H, Marcellus Pulido MD, 10 mL at 07/01/24 1006    sodium chloride 0.9 % flush 10 mL, 10 mL, Intravenous, PRN, Marcellus Pulido MD    sodium chloride 0.9 % infusion 40 mL, 40 mL, Intravenous, PRN, Marcellus Pulido MD    sodium chloride 0.9 % infusion 40 mL, 40 mL, Intravenous, PRN, Marcellus Pulido  "MD Oscar    Allergies   Allergen Reactions    Aspirin GI Bleeding    Niacin Other (See Comments)     I have utilized all available immediate resources to obtain, update, or review the patient's current medications (including all prescriptions, over-the-counter products, herbals, cannabis/cannabidiol products, and vitamin/mineral/dietary (nutritional) supplements) for name, route of administration, type, dose and frequency.    A:    /72 (BP Location: Right arm, Patient Position: Lying)   Pulse 94   Temp 97.2 °F (36.2 °C) (Oral)   Resp 16   Ht 172.7 cm (68\")   Wt 111 kg (244 lb)   LMP  (LMP Unknown)   SpO2 95%   BMI 37.10 kg/m²     Vitals and nursing note reviewed.   Constitutional:       Appearance: Not in distress.   Eyes:      General: Lids are normal.      Pupils: Pupils are equal, round, and reactive to light.   HENT:      Head: Normocephalic.   Pulmonary:      Effort: Pulmonary effort is normal.   Cardiovascular:      Normal rate.   Edema:     Peripheral edema present.  Abdominal:      Palpations: Abdomen is soft.   Musculoskeletal: Normal range of motion.      Cervical back: Neck supple.   Skin:     General: Skin is warm.   Genitourinary:     Comments: No reported dysuria  Neurological:      Mental Status: Alert   Psychiatric:         Mood and Affect: Mood normal.         Cognition and Memory: Cognition normal.      Patient status: Disease state: Controlled with current treatments.  Current Functional status: Palliative Performance Scale Score: Performance 40% based on the following measures: Ambulation: Mainly in bed, Activity and Evidence of Disease: Unable to do any work, extensive evidence of disease, Self-Care: Mainly assistance required,  Intake: Normal or reduced, LOC: Full, drowsy or confusion   Baseline Functional status: Palliative Performance Scale Score: Performance 70% based on the following measures: Ambulation: Reduced, Activity and Evidence of Disease: Unable to do normal work, some " evidence of disease, Self-Care: Fully independent,  Intake: Normal or reduced, LOC: Full   Baseline ECOG Status(3) Capable of limited self-care, confined to bed or chair > 50% of waking hours.  Nutritional status: Albumin 3.9 Body mass index is 37.1 kg/m².      Hospital Problem List      Left renal mass    Cauda equina compression    Metastatic cancer to spine     Impression/Problem List:     Small lymphocytic lymphoma/chronic lymphocytic leukemia, stage IV  Cord compression at L3 s/p laminectomy and decompression 6/29/2024  Left renal mass, right adrenal mass, and abdominal soft tissue nodules concerning for metastatic disease  Cerebral microvascular disease, per MRI  History of breast cancer, stage I  Left breast nodule 9 mm  Anemia  Thrombocytopenia  Acute kidney injury on chronic kidney disease stage III  Hyperlipidemia  Hypertension     Recommendations/Plan:  1. plan: Goals of care include status no CPR/limited support interventions.     Family support: The patient receives support from her children and friend, Jose Enrique ..  Advance Directives: Advance Directive Status: Patient does not have advance directive   POA/Healthcare surrogate-Advance directive completed 7/1 with assistance from palliative  and , patient named her friend Jose Enrique followed by 2 children, Kailee and Marques as healthcare surrogates..     2.  Palliative care encounter  - Prognosis is guarded long-term secondary to suspected metastatic disease and comorbidities..  -Patient and HCS appears to have good prognostic awareness.      -Neurosurgery and nephrology consulted.    6/29-Underwent lumbar laminectomy L3 with decompression of the spinal cord and biopsy by Dr. 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    -Oncology consulted, recommend further staging diagnostics.  Anticipating biopsy of abdominal subcutaneous nodules.  MRI brain  shows no definite evidence of metastatic disease.    -Radiation oncology consulted for postop radiation.   -Continues to work with therapy, LSO when out of bed per neurosurgery recommendation.    7/1-Advance directive completed today with assistance from palliative  and , patient named her friend Jose Enrique followed by 2 children Kailee and Marques as healthcare surrogates.  7/3-Cytology shows abnormal B-cell peripheral proliferation with some features consistent with small lymphocytic lymphoma/chronic lymphocytic leukemia, though FISH studies are pending.    -Oncology plans for bone marrow biopsy.  Anticipating initiation of ibrutinib.  Allopurinol initiated.  Plan for outpatient staging PET scan.  Plan outpatient diagnostic mammography to evaluate left breast nodule  -Radiation oncology plans for palliative radiation treatments 5-10 treatment fractions starting Monday 7/8, simulated 7/2.    -Neurosurgery recommends PT OT with brace and outpatient follow-up in 2 weeks.       7/1-CODE STATUS changes no CPR/limited support interventions, no intubation and no artificial nutrition.    -Will plan to complete a MOST document prior to discharge with assistance from palliative     7/3-continue supportive measures  -Radiation oncology plans for palliative radiation treatments 5-10 treatment fractions starting Monday 7/8, simulated 7/2.    -Anticipating bone marrow biopsy, and initiation of ibrutinib and outpatient PET scan per oncology and outpatient diagnostic mammography to evaluate left breast nodule  -Neurosurgery follow-up outpatient in 2 weeks  -A MOST document has been reviewed and initiated, attending to complete  -Anticipating home with home health and PT/OT.  Declined SNF and reports will have adequate family caregivers.    3.  Pain, cancer related  -Not well-controlled  -Increase Norco dosing from 7.5 to 10 mg every 4 hours as needed  -Add lidocaine patch  -Utilize Voltaren gel  as needed  -Plan for palliative radiation to start Monday  -Declined gabapentin trial at lower dose (100 mg every 8 hours), states side effects most recently with dizziness (300 mg per pain management).  Most likely exacerbated by kidney dysfunction.  -A MOST document has been reviewed and initiated, attending to complete    18 minutes spent on advance care planning-discussing with patient and/or family, answering questions, providing some guidance about a plan and documentation of care, and coordinating care face to face.     Thank you for allowing us to participate in patient's plan of care. Palliative Care Team will continue to follow patient.   No palliative services 7/4, limited services 7/5-please contact office if needs arise.    Lauren Kuo, MARY  7/3/2024  11:27 CDT

## 2024-07-03 NOTE — PROGRESS NOTES
Nephrology ([facility]) progress Note      Patient:  Marina Joe  YOB: 1945  Date of Service: 7/3/2024  MRN: [id number]   Acct: [id number]   Primary Care Physician: Hanh Og APRN  Advance Directive:   Code Status and Medical Interventions:   Ordered at: 07/01/24 1547     Medical Intervention Limits:    No intubation (DNI)    No artificial nutrition     Level Of Support Discussed With:    Patient     Code Status (Patient has no pulse and is not breathing):    No CPR (Do Not Attempt to Resuscitate)     Medical Interventions (Patient has pulse or is breathing):    Limited Support     Admit Date: 6/28/2024       Hospital Day: 5  Referring Provider: No ref. provider found      Patient personally seen and examined.  Complete chart including Consults, Notes, Operative Reports, Labs, Cardiology, and Radiology studies reviewed as able.        Subjective:  Marina Joe is a 79 y.o. female for whom we were consulted for evaluation and treatment of acute kidney injury.  She has history of chronic kidney disease stage IIIa, breast cancer, hypertension.  She presented for evaluation of back pain and had tried the gabapentin.  She reported no NSAID usage.  She denied current chest pain, shortness of air at rest, nausea or vomiting.  MRI spine demonstrated stenosis with likely malignancy.  Neurosurgery was evaluating.  She denied appetite changes, dysuria or hematuria.    Today, no overnight events.  Denied other complaints on questioning.  Tolerating diet.  Has adequate urine output.      Allergies:  Aspirin and Niacin    Home Meds:  Medications Prior to Admission   Medication Sig Dispense Refill Last Dose    amLODIPine (NORVASC) 5 MG tablet Take 1 tablet by mouth 2 (Two) Times a Day.       buPROPion XL (WELLBUTRIN XL) 150 MG 24 hr tablet Take 1 tablet by mouth Daily.       Calcium Carb-Cholecalciferol (CALCIUM 600+D3 PO) Take 1 tablet by mouth Daily.       carvedilol (COREG)  6.25 MG tablet Take 1 tablet by mouth 2 (Two) Times a Day With Meals.       dapagliflozin Propanediol (Farxiga) 10 MG tablet Take 10 mg by mouth Daily.       famotidine (PEPCID) 20 MG tablet Take 1 tablet by mouth 2 (Two) Times a Day Before Meals. 60 tablet 0     fluticasone (FLONASE) 50 MCG/ACT nasal spray 2 sprays into the nostril(s) as directed by provider Daily. 1 g 3     irbesartan-hydrochlorothiazide (AVALIDE) 300-12.5 MG tablet Take 1 tablet by mouth Daily.       montelukast (SINGULAIR) 10 MG tablet Take 1 tablet by mouth Every Night.       Multiple Vitamins-Minerals (MULTIVITAMIN ADULT) tablet Take 1 tablet by mouth Daily.       pantoprazole (PROTONIX) 40 MG EC tablet Take 1 tablet by mouth Daily.       rOPINIRole (REQUIP) 0.5 MG tablet Take 1 tablet by mouth Every Night. Take 1 hour before bedtime.       rosuvastatin (CRESTOR) 20 MG tablet Take 1 tablet by mouth Daily.       valACYclovir (VALTREX) 500 MG tablet Take 1 tablet by mouth 2 (Two) Times a Day.       albuterol sulfate  (90 Base) MCG/ACT inhaler Inhale 2 puffs Every 4 (Four) Hours As Needed for Wheezing. 2 g 3     cetirizine (zyrTEC) 10 MG tablet Take 1 tablet by mouth Daily.       cloNIDine (CATAPRES) 0.1 MG tablet Take 1 tablet by mouth 2 (Two) Times a Day.       cyclobenzaprine (FLEXERIL) 10 MG tablet Take 1 tablet by mouth 3 (Three) Times a Day As Needed for Muscle Spasms.       Diclofenac Sodium (VOLTAREN) 1 % gel gel Apply 4 g topically to the appropriate area as directed 4 (Four) Times a Day As Needed (arthralgia). 240 g 3     Ergocalciferol (VITAMIN D2 PO) Take 50,000 Units by mouth Every 30 (Thirty) Days.       naproxen sodium (ALEVE) 220 MG tablet Take 1 tablet by mouth 2 (Two) Times a Day As Needed.       sertraline (ZOLOFT) 100 MG tablet Take 1 tablet by mouth Daily.          Medicines:  Current Facility-Administered Medications   Medication Dose Route Frequency Provider Last Rate Last Admin    acetaminophen (TYLENOL) tablet 650  mg  650 mg Oral Q6H PRN Marcellus Pulido MD   650 mg at 06/29/24 0544    albuterol (PROVENTIL) nebulizer solution 0.083% 2.5 mg/3mL  2.5 mg Nebulization Q4H PRN Marcellus Pulido MD   2.5 mg at 06/28/24 1040    allopurinol (ZYLOPRIM) tablet 300 mg  300 mg Oral Daily Nagi Maya MD   300 mg at 07/03/24 0836    amLODIPine (NORVASC) tablet 5 mg  5 mg Oral BID Marcellus Pulido MD   5 mg at 07/03/24 0836    sennosides-docusate (PERICOLACE) 8.6-50 MG per tablet 2 tablet  2 tablet Oral BID PRN Marcellus Pulido MD   2 tablet at 07/01/24 2045    And    polyethylene glycol (MIRALAX) packet 17 g  17 g Oral Daily PRN Marcellus Pulido MD   17 g at 07/03/24 0836    And    bisacodyl (DULCOLAX) EC tablet 5 mg  5 mg Oral Daily PRN Marcellus Pulido MD        And    bisacodyl (DULCOLAX) suppository 10 mg  10 mg Rectal Daily PRN Marcellus Pulido MD        buPROPion XL (WELLBUTRIN XL) 24 hr tablet 150 mg  150 mg Oral Daily Marcellus Pulido MD   150 mg at 07/03/24 0836    Calcium Replacement - Follow Nurse / BPA Driven Protocol   Does not apply PRN Marcellus Pulido MD        carvedilol (COREG) tablet 12.5 mg  12.5 mg Oral BID With Meals Alysia Lincoln MD        cetirizine (zyrTEC) tablet 10 mg  10 mg Oral Daily Marcellus Pulido MD   10 mg at 07/03/24 0836    cloNIDine (CATAPRES) tablet 0.1 mg  0.1 mg Oral BID Marcellus Pulido MD   0.1 mg at 07/03/24 0836    cyclobenzaprine (FLEXERIL) tablet 10 mg  10 mg Oral TID PRN Marcellus Pulido MD   10 mg at 07/03/24 1245    Diclofenac Sodium (VOLTAREN) 1 % gel 4 g  4 g Topical 4x Daily PRN Marcellus Pulido MD        fluticasone (FLONASE) 50 MCG/ACT nasal spray 2 spray  2 spray Nasal Daily Marcellus Pulido MD   2 spray at 07/02/24 0837    heparin (porcine) 5000 UNIT/ML injection 5,000 Units  5,000 Units Subcutaneous Q12H Marcellus Pulido MD   5,000 Units at 07/03/24 0837    hydrALAZINE  (APRESOLINE) injection 10 mg  10 mg Intravenous Q4H PRN Marcellus Pulido MD        HYDROcodone-acetaminophen (NORCO)  MG per tablet 1 tablet  1 tablet Oral Q4H PRN Lauren Kuo APRN        HYDROcodone-acetaminophen (NORCO) 5-325 MG per tablet 1 tablet  1 tablet Oral Q4H PRN Marcellus Pulido MD   1 tablet at 07/03/24 0640    lactulose solution 30 g  30 g Oral TID Alysia Lincoln MD        Lidocaine 4 % 1 patch  1 patch Transdermal Q24H Lauren Kuo APRN   1 patch at 07/03/24 1415    Magnesium Low Dose Replacement - Follow Nurse / BPA Driven Protocol   Does not apply PRN Marcellus Pulido MD        methylnaltrexone (RELISTOR) injection 6 mg  6 mg Subcutaneous Every Other Day Marc Quevedo APRN   6 mg at 07/02/24 1125    montelukast (SINGULAIR) tablet 10 mg  10 mg Oral Nightly Marcellus Pulido MD   10 mg at 07/02/24 2048    multivitamin with minerals 1 tablet  1 tablet Oral Daily Marcellus Pulido MD   1 tablet at 07/03/24 0836    ondansetron (ZOFRAN) injection 4 mg  4 mg Intravenous Q6H PRN Marcellus Pulido MD        pantoprazole (PROTONIX) EC tablet 40 mg  40 mg Oral Daily Marcellus Pulido MD   40 mg at 07/03/24 0836    Pharmacy Consult   Does not apply Continuous PRN Nagi Maya MD        Phosphorus Replacement - Follow Nurse / BPA Driven Protocol   Does not apply PRN Marcellus Pulido MD        Potassium Replacement - Follow Nurse / BPA Driven Protocol   Does not apply PRN Marcellus Pulido MD        rOPINIRole (REQUIP) tablet 0.5 mg  0.5 mg Oral Nightly Marcellus Pulido MD   0.5 mg at 07/02/24 2048    rosuvastatin (CRESTOR) tablet 10 mg  10 mg Oral Nightly Marcellus Pulido MD   10 mg at 07/02/24 2048    sertraline (ZOLOFT) tablet 100 mg  100 mg Oral Daily Marcellus Pulido MD   100 mg at 07/03/24 0836    sodium chloride 0.9 % flush 10 mL  10 mL Intravenous PRN Marcellus Pulido MD        sodium  chloride 0.9 % flush 10 mL  10 mL Intravenous Q12H Marcellus Pulido MD   10 mL at 06/30/24 2106    sodium chloride 0.9 % flush 10 mL  10 mL Intravenous PRN Marcellus Pulido MD        sodium chloride 0.9 % flush 10 mL  10 mL Intravenous Q12H Marcellus Pulido MD   10 mL at 07/01/24 1006    sodium chloride 0.9 % flush 10 mL  10 mL Intravenous PRN Marcellus Pulido MD        sodium chloride 0.9 % infusion 40 mL  40 mL Intravenous PRN Marcellus Pulido MD        sodium chloride 0.9 % infusion 40 mL  40 mL Intravenous PRN Marcellus Pulido MD           Past Medical History:  Past Medical History:   Diagnosis Date    Breast cancer     CKD (chronic kidney disease)     Hypercholesteremia     Hypertension     Sinusitis     Stage 3a chronic kidney disease 10/20/2020       Past Surgical History:  Past Surgical History:   Procedure Laterality Date    AVULSION TOENAIL PLATE      Sept 26,2018    BREAST BIOPSY Left 11/20/2012    BREAST BIOPSY      Left Breast, 1/2019 per Dr Barkley    BREAST LUMPECTOMY Left     with node bx     COLONOSCOPY  09/13/2013    small polyp at 30cm benign hyperplastic polyp, changes consistent with melanosis coli. Recall 5 years    COLONOSCOPY  11/19/2018    Tics otherwise normal exam repeat in 5 years    COLONOSCOPY  02/13/2023    Normal exam repeat in 3 years with a 2 day prep    ENDOSCOPY  02/13/2023    Gastritis, small HH    LUMBAR LAMINECTOMY Right 6/29/2024    Procedure: LUMBAR LAMINECTOMY L3 WITH DECOMPRESSION 1-2 LEVELS-RIGHT;  Surgeon: Marcellus Pulido MD;  Location: [facility] OR;  Service: Neurosurgery;  Laterality: Right;    REPLACEMENT TOTAL KNEE Right     2016    TOTAL ABDOMINAL HYSTERECTOMY WITH SALPINGO OOPHORECTOMY      UPPER ENDOSCOPIC ULTRASOUND W/ FNA  12/20/2023    Pancreatic cyst s/p FNA       Family History  Family History   Problem Relation Age of Onset    Heart attack Mother     Hodgkin's lymphoma Father     Other Father     Cancer Father          hodgkins    Heart attack Brother     Skin cancer Maternal Uncle     Pancreatic cancer Maternal Uncle     Cancer Maternal Uncle         eye    Colon cancer Neg Hx     Colon polyps Neg Hx        Social History  Social History     Socioeconomic History    Marital status:    Tobacco Use    Smoking status: Never    Smokeless tobacco: Never   Vaping Use    Vaping status: Never Used   Substance and Sexual Activity    Alcohol use: No    Drug use: Not Currently    Sexual activity: Not Currently     Birth control/protection: Surgical         Review of Systems:  History obtained from chart review and the patient  General ROS: No fever or chills  Respiratory ROS: No cough, shortness of breath, wheezing  Cardiovascular ROS: No chest pain or palpitations  Gastrointestinal ROS: No abdominal pain or melena  Genito-Urinary ROS: No dysuria or hematuria  Psych ROS: No anxiety and depression  14 point ROS reviewed with the patient and negative except as noted above and in the HPI unless unable to obtain.      Objective:  Patient Vitals for the past 24 hrs:   BP Temp Temp src Pulse Resp SpO2   07/03/24 1502 145/67 97.5 °F (36.4 °C) Axillary 76 16 94 %   07/03/24 1115 155/79 97.1 °F (36.2 °C) Oral 76 16 93 %   07/03/24 0745 170/72 97.2 °F (36.2 °C) Oral 94 16 95 %   07/03/24 0404 166/80 98.4 °F (36.9 °C) Oral 70 16 95 %   07/02/24 2343 152/61 98.4 °F (36.9 °C) Oral 78 16 96 %   07/02/24 1947 153/73 97.7 °F (36.5 °C) Oral 84 16 97 %   07/02/24 1532 146/66 97.5 °F (36.4 °C) Oral 72 18 94 %       Intake/Output Summary (Last 24 hours) at 7/3/2024 1515  Last data filed at 7/3/2024 1421  Gross per 24 hour   Intake 730 ml   Output 2600 ml   Net -1870 ml     General: awake/alert   Chest:  clear to auscultation bilaterally without respiratory distress  CVS: regular rate and rhythm  Abdominal: soft, nontender, positive bowel sounds  Extremities: no cyanosis or edema  Skin: warm and dry without rash      Labs:  Results from last 7  days   Lab Units 07/03/24  0440 07/02/24  1823 07/02/24  0829   WBC 10*3/mm3 7.21 8.63 8.27   HEMOGLOBIN g/dL 8.2* 7.9* 8.3*   HEMATOCRIT % 26.3* 25.3* 26.1*   PLATELETS 10*3/mm3 106* 110* 75*         Results from last 7 days   Lab Units 07/03/24  0440 07/02/24  1414 07/02/24  0414 07/01/24  0432 06/30/24  0434 06/28/24  1153 06/28/24  0617   SODIUM mmol/L 137  --  138 137 137   < > 135*   POTASSIUM mmol/L 4.2 4.3 3.6 3.7 4.0   < > 3.9   CHLORIDE mmol/L 103  --  103 102 101   < > 95*   CO2 mmol/L 25.0  --  24.0 24.0 24.0   < > 26.0   BUN mg/dL 27*  --  30* 37* 34*   < > 32*   CREATININE mg/dL 1.47*  --  1.67* 2.18* 2.39*   < > 2.59*   CALCIUM mg/dL 8.9  --  8.9 8.5* 8.4*   < > 10.0   EGFR mL/min/1.73 36.4*  --  31.2* 22.7* 20.3*   < > 18.4*   BILIRUBIN mg/dL  --   --   --   --  <0.2  --  0.3   ALK PHOS U/L  --   --   --   --  78  --  85   ALT (SGPT) U/L  --   --   --   --  21  --  18   AST (SGOT) U/L  --   --   --   --  35*  --  22   GLUCOSE mg/dL 93  --  107* 98 120*   < > 99    < > = values in this interval not displayed.       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 Patient reports  history of breast cancer in 2013 status post surgery and radiation  therapy.        COMPARISON: 6/28/2024.     TECHNIQUE:  Representative images and report stored to PACS per  institutional protocol and state regulations.     PROCEDURE:  Preprocedure imaging performed demonstrating multiple peripherally  echogenic, centrally hypoechoic nodules. These demonstrated internal  vascularity. A superficial nodule RIGHT flank nodule was selected and  patient positioned with consideration for her comfort.     Risks, benefits and alternatives to the procedure were discussed with  the patient. Specifically, risks of bleeding, infection, allergy to  medicine, and non-diagnostic biopsy results were discussed with the  patient. Verbal and written informed consent was obtained.     A timeout was performed including the patient's name, date of birth,  biopsy site and laterality.     Using ultrasound, a site for biopsy of RIGHT flank subcutaneous nodule  was selected, marked and prepped in the usual sterile fashion. 1 percent   lidocaine was used for local anesthesia.     Using ultrasound guidance, RIGHT flank subcutaneous nodule biopsy was  performed using 18 gauge Bard core needle biopsy device. 5 passes were  made. One core was used for touch prep. 2 cores were placed in formalin  and 2 cores were placed in RPMI.     Cytology deemed sample adequate. Biopsy samples were taken by cytology.      The patient tolerated the procedure well. No evidence of complication.     The patient returned to the floor in stable condition and agrees to  follow up with referring clinician for biopsy results.           Impression:      1. Successful ultrasound guided core needle biopsy of RIGHT flank  subcutaneous nodule.        This report was signed and finalized on 7/1/2024 2:36 PM by Dr Sonam Quach MD.       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 Patient reports  history of breast cancer in 2013 status post surgery and radiation  therapy.        COMPARISON: 6/28/2024.     TECHNIQUE: Representative images and report stored to PACS per  institutional protocol and state regulations.     PROCEDURE:  Preprocedure imaging performed demonstrating multiple peripherally  echogenic, centrally hypoechoic nodules. These demonstrated internal  vascularity. A superficial nodule RIGHT flank nodule was selected and  patient positioned with consideration for her comfort.     Risks, benefits and alternatives to the procedure were discussed with  the patient. Specifically, risks of bleeding, infection, allergy to  medicine, and non-diagnostic biopsy results were discussed with the  patient. Verbal and written informed consent was obtained.     A timeout was performed including the patient's name, date of birth,  biopsy site and laterality.     Using ultrasound, a site for biopsy of RIGHT flank subcutaneous nodule  was selected, marked and prepped in the usual sterile fashion. 1 percent   lidocaine was used for local anesthesia.     Using ultrasound guidance, RIGHT flank subcutaneous nodule biopsy was  performed using 18 gauge Bard core needle biopsy device. 5 passes were  made. One core was used for touch prep. 2 cores were placed in formalin  and 2 cores were placed in RPMI.     Cytology deemed sample adequate. Biopsy samples were taken by cytology.      The patient tolerated the procedure well. No evidence of complication.     The patient returned to the floor in stable condition and agrees to  follow up with referring clinician for biopsy results.            Impression:      1. Successful ultrasound guided core needle biopsy of RIGHT flank  subcutaneous nodule.        This report was signed and finalized on 7/1/2024 2:36 PM by Dr Sonam Quach MD.       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 No contrast was administered.     FINDINGS:  No restricted diffusion. No mass effect or midline shift. It should be  noted that no intravenous contrast was administered, limiting evaluation  for intracranial metastatic disease. Within the limitations of this  exam, I don't see any definite evidence of intracranial metastatic  disease. Increased FLAIR signal intensity scattered throughout the  subcortical and periventricular white matter. Basilar cisterns are  preserved. Grey and white matter demonstrates normal MR signal. No  abnormal extra axial fluid collections are noted. No definite mass  effect or midline shift identified.     Proximal cervical spinal cord, brainstem, and cerebellum are  unremarkable. Normal cerebrovascular flow voids are seen. Bilateral  globes and orbits are normal in appearance.     Opacification of the LEFT sphenoid sinus with high T1 signal which may  represent some proteinaceous fluid.          Impression:         1.  No definite evidence of intracranial metastatic disease within the  limitations of this  "noncontrast examination.     2.  Fairly extensive periventricular white matter signal abnormality,  likely related to chronic microvascular ischemic change.     3.  Opacification of the LEFT sphenoid sinus with what is likely  proteinaceous fluid.                                                       This report was signed and finalized on 7/1/2024 9:53 AM by Dr. Armando Calixto MD.               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    Jean Alexander MD  7/3/2024  15:15 CDT    "

## 2024-07-03 NOTE — PROGRESS NOTES
Neurosurgery Daily Progress Note    HPI:  Marina Joe is a 79 y.o. female with significant medical comorbidities to include breast cancer, CKD, hypertension, hyperlipidemia, and obesity.  She presents with a new problem of lumbar back and bilateral nondermatomal lower extremity radicular pain and dysesthesias. Physical exam findings of lower extremity hyporeflexia and right leg weakness with saddle anesthesia.  Their imaging shows an enlarged left kidney and right adrenal mass concerning for metastatic disease.  CT of the lumbar spine shows multilevel degenerative changes to include an anteriolisthesis of L3 over L4 and spondylosis at L5-S1. MRI with cord compression at L3 concerning for epidural metastasis     Assessment:   Past Medical History:   Diagnosis Date    Breast cancer     CKD (chronic kidney disease)     Hypercholesteremia     Hypertension     Sinusitis     Stage 3a chronic kidney disease 10/20/2020     Active Hospital Problems    Diagnosis     **Left renal mass     Cauda equina compression     Metastatic cancer to spine      Plan:   Neuro: Stable.  Continues to complain of back and nondermatomal bilateral leg pain.  No significant focal weakness today.  Endorses intermittent numbness and tingling to the right lower extremity.  Ambulating with walker with PT.     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      4 Days Post-Op (6/29/2024) L3 laminectomy and biopsy of epidural mass  Surgical pathology:  Abnormal B-cell proliferation with some features consistent with small lymphocytic lymphoma/chronic  "lymphocytic leukemia.   LSO when out of bed  Continue neuroexam per policy.  Call for neurologic decline  CV: LASHANDA  Pulm: LASHANDA  GI: No BM TD with senna, MiraLAX, and Relistor.  Add fleets enema  : Voiding spontaneously.  Bladder scans and I/O catheter policy.  FEN: Tolerating regular diet.  Endocrine: LASHANDA  Heme:  Thrombocytopenia.  PLT post 2 units, 106.  Goal > 100-150K postop  Dispo: Max PT/OT with brace   NSG will sign off.   Follow-up with neurosurgery on an outpatient basis in 2 weeks.    Chief complaint:   Back pain bilateral lower extremity numbness and tingling    HPI  Subjective  Stable right proximal leg weakness,    Temp:  [97.4 °F (36.3 °C)-99 °F (37.2 °C)] 98.4 °F (36.9 °C)  Heart Rate:  [66-84] 70  Resp:  [16-18] 16  BP: (125-166)/(51-94) 166/80    Output by Drain (mL) 07/02/24 0701 - 07/02/24 1900 07/02/24 1901 - 07/03/24 0700 07/03/24 0701 - 07/03/24 0804 Range Total   Requested LDAs do not have output data documented.     Objective:  Vital signs: (most recent): Blood pressure 166/80, pulse 70, temperature 98.4 °F (36.9 °C), temperature source Oral, resp. rate 16, height 172.7 cm (68\"), weight 111 kg (244 lb), SpO2 95%, not currently breastfeeding.        Neurologic Exam     Mental Status   Oriented to person, place, and time.   Speech: speech is normal   Level of consciousness: alert    Cranial Nerves     CN III, IV, VI   Pupils are equal, round, and reactive to light.  Extraocular motions are normal.     CN V   Facial sensation intact.     CN VII   Facial expression full, symmetric.     Motor Exam   Right arm pronator drift: absent  Left arm pronator drift: absent    Strength   Right deltoid: 5/5  Left deltoid: 5/5  Right biceps: 5/5  Left biceps: 5/5  Right triceps: 5/5  Left triceps: 5/5  Right iliopsoas: 5/5  Left iliopsoas: 5/5  Right quadriceps: 5/5  Left quadriceps: 5/5  Right anterior tibial: 5/5  Left anterior tibial: 5/5  Right gastroc: 5/5  Left gastroc: 5/5    Sensory Exam   Right arm " light touch: normal  Left arm light touch: normal  Right leg light touch: decreased from knee  Left leg light touch: normal  Sensory deficit distribution on right: L3    Lumbar incision(s): C, D, I    Drains: * No LDAs found *    Imaging Results (Last 24 Hours)       ** No results found for the last 24 hours. **          Lab Results (last 24 hours)       Procedure Component Value Units Date/Time    Basic Metabolic Panel [[id number]]  (Abnormal) Collected: 07/03/24 0440    Specimen: Blood Updated: 07/03/24 0607     Glucose 93 mg/dL      BUN 27 mg/dL      Creatinine 1.47 mg/dL      Sodium 137 mmol/L      Potassium 4.2 mmol/L      Chloride 103 mmol/L      CO2 25.0 mmol/L      Calcium 8.9 mg/dL      BUN/Creatinine Ratio 18.4     Anion Gap 9.0 mmol/L      eGFR 36.4 mL/min/1.73     Narrative:      GFR Normal >60  Chronic Kidney Disease <60  Kidney Failure <15    The GFR formula is only valid for adults with stable renal function between ages 18 and 70.    CBC & Differential [[id number]]  (Abnormal) Collected: 07/03/24 0440    Specimen: Blood Updated: 07/03/24 0546    Narrative:      The following orders were created for panel order CBC & Differential.  Procedure                               Abnormality         Status                     ---------                               -----------         ------                     CBC Auto Differential[[id number]]        Abnormal            Final result                 Please view results for these tests on the individual orders.    CBC Auto Differential [[id number]]  (Abnormal) Collected: 07/03/24 0440    Specimen: Blood Updated: 07/03/24 0546     WBC 7.21 10*3/mm3      RBC 2.92 10*6/mm3      Hemoglobin 8.2 g/dL      Hematocrit 26.3 %      MCV 90.1 fL      MCH 28.1 pg      MCHC 31.2 g/dL      RDW 16.5 %      RDW-SD 54.6 fl      MPV 13.0 fL      Platelets 106 10*3/mm3      Neutrophil % 69.2 %      Lymphocyte % 14.4 %      Monocyte % 10.5 %      Eosinophil % 4.7 %      Basophil % 0.6  %      Immature Grans % 0.6 %      Neutrophils, Absolute 4.99 10*3/mm3      Lymphocytes, Absolute 1.04 10*3/mm3      Monocytes, Absolute 0.76 10*3/mm3      Eosinophils, Absolute 0.34 10*3/mm3      Basophils, Absolute 0.04 10*3/mm3      Immature Grans, Absolute 0.04 10*3/mm3      nRBC 0.0 /100 WBC     CBC & Differential [[id number]]  (Abnormal) Collected: 07/02/24 1823    Specimen: Blood Updated: 07/02/24 1920    Narrative:      The following orders were created for panel order CBC & Differential.  Procedure                               Abnormality         Status                     ---------                               -----------         ------                     CBC Auto Differential[[id number]]        Abnormal            Final result                 Please view results for these tests on the individual orders.    CBC Auto Differential [[id number]]  (Abnormal) Collected: 07/02/24 1823    Specimen: Blood Updated: 07/02/24 1920     WBC 8.63 10*3/mm3      RBC 2.80 10*6/mm3      Hemoglobin 7.9 g/dL      Hematocrit 25.3 %      MCV 90.4 fL      MCH 28.2 pg      MCHC 31.2 g/dL      RDW 16.3 %      RDW-SD 54.2 fl      MPV 12.7 fL      Platelets 110 10*3/mm3      Neutrophil % 75.9 %      Lymphocyte % 10.8 %      Monocyte % 8.8 %      Eosinophil % 3.5 %      Basophil % 0.3 %      Immature Grans % 0.7 %      Neutrophils, Absolute 6.55 10*3/mm3      Lymphocytes, Absolute 0.93 10*3/mm3      Monocytes, Absolute 0.76 10*3/mm3      Eosinophils, Absolute 0.30 10*3/mm3      Basophils, Absolute 0.03 10*3/mm3      Immature Grans, Absolute 0.06 10*3/mm3      nRBC 0.0 /100 WBC     Fine Needle Aspiration [[id number]] Collected: 07/01/24 1402    Specimen: Aspirate from Flank, right Updated: 07/02/24 1643     Note to Patients --     This report may contain a detailed description of human tissue sent by a health care provider to the laboratory for pathologic evaluation. The content of this report is essential for diagnosis and may provide  important critical findings. This information may be unfamiliar to patients to review without a medical professional present. It is advised that the patient review this report in the presence of a health care provider who can answer questions and explain the results.       Case Report --     Medical Cytology Report                           Case: [id number]                                 Authorizing Provider:  Alysia Lincoln MD         Collected:           07/01/2024 02:02 PM          Ordering Location:     [address]  Received:            07/01/2024 02:30 PM          Pathologist:           Samir Hartley MD                                                      Specimen:    Flank, right, right flank subQ                                                              Final Diagnosis --     1.  Mass, right flank, ultrasound-guided core needle biopsy:  Abnormal B-cell population which may represent small lymphocytic lymphoma/chronic lymphocytic leukemia (SLL/CLL).  See comment.       Clinical Information --     Metastatic neoplasm to spine, left renal mass, right adrenal mass       Comment --     Please see additional report [id number] in the associated flow cytometry report.  The flow cytometry report showed scatter properties compatible with mixed cell size with features of B-cell small lymphocytic lymphoma/chronic lymphocytic leukemia (B-SLL/). FISH testing is ordered and pending.  This may represent progression/transformation.  Correlation with clinical, laboratory, and morphologic data may be necessary to better define this lymphoid neoplasm.       Gross Description --     1. Flank, right.  Received in a formalin filled container labeled with the patient's name, date of birth, and designated ultrasound biopsy right flank.  The specimen consists of 4 yellow-red, stringy core biopsies ranging from 0.4 cm to 0.8 cm in length by less than 0.1 cm in diameter.  The cores are wrapped in lens paper and  submitted in blocks 1A and 1B.    One smear fixed in 95% alcohol and one smear air dried. Flow Cytometry sent.       Intraoperative Consultation --     1. Flank, right.  US Guided Needle Biopsy, Right Flank:         Pass 1: Lymphocytes       Pass 2: Formalin       Pass 3: Flow       Pass 4: Formalin         EUNICE Escobar([facility])               07/01/2024       Microscopic Description --     Sections show small core biopsies consisting almost completely of small mature lymphocytes with rare scattered larger lymphocytes in the background.  Immunoblasts are not identified.  Normal lymph node architecture is not present.      Tissue Pathology Exam [[id number]] Collected: 06/29/24 1526    Specimen: Tissue from Spine, Lumbar Updated: 07/02/24 1636     Note to Patients --     This report may contain a detailed description of human tissue sent by a health care provider to the laboratory for pathologic evaluation. The content of this report is essential for diagnosis and may provide important critical findings. This information may be unfamiliar to patients to review without a medical professional present. It is advised that the patient review this report in the presence of a health care provider who can answer questions and explain the results.       Case Report --     Surgical Pathology Report                         Case: [id number]                                  Authorizing Provider:  Marcellus Pulido MD Collected:           06/29/2024 03:26 PM          Ordering Location:     [facility] OR  Received:            06/30/2024 03:01 PM          Pathologist:           Samir Hartley MD                                                      Specimen:    Spine, Lumbar, L3 TUMOR                                                                     Clinical Information --     FROZEN FOR PERMANENT       Final Diagnosis --     L3 epidural soft tissue mass, biopsy:  Abnormal B-cell proliferation with some  features consistent with small lymphocytic lymphoma/chronic lymphocytic leukemia.  See comment.         Intraoperative Consultation --       FROZEN SECTION DIAGNOSIS:   Tumor, L3, biopsies (FS 1A):  Small round blue cell tumor.  Material collected for flow cytometry.  Reported to Dr. Marcellus Pulido on 6-29-24 at  16:21 by Samir Hartley MD.       Gross Description --     1. Spine, Lumbar.  2 fresh for frozen section labeled with the patient name, date of birth, and designated L3 tumor.  The specimen consists of multiple fragments of yellow-tan soft tissue and bone aggregating to 0.8 x 0.5 x 0.3 cm.  Representative sections are submitted for frozen section.  Representative sections are submitted in transport media and sent for flow cytometry.  The frozen section remnant is wrapped in lens paper and submitted in block 1A.  The remaining tissue is wrapped in lens paper and submitted in block 1B.  Block 1B is submitted in Immunocal for decalcification.         Microscopic Description --     Sections show  multiple fragments of monotonous cells with increased N/C ratios, minimal cytoplasm, and dark granular chromatin.  Some areas show marked crush artifact.  Immunohistochemical stains for CD45 are strongly positive.  Immunohistochemical stains for CD56, synaptophysin, and TTF-1 are negative.       Flow Cytometry Summary --     Flow cytometry was performed at [facility] ([id number]).  Flow cytometry shows 64% of the sample as an abnormal monoclonal B-cell population with mixed cell size.  FISH testing is pending.  This may represent a B-cell small lymphocytic lymphoma/chronic lymphocytic leukemia.  The cell size of the abnormal cells appears mixed.  This may represent increased prolymphocytes, paraimmunoblasts, and or large cells and progression/transformation is in the differential diagnosis.  Intact lymph node architecture is not present in this fragmented paraspinal mass for further evaluation.  Clinical  correlation is recommended.          Potassium [[id number]]  (Normal) Collected: 07/02/24 1414    Specimen: Blood Updated: 07/02/24 1503     Potassium 4.3 mmol/L     CBC & Differential [[id number]]  (Abnormal) Collected: 07/02/24 0829    Specimen: Blood Updated: 07/02/24 0922    Narrative:      The following orders were created for panel order CBC & Differential.  Procedure                               Abnormality         Status                     ---------                               -----------         ------                     CBC Auto Differential[[id number]]        Abnormal            Final result                 Please view results for these tests on the individual orders.    CBC Auto Differential [[id number]]  (Abnormal) Collected: 07/02/24 0829    Specimen: Blood Updated: 07/02/24 0922     WBC 8.27 10*3/mm3      RBC 2.92 10*6/mm3      Hemoglobin 8.3 g/dL      Hematocrit 26.1 %      MCV 89.4 fL      MCH 28.4 pg      MCHC 31.8 g/dL      RDW 16.2 %      RDW-SD 53.4 fl      MPV 13.1 fL      Platelets 75 10*3/mm3      Neutrophil % 72.5 %      Lymphocyte % 12.9 %      Monocyte % 10.2 %      Eosinophil % 3.4 %      Basophil % 0.4 %      Immature Grans % 0.6 %      Neutrophils, Absolute 6.00 10*3/mm3      Lymphocytes, Absolute 1.07 10*3/mm3      Monocytes, Absolute 0.84 10*3/mm3      Eosinophils, Absolute 0.28 10*3/mm3      Basophils, Absolute 0.03 10*3/mm3      Immature Grans, Absolute 0.05 10*3/mm3      nRBC 0.0 /100 WBC           Marc Quevedo, APRN

## 2024-07-03 NOTE — PLAN OF CARE
Goal Outcome Evaluation:  Plan of Care Reviewed With: patient        Progress: no change  Outcome Evaluation: OT treatment completed. Pt A&Ox4; c/o 8/10 R knee pain. Pt sitting in chair on arrival. Pt demo Mod A for UB dressing and to don/doff LSO brace likely d/t pain. Pt required multiple attempts to demo sit<>stand; required Mod A to achieve. Pt completed functional mobility from chair<>bathroom with CGA and FWW for safety. Pt demo toileting hygiene Max A in supported standing d/t pt not being able to remove hands from walker to stand. Pt required Mod A x 2 for toilet transfer with FWW. SOB noted d/t increased RRs during functional tasks and mobility. Pt returned to chair and demo SBA for grooming task seated in chair d/t fatigue and weakness. Pt educated on pursed lip breathing and energy conservation techniques for ADL particiption. OT to continue POC.

## 2024-07-04 ENCOUNTER — APPOINTMENT (OUTPATIENT)
Dept: CARDIOLOGY | Facility: HOSPITAL | Age: 79
End: 2024-07-04
Payer: MEDICARE

## 2024-07-04 LAB
ANION GAP SERPL CALCULATED.3IONS-SCNC: 12 MMOL/L (ref 5–15)
BASOPHILS # BLD AUTO: 0.03 10*3/MM3 (ref 0–0.2)
BASOPHILS NFR BLD AUTO: 0.4 % (ref 0–1.5)
BH CV ECHO MEAS - AO MAX PG: 7.3 MMHG
BH CV ECHO MEAS - AO MEAN PG: 5 MMHG
BH CV ECHO MEAS - AO V2 MAX: 135 CM/SEC
BH CV ECHO MEAS - AO V2 VTI: 35.3 CM
BH CV ECHO MEAS - AVA(I,D): 3 CM2
BH CV ECHO MEAS - EDV(CUBED): 89.9 ML
BH CV ECHO MEAS - EDV(MOD-SP2): 72.4 ML
BH CV ECHO MEAS - EDV(MOD-SP4): 73.2 ML
BH CV ECHO MEAS - EF(MOD-BP): 59.8 %
BH CV ECHO MEAS - EF(MOD-SP2): 59.3 %
BH CV ECHO MEAS - EF(MOD-SP4): 60.5 %
BH CV ECHO MEAS - ESV(CUBED): 40.4 ML
BH CV ECHO MEAS - ESV(MOD-SP2): 29.5 ML
BH CV ECHO MEAS - ESV(MOD-SP4): 28.9 ML
BH CV ECHO MEAS - FS: 23.4 %
BH CV ECHO MEAS - IVS/LVPW: 1.04 CM
BH CV ECHO MEAS - IVSD: 1.18 CM
BH CV ECHO MEAS - LA DIMENSION: 4.3 CM
BH CV ECHO MEAS - LAT PEAK E' VEL: 8.7 CM/SEC
BH CV ECHO MEAS - LV DIASTOLIC VOL/BSA (35-75): 32.9 CM2
BH CV ECHO MEAS - LV MASS(C)D: 187.4 GRAMS
BH CV ECHO MEAS - LV MAX PG: 5 MMHG
BH CV ECHO MEAS - LV MEAN PG: 3 MMHG
BH CV ECHO MEAS - LV SYSTOLIC VOL/BSA (12-30): 13 CM2
BH CV ECHO MEAS - LV V1 MAX: 112 CM/SEC
BH CV ECHO MEAS - LV V1 VTI: 30.5 CM
BH CV ECHO MEAS - LVIDD: 4.5 CM
BH CV ECHO MEAS - LVIDS: 3.4 CM
BH CV ECHO MEAS - LVOT AREA: 3.5 CM2
BH CV ECHO MEAS - LVOT DIAM: 2.1 CM
BH CV ECHO MEAS - LVPWD: 1.14 CM
BH CV ECHO MEAS - MED PEAK E' VEL: 8.5 CM/SEC
BH CV ECHO MEAS - MR MAX PG: 115.3 MMHG
BH CV ECHO MEAS - MR MAX VEL: 537 CM/SEC
BH CV ECHO MEAS - MV A MAX VEL: 121 CM/SEC
BH CV ECHO MEAS - MV DEC SLOPE: 390 CM/SEC2
BH CV ECHO MEAS - MV E MAX VEL: 94.3 CM/SEC
BH CV ECHO MEAS - MV E/A: 0.78
BH CV ECHO MEAS - MV P1/2T: 78.9 MSEC
BH CV ECHO MEAS - MVA(P1/2T): 2.8 CM2
BH CV ECHO MEAS - PA V2 MAX: 73.9 CM/SEC
BH CV ECHO MEAS - RAP SYSTOLE: 3 MMHG
BH CV ECHO MEAS - RV MAX PG: 1.89 MMHG
BH CV ECHO MEAS - RV V1 MAX: 68.7 CM/SEC
BH CV ECHO MEAS - RVDD: 3.2 CM
BH CV ECHO MEAS - RVSP: 32.4 MMHG
BH CV ECHO MEAS - SV(LVOT): 105.6 ML
BH CV ECHO MEAS - SV(MOD-SP2): 42.9 ML
BH CV ECHO MEAS - SV(MOD-SP4): 44.3 ML
BH CV ECHO MEAS - SVI(LVOT): 47.5 ML/M2
BH CV ECHO MEAS - SVI(MOD-SP2): 19.3 ML/M2
BH CV ECHO MEAS - SVI(MOD-SP4): 19.9 ML/M2
BH CV ECHO MEAS - TAPSE (>1.6): 2.09 CM
BH CV ECHO MEAS - TR MAX PG: 29.4 MMHG
BH CV ECHO MEAS - TR MAX VEL: 271 CM/SEC
BH CV ECHO MEASUREMENTS AVERAGE E/E' RATIO: 10.97
BH CV XLRA - RV BASE: 2.44 CM
BUN SERPL-MCNC: 20 MG/DL (ref 8–23)
BUN/CREAT SERPL: 15.5 (ref 7–25)
CALCIUM SPEC-SCNC: 9.4 MG/DL (ref 8.6–10.5)
CHLORIDE SERPL-SCNC: 100 MMOL/L (ref 98–107)
CO2 SERPL-SCNC: 24 MMOL/L (ref 22–29)
CREAT SERPL-MCNC: 1.29 MG/DL (ref 0.57–1)
DEPRECATED RDW RBC AUTO: 52.6 FL (ref 37–54)
EGFRCR SERPLBLD CKD-EPI 2021: 42.6 ML/MIN/1.73
EOSINOPHIL # BLD AUTO: 0.19 10*3/MM3 (ref 0–0.4)
EOSINOPHIL NFR BLD AUTO: 2.7 % (ref 0.3–6.2)
ERYTHROCYTE [DISTWIDTH] IN BLOOD BY AUTOMATED COUNT: 16 % (ref 12.3–15.4)
GLUCOSE SERPL-MCNC: 105 MG/DL (ref 65–99)
HCT VFR BLD AUTO: 27.6 % (ref 34–46.6)
HGB BLD-MCNC: 8.6 G/DL (ref 12–15.9)
IMM GRANULOCYTES # BLD AUTO: 0.05 10*3/MM3 (ref 0–0.05)
IMM GRANULOCYTES NFR BLD AUTO: 0.7 % (ref 0–0.5)
LEFT ATRIUM VOLUME INDEX: 31.1 ML/M2
LEFT ATRIUM VOLUME: 69 ML
LYMPHOCYTES # BLD AUTO: 0.82 10*3/MM3 (ref 0.7–3.1)
LYMPHOCYTES NFR BLD AUTO: 11.6 % (ref 19.6–45.3)
MCH RBC QN AUTO: 27.7 PG (ref 26.6–33)
MCHC RBC AUTO-ENTMCNC: 31.2 G/DL (ref 31.5–35.7)
MCV RBC AUTO: 89 FL (ref 79–97)
MONOCYTES # BLD AUTO: 0.82 10*3/MM3 (ref 0.1–0.9)
MONOCYTES NFR BLD AUTO: 11.6 % (ref 5–12)
NEUTROPHILS NFR BLD AUTO: 5.14 10*3/MM3 (ref 1.7–7)
NEUTROPHILS NFR BLD AUTO: 73 % (ref 42.7–76)
PLATELET # BLD AUTO: 88 10*3/MM3 (ref 140–450)
PMV BLD AUTO: 13.3 FL (ref 6–12)
POTASSIUM SERPL-SCNC: 3.8 MMOL/L (ref 3.5–5.2)
RBC # BLD AUTO: 3.1 10*6/MM3 (ref 3.77–5.28)
SINUS: 3.1 CM
SODIUM SERPL-SCNC: 136 MMOL/L (ref 136–145)
STJ: 3 CM
WBC NRBC COR # BLD AUTO: 7.05 10*3/MM3 (ref 3.4–10.8)

## 2024-07-04 PROCEDURE — 25010000002 METHYLNALTREXONE 12 MG/0.6ML SOLUTION: Performed by: NURSE PRACTITIONER

## 2024-07-04 PROCEDURE — 80048 BASIC METABOLIC PNL TOTAL CA: CPT | Performed by: INTERNAL MEDICINE

## 2024-07-04 PROCEDURE — 93306 TTE W/DOPPLER COMPLETE: CPT | Performed by: EMERGENCY MEDICINE

## 2024-07-04 PROCEDURE — 99222 1ST HOSP IP/OBS MODERATE 55: CPT | Performed by: INTERNAL MEDICINE

## 2024-07-04 PROCEDURE — 25010000002 FUROSEMIDE PER 20 MG: Performed by: HOSPITALIST

## 2024-07-04 PROCEDURE — 85025 COMPLETE CBC W/AUTO DIFF WBC: CPT | Performed by: NURSE PRACTITIONER

## 2024-07-04 PROCEDURE — 93306 TTE W/DOPPLER COMPLETE: CPT

## 2024-07-04 PROCEDURE — 97110 THERAPEUTIC EXERCISES: CPT

## 2024-07-04 PROCEDURE — 94762 N-INVAS EAR/PLS OXIMTRY CONT: CPT

## 2024-07-04 PROCEDURE — 97530 THERAPEUTIC ACTIVITIES: CPT

## 2024-07-04 RX ORDER — FUROSEMIDE 10 MG/ML
20 INJECTION INTRAMUSCULAR; INTRAVENOUS EVERY 12 HOURS SCHEDULED
Status: DISCONTINUED | OUTPATIENT
Start: 2024-07-04 | End: 2024-07-05

## 2024-07-04 RX ORDER — CARVEDILOL 25 MG/1
25 TABLET ORAL 2 TIMES DAILY WITH MEALS
Status: DISCONTINUED | OUTPATIENT
Start: 2024-07-04 | End: 2024-07-15 | Stop reason: HOSPADM

## 2024-07-04 RX ADMIN — AMLODIPINE BESYLATE 5 MG: 5 TABLET ORAL at 20:18

## 2024-07-04 RX ADMIN — CLONIDINE HYDROCHLORIDE 0.1 MG: 0.1 TABLET ORAL at 09:14

## 2024-07-04 RX ADMIN — CYCLOBENZAPRINE 10 MG: 10 TABLET, FILM COATED ORAL at 20:18

## 2024-07-04 RX ADMIN — CARVEDILOL 12.5 MG: 6.25 TABLET, FILM COATED ORAL at 09:15

## 2024-07-04 RX ADMIN — BUPROPION HYDROCHLORIDE 150 MG: 150 TABLET, EXTENDED RELEASE ORAL at 09:15

## 2024-07-04 RX ADMIN — LIDOCAINE 1 PATCH: 4 PATCH TOPICAL at 09:13

## 2024-07-04 RX ADMIN — DICLOFENAC SODIUM 4 G: 10 GEL TOPICAL at 20:17

## 2024-07-04 RX ADMIN — SERTRALINE 100 MG: 50 TABLET, FILM COATED ORAL at 09:14

## 2024-07-04 RX ADMIN — HYDROCODONE BITARTRATE AND ACETAMINOPHEN 1 TABLET: 10; 325 TABLET ORAL at 04:56

## 2024-07-04 RX ADMIN — METHYLNALTREXONE BROMIDE 6 MG: 12 INJECTION, SOLUTION SUBCUTANEOUS at 09:21

## 2024-07-04 RX ADMIN — DICLOFENAC SODIUM 4 G: 10 GEL TOPICAL at 04:57

## 2024-07-04 RX ADMIN — Medication 10 ML: at 09:18

## 2024-07-04 RX ADMIN — CYCLOBENZAPRINE 10 MG: 10 TABLET, FILM COATED ORAL at 04:56

## 2024-07-04 RX ADMIN — CLONIDINE HYDROCHLORIDE 0.1 MG: 0.1 TABLET ORAL at 20:18

## 2024-07-04 RX ADMIN — MONTELUKAST SODIUM 10 MG: 10 TABLET, FILM COATED ORAL at 20:18

## 2024-07-04 RX ADMIN — HYDROCODONE BITARTRATE AND ACETAMINOPHEN 1 TABLET: 10; 325 TABLET ORAL at 17:00

## 2024-07-04 RX ADMIN — PANTOPRAZOLE SODIUM 40 MG: 40 TABLET, DELAYED RELEASE ORAL at 09:14

## 2024-07-04 RX ADMIN — ROPINIROLE HYDROCHLORIDE 0.5 MG: 0.25 TABLET, FILM COATED ORAL at 20:18

## 2024-07-04 RX ADMIN — Medication 10 ML: at 20:18

## 2024-07-04 RX ADMIN — FUROSEMIDE 20 MG: 10 INJECTION, SOLUTION INTRAMUSCULAR; INTRAVENOUS at 17:00

## 2024-07-04 RX ADMIN — FUROSEMIDE 20 MG: 10 INJECTION, SOLUTION INTRAMUSCULAR; INTRAVENOUS at 20:18

## 2024-07-04 RX ADMIN — Medication 1 TABLET: at 09:14

## 2024-07-04 RX ADMIN — HYDROCODONE BITARTRATE AND ACETAMINOPHEN 1 TABLET: 10; 325 TABLET ORAL at 09:14

## 2024-07-04 RX ADMIN — ALLOPURINOL 300 MG: 300 TABLET ORAL at 09:15

## 2024-07-04 RX ADMIN — FLUTICASONE PROPIONATE 2 SPRAY: 50 SPRAY, METERED NASAL at 09:21

## 2024-07-04 RX ADMIN — CARVEDILOL 25 MG: 25 TABLET, FILM COATED ORAL at 18:37

## 2024-07-04 RX ADMIN — HYDROCODONE BITARTRATE AND ACETAMINOPHEN 1 TABLET: 10; 325 TABLET ORAL at 22:50

## 2024-07-04 RX ADMIN — ROSUVASTATIN CALCIUM 10 MG: 10 TABLET, FILM COATED ORAL at 20:18

## 2024-07-04 RX ADMIN — CETIRIZINE HYDROCHLORIDE 10 MG: 10 TABLET ORAL at 09:15

## 2024-07-04 RX ADMIN — LACTULOSE 30 G: 20 SOLUTION ORAL at 09:16

## 2024-07-04 RX ADMIN — AMLODIPINE BESYLATE 5 MG: 5 TABLET ORAL at 09:15

## 2024-07-04 NOTE — PLAN OF CARE
Problem: Adult Inpatient Plan of Care  Goal: Plan of Care Review  Outcome: Ongoing, Progressing  Flowsheets (Taken 7/4/2024 1502)  Outcome Evaluation: Patient RA. IV CDI, IID. C/O pain, see MAR. Up x1 with back brace. Echo, see notes. Voiding per bedside commode. VSS, safety maintained.

## 2024-07-04 NOTE — THERAPY TREATMENT NOTE
Acute Care - Physical Therapy Treatment Note  [facility]     Patient Name: Marina Joe  : 1945  MRN: [id number]  Today's Date: 2024      Visit Dx:     ICD-10-CM ICD-9-CM   1. 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 Left renal mass  N28.89 593.9   3. Right adrenal mass  E27.8 255.8   4. Nodule of soft tissue  M79.89 729.99   5. Acute on chronic renal insufficiency  N28.9 593.9    N18.9 585.9   6. Thrombocytopenia  D69.6 287.5   7. Cauda equina compression  G83.4 344.60   8. Impaired mobility [Z74.09]  Z74.09 799.89   9. HX: breast cancer  Z85.3 V10.3     Patient Active Problem List   Diagnosis    Malignant neoplasm of upper-outer quadrant of female breast    Anemia of chronic renal failure, stage 3 (moderate)    Hypertension, benign    Hx of colonic polyps    HX: breast cancer    Morbidly obese    Right knee DJD    S/P lumpectomy, left breast    Adult hypothyroidism    Anemia    Anxiety    Breast cancer, left    Cervical pain    Chronic insomnia    Elevated lipids    Herpes zoster without complication    Left ear pain    Left otitis externa    Myalgia    Negative depression screening    Obesity (BMI 30-39.9)    Postmenopausal status    Recurrent acute serous otitis media of left ear    Restless leg    Sinusitis, bacterial    Skin lesion    Vitamin D deficiency    Stage 3b chronic kidney disease    GIB (gastrointestinal bleeding)    Acute on chronic blood loss anemia    Chronic idiopathic thrombocytopenia    Hypotension due to hypovolemia    Primary hypertension    Pancreatic lesion    Left renal mass    Cauda equina compression    Metastatic cancer to spine     Past Medical History:   Diagnosis Date    Breast cancer     CKD (chronic kidney disease)     Hypercholesteremia     Hypertension     Sinusitis     Stage 3a chronic kidney disease 10/20/2020     Past Surgical History:   Procedure Laterality Date    AVULSION TOENAIL PLATE          BREAST BIOPSY Left 2012    BREAST BIOPSY       Left Breast, 1/2019 per Dr Barkley    BREAST LUMPECTOMY Left     with node bx     COLONOSCOPY  09/13/2013    small polyp at 30cm benign hyperplastic polyp, changes consistent with melanosis coli. Recall 5 years    COLONOSCOPY  11/19/2018    Tics otherwise normal exam repeat in 5 years    COLONOSCOPY  02/13/2023    Normal exam repeat in 3 years with a 2 day prep    ENDOSCOPY  02/13/2023    Gastritis, small HH    LUMBAR LAMINECTOMY Right 6/29/2024    Procedure: LUMBAR LAMINECTOMY L3 WITH DECOMPRESSION 1-2 LEVELS-RIGHT;  Surgeon: Marcellus Pulido MD;  Location: [facility] OR;  Service: Neurosurgery;  Laterality: Right;    REPLACEMENT TOTAL KNEE Right     2016    TOTAL ABDOMINAL HYSTERECTOMY WITH SALPINGO OOPHORECTOMY      UPPER ENDOSCOPIC ULTRASOUND W/ FNA  12/20/2023    Pancreatic cyst s/p FNA     PT Assessment (Last 12 Hours)       PT Evaluation and Treatment       Row Name 07/04/24 1052          Physical Therapy Time and Intention    Subjective Information complains of;weakness;pain;fatigue  -NW     Document Type therapy note (daily note)  -NW     Mode of Treatment physical therapy  -NW     Comment reports slid in floor last night and nsg got btb w/ lift.  -NW       Row Name 07/04/24 1052          General Information    Existing Precautions/Restrictions brace worn when out of bed;fall;LSO;spinal  -NW       Row Name 07/04/24 1052          Pain    Pretreatment Pain Rating 10/10  -NW     Posttreatment Pain Rating 10/10  -NW     Pain Location lower  -NW     Pain Location - back  -NW     Pre/Posttreatment Pain Comment radiating down RLE  -NW       Row Name 07/04/24 1052          Bed Mobility    Rolling Left [place] (Bed Mobility) verbal cues;minimum assist (75% patient effort)  -NW     Sidelying-Sit [place] (Bed Mobility) verbal cues;minimum assist (75% patient effort)  -NW       Row Name 07/04/24 1052          Sit-Stand Transfer    Sit-Stand [place] (Transfers) verbal cues;moderate assist (50%  patient effort)  -NW     Assistive Device (Sit-Stand Transfers) walker, front-wheeled  -NW       Row Name 07/04/24 1052          Stand-Sit Transfer    Stand-Sit [place] (Transfers) verbal cues;minimum assist (75% patient effort)  -NW     Assistive Device (Stand-Sit Transfers) walker, front-wheeled  -NW       Row Name 07/04/24 1052          Gait/Stairs (Locomotion)    Major Level (Gait) verbal cues;minimum assist (75% patient effort);moderate assist (50% patient effort)  -NW     Assistive Device (Gait) walker, front-wheeled  -NW     Distance in Feet (Gait) --  steps bed-chair. attempt to amb, however pt legs buckling and not safe only able to take steps to chair and worked on BLE strengthening ex's  -NW     Pattern (Gait) step-to  -NW     Bilateral Gait Deviations forward flexed posture  -NW     Left Sided Gait Deviations heel strike decreased  -NW       Row Name 07/04/24 1052          Safety Issues, Functional Mobility    Impairments Affecting Function (Mobility) pain  -NW       Row Name 07/04/24 1052          Motor Skills    Therapeutic Exercise aerobic  -NW       Row Name 07/04/24 1052          Aerobic Exercise    Time Performed (Aerobic Exercise) AROM BLEs  -NW       Row Name             Wound 06/29/24 1458 lumbar spine Incision    Wound - Properties Group Placement Date: 06/29/24  -KT Placement Time: 1458  -KT Present on Original Admission: N  -KT Location: lumbar spine  -KT Primary Wound Type: Incision  -KT    Retired Wound - Properties Group Placement Date: 06/29/24  -KT Placement Time: 1458  -KT Present on Original Admission: N  -KT Location: lumbar spine  -KT Primary Wound Type: Incision  -KT    Retired Wound - Properties Group Date first assessed: 06/29/24  -KT Time first assessed: 1458  -KT Present on Original Admission: N  -KT Location: lumbar spine  -KT Primary Wound Type: Incision  -KT      Row Name 07/04/24 1052          Positioning and Restraints    Pre-Treatment Position in bed  -NW      Post Treatment Position chair  -NW     In Chair reclined;call light within reach;encouraged to call for assist;with family/caregiver  -NW               User Key  (r) = Recorded By, (t) = Taken By, (c) = Cosigned By      Initials Name Provider Type    NW Yuyl Khalil, PTA Physical Therapist Assistant    Shravan Ordonez, RN Registered Nurse                    Physical Therapy Education       Title: PT OT SLP Therapies (Done)       Topic: Physical Therapy (Done)       Point: Mobility training (Done)       Learning Progress Summary             Patient Acceptance, E,D, DU,VU by  at 6/30/2024 0951    Comment: benefits of PT and POC, call for A OOB, no BLT, LSO when OOB                         Point: Body mechanics (Done)       Learning Progress Summary             Patient Acceptance, E,D, DU,VU by  at 6/30/2024 0951    Comment: benefits of PT and POC, call for A OOB, no BLT, LSO when OOB                         Point: Precautions (Done)       Learning Progress Summary             Patient Acceptance, E,D, DU,VU by  at 6/30/2024 0951    Comment: benefits of PT and POC, call for A OOB, no BLT, LSO when OOB                                         User Key       Initials Effective Dates Name Provider Type Discipline     02/03/23 -  Eliel León, PT Physical Therapist PT                  PT Recommendation and Plan     Plan of Care Reviewed With: patient  Progress: no change  Outcome Evaluation: Pt sitting EOB upon entering room, pt w/ mult attempts to stand before able to get upright min/mod x1 in order to take steps to bsc, pt w difficult time getting up from bsc and bk to EOB. pt attempt to amb but unable due to weakness and pain, pt sat rested then tried again and was able to then amb to door and bk to chair w/ mult rests, min x1 anatalgic gait and fwd flex posture. Pt will need rehab upon d/c for cont strength, mobiltiy and safety       Time Calculation:     Therapy Charges for Today       Code Description  Service Date Service Provider Modifiers Qty    22541528085 HC PT THERAPEUTIC ACT EA 15 MIN 7/3/2024 Yuly Khalil, LAZARO GP 2    46461986397 HC GAIT TRAINING EA 15 MIN 7/3/2024 Yuly Khalil PTA GP 1            PT G-Codes  Outcome Measure Options: AM-PAC 6 Clicks Daily Activity (OT)  AM-PAC 6 Clicks Score (PT): 15  AM-PAC 6 Clicks Score (OT): 16    Yuly Khalil PTA  7/4/2024

## 2024-07-04 NOTE — CONSULTS
[facility] [place] [place] - [facility]    24, 12:00 CDT  Patient Care Team:  Hanh Og APRN as PCP - General (Internal Medicine)  Name: Marina Joe  : 1945  MRN: [id number]  Contact Serial Number [id number]    Chief complaint: Hypoxia and oxygen desaturation, abnormal imaging studies    HPI:  We have been consulted by Alysia Lincoln MD to see this 79 y.o. -American female.  Was seen as inpatient pulmonary consult in medical surgical floor    Patient is a morbidly obese elderly -American female was seen as a pulmonary consult today.  She had a past medical history of breast cancer, chronic kidney disease stage IIIa, hypercholesterolemia, hypertension and allergic rhinitis and chronic sinusitis.  She presented to the emergency department in [facility] on 2024 with worsening back pain and flank pain which started few weeks ago.  Patient was seen in the pain clinic and was started on gabapentin which caused dizziness.  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  During this hospital stay patient was noted to have drop in oxygen saturation to 80s and required 8 L of oxygen at 1 time although in the last few days she is meeting oxygen saturation while in the room air.  Pulmonary was consulted for addressing hypoxemic respiratory failure and possibility of sleep apnea.  Patient was also noted to have a lung nodule on the right side which appeared to be stable in the prior CT scan of the chest.  Nephrology and urology also following the patient.    At the time of my visit patient was sitting comfortably in the chair and her significant  other her boyfriend was present in the room.  The patient reported she is a non-smoker I do not use any oxygen or any inhalers at home and never been tapped with COPD or asthma although she sometimes feels tired and has sleep problems with snoring and fatigue.  She does not acute shortness of breath.  She has sinus problem as mentioned above and uses nasal spray and sinus medications at times    She is vaccinated and did not have any COVID infection.  She sees Dr. Morgan for her breast cancer.  The CT scan of the chest showed a 6 mm nodule on the right breast near the fissure suggesting intrafissural lymph node.  Patient has restless leg syndrome and she is on ropinirole but she could not recall doing any sleep study or starting any CPAP treatment although it was noted patient has snoring and daytime tiredness and fatigue by her boyfriend.  He is currently using fluticasone nasal spray Zyrtec and Singulair for nasal allergy.    Past Medical History:   has a past medical history of Breast cancer, CKD (chronic kidney disease), Hypercholesteremia, Hypertension, Sinusitis, and Stage 3a chronic kidney disease (10/20/2020).   has a past surgical history that includes Breast biopsy (Left, 11/20/2012); Breast lumpectomy (Left); Total abdominal hysterectomy w/ bilateral salpingoophorectomy; Replacement total knee (Right); Colonoscopy (09/13/2013); Avulsion toenail plate; Breast biopsy; Colonoscopy (11/19/2018); Colonoscopy (02/13/2023); Esophagogastroduodenoscopy (02/13/2023); Upper endoscopic ultrasound w/ FNA (12/20/2023); and Lumbar laminectomy (Right, 6/29/2024).  Allergies   Allergen Reactions    Aspirin GI Bleeding    Niacin Other (See Comments)     Medications:  allopurinol, 300 mg, Oral, Daily  amLODIPine, 5 mg, Oral, BID  buPROPion XL, 150 mg, Oral, Daily  carvedilol, 25 mg, Oral, BID With Meals  cetirizine, 10 mg, Oral, Daily  cloNIDine, 0.1 mg, Oral, BID  fluticasone, 2 spray, Nasal, Daily  furosemide, 20 mg,  Intravenous, Q12H  heparin (porcine), 5,000 Units, Subcutaneous, Q12H  lactulose, 30 g, Oral, TID  Lidocaine, 1 patch, Transdermal, Q24H  methylnaltrexone, 6 mg, Subcutaneous, Every Other Day  montelukast, 10 mg, Oral, Nightly  multivitamin with minerals, 1 tablet, Oral, Daily  pantoprazole, 40 mg, Oral, Daily  rOPINIRole, 0.5 mg, Oral, Nightly  rosuvastatin, 10 mg, Oral, Nightly  sertraline, 100 mg, Oral, Daily  sodium chloride, 10 mL, Intravenous, Q12H         Family History:  Family History   Problem Relation Age of Onset    Heart attack Mother     Hodgkin's lymphoma Father     Other Father     Cancer Father         hodgkins    Heart attack Brother     Skin cancer Maternal Uncle     Pancreatic cancer Maternal Uncle     Cancer Maternal Uncle         eye    Colon cancer Neg Hx     Colon polyps Neg Hx      Social History:   reports that she has never smoked. She has never used smokeless tobacco. She reports that she does not currently use drugs. She reports that she does not drink alcohol.  Review of Systems:  Review of Systems   Constitutional:  Positive for fatigue and unexpected weight change.   HENT:  Positive for congestion, postnasal drip and sinus pressure.    Eyes: Negative.    Respiratory:  Positive for cough and chest tightness. Negative for shortness of breath.    Cardiovascular: Negative.    Gastrointestinal: Negative.    Endocrine: Negative.    Genitourinary: Negative.    Musculoskeletal:  Positive for arthralgias and back pain.   Skin: Negative.    Allergic/Immunologic: Positive for environmental allergies.   Neurological: Negative.    Hematological: Negative.    Psychiatric/Behavioral: Negative.        Physical Exam:  Temp:  [97.5 °F (36.4 °C)-98.4 °F (36.9 °C)] 98 °F (36.7 °C)  Heart Rate:  [73-79] 73  Resp:  [16-18] 18  BP: (102-175)/(59-77) 102/59  Intake/Output Summary (Last 24 hours) at 7/4/2024 1200  Last data filed at 7/4/2024 0725  Gross per 24 hour   Intake 240 ml   Output 3200 ml   Net -2960  ml         06/28/24  0538 06/28/24  1121   Weight: 111 kg (244 lb) 111 kg (244 lb)     SpO2 Percentage    07/04/24 0404 07/04/24 0811 07/04/24 1133   SpO2: 91% 96% 93%     Body mass index is 37.1 kg/m².   Physical Exam  Vitals and nursing note reviewed.   Constitutional:       General: She is not in acute distress.     Appearance: She is obese. She is not ill-appearing.      Comments: Elderly -American female appears comfortable in no acute distress   HENT:      Head: Normocephalic and atraumatic.      Right Ear: Tympanic membrane normal. There is no impacted cerumen.      Left Ear: Tympanic membrane normal. There is no impacted cerumen.      Nose: Congestion present. No rhinorrhea.      Mouth/Throat:      Mouth: Mucous membranes are moist.      Pharynx: Oropharynx is clear. No oropharyngeal exudate or posterior oropharyngeal erythema.      Comments: Narrow upper airway with Mallampati class III  Eyes:      General: No scleral icterus.        Right eye: No discharge.         Left eye: No discharge.      Extraocular Movements: Extraocular movements intact.      Conjunctiva/sclera: Conjunctivae normal.      Pupils: Pupils are equal, round, and reactive to light.   Neck:      Vascular: No carotid bruit.   Cardiovascular:      Rate and Rhythm: Regular rhythm. Tachycardia present.      Pulses: Normal pulses.      Heart sounds: Normal heart sounds. No murmur heard.     No friction rub. No gallop.   Pulmonary:      Effort: Pulmonary effort is normal. No respiratory distress.      Breath sounds: No stridor. No wheezing, rhonchi or rales.      Comments: Decreased breath sound at the bases  Chest:      Chest wall: No tenderness.   Abdominal:      General: Abdomen is flat. Bowel sounds are normal. There is no distension.      Palpations: Abdomen is soft. There is no mass.      Tenderness: There is no abdominal tenderness. There is no guarding or rebound.      Hernia: No hernia is present.   Genitourinary:     Comments:  Not examined  Musculoskeletal:         General: No swelling, tenderness, deformity or signs of injury. Normal range of motion.      Cervical back: Normal range of motion and neck supple. No rigidity or tenderness.      Right lower leg: No edema.      Left lower leg: No edema.   Lymphadenopathy:      Cervical: No cervical adenopathy.   Skin:     General: Skin is warm and dry.      Capillary Refill: Capillary refill takes less than 2 seconds.      Coloration: Skin is not jaundiced or pale.      Findings: No bruising, erythema, lesion or rash.   Neurological:      General: No focal deficit present.      Mental Status: She is oriented to person, place, and time. Mental status is at baseline.      Cranial Nerves: No cranial nerve deficit.      Sensory: No sensory deficit.      Motor: Weakness present.      Coordination: Coordination normal.      Deep Tendon Reflexes: Reflexes normal.      Comments: Bedrest in place due to recent back surgery.   Psychiatric:         Mood and Affect: Mood normal.         Behavior: Behavior normal.         Thought Content: Thought content normal.         Judgment: Judgment normal.       Result Review  Results from last 7 days   Lab Units 07/04/24  0301 07/03/24  0440 07/02/24  1823   WBC 10*3/mm3 7.05 7.21 8.63   HEMOGLOBIN g/dL 8.6* 8.2* 7.9*   PLATELETS 10*3/mm3 88* 106* 110*     Results from last 7 days   Lab Units 07/04/24  0301 07/03/24  0440 07/02/24  1414 07/02/24  0414 06/29/24  1858 06/29/24  0519   SODIUM mmol/L 136 137  --  138   < > 136   POTASSIUM mmol/L 3.8 4.2 4.3 3.6   < > 3.9   CO2 mmol/L 24.0 25.0  --  24.0   < > 25.0   BUN mg/dL 20 27*  --  30*   < > 37*   CREATININE mg/dL 1.29* 1.47*  --  1.67*   < > 2.97*   MAGNESIUM mg/dL  --   --   --   --   --  2.0   PHOSPHORUS mg/dL  --   --   --   --   --  4.8*   GLUCOSE mg/dL 105* 93  --  107*   < > 90    < > = values in this interval not displayed.         Microbiology Results (last 10 days)       Procedure Component Value -  "Date/Time    Wet Prep, Genital - Swab, Vagina [[id number]]  (Abnormal) Collected: 06/28/24 0854    Lab Status: Final result Specimen: Swab from Vagina Updated: 06/28/24 0935     YEAST No yeast seen     HYPHAL ELEMENTS No Hyphal elements seen     WBC'S 1+ WBC's seen     Clue Cells, Wet Prep No Clue cells seen     Trichomonas, Wet Prep No Trichomonas seen    Chlamydia trachomatis, Neisseria gonorrhoeae, PCR - Swab, Cervix [[id number]]  (Normal) Collected: 06/28/24 0854    Lab Status: Final result Specimen: Swab from Cervix Updated: 06/28/24 1435     Chlamydia DNA by PCR Not Detected     Neisseria gonorrhoeae by PCR Not Detected    Narrative:      Disclaimer: The Aptima Combo 2 assay is a target amplification nucleic acid probe test that utilizes target capture for the in vitro qualitative detection and differentiation of ribosomal RNA from Chlamydia trachomatis and Neisseria gonorrhoeae to aid in the diagnosis of chlamydial and/or gonococcal urogenital disease.  Cell culture was once considered to be the \"gold standard\" for detection of CT and NG.  Culture is quite specific, but scientific studies have demonstrated that the NAAT DNA probe technologies have higher clinical sensitivities than culture.    Urine Culture - Urine, Urine, Clean Catch [[id number]] Collected: 06/28/24 0711    Lab Status: Final result Specimen: Urine, Clean Catch Updated: 06/29/24 1141     Urine Culture >100,000 CFU/mL Mixed Bhavana Isolated    Narrative:      Specimen contains mixed organisms of questionable pathogenicity suggestive of contamination. If symptoms persist, suggest recollection.  Colonization of the urinary tract without infection is common. Treatment is discouraged unless the patient is symptomatic, pregnant, or undergoing an invasive urologic procedure.          Recent radiology:   Imaging Results (Last 72 Hours)       Procedure Component Value Units Date/Time    XR Chest 1 View [[id number]] Collected: 07/03/24 1538     Updated: " 07/03/24 1552    Narrative:      EXAMINATION: XR CHEST 1 VW-  7/3/2024 3:38 PM 1 view     HISTORY: sob; C79.51-Secondary malignant neoplasm of bone; N28.89-Other  specified disorders of kidney and ureter; E27.8-Other specified  disorders of adrenal gland; M79.89-Other specified soft tissue  disorders; N28.9-Disorder of kidney and ureter, unspecified;  N18.9-Chronic kidney disease, unspecified; D69.6-Thrombocytopenia,  unspecified; G83.4-Cauda equina syndrome; Z74.09-Other reduced mobility;  Z85.3-Pe     COMPARISON: 6/28/2024     TECHNIQUE: A single frontal view of the chest was obtained.     FINDINGS:  There is mild cardiomegaly and pulmonary vascular congestion. Shallow  depth of inspiration. I don't see a large effusion. Vascular  calcifications are noted in the aorta. Surgical clips project over the  LEFT chest wall.       Impression:         1.  Shallow inspiration with mild cardiomegaly and pulmonary vascular  congestion without lee edema or other acute consolidation.           This report was signed and finalized on 7/3/2024 3:49 PM by Dr. Armando Calixto MD.       US Guided Tissue Biopsy [[id number]] Collected: 07/01/24 1430     Updated: 07/02/24 0700    Narrative:      EXAM: US GUIDED TISSUE BIOPSY-      DATE: 7/1/2024 12:38 PM     HISTORY: Assess for metastatic disease; 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 Patient reports  history of breast cancer in 2013 status post surgery and radiation  therapy.        COMPARISON: 6/28/2024.     TECHNIQUE: Representative images and report stored to PACS per  institutional protocol and state regulations.     PROCEDURE:  Preprocedure  imaging performed demonstrating multiple peripherally  echogenic, centrally hypoechoic nodules. These demonstrated internal  vascularity. A superficial nodule RIGHT flank nodule was selected and  patient positioned with consideration for her comfort.     Risks, benefits and alternatives to the procedure were discussed with  the patient. Specifically, risks of bleeding, infection, allergy to  medicine, and non-diagnostic biopsy results were discussed with the  patient. Verbal and written informed consent was obtained.     A timeout was performed including the patient's name, date of birth,  biopsy site and laterality.     Using ultrasound, a site for biopsy of RIGHT flank subcutaneous nodule  was selected, marked and prepped in the usual sterile fashion. 1 percent   lidocaine was used for local anesthesia.     Using ultrasound guidance, RIGHT flank subcutaneous nodule biopsy was  performed using 18 gauge Bard core needle biopsy device. 5 passes were  made. One core was used for touch prep. 2 cores were placed in formalin  and 2 cores were placed in RPMI.     Cytology deemed sample adequate. Biopsy samples were taken by cytology.      The patient tolerated the procedure well. No evidence of complication.     The patient returned to the floor in stable condition and agrees to  follow up with referring clinician for biopsy results.           Impression:      1. Successful ultrasound guided core needle biopsy of RIGHT flank  subcutaneous nodule.        This report was signed and finalized on 7/1/2024 2:36 PM by Dr Sonam Quach MD.       US Soft Tissue [[id number]] Collected: 07/01/24 1430     Updated: 07/02/24 0700    Narrative:      EXAM: US GUIDED TISSUE BIOPSY-      DATE: 7/1/2024 12:38 PM     HISTORY: Assess for metastatic disease; C79.51-Secondary malignant  neoplasm of bone; N28.89-Other specified disorders of kidney and ureter;  E27.8-Other specified disorders of adrenal gland; M79.89-Other specified  soft  tissue disorders; N28.9-Disorder of kidney and ureter, unspecified;  N18.9-Chronic kidney disease, unspecified; D69.6-Thrombocytopenia,  unspecified; G83.4-Cauda equina syndrome; Z74.09-Other. Patient reports  history of breast cancer in 2013 status post surgery and radiation  therapy.        COMPARISON: 6/28/2024.     TECHNIQUE: Representative images and report stored to PACS per  institutional protocol and state regulations.     PROCEDURE:  Preprocedure imaging performed demonstrating multiple peripherally  echogenic, centrally hypoechoic nodules. These demonstrated internal  vascularity. A superficial nodule RIGHT flank nodule was selected and  patient positioned with consideration for her comfort.     Risks, benefits and alternatives to the procedure were discussed with  the patient. Specifically, risks of bleeding, infection, allergy to  medicine, and non-diagnostic biopsy results were discussed with the  patient. Verbal and written informed consent was obtained.     A timeout was performed including the patient's name, date of birth,  biopsy site and laterality.     Using ultrasound, a site for biopsy of RIGHT flank subcutaneous nodule  was selected, marked and prepped in the usual sterile fashion. 1 percent   lidocaine was used for local anesthesia.     Using ultrasound guidance, RIGHT flank subcutaneous nodule biopsy was  performed using 18 gauge Bard core needle biopsy device. 5 passes were  made. One core was used for touch prep. 2 cores were placed in formalin  and 2 cores were placed in RPMI.     Cytology deemed sample adequate. Biopsy samples were taken by cytology.      The patient tolerated the procedure well. No evidence of complication.     The patient returned to the floor in stable condition and agrees to  follow up with referring clinician for biopsy results.           Impression:      1. Successful ultrasound guided core needle biopsy of RIGHT flank  subcutaneous nodule.        This report was  signed and finalized on 7/1/2024 2:36 PM by Dr Sonam Quach MD.               Other test results (not lab or imaging): Results for orders placed during the hospital encounter of 10/26/22    Adult Stress Echo W/ Cont or Stress Agent if Necessary Per Protocol    Interpretation Summary    Low risk for ischemia.    All left ventricular wall segments demonstrate appropriate increase in contractility with dobutamine infusion.  Reviewed  Independent review of ekg: Done  Problem List as identified by Epic (may contain historical, inactive problems)    Left renal mass    Cauda equina compression    Metastatic cancer to spine    Pulmonary Assessment:    Hypoxia improved currently on room air  Morbid obesity  Possible obstructive sleep apnea and obesity hypoventilation syndrome  Allergic rhinitis  Non-smoker  History of recent kyphoplasty for cauda equina compression  Breast cancer with metastasis to spine  Left renal mass  Chronic kidney disease stage IIIa  Hypertension    Recommend/plan:   Patient showed 1 reading of low oxygen saturation at 1 point she needed 8 L of oxygen although in the last few days she was documented to have normal oxygen saturation on the room air.  In talking to her it appears patient is a non-smoker and less likely has COPD or asthma and is not requiring any inhaler  An outpatient pulmonary function test may be needed.  She has chronic sinus problems and sinus drainage and is currently on Zyrtec Flonase and Singulair which will be continued.  I believe she may have obstructive sleep apnea and obesity hypoventilation syndrome associated with morbid obesity  I will recommend her to get an outpatient sleep study and follow-up in the pulmonary clinic with me after discharge.  Currently she is getting managed for the spinal metastasis from breast cancer and was seen by neurosurgery oncology and nephrology.  She also has a renal mass which is likely a new renal neoplasm which is getting worked up.  As  there is no other acute complaints I would sign off but I would recommend to get her back in the office after discharge with outpatient sleep study   Her chest CT scan did not show any pulmonary metastasis except a stable lung nodule on the right side and postoperative changes after the breast surgery for cancer.  She will be encouraged incentive spirometry to improve pulmonary compliance and I would recommend to start her on albuterol rescue inhaler as needed for shortness of breath although currently she is not complaining of shortness of breath and maintain oxygen saturation above 92%.  I ordered a overnight oximetry for tonight to see if she qualifies for oxygen at night if its normal we will plan for outpatient follow-up and will sign off.  Patient will also need a walking oximetry before discharge to see if she has any exercise hypoxemia  Continue current plan and follow-up with other specialties.  DVT and stress ulcer prophylaxis.  Pain and anxiety control  Repeat labs and x-rays as needed.  PT OT/nutritional support  CODE STATUS: Full.  Overall prognosis guarded but stable  We appreciate the consult and we will follow  Total time spent in seeing this patient in the pulmonary consult was 45 minutes.    Thank you for this consult.  We will follow along.    Electronically signed by     Marcos Castro MD,  Pulmonologist/Intensivist   07/04/24, 12:00 PM CDT.

## 2024-07-04 NOTE — PROGRESS NOTES
[facility]  INPATIENT PROGRESS NOTE    Patient Name: Marina Joe  Date of Admission: 6/28/2024  Today's Date: 07/04/24  Length of Stay: 6  Primary Care Physician: Hanh Og APRN    Subjective   Chief Complaint: Lower back pain          HPI   Ms. Joe is a 79-year-old female from home with past medical history of breast cancer, chronic kidney disease stage IIIa, hypercholesterolemia, hypertension and chronic sinusitis, who presented to the emergency room with worsening back pain/flank pain, history was mostly provided by patient's boyfriend at bedside.  She developed back pain couple of weeks ago and was referred to a pain clinic doctor where she was prescribed gabapentin, after she took about 2-3 doses of the gabapentin, she developed dizziness. She came to the emergency room today because lower back pain and flank pain got even worse despite being on the gabapentin.  In the emergency room, she was extensively worked up with CT of the abdomen and lumbar spine, CT abdomen showed extensive mass of the left kidney extending up to the proximal left ureter as well as right adrenal gland mass suspicious for metastasis.  Urology was consulted from the emergency room for input.  7/1  As above history of chronic kidney disease hyperlipidemia hypertension presented with back pain found to have kidney mass suspicious for metastasis patient CT of the lumbar spine shows multilevel degenerative changes to include an anteriolisthesis of L3 over L4 and spondylosis at L5-S1. 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 awaiting final pathology will consult with palliative  team to address the CODE STATUS and plan of care  7/2  Appreciate palliative care the patient is currently DNR, appreciate neurosurgery status post L3 laminectomy and decompression on June 29 for cauda equina, appreciate oncology continues to work with physical therapy appreciate  radiation oncology patient started radiation today  7/3  Patient oxygen is 85% on 8 L, patient blood pressure is poorly controlled increase her Coreg we will repeat her chest x-ray chest x-ray from before was showing some congestion IV Hep-Lock will give her 1 dose of Lasix  7/4  As before remains on 8 L oxygen documented excision was not titrated will titrate oxygen, chest x-ray showing cardiomegaly pulmonary edema the patient has poor inspiration we did Hep-Lock her IV fluid we will continue IV Lasix blood pressure remains poorly controlled increase her Coreg we will check her echo of the heart as above she was not having bowel movement for most time was started on lactulose, patient had good bowel movement it was mistakenly documented that she is on 8 L the patient is actually on room air  Review of Systems   All pertinent negatives and positives are as above. All other systems have been reviewed and are negative unless otherwise stated.     Objective    Temp:  [97.1 °F (36.2 °C)-98.4 °F (36.9 °C)] 98.4 °F (36.9 °C)  Heart Rate:  [73-79] 79  Resp:  [16-18] 16  BP: (145-175)/(67-79) 175/70  Physical Exam    GEN: Awake, alert, interactive, in NAD  HEENT: Atraumatic, PERRLA, EOMI, Anicteric, Trachea midline  Lungs: CTAB, no wheezing/rales/rhonchi  Heart: RRR, +S1/s2, no rub  ABD: soft, nt/nd, +BS, no guarding/rebound  Extremities: atraumatic, no cyanosis, no edema  Skin: no rashes or lesions  Neuro: AAOx3, no focal deficits  Psych: normal mood & affect    Results Review:  I have reviewed the labs, radiology results, and diagnostic studies.    Laboratory Data:   Results from last 7 days   Lab Units 07/04/24  0301 07/03/24  0440  07/02/24  1823   WBC 10*3/mm3 7.05 7.21 8.63   HEMOGLOBIN g/dL 8.6* 8.2* 7.9*   HEMATOCRIT % 27.6* 26.3* 25.3*   PLATELETS 10*3/mm3 88* 106* 110*        Results from last 7 days   Lab Units 07/04/24  0301 07/03/24  0440 07/02/24  1414 07/02/24  0414 07/01/24  0432 06/30/24  0434 06/28/24  1153 06/28/24  0617   SODIUM mmol/L 136 137  --  138   < > 137   < > 135*   POTASSIUM mmol/L 3.8 4.2 4.3 3.6   < > 4.0   < > 3.9   CHLORIDE mmol/L 100 103  --  103   < > 101   < > 95*   CO2 mmol/L 24.0 25.0  --  24.0   < > 24.0   < > 26.0   BUN mg/dL 20 27*  --  30*   < > 34*   < > 32*   CREATININE mg/dL 1.29* 1.47*  --  1.67*   < > 2.39*   < > 2.59*   CALCIUM mg/dL 9.4 8.9  --  8.9   < > 8.4*   < > 10.0   BILIRUBIN mg/dL  --   --   --   --   --  <0.2  --  0.3   ALK PHOS U/L  --   --   --   --   --  78  --  85   ALT (SGPT) U/L  --   --   --   --   --  21  --  18   AST (SGOT) U/L  --   --   --   --   --  35*  --  22   GLUCOSE mg/dL 105* 93  --  107*   < > 120*   < > 99    < > = values in this interval not displayed.       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     FINDINGS:  There is mild cardiomegaly  and pulmonary vascular congestion. Shallow  depth of inspiration. I don't see a large effusion. Vascular  calcifications are noted in the aorta. Surgical clips project over the  LEFT chest wall.       Impression:         1.  Shallow inspiration with mild cardiomegaly and pulmonary vascular  congestion without lee edema or other acute consolidation.           This report was signed and finalized on 7/3/2024 3:49 PM by Dr. Armando Calixto MD.               I have reviewed the patient's current medications.     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  MRI of the lumbar spine reported as follows-  IMPRESSION:   Neoplastic process involving the L3 vertebral body with associated  posterior soft tissue component resulting in severe spinal canal  narrowing and cauda equina nerve root compression. The soft tissue  component appears to also extend into the right L3-L4 foramen. No  associated pathological fracture.  Additional multilevel spondylotic changes including moderate to severe  spinal canal and foraminal narrowing as detailed above.  Neurosurgery is on consult and did the following procedure-  Procedure(s):  LUMBAR LAMINECTOMY L3 WITH DECOMPRESSION 1-2 LEVELS-RIGHT     Decompression -  Lumbar laminectomy, medial facetectomy for decompression of the spinal cord  Open laminectomy and biopsy of epidural neoplasm  Use of intraoperative microscope      -Acute on chronic kidney disease stage IIIa  Patient follows up with a nephrologist outpatient, but he does not come to this hospital.  Nephrologist consulted and helping with management while patient is in-house.     -Left renal mass/right adrenal mass on CT of the abdomen/pelvis  Urology was  consulted from the emergency room and after evaluation did not recommend any intervention,rather recommended oncology consult.  Patient has multiple subcutaneous nodules that may be amenable to percutaneous biopsy.  Oncology is on consult and has evaluated patient, will await for tentative diagnosis from lumbar spine biopsy before making recommendations      Other chronic medical conditions-  History of breast cancer  Hypercholesterolemia  Hypertension  Chronic sinusitis     DVT prophylaxis-heparin       Medical Decision Making  Number and Complexity of problems: High    Conditions and Status        Condition is unchanged.     MDM Data  External documents reviewed: No  Cardiac tracing (EKG, telemetry) interpretation: Yes  Radiology interpretation: Yes  Labs reviewed: Yes  Any tests that were considered but not ordered: No     Decision rules/scores evaluated (example [id number], Wells, etc): Yes     Discussed with: Patient     Care Planning  Shared decision making: Yes  Code status and discussions: Yes [full code]    Disposition  Social Determinants of Health that impact treatment or disposition: No  I expect the patient to be discharged to unknown in unknown days.     Electronically signed by Alysia Lincoln MD, 07/04/24, 10:18 CDT.

## 2024-07-04 NOTE — PLAN OF CARE
Problem: Adult Inpatient Plan of Care  Goal: Plan of Care Review  Outcome: Ongoing, Progressing  Flowsheets (Taken 7/4/2024 7146)  Progress: no change  Plan of Care Reviewed With: patient  Outcome Evaluation: Patient rested well. Complains of pain x1 this far. IV IID. Up to the BSC x2, 3 BMs so far this shift. Voiding. Patient fell this shift, MD notified. Bed alarm set. VSS. Safety maintained.

## 2024-07-04 NOTE — PLAN OF CARE
Goal Outcome Evaluation:  Plan of Care Reviewed With: patient        Progress: no change  Outcome Evaluation: bed mobility min x1 w/ cues and extra time. sit-stand mod x1. steps bed-chair. attempt to amb, however pt legs buckling and not safe only able to take steps to chair and worked on BLE strengthening ex's. pt will need rehab upon d/c to cont working on mobilty, strength, and safety.

## 2024-07-04 NOTE — PROGRESS NOTES
Nephrology ([facility]) progress Note      Patient:  Marina Joe  YOB: 1945  Date of Service: 7/4/2024  MRN: [id number]   Acct: [id number]   Primary Care Physician: Hanh Og APRN  Advance Directive:   Code Status and Medical Interventions:   Ordered at: 07/01/24 1547     Medical Intervention Limits:    No intubation (DNI)    No artificial nutrition     Level Of Support Discussed With:    Patient     Code Status (Patient has no pulse and is not breathing):    No CPR (Do Not Attempt to Resuscitate)     Medical Interventions (Patient has pulse or is breathing):    Limited Support     Admit Date: 6/28/2024       Hospital Day: 6  Referring Provider: No ref. provider found      Patient personally seen and examined.  Complete chart including Consults, Notes, Operative Reports, Labs, Cardiology, and Radiology studies reviewed as able.        Subjective:  Marina Joe is a 79 y.o. female for whom we were consulted for evaluation and treatment of acute kidney injury.  She has history of chronic kidney disease stage IIIa, breast cancer, hypertension.  She presented for evaluation of back pain and had tried the gabapentin.  She reported no NSAID usage.  She denied current chest pain, shortness of air at rest, nausea or vomiting.  MRI spine demonstrated stenosis with likely malignancy.  Neurosurgery was evaluating.  She denied appetite changes, dysuria or hematuria.    Today, no overnight events.  Denied other complaints on questioning.  Tolerating diet.  Has adequate urine output.  Her repeat serum creatinine was 1.2.     Allergies:  Aspirin and Niacin    Home Meds:  Medications Prior to Admission   Medication Sig Dispense Refill Last Dose    amLODIPine (NORVASC) 5 MG tablet Take 1 tablet by mouth 2 (Two) Times a Day.       buPROPion XL (WELLBUTRIN XL) 150 MG 24 hr tablet Take 1 tablet by mouth Daily.       Calcium Carb-Cholecalciferol (CALCIUM 600+D3 PO) Take 1 tablet by  mouth Daily.       carvedilol (COREG) 6.25 MG tablet Take 1 tablet by mouth 2 (Two) Times a Day With Meals.       dapagliflozin Propanediol (Farxiga) 10 MG tablet Take 10 mg by mouth Daily.       famotidine (PEPCID) 20 MG tablet Take 1 tablet by mouth 2 (Two) Times a Day Before Meals. 60 tablet 0     fluticasone (FLONASE) 50 MCG/ACT nasal spray 2 sprays into the nostril(s) as directed by provider Daily. 1 g 3     irbesartan-hydrochlorothiazide (AVALIDE) 300-12.5 MG tablet Take 1 tablet by mouth Daily.       montelukast (SINGULAIR) 10 MG tablet Take 1 tablet by mouth Every Night.       Multiple Vitamins-Minerals (MULTIVITAMIN ADULT) tablet Take 1 tablet by mouth Daily.       pantoprazole (PROTONIX) 40 MG EC tablet Take 1 tablet by mouth Daily.       rOPINIRole (REQUIP) 0.5 MG tablet Take 1 tablet by mouth Every Night. Take 1 hour before bedtime.       rosuvastatin (CRESTOR) 20 MG tablet Take 1 tablet by mouth Daily.       valACYclovir (VALTREX) 500 MG tablet Take 1 tablet by mouth 2 (Two) Times a Day.       cetirizine (zyrTEC) 10 MG tablet Take 1 tablet by mouth Daily.       cyclobenzaprine (FLEXERIL) 10 MG tablet Take 1 tablet by mouth 3 (Three) Times a Day As Needed for Muscle Spasms.       Diclofenac Sodium (VOLTAREN) 1 % gel gel Apply 4 g topically to the appropriate area as directed 4 (Four) Times a Day As Needed (arthralgia). 240 g 3     Ergocalciferol (VITAMIN D2 PO) Take 50,000 Units by mouth Every 30 (Thirty) Days.       naproxen sodium (ALEVE) 220 MG tablet Take 1 tablet by mouth 2 (Two) Times a Day As Needed.       sertraline (ZOLOFT) 100 MG tablet Take 1 tablet by mouth Daily.          Medicines:  Current Facility-Administered Medications   Medication Dose Route Frequency Provider Last Rate Last Admin    acetaminophen (TYLENOL) tablet 650 mg  650 mg Oral Q6H PRN Marcellus Pulido MD   650 mg at 06/29/24 0544    albuterol (PROVENTIL) nebulizer solution 0.083% 2.5 mg/3mL  2.5 mg Nebulization Q4H PRN  Marcellus Pulido MD   2.5 mg at 06/28/24 1040    allopurinol (ZYLOPRIM) tablet 300 mg  300 mg Oral Daily Nagi Maya MD   300 mg at 07/04/24 0915    amLODIPine (NORVASC) tablet 5 mg  5 mg Oral BID Marcellus Pulido MD   5 mg at 07/04/24 0915    sennosides-docusate (PERICOLACE) 8.6-50 MG per tablet 2 tablet  2 tablet Oral BID PRN Marcellus Pulido MD   2 tablet at 07/01/24 2045    And    polyethylene glycol (MIRALAX) packet 17 g  17 g Oral Daily PRN Marcellus Pulido MD   17 g at 07/03/24 0836    And    bisacodyl (DULCOLAX) EC tablet 5 mg  5 mg Oral Daily PRN Marcellus Pulido MD        And    bisacodyl (DULCOLAX) suppository 10 mg  10 mg Rectal Daily PRN Marcellus Pulido MD        buPROPion XL (WELLBUTRIN XL) 24 hr tablet 150 mg  150 mg Oral Daily Marcellus Pulido MD   150 mg at 07/04/24 0915    Calcium Replacement - Follow Nurse / BPA Driven Protocol   Does not apply PRN Marcellus Pulido MD        carvedilol (COREG) tablet 25 mg  25 mg Oral BID With Meals Alysia Lincoln MD        cetirizine (zyrTEC) tablet 10 mg  10 mg Oral Daily Marcellus Pulido MD   10 mg at 07/04/24 0915    cloNIDine (CATAPRES) tablet 0.1 mg  0.1 mg Oral BID Marcellus Pulido MD   0.1 mg at 07/04/24 0914    cyclobenzaprine (FLEXERIL) tablet 10 mg  10 mg Oral TID PRN Marcellus Pulido MD   10 mg at 07/04/24 0456    Diclofenac Sodium (VOLTAREN) 1 % gel 4 g  4 g Topical 4x Daily PRN Marcellus Pulido MD   4 g at 07/04/24 0457    fluticasone (FLONASE) 50 MCG/ACT nasal spray 2 spray  2 spray Nasal Daily Marcellus Pulido MD   2 spray at 07/04/24 0921    furosemide (LASIX) injection 20 mg  20 mg Intravenous Q12H Alysia Lincoln MD        heparin (porcine) 5000 UNIT/ML injection 5,000 Units  5,000 Units Subcutaneous Q12H Marcellus Pulido MD   5,000 Units at 07/03/24 0837    hydrALAZINE (APRESOLINE) injection 10 mg  10 mg Intravenous Q4H PRN Ashok,  Marcellus Moore MD        HYDROcodone-acetaminophen (NORCO)  MG per tablet 1 tablet  1 tablet Oral Q4H PRN Lauren Kuo APRN   1 tablet at 07/04/24 0914    HYDROcodone-acetaminophen (NORCO) 5-325 MG per tablet 1 tablet  1 tablet Oral Q4H PRN Marcellus Pulido MD   1 tablet at 07/03/24 0640    lactulose solution 30 g  30 g Oral TID Alysia Lincoln MD   30 g at 07/04/24 0916    Lidocaine 4 % 1 patch  1 patch Transdermal Q24H Lauren Kuo APRN   1 patch at 07/04/24 0913    Magnesium Low Dose Replacement - Follow Nurse / BPA Driven Protocol   Does not apply PRN Marcellus Pulido MD        methylnaltrexone (RELISTOR) injection 6 mg  6 mg Subcutaneous Every Other Day Marc Quevedo APRN   6 mg at 07/04/24 0921    montelukast (SINGULAIR) tablet 10 mg  10 mg Oral Nightly Marcellus Pulido MD   10 mg at 07/03/24 2046    multivitamin with minerals 1 tablet  1 tablet Oral Daily Marcellus Puliod MD   1 tablet at 07/04/24 0914    ondansetron (ZOFRAN) injection 4 mg  4 mg Intravenous Q6H PRN Marcellus Pulido MD        pantoprazole (PROTONIX) EC tablet 40 mg  40 mg Oral Daily Marcellus Pulido MD   40 mg at 07/04/24 0914    Phosphorus Replacement - Follow Nurse / BPA Driven Protocol   Does not apply PRN Marcellus Pulido MD        Potassium Replacement - Follow Nurse / BPA Driven Protocol   Does not apply PRN Marcellus Pulido MD        rOPINIRole (REQUIP) tablet 0.5 mg  0.5 mg Oral Nightly Marcellus Pulido MD   0.5 mg at 07/03/24 2046    rosuvastatin (CRESTOR) tablet 10 mg  10 mg Oral Nightly Marcellus Pulido MD   10 mg at 07/03/24 2046    sertraline (ZOLOFT) tablet 100 mg  100 mg Oral Daily Marcellus Pulido MD   100 mg at 07/04/24 0914    sodium chloride 0.9 % flush 10 mL  10 mL Intravenous PRN Marcellus Pulido MD        sodium chloride 0.9 % flush 10 mL  10 mL Intravenous Q12H Marcellus Pulido MD   10 mL at 07/04/24 0951     sodium chloride 0.9 % flush 10 mL  10 mL Intravenous PRN Marcellus Pulido MD        sodium chloride 0.9 % flush 10 mL  10 mL Intravenous PRN Marcellus Pulido MD        sodium chloride 0.9 % infusion 40 mL  40 mL Intravenous PRN Marcellus Pulido MD        sodium chloride 0.9 % infusion 40 mL  40 mL Intravenous PRN Marcellus Pulido MD           Past Medical History:  Past Medical History:   Diagnosis Date    Breast cancer     CKD (chronic kidney disease)     Hypercholesteremia     Hypertension     Sinusitis     Stage 3a chronic kidney disease 10/20/2020       Past Surgical History:  Past Surgical History:   Procedure Laterality Date    AVULSION TOENAIL PLATE      Sept 26,2018    BREAST BIOPSY Left 11/20/2012    BREAST BIOPSY      Left Breast, 1/2019 per Dr Barkley    BREAST LUMPECTOMY Left     with node bx     COLONOSCOPY  09/13/2013    small polyp at 30cm benign hyperplastic polyp, changes consistent with melanosis coli. Recall 5 years    COLONOSCOPY  11/19/2018    Tics otherwise normal exam repeat in 5 years    COLONOSCOPY  02/13/2023    Normal exam repeat in 3 years with a 2 day prep    ENDOSCOPY  02/13/2023    Gastritis, small HH    LUMBAR LAMINECTOMY Right 6/29/2024    Procedure: LUMBAR LAMINECTOMY L3 WITH DECOMPRESSION 1-2 LEVELS-RIGHT;  Surgeon: Marcellus Pulido MD;  Location: [facility] OR;  Service: Neurosurgery;  Laterality: Right;    REPLACEMENT TOTAL KNEE Right     2016    TOTAL ABDOMINAL HYSTERECTOMY WITH SALPINGO OOPHORECTOMY      UPPER ENDOSCOPIC ULTRASOUND W/ FNA  12/20/2023    Pancreatic cyst s/p FNA       Family History  Family History   Problem Relation Age of Onset    Heart attack Mother     Hodgkin's lymphoma Father     Other Father     Cancer Father         hodgkins    Heart attack Brother     Skin cancer Maternal Uncle     Pancreatic cancer Maternal Uncle     Cancer Maternal Uncle         eye    Colon cancer Neg Hx     Colon polyps Neg Hx        Social  History  Social History     Socioeconomic History    Marital status:    Tobacco Use    Smoking status: Never    Smokeless tobacco: Never   Vaping Use    Vaping status: Never Used   Substance and Sexual Activity    Alcohol use: No    Drug use: Not Currently    Sexual activity: Not Currently     Birth control/protection: Surgical         Review of Systems:  History obtained from chart review and the patient  General ROS: No fever or chills  Respiratory ROS: No cough, shortness of breath, wheezing  Cardiovascular ROS: No chest pain or palpitations  Gastrointestinal ROS: No abdominal pain or melena  Genito-Urinary ROS: No dysuria or hematuria  Psych ROS: No anxiety and depression  14 point ROS reviewed with the patient and negative except as noted above and in the HPI unless unable to obtain.      Objective:  Patient Vitals for the past 24 hrs:   BP Temp Temp src Pulse Resp SpO2   07/04/24 1133 102/59 98 °F (36.7 °C) Oral 73 18 93 %   07/04/24 0811 175/70 98.4 °F (36.9 °C) Oral 79 16 96 %   07/04/24 0404 159/77 97.9 °F (36.6 °C) Oral 73 18 91 %   07/03/24 2334 170/71 98.3 °F (36.8 °C) Oral 79 18 96 %   07/03/24 1816 152/74 97.6 °F (36.4 °C) Oral 77 16 98 %   07/03/24 1502 145/67 97.5 °F (36.4 °C) Axillary 76 16 94 %       Intake/Output Summary (Last 24 hours) at 7/4/2024 1330  Last data filed at 7/4/2024 0725  Gross per 24 hour   Intake 240 ml   Output 3200 ml   Net -2960 ml     General: awake/alert   Chest:  clear to auscultation bilaterally without respiratory distress  CVS: regular rate and rhythm  Abdominal: soft, nontender, positive bowel sounds  Extremities: no cyanosis or edema  Skin: warm and dry without rash      Labs:  Results from last 7 days   Lab Units 07/04/24  0301 07/03/24  0440 07/02/24  1823   WBC 10*3/mm3 7.05 7.21 8.63   HEMOGLOBIN g/dL 8.6* 8.2* 7.9*   HEMATOCRIT % 27.6* 26.3* 25.3*   PLATELETS 10*3/mm3 88* 106* 110*         Results from last 7 days   Lab Units 07/04/24  0301 07/03/24  0059  07/02/24  1414 07/02/24  0414 07/01/24  0432 06/30/24  0434 06/28/24  1153 06/28/24  0617   SODIUM mmol/L 136 137  --  138   < > 137   < > 135*   POTASSIUM mmol/L 3.8 4.2 4.3 3.6   < > 4.0   < > 3.9   CHLORIDE mmol/L 100 103  --  103   < > 101   < > 95*   CO2 mmol/L 24.0 25.0  --  24.0   < > 24.0   < > 26.0   BUN mg/dL 20 27*  --  30*   < > 34*   < > 32*   CREATININE mg/dL 1.29* 1.47*  --  1.67*   < > 2.39*   < > 2.59*   CALCIUM mg/dL 9.4 8.9  --  8.9   < > 8.4*   < > 10.0   EGFR mL/min/1.73 42.6* 36.4*  --  31.2*   < > 20.3*   < > 18.4*   BILIRUBIN mg/dL  --   --   --   --   --  <0.2  --  0.3   ALK PHOS U/L  --   --   --   --   --  78  --  85   ALT (SGPT) U/L  --   --   --   --   --  21  --  18   AST (SGOT) U/L  --   --   --   --   --  35*  --  22   GLUCOSE mg/dL 105* 93  --  107*   < > 120*   < > 99    < > = values in this interval not displayed.       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     FINDINGS:  There is mild cardiomegaly and pulmonary vascular congestion. Shallow  depth of inspiration. I don't see a large effusion. Vascular  calcifications are noted in the aorta. Surgical clips project over the  LEFT chest wall.       Impression:         1.  Shallow inspiration with mild cardiomegaly and pulmonary  vascular  congestion without lee edema or other acute consolidation.           This report was signed and finalized on 7/3/2024 3:49 PM by Dr. Armando Calixto MD.       US Guided Tissue Biopsy [[id number]] Collected: 07/01/24 1430     Updated: 07/02/24 0700    Narrative:      EXAM: US GUIDED TISSUE BIOPSY-      DATE: 7/1/2024 12:38 PM     HISTORY: Assess for metastatic disease; 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 Patient reports  history of breast cancer in 2013 status post surgery and radiation  therapy.        COMPARISON: 6/28/2024.     TECHNIQUE: Representative images and report stored to PACS per  institutional protocol and state regulations.     PROCEDURE:  Preprocedure imaging performed demonstrating multiple peripherally  echogenic, centrally hypoechoic nodules. These demonstrated internal  vascularity. A superficial nodule RIGHT flank nodule was selected and  patient positioned with consideration for her comfort.     Risks, benefits and alternatives to the procedure were discussed with  the patient. Specifically, risks of bleeding, infection, allergy to  medicine, and non-diagnostic biopsy results were discussed with the  patient. Verbal and written informed consent was obtained.     A timeout was performed including the patient's name, date of birth,  biopsy site and laterality.     Using ultrasound, a site for biopsy of RIGHT flank subcutaneous nodule  was selected, marked and prepped in the usual sterile fashion. 1 percent   lidocaine was used for local anesthesia.     Using ultrasound guidance, RIGHT flank subcutaneous nodule biopsy was  performed using 18 gauge Bard  core needle biopsy device. 5 passes were  made. One core was used for touch prep. 2 cores were placed in formalin  and 2 cores were placed in RPMI.     Cytology deemed sample adequate. Biopsy samples were taken by cytology.      The patient tolerated the procedure well. No evidence of complication.     The patient returned to the floor in stable condition and agrees to  follow up with referring clinician for biopsy results.           Impression:      1. Successful ultrasound guided core needle biopsy of RIGHT flank  subcutaneous nodule.        This report was signed and finalized on 7/1/2024 2:36 PM by Dr Sonam Quach MD.       US Soft Tissue [[id number]] Collected: 07/01/24 1430     Updated: 07/02/24 0700    Narrative:      EXAM: US GUIDED TISSUE BIOPSY-      DATE: 7/1/2024 12:38 PM     HISTORY: Assess for metastatic disease; 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 Patient reports  history of breast cancer in 2013 status post surgery and radiation  therapy.        COMPARISON: 6/28/2024.     TECHNIQUE: Representative images and report stored to PACS per  institutional protocol and state regulations.     PROCEDURE:  Preprocedure imaging performed demonstrating multiple peripherally  echogenic, centrally hypoechoic nodules. These demonstrated internal  vascularity. A superficial nodule RIGHT flank nodule was selected and  patient positioned with consideration for her comfort.     Risks, benefits and alternatives to the procedure were discussed with  the patient. Specifically, risks of bleeding, infection, allergy to  medicine, and non-diagnostic biopsy results were discussed with  "the  patient. Verbal and written informed consent was obtained.     A timeout was performed including the patient's name, date of birth,  biopsy site and laterality.     Using ultrasound, a site for biopsy of RIGHT flank subcutaneous nodule  was selected, marked and prepped in the usual sterile fashion. 1 percent   lidocaine was used for local anesthesia.     Using ultrasound guidance, RIGHT flank subcutaneous nodule biopsy was  performed using 18 gauge Bard core needle biopsy device. 5 passes were  made. One core was used for touch prep. 2 cores were placed in formalin  and 2 cores were placed in RPMI.     Cytology deemed sample adequate. Biopsy samples were taken by cytology.      The patient tolerated the procedure well. No evidence of complication.     The patient returned to the floor in stable condition and agrees to  follow up with referring clinician for biopsy results.           Impression:      1. Successful ultrasound guided core needle biopsy of RIGHT flank  subcutaneous nodule.        This report was signed and finalized on 7/1/2024 2:36 PM by Dr Sonam Quach MD.               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  Renal service will sign off for now, recall as needed.     Jean Alexander MD  7/4/2024  13:30 CDT    "

## 2024-07-05 LAB
ANION GAP SERPL CALCULATED.3IONS-SCNC: 12 MMOL/L (ref 5–15)
BASOPHILS # BLD AUTO: 0.02 10*3/MM3 (ref 0–0.2)
BASOPHILS NFR BLD AUTO: 0.3 % (ref 0–1.5)
BUN SERPL-MCNC: 21 MG/DL (ref 8–23)
BUN/CREAT SERPL: 13.6 (ref 7–25)
CALCIUM SPEC-SCNC: 9.8 MG/DL (ref 8.6–10.5)
CHLORIDE SERPL-SCNC: 94 MMOL/L (ref 98–107)
CO2 SERPL-SCNC: 30 MMOL/L (ref 22–29)
CREAT SERPL-MCNC: 1.54 MG/DL (ref 0.57–1)
DEPRECATED RDW RBC AUTO: 50.6 FL (ref 37–54)
EGFRCR SERPLBLD CKD-EPI 2021: 34.4 ML/MIN/1.73
EOSINOPHIL # BLD AUTO: 0.2 10*3/MM3 (ref 0–0.4)
EOSINOPHIL NFR BLD AUTO: 2.8 % (ref 0.3–6.2)
ERYTHROCYTE [DISTWIDTH] IN BLOOD BY AUTOMATED COUNT: 15.9 % (ref 12.3–15.4)
GLUCOSE SERPL-MCNC: 102 MG/DL (ref 65–99)
HCT VFR BLD AUTO: 28.9 % (ref 34–46.6)
HGB BLD-MCNC: 9.5 G/DL (ref 12–15.9)
HLA AB SER QL IA: POSITIVE
IMM GRANULOCYTES # BLD AUTO: 0.04 10*3/MM3 (ref 0–0.05)
IMM GRANULOCYTES NFR BLD AUTO: 0.6 % (ref 0–0.5)
LYMPHOCYTES # BLD AUTO: 1.07 10*3/MM3 (ref 0.7–3.1)
LYMPHOCYTES NFR BLD AUTO: 15 % (ref 19.6–45.3)
MCH RBC QN AUTO: 28.5 PG (ref 26.6–33)
MCHC RBC AUTO-ENTMCNC: 32.9 G/DL (ref 31.5–35.7)
MCV RBC AUTO: 86.8 FL (ref 79–97)
MONOCYTES # BLD AUTO: 0.91 10*3/MM3 (ref 0.1–0.9)
MONOCYTES NFR BLD AUTO: 12.8 % (ref 5–12)
NEUTROPHILS NFR BLD AUTO: 4.87 10*3/MM3 (ref 1.7–7)
NEUTROPHILS NFR BLD AUTO: 68.5 % (ref 42.7–76)
NRBC BLD AUTO-RTO: 0 /100 WBC (ref 0–0.2)
PLAT GP AB SER QL IA: POSITIVE
PLAT GP IA/IIA AB SER QL IA: NEGATIVE
PLAT GP IB/IX AB SER QL IA: NEGATIVE
PLAT GP IIB/IIIA AB SER QL IA: NEGATIVE
PLATELET # BLD AUTO: 101 10*3/MM3 (ref 140–450)
PMV BLD AUTO: 12.3 FL (ref 6–12)
POTASSIUM SERPL-SCNC: 3.6 MMOL/L (ref 3.5–5.2)
POTASSIUM SERPL-SCNC: 4.5 MMOL/L (ref 3.5–5.2)
RBC # BLD AUTO: 3.33 10*6/MM3 (ref 3.77–5.28)
SODIUM SERPL-SCNC: 136 MMOL/L (ref 136–145)
WBC NRBC COR # BLD AUTO: 7.11 10*3/MM3 (ref 3.4–10.8)

## 2024-07-05 PROCEDURE — 97535 SELF CARE MNGMENT TRAINING: CPT

## 2024-07-05 PROCEDURE — 25010000002 FUROSEMIDE PER 20 MG: Performed by: HOSPITALIST

## 2024-07-05 PROCEDURE — 25010000002 HEPARIN (PORCINE) PER 1000 UNITS: Performed by: NEUROLOGICAL SURGERY

## 2024-07-05 PROCEDURE — 99232 SBSQ HOSP IP/OBS MODERATE 35: CPT | Performed by: INTERNAL MEDICINE

## 2024-07-05 PROCEDURE — 97530 THERAPEUTIC ACTIVITIES: CPT

## 2024-07-05 PROCEDURE — 80048 BASIC METABOLIC PNL TOTAL CA: CPT | Performed by: INTERNAL MEDICINE

## 2024-07-05 PROCEDURE — 84132 ASSAY OF SERUM POTASSIUM: CPT | Performed by: HOSPITALIST

## 2024-07-05 PROCEDURE — 85025 COMPLETE CBC W/AUTO DIFF WBC: CPT | Performed by: NURSE PRACTITIONER

## 2024-07-05 PROCEDURE — 99232 SBSQ HOSP IP/OBS MODERATE 35: CPT

## 2024-07-05 RX ORDER — CLONIDINE HYDROCHLORIDE 0.1 MG/1
0.2 TABLET ORAL 2 TIMES DAILY
Status: DISCONTINUED | OUTPATIENT
Start: 2024-07-05 | End: 2024-07-15 | Stop reason: HOSPADM

## 2024-07-05 RX ORDER — FUROSEMIDE 10 MG/ML
20 INJECTION INTRAMUSCULAR; INTRAVENOUS DAILY
Status: DISCONTINUED | OUTPATIENT
Start: 2024-07-06 | End: 2024-07-06

## 2024-07-05 RX ORDER — POTASSIUM CHLORIDE 750 MG/1
40 CAPSULE, EXTENDED RELEASE ORAL EVERY 4 HOURS
Status: COMPLETED | OUTPATIENT
Start: 2024-07-05 | End: 2024-07-05

## 2024-07-05 RX ADMIN — ROSUVASTATIN CALCIUM 10 MG: 10 TABLET, FILM COATED ORAL at 20:09

## 2024-07-05 RX ADMIN — POTASSIUM CHLORIDE 40 MEQ: 750 CAPSULE, EXTENDED RELEASE ORAL at 09:13

## 2024-07-05 RX ADMIN — AMLODIPINE BESYLATE 5 MG: 5 TABLET ORAL at 09:03

## 2024-07-05 RX ADMIN — CYCLOBENZAPRINE 10 MG: 10 TABLET, FILM COATED ORAL at 07:40

## 2024-07-05 RX ADMIN — PANTOPRAZOLE SODIUM 40 MG: 40 TABLET, DELAYED RELEASE ORAL at 09:10

## 2024-07-05 RX ADMIN — POTASSIUM CHLORIDE 40 MEQ: 750 CAPSULE, EXTENDED RELEASE ORAL at 15:06

## 2024-07-05 RX ADMIN — Medication 1 TABLET: at 09:05

## 2024-07-05 RX ADMIN — Medication 10 ML: at 09:04

## 2024-07-05 RX ADMIN — HYDROCODONE BITARTRATE AND ACETAMINOPHEN 1 TABLET: 10; 325 TABLET ORAL at 15:06

## 2024-07-05 RX ADMIN — CETIRIZINE HYDROCHLORIDE 10 MG: 10 TABLET ORAL at 09:03

## 2024-07-05 RX ADMIN — SERTRALINE 100 MG: 50 TABLET, FILM COATED ORAL at 09:04

## 2024-07-05 RX ADMIN — LIDOCAINE 1 PATCH: 4 PATCH TOPICAL at 09:04

## 2024-07-05 RX ADMIN — LACTULOSE 30 G: 20 SOLUTION ORAL at 15:07

## 2024-07-05 RX ADMIN — FUROSEMIDE 20 MG: 10 INJECTION, SOLUTION INTRAMUSCULAR; INTRAVENOUS at 09:11

## 2024-07-05 RX ADMIN — HEPARIN SODIUM 5000 UNITS: 5000 INJECTION INTRAVENOUS; SUBCUTANEOUS at 20:10

## 2024-07-05 RX ADMIN — CLONIDINE HYDROCHLORIDE 0.2 MG: 0.1 TABLET ORAL at 20:21

## 2024-07-05 RX ADMIN — DICLOFENAC SODIUM 4 G: 10 GEL TOPICAL at 07:40

## 2024-07-05 RX ADMIN — ROPINIROLE HYDROCHLORIDE 0.5 MG: 0.25 TABLET, FILM COATED ORAL at 20:09

## 2024-07-05 RX ADMIN — FLUTICASONE PROPIONATE 2 SPRAY: 50 SPRAY, METERED NASAL at 09:05

## 2024-07-05 RX ADMIN — CARVEDILOL 25 MG: 25 TABLET, FILM COATED ORAL at 09:03

## 2024-07-05 RX ADMIN — LACTULOSE 30 G: 20 SOLUTION ORAL at 09:03

## 2024-07-05 RX ADMIN — AMLODIPINE BESYLATE 5 MG: 5 TABLET ORAL at 20:09

## 2024-07-05 RX ADMIN — LACTULOSE 30 G: 20 SOLUTION ORAL at 20:10

## 2024-07-05 RX ADMIN — BUPROPION HYDROCHLORIDE 150 MG: 150 TABLET, EXTENDED RELEASE ORAL at 09:04

## 2024-07-05 RX ADMIN — CARVEDILOL 25 MG: 25 TABLET, FILM COATED ORAL at 17:30

## 2024-07-05 RX ADMIN — Medication 10 ML: at 20:11

## 2024-07-05 RX ADMIN — ALLOPURINOL 300 MG: 300 TABLET ORAL at 09:03

## 2024-07-05 RX ADMIN — HYDROCODONE BITARTRATE AND ACETAMINOPHEN 1 TABLET: 10; 325 TABLET ORAL at 07:40

## 2024-07-05 RX ADMIN — CLONIDINE HYDROCHLORIDE 0.1 MG: 0.1 TABLET ORAL at 09:11

## 2024-07-05 RX ADMIN — MONTELUKAST SODIUM 10 MG: 10 TABLET, FILM COATED ORAL at 20:09

## 2024-07-05 NOTE — CONSULTS
Additional palliative care consult received today per attending.  Please see initial consult note completed by colleague MARY Rosenberg on 7/1/2024 and progress notes following.    Electronically signed by, MARY Gutierrez, 07/05/24, 16:13 CDT.

## 2024-07-05 NOTE — PROGRESS NOTES
[facility] Palliative Care Services    Palliative Care Daily Progress Note   Chief complaint-follow up support for patient/family    Code Status:   Code Status and Medical Interventions:   Ordered at: 07/01/24 4566     Medical Intervention Limits:    No intubation (DNI)    No artificial nutrition     Level Of Support Discussed With:    Patient     Code Status (Patient has no pulse and is not breathing):    No CPR (Do Not Attempt to Resuscitate)     Medical Interventions (Patient has pulse or is breathing):    Limited Support      Advanced Directives: Advance Directive Status: Patient does not have advance directive   Goals of Care: Ongoing.     S: Medical record reviewed. Events noted.  Labs collected this morning reviewed.  Unable to complete bone marrow biopsy due to increased pain and difficulty with positioning per nursing.  She is lying in bed, awake and in no apparent distress.  Reports increased pain in lower back and bilateral lower extremities.  Her significant other is at bedside.    According to MAR she has received approximately 40 mg of oral morphine equivalent in the last 24 hours in the form of oral hydrocodone.  She has received 2 doses of as needed muscle relaxers in the last 24 hours per MAR.  Reports she is unsure of how effective pain medication is as she has been sleeping.  Explained she went almost 8 hours without pain pill earlier today and overnight.  We discussed adjuvants and treatment options further including utilizing more frequently if needed for breakthrough pain and/or utilizing additional adjuvants.  We discussed alternative options including trial of low-dose gabapentin however reflected on previous bad experience and is not interested at this time.  After further discussion Ms. Joe expressed wishes to continue with current regimen without making any changes.  Reports she will think about alternative adjuvants however is not interested in addition to  regimen at this time.        Review of Systems   Constitutional: Positive for malaise/fatigue.   Respiratory:  Negative for shortness of breath.    Musculoskeletal:  Positive for muscle weakness and back pain.   Genitourinary:  Negative for dysuria.   Neurological:  Positive for loss of balance and weakness.   Psychiatric/Behavioral:  The patient is not nervous/anxious    Pain Assessment  Preferred Pain Scale: number (Numeric Rating Pain Scale)  Nonverbal Indicators of Pain: nonverbal indicators absent  CPOT Facial Expression: 0-->relaxed, neutral  CPOT Body Movements: 0-->absence of movements  CPOT Muscle Tension: 0-->relaxed  Ventilator Compliance/Vocalization: 0-->talking in normal tone or no sound  CPOT Score: 0  Pain Location: back  Pain Description: intermittent, deep    O:     Intake/Output Summary (Last 24 hours) at 7/5/2024 1613  Last data filed at 7/5/2024 1456  Gross per 24 hour   Intake 120 ml   Output 4720 ml   Net -4600 ml       Diagnostics and current medications: Reviewed.      Current Facility-Administered Medications:     acetaminophen (TYLENOL) tablet 650 mg, 650 mg, Oral, Q6H PRN, Marcellus Pulido MD, 650 mg at 06/29/24 0544    albuterol (PROVENTIL) nebulizer solution 0.083% 2.5 mg/3mL, 2.5 mg, Nebulization, Q4H PRN, Marcellus Pulido MD, 2.5 mg at 06/28/24 1040    allopurinol (ZYLOPRIM) tablet 300 mg, 300 mg, Oral, Daily, Naig Maya MD, 300 mg at 07/05/24 0903    amLODIPine (NORVASC) tablet 5 mg, 5 mg, Oral, BID, Marcellus Pulido MD, 5 mg at 07/05/24 0903    sennosides-docusate (PERICOLACE) 8.6-50 MG per tablet 2 tablet, 2 tablet, Oral, BID PRN, 2 tablet at 07/01/24 2045 **AND** polyethylene glycol (MIRALAX) packet 17 g, 17 g, Oral, Daily PRN, 17 g at 07/03/24 0836 **AND** bisacodyl (DULCOLAX) EC tablet 5 mg, 5 mg, Oral, Daily PRN **AND** bisacodyl (DULCOLAX) suppository 10 mg, 10 mg, Rectal, Daily PRN, Marcellus Pulido MD    buPROPion XL (WELLBUTRIN XL) 24 hr  tablet 150 mg, 150 mg, Oral, Daily, Marcellus Pulido MD, 150 mg at 07/05/24 0904    Calcium Replacement - Follow Nurse / BPA Driven Protocol, , Does not apply, PRN, Marcellus Pulido MD    carvedilol (COREG) tablet 25 mg, 25 mg, Oral, BID With Meals, Alysia Lincoln MD, 25 mg at 07/05/24 0903    cetirizine (zyrTEC) tablet 10 mg, 10 mg, Oral, Daily, Marcellus Pulido MD, 10 mg at 07/05/24 0903    cloNIDine (CATAPRES) tablet 0.2 mg, 0.2 mg, Oral, BID, Alysia Lincoln MD    cyclobenzaprine (FLEXERIL) tablet 10 mg, 10 mg, Oral, TID PRN, Marcellus Pulido MD, 10 mg at 07/05/24 0740    Diclofenac Sodium (VOLTAREN) 1 % gel 4 g, 4 g, Topical, 4x Daily PRN, Marcellus Pulido MD, 4 g at 07/05/24 0740    fluticasone (FLONASE) 50 MCG/ACT nasal spray 2 spray, 2 spray, Nasal, Daily, Marcellus Pulido MD, 2 spray at 07/05/24 0905    [START ON 7/6/2024] furosemide (LASIX) injection 20 mg, 20 mg, Intravenous, Daily, Alysia Lincoln MD    heparin (porcine) 5000 UNIT/ML injection 5,000 Units, 5,000 Units, Subcutaneous, Q12H, Marcellus Pulido MD, 5,000 Units at 07/03/24 0837    hydrALAZINE (APRESOLINE) injection 10 mg, 10 mg, Intravenous, Q4H PRN, Marcellus Pulido MD    HYDROcodone-acetaminophen (NORCO)  MG per tablet 1 tablet, 1 tablet, Oral, Q4H PRN, Lauren Kuo APRN, 1 tablet at 07/05/24 1506    lactulose solution 30 g, 30 g, Oral, TID, Alysia Lincoln MD, 30 g at 07/05/24 1507    Lidocaine 4 % 1 patch, 1 patch, Transdermal, Q24H, Lauren Kuo APRN, 1 patch at 07/05/24 0904    Magnesium Low Dose Replacement - Follow Nurse / BPA Driven Protocol, , Does not apply, PRN, Marcellus Pulido MD    methylnaltrexone (RELISTOR) injection 6 mg, 6 mg, Subcutaneous, Every Other Day, Marc Quevedo APRN, 6 mg at 07/04/24 0921    montelukast (SINGULAIR) tablet 10 mg, 10 mg, Oral, Nightly, Marcellus Pulido MD, 10 mg at 07/04/24 2018    multivitamin with minerals  1 tablet, 1 tablet, Oral, Daily, Marcellus Pulido MD, 1 tablet at 07/05/24 0905    ondansetron (ZOFRAN) injection 4 mg, 4 mg, Intravenous, Q6H PRN, Marcellus Pulido MD    pantoprazole (PROTONIX) EC tablet 40 mg, 40 mg, Oral, Daily, Marcellus Pulido MD, 40 mg at 07/05/24 0910    Phosphorus Replacement - Follow Nurse / BPA Driven Protocol, , Does not apply, PRAshok CHUNG William Lee, MD    Potassium Replacement - Follow Nurse / BPA Driven Protocol, , Does not apply, Ashok DE JESUS William Lee, MD    rOPINIRole (REQUIP) tablet 0.5 mg, 0.5 mg, Oral, Nightly, Marcellus Pulido MD, 0.5 mg at 07/04/24 2018    rosuvastatin (CRESTOR) tablet 10 mg, 10 mg, Oral, Nightly, Marcellus Pulido MD, 10 mg at 07/04/24 2018    sertraline (ZOLOFT) tablet 100 mg, 100 mg, Oral, Daily, Marcellus Pulido MD, 100 mg at 07/05/24 0904    [COMPLETED] Insert Peripheral IV, , , Once **AND** sodium chloride 0.9 % flush 10 mL, 10 mL, Intravenous, PRN, Marcellus Pulido MD    sodium chloride 0.9 % flush 10 mL, 10 mL, Intravenous, Q12H, Marcellus Pulido MD, 10 mL at 07/05/24 0904    sodium chloride 0.9 % flush 10 mL, 10 mL, Intravenous, PRN, Marcellus Pulido MD    sodium chloride 0.9 % flush 10 mL, 10 mL, Intravenous, PRN, Marcellus Pulido MD    sodium chloride 0.9 % infusion 40 mL, 40 mL, Intravenous, PRN, Marcellus Pulido MD    sodium chloride 0.9 % infusion 40 mL, 40 mL, Intravenous, PRN, Marcellus Pulido MD    Allergies   Allergen Reactions    Aspirin GI Bleeding    Niacin Other (See Comments)     I have utilized all available immediate resources to obtain, update, or review the patient's current medications (including all prescriptions, over-the-counter products, herbals, cannabis/cannabidiol products, and vitamin/mineral/dietary (nutritional) supplements) for name, route of administration, type, dose and frequency.    A:    /65 (BP Location: Right arm,  "Patient Position: Lying)   Pulse 67   Temp 97.4 °F (36.3 °C) (Axillary)   Resp 18   Ht 172.7 cm (68\")   Wt 111 kg (244 lb)   LMP  (LMP Unknown)   SpO2 93%   BMI 37.10 kg/m²     Vitals and nursing note reviewed.   Constitutional:       Appearance: Not in distress.   Eyes:      General: Lids are normal.      Pupils: Pupils are equal, round, and reactive to light.   HENT:      Head: Normocephalic.   Pulmonary:      Effort: Pulmonary effort is normal.   Cardiovascular:      Normal rate.   Edema:     Peripheral edema present.  Abdominal:      Palpations: Abdomen is soft.   Musculoskeletal: Normal range of motion.      Cervical back: Neck supple.   Skin:     General: Skin is warm.   Neurological:      Mental Status: Alert   Psychiatric:         Mood and Affect: Mood normal.         Cognition and Memory: Cognition normal.      Patient status: Disease state: Controlled with current treatments.  Current Functional status: Palliative Performance Scale Score: Performance 40% based on the following measures: Ambulation: Mainly in bed, Activity and Evidence of Disease: Unable to do any work, extensive evidence of disease, Self-Care: Mainly assistance required,  Intake: Normal or reduced, LOC: Full, drowsy or confusion   Baseline Functional status: Palliative Performance Scale Score: Performance 70% based on the following measures: Ambulation: Reduced, Activity and Evidence of Disease: Unable to do normal work, some evidence of disease, Self-Care: Fully independent,  Intake: Normal or reduced, LOC: Full   Baseline ECOG Status(3) Capable of limited self-care, confined to bed or chair > 50% of waking hours.  Nutritional status: Albumin 3.9 Body mass index is 37.1 kg/m².      Hospital Problem List      Left renal mass    Cauda equina compression    Metastatic cancer to spine     Impression/Problem List:     Small lymphocytic lymphoma/chronic lymphocytic leukemia, stage IV  Cord compression at L3 s/p laminectomy and " decompression 6/29/2024  Left renal mass, right adrenal mass, and abdominal soft tissue nodules concerning for metastatic disease  Cerebral microvascular disease, per MRI  History of breast cancer, stage I  Left breast nodule 9 mm  Anemia  Thrombocytopenia  Acute kidney injury on chronic kidney disease stage III  Hyperlipidemia  Hypertension     Recommendations/Plan:  1. Plan: Goals of care include status no CPR/limited support interventions.     Family support: The patient receives support from her children and friend, Jose Enrique ..  Advance Directives: Advance Directive Status: Patient does not have advance directive   POA/Healthcare surrogate-Advance directive completed 7/1 with assistance from palliative  and , patient named her friend Jose Enrique followed by 2 children, Kailee and Marques as healthcare surrogates..     2.  Palliative care encounter  - Prognosis is guarded long-term secondary to suspected metastatic disease and comorbidities.  -Patient and HCS appears to have good prognostic awareness.      -Neurosurgery and nephrology consulted.    6/29-Underwent lumbar laminectomy L3 with decompression of the spinal cord and biopsy by Dr. 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    -Oncology consulted, recommend further staging diagnostics.  Anticipating biopsy of abdominal subcutaneous nodules.  MRI brain shows no definite evidence of metastatic disease.    -Radiation oncology consulted for postop radiation.   -Continues to work with therapy, LSO when out of bed per neurosurgery recommendation.    7/1-Advance directive completed today with assistance from palliative  and , patient named her friend Jose Enrique followed by 2 children Kailee and Marques as healthcare surrogates.  7/3-Cytology shows abnormal B-cell peripheral proliferation with some features consistent with small  lymphocytic lymphoma/chronic lymphocytic leukemia, though FISH studies are pending.    -Oncology plans for bone marrow biopsy.  Anticipating initiation of ibrutinib.  Allopurinol initiated.  Plan for outpatient staging PET scan.  Plan outpatient diagnostic mammography to evaluate left breast nodule  -Radiation oncology plans for palliative radiation treatments 5-10 treatment fractions starting Monday 7/8, simulated 7/2.    -Neurosurgery recommends PT OT with brace and outpatient follow-up in 2 weeks.       7/1-CODE STATUS changes no CPR/limited support interventions, no intubation and no artificial nutrition.      7/3-continue supportive measures  -Radiation oncology plans for palliative radiation treatments 5-10 treatment fractions starting Monday 7/8, simulated 7/2.    -Anticipating bone marrow biopsy, and initiation of ibrutinib and outpatient PET scan per oncology and outpatient diagnostic mammography to evaluate left breast nodule  -Neurosurgery follow-up outpatient in 2 weeks  -A MOST document has been reviewed and initiated, attending to complete  -Anticipating home with home health and PT/OT.  Declined SNF and reports will have adequate family caregivers.    3.  Pain, cancer related  -Hydrocodone increased from 7.5 to 10 mg every 4 hours as needed on 7/3/2024 per MARY Rosenberg.   -According to MAR she has received approximately 40 mg of oral morphine equivalent in the last 24 hours in the form of oral hydrocodone.    -Reports she has been sleeping most of the day and is unsure of how effective pain medication is.  -Explained she has been almost 8 hours without a pain pill earlier today and overnight.    -We discussed adjuvants and treatment options further.  -After further discussion expressed wishes to continue with current regimen.   -Recommend continuing Lidoderm patch.  -Voltaren gel as needed.  -Continues to remain unsure about trial of gabapentin.  Discussed option of trial of low dose however  does not wish to trial at this time.     Thank you for allowing us to participate in patient's plan of care. Palliative Care Team will continue to follow patient.     Hodan Blackwood, APRN  7/5/2024  16:13 CDT

## 2024-07-05 NOTE — THERAPY TREATMENT NOTE
Acute Care - Physical Therapy Treatment Note  [facility]     Patient Name: Marina Joe  : 1945  MRN: [id number]  Today's Date: 2024      Visit Dx:     ICD-10-CM ICD-9-CM   1. 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 Left renal mass  N28.89 593.9   3. Right adrenal mass  E27.8 255.8   4. Nodule of soft tissue  M79.89 729.99   5. Acute on chronic renal insufficiency  N28.9 593.9    N18.9 585.9   6. Thrombocytopenia  D69.6 287.5   7. Cauda equina compression  G83.4 344.60   8. Impaired mobility [Z74.09]  Z74.09 799.89   9. HX: breast cancer  Z85.3 V10.3     Patient Active Problem List   Diagnosis    Malignant neoplasm of upper-outer quadrant of female breast    Anemia of chronic renal failure, stage 3 (moderate)    Hypertension, benign    Hx of colonic polyps    HX: breast cancer    Morbid (severe) obesity due to excess calories    Right knee DJD    S/P lumpectomy, left breast    Adult hypothyroidism    Anemia    Anxiety    Breast cancer, left    Cervical pain    Chronic insomnia    Elevated lipids    Herpes zoster without complication    Left ear pain    Left otitis externa    Myalgia    Negative depression screening    Obesity (BMI 30-39.9)    Postmenopausal status    Recurrent acute serous otitis media of left ear    Restless leg    Sinusitis, bacterial    Skin lesion    Vitamin D deficiency    Stage 3b chronic kidney disease    GIB (gastrointestinal bleeding)    Acute on chronic blood loss anemia    Chronic idiopathic thrombocytopenia    Hypotension due to hypovolemia    Primary hypertension    Pancreatic lesion    Left renal mass    Cauda equina compression    Metastatic cancer to spine     Past Medical History:   Diagnosis Date    Breast cancer     CKD (chronic kidney disease)     Hypercholesteremia     Hypertension     Sinusitis     Stage 3a chronic kidney disease 10/20/2020     Past Surgical History:   Procedure Laterality Date    AVULSION TOENAIL PLATE          BREAST BIOPSY Left  Adan Hernandes 3 y.o.    BIB mother.     Chief Complaint   Patient presents with   • Fever     unknown temp at home. Mom states just been giving him Tylenol and Motrin   • Cough     congested cough for 3-4 days.    • Nasal Congestion   • Loss of Appetite     since Sunday.    • Constipation     No BM for 2 days.       /83 mmHg  Pulse 184  Temp(Src) 40.4 °C (104.7 °F)  Resp 40  Wt 11.38 kg (25 lb 1.4 oz)  SpO2 96%    Advised family to keep patient NPO until cleared by ERP.    Pt to lobby, awaiting room assignment, informed to let Triage RN know of any needs, changes, or concerns. Parents verbalized understanding.      11/20/2012    BREAST BIOPSY      Left Breast, 1/2019 per Dr Barkley    BREAST LUMPECTOMY Left     with node bx     COLONOSCOPY  09/13/2013    small polyp at 30cm benign hyperplastic polyp, changes consistent with melanosis coli. Recall 5 years    COLONOSCOPY  11/19/2018    Tics otherwise normal exam repeat in 5 years    COLONOSCOPY  02/13/2023    Normal exam repeat in 3 years with a 2 day prep    ENDOSCOPY  02/13/2023    Gastritis, small HH    LUMBAR LAMINECTOMY Right 6/29/2024    Procedure: LUMBAR LAMINECTOMY L3 WITH DECOMPRESSION 1-2 LEVELS-RIGHT;  Surgeon: Marcellus Pulido MD;  Location: [facility] OR;  Service: Neurosurgery;  Laterality: Right;    REPLACEMENT TOTAL KNEE Right     2016    TOTAL ABDOMINAL HYSTERECTOMY WITH SALPINGO OOPHORECTOMY      UPPER ENDOSCOPIC ULTRASOUND W/ FNA  12/20/2023    Pancreatic cyst s/p FNA     PT Assessment (Last 12 Hours)       PT Evaluation and Treatment       Row Name 07/05/24 0950          Physical Therapy Time and Intention    Subjective Information complains of;pain  B LE numbness  -AE     Document Type therapy note (daily note)  -AE     Mode of Treatment physical therapy  -AE       Row Name 07/05/24 0950          General Information    Existing Precautions/Restrictions brace worn when out of bed  -AE       Row Name 07/05/24 0950          Pain    Pretreatment Pain Rating 10/10  -AE     Posttreatment Pain Rating 10/10  -AE     Pain Location lower  -AE     Pain Location - back  -AE     Pain Intervention(s) Medication (See MAR)  -AE       Row Name 07/05/24 0950          Bed Mobility    Sidelying-Sit [place] (Bed Mobility) minimum assist (75% patient effort);verbal cues  -AE       Row Name 07/05/24 0950          Sit-Stand Transfer    Sit-Stand [place] (Transfers) minimum assist (75% patient effort);2 person assist  -AE       Row Name 07/05/24 0950          Stand-Sit Transfer    Stand-Sit [place] (Transfers) contact guard;2 person assist  -AE       Row Name  07/05/24 0950          Gait/Stairs (Locomotion)    [place] Level (Gait) minimum assist (75% patient effort);2 person assist  -AE     Assistive Device (Gait) walker, front-wheeled  -AE     Distance in Feet (Gait) --  Pt took 3 side steps then seated rest.Pt then took steps to chair with RW  -AE     Bilateral Gait Deviations forward flexed posture  -AE       Row Name             Wound 06/29/24 1458 lumbar spine Incision    Wound - Properties Group Placement Date: 06/29/24  -KT Placement Time: 1458  -KT Present on Original Admission: N  -KT Location: lumbar spine  -KT Primary Wound Type: Incision  -KT    Retired Wound - Properties Group Placement Date: 06/29/24  -KT Placement Time: 1458  -KT Present on Original Admission: N  -KT Location: lumbar spine  -KT Primary Wound Type: Incision  -KT    Retired Wound - Properties Group Date first assessed: 06/29/24  -KT Time first assessed: 1458  -KT Present on Original Admission: N  -KT Location: lumbar spine  -KT Primary Wound Type: Incision  -KT      Row Name 07/05/24 0950          Positioning and Restraints    Pre-Treatment Position in bed  -AE     Post Treatment Position chair  -AE     In Chair sitting;call light within reach;reclined;encouraged to call for assist  -AE               User Key  (r) = Recorded By, (t) = Taken By, (c) = Cosigned By      Initials Name Provider Type    AE Sofia Ann PTA Physical Therapist Assistant    KT Shravan Petit, RN Registered Nurse                    Physical Therapy Education       Title: PT OT SLP Therapies (Done)       Topic: Physical Therapy (Done)       Point: Mobility training (Done)       Learning Progress Summary             Patient Eager, E, VU by AE at 7/5/2024 1023    Comment: POC    Acceptance, E,D, DU,VU by  at 6/30/2024 0951    Comment: benefits of PT and POC, call for A OOB, no BLT, LSO when OOB                         Point: Body mechanics (Done)       Learning Progress Summary             Patient Acceptance,  ANTHONY HERNANDEZ, NILO,VU by  at 6/30/2024 0951    Comment: benefits of PT and POC, call for A OOB, no BLT, LSO when OOB                         Point: Precautions (Done)       Learning Progress Summary             Patient Acceptance, MARY,ANTHONY, NILO,VU by  at 6/30/2024 0951    Comment: benefits of PT and POC, call for A OOB, no BLT, LSO when OOB                                         User Key       Initials Effective Dates Name Provider Type Discipline    AE 02/03/23 -  Sofia Ann PTA Physical Therapist Assistant PT     02/03/23 -  Eliel León, PT Physical Therapist PT                  PT Recommendation and Plan     Plan of Care Reviewed With: patient  Progress: improving  Outcome Evaluation: Pt c/o back pain 10/10 and B LE numbness with R LE being more affected.Pt was min x 1 sidelying to sit and SBA for seated balance and her LSO was donned.Pt was min x 2 for sit to stand and was unable to push from bed.PT/OT will progress to pushing up for t/f's.Pt took side steps with RW then took a seated rest.Pt was able to take steps to chair min x2 with RW.Pt has lift pad under her for safety.PT will continue to progress patient and as of now pt will most likely need continued PT at SNF.Pt states that she wants to go home with family assist.       Time Calculation:    PT Charges       Row Name 07/05/24 1032             Time Calculation    Start Time 0950  -AE      Stop Time 1014  -AE      Time Calculation (min) 24 min  -AE      PT Non-Billable Time (min) 9 min  -AE      PT Received On 07/05/24  -AE      PT Goal Re-Cert Due Date 07/10/24  -AE         Time Calculation- PT    Total Timed Code Minutes- PT 24 minute(s)  -AE         Timed Charges    31611 - PT Therapeutic Activity Minutes 15  -AE         Total Minutes    Timed Charges Total Minutes 15  -AE       Total Minutes 15  -AE                User Key  (r) = Recorded By, (t) = Taken By, (c) = Cosigned By      Initials Name Provider Type    AE Sofia Ann PTA Physical Therapist  Assistant                  Therapy Charges for Today       Code Description Service Date Service Provider Modifiers Qty    34213805949 HC PT THERAPEUTIC ACT EA 15 MIN 7/5/2024 Sofia Ann, PTA GP 1            PT G-Codes  Outcome Measure Options: AM-PAC 6 Clicks Daily Activity (OT)  AM-PAC 6 Clicks Score (PT): 15  AM-PAC 6 Clicks Score (OT): 16    Sofia Ann, LAZARO  7/5/2024

## 2024-07-05 NOTE — THERAPY TREATMENT NOTE
Patient Name: Marina Joe  : 1945    MRN: [id number]                              Today's Date: 2024       Admit Date: 2024    Visit Dx:     ICD-10-CM ICD-9-CM   1. 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 Left renal mass  N28.89 593.9   3. Right adrenal mass  E27.8 255.8   4. Nodule of soft tissue  M79.89 729.99   5. Acute on chronic renal insufficiency  N28.9 593.9    N18.9 585.9   6. Thrombocytopenia  D69.6 287.5   7. Cauda equina compression  G83.4 344.60   8. Impaired mobility [Z74.09]  Z74.09 799.89   9. HX: breast cancer  Z85.3 V10.3     Patient Active Problem List   Diagnosis    Malignant neoplasm of upper-outer quadrant of female breast    Anemia of chronic renal failure, stage 3 (moderate)    Hypertension, benign    Hx of colonic polyps    HX: breast cancer    Morbid (severe) obesity due to excess calories    Right knee DJD    S/P lumpectomy, left breast    Adult hypothyroidism    Anemia    Anxiety    Breast cancer, left    Cervical pain    Chronic insomnia    Elevated lipids    Herpes zoster without complication    Left ear pain    Left otitis externa    Myalgia    Negative depression screening    Obesity (BMI 30-39.9)    Postmenopausal status    Recurrent acute serous otitis media of left ear    Restless leg    Sinusitis, bacterial    Skin lesion    Vitamin D deficiency    Stage 3b chronic kidney disease    GIB (gastrointestinal bleeding)    Acute on chronic blood loss anemia    Chronic idiopathic thrombocytopenia    Hypotension due to hypovolemia    Primary hypertension    Pancreatic lesion    Left renal mass    Cauda equina compression    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 Recall 5 years    COLONOSCOPY  11/19/2018    Tics otherwise normal exam repeat in 5 years    COLONOSCOPY  02/13/2023    Normal exam repeat in 3 years with a 2 day prep    ENDOSCOPY  02/13/2023    Gastritis, small HH    LUMBAR LAMINECTOMY Right 6/29/2024    Procedure: LUMBAR LAMINECTOMY L3 WITH DECOMPRESSION 1-2 LEVELS-RIGHT;  Surgeon: Marcellus Pulido MD;  [facility]: [facility] OR;  Service: Neurosurgery;  Laterality: Right;    REPLACEMENT TOTAL KNEE Right     2016    TOTAL ABDOMINAL HYSTERECTOMY WITH SALPINGO OOPHORECTOMY      UPPER ENDOSCOPIC ULTRASOUND W/ FNA  12/20/2023    Pancreatic cyst s/p FNA      General Information       Row Name 07/05/24 0935          OT Time and Intention    Document Type therapy note (daily note)  -LR     Mode of Treatment occupational therapy  -LR       Row Name 07/05/24 0935          General Information    Patient Profile Reviewed yes  -LR     Existing Precautions/Restrictions brace worn when out of bed;fall;LSO;spinal  -LR               User Key  (r) = Recorded By, (t) = Taken By, (c) = Cosigned By      Initials Name Provider Type    LR Bety Tejeda, OTR/L Occupational Therapist                     Mobility/ADL's       Row Name 07/05/24 0935          Bed Mobility    Bed Mobility rolling left;sidelying-sit  -LR     Rolling Left [place] (Bed Mobility) verbal cues;minimum assist (75% patient effort)  -LR     Sidelying-Sit [place] (Bed Mobility) minimum assist (75% patient effort);verbal cues  -LR     Assistive Device (Bed Mobility) bed rails;head of bed elevated  -LR       Row Name 07/05/24 0935          Transfers    Transfers sit-stand transfer;stand-sit transfer  -LR       Row Name 07/05/24 0935          Sit-Stand Transfer    Sit-Stand  Cavalier (Transfers) minimum assist (75% patient effort);2 person assist  -LR     Assistive Device (Sit-Stand Transfers) walker, front-wheeled  -LR       Row Name 07/05/24 0935          Stand-Sit Transfer    Stand-Sit [place] (Transfers) contact guard;2 person assist  -LR     Assistive Device (Stand-Sit Transfers) walker, front-wheeled  -LR       Row Name 07/05/24 0935          Functional Mobility    Functional Mobility- Ind. Level minimum assist (75% patient effort);2 person assist required;verbal cues required  -LR     Functional Mobility- Device walker, front-wheeled  -LR     Functional Mobility- Comment side-steps toward HOB and steps EOB>chair; R knee buckling noted upon initial attempt at side-steps  -LR       Row Name 07/05/24 0935          Activities of Daily Living    BADL Assessment/Intervention lower body dressing;upper body dressing;grooming  -LR       Row Name 07/05/24 0935          Upper Body Dressing Assessment/Training    Cavalier Level (Upper Body Dressing) don;moderate assist (50% patient effort);verbal cues  LSO  -LR     Position (Upper Body Dressing) edge of bed sitting  -LR       Row Name 07/05/24 0935          Lower Body Dressing Assessment/Training    Cavalier Level (Lower Body Dressing) maximum assist (25% patient effort);verbal cues  -LR     Position (Lower Body Dressing) edge of bed sitting  -LR     Comment, (Lower Body Dressing) adjust socks  -LR       Row Name 07/05/24 0935          Grooming Assessment/Training    Cavalier Level (Grooming) hair care, combing/brushing;wash face, hands;supervision;set up  -LR     Position (Grooming) supported sitting  -LR               User Key  (r) = Recorded By, (t) = Taken By, (c) = Cosigned By      Initials Name Provider Type    Bety Fernandez OTR/L Occupational Therapist                   Obj/Interventions       Row Name 07/05/24 0935          Balance    Balance Assessment sitting static balance;sitting dynamic  balance;standing static balance;standing dynamic balance  -LR     Static Sitting Balance standby assist  -LR     Dynamic Sitting Balance standby assist  -LR     Position, Sitting Balance unsupported;sitting edge of bed  -LR     Static Standing Balance contact guard;minimal assist;2-person assist  -LR     Dynamic Standing Balance minimal assist;2-person assist  -LR     Position/Device Used, Standing Balance supported;walker, front-wheeled  -LR               User Key  (r) = Recorded By, (t) = Taken By, (c) = Cosigned By      Initials Name Provider Type    LR Bety Tejeda, OTR/L Occupational Therapist                   Goals/Plan    No documentation.                  Clinical Impression       Row Name 07/05/24 0935          Pain Assessment    Pretreatment Pain Rating 10/10  -LR     Posttreatment Pain Rating 10/10  -LR     Pain Location - Side/Orientation Bilateral  -LR     Pain Location lower  -LR     Pain Location - back;extremity  -LR     Pre/Posttreatment Pain Comment low back radiating down B LE (R>L)  -LR     Pain Intervention(s) Medication (See MAR);Repositioned;Ambulation/increased activity  -LR       Row Name 07/05/24 0935          Plan of Care Review    Plan of Care Reviewed With patient  -LR     Progress improving  -LR     Outcome Evaluation OT tx completed. Pt presents in [facility], reporting 10/10 low back pain radiating down B LE (R>L). Pt willing to participate and came to EOB with Min A using log roll tech. Pt required Mod A for donning LSO brace at EOB, Max A for adjusting B socks. Pt c/o B LE numbness while seated EOB, Dr. Linclon notified upon entry to room. Pt completed sit<>stand t/f at EOB with Min A x2 required. Pt took side-steps toward HOB and then required seated RB before steps to chair, requiring Min A x2 for completion using FWW. R knee buckling noted with attempted weight shifting upon initial stand. Pt completed grooming while seated in chair with Supervision after set-up. Pt educated  on D/C recommendation of rehab placement at this time d/t decreased ability to care for self and increased fall risk.  -LR       Row Name 07/05/24 0935          Therapy Plan Review/Discharge Plan (OT)    Anticipated Discharge Disposition (OT) skilled nursing facility;sub acute care setting  -LR       Row Name 07/05/24 0935          Vital Signs    O2 Delivery Pre Treatment room air  -LR     O2 Delivery Intra Treatment room air  -LR     Post SpO2 (%) 95  -LR     O2 Delivery Post Treatment room air  -LR     Pre Patient Position Supine  -LR     Intra Patient Position Standing  -LR     Post Patient Position Sitting  -LR       Row Name 07/05/24 0935          Positioning and Restraints    Pre-Treatment Position in bed  -LR     Post Treatment Position chair  -LR     In Chair notified nsg;sitting;call light within reach;encouraged to call for assist;with family/caregiver;waffle cushion;with brace  encouraged to elevate B LE  -LR               User Key  (r) = Recorded By, (t) = Taken By, (c) = Cosigned By      Initials Name Provider Type    Bety Fernandez OTR/L Occupational Therapist                   Outcome Measures       Row Name 07/05/24 0935          How much help from another is currently needed...    Putting on and taking off regular lower body clothing? 2  -LR     Bathing (including washing, rinsing, and drying) 2  -LR     Toileting (which includes using toilet bed pan or urinal) 2  -LR     Putting on and taking off regular upper body clothing 2  -LR     Taking care of personal grooming (such as brushing teeth) 3  -LR     Eating meals 4  -LR     AM-PAC 6 Clicks Score (OT) 15  -LR       Row Name 07/05/24 0935          Functional Assessment    Outcome Measure Options AM-PAC 6 Clicks Daily Activity (OT)  -LR               User Key  (r) = Recorded By, (t) = Taken By, (c) = Cosigned By      Initials Name Provider Type    Bety Fernandez OTR/L Occupational Therapist                    Occupational Therapy  Education       Title: PT OT SLP Therapies (Done)       Topic: Occupational Therapy (Done)       Point: ADL training (Done)       Description:   Instruct learner(s) on proper safety adaptation and remediation techniques during self care or transfers.   Instruct in proper use of assistive devices.                  Learning Progress Summary             Patient Acceptance, E,D, VU,NR by LR at 7/5/2024 1058    Acceptance, E, VU by HH at 7/3/2024 1141    Acceptance, E, VU by HH at 7/2/2024 1436    Acceptance, E, VU by EC at 7/1/2024 0911    Acceptance, E, VU by KJ at 6/30/2024 0941    Acceptance, E, VU by LS at 6/28/2024 1431                         Point: Home exercise program (Done)       Description:   Instruct learner(s) on appropriate technique for monitoring, assisting and/or progressing therapeutic exercises/activities.                  Learning Progress Summary             Patient Acceptance, E, VU by HH at 7/3/2024 1141    Acceptance, E, VU by HH at 7/2/2024 1436    Acceptance, E, VU by EC at 7/1/2024 0911    Acceptance, E, VU by KJ at 6/30/2024 0941                         Point: Precautions (Done)       Description:   Instruct learner(s) on prescribed precautions during self-care and functional transfers.                  Learning Progress Summary             Patient Acceptance, E,D, VU,NR by LR at 7/5/2024 1058    Acceptance, E, VU by HH at 7/3/2024 1141    Acceptance, E, VU by HH at 7/2/2024 1436    Acceptance, E, VU by EC at 7/1/2024 0911    Acceptance, E, VU by KJ at 6/30/2024 0941    Acceptance, E, VU by LS at 6/28/2024 1431                         Point: Body mechanics (Done)       Description:   Instruct learner(s) on proper positioning and spine alignment during self-care, functional mobility activities and/or exercises.                  Learning Progress Summary             Patient Acceptance, E,D, VU,NR by LR at 7/5/2024 1058    Acceptance, E, VU by HH at 7/3/2024 1141    Acceptance, E, VU by HH at  7/2/2024 1436    Acceptance, E, VU by EC at 7/1/2024 0911    Acceptance, E, VU by KJ at 6/30/2024 0941    Acceptance, E, VU by LS at 6/28/2024 1431                                         User Key       Initials Effective Dates Name Provider Type Discipline    LS 06/20/22 -  Stacey Reagan, OTR/L Occupational Therapist OT    LR 04/25/23 -  Bety Tejeda, OTR/L Occupational Therapist OT    KJ 04/15/24 -  Joanna Marie, OT Student OT Student OT    EC 10/13/23 -  Davida Reyes, OTR/L Occupational Therapist OT     04/15/24 -  Kim Freed, OT Student OT Student OT                  OT Recommendation and Plan     Plan of Care Review  Plan of Care Reviewed With: patient  Progress: improving  Outcome Evaluation: OT tx completed. Pt presents in [facility], reporting 10/10 low back pain radiating down B LE (R>L). Pt willing to participate and came to EOB with Min A using log roll tech. Pt required Mod A for donning LSO brace at EOB, Max A for adjusting B socks. Pt c/o B LE numbness while seated EOB, Dr. Lincoln notified upon entry to room. Pt completed sit<>stand t/f at EOB with Min A x2 required. Pt took side-steps toward HOB and then required seated RB before steps to chair, requiring Min A x2 for completion using FWW. R knee buckling noted with attempted weight shifting upon initial stand. Pt completed grooming while seated in chair with Supervision after set-up. Pt educated on D/C recommendation of rehab placement at this time d/t decreased ability to care for self and increased fall risk.     Time Calculation:         Time Calculation- OT       Row Name 07/05/24 0935             Time Calculation- OT    OT Start Time 0935  co-tx x10 min  -LR      OT Stop Time 1045  -LR      OT Time Calculation (min) 70 min  -LR      Total Timed Code Minutes- OT 60 minute(s)  -LR      OT Received On 07/05/24  -LR         Timed Charges    75381 - OT Self Care/Mgmt Minutes 60  -LR         Total Minutes    Timed Charges Total Minutes  60  -LR       Total Minutes 60  -LR                User Key  (r) = Recorded By, (t) = Taken By, (c) = Cosigned By      Initials Name Provider Type    LR Bety Tejeda OTR/L Occupational Therapist                  Therapy Charges for Today       Code Description Service Date Service Provider Modifiers Qty    [id number] HC OT SELF CARE/MGMT/TRAIN EA 15 MIN 7/5/2024 Bety Tejeda OTR/L GO 4                 LORETTA Huntley/MARY  7/5/2024

## 2024-07-05 NOTE — PROGRESS NOTES
[facility] Medicine Services  INPATIENT PROGRESS NOTE    Patient Name: Marina Joe  Date of Admission: 6/28/2024  Today's Date: 07/05/24  Length of Stay: 7  Primary Care Physician: Hanh Og APRN    Subjective   Chief Complaint: Lower back pain          HPI   Ms. Joe is a 79-year-old female from home with past medical history of breast cancer, chronic kidney disease stage IIIa, hypercholesterolemia, hypertension and chronic sinusitis, who presented to the emergency room with worsening back pain/flank pain, history was mostly provided by patient's boyfriend at bedside.  She developed back pain couple of weeks ago and was referred to a pain clinic doctor where she was prescribed gabapentin, after she took about 2-3 doses of the gabapentin, she developed dizziness. She came to the emergency room today because lower back pain and flank pain got even worse despite being on the gabapentin.  In the emergency room, she was extensively worked up with CT of the abdomen and lumbar spine, CT abdomen showed extensive mass of the left kidney extending up to the proximal left ureter as well as right adrenal gland mass suspicious for metastasis.  Urology was consulted from the emergency room for input.  7/1  As above history of chronic kidney disease hyperlipidemia hypertension presented with back pain found to have kidney mass suspicious for metastasis patient CT of the lumbar spine shows multilevel degenerative changes to include an anteriolisthesis of L3 over L4 and spondylosis at L5-S1. 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 awaiting final pathology will consult with palliative  team to address the CODE STATUS and plan of care  7/2  Appreciate palliative care the patient is currently DNR, appreciate neurosurgery status post L3 laminectomy and decompression on June 29 for cauda equina, appreciate oncology continues to work with physical therapy appreciate  radiation oncology patient started radiation today  7/3  Patient oxygen is 85% on 8 L, patient blood pressure is poorly controlled increase her Coreg we will repeat her chest x-ray chest x-ray from before was showing some congestion IV Hep-Lock will give her 1 dose of Lasix  7/4  As before remains on 8 L oxygen documented excision was not titrated will titrate oxygen, chest x-ray showing cardiomegaly pulmonary edema the patient has poor inspiration we did Hep-Lock her IV fluid we will continue IV Lasix blood pressure remains poorly controlled increase her Coreg we will check her echo of the heart as above she was not having bowel movement for most time was started on lactulose, patient had good bowel movement it was mistakenly documented that she is on 8 L the patient is actually on room air  7/5  Patient echo of the heart is showing good EF 56 to 60% blood pressure remains a challenge will increase her Catapres I will decrease her  Lasix to 20 mg daily continue to work with PT OT, she had nephrology signed off, pain is not well controlled, will consult palliative for pain control   Review of Systems   All pertinent negatives and positives are as above. All other systems have been reviewed and are negative unless otherwise stated.     Objective    Temp:  [97.6 °F (36.4 °C)-98.8 °F (37.1 °C)] 97.9 °F (36.6 °C)  Heart Rate:  [71-77] 74  Resp:  [18] 18  BP: (102-187)/(54-77) 187/77  Physical Exam    GEN: Awake, alert, interactive, in NAD  HEENT: Atraumatic, PERRLA, EOMI, Anicteric, Trachea midline  Lungs: CTAB, no wheezing/rales/rhonchi  Heart: RRR, +S1/s2, no rub  ABD: soft, nt/nd, +BS, no guarding/rebound  Extremities: atraumatic, no  cyanosis, no edema  Skin: no rashes or lesions  Neuro: AAOx3, no focal deficits  Psych: normal mood & affect    Results Review:  I have reviewed the labs, radiology results, and diagnostic studies.    Laboratory Data:   Results from last 7 days   Lab Units 07/05/24 0416 07/04/24 0301 07/03/24 0440   WBC 10*3/mm3 7.11 7.05 7.21   HEMOGLOBIN g/dL 9.5* 8.6* 8.2*   HEMATOCRIT % 28.9* 27.6* 26.3*   PLATELETS 10*3/mm3 101* 88* 106*        Results from last 7 days   Lab Units 07/05/24 0416 07/04/24  0301 07/03/24 0440 07/01/24  0432 06/30/24  0434   SODIUM mmol/L 136 136 137   < > 137   POTASSIUM mmol/L 3.6 3.8 4.2   < > 4.0   CHLORIDE mmol/L 94* 100 103   < > 101   CO2 mmol/L 30.0* 24.0 25.0   < > 24.0   BUN mg/dL 21 20 27*   < > 34*   CREATININE mg/dL 1.54* 1.29* 1.47*   < > 2.39*   CALCIUM mg/dL 9.8 9.4 8.9   < > 8.4*   BILIRUBIN mg/dL  --   --   --   --  <0.2   ALK PHOS U/L  --   --   --   --  78   ALT (SGPT) U/L  --   --   --   --  21   AST (SGOT) U/L  --   --   --   --  35*   GLUCOSE mg/dL 102* 105* 93   < > 120*    < > = values in this interval not displayed.       Culture Data:   Urine Culture   Date Value Ref Range Status   06/28/2024 >100,000 CFU/mL Mixed Bhavana Isolated  Final       Radiology Data:   Imaging Results (Last 24 Hours)       ** No results found for the last 24 hours. **            I have reviewed the patient's current medications.     Assessment/Plan   Assessment  Active Hospital Problems    Diagnosis     **Left renal mass     Cauda equina compression     Metastatic cancer to spine     Morbid (severe) obesity due to excess calories        Treatment Plan  Lower back pain  Patient's low back pain has been getting worse in the last couple of weeks, was started on gabapentin outpatient but did not help.  MRI of the lumbar spine reported as follows-  IMPRESSION:   Neoplastic process involving the L3 vertebral body with associated  posterior soft tissue component resulting in severe spinal  canal  narrowing and cauda equina nerve root compression. The soft tissue  component appears to also extend into the right L3-L4 foramen. No  associated pathological fracture.  Additional multilevel spondylotic changes including moderate to severe  spinal canal and foraminal narrowing as detailed above.  Neurosurgery is on consult and did the following procedure-  Procedure(s):  LUMBAR LAMINECTOMY L3 WITH DECOMPRESSION 1-2 LEVELS-RIGHT     Decompression -  Lumbar laminectomy, medial facetectomy for decompression of the spinal cord  Open laminectomy and biopsy of epidural neoplasm  Use of intraoperative microscope      -Acute on chronic kidney disease stage IIIa  Patient follows up with a nephrologist outpatient, but he does not come to this hospital.  Nephrologist consulted and helping with management while patient is in-house.     -Left renal mass/right adrenal mass on CT of the abdomen/pelvis  Urology was consulted from the emergency room and after evaluation did not recommend any intervention,rather recommended oncology consult.  Patient has multiple subcutaneous nodules that may be amenable to percutaneous biopsy.  Oncology is on consult and has evaluated patient, will await for tentative diagnosis from lumbar spine biopsy before making recommendations      Other chronic medical conditions-  History of breast cancer  Hypercholesterolemia  Hypertension  Chronic sinusitis     DVT prophylaxis-heparin       Medical Decision Making  Number and Complexity of problems: High    Conditions and Status        Condition is unchanged.     MDM Data  External documents reviewed: No  Cardiac tracing (EKG, telemetry) interpretation: Yes  Radiology interpretation: Yes  Labs reviewed: Yes  Any tests that were considered but not ordered: No     Decision rules/scores evaluated (example [id number], Wells, etc): Yes     Discussed with: Patient     Care Planning  Shared decision making: Yes  Code status and discussions: Yes [full  code]    Disposition  Social Determinants of Health that impact treatment or disposition: No  I expect the patient to be discharged to unknown in unknown days.     Electronically signed by Alysia Lincoln MD, 07/05/24, 09:37 CDT.

## 2024-07-05 NOTE — PROGRESS NOTES
[facility] [place] PULMONARY AND CRITICAL CARE PROGRESS NOTE - [facility]    Patient: Marina Joe  1945   MR# [id number]   Acct# [id number]  07/05/24   18:16 CDT  Referring Provider: Alysia Lincoln MD    Chief Complaint:  Hypoxia and oxygen desaturation, abnormal imaging studies obesity suspected sleep apnea      Interval history: Patient was seen in the follow-up visit in pulmonary rounds in medical floor today.  She remained stable on room air.  Overnight oximetry done but I am not sure why the report is not IV asked respiratory to download the report but it is still not available in the chart.  Will look for the report and decide with the patient any oxygen at night.  She will definitely need an outpatient sleep study for possible sleep apnea and need a PFT for possible asthma.  She apparently had a fall without any apparent injury.  She is getting Zyrtec and Singulair for nasal allergy and also gets Requip for restless leg syndrome.  No other acute events overnight.  Back pain is better.  Oncology is following difficult has been consulted patient is a limited code now.    Meds:  allopurinol, 300 mg, Oral, Daily  amLODIPine, 5 mg, Oral, BID  buPROPion XL, 150 mg, Oral, Daily  carvedilol, 25 mg, Oral, BID With Meals  cetirizine, 10 mg, Oral, Daily  cloNIDine, 0.2 mg, Oral, BID  fluticasone, 2 spray, Nasal, Daily  [START ON 7/6/2024] furosemide, 20 mg, Intravenous, Daily  heparin (porcine), 5,000 Units, Subcutaneous, Q12H  lactulose, 30 g, Oral, TID  Lidocaine, 1 patch, Transdermal, Q24H  methylnaltrexone, 6 mg, Subcutaneous, Every Other Day  montelukast, 10 mg, Oral, Nightly  multivitamin with minerals, 1 tablet, Oral, Daily  pantoprazole, 40 mg, Oral, Daily  rOPINIRole, 0.5 mg, Oral, Nightly  rosuvastatin, 10 mg, Oral, Nightly  sertraline, 100 mg, Oral, Daily  sodium chloride, 10 mL, Intravenous, Q12H         Physical Exam:  SpO2 Percentage    07/05/24 0800 07/05/24 1207 07/05/24 1601   SpO2: 93% 96%  93%     Body mass index is 37.1 kg/m².   Temp:  [97.4 °F (36.3 °C)-98.8 °F (37.1 °C)] 97.4 °F (36.3 °C)  Heart Rate:  [64-77] 67  Resp:  [18] 18  BP: (132-187)/(54-77) 155/65  Intake/Output Summary (Last 24 hours) at 7/5/2024 1816  Last data filed at 7/5/2024 1456  Gross per 24 hour   Intake 120 ml   Output 4720 ml   Net -4600 ml     Physical Exam      Vitals and nursing note reviewed.   Constitutional:       General: She is not in acute distress.     Appearance: She is obese. She is not ill-appearing.      Comments: Elderly -American female appears comfortable in no acute distress   HENT:      Head: Normocephalic and atraumatic.      Right Ear: Tympanic membrane normal. There is no impacted cerumen.      Left Ear: Tympanic membrane normal. There is no impacted cerumen.      Nose: Congestion present. No rhinorrhea.      Mouth/Throat:      Mouth: Mucous membranes are moist.      Pharynx: Oropharynx is clear. No oropharyngeal exudate or posterior oropharyngeal erythema.      Comments: Narrow upper airway with Mallampati class III  Eyes:      General: No scleral icterus.        Right eye: No discharge.         Left eye: No discharge.      Extraocular Movements: Extraocular movements intact.      Conjunctiva/sclera: Conjunctivae normal.      Pupils: Pupils are equal, round, and reactive to light.   Neck:      Vascular: No carotid bruit.   Cardiovascular:      Rate and Rhythm: Regular rhythm. Tachycardia present.      Pulses: Normal pulses.      Heart sounds: Normal heart sounds. No murmur heard.     No friction rub. No gallop.   Pulmonary:      Effort: Pulmonary effort is normal. No respiratory distress.      Breath sounds: No stridor. No wheezing, rhonchi or rales.      Comments: Decreased breath sound at the bases  Chest:      Chest wall: No tenderness.   Abdominal:      General: Abdomen is flat. Bowel sounds are normal. There is no distension.      Palpations: Abdomen is soft. There is no mass.       Tenderness: There is no abdominal tenderness. There is no guarding or rebound.      Hernia: No hernia is present.   Genitourinary:     Comments: Not examined  Musculoskeletal:         General: No swelling, tenderness, deformity or signs of injury. Normal range of motion.      Cervical back: Normal range of motion and neck supple. No rigidity or tenderness.      Right lower leg: No edema.      Left lower leg: No edema.   Lymphadenopathy:      Cervical: No cervical adenopathy.   Skin:     General: Skin is warm and dry.      Capillary Refill: Capillary refill takes less than 2 seconds.      Coloration: Skin is not jaundiced or pale.      Findings: No bruising, erythema, lesion or rash.   Neurological:      General: No focal deficit present.      Mental Status: She is oriented to person, place, and time. Mental status is at baseline.      Cranial Nerves: No cranial nerve deficit.      Sensory: No sensory deficit.      Motor: Weakness present.      Coordination: Coordination normal.      Deep Tendon Reflexes: Reflexes normal.      Comments: Back support in place due to recent back surgery.   Psychiatric:         Mood and Affect: Mood normal.         Behavior: Behavior normal.         Thought Content: Thought content normal.         Judgment: Judgment normal.        Laboratory Data:  Results from last 7 days   Lab Units 07/05/24  0416 07/04/24  0301 07/03/24  0440   WBC 10*3/mm3 7.11 7.05 7.21   HEMOGLOBIN g/dL 9.5* 8.6* 8.2*   PLATELETS 10*3/mm3 101* 88* 106*     Results from last 7 days   Lab Units 07/05/24  0416 07/04/24  0301 07/03/24  0440 07/02/24  0414 07/01/24  1053 07/01/24  0432 06/30/24  0434 06/29/24  1858 06/29/24  1642   SODIUM mmol/L 136 136 137   < >  --    < > 137   < >  --    POTASSIUM mmol/L 3.6 3.8 4.2   < >  --    < > 4.0   < >  --    BUN mg/dL 21 20 27*   < >  --    < > 34*   < >  --    CREATININE mg/dL 1.54* 1.29* 1.47*   < >  --    < > 2.39*   < >  --    INR   --   --   --   --  0.99  --  1.04  --   "1.04    < > = values in this interval not displayed.         Microbiology Results (last 10 days)       Procedure Component Value - Date/Time    Wet Prep, Genital - Swab, Vagina [[id number]]  (Abnormal) Collected: 06/28/24 0854    Lab Status: Final result Specimen: Swab from Vagina Updated: 06/28/24 0935     YEAST No yeast seen     HYPHAL ELEMENTS No Hyphal elements seen     WBC'S 1+ WBC's seen     Clue Cells, Wet Prep No Clue cells seen     Trichomonas, Wet Prep No Trichomonas seen    Chlamydia trachomatis, Neisseria gonorrhoeae, PCR - Swab, Cervix [[id number]]  (Normal) Collected: 06/28/24 0854    Lab Status: Final result Specimen: Swab from Cervix Updated: 06/28/24 1435     Chlamydia DNA by PCR Not Detected     Neisseria gonorrhoeae by PCR Not Detected    Narrative:      Disclaimer: The Aptima Combo 2 assay is a target amplification nucleic acid probe test that utilizes target capture for the in vitro qualitative detection and differentiation of ribosomal RNA from Chlamydia trachomatis and Neisseria gonorrhoeae to aid in the diagnosis of chlamydial and/or gonococcal urogenital disease.  Cell culture was once considered to be the \"gold standard\" for detection of CT and NG.  Culture is quite specific, but scientific studies have demonstrated that the NAAT DNA probe technologies have higher clinical sensitivities than culture.    Urine Culture - Urine, Urine, Clean Catch [[id number]] Collected: 06/28/24 0711    Lab Status: Final result Specimen: Urine, Clean Catch Updated: 06/29/24 1141     Urine Culture >100,000 CFU/mL Mixed Bhavana Isolated    Narrative:      Specimen contains mixed organisms of questionable pathogenicity suggestive of contamination. If symptoms persist, suggest recollection.  Colonization of the urinary tract without infection is common. Treatment is discouraged unless the patient is symptomatic, pregnant, or undergoing an invasive urologic procedure.          Recent films:  No radiology results for " the last day  Personal review of imaging : Reviewed other patients and agree with interpretation.  No new x-rays done today.    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  I am not sure whether it was done last night  I will order overnight oximetry again tonight.  We will see if patient can qualify for overnight oxygen  Plan for outpatient sleep study and patient will need CPAP as she may have underlying obstructive sleep apnea  Continue diuresis patient improved on diuresis.  She remains on the room air  No bronchodilator treatment needed.  Continue sinus medications with Zyrtec nasal spray and Singulair  Neurosurgery following after the recent back surgery and kyphoplasty.    Oncology following for metastatic breast cancer with spine metastasis  DVT and stress ulcer prophylaxis.  Pain and anxiety control.  Repeat labs and imaging studies from time to time  CODE STATUS: Limited code.  Overall prognosis: Guarded.  We will follow    Electronically signed by     Marcos Castro MD,  Pulmonologist/Intensivist   07/05/24, 18:16 CDT

## 2024-07-05 NOTE — PLAN OF CARE
Goal Outcome Evaluation:  Plan of Care Reviewed With: patient        Progress: improving  Outcome Evaluation: OT tx completed. Pt presents in wlers, reporting 10/10 low back pain radiating down B LE (R>L). Pt willing to participate and came to EOB with Min A using log roll tech. Pt required Mod A for donning LSO brace at EOB, Max A for adjusting B socks. Pt c/o B LE numbness while seated EOB, Dr. Lincoln notified upon entry to room. Pt completed sit<>stand t/f at EOB with Min A x2 required. Pt took side-steps toward HOB and then required seated RB before steps to chair, requiring Min A x2 for completion using FWW. R knee buckling noted with attempted weight shifting upon initial stand. Pt completed grooming while seated in chair with Supervision after set-up. Pt educated on D/C recommendation of rehab placement at this time d/t decreased ability to care for self and increased fall risk.      Anticipated Discharge Disposition (OT): skilled nursing facility, sub acute care setting

## 2024-07-05 NOTE — PLAN OF CARE
Goal Outcome Evaluation:           Progress: improving     VSS.  RA.  Fall precautions.  IID with IV lasix.  Brace when OOB.  Dsg intact to lumbar spine.  Unable to tolerate bone marrow biopsy as can't maintained flat on abdomen for procedure.  Purewick with good UO.  Potassium replaced per protocol.  Medicated for pain X2.  A/O.  Safety maintained.

## 2024-07-05 NOTE — CASE MANAGEMENT/SOCIAL WORK
Continued Stay Note  [facility]     Patient Name: Marina Joe  MRN: [id number]  Today's Date: 7/5/2024    Admit Date: 6/28/2024    Plan: Undetermined   Discharge Plan       Row Name 07/05/24 1556       Plan    Plan Undetermined    Plan Comments Pt would benefit from snf at d/c but she is wanting to go home. She lives with a significant other. WIll follow.                   Discharge Codes    No documentation.                       JW Villalba

## 2024-07-05 NOTE — PLAN OF CARE
Goal Outcome Evaluation:  Plan of Care Reviewed With: patient        Progress: no change  Outcome Evaluation: LOS nutrition assessment. Pt presented to ED d/t chronic pain. She is noted to have a renal mass suspicious for metastatic disease. She is s/p laminectomy with decompression. She is on a C.CHO diet. She has consumed 69% of the last 4 meals. She is a limited intervention code status to include no artificial nutrition. Recommend to start Boost gluc control BID to supplement intake.

## 2024-07-05 NOTE — PLAN OF CARE
Goal Outcome Evaluation:  Plan of Care Reviewed With: patient        Progress: improving  Outcome Evaluation: Pt c/o back pain 10/10 and B LE numbness with R LE being more affected.Pt was min x 1 sidelying to sit and SBA for seated balance and her LSO was donned.Pt was min x 2 for sit to stand and was unable to push from bed.PT/OT will progress to pushing up for t/f's.Pt took side steps with RW then took a seated rest.Pt was able to take steps to chair min x2 with RW.Pt has lift pad under her for safety.PT will continue to progress patient and as of now pt will most likely need continued PT at SNF.Pt states that she wants to go home with family assist.

## 2024-07-05 NOTE — PLAN OF CARE
Problem: Adult Inpatient Plan of Care  Goal: Plan of Care Review  Outcome: Ongoing, Progressing  Flowsheets (Taken 7/5/2024 0630)  Progress: no change  Plan of Care Reviewed With: patient  Outcome Evaluation: Patient rested well. Complains of pain x1 this far. Voiding. Room air. Bed check. VSS. Safety maintained.

## 2024-07-05 NOTE — THERAPY TREATMENT NOTE
Acute Care - Physical Therapy Treatment Note  [facility]     Patient Name: Marina Joe  : 1945  MRN: [id number]  Today's Date: 2024      Visit Dx:     ICD-10-CM ICD-9-CM   1. 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 Left renal mass  N28.89 593.9   3. Right adrenal mass  E27.8 255.8   4. Nodule of soft tissue  M79.89 729.99   5. Acute on chronic renal insufficiency  N28.9 593.9    N18.9 585.9   6. Thrombocytopenia  D69.6 287.5   7. Cauda equina compression  G83.4 344.60   8. Impaired mobility [Z74.09]  Z74.09 799.89   9. HX: breast cancer  Z85.3 V10.3     Patient Active Problem List   Diagnosis    Malignant neoplasm of upper-outer quadrant of female breast    Anemia of chronic renal failure, stage 3 (moderate)    Hypertension, benign    Hx of colonic polyps    HX: breast cancer    Morbid (severe) obesity due to excess calories    Right knee DJD    S/P lumpectomy, left breast    Adult hypothyroidism    Anemia    Anxiety    Breast cancer, left    Cervical pain    Chronic insomnia    Elevated lipids    Herpes zoster without complication    Left ear pain    Left otitis externa    Myalgia    Negative depression screening    Obesity (BMI 30-39.9)    Postmenopausal status    Recurrent acute serous otitis media of left ear    Restless leg    Sinusitis, bacterial    Skin lesion    Vitamin D deficiency    Stage 3b chronic kidney disease    GIB (gastrointestinal bleeding)    Acute on chronic blood loss anemia    Chronic idiopathic thrombocytopenia    Hypotension due to hypovolemia    Primary hypertension    Pancreatic lesion    Left renal mass    Cauda equina compression    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 Recall 5 years    COLONOSCOPY  11/19/2018    Tics otherwise normal exam repeat in 5 years    COLONOSCOPY  02/13/2023    Normal exam repeat in 3 years with a 2 day prep    ENDOSCOPY  02/13/2023    Gastritis, small HH    LUMBAR LAMINECTOMY Right 6/29/2024    Procedure: LUMBAR LAMINECTOMY L3 WITH DECOMPRESSION 1-2 LEVELS-RIGHT;  Surgeon: Marcellus Pulido MD;  Location:  [facility] OR;  Service: Neurosurgery;  Laterality: Right;    REPLACEMENT TOTAL KNEE Right     2016    TOTAL ABDOMINAL HYSTERECTOMY WITH SALPINGO OOPHORECTOMY      UPPER ENDOSCOPIC ULTRASOUND W/ FNA  12/20/2023    Pancreatic cyst s/p FNA     PT Assessment (Last 12 Hours)       PT Evaluation and Treatment       Row Name 07/05/24 1354 07/05/24 0950       Physical Therapy Time and Intention    Subjective Information complains of;pain  -AE complains of;pain  B LE numbness  -AE    Document Type therapy note (daily note)  -AE therapy note (daily note)  -AE    Mode of Treatment physical therapy  -AE physical therapy  -AE      Row Name 07/05/24 1354 07/05/24 0950       General Information    Existing Precautions/Restrictions brace worn when out of bed;fall  -AE brace worn when out of bed  -AE      Row Name 07/05/24 1354 07/05/24 0950       Pain    Pretreatment Pain Rating 10/10  -AE 10/10  -AE    Posttreatment Pain Rating 10/10  -AE 10/10  -AE    Pain Location -- lower  -AE    Pain Location - back  -AE back  -AE    Pain Intervention(s) Medication (See MAR)  -AE Medication (See MAR)  -AE      Row Name 07/05/24 1354 07/05/24 0950       Bed Mobility    Sidelying-Sit [place] (Bed Mobility) -- minimum assist (75% patient effort);verbal cues  -AE    Sit-Sidelying [place] (Bed Mobility) minimum assist (75% patient effort);verbal  cues  -AE --    Comment, (Bed Mobility) up in chair  -AE --      Row Name 07/05/24 1354 07/05/24 0950       Sit-Stand Transfer    Sit-Stand [place] (Transfers) minimum assist (75% patient effort);contact guard;2 person assist  -AE minimum assist (75% patient effort);2 person assist  -AE      Row Name 07/05/24 1354 07/05/24 0950       Stand-Sit Transfer    Stand-Sit [place] (Transfers) minimum assist (75% patient effort);verbal cues  -AE contact guard;2 person assist  -AE      Row Name 07/05/24 1354 07/05/24 0950       Gait/Stairs (Locomotion)    [place] Level (Gait) minimum assist (75% patient effort)  -AE minimum assist (75% patient effort);2 person assist  -AE    Assistive Device (Gait) --  HHA to BSC with then RW back to chair.Pt then took steps to bed with RW min x 1.  -AE walker, front-wheeled  -AE    Distance in Feet (Gait) -- --  Pt took 3 side steps then seated rest.Pt then took steps to chair with RW  -AE    Bilateral Gait Deviations forward flexed posture  -AE forward flexed posture  -AE      Row Name             Wound 06/29/24 1458 lumbar spine Incision    Wound - Properties Group Placement Date: 06/29/24  -KT Placement Time: 1458 -KT Present on Original Admission: N  -KT Location: lumbar spine  -KT Primary Wound Type: Incision  -KT    Retired Wound - Properties Group Placement Date: 06/29/24  -KT Placement Time: 1458  -KT Present on Original Admission: N  -KT Location: lumbar spine  -KT Primary Wound Type: Incision  -KT    Retired Wound - Properties Group Date first assessed: 06/29/24  -KT Time first assessed: 1458  -KT Present on Original Admission: N  -KT Location: lumbar spine  -KT Primary Wound Type: Incision  -KT      Row Name 07/05/24 1354 07/05/24 0950       Positioning and Restraints    Pre-Treatment Position sitting in chair/recliner  -AE in bed  -AE    Post Treatment Position chair  -AE chair  -AE    In Bed sitting;call light within reach  -AE --    In Chair -- sitting;call  light within reach;reclined;encouraged to call for assist  -AE              User Key  (r) = Recorded By, (t) = Taken By, (c) = Cosigned By      Initials Name Provider Type    AE Sofia Ann, LAZARO Physical Therapist Assistant     Shravan Petit, RN Registered Nurse                    Physical Therapy Education       Title: PT OT SLP Therapies (Done)       Topic: Physical Therapy (Done)       Point: Mobility training (Done)       Learning Progress Summary             Patient Eager, E, VU by  at 7/5/2024 1023    Comment: POC    Acceptance, E,D, DU,VU by  at 6/30/2024 0951    Comment: benefits of PT and POC, call for A OOB, no BLT, LSO when OOB                         Point: Body mechanics (Done)       Learning Progress Summary             Patient Acceptance, E,D, DU,VU by  at 6/30/2024 0951    Comment: benefits of PT and POC, call for A OOB, no BLT, LSO when OOB                         Point: Precautions (Done)       Learning Progress Summary             Patient Acceptance, E,D, DU,VU by  at 6/30/2024 0951    Comment: benefits of PT and POC, call for A OOB, no BLT, LSO when OOB                                         User Key       Initials Effective Dates Name Provider Type Discipline     02/03/23 -  Sofia Ann PTA Physical Therapist Assistant PT     02/03/23 -  Eliel León, PT Physical Therapist PT                  PT Recommendation and Plan     Plan of Care Reviewed With: patient  Progress: improving  Outcome Evaluation: Pt c/o back pain 10/10 and B LE numbness with R LE being more affected.Pt was min x 1 sidelying to sit and SBA for seated balance and her LSO was donned.Pt was min x 2 for sit to stand and was unable to push from bed.PT/OT will progress to pushing up for t/f's.Pt took side steps with RW then took a seated rest.Pt was able to take steps to chair min x2 with RW.Pt has lift pad under her for safety.PT will continue to progress patient and as of now pt will most likely need  continued PT at SNF.Pt states that she wants to go home with family assist.       Time Calculation:    PT Charges       Row Name 07/05/24 1429 07/05/24 1032          Time Calculation    Start Time 1354  -AE 0950  -AE     Stop Time 1417  -AE 1014  -AE     Time Calculation (min) 23 min  -AE 24 min  -AE     PT Non-Billable Time (min) -- 9 min  -AE     PT Received On 07/05/24  -AE 07/05/24  -AE     PT Goal Re-Cert Due Date 07/10/24  -AE 07/10/24  -AE        Time Calculation- PT    Total Timed Code Minutes- PT 23 minute(s)  -AE 24 minute(s)  -AE        Timed Charges    21891 - PT Therapeutic Activity Minutes 23  -AE 15  -AE        Total Minutes    Timed Charges Total Minutes 23  -AE 15  -AE      Total Minutes 23  -AE 15  -AE               User Key  (r) = Recorded By, (t) = Taken By, (c) = Cosigned By      Initials Name Provider Type    AE Sofia Ann PTA Physical Therapist Assistant                  Therapy Charges for Today       Code Description Service Date Service Provider Modifiers Qty    03451715111 HC PT THERAPEUTIC ACT EA 15 MIN 7/5/2024 Sofia Ann PTA GP 1    96873215715 HC PT THERAPEUTIC ACT EA 15 MIN 7/5/2024 Sofia Ann PTA GP 2            PT G-Codes  Outcome Measure Options: AM-PAC 6 Clicks Daily Activity (OT)  AM-PAC 6 Clicks Score (PT): 15  AM-PAC 6 Clicks Score (OT): 15    Sofia Ann PTA  7/5/2024

## 2024-07-06 LAB
ALBUMIN SERPL-MCNC: 4 G/DL (ref 3.5–5.2)
ALBUMIN/GLOB SERPL: 1.3 G/DL
ALP SERPL-CCNC: 107 U/L (ref 39–117)
ALT SERPL W P-5'-P-CCNC: 27 U/L (ref 1–33)
ANION GAP SERPL CALCULATED.3IONS-SCNC: 11 MMOL/L (ref 5–15)
AST SERPL-CCNC: 40 U/L (ref 1–32)
BASOPHILS # BLD AUTO: 0.03 10*3/MM3 (ref 0–0.2)
BASOPHILS NFR BLD AUTO: 0.4 % (ref 0–1.5)
BILIRUB SERPL-MCNC: 0.4 MG/DL (ref 0–1.2)
BUN SERPL-MCNC: 29 MG/DL (ref 8–23)
BUN/CREAT SERPL: 13.6 (ref 7–25)
CALCIUM SPEC-SCNC: 9.7 MG/DL (ref 8.6–10.5)
CHLORIDE SERPL-SCNC: 97 MMOL/L (ref 98–107)
CO2 SERPL-SCNC: 28 MMOL/L (ref 22–29)
CREAT SERPL-MCNC: 2.14 MG/DL (ref 0.57–1)
DEPRECATED RDW RBC AUTO: 52.1 FL (ref 37–54)
EGFRCR SERPLBLD CKD-EPI 2021: 23.2 ML/MIN/1.73
EOSINOPHIL # BLD AUTO: 0.24 10*3/MM3 (ref 0–0.4)
EOSINOPHIL NFR BLD AUTO: 3.2 % (ref 0.3–6.2)
ERYTHROCYTE [DISTWIDTH] IN BLOOD BY AUTOMATED COUNT: 15.8 % (ref 12.3–15.4)
GLOBULIN UR ELPH-MCNC: 3.2 GM/DL
GLUCOSE SERPL-MCNC: 112 MG/DL (ref 65–99)
HCT VFR BLD AUTO: 30.2 % (ref 34–46.6)
HGB BLD-MCNC: 9.5 G/DL (ref 12–15.9)
IMM GRANULOCYTES # BLD AUTO: 0.04 10*3/MM3 (ref 0–0.05)
IMM GRANULOCYTES NFR BLD AUTO: 0.5 % (ref 0–0.5)
LYMPHOCYTES # BLD AUTO: 1.09 10*3/MM3 (ref 0.7–3.1)
LYMPHOCYTES NFR BLD AUTO: 14.5 % (ref 19.6–45.3)
MCH RBC QN AUTO: 28.2 PG (ref 26.6–33)
MCHC RBC AUTO-ENTMCNC: 31.5 G/DL (ref 31.5–35.7)
MCV RBC AUTO: 89.6 FL (ref 79–97)
MONOCYTES # BLD AUTO: 1.14 10*3/MM3 (ref 0.1–0.9)
MONOCYTES NFR BLD AUTO: 15.2 % (ref 5–12)
NEUTROPHILS NFR BLD AUTO: 4.97 10*3/MM3 (ref 1.7–7)
NEUTROPHILS NFR BLD AUTO: 66.2 % (ref 42.7–76)
NRBC BLD AUTO-RTO: 0 /100 WBC (ref 0–0.2)
PLATELET # BLD AUTO: 97 10*3/MM3 (ref 140–450)
PMV BLD AUTO: 12.8 FL (ref 6–12)
POTASSIUM SERPL-SCNC: 4.1 MMOL/L (ref 3.5–5.2)
PROT SERPL-MCNC: 7.2 G/DL (ref 6–8.5)
RBC # BLD AUTO: 3.37 10*6/MM3 (ref 3.77–5.28)
SODIUM SERPL-SCNC: 136 MMOL/L (ref 136–145)
WBC NRBC COR # BLD AUTO: 7.51 10*3/MM3 (ref 3.4–10.8)

## 2024-07-06 PROCEDURE — 85025 COMPLETE CBC W/AUTO DIFF WBC: CPT | Performed by: NURSE PRACTITIONER

## 2024-07-06 PROCEDURE — 97110 THERAPEUTIC EXERCISES: CPT

## 2024-07-06 PROCEDURE — 80053 COMPREHEN METABOLIC PANEL: CPT | Performed by: HOSPITALIST

## 2024-07-06 PROCEDURE — 97530 THERAPEUTIC ACTIVITIES: CPT

## 2024-07-06 PROCEDURE — 25010000002 FUROSEMIDE PER 20 MG: Performed by: HOSPITALIST

## 2024-07-06 PROCEDURE — 25010000002 HEPARIN (PORCINE) PER 1000 UNITS: Performed by: NEUROLOGICAL SURGERY

## 2024-07-06 PROCEDURE — 25010000002 METHYLNALTREXONE 12 MG/0.6ML SOLUTION: Performed by: NURSE PRACTITIONER

## 2024-07-06 PROCEDURE — 99232 SBSQ HOSP IP/OBS MODERATE 35: CPT | Performed by: INTERNAL MEDICINE

## 2024-07-06 RX ADMIN — HEPARIN SODIUM 5000 UNITS: 5000 INJECTION INTRAVENOUS; SUBCUTANEOUS at 20:10

## 2024-07-06 RX ADMIN — SERTRALINE 100 MG: 50 TABLET, FILM COATED ORAL at 08:23

## 2024-07-06 RX ADMIN — DICLOFENAC SODIUM 4 G: 10 GEL TOPICAL at 06:42

## 2024-07-06 RX ADMIN — HYDROCODONE BITARTRATE AND ACETAMINOPHEN 1 TABLET: 10; 325 TABLET ORAL at 02:04

## 2024-07-06 RX ADMIN — CARVEDILOL 25 MG: 25 TABLET, FILM COATED ORAL at 08:22

## 2024-07-06 RX ADMIN — FLUTICASONE PROPIONATE 2 SPRAY: 50 SPRAY, METERED NASAL at 08:27

## 2024-07-06 RX ADMIN — Medication 10 ML: at 20:12

## 2024-07-06 RX ADMIN — HYDROCODONE BITARTRATE AND ACETAMINOPHEN 1 TABLET: 10; 325 TABLET ORAL at 06:41

## 2024-07-06 RX ADMIN — HYDROCODONE BITARTRATE AND ACETAMINOPHEN 1 TABLET: 10; 325 TABLET ORAL at 14:53

## 2024-07-06 RX ADMIN — CYCLOBENZAPRINE 10 MG: 10 TABLET, FILM COATED ORAL at 08:26

## 2024-07-06 RX ADMIN — LIDOCAINE 1 PATCH: 4 PATCH TOPICAL at 08:24

## 2024-07-06 RX ADMIN — AMLODIPINE BESYLATE 5 MG: 5 TABLET ORAL at 08:22

## 2024-07-06 RX ADMIN — Medication 1 TABLET: at 08:23

## 2024-07-06 RX ADMIN — HEPARIN SODIUM 5000 UNITS: 5000 INJECTION INTRAVENOUS; SUBCUTANEOUS at 08:23

## 2024-07-06 RX ADMIN — ALLOPURINOL 300 MG: 300 TABLET ORAL at 08:26

## 2024-07-06 RX ADMIN — FUROSEMIDE 20 MG: 10 INJECTION, SOLUTION INTRAMUSCULAR; INTRAVENOUS at 08:24

## 2024-07-06 RX ADMIN — AMLODIPINE BESYLATE 5 MG: 5 TABLET ORAL at 20:11

## 2024-07-06 RX ADMIN — HYDROCODONE BITARTRATE AND ACETAMINOPHEN 1 TABLET: 10; 325 TABLET ORAL at 10:35

## 2024-07-06 RX ADMIN — ROSUVASTATIN CALCIUM 10 MG: 10 TABLET, FILM COATED ORAL at 20:11

## 2024-07-06 RX ADMIN — CLONIDINE HYDROCHLORIDE 0.2 MG: 0.1 TABLET ORAL at 20:11

## 2024-07-06 RX ADMIN — BUPROPION HYDROCHLORIDE 150 MG: 150 TABLET, EXTENDED RELEASE ORAL at 08:23

## 2024-07-06 RX ADMIN — PANTOPRAZOLE SODIUM 40 MG: 40 TABLET, DELAYED RELEASE ORAL at 08:23

## 2024-07-06 RX ADMIN — CLONIDINE HYDROCHLORIDE 0.2 MG: 0.1 TABLET ORAL at 08:23

## 2024-07-06 RX ADMIN — ROPINIROLE HYDROCHLORIDE 0.5 MG: 0.25 TABLET, FILM COATED ORAL at 20:11

## 2024-07-06 RX ADMIN — Medication 10 ML: at 08:27

## 2024-07-06 RX ADMIN — MONTELUKAST SODIUM 10 MG: 10 TABLET, FILM COATED ORAL at 20:11

## 2024-07-06 RX ADMIN — DICLOFENAC SODIUM 4 G: 10 GEL TOPICAL at 14:44

## 2024-07-06 RX ADMIN — CARVEDILOL 25 MG: 25 TABLET, FILM COATED ORAL at 17:43

## 2024-07-06 RX ADMIN — CETIRIZINE HYDROCHLORIDE 10 MG: 10 TABLET ORAL at 08:23

## 2024-07-06 NOTE — PLAN OF CARE
Goal Outcome Evaluation:           Progress: improving     A/O x 4, on room air, vss.  Pt c/o pain in both legs, upper thighs.  Up to bsc, with 2 assist.  Pt refused lactulose due to large amounts of diarrhea.  Surgical dressing removed by pt due to dressing falling off.  New dressing applied, tegaderm and 4x4.  Norco given prn for pain.  Voltaran applied to legs prn.  Safety maintaned.

## 2024-07-06 NOTE — PROGRESS NOTES
[facility] Medicine Services  INPATIENT PROGRESS NOTE    Patient Name: Marina Joe  Date of Admission: 6/28/2024  Today's Date: 07/06/24  Length of Stay: 8  Primary Care Physician: Hanh Og APRN    Subjective   Chief Complaint: Lower back pain          HPI   Ms. Joe is a 79-year-old female from home with past medical history of breast cancer, chronic kidney disease stage IIIa, hypercholesterolemia, hypertension and chronic sinusitis, who presented to the emergency room with worsening back pain/flank pain, history was mostly provided by patient's boyfriend at bedside.  She developed back pain couple of weeks ago and was referred to a pain clinic doctor where she was prescribed gabapentin, after she took about 2-3 doses of the gabapentin, she developed dizziness. She came to the emergency room today because lower back pain and flank pain got even worse despite being on the gabapentin.  In the emergency room, she was extensively worked up with CT of the abdomen and lumbar spine, CT abdomen showed extensive mass of the left kidney extending up to the proximal left ureter as well as right adrenal gland mass suspicious for metastasis.  Urology was consulted from the emergency room for input.  7/1  As above history of chronic kidney disease hyperlipidemia hypertension presented with back pain found to have kidney mass suspicious for metastasis patient CT of the lumbar spine shows multilevel degenerative changes to include an anteriolisthesis of L3 over L4 and spondylosis at L5-S1. 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 awaiting final pathology will consult with palliative  team to address the CODE STATUS and plan of care  7/2  Appreciate palliative care the patient is currently DNR, appreciate neurosurgery status post L3 laminectomy and decompression on June 29 for cauda equina, appreciate oncology continues to work with physical therapy appreciate  radiation oncology patient started radiation today  7/3  Patient oxygen is 85% on 8 L, patient blood pressure is poorly controlled increase her Coreg we will repeat her chest x-ray chest x-ray from before was showing some congestion IV Hep-Lock will give her 1 dose of Lasix  7/4  As before remains on 8 L oxygen documented excision was not titrated will titrate oxygen, chest x-ray showing cardiomegaly pulmonary edema the patient has poor inspiration we did Hep-Lock her IV fluid we will continue IV Lasix blood pressure remains poorly controlled increase her Coreg we will check her echo of the heart as above she was not having bowel movement for most time was started on lactulose, patient had good bowel movement it was mistakenly documented that she is on 8 L the patient is actually on room air  7/5  Patient echo of the heart is showing good EF 56 to 60% blood pressure remains a challenge will increase her Catapres I will decrease her  Lasix to 20 mg daily continue to work with PT OT, she had nephrology signed off, pain is not well controlled, will consult palliative for pain control   7/6  As before appreciate bronchopulmonary they have signed off appreciate palliative care helping us manage her pain continues to work with physical therapy today her creatinine is up we will discontinue IV Lasix remains off oxygen even though it is still documented she is on 8 L oxygen  Review of Systems   All pertinent negatives and positives are as above. All other systems have been reviewed and are negative unless otherwise stated.     Objective    Temp:  [97.4 °F (36.3 °C)-98.8 °F (37.1 °C)] 98.4 °F (36.9 °C)  Heart Rate:  [59-75] 73  Resp:  [18] 18  BP:  (132-176)/(59-79) 152/69  Physical Exam    GEN: Awake, alert, interactive, in NAD  HEENT: Atraumatic, PERRLA, EOMI, Anicteric, Trachea midline  Lungs: CTAB, no wheezing/rales/rhonchi  Heart: RRR, +S1/s2, no rub  ABD: soft, nt/nd, +BS, no guarding/rebound  Extremities: atraumatic, no cyanosis, no edema  Skin: no rashes or lesions  Neuro: AAOx3, no focal deficits  Psych: normal mood & affect    Results Review:  I have reviewed the labs, radiology results, and diagnostic studies.    Laboratory Data:   Results from last 7 days   Lab Units 07/06/24 0417 07/05/24 0416 07/04/24  0301   WBC 10*3/mm3 7.51 7.11 7.05   HEMOGLOBIN g/dL 9.5* 9.5* 8.6*   HEMATOCRIT % 30.2* 28.9* 27.6*   PLATELETS 10*3/mm3 97* 101* 88*        Results from last 7 days   Lab Units 07/06/24 0417 07/05/24  1805 07/05/24  0416 07/04/24  0301 07/01/24  0432 06/30/24  0434   SODIUM mmol/L 136  --  136 136   < > 137   POTASSIUM mmol/L 4.1 4.5 3.6 3.8   < > 4.0   CHLORIDE mmol/L 97*  --  94* 100   < > 101   CO2 mmol/L 28.0  --  30.0* 24.0   < > 24.0   BUN mg/dL 29*  --  21 20   < > 34*   CREATININE mg/dL 2.14*  --  1.54* 1.29*   < > 2.39*   CALCIUM mg/dL 9.7  --  9.8 9.4   < > 8.4*   BILIRUBIN mg/dL 0.4  --   --   --   --  <0.2   ALK PHOS U/L 107  --   --   --   --  78   ALT (SGPT) U/L 27  --   --   --   --  21   AST (SGOT) U/L 40*  --   --   --   --  35*   GLUCOSE mg/dL 112*  --  102* 105*   < > 120*    < > = values in this interval not displayed.       Culture Data:   Urine Culture   Date Value Ref Range Status   06/28/2024 >100,000 CFU/mL Mixed Bhavana Isolated  Final       Radiology Data:   Imaging Results (Last 24 Hours)       ** No results found for the last 24 hours. **            I have reviewed the patient's current medications.     Assessment/Plan   Assessment  Active Hospital Problems    Diagnosis     **Left renal mass     Cauda equina compression     Metastatic cancer to spine     Morbid (severe) obesity due to excess calories        Treatment  Plan  Lower back pain  Patient's low back pain has been getting worse in the last couple of weeks, was started on gabapentin outpatient but did not help.  MRI of the lumbar spine reported as follows-  IMPRESSION:   Neoplastic process involving the L3 vertebral body with associated  posterior soft tissue component resulting in severe spinal canal  narrowing and cauda equina nerve root compression. The soft tissue  component appears to also extend into the right L3-L4 foramen. No  associated pathological fracture.  Additional multilevel spondylotic changes including moderate to severe  spinal canal and foraminal narrowing as detailed above.  Neurosurgery is on consult and did the following procedure-  Procedure(s):  LUMBAR LAMINECTOMY L3 WITH DECOMPRESSION 1-2 LEVELS-RIGHT     Decompression -  Lumbar laminectomy, medial facetectomy for decompression of the spinal cord  Open laminectomy and biopsy of epidural neoplasm  Use of intraoperative microscope      -Acute on chronic kidney disease stage IIIa  Patient follows up with a nephrologist outpatient, but he does not come to this hospital.  Nephrologist consulted and helping with management while patient is in-house.     -Left renal mass/right adrenal mass on CT of the abdomen/pelvis  Urology was consulted from the emergency room and after evaluation did not recommend any intervention,rather recommended oncology consult.  Patient has multiple subcutaneous nodules that may be amenable to percutaneous biopsy.  Oncology is on consult and has evaluated patient, will await for tentative diagnosis from lumbar spine biopsy before making recommendations      Other chronic medical conditions-  History of breast cancer  Hypercholesterolemia  Hypertension  Chronic sinusitis     DVT prophylaxis-heparin       Medical Decision Making  Number and Complexity of problems: High    Conditions and Status        Condition is unchanged.     [facility] Data  External documents reviewed: No  Cardiac  tracing (EKG, telemetry) interpretation: Yes  Radiology interpretation: Yes  Labs reviewed: Yes  Any tests that were considered but not ordered: No     Decision rules/scores evaluated (example [id number], [place], etc): Yes     Discussed with: Patient     Care Planning  Shared decision making: Yes  Code status and discussions: Yes [full code]    Disposition  Social Determinants of Health that impact treatment or disposition: No  I expect the patient to be discharged to unknown in unknown days.     Electronically signed by Alysia Lincoln MD, 07/06/24, 10:09 CDT.

## 2024-07-06 NOTE — THERAPY TREATMENT NOTE
Acute Care - Physical Therapy Treatment Note  [facility]     Patient Name: Marina Joe  : 1945  MRN: [id number]  Today's Date: 2024      Visit Dx:     ICD-10-CM ICD-9-CM   1. 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 Left renal mass  N28.89 593.9   3. Right adrenal mass  E27.8 255.8   4. Nodule of soft tissue  M79.89 729.99   5. Acute on chronic renal insufficiency  N28.9 593.9    N18.9 585.9   6. Thrombocytopenia  D69.6 287.5   7. Cauda equina compression  G83.4 344.60   8. Impaired mobility [Z74.09]  Z74.09 799.89   9. HX: breast cancer  Z85.3 V10.3     Patient Active Problem List   Diagnosis    Malignant neoplasm of upper-outer quadrant of female breast    Anemia of chronic renal failure, stage 3 (moderate)    Hypertension, benign    Hx of colonic polyps    HX: breast cancer    Morbid (severe) obesity due to excess calories    Right knee DJD    S/P lumpectomy, left breast    Adult hypothyroidism    Anemia    Anxiety    Breast cancer, left    Cervical pain    Chronic insomnia    Elevated lipids    Herpes zoster without complication    Left ear pain    Left otitis externa    Myalgia    Negative depression screening    Obesity (BMI 30-39.9)    Postmenopausal status    Recurrent acute serous otitis media of left ear    Restless leg    Sinusitis, bacterial    Skin lesion    Vitamin D deficiency    Stage 3b chronic kidney disease    GIB (gastrointestinal bleeding)    Acute on chronic blood loss anemia    Chronic idiopathic thrombocytopenia    Hypotension due to hypovolemia    Primary hypertension    Pancreatic lesion    Left renal mass    Cauda equina compression    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 Recall 5 years    COLONOSCOPY  11/19/2018    Tics otherwise normal exam repeat in 5 years    COLONOSCOPY  02/13/2023    Normal exam repeat in 3 years with a 2 day prep    ENDOSCOPY  02/13/2023    Gastritis, small HH    LUMBAR LAMINECTOMY Right 6/29/2024    Procedure: LUMBAR LAMINECTOMY L3 WITH DECOMPRESSION 1-2 LEVELS-RIGHT;  Surgeon: Marcellus Pulido MD;  Location: [facility] OR;  Service: Neurosurgery;  Laterality: Right;    REPLACEMENT TOTAL KNEE Right     2016    TOTAL ABDOMINAL HYSTERECTOMY WITH SALPINGO OOPHORECTOMY      UPPER ENDOSCOPIC ULTRASOUND W/ FNA  12/20/2023    Pancreatic cyst s/p FNA     PT Assessment (Last 12 Hours)       PT Evaluation and Treatment       Row Name 07/06/24 1340          Physical Therapy Time and Intention    Subjective Information complains of;pain;fatigue  -MACIEL     Document Type therapy note (daily note)  -MACIEL     Mode of Treatment physical therapy  -MACIEL       Row Name 07/06/24 1340          General Information    Existing Precautions/Restrictions brace worn when out of bed;fall;LSO;spinal  -MACIEL       Row Name 07/06/24 1340          Pain    Pretreatment Pain Rating 4/10  -MACIEL     Posttreatment Pain Rating 4/10  -MACIEL     Pain Location - Side/Orientation Bilateral  -MACIEL     Pain Location lower  -MACIEL     Pain Location - extremity  -MACIEL     Pain Intervention(s) Medication (See MAR);Repositioned  -MACIEL       Row Name 07/06/24 1340          Bed Mobility    Sit-Sidelying [place] (Bed Mobility) verbal cues;moderate assist (50% patient effort)  -MACIEL     Comment, (Bed Mobility) mod assist and verbal cues for technique and to log roll  -MACIEL       Row Name 07/06/24 1340          Transfers    Comment, (Transfers) stood x 3  -MACIEL       Row Name 07/06/24 1340          Sit-Stand  Transfer    Sit-Stand [place] (Transfers) verbal cues;minimum assist (75% patient effort)  -MACIEL     Assistive Device (Sit-Stand Transfers) walker, front-wheeled  -MACIEL       Row Name 07/06/24 1340          Stand-Sit Transfer    Stand-Sit [place] (Transfers) verbal cues;minimum assist (75% patient effort)  -MACIEL     Assistive Device (Stand-Sit Transfers) walker, front-wheeled  -MACIEL       Row Name 07/06/24 1340          Gait/Stairs (Locomotion)    [place] Level (Gait) verbal cues;minimum assist (75% patient effort)  -MACIEL     Assistive Device (Gait) walker, front-wheeled  -MACIEL     Distance in Feet (Gait) --  side steps at EOB  -MACIEL       Row Name 07/06/24 1340          Motor Skills    Comments, Therapeutic Exercise sitting AROM BLE X 10  -MACIEL     Additional Documentation Comments, Therapeutic Exercise (Row)  -MACIEL       Row Name             Wound 06/29/24 1458 lumbar spine Incision    Wound - Properties Group Placement Date: 06/29/24  -KT Placement Time: 1458  -KT Present on Original Admission: N  -KT Location: lumbar spine  -KT Primary Wound Type: Incision  -KT    Retired Wound - Properties Group Placement Date: 06/29/24  -KT Placement Time: 1458  -KT Present on Original Admission: N  -KT Location: lumbar spine  -KT Primary Wound Type: Incision  -KT    Retired Wound - Properties Group Date first assessed: 06/29/24  -KT Time first assessed: 1458  -KT Present on Original Admission: N  -KT Location: lumbar spine  -KT Primary Wound Type: Incision  -KT      Row Name 07/06/24 1340          Positioning and Restraints    Pre-Treatment Position in bed  -MACIEL     Post Treatment Position bed  -MACIEL     In Bed fowlers;call light within reach;encouraged to call for assist;exit alarm on;side rails up x2  -MACIEL               User Key  (r) = Recorded By, (t) = Taken By, (c) = Cosigned By      Initials Name Provider Type    Santy Marin PTA Physical Therapist Assistant    Shravan Ordonez, RN Registered Nurse                     Physical Therapy Education       Title: PT OT SLP Therapies (Done)       Topic: Physical Therapy (Done)       Point: Mobility training (Done)       Learning Progress Summary             Patient Eager, E, VU by AE at 7/5/2024 1023    Comment: POC    Acceptance, E,D, DU,VU by  at 6/30/2024 0951    Comment: benefits of PT and POC, call for A OOB, no BLT, LSO when OOB                         Point: Body mechanics (Done)       Learning Progress Summary             Patient Acceptance, E,D, DU,VU by  at 6/30/2024 0951    Comment: benefits of PT and POC, call for A OOB, no BLT, LSO when OOB                         Point: Precautions (Done)       Learning Progress Summary             Patient Acceptance, E,D, DU,VU by  at 6/30/2024 0951    Comment: benefits of PT and POC, call for A OOB, no BLT, LSO when OOB                                         User Key       Initials Effective Dates Name Provider Type Discipline    AE 02/03/23 -  Sofia Ann, PTA Physical Therapist Assistant PT     02/03/23 -  Eliel León, PT Physical Therapist PT                  PT Recommendation and Plan     Plan of Care Reviewed With: patient  Progress: improving  Outcome Evaluation: Pt was sitting EOB without LSO, donned LSO and educated pt on wearing brace while up, as well as spinal precautions.  Pt was able to transfer sit to stand with min assist.  Pt is able to maintain standing with RWX and CGA, but only for a few seconds before requiring a sitting rest.  Pt was able to take a few side steps with RWX and min assist.  Pt fatigues quickly.  Required mod assist to transfer sitting to supine.  Will continue to work with pt to increase strength and imrpove amb.   Outcome Measures       Row Name 07/06/24 9230             How much help from another person do you currently need...    Turning from your back to your side while in flat bed without using bedrails? 3  -MACIEL      Moving from lying on back to sitting on the side of a flat  bed without bedrails? 3  -MACIEL      Moving to and from a bed to a chair (including a wheelchair)? 3  -MACIEL      Standing up from a chair using your arms (e.g., wheelchair, bedside chair)? 3  -MACIEL      Climbing 3-5 steps with a railing? 1  -MACIEL      To walk in hospital room? 2  -MACIEL      AM-PAC 6 Clicks Score (PT) 15  -MACIEL      Highest Level of Mobility Goal 4 --> Transfer to chair/commode  -MACIEL         Functional Assessment    Outcome Measure Options AM-PAC 6 Clicks Basic Mobility (PT)  -MACIEL                User Key  (r) = Recorded By, (t) = Taken By, (c) = Cosigned By      Initials Name Provider Type    Santy Marin PTA Physical Therapist Assistant                     Time Calculation:    PT Charges       Row Name 07/06/24 1340             Time Calculation    Start Time 1340  -MACIEL      Stop Time 1409  -MACIEL      Time Calculation (min) 29 min  -MACIEL      PT Received On 07/06/24  -MACIEL         Time Calculation- PT    Total Timed Code Minutes- PT 29 minute(s)  -MACIEL         Timed Charges    66666 - PT Therapeutic Exercise Minutes 12  -MACIEL      98166 - PT Therapeutic Activity Minutes 17  -MACIEL         Total Minutes    Timed Charges Total Minutes 29  -MACIEL       Total Minutes 29  -MACIEL                User Key  (r) = Recorded By, (t) = Taken By, (c) = Cosigned By      Initials Name Provider Type    Santy Marin PTA Physical Therapist Assistant                  Therapy Charges for Today       Code Description Service Date Service Provider Modifiers Qty    77342330278 HC PT THERAPEUTIC ACT EA 15 MIN 7/6/2024 Santy Reddy PTA GP 1    37054024029 HC PT THER PROC EA 15 MIN 7/6/2024 Santy Reddy PTA GP 1            PT G-Codes  Outcome Measure Options: AM-PAC 6 Clicks Basic Mobility (PT)  AM-PAC 6 Clicks Score (PT): 15  AM-PAC 6 Clicks Score (OT): 15    Santy Reddy PTA  7/6/2024

## 2024-07-06 NOTE — PROGRESS NOTES
[facility] PULMONARY AND CRITICAL CARE PROGRESS NOTE - [facility]    Patient: Marina Joe  1945   MR# [id number]   Acct# [id number]  07/06/24   07:22 CDT  Referring Provider: Alysia Lincoln MD    Chief Complaint:  obesity with suspected sleep apnea      Interval history: Patient is sitting up to the bedside commode.  A family member is at bedside fixing the patient's hair.  The patient reports leg pain.  She is not due another pain pill until 11 AM.  Hemoglobin is stable 9.5, creatinine is trending up at 2.14.  She remains on room air.  She had an overnight pulse ox study 7/4/2024 that shows she does not need nighttime oxygen as she only desatted to less than 88% for 3 minutes.  She may want to consider an outpatient sleep study at some point.  No further inpatient pulmonary plans.  Pulmonology will sign off.    Meds:  allopurinol, 300 mg, Oral, Daily  amLODIPine, 5 mg, Oral, BID  buPROPion XL, 150 mg, Oral, Daily  carvedilol, 25 mg, Oral, BID With Meals  cetirizine, 10 mg, Oral, Daily  cloNIDine, 0.2 mg, Oral, BID  fluticasone, 2 spray, Nasal, Daily  furosemide, 20 mg, Intravenous, Daily  heparin (porcine), 5,000 Units, Subcutaneous, Q12H  lactulose, 30 g, Oral, TID  Lidocaine, 1 patch, Transdermal, Q24H  methylnaltrexone, 6 mg, Subcutaneous, Every Other Day  montelukast, 10 mg, Oral, Nightly  multivitamin with minerals, 1 tablet, Oral, Daily  pantoprazole, 40 mg, Oral, Daily  rOPINIRole, 0.5 mg, Oral, Nightly  rosuvastatin, 10 mg, Oral, Nightly  sertraline, 100 mg, Oral, Daily  sodium chloride, 10 mL, Intravenous, Q12H         Physical Exam:  SpO2 Percentage    07/05/24 2003 07/06/24 0040 07/06/24 0359   SpO2: 94% 97% 95%     Body mass index is 37.1 kg/m².   Temp:  [97.4 °F (36.3 °C)-98.8 °F (37.1 °C)] 98.6 °F (37 °C)  Heart Rate:  [59-75] 69  Resp:  [18] 18  BP: (132-187)/(59-79) 153/79  Intake/Output Summary (Last 24 hours) at 7/6/2024 0722  Last data filed at 7/5/2024 1855  Gross per 24 hour    Intake 240 ml   Output 700 ml   Net -460 ml     Physical Exam  Vitals and nursing note reviewed. Exam conducted with a chaperone present.   Constitutional:       Appearance: She is well-developed. She is obese.   HENT:      Head: Normocephalic and atraumatic.   Eyes:      General: No scleral icterus.  Cardiovascular:      Rate and Rhythm: Normal rate and regular rhythm.   Abdominal:      Palpations: Abdomen is soft.   Musculoskeletal:         General: Normal range of motion.      Cervical back: Normal range of motion and neck supple.   Skin:     General: Skin is warm and dry.   Neurological:      Mental Status: She is alert. Mental status is at baseline.   Psychiatric:         Mood and Affect: Mood normal.         Behavior: Behavior normal.       Electronically signed by MARY Dean, 7/6/2024, 07:22 CDT      Physician Substantive Portion: Medical Decision Making to follow:    Physician substantive portion: medical decision making  Result Review  Laboratory Data:  Results from last 7 days   Lab Units 07/06/24  0417 07/05/24  0416 07/04/24  0301   WBC 10*3/mm3 7.51 7.11 7.05   HEMOGLOBIN g/dL 9.5* 9.5* 8.6*   PLATELETS 10*3/mm3 97* 101* 88*     Results from last 7 days   Lab Units 07/06/24  0417 07/05/24  1805 07/05/24  0416 07/04/24  0301 07/02/24  0414 07/01/24  1053 07/01/24  0432 06/30/24  0434 06/29/24  1858 06/29/24  1642   SODIUM mmol/L 136  --  136 136   < >  --    < > 137   < >  --    POTASSIUM mmol/L 4.1 4.5 3.6 3.8   < >  --    < > 4.0   < >  --    CO2 mmol/L 28.0  --  30.0* 24.0   < >  --    < > 24.0   < >  --    BUN mg/dL 29*  --  21 20   < >  --    < > 34*   < >  --    CREATININE mg/dL 2.14*  --  1.54* 1.29*   < >  --    < > 2.39*   < >  --    INR   --   --   --   --   --  0.99  --  1.04  --  1.04    < > = values in this interval not displayed.         Microbiology Results (last 10 days)       Procedure Component Value - Date/Time    Wet Prep, Genital - Swab, Vagina [[id number]]   "(Abnormal) Collected: 06/28/24 0854    Lab Status: Final result Specimen: Swab from Vagina Updated: 06/28/24 0935     YEAST No yeast seen     HYPHAL ELEMENTS No Hyphal elements seen     WBC'S 1+ WBC's seen     Clue Cells, Wet Prep No Clue cells seen     Trichomonas, Wet Prep No Trichomonas seen    Chlamydia trachomatis, Neisseria gonorrhoeae, PCR - Swab, Cervix [[id number]]  (Normal) Collected: 06/28/24 0854    Lab Status: Final result Specimen: Swab from Cervix Updated: 06/28/24 1435     Chlamydia DNA by PCR Not Detected     Neisseria gonorrhoeae by PCR Not Detected    Narrative:      Disclaimer: The Aptima Combo 2 assay is a target amplification nucleic acid probe test that utilizes target capture for the in vitro qualitative detection and differentiation of ribosomal RNA from Chlamydia trachomatis and Neisseria gonorrhoeae to aid in the diagnosis of chlamydial and/or gonococcal urogenital disease.  Cell culture was once considered to be the \"gold standard\" for detection of CT and NG.  Culture is quite specific, but scientific studies have demonstrated that the NAAT DNA probe technologies have higher clinical sensitivities than culture.    Urine Culture - Urine, Urine, Clean Catch [[id number]] Collected: 06/28/24 0711    Lab Status: Final result Specimen: Urine, Clean Catch Updated: 06/29/24 1141     Urine Culture >100,000 CFU/mL Mixed Bhavana Isolated    Narrative:      Specimen contains mixed organisms of questionable pathogenicity suggestive of contamination. If symptoms persist, suggest recollection.  Colonization of the urinary tract without infection is common. Treatment is discouraged unless the patient is symptomatic, pregnant, or undergoing an invasive urologic procedure.           Recent films:  Adult Transthoracic Echo Complete W/ Cont if Necessary Per Protocol    Result Date: 7/4/2024    Left ventricular systolic function is normal. Left ventricular ejection fraction appears to be 56 - 60%.   Left " ventricular wall thickness is consistent with mild concentric hypertrophy.   Left ventricular diastolic function was normal.   Normal right ventricular cavity size and systolic function noted.   Mild mitral valve regurgitation is present.   No evidence of pulmonary hypertension is present.     Personal review of imaging : Reviewed last imaging studies.  Chest x-ray shows improving infiltrate.  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  She did not have prolonged oxygen desaturation except 1 reading of the treatment within 1 minute below 89%.  She will not qualify for home oxygen  However she still has the possibility of obstructive sleep apnea and possible obesity hypoventilation syndrome   Plan for outpatient sleep study and set up CPAP and plan for outpatient follow-up in the pulmonary clinic.  She has some skin swellings and lumps for which she has anxiety issues but I believe those are probably not malignant  I will let the primary care provider to address those issues.  She is also getting followed by Dr. Morgan for her cancer  Neurosurgery and nephrology also following.  Her renal function is slightly worse today  Continue diuresis with close monitoring of renal function.  She is not short of breath and is oxygenating well in the room air  No bronchodilator treatment needed.  Continue sinus medications with Zyrtec nasal spray and Singulair  She already had a back surgery and kyphoplasty done and her spine  metastasis.  DVT and stress ulcer prophylaxis.  Pain and anxiety control.  Physical activity as tolerated.  Repeat labs and imaging studies from time to time  CODE STATUS: Limited code.  Overall prognosis: Guarded.  We will f sign off.  We appreciate the consult.   I recommend pulmonary clinic follow-up with a sleep study in 3 months time after discharge.  Please call us if needed.    Time spent by me: 35 min    This visit was performed by both a physician and an [facility] Practice RN.  I performed all aspects of the medical decision making as documented.    Electronically signed by     Marcos Castro MD,   Pulmonologist/Intensivist   7/6/2024, 11:42 CDT

## 2024-07-06 NOTE — PLAN OF CARE
Goal Outcome Evaluation:  Plan of Care Reviewed With: patient        Progress: improving  Outcome Evaluation: Pt was sitting EOB without LSO, donned LSO and educated pt on wearing brace while up, as well as spinal precautions.  Pt was able to transfer sit to stand with min assist.  Pt is able to maintain standing with RWX and CGA, but only for a few seconds before requiring a sitting rest.  Pt was able to take a few side steps with RWX and min assist.  Pt fatigues quickly.  Required mod assist to transfer sitting to supine.  Will continue to work with pt to increase strength and imrpove amb.

## 2024-07-07 LAB
ALBUMIN SERPL-MCNC: 4.1 G/DL (ref 3.5–5.2)
ALBUMIN/GLOB SERPL: 1.2 G/DL
ALP SERPL-CCNC: 109 U/L (ref 39–117)
ALT SERPL W P-5'-P-CCNC: 22 U/L (ref 1–33)
ANION GAP SERPL CALCULATED.3IONS-SCNC: 14 MMOL/L (ref 5–15)
AST SERPL-CCNC: 25 U/L (ref 1–32)
BASOPHILS # BLD AUTO: 0.03 10*3/MM3 (ref 0–0.2)
BASOPHILS NFR BLD AUTO: 0.4 % (ref 0–1.5)
BILIRUB SERPL-MCNC: 0.3 MG/DL (ref 0–1.2)
BUN SERPL-MCNC: 43 MG/DL (ref 8–23)
BUN/CREAT SERPL: 12.3 (ref 7–25)
CALCIUM SPEC-SCNC: 10 MG/DL (ref 8.6–10.5)
CHLORIDE SERPL-SCNC: 92 MMOL/L (ref 98–107)
CO2 SERPL-SCNC: 28 MMOL/L (ref 22–29)
CREAT SERPL-MCNC: 3.5 MG/DL (ref 0.57–1)
DEPRECATED RDW RBC AUTO: 51.2 FL (ref 37–54)
EGFRCR SERPLBLD CKD-EPI 2021: 12.9 ML/MIN/1.73
EOSINOPHIL # BLD AUTO: 0.39 10*3/MM3 (ref 0–0.4)
EOSINOPHIL NFR BLD AUTO: 4.7 % (ref 0.3–6.2)
ERYTHROCYTE [DISTWIDTH] IN BLOOD BY AUTOMATED COUNT: 15.7 % (ref 12.3–15.4)
GLOBULIN UR ELPH-MCNC: 3.4 GM/DL
GLUCOSE SERPL-MCNC: 106 MG/DL (ref 65–99)
HCT VFR BLD AUTO: 30.7 % (ref 34–46.6)
HGB BLD-MCNC: 9.7 G/DL (ref 12–15.9)
IMM GRANULOCYTES # BLD AUTO: 0.05 10*3/MM3 (ref 0–0.05)
IMM GRANULOCYTES NFR BLD AUTO: 0.6 % (ref 0–0.5)
LYMPHOCYTES # BLD AUTO: 1.23 10*3/MM3 (ref 0.7–3.1)
LYMPHOCYTES NFR BLD AUTO: 14.8 % (ref 19.6–45.3)
MCH RBC QN AUTO: 28.3 PG (ref 26.6–33)
MCHC RBC AUTO-ENTMCNC: 31.6 G/DL (ref 31.5–35.7)
MCV RBC AUTO: 89.5 FL (ref 79–97)
MONOCYTES # BLD AUTO: 1.19 10*3/MM3 (ref 0.1–0.9)
MONOCYTES NFR BLD AUTO: 14.3 % (ref 5–12)
NEUTROPHILS NFR BLD AUTO: 5.42 10*3/MM3 (ref 1.7–7)
NEUTROPHILS NFR BLD AUTO: 65.2 % (ref 42.7–76)
PLATELET # BLD AUTO: 94 10*3/MM3 (ref 140–450)
PMV BLD AUTO: ABNORMAL FL
POTASSIUM SERPL-SCNC: 4.5 MMOL/L (ref 3.5–5.2)
PROT SERPL-MCNC: 7.5 G/DL (ref 6–8.5)
RBC # BLD AUTO: 3.43 10*6/MM3 (ref 3.77–5.28)
SODIUM SERPL-SCNC: 134 MMOL/L (ref 136–145)
WBC NRBC COR # BLD AUTO: 8.31 10*3/MM3 (ref 3.4–10.8)

## 2024-07-07 PROCEDURE — 25010000002 HEPARIN (PORCINE) PER 1000 UNITS: Performed by: NEUROLOGICAL SURGERY

## 2024-07-07 PROCEDURE — 80053 COMPREHEN METABOLIC PANEL: CPT | Performed by: HOSPITALIST

## 2024-07-07 PROCEDURE — 97110 THERAPEUTIC EXERCISES: CPT

## 2024-07-07 PROCEDURE — 85025 COMPLETE CBC W/AUTO DIFF WBC: CPT | Performed by: NURSE PRACTITIONER

## 2024-07-07 PROCEDURE — 25810000003 SODIUM CHLORIDE 0.9 % SOLUTION: Performed by: HOSPITALIST

## 2024-07-07 RX ORDER — GABAPENTIN 100 MG/1
100 CAPSULE ORAL 3 TIMES DAILY
Status: DISCONTINUED | OUTPATIENT
Start: 2024-07-07 | End: 2024-07-08

## 2024-07-07 RX ORDER — SODIUM CHLORIDE 9 MG/ML
100 INJECTION, SOLUTION INTRAVENOUS CONTINUOUS
Status: DISCONTINUED | OUTPATIENT
Start: 2024-07-07 | End: 2024-07-15 | Stop reason: HOSPADM

## 2024-07-07 RX ADMIN — DICLOFENAC SODIUM 4 G: 10 GEL TOPICAL at 06:54

## 2024-07-07 RX ADMIN — FLUTICASONE PROPIONATE 2 SPRAY: 50 SPRAY, METERED NASAL at 09:07

## 2024-07-07 RX ADMIN — SERTRALINE 100 MG: 50 TABLET, FILM COATED ORAL at 08:57

## 2024-07-07 RX ADMIN — ROSUVASTATIN CALCIUM 10 MG: 10 TABLET, FILM COATED ORAL at 20:20

## 2024-07-07 RX ADMIN — ROPINIROLE HYDROCHLORIDE 0.5 MG: 0.25 TABLET, FILM COATED ORAL at 20:20

## 2024-07-07 RX ADMIN — CLONIDINE HYDROCHLORIDE 0.2 MG: 0.1 TABLET ORAL at 08:55

## 2024-07-07 RX ADMIN — LIDOCAINE 1 PATCH: 4 PATCH TOPICAL at 08:58

## 2024-07-07 RX ADMIN — SODIUM CHLORIDE 100 ML/HR: 9 INJECTION, SOLUTION INTRAVENOUS at 22:23

## 2024-07-07 RX ADMIN — SODIUM CHLORIDE 100 ML/HR: 9 INJECTION, SOLUTION INTRAVENOUS at 10:57

## 2024-07-07 RX ADMIN — AMLODIPINE BESYLATE 5 MG: 5 TABLET ORAL at 08:56

## 2024-07-07 RX ADMIN — ALLOPURINOL 300 MG: 300 TABLET ORAL at 08:57

## 2024-07-07 RX ADMIN — AMLODIPINE BESYLATE 5 MG: 5 TABLET ORAL at 20:20

## 2024-07-07 RX ADMIN — Medication 10 ML: at 20:24

## 2024-07-07 RX ADMIN — Medication 1 TABLET: at 08:57

## 2024-07-07 RX ADMIN — CARVEDILOL 25 MG: 25 TABLET, FILM COATED ORAL at 18:55

## 2024-07-07 RX ADMIN — CARVEDILOL 25 MG: 25 TABLET, FILM COATED ORAL at 08:58

## 2024-07-07 RX ADMIN — LACTULOSE 30 G: 20 SOLUTION ORAL at 16:27

## 2024-07-07 RX ADMIN — CLONIDINE HYDROCHLORIDE 0.2 MG: 0.1 TABLET ORAL at 20:19

## 2024-07-07 RX ADMIN — LACTULOSE 20 G: 20 SOLUTION ORAL at 05:45

## 2024-07-07 RX ADMIN — MONTELUKAST SODIUM 10 MG: 10 TABLET, FILM COATED ORAL at 20:20

## 2024-07-07 RX ADMIN — HEPARIN SODIUM 5000 UNITS: 5000 INJECTION INTRAVENOUS; SUBCUTANEOUS at 20:19

## 2024-07-07 RX ADMIN — HYDROCODONE BITARTRATE AND ACETAMINOPHEN 1 TABLET: 10; 325 TABLET ORAL at 06:53

## 2024-07-07 RX ADMIN — BUPROPION HYDROCHLORIDE 150 MG: 150 TABLET, EXTENDED RELEASE ORAL at 08:56

## 2024-07-07 RX ADMIN — HYDROCODONE BITARTRATE AND ACETAMINOPHEN 1 TABLET: 10; 325 TABLET ORAL at 20:32

## 2024-07-07 RX ADMIN — CETIRIZINE HYDROCHLORIDE 10 MG: 10 TABLET ORAL at 08:56

## 2024-07-07 RX ADMIN — HYDROCODONE BITARTRATE AND ACETAMINOPHEN 1 TABLET: 10; 325 TABLET ORAL at 11:04

## 2024-07-07 RX ADMIN — HYDROCODONE BITARTRATE AND ACETAMINOPHEN 1 TABLET: 10; 325 TABLET ORAL at 01:43

## 2024-07-07 RX ADMIN — Medication 10 ML: at 08:58

## 2024-07-07 RX ADMIN — HEPARIN SODIUM 5000 UNITS: 5000 INJECTION INTRAVENOUS; SUBCUTANEOUS at 08:55

## 2024-07-07 RX ADMIN — PANTOPRAZOLE SODIUM 40 MG: 40 TABLET, DELAYED RELEASE ORAL at 08:57

## 2024-07-07 NOTE — THERAPY TREATMENT NOTE
Acute Care - Physical Therapy Treatment Note  [facility]     Patient Name: Marina Joe  : 1945  MRN: [id number]  Today's Date: 2024      Visit Dx:     ICD-10-CM ICD-9-CM   1. 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 Left renal mass  N28.89 593.9   3. Right adrenal mass  E27.8 255.8   4. Nodule of soft tissue  M79.89 729.99   5. Acute on chronic renal insufficiency  N28.9 593.9    N18.9 585.9   6. Thrombocytopenia  D69.6 287.5   7. Cauda equina compression  G83.4 344.60   8. Impaired mobility [Z74.09]  Z74.09 799.89   9. HX: breast cancer  Z85.3 V10.3     Patient Active Problem List   Diagnosis    Malignant neoplasm of upper-outer quadrant of female breast    Anemia of chronic renal failure, stage 3 (moderate)    Hypertension, benign    Hx of colonic polyps    HX: breast cancer    Morbid (severe) obesity due to excess calories    Right knee DJD    S/P lumpectomy, left breast    Adult hypothyroidism    Anemia    Anxiety    Breast cancer, left    Cervical pain    Chronic insomnia    Elevated lipids    Herpes zoster without complication    Left ear pain    Left otitis externa    Myalgia    Negative depression screening    Obesity (BMI 30-39.9)    Postmenopausal status    Recurrent acute serous otitis media of left ear    Restless leg    Sinusitis, bacterial    Skin lesion    Vitamin D deficiency    Stage 3b chronic kidney disease    GIB (gastrointestinal bleeding)    Acute on chronic blood loss anemia    Chronic idiopathic thrombocytopenia    Hypotension due to hypovolemia    Primary hypertension    Pancreatic lesion    Left renal mass    Cauda equina compression    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 Recall 5 years    COLONOSCOPY  11/19/2018    Tics otherwise normal exam repeat in 5 years    COLONOSCOPY  02/13/2023    Normal exam repeat in 3 years with a 2 day prep    ENDOSCOPY  02/13/2023    Gastritis, small HH    LUMBAR LAMINECTOMY Right 6/29/2024    Procedure: LUMBAR LAMINECTOMY L3 WITH DECOMPRESSION 1-2 LEVELS-RIGHT;  Surgeon: Marcellus Pulido MD;  Location: [facility] OR;  Service: Neurosurgery;  Laterality: Right;    REPLACEMENT TOTAL KNEE Right     2016    TOTAL ABDOMINAL HYSTERECTOMY WITH SALPINGO OOPHORECTOMY      UPPER ENDOSCOPIC ULTRASOUND W/ FNA  12/20/2023    Pancreatic cyst s/p FNA     PT Assessment (Last 12 Hours)       PT Evaluation and Treatment       Row Name 07/07/24 1510 07/07/24 1418       Physical Therapy Time and Intention    Subjective Information complains of;weakness;fatigue;pain  -MACIEL --    Document Type therapy note (daily note)  -MACIEL therapy note (daily note)  -MACIEL    Mode of Treatment physical therapy  -MACIEL physical therapy  -MACIEL    Session Not Performed -- patient unavailable for treatment  -MACIEL    Comment, Session Not Performed -- [place] [facility]  -MACIEL      Row Name 07/07/24 1229          Physical Therapy Time and Intention    Mode of Treatment physical therapy  -LY     Session Not Performed patient unavailable for treatment  -LY     Comment, Session Not Performed eating lunch  -LY       Row Name 07/07/24 1510          General Information    Existing Precautions/Restrictions brace worn when out of bed;fall;LSO;spinal  -MACIEL       Row Name 07/07/24 1510          Pain    Pretreatment Pain Rating 4/10  -MACIEL     Posttreatment Pain Rating 5/10  -MACIEL     Pain Location - Side/Orientation Bilateral  -MACIEL     Pain Location lower  -MACIEL     Pain Location -  back;extremity  -MACIEL     Pain Intervention(s) Repositioned  -MACIEL       Row Name 07/07/24 1510          Bed Mobility    Comment, (Bed Mobility) pt had just got back to bed with nsg, agreed to exercises  -MACIEL       Row Name 07/07/24 1510          Motor Skills    Comments, Therapeutic Exercise in fowlers, AAROM/AROM BLE X 20  -MACIEL       Row Name             Wound 06/29/24 1458 lumbar spine Incision    Wound - Properties Group Placement Date: 06/29/24  -KT Placement Time: 1458  -KT Present on Original Admission: N  -KT Location: lumbar spine  -KT Primary Wound Type: Incision  -KT    Retired Wound - Properties Group Placement Date: 06/29/24  -KT Placement Time: 1458  -KT Present on Original Admission: N  -KT Location: lumbar spine  -KT Primary Wound Type: Incision  -KT    Retired Wound - Properties Group Date first assessed: 06/29/24  -KT Time first assessed: 1458  -KT Present on Original Admission: N  -KT Location: lumbar spine  -KT Primary Wound Type: Incision  -KT      Row Name 07/07/24 1510          Positioning and Restraints    Pre-Treatment Position in bed  -MACIEL     Post Treatment Position bed  -MACIEL     In Bed fowlers;call light within reach;encouraged to call for assist;side rails up x2  -MACIEL               User Key  (r) = Recorded By, (t) = Taken By, (c) = Cosigned By      Initials Name Provider Type    Santy Marin, PTA Physical Therapist Assistant    Stacey Jesus, LAZARO Physical Therapist Assistant    Shravan Ordonez, RN Registered Nurse                    Physical Therapy Education       Title: PT OT SLP Therapies (Done)       Topic: Physical Therapy (Done)       Point: Mobility training (Done)       Learning Progress Summary             Patient Eager, E, VU by AE at 7/5/2024 1023    Comment: POC    Acceptance, E,D, DU,VU by LH at 6/30/2024 0951    Comment: benefits of PT and POC, call for A OOB, no BLT, LSO when OOB                         Point: Body mechanics (Done)       Learning Progress Summary              Patient Acceptance, E,D, DU,VU by  at 6/30/2024 0951    Comment: benefits of PT and POC, call for A OOB, no BLT, LSO when OOB                         Point: Precautions (Done)       Learning Progress Summary             Patient Acceptance, E,D, DU,VU by  at 6/30/2024 0951    Comment: benefits of PT and POC, call for A OOB, no BLT, LSO when OOB                                         User Key       Initials Effective Dates Name Provider Type Discipline    AE 02/03/23 -  Sofia Ann, PTA Physical Therapist Assistant PT     02/03/23 -  Eliel León, PT Physical Therapist PT                  PT Recommendation and Plan     Plan of Care Reviewed With: patient  Progress: improving  Outcome Evaluation: Pt was sitting EOB without LSO, donned LSO and educated pt on wearing brace while up, as well as spinal precautions.  Pt was able to transfer sit to stand with min assist.  Pt is able to maintain standing with RWX and CGA, but only for a few seconds before requiring a sitting rest.  Pt was able to take a few side steps with RWX and min assist.  Pt fatigues quickly.  Required mod assist to transfer sitting to supine.  Will continue to work with pt to increase strength and imrpove amb.   Outcome Measures       Row Name 07/07/24 1510 07/06/24 1340          How much help from another person do you currently need...    Turning from your back to your side while in flat bed without using bedrails? 3  -MACIEL 3  -MACIEL     Moving from lying on back to sitting on the side of a flat bed without bedrails? 3  -MACIEL 3  -MACIEL     Moving to and from a bed to a chair (including a wheelchair)? 3  -MACIEL 3  -MACIEL     Standing up from a chair using your arms (e.g., wheelchair, bedside chair)? 3  -MACIEL 3  -MACIEL     Climbing 3-5 steps with a railing? 1  -MACIEL 1  -MACIEL     To walk in hospital room? 2  -MACIEL 2  -MACIEL     AM-PAC 6 Clicks Score (PT) 15  -MACIEL 15  -MACIEL     Highest Level of Mobility Goal 4 --> Transfer to chair/commode  -MACIEL 4 --> Transfer to  chair/commode  -MACIEL        Functional Assessment    Outcome Measure Options AM-PAC 6 Clicks Basic Mobility (PT)  -MACIEL AM-PAC 6 Clicks Basic Mobility (PT)  -MACIEL               User Key  (r) = Recorded By, (t) = Taken By, (c) = Cosigned By      Initials Name Provider Type    Santy Marin PTA Physical Therapist Assistant                     Time Calculation:    PT Charges       Row Name 07/07/24 1510             Time Calculation    Start Time 1510  -MACIEL      Stop Time 1534  -MACIEL      Time Calculation (min) 24 min  -MACIEL      PT Received On 07/07/24  -MACIEL         Time Calculation- PT    Total Timed Code Minutes- PT 24 minute(s)  -MACIEL         Timed Charges    17615 - PT Therapeutic Exercise Minutes 24  -MACIEL         Total Minutes    Timed Charges Total Minutes 24  -MACIEL       Total Minutes 24  -MACIEL                User Key  (r) = Recorded By, (t) = Taken By, (c) = Cosigned By      Initials Name Provider Type    Santy Marin PTA Physical Therapist Assistant                  Therapy Charges for Today       Code Description Service Date Service Provider Modifiers Qty    22277133672 HC PT THERAPEUTIC ACT EA 15 MIN 7/6/2024 Santy Reddy, PTA GP 1    54829660662 HC PT THER PROC EA 15 MIN 7/6/2024 Santy Reddy, PTA GP 1    18736751763 HC PT THER PROC EA 15 MIN 7/7/2024 Santy Reddy, LAZARO GP 2            PT G-Codes  Outcome Measure Options: AM-PAC 6 Clicks Basic Mobility (PT)  AM-PAC 6 Clicks Score (PT): 15  AM-PAC 6 Clicks Score (OT): 15    Santy Reddy PTA  7/7/2024

## 2024-07-07 NOTE — PROGRESS NOTES
[facility] Medicine Services  INPATIENT PROGRESS NOTE    Patient Name: Marina Joe  Date of Admission: 6/28/2024  Today's Date: 07/07/24  Length of Stay: 9  Primary Care Physician: Hanh Og APRN    Subjective   Chief Complaint: Lower back pain          HPI   Ms. Joe is a 79-year-old female from home with past medical history of breast cancer, chronic kidney disease stage IIIa, hypercholesterolemia, hypertension and chronic sinusitis, who presented to the emergency room with worsening back pain/flank pain, history was mostly provided by patient's boyfriend at bedside.  She developed back pain couple of weeks ago and was referred to a pain clinic doctor where she was prescribed gabapentin, after she took about 2-3 doses of the gabapentin, she developed dizziness. She came to the emergency room today because lower back pain and flank pain got even worse despite being on the gabapentin.  In the emergency room, she was extensively worked up with CT of the abdomen and lumbar spine, CT abdomen showed extensive mass of the left kidney extending up to the proximal left ureter as well as right adrenal gland mass suspicious for metastasis.  Urology was consulted from the emergency room for input.  7/1  As above history of chronic kidney disease hyperlipidemia hypertension presented with back pain found to have kidney mass suspicious for metastasis patient CT of the lumbar spine shows multilevel degenerative changes to include an anteriolisthesis of L3 over L4 and spondylosis at L5-S1. 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 awaiting final pathology will consult with palliative  team to address the CODE STATUS and plan of care  7/2  Appreciate palliative care the patient is currently DNR, appreciate neurosurgery status post L3 laminectomy and decompression on June 29 for cauda equina, appreciate oncology continues to work with physical therapy appreciate  radiation oncology patient started radiation today  7/3  Patient oxygen is 85% on 8 L, patient blood pressure is poorly controlled increase her Coreg we will repeat her chest x-ray chest x-ray from before was showing some congestion IV Hep-Lock will give her 1 dose of Lasix  7/4  As before remains on 8 L oxygen documented excision was not titrated will titrate oxygen, chest x-ray showing cardiomegaly pulmonary edema the patient has poor inspiration we did Hep-Lock her IV fluid we will continue IV Lasix blood pressure remains poorly controlled increase her Coreg we will check her echo of the heart as above she was not having bowel movement for most time was started on lactulose, patient had good bowel movement it was mistakenly documented that she is on 8 L the patient is actually on room air  7/5  Patient echo of the heart is showing good EF 56 to 60% blood pressure remains a challenge will increase her Catapres I will decrease her  Lasix to 20 mg daily continue to work with PT OT, she had nephrology signed off, pain is not well controlled, will consult palliative for pain control   7/6  As before appreciate bronchopulmonary they have signed off appreciate palliative care helping us manage her pain continues to work with physical therapy today her creatinine is up we will discontinue IV Lasix remains off oxygen even though it is still documented she is on 8 L oxygen  7/7  Creatinine is up secondary to overdiuresis, start IVF, still having neuropathy will add neurontine   Review of Systems   All pertinent negatives and positives are as above. All other systems have been reviewed and are negative unless otherwise stated.     Objective     Temp:  [97.6 °F (36.4 °C)-98.9 °F (37.2 °C)] 98.4 °F (36.9 °C)  Heart Rate:  [68-71] 71  Resp:  [16-18] 16  BP: (121-188)/() 124/55  Physical Exam    GEN: Awake, alert, interactive, in NAD  HEENT: Atraumatic, PERRLA, EOMI, Anicteric, Trachea midline  Lungs: CTAB, no wheezing/rales/rhonchi  Heart: RRR, +S1/s2, no rub  ABD: soft, nt/nd, +BS, no guarding/rebound  Extremities: atraumatic, no cyanosis, no edema  Skin: no rashes or lesions  Neuro: AAOx3, no focal deficits  Psych: normal mood & affect    Results Review:  I have reviewed the labs, radiology results, and diagnostic studies.    Laboratory Data:   Results from last 7 days   Lab Units 07/07/24  0409 07/06/24 0417 07/05/24 0416   WBC 10*3/mm3 8.31 7.51 7.11   HEMOGLOBIN g/dL 9.7* 9.5* 9.5*   HEMATOCRIT % 30.7* 30.2* 28.9*   PLATELETS 10*3/mm3 94* 97* 101*        Results from last 7 days   Lab Units 07/07/24  0409 07/06/24 0417 07/05/24  1805 07/05/24 0416   SODIUM mmol/L 134* 136  --  136   POTASSIUM mmol/L 4.5 4.1 4.5 3.6   CHLORIDE mmol/L 92* 97*  --  94*   CO2 mmol/L 28.0 28.0  --  30.0*   BUN mg/dL 43* 29*  --  21   CREATININE mg/dL 3.50* 2.14*  --  1.54*   CALCIUM mg/dL 10.0 9.7  --  9.8   BILIRUBIN mg/dL 0.3 0.4  --   --    ALK PHOS U/L 109 107  --   --    ALT (SGPT) U/L 22 27  --   --    AST (SGOT) U/L 25 40*  --   --    GLUCOSE mg/dL 106* 112*  --  102*       Culture Data:   Urine Culture   Date Value Ref Range Status   06/28/2024 >100,000 CFU/mL Mixed Bhavana Isolated  Final       Radiology Data:   Imaging Results (Last 24 Hours)       ** No results found for the last 24 hours. **            I have reviewed the patient's current medications.     Assessment/Plan   Assessment  Active Hospital Problems    Diagnosis     **Left renal mass     Cauda equina compression     Metastatic cancer to spine     Morbid (severe) obesity due to excess calories        Treatment Plan  Lower back pain  Patient's low back pain has been getting worse in the last  couple of weeks, was started on gabapentin outpatient but did not help.  MRI of the lumbar spine reported as follows-  IMPRESSION:   Neoplastic process involving the L3 vertebral body with associated  posterior soft tissue component resulting in severe spinal canal  narrowing and cauda equina nerve root compression. The soft tissue  component appears to also extend into the right L3-L4 foramen. No  associated pathological fracture.  Additional multilevel spondylotic changes including moderate to severe  spinal canal and foraminal narrowing as detailed above.  Neurosurgery is on consult and did the following procedure-  Procedure(s):  LUMBAR LAMINECTOMY L3 WITH DECOMPRESSION 1-2 LEVELS-RIGHT     Decompression -  Lumbar laminectomy, medial facetectomy for decompression of the spinal cord  Open laminectomy and biopsy of epidural neoplasm  Use of intraoperative microscope      -Acute on chronic kidney disease stage IIIa  Patient follows up with a nephrologist outpatient, but he does not come to this hospital.  Nephrologist consulted and helping with management while patient is in-house.     -Left renal mass/right adrenal mass on CT of the abdomen/pelvis  Urology was consulted from the emergency room and after evaluation did not recommend any intervention,rather recommended oncology consult.  Patient has multiple subcutaneous nodules that may be amenable to percutaneous biopsy.  Oncology is on consult and has evaluated patient, will await for tentative diagnosis from lumbar spine biopsy before making recommendations      Other chronic medical conditions-  History of breast cancer  Hypercholesterolemia  Hypertension  Chronic sinusitis     DVT prophylaxis-heparin       Medical Decision Making  Number and Complexity of problems: High    Conditions and Status        Condition is unchanged.     MDM Data  External documents reviewed: No  Cardiac tracing (EKG, telemetry) interpretation: Yes  Radiology interpretation: Yes  Labs  reviewed: Yes  Any tests that were considered but not ordered: No     Decision rules/scores evaluated (example [id number], [place], etc): Yes     Discussed with: Patient     Care Planning  Shared decision making: Yes  Code status and discussions: Yes [full code]    Disposition  Social Determinants of Health that impact treatment or disposition: No  I expect the patient to be discharged to unknown in unknown days.     Electronically signed by Alysia Lincoln MD, 07/07/24, 10:02 CDT.

## 2024-07-07 NOTE — PLAN OF CARE
Goal Outcome Evaluation:           Progress: improving     VSS, on room air.  Heparin given for VTE prevention.  Up to bsc with 1 assist.  Safety maintianted.

## 2024-07-07 NOTE — PLAN OF CARE
Goal Outcome Evaluation:  Plan of Care Reviewed With: patient     Progress: no change     Pt A&O x4. C/o pain; see MAR. IID. Lumbar Dsg intact. Voiding per BSC. Fall precautions in place. RA. Heparin for VTE prevention. VSS. Safety maintained.

## 2024-07-08 ENCOUNTER — APPOINTMENT (OUTPATIENT)
Dept: ULTRASOUND IMAGING | Facility: HOSPITAL | Age: 79
End: 2024-07-08
Payer: MEDICARE

## 2024-07-08 LAB
ALBUMIN SERPL-MCNC: 3.4 G/DL (ref 3.5–5.2)
ALBUMIN/GLOB SERPL: 1 G/DL
ALP SERPL-CCNC: 93 U/L (ref 39–117)
ALT SERPL W P-5'-P-CCNC: 20 U/L (ref 1–33)
ANION GAP SERPL CALCULATED.3IONS-SCNC: 13 MMOL/L (ref 5–15)
AST SERPL-CCNC: 23 U/L (ref 1–32)
BASOPHILS # BLD AUTO: 0.03 10*3/MM3 (ref 0–0.2)
BASOPHILS NFR BLD AUTO: 0.4 % (ref 0–1.5)
BILIRUB SERPL-MCNC: 0.3 MG/DL (ref 0–1.2)
BUN SERPL-MCNC: 48 MG/DL (ref 8–23)
BUN/CREAT SERPL: 13.8 (ref 7–25)
CALCIUM SPEC-SCNC: 9 MG/DL (ref 8.6–10.5)
CHLORIDE SERPL-SCNC: 95 MMOL/L (ref 98–107)
CO2 SERPL-SCNC: 26 MMOL/L (ref 22–29)
CREAT SERPL-MCNC: 3.48 MG/DL (ref 0.57–1)
DEPRECATED RDW RBC AUTO: 51.9 FL (ref 37–54)
EGFRCR SERPLBLD CKD-EPI 2021: 12.9 ML/MIN/1.73
EOSINOPHIL # BLD AUTO: 0.36 10*3/MM3 (ref 0–0.4)
EOSINOPHIL NFR BLD AUTO: 4.9 % (ref 0.3–6.2)
ERYTHROCYTE [DISTWIDTH] IN BLOOD BY AUTOMATED COUNT: 15.9 % (ref 12.3–15.4)
GLOBULIN UR ELPH-MCNC: 3.4 GM/DL
GLUCOSE SERPL-MCNC: 88 MG/DL (ref 65–99)
HCT VFR BLD AUTO: 25.8 % (ref 34–46.6)
HGB BLD-MCNC: 8.3 G/DL (ref 12–15.9)
IMM GRANULOCYTES # BLD AUTO: 0.05 10*3/MM3 (ref 0–0.05)
IMM GRANULOCYTES NFR BLD AUTO: 0.7 % (ref 0–0.5)
LYMPHOCYTES # BLD AUTO: 1.39 10*3/MM3 (ref 0.7–3.1)
LYMPHOCYTES NFR BLD AUTO: 18.9 % (ref 19.6–45.3)
MCH RBC QN AUTO: 28.5 PG (ref 26.6–33)
MCHC RBC AUTO-ENTMCNC: 32.2 G/DL (ref 31.5–35.7)
MCV RBC AUTO: 88.7 FL (ref 79–97)
MONOCYTES # BLD AUTO: 1.12 10*3/MM3 (ref 0.1–0.9)
MONOCYTES NFR BLD AUTO: 15.2 % (ref 5–12)
NEUTROPHILS NFR BLD AUTO: 4.42 10*3/MM3 (ref 1.7–7)
NEUTROPHILS NFR BLD AUTO: 59.9 % (ref 42.7–76)
NRBC BLD AUTO-RTO: 0 /100 WBC (ref 0–0.2)
PLATELET # BLD AUTO: 67 10*3/MM3 (ref 140–450)
POTASSIUM SERPL-SCNC: 4.4 MMOL/L (ref 3.5–5.2)
PROT SERPL-MCNC: 6.8 G/DL (ref 6–8.5)
RAD ONC ARIA COURSE ID: NORMAL
RAD ONC ARIA COURSE LAST TREATMENT DATE: NORMAL
RAD ONC ARIA COURSE START DATE: NORMAL
RAD ONC ARIA COURSE TREATMENT ELAPSED DAYS: 0
RAD ONC ARIA FIRST TREATMENT DATE: NORMAL
RAD ONC ARIA PLAN FRACTIONS TREATED TO DATE: 1
RAD ONC ARIA PLAN ID: NORMAL
RAD ONC ARIA PLAN PRESCRIBED DOSE PER FRACTION: 3 GY
RAD ONC ARIA PLAN PRIMARY REFERENCE POINT: NORMAL
RAD ONC ARIA PLAN TOTAL FRACTIONS PRESCRIBED: 10
RAD ONC ARIA PLAN TOTAL PRESCRIBED DOSE: 3000 CGY
RAD ONC ARIA REFERENCE POINT DOSAGE GIVEN TO DATE: 3 GY
RAD ONC ARIA REFERENCE POINT ID: NORMAL
RAD ONC ARIA REFERENCE POINT SESSION DOSAGE GIVEN: 3 GY
RBC # BLD AUTO: 2.91 10*6/MM3 (ref 3.77–5.28)
SODIUM SERPL-SCNC: 134 MMOL/L (ref 136–145)
WBC NRBC COR # BLD AUTO: 7.37 10*3/MM3 (ref 3.4–10.8)

## 2024-07-08 PROCEDURE — 25010000002 HEPARIN (PORCINE) PER 1000 UNITS: Performed by: NEUROLOGICAL SURGERY

## 2024-07-08 PROCEDURE — 85025 COMPLETE CBC W/AUTO DIFF WBC: CPT | Performed by: NURSE PRACTITIONER

## 2024-07-08 PROCEDURE — 76775 US EXAM ABDO BACK WALL LIM: CPT

## 2024-07-08 PROCEDURE — 25810000003 SODIUM CHLORIDE 0.9 % SOLUTION: Performed by: HOSPITALIST

## 2024-07-08 PROCEDURE — 99233 SBSQ HOSP IP/OBS HIGH 50: CPT | Performed by: INTERNAL MEDICINE

## 2024-07-08 PROCEDURE — 25010000002 METHYLNALTREXONE 12 MG/0.6ML SOLUTION: Performed by: NURSE PRACTITIONER

## 2024-07-08 PROCEDURE — 77412 RADIATION TX DELIVERY LVL 3: CPT | Performed by: RADIOLOGY

## 2024-07-08 PROCEDURE — 99232 SBSQ HOSP IP/OBS MODERATE 35: CPT | Performed by: CLINICAL NURSE SPECIALIST

## 2024-07-08 PROCEDURE — 77280 THER RAD SIMULAJ FIELD SMPL: CPT | Performed by: RADIOLOGY

## 2024-07-08 PROCEDURE — 80053 COMPREHEN METABOLIC PANEL: CPT | Performed by: HOSPITALIST

## 2024-07-08 PROCEDURE — 97116 GAIT TRAINING THERAPY: CPT

## 2024-07-08 PROCEDURE — 0 HYDROMORPHONE 1 MG/ML SOLUTION: Performed by: HOSPITALIST

## 2024-07-08 RX ORDER — HYDROCODONE BITARTRATE AND ACETAMINOPHEN 5; 325 MG/1; MG/1
1 TABLET ORAL EVERY 4 HOURS PRN
Status: DISCONTINUED | OUTPATIENT
Start: 2024-07-08 | End: 2024-07-09

## 2024-07-08 RX ORDER — HYDROCODONE BITARTRATE AND ACETAMINOPHEN 5; 325 MG/1; MG/1
2 TABLET ORAL EVERY 4 HOURS PRN
Status: DISCONTINUED | OUTPATIENT
Start: 2024-07-08 | End: 2024-07-09

## 2024-07-08 RX ORDER — FENTANYL 12.5 UG/1
1 PATCH TRANSDERMAL
Status: COMPLETED | OUTPATIENT
Start: 2024-07-08 | End: 2024-07-14

## 2024-07-08 RX ADMIN — ROSUVASTATIN CALCIUM 10 MG: 10 TABLET, FILM COATED ORAL at 20:02

## 2024-07-08 RX ADMIN — ROPINIROLE HYDROCHLORIDE 0.5 MG: 0.25 TABLET, FILM COATED ORAL at 20:02

## 2024-07-08 RX ADMIN — HYDROMORPHONE HYDROCHLORIDE 1 MG: 1 INJECTION, SOLUTION INTRAMUSCULAR; INTRAVENOUS; SUBCUTANEOUS at 15:52

## 2024-07-08 RX ADMIN — CLONIDINE HYDROCHLORIDE 0.2 MG: 0.1 TABLET ORAL at 08:44

## 2024-07-08 RX ADMIN — BUPROPION HYDROCHLORIDE 150 MG: 150 TABLET, EXTENDED RELEASE ORAL at 08:45

## 2024-07-08 RX ADMIN — MONTELUKAST SODIUM 10 MG: 10 TABLET, FILM COATED ORAL at 20:02

## 2024-07-08 RX ADMIN — HYDROCODONE BITARTRATE AND ACETAMINOPHEN 1 TABLET: 10; 325 TABLET ORAL at 08:44

## 2024-07-08 RX ADMIN — CARVEDILOL 25 MG: 25 TABLET, FILM COATED ORAL at 17:34

## 2024-07-08 RX ADMIN — DICLOFENAC SODIUM 4 G: 10 GEL TOPICAL at 04:23

## 2024-07-08 RX ADMIN — Medication 10 ML: at 20:04

## 2024-07-08 RX ADMIN — CARVEDILOL 25 MG: 25 TABLET, FILM COATED ORAL at 08:44

## 2024-07-08 RX ADMIN — FENTANYL 1 PATCH: 12 PATCH TRANSDERMAL at 17:38

## 2024-07-08 RX ADMIN — LIDOCAINE 1 PATCH: 4 PATCH TOPICAL at 08:43

## 2024-07-08 RX ADMIN — METHYLNALTREXONE BROMIDE 6 MG: 12 INJECTION, SOLUTION SUBCUTANEOUS at 08:49

## 2024-07-08 RX ADMIN — PANTOPRAZOLE SODIUM 40 MG: 40 TABLET, DELAYED RELEASE ORAL at 08:44

## 2024-07-08 RX ADMIN — Medication 1 TABLET: at 08:45

## 2024-07-08 RX ADMIN — AMLODIPINE BESYLATE 5 MG: 5 TABLET ORAL at 08:45

## 2024-07-08 RX ADMIN — HEPARIN SODIUM 5000 UNITS: 5000 INJECTION INTRAVENOUS; SUBCUTANEOUS at 08:44

## 2024-07-08 RX ADMIN — SODIUM CHLORIDE 100 ML/HR: 9 INJECTION, SOLUTION INTRAVENOUS at 07:16

## 2024-07-08 RX ADMIN — HYDROMORPHONE HYDROCHLORIDE 1 MG: 1 INJECTION, SOLUTION INTRAMUSCULAR; INTRAVENOUS; SUBCUTANEOUS at 19:23

## 2024-07-08 RX ADMIN — CETIRIZINE HYDROCHLORIDE 10 MG: 10 TABLET ORAL at 08:45

## 2024-07-08 RX ADMIN — LACTULOSE 30 G: 20 SOLUTION ORAL at 15:00

## 2024-07-08 RX ADMIN — HYDROCODONE BITARTRATE AND ACETAMINOPHEN 1 TABLET: 10; 325 TABLET ORAL at 04:24

## 2024-07-08 RX ADMIN — Medication 10 ML: at 08:49

## 2024-07-08 RX ADMIN — SERTRALINE 100 MG: 50 TABLET, FILM COATED ORAL at 08:46

## 2024-07-08 NOTE — PROGRESS NOTES
"[facility] Progress Note    Progress Note    Patient:  Marina Joe  YOB: 1945  Date of Service: 7/8/2024  MRN: [id number]   Acct: [id number]   Primary Care Physician: Hanh Og APRN  Advance Directive:   Code Status and Medical Interventions:   Ordered at: 07/01/24 1547     Medical Intervention Limits:    No intubation (DNI)    No artificial nutrition     Level Of Support Discussed With:    Patient     Code Status (Patient has no pulse and is not breathing):    No CPR (Do Not Attempt to Resuscitate)     Medical Interventions (Patient has pulse or is breathing):    Limited Support     Admit Date: 6/28/2024       Hospital Day: 10      Subjective:     Chief Compliant:   Back pain    Subjective: Lingering back pain.  States pain regimen is \"kind of adequate.\"  States she has been out of bed with assistance and ambulatory for short distances with a walker.  Denies fevers or drenching night sweats.  States appetite is fair.  Has had \"normal\" bowel movements.  No difficulty urinating.    Interval history:  Marina Joe 79 y.o. 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     She reports that she has been experiencing back pain for several weeks. Since her presentation on 6/28/2024 she had the following workup:  - CT-A/P showed abnormal heterogenous left kidney mass, abnormal right kidney, new right adrenal mass, innumerable solid nodules in the subcutaneous soft tissues all concerning for metastatic disease process.  - CT lumbar spine showing multilevel prominent disc osteophyte complexes acet arthropathy and ligamental hypertrophy and resultant neural foraminal spinal canal stenosis.  - CT chest showed a left breast nodule of 9 mm, scattered subcutaneous soft tissue " nodules in the abdominal wall, otherwise there is no sign of intrathoracic metastases.  - MRI-abdomen showed infiltrative mass within the left kidney extending into the proximal left collecting system as well as numerous additional solid masses in the bilateral kidneys. Additionally there is a right adrenal mass, multiple subcutaneous fat soft tissue masses, and a L3 vertebral body mass with suspected extension into the spinal canal. 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  - MRI-L-spine: Neoplastic process involving the L3 vertebral body with associated posterior soft tissue component resulting in severe spinal canal narrowing and cauda equina nerve root compression. The soft tissue component appears to also extend into the right L3-L4 foramen. No associated pathological fracture. There are also appears to be tumor signal extending into the right L2-L3 neural foramen.     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  Final diagnosis: L3 epidural soft tissue mass, biopsy: Abnormal B-cell population with some features consistent with small lymphocytic lymphoma/chronic lymphocytic leukemia.  Comment:  Flow Cytometry Summary    Flow cytometry was performed at [facility] ([id number]).  Flow cytometry shows 64% of the sample as an abnormal monoclonal B-cell population with mixed cell size.  FISH testing is pending.  This may represent a B-cell small lymphocytic lymphoma/chronic lymphocytic leukemia.  The cell size of the abnormal cells appears mixed.  This may represent increased prolymphocytes, paraimmunoblasts, and or large  "cells and progression/transformation is in the differential diagnosis.  Intact lymph node architecture is not present in this fragmented paraspinal mass for further evaluation.  Clinical correlation is recommended.      - 7/1/24-ultrasound-guided core needle biopsy of right flank subcutaneous nodule.  FINAL DIAGNOSIS:  Abnormal B-cell population which may represent small lymphocytic lymphoma/chronic lymphocytic leukemia (SLL/CLL).  Comment  Please see additional report [id number] in the associated flow cytometry report. The flow cytometry report showed  scatter properties compatible with mixed cell size with features of B-cell small lymphocytic lymphoma/chronic lymphocytic  leukemia (B-SLL/). FISH testing is ordered and pending. This may represent progression/transformation. Correlation with  clinical, laboratory, and morphologic data may be necessary to better define this lymphoid neoplasm.  - 7/1/24-manual blood smear review:  Lab Results   Component Value Date    PATHINTERP  07/01/2024     Moderate normochromic normocytic anemia    Normal total white blood cell count with the following manual differential:  Neutrophils 72%  Lymphocytes 20%  Monocytes 4%  Eosinophils 4%  Moderate peripheral thrombocytopenia    No schistocytes identified  No platelet clumps identified  No morulae identified in granulocytes or monocytes       Review of Systems  Review of Systems:   Constitutional: Fatigue.  Says she has been ambulatory for short distances with a walker and assistance.  Appetite fair.  \"Not great.\"  No fever / No chills / No sweats  HEENT:  No sore throat / No hoarseness / No vision changes  CV:  No chest pain / No palpitations / No orthopnea  Respiratory:  No cough / No shortness of breath / No sputum / No hemoptysis  GI:  No nausea / No vomiting / No abdominal pain / No diarrhea / +constipation   :  No dysuria / No hesitancy / No urgency / No hematuria  Neuro:  + Lower extremity muscle weakness / No dysphagia / No " "headache / No paresthesias  Musculoskeletal:  No edema / No cyanosis / + postop back pain  Derm: Supple subcutaneous nodules on trunk and extremities    Vitals: /58 (BP Location: Right arm, Patient Position: Lying)   Pulse 71   Temp 98.3 °F (36.8 °C) (Oral)   Resp 16   Ht 172.7 cm (68\")   Wt 111 kg (244 lb)   LMP  (LMP Unknown)   SpO2 96%   BMI 37.10 kg/m²     Physical Exam  Physical Exam:  General appearance: Pleasant, obese, modestly elderly female alert, appears stated age and cooperative.  In no acute distress while in bed.  Skin:  Skin color a bit pale. No rashes or lesions.  Subcutaneous nodules on trunk and extremities (most prominently right forearm.  HEENT:  Head: Normal, normocephalic, atraumatic.  Neck:  no adenopathy, no tenderness/mass/nodules  Lungs:  clear to auscultation bilaterally  Heart:  regular rate and rhythm  Abdomen:  soft, non-tender.  No overt splenomegaly (habitus precludes an adequate exam)  Extremities:  extremities normal, atraumatic, no cyanosis or edema  Lymphatics: No obvious adenopathy in the cervical, supraclavicular, axillary or inguinal tri regions.  Neurologic:  Mental status: Alert, oriented, thought content appropriate    24HR INTAKE/OUTPUT:    Intake/Output Summary (Last 24 hours) at 7/8/2024 0716  Last data filed at 7/7/2024 1500  Gross per 24 hour   Intake 520 ml   Output --   Net 520 ml        Batista Catheter: Yes    Diet: Diet: Diabetic; Consistent Carbohydrate; Fluid Consistency: Thin (IDDSI 0)      Medications:   Scheduled Meds:allopurinol, 300 mg, Oral, Daily  amLODIPine, 5 mg, Oral, BID  buPROPion XL, 150 mg, Oral, Daily  carvedilol, 25 mg, Oral, BID With Meals  cetirizine, 10 mg, Oral, Daily  cloNIDine, 0.2 mg, Oral, BID  fluticasone, 2 spray, Nasal, Daily  gabapentin, 100 mg, Oral, TID  heparin (porcine), 5,000 Units, Subcutaneous, Q12H  lactulose, 30 g, Oral, TID  Lidocaine, 1 patch, Transdermal, Q24H  methylnaltrexone, 6 mg, Subcutaneous, Every " Other Day  montelukast, 10 mg, Oral, Nightly  multivitamin with minerals, 1 tablet, Oral, Daily  pantoprazole, 40 mg, Oral, Daily  rOPINIRole, 0.5 mg, Oral, Nightly  rosuvastatin, 10 mg, Oral, Nightly  sertraline, 100 mg, Oral, Daily  sodium chloride, 10 mL, Intravenous, Q12H        Continuous Infusions:sodium chloride, 100 mL/hr, Last Rate: 100 mL/hr (07/07/24 2223)        Labs:     Results from last 7 days   Lab Units 07/08/24  0343 07/07/24  0409 07/06/24  0417   WBC 10*3/mm3 7.37 8.31 7.51   HEMOGLOBIN g/dL 8.3* 9.7* 9.5*   HEMATOCRIT % 25.8* 30.7* 30.2*   PLATELETS 10*3/mm3 67* 94* 97*     06/25/2024 0901 06/25/2024 0935 Iron Profile [[id number]]   (Abnormal)   Blood    Final result Component Value Units   Iron 56 mcg/dL   Iron Saturation (TSAT) 16 Low  %   Transferrin 238 mg/dL   TIBC 355 mcg/dL          06/25/2024 0901 06/25/2024 0940 Ferritin [[id number]]    (Abnormal)   Blood    Final result Component Value Units   Ferritin 451.10 High  ng/mL               07/01/2024 1053 07/01/2024 1130 Fibrinogen [[id number]]   (Abnormal)   Blood    Final result Component Value Units   Fibrinogen 521 High  mg/dL          07/01/2024 1053 07/01/2024 1204 Fibrin Split Products [[id number]]   Blood    Final result Component Value   Fibrin Split Products Less Than 5 mcg/mL          07/01/2024 1053 07/01/2024 1102 Platelet Antibody Profile [[id number]]   Blood    In process Component Value   No component results          07/01/2024 1053 07/01/2024 1130 Protime-INR [[id number]]   Blood    Final result Component Value Units   Protime 13.5 Seconds   INR 0.99           07/01/2024 1053 07/01/2024 1130 aPTT [[id number]]   Blood    Final result Component Value Units   PTT 26.8 seconds                    Results from last 7 days   Lab Units 07/08/24  0343 07/07/24  0409 07/06/24  0417   SODIUM mmol/L 134* 134* 136   POTASSIUM mmol/L 4.4 4.5 4.1   CHLORIDE mmol/L 95* 92* 97*   CO2 mmol/L 26.0 28.0 28.0   BUN mg/dL 48* 43* 29*   CREATININE  "mg/dL 3.48* 3.50* 2.14*   CALCIUM mg/dL 9.0 10.0 9.7   BILIRUBIN mg/dL 0.3 0.3 0.4   ALK PHOS U/L 93 109 107   ALT (SGPT) U/L 20 22 27   AST (SGOT) U/L 23 25 40*   GLUCOSE mg/dL 88 106* 112*       ABG:  No results found for: \"PHART\", \"PO2ART\", \"MIC7ZVA\"    Culture Data:   Urine Culture   Date Value Ref Range Status   06/28/2024 >100,000 CFU/mL Mixed Bhavana Isolated  Final       Lab Results   Component Value Date    PATHINTERP  07/01/2024     Moderate normochromic normocytic anemia    Normal total white blood cell count with the following manual differential:  Neutrophils 72%  Lymphocytes 20%  Monocytes 4%  Eosinophils 4%  Moderate peripheral thrombocytopenia    No schistocytes identified  No platelet clumps identified  No morulae identified in granulocytes or monocytes       Radiology:     Imaging Results (Last 7 Days)       Procedure Component Value Units Date/Time    XR Chest 1 View [[id number]] Collected: 07/03/24 1538     Updated: 07/03/24 1552    Narrative:      EXAMINATION: XR CHEST 1 VW-  7/3/2024 3:38 PM 1 view     HISTORY: sob; C79.51-Secondary malignant neoplasm of bone; N28.89-Other  specified disorders of kidney and ureter; E27.8-Other specified  disorders of adrenal gland; M79.89-Other specified soft tissue  disorders; N28.9-Disorder of kidney and ureter, unspecified;  N18.9-Chronic kidney disease, unspecified; D69.6-Thrombocytopenia,  unspecified; G83.4-Cauda equina syndrome; Z74.09-Other reduced mobility;  Z85.3-Pe     COMPARISON: 6/28/2024     TECHNIQUE: A single frontal view of the chest was obtained.     FINDINGS:  There is mild cardiomegaly and pulmonary vascular congestion. Shallow  depth of inspiration. I don't see a large effusion. Vascular  calcifications are noted in the aorta. Surgical clips project over the  LEFT chest wall.       Impression:         1.  Shallow inspiration with mild cardiomegaly and pulmonary vascular  congestion without lee edema or other acute consolidation.         "   This report was signed and finalized on 7/3/2024 3:49 PM by Dr. Armando Calixto MD.       US Guided Tissue Biopsy [[id number]] Collected: 07/01/24 1430     Updated: 07/02/24 0700    Narrative:      EXAM: US GUIDED TISSUE BIOPSY-      DATE: 7/1/2024 12:38 PM     HISTORY: Assess for metastatic disease; 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 Patient reports  history of breast cancer in 2013 status post surgery and radiation  therapy.        COMPARISON: 6/28/2024.     TECHNIQUE: Representative images and report stored to PACS per  institutional protocol and state regulations.     PROCEDURE:  Preprocedure imaging performed demonstrating multiple peripherally  echogenic, centrally hypoechoic nodules. These demonstrated internal  vascularity. A superficial nodule RIGHT flank nodule was selected and  patient positioned with consideration for her comfort.     Risks, benefits and alternatives to the procedure were discussed with  the patient. Specifically, risks of bleeding, infection, allergy to  medicine, and non-diagnostic biopsy results were discussed with the  patient. Verbal and written informed consent was obtained.     A timeout was performed including the patient's name, date of birth,  biopsy site and laterality.     Using ultrasound, a site for biopsy of RIGHT flank subcutaneous nodule  was selected, marked and prepped in the usual sterile fashion. 1 percent   lidocaine was used for local anesthesia.     Using ultrasound guidance, RIGHT flank subcutaneous nodule biopsy was  performed using 18 gauge Bard core needle biopsy device. 5 passes were  made. One core was used for touch prep.  2 cores were placed in formalin  and 2 cores were placed in RPMI.     Cytology deemed sample adequate. Biopsy samples were taken by cytology.      The patient tolerated the procedure well. No evidence of complication.     The patient returned to the floor in stable condition and agrees to  follow up with referring clinician for biopsy results.           Impression:      1. Successful ultrasound guided core needle biopsy of RIGHT flank  subcutaneous nodule.        This report was signed and finalized on 7/1/2024 2:36 PM by Dr Sonam Quach MD.       US Soft Tissue [[id number]] Collected: 07/01/24 1430     Updated: 07/02/24 0700    Narrative:      EXAM: US GUIDED TISSUE BIOPSY-      DATE: 7/1/2024 12:38 PM     HISTORY: Assess for metastatic disease; 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 Patient reports  history of breast cancer in 2013 status post surgery and radiation  therapy.        COMPARISON: 6/28/2024.     TECHNIQUE: Representative images and report stored to PACS per  institutional protocol and state regulations.     PROCEDURE:  Preprocedure imaging performed demonstrating multiple peripherally  echogenic, centrally hypoechoic nodules. These demonstrated internal  vascularity. A superficial nodule RIGHT flank nodule was selected and  patient positioned with consideration for her comfort.     Risks, benefits and alternatives to the procedure were discussed with  the patient. Specifically, risks of bleeding, infection, allergy to  medicine, and non-diagnostic biopsy results were discussed with the  patient. Verbal and written informed consent was obtained.     A timeout was  performed including the patient's name, date of birth,  biopsy site and laterality.     Using ultrasound, a site for biopsy of RIGHT flank subcutaneous nodule  was selected, marked and prepped in the usual sterile fashion. 1 percent   lidocaine was used for local anesthesia.     Using ultrasound guidance, RIGHT flank subcutaneous nodule biopsy was  performed using 18 gauge Bard core needle biopsy device. 5 passes were  made. One core was used for touch prep. 2 cores were placed in formalin  and 2 cores were placed in RPMI.     Cytology deemed sample adequate. Biopsy samples were taken by cytology.      The patient tolerated the procedure well. No evidence of complication.     The patient returned to the floor in stable condition and agrees to  follow up with referring clinician for biopsy results.           Impression:      1. Successful ultrasound guided core needle biopsy of RIGHT flank  subcutaneous nodule.        This report was signed and finalized on 7/1/2024 2:36 PM by Dr Sonam Quach MD.       MRI Brain Without Contrast [[id number]] Collected: 07/01/24 0949     Updated: 07/01/24 1000    Narrative:      EXAMINATION: MRI BRAIN WO CONTRAST-  7/1/2024 9:49 AM      HISTORY: Staging; C79.51-Secondary malignant neoplasm of bone;  N28.89-Other specified disorders of kidney and ureter; E27.8-Other  specified disorders of adrenal gland; M79.89-Other specified soft tissue  disorders; N28.9-Disorder of kidney and ureter, unspecified;  N18.9-Chronic kidney disease, unspecified; D69.6-Thrombocytopenia,  unspecified; G83.4-Cauda equina syndrome; Z74.09-Other reduced mobility     COMPARISON: 8/10/2023     TECHNIQUE: Multiplanar imaging of the brain was performed in a routine  fashion. No contrast was administered.     FINDINGS:  No restricted diffusion. No mass effect or midline shift. It should be  noted that no intravenous contrast was administered, limiting evaluation  for intracranial metastatic disease. Within the  limitations of this  exam, I don't see any definite evidence of intracranial metastatic  disease. Increased FLAIR signal intensity scattered throughout the  subcortical and periventricular white matter. Basilar cisterns are  preserved. Grey and white matter demonstrates normal MR signal. No  abnormal extra axial fluid collections are noted. No definite mass  effect or midline shift identified.     Proximal cervical spinal cord, brainstem, and cerebellum are  unremarkable. Normal cerebrovascular flow voids are seen. Bilateral  globes and orbits are normal in appearance.     Opacification of the LEFT sphenoid sinus with high T1 signal which may  represent some proteinaceous fluid.          Impression:         1.  No definite evidence of intracranial metastatic disease within the  limitations of this noncontrast examination.     2.  Fairly extensive periventricular white matter signal abnormality,  likely related to chronic microvascular ischemic change.     3.  Opacification of the LEFT sphenoid sinus with what is likely  proteinaceous fluid.                                                       This report was signed and finalized on 7/1/2024 9:53 AM by Dr. Armando Calixto MD.                 Objective:       Problem list:     Left renal mass    Morbid (severe) obesity due to excess calories    Cauda equina compression    Metastatic cancer to spine        Assessment/Plan:       ASSESSMENT:   Small lymphocytic lymphoma/chronic lymphocytic leukemia   -Tumor stage: IV  -Tumor burden:   -6/28/2024 CT-A/P showed abnormal heterogenous left kidney mass, abnormal right kidney, new right adrenal mass, innumerable solid nodules in the subcutaneous soft tissues all concerning for metastatic disease process.  -6/28/2024, CT lumbar spine showing multilevel prominent disc osteophyte complexes acet arthropathy and ligamental hypertrophy and resultant neural foraminal spinal canal stenosis.  -6/28/2024, CT chest showed a left  breast nodule of 9 mm, scattered subcutaneous soft tissue nodules in the abdominal wall, otherwise there is no sign of intrathoracic metastases.  -6/28/2024, MRI-abdomen showed infiltrative mass within the left kidney extending into the proximal left collecting system as well as numerous additional solid masses in the bilateral kidneys. Additionally there is a right adrenal mass, multiple subcutaneous fat soft tissue masses, and a L3 vertebral body mass with suspected extension into the spinal canal. 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  -6/20/2024, MRI-L-spine: Neoplastic process involving the L3 vertebral body with associated posterior soft tissue component resulting in severe spinal canal narrowing and cauda equina nerve root compression. The soft tissue component appears to also extend into the right L3-L4 foramen. No associated pathological fracture. There are also appears to be tumor signal extending into the right L2-L3 neural foramen.  - 7/1/2024-MRI brain-no definite evidence of intracranial metastatic disease within the limitations of noncontrast exam.  Cerebral microvascular disease noted.  - 7/1/2024-ultrasound-guided core needle biopsy of right flank subcutaneous nodule.  FINAL DIAGNOSIS:  Abnormal B-cell population which may represent small lymphocytic lymphoma/chronic lymphocytic leukemia (SLL/CLL).    -Complications of tumor: Cord compression at L3 concerning for epidural metastasis  -Tumor status:  - Systemically untreated.  Anticipate ibrutinib  - 6/29/2024 - OR for L3 laminectomy with decompression; prelim pathology showing small round blue cell tumor.  Final diagnosis: L3 epidural soft tissue mass, biopsy: Abnormal B-cell population with some features consistent with small lymphocytic lymphoma/chronic lymphocytic leukemia.    -Plan for postop  radiation therapy to the lumbar region is noted.  Radiation to L-spine anticipated beginning 7/8/24 with 2000 to 3000 cGy over 5-20 daily fractions    Diffuse soft tissue abdominal wall nodules.  - 7/1/24-ultrasound-guided core needle biopsy of right flank subcutaneous nodule.  FINAL DIAGNOSIS:  Abnormal B-cell population which may represent small lymphocytic lymphoma/chronic lymphocytic leukemia (SLL/CLL).  Comment  Please see additional report [id number] in the associated flow cytometry report. The flow cytometry report showed  scatter properties compatible with mixed cell size with features of B-cell small lymphocytic lymphoma/chronic lymphocytic  leukemia (B-SLL/). FISH testing is ordered and pending. This may represent progression/transformation. Correlation with  clinical, laboratory, and morphologic data may be necessary to better define this lymphoid neoplasm.    3.   History of stage I left breast cancer.  Now with left breast nodule of 9 mm  4.   Anemia.  Contributions from CKD+/- malignancy/anemia of chronic disease  -Hgb 8.3; MCV 88.7, 7/8/2024 (prior: Hgb 8.7 -11.8)  -6/25/24-iron 56, iron saturation 16%, TIBC 355, ferritin 451  5.  Thrombocytopenia.  Contributions from CLL/SLL, ITP,  myelopthisis?  -67,000, 7/8/2024 (prior patito: 81,000).  Has needed platelet transfusions to maintain >/=100,000  -7/1/2024-platelet antibody profile: HLA class I antibody and glycoprotein antibodies positive.  - No evidence for DIC-7/1/2024: Fibrinogen 521, FSP<5, PT 13.5/INR 0.9, PTT 26.8  - No evidence for MAHA/TTP- 7/1/24: Manual blood smear--moderate normochromic normocytic anemia.  Normal total white blood cell count with 72 segs, 20 lymphs, 4 monos, 4 eos.  Moderate peripheral thrombocytopenia.  No schistocytes identified.  No platelet clumps identified.  No morulae identified in granulocytes or monocytes.  6.   Acute on CKD 3-4  -Worsening since receipt of diuretics.  GFR 12.9, 7/8/2024 (prior: 15.6- 36.4)  7.    Guarded prognosis     PLAN:  -Reviewed labs, 7/1/24 - 7/8/24.  Note dwindling anemia, and thrombocytopenia, + platelet antibodies, previously normal PT/PTT, normal fibrinogen, normal FSP (no DIC), no evidence for MAHA (no schistocytes).  -Apprised of pathological diagnosis from L3 biopsy and abdominal wall nodules (above) consistent with small lymphocytic lymphoma/chronic lymphocytic leukemia though FISH studies are pending.  May have a more aggressive process/transformation.  Needs a bone marrow biopsy.  Patient agrees to proceed but is unable to tolerate the procedure immediately postop.  - Hold allopurinol due to worsening GFR  -Teaching sheets for ibrutinib.  Pharmacy to provide.  Anticipate initiation of drug when available and when cleared by nephrology (when able to resume allopurinol)  -Schedule bone marrow biopsy to assess for marrow involvement by CLL/SLL when patient tolerates the procedure  - Outpatient staging PET-scan   - Obtain outpatient diagnostic mammography to evaluate the left breast nodule.  -Plan for postop radiation therapy to the lumbar region.  Radiation to L-spine anticipated beginning Monday with 2000 to 3000 cGy over 5-20 daily fractions  -Appreciate palliative care assistance.  - Care discussed with patient's significant other Jose Enrique and her daughter Kailee by telephone.  -Care discussed with Dr. Lincoln (hospitalist).  Will address the GERI by avoiding diuretics, hydration, and nephrology consulation  - Care update with Dr. Parra of radiation oncology  - Meanwhile, will hold off on allopurinol and therapy for the SLL/CLL (steroids/ibrutinib) to avoid TLS while awaiting renal recovery.     I spent >40 minutes caring for Marina on this date of service. This time includes time spent by me in the following activities: preparing for the visit, reviewing tests, performing a medically appropriate examination and/or evaluation, counseling and educating the patient/family/caregiver, ordering  medications, tests, or procedures and documenting information in the medical record

## 2024-07-08 NOTE — PLAN OF CARE
Goal Outcome Evaluation:  Plan of Care Reviewed With: patient     Progress: improving     Pt A&O x4. C/o pain; see MAR. Up to BSC x1. IVF infusing per order. RA. Lumbar dressing dry and intact. Pt resting well between care. VSS. Safety maintained.

## 2024-07-08 NOTE — PROGRESS NOTES
[facility] Palliative Care Services    Palliative Care Daily Progress Note   Chief complaint-follow up support for patient/family    Code Status:   Code Status and Medical Interventions:   Ordered at: 07/01/24 1547     Medical Intervention Limits:    No intubation (DNI)    No artificial nutrition     Level Of Support Discussed With:    Patient     Code Status (Patient has no pulse and is not breathing):    No CPR (Do Not Attempt to Resuscitate)     Medical Interventions (Patient has pulse or is breathing):    Limited Support      Advanced Directives: Advance Directive Status: Patient does not have advance directive   Goals of Care: Ongoing.     S: Medical record reviewed. Events noted.  Pulmonology consulted last week secondary to hypoxia and ordered overnight oximetry, recommends follow-up outpatient.  Notes concerns for sleep apnea and obesity hypoventilation syndrome and recommends outpatient sleep study.  Seen by my colleague MARY Mortensen on 7/4 for pain management, no medication changes implemented as patient not using opiates consistently and noted to be mostly sleeping.  Worsening kidney function over the weekend and started on IV fluids.  Note that gabapentin initiated yesterday however patient has declined.  Allopurinol placed on hold due to worsening kidney function per oncology.  Awaiting bone marrow biopsy when patient able to tolerate procedure, outpatient PET scan, and outpatient diagnostic mammography.  Nephrology has been consulted due to worsening kidney function and plans for renal ultrasound.  Worsening thrombocytopenia and anemia.  Noted to not tolerate bone marrow biopsy or palliative radiation attempts secondary to complaints of pain.  Received 40 mg oral morphine equivalent in the form of Norco over the last 24 hours, this has been consistent over the last several days.  Attending physician has added IV Dilaudid as needed.  Advance Care Planning ongoing  conversation with patient and significant other-Jose Enrique with regard to challenges with tolerating interventions and procedures secondary to cancer related pain and now with worsening kidney function.  Continue to remain optimistic, but understand the complexity of situation.          Review of Systems   Constitutional: Positive for malaise/fatigue.   Respiratory:  Negative for shortness of breath.    Musculoskeletal:  Positive for muscle weakness and back pain.   Genitourinary:  Negative for dysuria.   Neurological:  Positive for loss of balance and weakness.   Psychiatric/Behavioral:  The patient is not nervous/anxious    Pain Assessment  Preferred Pain Scale: number (Numeric Rating Pain Scale)  Nonverbal Indicators of Pain: nonverbal indicators absent  CPOT Facial Expression: 0-->relaxed, neutral  CPOT Body Movements: 0-->absence of movements  CPOT Muscle Tension: 0-->relaxed  Ventilator Compliance/Vocalization: 0-->talking in normal tone or no sound  CPOT Score: 0  Pain Location: extremity  Pain Description: aching    O:     Intake/Output Summary (Last 24 hours) at 7/8/2024 1438  Last data filed at 7/7/2024 1500  Gross per 24 hour   Intake 280 ml   Output --   Net 280 ml       Diagnostics and current medications: Reviewed.      Current Facility-Administered Medications:     acetaminophen (TYLENOL) tablet 650 mg, 650 mg, Oral, Q6H PRN, Marcellus Pulido MD, 650 mg at 06/29/24 0544    albuterol (PROVENTIL) nebulizer solution 0.083% 2.5 mg/3mL, 2.5 mg, Nebulization, Q4H PRN, Marcellus Pulido MD, 2.5 mg at 06/28/24 1040    amLODIPine (NORVASC) tablet 5 mg, 5 mg, Oral, BID, Marcellus Pulido MD, 5 mg at 07/08/24 0845    sennosides-docusate (PERICOLACE) 8.6-50 MG per tablet 2 tablet, 2 tablet, Oral, BID PRN, 2 tablet at 07/01/24 2045 **AND** polyethylene glycol (MIRALAX) packet 17 g, 17 g, Oral, Daily PRN, 17 g at 07/03/24 0836 **AND** bisacodyl (DULCOLAX) EC tablet 5 mg, 5 mg, Oral, Daily PRN **AND**  bisacodyl (DULCOLAX) suppository 10 mg, 10 mg, Rectal, Daily PRN, Marcellus Pulido MD    buPROPion XL (WELLBUTRIN XL) 24 hr tablet 150 mg, 150 mg, Oral, Daily, Marcellus Pulido MD, 150 mg at 07/08/24 0845    Calcium Replacement - Follow Nurse / BPA Driven Protocol, , Does not apply, PRN, Marcellus Pulido MD    carvedilol (COREG) tablet 25 mg, 25 mg, Oral, BID With Meals, Alysia Lincoln MD, 25 mg at 07/08/24 0844    cetirizine (zyrTEC) tablet 10 mg, 10 mg, Oral, Daily, Marcellus Pulido MD, 10 mg at 07/08/24 0845    cloNIDine (CATAPRES) tablet 0.2 mg, 0.2 mg, Oral, BID, Alysia Lincoln MD, 0.2 mg at 07/08/24 0844    cyclobenzaprine (FLEXERIL) tablet 10 mg, 10 mg, Oral, TID PRN, Marcellus Pulido MD, 10 mg at 07/06/24 0826    Diclofenac Sodium (VOLTAREN) 1 % gel 4 g, 4 g, Topical, 4x Daily PRN, Marcellus Pulido MD, 4 g at 07/08/24 0423    fluticasone (FLONASE) 50 MCG/ACT nasal spray 2 spray, 2 spray, Nasal, Daily, Marcellus Pulido MD, 2 spray at 07/07/24 0907    gabapentin (NEURONTIN) capsule 100 mg, 100 mg, Oral, TID, Alysia Lincoln MD    hydrALAZINE (APRESOLINE) injection 10 mg, 10 mg, Intravenous, Q4H PRN, Marcellus Pulido MD    lactulose solution 30 g, 30 g, Oral, TID, Alysia Lincoln MD, 30 g at 07/07/24 1627    Lidocaine 4 % 1 patch, 1 patch, Transdermal, Q24H, Lauren Kuo, APRN, 1 patch at 07/08/24 0843    Magnesium Low Dose Replacement - Follow Nurse / BPA Driven Protocol, , Does not apply, PRN, Marcellus Pulido MD    methylnaltrexone (RELISTOR) injection 6 mg, 6 mg, Subcutaneous, Every Other Day, Marc Quevedo, APRN, 6 mg at 07/08/24 0849    montelukast (SINGULAIR) tablet 10 mg, 10 mg, Oral, Nightly, Marcellus Pulido MD, 10 mg at 07/07/24 2020    multivitamin with minerals 1 tablet, 1 tablet, Oral, Daily, Marcellus Pulido MD, 1 tablet at 07/08/24 0845    ondansetron (ZOFRAN) injection 4 mg, 4 mg, Intravenous, Q6H PRN,  Marcellus Pulido MD    pantoprazole (PROTONIX) EC tablet 40 mg, 40 mg, Oral, Daily, Marcellus Pulido MD, 40 mg at 07/08/24 0844    Phosphorus Replacement - Follow Nurse / BPA Driven Protocol, , Does not apply, Ashok DE JESUS William Lee, MD    Potassium Replacement - Follow Nurse / BPA Driven Protocol, , Does not apply, Ashok DE JESUS William Lee, MD    rOPINIRole (REQUIP) tablet 0.5 mg, 0.5 mg, Oral, Nightly, Marcellus Pulido MD, 0.5 mg at 07/07/24 2020    rosuvastatin (CRESTOR) tablet 10 mg, 10 mg, Oral, Nightly, Marcellus Pulido MD, 10 mg at 07/07/24 2020    sertraline (ZOLOFT) tablet 100 mg, 100 mg, Oral, Daily, Marcellus Pulido MD, 100 mg at 07/08/24 0846    [COMPLETED] Insert Peripheral IV, , , Once **AND** sodium chloride 0.9 % flush 10 mL, 10 mL, Intravenous, PRN, Marcellus Pulido MD    sodium chloride 0.9 % flush 10 mL, 10 mL, Intravenous, Q12H, Marcellus Pulido MD, 10 mL at 07/08/24 0849    sodium chloride 0.9 % flush 10 mL, 10 mL, Intravenous, PRN, Marcellus Pulido MD    sodium chloride 0.9 % flush 10 mL, 10 mL, Intravenous, PRN, Marcellus Pulido MD    sodium chloride 0.9 % infusion 40 mL, 40 mL, Intravenous, PRN, Marcellus Pulido MD    sodium chloride 0.9 % infusion 40 mL, 40 mL, Intravenous, PRN, Marcellus Pulido MD    sodium chloride 0.9 % infusion, 100 mL/hr, Intravenous, Continuous, Alysia Lincoln MD, Last Rate: 100 mL/hr at 07/08/24 0716, 100 mL/hr at 07/08/24 0716    Allergies   Allergen Reactions    Aspirin GI Bleeding    Niacin Other (See Comments)     I have utilized all available immediate resources to obtain, update, or review the patient's current medications (including all prescriptions, over-the-counter products, herbals, cannabis/cannabidiol products, and vitamin/mineral/dietary (nutritional) supplements) for name, route of administration, type, dose and frequency.    A:    /57 (BP Location: Right arm,  "Patient Position: Lying)   Pulse 66   Temp 98.2 °F (36.8 °C) (Oral)   Resp 18   Ht 172.7 cm (68\")   Wt 111 kg (244 lb)   LMP  (LMP Unknown)   SpO2 95%   BMI 37.10 kg/m²     Vitals and nursing note reviewed.   Constitutional:       Appearance: Not in distress.   Eyes:      General: Lids are normal.      Pupils: Pupils are equal, round, and reactive to light.   HENT:      Head: Normocephalic.   Pulmonary:      Effort: Pulmonary effort is normal.   Cardiovascular:      Normal rate.   Edema:     Peripheral edema present.  Abdominal:      Palpations: Abdomen is soft.   Musculoskeletal: Normal range of motion.      Cervical back: Neck supple.   Skin:     General: Skin is warm.   Genitourinary:     Comments: No reported dysuria  Neurological:      Mental Status: Alert   Psychiatric:         Mood and Affect: Mood normal.         Cognition and Memory: Cognition normal.      Patient status: Disease state: Controlled with current treatments.  Current Functional status: Palliative Performance Scale Score: Performance 40% based on the following measures: Ambulation: Mainly in bed, Activity and Evidence of Disease: Unable to do any work, extensive evidence of disease, Self-Care: Mainly assistance required,  Intake: Normal or reduced, LOC: Full, drowsy or confusion   Baseline Functional status: Palliative Performance Scale Score: Performance 70% based on the following measures: Ambulation: Reduced, Activity and Evidence of Disease: Unable to do normal work, some evidence of disease, Self-Care: Fully independent,  Intake: Normal or reduced, LOC: Full   Baseline ECOG Status(3) Capable of limited self-care, confined to bed or chair > 50% of waking hours.  Nutritional status: Albumin 3.9 Body mass index is 37.1 kg/m².      Hospital Problem List      Left renal mass    Cauda equina compression    Metastatic cancer to spine     Impression/Problem List:     Small lymphocytic lymphoma/chronic lymphocytic leukemia, stage IV  Cord " compression at L3 s/p laminectomy and decompression 6/29/2024  Left renal mass, right adrenal mass, and abdominal soft tissue nodules concerning for metastatic disease  Cerebral microvascular disease, per MRI  History of breast cancer, stage I  Left breast nodule 9 mm  Anemia  Thrombocytopenia  Acute kidney injury on chronic kidney disease stage III  Hyperlipidemia  Hypertension     Recommendations/Plan:  1. plan: Goals of care include status no CPR/limited support interventions.     Family support: The patient receives support from her children and friend, Jose Enrique ..  Advance Directives: Advance Directive Status: Patient does not have advance directive   POA/Healthcare surrogate-Advance directive completed 7/1 with assistance from palliative  and , patient named her friend Jose Enrique followed by 2 children, Kailee and Marques as healthcare surrogates..     2.  Palliative care encounter  - Prognosis is guarded long-term secondary to suspected metastatic disease and comorbidities..  -Patient and HCS appears to have good prognostic awareness.      -Neurosurgery and nephrology consulted.    6/29-Underwent lumbar laminectomy L3 with decompression of the spinal cord and biopsy by Dr. 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    -Oncology consulted, recommend further staging diagnostics.  Anticipating biopsy of abdominal subcutaneous nodules.  MRI brain shows no definite evidence of metastatic disease.    -Radiation oncology consulted for postop radiation.   -Continues to work with therapy, LSO when out of bed per neurosurgery recommendation.    7/1-Advance directive completed today with assistance from palliative  and , patient named her friend Jose Enrique followed by 2 children Kailee and Marques as healthcare surrogates.  7/3-Cytology shows abnormal B-cell peripheral proliferation with some  features consistent with small lymphocytic lymphoma/chronic lymphocytic leukemia, though FISH studies are pending.    -Oncology plans for bone marrow biopsy.  Anticipating initiation of ibrutinib.  Allopurinol initiated.  Plan for outpatient staging PET scan.  Plan outpatient diagnostic mammography to evaluate left breast nodule  -Radiation oncology plans for palliative radiation treatments 5-10 treatment fractions starting Monday 7/8, simulated 7/2.    -Neurosurgery recommends PT OT with brace and outpatient follow-up in 2 weeks.   7/8-Nephrology has been consulted due to worsening kidney function and plans for renal ultrasound.       7/1-CODE STATUS changes no CPR/limited support interventions, no intubation and no artificial nutrition.    -Will plan to complete a MOST document prior to discharge with assistance from palliative     7/8-continue supportive measures  -Radiation oncology plans for palliative radiation treatments 5-10 treatment fractions starting Monday 7/8, simulated 7/2.    -Anticipating bone marrow biopsy, and initiation of ibrutinib and outpatient PET scan per oncology and outpatient diagnostic mammography to evaluate left breast nodule  -Neurosurgery follow-up outpatient in 2 weeks  -A MOST document completed  -Anticipating home with home health and PT/OT.  Declined SNF and reports will have adequate family caregivers.    3.  Pain, cancer related  -Not well-controlled.  Has received 40 mg oral morphine equivalent consistently over the last several days.  -Add fentanyl transdermal patch 12 mcg for more consistent pain management  -Continue Norco as needed  -Attending has added IV Dilaudid  -Continue lidocaine patch  -Utilize Voltaren gel as needed  -Plan for palliative radiation to start Monday  -Declined gabapentin trial at lower dose (100 mg every 8 hours), states side effects most recently with dizziness (300 mg per pain management).  Most likely exacerbated by kidney dysfunction.   Will discontinue for now.    Thank you for allowing us to participate in patient's plan of care. Palliative Care Team will continue to follow patient.     Lauren Kuo, APRN  7/8/2024  14:38 CDT

## 2024-07-08 NOTE — PROGRESS NOTES
[facility] Medicine Services  INPATIENT PROGRESS NOTE    Patient Name: Marina Joe  Date of Admission: 6/28/2024  Today's Date: 07/08/24  Length of Stay: 10  Primary Care Physician: Hanh Og APRN    Subjective   Chief Complaint: Lower back pain          HPI   Ms. Joe is a 79-year-old female from home with past medical history of breast cancer, chronic kidney disease stage IIIa, hypercholesterolemia, hypertension and chronic sinusitis, who presented to the emergency room with worsening back pain/flank pain, history was mostly provided by patient's boyfriend at bedside.  She developed back pain couple of weeks ago and was referred to a pain clinic doctor where she was prescribed gabapentin, after she took about 2-3 doses of the gabapentin, she developed dizziness. She came to the emergency room today because lower back pain and flank pain got even worse despite being on the gabapentin.  In the emergency room, she was extensively worked up with CT of the abdomen and lumbar spine, CT abdomen showed extensive mass of the left kidney extending up to the proximal left ureter as well as right adrenal gland mass suspicious for metastasis.  Urology was consulted from the emergency room for input.  7/1  As above history of chronic kidney disease hyperlipidemia hypertension presented with back pain found to have kidney mass suspicious for metastasis patient CT of the lumbar spine shows multilevel degenerative changes to include an anteriolisthesis of L3 over L4 and spondylosis at L5-S1. 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 awaiting final pathology will consult with palliative  team to address the CODE STATUS and plan of care  7/2  Appreciate palliative care the patient is currently DNR, appreciate neurosurgery status post L3 laminectomy and decompression on June 29 for cauda equina, appreciate oncology continues to work with physical therapy appreciate  radiation oncology patient started radiation today  7/3  Patient oxygen is 85% on 8 L, patient blood pressure is poorly controlled increase her Coreg we will repeat her chest x-ray chest x-ray from before was showing some congestion IV Hep-Lock will give her 1 dose of Lasix  7/4  As before remains on 8 L oxygen documented excision was not titrated will titrate oxygen, chest x-ray showing cardiomegaly pulmonary edema the patient has poor inspiration we did Hep-Lock her IV fluid we will continue IV Lasix blood pressure remains poorly controlled increase her Coreg we will check her echo of the heart as above she was not having bowel movement for most time was started on lactulose, patient had good bowel movement it was mistakenly documented that she is on 8 L the patient is actually on room air  7/5  Patient echo of the heart is showing good EF 56 to 60% blood pressure remains a challenge will increase her Catapres I will decrease her  Lasix to 20 mg daily continue to work with PT OT, she had nephrology signed off, pain is not well controlled, will consult palliative for pain control   7/6  As before appreciate bronchopulmonary they have signed off appreciate palliative care helping us manage her pain continues to work with physical therapy today her creatinine is up we will discontinue IV Lasix remains off oxygen even though it is still documented she is on 8 L oxygen  7/7  Creatinine is up secondary to overdiuresis, start IVF, still having neuropathy will add neurontine   7/8  Her kidney function is not improving I will go ahead and consult with nephrology I spoke to oncology ideally we would love to do bone marrow biopsy to address  possible CLL but she cannot lay on her stomach with this kidney function we cannot start any chemo the 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 All other systems have been reviewed and are negative unless otherwise stated.     Objective    Temp:  [98.3 °F (36.8 °C)-98.5 °F (36.9 °C)] 98.3 °F (36.8 °C)  Heart Rate:  [71-73] 73  Resp:  [16-18] 16  BP: (108-136)/(50-58) 136/52  Physical Exam    GEN: Awake, alert, interactive, in NAD  HEENT: Atraumatic, PERRLA, EOMI, Anicteric, Trachea midline  Lungs: CTAB, no wheezing/rales/rhonchi  Heart: RRR, +S1/s2, no rub  ABD: soft, nt/nd, +BS, no guarding/rebound  Extremities: atraumatic, no cyanosis, no edema  Skin: no rashes or lesions  Neuro: AAOx3, no focal deficits  Psych: normal mood & affect    Results Review:  I have reviewed the labs, radiology results, and diagnostic studies.    Laboratory Data:   Results from last 7 days   Lab Units 07/08/24  0343 07/07/24  0409 07/06/24  0417   WBC 10*3/mm3 7.37 8.31 7.51   HEMOGLOBIN g/dL 8.3* 9.7* 9.5*   HEMATOCRIT % 25.8* 30.7* 30.2*   PLATELETS 10*3/mm3 67* 94* 97*        Results from last 7 days   Lab Units 07/08/24  0343 07/07/24  0409 07/06/24  0417   SODIUM mmol/L 134* 134* 136   POTASSIUM mmol/L 4.4 4.5 4.1   CHLORIDE mmol/L 95* 92* 97*   CO2 mmol/L 26.0 28.0 28.0   BUN mg/dL 48* 43* 29*   CREATININE mg/dL 3.48* 3.50* 2.14*   CALCIUM mg/dL 9.0 10.0 9.7   BILIRUBIN mg/dL 0.3 0.3 0.4   ALK PHOS U/L 93 109 107   ALT (SGPT) U/L 20 22 27   AST (SGOT) U/L 23 25 40*   GLUCOSE mg/dL 88 106* 112*       Culture Data:   Urine Culture   Date Value Ref Range Status   06/28/2024 >100,000 CFU/mL Mixed Bhavana Isolated  Final        Radiology Data:   Imaging Results (Last 24 Hours)       ** No results found for the last 24 hours. **            I have reviewed the patient's current medications.     Assessment/Plan   Assessment  Active Hospital Problems    Diagnosis     **Left renal mass     Cauda equina compression     Metastatic cancer to spine     Morbid (severe) obesity due to excess calories        Treatment Plan  Lower back pain  Patient's low back pain has been getting worse in the last couple of weeks, was started on gabapentin outpatient but did not help.  MRI of the lumbar spine reported as follows-  IMPRESSION:   Neoplastic process involving the L3 vertebral body with associated  posterior soft tissue component resulting in severe spinal canal  narrowing and cauda equina nerve root compression. The soft tissue  component appears to also extend into the right L3-L4 foramen. No  associated pathological fracture.  Additional multilevel spondylotic changes including moderate to severe  spinal canal and foraminal narrowing as detailed above.  Neurosurgery is on consult and did the following procedure-  Procedure(s):  LUMBAR LAMINECTOMY L3 WITH DECOMPRESSION 1-2 LEVELS-RIGHT     Decompression -  Lumbar laminectomy, medial facetectomy for decompression of the spinal cord  Open laminectomy and biopsy of epidural neoplasm  Use of intraoperative microscope      -Acute on chronic kidney disease stage IIIa  Patient follows up with a nephrologist outpatient, but he does not come to this hospital.  Nephrologist consulted and helping with management while patient is in-house.     -Left renal mass/right adrenal mass on CT of the abdomen/pelvis  Urology was consulted from the emergency room and after evaluation did not recommend any intervention,rather recommended oncology consult.  Patient has multiple subcutaneous nodules that may be amenable to percutaneous biopsy.  Oncology is on consult and has evaluated patient, will await for tentative  diagnosis from lumbar spine biopsy before making recommendations      Other chronic medical conditions-  History of breast cancer  Hypercholesterolemia  Hypertension  Chronic sinusitis     DVT prophylaxis-heparin       Medical Decision Making  Number and Complexity of problems: High    Conditions and Status        Condition is unchanged.     [facility] Data  External documents reviewed: No  Cardiac tracing (EKG, telemetry) interpretation: Yes  Radiology interpretation: Yes  Labs reviewed: Yes  Any tests that were considered but not ordered: No     Decision rules/scores evaluated (example [id number], [place], etc): Yes     Discussed with: Patient     Care Planning  Shared decision making: Yes  Code status and discussions: Yes [full code]    Disposition  Social Determinants of Health that impact treatment or disposition: No  I expect the patient to be discharged to unknown in unknown days.     Electronically signed by Alysia Lincoln MD, 07/08/24, 10:12 CDT.

## 2024-07-08 NOTE — PLAN OF CARE
Goal Outcome Evaluation:  Plan of Care Reviewed With: patient        Progress: improving  Outcome Evaluation: Pt was in bed, eager to work with therapy.  Able to perform bed mobility with min assist.  Transfered sit to stand with min assist.  Amb 10'  x 2 with sitting rest, with RWX and CGA.  Pt continues to improve, will continue to work with pt to increase strength and progress mobility.

## 2024-07-08 NOTE — THERAPY TREATMENT NOTE
Acute Care - Physical Therapy Treatment Note  [facility]     Patient Name: Marina Joe  : 1945  MRN: [id number]  Today's Date: 2024      Visit Dx:     ICD-10-CM ICD-9-CM   1. 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 Left renal mass  N28.89 593.9   3. Right adrenal mass  E27.8 255.8   4. Nodule of soft tissue  M79.89 729.99   5. Acute on chronic renal insufficiency  N28.9 593.9    N18.9 585.9   6. Thrombocytopenia  D69.6 287.5   7. Cauda equina compression  G83.4 344.60   8. Impaired mobility [Z74.09]  Z74.09 799.89   9. HX: breast cancer  Z85.3 V10.3     Patient Active Problem List   Diagnosis    Malignant neoplasm of upper-outer quadrant of female breast    Anemia of chronic renal failure, stage 3 (moderate)    Hypertension, benign    Hx of colonic polyps    HX: breast cancer    Morbid (severe) obesity due to excess calories    Right knee DJD    S/P lumpectomy, left breast    Adult hypothyroidism    Anemia    Anxiety    Breast cancer, left    Cervical pain    Chronic insomnia    Elevated lipids    Herpes zoster without complication    Left ear pain    Left otitis externa    Myalgia    Negative depression screening    Obesity (BMI 30-39.9)    Postmenopausal status    Recurrent acute serous otitis media of left ear    Restless leg    Sinusitis, bacterial    Skin lesion    Vitamin D deficiency    Stage 3b chronic kidney disease    GIB (gastrointestinal bleeding)    Acute on chronic blood loss anemia    Chronic idiopathic thrombocytopenia    Hypotension due to hypovolemia    Primary hypertension    Pancreatic lesion    Left renal mass    Cauda equina compression    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 Recall 5 years    COLONOSCOPY  11/19/2018    Tics otherwise normal exam repeat in 5 years    COLONOSCOPY  02/13/2023    Normal exam repeat in 3 years with a 2 day prep    ENDOSCOPY  02/13/2023    Gastritis, small HH    LUMBAR LAMINECTOMY Right 6/29/2024    Procedure: LUMBAR LAMINECTOMY L3 WITH DECOMPRESSION 1-2 LEVELS-RIGHT;  Surgeon: Marcellus Pulido MD;  Location: [facility] OR;  Service: Neurosurgery;  Laterality: Right;    REPLACEMENT TOTAL KNEE Right     2016    TOTAL ABDOMINAL HYSTERECTOMY WITH SALPINGO OOPHORECTOMY      UPPER ENDOSCOPIC ULTRASOUND W/ FNA  12/20/2023    Pancreatic cyst s/p FNA     PT Assessment (Last 12 Hours)       PT Evaluation and Treatment       Row Name 07/08/24 1002          Physical Therapy Time and Intention    Subjective Information complains of;weakness;fatigue;pain  -MACIEL     Document Type therapy note (daily note)  -MACIEL     Mode of Treatment physical therapy  -       Row Name 07/08/24 1002          General Information    Existing Precautions/Restrictions brace worn when out of bed;fall;LSO;spinal  -MACIEL       Row Name 07/08/24 1002          Pain    Pretreatment Pain Rating 4/10  -MACIEL     Posttreatment Pain Rating 5/10  -MACIEL     Pain Location - Side/Orientation Right  -     Pain Location lower  -MACIEL     Pain Location - back;extremity  -     Pain Intervention(s) Repositioned  -       Row Name 07/08/24 1002          Bed Mobility    Sidelying-Sit [place] (Bed Mobility) verbal cues;contact guard  -MACIEL     Sit-Sidelying [place] (Bed Mobility) verbal cues;minimum assist (75% patient effort)  -     Assistive Device (Bed Mobility) bed rails;head of bed elevated  -       Row Name 07/08/24 1002          Sit-Stand Transfer    Sit-Stand [place]  (Transfers) verbal cues;minimum assist (75% patient effort)  -MACIEL     Assistive Device (Sit-Stand Transfers) walker, front-wheeled  -MACIEL       Row Name 07/08/24 1002          Stand-Sit Transfer    Stand-Sit [place] (Transfers) verbal cues;contact guard  -MACIEL     Assistive Device (Stand-Sit Transfers) walker, front-wheeled  -MACIEL       Row Name 07/08/24 1002          Gait/Stairs (Locomotion)    [place] Level (Gait) verbal cues;minimum assist (75% patient effort)  -MACIEL     Assistive Device (Gait) walker, front-wheeled  -MACIEL     Distance in Feet (Gait) 10  x 2 with sitting rest  -MACIEL     Deviations/Abnormal Patterns (Gait) gait speed decreased;stride length decreased  -MACIEL     Bilateral Gait Deviations forward flexed posture  -MACIEL       Row Name             Wound 06/29/24 1458 lumbar spine Incision    Wound - Properties Group Placement Date: 06/29/24  -KT Placement Time: 1458  -KT Present on Original Admission: N  -KT Location: lumbar spine  -KT Primary Wound Type: Incision  -KT    Retired Wound - Properties Group Placement Date: 06/29/24  -KT Placement Time: 1458  -KT Present on Original Admission: N  -KT Location: lumbar spine  -KT Primary Wound Type: Incision  -KT    Retired Wound - Properties Group Date first assessed: 06/29/24  -KT Time first assessed: 1458  -KT Present on Original Admission: N  -KT Location: lumbar spine  -KT Primary Wound Type: Incision  -KT      Row Name 07/08/24 1002          Positioning and Restraints    Pre-Treatment Position in bed  -MACIEL     Post Treatment Position bed  -MACIEL     In Bed fowlers;call light within reach;encouraged to call for assist;side rails up x2  -MACIEL               User Key  (r) = Recorded By, (t) = Taken By, (c) = Cosigned By      Initials Name Provider Type    Santy Marni PTA Physical Therapist Assistant    Shravan Ordonez, RN Registered Nurse                    Physical Therapy Education       Title: PT OT SLP Therapies (Done)       Topic: Physical Therapy (Done)        Point: Mobility training (Done)       Learning Progress Summary             Patient Eager, E, VU by AE at 7/5/2024 1023    Comment: POC    Acceptance, E,D, DU,VU by  at 6/30/2024 0951    Comment: benefits of PT and POC, call for A OOB, no BLT, LSO when OOB                         Point: Body mechanics (Done)       Learning Progress Summary             Patient Acceptance, E,D, DU,VU by  at 6/30/2024 0951    Comment: benefits of PT and POC, call for A OOB, no BLT, LSO when OOB                         Point: Precautions (Done)       Learning Progress Summary             Patient Acceptance, E,D, DU,VU by  at 6/30/2024 0951    Comment: benefits of PT and POC, call for A OOB, no BLT, LSO when OOB                                         User Key       Initials Effective Dates Name Provider Type Discipline    AE 02/03/23 -  Sofia Ann, PTA Physical Therapist Assistant PT     02/03/23 -  Eliel León, PT Physical Therapist PT                  PT Recommendation and Plan     Plan of Care Reviewed With: patient  Progress: improving  Outcome Evaluation: Pt was in bed, eager to work with therapy.  Able to perform bed mobility with min assist.  Transfered sit to stand with min assist.  Amb 10'  x 2 with sitting rest, with RWX and CGA.  Pt continues to improve, will continue to work with pt to increase strength and progress mobility.   Outcome Measures       Row Name 07/08/24 1002 07/07/24 1510 07/06/24 1340       How much help from another person do you currently need...    Turning from your back to your side while in flat bed without using bedrails? 3  -MACIEL 3  -MACIEL 3  -MACIEL    Moving from lying on back to sitting on the side of a flat bed without bedrails? 3  -MACIEL 3  -MACIEL 3  -MACIEL    Moving to and from a bed to a chair (including a wheelchair)? 3  -MACIEL 3  -MACIEL 3  -MACIEL    Standing up from a chair using your arms (e.g., wheelchair, bedside chair)? 3  -MACIEL 3  -MACIEL 3  -MACIEL    Climbing 3-5 steps with a railing? 2  -MACIEL 1  -MACIEL 1   -MACIEL    To walk in hospital room? 3  -MACIEL 2  -MACIEL 2  -MACIEL    AM-PAC 6 Clicks Score (PT) 17  -MACIEL 15  -MACIEL 15  -MACIEL    Highest Level of Mobility Goal 5 --> Static standing  -MACIEL 4 --> Transfer to chair/commode  -MACIEL 4 --> Transfer to chair/commode  -MACIEL       Functional Assessment    Outcome Measure Options AM-PAC 6 Clicks Basic Mobility (PT)  -MACIEL AM-PAC 6 Clicks Basic Mobility (PT)  -MACIEL AM-PAC 6 Clicks Basic Mobility (PT)  -MACIEL              User Key  (r) = Recorded By, (t) = Taken By, (c) = Cosigned By      Initials Name Provider Type    Santy Marin PTA Physical Therapist Assistant                     Time Calculation:    PT Charges       Row Name 07/08/24 1002             Time Calculation    Start Time 1002  -MACIEL      Stop Time 1030  -MACIEL      Time Calculation (min) 28 min  -MACIEL      PT Received On 07/08/24  -MACIEL         Time Calculation- PT    Total Timed Code Minutes- PT 28 minute(s)  -MACIEL         Timed Charges    74484 - Gait Training Minutes  28  -MACIEL         Total Minutes    Timed Charges Total Minutes 28  -MACIEL       Total Minutes 28  -MACIEL                User Key  (r) = Recorded By, (t) = Taken By, (c) = Cosigned By      Initials Name Provider Type    Santy Marin PTA Physical Therapist Assistant                  Therapy Charges for Today       Code Description Service Date Service Provider Modifiers Qty    18683372193 HC PT THER PROC EA 15 MIN 7/7/2024 Santy Reddy PTA GP 2    76689645873 HC GAIT TRAINING EA 15 MIN 7/8/2024 Santy Reddy PTA GP 2            PT G-Codes  Outcome Measure Options: AM-PAC 6 Clicks Basic Mobility (PT)  AM-PAC 6 Clicks Score (PT): 17  AM-PAC 6 Clicks Score (OT): 15    Santy Reddy PTA  7/8/2024

## 2024-07-08 NOTE — PLAN OF CARE
Goal Outcome Evaluation:           Progress: improving     Dilaudid given and Fentanyl patch placed for c/o pain.  Pt had radiation and renal u/s this shift.  Voiding regularly, bsc.  Heparin for VTE prevention.  Safety maintained.

## 2024-07-08 NOTE — CASE MANAGEMENT/SOCIAL WORK
Continued Stay Note  [facility]     Patient Name: Marina Joe  MRN: [id number]  Today's Date: 7/8/2024    Admit Date: 6/28/2024    Plan: Home Health   Discharge Plan       Row Name 07/08/24 1111       Plan    Plan Home Health    Patient/Family in Agreement with Plan yes    Plan Comments Spoke with pt about d/c needs. Pt states her daughter will be in town to help take care of her at d/c. She does not want snf. She is open to home health. Will follow.                   Discharge Codes    No documentation.                       JW Villalba

## 2024-07-09 LAB
ALBUMIN SERPL-MCNC: 3.6 G/DL (ref 3.5–5.2)
ALBUMIN/GLOB SERPL: 1.2 G/DL
ALP SERPL-CCNC: 89 U/L (ref 39–117)
ALT SERPL W P-5'-P-CCNC: 20 U/L (ref 1–33)
ANION GAP SERPL CALCULATED.3IONS-SCNC: 11 MMOL/L (ref 5–15)
AST SERPL-CCNC: 25 U/L (ref 1–32)
BASOPHILS # BLD AUTO: 0.02 10*3/MM3 (ref 0–0.2)
BASOPHILS NFR BLD AUTO: 0.3 % (ref 0–1.5)
BILIRUB SERPL-MCNC: 0.3 MG/DL (ref 0–1.2)
BUN SERPL-MCNC: 54 MG/DL (ref 8–23)
BUN/CREAT SERPL: 20.2 (ref 7–25)
CALCIUM SPEC-SCNC: 8.6 MG/DL (ref 8.6–10.5)
CHLORIDE SERPL-SCNC: 99 MMOL/L (ref 98–107)
CO2 SERPL-SCNC: 24 MMOL/L (ref 22–29)
CREAT SERPL-MCNC: 2.67 MG/DL (ref 0.57–1)
DEPRECATED RDW RBC AUTO: 51.8 FL (ref 37–54)
EGFRCR SERPLBLD CKD-EPI 2021: 17.8 ML/MIN/1.73
EOSINOPHIL # BLD AUTO: 0.31 10*3/MM3 (ref 0–0.4)
EOSINOPHIL NFR BLD AUTO: 4.3 % (ref 0.3–6.2)
ERYTHROCYTE [DISTWIDTH] IN BLOOD BY AUTOMATED COUNT: 15.8 % (ref 12.3–15.4)
GLOBULIN UR ELPH-MCNC: 3 GM/DL
GLUCOSE SERPL-MCNC: 94 MG/DL (ref 65–99)
HCT VFR BLD AUTO: 25.1 % (ref 34–46.6)
HGB BLD-MCNC: 7.9 G/DL (ref 12–15.9)
IMM GRANULOCYTES # BLD AUTO: 0.04 10*3/MM3 (ref 0–0.05)
IMM GRANULOCYTES NFR BLD AUTO: 0.5 % (ref 0–0.5)
LYMPHOCYTES # BLD AUTO: 0.88 10*3/MM3 (ref 0.7–3.1)
LYMPHOCYTES NFR BLD AUTO: 12.1 % (ref 19.6–45.3)
MCH RBC QN AUTO: 28.2 PG (ref 26.6–33)
MCHC RBC AUTO-ENTMCNC: 31.5 G/DL (ref 31.5–35.7)
MCV RBC AUTO: 89.6 FL (ref 79–97)
MONOCYTES # BLD AUTO: 0.8 10*3/MM3 (ref 0.1–0.9)
MONOCYTES NFR BLD AUTO: 11 % (ref 5–12)
NEUTROPHILS NFR BLD AUTO: 5.23 10*3/MM3 (ref 1.7–7)
NEUTROPHILS NFR BLD AUTO: 71.8 % (ref 42.7–76)
PLATELET # BLD AUTO: 63 10*3/MM3 (ref 140–450)
POTASSIUM SERPL-SCNC: 4.6 MMOL/L (ref 3.5–5.2)
PROT SERPL-MCNC: 6.6 G/DL (ref 6–8.5)
RAD ONC ARIA COURSE ID: NORMAL
RAD ONC ARIA COURSE LAST TREATMENT DATE: NORMAL
RAD ONC ARIA COURSE START DATE: NORMAL
RAD ONC ARIA COURSE TREATMENT ELAPSED DAYS: 1
RAD ONC ARIA FIRST TREATMENT DATE: NORMAL
RAD ONC ARIA PLAN FRACTIONS TREATED TO DATE: 2
RAD ONC ARIA PLAN ID: NORMAL
RAD ONC ARIA PLAN PRESCRIBED DOSE PER FRACTION: 3 GY
RAD ONC ARIA PLAN PRIMARY REFERENCE POINT: NORMAL
RAD ONC ARIA PLAN TOTAL FRACTIONS PRESCRIBED: 10
RAD ONC ARIA PLAN TOTAL PRESCRIBED DOSE: 3000 CGY
RAD ONC ARIA REFERENCE POINT DOSAGE GIVEN TO DATE: 6 GY
RAD ONC ARIA REFERENCE POINT ID: NORMAL
RAD ONC ARIA REFERENCE POINT SESSION DOSAGE GIVEN: 3 GY
RBC # BLD AUTO: 2.8 10*6/MM3 (ref 3.77–5.28)
SODIUM SERPL-SCNC: 134 MMOL/L (ref 136–145)
WBC NRBC COR # BLD AUTO: 7.28 10*3/MM3 (ref 3.4–10.8)

## 2024-07-09 PROCEDURE — 77412 RADIATION TX DELIVERY LVL 3: CPT | Performed by: RADIOLOGY

## 2024-07-09 PROCEDURE — 99497 ADVNCD CARE PLAN 30 MIN: CPT | Performed by: CLINICAL NURSE SPECIALIST

## 2024-07-09 PROCEDURE — 25810000003 SODIUM CHLORIDE 0.9 % SOLUTION: Performed by: HOSPITALIST

## 2024-07-09 PROCEDURE — 99233 SBSQ HOSP IP/OBS HIGH 50: CPT | Performed by: INTERNAL MEDICINE

## 2024-07-09 PROCEDURE — 0 HYDROMORPHONE 1 MG/ML SOLUTION: Performed by: HOSPITALIST

## 2024-07-09 PROCEDURE — 97530 THERAPEUTIC ACTIVITIES: CPT

## 2024-07-09 PROCEDURE — 94799 UNLISTED PULMONARY SVC/PX: CPT

## 2024-07-09 PROCEDURE — 97116 GAIT TRAINING THERAPY: CPT

## 2024-07-09 PROCEDURE — 85025 COMPLETE CBC W/AUTO DIFF WBC: CPT | Performed by: NURSE PRACTITIONER

## 2024-07-09 PROCEDURE — 99232 SBSQ HOSP IP/OBS MODERATE 35: CPT | Performed by: CLINICAL NURSE SPECIALIST

## 2024-07-09 PROCEDURE — 80053 COMPREHEN METABOLIC PANEL: CPT | Performed by: HOSPITALIST

## 2024-07-09 RX ORDER — HYDROCODONE BITARTRATE AND ACETAMINOPHEN 10; 325 MG/1; MG/1
1 TABLET ORAL EVERY 4 HOURS PRN
Status: DISCONTINUED | OUTPATIENT
Start: 2024-07-09 | End: 2024-07-15 | Stop reason: HOSPADM

## 2024-07-09 RX ORDER — HYDROCODONE BITARTRATE AND ACETAMINOPHEN 10; 325 MG/1; MG/1
2 TABLET ORAL EVERY 4 HOURS PRN
Status: DISCONTINUED | OUTPATIENT
Start: 2024-07-09 | End: 2024-07-15 | Stop reason: HOSPADM

## 2024-07-09 RX ADMIN — FLUTICASONE PROPIONATE 2 SPRAY: 50 SPRAY, METERED NASAL at 10:18

## 2024-07-09 RX ADMIN — HYDROMORPHONE HYDROCHLORIDE 1 MG: 1 INJECTION, SOLUTION INTRAMUSCULAR; INTRAVENOUS; SUBCUTANEOUS at 04:01

## 2024-07-09 RX ADMIN — HYDROMORPHONE HYDROCHLORIDE 1 MG: 1 INJECTION, SOLUTION INTRAMUSCULAR; INTRAVENOUS; SUBCUTANEOUS at 06:53

## 2024-07-09 RX ADMIN — SERTRALINE 100 MG: 50 TABLET, FILM COATED ORAL at 10:13

## 2024-07-09 RX ADMIN — SODIUM CHLORIDE 100 ML/HR: 9 INJECTION, SOLUTION INTRAVENOUS at 00:53

## 2024-07-09 RX ADMIN — HYDROCODONE BITARTRATE AND ACETAMINOPHEN 2 TABLET: 10; 325 TABLET ORAL at 15:58

## 2024-07-09 RX ADMIN — ROPINIROLE HYDROCHLORIDE 0.5 MG: 0.25 TABLET, FILM COATED ORAL at 20:48

## 2024-07-09 RX ADMIN — SODIUM CHLORIDE 100 ML/HR: 9 INJECTION, SOLUTION INTRAVENOUS at 10:28

## 2024-07-09 RX ADMIN — MONTELUKAST SODIUM 10 MG: 10 TABLET, FILM COATED ORAL at 20:48

## 2024-07-09 RX ADMIN — ROSUVASTATIN CALCIUM 10 MG: 10 TABLET, FILM COATED ORAL at 20:48

## 2024-07-09 RX ADMIN — CARVEDILOL 25 MG: 25 TABLET, FILM COATED ORAL at 10:26

## 2024-07-09 RX ADMIN — SODIUM CHLORIDE 100 ML/HR: 9 INJECTION, SOLUTION INTRAVENOUS at 20:48

## 2024-07-09 RX ADMIN — CYCLOBENZAPRINE 10 MG: 10 TABLET, FILM COATED ORAL at 15:54

## 2024-07-09 RX ADMIN — AMLODIPINE BESYLATE 5 MG: 5 TABLET ORAL at 10:26

## 2024-07-09 RX ADMIN — CLONIDINE HYDROCHLORIDE 0.2 MG: 0.1 TABLET ORAL at 10:30

## 2024-07-09 RX ADMIN — LIDOCAINE 1 PATCH: 4 PATCH TOPICAL at 10:14

## 2024-07-09 RX ADMIN — HYDROMORPHONE HYDROCHLORIDE 1 MG: 1 INJECTION, SOLUTION INTRAMUSCULAR; INTRAVENOUS; SUBCUTANEOUS at 10:12

## 2024-07-09 RX ADMIN — CETIRIZINE HYDROCHLORIDE 10 MG: 10 TABLET ORAL at 10:13

## 2024-07-09 RX ADMIN — PANTOPRAZOLE SODIUM 40 MG: 40 TABLET, DELAYED RELEASE ORAL at 10:26

## 2024-07-09 RX ADMIN — BUPROPION HYDROCHLORIDE 150 MG: 150 TABLET, EXTENDED RELEASE ORAL at 10:26

## 2024-07-09 RX ADMIN — LACTULOSE 20 G: 20 SOLUTION ORAL at 20:48

## 2024-07-09 RX ADMIN — Medication 1 TABLET: at 10:13

## 2024-07-09 NOTE — THERAPY TREATMENT NOTE
Acute Care - Physical Therapy Treatment Note  [facility]     Patient Name: Marina Joe  : 1945  MRN: [id number]  Today's Date: 2024      Visit Dx:     ICD-10-CM ICD-9-CM   1. 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 Left renal mass  N28.89 593.9   3. Right adrenal mass  E27.8 255.8   4. Nodule of soft tissue  M79.89 729.99   5. Acute on chronic renal insufficiency  N28.9 593.9    N18.9 585.9   6. Thrombocytopenia  D69.6 287.5   7. Cauda equina compression  G83.4 344.60   8. Impaired mobility [Z74.09]  Z74.09 799.89   9. HX: breast cancer  Z85.3 V10.3     Patient Active Problem List   Diagnosis    Malignant neoplasm of upper-outer quadrant of female breast    Anemia of chronic renal failure, stage 3 (moderate)    Hypertension, benign    Hx of colonic polyps    HX: breast cancer    Morbid (severe) obesity due to excess calories    Right knee DJD    S/P lumpectomy, left breast    Adult hypothyroidism    Anemia    Anxiety    Breast cancer, left    Cervical pain    Chronic insomnia    Elevated lipids    Herpes zoster without complication    Left ear pain    Left otitis externa    Myalgia    Negative depression screening    Obesity (BMI 30-39.9)    Postmenopausal status    Recurrent acute serous otitis media of left ear    Restless leg    Sinusitis, bacterial    Skin lesion    Vitamin D deficiency    Stage 3b chronic kidney disease    GIB (gastrointestinal bleeding)    Acute on chronic blood loss anemia    Chronic idiopathic thrombocytopenia    Hypotension due to hypovolemia    Primary hypertension    Pancreatic lesion    Left renal mass    Cauda equina compression    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 Recall 5 years    COLONOSCOPY  11/19/2018    Tics otherwise normal exam repeat in 5 years    COLONOSCOPY  02/13/2023    Normal exam repeat in 3 years with a 2 day prep    ENDOSCOPY  02/13/2023    Gastritis, small HH    LUMBAR LAMINECTOMY Right 6/29/2024    Procedure: LUMBAR LAMINECTOMY L3 WITH DECOMPRESSION 1-2 LEVELS-RIGHT;  Surgeon: Marcellus Pulido MD;  Location: [facility] OR;  Service: Neurosurgery;  Laterality: Right;    REPLACEMENT TOTAL KNEE Right     2016    TOTAL ABDOMINAL HYSTERECTOMY WITH SALPINGO OOPHORECTOMY      UPPER ENDOSCOPIC ULTRASOUND W/ FNA  12/20/2023    Pancreatic cyst s/p FNA     PT Assessment (Last 12 Hours)       PT Evaluation and Treatment       Row Name 07/09/24 0933          Physical Therapy Time and Intention    Subjective Information complains of;pain  -     Document Type therapy note (daily note)  -     Mode of Treatment physical therapy  -       Row Name 07/09/24 0933          General Information    Existing Precautions/Restrictions fall;brace worn when out of bed;LSO;spinal  -       Row Name 07/09/24 0933          Pain    Pretreatment Pain Rating 9/10  -     Posttreatment Pain Rating 9/10  -     Pain Location - Side/Orientation Left  -     Pain Location lower  -     Pain Location - back;extremity  -     Pre/Posttreatment Pain Comment cramping pain down Left leg  -     Pain Intervention(s) Repositioned;Ambulation/increased activity;Medication (See MAR)  -       Row Name 07/09/24 0933          Bed Mobility    Comment, (Bed Mobility) sitting EOB  -       Row Name 07/09/24 0933          Sit-Stand Transfer    Sit-Stand Hyde (Transfers) verbal cues;minimum assist (75% patient effort)  -       Row Name 07/09/24 0933           Stand-Sit Transfer    Stand-Sit Elbert (Transfers) verbal cues;contact guard  -MF       Row Name 07/09/24 0933          Toilet Transfer    Type (Toilet Transfer) sit-stand;stand-sit  -MF     Elbert Level (Toilet Transfer) verbal cues;contact guard  -MF     Assistive Device (Toilet Transfer) commode;grab bars/safety frame;walker, front-wheeled  -MF     Comment, (Toilet Transfer) pt performed hygiene herself  -       Row Name 07/09/24 0933          Gait/Stairs (Locomotion)    Elbert Level (Gait) verbal cues;contact guard  -     Assistive Device (Gait) walker, front-wheeled  -MF     Distance in Feet (Gait) 15  sitting rest on commode, 15' back to chair, sitting rest, then 30'  -     Deviations/Abnormal Patterns (Gait) maynor decreased;stride length decreased  -     Bilateral Gait Deviations forward flexed posture;heel strike decreased  -       Row Name             Wound 06/29/24 1458 lumbar spine Incision    Wound - Properties Group Placement Date: 06/29/24  -KT Placement Time: 1458  -KT Present on Original Admission: N  -KT Location: lumbar spine  -KT Primary Wound Type: Incision  -KT    Retired Wound - Properties Group Placement Date: 06/29/24  -KT Placement Time: 1458  -KT Present on Original Admission: N  -KT Location: lumbar spine  -KT Primary Wound Type: Incision  -KT    Retired Wound - Properties Group Date first assessed: 06/29/24  -KT Time first assessed: 1458  -KT Present on Original Admission: N  -KT Location: lumbar spine  -KT Primary Wound Type: Incision  -KT      Row Name 07/09/24 0933          Plan of Care Review    Plan of Care Reviewed With patient  -     Progress improving  -     Outcome Evaluation Pt. agreeable to therapy. Pt. needed MIN assist to stand the first time, but was CGA the other times, including from commode. Pt. walked 15'x 2, then 30' with sitting rests in between. She rated her pain 9/10 with activity. Pt. making improvements overall.  -       Pipe  Name 07/09/24 0933          Positioning and Restraints    Pre-Treatment Position in bed  -MF     Post Treatment Position chair  -MF     In Chair sitting;call light within reach;encouraged to call for assist;with nsg  -MF               User Key  (r) = Recorded By, (t) = Taken By, (c) = Cosigned By      Initials Name Provider Type    Rosalva Luong PTA Physical Therapist Assistant    Shravan Ordonez, RN Registered Nurse                    Physical Therapy Education       Title: PT OT SLP Therapies (Done)       Topic: Physical Therapy (Done)       Point: Mobility training (Done)       Learning Progress Summary             Patient Eager, E, VU by  at 7/5/2024 1023    Comment: POC    Acceptance, E,D, DU,VU by  at 6/30/2024 0951    Comment: benefits of PT and POC, call for A OOB, no BLT, LSO when OOB                         Point: Body mechanics (Done)       Learning Progress Summary             Patient Acceptance, E,D, DU,VU by  at 6/30/2024 0951    Comment: benefits of PT and POC, call for A OOB, no BLT, LSO when OOB                         Point: Precautions (Done)       Learning Progress Summary             Patient Acceptance, E,D, DU,VU by  at 6/30/2024 0951    Comment: benefits of PT and POC, call for A OOB, no BLT, LSO when OOB                                         User Key       Initials Effective Dates Name Provider Type Discipline    AE 02/03/23 -  Sofia Ann PTA Physical Therapist Assistant PT     02/03/23 -  Eliel León, PT Physical Therapist PT                  PT Recommendation and Plan     Plan of Care Reviewed With: patient  Progress: improving  Outcome Evaluation: Pt. agreeable to therapy. Pt. needed MIN assist to stand the first time, but was CGA the other times, including from commode. Pt. walked 15'x 2, then 30' with sitting rests in between. She rated her pain 9/10 with activity. Pt. making improvements overall.   Outcome Measures       Row Name 07/09/24 0933 07/08/24 1002  07/07/24 1510       How much help from another person do you currently need...    Turning from your back to your side while in flat bed without using bedrails? 3  -MF 3  -MACIEL 3  -MACIEL    Moving from lying on back to sitting on the side of a flat bed without bedrails? 3  -MF 3  -MACIEL 3  -MACIEL    Moving to and from a bed to a chair (including a wheelchair)? 3  -MF 3  -MACIEL 3  -MACIEL    Standing up from a chair using your arms (e.g., wheelchair, bedside chair)? 3  -MF 3  -MACIEL 3  -MACIEL    Climbing 3-5 steps with a railing? 2  -MF 2  -MACIEL 1  -MACIEL    To walk in hospital room? 3  -MF 3  -MACIEL 2  -MACIEL    AM-PAC 6 Clicks Score (PT) 17  -MF 17  -MACIEL 15  -MACIEL    Highest Level of Mobility Goal 5 --> Static standing  - 5 --> Static standing  -MACIEL 4 --> Transfer to chair/commode  -MACIEL       Functional Assessment    Outcome Measure Options AM-PAC 6 Clicks Basic Mobility (PT)  - AM-PAC 6 Clicks Basic Mobility (PT)  -MACIEL AM-PAC 6 Clicks Basic Mobility (PT)  -MACIEL      Row Name 07/06/24 1340             How much help from another person do you currently need...    Turning from your back to your side while in flat bed without using bedrails? 3  -MACIEL      Moving from lying on back to sitting on the side of a flat bed without bedrails? 3  -MACIEL      Moving to and from a bed to a chair (including a wheelchair)? 3  -MACIEL      Standing up from a chair using your arms (e.g., wheelchair, bedside chair)? 3  -MACIEL      Climbing 3-5 steps with a railing? 1  -MACIEL      To walk in hospital room? 2  -MACIEL      AM-PAC 6 Clicks Score (PT) 15  -MACIEL      Highest Level of Mobility Goal 4 --> Transfer to chair/commode  -MACIEL         Functional Assessment    Outcome Measure Options AM-PAC 6 Clicks Basic Mobility (PT)  -MACIEL                User Key  (r) = Recorded By, (t) = Taken By, (c) = Cosigned By      Initials Name Provider Type    Santy Marin PTA Physical Therapist Assistant    Rosalva Luong PTA Physical Therapist Assistant                     Time Calculation:    PT  Charges       Row Name 07/09/24 1018             Time Calculation    Start Time 0933  -MF      Stop Time 1015  -MF      Time Calculation (min) 42 min  -MF      PT Received On 07/09/24  -MF         Time Calculation- PT    Total Timed Code Minutes- PT 42 minute(s)  -MF         Timed Charges    17043 - Gait Training Minutes  27  -MF      83538 - PT Therapeutic Activity Minutes 15  -MF         Total Minutes    Timed Charges Total Minutes 42  -MF       Total Minutes 42  -MF                User Key  (r) = Recorded By, (t) = Taken By, (c) = Cosigned By      Initials Name Provider Type    Rosalva Luong PTA Physical Therapist Assistant                  Therapy Charges for Today       Code Description Service Date Service Provider Modifiers Qty    02552280491 HC GAIT TRAINING EA 15 MIN 7/9/2024 Rosalva Lewis PTA GP 2    89739339875 HC PT THERAPEUTIC ACT EA 15 MIN 7/9/2024 Rosalva Lewis PTA GP 1            PT G-Codes  Outcome Measure Options: AM-PAC 6 Clicks Basic Mobility (PT)  AM-PAC 6 Clicks Score (PT): 17  AM-PAC 6 Clicks Score (OT): 15    Rosalva Lewis PTA  7/9/2024

## 2024-07-09 NOTE — PROGRESS NOTES
"            [facility]iative [facility]    Palliative Care Daily Progress Note   Chief complaint-follow up support for patient/family    Code Status:   Code Status and Medical Interventions:   Ordered at: 07/01/24 1541     Medical Intervention Limits:    No intubation (DNI)    No artificial nutrition     Level Of Support Discussed With:    Patient     Code Status (Patient has no pulse and is not breathing):    No CPR (Do Not Attempt to Resuscitate)     Medical Interventions (Patient has pulse or is breathing):    Limited Support      Advanced Directives: Advance Directive Status: Patient does not have advance directive   Goals of Care: Ongoing.     S: Medical record reviewed. Events noted.  Received palliative radiation #1 on 7/9 and #2 today.  Started on fentanyl transdermal patch 12 mcg on 7/8.  Received 124 mg oral morphine equivalent over the last 24 hours in the form of fentanyl transdermal patch and Dilaudid IV.  Oncology notes patient has declined systemic therapy  \"I am leaving it in the hands of God.\"  States that she we will continue radiation but now declines systemic therapy.  He is much more focused on comfort care/best supportive care directed by hospice.  Just returned back from palliative radiation, complains of pain with transfer from [facility] to bed.  Advance Care Planning ongoing conversation with patient and significant other-Jose Enrique.  Inquired about conversation with oncology with regard to hospice this morning and states she does not recall content of conversation.  Anticipating arrival of her children this afternoon for ongoing conversation with regard to care goals, particularly hospice.  Spoke with significant other-Jose Enrique outside of room \"I am not making any decisions, that is up to her and her children\".  Questions encouraged and support given.        Review of Systems   Constitutional: Positive for malaise/fatigue.   Respiratory:  Negative for shortness of breath.  "   Musculoskeletal:  Positive for muscle weakness and back pain.   Genitourinary:  Negative for dysuria.   Neurological:  Positive for loss of balance and weakness.   Psychiatric/Behavioral:  The patient is not nervous/anxious    Pain Assessment  Preferred Pain Scale: number (Numeric Rating Pain Scale)  Nonverbal Indicators of Pain: nonverbal indicators absent  CPOT Facial Expression: 0-->relaxed, neutral  CPOT Body Movements: 0-->absence of movements  CPOT Muscle Tension: 0-->relaxed  Ventilator Compliance/Vocalization: 0-->talking in normal tone or no sound  CPOT Score: 0  Pain Location: extremity  Pain Description: aching    O:     Intake/Output Summary (Last 24 hours) at 7/9/2024 1111  Last data filed at 7/9/2024 1028  Gross per 24 hour   Intake 1090 ml   Output 600 ml   Net 490 ml       Diagnostics and current medications: Reviewed.      Current Facility-Administered Medications:     acetaminophen (TYLENOL) tablet 650 mg, 650 mg, Oral, Q6H PRN, Marcellus Pulido MD, 650 mg at 06/29/24 0544    albuterol (PROVENTIL) nebulizer solution 0.083% 2.5 mg/3mL, 2.5 mg, Nebulization, Q4H PRN, Marcellus Pulido MD, 2.5 mg at 06/28/24 1040    amLODIPine (NORVASC) tablet 5 mg, 5 mg, Oral, BID, Marcellus Pulido MD, 5 mg at 07/09/24 1026    sennosides-docusate (PERICOLACE) 8.6-50 MG per tablet 2 tablet, 2 tablet, Oral, BID PRN, 2 tablet at 07/01/24 2045 **AND** polyethylene glycol (MIRALAX) packet 17 g, 17 g, Oral, Daily PRN, 17 g at 07/03/24 0836 **AND** bisacodyl (DULCOLAX) EC tablet 5 mg, 5 mg, Oral, Daily PRN **AND** bisacodyl (DULCOLAX) suppository 10 mg, 10 mg, Rectal, Daily PRN, Marcellus Pulido MD    buPROPion XL (WELLBUTRIN XL) 24 hr tablet 150 mg, 150 mg, Oral, Daily, Marcellus Pulido MD, 150 mg at 07/09/24 1026    Calcium Replacement - Follow Nurse / BPA Driven Protocol, , Does not apply, PRN, Marcellus Pulido MD    carvedilol (COREG) tablet 25 mg, 25 mg, Oral, BID With Meals,  Alysia Lincoln MD, 25 mg at 07/09/24 1026    cetirizine (zyrTEC) tablet 10 mg, 10 mg, Oral, Daily, Marcellus Pulido MD, 10 mg at 07/09/24 1013    fentaNYL (DURAGESIC) 12 MCG/HR 1 patch, 1 patch, Transdermal, Q72H, 1 patch at 07/08/24 1738 **AND** Check Fentanyl Patch Placement, 1 each, Does not apply, Q12H, Lauren Kuo APRN    cloNIDine (CATAPRES) tablet 0.2 mg, 0.2 mg, Oral, BID, Alysia Lincoln MD, 0.2 mg at 07/09/24 1030    cyclobenzaprine (FLEXERIL) tablet 10 mg, 10 mg, Oral, TID PRN, Marcellus Pulido MD, 10 mg at 07/06/24 0826    Diclofenac Sodium (VOLTAREN) 1 % gel 4 g, 4 g, Topical, 4x Daily PRN, Marcellus Pulido MD, 4 g at 07/08/24 0423    fluticasone (FLONASE) 50 MCG/ACT nasal spray 2 spray, 2 spray, Nasal, Daily, Marcellus Pulido MD, 2 spray at 07/09/24 1018    hydrALAZINE (APRESOLINE) injection 10 mg, 10 mg, Intravenous, Q4H PRN, Marcellus Pulido MD    HYDROcodone-acetaminophen (NORCO) 5-325 MG per tablet 1 tablet, 1 tablet, Oral, Q4H PRN **OR** HYDROcodone-acetaminophen (NORCO) 5-325 MG per tablet 2 tablet, 2 tablet, Oral, Q4H PRN, Lauren Kuo APRN    HYDROmorphone (DILAUDID) injection 1 mg, 1 mg, Intravenous, Q2H PRN, Alysia Lincoln MD, 1 mg at 07/09/24 1012    lactulose solution 30 g, 30 g, Oral, TID, Alysia Lincoln MD, 30 g at 07/08/24 1500    Lidocaine 4 % 1 patch, 1 patch, Transdermal, Q24H, Lauren Kuo APRN, 1 patch at 07/09/24 1014    Magnesium Low Dose Replacement - Follow Nurse / BPA Driven Protocol, , Does not apply, PRN, Marcellus Pulido MD    methylnaltrexone (RELISTOR) injection 6 mg, 6 mg, Subcutaneous, Every Other Day, Marc Quevedo, APRN, 6 mg at 07/08/24 0849    montelukast (SINGULAIR) tablet 10 mg, 10 mg, Oral, Nightly, Marcellus Pulido MD, 10 mg at 07/08/24 2002    multivitamin with minerals 1 tablet, 1 tablet, Oral, Daily, Marcellus Pulido MD, 1 tablet at 07/09/24 1013    ondansetron (ZOFRAN)  injection 4 mg, 4 mg, Intravenous, Q6H PRN, Marcellus Pulido MD    pantoprazole (PROTONIX) EC tablet 40 mg, 40 mg, Oral, Daily, Marcellus Pulido MD, 40 mg at 07/09/24 1026    Phosphorus Replacement - Follow Nurse / BPA Driven Protocol, , Does not apply, PRAshok CHUNG William Lee, MD    Potassium Replacement - Follow Nurse / BPA Driven Protocol, , Does not apply, PRAshok CHUNG William Lee, MD    rOPINIRole (REQUIP) tablet 0.5 mg, 0.5 mg, Oral, Nightly, Marcellus Pulido MD, 0.5 mg at 07/08/24 2002    rosuvastatin (CRESTOR) tablet 10 mg, 10 mg, Oral, Nightly, Marcellus Pulido MD, 10 mg at 07/08/24 2002    sertraline (ZOLOFT) tablet 100 mg, 100 mg, Oral, Daily, Marcellus Pulido MD, 100 mg at 07/09/24 1013    [COMPLETED] Insert Peripheral IV, , , Once **AND** sodium chloride 0.9 % flush 10 mL, 10 mL, Intravenous, PRN, Marcellus Pulido MD    sodium chloride 0.9 % flush 10 mL, 10 mL, Intravenous, Q12H, Marcellus Pulido MD, 10 mL at 07/08/24 2004    sodium chloride 0.9 % flush 10 mL, 10 mL, Intravenous, PRNAshok William Lee, MD    sodium chloride 0.9 % flush 10 mL, 10 mL, Intravenous, PRN, Marcellus Pulido MD    sodium chloride 0.9 % infusion 40 mL, 40 mL, Intravenous, PRN, Marcellus Pulido MD    sodium chloride 0.9 % infusion 40 mL, 40 mL, Intravenous, PRN, Marcellus Pulido MD    sodium chloride 0.9 % infusion, 100 mL/hr, Intravenous, Continuous, Alysia Lincoln MD, Last Rate: 100 mL/hr at 07/09/24 1028, 100 mL/hr at 07/09/24 1028    Allergies   Allergen Reactions    Aspirin GI Bleeding    Niacin Other (See Comments)     I have utilized all available immediate resources to obtain, update, or review the patient's current medications (including all prescriptions, over-the-counter products, herbals, cannabis/cannabidiol products, and vitamin/mineral/dietary (nutritional) supplements) for name, route of administration, type, dose and  "frequency.    A:    /68   Pulse 69   Temp 97.8 °F (36.6 °C) (Oral)   Resp 18   Ht 172.7 cm (68\")   Wt 111 kg (244 lb)   LMP  (LMP Unknown)   SpO2 95%   BMI 37.10 kg/m²     Vitals and nursing note reviewed.   Constitutional:       Appearance: Not in distress.   Eyes:      General: Lids are normal.      Pupils: Pupils are equal, round, and reactive to light.   HENT:      Head: Normocephalic.   Pulmonary:      Effort: Pulmonary effort is normal.   Cardiovascular:      Normal rate.   Edema:     Peripheral edema present.  Abdominal:      Palpations: Abdomen is soft.   Musculoskeletal: Normal range of motion.      Cervical back: Neck supple.   Skin:     General: Skin is warm.   Genitourinary:     Comments: No reported dysuria  Neurological:      Mental Status: Alert   Psychiatric:         Mood and Affect: Mood normal.         Cognition and Memory: Cognition normal.      Patient status: Disease state: Controlled with current treatments.  Current Functional status: Palliative Performance Scale Score: Performance 40% based on the following measures: Ambulation: Mainly in bed, Activity and Evidence of Disease: Unable to do any work, extensive evidence of disease, Self-Care: Mainly assistance required,  Intake: Normal or reduced, LOC: Full, drowsy or confusion   Baseline Functional status: Palliative Performance Scale Score: Performance 70% based on the following measures: Ambulation: Reduced, Activity and Evidence of Disease: Unable to do normal work, some evidence of disease, Self-Care: Fully independent,  Intake: Normal or reduced, LOC: Full   Baseline ECOG Status(3) Capable of limited self-care, confined to bed or chair > 50% of waking hours.  Nutritional status: Albumin 3.9 Body mass index is 37.1 kg/m².      Hospital Problem List      Left renal mass    Cauda equina compression    Metastatic cancer to spine     Impression/Problem List:     Small lymphocytic lymphoma/chronic lymphocytic leukemia, stage " IV  Cord compression at L3 s/p laminectomy and decompression 6/29/2024  Bilateral renal masses, right adrenal mass, and abdominal soft tissue nodules concerning for metastatic disease  Cerebral microvascular disease, per MRI  History of breast cancer, stage I  Left breast nodule 9 mm  Anemia  Thrombocytopenia  Acute kidney injury on chronic kidney disease stage III  Hyperlipidemia  Hypertension     Recommendations/Plan:  1. plan: Goals of care include status no CPR/limited support interventions.     Family support: The patient receives support from her children and friend, Jose Enrique ..  Advance Directives: Advance Directive Status: Patient does not have advance directive   POA/Healthcare surrogate-Advance directive completed 7/1 with assistance from palliative  and , patient named her friend Jose Enrique followed by 2 children, Kailee and Marques as healthcare surrogates..     2.  Palliative care encounter  - Prognosis is guarded long-term secondary to suspected metastatic disease and comorbidities..  -Patient and HCS appears to have good prognostic awareness.      -Neurosurgery and nephrology consulted.    6/29-Underwent lumbar laminectomy L3 with decompression of the spinal cord and biopsy by Dr. 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    -Oncology consulted, recommend further staging diagnostics.  Anticipating biopsy of abdominal subcutaneous nodules.  MRI brain shows no definite evidence of metastatic disease.    -Radiation oncology consulted for postop radiation.   -Continues to work with therapy, LSO when out of bed per neurosurgery recommendation.    7/1-Advance directive completed today with assistance from palliative  and , patient named her friend Jose Enrique followed by 2 children Kailee and Marques as healthcare surrogates.  7/3-Cytology shows abnormal B-cell peripheral  proliferation with some features consistent with small lymphocytic lymphoma/chronic lymphocytic leukemia, though FISH studies are pending.    -Oncology plans for bone marrow biopsy.  Anticipating initiation of ibrutinib.  Allopurinol initiated.  Plan for outpatient staging PET scan.  Plan outpatient diagnostic mammography to evaluate left breast nodule  -Radiation oncology plans for palliative radiation treatments 5-10 treatment fractions starting Monday 7/8, simulated 7/2.    -Neurosurgery recommends PT OT with brace and outpatient follow-up in 2 weeks.   7/8-Nephrology has been consulted due to worsening kidney function and plans for renal ultrasound.       7/1-CODE STATUS changes no CPR/limited support interventions, no intubation and no artificial nutrition.    -Will plan to complete a MOST document prior to discharge with assistance from palliative     7/9-continue supportive measures  -Radiation oncology plans for palliative radiation treatments 5-10 treatment fractions #1 Monday 7/8, #2 today    -Anticipating bone marrow biopsy, and initiation of ibrutinib and outpatient PET scan per oncology and outpatient diagnostic mammography to evaluate left breast nodule  -Neurosurgery follow-up outpatient in 2 weeks  -A MOST document completed  -Anticipating home with home health and PT/OT.  Declined SNF and reports will have adequate family caregivers.    3.  Pain, cancer related  -improved.  Has received 124 mg oral morphine equivalent consistently over the last several days.  -Continue fentanyl transdermal patch 12 mcg for more consistent pain management  -Increase Norco range dosing for moderate to severe pain as needed  -IV Dilaudid per attending  -Continue lidocaine patch  -Utilize Voltaren gel as needed  -Plan palliative radiation x 5-10 treatment fractions  -Declined gabapentin trial at lower dose (100 mg every 8 hours), states side effects most recently with dizziness (300 mg per pain management).   Most likely exacerbated by kidney dysfunction.  Discontinued per patient request 7/8.    20 minutes spent on advance care planning-discussing with patient and/or family, answering questions, providing some guidance about a plan and documentation of care, and coordinating care face to face.     Thank you for allowing us to participate in patient's plan of care. Palliative Care Team will continue to follow patient.     Lauren Kuo, MARY  7/9/2024  11:11 CDT

## 2024-07-09 NOTE — PLAN OF CARE
Goal Outcome Evaluation:  Plan of Care Reviewed With: patient, spouse, family        Progress: no change  Outcome Evaluation: A&O. Up with assist x1 and a walker. BSC. Voiding. ADA diet; tolerating. Room air. lower back drsng c/d/i. IVF. Fentanyl patch. C/o pain; see MAR. SCDs. Safety maintained.

## 2024-07-09 NOTE — PROGRESS NOTES
"[facility] Progress Note    Progress Note    Patient:  Marina Joe  YOB: 1945  Date of Service: 7/9/2024  MRN: [id number]   Acct: [id number]   Primary Care Physician: Hanh Og APRN  Advance Directive:   Code Status and Medical Interventions:   Ordered at: 07/01/24 1547     Medical Intervention Limits:    No intubation (DNI)    No artificial nutrition     Level Of Support Discussed With:    Patient     Code Status (Patient has no pulse and is not breathing):    No CPR (Do Not Attempt to Resuscitate)     Medical Interventions (Patient has pulse or is breathing):    Limited Support     Admit Date: 6/28/2024       Hospital Day: 11      Subjective:     Chief Compliant:   Improved back pain    Subjective: Back pain \"seems better\".  Received initial radiation treatment to the spine yesterday, 7/8/2024.  Again states she has been out of bed with assistance and ambulatory for short distances with a walker.  Denies fevers or drenching night sweats.  Appetite is fair.  No urinary symptoms with no difficulty urinating.    Interval history:  Marina Joe 79 y.o. 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     She reports that she has been experiencing back pain for several weeks. Since her presentation on 6/28/2024 she had the following workup:  - CT-A/P showed abnormal heterogenous left kidney mass, abnormal right kidney, new right adrenal mass, innumerable solid nodules in the subcutaneous soft tissues all concerning for metastatic disease process.  - CT lumbar spine showing multilevel prominent disc osteophyte complexes acet arthropathy and ligamental hypertrophy and resultant neural foraminal spinal canal stenosis.  - CT chest showed a left breast nodule of 9 mm, " scattered subcutaneous soft tissue nodules in the abdominal wall, otherwise there is no sign of intrathoracic metastases.  - MRI-abdomen showed infiltrative mass within the left kidney extending into the proximal left collecting system as well as numerous additional solid masses in the bilateral kidneys. Additionally there is a right adrenal mass, multiple subcutaneous fat soft tissue masses, and a L3 vertebral body mass with suspected extension into the spinal canal. 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  - MRI-L-spine: Neoplastic process involving the L3 vertebral body with associated posterior soft tissue component resulting in severe spinal canal narrowing and cauda equina nerve root compression. The soft tissue component appears to also extend into the right L3-L4 foramen. No associated pathological fracture. There are also appears to be tumor signal extending into the right L2-L3 neural foramen.     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  Final diagnosis: L3 epidural soft tissue mass, biopsy: Abnormal B-cell population with some features consistent with small lymphocytic lymphoma/chronic lymphocytic leukemia.  Comment:  Flow Cytometry Summary    Flow cytometry was performed at [facility] ([id number]).  Flow cytometry shows 64% of the sample as an abnormal monoclonal B-cell population with mixed cell size.  FISH testing is pending.  This may represent a B-cell small lymphocytic lymphoma/chronic lymphocytic leukemia.  The cell size of the abnormal cells appears mixed.  This may represent increased prolymphocytes,  "paraimmunoblasts, and or large cells and progression/transformation is in the differential diagnosis.  Intact lymph node architecture is not present in this fragmented paraspinal mass for further evaluation.  Clinical correlation is recommended.      - 7/1/24-ultrasound-guided core needle biopsy of right flank subcutaneous nodule.  FINAL DIAGNOSIS:  Abnormal B-cell population which may represent small lymphocytic lymphoma/chronic lymphocytic leukemia (SLL/CLL).  Comment  Please see additional report [id number] in the associated flow cytometry report. The flow cytometry report showed  scatter properties compatible with mixed cell size with features of B-cell small lymphocytic lymphoma/chronic lymphocytic  leukemia (B-SLL/). FISH testing is ordered and pending. This may represent progression/transformation. Correlation with  clinical, laboratory, and morphologic data may be necessary to better define this lymphoid neoplasm.  - 7/1/24-manual blood smear review:  Lab Results   Component Value Date    PATHINTERP  07/01/2024     Moderate normochromic normocytic anemia    Normal total white blood cell count with the following manual differential:  Neutrophils 72%  Lymphocytes 20%  Monocytes 4%  Eosinophils 4%  Moderate peripheral thrombocytopenia    No schistocytes identified  No platelet clumps identified  No morulae identified in granulocytes or monocytes     -7/8/24-initiation of palliative radiation to L-spine per Dr. Parra    Review of Systems  Review of Systems:   Constitutional: Fatigue.  Says she has not yet been ambulatory this morning.  Appetite fair.  \"Just not great.\"  No fever / No chills / No sweats  HEENT:  No sore throat / No hoarseness / No vision changes  CV:  No chest pain / No palpitations / No orthopnea  Respiratory:  No cough / No shortness of breath / No sputum / No hemoptysis  GI:  No nausea / No vomiting / No abdominal pain / No diarrhea / +constipation   :  No dysuria / No hesitancy / No urgency / " "No hematuria  Neuro:  + Lower extremity muscle weakness / No dysphagia / No headache / No paresthesias  Musculoskeletal:  No edema / No cyanosis / + postop back pain  Derm: Subcutaneous nodules on trunk and extremities    Vitals: /51 (BP Location: Right arm, Patient Position: Lying)   Pulse 72   Temp 98 °F (36.7 °C) (Oral)   Resp 16   Ht 172.7 cm (68\")   Wt 111 kg (244 lb)   LMP  (LMP Unknown)   SpO2 93%   BMI 37.10 kg/m²     Physical Exam  Physical Exam:  General appearance: Pleasant, obese, modestly elderly female alert, appears stated age and cooperative.  In no acute distress while seated up at the bedside  Skin:  Skin color a bit pale. No rashes or lesions.  Subcutaneous nodules on trunk and extremities (most prominently right proximal forearm).  HEENT:  Head: Normal, normocephalic, atraumatic.  Neck:  no adenopathy, no tenderness/mass/nodules  Lungs:  clear to auscultation bilaterally  Heart:  regular rate and rhythm  Abdomen:  soft, non-tender.  No overt splenomegaly (habitus precludes an adequate exam)  Extremities:  extremities normal, atraumatic, no cyanosis or edema  Lymphatics: No obvious adenopathy in the cervical, supraclavicular, axillary or inguinal tri regions.  Neurologic:  Mental status: Alert, oriented, thought content appropriate    24HR INTAKE/OUTPUT:    Intake/Output Summary (Last 24 hours) at 7/9/2024 0741  Last data filed at 7/9/2024 0701  Gross per 24 hour   Intake 240 ml   Output 600 ml   Net -360 ml        Batista Catheter: Yes    Diet: Diet: Diabetic; Consistent Carbohydrate; Fluid Consistency: Thin (IDDSI 0)      Medications:   Scheduled Meds:amLODIPine, 5 mg, Oral, BID  buPROPion XL, 150 mg, Oral, Daily  carvedilol, 25 mg, Oral, BID With Meals  cetirizine, 10 mg, Oral, Daily  fentaNYL, 1 patch, Transdermal, Q72H   And  Check Fentanyl Patch Placement, 1 each, Does not apply, Q12H  cloNIDine, 0.2 mg, Oral, BID  fluticasone, 2 spray, Nasal, Daily  lactulose, 30 g, Oral, " TID  Lidocaine, 1 patch, Transdermal, Q24H  methylnaltrexone, 6 mg, Subcutaneous, Every Other Day  montelukast, 10 mg, Oral, Nightly  multivitamin with minerals, 1 tablet, Oral, Daily  pantoprazole, 40 mg, Oral, Daily  rOPINIRole, 0.5 mg, Oral, Nightly  rosuvastatin, 10 mg, Oral, Nightly  sertraline, 100 mg, Oral, Daily  sodium chloride, 10 mL, Intravenous, Q12H        Continuous Infusions:sodium chloride, 100 mL/hr, Last Rate: 100 mL/hr (07/09/24 0053)        Labs:     Results from last 7 days   Lab Units 07/08/24  0343 07/07/24  0409 07/06/24  0417   WBC 10*3/mm3 7.37 8.31 7.51   HEMOGLOBIN g/dL 8.3* 9.7* 9.5*   HEMATOCRIT % 25.8* 30.7* 30.2*   PLATELETS 10*3/mm3 67* 94* 97*     06/25/2024 0901 06/25/2024 0935 Iron Profile [[id number]]   (Abnormal)   Blood    Final result Component Value Units   Iron 56 mcg/dL   Iron Saturation (TSAT) 16 Low  %   Transferrin 238 mg/dL   TIBC 355 mcg/dL          06/25/2024 0901 06/25/2024 0940 Ferritin [[id number]]    (Abnormal)   Blood    Final result Component Value Units   Ferritin 451.10 High  ng/mL               07/01/2024 1053 07/01/2024 1130 Fibrinogen [[id number]]   (Abnormal)   Blood    Final result Component Value Units   Fibrinogen 521 High  mg/dL          07/01/2024 1053 07/01/2024 1204 Fibrin Split Products [[id number]]   Blood    Final result Component Value   Fibrin Split Products Less Than 5 mcg/mL          07/01/2024 1053 07/01/2024 1102 Platelet Antibody Profile [[id number]]   Blood    In process Component Value   No component results          07/01/2024 1053 07/01/2024 1130 Protime-INR [[id number]]   Blood    Final result Component Value Units   Protime 13.5 Seconds   INR 0.99           07/01/2024 1053 07/01/2024 1130 aPTT [[id number]]   Blood    Final result Component Value Units   PTT 26.8 seconds                 07/01/2024 1053 07/05/2024 1608 Platelet Antibody Profile [[id number]]    (Abnormal)   Blood    Final result Component Value   HLA Class 1 Antibody Positive  "Abnormal    IIb/IIIa Antibody Negative   Ib/IX Antibody Negative   Ia/IIa Antibody Negative   Glycoprotein IV Antibody Positive Abnormal            Results from last 7 days   Lab Units 07/08/24  0343 07/07/24  0409 07/06/24  0417   SODIUM mmol/L 134* 134* 136   POTASSIUM mmol/L 4.4 4.5 4.1   CHLORIDE mmol/L 95* 92* 97*   CO2 mmol/L 26.0 28.0 28.0   BUN mg/dL 48* 43* 29*   CREATININE mg/dL 3.48* 3.50* 2.14*   CALCIUM mg/dL 9.0 10.0 9.7   BILIRUBIN mg/dL 0.3 0.3 0.4   ALK PHOS U/L 93 109 107   ALT (SGPT) U/L 20 22 27   AST (SGOT) U/L 23 25 40*   GLUCOSE mg/dL 88 106* 112*       ABG:  No results found for: \"PHART\", \"PO2ART\", \"UMS7SGR\"    Culture Data:   Urine Culture   Date Value Ref Range Status   06/28/2024 >100,000 CFU/mL Mixed Bhavana Isolated  Final       Lab Results   Component Value Date    PATHINTERP  07/01/2024     Moderate normochromic normocytic anemia    Normal total white blood cell count with the following manual differential:  Neutrophils 72%  Lymphocytes 20%  Monocytes 4%  Eosinophils 4%  Moderate peripheral thrombocytopenia    No schistocytes identified  No platelet clumps identified  No morulae identified in granulocytes or monocytes       Radiology:     Imaging Results (Last 7 Days)       Procedure Component Value Units Date/Time    US Renal Bilateral [[id number]] Collected: 07/08/24 1706     Updated: 07/08/24 1714    Narrative:      US RENAL BILATERAL-     HISTORY: acute on ckd; 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     There is an abnormal " heterogeneous appearance to the bilateral kidneys  representative of the solid renal mass is better seen on the MRI abdomen  of 6/28/2024. A 4.3 x 2.7 x 2.9 cm right adrenal nodule is measured.  Right kidney measures 10.4 x 6.3 x 5.5 cm. The left kidney measures 10.7  x 6.9 x 6.5 cm. There is questionable trace volume of right subcapsular  fluid considered. There is only minimal prominence of the renal  collecting systems.     The distended bladder is unremarkable.       Impression:      1. Abnormal heterogeneous appearance to the bilateral renal parenchyma  representative of bilateral, left larger than right, solid renal masses  better seen on MRI 6/28/2024. Trace right subcapsular fluid considered.     2. Only mild prominence of the bilateral renal collecting systems.     3. Persistent right adrenal mass.        This report was signed and finalized on 7/8/2024 5:10 PM by Dr. Meaghan Phillips MD.       XR Chest 1 View [[id number]] Collected: 07/03/24 1538     Updated: 07/03/24 1552    Narrative:      EXAMINATION: XR CHEST 1 VW-  7/3/2024 3:38 PM 1 view     HISTORY: sob; C79.51-Secondary malignant neoplasm of bone; N28.89-Other  specified disorders of kidney and ureter; E27.8-Other specified  disorders of adrenal gland; M79.89-Other specified soft tissue  disorders; N28.9-Disorder of kidney and ureter, unspecified;  N18.9-Chronic kidney disease, unspecified; D69.6-Thrombocytopenia,  unspecified; G83.4-Cauda equina syndrome; Z74.09-Other reduced mobility;  Z85.3-Pe     COMPARISON: 6/28/2024     TECHNIQUE: A single frontal view of the chest was obtained.     FINDINGS:  There is mild cardiomegaly and pulmonary vascular congestion. Shallow  depth of inspiration. I don't see a large effusion. Vascular  calcifications are noted in the aorta. Surgical clips project over the  LEFT chest wall.       Impression:         1.  Shallow inspiration with mild cardiomegaly and pulmonary vascular  congestion without lee edema or  other acute consolidation.           This report was signed and finalized on 7/3/2024 3:49 PM by Dr. Armando Calixto MD.                 Objective:       Problem list:     Left renal mass    Morbid (severe) obesity due to excess calories    Cauda equina compression    Metastatic cancer to spine        Assessment/Plan:       ASSESSMENT:   Small lymphocytic lymphoma/chronic lymphocytic leukemia   -Tumor stage: IV  -Tumor burden:   -6/28/2024 CT-A/P showed abnormal heterogenous left kidney mass, abnormal right kidney, new right adrenal mass, innumerable solid nodules in the subcutaneous soft tissues all concerning for metastatic disease process.  -6/28/2024, CT lumbar spine showing multilevel prominent disc osteophyte complexes acet arthropathy and ligamental hypertrophy and resultant neural foraminal spinal canal stenosis.  -6/28/2024, CT chest showed a left breast nodule of 9 mm, scattered subcutaneous soft tissue nodules in the abdominal wall, otherwise there is no sign of intrathoracic metastases.  -6/28/2024, MRI-abdomen showed infiltrative mass within the left kidney extending into the proximal left collecting system as well as numerous additional solid masses in the bilateral kidneys. Additionally there is a right adrenal mass, multiple subcutaneous fat soft tissue masses, and a L3 vertebral body mass with suspected extension into the spinal canal. 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  -6/20/2024, MRI-L-spine: Neoplastic process involving the L3 vertebral body with associated posterior soft tissue component resulting in severe spinal canal narrowing and cauda equina nerve root compression. The soft tissue component appears to also extend into the right L3-L4 foramen. No associated pathological fracture. There are also appears to be tumor signal extending into the  right L2-L3 neural foramen.  - 7/1/2024-MRI brain-no definite evidence of intracranial metastatic disease within the limitations of noncontrast exam.  Cerebral microvascular disease noted.  - 7/1/2024-ultrasound-guided core needle biopsy of right flank subcutaneous nodule.  FINAL DIAGNOSIS:  Abnormal B-cell population which may represent small lymphocytic lymphoma/chronic lymphocytic leukemia (SLL/CLL).    -Complications of tumor: Cord compression at L3 concerning for epidural metastasis  -Tumor status:  - Systemically untreated.  Anticipate ibrutinib  - 6/29/2024 - OR for L3 laminectomy with decompression; prelim pathology showing small round blue cell tumor.  Final diagnosis: L3 epidural soft tissue mass, biopsy: Abnormal B-cell population with some features consistent with small lymphocytic lymphoma/chronic lymphocytic leukemia.    -7/8/24 -initiation of postop radiation therapy to the lumbar region with anticipated 2000 to 3000 cGy over 5-20 daily fractions    Diffuse soft tissue abdominal wall/right arm subcutaneous nodules.  - 7/1/24-ultrasound-guided core needle biopsy of right flank subcutaneous nodule.  FINAL DIAGNOSIS:  Abnormal B-cell population which may represent small lymphocytic lymphoma/chronic lymphocytic leukemia (SLL/CLL).  Comment  Please see additional report [id number] in the associated flow cytometry report. The flow cytometry report showed  scatter properties compatible with mixed cell size with features of B-cell small lymphocytic lymphoma/chronic lymphocytic  leukemia (B-SLL/). FISH testing is ordered and pending. This may represent progression/transformation. Correlation with  clinical, laboratory, and morphologic data may be necessary to better define this lymphoid neoplasm.    3.   History of stage I left breast cancer.  Now with left breast nodule of 9 mm  4.   Anemia.  Contributions from CKD+/- malignancy/anemia of chronic disease  -Hgb 8.3; MCV 88.7, 7/8/2024 (prior: Hgb 8.7  "-11.8)  -6/25/24-iron 56, iron saturation 16%, TIBC 355, ferritin 451  5.  Thrombocytopenia.  Contributions from CLL/SLL, +ITP  -67,000, 7/8/2024 (prior patito: 81,000).  Had previously needed platelet transfusions to maintain >/=100,000  -7/1/2024-platelet antibody profile: HLA class I antibody and glycoprotein antibodies positive.  - No evidence for DIC-7/1/2024: Fibrinogen 521, FSP<5, PT 13.5/INR 0.9, PTT 26.8  - No evidence for MAHA/TTP- 7/1/24: Manual blood smear--moderate normochromic normocytic anemia.  Normal total white blood cell count with 72 segs, 20 lymphs, 4 monos, 4 eos.  Moderate peripheral thrombocytopenia.  No schistocytes identified.  No platelet clumps identified.  No morulae identified in granulocytes or monocytes.  6.   Acute on CKD 3-4  -Worsening (prior diuretics).  GFR 12.9, 7/8/2024 (prior: 15.6- 36.4)  7.   Guarded prognosis     PLAN:  -Again reviewed labs, 7/1/24 - 7/8/24.  Note dwindling anemia, and thrombocytopenia, + platelet antibodies, previously normal PT/PTT, normal fibrinogen, normal FSP (no DIC), no evidence for MAHA (no schistocytes).  -Apprised of pathological diagnosis from L3 biopsy and abdominal wall nodules (above) consistent with small lymphocytic lymphoma/chronic lymphocytic leukemia though FISH studies are pending.  May have a more aggressive process/transformation.  Needs a bone marrow biopsy.  Patient agrees to proceed but is unable to tolerate the procedure immediately postop.  - Allopurinol on hold since 7/8/24 due to worsening GFR  -Systemic therapy (chemotherapy vs ibrutinib) again discussed with patient.  We were anticipating initiation of therapy pending improvement of her renal function and while awaiting a bone marrow biopsy (see below).  She now states she has been mulling it over and has decided that, \"I am leaving it in the hands of God.\"  States that she we will continue radiation but now declines systemic therapy.  He is much more focused on comfort " care/best supportive care directed by hospice.  -Schedule bone marrow biopsy to assess for marrow involvement by CLL/SLL if patient agrees and when patient tolerates the procedure  - Outpatient staging PET-scan unless the patient declines any further therapy  - Obtain outpatient diagnostic mammography to evaluate the left breast nodule unless the patient declines any further therapy.  -Continue postop radiation therapy to the lumbar region (beginning 7/8/24) with anticipated 2000 to 3000 cGy over 5-20 daily fractions  - Palliative care services following.  - Care discussed with patient's daughter Kailee by telephone this morning.  Is apprised of the patient's decision to forego systemic therapy.  -Care discussed with Dr. Lincoln (hospitalist) and Dr. Parra of radiation oncology.       I spent >40 minutes caring for Marina on this date of service. This time includes time spent by me in the following activities: preparing for the visit, reviewing tests, performing a medically appropriate examination and/or evaluation, counseling and educating the patient/family/caregiver, ordering medications, tests, or procedures and documenting information in the medical record

## 2024-07-09 NOTE — PROGRESS NOTES
[facility] Medicine Services  INPATIENT PROGRESS NOTE    Patient Name: Marina Joe  Date of Admission: 6/28/2024  Today's Date: 07/09/24  Length of Stay: 11  Primary Care Physician: Hanh Og APRN    Subjective   Chief Complaint: Lower back pain          HPI   Ms. Joe is a 79-year-old female from home with past medical history of breast cancer, chronic kidney disease stage IIIa, hypercholesterolemia, hypertension and chronic sinusitis, who presented to the emergency room with worsening back pain/flank pain, history was mostly provided by patient's boyfriend at bedside.  She developed back pain couple of weeks ago and was referred to a pain clinic doctor where she was prescribed gabapentin, after she took about 2-3 doses of the gabapentin, she developed dizziness. She came to the emergency room today because lower back pain and flank pain got even worse despite being on the gabapentin.  In the emergency room, she was extensively worked up with CT of the abdomen and lumbar spine, CT abdomen showed extensive mass of the left kidney extending up to the proximal left ureter as well as right adrenal gland mass suspicious for metastasis.  Urology was consulted from the emergency room for input.  7/1  As above history of chronic kidney disease hyperlipidemia hypertension presented with back pain found to have kidney mass suspicious for metastasis patient CT of the lumbar spine shows multilevel degenerative changes to include an anteriolisthesis of L3 over L4 and spondylosis at L5-S1. 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 awaiting final pathology will consult with palliative  team to address the CODE STATUS and plan of care  7/2  Appreciate palliative care the patient is currently DNR, appreciate neurosurgery status post L3 laminectomy and decompression on June 29 for cauda equina, appreciate oncology continues to work with physical therapy appreciate  radiation oncology patient started radiation today  7/3  Patient oxygen is 85% on 8 L, patient blood pressure is poorly controlled increase her Coreg we will repeat her chest x-ray chest x-ray from before was showing some congestion IV Hep-Lock will give her 1 dose of Lasix  7/4  As before remains on 8 L oxygen documented excision was not titrated will titrate oxygen, chest x-ray showing cardiomegaly pulmonary edema the patient has poor inspiration we did Hep-Lock her IV fluid we will continue IV Lasix blood pressure remains poorly controlled increase her Coreg we will check her echo of the heart as above she was not having bowel movement for most time was started on lactulose, patient had good bowel movement it was mistakenly documented that she is on 8 L the patient is actually on room air  7/5  Patient echo of the heart is showing good EF 56 to 60% blood pressure remains a challenge will increase her Catapres I will decrease her  Lasix to 20 mg daily continue to work with PT OT, she had nephrology signed off, pain is not well controlled, will consult palliative for pain control   7/6  As before appreciate bronchopulmonary they have signed off appreciate palliative care helping us manage her pain continues to work with physical therapy today her creatinine is up we will discontinue IV Lasix remains off oxygen even though it is still documented she is on 8 L oxygen  7/7  Creatinine is up secondary to overdiuresis, start IVF, still having neuropathy will add neurontine   7/8  Her kidney function is not improving I will go ahead and consult with nephrology I spoke to oncology ideally we would love to do bone marrow biopsy to address  possible CLL but she cannot lay on her stomach with this kidney function we cannot start any chemo the 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 All other systems have been reviewed and are negative unless otherwise stated.     Objective    Temp:  [97.8 °F (36.6 °C)-98.3 °F (36.8 °C)] 97.8 °F (36.6 °C)  Heart Rate:  [66-72] 66  Resp:  [16-18] 18  BP: (120-133)/(46-74) 125/74  Physical Exam    GEN: Awake, alert, interactive, in NAD  HEENT: Atraumatic, PERRLA, EOMI, Anicteric, Trachea midline  Lungs: CTAB, no wheezing/rales/rhonchi  Heart: RRR, +S1/s2, no rub  ABD: soft, nt/nd, +BS, no guarding/rebound  Extremities: atraumatic, no cyanosis, no edema  Skin: no rashes or lesions  Neuro: AAOx3, no focal deficits  Psych: normal mood & affect    Results Review:  I have reviewed the labs, radiology results, and diagnostic studies.    Laboratory Data:   Results from last 7 days   Lab Units 07/09/24  0914 07/08/24 0343 07/07/24  0409   WBC 10*3/mm3 7.28 7.37 8.31   HEMOGLOBIN g/dL 7.9* 8.3* 9.7*   HEMATOCRIT % 25.1* 25.8* 30.7*   PLATELETS 10*3/mm3 63* 67* 94*        Results from last 7 days   Lab Units 07/08/24 0343 07/07/24  0409 07/06/24  0417   SODIUM mmol/L 134* 134* 136   POTASSIUM mmol/L 4.4 4.5 4.1   CHLORIDE mmol/L 95* 92* 97*   CO2 mmol/L 26.0 28.0 28.0   BUN mg/dL 48* 43* 29*   CREATININE mg/dL 3.48* 3.50* 2.14*   CALCIUM mg/dL  9.0 10.0 9.7   BILIRUBIN mg/dL 0.3 0.3 0.4   ALK PHOS U/L 93 109 107   ALT (SGPT) U/L 20 22 27   AST (SGOT) U/L 23 25 40*   GLUCOSE mg/dL 88 106* 112*       Culture Data:   Urine Culture   Date Value Ref Range Status   06/28/2024 >100,000 CFU/mL Mixed Bhavana Isolated  Final       Radiology Data:   Imaging Results (Last 24 Hours)       Procedure Component Value Units Date/Time    US Renal Bilateral [[id number]] Collected: 07/08/24 1706     Updated: 07/08/24 1714    Narrative:      US RENAL BILATERAL-     HISTORY: acute on ckd; 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     There is an abnormal heterogeneous appearance to the bilateral kidneys  representative of the solid renal mass is better seen on the MRI abdomen  of 6/28/2024. A 4.3 x 2.7 x 2.9 cm right adrenal nodule is measured.  Right kidney measures 10.4 x 6.3 x 5.5 cm. The left kidney measures 10.7  x 6.9 x 6.5 cm. There is questionable trace volume of right subcapsular  fluid considered. There is only minimal prominence of the renal  collecting systems.     The distended bladder is unremarkable.       Impression:      1. Abnormal heterogeneous appearance to the bilateral renal parenchyma  representative of bilateral, left larger than right, solid renal masses  better seen on MRI 6/28/2024. Trace right subcapsular fluid considered.     2. Only mild prominence of the bilateral renal collecting systems.     3. Persistent right adrenal mass.        This report was signed and finalized on 7/8/2024 5:10 PM by   Meaghan Phillips MD.               I have reviewed the patient's current medications.     Assessment/Plan   Assessment  Active Hospital Problems    Diagnosis     **Left renal mass     Cauda equina compression     Metastatic cancer to spine     Morbid (severe) obesity due to excess calories        Treatment Plan  Lower back pain  Patient's low back pain has been getting worse in the last couple of weeks, was started on gabapentin outpatient but did not help.  MRI of the lumbar spine reported as follows-  IMPRESSION:   Neoplastic process involving the L3 vertebral body with associated  posterior soft tissue component resulting in severe spinal canal  narrowing and cauda equina nerve root compression. The soft tissue  component appears to also extend into the right L3-L4 foramen. No  associated pathological fracture.  Additional multilevel spondylotic changes including moderate to severe  spinal canal and foraminal narrowing as detailed above.  Neurosurgery is on consult and did the following procedure-  Procedure(s):  LUMBAR LAMINECTOMY L3 WITH DECOMPRESSION 1-2 LEVELS-RIGHT     Decompression -  Lumbar laminectomy, medial facetectomy for decompression of the spinal cord  Open laminectomy and biopsy of epidural neoplasm  Use of intraoperative microscope      -Acute on chronic kidney disease stage IIIa  Patient follows up with a nephrologist outpatient, but he does not come to this hospital.  Nephrologist consulted and helping with management while patient is in-house.     -Left renal mass/right adrenal mass on CT of the abdomen/pelvis  Urology was consulted from the emergency room and after evaluation did not recommend any intervention,rather recommended oncology consult.  Patient has multiple subcutaneous nodules that may be amenable to percutaneous biopsy.  Oncology is on consult and has evaluated patient, will await for tentative diagnosis from lumbar spine biopsy before making recommendations      Other chronic  medical conditions-  History of breast cancer  Hypercholesterolemia  Hypertension  Chronic sinusitis     DVT prophylaxis-heparin       Medical Decision Making  Number and Complexity of problems: High    Conditions and Status        Condition is unchanged.     MDM Data  External documents reviewed: No  Cardiac tracing (EKG, telemetry) interpretation: Yes  Radiology interpretation: Yes  Labs reviewed: Yes  Any tests that were considered but not ordered: No     Decision rules/scores evaluated (example [id number], Wells, etc): Yes     Discussed with: Patient     Care Planning  Shared decision making: Yes  Code status and discussions: Yes [full code]    Disposition  Social Determinants of Health that impact treatment or disposition: No  I expect the patient to be discharged to unknown in unknown days.     Electronically signed by Alysia Lincoln MD, 07/09/24, 09:54 CDT.

## 2024-07-09 NOTE — PLAN OF CARE
Goal Outcome Evaluation:  Plan of Care Reviewed With: patient        Progress: improving  Outcome Evaluation: Pt. agreeable to therapy. Pt. needed MIN assist to stand the first time, but was CGA the other times, including from commode. Pt. walked 15'x 2, then 30' with sitting rests in between. She rated her pain 9/10 with activity. Pt. making improvements overall.

## 2024-07-09 NOTE — PLAN OF CARE
Goal Outcome Evaluation:  Plan of Care Reviewed With: significant other        Progress: improving  Outcome Evaluation: Nutrition follow up. Pt is on a C.CHO diet. She was off the floor at time of visit. Spoke with pt's boyfriend. He reports she has had a good appetite. She has consumed 88% of the last 4 meals. Her boyfriend does say that she is still in pain though. She is getting Boost gluc control BID. MD notes indicate hospice will be consulted. Cont to follow.

## 2024-07-09 NOTE — PLAN OF CARE
Goal Outcome Evaluation:  Plan of Care Reviewed With: patient     Progress: no change     Pt A&O x4. C/o pain throughout this shift; see MAR. Fentanyl patch still in place. Up w/ assist x1 to bsc. IVF infusing as ordered. RA. VSS. Safety maintained.

## 2024-07-10 ENCOUNTER — APPOINTMENT (OUTPATIENT)
Dept: RADIATION ONCOLOGY | Facility: HOSPITAL | Age: 79
End: 2024-07-10
Payer: MEDICARE

## 2024-07-10 ENCOUNTER — TELEPHONE (OUTPATIENT)
Dept: ONCOLOGY | Facility: CLINIC | Age: 79
End: 2024-07-10
Payer: MEDICARE

## 2024-07-10 LAB
ANION GAP SERPL CALCULATED.3IONS-SCNC: 11 MMOL/L (ref 5–15)
BASOPHILS # BLD AUTO: 0.02 10*3/MM3 (ref 0–0.2)
BASOPHILS NFR BLD AUTO: 0.3 % (ref 0–1.5)
BUN SERPL-MCNC: 47 MG/DL (ref 8–23)
BUN/CREAT SERPL: 21.9 (ref 7–25)
CALCIUM SPEC-SCNC: 8.9 MG/DL (ref 8.6–10.5)
CHLORIDE SERPL-SCNC: 102 MMOL/L (ref 98–107)
CO2 SERPL-SCNC: 24 MMOL/L (ref 22–29)
CREAT SERPL-MCNC: 2.15 MG/DL (ref 0.57–1)
DEPRECATED RDW RBC AUTO: 51 FL (ref 37–54)
EGFRCR SERPLBLD CKD-EPI 2021: 23.1 ML/MIN/1.73
EOSINOPHIL # BLD AUTO: 0.27 10*3/MM3 (ref 0–0.4)
EOSINOPHIL NFR BLD AUTO: 4.1 % (ref 0.3–6.2)
ERYTHROCYTE [DISTWIDTH] IN BLOOD BY AUTOMATED COUNT: 15.9 % (ref 12.3–15.4)
GLUCOSE SERPL-MCNC: 95 MG/DL (ref 65–99)
HCT VFR BLD AUTO: 24.9 % (ref 34–46.6)
HGB BLD-MCNC: 8 G/DL (ref 12–15.9)
IMM GRANULOCYTES # BLD AUTO: 0.04 10*3/MM3 (ref 0–0.05)
IMM GRANULOCYTES NFR BLD AUTO: 0.6 % (ref 0–0.5)
LYMPHOCYTES # BLD AUTO: 0.64 10*3/MM3 (ref 0.7–3.1)
LYMPHOCYTES NFR BLD AUTO: 9.7 % (ref 19.6–45.3)
MCH RBC QN AUTO: 28.4 PG (ref 26.6–33)
MCHC RBC AUTO-ENTMCNC: 32.1 G/DL (ref 31.5–35.7)
MCV RBC AUTO: 88.3 FL (ref 79–97)
MONOCYTES # BLD AUTO: 0.65 10*3/MM3 (ref 0.1–0.9)
MONOCYTES NFR BLD AUTO: 9.8 % (ref 5–12)
NEUTROPHILS NFR BLD AUTO: 5.01 10*3/MM3 (ref 1.7–7)
NEUTROPHILS NFR BLD AUTO: 75.5 % (ref 42.7–76)
PLATELET # BLD AUTO: 61 10*3/MM3 (ref 140–450)
POTASSIUM SERPL-SCNC: 4.3 MMOL/L (ref 3.5–5.2)
RAD ONC ARIA COURSE ID: NORMAL
RAD ONC ARIA COURSE LAST TREATMENT DATE: NORMAL
RAD ONC ARIA COURSE START DATE: NORMAL
RAD ONC ARIA COURSE TREATMENT ELAPSED DAYS: 2
RAD ONC ARIA FIRST TREATMENT DATE: NORMAL
RAD ONC ARIA PLAN FRACTIONS TREATED TO DATE: 3
RAD ONC ARIA PLAN ID: NORMAL
RAD ONC ARIA PLAN PRESCRIBED DOSE PER FRACTION: 3 GY
RAD ONC ARIA PLAN PRIMARY REFERENCE POINT: NORMAL
RAD ONC ARIA PLAN TOTAL FRACTIONS PRESCRIBED: 10
RAD ONC ARIA PLAN TOTAL PRESCRIBED DOSE: 3000 CGY
RAD ONC ARIA REFERENCE POINT DOSAGE GIVEN TO DATE: 9 GY
RAD ONC ARIA REFERENCE POINT ID: NORMAL
RAD ONC ARIA REFERENCE POINT SESSION DOSAGE GIVEN: 3 GY
RBC # BLD AUTO: 2.82 10*6/MM3 (ref 3.77–5.28)
SODIUM SERPL-SCNC: 137 MMOL/L (ref 136–145)
WBC NRBC COR # BLD AUTO: 6.63 10*3/MM3 (ref 3.4–10.8)

## 2024-07-10 PROCEDURE — 97116 GAIT TRAINING THERAPY: CPT

## 2024-07-10 PROCEDURE — 99232 SBSQ HOSP IP/OBS MODERATE 35: CPT | Performed by: CLINICAL NURSE SPECIALIST

## 2024-07-10 PROCEDURE — 25010000002 ONDANSETRON PER 1 MG: Performed by: NEUROLOGICAL SURGERY

## 2024-07-10 PROCEDURE — 97164 PT RE-EVAL EST PLAN CARE: CPT

## 2024-07-10 PROCEDURE — 97535 SELF CARE MNGMENT TRAINING: CPT

## 2024-07-10 PROCEDURE — 85025 COMPLETE CBC W/AUTO DIFF WBC: CPT | Performed by: NURSE PRACTITIONER

## 2024-07-10 PROCEDURE — 99497 ADVNCD CARE PLAN 30 MIN: CPT | Performed by: CLINICAL NURSE SPECIALIST

## 2024-07-10 PROCEDURE — 80048 BASIC METABOLIC PNL TOTAL CA: CPT | Performed by: INTERNAL MEDICINE

## 2024-07-10 PROCEDURE — 77412 RADIATION TX DELIVERY LVL 3: CPT | Performed by: RADIOLOGY

## 2024-07-10 PROCEDURE — 97168 OT RE-EVAL EST PLAN CARE: CPT

## 2024-07-10 PROCEDURE — 99232 SBSQ HOSP IP/OBS MODERATE 35: CPT | Performed by: INTERNAL MEDICINE

## 2024-07-10 PROCEDURE — 0 HYDROMORPHONE 1 MG/ML SOLUTION: Performed by: HOSPITALIST

## 2024-07-10 PROCEDURE — 25010000002 METHYLNALTREXONE 12 MG/0.6ML SOLUTION: Performed by: NURSE PRACTITIONER

## 2024-07-10 PROCEDURE — 25810000003 SODIUM CHLORIDE 0.9 % SOLUTION: Performed by: HOSPITALIST

## 2024-07-10 RX ADMIN — Medication 10 ML: at 10:19

## 2024-07-10 RX ADMIN — HYDROMORPHONE HYDROCHLORIDE 1 MG: 1 INJECTION, SOLUTION INTRAMUSCULAR; INTRAVENOUS; SUBCUTANEOUS at 07:24

## 2024-07-10 RX ADMIN — Medication 1 TABLET: at 10:18

## 2024-07-10 RX ADMIN — ROSUVASTATIN CALCIUM 10 MG: 10 TABLET, FILM COATED ORAL at 21:37

## 2024-07-10 RX ADMIN — AMLODIPINE BESYLATE 5 MG: 5 TABLET ORAL at 21:37

## 2024-07-10 RX ADMIN — ROPINIROLE HYDROCHLORIDE 0.5 MG: 0.25 TABLET, FILM COATED ORAL at 21:37

## 2024-07-10 RX ADMIN — PANTOPRAZOLE SODIUM 40 MG: 40 TABLET, DELAYED RELEASE ORAL at 10:21

## 2024-07-10 RX ADMIN — SERTRALINE 100 MG: 50 TABLET, FILM COATED ORAL at 10:17

## 2024-07-10 RX ADMIN — BUPROPION HYDROCHLORIDE 150 MG: 150 TABLET, EXTENDED RELEASE ORAL at 10:17

## 2024-07-10 RX ADMIN — CETIRIZINE HYDROCHLORIDE 10 MG: 10 TABLET ORAL at 10:17

## 2024-07-10 RX ADMIN — FLUTICASONE PROPIONATE 2 SPRAY: 50 SPRAY, METERED NASAL at 10:19

## 2024-07-10 RX ADMIN — METHYLNALTREXONE BROMIDE 6 MG: 12 INJECTION, SOLUTION SUBCUTANEOUS at 10:20

## 2024-07-10 RX ADMIN — SODIUM CHLORIDE 100 ML/HR: 9 INJECTION, SOLUTION INTRAVENOUS at 06:22

## 2024-07-10 RX ADMIN — Medication 10 ML: at 21:53

## 2024-07-10 RX ADMIN — LIDOCAINE 1 PATCH: 4 PATCH TOPICAL at 10:19

## 2024-07-10 RX ADMIN — ONDANSETRON 4 MG: 2 INJECTION INTRAMUSCULAR; INTRAVENOUS at 10:01

## 2024-07-10 RX ADMIN — MONTELUKAST SODIUM 10 MG: 10 TABLET, FILM COATED ORAL at 21:37

## 2024-07-10 NOTE — PLAN OF CARE
Goal Outcome Evaluation:  Plan of Care Reviewed With: patient     Progress: no change     Pt A&O x4. Voiding per external catheter and BSC. RA. IV fluids infusing per order. Fentanyl patch in place. Lumbar dressing clean, dry, and intact. VSS. Safety maintained.

## 2024-07-10 NOTE — THERAPY RE-EVALUATION
Patient Name: Marina Joe  : 1945    MRN: [id number]                              Today's Date: 7/10/2024       Admit Date: 2024    Visit Dx:     ICD-10-CM ICD-9-CM   1. 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 Left renal mass  N28.89 593.9   3. Right adrenal mass  E27.8 255.8   4. Nodule of soft tissue  M79.89 729.99   5. Acute on chronic renal insufficiency  N28.9 593.9    N18.9 585.9   6. Thrombocytopenia  D69.6 287.5   7. Cauda equina compression  G83.4 344.60   8. Impaired mobility [Z74.09]  Z74.09 799.89   9. HX: breast cancer  Z85.3 V10.3     Patient Active Problem List   Diagnosis    Malignant neoplasm of upper-outer quadrant of female breast    Anemia of chronic renal failure, stage 3 (moderate)    Hypertension, benign    Hx of colonic polyps    HX: breast cancer    Morbid (severe) obesity due to excess calories    Right knee DJD    S/P lumpectomy, left breast    Adult hypothyroidism    Anemia    Anxiety    Breast cancer, left    Cervical pain    Chronic insomnia    Elevated lipids    Herpes zoster without complication    Left ear pain    Left otitis externa    Myalgia    Negative depression screening    Obesity (BMI 30-39.9)    Postmenopausal status    Recurrent acute serous otitis media of left ear    Restless leg    Sinusitis, bacterial    Skin lesion    Vitamin D deficiency    Stage 3b chronic kidney disease    GIB (gastrointestinal bleeding)    Acute on chronic blood loss anemia    Chronic idiopathic thrombocytopenia    Hypotension due to hypovolemia    Primary hypertension    Pancreatic lesion    Left renal mass    Cauda equina compression    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 Recall 5 years    COLONOSCOPY  11/19/2018    Tics otherwise normal exam repeat in 5 years    COLONOSCOPY  02/13/2023    Normal exam repeat in 3 years with a 2 day prep    ENDOSCOPY  02/13/2023    Gastritis, small HH    LUMBAR LAMINECTOMY Right 6/29/2024    Procedure: LUMBAR LAMINECTOMY L3 WITH DECOMPRESSION 1-2 LEVELS-RIGHT;  Surgeon: Marcellus Pulido MD;  Location: [facility] OR;  Service: Neurosurgery;  Laterality: Right;    REPLACEMENT TOTAL KNEE Right     2016    TOTAL ABDOMINAL HYSTERECTOMY WITH SALPINGO OOPHORECTOMY      UPPER ENDOSCOPIC ULTRASOUND W/ FNA  12/20/2023    Pancreatic cyst s/p FNA      General Information       Row Name 07/10/24 0908          Physical Therapy Time and Intention    Document Type re-evaluation  -AJ     Mode of Treatment physical therapy  -       Row Name 07/10/24 0908          General Information    Patient Profile Reviewed yes  -AJ     Prior Level of Function independent:;all household mobility;community mobility;gait;ADL's  -AJ     Existing Precautions/Restrictions fall;LSO;brace worn when out of bed  -AJ     Barriers to Rehab medically complex;physical barrier  -AJ       Row Name 07/10/24 0908          Cognition    Orientation Status (Cognition) oriented to  -AJ       Row Name 07/10/24 0908          Safety Issues, Functional Mobility    Safety Issues Affecting Function (Mobility) safety precaution awareness;safety precautions follow-through/compliance;positioning of assistive device;impulsivity  -AJ     Impairments Affecting Function (Mobility) endurance/activity tolerance;pain  -AJ               User Key  (r) = Recorded By, (t) = Taken By, (c) = Cosigned By      Initials Name Provider Type    AJ Warren Carvalho, PT Physical Therapist                    Mobility       Row Name 07/10/24 0908          Bed Mobility    Bed Mobility rolling left;supine-sit  -AJ     Rolling Left [place] (Bed Mobility) contact guard  -AJ     Supine-Sit [place] (Bed Mobility) standby assist  -AJ     Assistive Device (Bed Mobility) bed rails;head of bed elevated  -AJ       Row Name 07/10/24 0908          Bed-Chair Transfer    Bed-Chair [place] (Transfers) standby assist  -AJ     Assistive Device (Bed-Chair Transfers) walker, front-wheeled  -AJ       Row Name 07/10/24 0908          Sit-Stand Transfer    Sit-Stand [place] (Transfers) contact guard  -AJ     Assistive Device (Sit-Stand Transfers) walker, front-wheeled  -AJ       Row Name 07/10/24 0908          Gait/Stairs (Locomotion)    [place] Level (Gait) contact guard  -AJ     Assistive Device (Gait) walker, front-wheeled  -AJ     Distance in Feet (Gait) 300  -AJ     Deviations/Abnormal Patterns (Gait) maynor decreased;bilateral deviations  -AJ     Bilateral Gait Deviations forward flexed posture  -AJ               User Key  (r) = Recorded By, (t) = Taken By, (c) = Cosigned By      Initials Name Provider Type    Warren Harvey PT Physical Therapist                   Obj/Interventions       Row Name 07/10/24 0908          Range of Motion Comprehensive    General Range of Motion bilateral lower extremity ROM WFL  -AJ       Row Name 07/10/24 0908          Strength Comprehensive (MMT)    General Manual Muscle Testing (MMT) Assessment no strength deficits identified  -AJ     Comment, General Manual Muscle Testing (MMT) Assessment WFL B LE  -AJ       Row Name 07/10/24 0908          Balance    Balance Assessment sitting static balance;sitting dynamic balance;standing static balance;standing dynamic balance  -AJ     Static Sitting Balance independent  -AJ     Dynamic Sitting Balance independent  -AJ     Position, Sitting Balance unsupported;sitting edge of bed  -AJ     Static Standing Balance standby assist  -AJ      Dynamic Standing Balance contact guard  -     Position/Device Used, Standing Balance supported;walker, front-wheeled  -     Balance Interventions sitting;standing;sit to stand;supported;static;dynamic  -       Row Name 07/10/24 0908          Sensory Assessment (Somatosensory)    Sensory Assessment (Somatosensory) LE sensation intact  -               User Key  (r) = Recorded By, (t) = Taken By, (c) = Cosigned By      Initials Name Provider Type    Warren Harvey, PT Physical Therapist                   Goals/Plan       Row Name 07/10/24 0908          Bed Mobility Goal 1 (PT)    Activity/Assistive Device (Bed Mobility Goal 1, PT) rolling to left;rolling to right;sidelying to sit/sit to sidelying  -AJ     Jay Level/Cues Needed (Bed Mobility Goal 1, PT) independent  -AJ     Time Frame (Bed Mobility Goal 1, PT) 10 days;long term goal (LTG)  -AJ     Progress/Outcomes (Bed Mobility Goal 1, PT) good progress toward goal;goal met  -       Row Name 07/10/24 0908          Transfer Goal 1 (PT)    Activity/Assistive Device (Transfer Goal 1, PT) sit-to-stand/stand-to-sit;bed-to-chair/chair-to-bed;toilet  -AJ     Jay Level/Cues Needed (Transfer Goal 1, PT) independent  -AJ     Time Frame (Transfer Goal 1, PT) 10 days;long term goal (LTG)  -AJ     Progress/Outcome (Transfer Goal 1, PT) good progress toward goal;continuing progress toward goal;goal ongoing  -       Row Name 07/10/24 0908          Gait Training Goal 1 (PT)    Activity/Assistive Device (Gait Training Goal 1, PT) gait (walking locomotion);assistive device use  -AJ     [place] Level (Gait Training Goal 1, PT) standby assist  -AJ     Distance (Gait Training Goal 1, PT) 200' with no rest break  -AJ     Time Frame (Gait Training Goal 1, PT) 10 days;long term goal (LTG)  -AJ     Progress/Outcome (Gait Training Goal 1, PT) goal revised this date;new goal  -       Row Name 07/10/24 0908          Therapy Assessment/Plan (PT)    Planned  Therapy Interventions (PT) balance training;bed mobility training;gait training;home exercise program;patient/family education;strengthening;postural re-education  -               User Key  (r) = Recorded By, (t) = Taken By, (c) = Cosigned By      Initials Name Provider Type    Warren Harvey, PT Physical Therapist                   Clinical Impression       Row Name 07/10/24 0908          Pain    Pretreatment Pain Rating 8/10  -AJ     Posttreatment Pain Rating 5/10  -     Pain Location - Side/Orientation Bilateral  -AJ     Pain Location lower  -AJ     Pain Location - back  -AJ     Pain Intervention(s) Repositioned;Ambulation/increased activity;Nursing Notified  -     Additional Documentation Pain Scale: Numbers Pre/Post-Treatment (Group)  -       Row Name 07/10/24 0908          Plan of Care Review    Plan of Care Reviewed With patient  -     Outcome Evaluation PT re-eval complete. Pt resting in fowlers position, AOx4 with complaints on pain in low back. Today pt agreeable to perform OOB activity if she could tolerate. Pt demo L roll, supine to sit with SBA and verbal cues. Once sitting pt required Max A for LSO brace, demo functional AROM and LE strength remains WFL. Pt demo sit<>stand on 2nd trial, ambualted 300' in hallwaywith FWW and CGA with 1 sitting rest break. Pt SpO2 98% on RA. Pt returned to room and demo safe commode transfers with OT. Pt educated on benefits of safe mobility with FWW and activity tolerance as tolerated within pain limits. Pt demo good progress towards goals and states she would still like to continue mobility, education and PT session as tolerated, goals progressed and updated. Recommend d/c home with assist an HH when medically stable.  -       Row Name 07/10/24 0908          Therapy Assessment/Plan (PT)    Patient/Family Therapy Goals Statement (PT) go home  -     Rehab Potential (PT) good, to achieve stated therapy goals  -     Criteria for Skilled Interventions Met  (PT) yes;skilled treatment is necessary;meets criteria  -AJ     Therapy Frequency (PT) 2 times/day  -AJ     Predicted Duration of Therapy Intervention (PT) unti d/c  -AJ       Row Name 07/10/24 0908          Vital Signs    Pre SpO2 (%) 98  -AJ     O2 Delivery Pre Treatment room air  -AJ     Pre Patient Position Supine  -AJ     Intra Patient Position Standing  -AJ     Post Patient Position Sitting  -AJ       Row Name 07/10/24 0908          Positioning and Restraints    Pre-Treatment Position in bed  -AJ     Post Treatment Position chair  -AJ     In Chair notified nsg;reclined;call light within reach;encouraged to call for assist  -AJ               User Key  (r) = Recorded By, (t) = Taken By, (c) = Cosigned By      Initials Name Provider Type    Warren Harvey, PT Physical Therapist                   Outcome Measures       Row Name 07/10/24 1000 07/10/24 0908       How much help from another person do you currently need...    Turning from your back to your side while in flat bed without using bedrails? 4  -KJ 4  -AJ    Moving from lying on back to sitting on the side of a flat bed without bedrails? 3  -KJ 4  -AJ    Moving to and from a bed to a chair (including a wheelchair)? 3  -KJ 3  -AJ    Standing up from a chair using your arms (e.g., wheelchair, bedside chair)? 3  -KJ 3  -AJ    Climbing 3-5 steps with a railing? 2  -KJ 2  -AJ    To walk in hospital room? 3  -KJ 3  -AJ    AM-PAC 6 Clicks Score (PT) 18  -KJ 19  -AJ    Highest Level of Mobility Goal 6 --> Walk 10 steps or more  -KJ 6 --> Walk 10 steps or more  -AJ      Row Name 07/10/24 0909 07/10/24 0908       Functional Assessment    Outcome Measure Options AM-PAC 6 Clicks Daily Activity (OT)  -LR AM-PAC 6 Clicks Basic Mobility (PT)  -AJ              User Key  (r) = Recorded By, (t) = Taken By, (c) = Cosigned By      Initials Name Provider Type    Nellie Austin RN Registered Nurse    Bety Fernandez, OTR/L Occupational Therapist    TERRA Carvalho  Warren PT Physical Therapist                                 Physical Therapy Education       Title: PT OT SLP Therapies (Done)       Topic: Physical Therapy (Done)       Point: Mobility training (Done)       Learning Progress Summary             Patient Acceptance, E, VU by  at 7/10/2024 1147    Comment: continued progression with goals, rest breaks, d/c plans    Eager, E, VU by  at 7/5/2024 1023    Comment: POC    Acceptance, E,D, DU,VU by  at 6/30/2024 0951    Comment: benefits of PT and POC, call for A OOB, no BLT, LSO when OOB                         Point: Home exercise program (Done)       Learning Progress Summary             Patient Acceptance, E, VU by  at 7/10/2024 1147    Comment: continued progression with goals, rest breaks, d/c plans                         Point: Body mechanics (Done)       Learning Progress Summary             Patient Acceptance, E, VU by  at 7/10/2024 1147    Comment: continued progression with goals, rest breaks, d/c plans    Acceptance, E,D, DU,VU by  at 6/30/2024 0951    Comment: benefits of PT and POC, call for A OOB, no BLT, LSO when OOB                         Point: Precautions (Done)       Learning Progress Summary             Patient Acceptance, E, VU by  at 7/10/2024 1147    Comment: continued progression with goals, rest breaks, d/c plans    Acceptance, E,D, DU,VU by  at 6/30/2024 0951    Comment: benefits of PT and POC, call for A OOB, no BLT, LSO when OOB                                         User Key       Initials Effective Dates Name Provider Type Discipline    AE 02/03/23 -  Sofia Ann, PTA Physical Therapist Assistant PT     02/03/23 -  Eliel León PT Physical Therapist PT     05/07/24 -  Warren Carvalho PT Physical Therapist PT                  PT Recommendation and Plan  Planned Therapy Interventions (PT): balance training, bed mobility training, gait training, home exercise program, patient/family education, strengthening, postural  re-education  Plan of Care Reviewed With: patient  Outcome Evaluation: PT re-eval complete. Pt resting in fowlers position, AOx4 with complaints on pain in low back. Today pt agreeable to perform OOB activity if she could tolerate. Pt demo L roll, supine to sit with SBA and verbal cues. Once sitting pt required Max A for LSO brace, demo functional AROM and LE strength remains WFL. Pt demo sit<>stand on 2nd trial, ambualted 300' in hallwaywith FWW and CGA with 1 sitting rest break. Pt SpO2 98% on RA. Pt returned to room and demo safe commode transfers with OT. Pt educated on benefits of safe mobility with FWW and activity tolerance as tolerated within pain limits. Pt demo good progress towards goals and states she would still like to continue mobility, education and PT session as tolerated, goals progressed and updated. Recommend d/c home with assist an HH when medically stable.     Time Calculation:         PT Charges       Row Name 07/10/24 0908             Time Calculation    Start Time 0909  -AJ      Stop Time 0950  -AJ      Time Calculation (min) 41 min  -AJ      PT Received On 07/10/24  -AJ      PT Goal Re-Cert Due Date 07/20/24  -AJ         Timed Charges    66927 - Gait Training Minutes  18  -AJ         Untimed Charges    PT Eval/Re-eval Minutes 23  -AJ         Total Minutes    Timed Charges Total Minutes 18  -AJ      Untimed Charges Total Minutes 23  -AJ       Total Minutes 41  -AJ                User Key  (r) = Recorded By, (t) = Taken By, (c) = Cosigned By      Initials Name Provider Type    AJ Warren Carvalho, PT Physical Therapist                  Therapy Charges for Today       Code Description Service Date Service Provider Modifiers Qty    83518507143 HC PT RE-EVAL ESTABLISHED PLAN 2 7/10/2024 Warren Carvalho, PT GP 1    12512165202 HC GAIT TRAINING EA 15 MIN 7/10/2024 Warren Carvalho, PT GP 1            PT G-Codes  Outcome Measure Options: AM-PAC 6 Clicks Daily Activity (OT)  AM-PAC 6 Clicks Score (PT):  18  AM-PAC 6 Clicks Score (OT): 18  PT Discharge Summary  Anticipated Discharge Disposition (PT): home with assist, home with home health    Warren Carvalho, PT  7/10/2024

## 2024-07-10 NOTE — TELEPHONE ENCOUNTER
Received call from Nurse Solange on 3C, she calls to report that she is caring for patient Marina Joe, patient received medication Ibrutinib via Med[facility] to Beds today. Nurse and patient are unsure when she needs to start the medication while in house or after she/patient is d/c.    Relayed all information to Dr Maya, he says due to patient poor renal function, she will have to restarted on Allopurinol to prevent Tumor Lysis Syndrome due to rapid breakdown of cancer cells once she starts the medication since the start of the medication without pre-start Allopurinol could destroy her kidneys. He feels that it would be best once patient is d/c and seen at her hospital f/u, he will then discuss with patient when start date should be.    NOTE:  Dr Maya reports that patient had informed him yesterday 7/9/24 while rounding that patient informed him that she was not interested in pursing further treatment at this time?    Notified patient Nurse Solange all information regarding patient and her medication Ibrutinib, Nurse was informed to have patient NOT TAKE the medication at this time, Dr Maya will see her in a f/u once d/c home and discuss the use of medication at that time. Nurse v/u of information.

## 2024-07-10 NOTE — PROGRESS NOTES
"            [facility]iative Care Services    Palliative Care Daily Progress Note   Chief complaint-follow up support for patient/family    Code Status:   Code Status and Medical Interventions:   Ordered at: 07/01/24 1541     Medical Intervention Limits:    No intubation (DNI)    No artificial nutrition     Level Of Support Discussed With:    Patient     Code Status (Patient has no pulse and is not breathing):    No CPR (Do Not Attempt to Resuscitate)     Medical Interventions (Patient has pulse or is breathing):    Limited Support      Advanced Directives: Advance Directive Status: Patient does not have advance directive   Goals of Care: Ongoing.     S: Medical record reviewed. Events noted.  Received palliative radiation #1 on 7/9 and #3 today.  Started on fentanyl transdermal patch 12 mcg on 7/8.  Received 64 mg (124 mg 7/9) oral morphine equivalent over the last 24 hours in the form of fentanyl transdermal patch, Norco and Dilaudid IV.  Oncology notes patient has declined systemic therapy 7/9  \"I am leaving it in the hands of God.\"  States that she we will continue radiation but now declines systemic therapy.  He is much more focused on comfort care/best supportive care directed by hospice.      7/10-Sitting up in chair without apparent distress, tolerated palliative radiation this morning.  Without new complaint.  Ongoing back pain somewhat better.  Encouraged oral opiate dosing for longer pain management.  No family at bedside.  Advance Care Planning ongoing conversation with patient with regard to understanding of oncology recommendations given advanced cancer, goals of palliative radiation for pain management, challenges with pursuing systemic therapy secondary to poor kidney function, and family conversation once children arrived yesterday.  We further discussed best supportive care directed by hospice.  Will plan ongoing conversation with patient and family when available.  Conversation " ended to patient nausea, states nausea symptoms started day before yesterday.  Staff notified.        Review of Systems   Constitutional: Positive for malaise/fatigue.   Respiratory:  Negative for shortness of breath.    Musculoskeletal:  Positive for muscle weakness and back pain.   Genitourinary:  Negative for dysuria.   Neurological:  Positive for loss of balance and weakness.   Psychiatric/Behavioral:  The patient is not nervous/anxious    Pain Assessment  Preferred Pain Scale: number (Numeric Rating Pain Scale)  Nonverbal Indicators of Pain: nonverbal indicators absent  CPOT Facial Expression: 0-->relaxed, neutral  CPOT Body Movements: 0-->absence of movements  CPOT Muscle Tension: 0-->relaxed  Ventilator Compliance/Vocalization: 0-->talking in normal tone or no sound  CPOT Score: 0  Pain Location: back, buttock  Pain Description: aching    O:     Intake/Output Summary (Last 24 hours) at 7/10/2024 0925  Last data filed at 7/10/2024 0836  Gross per 24 hour   Intake 850 ml   Output 1450 ml   Net -600 ml       Diagnostics and current medications: Reviewed.      Current Facility-Administered Medications:     acetaminophen (TYLENOL) tablet 650 mg, 650 mg, Oral, Q6H PRN, Marcellus Pulido MD, 650 mg at 06/29/24 0544    albuterol (PROVENTIL) nebulizer solution 0.083% 2.5 mg/3mL, 2.5 mg, Nebulization, Q4H PRN, Marcellus Pulido MD, 2.5 mg at 06/28/24 1040    amLODIPine (NORVASC) tablet 5 mg, 5 mg, Oral, BID, Marcellus Pulido MD, 5 mg at 07/09/24 1026    sennosides-docusate (PERICOLACE) 8.6-50 MG per tablet 2 tablet, 2 tablet, Oral, BID PRN, 2 tablet at 07/01/24 2045 **AND** polyethylene glycol (MIRALAX) packet 17 g, 17 g, Oral, Daily PRN, 17 g at 07/03/24 0836 **AND** bisacodyl (DULCOLAX) EC tablet 5 mg, 5 mg, Oral, Daily PRN **AND** bisacodyl (DULCOLAX) suppository 10 mg, 10 mg, Rectal, Daily PRN, Marcellus Pulido MD    buPROPion XL (WELLBUTRIN XL) 24 hr tablet 150 mg, 150 mg, Oral, Daily,  Marcellus Pulido MD, 150 mg at 07/09/24 1026    Calcium Replacement - Follow Nurse / BPA Driven Protocol, , Does not apply, PRN, Marcellus Pulido MD    carvedilol (COREG) tablet 25 mg, 25 mg, Oral, BID With Meals, Alysia Lincoln MD, 25 mg at 07/09/24 1026    cetirizine (zyrTEC) tablet 10 mg, 10 mg, Oral, Daily, Marcellus Pulido MD, 10 mg at 07/09/24 1013    fentaNYL (DURAGESIC) 12 MCG/HR 1 patch, 1 patch, Transdermal, Q72H, 1 patch at 07/08/24 1738 **AND** Check Fentanyl Patch Placement, 1 each, Does not apply, Q12H, Lauren Kuo APRN    cloNIDine (CATAPRES) tablet 0.2 mg, 0.2 mg, Oral, BID, Alysia Lincoln MD, 0.2 mg at 07/09/24 1030    cyclobenzaprine (FLEXERIL) tablet 10 mg, 10 mg, Oral, TID PRN, Marcellus Pulido MD, 10 mg at 07/09/24 1554    Diclofenac Sodium (VOLTAREN) 1 % gel 4 g, 4 g, Topical, 4x Daily PRN, Marcellus Pulido MD, 4 g at 07/08/24 0423    fluticasone (FLONASE) 50 MCG/ACT nasal spray 2 spray, 2 spray, Nasal, Daily, Marcellus Pulido MD, 2 spray at 07/09/24 1018    hydrALAZINE (APRESOLINE) injection 10 mg, 10 mg, Intravenous, Q4H PRN, Marcellus Pulido MD    HYDROcodone-acetaminophen (NORCO)  MG per tablet 1 tablet, 1 tablet, Oral, Q4H PRN **OR** HYDROcodone-acetaminophen (NORCO)  MG per tablet 2 tablet, 2 tablet, Oral, Q4H PRN, Lauren Kuo APRN, 2 tablet at 07/09/24 1558    HYDROmorphone (DILAUDID) injection 1 mg, 1 mg, Intravenous, Q2H PRN, Alysia Lincoln MD, 1 mg at 07/10/24 0724    lactulose solution 30 g, 30 g, Oral, TID, Alysia Lincoln MD, 20 g at 07/09/24 2048    Lidocaine 4 % 1 patch, 1 patch, Transdermal, Q24H, Lauren Kuo APRN, 1 patch at 07/09/24 1014    Magnesium Low Dose Replacement - Follow Nurse / BPA Driven Protocol, , Does not apply, PRN, Marcellus Pulido MD    methylnaltrexone (RELISTOR) injection 6 mg, 6 mg, Subcutaneous, Every Other Day, Marc Quevedo APRN, 6 mg at 07/08/24 0844     montelukast (SINGULAIR) tablet 10 mg, 10 mg, Oral, Nightly, Marcellus Pulido MD, 10 mg at 07/09/24 2048    multivitamin with minerals 1 tablet, 1 tablet, Oral, Daily, Marcellus Pulido MD, 1 tablet at 07/09/24 1013    ondansetron (ZOFRAN) injection 4 mg, 4 mg, Intravenous, Q6H PRN, Marcellus Pulido MD    pantoprazole (PROTONIX) EC tablet 40 mg, 40 mg, Oral, Daily, Marcellus Pulido MD, 40 mg at 07/09/24 1026    Phosphorus Replacement - Follow Nurse / BPA Driven Protocol, , Does not apply, PRN, Marcellus Pulido MD    Potassium Replacement - Follow Nurse / BPA Driven Protocol, , Does not apply, PRN, Marcellus Pulido MD    rOPINIRole (REQUIP) tablet 0.5 mg, 0.5 mg, Oral, Nightly, Marcellus Pulido MD, 0.5 mg at 07/09/24 2048    rosuvastatin (CRESTOR) tablet 10 mg, 10 mg, Oral, Nightly, Marcellus Pulido MD, 10 mg at 07/09/24 2048    sertraline (ZOLOFT) tablet 100 mg, 100 mg, Oral, Daily, Marcellus Pulido MD, 100 mg at 07/09/24 1013    [COMPLETED] Insert Peripheral IV, , , Once **AND** sodium chloride 0.9 % flush 10 mL, 10 mL, Intravenous, PRN, Marcellus Pulido MD    sodium chloride 0.9 % flush 10 mL, 10 mL, Intravenous, Q12H, Marcellus Pulido MD, 10 mL at 07/08/24 2004    sodium chloride 0.9 % flush 10 mL, 10 mL, Intravenous, PRN, Marcellus Pulido MD    sodium chloride 0.9 % flush 10 mL, 10 mL, Intravenous, PRN, Marcellus Pulido MD    sodium chloride 0.9 % infusion 40 mL, 40 mL, Intravenous, PRN, Marcellus Pulido MD    sodium chloride 0.9 % infusion 40 mL, 40 mL, Intravenous, PRN, Marcellus Pulido MD    sodium chloride 0.9 % infusion, 100 mL/hr, Intravenous, Continuous, Alysia Lincoln MD, Last Rate: 100 mL/hr at 07/10/24 0622, 100 mL/hr at 07/10/24 0622    Allergies   Allergen Reactions    Aspirin GI Bleeding    Niacin Other (See Comments)     I have utilized all available immediate resources to obtain, update, or  "review the patient's current medications (including all prescriptions, over-the-counter products, herbals, cannabis/cannabidiol products, and vitamin/mineral/dietary (nutritional) supplements) for name, route of administration, type, dose and frequency.    A:    /55 (BP Location: Right arm, Patient Position: Sitting)   Pulse 76   Temp 98.3 °F (36.8 °C) (Oral)   Resp 16   Ht 172.7 cm (68\")   Wt 111 kg (244 lb)   LMP  (LMP Unknown)   SpO2 94%   BMI 37.10 kg/m²     Vitals and nursing note reviewed.   Constitutional:       Appearance: Not in distress.   Eyes:      General: Lids are normal.      Pupils: Pupils are equal, round, and reactive to light.   HENT:      Head: Normocephalic.   Pulmonary:      Effort: Pulmonary effort is normal.   Cardiovascular:      Normal rate.   Edema:     Peripheral edema present.  Abdominal:      Palpations: Abdomen is soft.   Musculoskeletal: Normal range of motion.      Cervical back: Neck supple.   Skin:     General: Skin is warm.   Genitourinary:     Comments: No reported dysuria  Neurological:      Mental Status: Alert   Psychiatric:         Mood and Affect: Mood normal.         Cognition and Memory: Cognition normal.      Patient status: Disease state: Controlled with current treatments.  Current Functional status: Palliative Performance Scale Score: Performance 40% based on the following measures: Ambulation: Mainly in bed, Activity and Evidence of Disease: Unable to do any work, extensive evidence of disease, Self-Care: Mainly assistance required,  Intake: Normal or reduced, LOC: Full, drowsy or confusion   Baseline Functional status: Palliative Performance Scale Score: Performance 70% based on the following measures: Ambulation: Reduced, Activity and Evidence of Disease: Unable to do normal work, some evidence of disease, Self-Care: Fully independent,  Intake: Normal or reduced, LOC: Full   Baseline ECOG Status(3) Capable of limited self-care, confined to bed or chair " > 50% of waking hours.  Nutritional status: Albumin 3.9 Body mass index is 37.1 kg/m².      Hospital Problem List      Left renal mass    Cauda equina compression    Metastatic cancer to spine     Impression/Problem List:     Small lymphocytic lymphoma/chronic lymphocytic leukemia, stage IV  Cord compression at L3 s/p laminectomy and decompression 6/29/2024  Bilateral renal masses, right adrenal mass, and abdominal soft tissue nodules concerning for metastatic disease  Cerebral microvascular disease, per MRI  History of breast cancer, stage I  Left breast nodule 9 mm  Anemia  Thrombocytopenia  Acute kidney injury on chronic kidney disease stage III  Hyperlipidemia  Hypertension     Recommendations/Plan:  1. plan: Goals of care include status no CPR/limited support interventions.     Family support: The patient receives support from her children and friend, Jose Enrique ..  Advance Directives: Advance Directive Status: Patient does not have advance directive   POA/Healthcare surrogate-Advance directive completed 7/1 with assistance from palliative  and , patient named her friend Jose Enrique followed by 2 children, Kailee and Bensonduncan as healthcare surrogates..     2.  Palliative care encounter  - Prognosis is guarded long-term secondary to suspected metastatic disease and comorbidities..  -Patient and HCS appears to have good prognostic awareness.      -Neurosurgery and nephrology consulted.    6/29-Underwent lumbar laminectomy L3 with decompression of the spinal cord and biopsy by Dr. 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    -Oncology consulted, recommend further staging diagnostics.  Anticipating biopsy of abdominal subcutaneous nodules.  MRI brain shows no definite evidence of metastatic disease.    -Radiation oncology consulted for postop radiation.   -Continues to work with therapy, LSO when out of bed per  neurosurgery recommendation.    7/1-Advance directive completed today with assistance from palliative  and , patient named her friend Jose Enrique followed by 2 children Kailee and Mukundrosario as healthcare surrogates.  7/3-Cytology shows abnormal B-cell peripheral proliferation with some features consistent with small lymphocytic lymphoma/chronic lymphocytic leukemia, though FISH studies are pending.    -Oncology plans for bone marrow biopsy.  Anticipating initiation of ibrutinib.  Allopurinol initiated.  Plan for outpatient staging PET scan.  Plan outpatient diagnostic mammography to evaluate left breast nodule  -Radiation oncology plans for palliative radiation treatments 5-10 treatment fractions starting Monday 7/8, simulated 7/2.    -Neurosurgery recommends PT OT with brace and outpatient follow-up in 2 weeks.   7/8-Nephrology has been consulted due to worsening kidney function and plans for renal ultrasound.       7/1-CODE STATUS changes no CPR/limited support interventions, no intubation and no artificial nutrition.    -Will plan to complete a MOST document prior to discharge with assistance from palliative     7/10-continue supportive measures  -Radiation oncology plans for palliative radiation treatments 5-10 treatment fractions #1 Monday 7/8, #3 today    -ongoing conversation with patient with regard to understanding of oncology recommendations given advanced cancer, goals of palliative radiation for pain management, challenges with pursuing systemic therapy secondary to poor kidney function, and family conversation once children arrived yesterday.  We further discussed best supportive care directed by hospice.  Will plan ongoing conversation with patient and family when available.   -Anticipating bone marrow biopsy when able to tolerate if aggressive care interventions desired, and initiation of ibrutinib and outpatient PET scan per oncology and outpatient diagnostic  mammography to evaluate left breast nodule.  Noted to decline systemic therapy 7/9 per oncology.  -Neurosurgery follow-up outpatient in 2 weeks  -A MOST document completed  -Anticipating home with home health and PT/OT.  Declined SNF and reports will have adequate family caregivers.    3.  Pain, cancer related  -improved.  Has received 64 mg (124 mg 7/9) oral morphine equivalent consistently over the last several days.  -Continue fentanyl transdermal patch 12 mcg for more consistent pain management  -Norco range dosing for moderate to severe pain as needed  -IV Dilaudid per attending  -Continue lidocaine patch  -Utilize Voltaren gel as needed  -Plan palliative radiation x 5-10 treatment fractions  -Declined gabapentin trial at lower dose (100 mg every 8 hours), states side effects most recently with dizziness (300 mg per pain management).  Most likely exacerbated by kidney dysfunction.  Discontinued per patient request 7/8.    4.  Nausea  -Zofran as needed    ADDENDUM at 1:35 PM met with patient and daughter.  Ongoing conversation with regard to advanced cancer and care goals.  Leaning toward hospice at discharge.  Both are attempting to arrive at a location of discharge as daughter would like for patient to move to her home in the [facility], approximately 6 hours away, to ensure 24/7 caregiver support.  Encouraged ongoing conversation.    35 minutes spent on advance care planning-discussing with patient and/or family, answering questions, providing some guidance about a plan and documentation of care, and coordinating care face to face.     Thank you for allowing us to participate in patient's plan of care. Palliative Care Team will continue to follow patient.     MARY Rosenberg  7/10/2024  09:25 CDT

## 2024-07-10 NOTE — CASE MANAGEMENT/SOCIAL WORK
Continued Stay Note  JANE Edmonds     Patient Name: Marina Joe  MRN: [id number]  Today's Date: 7/10/2024    Admit Date: 6/28/2024    Plan: Home Hospice   Discharge Plan       Row Name 07/10/24 1557       Plan    Plan Home Hospice    Patient/Family in Agreement with Plan yes    Plan Comments Spoke with pt in the room and then her daughter on the phone. Pt is planning to go home with daughter to [place], [place]. Daughter is requesting [facility]. Will send referral to them once orders are rec'd.                   Discharge Codes    No documentation.                 Expected Discharge Date and Time       Expected Discharge Date Expected Discharge Time    Jul 11, 2024               JW Villalba

## 2024-07-10 NOTE — PLAN OF CARE
Goal Outcome Evaluation:  Plan of Care Reviewed With: patient        Progress: improving  Outcome Evaluation: OT reeval completed. Pt presents A&Ox4, willing to participate despite c/o 8/10 low back pain. Pt came to EOB with SBA, cues for use of log roll tech. Pt donned LSO with Max A required at EOB. Pt completed sit<>stand t/f at EOB with CGA required and 2 attempts d/t pain. Fxl mobility completed in hallway with CGA using FWW, requiring 1 seated RB. Cues required for proximity to FWW and posture. Pt returned to sitting in chair and then requested completion of toileting. Toileting completed at commode with SBA, impulsivity noted. Grooming completed sink-side with SBA. Since SOC, pt has demo fluctuation in participation and progress d/t c/o pain. This date, pt demo good participation and reports motivation to improve occupational performance. Good progress noted toward goals this date. Continued skilled OT warranted to provide education and address remaining deficits in order to increase pt safety and I during ADLs, fxl mobility, and fxl t/f's. Recommend D/C home with assist and HH services pending progress.      Anticipated Discharge Disposition (OT): home with assist, home with home health

## 2024-07-10 NOTE — PLAN OF CARE
Goal Outcome Evaluation:  Plan of Care Reviewed With: patient           Outcome Evaluation: PT re-eval complete. Pt resting in fowlers position, AOx4 with complaints on pain in low back. Today pt agreeable to perform OOB activity if she could tolerate. Pt demo L roll, supine to sit with SBA and verbal cues. Once sitting pt required Max A for LSO brace, demo functional AROM and LE strength remains WFL. Pt demo sit<>stand on 2nd trial, ambualted 300' in hallwaywith FWW and CGA with 1 sitting rest break. Pt SpO2 98% on RA. Pt returned to room and demo safe commode transfers with OT. Pt educated on benefits of safe mobility with FWW and activity tolerance as tolerated within pain limits. Pt demo good progress towards goals and states she would still like to continue mobility, education and PT session as tolerated, goals progressed and updated. Recommend d/c home with assist an HH when medically stable.      Anticipated Discharge Disposition (PT): home with assist, home with home health

## 2024-07-10 NOTE — PROGRESS NOTES
"[facility] Progress Note    Progress Note    Patient:  Marina Joe  YOB: 1945  Date of Service: 7/10/2024  MRN: [id number]   Acct: [id number]   Primary Care Physician: Hanh Og APRN  Advance Directive:   Code Status and Medical Interventions:   Ordered at: 07/01/24 1547     Medical Intervention Limits:    No intubation (DNI)    No artificial nutrition     Level Of Support Discussed With:    Patient     Code Status (Patient has no pulse and is not breathing):    No CPR (Do Not Attempt to Resuscitate)     Medical Interventions (Patient has pulse or is breathing):    Limited Support     Admit Date: 6/28/2024       Hospital Day: 12      Subjective:     Chief Compliant:   Back pain, \"5/10\"    Subjective: Lingering back pain.  Received initial radiation treatment to the spine, 7/8/2024.  Denies fevers or drenching night sweats.  Again with no urinary symptoms and no difficulty urinating.  States she is trying to work more with PT. \"meds to beds\" dropped off her ibrutinib today.  Patient however remains steadfast with her decision to forego systemic therapy.  She tells me that she now plans to go home with a daughter who lives in [facility] where they will be arranging for hospice there.    Interval history:  Marina Joe 79 y.o. 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     She reports that she has been experiencing back pain for several weeks. Since her presentation on 6/28/2024 she had the following workup:  - CT-A/P showed abnormal heterogenous left kidney mass, abnormal right kidney, new right adrenal mass, innumerable solid nodules in the subcutaneous soft tissues all concerning for metastatic disease process.  - CT lumbar spine showing multilevel " done prominent disc osteophyte complexes acet arthropathy and ligamental hypertrophy and resultant neural foraminal spinal canal stenosis.  - CT chest showed a left breast nodule of 9 mm, scattered subcutaneous soft tissue nodules in the abdominal wall, otherwise there is no sign of intrathoracic metastases.  - MRI-abdomen showed infiltrative mass within the left kidney extending into the proximal left collecting system as well as numerous additional solid masses in the bilateral kidneys. Additionally there is a right adrenal mass, multiple subcutaneous fat soft tissue masses, and a L3 vertebral body mass with suspected extension into the spinal canal. 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  - MRI-L-spine: Neoplastic process involving the L3 vertebral body with associated posterior soft tissue component resulting in severe spinal canal narrowing and cauda equina nerve root compression. The soft tissue component appears to also extend into the right L3-L4 foramen. No associated pathological fracture. There are also appears to be tumor signal extending into the right L2-L3 neural foramen.     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  Final diagnosis: L3 epidural soft tissue mass, biopsy: Abnormal B-cell population with some features consistent with small lymphocytic lymphoma/chronic lymphocytic leukemia.  Comment:  Flow Cytometry Summary    Flow cytometry was performed at [facility] ([id number]).  Flow cytometry shows 64% of the sample as an abnormal monoclonal B-cell population with mixed cell size.  FISH testing is pending.   "This may represent a B-cell small lymphocytic lymphoma/chronic lymphocytic leukemia.  The cell size of the abnormal cells appears mixed.  This may represent increased prolymphocytes, paraimmunoblasts, and or large cells and progression/transformation is in the differential diagnosis.  Intact lymph node architecture is not present in this fragmented paraspinal mass for further evaluation.  Clinical correlation is recommended.      - 7/1/24-ultrasound-guided core needle biopsy of right flank subcutaneous nodule.  FINAL DIAGNOSIS:  Abnormal B-cell population which may represent small lymphocytic lymphoma/chronic lymphocytic leukemia (SLL/CLL).  Comment  Please see additional report [id number] in the associated flow cytometry report. The flow cytometry report showed  scatter properties compatible with mixed cell size with features of B-cell small lymphocytic lymphoma/chronic lymphocytic  leukemia (B-SLL/). FISH testing is ordered and pending. This may represent progression/transformation. Correlation with  clinical, laboratory, and morphologic data may be necessary to better define this lymphoid neoplasm.  - 7/1/24-manual blood smear review:  Lab Results   Component Value Date    PATHINTERP  07/01/2024     Moderate normochromic normocytic anemia    Normal total white blood cell count with the following manual differential:  Neutrophils 72%  Lymphocytes 20%  Monocytes 4%  Eosinophils 4%  Moderate peripheral thrombocytopenia    No schistocytes identified  No platelet clumps identified  No morulae identified in granulocytes or monocytes     -7/8/24-initiation of palliative radiation to L-spine per Dr. Parra    -7/10/24-\"meds to beds\" delivered ibrutinib to the bedside.    Review of Systems  Review of Systems:   Constitutional: Fatigue.  Appetite fair.  \"Not great.\"  No fever / No chills / No sweats  HEENT:  No sore throat / No hoarseness / No vision changes  CV:  No chest pain / No palpitations / No orthopnea  Respiratory:  " "No cough / No shortness of breath / No sputum / No hemoptysis  GI:  No nausea / No vomiting / No abdominal pain / No diarrhea / +constipation   :  No dysuria / No hesitancy / No urgency / No hematuria  Neuro:  + Lower extremity muscle weakness / No dysphagia / No headache / No paresthesias  Musculoskeletal:  No edema / No cyanosis / + postop back pain\" 5/10\"  Derm: Subcutaneous nodules on trunk and extremities    Vitals: /54 (BP Location: Right arm, Patient Position: Lying)   Pulse 74   Temp 98.2 °F (36.8 °C) (Oral)   Resp 16   Ht 172.7 cm (68\")   Wt 111 kg (244 lb)   LMP  (LMP Unknown)   SpO2 93%   BMI 37.10 kg/m²     Physical Exam  Physical Exam:  General appearance: Pleasant, obese, modestly elderly female alert, appears stated age and cooperative.  In no acute distress while lying in bed.  No family at the bedside.  Skin:  Skin color a bit pale. No rashes or lesions.  Subcutaneous nodules on trunk and extremities (most prominently right proximal forearm).  HEENT:  Head: Normal, normocephalic, atraumatic.  Neck:  no adenopathy, no tenderness/mass/nodules  Lungs:  clear to auscultation bilaterally  Heart:  regular rate and rhythm  Abdomen:  soft, non-tender.  No overt splenomegaly (habitus precludes an adequate exam)  Extremities:  extremities normal, atraumatic, no cyanosis or edema  Lymphatics: No obvious adenopathy in the cervical, supraclavicular, axillary or inguinal tri regions.  Neurologic:  Mental status: Alert, oriented, thought content appropriate    24HR INTAKE/OUTPUT:    Intake/Output Summary (Last 24 hours) at 7/10/2024 1608  Last data filed at 7/10/2024 1431  Gross per 24 hour   Intake 360 ml   Output 1650 ml   Net -1290 ml        Batista Catheter: Yes    Diet: Diet: Diabetic; Consistent Carbohydrate; Fluid Consistency: Thin (IDDSI 0)      Medications:   Scheduled Meds:amLODIPine, 5 mg, Oral, BID  buPROPion XL, 150 mg, Oral, Daily  carvedilol, 25 mg, Oral, BID With Meals  cetirizine, " 10 mg, Oral, Daily  fentaNYL, 1 patch, Transdermal, Q72H   And  Check Fentanyl Patch Placement, 1 each, Does not apply, Q12H  cloNIDine, 0.2 mg, Oral, BID  fluticasone, 2 spray, Nasal, Daily  lactulose, 30 g, Oral, TID  Lidocaine, 1 patch, Transdermal, Q24H  methylnaltrexone, 6 mg, Subcutaneous, Every Other Day  montelukast, 10 mg, Oral, Nightly  multivitamin with minerals, 1 tablet, Oral, Daily  pantoprazole, 40 mg, Oral, Daily  rOPINIRole, 0.5 mg, Oral, Nightly  rosuvastatin, 10 mg, Oral, Nightly  sertraline, 100 mg, Oral, Daily  sodium chloride, 10 mL, Intravenous, Q12H        Continuous Infusions:sodium chloride, 100 mL/hr, Last Rate: 100 mL/hr (07/10/24 0622)        Labs:     Results from last 7 days   Lab Units 07/10/24  0408 07/09/24  0914 07/08/24  0343   WBC 10*3/mm3 6.63 7.28 7.37   HEMOGLOBIN g/dL 8.0* 7.9* 8.3*   HEMATOCRIT % 24.9* 25.1* 25.8*   PLATELETS 10*3/mm3 61* 63* 67*     06/25/2024 0901 06/25/2024 0935 Iron Profile [[id number]]   (Abnormal)   Blood    Final result Component Value Units   Iron 56 mcg/dL   Iron Saturation (TSAT) 16 Low  %   Transferrin 238 mg/dL   TIBC 355 mcg/dL          06/25/2024 0901 06/25/2024 0940 Ferritin [[id number]]    (Abnormal)   Blood    Final result Component Value Units   Ferritin 451.10 High  ng/mL               07/01/2024 1053 07/01/2024 1130 Fibrinogen [[id number]]   (Abnormal)   Blood    Final result Component Value Units   Fibrinogen 521 High  mg/dL          07/01/2024 1053 07/01/2024 1204 Fibrin Split Products [[id number]]   Blood    Final result Component Value   Fibrin Split Products Less Than 5 mcg/mL          07/01/2024 1053 07/01/2024 1102 Platelet Antibody Profile [[id number]]   Blood    In process Component Value   No component results          07/01/2024 1053 07/01/2024 1130 Protime-INR [[id number]]   Blood    Final result Component Value Units   Protime 13.5 Seconds   INR 0.99           07/01/2024 1053 07/01/2024 1130 aPTT [[id number]]   Blood    Final result  "Component Value Units   PTT 26.8 seconds                 07/01/2024 1053 07/05/2024 1608 Platelet Antibody Profile [[id number]]    (Abnormal)   Blood    Final result Component Value   HLA Class 1 Antibody Positive Abnormal    IIb/IIIa Antibody Negative   Ib/IX Antibody Negative   Ia/IIa Antibody Negative   Glycoprotein IV Antibody Positive Abnormal            Results from last 7 days   Lab Units 07/10/24  0408 07/09/24  0914 07/08/24  0343 07/07/24  0409   SODIUM mmol/L 137 134* 134* 134*   POTASSIUM mmol/L 4.3 4.6 4.4 4.5   CHLORIDE mmol/L 102 99 95* 92*   CO2 mmol/L 24.0 24.0 26.0 28.0   BUN mg/dL 47* 54* 48* 43*   CREATININE mg/dL 2.15* 2.67* 3.48* 3.50*   CALCIUM mg/dL 8.9 8.6 9.0 10.0   BILIRUBIN mg/dL  --  0.3 0.3 0.3   ALK PHOS U/L  --  89 93 109   ALT (SGPT) U/L  --  20 20 22   AST (SGOT) U/L  --  25 23 25   GLUCOSE mg/dL 95 94 88 106*       ABG:  No results found for: \"PHART\", \"PO2ART\", \"KYG7EXI\"    Culture Data:   Urine Culture   Date Value Ref Range Status   06/28/2024 >100,000 CFU/mL Mixed Bhavana Isolated  Final       Lab Results   Component Value Date    PATHINTERP  07/01/2024     Moderate normochromic normocytic anemia    Normal total white blood cell count with the following manual differential:  Neutrophils 72%  Lymphocytes 20%  Monocytes 4%  Eosinophils 4%  Moderate peripheral thrombocytopenia    No schistocytes identified  No platelet clumps identified  No morulae identified in granulocytes or monocytes       Radiology:     Imaging Results (Last 7 Days)       Procedure Component Value Units Date/Time    US Renal Bilateral [[id number]] Collected: 07/08/24 1706     Updated: 07/08/24 1714    Narrative:      US RENAL BILATERAL-     HISTORY: acute on ckd; C79.51-Secondary malignant neoplasm of bone;  N28.89-Other specified disorders of kidney and ureter; E27.8-Other  specified disorders of adrenal gland; M79.89-Other specified soft tissue  disorders; N28.9-Disorder of kidney and ureter, " unspecified;  N18.9-Chronic kidney disease, unspecified; D69.6-Thrombocytopenia,  unspecified; G83.4-Cauda equina syndrome; Z74.09-Other reduced mobility;     COMPARISON: MR abdomen 6/28/2024     FINDINGS: Sonographic imaging the kidneys and bladder performed.     There is an abnormal heterogeneous appearance to the bilateral kidneys  representative of the solid renal mass is better seen on the MRI abdomen  of 6/28/2024. A 4.3 x 2.7 x 2.9 cm right adrenal nodule is measured.  Right kidney measures 10.4 x 6.3 x 5.5 cm. The left kidney measures 10.7  x 6.9 x 6.5 cm. There is questionable trace volume of right subcapsular  fluid considered. There is only minimal prominence of the renal  collecting systems.     The distended bladder is unremarkable.       Impression:      1. Abnormal heterogeneous appearance to the bilateral renal parenchyma  representative of bilateral, left larger than right, solid renal masses  better seen on MRI 6/28/2024. Trace right subcapsular fluid considered.     2. Only mild prominence of the bilateral renal collecting systems.     3. Persistent right adrenal mass.        This report was signed and finalized on 7/8/2024 5:10 PM by Dr. Meaghan Phillips MD.                 Objective:       Problem list:     Left renal mass    Morbid (severe) obesity due to excess calories    Cauda equina compression    Metastatic cancer to spine        Assessment/Plan:       ASSESSMENT:   Small lymphocytic lymphoma/chronic lymphocytic leukemia   -Tumor stage: IV  -Tumor burden:   -6/28/2024 CT-A/P showed abnormal heterogenous left kidney mass, abnormal right kidney, new right adrenal mass, innumerable solid nodules in the subcutaneous soft tissues all concerning for metastatic disease process.  -6/28/2024, CT lumbar spine showing multilevel prominent disc osteophyte complexes acet arthropathy and ligamental hypertrophy and resultant neural foraminal spinal canal stenosis.  -6/28/2024, CT chest showed a left  breast nodule of 9 mm, scattered subcutaneous soft tissue nodules in the abdominal wall, otherwise there is no sign of intrathoracic metastases.  -6/28/2024, MRI-abdomen showed infiltrative mass within the left kidney extending into the proximal left collecting system as well as numerous additional solid masses in the bilateral kidneys. Additionally there is a right adrenal mass, multiple subcutaneous fat soft tissue masses, and a L3 vertebral body mass with suspected extension into the spinal canal. 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  -6/20/2024, MRI-L-spine: Neoplastic process involving the L3 vertebral body with associated posterior soft tissue component resulting in severe spinal canal narrowing and cauda equina nerve root compression. The soft tissue component appears to also extend into the right L3-L4 foramen. No associated pathological fracture. There are also appears to be tumor signal extending into the right L2-L3 neural foramen.  - 7/1/2024-MRI brain-no definite evidence of intracranial metastatic disease within the limitations of noncontrast exam.  Cerebral microvascular disease noted.  - 7/1/2024-ultrasound-guided core needle biopsy of right flank subcutaneous nodule.  FINAL DIAGNOSIS:  Abnormal B-cell population which may represent small lymphocytic lymphoma/chronic lymphocytic leukemia (SLL/CLL).    -Complications of tumor: Cord compression at L3 concerning for epidural metastasis  -Tumor status:  - Systemically untreated.  Anticipate ibrutinib  - 6/29/2024 - OR for L3 laminectomy with decompression; prelim pathology showing small round blue cell tumor.  Final diagnosis: L3 epidural soft tissue mass, biopsy: Abnormal B-cell population with some features consistent with small lymphocytic lymphoma/chronic lymphocytic leukemia.    -7/8/24 -initiation of  postop radiation therapy to the lumbar region with anticipated 2000 to 3000 cGy over 5-20 daily fractions    Diffuse soft tissue abdominal wall/right arm subcutaneous nodules.  - 7/1/24-ultrasound-guided core needle biopsy of right flank subcutaneous nodule.  FINAL DIAGNOSIS:  Abnormal B-cell population which may represent small lymphocytic lymphoma/chronic lymphocytic leukemia (SLL/CLL).  Comment  Please see additional report [id number] in the associated flow cytometry report. The flow cytometry report showed  scatter properties compatible with mixed cell size with features of B-cell small lymphocytic lymphoma/chronic lymphocytic  leukemia (B-SLL/). FISH testing is ordered and pending. This may represent progression/transformation. Correlation with  clinical, laboratory, and morphologic data may be necessary to better define this lymphoid neoplasm.    3.   History of stage I left breast cancer.  Now with left breast nodule of 9 mm  4.   Anemia.  Contributions from CKD+/- malignancy/anemia of chronic disease  -Hgb 8.0; MCV 88.3, 7/10/2024 (prior: Hgb 8.3 -11.8)  -6/25/24-iron 56, iron saturation 16%, TIBC 355, ferritin 451  5.  Thrombocytopenia.  Contributions from CLL/SLL, +ITP  -61,000, 7/10/2024 (prior patito: 67,000).  Had previously needed platelet transfusions to maintain >/=100,000  -7/1/2024-platelet antibody profile: HLA class I antibody and glycoprotein antibodies positive.  - No evidence for DIC-7/1/2024: Fibrinogen 521, FSP<5, PT 13.5/INR 0.9, PTT 26.8  - No evidence for MAHA/TTP- 7/1/24: Manual blood smear--moderate normochromic normocytic anemia.  Normal total white blood cell count with 72 segs, 20 lymphs, 4 monos, 4 eos.  Moderate peripheral thrombocytopenia.  No schistocytes identified.  No platelet clumps identified.  No morulae identified in granulocytes or monocytes.  6.   Acute on CKD 3-4  - GFR 23, 7/10/2024 (prior: 12.9- 36.4)  7.   Guarded prognosis     PLAN:  -Again reviewed labs, 7/1/24 -  "7/10/24.  Note dwindling anemia, and thrombocytopenia, + platelet antibodies, previously normal PT/PTT, normal fibrinogen, normal FSP (no DIC), no evidence for MAHA (no schistocytes).  -Apprised of pathological diagnosis from L3 biopsy and abdominal wall nodules (above) consistent with small lymphocytic lymphoma/chronic lymphocytic leukemia though FISH studies are pending.  May have a more aggressive process/transformation.  Needs a bone marrow biopsy.  Patient unable to tolerate lying on her abdomen for the procedure immediately postop.  - Allopurinol on hold since 7/8/24 due to worsening GFR  -Systemic therapy (chemotherapy vs ibrutinib) again discussed with patient today.  We were anticipating initiation of therapy pending improvement of her renal function and while awaiting a bone marrow biopsy.  Today she is unequivocal about her decision to forego systemic therapy.  She had previously decided that, \"I am leaving it in the hands of God.\"  States that she we will continue radiation but declined systemic therapy.   She remains focused on comfort care/best supportive care directed by hospice.  -For now we will cancel bone marrow biopsy (to assess for marrow involvement by CLL/SLL) unless the patient agrees and when patient tolerates the procedure  -Cancel outpatient staging PET-scan-the patient has declined further therapy  -Defer outpatient diagnostic mammography to evaluate the left breast nodule since the patient declines any further therapy.  -Continue postop radiation therapy to the lumbar region (beginning 7/8/24) with anticipated 2000 to 3000 cGy over 5-20 daily fractions  - Palliative care services following.  - Care previously discussed with patient's daughter Kailee by telephone on 7/9/24.  Was apprised of the patient's decision to forego systemic therapy.  -Care previously discussed with Dr. Lincoln (hospitalist) and Dr. Parra of radiation oncology.       I spent ~ 25 minutes caring for Marina on this " date of service. This time includes time spent by me in the following activities: preparing for the visit, reviewing tests, performing a medically appropriate examination and/or evaluation, counseling and educating the patient/family/caregiver, ordering medications, tests, or procedures and documenting information in the medical record

## 2024-07-10 NOTE — NURSING NOTE
"Pt called out to ask about a medication that was delivered to her bedside. Patient stated \"Meds to beds dropped off this medication and I do not know what it is.\"   Noted that meds to beds delivered ibrutinib to the pt.   This nurse contacted the ordering provider listened on the script, Dr. Maya's office and spoke with KUMAR Gonzales for further information as there were no in patient orders.  KUMAR Gonzales states she spoke with Dr. Maya and he stated she should not take the medication and have family take the medication home.   This nurse educated the family and the patient to take the medication home but to not take any of it until further follow up by the Oncology group.   "

## 2024-07-10 NOTE — THERAPY RE-EVALUATION
Patient Name: Marina Joe  : 1945    MRN: [id number]                              Today's Date: 7/10/2024       Admit Date: 2024    Visit Dx:     ICD-10-CM ICD-9-CM   1. 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 Left renal mass  N28.89 593.9   3. Right adrenal mass  E27.8 255.8   4. Nodule of soft tissue  M79.89 729.99   5. Acute on chronic renal insufficiency  N28.9 593.9    N18.9 585.9   6. Thrombocytopenia  D69.6 287.5   7. Cauda equina compression  G83.4 344.60   8. Impaired mobility [Z74.09]  Z74.09 799.89   9. HX: breast cancer  Z85.3 V10.3     Patient Active Problem List   Diagnosis    Malignant neoplasm of upper-outer quadrant of female breast    Anemia of chronic renal failure, stage 3 (moderate)    Hypertension, benign    Hx of colonic polyps    HX: breast cancer    Morbid (severe) obesity due to excess calories    Right knee DJD    S/P lumpectomy, left breast    Adult hypothyroidism    Anemia    Anxiety    Breast cancer, left    Cervical pain    Chronic insomnia    Elevated lipids    Herpes zoster without complication    Left ear pain    Left otitis externa    Myalgia    Negative depression screening    Obesity (BMI 30-39.9)    Postmenopausal status    Recurrent acute serous otitis media of left ear    Restless leg    Sinusitis, bacterial    Skin lesion    Vitamin D deficiency    Stage 3b chronic kidney disease    GIB (gastrointestinal bleeding)    Acute on chronic blood loss anemia    Chronic idiopathic thrombocytopenia    Hypotension due to hypovolemia    Primary hypertension    Pancreatic lesion    Left renal mass    Cauda equina compression    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 Recall 5 years    COLONOSCOPY  11/19/2018    Tics otherwise normal exam repeat in 5 years    COLONOSCOPY  02/13/2023    Normal exam repeat in 3 years with a 2 day prep    ENDOSCOPY  02/13/2023    Gastritis, small HH    LUMBAR LAMINECTOMY Right 6/29/2024    Procedure: LUMBAR LAMINECTOMY L3 WITH DECOMPRESSION 1-2 LEVELS-RIGHT;  Surgeon: Marcellus Pulido MD;  Location: [facility] OR;  Service: Neurosurgery;  Laterality: Right;    REPLACEMENT TOTAL KNEE Right     2016    TOTAL ABDOMINAL HYSTERECTOMY WITH SALPINGO OOPHORECTOMY      UPPER ENDOSCOPIC ULTRASOUND W/ FNA  12/20/2023    Pancreatic cyst s/p FNA      General Information       Row Name 07/10/24 0909          OT Time and Intention    Document Type re-evaluation  -LR     Mode of Treatment occupational therapy;co-treatment  -LR       Row Name 07/10/24 0909          General Information    Patient Profile Reviewed yes  -LR     Existing Precautions/Restrictions fall;brace worn when out of bed;LSO;spinal  -LR     Barriers to Rehab physical barrier;medically complex  -LR       Row Name 07/10/24 0909          Cognition    Orientation Status (Cognition) oriented x 4  -LR       Row Name 07/10/24 0909          Safety Issues, Functional Mobility    Safety Issues Affecting Function (Mobility) at risk behavior observed;awareness of need for assistance;friction/shear risk;impulsivity;insight into deficits/self-awareness;judgment;safety precaution awareness;safety precautions follow-through/compliance  -LR     Impairments Affecting Function (Mobility) balance;endurance/activity tolerance;pain;strength  -LR               User Key  (r) = Recorded By, (t) = Taken By, (c) = Cosigned By      Initials Name Provider Type    LR Bety Tejeda, OTR/L  Occupational Therapist                     Mobility/ADL's       Row Name 07/10/24 0909          Bed Mobility    Bed Mobility supine-sit  -LR     Supine-Sit [place] (Bed Mobility) standby assist;verbal cues  -LR     Assistive Device (Bed Mobility) bed rails;head of bed elevated  -LR     Comment, (Bed Mobility) cues for log roll tech  -LR       Row Name 07/10/24 0909          Transfers    Transfers sit-stand transfer;stand-sit transfer;toilet transfer  -LR       Row Name 07/10/24 0909          Sit-Stand Transfer    Sit-Stand [place] (Transfers) contact guard;verbal cues  -LR     Assistive Device (Sit-Stand Transfers) walker, front-wheeled  -LR       Row Name 07/10/24 0909          Stand-Sit Transfer    Stand-Sit [place] (Transfers) contact guard;verbal cues  -LR     Assistive Device (Stand-Sit Transfers) walker, front-wheeled  -LR       Row Name 07/10/24 0909          Toilet Transfer    Type (Toilet Transfer) sit-stand;stand-sit  -LR     [place] Level (Toilet Transfer) standby assist;verbal cues  -LR     Assistive Device (Toilet Transfer) commode;grab bars/safety frame;walker, front-wheeled  -LR     Comment, (Toilet Transfer) impulsivity noted  -LR       Row Name 07/10/24 0909          Functional Mobility    Functional Mobility- Ind. Level contact guard assist;verbal cues required  -LR     Functional Mobility- Device walker, front-wheeled  -LR     Functional Mobility- Comment in room and hallway  -       Row Name 07/10/24 0909          Activities of Daily Living    BADL Assessment/Intervention upper body dressing;lower body dressing;grooming;toileting  -LR       Row Name 07/10/24 0909          Upper Body Dressing Assessment/Training    [place] Level (Upper Body Dressing) don;maximum assist (25% patient effort);verbal cues  LSO  -LR     Position (Upper Body Dressing) edge of bed sitting  -LR       Row Name 07/10/24 0909          Lower Body Dressing Assessment/Training    [place] Level  (Lower Body Dressing) doff;don;socks;standby assist;verbal cues  -LR     Position (Lower Body Dressing) supported sitting  -LR     Comment, (Lower Body Dressing) cues for spinal precautions  -LR       Row Name 07/10/24 0909          Toileting Assessment/Training    [place] Level (Toileting) adjust/manage clothing;perform perineal hygiene;standby assist  -LR     Assistive Devices (Toileting) commode;grab bar/safety frame  -LR     Position (Toileting) unsupported sitting;supported standing  -LR     Comment, (Toileting) impulsivity noted  -LR       Row Name 07/10/24 0909          Grooming Assessment/Training    [place] Level (Grooming) wash face, hands;standby assist  -LR     Position (Grooming) sink side;supported standing  -LR               User Key  (r) = Recorded By, (t) = Taken By, (c) = Cosigned By      Initials Name Provider Type    LR Bety Tejeda, OTR/L Occupational Therapist                   Obj/Interventions       Row Name 07/10/24 0909          Sensory Assessment (Somatosensory)    Sensory Assessment (Somatosensory) UE sensation intact  -LR       Row Name 07/10/24 0909          Vision Assessment/Intervention    Visual Impairment/Limitations corrective lenses full-time  -LR     Vision Assessment Comment pt reports impaired vision L eye  -LR       Row Name 07/10/24 0909          Range of Motion Comprehensive    General Range of Motion bilateral upper extremity ROM WFL  -LR       Row Name 07/10/24 0909          Strength Comprehensive (MMT)    General Manual Muscle Testing (MMT) Assessment upper extremity strength deficits identified  -LR     Comment, General Manual Muscle Testing (MMT) Assessment B UE 4/5 proximal, 4+/5 distal  -LR       Row Name 07/10/24 0909          Balance    Balance Assessment sitting static balance;sitting dynamic balance;standing static balance;standing dynamic balance  -LR     Static Sitting Balance supervision  -LR     Dynamic Sitting Balance standby assist  -LR      Position, Sitting Balance unsupported;sitting edge of bed  -LR     Static Standing Balance contact guard;standby assist  -LR     Dynamic Standing Balance contact guard  -LR     Position/Device Used, Standing Balance supported;walker, front-wheeled  -LR               User Key  (r) = Recorded By, (t) = Taken By, (c) = Cosigned By      Initials Name Provider Type    LR Bety Tejeda, OTR/L Occupational Therapist                   Goals/Plan       Row Name 07/10/24 0909          Transfer Goal 1 (OT)    Activity/Assistive Device (Transfer Goal 1, OT) bed-to-chair/chair-to-bed;toilet;tub;commode  -LR     [place] Level/Cues Needed (Transfer Goal 1, OT) modified independence  -LR     Time Frame (Transfer Goal 1, OT) long term goal (LTG);by discharge  -LR     Progress/Outcome (Transfer Goal 1, OT) goal revised this date  -LR       Row Name 07/10/24 0909          Bathing Goal 1 (OT)    Activity/Device (Bathing Goal 1, OT) bathing skills, all  -LR     [place] Level/Cues Needed (Bathing Goal 1, OT) modified independence  -LR     Time Frame (Bathing Goal 1, OT) long term goal (LTG);by discharge  -LR     Strategies/Barriers (Bathing Goal 1, OT) AE PRN  -LR     Progress/Outcomes (Bathing Goal 1, OT) goal revised this date  -LR       Row Name 07/10/24 0909          Dressing Goal 1 (OT)    Activity/Device (Dressing Goal 1, OT) dressing skills, all  -LR     [place]/Cues Needed (Dressing Goal 1, OT) modified independence  -LR     Time Frame (Dressing Goal 1, OT) long term goal (LTG);by discharge  -LR     Progress/Outcome (Dressing Goal 1, OT) goal ongoing  -LR       Row Name 07/10/24 0909          Toileting Goal 1 (OT)    Activity/Device (Toileting Goal 1, OT) toileting skills, all;commode  -LR     [place] Level/Cues Needed (Toileting Goal 1, OT) modified independence  -LR     Time Frame (Toileting Goal 1, OT) long term goal (LTG);by discharge  -LR     Progress/Outcome (Toileting Goal 1, OT) goal revised this  date  -LR       Row Name 07/10/24 0909          Therapy Assessment/Plan (OT)    Planned Therapy Interventions (OT) activity tolerance training;adaptive equipment training;BADL retraining;functional balance retraining;IADL retraining;neuromuscular control/coordination retraining;occupation/activity based interventions;orthotic fabrication/fitting/training;patient/caregiver education/training;ROM/therapeutic exercise;strengthening exercise;transfer/mobility retraining  -LR               User Key  (r) = Recorded By, (t) = Taken By, (c) = Cosigned By      Initials Name Provider Type    LR Bety Tejeda, OTR/L Occupational Therapist                   Clinical Impression       Row Name 07/10/24 0909          Pain Assessment    Pretreatment Pain Rating 8/10  -LR     Posttreatment Pain Rating 5/10  -LR     Pain Location lower  -LR     Pain Location - back  -LR     Pain Intervention(s) Medication (See MAR);Repositioned;Ambulation/increased activity  -LR       Row Name 07/10/24 0909          Plan of Care Review    Plan of Care Reviewed With patient  -LR     Progress improving  -LR     Outcome Evaluation OT reeval completed. Pt presents A&Ox4, willing to participate despite c/o 8/10 low back pain. Pt came to EOB with SBA, cues for use of log roll tech. Pt donned LSO with Max A required at EOB. Pt completed sit<>stand t/f at EOB with CGA required and 2 attempts d/t pain. Fxl mobility completed in hallway with CGA using FWW, requiring 1 seated RB. Cues required for proximity to FWW and posture. Pt returned to sitting in chair and then requested completion of toileting. Toileting completed at commode with SBA, impulsivity noted. Grooming completed sink-side with SBA. Since SOC, pt has demo fluctuation in participation and progress d/t c/o pain. This date, pt demo good participation and reports motivation to improve occupational performance. Good progress noted toward goals this date. Continued skilled OT warranted to  provide education and address remaining deficits in order to increase pt safety and I during ADLs, fxl mobility, and fxl t/f's. Recommend D/C home with assist and  services pending progress.  -LR       Row Name 07/10/24 0909          Therapy Assessment/Plan (OT)    Rehab Potential (OT) good, to achieve stated therapy goals  -LR     Criteria for Skilled Therapeutic Interventions Met (OT) yes;skilled treatment is necessary  -LR     Therapy Frequency (OT) 5 times/wk  -LR     Predicted Duration of Therapy Intervention (OT) until hospital D/C  -LR       Row Name 07/10/24 0909          Therapy Plan Review/Discharge Plan (OT)    Anticipated Discharge Disposition (OT) home with assist;home with home health  -LR       Row Name 07/10/24 0909          Vital Signs    O2 Delivery Pre Treatment room air  -LR     O2 Delivery Intra Treatment room air  -LR     O2 Delivery Post Treatment room air  -LR     Pre Patient Position Supine  -LR     Intra Patient Position Standing  -LR     Post Patient Position Sitting  -LR       Row Name 07/10/24 0909          Positioning and Restraints    Pre-Treatment Position in bed  -LR     Post Treatment Position chair  -LR     In Chair notified nsg;reclined;call light within reach;encouraged to call for assist;with other staff;with brace  -LR               User Key  (r) = Recorded By, (t) = Taken By, (c) = Cosigned By      Initials Name Provider Type    LR Bety Tejeda, OTR/L Occupational Therapist                   Outcome Measures       Row Name 07/10/24 0909          How much help from another is currently needed...    Putting on and taking off regular lower body clothing? 3  -LR     Bathing (including washing, rinsing, and drying) 3  -LR     Toileting (which includes using toilet bed pan or urinal) 3  -LR     Putting on and taking off regular upper body clothing 2  -LR     Taking care of personal grooming (such as brushing teeth) 3  -LR     Eating meals 4  -LR     AM-PAC 6 Clicks Score  (OT) 18  -LR       Row Name 07/10/24 0909          Functional Assessment    Outcome Measure Options AM-PAC 6 Clicks Daily Activity (OT)  -LR               User Key  (r) = Recorded By, (t) = Taken By, (c) = Cosigned By      Initials Name Provider Type    Bety Fernandez, OTR/L Occupational Therapist                    Occupational Therapy Education       Title: PT OT SLP Therapies (Done)       Topic: Occupational Therapy (Done)       Point: ADL training (Done)       Description:   Instruct learner(s) on proper safety adaptation and remediation techniques during self care or transfers.   Instruct in proper use of assistive devices.                  Learning Progress Summary             Patient Acceptance, E,D, VU,NR by LR at 7/10/2024 1008    Acceptance, E,D, VU,NR by LR at 7/5/2024 1058    Acceptance, E, VU by HH at 7/3/2024 1141    Acceptance, E, VU by HH at 7/2/2024 1436    Acceptance, E, VU by EC at 7/1/2024 0911    Acceptance, E, VU by KJ at 6/30/2024 0941    Acceptance, E, VU by LS at 6/28/2024 1431                         Point: Home exercise program (Done)       Description:   Instruct learner(s) on appropriate technique for monitoring, assisting and/or progressing therapeutic exercises/activities.                  Learning Progress Summary             Patient Acceptance, E, VU by HH at 7/3/2024 1141    Acceptance, E, VU by HH at 7/2/2024 1436    Acceptance, E, VU by EC at 7/1/2024 0911    Acceptance, E, VU by KJ at 6/30/2024 0941                         Point: Precautions (Done)       Description:   Instruct learner(s) on prescribed precautions during self-care and functional transfers.                  Learning Progress Summary             Patient Acceptance, E,D, VU,NR by LR at 7/10/2024 1008    Acceptance, E,D, VU,NR by LR at 7/5/2024 1058    Acceptance, E, VU by HH at 7/3/2024 1141    Acceptance, E, VU by HH at 7/2/2024 1436    Acceptance, E, VU by EC at 7/1/2024 0911    Acceptance, E, VU by KJ at  6/30/2024 0941    Acceptance, E, VU by LS at 6/28/2024 1431                         Point: Body mechanics (Done)       Description:   Instruct learner(s) on proper positioning and spine alignment during self-care, functional mobility activities and/or exercises.                  Learning Progress Summary             Patient Acceptance, E,D, VU,NR by LR at 7/10/2024 1008    Acceptance, E,D, VU,NR by LR at 7/5/2024 1058    Acceptance, E, VU by HH at 7/3/2024 1141    Acceptance, E, VU by HH at 7/2/2024 1436    Acceptance, E, VU by EC at 7/1/2024 0911    Acceptance, E, VU by KJ at 6/30/2024 0941    Acceptance, E, VU by LS at 6/28/2024 1431                                         User Key       Initials Effective Dates Name Provider Type Discipline    LS 06/20/22 -  Stacey Reagan, OTR/L Occupational Therapist OT    LR 04/25/23 -  Bety Tejeda, OTR/L Occupational Therapist OT    KJ 04/15/24 -  Joanna Marie, OT Student OT Student OT    EC 10/13/23 -  Davida Reyes, OTR/L Occupational Therapist OT     04/15/24 -  Kim Freed, OT Student OT Student OT                  OT Recommendation and Plan  Planned Therapy Interventions (OT): activity tolerance training, adaptive equipment training, BADL retraining, functional balance retraining, IADL retraining, neuromuscular control/coordination retraining, occupation/activity based interventions, orthotic fabrication/fitting/training, patient/caregiver education/training, ROM/therapeutic exercise, strengthening exercise, transfer/mobility retraining  Therapy Frequency (OT): 5 times/wk  Plan of Care Review  Plan of Care Reviewed With: patient  Progress: improving  Outcome Evaluation: OT reeval completed. Pt presents A&Ox4, willing to participate despite c/o 8/10 low back pain. Pt came to EOB with SBA, cues for use of log roll tech. Pt donned LSO with Max A required at EOB. Pt completed sit<>stand t/f at EOB with CGA required and 2 attempts d/t pain. Fxl mobility  completed in hallway with CGA using FWW, requiring 1 seated RB. Cues required for proximity to FWW and posture. Pt returned to sitting in chair and then requested completion of toileting. Toileting completed at commode with SBA, impulsivity noted. Grooming completed sink-side with SBA. Since SOC, pt has demo fluctuation in participation and progress d/t c/o pain. This date, pt demo good participation and reports motivation to improve occupational performance. Good progress noted toward goals this date. Continued skilled OT warranted to provide education and address remaining deficits in order to increase pt safety and I during ADLs, fxl mobility, and fxl t/f's. Recommend D/C home with assist and HH services pending progress.     Time Calculation:         Time Calculation- OT       Row Name 07/10/24 0909             Time Calculation- OT    OT Start Time 0909  -LR      OT Stop Time 0953  -LR      OT Time Calculation (min) 44 min  -LR      Total Timed Code Minutes- OT 14 minute(s)  -LR      OT Received On 07/10/24  -LR      OT Goal Re-Cert Due Date 07/20/24  -LR         Timed Charges    04870 - OT Self Care/Mgmt Minutes 14  -LR         Untimed Charges    OT Eval/Re-eval Minutes 30  -LR         Total Minutes    Timed Charges Total Minutes 14  -LR      Untimed Charges Total Minutes 30  -LR       Total Minutes 44  -LR                User Key  (r) = Recorded By, (t) = Taken By, (c) = Cosigned By      Initials Name Provider Type    LR Bety Tejeda OTR/L Occupational Therapist                  Therapy Charges for Today       Code Description Service Date Service Provider Modifiers Qty    26054686412 HC OT SELF CARE/MGMT/TRAIN EA 15 MIN 7/10/2024 Bety Tejeda OTR/L GO 1    41041437563 HC OT RE-EVAL 2 7/10/2024 Bety Tejeda OTR/L GO 1                 Bety Tejeda OTR/MARY  7/10/2024

## 2024-07-10 NOTE — PLAN OF CARE
Goal Outcome Evaluation:  Plan of Care Reviewed With: patient           Outcome Evaluation: denies need for PRNs this evening; nausea this am; voiding; safety maintained

## 2024-07-10 NOTE — PROGRESS NOTES
[facility]  INPATIENT PROGRESS NOTE    Patient Name: Marina Joe  Date of Admission: 6/28/2024  Today's Date: 07/10/24  Length of Stay: 12  Primary Care Physician: Hanh Og APRN    Subjective   Chief Complaint: Lower back pain          HPI   Ms. Joe is a 79-year-old female from home with past medical history of breast cancer, chronic kidney disease stage IIIa, hypercholesterolemia, hypertension and chronic sinusitis, who presented to the emergency room with worsening back pain/flank pain, history was mostly provided by patient's boyfriend at bedside.  She developed back pain couple of weeks ago and was referred to a pain clinic doctor where she was prescribed gabapentin, after she took about 2-3 doses of the gabapentin, she developed dizziness. She came to the emergency room today because lower back pain and flank pain got even worse despite being on the gabapentin.  In the emergency room, she was extensively worked up with CT of the abdomen and lumbar spine, CT abdomen showed extensive mass of the left kidney extending up to the proximal left ureter as well as right adrenal gland mass suspicious for metastasis.  Urology was consulted from the emergency room for input.  7/1  As above history of chronic kidney disease hyperlipidemia hypertension presented with back pain found to have kidney mass suspicious for metastasis patient CT of the lumbar spine shows multilevel degenerative changes to include an anteriolisthesis of L3 over L4 and spondylosis at L5-S1. 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 awaiting final pathology will consult with palliative  team to address the CODE STATUS and plan of care  7/2  Appreciate palliative care the patient is currently DNR, appreciate neurosurgery status post L3 laminectomy and decompression on June 29 for cauda equina, appreciate oncology continues to work with physical therapy appreciate  radiation oncology patient started radiation today  7/3  Patient oxygen is 85% on 8 L, patient blood pressure is poorly controlled increase her Coreg we will repeat her chest x-ray chest x-ray from before was showing some congestion IV Hep-Lock will give her 1 dose of Lasix  7/4  As before remains on 8 L oxygen documented excision was not titrated will titrate oxygen, chest x-ray showing cardiomegaly pulmonary edema the patient has poor inspiration we did Hep-Lock her IV fluid we will continue IV Lasix blood pressure remains poorly controlled increase her Coreg we will check her echo of the heart as above she was not having bowel movement for most time was started on lactulose, patient had good bowel movement it was mistakenly documented that she is on 8 L the patient is actually on room air  7/5  Patient echo of the heart is showing good EF 56 to 60% blood pressure remains a challenge will increase her Catapres I will decrease her  Lasix to 20 mg daily continue to work with PT OT, she had nephrology signed off, pain is not well controlled, will consult palliative for pain control   7/6  As before appreciate bronchopulmonary they have signed off appreciate palliative care helping us manage her pain continues to work with physical therapy today her creatinine is up we will discontinue IV Lasix remains off oxygen even though it is still documented she is on 8 L oxygen  7/7  Creatinine is up secondary to overdiuresis, start IVF, still having neuropathy will add neurontine   7/8  Her kidney function is not improving I will go ahead and consult with nephrology I spoke to oncology ideally we would love to do bone marrow biopsy to address  possible CLL but she cannot lay on her stomach with this kidney function we cannot start any chemo the 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 though FISH studies are pending , we had to stop the allopurinol secondary to worsening GFR, for postop radiation to start today I will order ultrasound renal and consult with nephrology hold all nephrotoxins  7/9  Long discussion with the patient family and with the oncology team strongly feel that this patient would not do well with chemo or radiation and would not do well with physical therapy or going to rehab hospice is the best option for her I will go ahead and consult hospice  7/10  As before had a long discussion with the patient her daughter and her partner about the options and plan of care, discussion with radiation oncology with the oncology, interested in rehab patient is undergoing radiation, Anticipating bone marrow biopsy, and initiation of ibrutinib and outpatient PET scan per oncology and outpatient diagnostic mammography to evaluate left breast nodule , patient was placed on fentanyl patch and Norco as needed she is getting IV Dilaudid as needed is on the lidocaine patch Neurontin was discontinued again the patient is extremely passive and so is her significant other and she is not really engaged with the decision making, her kidney function is getting better remains DNR  Review of Systems   All pertinent negatives and positives are as above. All other systems have been reviewed and are negative unless otherwise stated.     Objective    Temp:  [97.3 °F (36.3 °C)-98.3 °F (36.8 °C)] 98.3 °F (36.8 °C)  Heart Rate:  [69-76] 76  Resp:  [16-18] 16  BP: (115-141)/(43-68) 139/55  Physical Exam    GEN: Awake, alert, interactive, in NAD  HEENT: Atraumatic, PERRLA, EOMI, Anicteric, Trachea midline  Lungs: CTAB, no wheezing/rales/rhonchi  Heart: RRR, +S1/s2, no rub  ABD: soft, nt/nd, +BS, no guarding/rebound  Extremities: atraumatic,  no cyanosis, no edema  Skin: no rashes or lesions  Neuro: AAOx3, no focal deficits  Psych: normal mood & affect    Results Review:  I have reviewed the labs, radiology results, and diagnostic studies.    Laboratory Data:   Results from last 7 days   Lab Units 07/10/24  0408 07/09/24  0914 07/08/24  0343   WBC 10*3/mm3 6.63 7.28 7.37   HEMOGLOBIN g/dL 8.0* 7.9* 8.3*   HEMATOCRIT % 24.9* 25.1* 25.8*   PLATELETS 10*3/mm3 61* 63* 67*        Results from last 7 days   Lab Units 07/10/24  0408 07/09/24  0914 07/08/24 0343 07/07/24  0409   SODIUM mmol/L 137 134* 134* 134*   POTASSIUM mmol/L 4.3 4.6 4.4 4.5   CHLORIDE mmol/L 102 99 95* 92*   CO2 mmol/L 24.0 24.0 26.0 28.0   BUN mg/dL 47* 54* 48* 43*   CREATININE mg/dL 2.15* 2.67* 3.48* 3.50*   CALCIUM mg/dL 8.9 8.6 9.0 10.0   BILIRUBIN mg/dL  --  0.3 0.3 0.3   ALK PHOS U/L  --  89 93 109   ALT (SGPT) U/L  --  20 20 22   AST (SGOT) U/L  --  25 23 25   GLUCOSE mg/dL 95 94 88 106*       Culture Data:   Urine Culture   Date Value Ref Range Status   06/28/2024 >100,000 CFU/mL Mixed Bhavana Isolated  Final       Radiology Data:   Imaging Results (Last 24 Hours)       ** No results found for the last 24 hours. **            I have reviewed the patient's current medications.     Assessment/Plan   Assessment  Active Hospital Problems    Diagnosis     **Left renal mass     Cauda equina compression     Metastatic cancer to spine     Morbid (severe) obesity due to excess calories        Treatment Plan  Lower back pain  Patient's low back pain has been getting worse in the last couple of weeks, was started on gabapentin outpatient but did not help.  MRI of the lumbar spine reported as follows-  IMPRESSION:   Neoplastic process involving the L3 vertebral body with associated  posterior soft tissue component resulting in severe spinal canal  narrowing and cauda equina nerve root compression. The soft tissue  component appears to also extend into the right L3-L4 foramen. No  associated  pathological fracture.  Additional multilevel spondylotic changes including moderate to severe  spinal canal and foraminal narrowing as detailed above.  Neurosurgery is on consult and did the following procedure-  Procedure(s):  LUMBAR LAMINECTOMY L3 WITH DECOMPRESSION 1-2 LEVELS-RIGHT     Decompression -  Lumbar laminectomy, medial facetectomy for decompression of the spinal cord  Open laminectomy and biopsy of epidural neoplasm  Use of intraoperative microscope      -Acute on chronic kidney disease stage IIIa  Patient follows up with a nephrologist outpatient, but he does not come to this hospital.  Nephrologist consulted and helping with management while patient is in-house.     -Left renal mass/right adrenal mass on CT of the abdomen/pelvis  Urology was consulted from the emergency room and after evaluation did not recommend any intervention,rather recommended oncology consult.  Patient has multiple subcutaneous nodules that may be amenable to percutaneous biopsy.  Oncology is on consult and has evaluated patient, will await for tentative diagnosis from lumbar spine biopsy before making recommendations      Other chronic medical conditions-  History of breast cancer  Hypercholesterolemia  Hypertension  Chronic sinusitis     DVT prophylaxis-heparin       Medical Decision Making  Number and Complexity of problems: High    Conditions and Status        Condition is unchanged.     [facility] Data  External documents reviewed: No  Cardiac tracing (EKG, telemetry) interpretation: Yes  Radiology interpretation: Yes  Labs reviewed: Yes  Any tests that were considered but not ordered: No     Decision rules/scores evaluated (example [id number], Wells, etc): Yes     Discussed with: Patient     Care Planning  Shared decision making: Yes  Code status and discussions: Yes [full code]    Disposition  Social Determinants of Health that impact treatment or disposition: No  I expect the patient to be discharged to unknown in unknown  days.     Electronically signed by Alysia Lincoln MD, 07/10/24, 10:14 CDT.

## 2024-07-11 LAB
ANION GAP SERPL CALCULATED.3IONS-SCNC: 12 MMOL/L (ref 5–15)
BASOPHILS # BLD AUTO: 0.02 10*3/MM3 (ref 0–0.2)
BASOPHILS NFR BLD AUTO: 0.3 % (ref 0–1.5)
BUN SERPL-MCNC: 30 MG/DL (ref 8–23)
BUN/CREAT SERPL: 20.5 (ref 7–25)
CALCIUM SPEC-SCNC: 9.5 MG/DL (ref 8.6–10.5)
CHLORIDE SERPL-SCNC: 101 MMOL/L (ref 98–107)
CO2 SERPL-SCNC: 25 MMOL/L (ref 22–29)
CREAT SERPL-MCNC: 1.46 MG/DL (ref 0.57–1)
DEPRECATED RDW RBC AUTO: 51.4 FL (ref 37–54)
EGFRCR SERPLBLD CKD-EPI 2021: 36.7 ML/MIN/1.73
EOSINOPHIL # BLD AUTO: 0.13 10*3/MM3 (ref 0–0.4)
EOSINOPHIL NFR BLD AUTO: 1.8 % (ref 0.3–6.2)
ERYTHROCYTE [DISTWIDTH] IN BLOOD BY AUTOMATED COUNT: 15.9 % (ref 12.3–15.4)
GLUCOSE SERPL-MCNC: 95 MG/DL (ref 65–99)
HCT VFR BLD AUTO: 26.7 % (ref 34–46.6)
HGB BLD-MCNC: 8.5 G/DL (ref 12–15.9)
IMM GRANULOCYTES # BLD AUTO: 0.05 10*3/MM3 (ref 0–0.05)
IMM GRANULOCYTES NFR BLD AUTO: 0.7 % (ref 0–0.5)
LYMPHOCYTES # BLD AUTO: 0.82 10*3/MM3 (ref 0.7–3.1)
LYMPHOCYTES NFR BLD AUTO: 11.3 % (ref 19.6–45.3)
MCH RBC QN AUTO: 28 PG (ref 26.6–33)
MCHC RBC AUTO-ENTMCNC: 31.8 G/DL (ref 31.5–35.7)
MCV RBC AUTO: 87.8 FL (ref 79–97)
MONOCYTES # BLD AUTO: 0.54 10*3/MM3 (ref 0.1–0.9)
MONOCYTES NFR BLD AUTO: 7.4 % (ref 5–12)
NEUTROPHILS NFR BLD AUTO: 5.7 10*3/MM3 (ref 1.7–7)
NEUTROPHILS NFR BLD AUTO: 78.5 % (ref 42.7–76)
PLATELET # BLD AUTO: 68 10*3/MM3 (ref 140–450)
PMV BLD AUTO: ABNORMAL FL
POTASSIUM SERPL-SCNC: 4 MMOL/L (ref 3.5–5.2)
RAD ONC ARIA COURSE ID: NORMAL
RAD ONC ARIA COURSE LAST TREATMENT DATE: NORMAL
RAD ONC ARIA COURSE START DATE: NORMAL
RAD ONC ARIA COURSE TREATMENT ELAPSED DAYS: 3
RAD ONC ARIA FIRST TREATMENT DATE: NORMAL
RAD ONC ARIA PLAN FRACTIONS TREATED TO DATE: 4
RAD ONC ARIA PLAN ID: NORMAL
RAD ONC ARIA PLAN PRESCRIBED DOSE PER FRACTION: 3 GY
RAD ONC ARIA PLAN PRIMARY REFERENCE POINT: NORMAL
RAD ONC ARIA PLAN TOTAL FRACTIONS PRESCRIBED: 10
RAD ONC ARIA PLAN TOTAL PRESCRIBED DOSE: 3000 CGY
RAD ONC ARIA REFERENCE POINT DOSAGE GIVEN TO DATE: 12 GY
RAD ONC ARIA REFERENCE POINT ID: NORMAL
RAD ONC ARIA REFERENCE POINT SESSION DOSAGE GIVEN: 3 GY
RBC # BLD AUTO: 3.04 10*6/MM3 (ref 3.77–5.28)
SODIUM SERPL-SCNC: 138 MMOL/L (ref 136–145)
WBC NRBC COR # BLD AUTO: 7.26 10*3/MM3 (ref 3.4–10.8)

## 2024-07-11 PROCEDURE — 97535 SELF CARE MNGMENT TRAINING: CPT

## 2024-07-11 PROCEDURE — 80048 BASIC METABOLIC PNL TOTAL CA: CPT | Performed by: INTERNAL MEDICINE

## 2024-07-11 PROCEDURE — 77336 RADIATION PHYSICS CONSULT: CPT | Performed by: RADIOLOGY

## 2024-07-11 PROCEDURE — 25010000002 ONDANSETRON PER 1 MG: Performed by: NEUROLOGICAL SURGERY

## 2024-07-11 PROCEDURE — 77412 RADIATION TX DELIVERY LVL 3: CPT | Performed by: RADIOLOGY

## 2024-07-11 PROCEDURE — 97116 GAIT TRAINING THERAPY: CPT

## 2024-07-11 PROCEDURE — 99497 ADVNCD CARE PLAN 30 MIN: CPT | Performed by: CLINICAL NURSE SPECIALIST

## 2024-07-11 PROCEDURE — 99232 SBSQ HOSP IP/OBS MODERATE 35: CPT | Performed by: CLINICAL NURSE SPECIALIST

## 2024-07-11 PROCEDURE — 0 HYDROMORPHONE 1 MG/ML SOLUTION: Performed by: HOSPITALIST

## 2024-07-11 PROCEDURE — 99233 SBSQ HOSP IP/OBS HIGH 50: CPT | Performed by: INTERNAL MEDICINE

## 2024-07-11 PROCEDURE — 85025 COMPLETE CBC W/AUTO DIFF WBC: CPT | Performed by: NURSE PRACTITIONER

## 2024-07-11 PROCEDURE — 97530 THERAPEUTIC ACTIVITIES: CPT

## 2024-07-11 RX ADMIN — HYDROMORPHONE HYDROCHLORIDE 1 MG: 1 INJECTION, SOLUTION INTRAMUSCULAR; INTRAVENOUS; SUBCUTANEOUS at 06:59

## 2024-07-11 RX ADMIN — Medication 1 TABLET: at 09:55

## 2024-07-11 RX ADMIN — SERTRALINE 100 MG: 50 TABLET, FILM COATED ORAL at 09:55

## 2024-07-11 RX ADMIN — HYDROMORPHONE HYDROCHLORIDE 1 MG: 1 INJECTION, SOLUTION INTRAMUSCULAR; INTRAVENOUS; SUBCUTANEOUS at 04:15

## 2024-07-11 RX ADMIN — CARVEDILOL 25 MG: 25 TABLET, FILM COATED ORAL at 17:58

## 2024-07-11 RX ADMIN — AMLODIPINE BESYLATE 5 MG: 5 TABLET ORAL at 20:17

## 2024-07-11 RX ADMIN — Medication 10 ML: at 09:57

## 2024-07-11 RX ADMIN — Medication 10 ML: at 20:18

## 2024-07-11 RX ADMIN — ROSUVASTATIN CALCIUM 10 MG: 10 TABLET, FILM COATED ORAL at 20:17

## 2024-07-11 RX ADMIN — AMLODIPINE BESYLATE 5 MG: 5 TABLET ORAL at 09:55

## 2024-07-11 RX ADMIN — FLUTICASONE PROPIONATE 2 SPRAY: 50 SPRAY, METERED NASAL at 09:57

## 2024-07-11 RX ADMIN — PANTOPRAZOLE SODIUM 40 MG: 40 TABLET, DELAYED RELEASE ORAL at 09:55

## 2024-07-11 RX ADMIN — LACTULOSE 30 G: 20 SOLUTION ORAL at 17:58

## 2024-07-11 RX ADMIN — ROPINIROLE HYDROCHLORIDE 0.5 MG: 0.25 TABLET, FILM COATED ORAL at 20:17

## 2024-07-11 RX ADMIN — BUPROPION HYDROCHLORIDE 150 MG: 150 TABLET, EXTENDED RELEASE ORAL at 09:55

## 2024-07-11 RX ADMIN — HYDROMORPHONE HYDROCHLORIDE 1 MG: 1 INJECTION, SOLUTION INTRAMUSCULAR; INTRAVENOUS; SUBCUTANEOUS at 21:51

## 2024-07-11 RX ADMIN — CETIRIZINE HYDROCHLORIDE 10 MG: 10 TABLET ORAL at 09:55

## 2024-07-11 RX ADMIN — CARVEDILOL 25 MG: 25 TABLET, FILM COATED ORAL at 09:55

## 2024-07-11 RX ADMIN — MONTELUKAST SODIUM 10 MG: 10 TABLET, FILM COATED ORAL at 20:17

## 2024-07-11 RX ADMIN — ONDANSETRON 4 MG: 2 INJECTION INTRAMUSCULAR; INTRAVENOUS at 09:21

## 2024-07-11 RX ADMIN — LIDOCAINE 1 PATCH: 4 PATCH TOPICAL at 09:55

## 2024-07-11 RX ADMIN — FENTANYL 1 PATCH: 12 PATCH TRANSDERMAL at 17:57

## 2024-07-11 NOTE — CASE MANAGEMENT/SOCIAL WORK
Continued Stay Note   [facility]     Patient Name: Marina Joe  MRN: [id number]  Today's Date: 7/11/2024    Admit Date: 6/28/2024    Plan: Home Hospice   Discharge Plan       Row Name 07/11/24 1433       Plan    Plan Home Hospice    Patient/Family in Agreement with Plan yes    Plan Comments Pt is questioning now if she wants to go to her daughter's home or return to her home with her significant other. She is also considering getting a second opinion. Will follow.                   Discharge Codes    No documentation.                 Expected Discharge Date and Time       Expected Discharge Date Expected Discharge Time    Jul 11, 2024               JW Villalba

## 2024-07-11 NOTE — PROGRESS NOTES
"[facility] Progress Note    Progress Note    Patient:  Marina Joe  YOB: 1945  Date of Service: 7/11/2024  MRN: [id number]   Acct: [id number]   Primary Care Physician: Hanh Og APRN  Advance Directive:   Code Status and Medical Interventions:   Ordered at: 07/01/24 1547     Medical Intervention Limits:    No intubation (DNI)    No artificial nutrition     Level Of Support Discussed With:    Patient     Code Status (Patient has no pulse and is not breathing):    No CPR (Do Not Attempt to Resuscitate)     Medical Interventions (Patient has pulse or is breathing):    Limited Support     Admit Date: 6/28/2024       Hospital Day: 13      Subjective:     Chief Compliant:   Back pain, \"none right now\"    Subjective:   Subjectively improved back pain.  Has been receiving radiation treatment to the spine since 7/8/2024.  Denies fevers or drenching night sweats.  States she has been able to get out of bed with the help of physical therapy and has been able to ambulate without a walker.  Appetite, \"pretty good.\"  Has had regular BMs and is urinating without difficulty.  Yesterday \"meds to beds\" dropped off her ibrutinib.  Earlier today had mentioned to palliative care that she has thought about getting a second opinion regarding treatment options but at this current time now tells me that she is still not interested in systemic therapy.  She also says that she has will be staying to live with her boyfriend and will not be moving to [facility] after all.  She repeatedly expresses her eagerness to talk to the  regarding hospice care for when she is discharged.    Interval history:  Marina Joe 79 y.o. 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     She " reports that she has been experiencing back pain for several weeks. Since her presentation on 6/28/2024 she had the following workup:  - CT-A/P showed abnormal heterogenous left kidney mass, abnormal right kidney, new right adrenal mass, innumerable solid nodules in the subcutaneous soft tissues all concerning for metastatic disease process.  - CT lumbar spine showing multilevel prominent disc osteophyte complexes acet arthropathy and ligamental hypertrophy and resultant neural foraminal spinal canal stenosis.  - CT chest showed a left breast nodule of 9 mm, scattered subcutaneous soft tissue nodules in the abdominal wall, otherwise there is no sign of intrathoracic metastases.  - MRI-abdomen showed infiltrative mass within the left kidney extending into the proximal left collecting system as well as numerous additional solid masses in the bilateral kidneys. Additionally there is a right adrenal mass, multiple subcutaneous fat soft tissue masses, and a L3 vertebral body mass with suspected extension into the spinal canal. 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  - MRI-L-spine: Neoplastic process involving the L3 vertebral body with associated posterior soft tissue component resulting in severe spinal canal narrowing and cauda equina nerve root compression. The soft tissue component appears to also extend into the right L3-L4 foramen. No associated pathological fracture. There are also appears to be tumor signal extending into the right L2-L3 neural foramen.     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  Final diagnosis:  L3 epidural soft tissue mass, biopsy: Abnormal B-cell population with some features consistent with small lymphocytic lymphoma/chronic lymphocytic leukemia.  Comment:  Flow Cytometry Summary    Flow cytometry was performed at [facility], [place] ([id number]).  Flow cytometry shows 64% of the sample as an abnormal monoclonal B-cell population with mixed cell size.  FISH testing is pending.  This may represent a B-cell small lymphocytic lymphoma/chronic lymphocytic leukemia.  The cell size of the abnormal cells appears mixed.  This may represent increased prolymphocytes, paraimmunoblasts, and or large cells and progression/transformation is in the differential diagnosis.  Intact lymph node architecture is not present in this fragmented paraspinal mass for further evaluation.  Clinical correlation is recommended.     -FISH REPORT:  Abnormal CLL FISH Panel and lymphoma probes.  Rearrangement of MYC/8q. Deletions of ANABEL/11q and CCND1/11q.  No evidence of rearrangements of BCL2 and BCL6 genes. No evidence of IGH/MYC and IGH/BCL2 translocation.      INTERPRETATION:  MYC gene rearrangement is characteristically observed in Burkitt lymphoma but may also be seen in diffuse large B-cell lymphoma and in transformed lower grade lymphomas.     - 7/1/24-ultrasound-guided core needle biopsy of right flank subcutaneous nodule.  FINAL DIAGNOSIS:  Abnormal B-cell population which may represent small lymphocytic lymphoma/chronic lymphocytic leukemia (SLL/CLL).  Comment  Please see additional report [id number] in the associated flow cytometry report. The flow cytometry report showed  scatter properties compatible with mixed cell size with features of B-cell small lymphocytic lymphoma/chronic lymphocytic  leukemia (B-SLL/). FISH testing is ordered and pending. This may represent progression/transformation. Correlation with  clinical, laboratory, and morphologic data may be necessary to better define this lymphoid neoplasm.  - 7/1/24-manual blood  "smear review:  Lab Results   Component Value Date    PATHINTERP  07/01/2024     Moderate normochromic normocytic anemia    Normal total white blood cell count with the following manual differential:  Neutrophils 72%  Lymphocytes 20%  Monocytes 4%  Eosinophils 4%  Moderate peripheral thrombocytopenia    No schistocytes identified  No platelet clumps identified  No morulae identified in granulocytes or monocytes     -7/8/24-initiation of palliative radiation to L-spine per Dr. Parra    -7/10/24-\"meds to beds\" delivered ibrutinib to the bedside.    Review of Systems  Review of Systems:   Constitutional: Fatigue.  Appetite fair.  \"Not bad now.\"  No fever / No chills / No sweats.  States she has been able to get out of bed and ambulate with physical therapy.  HEENT:  No sore throat / No hoarseness / No vision changes  CV:  No chest pain / No palpitations / No orthopnea  Respiratory:  No cough / No shortness of breath / No sputum / No hemoptysis  GI:  No nausea / No vomiting / No abdominal pain / No diarrhea / +constipation   :  No dysuria / No hesitancy / No urgency / No hematuria  Neuro: Lingering lower extremity muscle weakness / No dysphagia / No headache / No paresthesias  Musculoskeletal:  No edema / No cyanosis / + postop back pain\" better\"  Derm: Subcutaneous nodules on trunk and extremities    Vitals: /51 (BP Location: Right arm, Patient Position: Lying)   Pulse 76   Temp 97.9 °F (36.6 °C) (Oral)   Resp 16   Ht 172.7 cm (68\")   Wt 111 kg (244 lb)   LMP  (LMP Unknown)   SpO2 95%   BMI 37.10 kg/m²     Physical Exam  Physical Exam:  General appearance: Pleasant, obese, modestly elderly female alert, appears stated age and cooperative.  In no acute distress while lying in bed.  Multiple family members at the bedside including her brother, sister, and sister-in-law  Skin:  Skin color a bit pale. No rashes or lesions.  Subcutaneous nodules on trunk and extremities (most prominently right proximal " forearm).  HEENT:  Head: Normal, normocephalic, atraumatic.  Neck:  no adenopathy, no tenderness/mass/nodules  Lungs:  clear to auscultation bilaterally  Heart:  regular rate and rhythm  Abdomen:  soft, non-tender.  Previously no overt splenomegaly (habitus precludes an adequate exam)  Extremities:  extremities normal, atraumatic, no cyanosis or edema  Lymphatics: No obvious adenopathy in the cervical, supraclavicular, axillary or inguinal tri regions.  Neurologic:  Mental status: Alert, oriented, thought content appropriate    24HR INTAKE/OUTPUT:    Intake/Output Summary (Last 24 hours) at 7/11/2024 1625  Last data filed at 7/11/2024 1400  Gross per 24 hour   Intake 342 ml   Output 1850 ml   Net -1508 ml        Batista Catheter: Yes    Diet: Diet: Diabetic; Consistent Carbohydrate; Fluid Consistency: Thin (IDDSI 0)      Medications:   Scheduled Meds:amLODIPine, 5 mg, Oral, BID  buPROPion XL, 150 mg, Oral, Daily  carvedilol, 25 mg, Oral, BID With Meals  cetirizine, 10 mg, Oral, Daily  fentaNYL, 1 patch, Transdermal, Q72H   And  Check Fentanyl Patch Placement, 1 each, Does not apply, Q12H  cloNIDine, 0.2 mg, Oral, BID  fluticasone, 2 spray, Nasal, Daily  lactulose, 30 g, Oral, TID  Lidocaine, 1 patch, Transdermal, Q24H  methylnaltrexone, 6 mg, Subcutaneous, Every Other Day  montelukast, 10 mg, Oral, Nightly  multivitamin with minerals, 1 tablet, Oral, Daily  pantoprazole, 40 mg, Oral, Daily  rOPINIRole, 0.5 mg, Oral, Nightly  rosuvastatin, 10 mg, Oral, Nightly  sertraline, 100 mg, Oral, Daily  sodium chloride, 10 mL, Intravenous, Q12H        Continuous Infusions:sodium chloride, 100 mL/hr, Last Rate: 100 mL/hr (07/10/24 0622)        Labs:     Results from last 7 days   Lab Units 07/11/24  0356 07/10/24  0408 07/09/24  0914   WBC 10*3/mm3 7.26 6.63 7.28   HEMOGLOBIN g/dL 8.5* 8.0* 7.9*   HEMATOCRIT % 26.7* 24.9* 25.1*   PLATELETS 10*3/mm3 68* 61* 63*     06/25/2024 0901 06/25/2024 0935 Iron Profile [[id number]]    (Abnormal)   Blood    Final result Component Value Units   Iron 56 mcg/dL   Iron Saturation (TSAT) 16 Low  %   Transferrin 238 mg/dL   TIBC 355 mcg/dL          06/25/2024 0901 06/25/2024 0940 Ferritin [[id number]]    (Abnormal)   Blood    Final result Component Value Units   Ferritin 451.10 High  ng/mL               07/01/2024 1053 07/01/2024 1130 Fibrinogen [[id number]]   (Abnormal)   Blood    Final result Component Value Units   Fibrinogen 521 High  mg/dL          07/01/2024 1053 07/01/2024 1204 Fibrin Split Products [[id number]]   Blood    Final result Component Value   Fibrin Split Products Less Than 5 mcg/mL          07/01/2024 1053 07/01/2024 1102 Platelet Antibody Profile [[id number]]   Blood    In process Component Value   No component results          07/01/2024 1053 07/01/2024 1130 Protime-INR [[id number]]   Blood    Final result Component Value Units   Protime 13.5 Seconds   INR 0.99           07/01/2024 1053 07/01/2024 1130 aPTT [[id number]]   Blood    Final result Component Value Units   PTT 26.8 seconds                 07/01/2024 1053 07/05/2024 1608 Platelet Antibody Profile [[id number]]    (Abnormal)   Blood    Final result Component Value   HLA Class 1 Antibody Positive Abnormal    IIb/IIIa Antibody Negative   Ib/IX Antibody Negative   Ia/IIa Antibody Negative   Glycoprotein IV Antibody Positive Abnormal            Results from last 7 days   Lab Units 07/11/24  0356 07/10/24  0408 07/09/24  0914 07/08/24  0343 07/07/24  0409   SODIUM mmol/L 138 137 134* 134* 134*   POTASSIUM mmol/L 4.0 4.3 4.6 4.4 4.5   CHLORIDE mmol/L 101 102 99 95* 92*   CO2 mmol/L 25.0 24.0 24.0 26.0 28.0   BUN mg/dL 30* 47* 54* 48* 43*   CREATININE mg/dL 1.46* 2.15* 2.67* 3.48* 3.50*   CALCIUM mg/dL 9.5 8.9 8.6 9.0 10.0   BILIRUBIN mg/dL  --   --  0.3 0.3 0.3   ALK PHOS U/L  --   --  89 93 109   ALT (SGPT) U/L  --   --  20 20 22   AST (SGOT) U/L  --   --  25 23 25   GLUCOSE mg/dL 95 95 94 88 106*       ABG:  No results found for:  "\"PHART\", \"PO2ART\", \"ACH3FIB\"    Culture Data:   Urine Culture   Date Value Ref Range Status   06/28/2024 >100,000 CFU/mL Mixed Bhavana Isolated  Final       Lab Results   Component Value Date    PATHINTERP  07/01/2024     Moderate normochromic normocytic anemia    Normal total white blood cell count with the following manual differential:  Neutrophils 72%  Lymphocytes 20%  Monocytes 4%  Eosinophils 4%  Moderate peripheral thrombocytopenia    No schistocytes identified  No platelet clumps identified  No morulae identified in granulocytes or monocytes       Radiology:     Imaging Results (Last 7 Days)       Procedure Component Value Units Date/Time    US Renal Bilateral [[id number]] Collected: 07/08/24 1706     Updated: 07/08/24 1714    Narrative:      US RENAL BILATERAL-     HISTORY: acute on ckd; 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     There is an abnormal heterogeneous appearance to the bilateral kidneys  representative of the solid renal mass is better seen on the MRI abdomen  of 6/28/2024. A 4.3 x 2.7 x 2.9 cm right adrenal nodule is measured.  Right kidney measures 10.4 x 6.3 x 5.5 cm. The left kidney measures 10.7  x 6.9 x 6.5 cm. There is questionable trace volume of right subcapsular  fluid considered. There is only minimal prominence of the renal  collecting systems.     The distended bladder is unremarkable.       Impression:      1. Abnormal heterogeneous appearance to the bilateral renal parenchyma  representative of bilateral, left larger than right, " solid renal masses  better seen on MRI 6/28/2024. Trace right subcapsular fluid considered.     2. Only mild prominence of the bilateral renal collecting systems.     3. Persistent right adrenal mass.        This report was signed and finalized on 7/8/2024 5:10 PM by Dr. Meaghan Phillips MD.                 Objective:       Problem list:     Left renal mass    Morbid (severe) obesity due to excess calories    Cauda equina compression    Metastatic cancer to spine        Assessment/Plan:       ASSESSMENT:   Small lymphocytic lymphoma/chronic lymphocytic leukemia   -Tumor stage: IV  -Tumor burden:   -6/28/2024 CT-A/P showed abnormal heterogenous left kidney mass, abnormal right kidney, new right adrenal mass, innumerable solid nodules in the subcutaneous soft tissues all concerning for metastatic disease process.  -6/28/2024, CT lumbar spine showing multilevel prominent disc osteophyte complexes acet arthropathy and ligamental hypertrophy and resultant neural foraminal spinal canal stenosis.  -6/28/2024, CT chest showed a left breast nodule of 9 mm, scattered subcutaneous soft tissue nodules in the abdominal wall, otherwise there is no sign of intrathoracic metastases.  -6/28/2024, MRI-abdomen showed infiltrative mass within the left kidney extending into the proximal left collecting system as well as numerous additional solid masses in the bilateral kidneys. Additionally there is a right adrenal mass, multiple subcutaneous fat soft tissue masses, and a L3 vertebral body mass with suspected extension into the spinal canal. 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  -6/20/2024, MRI-L-spine: Neoplastic process involving the L3 vertebral body with associated posterior soft tissue component resulting in severe spinal canal narrowing and cauda equina nerve root compression. The  soft tissue component appears to also extend into the right L3-L4 foramen. No associated pathological fracture. There are also appears to be tumor signal extending into the right L2-L3 neural foramen.  - 7/1/2024-MRI brain-no definite evidence of intracranial metastatic disease within the limitations of noncontrast exam.  Cerebral microvascular disease noted.  - 7/1/2024-ultrasound-guided core needle biopsy of right flank subcutaneous nodule.  FINAL DIAGNOSIS:  Abnormal B-cell population which may represent small lymphocytic lymphoma/chronic lymphocytic leukemia (SLL/CLL).    -Complications of tumor: Cord compression at L3 concerning for epidural metastasis  -Tumor status:  - Systemically untreated.  Anticipate ibrutinib  - 6/29/2024 - OR for L3 laminectomy with decompression; prelim pathology showing small round blue cell tumor.  Final diagnosis: L3 epidural soft tissue mass, biopsy: Abnormal B-cell population with some features consistent with small lymphocytic lymphoma/chronic lymphocytic leukemia.  FISH REPORT:  Abnormal CLL FISH Panel and lymphoma probes.  Rearrangement of MYC/8q. Deletions of ANABEL/11q and CCND1/11q.  No evidence of rearrangements of BCL2 and BCL6 genes. No evidence of IGH/MYC and IGH/BCL2 translocation.      INTERPRETATION:  MYC gene rearrangement is characteristically observed in Burkitt lymphoma but may also be seen in diffuse large B-cell lymphoma and in transformed lower grade lymphomas    -7/8/24 -initiation of postop radiation therapy to the lumbar region with anticipated 2000 to 3000 cGy over 5-20 daily fractions    Diffuse soft tissue abdominal wall/right arm subcutaneous nodules.  - 7/1/24-ultrasound-guided core needle biopsy of right flank subcutaneous nodule.  FINAL DIAGNOSIS:  Abnormal B-cell population which may represent small lymphocytic lymphoma/chronic lymphocytic leukemia (SLL/CLL).  Comment  Please see additional report [id number] in the associated flow cytometry report. The  flow cytometry report showed  scatter properties compatible with mixed cell size with features of B-cell small lymphocytic lymphoma/chronic lymphocytic  leukemia (B-SLL/). FISH testing is ordered and pending. This may represent progression/transformation. Correlation with  clinical, laboratory, and morphologic data may be necessary to better define this lymphoid neoplasm.    3.   History of stage I left breast cancer.  Now with left breast nodule of 9 mm  4.   Anemia.  Contributions from CKD+/- malignancy/anemia of chronic disease  -Hgb 8.5; MCV 87.8, 7/11/2024 (prior: Hgb 8.0 -11.8)  -6/25/24-iron 56, iron saturation 16%, TIBC 355, ferritin 451  5.  Thrombocytopenia.  Contributions from CLL/SLL, +ITP  -68,000, 7/11/2024 (prior patito: 61,000).  Had previously needed platelet transfusions to maintain >/=100,000  -7/1/2024-platelet antibody profile: HLA class I antibody and glycoprotein antibodies positive.  - No evidence for DIC-7/1/2024: Fibrinogen 521, FSP<5, PT 13.5/INR 0.9, PTT 26.8  - No evidence for MAHA/TTP- 7/1/24: Manual blood smear--moderate normochromic normocytic anemia.  Normal total white blood cell count with 72 segs, 20 lymphs, 4 monos, 4 eos.  Moderate peripheral thrombocytopenia.  No schistocytes identified.  No platelet clumps identified.  No morulae identified in granulocytes or monocytes.  6.   Acute on CKD 3-4  - GFR 36.7, 7/11/2024 (prior: 12.9- 36.4)  7.   Guarded prognosis     PLAN:  -Again reviewed labs, 7/1/24 - 7/11/24.  Note normal WBC with ALC, stable anemia, and stable thrombocytopenia, + platelet antibodies, previously normal PT/PTT, normal fibrinogen, normal FSP (no DIC), no evidence for MAHA (no schistocytes).  -Apprised of pathological diagnosis from L3 biopsy and abdominal wall nodules (above) consistent with small lymphocytic lymphoma/chronic lymphocytic leukemia though FISH studies (above) reviewed:  I noted that MYC gene rearrangement is characteristically observed in Burkitt  "lymphoma but may also be seen in diffuse large B-cell lymphoma and in transformed lower grade lymphomas.  Needs a bone marrow biopsy.  Patient unable to tolerate lying on her abdomen for the procedure since her back surgery.  - Allopurinol on hold since 7/8/24 due to worsening GFR.  Since GFR now back to >30 may resume if patient opts to try ibrutinib.  -Systemic therapy (chemotherapy vs ibrutinib) again discussed with patient (and with family at the bedside) today.  We were anticipating initiation of therapy pending improvement of her renal function and while awaiting a bone marrow biopsy.  Today she is again unequivocal about her decision to forego systemic therapy.  She had previously decided that, \"I am leaving it in the hands of God.\"  States that she we will continue radiation but declined systemic therapy.   She is still focused on comfort care/best supportive care directed by hospice.  -Bone marrow biopsy (to assess for marrow involvement by CLL/SLL) if the patient agrees and reconsiders her decision regarding systemic therapy.  -Outpatient staging PET-scan-if she reconsiders her decision regarding systemic therapy.  -Pending outpatient diagnostic mammography to evaluate the left breast nodule unless the patient continues to decline any further therapy.  -Continue postop radiation therapy to the lumbar region (beginning 7/8/24) with anticipated 2000 to 3000 cGy over 5-20 daily fractions  - Palliative care services following.  - Care previously discussed with patient's daughter Kailee by telephone on 7/9/24.  Was apprised of the patient's decision to forego systemic therapy.  -Care previously discussed with Dr. Lincoln (hospitalist) and Dr. Parra of radiation oncology.   -Discussed today with the patient, her brother, her sister, and her sister-in-law at the bedside       I spent ~ 35 minutes caring for Marina on this date of service. This time includes time spent by me in the following activities: preparing " for the visit, reviewing tests, performing a medically appropriate examination and/or evaluation, counseling and educating the patient/family/caregiver, ordering medications, tests, or procedures and documenting information in the medical record

## 2024-07-11 NOTE — CASE MANAGEMENT/SOCIAL WORK
Continued Stay Note  JANE Edmonds     Patient Name: Marina Joe  MRN: [id number]  Today's Date: 7/11/2024    Admit Date: 6/28/2024    Plan: Home Hospice   Discharge Plan       Row Name 07/11/24 0949       Plan    Plan Home Hospice    Patient/Family in Agreement with Plan yes    Plan Comments Orders rec'd and referral faxed to [facility] at [phone].                   Discharge Codes    No documentation.                 Expected Discharge Date and Time       Expected Discharge Date Expected Discharge Time    Jul 11, 2024               JW Villalba

## 2024-07-11 NOTE — PROGRESS NOTES
"            [facility]    Palliative Care Daily Progress Note   Chief complaint-follow up support for patient/family    Code Status:   Code Status and Medical Interventions:   Ordered at: 07/01/24 7564     Medical Intervention Limits:    No intubation (DNI)    No artificial nutrition     Level Of Support Discussed With:    Patient     Code Status (Patient has no pulse and is not breathing):    No CPR (Do Not Attempt to Resuscitate)     Medical Interventions (Patient has pulse or is breathing):    Limited Support      Advanced Directives: Advance Directive Status: Patient does not have advance directive   Goals of Care: Ongoing.     S: Medical record reviewed. Events noted.  Received palliative radiation #1 on 7/9 and #4 today.  Started on fentanyl transdermal patch 12 mcg on 7/8.  Received 64 mg (64 mg 7/10, 124 mg 7/9) oral morphine equivalent over the last 24 hours in the form of fentanyl transdermal patch, Norco and Dilaudid IV.  Note plan for home with hospice, anticipating patient to discharge home with daughter in the [place], [place] area per SW. Bone marrow biopsy, systemic therapy, and outpatient staging imaging has been discontinued per oncology.  Sitting up in bed without apparent distress.  Reports nausea after radiation treatments and medicated with Zofran.  No family at bedside.  Advance Care Planning ongoing conversation with patient this morning, she verbalizes she has now elected not to discharge home with her daughter her but rather home with significant other, Jose Enrique.  We talked through multiple concerns.  She continues to have difficulty coming to terms with her cancer diagnosis and prognosis \"I think I can beat this like I beat my breast cancer and everything else in my life\".  Encouraged ongoing consideration with regard to discharge options including hospice as patient mentioned \"getting a second opinion.  Questions encouraged and support given.  social Kaila " worker present for conversation.        Review of Systems   Constitutional: Positive for malaise/fatigue.   Respiratory:  Negative for shortness of breath.    Musculoskeletal:  Positive for muscle weakness and back pain.   Genitourinary:  Negative for dysuria.   Neurological:  Positive for loss of balance and weakness.   Psychiatric/Behavioral:  The patient is not nervous/anxious    Pain Assessment  Preferred Pain Scale: number (Numeric Rating Pain Scale)  Nonverbal Indicators of Pain: nonverbal indicators absent  CPOT Facial Expression: 0-->relaxed, neutral  CPOT Body Movements: 0-->absence of movements  CPOT Muscle Tension: 0-->relaxed  Ventilator Compliance/Vocalization: 0-->talking in normal tone or no sound  CPOT Score: 0  Pain Location: back, buttock  Pain Description: aching    O:     Intake/Output Summary (Last 24 hours) at 7/11/2024 0936  Last data filed at 7/11/2024 0750  Gross per 24 hour   Intake 360 ml   Output 1850 ml   Net -1490 ml       Diagnostics and current medications: Reviewed.      Current Facility-Administered Medications:     acetaminophen (TYLENOL) tablet 650 mg, 650 mg, Oral, Q6H PRN, Marcellus Pulido MD, 650 mg at 06/29/24 0544    albuterol (PROVENTIL) nebulizer solution 0.083% 2.5 mg/3mL, 2.5 mg, Nebulization, Q4H PRN, Marcellus Pulido MD, 2.5 mg at 06/28/24 1040    amLODIPine (NORVASC) tablet 5 mg, 5 mg, Oral, BID, Marcellus Pulido MD, 5 mg at 07/10/24 2137    sennosides-docusate (PERICOLACE) 8.6-50 MG per tablet 2 tablet, 2 tablet, Oral, BID PRN, 2 tablet at 07/01/24 2045 **AND** polyethylene glycol (MIRALAX) packet 17 g, 17 g, Oral, Daily PRN, 17 g at 07/03/24 0836 **AND** bisacodyl (DULCOLAX) EC tablet 5 mg, 5 mg, Oral, Daily PRN **AND** bisacodyl (DULCOLAX) suppository 10 mg, 10 mg, Rectal, Daily PRN, Marcellus Pulido MD    buPROPion XL (WELLBUTRIN XL) 24 hr tablet 150 mg, 150 mg, Oral, Daily, Marcellus Pulido MD, 150 mg at 07/10/24 1017    Calcium  Replacement - Follow Nurse / BPA Driven Protocol, , Does not apply, PRN, Marcellus Pulido MD    carvedilol (COREG) tablet 25 mg, 25 mg, Oral, BID With Meals, Alysia Lincoln MD, 25 mg at 07/09/24 1026    cetirizine (zyrTEC) tablet 10 mg, 10 mg, Oral, Daily, Marcellus Pulido MD, 10 mg at 07/10/24 1017    fentaNYL (DURAGESIC) 12 MCG/HR 1 patch, 1 patch, Transdermal, Q72H, 1 patch at 07/08/24 1738 **AND** Check Fentanyl Patch Placement, 1 each, Does not apply, Q12H, Lauren Kuo APRN    cloNIDine (CATAPRES) tablet 0.2 mg, 0.2 mg, Oral, BID, Alysia Lincoln MD, 0.2 mg at 07/09/24 1030    cyclobenzaprine (FLEXERIL) tablet 10 mg, 10 mg, Oral, TID PRN, Marcellus Pulido MD, 10 mg at 07/09/24 1554    Diclofenac Sodium (VOLTAREN) 1 % gel 4 g, 4 g, Topical, 4x Daily PRN, Marcellus Pulido MD, 4 g at 07/08/24 0423    fluticasone (FLONASE) 50 MCG/ACT nasal spray 2 spray, 2 spray, Nasal, Daily, Marcellus Pulido MD, 2 spray at 07/10/24 1019    hydrALAZINE (APRESOLINE) injection 10 mg, 10 mg, Intravenous, Q4H PRN, Marcellus Pulido MD    HYDROcodone-acetaminophen (NORCO)  MG per tablet 1 tablet, 1 tablet, Oral, Q4H PRN **OR** HYDROcodone-acetaminophen (NORCO)  MG per tablet 2 tablet, 2 tablet, Oral, Q4H PRN, Lauren Kuo APRN, 2 tablet at 07/09/24 1558    HYDROmorphone (DILAUDID) injection 1 mg, 1 mg, Intravenous, Q2H PRN, Alysia Lincoln MD, 1 mg at 07/11/24 0659    lactulose solution 30 g, 30 g, Oral, TID, Alysia Lincoln MD, 20 g at 07/09/24 2048    Lidocaine 4 % 1 patch, 1 patch, Transdermal, Q24H, Lauren Kuo APRN, 1 patch at 07/10/24 1019    Magnesium Low Dose Replacement - Follow Nurse / BPA Driven Protocol, , Does not apply, PRN, Marcellus Pulido MD    methylnaltrexone (RELISTOR) injection 6 mg, 6 mg, Subcutaneous, Every Other Day, Marc Quevedo APRN, 6 mg at 07/10/24 1020    montelukast (SINGULAIR) tablet 10 mg, 10 mg, Oral, Nightly,  Marcellus Pulido MD, 10 mg at 07/10/24 2137    multivitamin with minerals 1 tablet, 1 tablet, Oral, Daily, Marcellus Pulido MD, 1 tablet at 07/10/24 1018    ondansetron (ZOFRAN) injection 4 mg, 4 mg, Intravenous, Q6H PRN, Marcellus Pulido MD, 4 mg at 07/11/24 0921    pantoprazole (PROTONIX) EC tablet 40 mg, 40 mg, Oral, Daily, Marcellus Pulido MD, 40 mg at 07/10/24 1021    Phosphorus Replacement - Follow Nurse / BPA Driven Protocol, , Does not apply, PRN, Marcellus Pulido MD    Potassium Replacement - Follow Nurse / BPA Driven Protocol, , Does not apply, Ashok DE JESUS William Lee, MD    rOPINIRole (REQUIP) tablet 0.5 mg, 0.5 mg, Oral, Nightly, Marcellus Pulido MD, 0.5 mg at 07/10/24 2137    rosuvastatin (CRESTOR) tablet 10 mg, 10 mg, Oral, Nightly, Marcellus Pulido MD, 10 mg at 07/10/24 2137    sertraline (ZOLOFT) tablet 100 mg, 100 mg, Oral, Daily, Marcellus Pulido MD, 100 mg at 07/10/24 1017    [COMPLETED] Insert Peripheral IV, , , Once **AND** sodium chloride 0.9 % flush 10 mL, 10 mL, Intravenous, PRN, Marcellus Pulido MD    sodium chloride 0.9 % flush 10 mL, 10 mL, Intravenous, Q12H, Marcellus Pulido MD, 10 mL at 07/10/24 2153    sodium chloride 0.9 % flush 10 mL, 10 mL, Intravenous, PRN, Marcellus Pulido MD    sodium chloride 0.9 % flush 10 mL, 10 mL, Intravenous, PRN, Marcellus Pulido MD    sodium chloride 0.9 % infusion 40 mL, 40 mL, Intravenous, PRN, Marcellus Pulido MD    sodium chloride 0.9 % infusion 40 mL, 40 mL, Intravenous, PRN, Marcellus Pulido MD    sodium chloride 0.9 % infusion, 100 mL/hr, Intravenous, Continuous, Dalati, Fakhri, MD, Last Rate: 100 mL/hr at 07/10/24 0622, 100 mL/hr at 07/10/24 0622    Allergies   Allergen Reactions    Aspirin GI Bleeding    Niacin Other (See Comments)     I have utilized all available immediate resources to obtain, update, or review the patient's current medications  "(including all prescriptions, over-the-counter products, herbals, cannabis/cannabidiol products, and vitamin/mineral/dietary (nutritional) supplements) for name, route of administration, type, dose and frequency.    A:    /75 (BP Location: Right arm, Patient Position: Sitting)   Pulse 83   Temp 97.4 °F (36.3 °C) (Oral)   Resp 16   Ht 172.7 cm (68\")   Wt 111 kg (244 lb)   LMP  (LMP Unknown)   SpO2 91%   BMI 37.10 kg/m²     Vitals and nursing note reviewed.   Constitutional:       Appearance: Not in distress.   Eyes:      General: Lids are normal.      Pupils: Pupils are equal, round, and reactive to light.   HENT:      Head: Normocephalic.   Pulmonary:      Effort: Pulmonary effort is normal.   Cardiovascular:      Normal rate.   Edema:     Peripheral edema present.  Abdominal:      Palpations: Abdomen is soft.   Musculoskeletal: Normal range of motion.      Cervical back: Neck supple.   Skin:     General: Skin is warm.   Genitourinary:     Comments: No reported dysuria  Neurological:      Mental Status: Alert   Psychiatric:         Mood and Affect: Mood normal.         Cognition and Memory: Cognition normal.      Patient status: Disease state: Controlled with current treatments.  Current Functional status: Palliative Performance Scale Score: Performance 40% based on the following measures: Ambulation: Mainly in bed, Activity and Evidence of Disease: Unable to do any work, extensive evidence of disease, Self-Care: Mainly assistance required,  Intake: Normal or reduced, LOC: Full, drowsy or confusion   Baseline Functional status: Palliative Performance Scale Score: Performance 70% based on the following measures: Ambulation: Reduced, Activity and Evidence of Disease: Unable to do normal work, some evidence of disease, Self-Care: Fully independent,  Intake: Normal or reduced, LOC: Full   Baseline ECOG Status(3) Capable of limited self-care, confined to bed or chair > 50% of waking hours.  Nutritional " status: Albumin 3.9 Body mass index is 37.1 kg/m².      Hospital Problem List      Left renal mass    Cauda equina compression    Metastatic cancer to spine     Impression/Problem List:     Small lymphocytic lymphoma/chronic lymphocytic leukemia, stage IV  Cord compression at L3 s/p laminectomy and decompression 6/29/2024  Bilateral renal masses, right adrenal mass, and abdominal soft tissue nodules concerning for metastatic disease  Cerebral microvascular disease, per MRI  History of breast cancer, stage I  Left breast nodule 9 mm  Anemia  Thrombocytopenia  Acute kidney injury on chronic kidney disease stage III  Hyperlipidemia  Hypertension     Recommendations/Plan:  1. plan: Goals of care include status no CPR/limited support interventions.     Family support: The patient receives support from her children and friend, Jose Enrique ..  Advance Directives: Advance Directive Status: Patient does not have advance directive   POA/Healthcare surrogate-Advance directive completed 7/1 with assistance from palliative  and , patient named her friend Jose Enrique followed by 2 children, Kailee and Marques as healthcare surrogates..     2.  Palliative care encounter  - Prognosis is guarded long-term secondary to suspected metastatic disease and comorbidities..  -Patient and HCS appears to have good prognostic awareness.      -Neurosurgery and nephrology consulted.    6/29-Underwent lumbar laminectomy L3 with decompression of the spinal cord and biopsy by Dr. 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    -Oncology consulted, recommend further staging diagnostics.  Anticipating biopsy of abdominal subcutaneous nodules.  MRI brain shows no definite evidence of metastatic disease.    -Radiation oncology consulted for postop radiation.   -Continues to work with therapy, LSO when out of bed per neurosurgery recommendation.     7/1-Advance directive completed today with assistance from palliative  and , patient named her friend Jose Enrique followed by 2 children Kailee and Marques as healthcare surrogates.  7/3-Cytology shows abnormal B-cell peripheral proliferation with some features consistent with small lymphocytic lymphoma/chronic lymphocytic leukemia, though FISH studies are pending.    -Oncology plans for bone marrow biopsy.  Anticipating initiation of ibrutinib.  Allopurinol initiated.  Plan for outpatient staging PET scan.  Plan outpatient diagnostic mammography to evaluate left breast nodule  -Radiation oncology plans for palliative radiation treatments 5-10 treatment fractions starting Monday 7/8, simulated 7/2.    -Neurosurgery recommends PT OT with brace and outpatient follow-up in 2 weeks.   7/8-Nephrology has been consulted due to worsening kidney function and plans for renal ultrasound.       7/1-CODE STATUS changes no CPR/limited support interventions, no intubation and no artificial nutrition.    -Will plan to complete a MOST document prior to discharge with assistance from palliative     7/11-continue supportive measures  -Radiation oncology plans for palliative radiation treatments 5-10 treatment fractions #1 Monday 7/8, #4 today    -Discharge disposition pending, anticipating home.  We again explored options of hospice.  Patient reports considering obtaining a second opinion with regard to cancer diagnosis and continues to have difficulty coming to terms with overall prognosis.    -A MOST document completed    3.  Pain, cancer related  -improved.  Received 64 mg (64 mg 7/10, 124 mg 7/9) oral morphine equivalent.  -Continue fentanyl transdermal patch 12 mcg for more consistent pain management  -Norco range dosing for moderate to severe pain as needed  -IV Dilaudid per attending  -Continue lidocaine patch  -Utilize Voltaren gel as needed  -Plan palliative radiation x 5-10 treatment  fractions  -Declined gabapentin trial at lower dose (100 mg every 8 hours), states side effects most recently with dizziness (300 mg per pain management).  Most likely exacerbated by kidney dysfunction.  Discontinued per patient request 7/8.    4.  Nausea  -Zofran as needed    20 minutes spent on advance care planning-discussing with patient and/or family, answering questions, providing some guidance about a plan and documentation of care, and coordinating care face to face.     Thank you for allowing us to participate in patient's plan of care. Palliative Care Team will continue to follow patient.     Lauren Kuo, MARY  7/11/2024  09:36 CDT

## 2024-07-11 NOTE — PLAN OF CARE
Goal Outcome Evaluation:      Resting well overnight. Up SBA to BSC. CO pain medicated. IVF. Safety maintained.

## 2024-07-11 NOTE — THERAPY TREATMENT NOTE
Patient Name: Marina Joe  : 1945    MRN: [id number]                              Today's Date: 2024       Admit Date: 2024    Visit Dx:     ICD-10-CM ICD-9-CM   1. 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 Left renal mass  N28.89 593.9   3. Right adrenal mass  E27.8 255.8   4. Nodule of soft tissue  M79.89 729.99   5. Acute on chronic renal insufficiency  N28.9 593.9    N18.9 585.9   6. Thrombocytopenia  D69.6 287.5   7. Cauda equina compression  G83.4 344.60   8. Impaired mobility [Z74.09]  Z74.09 799.89   9. HX: breast cancer  Z85.3 V10.3     Patient Active Problem List   Diagnosis    Malignant neoplasm of upper-outer quadrant of female breast    Anemia of chronic renal failure, stage 3 (moderate)    Hypertension, benign    Hx of colonic polyps    HX: breast cancer    Morbid (severe) obesity due to excess calories    Right knee DJD    S/P lumpectomy, left breast    Adult hypothyroidism    Anemia    Anxiety    Breast cancer, left    Cervical pain    Chronic insomnia    Elevated lipids    Herpes zoster without complication    Left ear pain    Left otitis externa    Myalgia    Negative depression screening    Obesity (BMI 30-39.9)    Postmenopausal status    Recurrent acute serous otitis media of left ear    Restless leg    Sinusitis, bacterial    Skin lesion    Vitamin D deficiency    Stage 3b chronic kidney disease    GIB (gastrointestinal bleeding)    Acute on chronic blood loss anemia    Chronic idiopathic thrombocytopenia    Hypotension due to hypovolemia    Primary hypertension    Pancreatic lesion    Left renal mass    Cauda equina compression    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 Recall 5 years    COLONOSCOPY  11/19/2018    Tics otherwise normal exam repeat in 5 years    COLONOSCOPY  02/13/2023    Normal exam repeat in 3 years with a 2 day prep    ENDOSCOPY  02/13/2023    Gastritis, small HH    LUMBAR LAMINECTOMY Right 6/29/2024    Procedure: LUMBAR LAMINECTOMY L3 WITH DECOMPRESSION 1-2 LEVELS-RIGHT;  Surgeon: Marcellus Pulido MD;  Location: [facility] OR;  Service: Neurosurgery;  Laterality: Right;    REPLACEMENT TOTAL KNEE Right     2016    TOTAL ABDOMINAL HYSTERECTOMY WITH SALPINGO OOPHORECTOMY      UPPER ENDOSCOPIC ULTRASOUND W/ FNA  12/20/2023    Pancreatic cyst s/p FNA      General Information       Row Name 07/11/24 1122          OT Time and Intention    Document Type therapy note (daily note)  -LR     Mode of Treatment occupational therapy  -LR       Row Name 07/11/24 1122          General Information    Patient Profile Reviewed yes  -LR     Existing Precautions/Restrictions fall;brace worn when out of bed;LSO;spinal  -LR               User Key  (r) = Recorded By, (t) = Taken By, (c) = Cosigned By      Initials Name Provider Type    LR Bety Tejeda, OTR/L Occupational Therapist                     Mobility/ADL's       Row Name 07/11/24 1122          Bed Mobility    Comment, (Bed Mobility) sitting EOB upon entry  -LR       Row Name 07/11/24 1122          Transfers    Transfers sit-stand transfer;stand-sit transfer;toilet transfer  -LR       Row Name 07/11/24 1122          Sit-Stand Transfer    Sit-Stand [place] (Transfers) contact guard;verbal cues  -LR     Assistive Device (Sit-Stand Transfers) walker, front-wheeled  -LR       Row Name 07/11/24 1122          Stand-Sit Transfer    Stand-Sit [place] (Transfers) contact guard;verbal cues  -LR      Assistive Device (Stand-Sit Transfers) walker, front-wheeled  -LR       Row Name 07/11/24 1122          Toilet Transfer    Type (Toilet Transfer) sit-stand;stand-sit  -LR     [place] Level (Toilet Transfer) standby assist;contact guard;verbal cues  -LR     Assistive Device (Toilet Transfer) commode;grab bars/safety frame;walker, front-wheeled  -LR       Row Name 07/11/24 1122          Functional Mobility    Functional Mobility- Ind. Level contact guard assist;verbal cues required  -LR     Functional Mobility- Device walker, front-wheeled  -LR     Functional Mobility- Comment EOB>BR>chair  -LR       Row Name 07/11/24 1122          Activities of Daily Living    BADL Assessment/Intervention grooming;upper body dressing  -LR       Row Name 07/11/24 1122          Upper Body Dressing Assessment/Training    [place] Level (Upper Body Dressing) don;maximum assist (25% patient effort);verbal cues  LSO  -LR     Position (Upper Body Dressing) edge of bed sitting  -LR       Row Name 07/11/24 1122          Grooming Assessment/Training    [place] Level (Grooming) wash face, hands;standby assist  -LR     Position (Grooming) sink side;supported standing  -LR               User Key  (r) = Recorded By, (t) = Taken By, (c) = Cosigned By      Initials Name Provider Type    Bety Fernandez OTR/L Occupational Therapist                   Obj/Interventions       Row Name 07/11/24 1122          Balance    Balance Assessment sitting static balance;sitting dynamic balance;standing static balance;standing dynamic balance  -LR     Static Sitting Balance supervision  -LR     Dynamic Sitting Balance standby assist  -LR     Position, Sitting Balance unsupported;sitting edge of bed  -LR     Static Standing Balance standby assist;contact guard  -LR     Dynamic Standing Balance contact guard  -LR     Position/Device Used, Standing Balance supported;walker, front-wheeled  -LR               User Key  (r) = Recorded By, (t) =  Taken By, (c) = Cosigned By      Initials Name Provider Type    LR Bety Tejeda, OTR/L Occupational Therapist                   Goals/Plan    No documentation.                  Clinical Impression       Row Name 07/11/24 1122          Pain Assessment    Pretreatment Pain Rating 7/10  -LR     Posttreatment Pain Rating 7/10  -LR     Pain Location lower  -LR     Pain Location - back  -LR     Pain Intervention(s) Medication (See MAR);Repositioned;Ambulation/increased activity  -LR       Row Name 07/11/24 1122          Plan of Care Review    Plan of Care Reviewed With patient;significant other  -LR     Progress improving  -LR     Outcome Evaluation OT tx completed. Pt presents sitting EOB with LSO doffed. Pt educated on LSO wear and required Max A for donning. Pt completed sit<>stand t/f's at EOB and commode with CGA/SBA, cues for tech. Pt completed fxl mobility using FWW with CGA required. Grooming completed standing sink-side with SBA. Pt denied further ADLs at this time. Pt left sitting in chair with LSO donned, significant other present, nsg notified, call light within reach, and encouraged to call for assist. Continue OT POC.  -LR       Row Name 07/11/24 1122          Vital Signs    O2 Delivery Pre Treatment room air  -LR     O2 Delivery Intra Treatment room air  -LR     O2 Delivery Post Treatment room air  -LR     Pre Patient Position Sitting  -LR     Intra Patient Position Standing  -LR     Post Patient Position Sitting  -LR       Row Name 07/11/24 1122          Positioning and Restraints    Pre-Treatment Position in bed  -LR     Post Treatment Position chair  -LR     In Chair notified nsg;sitting;call light within reach;encouraged to call for assist;waffle cushion;with brace;with family/caregiver  -LR               User Key  (r) = Recorded By, (t) = Taken By, (c) = Cosigned By      Initials Name Provider Type    LR Bety Tejeda, OTR/L Occupational Therapist                   Outcome Measures        Row Name 07/11/24 1122          How much help from another is currently needed...    Putting on and taking off regular lower body clothing? 3  -LR     Bathing (including washing, rinsing, and drying) 3  -LR     Toileting (which includes using toilet bed pan or urinal) 3  -LR     Putting on and taking off regular upper body clothing 2  -LR     Taking care of personal grooming (such as brushing teeth) 3  -LR     Eating meals 4  -LR     AM-PAC 6 Clicks Score (OT) 18  -LR       Row Name 07/11/24 0800          How much help from another person do you currently need...    Turning from your back to your side while in flat bed without using bedrails? 4  -KJ     Moving from lying on back to sitting on the side of a flat bed without bedrails? 3  -KJ     Moving to and from a bed to a chair (including a wheelchair)? 3  -KJ     Standing up from a chair using your arms (e.g., wheelchair, bedside chair)? 3  -KJ     Climbing 3-5 steps with a railing? 2  -KJ     To walk in hospital room? 3  -KJ     AM-PAC 6 Clicks Score (PT) 18  -KJ     Highest Level of Mobility Goal 6 --> Walk 10 steps or more  -KJ       Row Name 07/11/24 1122          Functional Assessment    Outcome Measure Options AM-PAC 6 Clicks Daily Activity (OT)  -LR               User Key  (r) = Recorded By, (t) = Taken By, (c) = Cosigned By      Initials Name Provider Type    Nellie Austin RN Registered Nurse    Bety Fernandez OTR/L Occupational Therapist                    Occupational Therapy Education       Title: PT OT SLP Therapies (Done)       Topic: Occupational Therapy (Done)       Point: ADL training (Done)       Description:   Instruct learner(s) on proper safety adaptation and remediation techniques during self care or transfers.   Instruct in proper use of assistive devices.                  Learning Progress Summary             Patient Acceptance, E,D, VU,NR by LR at 7/11/2024 1156    Acceptance, E,D, VU,NR by LR at 7/10/2024 1008     Acceptance, E,D, VU,NR by LR at 7/5/2024 1058    Acceptance, E, VU by HH at 7/3/2024 1141    Acceptance, E, VU by HH at 7/2/2024 1436    Acceptance, E, VU by EC at 7/1/2024 0911    Acceptance, E, VU by KJ at 6/30/2024 0941    Acceptance, E, VU by LS at 6/28/2024 1431   Significant Other Acceptance, E,D, VU,NR by LR at 7/11/2024 1156                         Point: Home exercise program (Done)       Description:   Instruct learner(s) on appropriate technique for monitoring, assisting and/or progressing therapeutic exercises/activities.                  Learning Progress Summary             Patient Acceptance, E, VU by HH at 7/3/2024 1141    Acceptance, E, VU by HH at 7/2/2024 1436    Acceptance, E, VU by EC at 7/1/2024 0911    Acceptance, E, VU by KJ at 6/30/2024 0941                         Point: Precautions (Done)       Description:   Instruct learner(s) on prescribed precautions during self-care and functional transfers.                  Learning Progress Summary             Patient Acceptance, E,D, VU,NR by LR at 7/11/2024 1156    Acceptance, E,D, VU,NR by LR at 7/10/2024 1008    Acceptance, E,D, VU,NR by LR at 7/5/2024 1058    Acceptance, E, VU by HH at 7/3/2024 1141    Acceptance, E, VU by HH at 7/2/2024 1436    Acceptance, E, VU by EC at 7/1/2024 0911    Acceptance, E, VU by KJ at 6/30/2024 0941    Acceptance, E, VU by LS at 6/28/2024 1431   Significant Other Acceptance, E,D, VU,NR by LR at 7/11/2024 1156                         Point: Body mechanics (Done)       Description:   Instruct learner(s) on proper positioning and spine alignment during self-care, functional mobility activities and/or exercises.                  Learning Progress Summary             Patient Acceptance, E,D, VU,NR by LR at 7/11/2024 1156    Acceptance, E,D, VU,NR by LR at 7/10/2024 1008    Acceptance, E,D, VU,NR by LR at 7/5/2024 1058    Acceptance, E, VU by HH at 7/3/2024 1141    Acceptance, E, VU by HH at 7/2/2024 1436    Acceptance,  E, VU by EC at 7/1/2024 0911    Acceptance, E, VU by KJ at 6/30/2024 0941    Acceptance, E, VU by LS at 6/28/2024 1431   Significant Other Acceptance, E,D, VU,NR by LR at 7/11/2024 1156                                         User Key       Initials Effective Dates Name Provider Type Discipline    LS 06/20/22 -  Stacey Reagan, OTR/L Occupational Therapist OT    LR 04/25/23 -  Bety Tejeda, OTR/L Occupational Therapist OT    KJ 04/15/24 -  Joanna Marie, OT Student OT Student OT    EC 10/13/23 -  Davida Reyes, OTR/L Occupational Therapist OT     04/15/24 -  Kim Freed, OT Student OT Student OT                  OT Recommendation and Plan  Planned Therapy Interventions (OT): activity tolerance training, adaptive equipment training, BADL retraining, functional balance retraining, IADL retraining, neuromuscular control/coordination retraining, occupation/activity based interventions, orthotic fabrication/fitting/training, patient/caregiver education/training, ROM/therapeutic exercise, strengthening exercise, transfer/mobility retraining  Therapy Frequency (OT): 5 times/wk  Plan of Care Review  Plan of Care Reviewed With: patient, significant other  Progress: improving  Outcome Evaluation: OT tx completed. Pt presents sitting EOB with LSO doffed. Pt educated on LSO wear and required Max A for donning. Pt completed sit<>stand t/f's at EOB and commode with CGA/SBA, cues for tech. Pt completed fxl mobility using FWW with CGA required. Grooming completed standing sink-side with SBA. Pt denied further ADLs at this time. Pt left sitting in chair with LSO donned, significant other present, nsg notified, call light within reach, and encouraged to call for assist. Continue OT POC.     Time Calculation:         Time Calculation- OT       Row Name 07/11/24 1122             Time Calculation- OT    OT Start Time 1122  -LR      OT Stop Time 1150  -LR      OT Time Calculation (min) 28 min  -LR      Total Timed Code  Minutes- OT 28 minute(s)  -LR      OT Received On 07/11/24  -LR         Timed Charges    90727 - OT Self Care/Mgmt Minutes 28  -LR         Total Minutes    Timed Charges Total Minutes 28  -LR       Total Minutes 28  -LR                User Key  (r) = Recorded By, (t) = Taken By, (c) = Cosigned By      Initials Name Provider Type    LR Bety Tejeda OTR/L Occupational Therapist                  Therapy Charges for Today       Code Description Service Date Service Provider Modifiers Qty    10894164846 HC OT SELF CARE/MGMT/TRAIN EA 15 MIN 7/10/2024 Bety Tejeda OTR/L GO 1    86223011277 HC OT RE-EVAL 2 7/10/2024 Bety Tejeda OTR/L GO 1    15331108764 HC OT SELF CARE/MGMT/TRAIN EA 15 MIN 7/11/2024 Bety Tejeda OTR/L GO 2                 Bety Tejeda OTR/L  7/11/2024

## 2024-07-11 NOTE — THERAPY TREATMENT NOTE
Acute Care - Physical Therapy Treatment Note  [facility]     Patient Name: Marina Joe  : 1945  MRN: [id number]  Today's Date: 2024      Visit Dx:     ICD-10-CM ICD-9-CM   1. 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 Left renal mass  N28.89 593.9   3. Right adrenal mass  E27.8 255.8   4. Nodule of soft tissue  M79.89 729.99   5. Acute on chronic renal insufficiency  N28.9 593.9    N18.9 585.9   6. Thrombocytopenia  D69.6 287.5   7. Cauda equina compression  G83.4 344.60   8. Impaired mobility [Z74.09]  Z74.09 799.89   9. HX: breast cancer  Z85.3 V10.3     Patient Active Problem List   Diagnosis    Malignant neoplasm of upper-outer quadrant of female breast    Anemia of chronic renal failure, stage 3 (moderate)    Hypertension, benign    Hx of colonic polyps    HX: breast cancer    Morbid (severe) obesity due to excess calories    Right knee DJD    S/P lumpectomy, left breast    Adult hypothyroidism    Anemia    Anxiety    Breast cancer, left    Cervical pain    Chronic insomnia    Elevated lipids    Herpes zoster without complication    Left ear pain    Left otitis externa    Myalgia    Negative depression screening    Obesity (BMI 30-39.9)    Postmenopausal status    Recurrent acute serous otitis media of left ear    Restless leg    Sinusitis, bacterial    Skin lesion    Vitamin D deficiency    Stage 3b chronic kidney disease    GIB (gastrointestinal bleeding)    Acute on chronic blood loss anemia    Chronic idiopathic thrombocytopenia    Hypotension due to hypovolemia    Primary hypertension    Pancreatic lesion    Left renal mass    Cauda equina compression    Metastatic cancer to spine     Past Medical History:   Diagnosis Date    Breast cancer     CKD (chronic kidney disease)     Hypercholesteremia     Hypertension     Sinusitis     Stage 3a chronic kidney disease 10/20/2020     Past Surgical History:   Procedure Laterality Date    AVULSION TOENAIL PLATE          BREAST BIOPSY  Left 11/20/2012    BREAST BIOPSY      Left Breast, 1/2019 per Dr Barkley    BREAST LUMPECTOMY Left     with node bx     COLONOSCOPY  09/13/2013    small polyp at 30cm benign hyperplastic polyp, changes consistent with melanosis coli. Recall 5 years    COLONOSCOPY  11/19/2018    Tics otherwise normal exam repeat in 5 years    COLONOSCOPY  02/13/2023    Normal exam repeat in 3 years with a 2 day prep    ENDOSCOPY  02/13/2023    Gastritis, small HH    LUMBAR LAMINECTOMY Right 6/29/2024    Procedure: LUMBAR LAMINECTOMY L3 WITH DECOMPRESSION 1-2 LEVELS-RIGHT;  Surgeon: Marcellus Pulido MD;  Location:  [facility] OR;  Service: Neurosurgery;  Laterality: Right;    REPLACEMENT TOTAL KNEE Right     2016    TOTAL ABDOMINAL HYSTERECTOMY WITH SALPINGO OOPHORECTOMY      UPPER ENDOSCOPIC ULTRASOUND W/ FNA  12/20/2023    Pancreatic cyst s/p FNA     PT Assessment (Last 12 Hours)       PT Evaluation and Treatment       Row Name 07/11/24 1415 07/11/24 1035       Physical Therapy Time and Intention    Subjective Information complains of;fatigue;weakness  -LY --    Document Type therapy note (daily note)  -LY therapy note (daily note)  -LY    Mode of Treatment physical therapy  -LY physical therapy  -LY    Comment, Session Not Performed -- asked to check back, her stomach is not feeling well  -LY    Comment pt is very impulsive and requires frequent cues for safety  -LY --      Row Name 07/11/24 1415 07/11/24 1035       General Information    Existing Precautions/Restrictions fall;brace worn when out of bed;LSO;spinal  -LY fall;brace worn when out of bed;LSO;spinal  -LY      Row Name 07/11/24 1415          Pain    Pretreatment Pain Rating 6/10  -LY     Posttreatment Pain Rating 6/10  -LY     Pain Location lower  -LY     Pain Location - back  -LY     Pain Intervention(s) Medication (See MAR);Ambulation/increased activity  -LY       Row Name 07/11/24 1415          Bed Mobility    Rolling Left [place] (Bed Mobility) verbal  cues;standby assist  -LY     Rolling Right [place] (Bed Mobility) verbal cues;standby assist  -LY     Sidelying-Sit [place] (Bed Mobility) verbal cues;contact guard  -LY     Sit-Sidelying [place] (Bed Mobility) verbal cues;contact guard  -LY     Assistive Device (Bed Mobility) bed rails;head of bed elevated  -LY       Row Name 07/11/24 1415          Sit-Stand Transfer    Sit-Stand [place] (Transfers) contact guard;verbal cues  -LY     Assistive Device (Sit-Stand Transfers) walker, front-wheeled  -LY       Row Name 07/11/24 1415          Stand-Sit Transfer    Stand-Sit [place] (Transfers) contact guard;verbal cues  -LY     Assistive Device (Stand-Sit Transfers) walker, front-wheeled  -LY       Row Name 07/11/24 1415          Toilet Transfer    Type (Toilet Transfer) sit-stand;stand-sit  -LY     [place] Level (Toilet Transfer) standby assist;contact guard;verbal cues  -LY     Assistive Device (Toilet Transfer) commode;grab bars/safety frame;walker, front-wheeled  -LY       Row Name 07/11/24 1415          Gait/Stairs (Locomotion)    [place] Level (Gait) verbal cues;contact guard  -LY     Assistive Device (Gait) walker, front-wheeled  -LY     Distance in Feet (Gait) 250  x2  -LY     Pattern (Gait) step-through  -LY     Deviations/Abnormal Patterns (Gait) maynor decreased;bilateral deviations  -LY     Bilateral Gait Deviations forward flexed posture  -LY     Left Sided Gait Deviations heel strike decreased  -LY       Row Name             Wound 06/29/24 1458 lumbar spine Incision    Wound - Properties Group Placement Date: 06/29/24  -KT Placement Time: 1458 -KT Present on Original Admission: N  -KT Location: lumbar spine  -KT Primary Wound Type: Incision  -KT    Retired Wound - Properties Group Placement Date: 06/29/24  -KT Placement Time: 1458 -KT Present on Original Admission: N  -KT Location: lumbar spine  -KT Primary Wound Type: Incision  -KT    Retired Wound - Properties Group  Date first assessed: 06/29/24  -KT Time first assessed: 1458  -KT Present on Original Admission: N  -KT Location: lumbar spine  -KT Primary Wound Type: Incision  -KT      Row Name 07/11/24 1415          Positioning and Restraints    Pre-Treatment Position in bed  -LY     Post Treatment Position bed  -LY     In Bed fowlers;call light within reach;encouraged to call for assist;with family/caregiver  -LY               User Key  (r) = Recorded By, (t) = Taken By, (c) = Cosigned By      Initials Name Provider Type    LY Stacey Miner, PTA Physical Therapist Assistant    Shravan Ordonez RN Registered Nurse                    Physical Therapy Education       Title: PT OT SLP Therapies (Done)       Topic: Physical Therapy (Done)       Point: Mobility training (Done)       Learning Progress Summary             Patient Acceptance, E, VU by AJ at 7/10/2024 1147    Comment: continued progression with goals, rest breaks, d/c plans    Eager, E, VU by  at 7/5/2024 1023    Comment: POC    Acceptance, E,D, DU,VU by  at 6/30/2024 0951    Comment: benefits of PT and POC, call for A OOB, no BLT, LSO when OOB                         Point: Home exercise program (Done)       Learning Progress Summary             Patient Acceptance, E, VU by TERRA at 7/10/2024 1147    Comment: continued progression with goals, rest breaks, d/c plans                         Point: Body mechanics (Done)       Learning Progress Summary             Patient Acceptance, E, VU by AJ at 7/10/2024 1147    Comment: continued progression with goals, rest breaks, d/c plans    Acceptance, E,D, DU,VU by  at 6/30/2024 0951    Comment: benefits of PT and POC, call for A OOB, no BLT, LSO when OOB                         Point: Precautions (Done)       Learning Progress Summary             Patient Acceptance, E, VU by AJ at 7/10/2024 1147    Comment: continued progression with goals, rest breaks, d/c plans    Acceptance, E,D, DU,VU by  at 6/30/2024 0951    Comment:  benefits of PT and POC, call for A OOB, no BLT, LSO when OOB                                         User Key       Initials Effective Dates Name Provider Type Discipline    AE 02/03/23 -  Sofia Ann, PTA Physical Therapist Assistant PT     02/03/23 -  Eliel León, PT Physical Therapist PT     05/07/24 -  Warren Carvalho, PT Physical Therapist PT                  PT Recommendation and Plan         Outcome Measures       Row Name 07/09/24 0933             How much help from another person do you currently need...    Turning from your back to your side while in flat bed without using bedrails? 3  -MF      Moving from lying on back to sitting on the side of a flat bed without bedrails? 3  -MF      Moving to and from a bed to a chair (including a wheelchair)? 3  -MF      Standing up from a chair using your arms (e.g., wheelchair, bedside chair)? 3  -MF      Climbing 3-5 steps with a railing? 2  -MF      To walk in hospital room? 3  -MF      AM-PAC 6 Clicks Score (PT) 17  -MF      Highest Level of Mobility Goal 5 --> Static standing  -MF         Functional Assessment    Outcome Measure Options AM-PAC 6 Clicks Basic Mobility (PT)  -MF                User Key  (r) = Recorded By, (t) = Taken By, (c) = Cosigned By      Initials Name Provider Type     Rosalva Lewis PTA Physical Therapist Assistant                     Time Calculation:    PT Charges       Row Name 07/11/24 1539             Time Calculation    Start Time 1415  -LY      Stop Time 1453  -LY      Time Calculation (min) 38 min  -LY      PT Received On 07/11/24  -LY         Time Calculation- PT    Total Timed Code Minutes- PT 38 minute(s)  -LY         Timed Charges    51433 - Gait Training Minutes  25  -LY      52929 - PT Therapeutic Activity Minutes 13  -LY         Total Minutes    Timed Charges Total Minutes 38  -LY       Total Minutes 38  -LY                User Key  (r) = Recorded By, (t) = Taken By, (c) = Cosigned By      Initials Name  Provider Type    LY Stacey Miner PTA Physical Therapist Assistant                  Therapy Charges for Today       Code Description Service Date Service Provider Modifiers Qty    77278847418 HC GAIT TRAINING EA 15 MIN 7/11/2024 Stacey Mienr PTA GP 2    86226932513 HC PT THERAPEUTIC ACT EA 15 MIN 7/11/2024 Stacey Miner PTA GP 1            PT G-Codes  Outcome Measure Options: AM-PAC 6 Clicks Daily Activity (OT)  AM-PAC 6 Clicks Score (PT): 18  AM-PAC 6 Clicks Score (OT): 18    Stacey Miner PTA  7/11/2024

## 2024-07-11 NOTE — PROGRESS NOTES
[facility] Medicine Services  INPATIENT PROGRESS NOTE    Patient Name: Marina Joe  Date of Admission: 6/28/2024  Today's Date: 07/11/24  Length of Stay: 13  Primary Care Physician: Hanh Og APRN    Subjective   Chief Complaint: Lower back pain          HPI   Ms. Joe is a 79-year-old female from home with past medical history of breast cancer, chronic kidney disease stage IIIa, hypercholesterolemia, hypertension and chronic sinusitis, who presented to the emergency room with worsening back pain/flank pain, history was mostly provided by patient's boyfriend at bedside.  She developed back pain couple of weeks ago and was referred to a pain clinic doctor where she was prescribed gabapentin, after she took about 2-3 doses of the gabapentin, she developed dizziness. She came to the emergency room today because lower back pain and flank pain got even worse despite being on the gabapentin.  In the emergency room, she was extensively worked up with CT of the abdomen and lumbar spine, CT abdomen showed extensive mass of the left kidney extending up to the proximal left ureter as well as right adrenal gland mass suspicious for metastasis.  Urology was consulted from the emergency room for input.  7/1  As above history of chronic kidney disease hyperlipidemia hypertension presented with back pain found to have kidney mass suspicious for metastasis patient CT of the lumbar spine shows multilevel degenerative changes to include an anteriolisthesis of L3 over L4 and spondylosis at L5-S1. 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 awaiting final pathology will consult with palliative  team to address the CODE STATUS and plan of care  7/2  Appreciate palliative care the patient is currently DNR, appreciate neurosurgery status post L3 laminectomy and decompression on June 29 for cauda equina, appreciate oncology continues to work with physical therapy appreciate  radiation oncology patient started radiation today  7/3  Patient oxygen is 85% on 8 L, patient blood pressure is poorly controlled increase her Coreg we will repeat her chest x-ray chest x-ray from before was showing some congestion IV Hep-Lock will give her 1 dose of Lasix  7/4  As before remains on 8 L oxygen documented excision was not titrated will titrate oxygen, chest x-ray showing cardiomegaly pulmonary edema the patient has poor inspiration we did Hep-Lock her IV fluid we will continue IV Lasix blood pressure remains poorly controlled increase her Coreg we will check her echo of the heart as above she was not having bowel movement for most time was started on lactulose, patient had good bowel movement it was mistakenly documented that she is on 8 L the patient is actually on room air  7/5  Patient echo of the heart is showing good EF 56 to 60% blood pressure remains a challenge will increase her Catapres I will decrease her  Lasix to 20 mg daily continue to work with PT OT, she had nephrology signed off, pain is not well controlled, will consult palliative for pain control   7/6  As before appreciate bronchopulmonary they have signed off appreciate palliative care helping us manage her pain continues to work with physical therapy today her creatinine is up we will discontinue IV Lasix remains off oxygen even though it is still documented she is on 8 L oxygen  7/7  Creatinine is up secondary to overdiuresis, start IVF, still having neuropathy will add neurontine   7/8  Her kidney function is not improving I will go ahead and consult with nephrology I spoke to oncology ideally we would love to do bone marrow biopsy to address  possible CLL but she cannot lay on her stomach with this kidney function we cannot start any chemo the 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 though FISH studies are pending , we had to stop the allopurinol secondary to worsening GFR, for postop radiation to start today I will order ultrasound renal and consult with nephrology hold all nephrotoxins  7/9  Long discussion with the patient family and with the oncology team strongly feel that this patient would not do well with chemo or radiation and would not do well with physical therapy or going to rehab hospice is the best option for her I will go ahead and consult hospice  7/10  As before had a long discussion with the patient her daughter and her partner about the options and plan of care, discussion with radiation oncology with the oncology, interested in rehab patient is undergoing radiation, Anticipating bone marrow biopsy, and initiation of ibrutinib and outpatient PET scan per oncology and outpatient diagnostic mammography to evaluate left breast nodule , patient was placed on fentanyl patch and Norco as needed she is getting IV Dilaudid as needed is on the lidocaine patch Neurontin was discontinued again the patient is extremely passive and so is her significant other and she is not really engaged with the decision making, her kidney function is getting better remains DNR  7/11  Long discussion yesterday with the entire family and I was told that the family is electing hospice at home, appreciate palliative care, planning to go home with daughter to [place], [place]. Daughter is requesting [facility]   Review of Systems   All pertinent negatives and positives are as above. All other systems have been reviewed and are negative unless otherwise stated.     Objective    Temp:  [97.4 °F (36.3 °C)-98.9 °F (37.2 °C)] 97.4 °F (36.3 °C)  Heart Rate:  [70-85] 83  Resp:  [16] 16  BP: (129-169)/(50-82) 153/75  Physical Exam    GEN: Awake,  alert, interactive, in NAD  HEENT: Atraumatic, PERRLA, EOMI, Anicteric, Trachea midline  Lungs: CTAB, no wheezing/rales/rhonchi  Heart: RRR, +S1/s2, no rub  ABD: soft, nt/nd, +BS, no guarding/rebound  Extremities: atraumatic, no cyanosis, no edema  Skin: no rashes or lesions  Neuro: AAOx3, no focal deficits  Psych: normal mood & affect    Results Review:  I have reviewed the labs, radiology results, and diagnostic studies.    Laboratory Data:   Results from last 7 days   Lab Units 07/11/24  0356 07/10/24  0408 07/09/24  0914   WBC 10*3/mm3 7.26 6.63 7.28   HEMOGLOBIN g/dL 8.5* 8.0* 7.9*   HEMATOCRIT % 26.7* 24.9* 25.1*   PLATELETS 10*3/mm3 68* 61* 63*        Results from last 7 days   Lab Units 07/11/24  0356 07/10/24  0408 07/09/24  0914 07/08/24  0343 07/07/24  0409   SODIUM mmol/L 138 137 134* 134* 134*   POTASSIUM mmol/L 4.0 4.3 4.6 4.4 4.5   CHLORIDE mmol/L 101 102 99 95* 92*   CO2 mmol/L 25.0 24.0 24.0 26.0 28.0   BUN mg/dL 30* 47* 54* 48* 43*   CREATININE mg/dL 1.46* 2.15* 2.67* 3.48* 3.50*   CALCIUM mg/dL 9.5 8.9 8.6 9.0 10.0   BILIRUBIN mg/dL  --   --  0.3 0.3 0.3   ALK PHOS U/L  --   --  89 93 109   ALT (SGPT) U/L  --   --  20 20 22   AST (SGOT) U/L  --   --  25 23 25   GLUCOSE mg/dL 95 95 94 88 106*       Culture Data:   Urine Culture   Date Value Ref Range Status   06/28/2024 >100,000 CFU/mL Mixed Bhavana Isolated  Final       Radiology Data:   Imaging Results (Last 24 Hours)       ** No results found for the last 24 hours. **            I have reviewed the patient's current medications.     Assessment/Plan   Assessment  Active Hospital Problems    Diagnosis     **Left renal mass     Cauda equina compression     Metastatic cancer to spine     Morbid (severe) obesity due to excess calories        Treatment Plan  Lower back pain  Patient's low back pain has been getting worse in the last couple of weeks, was started on gabapentin outpatient but did not help.  MRI of the lumbar spine reported as  follows-  IMPRESSION:   Neoplastic process involving the L3 vertebral body with associated  posterior soft tissue component resulting in severe spinal canal  narrowing and cauda equina nerve root compression. The soft tissue  component appears to also extend into the right L3-L4 foramen. No  associated pathological fracture.  Additional multilevel spondylotic changes including moderate to severe  spinal canal and foraminal narrowing as detailed above.  Neurosurgery is on consult and did the following procedure-  Procedure(s):  LUMBAR LAMINECTOMY L3 WITH DECOMPRESSION 1-2 LEVELS-RIGHT     Decompression -  Lumbar laminectomy, medial facetectomy for decompression of the spinal cord  Open laminectomy and biopsy of epidural neoplasm  Use of intraoperative microscope      -Acute on chronic kidney disease stage IIIa  Patient follows up with a nephrologist outpatient, but he does not come to this hospital.  Nephrologist consulted and helping with management while patient is in-house.     -Left renal mass/right adrenal mass on CT of the abdomen/pelvis  Urology was consulted from the emergency room and after evaluation did not recommend any intervention,rather recommended oncology consult.  Patient has multiple subcutaneous nodules that may be amenable to percutaneous biopsy.  Oncology is on consult and has evaluated patient, will await for tentative diagnosis from lumbar spine biopsy before making recommendations      Other chronic medical conditions-  History of breast cancer  Hypercholesterolemia  Hypertension  Chronic sinusitis     DVT prophylaxis-heparin       Medical Decision Making  Number and Complexity of problems: High    Conditions and Status        Condition is unchanged.     MDM Data  External documents reviewed: No  Cardiac tracing (EKG, telemetry) interpretation: Yes  Radiology interpretation: Yes  Labs reviewed: Yes  Any tests that were considered but not ordered: No     Decision rules/scores evaluated  (example [id number], [place], etc): Yes     Discussed with: Patient     Care Planning  Shared decision making: Yes  Code status and discussions: Yes [full code]    Disposition  Social Determinants of Health that impact treatment or disposition: No  I expect the patient to be discharged to unknown in unknown days.     Electronically signed by Alysia Lincoln MD, 07/11/24, 09:47 CDT.

## 2024-07-11 NOTE — PLAN OF CARE
Goal Outcome Evaluation:  Plan of Care Reviewed With: patient, significant other        Progress: improving  Outcome Evaluation: OT tx completed. Pt presents sitting EOB with LSO doffed. Pt educated on LSO wear and required Max A for donning. Pt completed sit<>stand t/f's at EOB and commode with CGA/SBA, cues for tech. Pt completed fxl mobility using FWW with CGA required. Grooming completed standing sink-side with SBA. Pt denied further ADLs at this time. Pt left sitting in chair with LSO donned, significant other present, nsg notified, call light within reach, and encouraged to call for assist. Continue OT POC.

## 2024-07-11 NOTE — PLAN OF CARE
Goal Outcome Evaluation:  Plan of Care Reviewed With: patient           Outcome Evaluation: c/o nausea/ emesis x1 see MAR; VSS; fent. patch remains in place; PT/OT; safety maintained

## 2024-07-12 ENCOUNTER — TELEPHONE (OUTPATIENT)
Dept: INTERNAL MEDICINE | Facility: CLINIC | Age: 79
End: 2024-07-12

## 2024-07-12 LAB
ANION GAP SERPL CALCULATED.3IONS-SCNC: 10 MMOL/L (ref 5–15)
BASOPHILS # BLD AUTO: 0.01 10*3/MM3 (ref 0–0.2)
BASOPHILS NFR BLD AUTO: 0.2 % (ref 0–1.5)
BEAKER LAB AP INTRAOPERATIVE CONSULTATION: NORMAL
BUN SERPL-MCNC: 26 MG/DL (ref 8–23)
BUN/CREAT SERPL: 17.7 (ref 7–25)
CALCIUM SPEC-SCNC: 9.1 MG/DL (ref 8.6–10.5)
CHLORIDE SERPL-SCNC: 101 MMOL/L (ref 98–107)
CO2 SERPL-SCNC: 26 MMOL/L (ref 22–29)
CREAT SERPL-MCNC: 1.47 MG/DL (ref 0.57–1)
CYTO UR: NORMAL
CYTO UR: NORMAL
DEPRECATED RDW RBC AUTO: 52.8 FL (ref 37–54)
EGFRCR SERPLBLD CKD-EPI 2021: 36.4 ML/MIN/1.73
EOSINOPHIL # BLD AUTO: 0.14 10*3/MM3 (ref 0–0.4)
EOSINOPHIL NFR BLD AUTO: 2.5 % (ref 0.3–6.2)
ERYTHROCYTE [DISTWIDTH] IN BLOOD BY AUTOMATED COUNT: 16 % (ref 12.3–15.4)
GLUCOSE SERPL-MCNC: 103 MG/DL (ref 65–99)
HCT VFR BLD AUTO: 25.3 % (ref 34–46.6)
HGB BLD-MCNC: 8 G/DL (ref 12–15.9)
IMM GRANULOCYTES # BLD AUTO: 0.03 10*3/MM3 (ref 0–0.05)
IMM GRANULOCYTES NFR BLD AUTO: 0.5 % (ref 0–0.5)
LAB AP CASE REPORT: NORMAL
LAB AP CASE REPORT: NORMAL
LAB AP CLINICAL INFORMATION: NORMAL
LAB AP CLINICAL INFORMATION: NORMAL
LAB AP DIAGNOSIS COMMENT: NORMAL
LAB AP FLOW CYTOMETRY SUMMARY: NORMAL
LYMPHOCYTES # BLD AUTO: 0.63 10*3/MM3 (ref 0.7–3.1)
LYMPHOCYTES NFR BLD AUTO: 11.3 % (ref 19.6–45.3)
Lab: NORMAL
MCH RBC QN AUTO: 28.2 PG (ref 26.6–33)
MCHC RBC AUTO-ENTMCNC: 31.6 G/DL (ref 31.5–35.7)
MCV RBC AUTO: 89.1 FL (ref 79–97)
MONOCYTES # BLD AUTO: 0.46 10*3/MM3 (ref 0.1–0.9)
MONOCYTES NFR BLD AUTO: 8.2 % (ref 5–12)
NEUTROPHILS NFR BLD AUTO: 4.32 10*3/MM3 (ref 1.7–7)
NEUTROPHILS NFR BLD AUTO: 77.3 % (ref 42.7–76)
PATH REPORT.FINAL DX SPEC: NORMAL
PATH REPORT.FINAL DX SPEC: NORMAL
PATH REPORT.GROSS SPEC: NORMAL
PATH REPORT.GROSS SPEC: NORMAL
PLATELET # BLD AUTO: 51 10*3/MM3 (ref 140–450)
PMV BLD AUTO: ABNORMAL FL
POTASSIUM SERPL-SCNC: 4.3 MMOL/L (ref 3.5–5.2)
RAD ONC ARIA COURSE ID: NORMAL
RAD ONC ARIA COURSE LAST TREATMENT DATE: NORMAL
RAD ONC ARIA COURSE START DATE: NORMAL
RAD ONC ARIA COURSE TREATMENT ELAPSED DAYS: 4
RAD ONC ARIA FIRST TREATMENT DATE: NORMAL
RAD ONC ARIA PLAN FRACTIONS TREATED TO DATE: 5
RAD ONC ARIA PLAN ID: NORMAL
RAD ONC ARIA PLAN PRESCRIBED DOSE PER FRACTION: 3 GY
RAD ONC ARIA PLAN PRIMARY REFERENCE POINT: NORMAL
RAD ONC ARIA PLAN TOTAL FRACTIONS PRESCRIBED: 10
RAD ONC ARIA PLAN TOTAL PRESCRIBED DOSE: 3000 CGY
RAD ONC ARIA REFERENCE POINT DOSAGE GIVEN TO DATE: 15 GY
RAD ONC ARIA REFERENCE POINT ID: NORMAL
RAD ONC ARIA REFERENCE POINT SESSION DOSAGE GIVEN: 3 GY
RBC # BLD AUTO: 2.84 10*6/MM3 (ref 3.77–5.28)
SODIUM SERPL-SCNC: 137 MMOL/L (ref 136–145)
WBC NRBC COR # BLD AUTO: 5.59 10*3/MM3 (ref 3.4–10.8)

## 2024-07-12 PROCEDURE — 99232 SBSQ HOSP IP/OBS MODERATE 35: CPT | Performed by: CLINICAL NURSE SPECIALIST

## 2024-07-12 PROCEDURE — 77412 RADIATION TX DELIVERY LVL 3: CPT | Performed by: RADIOLOGY

## 2024-07-12 PROCEDURE — 25010000002 METHYLNALTREXONE 12 MG/0.6ML SOLUTION: Performed by: NURSE PRACTITIONER

## 2024-07-12 PROCEDURE — 25010000002 ONDANSETRON PER 1 MG: Performed by: NEUROLOGICAL SURGERY

## 2024-07-12 PROCEDURE — 99497 ADVNCD CARE PLAN 30 MIN: CPT | Performed by: CLINICAL NURSE SPECIALIST

## 2024-07-12 PROCEDURE — 97116 GAIT TRAINING THERAPY: CPT

## 2024-07-12 PROCEDURE — 85025 COMPLETE CBC W/AUTO DIFF WBC: CPT | Performed by: NURSE PRACTITIONER

## 2024-07-12 PROCEDURE — 97535 SELF CARE MNGMENT TRAINING: CPT

## 2024-07-12 PROCEDURE — 0 HYDROMORPHONE 1 MG/ML SOLUTION: Performed by: HOSPITALIST

## 2024-07-12 PROCEDURE — 80048 BASIC METABOLIC PNL TOTAL CA: CPT | Performed by: INTERNAL MEDICINE

## 2024-07-12 RX ORDER — HYDROCODONE BITARTRATE AND ACETAMINOPHEN 10; 325 MG/1; MG/1
1 TABLET ORAL EVERY 4 HOURS PRN
Qty: 16 TABLET | Refills: 0 | Status: SHIPPED | OUTPATIENT
Start: 2024-07-12 | End: 2024-07-14

## 2024-07-12 RX ORDER — LIDOCAINE HYDROCHLORIDE 20 MG/ML
5 SOLUTION OROPHARYNGEAL
Status: DISCONTINUED | OUTPATIENT
Start: 2024-07-12 | End: 2024-07-15 | Stop reason: HOSPADM

## 2024-07-12 RX ORDER — LIDOCAINE HYDROCHLORIDE 20 MG/ML
5 SOLUTION OROPHARYNGEAL
Qty: 100 ML | Refills: 0 | Status: SHIPPED | OUTPATIENT
Start: 2024-07-12

## 2024-07-12 RX ORDER — FENTANYL 12.5 UG/1
1 PATCH TRANSDERMAL
Qty: 5 PATCH | Refills: 0 | Status: SHIPPED | OUTPATIENT
Start: 2024-07-14

## 2024-07-12 RX ADMIN — CARVEDILOL 25 MG: 25 TABLET, FILM COATED ORAL at 17:19

## 2024-07-12 RX ADMIN — HYDROMORPHONE HYDROCHLORIDE 1 MG: 1 INJECTION, SOLUTION INTRAMUSCULAR; INTRAVENOUS; SUBCUTANEOUS at 07:10

## 2024-07-12 RX ADMIN — HYDROCODONE BITARTRATE AND ACETAMINOPHEN 2 TABLET: 10; 325 TABLET ORAL at 06:29

## 2024-07-12 RX ADMIN — CARVEDILOL 25 MG: 25 TABLET, FILM COATED ORAL at 09:09

## 2024-07-12 RX ADMIN — ROSUVASTATIN CALCIUM 10 MG: 10 TABLET, FILM COATED ORAL at 21:26

## 2024-07-12 RX ADMIN — AMLODIPINE BESYLATE 5 MG: 5 TABLET ORAL at 09:10

## 2024-07-12 RX ADMIN — HYDROCODONE BITARTRATE AND ACETAMINOPHEN 2 TABLET: 10; 325 TABLET ORAL at 21:26

## 2024-07-12 RX ADMIN — Medication 10 ML: at 21:27

## 2024-07-12 RX ADMIN — HYDROCODONE BITARTRATE AND ACETAMINOPHEN 1 TABLET: 10; 325 TABLET ORAL at 00:15

## 2024-07-12 RX ADMIN — CLONIDINE HYDROCHLORIDE 0.2 MG: 0.1 TABLET ORAL at 09:10

## 2024-07-12 RX ADMIN — LIDOCAINE 1 PATCH: 4 PATCH TOPICAL at 09:12

## 2024-07-12 RX ADMIN — PANTOPRAZOLE SODIUM 40 MG: 40 TABLET, DELAYED RELEASE ORAL at 09:10

## 2024-07-12 RX ADMIN — METHYLNALTREXONE BROMIDE 6 MG: 12 INJECTION, SOLUTION SUBCUTANEOUS at 09:16

## 2024-07-12 RX ADMIN — MONTELUKAST SODIUM 10 MG: 10 TABLET, FILM COATED ORAL at 21:26

## 2024-07-12 RX ADMIN — BUPROPION HYDROCHLORIDE 150 MG: 150 TABLET, EXTENDED RELEASE ORAL at 09:10

## 2024-07-12 RX ADMIN — HYDROCODONE BITARTRATE AND ACETAMINOPHEN 2 TABLET: 10; 325 TABLET ORAL at 16:26

## 2024-07-12 RX ADMIN — ONDANSETRON 4 MG: 2 INJECTION INTRAMUSCULAR; INTRAVENOUS at 09:06

## 2024-07-12 RX ADMIN — Medication 10 ML: at 09:12

## 2024-07-12 RX ADMIN — ROPINIROLE HYDROCHLORIDE 0.5 MG: 0.25 TABLET, FILM COATED ORAL at 21:26

## 2024-07-12 RX ADMIN — CETIRIZINE HYDROCHLORIDE 10 MG: 10 TABLET ORAL at 09:09

## 2024-07-12 RX ADMIN — Medication 1 TABLET: at 09:09

## 2024-07-12 RX ADMIN — AMLODIPINE BESYLATE 5 MG: 5 TABLET ORAL at 21:26

## 2024-07-12 RX ADMIN — FLUTICASONE PROPIONATE 2 SPRAY: 50 SPRAY, METERED NASAL at 09:12

## 2024-07-12 RX ADMIN — CLONIDINE HYDROCHLORIDE 0.2 MG: 0.1 TABLET ORAL at 21:26

## 2024-07-12 RX ADMIN — SERTRALINE 100 MG: 50 TABLET, FILM COATED ORAL at 09:09

## 2024-07-12 NOTE — PLAN OF CARE
Goal Outcome Evaluation:  Plan of Care Reviewed With: patient        Progress: improving  Outcome Evaluation: OT tx completed. Pt in fowlers upon therapist arrival; A&Ox4; c/o 9/10 dental/tooth pain. Pt performed supine>sit utilizing bedrail with HOB elevated with Mod I. Pt donned LSO while seated EOB I after set-up. Pt performed all sit<>stand transfers to/from bed, toilet, and chair utilizing rwx and/or grab bar with SBA. Pt ambulated from bed>BR>chair utilizing rwx with SBA. Pt performed lucy hygiene while seated with supervision and clothing management in standing with SBA. Cont OT POC. Recommend home with assist and HH at discharge.      Anticipated Discharge Disposition (OT): home with assist, home with home health

## 2024-07-12 NOTE — PROGRESS NOTES
[facility] Medicine Services  INPATIENT PROGRESS NOTE    Patient Name: Marina Joe  Date of Admission: 6/28/2024  Today's Date: 07/12/24  Length of Stay: 14  Primary Care Physician: Hanh Og APRN    Subjective   Chief Complaint: Lower back pain          HPI   Ms. Joe is a 79-year-old female from home with past medical history of breast cancer, chronic kidney disease stage IIIa, hypercholesterolemia, hypertension and chronic sinusitis, who presented to the emergency room with worsening back pain/flank pain, history was mostly provided by patient's boyfriend at bedside.  She developed back pain couple of weeks ago and was referred to a pain clinic doctor where she was prescribed gabapentin, after she took about 2-3 doses of the gabapentin, she developed dizziness. She came to the emergency room today because lower back pain and flank pain got even worse despite being on the gabapentin.  In the emergency room, she was extensively worked up with CT of the abdomen and lumbar spine, CT abdomen showed extensive mass of the left kidney extending up to the proximal left ureter as well as right adrenal gland mass suspicious for metastasis.  Urology was consulted from the emergency room for input.  7/1  As above history of chronic kidney disease hyperlipidemia hypertension presented with back pain found to have kidney mass suspicious for metastasis patient CT of the lumbar spine shows multilevel degenerative changes to include an anteriolisthesis of L3 over L4 and spondylosis at L5-S1. 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 awaiting final pathology will consult with palliative  team to address the CODE STATUS and plan of care  7/2  Appreciate palliative care the patient is currently DNR, appreciate neurosurgery status post L3 laminectomy and decompression on June 29 for cauda equina, appreciate oncology continues to work with physical therapy appreciate  radiation oncology patient started radiation today  7/3  Patient oxygen is 85% on 8 L, patient blood pressure is poorly controlled increase her Coreg we will repeat her chest x-ray chest x-ray from before was showing some congestion IV Hep-Lock will give her 1 dose of Lasix  7/4  As before remains on 8 L oxygen documented excision was not titrated will titrate oxygen, chest x-ray showing cardiomegaly pulmonary edema the patient has poor inspiration we did Hep-Lock her IV fluid we will continue IV Lasix blood pressure remains poorly controlled increase her Coreg we will check her echo of the heart as above she was not having bowel movement for most time was started on lactulose, patient had good bowel movement it was mistakenly documented that she is on 8 L the patient is actually on room air  7/5  Patient echo of the heart is showing good EF 56 to 60% blood pressure remains a challenge will increase her Catapres I will decrease her  Lasix to 20 mg daily continue to work with PT OT, she had nephrology signed off, pain is not well controlled, will consult palliative for pain control   7/6  As before appreciate bronchopulmonary they have signed off appreciate palliative care helping us manage her pain continues to work with physical therapy today her creatinine is up we will discontinue IV Lasix remains off oxygen even though it is still documented she is on 8 L oxygen  7/7  Creatinine is up secondary to overdiuresis, start IVF, still having neuropathy will add neurontine   7/8  Her kidney function is not improving I will go ahead and consult with nephrology I spoke to oncology ideally we would love to do bone marrow biopsy to address  possible CLL but she cannot lay on her stomach with this kidney function we cannot start any chemo the 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 though FISH studies are pending , we had to stop the allopurinol secondary to worsening GFR, for postop radiation to start today I will order ultrasound renal and consult with nephrology hold all nephrotoxins  7/9  Long discussion with the patient family and with the oncology team strongly feel that this patient would not do well with chemo or radiation and would not do well with physical therapy or going to rehab hospice is the best option for her I will go ahead and consult hospice  7/10  As before had a long discussion with the patient her daughter and her partner about the options and plan of care, discussion with radiation oncology with the oncology, interested in rehab patient is undergoing radiation, Anticipating bone marrow biopsy, and initiation of ibrutinib and outpatient PET scan per oncology and outpatient diagnostic mammography to evaluate left breast nodule , patient was placed on fentanyl patch and Norco as needed she is getting IV Dilaudid as needed is on the lidocaine patch Neurontin was discontinued again the patient is extremely passive and so is her significant other and she is not really engaged with the decision making, her kidney function is getting better remains DNR  7/11  Long discussion yesterday with the entire family and I was told that the family is electing hospice at home, appreciate palliative care, planning to go home with daughter to [place], [place]. Daughter is requesting [facility]   7/12  As before, appreciate oncology, she decided to go home with home hospice  Review of Systems   All pertinent negatives and positives are as above. All other systems have been reviewed and are negative unless otherwise stated.     Objective    Temp:  [97.8 °F (36.6 °C)-98.7 °F (37.1 °C)] 98.7 °F (37.1 °C)  Heart Rate:  [66-76]  66  Resp:  [16] 16  BP: (132-145)/(51-58) 143/58  Physical Exam    GEN: Awake, alert, interactive, in NAD  HEENT: Atraumatic, PERRLA, EOMI, Anicteric, Trachea midline  Lungs: CTAB, no wheezing/rales/rhonchi  Heart: RRR, +S1/s2, no rub  ABD: soft, nt/nd, +BS, no guarding/rebound  Extremities: atraumatic, no cyanosis, no edema  Skin: no rashes or lesions  Neuro: AAOx3, no focal deficits  Psych: normal mood & affect    Results Review:  I have reviewed the labs, radiology results, and diagnostic studies.    Laboratory Data:   Results from last 7 days   Lab Units 07/12/24  0401 07/11/24  0356 07/10/24  0408   WBC 10*3/mm3 5.59 7.26 6.63   HEMOGLOBIN g/dL 8.0* 8.5* 8.0*   HEMATOCRIT % 25.3* 26.7* 24.9*   PLATELETS 10*3/mm3 51* 68* 61*        Results from last 7 days   Lab Units 07/12/24  0401 07/11/24  0356 07/10/24  0408 07/09/24  0914 07/08/24  0343 07/07/24  0409   SODIUM mmol/L 137 138 137 134* 134* 134*   POTASSIUM mmol/L 4.3 4.0 4.3 4.6 4.4 4.5   CHLORIDE mmol/L 101 101 102 99 95* 92*   CO2 mmol/L 26.0 25.0 24.0 24.0 26.0 28.0   BUN mg/dL 26* 30* 47* 54* 48* 43*   CREATININE mg/dL 1.47* 1.46* 2.15* 2.67* 3.48* 3.50*   CALCIUM mg/dL 9.1 9.5 8.9 8.6 9.0 10.0   BILIRUBIN mg/dL  --   --   --  0.3 0.3 0.3   ALK PHOS U/L  --   --   --  89 93 109   ALT (SGPT) U/L  --   --   --  20 20 22   AST (SGOT) U/L  --   --   --  25 23 25   GLUCOSE mg/dL 103* 95 95 94 88 106*       Culture Data:   Urine Culture   Date Value Ref Range Status   06/28/2024 >100,000 CFU/mL Mixed Bhavana Isolated  Final       Radiology Data:   Imaging Results (Last 24 Hours)       ** No results found for the last 24 hours. **            I have reviewed the patient's current medications.     Assessment/Plan   Assessment  Active Hospital Problems    Diagnosis     **Left renal mass     Cauda equina compression     Metastatic cancer to spine     Morbid (severe) obesity due to excess calories        Treatment Plan  Lower back pain  Patient's low back pain has  been getting worse in the last couple of weeks, was started on gabapentin outpatient but did not help.  MRI of the lumbar spine reported as follows-  IMPRESSION:   Neoplastic process involving the L3 vertebral body with associated  posterior soft tissue component resulting in severe spinal canal  narrowing and cauda equina nerve root compression. The soft tissue  component appears to also extend into the right L3-L4 foramen. No  associated pathological fracture.  Additional multilevel spondylotic changes including moderate to severe  spinal canal and foraminal narrowing as detailed above.  Neurosurgery is on consult and did the following procedure-  Procedure(s):  LUMBAR LAMINECTOMY L3 WITH DECOMPRESSION 1-2 LEVELS-RIGHT     Decompression -  Lumbar laminectomy, medial facetectomy for decompression of the spinal cord  Open laminectomy and biopsy of epidural neoplasm  Use of intraoperative microscope      -Acute on chronic kidney disease stage IIIa  Patient follows up with a nephrologist outpatient, but he does not come to this hospital.  Nephrologist consulted and helping with management while patient is in-house.     -Left renal mass/right adrenal mass on CT of the abdomen/pelvis  Urology was consulted from the emergency room and after evaluation did not recommend any intervention,rather recommended oncology consult.  Patient has multiple subcutaneous nodules that may be amenable to percutaneous biopsy.  Oncology is on consult and has evaluated patient, will await for tentative diagnosis from lumbar spine biopsy before making recommendations      Other chronic medical conditions-  History of breast cancer  Hypercholesterolemia  Hypertension  Chronic sinusitis     DVT prophylaxis-heparin       Medical Decision Making  Number and Complexity of problems: High    Conditions and Status        Condition is unchanged.     MDM Data  External documents reviewed: No  Cardiac tracing (EKG, telemetry) interpretation:  Yes  Radiology interpretation: Yes  Labs reviewed: Yes  Any tests that were considered but not ordered: No     Decision rules/scores evaluated (example [id number], Wells, etc): Yes     Discussed with: Patient     Care Planning  Shared decision making: Yes  Code status and discussions: Yes [full code]    Disposition  Social Determinants of Health that impact treatment or disposition: No  I expect the patient to be discharged to unknown in unknown days.     Electronically signed by Alysia Lincoln MD, 07/12/24, 09:56 CDT.

## 2024-07-12 NOTE — PLAN OF CARE
Goal Outcome Evaluation:      Patient resting well overnight. Up SBA to bathroom. CO pain medicated Fent patch in place. Safety maintained.

## 2024-07-12 NOTE — DISCHARGE SUMMARY
[facility] Medicine Services  DISCHARGE SUMMARY       Date of Admission: 6/28/2024  Date of Discharge:  7/12/2024  Primary Care Physician: Hanh Og APRN    Presenting Problem/History of Present Illness:  Ms. Joe is a 79-year-old female from home with past medical history of breast cancer, chronic kidney disease stage IIIa, hypercholesterolemia, hypertension and chronic sinusitis, who presented to the emergency room with worsening back pain/flank pain, history was mostly provided by patient's boyfriend at bedside.  She developed back pain couple of weeks ago and was referred to a pain clinic doctor where she was prescribed gabapentin, after she took about 2-3 doses of the gabapentin, she developed dizziness. She came to the emergency room today because lower back pain and flank pain got even worse despite being on the gabapentin.  In the emergency room, she was extensively worked up with CT of the abdomen and lumbar spine, CT abdomen showed extensive mass of the left kidney extending up to the proximal left ureter as well as right adrenal gland mass suspicious for metastasis.  Urology was consulted from the emergency room for input.  7/1  As above history of chronic kidney disease hyperlipidemia hypertension presented with back pain found to have kidney mass suspicious for metastasis patient CT of the lumbar spine shows multilevel degenerative changes to include an anteriolisthesis of L3 over L4 and spondylosis at L5-S1. 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 awaiting final pathology will consult with palliative team to address the CODE STATUS and plan of  care  7/2  Appreciate palliative care the patient is currently DNR, appreciate neurosurgery status post L3 laminectomy and decompression on June 29 for cauda equina, appreciate oncology continues to work with physical therapy appreciate  radiation oncology patient started radiation today  7/3  Patient oxygen is 85% on 8 L, patient blood pressure is poorly controlled increase her Coreg we will repeat her chest x-ray chest x-ray from before was showing some congestion IV Hep-Lock will give her 1 dose of Lasix  7/4  As before remains on 8 L oxygen documented excision was not titrated will titrate oxygen, chest x-ray showing cardiomegaly pulmonary edema the patient has poor inspiration we did Hep-Lock her IV fluid we will continue IV Lasix blood pressure remains poorly controlled increase her Coreg we will check her echo of the heart as above she was not having bowel movement for most time was started on lactulose, patient had good bowel movement it was mistakenly documented that she is on 8 L the patient is actually on room air  7/5  Patient echo of the heart is showing good EF 56 to 60% blood pressure remains a challenge will increase her Catapres I will decrease her  Lasix to 20 mg daily continue to work with PT OT, she had nephrology signed off, pain is not well controlled, will consult palliative for pain control   7/6  As before appreciate bronchopulmonary they have signed off appreciate palliative care helping us manage her pain continues to work with physical therapy today her creatinine is up we will discontinue IV Lasix remains off oxygen even though it is still documented she is on 8 L oxygen  7/7  Creatinine is up secondary to overdiuresis, start IVF, still having neuropathy will add neurontine   7/8  Her kidney function is not improving I will go ahead and consult with nephrology I spoke to oncology ideally we would love to do bone marrow biopsy to address possible CLL but she cannot lay on her stomach with  this kidney function we cannot start any chemo the 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 though FISH studies are pending , we had to stop the allopurinol secondary to worsening GFR, for postop radiation to start today I will order ultrasound renal and consult with nephrology hold all nephrotoxins  7/9  Long discussion with the patient family and with the oncology team strongly feel that this patient would not do well with chemo or radiation and would not do well with physical therapy or going to rehab hospice is the best option for her I will go ahead and consult hospice  7/10  As before had a long discussion with the patient her daughter and her partner about the options and plan radiation oncology, discussion with radiation oncology with the oncology, interested in rehab patient is undergoing radiation, Anticipating bone marrow biopsy, and initiation of ibrutinib and outpatient PET scan per oncology and outpatient diagnostic mammography to evaluate left breast nodule , patient was placed on fentanyl patch and Norco as needed she is getting IV Dilaudid as needed is on the lidocaine patch Neurontin was discontinued again the patient is extremely passive and so is her significant other and she is not really engaged with the decision making, her kidney function is getting better remains DNR  7/11  Long discussion yesterday with the entire family and I was told that the family is electing hospice at home, appreciate palliative care, planning to go home with daughter to [place], [place]. Daughter is requesting [facility]   7/12  As before, appreciate oncology, she decided to go home with home hospice  Final Discharge Diagnoses:  Active Hospital Problems    Diagnosis     **Left renal mass     Cauda equina compression     Metastatic cancer to spine     Morbid (severe) obesity due to excess calories        Consults: Neurosurgery oncology, radiation oncology, pulmonary    Procedures Performed:  L3 laminectomy medial facetectomy for decompression of the spinal cord on June 29    Pertinent Test Results:   Results for orders placed during the hospital encounter of 06/28/24    Adult Transthoracic Echo Complete W/ Cont if Necessary Per Protocol    Interpretation Summary    Left ventricular systolic function is normal. Left ventricular ejection fraction appears to be 56 - 60%.    Left ventricular wall thickness is consistent with mild concentric hypertrophy.    Left ventricular diastolic function was normal.    Normal right ventricular cavity size and systolic function noted.    Mild mitral valve regurgitation is present.    No evidence of pulmonary hypertension is present.      Imaging Results (All)       Procedure Component Value Units Date/Time    US Renal Bilateral [[id number]] Collected: 07/08/24 1706     Updated: 07/08/24 1714    Narrative:      US RENAL BILATERAL-     HISTORY: acute on ckd; 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     There is an abnormal heterogeneous appearance to the bilateral kidneys  representative of the solid renal mass is better seen on the MRI abdomen  of 6/28/2024. A 4.3 x 2.7 x 2.9 cm right adrenal nodule is measured.  Right kidney measures 10.4 x 6.3 x 5.5 cm. The left kidney measures 10.7  x 6.9 x 6.5 cm. There is questionable trace volume of right subcapsular  fluid considered. There is only minimal prominence of the renal  collecting systems.     The distended bladder is unremarkable.       Impression:      1. Abnormal  heterogeneous appearance to the bilateral renal parenchyma  representative of bilateral, left larger than right, solid renal masses  better seen on MRI 6/28/2024. Trace right subcapsular fluid considered.     2. Only mild prominence of the bilateral renal collecting systems.     3. Persistent right adrenal mass.        This report was signed and finalized on 7/8/2024 5:10 PM by Dr. Meaghan Phillips MD.       XR Chest 1 View [[id number]] Collected: 07/03/24 1538     Updated: 07/03/24 1552    Narrative:      EXAMINATION: XR CHEST 1 VW-  7/3/2024 3:38 PM 1 view     HISTORY: sob; C79.51-Secondary malignant neoplasm of bone; N28.89-Other  specified disorders of kidney and ureter; E27.8-Other specified  disorders of adrenal gland; M79.89-Other specified soft tissue  disorders; N28.9-Disorder of kidney and ureter, unspecified;  N18.9-Chronic kidney disease, unspecified; D69.6-Thrombocytopenia,  unspecified; G83.4-Cauda equina syndrome; Z74.09-Other reduced mobility;  Z85.3-Pe     COMPARISON: 6/28/2024     TECHNIQUE: A single frontal view of the chest was obtained.     FINDINGS:  There is mild cardiomegaly and pulmonary vascular congestion. Shallow  depth of inspiration. I don't see a large effusion. Vascular  calcifications are noted in the aorta. Surgical clips project over the  LEFT chest wall.       Impression:         1.  Shallow inspiration with mild cardiomegaly and pulmonary vascular  congestion without lee edema or other acute consolidation.           This report was signed and finalized on 7/3/2024 3:49 PM by Dr. Armando Calixto MD.       US Guided Tissue Biopsy [[id number]] Collected: 07/01/24 1430     Updated: 07/02/24 0700    Narrative:      EXAM: US GUIDED TISSUE BIOPSY-      DATE: 7/1/2024 12:38 PM     HISTORY: Assess for metastatic disease; C79.51-Secondary malignant  neoplasm of bone; N28.89-Other specified disorders of kidney and ureter;  E27.8-Other specified disorders of adrenal gland; M79.89-Other  specified  soft tissue disorders; N28.9-Disorder of kidney and ureter, unspecified;  N18.9-Chronic kidney disease, unspecified; D69.6-Thrombocytopenia,  unspecified; G83.4-Cauda equina syndrome; Z74.09-Other. Patient reports  history of breast cancer in 2013 status post surgery and radiation  therapy.        COMPARISON: 6/28/2024.     TECHNIQUE: Representative images and report stored to PACS per  institutional protocol and state regulations.     PROCEDURE:  Preprocedure imaging performed demonstrating multiple peripherally  echogenic, centrally hypoechoic nodules. These demonstrated internal  vascularity. A superficial nodule RIGHT flank nodule was selected and  patient positioned with consideration for her comfort.     Risks, benefits and alternatives to the procedure were discussed with  the patient. Specifically, risks of bleeding, infection, allergy to  medicine, and non-diagnostic biopsy results were discussed with the  patient. Verbal and written informed consent was obtained.     A timeout was performed including the patient's name, date of birth,  biopsy site and laterality.     Using ultrasound, a site for biopsy of RIGHT flank subcutaneous nodule  was selected, marked and prepped in the usual sterile fashion. 1 percent   lidocaine was used for local anesthesia.     Using ultrasound guidance, RIGHT flank subcutaneous nodule biopsy was  performed using 18 gauge Bard core needle biopsy device. 5 passes were  made. One core was used for touch prep. 2 cores were placed in formalin  and 2 cores were placed in RPMI.     Cytology deemed sample adequate. Biopsy samples were taken by cytology.      The patient tolerated the procedure well. No evidence of complication.     The patient returned to the floor in stable condition and agrees to  follow up with referring clinician for biopsy results.           Impression:      1. Successful ultrasound guided core needle biopsy of RIGHT flank  subcutaneous nodule.         This report was signed and finalized on 7/1/2024 2:36 PM by Dr Sonam Quach MD.       US Soft Tissue [[id number]] Collected: 07/01/24 1430     Updated: 07/02/24 0700    Narrative:      EXAM: US GUIDED TISSUE BIOPSY-      DATE: 7/1/2024 12:38 PM     HISTORY: Assess for metastatic disease; 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 Patient reports  history of breast cancer in 2013 status post surgery and radiation  therapy.        COMPARISON: 6/28/2024.     TECHNIQUE: Representative images and report stored to PACS per  institutional protocol and state regulations.     PROCEDURE:  Preprocedure imaging performed demonstrating multiple peripherally  echogenic, centrally hypoechoic nodules. These demonstrated internal  vascularity. A superficial nodule RIGHT flank nodule was selected and  patient positioned with consideration for her comfort.     Risks, benefits and alternatives to the procedure were discussed with  the patient. Specifically, risks of bleeding, infection, allergy to  medicine, and non-diagnostic biopsy results were discussed with the  patient. Verbal and written informed consent was obtained.     A timeout was performed including the patient's name, date of birth,  biopsy site and laterality.     Using ultrasound, a site for biopsy of RIGHT flank subcutaneous nodule  was selected, marked and prepped in the usual sterile fashion. 1 percent   lidocaine was used for local anesthesia.     Using ultrasound guidance, RIGHT flank subcutaneous nodule biopsy was  performed using 18 gauge Bard core needle biopsy device. 5 passes were  made. One core was used for touch prep. 2 cores  were placed in formalin  and 2 cores were placed in RPMI.     Cytology deemed sample adequate. Biopsy samples were taken by cytology.      The patient tolerated the procedure well. No evidence of complication.     The patient returned to the floor in stable condition and agrees to  follow up with referring clinician for biopsy results.           Impression:      1. Successful ultrasound guided core needle biopsy of RIGHT flank  subcutaneous nodule.        This report was signed and finalized on 7/1/2024 2:36 PM by Dr Sonam Quach MD.       MRI Brain Without Contrast [[id number]] Collected: 07/01/24 0949     Updated: 07/01/24 1000    Narrative:      EXAMINATION: MRI BRAIN WO CONTRAST-  7/1/2024 9:49 AM      HISTORY: Staging; C79.51-Secondary malignant neoplasm of bone;  N28.89-Other specified disorders of kidney and ureter; E27.8-Other  specified disorders of adrenal gland; M79.89-Other specified soft tissue  disorders; N28.9-Disorder of kidney and ureter, unspecified;  N18.9-Chronic kidney disease, unspecified; D69.6-Thrombocytopenia,  unspecified; G83.4-Cauda equina syndrome; Z74.09-Other reduced mobility     COMPARISON: 8/10/2023     TECHNIQUE: Multiplanar imaging of the brain was performed in a routine  fashion. No contrast was administered.     FINDINGS:  No restricted diffusion. No mass effect or midline shift. It should be  noted that no intravenous contrast was administered, limiting evaluation  for intracranial metastatic disease. Within the limitations of this  exam, I don't see any definite evidence of intracranial metastatic  disease. Increased FLAIR signal intensity scattered throughout the  subcortical and periventricular white matter. Basilar cisterns are  preserved. Grey and white matter demonstrates normal MR signal. No  abnormal extra axial fluid collections are noted. No definite mass  effect or midline shift identified.     Proximal cervical spinal cord, brainstem, and cerebellum  are  unremarkable. Normal cerebrovascular flow voids are seen. Bilateral  globes and orbits are normal in appearance.     Opacification of the LEFT sphenoid sinus with high T1 signal which may  represent some proteinaceous fluid.          Impression:         1.  No definite evidence of intracranial metastatic disease within the  limitations of this noncontrast examination.     2.  Fairly extensive periventricular white matter signal abnormality,  likely related to chronic microvascular ischemic change.     3.  Opacification of the LEFT sphenoid sinus with what is likely  proteinaceous fluid.                                                       This report was signed and finalized on 7/1/2024 9:53 AM by Dr. Armando Calixto MD.       MRI Lumbar Spine With & Without Contrast [[id number]] Collected: 06/28/24 1920     Updated: 06/28/24 1945    Addenda:        Additional findings: There are also appears to be tumor signal extending  into the right L2-L3 neural foramen.     This report was signed and finalized on 6/28/2024 7:42 PM by Warren Freire.     Signed: 06/28/24 1942 by Warren Freire, DO    Narrative:      Exam: MRI LUMBAR SPINE W WO CONTRAST- 6/28/2024 3:43 PM     HISTORY: Lumbar back and bilateral nondermatomal leg pain and  dysesthesias; N28.89-Other specified disorders of kidney and ureter;  E27.8-Other specified disorders of adrenal gland; M79.89-Other specified  soft tissue disorders; N28.9-Disorder of kidney and ureter, unspecified;  N18.9-Chronic kidney disease, unspecified; D69.6-Thrombocytopenia,  unspecified     COMPARISON: 6/28/2024 lumbar spine CT     TECHNIQUE: Multiplanar, multisequence MRI of the lumbar spine was  obtained prior to and after the administration of 20 mL intravenous  gadolinium contrast.     FINDINGS:     Grade 1 degenerative anterolisthesis of L3-L4.     Within the L3 vertebral body there is fairly diffuse T2 hyperintense  signal with associated T1 hypointense marrow  infiltration and suspected  mild enhancement. There is an associated posterior enhancing soft tissue  mass extending into the spinal canal causing severe stenosis and  compression of the cauda equina nerve roots. The soft tissue component  also appears to extend superiorly to the L2-L3 disc level. There appears  to be tumor extension into the right L3-L4 foramen.     No visible fracture.     Multilevel mild degenerative disc disease with posterior disc osteophyte  complexes. Multilevel facet hypertrophic changes. No significant facet  periarticular edema. Multilevel ligamentum flavum hypertrophy.  Multilevel prominent posterior epidural fat.     Conus appears to terminate at L1-L2.     T11-T12: There appears to be a at least mild to moderate spinal canal  and mild to moderate bilateral foraminal narrowing.     T12-L1: No significant spinal canal narrowing. Moderate right foraminal  narrowing.     L1-L2: Moderate spinal canal narrowing. Mild to moderate right foraminal  narrowing.     L2-L3: Severe spinal canal narrowing. Moderate to severe right foraminal  narrowing.     L3-L4: Severe spinal canal narrowing. Moderate right foraminal  narrowing.     L4-L5: Moderate spinal canal narrowing and bilateral subarticular  narrowing encroaching the descending bilateral L5 nerve roots. Mild  bilateral foraminal narrowing.     L5-S1: No significant spinal canal or foraminal narrowing.     Extraspinal findings: Please see separately dictated MR abdomen and CT  abdomen/pelvis from the same day.       Impression:         Neoplastic process involving the L3 vertebral body with associated  posterior soft tissue component resulting in severe spinal canal  narrowing and cauda equina nerve root compression. The soft tissue  component appears to also extend into the right L3-L4 foramen. No  associated pathological fracture.     Additional multilevel spondylotic changes including moderate to severe  spinal canal and foraminal narrowing  as detailed above.           This report was signed and finalized on 6/28/2024 7:36 PM by Warren Freire.       MRI Abdomen With & Without Contrast [[id number]] Collected: 06/28/24 1826     Updated: 06/28/24 1943    Addenda:        Additional findings: Tumor signal from the left renal mass appears to  extend along the left renal vein and possibly abuts the IVC and is  suspicious for neoplastic involvement. An additional differential also  includes multifocal primary renal malignancies with metastatic disease.     This report was signed and finalized on 6/28/2024 7:40 PM by Warren Freire.     Signed: 06/28/24 1940 by Warren Freire,     Narrative:      EXAM: MRI ABDOMEN W WO CONTRAST-     INDICATION: Evaluate kidney and adrenal masses showed on CT-scan;  N28.89-Other specified disorders of kidney and ureter; E27.8-Other  specified disorders of adrenal gland; M79.89-Other specified soft tissue  disorders; N28.9-Disorder of kidney and ureter, unspecified;  N18.9-Chronic kidney disease, unspecified; D69.6-Thrombocytopenia,  unspecified        TECHNIQUE: Multisequence multiplanar MR images was obtained of the  abdomen prior to and at the administration of 20 mL intravenous  gadolinium contrast.        COMPARISON: CT abdomen pelvis 6/28/2024     FINDINGS:     Decrease sensitivity due to motion.     Lower Chest: Unremarkable.     Liver: Unremarkable.     Biliary Tree: Small amount of gallbladder sludge versus layering stones.     Spleen: Tiny T2 hyperintense splenic lesion is nonspecific but favored  benign.     Pancreas: 1.5 cm pancreatic body cyst.     Adrenal Glands: 3.3 cm right adrenal nodule, new from prior CT on  5/23/2023.     Kidneys/Upper Ureters:      There is an infiltrative T2 hypointense/T1 isointense diffusion  restricting mass involving the left kidney with extension into the  proximal left collecting system/upper ureters which is difficult to  measure but measures up to 11 cm in cranial caudal  dimensions. Please  note the abnormal diffusion restriction does extend below the  field-of-view into the left ureter. There are additional smaller masses  within the left renal cortex.      There are also multiple (at least 6) diffusion restricting masses within  the right kidney measuring up to 3.2 cm. There also appears to be a  diffusion restricting mass in the right renal pelvis.      These are somewhat limited in evaluation on postcontrast images due to  degree of motion, however these these masses do appear to demonstrate  some degree of enhancement.     Small left renal cyst is noted.     Gastrointestinal Tract: Colonic diverticulosis.     Lymphatics: Unremarkable.     Vasculature: Unremarkable.      Peritoneum/Retroperitoneum: Unremarkable.     Abdominal Wall/Soft Tissues: There enhancing diffusion restricting soft  tissue masses, the most dominant and largest cluster is within the right  posterior abdominal wall measuring up to 2.6 cm.     Osseous Structures: There is a diffusion restricting mass involving the  L3 vertebral body with suspected posterior extension into the spinal  canal.       Impression:            Infiltrative mass within the left kidney extending into the proximal  left collecting system as well as numerous additional solid masses in  the bilateral kidneys. Additionally there is a right adrenal mass,  multiple subcutaneous fat soft tissue masses, and a L3 vertebral body  mass with suspected extension into the spinal canal. Findings  collectively are most consistent with metastatic disease which carries a  broad differential, however some differentials include metastatic breast  cancer, malignant melanoma, and lymphoma. The subtendinous fat soft  tissue masses would be amenable to ultrasound-guided biopsy.     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           This report was signed and finalized on 6/28/2024 6:57 PM by  Warren Freire.       CT Chest Without Contrast Diagnostic [[id number]] Collected: 06/28/24 1626     Updated: 06/28/24 1639    Narrative:      CT CHEST WO CONTRAST DIAGNOSTIC- 6/28/2024 3:15 PM     HISTORY: Staging for newly diagnosed metastatic disease; N28.89-Other  specified disorders of kidney and ureter; E27.8-Other specified  disorders of adrenal gland; M79.89-Other specified soft tissue  disorders; N28.9-Disorder of kidney and ureter, unspecified;  N18.9-Chronic kidney disease, unspecified; D69.6-Thrombocytopenia,  unspecified      COMPARISON: 10/13/2022     TOTAL DOSE LENGTH PRODUCT: 413.82 mGy.cm. Automated exposure control was  also utilized to decrease patient radiation dose.     TECHNIQUE: Axial images of the chest are performed without IV contrast  secondary to renal insufficiency.      FINDINGS:    Postoperative changes of the left breast with left axillary  surgical clips. 9 mm medial left breast nodule/mass near the 8 to 9  o'clock position, new from the 2022 CT study which may represent a cyst  or solid mass. Correlation to any outside mammogram and breast  ultrasound recommended. No current mammogram or ultrasound at this  institution.     No pathologic axillary or intrathoracic lymphadenopathy. Cardiomegaly.  Very small pericardial effusion. No pleural effusions.     3.2 cm right adrenal mass. Abnormal appearance of the left greater than  right kidney. Please refer to today's CT abdomen pelvis 6/20/2024.     Basilar atelectasis. A 6 mm nodule along the right major fissure  favoring intrafissural lymph node, present on the 10/13/2022 study.. No  suspicious pulmonary nodules or convincing intrathoracic metastasis. No  pneumothorax. No discrete endobronchial lesion.     No focal aggressive regional bony lesions. Moderate diffuse degenerative  changes of the thoracolumbar junction. New 1.5 cm nodule within the  subcutaneous tissues of the left lower chest/upper abdomen (series 2  image 98. Several  soft tissue nodules within the subcutaneous tissue of  the right flank at the L1 level measuring up to 2.1 cm. Smaller 9 mm  soft tissue nodule of the posterior back just left of midline at the L2  level. A left 10 mm posterior subcutaneous nodule at the T12-L1 level.       Impression:      1. Patient with a history of left breast cancer with postoperative  changes of the superior left breast and left axillary surgical clips.  There is a 9 mm nodule/mass of the medial left breast positioned towards  the 8 to 9 o'clock position. Correlation to any recent mammogram or  breast ultrasound recommended, none at this institution. If no outside  studies demonstrating a known 9 mm medial left breast cyst, a follow-up  outpatient mammogram and left breast ultrasound should be performed for  further assessment.  2. Scattered subcutaneous soft tissue nodules of the abdominal wall.  Largest nodules within the right posterior flank measuring up to 2.1 cm  at the L1 level. These appear to represent soft tissue nodules and could  be metastatic deposits. Ultrasound recommended to establish solid  nature. Biopsy could be performed if solid and clinically indicated.  3. Cardiomegaly. Mild vascular calcification.  4. No convincing intrathoracic metastasis. Stable 6 mm nodule in the  right major fissure favoring intrafissural lymph node.  5. Please refer to CT abdomen and pelvis report from today for  description of bilateral renal pathology as well as a 3.2 cm right  adrenal mass.     This report was signed and finalized on 6/28/2024 4:36 PM by Dr. Meaghan Phillips MD.       XR Chest 1 View [[id number]] Collected: 06/28/24 0912     Updated: 06/28/24 0916    Narrative:      EXAM: XR CHEST 1 VW-      DATE: 6/28/2024 8:06 AM     HISTORY: multiple complaints       COMPARISON: 8/10/2023.     TECHNIQUE:  Frontal view(s) of the chest submitted.     FINDINGS:    There are low lung volumes with vascular crowding versus volume  overload/edema.  There is enlargement of the cardiac silhouette. No  effusion or pneumothorax is seen.          Impression:         1. Low lung volumes with prominent vasculature may represent vascular  crowding or mild edema.     This report was signed and finalized on 6/28/2024 9:13 AM by Gatito Blackwood.       CT Abdomen Pelvis Stone Protocol [[id number]] Collected: 06/28/24 0817     Updated: 06/28/24 0832    Narrative:      EXAM: CT ABDOMEN PELVIS STONE PROTOCOL-      DATE: 6/28/2024 6:48 AM     HISTORY: flank pain       COMPARISON: 5/23/2023.     DOSE LENGTH PRODUCT: 1399.15 mGy.cm Automatic exposure control was  utilized to make radiation dose as low as reasonably achievable.     TECHNIQUE: Noncontract axial images of the abdomen and pelvis obtained  with multiplanar reformats.     FINDINGS:  VISUALIZED CHEST: There is cardiomegaly without pericardial or pleural  effusion. There is atelectasis at the lung bases which are otherwise  grossly clear.     LIVER/BILE DUCTS: Unenhanced liver is unremarkable. Gallbladder is  distended without inflammatory change. Bile ducts are unremarkable.     KIDNEYS/URETERS: Both kidneys are abnormal in appearance. The left is  larger than the right and there is a suggestion of an underlying  infiltrative left renal mass involving the entire left kidney but  especially at the mid and lower pole extending along the left renal  pelvis and to the proximal left ureter. There are bilateral renal cysts.  There are vascular calcifications and probable nonobstructing stones in  the left kidney. There is no definite hydronephrosis.     ADRENAL: There is a new right adrenal mass worrisome for neoplasm  measuring 3.5 cm. Left adrenal gland is nodular but unchanged.        SPLEEN: Unremarkable.     PANCREAS: A cyst at the ventral aspect of the body of the pancreas is  unchanged measuring 1 cm. 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     MESENTERY: No mesenteric or retroperitoneal lymphadenopathy is seen. No  free  fluid identified.     VASCULATURE: There is mild atherosclerosis of the abdominal aorta  without aneurysm.     GI TRACT: There is a small hiatal hernia. There is diverticulosis of the  colon without acute diverticulitis. No mass or obstruction is seen.     PELVIS: Urinary bladder is unremarkable. There is diverticulosis of the  sigmoid colon without acute diverticulitis. Borderline left external  iliac lymph node measures 0.9 cm in short axis on image #70 of series 3.  This is actually unchanged. Patient is status post hysterectomy.     SOFT TISSUES: There are multiple solid nodules in the subcutaneous soft  tissues which are new from the 5/23/2023 exam. One on the left  anteriorly on image #9 measures 1.5 cm. A cluster of solid nodules on  the right posteriorly and laterally on image #38 noted largest measures  2.5 cm.     BONES: There is spondylosis of the spine and lumbar spine facet  arthropathy. No suspicious osseous lesions are seen.          Impression:      1. Diffusely abnormal left kidney which appears heterogeneous and  enlarged worrisome for neoplasm. Normal soft tissue extends into the  left renal pelvis and along the proximal left ureter. Right kidney also  is abnormal in its. Differential diagnosis would include infiltrative  renal cell carcinoma, lymphoma and metastatic disease.  2. There is also a new right adrenal mass worrisome for metastatic  disease.  3. Innumerable solid nodules in the subcutaneous soft tissues worrisome  for soft tissue metastasis. Cluster of nodules on the right posteriorly  at the mid to lower abdomen is close skin surface and would likely be  amenable to ultrasound-guided biopsy.        This report was signed and finalized on 6/28/2024 8:29 AM by Gatito Blackwood.       CT Lumbar Spine Without Contrast [[id number]] Collected: 06/28/24 0817     Updated: 06/28/24 0832    Narrative:      EXAMINATION: CT LUMBAR SPINE WO CONTRAST-      6/28/2024 6:48 AM     HISTORY: back pain      In order to have a CT radiation dose as low as reasonably achievable  Automated Exposure Control was utilized for adjustment of the mA and/or  KV according to patient size.     Total DLP = 1399.15 mGy.cm     The CT scan of the lumbar spine is performed without intravenous or  intrathecal contrast enhancement.     Images are acquired in axial plane and subsequent reconstruction in  coronal and sagittal planes.     The comparison is made with the previous study dated 1/21/2024.     There is no evidence of an acute fracture or compression.     No acute displacement or malalignment.     There is a mild levoscoliosis.     There is loss of lumbar lordosis.     There is mild anterolisthesis of L3 over L4 similar to the previous  study. No spondylolysis.     The vertebral body heights are normal.     The loss of height of the intervertebral disc, most pronounced at L5-S1.  There is a vacuum sign at L3-4, L4-5 and L5-S1 representing degenerative  disc disease.     T12-L1: Prominent posterior osteophytes. Mild diffuse disc bulging.  Bilateral facet arthropathy right more than the left. There is right  neuroforaminal stenosis. Spinal canal is patent.     L1-2: Mild disc bulging. No significant osteophytes. Bilateral facet  arthropathy and ligamental hypertrophy. There is a mild spinal stenosis.  Neural foramina are patent.     L2-3: Mild diffuse disc bulging. Bilateral facet arthropathy and  ligamental hypertrophy. There is resultant moderate spinal stenosis.  There is mild bilateral neural foraminal stenosis.     L3-4: Moderate diffuse disc bulging/displacement central and bilateral.  There is severe facet arthropathy and ligamental hypertrophy. There is  moderate spinal stenosis. There is bilateral neuroforaminal stenosis  right more than the left.     L4-5: Small posterior osteophytes. Moderate diffuse disc bulging.  Bilateral severe facet arthropathy and ligamental hypertrophy. Moderate  spinal stenosis. Bilateral  neuroforaminal stenosis.     L5-S1: Moderately prominent posterior osteophytes. Moderate diffuse disc  bulging. Bilateral facet arthropathy. There is bilateral neuroforaminal  stenosis. Spinal canal is patent.     Limited visualized paravertebral paraspinal soft tissues are  unremarkable. There are some calcification in the renal hilum  bilaterally which probably represent vascular calcification.       Impression:      1. No acute fracture or malalignment.  2. Lumbar spondylosis. Loss of lumbar lordosis. A mild anterolisthesis  L3 over L4.  3. Multilevel prominent disc osteophyte complexes, facet arthropathy and  ligamental hypertrophy and resultant neural foraminal spinal canal  stenosis.                                                  This report was signed and finalized on 6/28/2024 8:29 AM by Dr. Marito Marcano MD.             LAB RESULTS:      Lab 07/12/24  0401 07/11/24  0356 07/10/24  0408 07/09/24  0914 07/08/24  0343   WBC 5.59 7.26 6.63 7.28 7.37   HEMOGLOBIN 8.0* 8.5* 8.0* 7.9* 8.3*   HEMATOCRIT 25.3* 26.7* 24.9* 25.1* 25.8*   PLATELETS 51* 68* 61* 63* 67*   NEUTROS ABS 4.32 5.70 5.01 5.23 4.42   IMMATURE GRANS (ABS) 0.03 0.05 0.04 0.04 0.05   LYMPHS ABS 0.63* 0.82 0.64* 0.88 1.39   MONOS ABS 0.46 0.54 0.65 0.80 1.12*   EOS ABS 0.14 0.13 0.27 0.31 0.36   MCV 89.1 87.8 88.3 89.6 88.7         Lab 07/12/24  0401 07/11/24  0356 07/10/24  0408 07/09/24  0914 07/08/24  0343   SODIUM 137 138 137 134* 134*   POTASSIUM 4.3 4.0 4.3 4.6 4.4   CHLORIDE 101 101 102 99 95*   CO2 26.0 25.0 24.0 24.0 26.0   ANION GAP 10.0 12.0 11.0 11.0 13.0   BUN 26* 30* 47* 54* 48*   CREATININE 1.47* 1.46* 2.15* 2.67* 3.48*   EGFR 36.4* 36.7* 23.1* 17.8* 12.9*   GLUCOSE 103* 95 95 94 88   CALCIUM 9.1 9.5 8.9 8.6 9.0         Lab 07/09/24  0914 07/08/24  0343 07/07/24  0409 07/06/24  0417   TOTAL PROTEIN 6.6 6.8 7.5 7.2   ALBUMIN 3.6 3.4* 4.1 4.0   GLOBULIN 3.0 3.4 3.4 3.2   ALT (SGPT) 20 20 22 27   AST (SGOT) 25 23 25 40*    BILIRUBIN 0.3 0.3 0.3 0.4   ALK PHOS 89 93 109 107                     Brief Urine Lab Results  (Last result in the past 365 days)        Color   Clarity   Blood   Leuk Est   Nitrite   Protein   CREAT   Urine HCG        06/29/24 0022             182.2               Microbiology Results (last 10 days)       ** No results found for the last 240 hours. **            Hospital Course:   Ms. Joe is a 79-year-old female from home with past medical history of breast cancer, chronic kidney disease stage IIIa, hypercholesterolemia, hypertension and chronic sinusitis, who presented to the emergency room with worsening back pain/flank pain, history was mostly provided by patient's boyfriend at bedside.  She developed back pain couple of weeks ago and was referred to a pain clinic doctor where she was prescribed gabapentin, after she took about 2-3 doses of the gabapentin, she developed dizziness. She came to the emergency room today because lower back pain and flank pain got even worse despite being on the gabapentin.  In the emergency room, she was extensively worked up with CT of the abdomen and lumbar spine, CT abdomen showed extensive mass of the left kidney extending up to the proximal left ureter as well as right adrenal gland mass suspicious for metastasis.  Urology was consulted from the emergency room for input.  7/1  As above history of chronic kidney disease hyperlipidemia hypertension presented with back pain found to have kidney mass suspicious for metastasis patient CT of the lumbar spine shows multilevel degenerative changes to include an anteriolisthesis of L3 over L4 and spondylosis at L5-S1. 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 versus  neuroendocrine tumor awaiting final pathology will consult with palliative team to address the CODE STATUS and plan of care  7/2  Appreciate palliative care the patient is currently DNR, appreciate neurosurgery status post L3 laminectomy and decompression on June 29 for cauda equina, appreciate oncology continues to work with physical therapy appreciate  radiation oncology patient started radiation today  7/3  Patient oxygen is 85% on 8 L, patient blood pressure is poorly controlled increase her Coreg we will repeat her chest x-ray chest x-ray from before was showing some congestion IV Hep-Lock will give her 1 dose of Lasix  7/4  As before remains on 8 L oxygen documented excision was not titrated will titrate oxygen, chest x-ray showing cardiomegaly pulmonary edema the patient has poor inspiration we did Hep-Lock her IV fluid we will continue IV Lasix blood pressure remains poorly controlled increase her Coreg we will check her echo of the heart as above she was not having bowel movement for most time was started on lactulose, patient had good bowel movement it was mistakenly documented that she is on 8 L the patient is actually on room air  7/5  Patient echo of the heart is showing good EF 56 to 60% blood pressure remains a challenge will increase her Catapres I will decrease her  Lasix to 20 mg daily continue to work with PT OT, she had nephrology signed off, pain is not well controlled, will consult palliative for pain control   7/6  As before appreciate bronchopulmonary they have signed off appreciate palliative care helping us manage her pain continues to work with physical therapy today her creatinine is up we will discontinue IV Lasix remains off oxygen even though it is still documented she is on 8 L oxygen  7/7  Creatinine is up secondary to overdiuresis, start IVF, still having neuropathy will add neurontine   7/8  Her kidney function is not improving I will go ahead and consult with nephrology I spoke to  "oncology ideally we would love to do bone marrow biopsy to address possible CLL but she cannot lay on her stomach with this kidney function we cannot start any chemo the 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 though FISH studies are pending , we had to stop the allopurinol secondary to worsening GFR, for postop radiation to start today I will order ultrasound renal and consult with nephrology hold all nephrotoxins  7/9  Long discussion with the patient family and with the oncology team strongly feel that this patient would not do well with chemo or radiation and would not do well with physical therapy or going to rehab hospice is the best option for her I will go ahead and consult hospice  7/10  As before had a long discussion with the patient her daughter and her partner about the options and plan radiation oncology, discussion with radiation oncology with the oncology, interested in rehab patient is undergoing radiation, Anticipating bone marrow biopsy, and initiation of ibrutinib and outpatient PET scan per oncology and outpatient diagnostic mammography to evaluate left breast nodule , patient was placed on fentanyl patch and Norco as needed she is getting IV Dilaudid as needed is on the lidocaine patch Neurontin was discontinued again the patient is extremely passive and so is her significant other and she is not really engaged with the decision making, her kidney function is getting better remains DNR  7/11  Long discussion yesterday with the entire family and I was told that the family is electing hospice at home, appreciate palliative care, planning to go home with daughter to [place], [place]. Daughter is requesting [facility]   7/12  As before, appreciate oncology, she decided to go home with home hospice    Physical Exam on Discharge:  /60 (BP Location: Right arm, Patient Position: Lying)   Pulse 79   Temp 98.4 °F (36.9 °C) (Oral)   Resp 16   Ht 172.7 cm (68\")   Wt " 111 kg (244 lb)   LMP  (LMP Unknown)   SpO2 99%   BMI 37.10 kg/m²   Physical Exam  Constitutional:       Appearance: Normal appearance.   HENT:      Head: Normocephalic.      Nose: Nose normal.   Eyes:      Pupils: Pupils are equal, round, and reactive to light.   Cardiovascular:      Rate and Rhythm: Normal rate and regular rhythm.   Pulmonary:      Breath sounds: Rhonchi present.   Abdominal:      General: Abdomen is flat.      Palpations: Abdomen is soft.   Musculoskeletal:         General: Normal range of motion.      Cervical back: Normal range of motion.   Neurological:      Mental Status: She is alert.           Condition on Discharge: stable    Discharge Disposition:  Hospice/Home    Discharge Medications:     Discharge Medications        New Medications        Instructions Start Date   fentaNYL 12 MCG/HR  Commonly known as: DURAGESIC   1 patch, Transdermal, Every 72 Hours   Start Date: July 14, 2024     HYDROcodone-acetaminophen  MG per tablet  Commonly known as: NORCO   1 tablet, Oral, Every 4 Hours PRN      Imbruvica 420 MG tablet tablet  Generic drug: ibrutinib   420 mg, Oral, Daily      Lidocaine Viscous HCl 2 % solution  Commonly known as: XYLOCAINE   5 mL, Oral, Every 3 Hours PRN      naloxone 4 MG/0.1ML nasal spray  Commonly known as: NARCAN   Call 911. Don't prime. [place] in 1 nostril for overdose. Repeat in 2-3 minutes in other nostril if no or minimal breathing/responsiveness.             Continue These Medications        Instructions Start Date   amLODIPine 5 MG tablet  Commonly known as: NORVASC   5 mg, Oral, 2 Times Daily      buPROPion  MG 24 hr tablet  Commonly known as: WELLBUTRIN XL   150 mg, Oral, Daily      CALCIUM 600+D3 PO   1 tablet, Oral, Daily      carvedilol 6.25 MG tablet  Commonly known as: COREG   6.25 mg, Oral, 2 Times Daily With Meals      cetirizine 10 MG tablet  Commonly known as: zyrTEC   10 mg, Oral, Daily      cyclobenzaprine 10 MG tablet  Commonly known as:  FLEXERIL   10 mg, Oral, 3 Times Daily PRN      Diclofenac Sodium 1 % gel gel  Commonly known as: VOLTAREN   4 g, Topical, 4 Times Daily PRN      famotidine 20 MG tablet  Commonly known as: PEPCID   20 mg, Oral, 2 Times Daily Before Meals      Farxiga 10 MG tablet  Generic drug: dapagliflozin Propanediol   1 tablet, Oral, Daily      fluticasone 50 MCG/ACT nasal spray  Commonly known as: FLONASE   2 sprays, Nasal, Daily      irbesartan-hydrochlorothiazide 300-12.5 MG tablet  Commonly known as: AVALIDE   1 tablet, Oral, Daily      montelukast 10 MG tablet  Commonly known as: SINGULAIR   10 mg, Oral, Nightly      Multivitamin Adult tablet tablet  Generic drug: multivitamin with minerals   1 tablet, Oral, Daily      pantoprazole 40 MG EC tablet  Commonly known as: PROTONIX   40 mg, Oral, Daily      rOPINIRole 0.5 MG tablet  Commonly known as: REQUIP   0.5 mg, Oral, Nightly, Take 1 hour before bedtime.      rosuvastatin 20 MG tablet  Commonly known as: CRESTOR   20 mg, Oral, Daily      sertraline 100 MG tablet  Commonly known as: ZOLOFT   100 mg, Oral, Daily      valACYclovir 500 MG tablet  Commonly known as: VALTREX   500 mg, Oral, 2 Times Daily      VITAMIN D2 PO   50,000 Units, Oral, Every 30 Days             Stop These Medications      naproxen sodium 220 MG tablet  Commonly known as: ALEVE                  Discharge Diet:     Activity at Discharge:     Follow-up Appointments:   Future Appointments   Date Time Provider Department Center   7/16/2024  9:40 AM  [facility] LAB  [facility]   7/16/2024 10:00 AM TX ROOM  [facility] OP INFU ONC  [facility]   7/19/2024  5:00 PM [facility] MRI 2  [facility] MRI [facility]   7/19/2024  5:45 PM [facility] MRI 2  [facility] MRI [facility]   8/6/2024  8:30 AM  [facility]  [facility]   8/6/2024  9:00 AM Analilia Madera APRN [facility]   8/6/2024  9:45 AM TX ROOM  [facility] OP INFU ONC  [facility]       Test Results Pending at Discharge: no    Electronically signed by Alysia Lincoln MD,  07/12/24, 11:58 CDT.    Time: 35 minutes.

## 2024-07-12 NOTE — TELEPHONE ENCOUNTER
Caller: [facility]    Relationship to patient: Other    Best call back number: [phone]     THE PATIENT IS CURRENTLY ADMITTED FOR [facility] WITH PLANS TO DISCHARGE NEXT WEEK, SHE IS GOING TO COMPLETE 5 RADIATION TREATMENTS AND THEN PLANS TO ADMIT TO HOSPICE SERVICES. [facility] WILL SEND OVER THE OFFICIAL ORDERS FOR HOSPICE ONCE SHE'S BEEN ADMITTED TO HOSPICE.     IF SHE HAS ANY QUESTIONS PLEASE CALL [phone]  AND ASK FOR KEITH AGUAYO

## 2024-07-12 NOTE — CASE MANAGEMENT/SOCIAL WORK
Continued Stay Note  JANE Edmonds     Patient Name: Marina Joe  MRN: [id number]  Today's Date: 7/12/2024    Admit Date: 6/28/2024    Plan: Home Hospice   Discharge Plan       Row Name 07/12/24 1253       Plan    Plan Home Hospice    Patient/Family in Agreement with Plan yes    Final Discharge Disposition Code 50 - home with hospice    Final Note Pt is elected to return home with her significant other. Discussed hospice options and pt requests [facility]. Referral being sent. Pt states she has 5 more radiation treatments so plan will be for pt complete those treatments and hospice will be in contact her with her next week to make arrangements.    Addendum: Spoke with Sandrine at [facility] to confirm referral was rec'd. They will review and be in contact. Plan at this time is for pt to d/c home on Monday after radiation.                    Discharge Codes    No documentation.                 Expected Discharge Date and Time       Expected Discharge Date Expected Discharge Time    Jul 12, 2024               JW Villalba

## 2024-07-12 NOTE — THERAPY TREATMENT NOTE
Patient Name: Marina Joe  : 1945    MRN: [id number]                              Today's Date: 2024       Admit Date: 2024    Visit Dx:     ICD-10-CM ICD-9-CM   1. 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 Left renal mass  N28.89 593.9   3. Right adrenal mass  E27.8 255.8   4. Nodule of soft tissue  M79.89 729.99   5. Acute on chronic renal insufficiency  N28.9 593.9    N18.9 585.9   6. Thrombocytopenia  D69.6 287.5   7. Cauda equina compression  G83.4 344.60   8. Impaired mobility [Z74.09]  Z74.09 799.89   9. HX: breast cancer  Z85.3 V10.3     Patient Active Problem List   Diagnosis    Malignant neoplasm of upper-outer quadrant of female breast    Anemia of chronic renal failure, stage 3 (moderate)    Hypertension, benign    Hx of colonic polyps    HX: breast cancer    Morbid (severe) obesity due to excess calories    Right knee DJD    S/P lumpectomy, left breast    Adult hypothyroidism    Anemia    Anxiety    Breast cancer, left    Cervical pain    Chronic insomnia    Elevated lipids    Herpes zoster without complication    Left ear pain    Left otitis externa    Myalgia    Negative depression screening    Obesity (BMI 30-39.9)    Postmenopausal status    Recurrent acute serous otitis media of left ear    Restless leg    Sinusitis, bacterial    Skin lesion    Vitamin D deficiency    Stage 3b chronic kidney disease    GIB (gastrointestinal bleeding)    Acute on chronic blood loss anemia    Chronic idiopathic thrombocytopenia    Hypotension due to hypovolemia    Primary hypertension    Pancreatic lesion    Left renal mass    Cauda equina compression    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 Recall 5 years    COLONOSCOPY  11/19/2018    Tics otherwise normal exam repeat in 5 years    COLONOSCOPY  02/13/2023    Normal exam repeat in 3 years with a 2 day prep    ENDOSCOPY  02/13/2023    Gastritis, small HH    LUMBAR LAMINECTOMY Right 6/29/2024    Procedure: LUMBAR LAMINECTOMY L3 WITH DECOMPRESSION 1-2 LEVELS-RIGHT;  Surgeon: Marcellus Pulido MD;  Location: [facility] OR;  Service: Neurosurgery;  Laterality: Right;    REPLACEMENT TOTAL KNEE Right     2016    TOTAL ABDOMINAL HYSTERECTOMY WITH SALPINGO OOPHORECTOMY      UPPER ENDOSCOPIC ULTRASOUND W/ FNA  12/20/2023    Pancreatic cyst s/p FNA      General Information       Row Name 07/12/24 1045          OT Time and Intention    Document Type therapy note (daily note)  -     Mode of Treatment occupational therapy  -       Row Name 07/12/24 1045          General Information    Existing Precautions/Restrictions fall;brace worn when out of bed;LSO;spinal  -       Row Name 07/12/24 1045          Cognition    Orientation Status (Cognition) oriented x 4  -       Row Name 07/12/24 1045          Safety Issues, Functional Mobility    Impairments Affecting Function (Mobility) balance;endurance/activity tolerance;pain;strength  -               User Key  (r) = Recorded By, (t) = Taken By, (c) = Cosigned By      Initials Name Provider Type     Stacey Reagan OTR/L Occupational Therapist                     Mobility/ADL's       Row Name 07/12/24 1045          Bed Mobility    Bed Mobility supine-sit  -LS     Supine-Sit [place] (Bed Mobility) modified independence  -     Assistive Device (Bed Mobility) bed rails;head of bed elevated  -       Row Name 07/12/24 1045          Transfers    Transfers sit-stand transfer;stand-sit  transfer;toilet transfer  -LS       Row Name 07/12/24 1045          Sit-Stand Transfer    Sit-Stand [place] (Transfers) standby assist  -LS     Assistive Device (Sit-Stand Transfers) walker, front-wheeled  -LS       Row Name 07/12/24 1045          Stand-Sit Transfer    Stand-Sit [place] (Transfers) standby assist  -LS     Assistive Device (Stand-Sit Transfers) walker, front-wheeled  -LS       Row Name 07/12/24 1045          Toilet Transfer    Type (Toilet Transfer) sit-stand;stand-sit  -LS     [place] Level (Toilet Transfer) standby assist  -LS     Assistive Device (Toilet Transfer) grab bars/safety frame;walker, front-wheeled  -LS       Row Name 07/12/24 1045          Upper Body Dressing Assessment/Training    [place] Level (Upper Body Dressing) upper body dressing skills;set up  -LS     Position (Upper Body Dressing) edge of bed sitting  -LS       Row Name 07/12/24 1045          Toileting Assessment/Training    [place] Level (Toileting) toileting skills;perform perineal hygiene;supervision;adjust/manage clothing;standby assist  -LS               User Key  (r) = Recorded By, (t) = Taken By, (c) = Cosigned By      Initials Name Provider Type    Stacey Alba, OTR/L Occupational Therapist                   Obj/Interventions    No documentation.                  Goals/Plan    No documentation.                  Clinical Impression       Row Name 07/12/24 1045          Pain Assessment    Pretreatment Pain Rating 9/10  -LS     Posttreatment Pain Rating 9/10  -LS     Pain Location - other (see comments)  dental/tooth pain  -LS     Pain Intervention(s) Ambulation/increased activity;Nursing Notified;Repositioned  -LS       Row Name 07/12/24 1045          Plan of Care Review    Plan of Care Reviewed With patient  -LS     Progress improving  -LS     Outcome Evaluation OT tx completed. Pt in fowlers upon therapist arrival; A&Ox4; c/o 9/10 dental/tooth pain. Pt performed supine>sit utilizing bedrail  with HOB elevated with Mod I. Pt donned LSO while seated EOB I after set-up. Pt performed all sit<>stand transfers to/from bed, toilet, and chair utilizing rwx and/or grab bar with SBA. Pt ambulated from bed>BR>chair utilizing rwx with SBA. Pt performed lucy hygiene while seated with supervision and clothing management in standing with SBA. Cont OT POC. Recommend home with assist and HH at discharge.  -LS       Row Name 07/12/24 1045          Therapy Plan Review/Discharge Plan (OT)    Anticipated Discharge Disposition (OT) home with assist;home with home health  -LS       Row Name 07/12/24 1045          Positioning and Restraints    Pre-Treatment Position in bed  -LS     Post Treatment Position chair  -LS     In Chair sitting;call light within reach;encouraged to call for assist;with brace  -LS               User Key  (r) = Recorded By, (t) = Taken By, (c) = Cosigned By      Initials Name Provider Type    Stacey Alba OTR/L Occupational Therapist                   Outcome Measures       Row Name 07/12/24 1045          How much help from another is currently needed...    Putting on and taking off regular lower body clothing? 3  -LS     Bathing (including washing, rinsing, and drying) 3  -LS     Toileting (which includes using toilet bed pan or urinal) 3  -LS     Putting on and taking off regular upper body clothing 4  -LS     Taking care of personal grooming (such as brushing teeth) 4  -LS     Eating meals 4  -LS     AM-PAC 6 Clicks Score (OT) 21  -LS       Row Name 07/12/24 1045          Functional Assessment    Outcome Measure Options AM-PAC 6 Clicks Daily Activity (OT)  -LS               User Key  (r) = Recorded By, (t) = Taken By, (c) = Cosigned By      Initials Name Provider Type    Stacey Alba OTR/L Occupational Therapist                    Occupational Therapy Education       Title: PT OT SLP Therapies (Done)       Topic: Occupational Therapy (Done)       Point: ADL training (Done)        Description:   Instruct learner(s) on proper safety adaptation and remediation techniques during self care or transfers.   Instruct in proper use of assistive devices.                  Learning Progress Summary             Patient Acceptance, E, VU by LS at 7/12/2024 1154    Acceptance, E,D, VU,NR by LR at 7/11/2024 1156    Acceptance, E,D, VU,NR by LR at 7/10/2024 1008    Acceptance, E,D, VU,NR by LR at 7/5/2024 1058    Acceptance, E, VU by HH at 7/3/2024 1141    Acceptance, E, VU by HH at 7/2/2024 1436    Acceptance, E, VU by EC at 7/1/2024 0911    Acceptance, E, VU by KJ at 6/30/2024 0941    Acceptance, E, VU by LS at 6/28/2024 1431   Significant Other Acceptance, E,D, VU,NR by LR at 7/11/2024 1156                         Point: Home exercise program (Done)       Description:   Instruct learner(s) on appropriate technique for monitoring, assisting and/or progressing therapeutic exercises/activities.                  Learning Progress Summary             Patient Acceptance, E, VU by HH at 7/3/2024 1141    Acceptance, E, VU by HH at 7/2/2024 1436    Acceptance, E, VU by EC at 7/1/2024 0911    Acceptance, E, VU by KJ at 6/30/2024 0941                         Point: Precautions (Done)       Description:   Instruct learner(s) on prescribed precautions during self-care and functional transfers.                  Learning Progress Summary             Patient Acceptance, E, VU by LS at 7/12/2024 1154    Acceptance, E,D, VU,NR by LR at 7/11/2024 1156    Acceptance, E,D, VU,NR by LR at 7/10/2024 1008    Acceptance, E,D, VU,NR by LR at 7/5/2024 1058    Acceptance, E, VU by HH at 7/3/2024 1141    Acceptance, E, VU by HH at 7/2/2024 1436    Acceptance, E, VU by EC at 7/1/2024 0911    Acceptance, E, VU by KJ at 6/30/2024 0941    Acceptance, E, VU by LS at 6/28/2024 1431   Significant Other Acceptance, E,D, VU,NR by LR at 7/11/2024 1156                         Point: Body mechanics (Done)       Description:   Instruct learner(s)  on proper positioning and spine alignment during self-care, functional mobility activities and/or exercises.                  Learning Progress Summary             Patient Acceptance, E, VU by LS at 7/12/2024 1154    Acceptance, E,D, VU,NR by LR at 7/11/2024 1156    Acceptance, E,D, VU,NR by LR at 7/10/2024 1008    Acceptance, E,D, VU,NR by LR at 7/5/2024 1058    Acceptance, E, VU by HH at 7/3/2024 1141    Acceptance, E, VU by HH at 7/2/2024 1436    Acceptance, E, VU by EC at 7/1/2024 0911    Acceptance, E, VU by KJ at 6/30/2024 0941    Acceptance, E, VU by LS at 6/28/2024 1431   Significant Other Acceptance, E,D, VU,NR by LR at 7/11/2024 1156                                         User Key       Initials Effective Dates Name Provider Type Discipline    LS 06/20/22 -  Stacey Reagan, OTR/L Occupational Therapist OT    LR 04/25/23 -  Bety Tejeda OTR/L Occupational Therapist OT    KJ 04/15/24 -  Joanna Marie, OT Student OT Student OT    EC 10/13/23 -  Davida Reyes, OTR/L Occupational Therapist OT    HH 04/15/24 -  Kim Freed, OT Student OT Student OT                  OT Recommendation and Plan  Planned Therapy Interventions (OT): activity tolerance training, functional balance retraining, occupation/activity based interventions, ROM/therapeutic exercise, adaptive equipment training, BADL retraining, strengthening exercise, transfer/mobility retraining, patient/caregiver education/training  Therapy Frequency (OT): 5 times/wk  Plan of Care Review  Plan of Care Reviewed With: patient  Progress: improving  Outcome Evaluation: OT tx completed. Pt in fowlers upon therapist arrival; A&Ox4; c/o 9/10 dental/tooth pain. Pt performed supine>sit utilizing bedrail with HOB elevated with Mod I. Pt donned LSO while seated EOB I after set-up. Pt performed all sit<>stand transfers to/from bed, toilet, and chair utilizing rwx and/or grab bar with SBA. Pt ambulated from bed>BR>chair utilizing rwx with SBA. Pt  performed lucy hygiene while seated with supervision and clothing management in standing with SBA. Cont OT POC. Recommend home with assist and HH at discharge.     Time Calculation:         Time Calculation- OT       Row Name 07/12/24 1045             Time Calculation- OT    OT Start Time 1045  -LS      OT Stop Time 1114  -LS      OT Time Calculation (min) 29 min  -      Total Timed Code Minutes- OT 29 minute(s)  -LS      OT Received On 07/12/24  -                User Key  (r) = Recorded By, (t) = Taken By, (c) = Cosigned By      Initials Name Provider Type    Stacey Alba OTR/L Occupational Therapist                  Therapy Charges for Today       Code Description Service Date Service Provider Modifiers Qty    39324077262 HC OT SELF CARE/MGMT/TRAIN EA 15 MIN 7/12/2024 Stacey Reagan OTR/L GO 2                 LORETTA Reid/MARY  7/12/2024

## 2024-07-12 NOTE — THERAPY TREATMENT NOTE
Acute Care - Physical Therapy Treatment Note  [facility]     Patient Name: Marina Joe  : 1945  MRN: [id number]  Today's Date: 2024      Visit Dx:     ICD-10-CM ICD-9-CM   1. 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 Left renal mass  N28.89 593.9   3. Right adrenal mass  E27.8 255.8   4. Nodule of soft tissue  M79.89 729.99   5. Acute on chronic renal insufficiency  N28.9 593.9    N18.9 585.9   6. Thrombocytopenia  D69.6 287.5   7. Cauda equina compression  G83.4 344.60   8. Impaired mobility [Z74.09]  Z74.09 799.89   9. HX: breast cancer  Z85.3 V10.3     Patient Active Problem List   Diagnosis    Malignant neoplasm of upper-outer quadrant of female breast    Anemia of chronic renal failure, stage 3 (moderate)    Hypertension, benign    Hx of colonic polyps    HX: breast cancer    Morbid (severe) obesity due to excess calories    Right knee DJD    S/P lumpectomy, left breast    Adult hypothyroidism    Anemia    Anxiety    Breast cancer, left    Cervical pain    Chronic insomnia    Elevated lipids    Herpes zoster without complication    Left ear pain    Left otitis externa    Myalgia    Negative depression screening    Obesity (BMI 30-39.9)    Postmenopausal status    Recurrent acute serous otitis media of left ear    Restless leg    Sinusitis, bacterial    Skin lesion    Vitamin D deficiency    Stage 3b chronic kidney disease    GIB (gastrointestinal bleeding)    Acute on chronic blood loss anemia    Chronic idiopathic thrombocytopenia    Hypotension due to hypovolemia    Primary hypertension    Pancreatic lesion    Left renal mass    Cauda equina compression    Metastatic cancer to spine     Past Medical History:   Diagnosis Date    Breast cancer     CKD (chronic kidney disease)     Hypercholesteremia     Hypertension     Sinusitis     Stage 3a chronic kidney disease 10/20/2020     Past Surgical History:   Procedure Laterality Date    AVULSION TOENAIL PLATE          BREAST BIOPSY  Left 11/20/2012    BREAST BIOPSY      Left Breast, 1/2019 per Dr Barkley    BREAST LUMPECTOMY Left     with node bx     COLONOSCOPY  09/13/2013    small polyp at 30cm benign hyperplastic polyp, changes consistent with melanosis coli. Recall 5 years    COLONOSCOPY  11/19/2018    Tics otherwise normal exam repeat in 5 years    COLONOSCOPY  02/13/2023    Normal exam repeat in 3 years with a 2 day prep    ENDOSCOPY  02/13/2023    Gastritis, small HH    LUMBAR LAMINECTOMY Right 6/29/2024    Procedure: LUMBAR LAMINECTOMY L3 WITH DECOMPRESSION 1-2 LEVELS-RIGHT;  Surgeon: Marcellus Pulido MD;  Location: [facility] OR;  Service: Neurosurgery;  Laterality: Right;    REPLACEMENT TOTAL KNEE Right     2016    TOTAL ABDOMINAL HYSTERECTOMY WITH SALPINGO OOPHORECTOMY      UPPER ENDOSCOPIC ULTRASOUND W/ FNA  12/20/2023    Pancreatic cyst s/p FNA     PT Assessment (Last 12 Hours)       PT Evaluation and Treatment       Row Name 07/12/24 1500          Physical Therapy Time and Intention    Subjective Information no complaints  -LY     Document Type therapy note (daily note)  -LY     Mode of Treatment physical therapy  -LY       Row Name 07/12/24 1500          General Information    Existing Precautions/Restrictions fall;brace worn when out of bed;LSO;spinal  -LY       Row Name 07/12/24 1500          Pain    Pretreatment Pain Rating 0/10 - no pain  -LY     Posttreatment Pain Rating 0/10 - no pain  -LY       Row Name 07/12/24 1500          Bed Mobility    Supine-Sit [place] (Bed Mobility) modified independence  -LY     Assistive Device (Bed Mobility) bed rails;head of bed elevated  -LY       Row Name 07/12/24 1500          Sit-Stand Transfer    Sit-Stand [place] (Transfers) standby assist  -LY     Assistive Device (Sit-Stand Transfers) walker, front-wheeled  -LY       Row Name 07/12/24 1500          Stand-Sit Transfer    Stand-Sit [place] (Transfers) standby assist  -LY     Assistive Device (Stand-Sit Transfers) walker,  front-wheeled  -LY       Row Name 07/12/24 1500          Gait/Stairs (Locomotion)    [place] Level (Gait) verbal cues;contact guard  -LY     Assistive Device (Gait) walker, front-wheeled  -LY     Distance in Feet (Gait) 250  x2 with seated rest break  -LY     Pattern (Gait) step-through  -LY     Deviations/Abnormal Patterns (Gait) [place] decreased;bilateral deviations  -LY     Bilateral Gait Deviations forward flexed posture  -LY     Left Sided Gait Deviations heel strike decreased  -LY       Row Name             Wound 06/29/24 1458 lumbar spine Incision    Wound - Properties Group Placement Date: 06/29/24  -KT Placement Time: 1458  -KT Present on Original Admission: N  -KT Location: lumbar spine  -KT Primary Wound Type: Incision  -KT    Retired Wound - Properties Group Placement Date: 06/29/24  -KT Placement Time: 1458 -KT Present on Original Admission: N  -KT Location: lumbar spine  -KT Primary Wound Type: Incision  -KT    Retired Wound - Properties Group Date first assessed: 06/29/24  -KT Time first assessed: 1458  -KT Present on Original Admission: N  -KT Location: lumbar spine  -KT Primary Wound Type: Incision  -KT      Row Name 07/12/24 1500          Plan of Care Review    Plan of Care Reviewed With patient  -LY     Progress improving  -LY       Row Name 07/12/24 1500          Positioning and Restraints    Pre-Treatment Position in bed  -LY     Post Treatment Position bed  -LY     In Bed sitting EOB;call light within reach;encouraged to call for assist;with family/caregiver  -LY               User Key  (r) = Recorded By, (t) = Taken By, (c) = Cosigned By      Initials Name Provider Type    LY Stacey Miner PTA Physical Therapist Assistant    Shravan Ordonez, RN Registered Nurse                    Physical Therapy Education       Title: PT OT SLP Therapies (Done)       Topic: Physical Therapy (Done)       Point: Mobility training (Done)       Learning Progress Summary             Patient Acceptance, E,  VU by  at 7/10/2024 1147    Comment: continued progression with goals, rest breaks, d/c plans    Eager, E, VU by  at 7/5/2024 1023    Comment: POC    Acceptance, E,D, DU,VU by  at 6/30/2024 0951    Comment: benefits of PT and POC, call for A OOB, no BLT, LSO when OOB                         Point: Home exercise program (Done)       Learning Progress Summary             Patient Acceptance, E, VU by  at 7/10/2024 1147    Comment: continued progression with goals, rest breaks, d/c plans                         Point: Body mechanics (Done)       Learning Progress Summary             Patient Acceptance, E, VU by  at 7/10/2024 1147    Comment: continued progression with goals, rest breaks, d/c plans    Acceptance, E,D, DU,VU by  at 6/30/2024 0951    Comment: benefits of PT and POC, call for A OOB, no BLT, LSO when OOB                         Point: Precautions (Done)       Learning Progress Summary             Patient Acceptance, E, VU by  at 7/10/2024 1147    Comment: continued progression with goals, rest breaks, d/c plans    Acceptance, E,D, DU,VU by  at 6/30/2024 0951    Comment: benefits of PT and POC, call for A OOB, no BLT, LSO when OOB                                         User Key       Initials Effective Dates Name Provider Type Discipline    AE 02/03/23 -  Sofia Ann, PTA Physical Therapist Assistant PT     02/03/23 -  Eliel León, PT Physical Therapist PT     05/07/24 -  Warren Carvalho, PT Physical Therapist PT                  PT Recommendation and Plan     Plan of Care Reviewed With: patient  Progress: improving       Time Calculation:    PT Charges       Row Name 07/12/24 1539             Time Calculation    Start Time 1500  -LY      Stop Time 1530  -LY      Time Calculation (min) 30 min  -LY      PT Received On 07/12/24  -LY         Time Calculation- PT    Total Timed Code Minutes- PT 30 minute(s)  -LY         Timed Charges    84024 - Gait Training Minutes  30  -LY         Total  Minutes    Timed Charges Total Minutes 30  -LY       Total Minutes 30  -LY                User Key  (r) = Recorded By, (t) = Taken By, (c) = Cosigned By      Initials Name Provider Type    Stacey Jesus PTA Physical Therapist Assistant                  Therapy Charges for Today       Code Description Service Date Service Provider Modifiers Qty    31802781335 HC GAIT TRAINING EA 15 MIN 7/11/2024 Stacey Miner, LAZARO GP 2    35069848738 HC PT THERAPEUTIC ACT EA 15 MIN 7/11/2024 Stacey Miner, LAZARO GP 1    78211001664 HC GAIT TRAINING EA 15 MIN 7/12/2024 Stacey Miner, LAZARO GP 2            PT G-Codes  Outcome Measure Options: AM-PAC 6 Clicks Daily Activity (OT)  AM-PAC 6 Clicks Score (PT): 18  AM-PAC 6 Clicks Score (OT): 21    Stacey Miner PTA  7/12/2024

## 2024-07-12 NOTE — PROGRESS NOTES
[facility] Palliative Care Services    Palliative Care Daily Progress Note   Chief complaint-follow up support for patient/family    Code Status:   Code Status and Medical Interventions:   Ordered at: 07/01/24 1546     Medical Intervention Limits:    No intubation (DNI)    No artificial nutrition     Level Of Support Discussed With:    Patient     Code Status (Patient has no pulse and is not breathing):    No CPR (Do Not Attempt to Resuscitate)     Medical Interventions (Patient has pulse or is breathing):    Limited Support      Advanced Directives: Advance Directive Status: Patient does not have advance directive   Goals of Care: Ongoing.     S: Medical record reviewed. Events noted.  Received palliative radiation #1 on 7/9 and #5 today.  Started on fentanyl transdermal patch 12 mcg on 7/8.  Received 84 mg (64 mg 7/10 & 7/11, 124 mg 7/9) oral morphine equivalent over the last 24 hours in the form of fentanyl transdermal patch, Norco and Dilaudid IV.  Bone marrow biopsy, systemic therapy, diagnostic mammography, and outpatient staging imaging pending patient's decision for systemic therapy oncology.  Laying in bed without apparent distress, does complain of oral pain due to a lesion after a tooth that was previously pulled, with associated bleeding when brushing her teeth. She does appear to have right facial swelling and gum inflammation and erosion on examination as well.  States a flareup a few weeks ago relieved with Orajel.  Attending physician notified electronically.  States back pain is fairly well-controlled but did have some increased pain early this morning.  Advance Care Planning   ongoing conversation with patient and significant other, Jose Enrique with regard to care goals moving forward.  Explored ongoing conversation with oncology with regard to treatment options versus transitions of care with best supportive care directed by hospice.  After discussion both patient and Jose Enrique have  elected to discharge home with hospice services.  Aware that palliative radiation treatments may delay hospice admission, but once treatments completed or no further treatments desire transition could occur at that time.  Hospice expectations discussed at length.  Questions encouraged and support given.      Review of Systems   Constitutional: Positive for malaise/fatigue.   Respiratory:  Negative for shortness of breath.    Musculoskeletal:  Positive for muscle weakness and back pain.   Genitourinary:  Negative for dysuria.   Neurological:  Positive for loss of balance and weakness.   Psychiatric/Behavioral:  The patient is not nervous/anxious    Pain Assessment  Preferred Pain Scale: number (Numeric Rating Pain Scale)  Nonverbal Indicators of Pain: nonverbal indicators absent  CPOT Facial Expression: 0-->relaxed, neutral  CPOT Body Movements: 0-->absence of movements  CPOT Muscle Tension: 0-->relaxed  Ventilator Compliance/Vocalization: 0-->talking in normal tone or no sound  CPOT Score: 0  Pain Location: back, buttock  Pain Description: aching    O:     Intake/Output Summary (Last 24 hours) at 7/12/2024 0929  Last data filed at 7/12/2024 0032  Gross per 24 hour   Intake 360 ml   Output 450 ml   Net -90 ml       Diagnostics and current medications: Reviewed.      Current Facility-Administered Medications:     acetaminophen (TYLENOL) tablet 650 mg, 650 mg, Oral, Q6H PRN, Marcellus Pulido MD, 650 mg at 06/29/24 0544    albuterol (PROVENTIL) nebulizer solution 0.083% 2.5 mg/3mL, 2.5 mg, Nebulization, Q4H PRN, Marcellus Pulido MD, 2.5 mg at 06/28/24 1040    amLODIPine (NORVASC) tablet 5 mg, 5 mg, Oral, BID, Marcellus Pulido MD, 5 mg at 07/12/24 0910    sennosides-docusate (PERICOLACE) 8.6-50 MG per tablet 2 tablet, 2 tablet, Oral, BID PRN, 2 tablet at 07/01/24 2045 **AND** polyethylene glycol (MIRALAX) packet 17 g, 17 g, Oral, Daily PRN, 17 g at 07/03/24 0836 **AND** bisacodyl (DULCOLAX) EC tablet  5 mg, 5 mg, Oral, Daily PRN **AND** bisacodyl (DULCOLAX) suppository 10 mg, 10 mg, Rectal, Daily PRN, Marcellus Pulido MD    buPROPion XL (WELLBUTRIN XL) 24 hr tablet 150 mg, 150 mg, Oral, Daily, Marcellus Pulido MD, 150 mg at 07/12/24 0910    Calcium Replacement - Follow Nurse / BPA Driven Protocol, , Does not apply, PRN, Marcellus Pulido MD    carvedilol (COREG) tablet 25 mg, 25 mg, Oral, BID With Meals, Alysia Lincoln MD, 25 mg at 07/12/24 0909    cetirizine (zyrTEC) tablet 10 mg, 10 mg, Oral, Daily, Marcellus Pulido MD, 10 mg at 07/12/24 0909    fentaNYL (DURAGESIC) 12 MCG/HR 1 patch, 1 patch, Transdermal, Q72H, 1 patch at 07/11/24 1757 **AND** Check Fentanyl Patch Placement, 1 each, Does not apply, Q12H, Lauren Kuo, MARY    cloNIDine (CATAPRES) tablet 0.2 mg, 0.2 mg, Oral, BID, Alysia Lincoln MD, 0.2 mg at 07/12/24 0910    cyclobenzaprine (FLEXERIL) tablet 10 mg, 10 mg, Oral, TID PRN, Marcellus Pulido MD, 10 mg at 07/09/24 1554    Diclofenac Sodium (VOLTAREN) 1 % gel 4 g, 4 g, Topical, 4x Daily PRN, Marcellus Pulido MD, 4 g at 07/08/24 0423    fluticasone (FLONASE) 50 MCG/ACT nasal spray 2 spray, 2 spray, Nasal, Daily, Marcellus Pulido MD, 2 spray at 07/12/24 0912    hydrALAZINE (APRESOLINE) injection 10 mg, 10 mg, Intravenous, Q4H PRN, Marcellus Pulido MD    HYDROcodone-acetaminophen (NORCO)  MG per tablet 1 tablet, 1 tablet, Oral, Q4H PRN, 1 tablet at 07/12/24 0015 **OR** HYDROcodone-acetaminophen (NORCO)  MG per tablet 2 tablet, 2 tablet, Oral, Q4H PRN, Lauren Kuo APRN, 2 tablet at 07/12/24 0629    HYDROmorphone (DILAUDID) injection 1 mg, 1 mg, Intravenous, Q2H PRN, Alysia Lincoln MD, 1 mg at 07/12/24 0710    lactulose solution 30 g, 30 g, Oral, TID, Alysia Lincoln MD, 30 g at 07/11/24 1758    Lidocaine 4 % 1 patch, 1 patch, Transdermal, Q24H, Lauren Kuo APRN, 1 patch at 07/12/24 0912    Magnesium Low Dose  Replacement - Follow Nurse / BPA Driven Protocol, , Does not apply, PRN, Marcellus Pulido MD    methylnaltrexone (RELISTOR) injection 6 mg, 6 mg, Subcutaneous, Every Other Day, Marc Quevedo APRN, 6 mg at 07/12/24 0916    montelukast (SINGULAIR) tablet 10 mg, 10 mg, Oral, Nightly, Marcellus Pulido MD, 10 mg at 07/11/24 2017    multivitamin with minerals 1 tablet, 1 tablet, Oral, Daily, Marcellus Pulido MD, 1 tablet at 07/12/24 0909    ondansetron (ZOFRAN) injection 4 mg, 4 mg, Intravenous, Q6H PRN, Marcellus Pulido MD, 4 mg at 07/12/24 0906    pantoprazole (PROTONIX) EC tablet 40 mg, 40 mg, Oral, Daily, Marcellus Pulido MD, 40 mg at 07/12/24 0910    Phosphorus Replacement - Follow Nurse / BPA Driven Protocol, , Does not apply, PRN, Marcellus Pulido MD    Potassium Replacement - Follow Nurse / BPA Driven Protocol, , Does not apply, PRN, Marcellus Pulido MD    rOPINIRole (REQUIP) tablet 0.5 mg, 0.5 mg, Oral, Nightly, Marcellus Pulido MD, 0.5 mg at 07/11/24 2017    rosuvastatin (CRESTOR) tablet 10 mg, 10 mg, Oral, Nightly, Marcellus Pulido MD, 10 mg at 07/11/24 2017    sertraline (ZOLOFT) tablet 100 mg, 100 mg, Oral, Daily, Marcellus Pulido MD, 100 mg at 07/12/24 0909    [COMPLETED] Insert Peripheral IV, , , Once **AND** sodium chloride 0.9 % flush 10 mL, 10 mL, Intravenous, PRN, Marcellus Pulido MD    sodium chloride 0.9 % flush 10 mL, 10 mL, Intravenous, Q12H, Marcellus Pulido MD, 10 mL at 07/12/24 0912    sodium chloride 0.9 % flush 10 mL, 10 mL, Intravenous, PRN, Marcellus Pulido MD    sodium chloride 0.9 % flush 10 mL, 10 mL, Intravenous, PRN, Marcellus Pulido MD    sodium chloride 0.9 % infusion 40 mL, 40 mL, Intravenous, PRN, Marcellus Pulido MD    sodium chloride 0.9 % infusion 40 mL, 40 mL, Intravenous, PRN, Marcellus Pulido MD    sodium chloride 0.9 % infusion, 100 mL/hr, Intravenous, Continuous,  "Alysia Lincoln MD, Last Rate: 100 mL/hr at 07/10/24 0622, 100 mL/hr at 07/10/24 0622    Allergies   Allergen Reactions    Aspirin GI Bleeding    Niacin Other (See Comments)     I have utilized all available immediate resources to obtain, update, or review the patient's current medications (including all prescriptions, over-the-counter products, herbals, cannabis/cannabidiol products, and vitamin/mineral/dietary (nutritional) supplements) for name, route of administration, type, dose and frequency.    A:    /58 (BP Location: Right arm, Patient Position: Lying)   Pulse 66   Temp 98.7 °F (37.1 °C) (Oral)   Resp 16   Ht 172.7 cm (68\")   Wt 111 kg (244 lb)   LMP  (LMP Unknown)   SpO2 94%   BMI 37.10 kg/m²     Vitals and nursing note reviewed.   Constitutional:       Appearance: Not in distress.   Eyes:      General: Lids are normal.      Pupils: Pupils are equal, round, and reactive to light.   HENT:      Head: Normocephalic. Facial swelling right jaw. Inflammation and erosion of right gum with previous tooth extraction.  Pulmonary:      Effort: Pulmonary effort is normal.   Cardiovascular:      Normal rate.   Edema:     Peripheral edema present.  Abdominal:      Palpations: Abdomen is soft.   Musculoskeletal: Normal range of motion.      Cervical back: Neck supple.   Skin:     General: Skin is warm.   Genitourinary:     Comments: No reported dysuria  Neurological:      Mental Status: Alert   Psychiatric:         Mood and Affect: Mood normal.         Cognition and Memory: Cognition normal.      Patient status: Disease state: Controlled with current treatments.  Current Functional status: Palliative Performance Scale Score: Performance 40% based on the following measures: Ambulation: Mainly in bed, Activity and Evidence of Disease: Unable to do any work, extensive evidence of disease, Self-Care: Mainly assistance required,  Intake: Normal or reduced, LOC: Full, drowsy or confusion   Baseline Functional " status: Palliative Performance Scale Score: Performance 70% based on the following measures: Ambulation: Reduced, Activity and Evidence of Disease: Unable to do normal work, some evidence of disease, Self-Care: Fully independent,  Intake: Normal or reduced, LOC: Full   Baseline ECOG Status(3) Capable of limited self-care, confined to bed or chair > 50% of waking hours.  Nutritional status: Albumin 3.9 Body mass index is 37.1 kg/m².      Hospital Problem List      Left renal mass    Cauda equina compression    Metastatic cancer to spine     Impression/Problem List:     Small lymphocytic lymphoma/chronic lymphocytic leukemia, stage IV  Cord compression at L3 s/p laminectomy and decompression 6/29/2024  Bilateral renal masses, right adrenal mass, and abdominal soft tissue nodules concerning for metastatic disease  Cerebral microvascular disease, per MRI  History of breast cancer, stage I  Left breast nodule 9 mm  Anemia  Thrombocytopenia  Acute kidney injury on chronic kidney disease stage III  Hyperlipidemia  Hypertension     Recommendations/Plan:  1. plan: Goals of care include status no CPR/limited support interventions.     Family support: The patient receives support from her children and friend, Jose Enrique ..  Advance Directives: Advance Directive Status: Patient does not have advance directive   POA/Healthcare surrogate-Advance directive completed 7/1 with assistance from palliative  and , patient named her friend Jose Enrique followed by 2 children, Kailee and Marques as healthcare surrogates..     2.  Palliative care encounter  - Prognosis is guarded long-term secondary to suspected metastatic disease and comorbidities..  -Patient and HCS appears to have good prognostic awareness.      -Neurosurgery and nephrology consulted.    6/29-Underwent lumbar laminectomy L3 with decompression of the spinal cord and biopsy by Dr. Pulido, pathology pending-preliminary shows small blue cell tumor which  could be either lymphoma versus neuroendocrine tumor.    -Oncology consulted, recommend further staging diagnostics.  Anticipating biopsy of abdominal subcutaneous nodules.  MRI brain shows no definite evidence of metastatic disease.    -Radiation oncology consulted for postop radiation.   -Continues to work with therapy, LSO when out of bed per neurosurgery recommendation.    7/1-Advance directive completed today with assistance from palliative  and , patient named her friend Jose Enrique followed by 2 children Kailee and Mukundrosario as healthcare surrogates.  7/3-Cytology shows abnormal B-cell peripheral proliferation with some features consistent with small lymphocytic lymphoma/chronic lymphocytic leukemia, though FISH studies are pending.    -Oncology plans for bone marrow biopsy.  Anticipating initiation of ibrutinib.  Allopurinol initiated.  Plan for outpatient staging PET scan.  Plan outpatient diagnostic mammography to evaluate left breast nodule  -Radiation oncology plans for palliative radiation treatments 5-10 treatment fractions starting Monday 7/8, simulated 7/2.    -Neurosurgery recommends PT OT with brace and outpatient follow-up in 2 weeks.   7/8-Nephrology has been consulted due to worsening kidney function and plans for renal ultrasound.       7/1-CODE STATUS changes no CPR/limited support interventions, no intubation and no artificial nutrition.    -Will plan to complete a MOST document prior to discharge with assistance from palliative     7/12-continue supportive measures  -Radiation oncology plans for palliative radiation treatments 5-10 treatment fractions #1 Monday 7/8, #5 today    -Anticipating home with hospice.  A consult previously placed. Spoke with LIZZ Bright. Will likely need to be near completion of palliative radiation prior to hospice admission.  -A MOST document completed    3.  Pain, cancer related  -Controlled received 84 mg (64 mg 7/10 & 7/11, 124 mg  7/9) oral morphine equivalent.  -Continue fentanyl transdermal patch 12 mcg for consistent pain management  -Norco range dosing for moderate to severe pain as needed  -IV Dilaudid per attending  -Continue lidocaine patch  -Utilize Voltaren gel as needed  -Plan palliative radiation x 5-10 treatment fractions  -Declined gabapentin trial at lower dose (100 mg every 8 hours), states side effects most recently with dizziness (300 mg per pain management).  Most likely exacerbated by kidney dysfunction.  Discontinued per patient request 7/8.    4.  Nausea  -Zofran as needed    5.  Oral pain  -Viscous lidocaine as needed.  Nursing to provide a dose now  -attending physician notified electronically.      25 minutes spent on advance care planning-discussing with patient and/or family, answering questions, providing some guidance about a plan and documentation of care, and coordinating care face to face.     Thank you for allowing us to participate in patient's plan of care. Palliative Care Team will continue to follow patient.     Lauren Kuo, MARY  7/12/2024  09:29 CDT

## 2024-07-13 LAB
ANION GAP SERPL CALCULATED.3IONS-SCNC: 10 MMOL/L (ref 5–15)
BASOPHILS # BLD AUTO: 0.01 10*3/MM3 (ref 0–0.2)
BASOPHILS NFR BLD AUTO: 0.2 % (ref 0–1.5)
BUN SERPL-MCNC: 27 MG/DL (ref 8–23)
BUN/CREAT SERPL: 17.3 (ref 7–25)
CALCIUM SPEC-SCNC: 9.4 MG/DL (ref 8.6–10.5)
CHLORIDE SERPL-SCNC: 100 MMOL/L (ref 98–107)
CO2 SERPL-SCNC: 27 MMOL/L (ref 22–29)
CREAT SERPL-MCNC: 1.56 MG/DL (ref 0.57–1)
DEPRECATED RDW RBC AUTO: 52.5 FL (ref 37–54)
EGFRCR SERPLBLD CKD-EPI 2021: 33.9 ML/MIN/1.73
EOSINOPHIL # BLD AUTO: 0.14 10*3/MM3 (ref 0–0.4)
EOSINOPHIL NFR BLD AUTO: 2.5 % (ref 0.3–6.2)
ERYTHROCYTE [DISTWIDTH] IN BLOOD BY AUTOMATED COUNT: 15.9 % (ref 12.3–15.4)
GLUCOSE SERPL-MCNC: 97 MG/DL (ref 65–99)
HCT VFR BLD AUTO: 26.7 % (ref 34–46.6)
HGB BLD-MCNC: 8.3 G/DL (ref 12–15.9)
IMM GRANULOCYTES # BLD AUTO: 0.03 10*3/MM3 (ref 0–0.05)
IMM GRANULOCYTES NFR BLD AUTO: 0.5 % (ref 0–0.5)
LYMPHOCYTES # BLD AUTO: 0.47 10*3/MM3 (ref 0.7–3.1)
LYMPHOCYTES NFR BLD AUTO: 8.3 % (ref 19.6–45.3)
MCH RBC QN AUTO: 28.1 PG (ref 26.6–33)
MCHC RBC AUTO-ENTMCNC: 31.1 G/DL (ref 31.5–35.7)
MCV RBC AUTO: 90.5 FL (ref 79–97)
MONOCYTES # BLD AUTO: 0.48 10*3/MM3 (ref 0.1–0.9)
MONOCYTES NFR BLD AUTO: 8.5 % (ref 5–12)
NEUTROPHILS NFR BLD AUTO: 4.5 10*3/MM3 (ref 1.7–7)
NEUTROPHILS NFR BLD AUTO: 80 % (ref 42.7–76)
NRBC BLD AUTO-RTO: 0 /100 WBC (ref 0–0.2)
PLATELET # BLD AUTO: 51 10*3/MM3 (ref 140–450)
POTASSIUM SERPL-SCNC: 4.3 MMOL/L (ref 3.5–5.2)
RBC # BLD AUTO: 2.95 10*6/MM3 (ref 3.77–5.28)
SODIUM SERPL-SCNC: 137 MMOL/L (ref 136–145)
WBC NRBC COR # BLD AUTO: 5.63 10*3/MM3 (ref 3.4–10.8)

## 2024-07-13 PROCEDURE — 85025 COMPLETE CBC W/AUTO DIFF WBC: CPT | Performed by: NURSE PRACTITIONER

## 2024-07-13 PROCEDURE — 80048 BASIC METABOLIC PNL TOTAL CA: CPT | Performed by: INTERNAL MEDICINE

## 2024-07-13 RX ADMIN — CYCLOBENZAPRINE 10 MG: 10 TABLET, FILM COATED ORAL at 08:27

## 2024-07-13 RX ADMIN — CARVEDILOL 25 MG: 25 TABLET, FILM COATED ORAL at 08:28

## 2024-07-13 RX ADMIN — ROPINIROLE HYDROCHLORIDE 0.5 MG: 0.25 TABLET, FILM COATED ORAL at 21:27

## 2024-07-13 RX ADMIN — MONTELUKAST SODIUM 10 MG: 10 TABLET, FILM COATED ORAL at 21:28

## 2024-07-13 RX ADMIN — AMLODIPINE BESYLATE 5 MG: 5 TABLET ORAL at 08:28

## 2024-07-13 RX ADMIN — Medication 1 TABLET: at 08:28

## 2024-07-13 RX ADMIN — CETIRIZINE HYDROCHLORIDE 10 MG: 10 TABLET ORAL at 08:28

## 2024-07-13 RX ADMIN — HYDROCODONE BITARTRATE AND ACETAMINOPHEN 2 TABLET: 10; 325 TABLET ORAL at 21:24

## 2024-07-13 RX ADMIN — BUPROPION HYDROCHLORIDE 150 MG: 150 TABLET, EXTENDED RELEASE ORAL at 08:28

## 2024-07-13 RX ADMIN — CYCLOBENZAPRINE 10 MG: 10 TABLET, FILM COATED ORAL at 16:47

## 2024-07-13 RX ADMIN — HYDROCODONE BITARTRATE AND ACETAMINOPHEN 2 TABLET: 10; 325 TABLET ORAL at 08:27

## 2024-07-13 RX ADMIN — HYDROCODONE BITARTRATE AND ACETAMINOPHEN 2 TABLET: 10; 325 TABLET ORAL at 16:47

## 2024-07-13 RX ADMIN — CLONIDINE HYDROCHLORIDE 0.2 MG: 0.1 TABLET ORAL at 08:28

## 2024-07-13 RX ADMIN — LIDOCAINE 1 PATCH: 4 PATCH TOPICAL at 08:28

## 2024-07-13 RX ADMIN — PANTOPRAZOLE SODIUM 40 MG: 40 TABLET, DELAYED RELEASE ORAL at 08:27

## 2024-07-13 RX ADMIN — HYDROCODONE BITARTRATE AND ACETAMINOPHEN 2 TABLET: 10; 325 TABLET ORAL at 04:02

## 2024-07-13 RX ADMIN — Medication 10 ML: at 21:32

## 2024-07-13 RX ADMIN — Medication 10 ML: at 08:29

## 2024-07-13 RX ADMIN — LACTULOSE 30 G: 20 SOLUTION ORAL at 08:27

## 2024-07-13 RX ADMIN — SERTRALINE 100 MG: 50 TABLET, FILM COATED ORAL at 08:28

## 2024-07-13 RX ADMIN — ROSUVASTATIN CALCIUM 10 MG: 10 TABLET, FILM COATED ORAL at 21:28

## 2024-07-13 NOTE — PLAN OF CARE
Goal Outcome Evaluation:  Plan of Care Reviewed With: patient        Progress: no change  Outcome Evaluation: IV to R wrist area, saline locked. refusing lactulose.  Norco, flexeril and lidocaine patch to lower back of pain. vitals stable, A/Ox4. fall precautions, safety maintained.

## 2024-07-13 NOTE — PLAN OF CARE
Goal Outcome Evaluation:  Plan of Care Reviewed With: patient        Progress: no change  Outcome Evaluation: VSS. PRN pain meds as orderd with good results. Pt appeared to sleep most of night. Safety maintained.

## 2024-07-13 NOTE — PROGRESS NOTES
[facility] Medicine Services  INPATIENT PROGRESS NOTE    Patient Name: Marina Joe  Date of Admission: 6/28/2024  Today's Date: 07/13/24  Length of Stay: 15  Primary Care Physician: Hanh Og APRN    Subjective   Chief Complaint: Lower back pain          HPI   Ms. Joe is a 79-year-old female from home with past medical history of breast cancer, chronic kidney disease stage IIIa, hypercholesterolemia, hypertension and chronic sinusitis, who presented to the emergency room with worsening back pain/flank pain, history was mostly provided by patient's boyfriend at bedside.  She developed back pain couple of weeks ago and was referred to a pain clinic doctor where she was prescribed gabapentin, after she took about 2-3 doses of the gabapentin, she developed dizziness. She came to the emergency room today because lower back pain and flank pain got even worse despite being on the gabapentin.  In the emergency room, she was extensively worked up with CT of the abdomen and lumbar spine, CT abdomen showed extensive mass of the left kidney extending up to the proximal left ureter as well as right adrenal gland mass suspicious for metastasis.  Urology was consulted from the emergency room for input.  7/1  As above history of chronic kidney disease hyperlipidemia hypertension presented with back pain found to have kidney mass suspicious for metastasis patient CT of the lumbar spine shows multilevel degenerative changes to include an anteriolisthesis of L3 over L4 and spondylosis at L5-S1. 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 awaiting final pathology will consult with palliative  team to address the CODE STATUS and plan of care  7/2  Appreciate palliative care the patient is currently DNR, appreciate neurosurgery status post L3 laminectomy and decompression on June 29 for cauda equina, appreciate oncology continues to work with physical therapy appreciate  radiation oncology patient started radiation today  7/3  Patient oxygen is 85% on 8 L, patient blood pressure is poorly controlled increase her Coreg we will repeat her chest x-ray chest x-ray from before was showing some congestion IV Hep-Lock will give her 1 dose of Lasix  7/4  As before remains on 8 L oxygen documented excision was not titrated will titrate oxygen, chest x-ray showing cardiomegaly pulmonary edema the patient has poor inspiration we did Hep-Lock her IV fluid we will continue IV Lasix blood pressure remains poorly controlled increase her Coreg we will check her echo of the heart as above she was not having bowel movement for most time was started on lactulose, patient had good bowel movement it was mistakenly documented that she is on 8 L the patient is actually on room air  7/5  Patient echo of the heart is showing good EF 56 to 60% blood pressure remains a challenge will increase her Catapres I will decrease her  Lasix to 20 mg daily continue to work with PT OT, she had nephrology signed off, pain is not well controlled, will consult palliative for pain control   7/6  As before appreciate bronchopulmonary they have signed off appreciate palliative care helping us manage her pain continues to work with physical therapy today her creatinine is up we will discontinue IV Lasix remains off oxygen even though it is still documented she is on 8 L oxygen  7/7  Creatinine is up secondary to overdiuresis, start IVF, still having neuropathy will add neurontine   7/8  Her kidney function is not improving I will go ahead and consult with nephrology I spoke to oncology ideally we would love to do bone marrow biopsy to address  possible CLL but she cannot lay on her stomach with this kidney function we cannot start any chemo the 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 though FISH studies are pending , we had to stop the allopurinol secondary to worsening GFR, for postop radiation to start today I will order ultrasound renal and consult with nephrology hold all nephrotoxins  7/9  Long discussion with the patient family and with the oncology team strongly feel that this patient would not do well with chemo or radiation and would not do well with physical therapy or going to rehab hospice is the best option for her I will go ahead and consult hospice  7/10  As before had a long discussion with the patient her daughter and her partner about the options and plan of care, discussion with radiation oncology with the oncology, interested in rehab patient is undergoing radiation, Anticipating bone marrow biopsy, and initiation of ibrutinib and outpatient PET scan per oncology and outpatient diagnostic mammography to evaluate left breast nodule , patient was placed on fentanyl patch and Norco as needed she is getting IV Dilaudid as needed is on the lidocaine patch Neurontin was discontinued again the patient is extremely passive and so is her significant other and she is not really engaged with the decision making, her kidney function is getting better remains DNR  7/11  Long discussion yesterday with the entire family and I was told that the family is electing hospice at home, appreciate palliative care, planning to go home with daughter to [place], [place]. Daughter is requesting [facility]   7/12  As before, appreciate oncology, she decided to go home with home hospice  7/13  Her family did not want her to be discharged yesterday as they want her to have radiation on Monday did not go home  Review of Systems   All pertinent negatives and positives are as above. All other systems have been reviewed and are negative  unless otherwise stated.     Objective    Temp:  [98 °F (36.7 °C)-99 °F (37.2 °C)] 98.7 °F (37.1 °C)  Heart Rate:  [62-72] 66  Resp:  [16] 16  BP: (121-131)/(45-55) 131/45  Physical Exam    GEN: Awake, alert, interactive, in NAD  HEENT: Atraumatic, PERRLA, EOMI, Anicteric, Trachea midline  Lungs: CTAB, no wheezing/rales/rhonchi  Heart: RRR, +S1/s2, no rub  ABD: soft, nt/nd, +BS, no guarding/rebound  Extremities: atraumatic, no cyanosis, no edema  Skin: no rashes or lesions  Neuro: AAOx3, no focal deficits  Psych: normal mood & affect    Results Review:  I have reviewed the labs, radiology results, and diagnostic studies.    Laboratory Data:   Results from last 7 days   Lab Units 07/13/24  0526 07/12/24  0401 07/11/24  0356   WBC 10*3/mm3 5.63 5.59 7.26   HEMOGLOBIN g/dL 8.3* 8.0* 8.5*   HEMATOCRIT % 26.7* 25.3* 26.7*   PLATELETS 10*3/mm3 51* 51* 68*        Results from last 7 days   Lab Units 07/13/24  0526 07/12/24  0401 07/11/24  0356 07/10/24  0408 07/09/24  0914 07/08/24  0343 07/07/24  0409   SODIUM mmol/L 137 137 138   < > 134* 134* 134*   POTASSIUM mmol/L 4.3 4.3 4.0   < > 4.6 4.4 4.5   CHLORIDE mmol/L 100 101 101   < > 99 95* 92*   CO2 mmol/L 27.0 26.0 25.0   < > 24.0 26.0 28.0   BUN mg/dL 27* 26* 30*   < > 54* 48* 43*   CREATININE mg/dL 1.56* 1.47* 1.46*   < > 2.67* 3.48* 3.50*   CALCIUM mg/dL 9.4 9.1 9.5   < > 8.6 9.0 10.0   BILIRUBIN mg/dL  --   --   --   --  0.3 0.3 0.3   ALK PHOS U/L  --   --   --   --  89 93 109   ALT (SGPT) U/L  --   --   --   --  20 20 22   AST (SGOT) U/L  --   --   --   --  25 23 25   GLUCOSE mg/dL 97 103* 95   < > 94 88 106*    < > = values in this interval not displayed.       Culture Data:   Urine Culture   Date Value Ref Range Status   06/28/2024 >100,000 CFU/mL Mixed Bhavana Isolated  Final       Radiology Data:   Imaging Results (Last 24 Hours)       ** No results found for the last 24 hours. **            I have reviewed the patient's current medications.     Assessment/Plan    Assessment  Active Hospital Problems    Diagnosis     **Left renal mass     Cauda equina compression     Metastatic cancer to spine     Morbid (severe) obesity due to excess calories        Treatment Plan  Lower back pain  Patient's low back pain has been getting worse in the last couple of weeks, was started on gabapentin outpatient but did not help.  MRI of the lumbar spine reported as follows-  IMPRESSION:   Neoplastic process involving the L3 vertebral body with associated  posterior soft tissue component resulting in severe spinal canal  narrowing and cauda equina nerve root compression. The soft tissue  component appears to also extend into the right L3-L4 foramen. No  associated pathological fracture.  Additional multilevel spondylotic changes including moderate to severe  spinal canal and foraminal narrowing as detailed above.  Neurosurgery is on consult and did the following procedure-  Procedure(s):  LUMBAR LAMINECTOMY L3 WITH DECOMPRESSION 1-2 LEVELS-RIGHT     Decompression -  Lumbar laminectomy, medial facetectomy for decompression of the spinal cord  Open laminectomy and biopsy of epidural neoplasm  Use of intraoperative microscope      -Acute on chronic kidney disease stage IIIa  Patient follows up with a nephrologist outpatient, but he does not come to this hospital.  Nephrologist consulted and helping with management while patient is in-house.     -Left renal mass/right adrenal mass on CT of the abdomen/pelvis  Urology was consulted from the emergency room and after evaluation did not recommend any intervention,rather recommended oncology consult.  Patient has multiple subcutaneous nodules that may be amenable to percutaneous biopsy.  Oncology is on consult and has evaluated patient, will await for tentative diagnosis from lumbar spine biopsy before making recommendations      Other chronic medical conditions-  History of breast cancer  Hypercholesterolemia  Hypertension  Chronic sinusitis     DVT  prophylaxis-heparin       Medical Decision Making  Number and Complexity of problems: High    Conditions and Status        Condition is unchanged.     MDM Data  External documents reviewed: No  Cardiac tracing (EKG, telemetry) interpretation: Yes  Radiology interpretation: Yes  Labs reviewed: Yes  Any tests that were considered but not ordered: No     Decision rules/scores evaluated (example [id number], [place], etc): Yes     Discussed with: Patient     Care Planning  Shared decision making: Yes  Code status and discussions: Yes [full code]    Disposition  Social Determinants of Health that impact treatment or disposition: No  I expect the patient to be discharged to unknown in unknown days.     Electronically signed by Alysia Lincoln MD, 07/13/24, 10:53 CDT.

## 2024-07-14 LAB
ANION GAP SERPL CALCULATED.3IONS-SCNC: 11 MMOL/L (ref 5–15)
BASOPHILS # BLD AUTO: 0.01 10*3/MM3 (ref 0–0.2)
BASOPHILS NFR BLD AUTO: 0.2 % (ref 0–1.5)
BUN SERPL-MCNC: 24 MG/DL (ref 8–23)
BUN/CREAT SERPL: 16.9 (ref 7–25)
CALCIUM SPEC-SCNC: 8.9 MG/DL (ref 8.6–10.5)
CHLORIDE SERPL-SCNC: 100 MMOL/L (ref 98–107)
CO2 SERPL-SCNC: 26 MMOL/L (ref 22–29)
CREAT SERPL-MCNC: 1.42 MG/DL (ref 0.57–1)
DEPRECATED RDW RBC AUTO: 53.3 FL (ref 37–54)
EGFRCR SERPLBLD CKD-EPI 2021: 37.9 ML/MIN/1.73
EOSINOPHIL # BLD AUTO: 0.17 10*3/MM3 (ref 0–0.4)
EOSINOPHIL NFR BLD AUTO: 3 % (ref 0.3–6.2)
ERYTHROCYTE [DISTWIDTH] IN BLOOD BY AUTOMATED COUNT: 15.8 % (ref 12.3–15.4)
GLUCOSE SERPL-MCNC: 96 MG/DL (ref 65–99)
HCT VFR BLD AUTO: 26.9 % (ref 34–46.6)
HGB BLD-MCNC: 8.3 G/DL (ref 12–15.9)
IMM GRANULOCYTES # BLD AUTO: 0.03 10*3/MM3 (ref 0–0.05)
IMM GRANULOCYTES NFR BLD AUTO: 0.5 % (ref 0–0.5)
LYMPHOCYTES # BLD AUTO: 0.43 10*3/MM3 (ref 0.7–3.1)
LYMPHOCYTES NFR BLD AUTO: 7.7 % (ref 19.6–45.3)
MCH RBC QN AUTO: 28.2 PG (ref 26.6–33)
MCHC RBC AUTO-ENTMCNC: 30.9 G/DL (ref 31.5–35.7)
MCV RBC AUTO: 91.5 FL (ref 79–97)
MONOCYTES # BLD AUTO: 0.6 10*3/MM3 (ref 0.1–0.9)
MONOCYTES NFR BLD AUTO: 10.7 % (ref 5–12)
NEUTROPHILS NFR BLD AUTO: 4.35 10*3/MM3 (ref 1.7–7)
NEUTROPHILS NFR BLD AUTO: 77.9 % (ref 42.7–76)
PLATELET # BLD AUTO: 49 10*3/MM3 (ref 140–450)
PMV BLD AUTO: ABNORMAL FL
POTASSIUM SERPL-SCNC: 4.4 MMOL/L (ref 3.5–5.2)
RBC # BLD AUTO: 2.94 10*6/MM3 (ref 3.77–5.28)
SODIUM SERPL-SCNC: 137 MMOL/L (ref 136–145)
WBC NRBC COR # BLD AUTO: 5.59 10*3/MM3 (ref 3.4–10.8)

## 2024-07-14 PROCEDURE — 80048 BASIC METABOLIC PNL TOTAL CA: CPT | Performed by: INTERNAL MEDICINE

## 2024-07-14 PROCEDURE — 25010000002 METHYLNALTREXONE 12 MG/0.6ML SOLUTION: Performed by: NURSE PRACTITIONER

## 2024-07-14 PROCEDURE — 85025 COMPLETE CBC W/AUTO DIFF WBC: CPT | Performed by: NURSE PRACTITIONER

## 2024-07-14 PROCEDURE — 97116 GAIT TRAINING THERAPY: CPT

## 2024-07-14 RX ORDER — FENTANYL 12.5 UG/1
1 PATCH TRANSDERMAL
Status: DISCONTINUED | OUTPATIENT
Start: 2024-07-14 | End: 2024-07-15 | Stop reason: HOSPADM

## 2024-07-14 RX ADMIN — METHYLNALTREXONE BROMIDE 6 MG: 12 INJECTION, SOLUTION SUBCUTANEOUS at 09:01

## 2024-07-14 RX ADMIN — ROPINIROLE HYDROCHLORIDE 0.5 MG: 0.25 TABLET, FILM COATED ORAL at 20:26

## 2024-07-14 RX ADMIN — HYDROCODONE BITARTRATE AND ACETAMINOPHEN 2 TABLET: 10; 325 TABLET ORAL at 22:09

## 2024-07-14 RX ADMIN — BUPROPION HYDROCHLORIDE 150 MG: 150 TABLET, EXTENDED RELEASE ORAL at 09:01

## 2024-07-14 RX ADMIN — CYCLOBENZAPRINE 10 MG: 10 TABLET, FILM COATED ORAL at 05:05

## 2024-07-14 RX ADMIN — Medication 1 TABLET: at 09:01

## 2024-07-14 RX ADMIN — LACTULOSE 20 G: 20 SOLUTION ORAL at 20:26

## 2024-07-14 RX ADMIN — ROSUVASTATIN CALCIUM 10 MG: 10 TABLET, FILM COATED ORAL at 20:26

## 2024-07-14 RX ADMIN — AMLODIPINE BESYLATE 5 MG: 5 TABLET ORAL at 09:01

## 2024-07-14 RX ADMIN — HYDROCODONE BITARTRATE AND ACETAMINOPHEN 2 TABLET: 10; 325 TABLET ORAL at 09:00

## 2024-07-14 RX ADMIN — SERTRALINE 100 MG: 50 TABLET, FILM COATED ORAL at 09:01

## 2024-07-14 RX ADMIN — CARVEDILOL 25 MG: 25 TABLET, FILM COATED ORAL at 09:01

## 2024-07-14 RX ADMIN — CLONIDINE HYDROCHLORIDE 0.2 MG: 0.1 TABLET ORAL at 09:01

## 2024-07-14 RX ADMIN — AMLODIPINE BESYLATE 5 MG: 5 TABLET ORAL at 20:27

## 2024-07-14 RX ADMIN — POLYETHYLENE GLYCOL 3350 17 G: 17 POWDER, FOR SOLUTION ORAL at 09:07

## 2024-07-14 RX ADMIN — CYCLOBENZAPRINE 10 MG: 10 TABLET, FILM COATED ORAL at 13:20

## 2024-07-14 RX ADMIN — PANTOPRAZOLE SODIUM 40 MG: 40 TABLET, DELAYED RELEASE ORAL at 09:01

## 2024-07-14 RX ADMIN — HYDROCODONE BITARTRATE AND ACETAMINOPHEN 2 TABLET: 10; 325 TABLET ORAL at 01:51

## 2024-07-14 RX ADMIN — CLONIDINE HYDROCHLORIDE 0.2 MG: 0.1 TABLET ORAL at 20:27

## 2024-07-14 RX ADMIN — FLUTICASONE PROPIONATE 2 SPRAY: 50 SPRAY, METERED NASAL at 09:08

## 2024-07-14 RX ADMIN — MONTELUKAST SODIUM 10 MG: 10 TABLET, FILM COATED ORAL at 20:26

## 2024-07-14 RX ADMIN — LIDOCAINE 1 PATCH: 4 PATCH TOPICAL at 09:09

## 2024-07-14 RX ADMIN — HYDROCODONE BITARTRATE AND ACETAMINOPHEN 2 TABLET: 10; 325 TABLET ORAL at 13:20

## 2024-07-14 RX ADMIN — CYCLOBENZAPRINE 10 MG: 10 TABLET, FILM COATED ORAL at 22:08

## 2024-07-14 RX ADMIN — Medication 10 ML: at 09:08

## 2024-07-14 RX ADMIN — HYDROCODONE BITARTRATE AND ACETAMINOPHEN 2 TABLET: 10; 325 TABLET ORAL at 18:10

## 2024-07-14 RX ADMIN — CETIRIZINE HYDROCHLORIDE 10 MG: 10 TABLET ORAL at 09:01

## 2024-07-14 RX ADMIN — FENTANYL 1 PATCH: 12 PATCH TRANSDERMAL at 20:25

## 2024-07-14 NOTE — THERAPY TREATMENT NOTE
Acute Care - Physical Therapy Treatment Note  [facility]     Patient Name: Marina Joe  : 1945  MRN: [id number]  Today's Date: 2024      Visit Dx:     ICD-10-CM ICD-9-CM   1. 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 Left renal mass  N28.89 593.9   3. Right adrenal mass  E27.8 255.8   4. Nodule of soft tissue  M79.89 729.99   5. Acute on chronic renal insufficiency  N28.9 593.9    N18.9 585.9   6. Thrombocytopenia  D69.6 287.5   7. Cauda equina compression  G83.4 344.60   8. Impaired mobility [Z74.09]  Z74.09 799.89   9. HX: breast cancer  Z85.3 V10.3     Patient Active Problem List   Diagnosis    Malignant neoplasm of upper-outer quadrant of female breast    Anemia of chronic renal failure, stage 3 (moderate)    Hypertension, benign    Hx of colonic polyps    HX: breast cancer    Morbid (severe) obesity due to excess calories    Right knee DJD    S/P lumpectomy, left breast    Adult hypothyroidism    Anemia    Anxiety    Breast cancer, left    Cervical pain    Chronic insomnia    Elevated lipids    Herpes zoster without complication    Left ear pain    Left otitis externa    Myalgia    Negative depression screening    Obesity (BMI 30-39.9)    Postmenopausal status    Recurrent acute serous otitis media of left ear    Restless leg    Sinusitis, bacterial    Skin lesion    Vitamin D deficiency    Stage 3b chronic kidney disease    GIB (gastrointestinal bleeding)    Acute on chronic blood loss anemia    Chronic idiopathic thrombocytopenia    Hypotension due to hypovolemia    Primary hypertension    Pancreatic lesion    Left renal mass    Cauda equina compression    Metastatic cancer to spine     Past Medical History:   Diagnosis Date    Breast cancer     CKD (chronic kidney disease)     Hypercholesteremia     Hypertension     Sinusitis     Stage 3a chronic kidney disease 10/20/2020     Past Surgical History:   Procedure Laterality Date    AVULSION TOENAIL PLATE          BREAST BIOPSY  Left 11/20/2012    BREAST BIOPSY      Left Breast, 1/2019 per Dr Barkley    BREAST LUMPECTOMY Left     with node bx     COLONOSCOPY  09/13/2013    small polyp at 30cm benign hyperplastic polyp, changes consistent with melanosis coli. Recall 5 years    COLONOSCOPY  11/19/2018    Tics otherwise normal exam repeat in 5 years    COLONOSCOPY  02/13/2023    Normal exam repeat in 3 years with a 2 day prep    ENDOSCOPY  02/13/2023    Gastritis, small HH    LUMBAR LAMINECTOMY Right 6/29/2024    Procedure: LUMBAR LAMINECTOMY L3 WITH DECOMPRESSION 1-2 LEVELS-RIGHT;  Surgeon: Marcellus Pulido MD;  Location: [facility] OR;  Service: Neurosurgery;  Laterality: Right;    REPLACEMENT TOTAL KNEE Right     2016    TOTAL ABDOMINAL HYSTERECTOMY WITH SALPINGO OOPHORECTOMY      UPPER ENDOSCOPIC ULTRASOUND W/ FNA  12/20/2023    Pancreatic cyst s/p FNA     PT Assessment (Last 12 Hours)       PT Evaluation and Treatment       [facility] Name 07/14/24 1344          Physical Therapy Time and Intention    Subjective Information complains of;weakness;fatigue;pain  -     Document Type therapy note (daily note)  -     Mode of Treatment physical therapy  -[facility] Name 07/14/24 1343          General Information    Existing Precautions/Restrictions fall;brace worn when out of bed;LSO;spinal  -[facility] Name 07/14/24 1349          Pain    Pretreatment Pain Rating 0/10 - no pain  -     Posttreatment Pain Rating 0/10 - no pain  -[facility] Name 07/14/24 1340          Bed Mobility    Sidelying-Sit [place] (Bed Mobility) --  sitting EOB  -     Sit-Sidelying [place] (Bed Mobility) --  sitting EOB  -     Assistive Device (Bed Mobility) bed rails;head of bed elevated  -[facility] Name 07/14/24 1342          Sit-Stand Transfer    Sit-Stand [place] (Transfers) standby assist  -     Assistive Device (Sit-Stand Transfers) walker, front-wheeled  -       Row Name 07/14/24 134          Stand-Sit Transfer    Stand-Sit  [place] (Transfers) standby assist  -     Assistive Device (Stand-Sit Transfers) walker, front-wheeled  -       Row Name 07/14/24 1344          Toilet Transfer    [place] Level (Toilet Transfer) standby assist  -     Assistive Device (Toilet Transfer) commode;walker, front-wheeled  -MADELINE       Row Name 07/14/24 1344          Gait/Stairs (Locomotion)    [place] Level (Gait) verbal cues;contact guard  -     Assistive Device (Gait) walker, front-wheeled  -     Distance in Feet (Gait) 250  250 x 2, one sitting rest  -     Pattern (Gait) step-through  -     Deviations/Abnormal Patterns (Gait) maynor decreased;bilateral deviations  -     Bilateral Gait Deviations forward flexed posture  -     Left Sided Gait Deviations heel strike decreased  -       Row Name             Wound 06/29/24 1458 lumbar spine Incision    Wound - Properties Group Placement Date: 06/29/24  -KT Placement Time: 1458  -KT Present on Original Admission: N  -KT Location: lumbar spine  -KT Primary Wound Type: Incision  -KT    Retired Wound - Properties Group Placement Date: 06/29/24  -KT Placement Time: 1458  -KT Present on Original Admission: N  -KT Location: lumbar spine  -KT Primary Wound Type: Incision  -KT    Retired Wound - Properties Group Date first assessed: 06/29/24  -KT Time first assessed: 1458  -KT Present on Original Admission: N  -KT Location: lumbar spine  -KT Primary Wound Type: Incision  -KT      Row Name 07/14/24 1344          Positioning and Restraints    Pre-Treatment Position in bed  -     Post Treatment Position bed  -     In Bed sitting EOB;call light within reach;encouraged to call for assist;with family/caregiver  -               User Key  (r) = Recorded By, (t) = Taken By, (c) = Cosigned By      Initials Name Provider Type     Goldie Paul PTA Physical Therapist Assistant    Shravan Ordonez, RN Registered Nurse                    Physical Therapy Education       Title: PT OT SLP  Therapies (Done)       Topic: Physical Therapy (Done)       Point: Mobility training (Done)       Learning Progress Summary             Patient Acceptance, E, VU by AJ at 7/10/2024 1147    Comment: continued progression with goals, rest breaks, d/c plans    Eager, E, VU by AE at 7/5/2024 1023    Comment: POC    Acceptance, E,D, DU,VU by  at 6/30/2024 0951    Comment: benefits of PT and POC, call for A OOB, no BLT, LSO when OOB                         Point: Home exercise program (Done)       Learning Progress Summary             Patient Acceptance, E, VU by AJ at 7/10/2024 1147    Comment: continued progression with goals, rest breaks, d/c plans                         Point: Body mechanics (Done)       Learning Progress Summary             Patient Acceptance, E, VU by AJ at 7/10/2024 1147    Comment: continued progression with goals, rest breaks, d/c plans    Acceptance, E,D, DU,VU by  at 6/30/2024 0951    Comment: benefits of PT and POC, call for A OOB, no BLT, LSO when OOB                         Point: Precautions (Done)       Learning Progress Summary             Patient Acceptance, E, VU by AJ at 7/10/2024 1147    Comment: continued progression with goals, rest breaks, d/c plans    Acceptance, E,D, DU,VU by  at 6/30/2024 0951    Comment: benefits of PT and POC, call for A OOB, no BLT, LSO when OOB                                         User Key       Initials Effective Dates Name Provider Type Discipline    AE 02/03/23 -  Sofia Ann, PTA Physical Therapist Assistant PT     02/03/23 -  Eliel León, PT Physical Therapist PT     05/07/24 -  Warren Carvalho, JOSHUA Physical Therapist PT                  PT Recommendation and Plan         Outcome Measures       Row Name 07/14/24 1400             How much help from another person do you currently need...    Turning from your back to your side while in flat bed without using bedrails? 4  -AH      Moving from lying on back to sitting on the side of a flat  bed without bedrails? 4  -AH      Moving to and from a bed to a chair (including a wheelchair)? 3  -AH      Standing up from a chair using your arms (e.g., wheelchair, bedside chair)? 3  -AH      Climbing 3-5 steps with a railing? 2  -AH      To walk in hospital room? 3  -AH      AM-PAC 6 Clicks Score (PT) 19  -      Highest Level of Mobility Goal 6 --> Walk 10 steps or more  -         Functional Assessment    Outcome Measure Options AM-PAC 6 Clicks Basic Mobility (PT)  -                User Key  (r) = Recorded By, (t) = Taken By, (c) = Cosigned By      Initials Name Provider Type     Goldie Paul PTA Physical Therapist Assistant                     Time Calculation:    PT Charges       Row Name 07/14/24 1426             Time Calculation    Start Time 1344  -      Stop Time 1415  -      Time Calculation (min) 31 min  -      PT Received On 07/14/24  -         Time Calculation- PT    Total Timed Code Minutes- PT 31 minute(s)  -         Timed Charges    32027 - Gait Training Minutes  31  -AH         Total Minutes    Timed Charges Total Minutes 31  -       Total Minutes 31  -AH                User Key  (r) = Recorded By, (t) = Taken By, (c) = Cosigned By      Initials Name Provider Type     Goldie Paul PTA Physical Therapist Assistant                  Therapy Charges for Today       Code Description Service Date Service Provider Modifiers Qty    [id number] HC GAIT TRAINING EA 15 MIN 7/14/2024 Goldie Paul PTA GP 2            PT G-Codes  Outcome Measure Options: AM-PAC 6 Clicks Basic Mobility (PT)  AM-PAC 6 Clicks Score (PT): 19  AM-PAC 6 Clicks Score (OT): 21    Goldie Paul PTA  7/14/2024

## 2024-07-14 NOTE — PLAN OF CARE
Problem: Adult Inpatient Plan of Care  Goal: Plan of Care Review  Outcome: Ongoing, Progressing  Goal: Patient-Specific Goal (Individualized)  Outcome: Ongoing, Progressing  Goal: Absence of Hospital-Acquired Illness or Injury  Outcome: Ongoing, Progressing  Intervention: Identify and Manage Fall Risk  Recent Flowsheet Documentation  Taken 7/14/2024 0500 by Miladys Bruno RN  Safety Promotion/Fall Prevention: safety round/check completed  Taken 7/14/2024 0400 by Miladys Bruno RN  Safety Promotion/Fall Prevention: safety round/check completed  Taken 7/14/2024 0300 by Miladys Bruno RN  Safety Promotion/Fall Prevention: safety round/check completed  Taken 7/14/2024 0200 by Miladys Bruno RN  Safety Promotion/Fall Prevention: safety round/check completed  Taken 7/14/2024 0157 by Miladys Bruno RN  Safety Promotion/Fall Prevention: safety round/check completed  Taken 7/14/2024 0151 by Miladys Bruno RN  Safety Promotion/Fall Prevention: safety round/check completed  Taken 7/14/2024 0100 by Miladys Bruno RN  Safety Promotion/Fall Prevention: safety round/check completed  Taken 7/14/2024 0000 by Miladys Bruno RN  Safety Promotion/Fall Prevention: safety round/check completed  Taken 7/13/2024 2300 by Miladys Bruno RN  Safety Promotion/Fall Prevention: safety round/check completed  Taken 7/13/2024 2200 by Miladys Bruno RN  Safety Promotion/Fall Prevention: safety round/check completed  Taken 7/13/2024 2124 by iMladys Bruno RN  Safety Promotion/Fall Prevention: safety round/check completed  Taken 7/13/2024 2100 by Miladys Bruno RN  Safety Promotion/Fall Prevention: safety round/check completed  Taken 7/13/2024 2000 by Miladys Bruno RN  Safety Promotion/Fall Prevention: safety round/check completed  Taken 7/13/2024 1900 by Miladys Bruno RN  Safety Promotion/Fall Prevention: safety round/check completed  Intervention: Prevent Skin Injury  Recent Flowsheet Documentation  Taken 7/14/2024  0300 by Miladys Bruno RN  Body Position: position changed independently  Taken 7/14/2024 0100 by Miladys Bruno RN  Body Position: position changed independently  Taken 7/13/2024 2300 by Miladys Bruno RN  Body Position: position changed independently  Taken 7/13/2024 2100 by Miladys Bruno RN  Body Position: patient/family refused  Taken 7/13/2024 1900 by Miladys Bruno RN  Body Position: position changed independently  Intervention: Prevent and Manage VTE (Venous Thromboembolism) Risk  Recent Flowsheet Documentation  Taken 7/13/2024 2000 by Miladys Bruno RN  VTE Prevention/Management:   bilateral   sequential compression devices on  Goal: Optimal Comfort and Wellbeing  Outcome: Ongoing, Progressing  Intervention: Monitor Pain and Promote Comfort  Recent Flowsheet Documentation  Taken 7/14/2024 0500 by Miladys Bruno RN  Pain Management Interventions: see MAR  Taken 7/14/2024 0151 by Miladys Bruno RN  Pain Management Interventions: see MAR  Taken 7/13/2024 2300 by Miladys Bruno RN  Pain Management Interventions: position adjusted  Taken 7/13/2024 2200 by Miladys Bruno RN  Pain Management Interventions: see MAR  Taken 7/13/2024 2124 by Miladys Bruno RN  Pain Management Interventions: see MAR  Taken 7/13/2024 1900 by Miladys Bruno RN  Pain Management Interventions: position adjusted  Intervention: Provide Person-Centered Care  Recent Flowsheet Documentation  Taken 7/13/2024 2000 by Miladys Bruno RN  Trust Relationship/Rapport:   care explained   reassurance provided  Goal: Readiness for Transition of Care  Outcome: Ongoing, Progressing     Problem: Fluid and Electrolyte Imbalance (Acute Kidney Injury/Impairment)  Goal: Fluid and Electrolyte Balance  Outcome: Ongoing, Progressing     Problem: Oral Intake Inadequate (Acute Kidney Injury/Impairment)  Goal: Optimal Nutrition Intake  Outcome: Ongoing, Progressing     Problem: Renal Function Impairment (Acute Kidney  Injury/Impairment)  Goal: Effective Renal Function  Outcome: Ongoing, Progressing     Problem: Pain Acute  Goal: Acceptable Pain Control and Functional Ability  Outcome: Ongoing, Progressing  Intervention: Develop Pain Management Plan  Recent Flowsheet Documentation  Taken 7/14/2024 0500 by Miladys Bruno RN  Pain Management Interventions: see MAR  Taken 7/14/2024 0151 by Miladys Bruno RN  Pain Management Interventions: see MAR  Taken 7/13/2024 2300 by Miladys Bruno RN  Pain Management Interventions: position adjusted  Taken 7/13/2024 2200 by Miladys Bruno RN  Pain Management Interventions: see MAR  Taken 7/13/2024 2124 by Miladys Bruno RN  Pain Management Interventions: see MAR  Taken 7/13/2024 1900 by Miladys Bruno RN  Pain Management Interventions: position adjusted  Intervention: Optimize Psychosocial Wellbeing  Recent Flowsheet Documentation  Taken 7/13/2024 2000 by Miladys Bruno RN  Diversional Activities: television     Problem: Fall Injury Risk  Goal: Absence of Fall and Fall-Related Injury  Outcome: Ongoing, Progressing  Intervention: Promote Injury-Free Environment  Recent Flowsheet Documentation  Taken 7/14/2024 0500 by Miladys Bruno RN  Safety Promotion/Fall Prevention: safety round/check completed  Taken 7/14/2024 0400 by Miladys Bruno RN  Safety Promotion/Fall Prevention: safety round/check completed  Taken 7/14/2024 0300 by Miladys Bruno RN  Safety Promotion/Fall Prevention: safety round/check completed  Taken 7/14/2024 0200 by Miladys Bruno RN  Safety Promotion/Fall Prevention: safety round/check completed  Taken 7/14/2024 0157 by Miladys Bruno RN  Safety Promotion/Fall Prevention: safety round/check completed  Taken 7/14/2024 0151 by Miladys Bruno RN  Safety Promotion/Fall Prevention: safety round/check completed  Taken 7/14/2024 0100 by Miladys Bruno RN  Safety Promotion/Fall Prevention: safety round/check completed  Taken 7/14/2024 0000 by Miladys Bruno  RN  Safety Promotion/Fall Prevention: safety round/check completed  Taken 7/13/2024 2300 by Miladys Bruno RN  Safety Promotion/Fall Prevention: safety round/check completed  Taken 7/13/2024 2200 by Miladys Bruno RN  Safety Promotion/Fall Prevention: safety round/check completed  Taken 7/13/2024 2124 by Miladys Bruno RN  Safety Promotion/Fall Prevention: safety round/check completed  Taken 7/13/2024 2100 by Miladys Bruno RN  Safety Promotion/Fall Prevention: safety round/check completed  Taken 7/13/2024 2000 by Miladys Bruno RN  Safety Promotion/Fall Prevention: safety round/check completed  Taken 7/13/2024 1900 by Miladys Bruno RN  Safety Promotion/Fall Prevention: safety round/check completed     Problem: Palliative Care  Goal: Enhanced Quality of Life  Outcome: Ongoing, Progressing  Intervention: Maximize Comfort  Recent Flowsheet Documentation  Taken 7/14/2024 0500 by Miladys Bruno RN  Pain Management Interventions: see MAR  Taken 7/14/2024 0151 by Miladys Bruno RN  Pain Management Interventions: see MAR  Taken 7/13/2024 2300 by Miladys Bruno RN  Pain Management Interventions: position adjusted  Taken 7/13/2024 2200 by Miladys Bruno RN  Pain Management Interventions: see MAR  Taken 7/13/2024 2124 by Miladys Bruno RN  Pain Management Interventions: see MAR  Taken 7/13/2024 1900 by Miladys Bruno RN  Pain Management Interventions: position adjusted  Intervention: Optimize Psychosocial Wellbeing  Recent Flowsheet Documentation  Taken 7/13/2024 2000 by Miladys Bruno RN  Family/Support System Care: support provided   Goal Outcome Evaluation:

## 2024-07-14 NOTE — PLAN OF CARE
Goal Outcome Evaluation:  Plan of Care Reviewed With: patient        Progress: no change  Outcome Evaluation: IV to R wrist area, saline locked. refusing lactulose, miralax given instead.  Up x1 to bathroom, voiding. worked with PT today. Continued Norco, flexeril and lidocaine patch to lower back of pain. vitals stable, A/Ox4. fall precautions, safety maintained.

## 2024-07-14 NOTE — PROGRESS NOTES
[facility] Medicine Services  INPATIENT PROGRESS NOTE    Patient Name: Marina Joe  Date of Admission: 6/28/2024  Today's Date: 07/14/24  Length of Stay: 16  Primary Care Physician: Hanh Og APRN    Subjective   Chief Complaint: Lower back pain          HPI   Ms. Joe is a 79-year-old female from home with past medical history of breast cancer, chronic kidney disease stage IIIa, hypercholesterolemia, hypertension and chronic sinusitis, who presented to the emergency room with worsening back pain/flank pain, history was mostly provided by patient's boyfriend at bedside.  She developed back pain couple of weeks ago and was referred to a pain clinic doctor where she was prescribed gabapentin, after she took about 2-3 doses of the gabapentin, she developed dizziness. She came to the emergency room today because lower back pain and flank pain got even worse despite being on the gabapentin.  In the emergency room, she was extensively worked up with CT of the abdomen and lumbar spine, CT abdomen showed extensive mass of the left kidney extending up to the proximal left ureter as well as right adrenal gland mass suspicious for metastasis.  Urology was consulted from the emergency room for input.  7/1  As above history of chronic kidney disease hyperlipidemia hypertension presented with back pain found to have kidney mass suspicious for metastasis patient CT of the lumbar spine shows multilevel degenerative changes to include an anteriolisthesis of L3 over L4 and spondylosis at L5-S1. 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 awaiting final pathology will consult with palliative  team to address the CODE STATUS and plan of care  7/2  Appreciate palliative care the patient is currently DNR, appreciate neurosurgery status post L3 laminectomy and decompression on June 29 for cauda equina, appreciate oncology continues to work with physical therapy appreciate  radiation oncology patient started radiation today  7/3  Patient oxygen is 85% on 8 L, patient blood pressure is poorly controlled increase her Coreg we will repeat her chest x-ray chest x-ray from before was showing some congestion IV Hep-Lock will give her 1 dose of Lasix  7/4  As before remains on 8 L oxygen documented excision was not titrated will titrate oxygen, chest x-ray showing cardiomegaly pulmonary edema the patient has poor inspiration we did Hep-Lock her IV fluid we will continue IV Lasix blood pressure remains poorly controlled increase her Coreg we will check her echo of the heart as above she was not having bowel movement for most time was started on lactulose, patient had good bowel movement it was mistakenly documented that she is on 8 L the patient is actually on room air  7/5  Patient echo of the heart is showing good EF 56 to 60% blood pressure remains a challenge will increase her Catapres I will decrease her  Lasix to 20 mg daily continue to work with PT OT, she had nephrology signed off, pain is not well controlled, will consult palliative for pain control   7/6  As before appreciate bronchopulmonary they have signed off appreciate palliative care helping us manage her pain continues to work with physical therapy today her creatinine is up we will discontinue IV Lasix remains off oxygen even though it is still documented she is on 8 L oxygen  7/7  Creatinine is up secondary to overdiuresis, start IVF, still having neuropathy will add neurontine   7/8  Her kidney function is not improving I will go ahead and consult with nephrology I spoke to oncology ideally we would love to do bone marrow biopsy to address  possible CLL but she cannot lay on her stomach with this kidney function we cannot start any chemo the 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 though FISH studies are pending , we had to stop the allopurinol secondary to worsening GFR, for postop radiation to start today I will order ultrasound renal and consult with nephrology hold all nephrotoxins  7/9  Long discussion with the patient family and with the oncology team strongly feel that this patient would not do well with chemo or radiation and would not do well with physical therapy or going to rehab hospice is the best option for her I will go ahead and consult hospice  7/10  As before had a long discussion with the patient her daughter and her partner about the options and plan of care, discussion with radiation oncology with the oncology, interested in rehab patient is undergoing radiation, Anticipating bone marrow biopsy, and initiation of ibrutinib and outpatient PET scan per oncology and outpatient diagnostic mammography to evaluate left breast nodule , patient was placed on fentanyl patch and Norco as needed she is getting IV Dilaudid as needed is on the lidocaine patch Neurontin was discontinued again the patient is extremely passive and so is her significant other and she is not really engaged with the decision making, her kidney function is getting better remains DNR  7/11  Long discussion yesterday with the entire family and I was told that the family is electing hospice at home, appreciate palliative care, planning to go home with daughter to [place], [place]. Daughter is requesting [facility]   7/12  As before, appreciate oncology, she decided to go home with home hospice  7/13  Her family did not want her to be discharged yesterday as they want her to have radiation on Monday did not go home  7/14  As before we did discharge her home on hospice however her significant other wanted her to stay through the weekend get  radiation on Monday and be discharged  Review of Systems   All pertinent negatives and positives are as above. All other systems have been reviewed and are negative unless otherwise stated.     Objective    Temp:  [98 °F (36.7 °C)-98.8 °F (37.1 °C)] 98.7 °F (37.1 °C)  Heart Rate:  [64-85] 85  Resp:  [16] 16  BP: (125-169)/(49-88) 169/88  Physical Exam    GEN: Awake, alert, interactive, in NAD  HEENT: Atraumatic, PERRLA, EOMI, Anicteric, Trachea midline  Lungs: CTAB, no wheezing/rales/rhonchi  Heart: RRR, +S1/s2, no rub  ABD: soft, nt/nd, +BS, no guarding/rebound  Extremities: atraumatic, no cyanosis, no edema  Skin: no rashes or lesions  Neuro: AAOx3, no focal deficits  Psych: normal mood & affect    Results Review:  I have reviewed the labs, radiology results, and diagnostic studies.    Laboratory Data:   Results from last 7 days   Lab Units 07/14/24  0348 07/13/24  0526 07/12/24  0401   WBC 10*3/mm3 5.59 5.63 5.59   HEMOGLOBIN g/dL 8.3* 8.3* 8.0*   HEMATOCRIT % 26.9* 26.7* 25.3*   PLATELETS 10*3/mm3 49* 51* 51*        Results from last 7 days   Lab Units 07/14/24  0348 07/13/24  0526 07/12/24  0401 07/10/24  0408 07/09/24  0914 07/08/24  0343   SODIUM mmol/L 137 137 137   < > 134* 134*   POTASSIUM mmol/L 4.4 4.3 4.3   < > 4.6 4.4   CHLORIDE mmol/L 100 100 101   < > 99 95*   CO2 mmol/L 26.0 27.0 26.0   < > 24.0 26.0   BUN mg/dL 24* 27* 26*   < > 54* 48*   CREATININE mg/dL 1.42* 1.56* 1.47*   < > 2.67* 3.48*   CALCIUM mg/dL 8.9 9.4 9.1   < > 8.6 9.0   BILIRUBIN mg/dL  --   --   --   --  0.3 0.3   ALK PHOS U/L  --   --   --   --  89 93   ALT (SGPT) U/L  --   --   --   --  20 20   AST (SGOT) U/L  --   --   --   --  25 23   GLUCOSE mg/dL 96 97 103*   < > 94 88    < > = values in this interval not displayed.       Culture Data:   Urine Culture   Date Value Ref Range Status   06/28/2024 >100,000 CFU/mL Mixed Bhavana Isolated  Final       Radiology Data:   Imaging Results (Last 24 Hours)       ** No results found for the  last 24 hours. **            I have reviewed the patient's current medications.     Assessment/Plan   Assessment  Active Hospital Problems    Diagnosis     **Left renal mass     Cauda equina compression     Metastatic cancer to spine     Morbid (severe) obesity due to excess calories        Treatment Plan  Lower back pain  Patient's low back pain has been getting worse in the last couple of weeks, was started on gabapentin outpatient but did not help.  MRI of the lumbar spine reported as follows-  IMPRESSION:   Neoplastic process involving the L3 vertebral body with associated  posterior soft tissue component resulting in severe spinal canal  narrowing and cauda equina nerve root compression. The soft tissue  component appears to also extend into the right L3-L4 foramen. No  associated pathological fracture.  Additional multilevel spondylotic changes including moderate to severe  spinal canal and foraminal narrowing as detailed above.  Neurosurgery is on consult and did the following procedure-  Procedure(s):  LUMBAR LAMINECTOMY L3 WITH DECOMPRESSION 1-2 LEVELS-RIGHT     Decompression -  Lumbar laminectomy, medial facetectomy for decompression of the spinal cord  Open laminectomy and biopsy of epidural neoplasm  Use of intraoperative microscope      -Acute on chronic kidney disease stage IIIa  Patient follows up with a nephrologist outpatient, but he does not come to this hospital.  Nephrologist consulted and helping with management while patient is in-house.     -Left renal mass/right adrenal mass on CT of the abdomen/pelvis  Urology was consulted from the emergency room and after evaluation did not recommend any intervention,rather recommended oncology consult.  Patient has multiple subcutaneous nodules that may be amenable to percutaneous biopsy.  Oncology is on consult and has evaluated patient, will await for tentative diagnosis from lumbar spine biopsy before making recommendations      Other chronic medical  conditions-  History of breast cancer  Hypercholesterolemia  Hypertension  Chronic sinusitis     DVT prophylaxis-heparin       Medical Decision Making  Number and Complexity of problems: High    Conditions and Status        Condition is unchanged.     MDM Data  External documents reviewed: No  Cardiac tracing (EKG, telemetry) interpretation: Yes  Radiology interpretation: Yes  Labs reviewed: Yes  Any tests that were considered but not ordered: No     Decision rules/scores evaluated (example [id number], Wells, etc): Yes     Discussed with: Patient     Care Planning  Shared decision making: Yes  Code status and discussions: Yes [full code]    Disposition  Social Determinants of Health that impact treatment or disposition: No  I expect the patient to be discharged to unknown in unknown days.     Electronically signed by Alysia Lincoln MD, 07/14/24, 11:07 CDT.

## 2024-07-15 ENCOUNTER — APPOINTMENT (OUTPATIENT)
Dept: RADIATION ONCOLOGY | Facility: HOSPITAL | Age: 79
End: 2024-07-15
Payer: MEDICARE

## 2024-07-15 VITALS
DIASTOLIC BLOOD PRESSURE: 60 MMHG | HEIGHT: 68 IN | RESPIRATION RATE: 18 BRPM | SYSTOLIC BLOOD PRESSURE: 144 MMHG | OXYGEN SATURATION: 96 % | BODY MASS INDEX: 36.98 KG/M2 | WEIGHT: 244 LBS | HEART RATE: 69 BPM | TEMPERATURE: 98.6 F

## 2024-07-15 LAB
ANION GAP SERPL CALCULATED.3IONS-SCNC: 9 MMOL/L (ref 5–15)
BASOPHILS # BLD AUTO: 0.01 10*3/MM3 (ref 0–0.2)
BASOPHILS NFR BLD AUTO: 0.2 % (ref 0–1.5)
BUN SERPL-MCNC: 19 MG/DL (ref 8–23)
BUN/CREAT SERPL: 15.6 (ref 7–25)
CALCIUM SPEC-SCNC: 9.2 MG/DL (ref 8.6–10.5)
CHLORIDE SERPL-SCNC: 102 MMOL/L (ref 98–107)
CO2 SERPL-SCNC: 28 MMOL/L (ref 22–29)
CREAT SERPL-MCNC: 1.22 MG/DL (ref 0.57–1)
DEPRECATED RDW RBC AUTO: 52.2 FL (ref 37–54)
EGFRCR SERPLBLD CKD-EPI 2021: 45.5 ML/MIN/1.73
EOSINOPHIL # BLD AUTO: 0.23 10*3/MM3 (ref 0–0.4)
EOSINOPHIL NFR BLD AUTO: 4.8 % (ref 0.3–6.2)
ERYTHROCYTE [DISTWIDTH] IN BLOOD BY AUTOMATED COUNT: 15.8 % (ref 12.3–15.4)
GLUCOSE SERPL-MCNC: 94 MG/DL (ref 65–99)
HCT VFR BLD AUTO: 25.5 % (ref 34–46.6)
HGB BLD-MCNC: 7.9 G/DL (ref 12–15.9)
IMM GRANULOCYTES # BLD AUTO: 0.02 10*3/MM3 (ref 0–0.05)
IMM GRANULOCYTES NFR BLD AUTO: 0.4 % (ref 0–0.5)
LYMPHOCYTES # BLD AUTO: 0.5 10*3/MM3 (ref 0.7–3.1)
LYMPHOCYTES NFR BLD AUTO: 10.5 % (ref 19.6–45.3)
MCH RBC QN AUTO: 27.9 PG (ref 26.6–33)
MCHC RBC AUTO-ENTMCNC: 31 G/DL (ref 31.5–35.7)
MCV RBC AUTO: 90.1 FL (ref 79–97)
MONOCYTES # BLD AUTO: 0.56 10*3/MM3 (ref 0.1–0.9)
MONOCYTES NFR BLD AUTO: 11.8 % (ref 5–12)
NEUTROPHILS NFR BLD AUTO: 3.44 10*3/MM3 (ref 1.7–7)
NEUTROPHILS NFR BLD AUTO: 72.3 % (ref 42.7–76)
PLATELET # BLD AUTO: 46 10*3/MM3 (ref 140–450)
PMV BLD AUTO: ABNORMAL FL
POTASSIUM SERPL-SCNC: 4.2 MMOL/L (ref 3.5–5.2)
RAD ONC ARIA COURSE ID: NORMAL
RAD ONC ARIA COURSE LAST TREATMENT DATE: NORMAL
RAD ONC ARIA COURSE START DATE: NORMAL
RAD ONC ARIA COURSE TREATMENT ELAPSED DAYS: 7
RAD ONC ARIA FIRST TREATMENT DATE: NORMAL
RAD ONC ARIA PLAN FRACTIONS TREATED TO DATE: 6
RAD ONC ARIA PLAN ID: NORMAL
RAD ONC ARIA PLAN PRESCRIBED DOSE PER FRACTION: 3 GY
RAD ONC ARIA PLAN PRIMARY REFERENCE POINT: NORMAL
RAD ONC ARIA PLAN TOTAL FRACTIONS PRESCRIBED: 10
RAD ONC ARIA PLAN TOTAL PRESCRIBED DOSE: 3000 CGY
RAD ONC ARIA REFERENCE POINT DOSAGE GIVEN TO DATE: 18 GY
RAD ONC ARIA REFERENCE POINT ID: NORMAL
RAD ONC ARIA REFERENCE POINT SESSION DOSAGE GIVEN: 3 GY
RBC # BLD AUTO: 2.83 10*6/MM3 (ref 3.77–5.28)
SODIUM SERPL-SCNC: 139 MMOL/L (ref 136–145)
WBC NRBC COR # BLD AUTO: 4.76 10*3/MM3 (ref 3.4–10.8)

## 2024-07-15 PROCEDURE — 77417 THER RADIOLOGY PORT IMAGE(S): CPT | Performed by: RADIOLOGY

## 2024-07-15 PROCEDURE — 25010000002 HYDROMORPHONE PER 4 MG: Performed by: INTERNAL MEDICINE

## 2024-07-15 PROCEDURE — 85025 COMPLETE CBC W/AUTO DIFF WBC: CPT | Performed by: NURSE PRACTITIONER

## 2024-07-15 PROCEDURE — 25010000002 ONDANSETRON PER 1 MG: Performed by: NEUROLOGICAL SURGERY

## 2024-07-15 PROCEDURE — 77412 RADIATION TX DELIVERY LVL 3: CPT | Performed by: RADIOLOGY

## 2024-07-15 PROCEDURE — 80048 BASIC METABOLIC PNL TOTAL CA: CPT | Performed by: INTERNAL MEDICINE

## 2024-07-15 PROCEDURE — 97535 SELF CARE MNGMENT TRAINING: CPT

## 2024-07-15 RX ORDER — HYDROMORPHONE HYDROCHLORIDE 1 MG/ML
0.5 INJECTION, SOLUTION INTRAMUSCULAR; INTRAVENOUS; SUBCUTANEOUS ONCE AS NEEDED
Status: COMPLETED | OUTPATIENT
Start: 2024-07-15 | End: 2024-07-15

## 2024-07-15 RX ORDER — CLONIDINE HYDROCHLORIDE 0.1 MG/1
0.1 TABLET ORAL 2 TIMES DAILY
Qty: 60 TABLET | Refills: 0 | Status: SHIPPED | OUTPATIENT
Start: 2024-07-15 | End: 2024-08-14

## 2024-07-15 RX ORDER — CARVEDILOL 25 MG/1
25 TABLET ORAL 2 TIMES DAILY WITH MEALS
Qty: 60 TABLET | Refills: 0 | Status: SHIPPED | OUTPATIENT
Start: 2024-07-15 | End: 2024-08-14

## 2024-07-15 RX ORDER — AMOXICILLIN 250 MG
2 CAPSULE ORAL 2 TIMES DAILY PRN
Qty: 30 TABLET | Refills: 0 | Status: SHIPPED | OUTPATIENT
Start: 2024-07-15

## 2024-07-15 RX ADMIN — CARVEDILOL 25 MG: 25 TABLET, FILM COATED ORAL at 08:08

## 2024-07-15 RX ADMIN — LIDOCAINE 1 PATCH: 4 PATCH TOPICAL at 08:09

## 2024-07-15 RX ADMIN — Medication 10 ML: at 08:12

## 2024-07-15 RX ADMIN — PANTOPRAZOLE SODIUM 40 MG: 40 TABLET, DELAYED RELEASE ORAL at 08:08

## 2024-07-15 RX ADMIN — AMLODIPINE BESYLATE 5 MG: 5 TABLET ORAL at 08:08

## 2024-07-15 RX ADMIN — BUPROPION HYDROCHLORIDE 150 MG: 150 TABLET, EXTENDED RELEASE ORAL at 08:08

## 2024-07-15 RX ADMIN — CETIRIZINE HYDROCHLORIDE 10 MG: 10 TABLET ORAL at 08:09

## 2024-07-15 RX ADMIN — FLUTICASONE PROPIONATE 2 SPRAY: 50 SPRAY, METERED NASAL at 08:12

## 2024-07-15 RX ADMIN — SERTRALINE 100 MG: 50 TABLET, FILM COATED ORAL at 08:08

## 2024-07-15 RX ADMIN — HYDROMORPHONE HYDROCHLORIDE 0.5 MG: 1 INJECTION, SOLUTION INTRAMUSCULAR; INTRAVENOUS; SUBCUTANEOUS at 08:09

## 2024-07-15 RX ADMIN — ONDANSETRON 4 MG: 2 INJECTION INTRAMUSCULAR; INTRAVENOUS at 09:45

## 2024-07-15 RX ADMIN — HYDROCODONE BITARTRATE AND ACETAMINOPHEN 2 TABLET: 10; 325 TABLET ORAL at 03:42

## 2024-07-15 RX ADMIN — Medication 1 TABLET: at 08:08

## 2024-07-15 NOTE — PLAN OF CARE
Goal Outcome Evaluation:  Plan of Care Reviewed With: patient     Progress: no change     Pt A&O x4. C/O pain throughout shift; see MAR. Fentanyl patch as ordered. Currently IID. Room air. Up x1. Voiding per BSC. Fall precautions in place. VSS. Safety maintained.

## 2024-07-15 NOTE — DISCHARGE SUMMARY
[facility]  DISCHARGE SUMMARY       Date of Admission: 6/28/2024  Date of Discharge:  7/15/2024  Primary Care Physician: Hanh Og APRN    Presenting Problem/History of Present Illness:  Low back pain    Final Discharge Diagnoses:  Active Hospital Problems    Diagnosis     **Left renal mass     Cauda equina compression     Metastatic cancer to spine     Morbid (severe) obesity due to excess calories        Consults:   Oncology  Neurosurgery  Nephrology  Pulmonary  Radiation oncologist  Urologist    Procedures Performed:   OR Date: 6/29/2024     Pre-op Diagnosis:   Cauda equina compression G83.4  Metastatic cancer to spine C79.51     Post-op Diagnosis:     Post-Op Diagnosis Codes:     * Cauda equina compression G83.4     * Metastatic cancer to spine C79.51              Surgeon(s):  Marcellus Pulido MD     Anesthesia: General     Staff:   Circulator: Shravan Petit RN  Scrub Person: Marley Davalos Brittany     Procedure(s):  LUMBAR LAMINECTOMY L3 WITH DECOMPRESSION 1-2 LEVELS-RIGHT     Decompression -  Lumbar laminectomy, medial facetectomy for decompression of the spinal cord  Open laminectomy and biopsy of epidural neoplasm  Use of intraoperative microscope  Pertinent Test Results:   Results for orders placed during the hospital encounter of 06/28/24    Adult Transthoracic Echo Complete W/ Cont if Necessary Per Protocol    Interpretation Summary    Left ventricular systolic function is normal. Left ventricular ejection fraction appears to be 56 - 60%.    Left ventricular wall thickness is consistent with mild concentric hypertrophy.    Left ventricular diastolic function was normal.    Normal right ventricular cavity size and systolic function noted.    Mild mitral valve regurgitation is present.    No evidence of pulmonary hypertension is present.      Imaging Results (All)       Procedure Component Value Units Date/Time    US  Renal Bilateral [id number] Collected: 07/08/24 1706     Updated: 07/08/24 1714    Narrative:      US RENAL BILATERAL-     HISTORY: acute on ckd; 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     There is an abnormal heterogeneous appearance to the bilateral kidneys  representative of the solid renal mass is better seen on the MRI abdomen  of 6/28/2024. A 4.3 x 2.7 x 2.9 cm right adrenal nodule is measured.  Right kidney measures 10.4 x 6.3 x 5.5 cm. The left kidney measures 10.7  x 6.9 x 6.5 cm. There is questionable trace volume of right subcapsular  fluid considered. There is only minimal prominence of the renal  collecting systems.     The distended bladder is unremarkable.       Impression:      1. Abnormal heterogeneous appearance to the bilateral renal parenchyma  representative of bilateral, left larger than right, solid renal masses  better seen on MRI 6/28/2024. Trace right subcapsular fluid considered.     2. Only mild prominence of the bilateral renal collecting systems.     3. Persistent right adrenal mass.        This report was signed and finalized on 7/8/2024 5:10 PM by Dr. Meaghan Phillips MD.       XR Chest 1 View [id number] Collected: 07/03/24 1538     Updated: 07/03/24 1552    Narrative:      EXAMINATION: XR CHEST 1 VW-  7/3/2024 3:38 PM 1 view     HISTORY: sob; C79.51-Secondary malignant neoplasm of bone; N28.89-Other  specified disorders of kidney and ureter; E27.8-Other specified  disorders of adrenal gland; M79.89-Other specified soft tissue  disorders;  N28.9-Disorder of kidney and ureter, unspecified;  N18.9-Chronic kidney disease, unspecified; D69.6-Thrombocytopenia,  unspecified; G83.4-Cauda equina syndrome; Z74.09-Other reduced mobility;  Z85.3-Pe     COMPARISON: 6/28/2024     TECHNIQUE: A single frontal view of the chest was obtained.     FINDINGS:  There is mild cardiomegaly and pulmonary vascular congestion. Shallow  depth of inspiration. I don't see a large effusion. Vascular  calcifications are noted in the aorta. Surgical clips project over the  LEFT chest wall.       Impression:         1.  Shallow inspiration with mild cardiomegaly and pulmonary vascular  congestion without lee edema or other acute consolidation.           This report was signed and finalized on 7/3/2024 3:49 PM by Dr. Armando Calixto MD.       US Guided Tissue Biopsy [id number] Collected: 07/01/24 1430     Updated: 07/02/24 0700    Narrative:      EXAM: US GUIDED TISSUE BIOPSY-      DATE: 7/1/2024 12:38 PM     HISTORY: Assess for metastatic disease; 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 Patient reports  history of breast cancer in 2013 status post surgery and radiation  therapy.        COMPARISON: 6/28/2024.     TECHNIQUE: Representative images and report stored to PACS per  institutional protocol and state regulations.     PROCEDURE:  Preprocedure imaging performed demonstrating multiple peripherally  echogenic, centrally hypoechoic nodules. These demonstrated internal  vascularity. A superficial nodule RIGHT flank nodule was selected and  patient positioned with consideration for her comfort.     Risks, benefits and alternatives to the procedure were  discussed with  the patient. Specifically, risks of bleeding, infection, allergy to  medicine, and non-diagnostic biopsy results were discussed with the  patient. Verbal and written informed consent was obtained.     A timeout was performed including the patient's name, date of birth,  biopsy site and laterality.     Using ultrasound, a site for biopsy of RIGHT flank subcutaneous nodule  was selected, marked and prepped in the usual sterile fashion. 1 percent   lidocaine was used for local anesthesia.     Using ultrasound guidance, RIGHT flank subcutaneous nodule biopsy was  performed using 18 gauge Bard core needle biopsy device. 5 passes were  made. One core was used for touch prep. 2 cores were placed in formalin  and 2 cores were placed in RPMI.     Cytology deemed sample adequate. Biopsy samples were taken by cytology.      The patient tolerated the procedure well. No evidence of complication.     The patient returned to the floor in stable condition and agrees to  follow up with referring clinician for biopsy results.           Impression:      1. Successful ultrasound guided core needle biopsy of RIGHT flank  subcutaneous nodule.        This report was signed and finalized on 7/1/2024 2:36 PM by Dr Sonam Quach MD.       US Soft Tissue [id number] Collected: 07/01/24 1430     Updated: 07/02/24 0700    Narrative:      EXAM: US GUIDED TISSUE BIOPSY-      DATE: 7/1/2024 12:38 PM     HISTORY: Assess for metastatic disease; 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 Patient reports  history of breast cancer in 2013 status post surgery and radiation  therapy.        COMPARISON:  6/28/2024.     TECHNIQUE: Representative images and report stored to PACS per  institutional protocol and state regulations.     PROCEDURE:  Preprocedure imaging performed demonstrating multiple peripherally  echogenic, centrally hypoechoic nodules. These demonstrated internal  vascularity. A superficial nodule RIGHT flank nodule was selected and  patient positioned with consideration for her comfort.     Risks, benefits and alternatives to the procedure were discussed with  the patient. Specifically, risks of bleeding, infection, allergy to  medicine, and non-diagnostic biopsy results were discussed with the  patient. Verbal and written informed consent was obtained.     A timeout was performed including the patient's name, date of birth,  biopsy site and laterality.     Using ultrasound, a site for biopsy of RIGHT flank subcutaneous nodule  was selected, marked and prepped in the usual sterile fashion. 1 percent   lidocaine was used for local anesthesia.     Using ultrasound guidance, RIGHT flank subcutaneous nodule biopsy was  performed using 18 gauge Bard core needle biopsy device. 5 passes were  made. One core was used for touch prep. 2 cores were placed in formalin  and 2 cores were placed in RPMI.     Cytology deemed sample adequate. Biopsy samples were taken by cytology.      The patient tolerated the procedure well. No evidence of complication.     The patient returned to the floor in stable condition and agrees to  follow up with referring clinician for biopsy results.           Impression:      1. Successful ultrasound guided core needle biopsy of RIGHT flank  subcutaneous nodule.        This report was signed and finalized on 7/1/2024 2:36 PM by Dr Sonam Quach MD.       MRI Brain Without Contrast [id number] Collected: 07/01/24 0949     Updated: 07/01/24 1000    Narrative:      EXAMINATION: MRI BRAIN WO CONTRAST-  7/1/2024 9:49 AM      HISTORY: Staging; C79.51-Secondary malignant neoplasm of  bone;  N28.89-Other specified disorders of kidney and ureter; E27.8-Other  specified disorders of adrenal gland; M79.89-Other specified soft tissue  disorders; N28.9-Disorder of kidney and ureter, unspecified;  N18.9-Chronic kidney disease, unspecified; D69.6-Thrombocytopenia,  unspecified; G83.4-Cauda equina syndrome; Z74.09-Other reduced mobility     COMPARISON: 8/10/2023     TECHNIQUE: Multiplanar imaging of the brain was performed in a routine  fashion. No contrast was administered.     FINDINGS:  No restricted diffusion. No mass effect or midline shift. It should be  noted that no intravenous contrast was administered, limiting evaluation  for intracranial metastatic disease. Within the limitations of this  exam, I don't see any definite evidence of intracranial metastatic  disease. Increased FLAIR signal intensity scattered throughout the  subcortical and periventricular white matter. Basilar cisterns are  preserved. Grey and white matter demonstrates normal MR signal. No  abnormal extra axial fluid collections are noted. No definite mass  effect or midline shift identified.     Proximal cervical spinal cord, brainstem, and cerebellum are  unremarkable. Normal cerebrovascular flow voids are seen. Bilateral  globes and orbits are normal in appearance.     Opacification of the LEFT sphenoid sinus with high T1 signal which may  represent some proteinaceous fluid.          Impression:         1.  No definite evidence of intracranial metastatic disease within the  limitations of this noncontrast examination.     2.  Fairly extensive periventricular white matter signal abnormality,  likely related to chronic microvascular ischemic change.     3.  Opacification of the LEFT sphenoid sinus with what is likely  proteinaceous fluid.                                                       This report was signed and finalized on 7/1/2024 9:53 AM by Dr. Armando Calixto MD.       MRI Lumbar Spine With & Without Contrast  [id number] Collected: 06/28/24 1920     Updated: 06/28/24 1945    Addenda:        Additional findings: There are also appears to be tumor signal extending  into the right L2-L3 neural foramen.     This report was signed and finalized on 6/28/2024 7:42 PM by Warren Freire.     Signed: 06/28/24 1942 by Warren Freire DO    Narrative:      Exam: MRI LUMBAR SPINE W WO CONTRAST- 6/28/2024 3:43 PM     HISTORY: Lumbar back and bilateral nondermatomal leg pain and  dysesthesias; N28.89-Other specified disorders of kidney and ureter;  E27.8-Other specified disorders of adrenal gland; M79.89-Other specified  soft tissue disorders; N28.9-Disorder of kidney and ureter, unspecified;  N18.9-Chronic kidney disease, unspecified; D69.6-Thrombocytopenia,  unspecified     COMPARISON: 6/28/2024 lumbar spine CT     TECHNIQUE: Multiplanar, multisequence MRI of the lumbar spine was  obtained prior to and after the administration of 20 mL intravenous  gadolinium contrast.     FINDINGS:     Grade 1 degenerative anterolisthesis of L3-L4.     Within the L3 vertebral body there is fairly diffuse T2 hyperintense  signal with associated T1 hypointense marrow infiltration and suspected  mild enhancement. There is an associated posterior enhancing soft tissue  mass extending into the spinal canal causing severe stenosis and  compression of the cauda equina nerve roots. The soft tissue component  also appears to extend superiorly to the L2-L3 disc level. There appears  to be tumor extension into the right L3-L4 foramen.     No visible fracture.     Multilevel mild degenerative disc disease with posterior disc osteophyte  complexes. Multilevel facet hypertrophic changes. No significant facet  periarticular edema. Multilevel ligamentum flavum hypertrophy.  Multilevel prominent posterior epidural fat.     Conus appears to terminate at L1-L2.     T11-T12: There appears to be a at least mild to moderate spinal canal  and mild to moderate bilateral  foraminal narrowing.     T12-L1: No significant spinal canal narrowing. Moderate right foraminal  narrowing.     L1-L2: Moderate spinal canal narrowing. Mild to moderate right foraminal  narrowing.     L2-L3: Severe spinal canal narrowing. Moderate to severe right foraminal  narrowing.     L3-L4: Severe spinal canal narrowing. Moderate right foraminal  narrowing.     L4-L5: Moderate spinal canal narrowing and bilateral subarticular  narrowing encroaching the descending bilateral L5 nerve roots. Mild  bilateral foraminal narrowing.     L5-S1: No significant spinal canal or foraminal narrowing.     Extraspinal findings: Please see separately dictated MR abdomen and CT  abdomen/pelvis from the same day.       Impression:         Neoplastic process involving the L3 vertebral body with associated  posterior soft tissue component resulting in severe spinal canal  narrowing and cauda equina nerve root compression. The soft tissue  component appears to also extend into the right L3-L4 foramen. No  associated pathological fracture.     Additional multilevel spondylotic changes including moderate to severe  spinal canal and foraminal narrowing as detailed above.           This report was signed and finalized on 6/28/2024 7:36 PM by Warren Freire.       MRI Abdomen With & Without Contrast [id number] Collected: 06/28/24 1826     Updated: 06/28/24 1943    Addenda:        Additional findings: Tumor signal from the left renal mass appears to  extend along the left renal vein and possibly abuts the IVC and is  suspicious for neoplastic involvement. An additional differential also  includes multifocal primary renal malignancies with metastatic disease.     This report was signed and finalized on 6/28/2024 7:40 PM by Warren Freire.     Signed: 06/28/24 1940 by Warren Freire DO    Narrative:      EXAM: MRI ABDOMEN W WO CONTRAST-     INDICATION: Evaluate kidney and adrenal masses showed on CT-scan;  N28.89-Other specified  disorders of kidney and ureter; E27.8-Other  specified disorders of adrenal gland; M79.89-Other specified soft tissue  disorders; N28.9-Disorder of kidney and ureter, unspecified;  N18.9-Chronic kidney disease, unspecified; D69.6-Thrombocytopenia,  unspecified        TECHNIQUE: Multisequence multiplanar MR images was obtained of the  abdomen prior to and at the administration of 20 mL intravenous  gadolinium contrast.        COMPARISON: CT abdomen pelvis 6/28/2024     FINDINGS:     Decrease sensitivity due to motion.     Lower Chest: Unremarkable.     Liver: Unremarkable.     Biliary Tree: Small amount of gallbladder sludge versus layering stones.     Spleen: Tiny T2 hyperintense splenic lesion is nonspecific but favored  benign.     Pancreas: 1.5 cm pancreatic body cyst.     Adrenal Glands: 3.3 cm right adrenal nodule, new from prior CT on  5/23/2023.     Kidneys/Upper Ureters:      There is an infiltrative T2 hypointense/T1 isointense diffusion  restricting mass involving the left kidney with extension into the  proximal left collecting system/upper ureters which is difficult to  measure but measures up to 11 cm in cranial caudal dimensions. Please  note the abnormal diffusion restriction does extend below the  field-of-view into the left ureter. There are additional smaller masses  within the left renal cortex.      There are also multiple (at least 6) diffusion restricting masses within  the right kidney measuring up to 3.2 cm. There also appears to be a  diffusion restricting mass in the right renal pelvis.      These are somewhat limited in evaluation on postcontrast images due to  degree of motion, however these these masses do appear to demonstrate  some degree of enhancement.     Small left renal cyst is noted.     Gastrointestinal Tract: Colonic diverticulosis.     Lymphatics: Unremarkable.     Vasculature: Unremarkable.      Peritoneum/Retroperitoneum: Unremarkable.     Abdominal Wall/Soft Tissues: There  enhancing diffusion restricting soft  tissue masses, the most dominant and largest cluster is within the right  posterior abdominal wall measuring up to 2.6 cm.     Osseous Structures: There is a diffusion restricting mass involving the  L3 vertebral body with suspected posterior extension into the spinal  canal.       Impression:            Infiltrative mass within the left kidney extending into the proximal  left collecting system as well as numerous additional solid masses in  the bilateral kidneys. Additionally there is a right adrenal mass,  multiple subcutaneous fat soft tissue masses, and a L3 vertebral body  mass with suspected extension into the spinal canal. Findings  collectively are most consistent with metastatic disease which carries a  broad differential, however some differentials include metastatic breast  cancer, malignant melanoma, and lymphoma. The subtendinous fat soft  tissue masses would be amenable to ultrasound-guided biopsy.     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           This report was signed and finalized on 6/28/2024 6:57 PM by Warren Freire.       CT Chest Without Contrast Diagnostic [id number] Collected: 06/28/24 1626     Updated: 06/28/24 1639    Narrative:      CT CHEST WO CONTRAST DIAGNOSTIC- 6/28/2024 3:15 PM     HISTORY: Staging for newly diagnosed metastatic disease; N28.89-Other  specified disorders of kidney and ureter; E27.8-Other specified  disorders of adrenal gland; M79.89-Other specified soft tissue  disorders; N28.9-Disorder of kidney and ureter, unspecified;  N18.9-Chronic kidney disease, unspecified; D69.6-Thrombocytopenia,  unspecified      COMPARISON: 10/13/2022     TOTAL DOSE LENGTH PRODUCT: 413.82 mGy.cm. Automated exposure control was  also utilized to decrease patient radiation dose.     TECHNIQUE: Axial images of the chest are performed without IV contrast  secondary to renal  insufficiency.      FINDINGS:    Postoperative changes of the left breast with left axillary  surgical clips. 9 mm medial left breast nodule/mass near the 8 to 9  o'clock position, new from the 2022 CT study which may represent a cyst  or solid mass. Correlation to any outside mammogram and breast  ultrasound recommended. No current mammogram or ultrasound at this  institution.     No pathologic axillary or intrathoracic lymphadenopathy. Cardiomegaly.  Very small pericardial effusion. No pleural effusions.     3.2 cm right adrenal mass. Abnormal appearance of the left greater than  right kidney. Please refer to today's CT abdomen pelvis 6/20/2024.     Basilar atelectasis. A 6 mm nodule along the right major fissure  favoring intrafissural lymph node, present on the 10/13/2022 study.. No  suspicious pulmonary nodules or convincing intrathoracic metastasis. No  pneumothorax. No discrete endobronchial lesion.     No focal aggressive regional bony lesions. Moderate diffuse degenerative  changes of the thoracolumbar junction. New 1.5 cm nodule within the  subcutaneous tissues of the left lower chest/upper abdomen (series 2  image 98. Several soft tissue nodules within the subcutaneous tissue of  the right flank at the L1 level measuring up to 2.1 cm. Smaller 9 mm  soft tissue nodule of the posterior back just left of midline at the L2  level. A left 10 mm posterior subcutaneous nodule at the T12-L1 level.       Impression:      1. Patient with a history of left breast cancer with postoperative  changes of the superior left breast and left axillary surgical clips.  There is a 9 mm nodule/mass of the medial left breast positioned towards  the 8 to 9 o'clock position. Correlation to any recent mammogram or  breast ultrasound recommended, none at this institution. If no outside  studies demonstrating a known 9 mm medial left breast cyst, a follow-up  outpatient mammogram and left breast ultrasound should be performed  for  further assessment.  2. Scattered subcutaneous soft tissue nodules of the abdominal wall.  Largest nodules within the right posterior flank measuring up to 2.1 cm  at the L1 level. These appear to represent soft tissue nodules and could  be metastatic deposits. Ultrasound recommended to establish solid  nature. Biopsy could be performed if solid and clinically indicated.  3. Cardiomegaly. Mild vascular calcification.  4. No convincing intrathoracic metastasis. Stable 6 mm nodule in the  right major fissure favoring intrafissural lymph node.  5. Please refer to CT abdomen and pelvis report from today for  description of bilateral renal pathology as well as a 3.2 cm right  adrenal mass.     This report was signed and finalized on 6/28/2024 4:36 PM by Dr. Meaghan Phillips MD.       XR Chest 1 View [id number] Collected: 06/28/24 0912     Updated: 06/28/24 0916    Narrative:      EXAM: XR CHEST 1 VW-      DATE: 6/28/2024 8:06 AM     HISTORY: multiple complaints       COMPARISON: 8/10/2023.     TECHNIQUE:  Frontal view(s) of the chest submitted.     FINDINGS:    There are low lung volumes with vascular crowding versus volume  overload/edema. There is enlargement of the cardiac silhouette. No  effusion or pneumothorax is seen.          Impression:         1. Low lung volumes with prominent vasculature may represent vascular  crowding or mild edema.     This report was signed and finalized on 6/28/2024 9:13 AM by Gatito Blackwood.       CT Abdomen Pelvis Stone Protocol [id number] Collected: 06/28/24 0817     Updated: 06/28/24 0832    Narrative:      EXAM: CT ABDOMEN PELVIS STONE PROTOCOL-      DATE: 6/28/2024 6:48 AM     HISTORY: flank pain       COMPARISON: 5/23/2023.     DOSE LENGTH PRODUCT: 1399.15 mGy.cm Automatic exposure control was  utilized to make radiation dose as low as reasonably achievable.     TECHNIQUE: Noncontract axial images of the abdomen and pelvis obtained  with multiplanar reformats.      FINDINGS:  VISUALIZED CHEST: There is cardiomegaly without pericardial or pleural  effusion. There is atelectasis at the lung bases which are otherwise  grossly clear.     LIVER/BILE DUCTS: Unenhanced liver is unremarkable. Gallbladder is  distended without inflammatory change. Bile ducts are unremarkable.     KIDNEYS/URETERS: Both kidneys are abnormal in appearance. The left is  larger than the right and there is a suggestion of an underlying  infiltrative left renal mass involving the entire left kidney but  especially at the mid and lower pole extending along the left renal  pelvis and to the proximal left ureter. There are bilateral renal cysts.  There are vascular calcifications and probable nonobstructing stones in  the left kidney. There is no definite hydronephrosis.     ADRENAL: There is a new right adrenal mass worrisome for neoplasm  measuring 3.5 cm. Left adrenal gland is nodular but unchanged.        SPLEEN: Unremarkable.     PANCREAS: A cyst at the ventral aspect of the body of the pancreas is  unchanged measuring 1 cm. 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     MESENTERY: No mesenteric or retroperitoneal lymphadenopathy is seen. No  free fluid identified.     VASCULATURE: There is mild atherosclerosis of the abdominal aorta  without aneurysm.     GI TRACT: There is a small hiatal hernia. There is diverticulosis of the  colon without acute diverticulitis. No mass or obstruction is seen.     PELVIS: Urinary bladder is unremarkable. There is diverticulosis of the  sigmoid colon without acute diverticulitis. Borderline left external  iliac lymph node measures 0.9 cm in short axis on image #70 of series 3.  This is actually unchanged. Patient is status post hysterectomy.     SOFT TISSUES: There are multiple solid nodules in the subcutaneous soft  tissues which are new from the 5/23/2023 exam. One on the left  anteriorly on image #9 measures 1.5 cm. A cluster of solid nodules on  the right posteriorly and  laterally on image #38 noted largest measures  2.5 cm.     BONES: There is spondylosis of the spine and lumbar spine facet  arthropathy. No suspicious osseous lesions are seen.          Impression:      1. Diffusely abnormal left kidney which appears heterogeneous and  enlarged worrisome for neoplasm. Normal soft tissue extends into the  left renal pelvis and along the proximal left ureter. Right kidney also  is abnormal in its. Differential diagnosis would include infiltrative  renal cell carcinoma, lymphoma and metastatic disease.  2. There is also a new right adrenal mass worrisome for metastatic  disease.  3. Innumerable solid nodules in the subcutaneous soft tissues worrisome  for soft tissue metastasis. Cluster of nodules on the right posteriorly  at the mid to lower abdomen is close skin surface and would likely be  amenable to ultrasound-guided biopsy.        This report was signed and finalized on 6/28/2024 8:29 AM by Gatito Blackwood.       CT Lumbar Spine Without Contrast [id number] Collected: 06/28/24 0817     Updated: 06/28/24 0832    Narrative:      EXAMINATION: CT LUMBAR SPINE WO CONTRAST-      6/28/2024 6:48 AM     HISTORY: back pain     In order to have a CT radiation dose as low as reasonably achievable  Automated Exposure Control was utilized for adjustment of the mA and/or  KV according to patient size.     Total DLP = 1399.15 mGy.cm     The CT scan of the lumbar spine is performed without intravenous or  intrathecal contrast enhancement.     Images are acquired in axial plane and subsequent reconstruction in  coronal and sagittal planes.     The comparison is made with the previous study dated 1/21/2024.     There is no evidence of an acute fracture or compression.     No acute displacement or malalignment.     There is a mild levoscoliosis.     There is loss of lumbar lordosis.     There is mild anterolisthesis of L3 over L4 similar to the previous  study. No spondylolysis.     The vertebral  body heights are normal.     The loss of height of the intervertebral disc, most pronounced at L5-S1.  There is a vacuum sign at L3-4, L4-5 and L5-S1 representing degenerative  disc disease.     T12-L1: Prominent posterior osteophytes. Mild diffuse disc bulging.  Bilateral facet arthropathy right more than the left. There is right  neuroforaminal stenosis. Spinal canal is patent.     L1-2: Mild disc bulging. No significant osteophytes. Bilateral facet  arthropathy and ligamental hypertrophy. There is a mild spinal stenosis.  Neural foramina are patent.     L2-3: Mild diffuse disc bulging. Bilateral facet arthropathy and  ligamental hypertrophy. There is resultant moderate spinal stenosis.  There is mild bilateral neural foraminal stenosis.     L3-4: Moderate diffuse disc bulging/displacement central and bilateral.  There is severe facet arthropathy and ligamental hypertrophy. There is  moderate spinal stenosis. There is bilateral neuroforaminal stenosis  right more than the left.     L4-5: Small posterior osteophytes. Moderate diffuse disc bulging.  Bilateral severe facet arthropathy and ligamental hypertrophy. Moderate  spinal stenosis. Bilateral neuroforaminal stenosis.     L5-S1: Moderately prominent posterior osteophytes. Moderate diffuse disc  bulging. Bilateral facet arthropathy. There is bilateral neuroforaminal  stenosis. Spinal canal is patent.     Limited visualized paravertebral paraspinal soft tissues are  unremarkable. There are some calcification in the renal hilum  bilaterally which probably represent vascular calcification.       Impression:      1. No acute fracture or malalignment.  2. Lumbar spondylosis. Loss of lumbar lordosis. A mild anterolisthesis  L3 over L4.  3. Multilevel prominent disc osteophyte complexes, facet arthropathy and  ligamental hypertrophy and resultant neural foraminal spinal canal  stenosis.                                                  This report was signed and  finalized on 6/28/2024 8:29 AM by Dr. Marito Marcano MD.             LAB RESULTS:      Lab 07/15/24  0545 07/14/24 0348 07/13/24  0526 07/12/24  0401 07/11/24  0356   WBC 4.76 5.59 5.63 5.59 7.26   HEMOGLOBIN 7.9* 8.3* 8.3* 8.0* 8.5*   HEMATOCRIT 25.5* 26.9* 26.7* 25.3* 26.7*   PLATELETS 46* 49* 51* 51* 68*   NEUTROS ABS 3.44 4.35 4.50 4.32 5.70   IMMATURE GRANS (ABS) 0.02 0.03 0.03 0.03 0.05   LYMPHS ABS 0.50* 0.43* 0.47* 0.63* 0.82   MONOS ABS 0.56 0.60 0.48 0.46 0.54   EOS ABS 0.23 0.17 0.14 0.14 0.13   MCV 90.1 91.5 90.5 89.1 87.8         Lab 07/15/24  0545 07/14/24  0348 07/13/24  0526 07/12/24  0401 07/11/24  0356   SODIUM 139 137 137 137 138   POTASSIUM 4.2 4.4 4.3 4.3 4.0   CHLORIDE 102 100 100 101 101   CO2 28.0 26.0 27.0 26.0 25.0   ANION GAP 9.0 11.0 10.0 10.0 12.0   BUN 19 24* 27* 26* 30*   CREATININE 1.22* 1.42* 1.56* 1.47* 1.46*   EGFR 45.5* 37.9* 33.9* 36.4* 36.7*   GLUCOSE 94 96 97 103* 95   CALCIUM 9.2 8.9 9.4 9.1 9.5         Lab 07/09/24  0914   TOTAL PROTEIN 6.6   ALBUMIN 3.6   GLOBULIN 3.0   ALT (SGPT) 20   AST (SGOT) 25   BILIRUBIN 0.3   ALK PHOS 89                     Brief Urine Lab Results  (Last result in the past 365 days)        Color   Clarity   Blood   Leuk Est   Nitrite   Protein   CREAT   Urine HCG        06/29/24 0022             182.2               Microbiology Results (last 10 days)       ** No results found for the last 240 hours. **            Hospital Course:     I noticed that the lab be attempted to discharge her on July 12.  He has a discharge summary as outlined by him.  Plan is to go home hospice.  I also noticed that there is a discharge medication already placed.  I reconciled it to the best of my abilities.  I found out that carvedilol was prescribed to be 6.25 when actually she was getting 25 mg twice daily.  In a similar way, I made some changes based on current medication she is taking.  I also added softener since she discharge on pain medication.    Patient is a  79-year old woman on day 17 of hospitalization  This is my first day of encounter with her.  She presented with lower back pain on June 28.     She is followed by Dr. Morgan whose notes on July 11 I have seen and reviewed.  Patient on July 1 reportedly had ultrasound-guided core needle biopsy at right flank subcutaneous nodule.  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  This is complicated by cord compression at L3 concerning for epidural metastasis.  On July 8 patient had postop radiation therapy to the lumbar region with anticipated 2000 to 3000 cGy over 5-20 daily fractions.        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 Pulido who did operative management of cauda equina compression; metastatic cancer to the spine (lumbar laminectomy L3 with decompression 1 2 levels right (June 29)     Pulmonary also saw the patient in consult with last note dated July 6:  From the record reviewed chief complaint obesity with suspected sleep apnea.  Dr. Castro indicates that patient is stable from pulmonary standpoint and remains on room air.  He recommends sleep study in 3 months time after discharge.     Nephrology saw the patient in consult with last note dating July f4.  Creatinine from 2.59 on  "June 28 has improved to 1.29 as of July 4.  Impression includes acute kidney injury prerenal azotemia, CKD stage IIIa.  They signed off on July 4.    Discharge summary is in collaboration with the discharge summary under the care of of Dr. Arreaga.      Physical Exam on Discharge:  /60 (BP Location: Right arm, Patient Position: Lying)   Pulse 69   Temp 98.6 °F (37 °C) (Oral)   Resp 18   Ht 172.7 cm (68\")   Wt 111 kg (244 lb)   LMP  (LMP Unknown)   SpO2 96%   BMI 37.10 kg/m²   Physical Exam  Patient is surprisingly doing well.  She came out of the bathroom with a therapist.  She is walking with a walker but was readily to let it go and walk on the foot and of the bed to her bed without difficulty  She is wearing a brace.  She appears to have stable gait and not in any apparent distress  She is hemodynamically stable  She is heavyset but her BMI is only 37 as compared to the diagnosis written as morbidly obese.  She expressed that she is ready to go home and be happier at home.  She is anticipating to get her radiation treatment and will continue this for the rest of what has been recommended in an outpatient setting.  Alert oriented x 3  Grossly nonfocal exam  Appropriate affect  Normal respiratory effort with clear breath sounds  S1-S2 regular rate and rhythm no gallop or murmur  Obese abdomen, limited exam due to presence of brace.  No cyanosis or gross edema            Condition on Discharge: Stable    Discharge Disposition:  Hospice/Home    Discharge Medications:     Discharge Medications        New Medications        Instructions Start Date   fentaNYL 12 MCG/HR  Commonly known as: DURAGESIC   1 patch, Transdermal, Every 72 Hours      Lidocaine Viscous HCl 2 % solution  Commonly known as: XYLOCAINE   5 mL, Oral, Every 3 Hours PRN      naloxone 4 MG/0.1ML nasal spray  Commonly known as: NARCAN   Call 911. Don't prime. [place] in 1 nostril for overdose. Repeat in 2-3 minutes in other nostril if no or " minimal breathing/responsiveness.      sennosides-docusate 8.6-50 MG per tablet  Commonly known as: PERICOLACE   2 tablets, Oral, 2 Times Daily PRN             Changes to Medications        Instructions Start Date   carvedilol 25 MG tablet  Commonly known as: COREG  What changed:   medication strength  how much to take  Another medication with the same name was removed. Continue taking this medication, and follow the directions you see here.   25 mg, Oral, 2 Times Daily With Meals             Continue These Medications        Instructions Start Date   amLODIPine 5 MG tablet  Commonly known as: NORVASC   5 mg, Oral, 2 Times Daily      buPROPion  MG 24 hr tablet  Commonly known as: WELLBUTRIN XL   150 mg, Oral, Daily      CALCIUM 600+D3 PO   1 tablet, Oral, Daily      cetirizine 10 MG tablet  Commonly known as: zyrTEC   10 mg, Oral, Daily      cloNIDine 0.1 MG tablet  Commonly known as: CATAPRES   0.1 mg, Oral, 2 Times Daily      cyclobenzaprine 10 MG tablet  Commonly known as: FLEXERIL   10 mg, Oral, 3 Times Daily PRN      Diclofenac Sodium 1 % gel gel  Commonly known as: VOLTAREN   4 g, Topical, 4 Times Daily PRN      famotidine 20 MG tablet  Commonly known as: PEPCID   20 mg, Oral, 2 Times Daily Before Meals      Farxiga 10 MG tablet  Generic drug: dapagliflozin Propanediol   1 tablet, Oral, Daily      fluticasone 50 MCG/ACT nasal spray  Commonly known as: FLONASE   2 sprays, Nasal, Daily      irbesartan-hydrochlorothiazide 300-12.5 MG tablet  Commonly known as: AVALIDE   1 tablet, Oral, Daily      montelukast 10 MG tablet  Commonly known as: SINGULAIR   10 mg, Oral, Nightly      Multivitamin Adult tablet tablet  Generic drug: multivitamin with minerals   1 tablet, Oral, Daily      pantoprazole 40 MG EC tablet  Commonly known as: PROTONIX   40 mg, Oral, Daily      rOPINIRole 0.5 MG tablet  Commonly known as: REQUIP   0.5 mg, Oral, Nightly, Take 1 hour before bedtime.      rosuvastatin 20 MG tablet  Commonly  known as: CRESTOR   20 mg, Oral, Daily      sertraline 100 MG tablet  Commonly known as: ZOLOFT   100 mg, Oral, Daily      valACYclovir 500 MG tablet  Commonly known as: VALTREX   500 mg, Oral, 2 Times Daily      VITAMIN D2 PO   50,000 Units, Oral, Every 30 Days             Stop These Medications      naproxen sodium 220 MG tablet  Commonly known as: ALEVE            ASK your doctor about these medications        Instructions Start Date   HYDROcodone-acetaminophen  MG per tablet  Commonly known as: NORCO  Ask about: Should I take this medication?   1 tablet, Oral, Every 4 Hours PRN               This patient has current or prior documentation of an left ventricular ejection fraction (LVEF) of less than or equal to 40%.;--Not applicable    Discharge Diet:   Diet Instructions       Diet: Diabetic Diets; Consistent Carbohydrate; Thin (IDDSI 0)      Discharge Diet: Diabetic Diets    Diabetic Diet: Consistent Carbohydrate    Fluid Consistency: Thin (IDDSI 0)            Activity at Discharge:   Activity Instructions       Gradually Increase Activity Until at Pre-Hospitalization Level              Follow-up Appointments:   Primary care provider within 1 week to facilitate care.      Future Appointments   Date Time Provider Department Center   7/16/2024  9:40 AM  [facility]  [facility]   7/16/2024 10:00 AM TX ROOM  [facility]FU [facility]C  [facility]   7/19/2024  5:00 PM [facility] MRI 2  [facility] MRI [facility]   7/19/2024  5:45 PM [facility] MRI 2  [facility] MRI [facility]   7/22/2024  8:00 AM Hanh Og APRN [facility]   7/25/2024 11:15 AM Marc Quevedo APRN [facility]   8/6/2024  8:30 AM  [facility]  [facility]   8/6/2024  9:00 AM Analilia Madera APRN [facility]   8/6/2024  9:45 AM TX ROOM  [facility] INFU [facility]C  [facility]   8/19/2024  1:00 PM Marcellus Pulido MD [facility] PAD       Test Results Pending at Discharge: None  Electronically signed by Christian Chatterjee MD, 07/15/24,  "09:30 CDT.    Time: Greater than 45 minutes.     Once again I noticed in the discharge medication about Imbruvica.  I looked back at Dr. Morgan's note and found this.  \"Systemic therapy (chemotherapy vs ibrutinib) again discussed with patient (and with family at the bedside) today.  We were anticipating initiation of therapy pending improvement of her renal function and while awaiting a bone marrow biopsy.  Today she is again unequivocal about her decision to forego systemic therapy.  She had previously decided that, \"I am leaving it in the hands of God.\"  States that she we will continue radiation but declined systemic therapy.   She is still focused on comfort care/best supportive care directed by hospice.\"    Therefore, it is my thoughts that Imbruvica should not be part of the medications unless Dr. Morgan otherwise on subsequent meeting with patient if this will occur based on goals of care of hospice.    Unfortunately, I do not think this was reflected in the discharge medications were originally done by Dr. Lincoln.      Case discussed with Dr. Morgan  who informed me that she was never started on Imbruvica.  Patient not interested.  Therefore I will remove that from the list.    Electronically signed by Christian Chatterjee MD, 07/15/24, 9:09 AM CDT.     I confirmed that the patient's advanced care plan is present, code status is documented, and a surrogate decision maker is listed in the patient's medical record.        "

## 2024-07-15 NOTE — THERAPY DISCHARGE NOTE
Acute Care - Occupational Therapy Discharge Summary  [facility]     Patient Name: Marina Joe  : 1945  MRN: [id number]    Today's Date: 7/15/2024                 Admit Date: 2024        OT Recommendation and Plan    Visit Dx:    ICD-10-CM ICD-9-CM   1. 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 Left renal mass  N28.89 593.9   3. Right adrenal mass  E27.8 255.8   4. Nodule of soft tissue  M79.89 729.99   5. Acute on chronic renal insufficiency  N28.9 593.9    N18.9 585.9   6. Thrombocytopenia  D69.6 287.5   7. Cauda equina compression  G83.4 344.60   8. Impaired mobility [Z74.09]  Z74.09 799.89   9. HX: breast cancer  Z85.3 V10.3   10. Malignant neoplasm of left breast in female, estrogen receptor positive, unspecified site of breast  C50.912 174.9    Z17.0 V86.0          Time Calculation- OT       Row Name 07/15/24 0814             Time Calculation- OT    OT Start Time 0814  -LR      OT Stop Time 0837  -LR      OT Time Calculation (min) 23 min  -LR      Total Timed Code Minutes- OT 23 minute(s)  -LR      OT Received On 07/15/24  -LR         Timed Charges    12368 - OT Self Care/Mgmt Minutes 23  -LR         Total Minutes    Timed Charges Total Minutes 23  -LR       Total Minutes 23  -LR                User Key  (r) = Recorded By, (t) = Taken By, (c) = Cosigned By      Initials Name Provider Type    LR Bety Tejeda, OTR/L Occupational Therapist                       OT Rehab Goals       Row Name 07/15/24 1300             Transfer Goal 1 (OT)    Activity/Assistive Device (Transfer Goal 1, OT) bed-to-chair/chair-to-bed;toilet;tub;commode  -EC      [place] Level/Cues Needed (Transfer Goal 1, OT) modified independence  -EC      Time Frame (Transfer Goal 1, OT) long term goal (LTG);by discharge  -EC      Progress/Outcome (Transfer Goal 1, OT) goal not met  -EC         Bathing Goal 1 (OT)    Activity/Device (Bathing Goal 1, OT) bathing skills, all  -EC      [place] Level/Cues Needed  (Bathing Goal 1, OT) modified independence  -EC      Time Frame (Bathing Goal 1, OT) long term goal (LTG);by discharge  -EC      Strategies/Barriers (Bathing Goal 1, OT) AE PRN  -EC      Progress/Outcomes (Bathing Goal 1, OT) goal not met  -EC         Dressing Goal 1 (OT)    Activity/Device (Dressing Goal 1, OT) dressing skills, all  -EC      [place]/Cues Needed (Dressing Goal 1, OT) modified independence  -EC      Time Frame (Dressing Goal 1, OT) long term goal (LTG);by discharge  -EC      Progress/Outcome (Dressing Goal 1, OT) goal not met  -EC         Toileting Goal 1 (OT)    Activity/Device (Toileting Goal 1, OT) toileting skills, all;commode  -EC      [place] Level/Cues Needed (Toileting Goal 1, OT) modified independence  -EC      Time Frame (Toileting Goal 1, OT) long term goal (LTG);by discharge  -EC      Progress/Outcome (Toileting Goal 1, OT) goal not met  -EC                User Key  (r) = Recorded By, (t) = Taken By, (c) = Cosigned By      Initials Name Provider Type Discipline    EC Davida Reyes, OTR/L Occupational Therapist OT                     Outcome Measures       Row Name 07/14/24 1400             How much help from another person do you currently need...    Turning from your back to your side while in flat bed without using bedrails? 4  -AH      Moving from lying on back to sitting on the side of a flat bed without bedrails? 4  -AH      Moving to and from a bed to a chair (including a wheelchair)? 3  -AH      Standing up from a chair using your arms (e.g., wheelchair, bedside chair)? 3  -AH      Climbing 3-5 steps with a railing? 2  -AH      To walk in hospital room? 3  -AH      AM-PAC 6 Clicks Score (PT) 19  -AH      Highest Level of Mobility Goal 6 --> Walk 10 steps or more  -         Functional Assessment    Outcome Measure Options AM-PAC 6 Clicks Basic Mobility (PT)  -                User Key  (r) = Recorded By, (t) = Taken By, (c) = Cosigned By      Initials Name Provider  Type    Goldie Patton, PTA Physical Therapist Assistant                    Timed Therapy Charges  Total Units: 2      Suggested Charges  Total Units: 2      Procedure Name Documented Minutes Units Code    HC OT SELF CARE/MGMT/TRAIN EA 15 MIN 23 2   42568 (CPT®)                 Documented Minutes  Total Minutes: 23      Therapy Provided Minutes    29521 - OT Self Care/Mgmt Minutes 23                        OT Discharge Summary  Anticipated Discharge Disposition (OT): home with assist, home with home health  Reason for Discharge: Discharge from facility  Outcomes Achieved: Refer to plan of care for updates on goals achieved  Discharge Destination: Home with assist, Home with home health      Davida Reyes OTR/L  7/15/2024

## 2024-07-15 NOTE — PLAN OF CARE
Goal Outcome Evaluation:  Plan of Care Reviewed With: patient        Progress: improving  Outcome Evaluation: OT tx completed. Pt presents in fowlers, willing to participate despite c/o 9/10 low back pain. Pt came to EOB with Supervision, cues for log roll tech. LSO donned at EOB with Darwin ESPINOZA. Pt completed doffing/donning of socks with Supervision while seated EOB. Pt completed sit<>stand t/f's at EOB and commode with SBA. Fxl mobility completed with CGA/SBA, abandoning FWW despite cues. Toileting and grooming completed with SBA. Transport entered room, pt completed t/f and fxl mobility to transport chair with SBA. Continue OT POC in order to increase pt safety and I during ADLs, fxl mobility, and fxl t/f's.

## 2024-07-15 NOTE — THERAPY DISCHARGE NOTE
Acute Care - Physical Therapy Discharge Summary  [facility]       Patient Name: Marina Joe  : 1945  MRN: [id number]    Today's Date: 7/15/2024                 Admit Date: 2024      PT Recommendation and Plan    Visit Dx:    ICD-10-CM ICD-9-CM   1. 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 Left renal mass  N28.89 593.9   3. Right adrenal mass  E27.8 255.8   4. Nodule of soft tissue  M79.89 729.99   5. Acute on chronic renal insufficiency  N28.9 593.9    N18.9 585.9   6. Thrombocytopenia  D69.6 287.5   7. Cauda equina compression  G83.4 344.60   8. Impaired mobility [Z74.09]  Z74.09 799.89   9. HX: breast cancer  Z85.3 V10.3   10. Malignant neoplasm of left breast in female, estrogen receptor positive, unspecified site of breast  C50.912 174.9    Z17.0 V86.0        Outcome Measures       Row Name 24 1400             How much help from another person do you currently need...    Turning from your back to your side while in flat bed without using bedrails? 4  -AH      Moving from lying on back to sitting on the side of a flat bed without bedrails? 4  -AH      Moving to and from a bed to a chair (including a wheelchair)? 3  -AH      Standing up from a chair using your arms (e.g., wheelchair, bedside chair)? 3  -AH      Climbing 3-5 steps with a railing? 2  -AH      To walk in hospital room? 3  -AH      AM-PAC 6 Clicks Score (PT) 19  -      Highest Level of Mobility Goal 6 --> Walk 10 steps or more  -         Functional Assessment    Outcome Measure Options AM-PAC 6 Clicks Basic Mobility (PT)  -                User Key  (r) = Recorded By, (t) = Taken By, (c) = Cosigned By      Initials Name Provider Type    Goldie Patton, PTA Physical Therapist Assistant                         PT Rehab Goals       Row Name 07/15/24 1300             Bed Mobility Goal 1 (PT)    Activity/Assistive Device (Bed Mobility Goal 1, PT) rolling to left;rolling to right;sidelying to sit/sit to sidelying  -AB       [place] Level/Cues Needed (Bed Mobility Goal 1, PT) independent  -AB      Time Frame (Bed Mobility Goal 1, PT) 10 days;long term goal (LTG)  -AB      Progress/Outcomes (Bed Mobility Goal 1, PT) goal not met  -AB         Transfer Goal 1 (PT)    Activity/Assistive Device (Transfer Goal 1, PT) sit-to-stand/stand-to-sit;bed-to-chair/chair-to-bed;toilet  -AB      [place] Level/Cues Needed (Transfer Goal 1, PT) independent  -AB      Time Frame (Transfer Goal 1, PT) 10 days;long term goal (LTG)  -AB      Progress/Outcome (Transfer Goal 1, PT) goal not met  -AB         Gait Training Goal 1 (PT)    Activity/Assistive Device (Gait Training Goal 1, PT) gait (walking locomotion);assistive device use  -AB      [place] Level (Gait Training Goal 1, PT) standby assist  -AB      Distance (Gait Training Goal 1, PT) 200' with no rest break  -AB      Time Frame (Gait Training Goal 1, PT) 10 days;long term goal (LTG)  -AB      Progress/Outcome (Gait Training Goal 1, PT) goal not met  -AB                User Key  (r) = Recorded By, (t) = Taken By, (c) = Cosigned By      Initials Name Provider Type Discipline    Carlene Canela, PTA Physical Therapist Assistant PT                        PT Discharge Summary  Anticipated Discharge Disposition (PT): home with assist, home with home health  Reason for Discharge: Discharge from facility  Outcomes Achieved: Refer to plan of care for updates on goals achieved  Discharge Destination: Home with assist      Carlene Smith PTA   7/15/2024

## 2024-07-15 NOTE — CASE MANAGEMENT/SOCIAL WORK
Continued Stay Note  [facility]     Patient Name: Marina Joe  MRN: [id number]  Today's Date: 7/15/2024    Admit Date: 6/28/2024    Plan: Home Hospice   Discharge Plan       Row Name 07/15/24 1114       Plan    Plan Home Hospice    Patient/Family in Agreement with Plan yes    Final Note Pt is going home today. Notified Mayda [phone] with [facility] of d/c and they will be in touch with pt. Faxed the d/c summary to them at [phone].                   Discharge Codes    No documentation.                 Expected Discharge Date and Time       Expected Discharge Date Expected Discharge Time    Jul 15, 2024               JW Villalba

## 2024-07-15 NOTE — THERAPY TREATMENT NOTE
Patient Name: Marina Joe  : 1945    MRN: [id number]                              Today's Date: 7/15/2024       Admit Date: 2024    Visit Dx:     ICD-10-CM ICD-9-CM   1. 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 Left renal mass  N28.89 593.9   3. Right adrenal mass  E27.8 255.8   4. Nodule of soft tissue  M79.89 729.99   5. Acute on chronic renal insufficiency  N28.9 593.9    N18.9 585.9   6. Thrombocytopenia  D69.6 287.5   7. Cauda equina compression  G83.4 344.60   8. Impaired mobility [Z74.09]  Z74.09 799.89   9. HX: breast cancer  Z85.3 V10.3     Patient Active Problem List   Diagnosis    Malignant neoplasm of upper-outer quadrant of female breast    Anemia of chronic renal failure, stage 3 (moderate)    Hypertension, benign    Hx of colonic polyps    HX: breast cancer    Morbid (severe) obesity due to excess calories    Right knee DJD    S/P lumpectomy, left breast    Adult hypothyroidism    Anemia    Anxiety    Breast cancer, left    Cervical pain    Chronic insomnia    Elevated lipids    Herpes zoster without complication    Left ear pain    Left otitis externa    Myalgia    Negative depression screening    Obesity (BMI 30-39.9)    Postmenopausal status    Recurrent acute serous otitis media of left ear    Restless leg    Sinusitis, bacterial    Skin lesion    Vitamin D deficiency    Stage 3b chronic kidney disease    GIB (gastrointestinal bleeding)    Acute on chronic blood loss anemia    Chronic idiopathic thrombocytopenia    Hypotension due to hypovolemia    Primary hypertension    Pancreatic lesion    Left renal mass    Cauda equina compression    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 Recall 5 years    COLONOSCOPY  11/19/2018    Tics otherwise normal exam repeat in 5 years    COLONOSCOPY  02/13/2023    Normal exam repeat in 3 years with a 2 day prep    ENDOSCOPY  02/13/2023    Gastritis, small HH    LUMBAR LAMINECTOMY Right 6/29/2024    Procedure: LUMBAR LAMINECTOMY L3 WITH DECOMPRESSION 1-2 LEVELS-RIGHT;  Surgeon: Marcellus Pulido MD;  Location: [facility] OR;  Service: Neurosurgery;  Laterality: Right;    REPLACEMENT TOTAL KNEE Right     2016    TOTAL ABDOMINAL HYSTERECTOMY WITH SALPINGO OOPHORECTOMY      UPPER ENDOSCOPIC ULTRASOUND W/ FNA  12/20/2023    Pancreatic cyst s/p FNA      General Information       Row Name 07/15/24 0814          OT Time and Intention    Document Type therapy note (daily note)  -LR     Mode of Treatment occupational therapy;individual therapy  -LR       Row Name 07/15/24 0814          General Information    Patient Profile Reviewed yes  -LR     Existing Precautions/Restrictions fall;brace worn when out of bed;LSO;spinal  -LR               User Key  (r) = Recorded By, (t) = Taken By, (c) = Cosigned By      Initials Name Provider Type    LR Bety Tejeda, OTR/L Occupational Therapist                     Mobility/ADL's       Row Name 07/15/24 0814          Bed Mobility    Bed Mobility sidelying-sit  -LR     Sidelying-Sit [place] (Bed Mobility) supervision;verbal cues  -LR     Assistive Device (Bed Mobility) bed rails;head of bed elevated  -LR     Comment, (Bed Mobility) cues for tech  -LR       Row Name 07/15/24 0814          Transfers    Transfers sit-stand transfer;stand-sit transfer;toilet transfer  -LR       Row Name 07/15/24 0814          Sit-Stand Transfer    Sit-Stand [place] (Transfers) standby assist  -LR     Assistive  Device (Sit-Stand Transfers) walker, front-wheeled  -LR       Row Name 07/15/24 0814          Stand-Sit Transfer    Stand-Sit [place] (Transfers) standby assist  -LR     Assistive Device (Stand-Sit Transfers) walker, front-wheeled  -LR       Row Name 07/15/24 0814          Toilet Transfer    Type (Toilet Transfer) sit-stand;stand-sit  -LR     [place] Level (Toilet Transfer) standby assist  -LR     Assistive Device (Toilet Transfer) commode;grab bars/safety frame;walker, front-wheeled  -LR       Row Name 07/15/24 0814          Functional Mobility    Functional Mobility- Ind. Level standby assist;contact guard assist;verbal cues required  -LR     Functional Mobility- Device walker, front-wheeled  -LR     Functional Mobility- Comment EOB<>BR, abandoned FWW despite cues  -LR       Row Name 07/15/24 0814          Activities of Daily Living    BADL Assessment/Intervention lower body dressing;toileting;grooming  -LR       Row Name 07/15/24 0814          Lower Body Dressing Assessment/Training    [place] Level (Lower Body Dressing) don;doff;socks;supervision  -LR     Position (Lower Body Dressing) edge of bed sitting  -LR       Row Name 07/15/24 0814          Toileting Assessment/Training    [place] Level (Toileting) adjust/manage clothing;perform perineal hygiene;standby assist  -LR     Assistive Devices (Toileting) commode;grab bar/safety frame  -LR     Position (Toileting) unsupported sitting;supported standing  -LR       Row Name 07/15/24 0814          Grooming Assessment/Training    [place] Level (Grooming) wash face, hands;standby assist  -LR     Position (Grooming) sink side;supported standing  -LR               User Key  (r) = Recorded By, (t) = Taken By, (c) = Cosigned By      Initials Name Provider Type    Bety Fernandez OTR/L Occupational Therapist                   Obj/Interventions       Row Name 07/15/24 0814          Balance    Balance Assessment sitting static  balance;sitting dynamic balance;standing static balance;standing dynamic balance  -LR     Static Sitting Balance independent  -LR     Dynamic Sitting Balance supervision  -LR     Position, Sitting Balance unsupported;sitting edge of bed  -LR     Static Standing Balance standby assist  -LR     Dynamic Standing Balance standby assist;contact guard  -LR     Position/Device Used, Standing Balance supported;walker, front-wheeled  -LR               User Key  (r) = Recorded By, (t) = Taken By, (c) = Cosigned By      Initials Name Provider Type    LR Bety Tejeda, OTR/L Occupational Therapist                   Goals/Plan    No documentation.                  Clinical Impression       Row Name 07/15/24 0814          Pain Assessment    Pretreatment Pain Rating 9/10  -LR     Posttreatment Pain Rating 9/10  -LR     Pain Location lower  -LR     Pain Location - back  -LR     Pain Intervention(s) Medication (See MAR);Repositioned;Ambulation/increased activity  -LR       Row Name 07/15/24 0814          Plan of Care Review    Plan of Care Reviewed With patient  -LR     Progress improving  -LR     Outcome Evaluation [facility]. [facility] despite c/o 9/10 low back pain. Pt came to EOB with Supervision, cues for log roll tech. LSO donned at EOB with Max A. Pt completed doffing/donning of socks with Supervision while seated EOB. Pt completed sit<>stand t/f's at EOB and commode with SBA. Fxl mobility completed with CGA/SBA, abandoning FWW despite cues. Toileting and grooming completed with SBA. Transport entered room, pt completed t/f and fxl mobility to transport chair with SBA. Continue OT POC in order to increase pt safety and I during ADLs, fxl mobility, and fxl t/f's.  -LR       Row Name 07/15/24 0814          Vital Signs    O2 Delivery Pre Treatment room air  -LR     O2 Delivery Intra Treatment room air  -LR     O2 Delivery Post Treatment room air  -LR     Pre Patient Position Supine   -LR     Intra Patient Position Standing  -LR     Post Patient Position Sitting  -LR       Row Name 07/15/24 0814          Positioning and Restraints    Pre-Treatment Position in bed  -LR     Post Treatment Position wheelchair  -LR     In Bed --  -LR     In Wheelchair notified nsg;sitting;with other staff  -LR               User Key  (r) = Recorded By, (t) = Taken By, (c) = Cosigned By      Initials Name Provider Type    LR Bety Tejeda OTR/L Occupational Therapist                   Outcome Measures       Row Name 07/15/24 0814          How much help from another is currently needed...    Putting on and taking off regular lower body clothing? 3  -LR     Bathing (including washing, rinsing, and drying) 3  -LR     Toileting (which includes using toilet bed pan or urinal) 3  -LR     Putting on and taking off regular upper body clothing 3  -LR     Taking care of personal grooming (such as brushing teeth) 4  -LR     Eating meals 4  -LR     AM-PAC 6 Clicks Score (OT) 20  -LR       Row Name 07/15/24 0814          Functional Assessment    Outcome Measure Options AM-PAC 6 Clicks Daily Activity (OT)  -LR               User Key  (r) = Recorded By, (t) = Taken By, (c) = Cosigned By      Initials Name Provider Type    Bety Fernandez OTR/L Occupational Therapist                    Occupational Therapy Education       Title: PT OT SLP Therapies (Done)       Topic: Occupational Therapy (Done)       Point: ADL training (Done)       Description:   Instruct learner(s) on proper safety adaptation and remediation techniques during self care or transfers.   Instruct in proper use of assistive devices.                  Learning Progress Summary             Patient Acceptance, E,D, VU,NR by LR at 7/15/2024 0838    Acceptance, E, VU by LS at 7/12/2024 1154    Acceptance, E,D, VU,NR by LR at 7/11/2024 1156    Acceptance, E,D, VU,NR by LR at 7/10/2024 1008    Acceptance, E,D, VU,NR by LR at 7/5/2024 1058    Acceptance, E, VU  by HH at 7/3/2024 1141    Acceptance, E, VU by HH at 7/2/2024 1436    Acceptance, E, VU by EC at 7/1/2024 0911    Acceptance, E, VU by KJ at 6/30/2024 0941    Acceptance, E, VU by LS at 6/28/2024 1431   Significant Other Acceptance, E,D, VU,NR by LR at 7/11/2024 1156                         Point: Home exercise program (Done)       Description:   Instruct learner(s) on appropriate technique for monitoring, assisting and/or progressing therapeutic exercises/activities.                  Learning Progress Summary             Patient Acceptance, E, VU by HH at 7/3/2024 1141    Acceptance, E, VU by HH at 7/2/2024 1436    Acceptance, E, VU by EC at 7/1/2024 0911    Acceptance, E, VU by KJ at 6/30/2024 0941                         Point: Precautions (Done)       Description:   Instruct learner(s) on prescribed precautions during self-care and functional transfers.                  Learning Progress Summary             Patient Acceptance, E,D, VU,NR by LR at 7/15/2024 0838    Acceptance, E, VU by LS at 7/12/2024 1154    Acceptance, E,D, VU,NR by LR at 7/11/2024 1156    Acceptance, E,D, VU,NR by LR at 7/10/2024 1008    Acceptance, E,D, VU,NR by LR at 7/5/2024 1058    Acceptance, E, VU by HH at 7/3/2024 1141    Acceptance, E, VU by HH at 7/2/2024 1436    Acceptance, E, VU by EC at 7/1/2024 0911    Acceptance, E, VU by KJ at 6/30/2024 0941    Acceptance, E, VU by LS at 6/28/2024 1431   Significant Other Acceptance, E,D, VU,NR by LR at 7/11/2024 1156                         Point: Body mechanics (Done)       Description:   Instruct learner(s) on proper positioning and spine alignment during self-care, functional mobility activities and/or exercises.                  Learning Progress Summary             Patient Acceptance, E,D, VU,NR by LR at 7/15/2024 0838    Acceptance, E, VU by LS at 7/12/2024 1154    Acceptance, E,D, VU,NR by LR at 7/11/2024 1156    Acceptance, E,D, VU,NR by LR at 7/10/2024 1008    Acceptance, E,D, VU,NR by  LR at 7/5/2024 1058    Acceptance, E, VU by HH at 7/3/2024 1141    Acceptance, E, VU by HH at 7/2/2024 1436    Acceptance, E, VU by EC at 7/1/2024 0911    Acceptance, E, VU by KJ at 6/30/2024 0941    Acceptance, E, VU by LS at 6/28/2024 1431   Significant Other Acceptance, E,D, VU,NR by LR at 7/11/2024 1156                                         User Key       Initials Effective Dates Name Provider Type Discipline    LS 06/20/22 -  Stacey Reagan, OTR/L Occupational Therapist OT    LR 04/25/23 -  Bety Tejeda, OTR/L Occupational Therapist OT    KJ 04/15/24 -  Joanna Marie, OT Student OT Student OT    EC 10/13/23 -  Davida Reyes, OTR/L Occupational Therapist OT    HH 04/15/24 -  Kim Freed, OT Student OT Student OT                  OT Recommendation and Plan  Planned Therapy Interventions (OT): activity tolerance training, adaptive equipment training, BADL retraining, functional balance retraining, IADL retraining, neuromuscular control/coordination retraining, occupation/activity based interventions, orthotic fabrication/fitting/training, patient/caregiver education/training, ROM/therapeutic exercise, strengthening exercise, transfer/mobility retraining  Therapy Frequency (OT): 5 times/wk  Plan of Care Review  Plan of Care Reviewed With: patient  Progress: improving  Outcome Evaluation: [facility]. [facility] despite c/o 9/10 low back pain. Pt came to EOB with Supervision, cues for log roll tech. LSO donned at EOB with Max A. Pt completed doffing/donning of socks with Supervision while seated EOB. Pt completed sit<>stand t/f's at EOB and commode with SBA. Fxl mobility completed with CGA/SBA, abandoning FWW despite cues. Toileting and grooming completed with SBA. Transport entered room, pt completed t/f and fxl mobility to transport chair with SBA. Continue OT POC in order to increase pt safety and I during ADLs, fxl mobility, and fxl t/f's.     Time  Calculation:         Time Calculation- OT       Row Name 07/15/24 0814             Time Calculation- OT    OT Start Time 0814  -LR      OT Stop Time 0837  -LR      OT Time Calculation (min) 23 min  -LR      Total Timed Code Minutes- OT 23 minute(s)  -LR      OT Received On 07/15/24  -LR         Timed Charges    06212 - OT Self Care/Mgmt Minutes 23  -LR         Total Minutes    Timed Charges Total Minutes 23  -LR       Total Minutes 23  -LR                User Key  (r) = Recorded By, (t) = Taken By, (c) = Cosigned By      Initials Name Provider Type    LR Bety Tejeda OTR/L Occupational Therapist                  Therapy Charges for Today       Code Description Service Date Service Provider Modifiers Qty    [id number] HC OT SELF CARE/MGMT/TRAIN EA 15 MIN 7/15/2024 Bety Tejeda OTR/L GO 2                 LORETTA Huntley/MARY  7/15/2024

## 2024-07-16 ENCOUNTER — HOSPITAL ENCOUNTER (OUTPATIENT)
Dept: RADIATION ONCOLOGY | Facility: HOSPITAL | Age: 79
Setting detail: RADIATION/ONCOLOGY SERIES
Discharge: HOME | End: 2024-07-16
Payer: MEDICARE

## 2024-07-16 ENCOUNTER — INFUSION (OUTPATIENT)
Dept: ONCOLOGY | Facility: HOSPITAL | Age: 79
End: 2024-07-16
Payer: MEDICARE

## 2024-07-16 ENCOUNTER — HOSPITAL ENCOUNTER (OUTPATIENT)
Dept: RADIATION ONCOLOGY | Facility: HOSPITAL | Age: 79
Setting detail: RADIATION/ONCOLOGY SERIES
End: 2024-07-16
Payer: MEDICARE

## 2024-07-16 ENCOUNTER — LAB (OUTPATIENT)
Dept: LAB | Facility: HOSPITAL | Age: 79
End: 2024-07-16
Payer: MEDICARE

## 2024-07-16 VITALS
DIASTOLIC BLOOD PRESSURE: 56 MMHG | SYSTOLIC BLOOD PRESSURE: 153 MMHG | OXYGEN SATURATION: 97 % | RESPIRATION RATE: 16 BRPM | TEMPERATURE: 96.8 F | HEART RATE: 74 BPM

## 2024-07-16 DIAGNOSIS — N18.31 ANEMIA OF CHRONIC RENAL FAILURE, STAGE 3A: ICD-10-CM

## 2024-07-16 DIAGNOSIS — N18.31 ANEMIA OF CHRONIC RENAL FAILURE, STAGE 3A: Primary | ICD-10-CM

## 2024-07-16 DIAGNOSIS — D63.1 ANEMIA OF CHRONIC RENAL FAILURE, STAGE 3A: ICD-10-CM

## 2024-07-16 DIAGNOSIS — N18.30 ANEMIA OF CHRONIC RENAL FAILURE, STAGE 3 (MODERATE), UNSPECIFIED WHETHER STAGE 3A OR 3B CKD: ICD-10-CM

## 2024-07-16 DIAGNOSIS — D63.1 ANEMIA OF CHRONIC RENAL FAILURE, STAGE 3A: Primary | ICD-10-CM

## 2024-07-16 DIAGNOSIS — D63.1 ANEMIA OF CHRONIC RENAL FAILURE, STAGE 3 (MODERATE), UNSPECIFIED WHETHER STAGE 3A OR 3B CKD: ICD-10-CM

## 2024-07-16 DIAGNOSIS — N18.32 STAGE 3B CHRONIC KIDNEY DISEASE: ICD-10-CM

## 2024-07-16 LAB
ALBUMIN SERPL-MCNC: 3.9 G/DL (ref 3.5–5.2)
ALBUMIN/GLOB SERPL: 1.1 G/DL
ALP SERPL-CCNC: 96 U/L (ref 39–117)
ALT SERPL W P-5'-P-CCNC: 18 U/L (ref 1–33)
ANION GAP SERPL CALCULATED.3IONS-SCNC: 10 MMOL/L (ref 5–15)
AST SERPL-CCNC: 20 U/L (ref 1–32)
BASOPHILS # BLD AUTO: 0.02 10*3/MM3 (ref 0–0.2)
BASOPHILS NFR BLD AUTO: 0.3 % (ref 0–1.5)
BILIRUB SERPL-MCNC: 0.4 MG/DL (ref 0–1.2)
BUN SERPL-MCNC: 14 MG/DL (ref 8–23)
BUN/CREAT SERPL: 12.2 (ref 7–25)
CALCIUM SPEC-SCNC: 9.7 MG/DL (ref 8.6–10.5)
CHLORIDE SERPL-SCNC: 99 MMOL/L (ref 98–107)
CO2 SERPL-SCNC: 28 MMOL/L (ref 22–29)
CREAT SERPL-MCNC: 1.15 MG/DL (ref 0.57–1)
DEPRECATED RDW RBC AUTO: 51.1 FL (ref 37–54)
EGFRCR SERPLBLD CKD-EPI 2021: 48.9 ML/MIN/1.73
EOSINOPHIL # BLD AUTO: 0.25 10*3/MM3 (ref 0–0.4)
EOSINOPHIL NFR BLD AUTO: 4.3 % (ref 0.3–6.2)
ERYTHROCYTE [DISTWIDTH] IN BLOOD BY AUTOMATED COUNT: 15.8 % (ref 12.3–15.4)
FERRITIN SERPL-MCNC: 893.4 NG/ML (ref 13–150)
GLOBULIN UR ELPH-MCNC: 3.5 GM/DL
GLUCOSE SERPL-MCNC: 137 MG/DL (ref 65–99)
HCT VFR BLD AUTO: 28 % (ref 34–46.6)
HGB BLD-MCNC: 8.8 G/DL (ref 12–15.9)
IMM GRANULOCYTES # BLD AUTO: 0.03 10*3/MM3 (ref 0–0.05)
IMM GRANULOCYTES NFR BLD AUTO: 0.5 % (ref 0–0.5)
IRON 24H UR-MRATE: 32 MCG/DL (ref 37–145)
IRON SATN MFR SERPL: 12 % (ref 20–50)
LYMPHOCYTES # BLD AUTO: 0.48 10*3/MM3 (ref 0.7–3.1)
LYMPHOCYTES NFR BLD AUTO: 8.2 % (ref 19.6–45.3)
MCH RBC QN AUTO: 28 PG (ref 26.6–33)
MCHC RBC AUTO-ENTMCNC: 31.4 G/DL (ref 31.5–35.7)
MCV RBC AUTO: 89.2 FL (ref 79–97)
MONOCYTES # BLD AUTO: 0.62 10*3/MM3 (ref 0.1–0.9)
MONOCYTES NFR BLD AUTO: 10.6 % (ref 5–12)
NEUTROPHILS NFR BLD AUTO: 4.47 10*3/MM3 (ref 1.7–7)
NEUTROPHILS NFR BLD AUTO: 76.1 % (ref 42.7–76)
PLATELET # BLD AUTO: 56 10*3/MM3 (ref 140–450)
POTASSIUM SERPL-SCNC: 4.1 MMOL/L (ref 3.5–5.2)
PROT SERPL-MCNC: 7.4 G/DL (ref 6–8.5)
RBC # BLD AUTO: 3.14 10*6/MM3 (ref 3.77–5.28)
SODIUM SERPL-SCNC: 137 MMOL/L (ref 136–145)
TIBC SERPL-MCNC: 264 MCG/DL (ref 298–536)
TRANSFERRIN SERPL-MCNC: 177 MG/DL (ref 200–360)
WBC NRBC COR # BLD AUTO: 5.87 10*3/MM3 (ref 3.4–10.8)

## 2024-07-16 PROCEDURE — 36415 COLL VENOUS BLD VENIPUNCTURE: CPT

## 2024-07-16 PROCEDURE — 25010000002 EPOETIN ALFA-EPBX 40000 UNIT/ML SOLUTION: Performed by: NURSE PRACTITIONER

## 2024-07-16 PROCEDURE — 85025 COMPLETE CBC W/AUTO DIFF WBC: CPT

## 2024-07-16 PROCEDURE — 82728 ASSAY OF FERRITIN: CPT

## 2024-07-16 PROCEDURE — 83540 ASSAY OF IRON: CPT

## 2024-07-16 PROCEDURE — 77334 RADIATION TREATMENT AID(S): CPT | Performed by: RADIOLOGY

## 2024-07-16 PROCEDURE — 84466 ASSAY OF TRANSFERRIN: CPT

## 2024-07-16 PROCEDURE — 77290 THER RAD SIMULAJ FIELD CPLX: CPT | Performed by: RADIOLOGY

## 2024-07-16 PROCEDURE — 80053 COMPREHEN METABOLIC PANEL: CPT

## 2024-07-16 PROCEDURE — 96372 THER/PROPH/DIAG INJ SC/IM: CPT

## 2024-07-16 RX ORDER — DEXAMETHASONE 4 MG/1
4 TABLET ORAL 3 TIMES DAILY
Qty: 42 TABLET | Refills: 0 | Status: SHIPPED | OUTPATIENT
Start: 2024-07-16 | End: 2024-07-30

## 2024-07-16 RX ADMIN — EPOETIN ALFA-EPBX 40000 UNITS: 40000 INJECTION, SOLUTION INTRAVENOUS; SUBCUTANEOUS at 09:48

## 2024-07-17 LAB
RAD ONC ARIA COURSE ID: NORMAL
RAD ONC ARIA COURSE LAST TREATMENT DATE: NORMAL
RAD ONC ARIA COURSE START DATE: NORMAL
RAD ONC ARIA COURSE TREATMENT ELAPSED DAYS: 9
RAD ONC ARIA FIRST TREATMENT DATE: NORMAL
RAD ONC ARIA PLAN FRACTIONS TREATED TO DATE: 7
RAD ONC ARIA PLAN ID: NORMAL
RAD ONC ARIA PLAN PRESCRIBED DOSE PER FRACTION: 3 GY
RAD ONC ARIA PLAN PRIMARY REFERENCE POINT: NORMAL
RAD ONC ARIA PLAN TOTAL FRACTIONS PRESCRIBED: 10
RAD ONC ARIA PLAN TOTAL PRESCRIBED DOSE: 3000 CGY
RAD ONC ARIA REFERENCE POINT DOSAGE GIVEN TO DATE: 21 GY
RAD ONC ARIA REFERENCE POINT ID: NORMAL
RAD ONC ARIA REFERENCE POINT SESSION DOSAGE GIVEN: 3 GY

## 2024-07-17 PROCEDURE — 77412 RADIATION TX DELIVERY LVL 3: CPT | Performed by: RADIOLOGY

## 2024-07-17 NOTE — PROGRESS NOTES
[facility] ON[facility] HEMATOLOGY & ONCOLOGY  [address]  [facility] [address] [phone]    Patient Name: Marina Joe  Encounter Date: 07/18/2024  YOB: 1945  Patient Number: [id number]      REASON FOR VISIT: Marina Joe 79 y.o. 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  She was admitted to [facility], 6/28/2024 - 7/15/2024 (see below).  She was taken to the OR on 6/29/2024 for L3 laminectomy with decompression.  Final diagnosis: L3 epidural soft tissue mass, biopsy: Abnormal B-cell population features consistent with SLL/CLL. Abnormal CLL FISH Panel and lymphoma probes.  Rearrangement of MYC/8q. Deletions of ANABEL/11q and CCND1/11q.  No evidence of rearrangements of BCL2 and BCL6 genes. No evidence of IGH/MYC and IGH/BCL2 translocation.  She is currently receiving palliative postop radiation therapy to the lumbar region (beginning 7/8/24) with anticipated 2000 to 3000 cGy over 5-20 daily fractions.  Given her advanced age, multiple comorbidities, and debility the plan was a trial of palliative ibrutinib that the patient declined.  She was discharged with plans for subsequent hospice care.  Today she is seen at her request to again discuss the option of systemic therapy.  She is accompanied by her significant other Jose Enrique.    I have reviewed the HPI and verified with the patient the accuracy of it. No changes to interval history since the information was documented. Nagi Maya MD 07/18/24      Diagnostic abnormalities:  - Medical history of hypertension, hyperlipidemia, CKD III, stage I left breast cancer that is endocrine receptor positive status post left partial mastectomy and SLNB in 1/2013, who is being hospitalized after presenting with worsening back pain.  - Presented with back pain for several weeks. Since her presentation on 6/28/2024 she had the following  workup:  -6/28/2024 CT-A/P showed abnormal heterogenous left kidney mass, abnormal right kidney, new right adrenal mass, innumerable solid nodules in the subcutaneous soft tissues all concerning for metastatic disease process.  -6/28/2024, CT lumbar spine showing multilevel prominent disc osteophyte complexes acet arthropathy and ligamental hypertrophy and resultant neural foraminal spinal canal stenosis.  -6/28/2024, CT chest showed a left breast nodule of 9 mm, scattered subcutaneous soft tissue nodules in the abdominal wall, otherwise there is no sign of intrathoracic metastases.  -6/28/2024, MRI-abdomen showed infiltrative mass within the left kidney extending into the proximal left collecting system as well as numerous additional solid masses in the bilateral kidneys. Additionally there is a right adrenal mass, multiple subcutaneous fat soft tissue masses, and a L3 vertebral body mass with suspected extension into the spinal canal. 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  -6/20/2024, MRI-L-spine: Neoplastic process involving the L3 vertebral body with associated posterior soft tissue component resulting in severe spinal canal narrowing and cauda equina nerve root compression. The soft tissue component appears to also extend into the right L3-L4 foramen. No associated pathological fracture. There are also appears to be tumor signal extending into the right L2-L3 neural foramen.  - 7/1/2024-MRI brain-no definite evidence of intracranial metastatic disease within the limitations of noncontrast exam.  Cerebral microvascular disease noted.  - 7/1/2024-ultrasound-guided core needle biopsy of right flank subcutaneous nodule.  FINAL DIAGNOSIS:  Abnormal B-cell population which may represent small lymphocytic lymphoma/chronic lymphocytic leukemia (SLL/CLL).    --The patient  was evaluated by neurosurgery and given physical exam showing hyperreflexia and MRI findings of cord compression at L3 concerning for epidural metastasis, she was taken to the OR on 6/29/2024 for L3 laminectomy with decompression; prelim pathology showing small round blue cell tumor.  Final diagnosis: L3 epidural soft tissue mass, biopsy: Abnormal B-cell population with some features consistent with small lymphocytic lymphoma/chronic lymphocytic leukemia.  Comment:  Flow Cytometry Summary     Flow cytometry was performed at [facility] ([id number]).  Flow cytometry shows 64% of the sample as an abnormal monoclonal B-cell population with mixed cell size.  FISH testing is pending.  This may represent a B-cell small lymphocytic lymphoma/chronic lymphocytic leukemia.  The cell size of the abnormal cells appears mixed.  This may represent increased prolymphocytes, paraimmunoblasts, and or large cells and progression/transformation is in the differential diagnosis.  Intact lymph node architecture is not present in this fragmented paraspinal mass for further evaluation.  Clinical correlation is recommended.     -FISH REPORT:  Abnormal CLL FISH Panel and lymphoma probes.  Rearrangement of MYC/8q. Deletions of ANABEL/11q and CCND1/11q.  No evidence of rearrangements of BCL2 and BCL6 genes. No evidence of IGH/MYC and IGH/BCL2 translocation.       INTERPRETATION:  MYC gene rearrangement is characteristically observed in Burkitt lymphoma but may also be seen in diffuse large B-cell lymphoma and in transformed lower grade lymphomas.      - 7/1/24-ultrasound-guided core needle biopsy of right flank subcutaneous nodule.  FINAL DIAGNOSIS:  Abnormal B-cell population which may represent small lymphocytic lymphoma/chronic lymphocytic leukemia (SLL/CLL).  Comment  Please see additional report [id number] in the associated flow cytometry report. The flow cytometry report showed  scatter properties compatible with mixed cell size with features  of B-cell small lymphocytic lymphoma/chronic lymphocytic  leukemia (B-SLL/). FISH testing is ordered and pending. This may represent progression/transformation. Correlation with  clinical, laboratory, and morphologic data may be necessary to better define this lymphoid neoplasm.  - 7/1/24-manual blood smear review:    Moderate normochromic normocytic anemia     Normal total white blood cell count with the following manual differential:  Neutrophils 72%  Lymphocytes 20%  Monocytes 4%  Eosinophils 4%  Moderate peripheral thrombocytopenia     No schistocytes identified  No platelet clumps identified  No morulae identified in granulocytes or monocytes         Problem List Items Addressed This Visit    None    Oncology/Hematology History Overview Note   Oncologic history  In February 2012, she had a routine mammogram that found a nodular density in the upper outer quadrant of the left breast.  An ultrasound revealed no nodularity at that time.  Repeat ultrasound 3 months later on 5/3/2012 revealed a small cyst in the left breast but felt to be benign.  Repeat mammogram on October 31, 2012 found a lobulated lesion in the left breast at the 2 o'clock position and a stable cyst at the 12 o'clock position.  She was referred to Dr. Barkley on 11/30/2012 and biopsy was performed.  The 12:00 cyst was a fibrocystic lesion while the 2:00 lesion was an invasive mammary carcinoma, low-grade, ER positive IL positive HER-2 nu 2+, FISH unamplified.    On January 8, 2013 she underwent a left partial mastectomy with sentinel node biopsy finding invasive ductal carcinoma, 3.1 mm in greatest dimension, grade 1.  2 sentinel nodes were negative.  AJCC TNM stage was pT1aN0 M0, Stage IA.  Following surgery she underwent adjuvant radiation therapy and was then started on Arimidex for hormonal manipulation.  She was started on the Arimidex in approximately April or May 2013.  He has been recommended to continue this for 10 years.    Hematologic  history  Patient with a long history of anemia secondary to iron deficiency as well as chronic kidney disease stage III.Patient has a GFR that ranges from 45-61ML/MIN.  He has been undergoing Procrit when meets guidelines for several years now.  She is also required iron replacement.  Folate > 20, B12 at 1503 and negative blood for flow cytometry on 01/07/2022.      Previous interventions  Procrit 40,000 units subcu initiated approximately February 2017  Injectafer given 9/23/2019, 9/30/2019     Malignant neoplasm of upper-outer quadrant of female breast   10/31/2012 Initial Diagnosis    Malignant neoplasm of central portion of left female breast (CMS/HCC)     10/31/2012 Imaging    Mammogram on October 31, 2012 found a lobulated lesion in the left breast at the 2 o'clock position and a stable cyst at the 12 o'clock position.      11/30/2012 Biopsy    Dr. Barkley on 11/30/2012 and biopsy was performed.  The 12:00 cyst was a fibrocystic lesion while the 2:00 lesion was an invasive mammary carcinoma, low-grade, ER positive GA positive HER-2 nu 2+, FISH unamplified.         1/8/2013 Surgery    On January 8, 2013 she underwent a left partial mastectomy with sentinel node biopsy finding invasive ductal carcinoma, 3.1 mm in greatest dimension, grade 1.  2 sentinel nodes were negative.  AJCC TNM stage was pT1aN0 M0, Stage IA.  Hormone receptor positive HER-2 negative      Radiation    Radiation OncologyTreatment Course:  Marina Joe receivedin 33 fractions to left breast via External Beam Radiation - EBRT.     5/2013 -  Hormonal Therapy    Arimidex 1 mg daily     8/22/2022 Cancer Staged    Staging form: Breast, AJCC V7  - Pathologic stage from 8/22/2022: Stage IA (T1a, N0, cM0) - Signed by Benjamin Shah MD on 8/22/2022     Breast cancer, left   5/29/2020 Initial Diagnosis    Breast cancer, left     7/3/2024 -  Chemotherapy    [facility] Oncology - Ibrutinib         PAST MEDICAL HISTORY:  ALLERGIES:  Allergies   Allergen  Reactions    Aspirin GI Bleeding    Niacin Other (See Comments)     CURRENT MEDICATIONS:  Outpatient Encounter Medications as of 7/18/2024   Medication Sig Dispense Refill    amLODIPine (NORVASC) 5 MG tablet Take 1 tablet by mouth 2 (Two) Times a Day.      buPROPion XL (WELLBUTRIN XL) 150 MG 24 hr tablet Take 1 tablet by mouth Daily.      Calcium Carb-Cholecalciferol (CALCIUM 600+D3 PO) Take 1 tablet by mouth Daily.      carvedilol (COREG) 25 MG tablet Take 1 tablet by mouth 2 (Two) Times a Day With Meals for 30 days. 60 tablet 0    cetirizine (zyrTEC) 10 MG tablet Take 1 tablet by mouth Daily.      cloNIDine (CATAPRES) 0.1 MG tablet Take 1 tablet by mouth 2 (Two) Times a Day for 30 days. 60 tablet 0    cyclobenzaprine (FLEXERIL) 10 MG tablet Take 1 tablet by mouth 3 (Three) Times a Day As Needed for Muscle Spasms.      dapagliflozin Propanediol (Farxiga) 10 MG tablet Take 10 mg by mouth Daily.      dexAMETHasone (DECADRON) 4 MG tablet Take 1 tablet by mouth 3 (Three) Times a Day for 14 days. Take 2 tabs for 1st dose. Take with food if possible. 42 tablet 0    Diclofenac Sodium (VOLTAREN) 1 % gel gel Apply 4 g topically to the appropriate area as directed 4 (Four) Times a Day As Needed (arthralgia). 240 g 3    Ergocalciferol (VITAMIN D2 PO) Take 50,000 Units by mouth Every 30 (Thirty) Days.      famotidine (PEPCID) 20 MG tablet Take 1 tablet by mouth 2 (Two) Times a Day Before Meals. 60 tablet 0    fentaNYL (DURAGESIC) 12 MCG/HR Place 1 patch on the skin as directed by provider Every 72 (Seventy-Two) Hours. 5 patch 0    fluticasone (FLONASE) 50 MCG/ACT nasal spray 2 sprays into the nostril(s) as directed by provider Daily. 1 g 3    irbesartan-hydrochlorothiazide (AVALIDE) 300-12.5 MG tablet Take 1 tablet by mouth Daily.      Lidocaine Viscous HCl (XYLOCAINE) 2 % solution Take 5 mL by mouth Every 3 (Three) Hours As Needed for Mild Pain or Moderate Pain. 100 mL 0    montelukast (SINGULAIR) 10 MG tablet Take 1 tablet  by mouth Every Night.      Multiple Vitamins-Minerals (MULTIVITAMIN ADULT) tablet Take 1 tablet by mouth Daily.      naloxone (NARCAN) 4 MG/0.1ML nasal spray Call 911. Don't prime. [place] in 1 nostril for overdose. Repeat in 2-3 minutes in other nostril if no or minimal breathing/responsiveness. 2 each 0    pantoprazole (PROTONIX) 40 MG EC tablet Take 1 tablet by mouth Daily.      rOPINIRole (REQUIP) 0.5 MG tablet Take 1 tablet by mouth Every Night. Take 1 hour before bedtime.      rosuvastatin (CRESTOR) 20 MG tablet Take 1 tablet by mouth Daily.      sennosides-docusate (PERICOLACE) 8.6-50 MG per tablet Take 2 tablets by mouth 2 (Two) Times a Day As Needed for Constipation. 30 tablet 0    sertraline (ZOLOFT) 100 MG tablet Take 1 tablet by mouth Daily.      valACYclovir (VALTREX) 500 MG tablet Take 1 tablet by mouth 2 (Two) Times a Day.       No facility-administered encounter medications on file as of 7/18/2024.     ADULT ILLNESSES:  Patient Active Problem List   Diagnosis Code    Malignant neoplasm of upper-outer quadrant of female breast C50.419    Anemia of chronic renal failure, stage 3 (moderate) N18.30, D63.1    Hypertension, benign I10    Hx of colonic polyps Z86.010    HX: breast cancer Z85.3    Morbid (severe) obesity due to excess calories E66.01    Right knee DJD M17.11    S/P lumpectomy, left breast Z98.890    Adult hypothyroidism E03.9    Anemia D64.9    Anxiety F41.9    Breast cancer, left C50.912    Cervical pain M54.2    Chronic insomnia F51.04    Elevated lipids E78.5    Herpes zoster without complication B02.9    Left ear pain H92.02    Left otitis externa H60.92    Myalgia M79.10    Negative depression screening Z13.31    Obesity (BMI 30-39.9) E66.9    Postmenopausal status Z78.0    Recurrent acute serous otitis media of left ear H65.05    Restless leg G25.81    Sinusitis, bacterial J32.9, B96.89    Skin lesion L98.9    Vitamin D deficiency E55.9    Stage 3b chronic kidney disease N18.32    GIB  (gastrointestinal bleeding) K92.2    Acute on chronic blood loss anemia D62    Chronic idiopathic thrombocytopenia D69.6    Hypotension due to hypovolemia E86.1    Primary hypertension I10    Pancreatic lesion K86.9    Left renal mass N28.89    Cauda equina compression G83.4    Metastatic cancer to spine C79.51     SURGERIES:  Past Surgical History:   Procedure Laterality Date    AVULSION TOENAIL PLATE      Sept 26,2018    BREAST BIOPSY Left 11/20/2012    BREAST BIOPSY      Left Breast, 1/2019 per Dr Barkley    BREAST LUMPECTOMY Left     with node bx     COLONOSCOPY  09/13/2013    small polyp at 30cm benign hyperplastic polyp, changes consistent with melanosis coli. Recall 5 years    COLONOSCOPY  11/19/2018    Tics otherwise normal exam repeat in 5 years    COLONOSCOPY  02/13/2023    Normal exam repeat in 3 years with a 2 day prep    ENDOSCOPY  02/13/2023    Gastritis, small HH    LUMBAR LAMINECTOMY Right 6/29/2024    Procedure: LUMBAR LAMINECTOMY L3 WITH DECOMPRESSION 1-2 LEVELS-RIGHT;  Surgeon: Marcellus Pulido MD;  Location: [facility];  Service: Neurosurgery;  Laterality: Right;    REPLACEMENT TOTAL KNEE Right     2016    TOTAL ABDOMINAL HYSTERECTOMY WITH SALPINGO OOPHORECTOMY      UPPER ENDOSCOPIC ULTRASOUND W/ FNA  12/20/2023    Pancreatic cyst s/p FNA     HEALTH MAINTENANCE ITEMS:  Health Maintenance Due   Topic Date Due    RSV Vaccine - Adults (1 - 1-dose 60+ series) Never done    DXA SCAN  06/17/2023    COVID-19 Vaccine (5 - 2023-24 season) 02/06/2024       <no information>  Last Completed Colonoscopy            COLORECTAL CANCER SCREENING (COLONOSCOPY - Every 3 Years) Next due on 2/13/2026 09/07/2023  Fecal Occult Blood component of Occult Blood X 1, Stool - Stool, Per Rectum    09/03/2023  Fecal Occult Blood component of POC Occult Blood Stool    08/10/2023  Fecal Occult Blood component of POC Occult Blood Stool    02/13/2023  SCANNED - COLONOSCOPY    11/16/2022  Occult Blood X 3, Stool -  Stool, Per Rectum    Only the first 5 history entries have been loaded, but more history exists.                  Immunization History   Administered Date(s) Administered    COVID-19 (MODERNA) 1st,2nd,3rd Dose Monovalent 03/12/2021, 03/28/2021    COVID-19 (MODERNA) Monovalent Original Booster 08/31/2022    COVID-19 (PFIZER) BIVALENT 12+YRS 12/08/2022    COVID-19 F23 (PFIZER) 12YRS+ (COMIRNATY) 12/12/2023    Flu Vaccine Quad PF >36MO 11/05/2019    Fluad Quad 65+ 11/05/2020    Fluzone High Dose =>65 Years ([employer] ONLY) 11/11/2015, 10/14/2016, 11/20/2017    Fluzone High-Dose 65+yrs 11/29/2021, 10/04/2022, 10/17/2023    Pneumococcal Conjugate 13-Valent (PCV13) 10/14/2016    Pneumococcal Polysaccharide (PPSV23) 11/05/2020    Shingrix 01/01/2021, 06/08/2021    Zoster, Unspecified 01/01/2021     Last Completed Mammogram            MAMMOGRAM (Yearly) Order placed this encounter      02/14/2024  Mammo Diagnostic Bilateral With CAD    01/23/2024  SCANNED - MAMMO    01/23/2024  MAMMO SCREENING BILATERAL W CAD    01/23/2023  SCANNED - MAMMO    01/23/2023  MAMMO SCREENING BILATERAL W CAD    Only the first 5 history entries have been loaded, but more history exists.                      FAMILY HISTORY:  Family History   Problem Relation Age of Onset    Heart attack Mother     Hodgkin's lymphoma Father     Other Father     Cancer Father         hodgkins    Heart attack Brother     Skin cancer Maternal Uncle     Pancreatic cancer Maternal Uncle     Cancer Maternal Uncle         eye    Colon cancer Neg Hx     Colon polyps Neg Hx      SOCIAL HISTORY:  Social History     Socioeconomic History    Marital status:    Tobacco Use    Smoking status: Never    Smokeless tobacco: Never   Vaping Use    Vaping status: Never Used   Substance and Sexual Activity    Alcohol use: No    Drug use: Not Currently    Sexual activity: Not Currently     Birth control/protection: Surgical       Review of Systems:   Constitutional: Fatigue.   "Appetite fairly good.  No fever / No chills / No sweats.  States she has been able to get out of bed and ambulate with a walker.  Not able to help with chores, errands, nor driving.  Is up and about ~ 50% in a chair.  She lives with her boyfriend  HEENT:  No sore throat / No hoarseness / No vision changes  CV:  No chest pain / No palpitations / No orthopnea  Respiratory:  No cough / No shortness of breath / No sputum / No hemoptysis  GI:  No nausea / No vomiting / No abdominal pain / No diarrhea / +constipation   :  No dysuria / No hesitancy / No urgency / No hematuria  Neuro: Lingering lower extremity muscle weakness / No dysphagia / No headache / No paresthesias  Musculoskeletal:  No edema / No cyanosis / + postop back pain\" better with RT\"  Derm: Subcutaneous nodules on trunk and extremities         VITAL SIGNS: /86   Pulse 74   Temp 97.1 °F (36.2 °C) (Tympanic)   Resp 18   Ht 172.7 cm (68\")   Wt 106 kg (233 lb 4.8 oz)   LMP  (LMP Unknown)   SpO2 100%   BMI 35.47 kg/m² Body surface area is 2.18 meters squared.  Pain Score    07/18/24 1412   PainSc: 0-No pain       Physical Exam:  General appearance: Pleasant, obese, modestly elderly female alert, appears stated age and cooperative.  In no acute distress while seated in a wheelchair. ECOG 2.  Has lost 11 lb since last visit  Skin:  Skin color a bit pale. No rashes or lesions.  Subcutaneous nodules on trunk and extremities (most prominently right proximal forearm).  HEENT:  Head: Normal, normocephalic, atraumatic.  Neck:  no adenopathy, no tenderness/mass/nodules  Lungs:  clear to auscultation bilaterally  Heart:  regular rate and rhythm  Abdomen:  soft, non-tender.  Previously no overt splenomegaly (habitus precludes an adequate exam)  Extremities:  extremities normal, atraumatic, no cyanosis or edema  Lymphatics: No obvious adenopathy in the cervical, supraclavicular, axillary or inguinal tri regions.  Neurologic:  Mental status: Alert, " oriented, thought content appropriate      LABS    Lab Results - Last 18 Months   Lab Units 07/16/24  0908 07/15/24  0545 07/14/24  0348 07/13/24  0526 07/12/24  0401 07/11/24  0356 07/09/24  0914 07/08/24  0343 07/07/24  0409 07/06/24  0417 07/05/24  0416 07/04/24  0301 07/03/24  0440 07/02/24  1823 07/02/24  0829 06/28/24  0617 06/25/24  0901 09/22/23  0859 09/15/23  0904 09/07/23  0141 09/06/23  1608 08/25/23  0853 08/18/23  0844 08/11/23  0030 08/10/23  1754 08/04/23  0843 07/28/23  0932   HEMOGLOBIN g/dL 8.8* 7.9* 8.3* 8.3* 8.0* 8.5*   < > 8.3*   < > 9.5* 9.5* 8.6* 8.2* 7.9* 8.3*   < > 11.7*   < > 9.2*   < > 4.9*   < > 6.6*   < > 5.3*   < > 7.0*   HEMATOCRIT % 28.0* 25.5* 26.9* 26.7* 25.3* 26.7*   < > 25.8*   < > 30.2* 28.9* 27.6* 26.3* 25.3* 26.1*   < > 36.1   < > 30.2*   < > 16.6*   < > 22.1*   < > 18.4*   < > 24.8*   MCV fL 89.2 90.1 91.5 90.5 89.1 87.8   < > 88.7   < > 89.6 86.8 89.0 90.1 90.4 89.4   < > 88.7   < > 94.1   < > 97.6*   < > 100.5*  --  98.4*   < > 98.0*   WBC 10*3/mm3 5.87 4.76 5.59 5.63 5.59 7.26   < > 7.37   < > 7.51 7.11 7.05 7.21 8.63 8.27   < > 6.83   < > 8.92   < > 8.67   < > 6.97  --  7.61   < > 5.68   RDW % 15.8* 15.8* 15.8* 15.9* 16.0* 15.9*   < > 15.9*   < > 15.8* 15.9* 16.0* 16.5* 16.3* 16.2*   < > 16.1*   < > 17.1*   < > 21.5*   < > 21.2*  --  19.5*   < > 18.3*   MPV fL  --   --   --   --   --   --   --   --   --  12.8* 12.3* 13.3* 13.0* 12.7* 13.1*   < > 12.3*   < >  --    < >  --   --  13.3*  --   --   --   --    PLATELETS 10*3/mm3 56* 46* 49* 51* 51* 68*   < > 67*   < > 97* 101* 88* 106* 110* 75*   < > 90*   < > 21*   < > 24*   < > 35*  --  46*   < > 46*   IMM GRAN % % 0.5 0.4 0.5 0.5 0.5 0.7*   < > 0.7*   < > 0.5 0.6* 0.7* 0.6* 0.7* 0.6*   < >  --    < >  --   --   --    < >  --   --   --   --   --    NEUTROS ABS 10*3/mm3 4.47 3.44 4.35 4.50 4.32 5.70   < > 4.42   < > 4.97 4.87 5.14 4.99 6.55 6.00   < > 4.90   < > 7.12*   < > 6.61   < > 4.86  --  5.30   < > 3.73   LYMPHS ABS  10*3/mm3 0.48* 0.50* 0.43* 0.47* 0.63* 0.82   < > 1.39   < > 1.09 1.07 0.82 1.04 0.93 1.07   < >  --    < >  --    < >  --    < >  --   --   --    < >  --    MONOS ABS 10*3/mm3 0.62 0.56 0.60 0.48 0.46 0.54   < > 1.12*   < > 1.14* 0.91* 0.82 0.76 0.76 0.84   < >  --    < >  --    < >  --    < >  --   --   --    < >  --    EOS ABS 10*3/mm3 0.25 0.23 0.17 0.14 0.14 0.13   < > 0.36   < > 0.24 0.20 0.19 0.34 0.30 0.28   < > 0.35   < > 0.36   < > 0.26   < > 0.36  --  0.15   < > 0.57*   BASOS ABS 10*3/mm3 0.02 0.01 0.01 0.01 0.01 0.02   < > 0.03   < > 0.03 0.02 0.03 0.04 0.03 0.03   < > 0.00   < >  --    < >  --    < >  --   --  0.08   < >  --    IMMATURE GRANS (ABS) 10*3/mm3 0.03 0.02 0.03 0.03 0.03 0.05   < > 0.05   < > 0.04 0.04 0.05 0.04 0.06* 0.05   < >  --    < >  --   --   --    < >  --   --   --   --   --    NRBC /100 WBC  --   --   --  0.0  --   --   --  0.0  --  0.0 0.0  --  0.0 0.0 0.0   < >  --    < >  --   --  2.0*   < >  --   --   --   --   --    NEUTROPHIL % %  --   --   --   --   --   --   --   --   --   --   --   --   --   --   --   --  71.7  --  79.8*  --  75.2  --  68.7  --  69.7  --  64.6   MONOCYTES % %  --   --   --   --   --   --   --   --   --   --   --   --   --   --   --   --  6.1  --  3.0*  --  6.9  --  5.1  --  1.0*  --  9.1   BASOPHIL % %  --   --   --   --   --   --   --   --   --   --   --   --   --   --   --   --  0.0  --   --   --   --   --   --   --  1.0  --   --    ATYP LYMPH % %  --   --   --   --   --   --   --   --   --   --   --   --   --   --   --   --  1.0  --   --   --   --   --   --   --   --   --   --    ANISOCYTOSIS   --   --   --   --   --   --   --   --   --   --   --   --   --   --   --   --  Slight/1+  --  Slight/1+  --  Mod/2+  --  Slight/1+  --  Slight/1+  --  Mod/2+   GIANT PLT   --   --   --   --   --   --   --   --   --   --   --   --   --   --   --   --  Slight/1+  --  Slight/1+  --  Slight/1+  --  Large/3+  --  Large/3+  --  Large/3+    < > = values in this interval  not displayed.       Lab Results - Last 18 Months   Lab Units 07/16/24  0908 07/15/24  0545 07/14/24  0348 07/13/24  0526 07/12/24  0401 07/11/24  0356 07/10/24  0408 07/09/24  0914 07/08/24  0343 07/07/24  0409 07/06/24  0417 07/01/24  0432 06/30/24  0434   GLUCOSE mg/dL 137* 94 96 97 103* 95   < > 94 88 106* 112*   < > 120*   SODIUM mmol/L 137 139 137 137 137 138   < > 134* 134* 134* 136   < > 137   POTASSIUM mmol/L 4.1 4.2 4.4 4.3 4.3 4.0   < > 4.6 4.4 4.5 4.1   < > 4.0   CO2 mmol/L 28.0 28.0 26.0 27.0 26.0 25.0   < > 24.0 26.0 28.0 28.0   < > 24.0   CHLORIDE mmol/L 99 102 100 100 101 101   < > 99 95* 92* 97*   < > 101   ANION GAP mmol/L 10.0 9.0 11.0 10.0 10.0 12.0   < > 11.0 13.0 14.0 11.0   < > 12.0   CREATININE mg/dL 1.15* 1.22* 1.42* 1.56* 1.47* 1.46*   < > 2.67* 3.48* 3.50* 2.14*   < > 2.39*   BUN mg/dL 14 19 24* 27* 26* 30*   < > 54* 48* 43* 29*   < > 34*   BUN / CREAT RATIO  12.2 15.6 16.9 17.3 17.7 20.5   < > 20.2 13.8 12.3 13.6   < > 14.2   CALCIUM mg/dL 9.7 9.2 8.9 9.4 9.1 9.5   < > 8.6 9.0 10.0 9.7   < > 8.4*   ALK PHOS U/L 96  --   --   --   --   --   --  89 93 109 107  --  78   TOTAL PROTEIN g/dL 7.4  --   --   --   --   --   --  6.6 6.8 7.5 7.2  --  6.8   ALT (SGPT) U/L 18  --   --   --   --   --   --  20 20 22 27  --  21   AST (SGOT) U/L 20  --   --   --   --   --   --  25 23 25 40*  --  35*   BILIRUBIN mg/dL 0.4  --   --   --   --   --   --  0.3 0.3 0.3 0.4  --  <0.2   ALBUMIN g/dL 3.9  --   --   --   --   --   --  3.6 3.4* 4.1 4.0  --  3.9   GLOBULIN gm/dL 3.5  --   --   --   --   --   --  3.0 3.4 3.4 3.2  --  2.9    < > = values in this interval not displayed.       Lab Results - Last 18 Months   Lab Units 07/03/24  0440   LDH U/L 388*       Lab Results - Last 18 Months   Lab Units 07/16/24  0908 06/25/24  0901 05/14/24  0826 02/20/24  0750 12/08/23  0837 11/28/23  0827 10/13/23  0812 06/30/23  0918 06/20/23  0733   IRON mcg/dL 32* 56 43 51  --  38 48   < >  --    TIBC mcg/dL 264* 355 302 291*   --  294* 329   < > 334   IRON SATURATION %  --   --   --   --   --   --   --   --  17*   IRON SATURATION (TSAT) % 12* 16* 14* 18*  --  13* 15*   < >  --    FERRITIN ng/mL 893.40* 451.10* 444.10* 539.00*  --  205.70* 165.30*   < > 455.00*   TSH uIU/mL  --   --   --   --  5.540*  --   --   --  5.880*    < > = values in this interval not displayed.     ASSESSMENT:   Small lymphocytic lymphoma/chronic lymphocytic leukemia   -Tumor stage: IV  -Tumor burden:   -6/28/2024 CT-A/P showed abnormal heterogenous left kidney mass, abnormal right kidney, new right adrenal mass, innumerable solid nodules in the subcutaneous soft tissues all concerning for metastatic disease process.  -6/28/2024, CT lumbar spine showing multilevel prominent disc osteophyte complexes acet arthropathy and ligamental hypertrophy and resultant neural foraminal spinal canal stenosis.  -6/28/2024, CT chest showed a left breast nodule of 9 mm, scattered subcutaneous soft tissue nodules in the abdominal wall, otherwise there is no sign of intrathoracic metastases.  -6/28/2024, MRI-abdomen showed infiltrative mass within the left kidney extending into the proximal left collecting system as well as numerous additional solid masses in the bilateral kidneys. Additionally there is a right adrenal mass, multiple subcutaneous fat soft tissue masses, and a L3 vertebral body mass with suspected extension into the spinal canal. 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  -6/20/2024, MRI-L-spine: Neoplastic process involving the L3 vertebral body with associated posterior soft tissue component resulting in severe spinal canal narrowing and cauda equina nerve root compression. The soft tissue component appears to also extend into the right L3-L4 foramen. No associated pathological fracture. There are also appears to be tumor  signal extending into the right L2-L3 neural foramen.  - 7/1/2024-MRI brain-no definite evidence of intracranial metastatic disease within the limitations of noncontrast exam.  Cerebral microvascular disease noted.  - 7/1/2024-ultrasound-guided core needle biopsy of right flank subcutaneous nodule.  FINAL DIAGNOSIS:  Abnormal B-cell population which may represent small lymphocytic lymphoma/chronic lymphocytic leukemia (SLL/CLL).     -Complications of tumor: Cord compression at L3 concerning for epidural metastasis  -Tumor status:  - Systemically untreated.  Anticipate ibrutinib  - 6/29/2024 - OR for L3 laminectomy with decompression; prelim pathology showing small round blue cell tumor.  Final diagnosis: L3 epidural soft tissue mass, biopsy: Abnormal B-cell population with some features consistent with small lymphocytic lymphoma/chronic lymphocytic leukemia.  FISH REPORT:  Abnormal CLL FISH Panel and lymphoma probes.  Rearrangement of MYC/8q. Deletions of ANABEL/11q and CCND1/11q.  No evidence of rearrangements of BCL2 and BCL6 genes. No evidence of IGH/MYC and IGH/BCL2 translocation.       INTERPRETATION:  MYC gene rearrangement is characteristically observed in Burkitt lymphoma but may also be seen in diffuse large B-cell lymphoma and in transformed lower grade lymphomas     -7/8/24 -initiation of postop radiation therapy to the lumbar region with anticipated 2000 to 3000 cGy over 5-20 daily fractions     Diffuse soft tissue abdominal wall/right arm subcutaneous nodules.  - 7/1/24-ultrasound-guided core needle biopsy of right flank subcutaneous nodule.  FINAL DIAGNOSIS:  Abnormal B-cell population which may represent small lymphocytic lymphoma/chronic lymphocytic leukemia (SLL/CLL).  Comment  Please see additional report [id number] in the associated flow cytometry report. The flow cytometry report showed  scatter properties compatible with mixed cell size with features of B-cell small lymphocytic lymphoma/chronic  lymphocytic  leukemia (B-SLL/). FISH testing is ordered and pending. This may represent progression/transformation. Correlation with  clinical, laboratory, and morphologic data may be necessary to better define this lymphoid neoplasm.     3.   History of stage I left breast cancer.  Now with left breast nodule of 9 mm  4.   Anemia.  Contributions from CKD+/- malignancy/anemia of chronic disease  -Hgb 8.8; MCV 89.2, 7/16/2024 (prior: Hgb 8.0 -11.8)  -7/16/24-iron 32 (prior: 56, 6/25/2024), iron saturation 12% (prior: 16%, 6/25/2024), ferritin 893 (prior: 451, 7/16/2024)  5.  Thrombocytopenia.  Contributions from CLL/SLL, +ITP  -56,000, 7/16/2024 (prior patito: 46,000).  Had previously needed platelet transfusions to maintain >/=100,000  -7/1/2024-platelet antibody profile: HLA class I antibody and glycoprotein antibodies positive.  - No evidence for DIC-7/1/2024: Fibrinogen 521, FSP<5, PT 13.5/INR 0.9, PTT 26.8  - No evidence for MAHA/TTP- 7/1/24: Manual blood smear--moderate normochromic normocytic anemia.  Normal total white blood cell count with 72 segs, 20 lymphs, 4 monos, 4 eos.  Moderate peripheral thrombocytopenia.  No schistocytes identified.  No platelet clumps identified.  No morulae identified in granulocytes or monocytes.  6.   CKD 3  - GFR 48.9, 7/16/2024 (prior: 12.9- 36.4)  7.   Guarded prognosis     PLAN:  -Again reviewed labs, 7/1/24 - 7/16/24.  Note glucose 137, GFR 48.9 (stable/improved) otherwise normal CMP, normal WBC (5.87) with normal (0.48) ALC, stable anemia, stable thrombocytopenia, + platelet antibodies, previously normal PT/PTT, normal fibrinogen, normal FSP (no DIC), no evidence for MAHA (no schistocytes).    -Previously apprised of pathological diagnosis from L3 biopsy and abdominal wall nodules (above) consistent with small lymphocytic lymphoma/chronic lymphocytic leukemia.  FISH studies (above) reviewed:  I noted that MYC gene rearrangement is characteristically observed in Burkitt  "lymphoma but may also be seen in diffuse large B-cell lymphoma and in transformed lower grade lymphomas.    -Systemic therapy (chemotherapy vs ibrutinib) again discussed with patient (and Jose Enrique her significant other) today.  She had previously decided that, \"I am leaving it in the hands of God.\"  States that she we will continue radiation but declined systemic therapy.   She was focused on comfort care/best supportive care directed by hospice.  Today however, she wants to consider a trial of therapy.  As noted previously, I explained that therapy will be palliative and not curative intent.    -Discussed the potential toxicities of ibrutinib (to include but not limited to: Most common adverse reactions - greater than 20%) include neutropenia 61%, thrombocytopenia 62%, diarrhea 43%, anemia 41%, musculoskeletal pain 30%, rash 30%, nausea 29%, bruising 30%, fatigue 29%, hemorrhage 22%. Approximately 6% of CLL/SLL patients had a dose reduction due to adverse reactions. Approximately 4-10% of patients discontinued dose due to adverse reactions. Most common adverse reactions leading to discontinuation were pneumonia, hemorrhage, atrial fibrillation, rash, and neutropenia (1% each).  Questions answered.    -Schedule bone marrow biopsy (to assess for marrow involvement by CLL/SLL)    -Schedule staging PET-scan-neck to thighs.    -Schedule diagnostic mammography to evaluate the left breast nodule    -Continue postop radiation therapy to the lumbar region (beginning 7/8/24) with anticipated 2000 to 3000 cGy over 5-20 daily fractions    - Rx:  Allopurinol 300 mg p.o. daily # 30 x 11RF            Ibrutinib 420 mg po daily - start 1 week after allopurinol - # 30 x 11 RF    - CBC with differential, CMP weekly while on ibrutinib.  Procrit 40,000 sc if Hgb <10 and Hct <30; Neupogen 480 mcg sc daily x 3 if ANC <1    - Refer to [place] [facility] clinic Re: Management considerations    - Return to office in 6 weeks with " pre-office CBC and differential, CMP, LDH         I spent ~ 84 minutes caring for Marina on this date of service. This time includes time spent by me in the following activities: preparing for the visit, reviewing tests, performing a medically appropriate examination and/or evaluation, counseling and educating the patient/family/caregiver, ordering medications, tests, or procedures and documenting information in the medical record

## 2024-07-18 ENCOUNTER — OFFICE VISIT (OUTPATIENT)
Dept: ONCOLOGY | Facility: CLINIC | Age: 79
End: 2024-07-18
Payer: MEDICARE

## 2024-07-18 ENCOUNTER — TRANSCRIBE ORDERS (OUTPATIENT)
Dept: ADMINISTRATIVE | Facility: HOSPITAL | Age: 79
End: 2024-07-18
Payer: MEDICARE

## 2024-07-18 VITALS
RESPIRATION RATE: 18 BRPM | OXYGEN SATURATION: 100 % | WEIGHT: 233.3 LBS | TEMPERATURE: 97.1 F | HEIGHT: 68 IN | DIASTOLIC BLOOD PRESSURE: 86 MMHG | HEART RATE: 74 BPM | BODY MASS INDEX: 35.36 KG/M2 | SYSTOLIC BLOOD PRESSURE: 146 MMHG

## 2024-07-18 DIAGNOSIS — G44.1 VASCULAR HEADACHE: ICD-10-CM

## 2024-07-18 DIAGNOSIS — R92.8 OTHER ABNORMAL AND INCONCLUSIVE FINDINGS ON DIAGNOSTIC IMAGING OF BREAST: ICD-10-CM

## 2024-07-18 DIAGNOSIS — R93.49 ABNORMAL RADIOLOGIC FINDINGS ON DIAGNOSTIC IMAGING OF OTHER URINARY ORGANS: ICD-10-CM

## 2024-07-18 DIAGNOSIS — H46.9 OPTIC NEURITIS: Primary | ICD-10-CM

## 2024-07-18 DIAGNOSIS — C83.00 SMALL LYMPHOCYTIC LYMPHOMA: Primary | ICD-10-CM

## 2024-07-18 RX ORDER — ALLOPURINOL 300 MG/1
300 TABLET ORAL DAILY
Qty: 30 TABLET | Refills: 11 | Status: SHIPPED | OUTPATIENT
Start: 2024-07-18

## 2024-07-22 PROBLEM — I10 HYPERTENSION, BENIGN: Status: RESOLVED | Noted: 2018-09-26 | Resolved: 2024-07-22

## 2024-07-23 ENCOUNTER — HOSPITAL ENCOUNTER (OUTPATIENT)
Dept: RADIATION ONCOLOGY | Facility: HOSPITAL | Age: 79
Discharge: HOME | End: 2024-07-23

## 2024-07-23 LAB
RAD ONC ARIA COURSE ID: NORMAL
RAD ONC ARIA COURSE LAST TREATMENT DATE: NORMAL
RAD ONC ARIA COURSE START DATE: NORMAL
RAD ONC ARIA COURSE TREATMENT ELAPSED DAYS: 15
RAD ONC ARIA FIRST TREATMENT DATE: NORMAL
RAD ONC ARIA PLAN FRACTIONS TREATED TO DATE: 8
RAD ONC ARIA PLAN ID: NORMAL
RAD ONC ARIA PLAN PRESCRIBED DOSE PER FRACTION: 3 GY
RAD ONC ARIA PLAN PRIMARY REFERENCE POINT: NORMAL
RAD ONC ARIA PLAN TOTAL FRACTIONS PRESCRIBED: 10
RAD ONC ARIA PLAN TOTAL PRESCRIBED DOSE: 3000 CGY
RAD ONC ARIA REFERENCE POINT DOSAGE GIVEN TO DATE: 24 GY
RAD ONC ARIA REFERENCE POINT ID: NORMAL
RAD ONC ARIA REFERENCE POINT SESSION DOSAGE GIVEN: 3 GY

## 2024-07-23 PROCEDURE — 77412 RADIATION TX DELIVERY LVL 3: CPT | Performed by: RADIOLOGY

## 2024-07-24 ENCOUNTER — HOSPITAL ENCOUNTER (OUTPATIENT)
Dept: RADIATION ONCOLOGY | Facility: HOSPITAL | Age: 79
Setting detail: RADIATION/ONCOLOGY SERIES
End: 2024-07-24
Payer: MEDICARE

## 2024-07-24 ENCOUNTER — APPOINTMENT (OUTPATIENT)
Dept: RADIATION ONCOLOGY | Facility: HOSPITAL | Age: 79
End: 2024-07-24
Payer: MEDICARE

## 2024-07-24 DIAGNOSIS — C79.51 METASTATIC CANCER TO SPINE: Primary | ICD-10-CM

## 2024-07-24 LAB
RAD ONC ARIA COURSE ID: NORMAL
RAD ONC ARIA COURSE LAST TREATMENT DATE: NORMAL
RAD ONC ARIA COURSE START DATE: NORMAL
RAD ONC ARIA COURSE TREATMENT ELAPSED DAYS: 16
RAD ONC ARIA FIRST TREATMENT DATE: NORMAL
RAD ONC ARIA PLAN FRACTIONS TREATED TO DATE: 9
RAD ONC ARIA PLAN ID: NORMAL
RAD ONC ARIA PLAN PRESCRIBED DOSE PER FRACTION: 3 GY
RAD ONC ARIA PLAN PRIMARY REFERENCE POINT: NORMAL
RAD ONC ARIA PLAN TOTAL FRACTIONS PRESCRIBED: 10
RAD ONC ARIA PLAN TOTAL PRESCRIBED DOSE: 3000 CGY
RAD ONC ARIA REFERENCE POINT DOSAGE GIVEN TO DATE: 27 GY
RAD ONC ARIA REFERENCE POINT ID: NORMAL
RAD ONC ARIA REFERENCE POINT SESSION DOSAGE GIVEN: 3 GY

## 2024-07-24 PROCEDURE — 77412 RADIATION TX DELIVERY LVL 3: CPT | Performed by: RADIOLOGY

## 2024-07-24 RX ORDER — HYDROCODONE BITARTRATE AND ACETAMINOPHEN 10; 325 MG/1; MG/1
1 TABLET ORAL EVERY 4 HOURS PRN
Qty: 60 TABLET | Refills: 0 | Status: SHIPPED | OUTPATIENT
Start: 2024-07-24

## 2024-07-25 ENCOUNTER — HOSPITAL ENCOUNTER (OUTPATIENT)
Dept: RADIATION ONCOLOGY | Facility: HOSPITAL | Age: 79
Setting detail: RADIATION/ONCOLOGY SERIES
Discharge: HOME | End: 2024-07-25
Payer: MEDICARE

## 2024-07-25 ENCOUNTER — OFFICE VISIT (OUTPATIENT)
Dept: NEUROSURGERY | Facility: CLINIC | Age: 79
End: 2024-07-25
Payer: MEDICARE

## 2024-07-25 VITALS — WEIGHT: 233 LBS | BODY MASS INDEX: 35.31 KG/M2 | HEIGHT: 68 IN

## 2024-07-25 DIAGNOSIS — Z91.81 AT RISK FOR FALLS: ICD-10-CM

## 2024-07-25 DIAGNOSIS — Z98.890 S/P LUMBAR LAMINECTOMY: ICD-10-CM

## 2024-07-25 DIAGNOSIS — C83.00 SMALL LYMPHOCYTIC LYMPHOMA: Primary | ICD-10-CM

## 2024-07-25 DIAGNOSIS — M54.50 LUMBAR BACK PAIN: ICD-10-CM

## 2024-07-25 DIAGNOSIS — E66.01 CLASS 2 SEVERE OBESITY DUE TO EXCESS CALORIES WITH SERIOUS COMORBIDITY AND BODY MASS INDEX (BMI) OF 35.0 TO 35.9 IN ADULT: ICD-10-CM

## 2024-07-25 LAB
RAD ONC ARIA COURSE ID: NORMAL
RAD ONC ARIA COURSE LAST TREATMENT DATE: NORMAL
RAD ONC ARIA COURSE START DATE: NORMAL
RAD ONC ARIA COURSE TREATMENT ELAPSED DAYS: 17
RAD ONC ARIA FIRST TREATMENT DATE: NORMAL
RAD ONC ARIA PLAN FRACTIONS TREATED TO DATE: 10
RAD ONC ARIA PLAN ID: NORMAL
RAD ONC ARIA PLAN PRESCRIBED DOSE PER FRACTION: 3 GY
RAD ONC ARIA PLAN PRIMARY REFERENCE POINT: NORMAL
RAD ONC ARIA PLAN TOTAL FRACTIONS PRESCRIBED: 10
RAD ONC ARIA PLAN TOTAL PRESCRIBED DOSE: 3000 CGY
RAD ONC ARIA REFERENCE POINT DOSAGE GIVEN TO DATE: 30 GY
RAD ONC ARIA REFERENCE POINT ID: NORMAL
RAD ONC ARIA REFERENCE POINT SESSION DOSAGE GIVEN: 3 GY

## 2024-07-25 PROCEDURE — 77412 RADIATION TX DELIVERY LVL 3: CPT | Performed by: RADIOLOGY

## 2024-07-25 PROCEDURE — 1159F MED LIST DOCD IN RCRD: CPT | Performed by: NURSE PRACTITIONER

## 2024-07-25 PROCEDURE — 1160F RVW MEDS BY RX/DR IN RCRD: CPT | Performed by: NURSE PRACTITIONER

## 2024-07-25 PROCEDURE — 77336 RADIATION PHYSICS CONSULT: CPT | Performed by: RADIOLOGY

## 2024-07-25 PROCEDURE — 99024 POSTOP FOLLOW-UP VISIT: CPT | Performed by: NURSE PRACTITIONER

## 2024-07-25 NOTE — PROGRESS NOTES
Chief complaint:   Chief Complaint   Patient presents with    Back Pain     Patient here for a 2wk post op visit. She had a lumbar laminectomy with decompression on 6/29/24.      Subjective     HPI:   Interval History: Marina Joe is a 79 y.o.  female who presents today for post operative follow-up from a Lumbar Laminectomy L3 With Decompression 1-2 Levels-right - Right on 6/29/2024.  Ms. Joe has done well since we last saw her.  She has remained compliant with her LSO brace to date.  She continues to complain of persistent lumbar back pain without complaints of lower extremity radicular pain, weakness, numbness, or tingling.  She presents today in a wheelchair, however minimally ambulates at home with a walker.  She has reportedly avoided increasing her physical activity due to back pain and concern for falling.  She denies saddle anesthesia or bowel or bladder dysfunction.  No concerns for postoperative incision infection.  She is scheduled for her last dose of radiation today and is scheduled for evaluation with oncology at [facility] on 8/9/2024.  She continues Norco as prescribed.  She currently rates the severity of her symptoms 10/10.    No data recorded  No data recorded  No data recorded    PFSH:  Past Medical History:   Diagnosis Date    Breast cancer     CKD (chronic kidney disease)     Hypercholesteremia     Hypertension     Sinusitis     Stage 3a chronic kidney disease 10/20/2020     Past Surgical History:   Procedure Laterality Date    AVULSION TOENAIL PLATE      Sept 26,2018    BREAST BIOPSY Left 11/20/2012    BREAST BIOPSY      Left Breast, 1/2019 per Dr Barkley    BREAST LUMPECTOMY Left     with node bx     COLONOSCOPY  09/13/2013    small polyp at 30cm benign hyperplastic polyp, changes consistent with melanosis coli. Recall 5 years    COLONOSCOPY  11/19/2018    Tics otherwise normal exam repeat in 5 years    COLONOSCOPY  02/13/2023    Normal exam repeat in 3 years with a 2 day prep     ENDOSCOPY  02/13/2023    Gastritis, small HH    LUMBAR LAMINECTOMY Right 6/29/2024    Procedure: LUMBAR LAMINECTOMY L3 WITH DECOMPRESSION 1-2 LEVELS-RIGHT;  Surgeon: Marcellus Pulido MD;  [facility] OR;  Service: Neurosurgery;  Laterality: Right;    REPLACEMENT TOTAL KNEE Right     2016    TOTAL ABDOMINAL HYSTERECTOMY WITH SALPINGO OOPHORECTOMY      UPPER ENDOSCOPIC ULTRASOUND W/ FNA  12/20/2023    Pancreatic cyst s/p FNA       Objective      Current Outpatient Medications   Medication Sig Dispense Refill    allopurinol (ZYLOPRIM) 300 MG tablet Take 1 tablet by mouth Daily. 30 tablet 11    amLODIPine (NORVASC) 5 MG tablet Take 1 tablet by mouth 2 (Two) Times a Day.      buPROPion XL (WELLBUTRIN XL) 150 MG 24 hr tablet Take 1 tablet by mouth Daily.      Calcium Carb-Cholecalciferol (CALCIUM 600+D3 PO) Take 1 tablet by mouth Daily.      carvedilol (COREG) 25 MG tablet Take 1 tablet by mouth 2 (Two) Times a Day With Meals for 30 days. 60 tablet 0    cetirizine (zyrTEC) 10 MG tablet Take 1 tablet by mouth Daily.      cloNIDine (CATAPRES) 0.1 MG tablet Take 1 tablet by mouth 2 (Two) Times a Day for 30 days. 60 tablet 0    cyclobenzaprine (FLEXERIL) 10 MG tablet Take 1 tablet by mouth 3 (Three) Times a Day As Needed for Muscle Spasms.      dapagliflozin Propanediol (Farxiga) 10 MG tablet Take 10 mg by mouth Daily.      dexAMETHasone (DECADRON) 4 MG tablet Take 1 tablet by mouth 3 (Three) Times a Day for 14 days. Take 2 tabs for 1st dose. Take with food if possible. 42 tablet 0    Diclofenac Sodium (VOLTAREN) 1 % gel gel Apply 4 g topically to the appropriate area as directed 4 (Four) Times a Day As Needed (arthralgia). 240 g 3    Ergocalciferol (VITAMIN D2 PO) Take 50,000 Units by mouth Every 30 (Thirty) Days.      famotidine (PEPCID) 20 MG tablet Take 1 tablet by mouth 2 (Two) Times a Day Before Meals. 60 tablet 0    fentaNYL (DURAGESIC) 12 MCG/HR Place 1 patch on the skin as directed by provider Every 72  "(Seventy-Two) Hours. 5 patch 0    fluticasone (FLONASE) 50 MCG/ACT nasal spray 2 sprays into the nostril(s) as directed by provider Daily. 1 g 3    HYDROcodone-acetaminophen (NORCO)  MG per tablet Take 1 tablet by mouth Every 4 (Four) Hours As Needed for Moderate Pain. 60 tablet 0    irbesartan-hydrochlorothiazide (AVALIDE) 300-12.5 MG tablet Take 1 tablet by mouth Daily.      Lidocaine Viscous HCl (XYLOCAINE) 2 % solution Take 5 mL by mouth Every 3 (Three) Hours As Needed for Mild Pain or Moderate Pain. 100 mL 0    montelukast (SINGULAIR) 10 MG tablet Take 1 tablet by mouth Every Night.      Multiple Vitamins-Minerals (MULTIVITAMIN ADULT) tablet Take 1 tablet by mouth Daily.      naloxone (NARCAN) 4 MG/0.1ML nasal spray Call 911. Don't prime. [place] in 1 nostril for overdose. Repeat in 2-3 minutes in other nostril if no or minimal breathing/responsiveness. 2 each 0    pantoprazole (PROTONIX) 40 MG EC tablet Take 1 tablet by mouth Daily.      rOPINIRole (REQUIP) 0.5 MG tablet Take 1 tablet by mouth Every Night. Take 1 hour before bedtime.      rosuvastatin (CRESTOR) 20 MG tablet Take 1 tablet by mouth Daily.      sennosides-docusate (PERICOLACE) 8.6-50 MG per tablet Take 2 tablets by mouth 2 (Two) Times a Day As Needed for Constipation. 30 tablet 0    sertraline (ZOLOFT) 100 MG tablet Take 1 tablet by mouth Daily.      valACYclovir (VALTREX) 500 MG tablet Take 1 tablet by mouth 2 (Two) Times a Day.       No current facility-administered medications for this visit.       Vital Signs  Ht 172.7 cm (68\")   Wt 106 kg (233 lb)   LMP  (LMP Unknown)   BMI 35.43 kg/m²   Physical Exam  Vitals and nursing note reviewed.   Constitutional:       General: She is not in acute distress.     Appearance: Normal appearance. She is well-developed and well-groomed. She is obese. She is not ill-appearing, toxic-appearing or diaphoretic.      Comments: BMI 35.43   HENT:      Head: Normocephalic and atraumatic.      Right Ear: " Hearing normal.      Left Ear: Hearing normal.   Eyes:      Extraocular Movements: EOM normal.      Conjunctiva/sclera: Conjunctivae normal.      Pupils: Pupils are equal, round, and reactive to light.   Neck:      Trachea: Trachea normal.   Cardiovascular:      Rate and Rhythm: Normal rate and regular rhythm.   Pulmonary:      Effort: Pulmonary effort is normal. No tachypnea, bradypnea, accessory muscle usage or respiratory distress.   Abdominal:      Palpations: Abdomen is soft.   Musculoskeletal:      Cervical back: Full passive range of motion without pain and neck supple.   Skin:     General: Skin is warm and dry.   Neurological:      Mental Status: She is alert and oriented to person, place, and time.      GCS: GCS eye subscore is 4. GCS verbal subscore is 5. GCS motor subscore is 6.   Psychiatric:         Speech: Speech normal.         Behavior: Behavior normal. Behavior is cooperative.       Neurologic Exam     Mental Status   Oriented to person, place, and time.   Attention: normal. Concentration: normal.   Speech: speech is normal   Level of consciousness: alert    Cranial Nerves     CN II   Visual fields full to confrontation.     CN III, IV, VI   Pupils are equal, round, and reactive to light.  Extraocular motions are normal.     CN V   Facial sensation intact.     CN VII   Facial expression full, symmetric.     CN VIII   CN VIII normal.     CN IX, X   CN IX normal.     CN XI   CN XI normal.     Motor Exam   Right arm tone: normal  Left arm tone: normal  Right leg tone: normal  Left leg tone: normal    Strength   Right deltoid: 5/5  Left deltoid: 5/5  Right biceps: 5/5  Left biceps: 5/5  Right triceps: 5/5  Left triceps: 5/5  Right wrist extension: 5/5  Left wrist extension: 5/5  Right iliopsoas: 5/5  Left iliopsoas: 5/5  Right quadriceps: 5/5  Left quadriceps: 5/5  Right anterior tibial: 5/5  Left anterior tibial: 5/5  Right gastroc: 5/5  Left gastroc: 5/5  Right EHL 5/5  Left EHL 5/5       Sensory Exam    Right arm light touch: normal  Left arm light touch: normal  Right leg light touch: normal  Left leg light touch: normal    Gait, Coordination, and Reflexes     Tremor   Resting tremor: absent  Intention tremor: absent  Action tremor: absent  Did not ambulate due to lack of walker at bedside     Incision: WOUND IMAGE - SP laminectomy (07/25/2024)   (Consent to obtain photo of postoperative site for documentation purposes only obtained verbally by Ms. Joe)    Results Review: no new imaging    Fine Needle Aspiration (07/01/2024 14:02)  Tissue Pathology Exam (06/29/2024 15:26)      Assessment/Plan:   Small lymphocytic lymphoma  Status post L3 laminectomy  Lumbar back pain, secondary to above  Marina Joe is a 79 y.o. female who presents today for post operative wound check following a Lumbar Laminectomy L3 With Decompression 1-2 Levels-right - Right on 6/29/2024.  Ms. Joe has done fairly well since we last saw her.   She continues to complain of lumbar back pain only, however fairly well-controlled with continued use of Norco.  Her postoperative incision is clean, dry, intact, and well-approximated.  1 retention suture removed.  We discussed signs and symptoms of a soft tissue infection and I recommended she call immediately for any concerns.  Given her concern for falling and lack of physical mobility, I find it prudent to provide her with a prescription for outpatient physical therapy to be conducted at [facility] in [place].  I advised the patient to keep scheduled appointment with Dr. Pulido for reassessment on 8/19/2024.  No additional imaging needed at this time.  Marina knows to call the neurosurgical clinic to return sooner for any new or additional concerns.      Class 2 obesity (BMI 35.0-39.9) due to excessive calories with serious comorbidities BMI of 35.0-35.9 in adult  Body mass index is 35.43 kg/m². Information on the DASH diet provided in the AVS.  We will continue to provided diet and  exercise information with the goal of weight loss at each scheduled appointment.     EYAL Fall Risk Assessment was completed, and patient is at LOW risk for falls.Assessment completed on:7/25/2024  Fall risk information provided in AVS.    Diagnoses and all orders for this visit:    1. Small lymphocytic lymphoma (Primary)    2. S/P lumbar laminectomy  -     Ambulatory Referral to Physical Therapy for Evaluation & Treatment    3. Lumbar back pain  -     Ambulatory Referral to Physical Therapy for Evaluation & Treatment    4. Class 2 severe obesity due to excess calories with serious comorbidity and body mass index (BMI) of 35.0 to 35.9 in adult    5. At risk for falls      Return for KEEP SCHEDULED APPT WITH DR. TIRADO.    I discussed the patients findings and my recommendations with patient    Marc Quevedo, APRN

## 2024-07-25 NOTE — PATIENT INSTRUCTIONS
"DASH Eating Plan  DASH stands for Dietary Approaches to Stop Hypertension. The DASH eating plan is a healthy eating plan that has been shown to:  Lower high blood pressure (hypertension).  Reduce your risk for type 2 diabetes, heart disease, and stroke.  Help with weight loss.  What are tips for following this plan?  Reading food labels  Check food labels for the amount of salt (sodium) per serving. Choose foods with less than 5 percent of the Daily Value (DV) of sodium. In general, foods with less than 300 milligrams (mg) of sodium per serving fit into this eating plan.  To find whole grains, look for the word \"whole\" as the first word in the ingredient list.  Shopping  Buy products labeled as \"low-sodium\" or \"no salt added.\"  Buy fresh foods. Avoid canned foods and pre-made or frozen meals.  Cooking  Try not to add salt when you cook. Use salt-free seasonings or herbs instead of table salt or sea salt. Check with your health care provider or pharmacist before using salt substitutes.  Do not caraballo foods. Cook foods in healthy ways, such as baking, boiling, grilling, roasting, or broiling.  Cook using oils that are good for your heart. These include olive, canola, avocado, soybean, and sunflower oil.  Meal planning    Eat a balanced diet. This should include:  4 or more servings of fruits and 4 or more servings of vegetables each day. Try to fill half of your plate with fruits and vegetables.  6-8 servings of whole grains each day.  6 or less servings of lean meat, poultry, or fish each day. 1 oz is 1 serving. A 3 oz (85 g) serving of meat is about the same size as the palm of your hand. One egg is 1 oz (28 g).  2-3 servings of low-fat dairy each day. One serving is 1 cup (237 mL).  1 serving of nuts, seeds, or beans 5 times each week.  2-3 servings of heart-healthy fats. Healthy fats called omega-3 fatty acids are found in foods such as walnuts, flaxseeds, fortified milks, and eggs. These fats are also found in " cold-water fish, such as sardines, salmon, and mackerel.  Limit how much you eat of:  Canned or prepackaged foods.  Food that is high in trans fat, such as fried foods.  Food that is high in saturated fat, such as fatty meat.  Desserts and other sweets, sugary drinks, and other foods with added sugar.  Full-fat dairy products.  Do not salt foods before eating.  Do not eat more than 4 egg yolks a week.  Try to eat at least 2 vegetarian meals a week.  Eat more home-cooked food and less restaurant, buffet, and fast food.  Lifestyle  When eating at a restaurant, ask if your food can be made with less salt or no salt.  If you drink alcohol:  Limit how much you have to:  0-1 drink a day if you are female.  0-2 drinks a day if you are male.  Know how much alcohol is in your drink. In the [place] one drink is one 12 oz bottle of beer (355 mL), one 5 oz glass of wine (148 mL), or one 1½ oz glass of hard liquor (44 mL).  General information  Avoid eating more than 2,300 mg of salt a day. If you have hypertension, you may need to reduce your sodium intake to 1,500 mg a day.  Work with your provider to stay at a healthy body weight or lose weight. Ask what the best weight range is for you.  On most days of the week, get at least 30 minutes of exercise that causes your heart to beat faster. This may include walking, swimming, or biking.  Work with your provider or dietitian to adjust your eating plan to meet your specific calorie needs.  What foods should I eat?  Fruits  All fresh, dried, or frozen fruit. Canned fruits that are in their natural juice and do not have sugar added to them.  Vegetables  Fresh or frozen vegetables that are raw, steamed, roasted, or grilled. Low-sodium or reduced-sodium tomato and vegetable juice. Low-sodium or reduced-sodium tomato sauce and tomato paste. Low-sodium or reduced-sodium canned vegetables.  Grains  Whole-grain or whole-wheat bread. Whole-grain or whole-wheat pasta. Brown rice. Oatmeal.  Quinoa. Bulgur. Whole-grain and low-sodium cereals. Lisa bread. Low-fat, low-sodium crackers. Whole-wheat flour tortillas.  Meats and other proteins  Skinless chicken or turkey. Ground chicken or turkey. Pork with fat trimmed off. Fish and seafood. Egg whites. Dried beans, peas, or lentils. Unsalted nuts, nut butters, and seeds. Unsalted canned beans. Lean cuts of beef with fat trimmed off. Low-sodium, lean precooked or cured meat, such as sausages or meat loaves.  Dairy  Low-fat (1%) or fat-free (skim) milk. Reduced-fat, low-fat, or fat-free cheeses. Nonfat, low-sodium ricotta or cottage cheese. Low-fat or nonfat yogurt. Low-fat, low-sodium cheese.  Fats and oils  Soft margarine without trans fats. Vegetable oil. Reduced-fat, low-fat, or light mayonnaise and salad dressings (reduced-sodium). Canola, safflower, olive, avocado, soybean, and sunflower oils. Avocado.  Seasonings and condiments  Herbs. Spices. Seasoning mixes without salt.  Other foods  Unsalted popcorn and pretzels. Fat-free sweets.  The items listed above may not be all the foods and drinks you can have. Talk to a dietitian to learn more.  What foods should I avoid?  Fruits  Canned fruit in a light or heavy syrup. Fried fruit. Fruit in cream or butter sauce.  Vegetables  Creamed or fried vegetables. Vegetables in a cheese sauce. Regular canned vegetables that are not marked as low-sodium or reduced-sodium. Regular canned tomato sauce and paste that are not marked as low-sodium or reduced-sodium. Regular tomato and vegetable juices that are not marked as low-sodium or reduced-sodium. Pickles. Olives.  Grains  Baked goods made with fat, such as croissants, muffins, or some breads. Dry pasta or rice meal packs.  Meats and other proteins  Fatty cuts of meat. Ribs. Fried meat. Kaur. Bologna, salami, and other precooked or cured meats, such as sausages or meat loaves, that are not lean and low in sodium. Fat from the back of a pig (fatback). Hebert.  Salted nuts and seeds. Canned beans with added salt. Canned or smoked fish. Whole eggs or egg yolks. Chicken or turkey with skin.  Dairy  Whole or 2% milk, cream, and half-and-half. Whole or full-fat cream cheese. Whole-fat or sweetened yogurt. Full-fat cheese. Nondairy creamers. Whipped toppings. Processed cheese and cheese spreads.  Fats and oils  Butter. Stick margarine. Lard. Shortening. Ghee. Kaur fat. Tropical oils, such as coconut, palm kernel, or palm oil.  Seasonings and condiments  Onion salt, garlic salt, seasoned salt, table salt, and sea salt. Worcestershire sauce. Tartar sauce. Barbecue sauce. Teriyaki sauce. Soy sauce, including reduced-sodium soy sauce. Steak sauce. Canned and packaged gravies. Fish sauce. Oyster sauce. Cocktail sauce. Store-bought horseradish. Ketchup. Mustard. Meat flavorings and tenderizers. Bouillon cubes. Hot sauces. Pre-made or packaged marinades. Pre-made or packaged taco seasonings. Relishes. Regular salad dressings.  Other foods  Salted popcorn and pretzels.  The items listed above may not be all the foods and drinks you should avoid. Talk to a dietitian to learn more.  Where to find more information  National Heart, Lung, and Blood [place] (NHLBI): nhlbi.nih.gov  American Heart Association (AHA): heart.org  Academy of Nutrition and Dietetics: eatright.org  National Kidney Foundation (NKF): kidney.org  This information is not intended to replace advice given to you by your health care provider. Make sure you discuss any questions you have with your health care provider.  Document Revised: 01/04/2024 Document Reviewed: 01/04/2024  Elsevier Patient Education © 2024 [employer]    Advance Care Planning and Advance Directives     You make decisions on a daily basis - decisions about where you want to live, your career, your home, your life. Perhaps one of the most important decisions you face is your choice for future medical care. Take time to talk with your family and  your healthcare team and start planning today.  Advance Care Planning is a process that can help you:  Understand possible future healthcare decisions in light of your own experiences  Reflect on those decision in light of your goals and values  Discuss your decisions with those closest to you and the healthcare professionals that care for you  Make a plan by creating a document that reflects your wishes    Surrogate Decision Maker  In the event of a medical emergency, which has left you unable to communicate or to make your own decisions, you would need someone to make decisions for you.  It is important to discuss your preferences for medical treatment with this person while you are in good health.     Qualities of a surrogate decision maker:  Willing to take on this role and responsibility  Knows what you want for future medical care  Willing to follow your wishes even if they don't agree with them  Able to make difficult medical decisions under stressful circumstances    Advance Directives  These are legal documents you can create that will guide your healthcare team and decision maker(s) when needed. These documents can be stored in the electronic medical record.    Living Will - a legal document to guide your care if you have a terminal condition or a serious illness and are unable to communicate. States vary by statute in document names/types, but most forms may include one or more of the following:        -  Directions regarding life-prolonging treatments        -  Directions regarding artificially provided nutrition/hydration        -  Choosing a healthcare decision maker        -  Direction regarding organ/tissue donation    Durable Power of  for Healthcare - this document names an -in-fact to make medical decisions for you, but it may also allow this person to make personal and financial decisions for you. Please seek the advice of an  if you need this type of  document.    **Advance Directives are not required and no one may discriminate against you if you do not sign one.    Medical Orders  Many states allow specific forms/orders signed by your physician to record your wishes for medical treatment in your current state of health. This form, signed in personal communication with your physician, addresses resuscitation and other medical interventions that you may or may not want.      For more information or to schedule a time with a [facility] Advance Care Planning Facilitator contact: [facility] or call [phone] and someone will contact you directly.Fall Prevention in the Home, Adult  Falls can cause injuries and can happen to people of all ages. There are many things you can do to make your home safer and to help prevent falls.  What actions can I take to prevent falls?  General information  Use good lighting in all rooms. Make sure to:  Replace any light bulbs that burn out.  Turn on the lights in dark areas and use night-lights.  Keep items that you use often in easy-to-reach places. Lower the shelves around your home if needed.  Move furniture so that there are clear paths around it.  Do not use throw rugs or other things on the floor that can make you trip.  If any of your floors are uneven, fix them.  Add color or contrast paint or tape to clearly pepper and help you see:  Grab bars or handrails.  First and last steps of staircases.  Where the edge of each step is.  If you use a ladder or stepladder:  Make sure that it is fully opened. Do not climb a closed ladder.  Make sure the sides of the ladder are locked in place.  Have someone hold the ladder while you use it.  Know where your pets are as you move through your home.  What can I do in the bathroom?         Keep the floor dry. Clean up any water on the floor right away.  Remove soap buildup in the bathtub or shower. Buildup makes bathtubs and showers slippery.  Use non-skid mats or decals on  the floor of the bathtub or shower.  Attach bath mats securely with double-sided, non-slip rug tape.  If you need to sit down in the shower, use a non-slip stool.  Install grab bars by the toilet and in the bathtub and shower. Do not use towel bars as grab bars.  What can I do in the bedroom?  Make sure that you have a light by your bed that is easy to reach.  Do not use any sheets or blankets on your bed that hang to the floor.  Have a firm chair or bench with side arms that you can use for support when you get dressed.  What can I do in the kitchen?  Clean up any spills right away.  If you need to reach something above you, use a step stool with a grab bar.  Keep electrical cords out of the way.  Do not use floor polish or wax that makes floors slippery.  What can I do with my stairs?  Do not leave anything on the stairs.  Make sure that you have a light switch at the top and the bottom of the stairs.  Make sure that there are handrails on both sides of the stairs. Fix handrails that are broken or loose.  Install non-slip stair treads on all your stairs if they do not have carpet.  Avoid having throw rugs at the top or bottom of the stairs.  Choose a carpet that does not hide the edge of the steps on the stairs. Make sure that the carpet is firmly attached to the stairs. Fix carpet that is loose or worn.  What can I do on the outside of my home?  Use bright outdoor lighting.  Fix the edges of walkways and driveways and fix any cracks. Clear paths of anything that can make you trip, such as tools or rocks.  Add color or contrast paint or tape to clearly pepper and help you see anything that might make you trip as you walk through a door, such as a raised step or threshold.  Trim any bushes or trees on paths to your home.  Check to see if handrails are loose or broken and that both sides of all steps have handrails. Install guardrails along the edges of any raised decks and porches.  Have leaves, snow, or ice cleared  regularly. Use sand, salt, or ice melter on paths if you live where there is ice and snow during the winter.  Clean up any spills in your garage right away. This includes grease or oil spills.  What other actions can I take?  Review your medicines with your doctor. Some medicines can cause dizziness or changes in blood pressure, which increase your risk of falling.  Wear shoes that:  Have a low heel. Do not wear high heels.  Have rubber bottoms and are closed at the toe.  Feel good on your feet and fit well.  Use tools that help you move around if needed. These include:  Canes.  Walkers.  Scooters.  Crutches.  Ask your doctor what else you can do to help prevent falls. This may include seeing a physical therapist to learn to do exercises to move better and get stronger.  Where to find more information  Centers for Disease Control and Prevention, [place]: cdc.gov  National [place] on A[place]: [place].nih.gov  [place] on Aging: [place].nih.gov  Contact a doctor if:  You are afraid of falling at home.  You feel weak, drowsy, or dizzy at home.  You fall at home.  Get help right away if you:  Lose consciousness or have trouble moving after a fall.  Have a fall that causes a head injury.  These symptoms may be an emergency. Get help right away. Call 911.  Do not wait to see if the symptoms will go away.  Do not drive yourself to the hospital.  This information is not intended to replace advice given to you by your health care provider. Make sure you discuss any questions you have with your health care provider.  Document Revised: 08/21/2023 Document Reviewed: 08/21/2023  Elsevier Patient Education © 2024 Elsevier Inc.

## 2024-07-31 ENCOUNTER — SPECIALTY PHARMACY (OUTPATIENT)
Dept: ONCOLOGY | Facility: HOSPITAL | Age: 79
End: 2024-07-31
Payer: MEDICARE

## 2024-07-31 DIAGNOSIS — Z17.0 MALIGNANT NEOPLASM OF LEFT BREAST IN FEMALE, ESTROGEN RECEPTOR POSITIVE, UNSPECIFIED SITE OF BREAST: Primary | ICD-10-CM

## 2024-07-31 DIAGNOSIS — C50.912 MALIGNANT NEOPLASM OF LEFT BREAST IN FEMALE, ESTROGEN RECEPTOR POSITIVE, UNSPECIFIED SITE OF BREAST: Primary | ICD-10-CM

## 2024-07-31 NOTE — PROGRESS NOTES
"[facility]    Oral Oncology Patient REFILL REQUEST      Consult Details  Consulting Provider:   Nagi Maya MD ([facility])   Medication and Regimen:  ibrutinib [Imbruvica] 420 mg PO daily (to start again after 1 week of allopurinol)     Prescription Review  Dosing & Interactions:   Reviewed, appropriate and no significant interaction noted at this time..   Current Specialty Pharmacy [facility]     Intervention(s)/Discussion:         Received message from Beverly Worthington (Patient Care Coordinator) to send a new prescription to our retail pharmacy for the patient's Imbruvica.   Beverly \"tracks\" our specialty patients that fill with our retail pharmacy. An excel-based tracker is utilized for therapy initiation based on new patients, and is updated regularly based on new prescriptions needed, when refill has been submitted, how much prescription co-pay amount there was, and how much dane/foundation fund has been used.     Per office visit note on 7/18/24, \" Rx:  Allopurinol 300 mg p.o. daily # 30 x 11RF            Ibrutinib 420 mg po daily - start 1 week after allopurinol - # 30 x 11 RF.\"     Summary:    Sent in a 12 MONTH prescription for Imbruvica to our [facility]. [facility] will continue to follow patient, regarding the patient’s oral chemotherapy.      Electronically signed 07/31/2024 15:18 CDT by:  Daysi Simon PharmD  Hematology & Oncology Specialty Pharmacist  [facility]  Phone: [phone]     "

## 2024-08-02 ENCOUNTER — TELEPHONE (OUTPATIENT)
Dept: ONCOLOGY | Facility: CLINIC | Age: 79
End: 2024-08-02
Payer: MEDICARE

## 2024-08-02 ENCOUNTER — HOSPITAL ENCOUNTER (OUTPATIENT)
Dept: CT IMAGING | Facility: HOSPITAL | Age: 79
Discharge: HOME | End: 2024-08-02
Payer: MEDICARE

## 2024-08-02 ENCOUNTER — HOSPITAL ENCOUNTER (EMERGENCY)
Facility: HOSPITAL | Age: 79
Discharge: OTHER INSTITUTION | End: 2024-08-03
Attending: INTERNAL MEDICINE
Payer: MEDICARE

## 2024-08-02 VITALS
WEIGHT: 235.89 LBS | OXYGEN SATURATION: 96 % | SYSTOLIC BLOOD PRESSURE: 141 MMHG | HEIGHT: 66 IN | HEART RATE: 75 BPM | DIASTOLIC BLOOD PRESSURE: 72 MMHG | RESPIRATION RATE: 22 BRPM | TEMPERATURE: 96 F | BODY MASS INDEX: 37.91 KG/M2

## 2024-08-02 DIAGNOSIS — C83.00 SMALL LYMPHOCYTIC LYMPHOMA: ICD-10-CM

## 2024-08-02 DIAGNOSIS — D69.6 THROMBOCYTOPENIA: Primary | ICD-10-CM

## 2024-08-02 LAB
ALBUMIN SERPL-MCNC: 3.1 G/DL (ref 3.5–5.2)
ALBUMIN/GLOB SERPL: 1.3 G/DL
ALP SERPL-CCNC: 63 U/L (ref 39–117)
ALT SERPL W P-5'-P-CCNC: 20 U/L (ref 1–33)
ANION GAP SERPL CALCULATED.3IONS-SCNC: 8 MMOL/L (ref 5–15)
ANISOCYTOSIS BLD QL: ABNORMAL
ANISOCYTOSIS BLD QL: ABNORMAL
APTT PPP: <20 SECONDS (ref 24.5–36)
AST SERPL-CCNC: 16 U/L (ref 1–32)
BACTERIA UR QL AUTO: ABNORMAL /HPF
BASOPHILS # BLD MANUAL: 0 10*3/MM3 (ref 0–0.2)
BASOPHILS # BLD MANUAL: 0 10*3/MM3 (ref 0–0.2)
BASOPHILS NFR BLD MANUAL: 0 % (ref 0–1.5)
BASOPHILS NFR BLD MANUAL: 0 % (ref 0–1.5)
BILIRUB SERPL-MCNC: 0.4 MG/DL (ref 0–1.2)
BILIRUB UR QL STRIP: NEGATIVE
BUN SERPL-MCNC: 48 MG/DL (ref 8–23)
BUN/CREAT SERPL: 34.5 (ref 7–25)
CALCIUM SPEC-SCNC: 8.4 MG/DL (ref 8.6–10.5)
CHLORIDE SERPL-SCNC: 96 MMOL/L (ref 98–107)
CLARITY UR: CLEAR
CO2 SERPL-SCNC: 29 MMOL/L (ref 22–29)
COLOR UR: YELLOW
CREAT SERPL-MCNC: 1.39 MG/DL (ref 0.57–1)
D-LACTATE SERPL-SCNC: 1.5 MMOL/L (ref 0.5–2)
DEPRECATED RDW RBC AUTO: 57.7 FL (ref 37–54)
DEPRECATED RDW RBC AUTO: 57.8 FL (ref 37–54)
EGFRCR SERPLBLD CKD-EPI 2021: 38.9 ML/MIN/1.73
EOSINOPHIL # BLD MANUAL: 0 10*3/MM3 (ref 0–0.4)
EOSINOPHIL # BLD MANUAL: 0 10*3/MM3 (ref 0–0.4)
EOSINOPHIL NFR BLD MANUAL: 0 % (ref 0.3–6.2)
EOSINOPHIL NFR BLD MANUAL: 0 % (ref 0.3–6.2)
ERYTHROCYTE [DISTWIDTH] IN BLOOD BY AUTOMATED COUNT: 18.1 % (ref 12.3–15.4)
ERYTHROCYTE [DISTWIDTH] IN BLOOD BY AUTOMATED COUNT: 18.3 % (ref 12.3–15.4)
GLOBULIN UR ELPH-MCNC: 2.3 GM/DL
GLUCOSE SERPL-MCNC: 116 MG/DL (ref 65–99)
GLUCOSE UR STRIP-MCNC: ABNORMAL MG/DL
HCT VFR BLD AUTO: 24.5 % (ref 34–46.6)
HCT VFR BLD AUTO: 25.4 % (ref 34–46.6)
HGB BLD-MCNC: 7.8 G/DL (ref 12–15.9)
HGB BLD-MCNC: 8.1 G/DL (ref 12–15.9)
HGB UR QL STRIP.AUTO: ABNORMAL
HYPOCHROMIA BLD QL: ABNORMAL
INR PPP: 1.02 (ref 0.91–1.09)
KETONES UR QL STRIP: NEGATIVE
LDH SERPL-CCNC: 285 U/L (ref 135–214)
LEUKOCYTE ESTERASE UR QL STRIP.AUTO: ABNORMAL
LYMPHOCYTES # BLD MANUAL: 0 10*3/MM3 (ref 0.7–3.1)
LYMPHOCYTES # BLD MANUAL: 0.09 10*3/MM3 (ref 0.7–3.1)
LYMPHOCYTES NFR BLD MANUAL: 2 % (ref 5–12)
LYMPHOCYTES NFR BLD MANUAL: 9 % (ref 5–12)
MCH RBC QN AUTO: 28.8 PG (ref 26.6–33)
MCH RBC QN AUTO: 29 PG (ref 26.6–33)
MCHC RBC AUTO-ENTMCNC: 31.8 G/DL (ref 31.5–35.7)
MCHC RBC AUTO-ENTMCNC: 31.9 G/DL (ref 31.5–35.7)
MCV RBC AUTO: 90.4 FL (ref 79–97)
MCV RBC AUTO: 91.1 FL (ref 79–97)
MICROCYTES BLD QL: ABNORMAL
MONOCYTES # BLD: 0.17 10*3/MM3 (ref 0.1–0.9)
MONOCYTES # BLD: 0.89 10*3/MM3 (ref 0.1–0.9)
NEUTROPHILS # BLD AUTO: 8.46 10*3/MM3 (ref 1.7–7)
NEUTROPHILS # BLD AUTO: 9.02 10*3/MM3 (ref 1.7–7)
NEUTROPHILS NFR BLD MANUAL: 91 % (ref 42.7–76)
NEUTROPHILS NFR BLD MANUAL: 97 % (ref 42.7–76)
NITRITE UR QL STRIP: POSITIVE
OVALOCYTES BLD QL SMEAR: ABNORMAL
PH UR STRIP.AUTO: 5.5 [PH] (ref 5–8)
PLATELET # BLD AUTO: 3 10*3/MM3 (ref 140–450)
PLATELET # BLD AUTO: 3 10*3/MM3 (ref 140–450)
POIKILOCYTOSIS BLD QL SMEAR: ABNORMAL
POIKILOCYTOSIS BLD QL SMEAR: ABNORMAL
POLYCHROMASIA BLD QL SMEAR: ABNORMAL
POLYCHROMASIA BLD QL SMEAR: ABNORMAL
POTASSIUM SERPL-SCNC: 4.8 MMOL/L (ref 3.5–5.2)
PROT SERPL-MCNC: 5.4 G/DL (ref 6–8.5)
PROT UR QL STRIP: NEGATIVE
PROTHROMBIN TIME: 13.8 SECONDS (ref 11.8–14.8)
RBC # BLD AUTO: 2.69 10*6/MM3 (ref 3.77–5.28)
RBC # BLD AUTO: 2.81 10*6/MM3 (ref 3.77–5.28)
RBC # UR STRIP: ABNORMAL /HPF
REF LAB TEST METHOD: ABNORMAL
SCHISTOCYTES BLD QL SMEAR: ABNORMAL
SMALL PLATELETS BLD QL SMEAR: ABNORMAL
SMALL PLATELETS BLD QL SMEAR: ABNORMAL
SODIUM SERPL-SCNC: 133 MMOL/L (ref 136–145)
SP GR UR STRIP: <=1.005 (ref 1–1.03)
SQUAMOUS #/AREA URNS HPF: ABNORMAL /HPF
URATE SERPL-MCNC: 3.7 MG/DL (ref 2.4–5.7)
UROBILINOGEN UR QL STRIP: ABNORMAL
VARIANT LYMPHS NFR BLD MANUAL: 0 % (ref 19.6–45.3)
VARIANT LYMPHS NFR BLD MANUAL: 1 % (ref 19.6–45.3)
WBC # UR STRIP: ABNORMAL /HPF
WBC MORPH BLD: NORMAL
WBC MORPH BLD: NORMAL
WBC NRBC COR # BLD AUTO: 8.72 10*3/MM3 (ref 3.4–10.8)
WBC NRBC COR # BLD AUTO: 9.91 10*3/MM3 (ref 3.4–10.8)
WHOLE BLOOD HOLD COAG: NORMAL

## 2024-08-02 PROCEDURE — 85007 BL SMEAR W/DIFF WBC COUNT: CPT | Performed by: RADIOLOGY

## 2024-08-02 PROCEDURE — 36430 TRANSFUSION BLD/BLD COMPNT: CPT

## 2024-08-02 PROCEDURE — 83615 LACTATE (LD) (LDH) ENZYME: CPT | Performed by: INTERNAL MEDICINE

## 2024-08-02 PROCEDURE — 87086 URINE CULTURE/COLONY COUNT: CPT | Performed by: INTERNAL MEDICINE

## 2024-08-02 PROCEDURE — 84550 ASSAY OF BLOOD/URIC ACID: CPT | Performed by: INTERNAL MEDICINE

## 2024-08-02 PROCEDURE — 81001 URINALYSIS AUTO W/SCOPE: CPT | Performed by: INTERNAL MEDICINE

## 2024-08-02 PROCEDURE — 85025 COMPLETE CBC W/AUTO DIFF WBC: CPT | Performed by: INTERNAL MEDICINE

## 2024-08-02 PROCEDURE — 96365 THER/PROPH/DIAG IV INF INIT: CPT

## 2024-08-02 PROCEDURE — 36415 COLL VENOUS BLD VENIPUNCTURE: CPT

## 2024-08-02 PROCEDURE — 99285 EMERGENCY DEPT VISIT HI MDM: CPT

## 2024-08-02 PROCEDURE — 85025 COMPLETE CBC W/AUTO DIFF WBC: CPT | Performed by: RADIOLOGY

## 2024-08-02 PROCEDURE — P9035 PLATELET PHERES LEUKOREDUCED: HCPCS

## 2024-08-02 PROCEDURE — 85007 BL SMEAR W/DIFF WBC COUNT: CPT | Performed by: INTERNAL MEDICINE

## 2024-08-02 PROCEDURE — 80053 COMPREHEN METABOLIC PANEL: CPT | Performed by: INTERNAL MEDICINE

## 2024-08-02 PROCEDURE — 85730 THROMBOPLASTIN TIME PARTIAL: CPT | Performed by: RADIOLOGY

## 2024-08-02 PROCEDURE — P9100 PATHOGEN TEST FOR PLATELETS: HCPCS

## 2024-08-02 PROCEDURE — 25010000002 CEFTRIAXONE PER 250 MG: Performed by: INTERNAL MEDICINE

## 2024-08-02 PROCEDURE — 83605 ASSAY OF LACTIC ACID: CPT | Performed by: INTERNAL MEDICINE

## 2024-08-02 PROCEDURE — 85610 PROTHROMBIN TIME: CPT | Performed by: RADIOLOGY

## 2024-08-02 RX ORDER — HYDROCODONE BITARTRATE AND ACETAMINOPHEN 10; 325 MG/1; MG/1
1 TABLET ORAL ONCE
Status: COMPLETED | OUTPATIENT
Start: 2024-08-02 | End: 2024-08-02

## 2024-08-02 RX ORDER — SODIUM CHLORIDE 0.9 % (FLUSH) 0.9 %
10 SYRINGE (ML) INJECTION AS NEEDED
Status: DISCONTINUED | OUTPATIENT
Start: 2024-08-02 | End: 2024-08-03 | Stop reason: HOSPADM

## 2024-08-02 RX ORDER — SODIUM CHLORIDE 9 MG/ML
40 INJECTION, SOLUTION INTRAVENOUS AS NEEDED
Status: DISCONTINUED | OUTPATIENT
Start: 2024-08-02 | End: 2024-08-03 | Stop reason: HOSPADM

## 2024-08-02 RX ORDER — SODIUM CHLORIDE 0.9 % (FLUSH) 0.9 %
3 SYRINGE (ML) INJECTION EVERY 12 HOURS SCHEDULED
Status: DISCONTINUED | OUTPATIENT
Start: 2024-08-02 | End: 2024-08-03 | Stop reason: HOSPADM

## 2024-08-02 RX ADMIN — HYDROCODONE BITARTRATE AND ACETAMINOPHEN 1 TABLET: 10; 325 TABLET ORAL at 20:44

## 2024-08-02 RX ADMIN — SODIUM CHLORIDE 1000 MG: 900 INJECTION INTRAVENOUS at 15:54

## 2024-08-02 NOTE — TELEPHONE ENCOUNTER
----- Message from Mikaela ANGLIN sent at 8/2/2024 12:39 PM CDT -----  SHE IS ON THE TABLE RIGHT NOW FOR A BONE MARROW, AND SHE HAS A CRITICAL VALUE OF PLTS ARE A 3 AND THEY NEED A CALL BACK ASAP [phone]

## 2024-08-02 NOTE — TELEPHONE ENCOUNTER
Return call to Leonarda in Radiology, she was informed per Dr Maya's recommendations.   Stop planned bone marrow kathy today 8/2/24  Send patient Marina Joe to ER dept due to lab result of PLT Count of 3  Contact Dr De Luna on-call Physician for Oncology [facility] this weekend regarding this matter  Leonarda v/u of all instructions.

## 2024-08-02 NOTE — ED PROVIDER NOTES
Subjective   History of Present Illness  79-year-old female who presented to the emergency department at the request of hematology.  The patient was scheduled to have a bone marrow biopsy today.  She had her labs done and it was noted that her platelets were low.  She was sent to the ED for evaluation.  She notes no acute bleeding.  She has no bleeding in her nose, bowels, or urine.  The patient did state that she fell yesterday into her trash can.  She has a large hematoma on her right upper arm.  She is some bruising in her right flank.    Oncology visit 7/18/24:  REASON FOR VISIT: Marina Joe 79 y.o. 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  She was admitted to [facility], 6/28/2024 - 7/15/2024 (see below).  She was taken to the OR on 6/29/2024 for L3 laminectomy with decompression.  Final diagnosis: L3 epidural soft tissue mass, biopsy: Abnormal B-cell population features consistent with SLL/CLL. Abnormal CLL FISH Panel and lymphoma probes.  Rearrangement of MYC/8q. Deletions of ANABEL/11q and CCND1/11q.  No evidence of rearrangements of BCL2 and BCL6 genes. No evidence of IGH/MYC and IGH/BCL2 translocation.  She is currently receiving palliative postop radiation therapy to the lumbar region (beginning 7/8/24) with anticipated 2000 to 3000 cGy over 5-20 daily fractions.  Given her advanced age, multiple comorbidities, and debility the plan was a trial of palliative ibrutinib that the patient declined.  She was discharged with plans for subsequent hospice care.  Today she is seen at her request to again discuss the option of systemic therapy.  She is accompanied by her significant other Jose Enrique.     6/28/24 MRI lumbar:  IMPRESSION:  Neoplastic process involving the L3 vertebral body with associated  posterior soft tissue component resulting in severe spinal canal  narrowing and cauda equina nerve root compression. The soft tissue  component appears to  also extend into the right L3-L4 foramen. No  associated pathological fracture.     Additional multilevel spondylotic changes including moderate to severe  spinal canal and foraminal narrowing as detailed above.    Review of Systems   Skin:  Positive for color change.       Past Medical History:   Diagnosis Date    Anxiety     Breast cancer 2013    mets to both kidney and spine now    CKD (chronic kidney disease)     Hypercholesteremia     Hypertension     Sinusitis     Stage 3a chronic kidney disease 10/20/2020       Allergies   Allergen Reactions    Aspirin GI Bleeding    Niacin Other (See Comments)     unknown       Past Surgical History:   Procedure Laterality Date    AVULSION TOENAIL PLATE      Sept 26,2018    BREAST BIOPSY Left 11/20/2012    BREAST BIOPSY      Left Breast, 1/2019 per Dr Barkley    BREAST LUMPECTOMY Left     with node bx     COLONOSCOPY  09/13/2013    small polyp at 30cm benign hyperplastic polyp, changes consistent with melanosis coli. Recall 5 years    COLONOSCOPY  11/19/2018    Tics otherwise normal exam repeat in 5 years    COLONOSCOPY  02/13/2023    Normal exam repeat in 3 years with a 2 day prep    ENDOSCOPY  02/13/2023    Gastritis, small HH    LUMBAR LAMINECTOMY Right 6/29/2024    Procedure: LUMBAR LAMINECTOMY L3 WITH DECOMPRESSION 1-2 LEVELS-RIGHT;  Surgeon: Marcellus Pulido MD;  Location: [facility] OR;  Service: Neurosurgery;  Laterality: Right;    REPLACEMENT TOTAL KNEE Right     2016    TOTAL ABDOMINAL HYSTERECTOMY WITH SALPINGO OOPHORECTOMY      UPPER ENDOSCOPIC ULTRASOUND W/ FNA  12/20/2023    Pancreatic cyst s/p FNA       Family History   Problem Relation Age of Onset    Heart attack Mother     Hodgkin's lymphoma Father     Other Father     Cancer Father         hodgkins    Heart attack Brother     Skin cancer Maternal Uncle     Pancreatic cancer Maternal Uncle     Cancer Maternal Uncle         eye    Colon cancer Neg Hx     Colon polyps Neg Hx        Social History      Socioeconomic History    Marital status:    Tobacco Use    Smoking status: Never    Smokeless tobacco: Never   Vaping Use    Vaping status: Never Used   Substance and Sexual Activity    Alcohol use: No    Drug use: Not Currently    Sexual activity: Not Currently     Birth control/protection: Surgical           Objective   Physical Exam  Vitals reviewed.   Constitutional:       Appearance: She is obese. She is not ill-appearing.   HENT:      Head: Normocephalic and atraumatic.      Right Ear: External ear normal.      Left Ear: External ear normal.      Nose: Nose normal.   Eyes:      General: No scleral icterus.     Conjunctiva/sclera: Conjunctivae normal.   Cardiovascular:      Rate and Rhythm: Normal rate and regular rhythm.      Heart sounds: Normal heart sounds.   Pulmonary:      Effort: Pulmonary effort is normal.      Breath sounds: Normal breath sounds.   Abdominal:      General: There is no distension.      Palpations: Abdomen is soft.   Musculoskeletal:         General: No tenderness.      Cervical back: Normal range of motion and neck supple.   Skin:     General: Skin is warm and dry.      Findings: Bruising present.      Comments: Bruising to the right posterior upper arm.  Bruising to the right flank area these are small scattered bruises.   Neurological:      Mental Status: She is alert and oriented to person, place, and time.      Cranial Nerves: No cranial nerve deficit.   Psychiatric:         Mood and Affect: Mood normal.         Behavior: Behavior normal.         Procedures       Lab Results (last 24 hours)       Procedure Component Value Units Date/Time    CBC & Differential [[id number]]  (Abnormal) Collected: 08/02/24 1201    Specimen: Blood Updated: 08/02/24 1237    Narrative:      The following orders were created for panel order CBC & Differential.  Procedure                               Abnormality         Status                     ---------                                -----------         ------                     CBC Auto Differential[[id number]]        Abnormal            Final result                 Please view results for these tests on the individual orders.    Protime-INR [[id number]]  (Normal) Collected: 08/02/24 1201    Specimen: Blood Updated: 08/02/24 1243     Protime 13.8 Seconds      INR 1.02    aPTT [[id number]]  (Abnormal) Collected: 08/02/24 1201    Specimen: Blood Updated: 08/02/24 1243     PTT <20.0 seconds     CBC Auto Differential [[id number]]  (Abnormal) Collected: 08/02/24 1201    Specimen: Blood Updated: 08/02/24 1237     WBC 9.91 10*3/mm3      RBC 2.81 10*6/mm3      Hemoglobin 8.1 g/dL      Hematocrit 25.4 %      MCV 90.4 fL      MCH 28.8 pg      MCHC 31.9 g/dL      RDW 18.1 %      RDW-SD 57.8 fl      Platelets 3 10*3/mm3     Manual Differential [[id number]]  (Abnormal) Collected: 08/02/24 1201    Specimen: Blood Updated: 08/02/24 1237     Neutrophil % 91.0 %      Lymphocyte % 0.0 %      Monocyte % 9.0 %      Eosinophil % 0.0 %      Basophil % 0.0 %      Neutrophils Absolute 9.02 10*3/mm3      Lymphocytes Absolute 0.00 10*3/mm3      Monocytes Absolute 0.89 10*3/mm3      Eosinophils Absolute 0.00 10*3/mm3      Basophils Absolute 0.00 10*3/mm3      Anisocytosis Slight/1+     Hypochromia Slight/1+     Ovalocytes Slight/1+     Poikilocytes Slight/1+     Polychromasia Slight/1+     Schistocytes Slight/1+     WBC Morphology Normal     Platelet Estimate Decreased    Urinalysis With Culture If Indicated - Urine, Clean Catch [[id number]]  (Abnormal) Collected: 08/02/24 1410    Specimen: Urine, Clean Catch Updated: 08/02/24 1436     Color, UA Yellow     Appearance, UA Clear     pH, UA 5.5     Specific Gravity, UA <=1.005     Glucose,  mg/dL (1+)     Ketones, UA Negative     Bilirubin, UA Negative     Blood, UA Trace     Protein, UA Negative     Leuk Esterase, UA Moderate (2+)     Nitrite, UA Positive     Urobilinogen, UA 0.2 E.U./dL    Narrative:      In absence of  clinical symptoms, the presence of pyuria, bacteria, and/or nitrites on the urinalysis result does not correlate with infection.    Urinalysis, Microscopic Only - Urine, Clean Catch [[id number]]  (Abnormal) Collected: 08/02/24 1410    Specimen: Urine, Clean Catch Updated: 08/02/24 1436     RBC, UA None Seen /HPF      WBC, UA 21-50 /HPF      Bacteria, UA 4+ /HPF      Squamous Epithelial Cells, UA 13-20 /HPF      Methodology Automated Microscopy    Narrative:      Only 1 ml specimen sent.    CBC & Differential [[id number]]  (Abnormal) Collected: 08/02/24 1544    Specimen: Blood Updated: 08/02/24 1625    Narrative:      The following orders were created for panel order CBC & Differential.  Procedure                               Abnormality         Status                     ---------                               -----------         ------                     CBC Auto Differential[[id number]]        Abnormal            Final result                 Please view results for these tests on the individual orders.    Comprehensive Metabolic Panel [[id number]]  (Abnormal) Collected: 08/02/24 1544    Specimen: Blood Updated: 08/02/24 1618     Glucose 116 mg/dL      BUN 48 mg/dL      Creatinine 1.39 mg/dL      Sodium 133 mmol/L      Potassium 4.8 mmol/L      Chloride 96 mmol/L      CO2 29.0 mmol/L      Calcium 8.4 mg/dL      Total Protein 5.4 g/dL      Albumin 3.1 g/dL      ALT (SGPT) 20 U/L      AST (SGOT) 16 U/L      Alkaline Phosphatase 63 U/L      Total Bilirubin 0.4 mg/dL      Globulin 2.3 gm/dL      A/G Ratio 1.3 g/dL      BUN/Creatinine Ratio 34.5     Anion Gap 8.0 mmol/L      eGFR 38.9 mL/min/1.73     Narrative:      GFR Normal >60  Chronic Kidney Disease <60  Kidney Failure <15    The GFR formula is only valid for adults with stable renal function between ages 18 and 70.    CBC Auto Differential [[id number]]  (Abnormal) Collected: 08/02/24 1544    Specimen: Blood Updated: 08/02/24 1625     WBC 8.72 10*3/mm3      RBC 2.69  10*6/mm3      Hemoglobin 7.8 g/dL      Hematocrit 24.5 %      MCV 91.1 fL      MCH 29.0 pg      MCHC 31.8 g/dL      RDW 18.3 %      RDW-SD 57.7 fl      Platelets 3 10*3/mm3     Lactic Acid, Plasma [[id number]]  (Normal) Collected: 08/02/24 1544    Specimen: Blood Updated: 08/02/24 1617     Lactate 1.5 mmol/L     Manual Differential [[id number]]  (Abnormal) Collected: 08/02/24 1544    Specimen: Blood Updated: 08/02/24 1625     Neutrophil % 97.0 %      Lymphocyte % 1.0 %      Monocyte % 2.0 %      Eosinophil % 0.0 %      Basophil % 0.0 %      Neutrophils Absolute 8.46 10*3/mm3      Lymphocytes Absolute 0.09 10*3/mm3      Monocytes Absolute 0.17 10*3/mm3      Eosinophils Absolute 0.00 10*3/mm3      Basophils Absolute 0.00 10*3/mm3      Anisocytosis Slight/1+     Microcytes Slight/1+     Poikilocytes Slight/1+     Polychromasia Slight/1+     WBC Morphology Normal     Platelet Estimate Decreased    Urine Culture - Urine, Urine, Clean Catch [[id number]] Collected: 08/02/24 2028    Specimen: Urine, Clean Catch Updated: 08/02/24 2045    Uric Acid [[id number]]  (Normal) Collected: 08/02/24 2123    Specimen: Blood Updated: 08/02/24 2148     Uric Acid 3.7 mg/dL     Lactate Dehydrogenase [[id number]]  (Abnormal) Collected: 08/02/24 2123    Specimen: Blood Updated: 08/02/24 2148      U/L               ED Course  ED Course as of 08/02/24 2151   Fri Aug 02, 2024   1603 I spoke to Dr. Morgan on.  He states he is concerned that the patient has a high-grade lymphoma like Burkitt's or diffuse large cell lymphoma.  He states that she has not fully worked up.  He notes that we do not treat these high-grade lymphomas at our facility.  He recommends transfer to tertiary care. [AJ]   9998 I spoke with the family.  They initially noted that the patient had a follow-up with [facility] next week on Friday.  I spoke back with Dr. Morgan regarding this.  He notes that he would like the patient transferred emergently secondary  to her spinal cord involvement.  I read communicated with the family.  They are speaking with other family to see if they want to be transferred, as this would be a hardship on everyone in the family.  I will go back and communicate with them again. [AJ]   1753 Family at bedside is waiting for children to call back regarding transfer versus discharge home. [AJ]   1836 I called and spoke with [facility] transfer center.  They will speak with the  and return my call. [AJ]   1852 [facility] declined due to capacity [AJ]   1910 Spoke with Dr. Melgoza and he wants me to try [facility].  [AJ]   2004 The family has decided that they would want to go to [facility] because that is closer to their home.  I spoke with the transfer center.  She notes that she will also talk with oncology, but anticipates the patient will be on the hospitalist wait list.  She will call me back. [AJ]   2031 I spoke with Dr. Lakhani with the BMT fellow at [facility].  He states that he will speak with his colleagues and return my call.  He anticipates expediting the patient's transfer to their facility. [AJ]   2049 BMT fellow called me back.  He states that the patient will be expedited.  He asked that I get a uric acid, LDH, he wants to hold on steroids.  He asked to consider a fibrinolytic agent if the patient starts to bleed such as Amicar. [AJ]      ED Course User Index  [AJ] Mario Young, DO                                             Medical Decision Making  Problems Addressed:  Thrombocytopenia: complicated acute illness or injury    Amount and/or Complexity of Data Reviewed  Labs: ordered.     Details: CBC CMP UA    Risk  Prescription drug management.        Final diagnoses:   Thrombocytopenia       ED Disposition  ED Disposition       ED Disposition   Transfer to Another Facility     Condition   --    Comment   Transfer to [facility]. Dr. Santana accepting. I spoke with fellow,   Kar.                No follow-up provider specified.       Medication List      No changes were made to your prescriptions during this visit.            Mario Young,   08/02/24 1409       Mario Young DO  08/02/24 1857       Mario Young,   08/02/24 2117       Mario Young,   08/02/24 2427

## 2024-08-02 NOTE — NURSING NOTE
Dr. Maya's nurse returned phone call concerning patient's critical platelet count, see lab.  Zulma does not want to proceed with bone marrow biopsy and wants patient to report to ED and see on call oncologist, Dr. De Luna.  Called ED and spoke with Miguel, who ok'd through manager to directly bring to ED from radiology.  Notified RN to call on call oncologist.

## 2024-08-03 VITALS
BODY MASS INDEX: 37.77 KG/M2 | RESPIRATION RATE: 18 BRPM | HEART RATE: 69 BPM | HEIGHT: 66 IN | WEIGHT: 235 LBS | DIASTOLIC BLOOD PRESSURE: 61 MMHG | TEMPERATURE: 98.3 F | OXYGEN SATURATION: 100 % | SYSTOLIC BLOOD PRESSURE: 112 MMHG

## 2024-08-03 NOTE — ED NOTES
Jasper HEATH attempted to given report to [facility].  Nurse at [facility] unavailable at this time, but will call back to get report.

## 2024-08-03 NOTE — ED NOTES
Jasper HEATH spoke with [facility].  Pt has been accepted per Dr. Santana.  Awaiting bed assignment.  Pt and family updated.

## 2024-08-04 LAB
BACTERIA SPEC AEROBE CULT: NORMAL
BH BB BLOOD EXPIRATION DATE: NORMAL
BH BB BLOOD EXPIRATION DATE: NORMAL
BH BB BLOOD TYPE BARCODE: 7300
BH BB BLOOD TYPE BARCODE: 7300
BH BB DISPENSE STATUS: NORMAL
BH BB DISPENSE STATUS: NORMAL
BH BB PRODUCT CODE: NORMAL
BH BB PRODUCT CODE: NORMAL
BH BB UNIT NUMBER: NORMAL
BH BB UNIT NUMBER: NORMAL
UNIT  ABO: NORMAL
UNIT  ABO: NORMAL
UNIT  RH: NORMAL
UNIT  RH: NORMAL

## 2024-08-05 ENCOUNTER — TELEPHONE (OUTPATIENT)
Dept: ONCOLOGY | Facility: CLINIC | Age: 79
End: 2024-08-05
Payer: MEDICARE

## 2024-08-05 NOTE — TELEPHONE ENCOUNTER
CARE TAKER FOR PT CAME IN AND LET US KNOW THAT SHE WAS SENT TO [facility] AND NEEDED TO GET APPT CANCELLED AT THIS TIME. SHE WILL LET US KNOW WHEN PT IS HOME AND CAN RETURN   
Statement Selected

## 2024-08-06 ENCOUNTER — SPECIALTY PHARMACY (OUTPATIENT)
Dept: ONCOLOGY | Facility: HOSPITAL | Age: 79
End: 2024-08-06
Payer: MEDICARE

## 2024-08-06 NOTE — PROGRESS NOTES
Specialty Pharmacy Refill Coordination Note     Patient is currently admitted to [facility] in [place].  Dr. Maya is aware of this patient's situation. Will continue to follow.     Beverly Worthington, Pharmacy Technician  Specialty Pharmacy Technician

## 2024-08-08 ENCOUNTER — TELEPHONE (OUTPATIENT)
Dept: INTERVENTIONAL RADIOLOGY/VASCULAR | Facility: HOSPITAL | Age: 79
End: 2024-08-08

## 2024-09-18 LAB
CYTO UR: NORMAL
LAB AP CASE REPORT: NORMAL
LAB AP CLINICAL INFORMATION: NORMAL
LAB AP FLOW CYTOMETRY SUMMARY: NORMAL
Lab: NORMAL
Lab: NORMAL
PATH REPORT.FINAL DX SPEC: NORMAL
PATH REPORT.GROSS SPEC: NORMAL

## 2024-10-16 ENCOUNTER — SPECIALTY PHARMACY (OUTPATIENT)
Dept: ONCOLOGY | Facility: HOSPITAL | Age: 79
End: 2024-10-16
Payer: MEDICARE

## 2024-10-16 NOTE — PROGRESS NOTES
Specialty Pharmacy Refill Coordination Note     Patient Outreach  An attempt was made by [facility] to contact this patient for a follow-up on their chemotherapy medication. [facility] can be reached at [phone].        Beverly Worthington, Pharmacy Technician  Specialty Pharmacy Technician

## 2024-11-04 ENCOUNTER — TELEPHONE (OUTPATIENT)
Dept: GASTROENTEROLOGY | Facility: CLINIC | Age: 79
End: 2024-11-04
Payer: MEDICARE

## 2024-11-04 NOTE — TELEPHONE ENCOUNTER
She has a follow up coming up but wanted to see if you wanted her pancreatic imaging done before her appt?

## 2024-11-05 DIAGNOSIS — K86.9 PANCREATIC LESION: Primary | ICD-10-CM

## 2024-11-20 ENCOUNTER — TELEPHONE (OUTPATIENT)
Dept: GASTROENTEROLOGY | Age: 79
End: 2024-11-20

## 2024-11-20 NOTE — TELEPHONE ENCOUNTER
Lvm for pt to call back. She is a [facility] primary patient and I wanted to make sure if we need to order imaging or if they are. I see a phone note from 11/4/24 about it at their office, but don't see anything scheduled.     ----- Message from JOSAFAT VIEIRA MA sent at 12/21/2023  9:33 AM CST -----  Regarding: PT in recall, due for repeat imaging around 12/20/24, she is a [facility] pt also

## (undated) DEVICE — PK SPINE POST 30

## (undated) DEVICE — ELECTRD BLD EZ CLN MOD XLNG 2.75IN

## (undated) DEVICE — SUT VIC 0 UR6 27IN VCP603H

## (undated) DEVICE — GLV SURG SENSICARE W/ALOE PF LF 7.5 STRL

## (undated) DEVICE — CLTH CLENS READYCLEANSE PERI CARE PK/5

## (undated) DEVICE — DRESSING,GAUZE,XEROFORM,CURAD,5"X9",ST: Brand: CURAD

## (undated) DEVICE — GLV SURG DERMASSURE GRN LF PF 7.0

## (undated) DEVICE — APPL DURAPREP IODOPHOR APL 26ML

## (undated) DEVICE — GLV SURG DERMASSURE GRN LF PF 8.0

## (undated) DEVICE — SUT VIC 0 MO4 CR8 18IN VCP701D

## (undated) DEVICE — SPK10277 JACKSON/PRO-AXIS KIT: Brand: SPK10277 JACKSON/PRO-AXIS KIT

## (undated) DEVICE — PENCL ES MEGADINE EZ/CLEAN BUTN W/HOLSTR 10FT

## (undated) DEVICE — 3.0MM PRECISION NEURO (MATCH HEAD)

## (undated) DEVICE — ELECTRD BLD EZ CLN MOD 6.5IN

## (undated) DEVICE — GLV SURG BIOGEL LTX PF 6 1/2

## (undated) DEVICE — CATH IV ANGIO FEP 12G 3IN LTBLU 10PK

## (undated) DEVICE — NEEDLE, QUINCKE 22GX3.5": Brand: MEDLINE INDUSTRIES, INC.

## (undated) DEVICE — SUT MNCRYL 4/0 PS2 27IN UD MCP426H

## (undated) DEVICE — ELECTRD BLD EZ CLN MOD 4IN

## (undated) DEVICE — TRAP FLD MINIVAC MEGADYNE 100ML

## (undated) DEVICE — CONN FLX BREATHE CIRCT

## (undated) DEVICE — ANTIBACTERIAL VIOLET BRAIDED (POLYGLACTIN 910), SYNTHETIC ABSORBABLE SUTURE: Brand: COATED VICRYL